# Patient Record
Sex: MALE | Race: WHITE | Employment: OTHER | URBAN - METROPOLITAN AREA
[De-identification: names, ages, dates, MRNs, and addresses within clinical notes are randomized per-mention and may not be internally consistent; named-entity substitution may affect disease eponyms.]

---

## 2018-06-08 ENCOUNTER — HOSPITAL ENCOUNTER (OUTPATIENT)
Age: 63
Discharge: HOME | End: 2018-06-10
Payer: MEDICARE

## 2018-06-08 ENCOUNTER — OFFICE VISIT (OUTPATIENT)
Dept: FAMILY MEDICINE CLINIC | Age: 63
End: 2018-06-08
Payer: MEDICARE

## 2018-06-08 VITALS
HEART RATE: 79 BPM | SYSTOLIC BLOOD PRESSURE: 118 MMHG | WEIGHT: 312 LBS | RESPIRATION RATE: 18 BRPM | OXYGEN SATURATION: 96 % | HEIGHT: 66 IN | BODY MASS INDEX: 50.14 KG/M2 | DIASTOLIC BLOOD PRESSURE: 80 MMHG

## 2018-06-08 DIAGNOSIS — I48.0 PAF (PAROXYSMAL ATRIAL FIBRILLATION) (HCC): ICD-10-CM

## 2018-06-08 DIAGNOSIS — E55.9 VITAMIN D DEFICIENCY: ICD-10-CM

## 2018-06-08 DIAGNOSIS — Z79.4 TYPE 2 DIABETES MELLITUS WITH HYPERGLYCEMIA, WITH LONG-TERM CURRENT USE OF INSULIN (HCC): ICD-10-CM

## 2018-06-08 DIAGNOSIS — E11.65 TYPE 2 DIABETES MELLITUS WITH HYPERGLYCEMIA, WITH LONG-TERM CURRENT USE OF INSULIN (HCC): ICD-10-CM

## 2018-06-08 DIAGNOSIS — E78.2 MIXED HYPERLIPIDEMIA: ICD-10-CM

## 2018-06-08 DIAGNOSIS — I25.10 CORONARY ARTERY DISEASE INVOLVING NATIVE CORONARY ARTERY OF NATIVE HEART WITHOUT ANGINA PECTORIS: ICD-10-CM

## 2018-06-08 DIAGNOSIS — I48.0 PAF (PAROXYSMAL ATRIAL FIBRILLATION) (HCC): Primary | ICD-10-CM

## 2018-06-08 DIAGNOSIS — I10 ESSENTIAL HYPERTENSION: ICD-10-CM

## 2018-06-08 DIAGNOSIS — K21.9 GASTROESOPHAGEAL REFLUX DISEASE WITHOUT ESOPHAGITIS: ICD-10-CM

## 2018-06-08 LAB
HBA1C MFR BLD: 9.4 %
INTERNATIONAL NORMALIZATION RATIO, POC: 2.3
PROTHROMBIN TIME, POC: NORMAL

## 2018-06-08 PROCEDURE — 99214 OFFICE O/P EST MOD 30 MIN: CPT | Performed by: FAMILY MEDICINE

## 2018-06-08 PROCEDURE — 82607 VITAMIN B-12: CPT

## 2018-06-08 PROCEDURE — G8510 SCR DEP NEG, NO PLAN REQD: HCPCS | Performed by: FAMILY MEDICINE

## 2018-06-08 PROCEDURE — G8427 DOCREV CUR MEDS BY ELIG CLIN: HCPCS | Performed by: FAMILY MEDICINE

## 2018-06-08 PROCEDURE — 83036 HEMOGLOBIN GLYCOSYLATED A1C: CPT | Performed by: FAMILY MEDICINE

## 2018-06-08 PROCEDURE — 3046F HEMOGLOBIN A1C LEVEL >9.0%: CPT | Performed by: FAMILY MEDICINE

## 2018-06-08 PROCEDURE — 85610 PROTHROMBIN TIME: CPT | Performed by: FAMILY MEDICINE

## 2018-06-08 PROCEDURE — 3017F COLORECTAL CA SCREEN DOC REV: CPT | Performed by: FAMILY MEDICINE

## 2018-06-08 PROCEDURE — 1036F TOBACCO NON-USER: CPT | Performed by: FAMILY MEDICINE

## 2018-06-08 PROCEDURE — 82746 ASSAY OF FOLIC ACID SERUM: CPT

## 2018-06-08 PROCEDURE — 84439 ASSAY OF FREE THYROXINE: CPT

## 2018-06-08 PROCEDURE — 36415 COLL VENOUS BLD VENIPUNCTURE: CPT

## 2018-06-08 PROCEDURE — G8417 CALC BMI ABV UP PARAM F/U: HCPCS | Performed by: FAMILY MEDICINE

## 2018-06-08 PROCEDURE — 80053 COMPREHEN METABOLIC PANEL: CPT

## 2018-06-08 PROCEDURE — 85025 COMPLETE CBC W/AUTO DIFF WBC: CPT

## 2018-06-08 PROCEDURE — 84443 ASSAY THYROID STIM HORMONE: CPT

## 2018-06-08 PROCEDURE — 82306 VITAMIN D 25 HYDROXY: CPT

## 2018-06-08 PROCEDURE — G8598 ASA/ANTIPLAT THER USED: HCPCS | Performed by: FAMILY MEDICINE

## 2018-06-08 PROCEDURE — 2022F DILAT RTA XM EVC RTNOPTHY: CPT | Performed by: FAMILY MEDICINE

## 2018-06-08 PROCEDURE — 80061 LIPID PANEL: CPT

## 2018-06-08 PROCEDURE — 82044 UR ALBUMIN SEMIQUANTITATIVE: CPT

## 2018-06-08 PROCEDURE — 83036 HEMOGLOBIN GLYCOSYLATED A1C: CPT

## 2018-06-08 RX ORDER — METOPROLOL SUCCINATE 25 MG/1
TABLET, EXTENDED RELEASE ORAL
Qty: 90 TABLET | Refills: 3 | Status: SHIPPED | OUTPATIENT
Start: 2018-06-08 | End: 2019-04-03 | Stop reason: SDUPTHER

## 2018-06-08 RX ORDER — WARFARIN SODIUM 5 MG/1
TABLET ORAL
Qty: 50 TABLET | Refills: 5 | Status: SHIPPED | OUTPATIENT
Start: 2018-06-08 | End: 2019-01-03 | Stop reason: SDUPTHER

## 2018-06-08 RX ORDER — PANTOPRAZOLE SODIUM 40 MG/1
TABLET, DELAYED RELEASE ORAL
Qty: 30 TABLET | Refills: 0 | Status: SHIPPED | OUTPATIENT
Start: 2018-06-08 | End: 2018-07-20 | Stop reason: SDUPTHER

## 2018-06-08 RX ORDER — PRAVASTATIN SODIUM 20 MG
20 TABLET ORAL DAILY
Qty: 90 TABLET | Refills: 3 | Status: ON HOLD | OUTPATIENT
Start: 2018-06-08 | End: 2019-06-25 | Stop reason: HOSPADM

## 2018-06-09 LAB
ALBUMIN SERPL-MCNC: 4.2 G/DL (ref 3.5–5.2)
ALP BLD-CCNC: 72 U/L (ref 40–129)
ALT SERPL-CCNC: 19 U/L (ref 0–40)
ANION GAP SERPL CALCULATED.3IONS-SCNC: 20 MMOL/L (ref 7–16)
AST SERPL-CCNC: 18 U/L (ref 0–39)
BASOPHILS ABSOLUTE: 0.06 E9/L (ref 0–0.2)
BASOPHILS RELATIVE PERCENT: 0.7 % (ref 0–2)
BILIRUB SERPL-MCNC: 0.5 MG/DL (ref 0–1.2)
BUN BLDV-MCNC: 18 MG/DL (ref 8–23)
CALCIUM SERPL-MCNC: 9.8 MG/DL (ref 8.6–10.2)
CHLORIDE BLD-SCNC: 99 MMOL/L (ref 98–107)
CHOLESTEROL, TOTAL: 219 MG/DL (ref 0–199)
CO2: 21 MMOL/L (ref 22–29)
CREAT SERPL-MCNC: 0.9 MG/DL (ref 0.7–1.2)
EOSINOPHILS ABSOLUTE: 0.1 E9/L (ref 0.05–0.5)
EOSINOPHILS RELATIVE PERCENT: 1.2 % (ref 0–6)
FOLATE: 17.9 NG/ML (ref 4.8–24.2)
GFR AFRICAN AMERICAN: >60
GFR NON-AFRICAN AMERICAN: >60 ML/MIN/1.73
GLUCOSE BLD-MCNC: 199 MG/DL (ref 74–109)
HBA1C MFR BLD: 9.5 % (ref 4.8–5.9)
HCT VFR BLD CALC: 50.9 % (ref 37–54)
HDLC SERPL-MCNC: 51 MG/DL
HEMOGLOBIN: 15.9 G/DL (ref 12.5–16.5)
IMMATURE GRANULOCYTES #: 0.05 E9/L
IMMATURE GRANULOCYTES %: 0.6 % (ref 0–5)
LDL CHOLESTEROL CALCULATED: 116 MG/DL (ref 0–99)
LYMPHOCYTES ABSOLUTE: 2.01 E9/L (ref 1.5–4)
LYMPHOCYTES RELATIVE PERCENT: 23.7 % (ref 20–42)
MCH RBC QN AUTO: 30.3 PG (ref 26–35)
MCHC RBC AUTO-ENTMCNC: 31.2 % (ref 32–34.5)
MCV RBC AUTO: 97 FL (ref 80–99.9)
MICROALBUMIN UR-MCNC: 23.3 MG/L
MONOCYTES ABSOLUTE: 0.66 E9/L (ref 0.1–0.95)
MONOCYTES RELATIVE PERCENT: 7.8 % (ref 2–12)
NEUTROPHILS ABSOLUTE: 5.59 E9/L (ref 1.8–7.3)
NEUTROPHILS RELATIVE PERCENT: 66 % (ref 43–80)
PDW BLD-RTO: 14.7 FL (ref 11.5–15)
PLATELET # BLD: 254 E9/L (ref 130–450)
PMV BLD AUTO: 10.4 FL (ref 7–12)
POTASSIUM SERPL-SCNC: 4.6 MMOL/L (ref 3.5–5)
RBC # BLD: 5.25 E12/L (ref 3.8–5.8)
SODIUM BLD-SCNC: 140 MMOL/L (ref 132–146)
T4 FREE: 1.15 NG/DL (ref 0.93–1.7)
TOTAL PROTEIN: 7.8 G/DL (ref 6.4–8.3)
TRIGL SERPL-MCNC: 260 MG/DL (ref 0–149)
TSH SERPL DL<=0.05 MIU/L-ACNC: 4.21 UIU/ML (ref 0.27–4.2)
VITAMIN B-12: 447 PG/ML (ref 211–946)
VITAMIN D 25-HYDROXY: 59 NG/ML (ref 30–100)
VLDLC SERPL CALC-MCNC: 52 MG/DL
WBC # BLD: 8.5 E9/L (ref 4.5–11.5)

## 2018-06-10 PROBLEM — E78.2 MIXED HYPERLIPIDEMIA: Status: ACTIVE | Noted: 2018-06-10

## 2018-06-10 PROBLEM — K21.9 GASTROESOPHAGEAL REFLUX DISEASE WITHOUT ESOPHAGITIS: Status: ACTIVE | Noted: 2018-06-10

## 2018-06-10 ASSESSMENT — ENCOUNTER SYMPTOMS
CHEST TIGHTNESS: 0
RECTAL PAIN: 0
PHOTOPHOBIA: 0
COUGH: 1
VOICE CHANGE: 0
RHINORRHEA: 0
EYE ITCHING: 0
TROUBLE SWALLOWING: 0
CHOKING: 0
DIARRHEA: 0
STRIDOR: 0
WHEEZING: 0
ABDOMINAL DISTENTION: 0
FACIAL SWELLING: 0
ANAL BLEEDING: 0
BLOOD IN STOOL: 0
COLOR CHANGE: 0
EYE DISCHARGE: 0
CONSTIPATION: 0
EYE REDNESS: 0
APNEA: 0
BACK PAIN: 0
HEARTBURN: 1
EYE PAIN: 0

## 2018-06-10 ASSESSMENT — PATIENT HEALTH QUESTIONNAIRE - PHQ9
SUM OF ALL RESPONSES TO PHQ9 QUESTIONS 1 & 2: 0
1. LITTLE INTEREST OR PLEASURE IN DOING THINGS: 0
2. FEELING DOWN, DEPRESSED OR HOPELESS: 0
SUM OF ALL RESPONSES TO PHQ QUESTIONS 1-9: 0

## 2018-06-19 DIAGNOSIS — E03.9 ACQUIRED HYPOTHYROIDISM: ICD-10-CM

## 2018-06-19 DIAGNOSIS — E03.9 HYPOTHYROIDISM, UNSPECIFIED TYPE: Primary | ICD-10-CM

## 2018-06-19 DIAGNOSIS — E55.9 VITAMIN D DEFICIENCY: ICD-10-CM

## 2018-06-19 RX ORDER — LEVOTHYROXINE SODIUM 0.03 MG/1
25 TABLET ORAL DAILY
Qty: 30 TABLET | Refills: 5 | Status: SHIPPED | OUTPATIENT
Start: 2018-06-19 | End: 2019-01-03

## 2018-07-24 RX ORDER — PANTOPRAZOLE SODIUM 40 MG/1
TABLET, DELAYED RELEASE ORAL
Qty: 30 TABLET | Refills: 2 | Status: SHIPPED | OUTPATIENT
Start: 2018-07-24 | End: 2018-10-14 | Stop reason: SDUPTHER

## 2018-08-07 ENCOUNTER — OFFICE VISIT (OUTPATIENT)
Dept: CARDIOLOGY CLINIC | Age: 63
End: 2018-08-07
Payer: MEDICARE

## 2018-08-07 VITALS
DIASTOLIC BLOOD PRESSURE: 78 MMHG | BODY MASS INDEX: 49.82 KG/M2 | HEART RATE: 79 BPM | WEIGHT: 310 LBS | SYSTOLIC BLOOD PRESSURE: 118 MMHG | HEIGHT: 66 IN

## 2018-08-07 DIAGNOSIS — Z72.0 TOBACCO ABUSE: ICD-10-CM

## 2018-08-07 DIAGNOSIS — Z98.890 HISTORY OF MITRAL VALVE REPAIR: ICD-10-CM

## 2018-08-07 DIAGNOSIS — Z79.4 TYPE 2 DIABETES MELLITUS WITH HYPERGLYCEMIA, WITH LONG-TERM CURRENT USE OF INSULIN (HCC): ICD-10-CM

## 2018-08-07 DIAGNOSIS — Z95.1 S/P CABG X 2: ICD-10-CM

## 2018-08-07 DIAGNOSIS — I10 ESSENTIAL HYPERTENSION: ICD-10-CM

## 2018-08-07 DIAGNOSIS — E11.65 TYPE 2 DIABETES MELLITUS WITH HYPERGLYCEMIA, WITH LONG-TERM CURRENT USE OF INSULIN (HCC): ICD-10-CM

## 2018-08-07 DIAGNOSIS — I48.0 PAF (PAROXYSMAL ATRIAL FIBRILLATION) (HCC): ICD-10-CM

## 2018-08-07 DIAGNOSIS — E66.01 MORBID OBESITY WITH BMI OF 50.0-59.9, ADULT (HCC): ICD-10-CM

## 2018-08-07 DIAGNOSIS — I25.10 CORONARY ARTERY DISEASE INVOLVING NATIVE CORONARY ARTERY OF NATIVE HEART WITHOUT ANGINA PECTORIS: Primary | ICD-10-CM

## 2018-08-07 PROCEDURE — G8417 CALC BMI ABV UP PARAM F/U: HCPCS | Performed by: INTERNAL MEDICINE

## 2018-08-07 PROCEDURE — 93000 ELECTROCARDIOGRAM COMPLETE: CPT | Performed by: INTERNAL MEDICINE

## 2018-08-07 PROCEDURE — G8598 ASA/ANTIPLAT THER USED: HCPCS | Performed by: INTERNAL MEDICINE

## 2018-08-07 PROCEDURE — 3046F HEMOGLOBIN A1C LEVEL >9.0%: CPT | Performed by: INTERNAL MEDICINE

## 2018-08-07 PROCEDURE — 1036F TOBACCO NON-USER: CPT | Performed by: INTERNAL MEDICINE

## 2018-08-07 PROCEDURE — G8427 DOCREV CUR MEDS BY ELIG CLIN: HCPCS | Performed by: INTERNAL MEDICINE

## 2018-08-07 PROCEDURE — 3017F COLORECTAL CA SCREEN DOC REV: CPT | Performed by: INTERNAL MEDICINE

## 2018-08-07 PROCEDURE — 2022F DILAT RTA XM EVC RTNOPTHY: CPT | Performed by: INTERNAL MEDICINE

## 2018-08-07 PROCEDURE — 99214 OFFICE O/P EST MOD 30 MIN: CPT | Performed by: INTERNAL MEDICINE

## 2018-08-07 NOTE — PROGRESS NOTES
12.5-500 MG TABS, Take 1 tablet by mouth daily, Disp: 30 tablet, Rfl: 5    glucose blood VI test strips (ASCENSIA AUTODISC VI;ONE TOUCH ULTRA TEST VI) strip, Test tid prn, Disp: 100 strip, Rfl: 5    glucose blood VI test strips (FREESTYLE LITE) strip, TEST THREE TIMES DAILY, Disp: 100 strip, Rfl: 5    tiotropium (SPIRIVA RESPIMAT) 2.5 MCG/ACT AERS inhaler, Inhale 1 puff into the lungs daily, Disp: 3 Inhaler, Rfl: 12    Blood Glucose Monitoring Suppl (FREESTYLE LITE) MEDINA, , Disp: , Rfl:     CPAP Machine MISC, by Does not apply route nightly as needed , Disp: , Rfl:     Accu-Chek Multiclix Lancets MISC, Use as directed, Disp: 100 each, Rfl: 3    aspirin 81 MG tablet, Take 81 mg by mouth daily. , Disp: , Rfl:       S: REASON FOR VISIT:       Chief Complaint   Patient presents with    Atrial Fibrillation     6 month. Pt has no cardiac complaints today          History of Present Illness:       Office Visit for follow up of CAD, A Fib, VHD     No hospitalizations or surgeries since last visit     Cory any exertional chest pain or short of breath   No palpitations, dizzy or syncope.    Active at home   No orthopnea   Try to watch diet   Compliant with all medications       Past Medical History:   Diagnosis Date    Acid reflux     Arthritis     Asthma 4/16/2014    Asthmatic bronchitis , chronic (HCC) 11/28/2016    CAD (coronary artery disease)     [facility]itis ([facility] 4/16/2014    COPD [facility]ch[facility] obstructive [facility]     CB    COPD (chronic obstructive pulmonary disease) [facility]     Diabetes mellitus ([facility]     Emphysema (subcutaneous) (surgical) resulting from a procedure     Hyperlipidemia     Hypertension     Hypoxemia requiring supplemental oxygen 2/2/2015    LONG TERM ANTICOAGULENT USE     Morbid obesity with BMI of 50.0-59.9, adult ([facility] 11/27/2013    Obesity     Osteoarthritis     Sleep apnea     bilevel positive airway pressure at 13/8 with 2 L oxygen flow     Tobacco abuse polydyspsia  Genitourinary:negative for dysuria and hematuria  Derm: negative for rash and skin lesion(s)  Neurological: negative for tingling, numbness, weakness, seizures and tremors  Endocrine: negative for polydipsia and polyuria  Musculoskeletal: negative for pain or tenderness  Psychiatric: negative for anxiety, depression, or suicidal ideations         O:  COMPLETE PHYSICAL EXAM:       /78   Pulse 79   Ht 5' 6\" (1.676 m)   Wt (!) 310 lb (140.6 kg)   BMI 50.04 kg/m²       General:   Patient alert, comfortable, no distress. Appears stated age. HEENT:    Pupils equal, no icterus, no nasal drainage, tongue moist.   Neck:              No masses, Thyroid not palpable. Chest:   Normal configuration, non tender. Lungs:   Clear to auscultation bilaterally, few scattered rhonchi. Cardiovascular:  Regular rhythm, 1/6 systolic murmur, No S3, no palpable thrills, No elevated JVD, No carotid bruit. Abdomen:  Soft, Non tender, Bowel sounds normal, no pulsatile abdominal aorta, no palpable masses. Extremities:  No edema. Distal pulses palpable. No cyanosis, no clubbing. Skin:   Good turgor, warm and dry, no cyanosis. Musculoskeletal: No joint swelling or deformity. Neuro:   Cranial nerves grossly intact; No focal neurologic deficit. Psych:   Alert, good mood and effect. REVIEW OF DIAGNOSTIC TESTS:        Electrocardiogram: Atrial paced             A/P:   ASSESSMENT / PLAN:    Spring Riddle was seen today for atrial fibrillation.     Diagnoses and all orders for this visit:      Coronary artery disease involving native coronary artery of native heart without angina pectoris: On ASA, BB, statins  -     EKG 12 Lead     S/P CABG (coronary artery bypass graft) x2 9/2013-Dr Aden     S/P MVR (mitral valve repair) 2013     PAF (paroxysmal atrial fibrillation) ([facility] on Coumadin    Nonrheumatic aortic valve stenosis, mild     DE PAZ (dyspnea on exertion)     Abnormal resting ECG findings     Controlled type

## 2018-09-10 ENCOUNTER — OFFICE VISIT (OUTPATIENT)
Dept: FAMILY MEDICINE CLINIC | Age: 63
End: 2018-09-10
Payer: MEDICARE

## 2018-09-10 ENCOUNTER — HOSPITAL ENCOUNTER (OUTPATIENT)
Age: 63
Discharge: HOME | End: 2018-09-12
Payer: MEDICARE

## 2018-09-10 VITALS
WEIGHT: 315 LBS | OXYGEN SATURATION: 97 % | HEART RATE: 76 BPM | DIASTOLIC BLOOD PRESSURE: 80 MMHG | BODY MASS INDEX: 51.65 KG/M2 | SYSTOLIC BLOOD PRESSURE: 124 MMHG

## 2018-09-10 DIAGNOSIS — Z79.4 TYPE 2 DIABETES MELLITUS WITH HYPERGLYCEMIA, WITH LONG-TERM CURRENT USE OF INSULIN (HCC): ICD-10-CM

## 2018-09-10 DIAGNOSIS — I48.0 PAF (PAROXYSMAL ATRIAL FIBRILLATION) (HCC): Primary | ICD-10-CM

## 2018-09-10 DIAGNOSIS — R06.02 SOB (SHORTNESS OF BREATH): ICD-10-CM

## 2018-09-10 DIAGNOSIS — F17.211 CIGARETTE NICOTINE DEPENDENCE IN REMISSION: ICD-10-CM

## 2018-09-10 DIAGNOSIS — I48.0 PAF (PAROXYSMAL ATRIAL FIBRILLATION) (HCC): ICD-10-CM

## 2018-09-10 DIAGNOSIS — E11.65 TYPE 2 DIABETES MELLITUS WITH HYPERGLYCEMIA, WITH LONG-TERM CURRENT USE OF INSULIN (HCC): ICD-10-CM

## 2018-09-10 DIAGNOSIS — Z87.891 PERSONAL HISTORY OF TOBACCO USE: ICD-10-CM

## 2018-09-10 DIAGNOSIS — G25.81 RESTLESS LEG SYNDROME: ICD-10-CM

## 2018-09-10 LAB
BASOPHILS ABSOLUTE: 0.04 E9/L (ref 0–0.2)
BASOPHILS RELATIVE PERCENT: 0.5 % (ref 0–2)
EOSINOPHILS ABSOLUTE: 0.05 E9/L (ref 0.05–0.5)
EOSINOPHILS RELATIVE PERCENT: 0.6 % (ref 0–6)
HBA1C MFR BLD: 10.4 %
HCT VFR BLD CALC: 46.1 % (ref 37–54)
HEMOGLOBIN: 14.2 G/DL (ref 12.5–16.5)
IMMATURE GRANULOCYTES #: 0.05 E9/L
IMMATURE GRANULOCYTES %: 0.6 % (ref 0–5)
INTERNATIONAL NORMALIZATION RATIO, POC: 1.9
LYMPHOCYTES ABSOLUTE: 1.53 E9/L (ref 1.5–4)
LYMPHOCYTES RELATIVE PERCENT: 17.8 % (ref 20–42)
MCH RBC QN AUTO: 30.4 PG (ref 26–35)
MCHC RBC AUTO-ENTMCNC: 30.8 % (ref 32–34.5)
MCV RBC AUTO: 98.7 FL (ref 80–99.9)
MONOCYTES ABSOLUTE: 0.54 E9/L (ref 0.1–0.95)
MONOCYTES RELATIVE PERCENT: 6.3 % (ref 2–12)
NEUTROPHILS ABSOLUTE: 6.37 E9/L (ref 1.8–7.3)
NEUTROPHILS RELATIVE PERCENT: 74.2 % (ref 43–80)
PDW BLD-RTO: 15.3 FL (ref 11.5–15)
PLATELET # BLD: 237 E9/L (ref 130–450)
PMV BLD AUTO: 10.3 FL (ref 7–12)
PROTHROMBIN TIME, POC: NORMAL
RBC # BLD: 4.67 E12/L (ref 3.8–5.8)
WBC # BLD: 8.6 E9/L (ref 4.5–11.5)

## 2018-09-10 PROCEDURE — 1036F TOBACCO NON-USER: CPT | Performed by: FAMILY MEDICINE

## 2018-09-10 PROCEDURE — G8598 ASA/ANTIPLAT THER USED: HCPCS | Performed by: FAMILY MEDICINE

## 2018-09-10 PROCEDURE — 3046F HEMOGLOBIN A1C LEVEL >9.0%: CPT | Performed by: FAMILY MEDICINE

## 2018-09-10 PROCEDURE — G8510 SCR DEP NEG, NO PLAN REQD: HCPCS | Performed by: FAMILY MEDICINE

## 2018-09-10 PROCEDURE — G0296 VISIT TO DETERM LDCT ELIG: HCPCS | Performed by: FAMILY MEDICINE

## 2018-09-10 PROCEDURE — 84443 ASSAY THYROID STIM HORMONE: CPT

## 2018-09-10 PROCEDURE — 80053 COMPREHEN METABOLIC PANEL: CPT

## 2018-09-10 PROCEDURE — 2022F DILAT RTA XM EVC RTNOPTHY: CPT | Performed by: FAMILY MEDICINE

## 2018-09-10 PROCEDURE — G8427 DOCREV CUR MEDS BY ELIG CLIN: HCPCS | Performed by: FAMILY MEDICINE

## 2018-09-10 PROCEDURE — 3017F COLORECTAL CA SCREEN DOC REV: CPT | Performed by: FAMILY MEDICINE

## 2018-09-10 PROCEDURE — 83036 HEMOGLOBIN GLYCOSYLATED A1C: CPT

## 2018-09-10 PROCEDURE — 99214 OFFICE O/P EST MOD 30 MIN: CPT | Performed by: FAMILY MEDICINE

## 2018-09-10 PROCEDURE — 80061 LIPID PANEL: CPT

## 2018-09-10 PROCEDURE — 83036 HEMOGLOBIN GLYCOSYLATED A1C: CPT | Performed by: FAMILY MEDICINE

## 2018-09-10 PROCEDURE — 85025 COMPLETE CBC W/AUTO DIFF WBC: CPT

## 2018-09-10 PROCEDURE — G8417 CALC BMI ABV UP PARAM F/U: HCPCS | Performed by: FAMILY MEDICINE

## 2018-09-10 PROCEDURE — 85610 PROTHROMBIN TIME: CPT | Performed by: FAMILY MEDICINE

## 2018-09-10 RX ORDER — PRAMIPEXOLE DIHYDROCHLORIDE 0.25 MG/1
0.25 TABLET ORAL NIGHTLY
Qty: 30 TABLET | Refills: 5 | Status: SHIPPED | OUTPATIENT
Start: 2018-09-10 | End: 2019-02-26 | Stop reason: SDUPTHER

## 2018-09-10 ASSESSMENT — PATIENT HEALTH QUESTIONNAIRE - PHQ9
1. LITTLE INTEREST OR PLEASURE IN DOING THINGS: 0
SUM OF ALL RESPONSES TO PHQ9 QUESTIONS 1 & 2: 0
SUM OF ALL RESPONSES TO PHQ QUESTIONS 1-9: 0
2. FEELING DOWN, DEPRESSED OR HOPELESS: 0
SUM OF ALL RESPONSES TO PHQ QUESTIONS 1-9: 0
1. LITTLE INTEREST OR PLEASURE IN DOING THINGS: 0
SUM OF ALL RESPONSES TO PHQ QUESTIONS 1-9: 0
2. FEELING DOWN, DEPRESSED OR HOPELESS: 0
SUM OF ALL RESPONSES TO PHQ9 QUESTIONS 1 & 2: 0
SUM OF ALL RESPONSES TO PHQ QUESTIONS 1-9: 0

## 2018-09-11 ENCOUNTER — TELEPHONE (OUTPATIENT)
Dept: CASE MANAGEMENT | Age: 63
End: 2018-09-11

## 2018-09-11 ENCOUNTER — TELEPHONE (OUTPATIENT)
Dept: FAMILY MEDICINE CLINIC | Age: 63
End: 2018-09-11

## 2018-09-11 LAB
ALBUMIN SERPL-MCNC: 4 G/DL (ref 3.5–5.2)
ALP BLD-CCNC: 86 U/L (ref 40–129)
ALT SERPL-CCNC: 44 U/L (ref 0–40)
ANION GAP SERPL CALCULATED.3IONS-SCNC: 16 MMOL/L (ref 7–16)
AST SERPL-CCNC: 25 U/L (ref 0–39)
BILIRUB SERPL-MCNC: 0.8 MG/DL (ref 0–1.2)
BUN BLDV-MCNC: 12 MG/DL (ref 8–23)
CALCIUM SERPL-MCNC: 9.9 MG/DL (ref 8.6–10.2)
CHLORIDE BLD-SCNC: 98 MMOL/L (ref 98–107)
CHOLESTEROL, TOTAL: 202 MG/DL (ref 0–199)
CO2: 26 MMOL/L (ref 22–29)
CREAT SERPL-MCNC: 0.8 MG/DL (ref 0.7–1.2)
GFR AFRICAN AMERICAN: >60
GFR NON-AFRICAN AMERICAN: >60 ML/MIN/1.73
GLUCOSE BLD-MCNC: 243 MG/DL (ref 74–109)
HBA1C MFR BLD: 10.4 % (ref 4–5.6)
HDLC SERPL-MCNC: 44 MG/DL
LDL CHOLESTEROL CALCULATED: 113 MG/DL (ref 0–99)
POTASSIUM SERPL-SCNC: 4.6 MMOL/L (ref 3.5–5)
SODIUM BLD-SCNC: 140 MMOL/L (ref 132–146)
TOTAL PROTEIN: 7.2 G/DL (ref 6.4–8.3)
TRIGL SERPL-MCNC: 223 MG/DL (ref 0–149)
TSH SERPL DL<=0.05 MIU/L-ACNC: 3.26 UIU/ML (ref 0.27–4.2)
VLDLC SERPL CALC-MCNC: 45 MG/DL

## 2018-09-11 ASSESSMENT — ENCOUNTER SYMPTOMS
EYE ITCHING: 0
COLOR CHANGE: 0
APNEA: 0
CHOKING: 0
EYE PAIN: 0
VOICE CHANGE: 0
SINUS PRESSURE: 0
CONSTIPATION: 0
BACK PAIN: 0
FACIAL SWELLING: 0
SORE THROAT: 0
RHINORRHEA: 0
WHEEZING: 0
EYE REDNESS: 0
RECTAL PAIN: 0
SHORTNESS OF BREATH: 1
ANAL BLEEDING: 0
COUGH: 0
ABDOMINAL PAIN: 0
PHOTOPHOBIA: 0
STRIDOR: 0
EYE DISCHARGE: 0
NAUSEA: 0
DIARRHEA: 0
CHEST TIGHTNESS: 0
HEARTBURN: 1
BLOOD IN STOOL: 0
VOMITING: 0
ABDOMINAL DISTENTION: 0
TROUBLE SWALLOWING: 0

## 2018-09-11 NOTE — PROGRESS NOTES
Subjective:      Patient ID: Camron Treviño is a 58 y.o. male. Shortness of Breath   This is a chronic problem. The current episode started 1 to 4 weeks ago. The problem occurs daily. The problem has been waxing and waning. Pertinent negatives include no abdominal pain, chest pain, ear pain, fever, headaches, leg swelling, neck pain, rash, rhinorrhea, sore throat, vomiting or wheezing. The symptoms are aggravated by any activity. The patient has no known risk factors for DVT/PE. He has tried steroid inhalers and beta agonist inhalers for the symptoms. The treatment provided mild relief. Other   This is a recurrent (restless leg) problem. The current episode started more than 1 month ago. The problem occurs daily. The problem has been waxing and waning. Associated symptoms include arthralgias, fatigue and urinary symptoms. Pertinent negatives include no abdominal pain, chest pain, chills, congestion, coughing, diaphoresis, fever, headaches, joint swelling, myalgias, nausea, neck pain, numbness, rash, sore throat, vomiting or weakness. Nothing aggravates the symptoms. He has tried nothing for the symptoms. The treatment provided no relief. Fatigue   This is a chronic problem. The current episode started more than 1 year ago. The problem occurs daily. The problem has been waxing and waning. Associated symptoms include arthralgias, fatigue and urinary symptoms. Pertinent negatives include no abdominal pain, chest pain, chills, congestion, coughing, diaphoresis, fever, headaches, joint swelling, myalgias, nausea, neck pain, numbness, rash, sore throat, vomiting or weakness. Nothing aggravates the symptoms. He has tried nothing for the symptoms. The treatment provided no relief. Diabetes   He presents for his follow-up diabetic visit. He has type 2 diabetes mellitus. No MedicAlert identification noted. His disease course has been worsening. There are no hypoglycemic associated symptoms.  Pertinent negatives for today, and weight loss plan recommended is : medically supervised diet with primary care physician. reviewed health maintenance report. Patient is aware of deficiencies and suggested preventative tests.

## 2018-09-25 ENCOUNTER — OFFICE VISIT (OUTPATIENT)
Dept: PULMONOLOGY | Age: 63
End: 2018-09-25
Payer: MEDICARE

## 2018-09-25 VITALS
OXYGEN SATURATION: 91 % | HEIGHT: 66 IN | HEART RATE: 85 BPM | DIASTOLIC BLOOD PRESSURE: 80 MMHG | SYSTOLIC BLOOD PRESSURE: 124 MMHG | WEIGHT: 312 LBS | TEMPERATURE: 97.6 F | BODY MASS INDEX: 50.14 KG/M2 | RESPIRATION RATE: 22 BRPM

## 2018-09-25 DIAGNOSIS — Z72.0 TOBACCO ABUSE: ICD-10-CM

## 2018-09-25 DIAGNOSIS — E66.01 MORBID OBESITY WITH BMI OF 50.0-59.9, ADULT (HCC): ICD-10-CM

## 2018-09-25 DIAGNOSIS — J45.909 SEVERE ASTHMA WITHOUT COMPLICATION, UNSPECIFIED WHETHER PERSISTENT: Primary | ICD-10-CM

## 2018-09-25 DIAGNOSIS — G47.30 SLEEP APNEA, UNSPECIFIED TYPE: ICD-10-CM

## 2018-09-25 DIAGNOSIS — J42 CHRONIC BRONCHITIS, UNSPECIFIED CHRONIC BRONCHITIS TYPE (HCC): ICD-10-CM

## 2018-09-25 DIAGNOSIS — J44.9 ASTHMATIC BRONCHITIS , CHRONIC (HCC): ICD-10-CM

## 2018-09-25 LAB
DLCO %PRED: NORMAL
DLCO PRE: NORMAL
FEF 25-75%-POST: 1.7
FEF 25-75%-PRE: 1.24
FEV1-POST: 2.47
FEV1-PRE: 2.28
FEV1/FVC-POST: 72
FEV1/FVC-PRE: 66
FVC-POST: 3.44
FVC-PRE: 3.45
MEP: NORMAL
MIP: NORMAL
TLC %PRED: NORMAL
TLC PRE: NORMAL

## 2018-09-25 PROCEDURE — G8417 CALC BMI ABV UP PARAM F/U: HCPCS | Performed by: INTERNAL MEDICINE

## 2018-09-25 PROCEDURE — 99213 OFFICE O/P EST LOW 20 MIN: CPT | Performed by: INTERNAL MEDICINE

## 2018-09-25 PROCEDURE — 1036F TOBACCO NON-USER: CPT | Performed by: INTERNAL MEDICINE

## 2018-09-25 PROCEDURE — G8427 DOCREV CUR MEDS BY ELIG CLIN: HCPCS | Performed by: INTERNAL MEDICINE

## 2018-09-25 PROCEDURE — 94060 EVALUATION OF WHEEZING: CPT | Performed by: INTERNAL MEDICINE

## 2018-09-25 PROCEDURE — G8598 ASA/ANTIPLAT THER USED: HCPCS | Performed by: INTERNAL MEDICINE

## 2018-09-25 PROCEDURE — 3023F SPIROM DOC REV: CPT | Performed by: INTERNAL MEDICINE

## 2018-09-25 PROCEDURE — G8925 SPIR FEV1/FVC>=60% & NO COPD: HCPCS | Performed by: INTERNAL MEDICINE

## 2018-09-25 PROCEDURE — 3017F COLORECTAL CA SCREEN DOC REV: CPT | Performed by: INTERNAL MEDICINE

## 2018-09-25 PROCEDURE — 99215 OFFICE O/P EST HI 40 MIN: CPT | Performed by: INTERNAL MEDICINE

## 2018-09-25 ASSESSMENT — PULMONARY FUNCTION TESTS
FEV1_POST: 2.47
FEV1/FVC_POST: 72
FVC_POST: 3.44
FEV1_PRE: 2.28
FEV1/FVC_PRE: 66
FVC_PRE: 3.45

## 2018-09-25 NOTE — PROGRESS NOTES
Pulmonary [address] [facility]    Department of Internal Medicine  Division of Pulmonary, Critical Care and Sleep Medicine      Dear Manuel Hackett, DO    We had the pleasure of seeing Hu Combs, in the [address] at [facility] regarding his LISS & Chronic Asthmatic Bronchitis (COPD)     HISTORY OF PRESENT ILLNESS:  Hu Combs is a 58 y.o. male with a past medical history of Chronic bronchitis from his one pack per day smoker, stopped with MI 7/22/2013, Hypertension, Type II diabetes mellitus, Morbid Obesity, LISS, Sinus surgery and herniorrhaphy, No family history of premature vascular disease, Allergy to penicillin and sulfa drugs, [facility] admission, 07/23/2013 with two to three days of intermittent exertional chest discomfort. A prolonged episode occurred on the day of admission associated with inferior ST elevation and reciprocal lateral ST depression. Urgent cardiac catheterization, 07/23/2013. LV mild inferior hypokinesis, EF 55%. [facility] eccentric focal 65% distal stenosis. LAD and CX normal. OM1 50% ostial and proximal stenosis. RCA 25% mid stenosis, 99% focal distal stenosis. PCI distal RCA, 07/23/2013. 3.5 x 12 mm Vision BMS, no residual stenosis. Surgical consultation, Dr. Kael Boykin, 07/23/2013. Definitive plans were to have him take aspirin and Plavix for a month then undergo elective CABG one week after discontinuation of these medications. Unfourtunely he had a MI just toward the end of this regimen and he was taken to the OR and had CABG and mitral valve repair 09/09/2013. Postoperatively, he had pulmonary difficulties related to obesity/COPD. At discharge, he was in atrial fibrillation and is on a weaning dose of amiodarone. On today's visit, Hu Combs is just ok but has no chest pain, but is experiencing worsening or declining SOB. He has no pleurisy, hemoptysis. He has no CP, but has noted leg swelling.    Mr. Marilyn Mccurdy is semi - compliant with BiPAP with oxygen. He had issues with billing from [employer]. His sleep study showed excessive daytime sleepiness, hypersomnolence indicated by a sleep onset of 13 minutes, severe reduced sleep efficiency, severe sleep apnea syndrome with an apnea/hypopnea index of 60 and this was corrected with start bilevel positive airway pressure at 13/8 with 2 liters oxygen flow. No new complaints. His pulmonary function test remain stable and he quit smoking after his heart attack, but still has cravings to smoke- support provided. He is to continue with the nuMVC 99. He can use the nebulizer as needed. He has overnight oxygen testing which is 40% desaturations. ALLERGIES:    Allergies   Allergen Reactions    Pcn [Penicillins]      Child went to hospital   ? reaction    Sulfa Antibiotics      ?        PAST MEDICAL HISTORY:       Diagnosis Date    Acid reflux     Arthritis     Asthma 4/16/2014    Asthmatic bronchitis , chronic ([facility]) 11/28/2016    CAD (coronary artery disease)     [facility] 4/16/2014    COPD (chronic obstructive pulmonary disease) ([facility])     CB    COPD (chronic obstructive pulmonary disease) ([facility]     Diabetes mellitus ([facility])     Emphysema (subcutaneous) (surgical) resulting from a procedure     Hyperlipidemia     Hypertension     Hypoxemia requiring supplemental oxygen 2/2/2015    LONG TERM ANTICOAGULENT USE     Morbid obesity with BMI of 50.0-59.9, adult ([facility] 11/27/2013    Obesity     Osteoarthritis     Sleep apnea     bilevel positive airway pressure at 13/8 with 2 L oxygen flow     Tobacco abuse     Type II or unspecified type diabetes mellitus without mention of complication, not stated as uncontrolled         MEDICATIONS:   Current Outpatient Prescriptions   Medication Sig Dispense Refill    SYMBICORT 160-4.5 MCG/ACT AERO Inhale 2 puffs into the lungs 2 times daily 1 Inhaler 3    tiotropium (SPIRIVA RESPIMAT) 2.5 MCG/ACT AERS inhaler Inhale 1 puff into the lungs daily 3 Inhaler 12    gabapentin (NEURONTIN) 600 MG tablet TAKE ONE TABLET BY MOUTH TWO TIMES A DAY 60 tablet 3    insulin glargine (BASAGLAR KWIKPEN) 100 UNIT/ML injection pen Inject 20 Units into the skin nightly 5 pen 5    glycopyrrolate-formoterol (BEVESPI AEROSPHERE) 9-4.8 MCG/ACT AERO Inhale 2 puffs into the lungs 2 times daily 1 Inhaler 0    pramipexole (MIRAPEX) 0.25 MG tablet Take 1 tablet by mouth nightly 30 tablet 5    montelukast (SINGULAIR) 10 MG tablet TAKE ONE TABLET BY MOUTH EVERY NIGHT 30 tablet 3    blood glucose test strips (ASCENSIA AUTODISC VI;ONE TOUCH ULTRA TEST VI) strip Test tid prn 100 strip 5    pantoprazole (PROTONIX) 40 MG tablet TAKE ONE TABLET BY MOUTH EVERY DAY WITH BREAKFAST 30 tablet 2    levothyroxine (SYNTHROID) 25 MCG tablet Take 1 tablet by mouth Daily 30 tablet 5    vitamin D (D3-50) 81501 UNIT CAPS TAKE 1 CAPSULE BY MOUTH ONCE EVERY 7 DAYS 4 capsule 5    metoprolol succinate (TOPROL XL) 25 MG extended release tablet TAKE ONE TABLET BY MOUTH EVERY DAY 90 tablet 3    pravastatin (PRAVACHOL) 20 MG tablet Take 1 tablet by mouth daily 90 tablet 3    warfarin (JANTOVEN) 5 MG tablet TAKE 2 TABLETS BY MOUTH DAILY ON MONDAY, WEDNESDAY, FRIDAY AND SATURDAY.  THEN TAKE 1 TABLET DAILY ON TUESDAY, THURSDAY AND SUNDAY 50 tablet 5    ranitidine (ZANTAC) 300 MG tablet TAKE ONE TABLET BY MOUTH EVERY NIGHT 30 tablet 4    potassium chloride (KLOR-CON M) 20 MEQ extended release tablet Take 1 tablet by mouth daily 60 tablet 5    ipratropium (ATROVENT) 0.06 % nasal spray 2 sprays by Nasal route 3 times daily 1 Bottle 5    furosemide (LASIX) 40 MG tablet TAKE ONE-HALF TABLET BY MOUTH DAILY 30 tablet 4    glucose blood VI test strips (FREESTYLE LITE) strip TEST THREE TIMES DAILY 100 strip 5    Blood Glucose Monitoring Suppl (FREESTYLE LITE) MEDINA       CPAP Machine MISC by Does not apply route nightly as needed       Accu-Chek Multiclix Lancets MISC Use as directed 100 each 3    aspirin 81 MG tablet Take 81 mg by mouth daily. No current facility-administered medications for this visit. SOCIAL AND OCCUPATIONAL HEALTH:  The patient quit smoking with he quit in 7/2013. He smoked 1 ppd for 45 years. There is no history of TB or TB exposure. There is no asbestos or silica dust exposure. The patient reports no coal, foundry, quarry or [employer] exposure. There is no recent travel history noted. The patient denies a history of recreational or IV drug use. No hot tub exposure. The patient has no pets. Hobbies include Jehovah's witness. The patient denies excessive alcohol intake. SOCIAL HISTORY: Age-appropriate past and current activities are:  Social History   Substance Use Topics    Smoking status: Former Smoker     Packs/day: 1.00     Years: 45.00     Types: Cigarettes     Quit date: 7/22/2013    Smokeless tobacco: Former User     Types: Chew     Quit date: 10/8/1971    Alcohol use No      Comment: drinks 2 cups of coffee daily       SURGICAL HISTORY:   Past Surgical History:   Procedure Laterality Date    COLONOSCOPY      CORONARY ANGIOPLASTY WITH STENT PLACEMENT  07-23-13    Left main focal eccentric 65% distal stenosis. Large OM1 CX 50% ostial & prox narrow. Large ramus artery & LAD: Minor plaque w/o sign narrow. RCA: Dom vessel w/25% prox narrow & focal hazy eccentric 99% distal stenosis before origin RPDA & RPLCA. LV: Mild inferior hypokinesis EF 55%. Probable mild AS with 10-15mmHg. Successful PCI distal RCA w/3.5 x 12 mm BMS, 0% res stenosis.  Normal distal runoff    CORONARY ARTERY BYPASS GRAFT  09-09-13    Dr Scarlet Cruz; CABG x2, LIMA to LAD, SVG to ramus intermedius; MV repair using 30-mm Future complete ring with magic stitch between A3 and P3; rigid internal fixation of sternum using KLS plates x2; endoscopic vein harvesting of right lower extremity     ECHO COMPL W DOP COLOR FLOW  7/25/2013         HERNIA REPAIR      SINUS SURGERY         FAMILY HISTORY: A review of medical events in the patient's family , including disease which may be hereditary or place the patient at risk were reviewed. Family History   Problem Relation Age of Onset    Cancer Mother         breast    Cancer Father         stomach    Mental Illness Brother     Stroke Brother     Other Brother       Family Status   Relation Status    Mother     Father     Sister     Brother Alive    Brother         REVIEW OF SYSTEMS: The patients health assessment form was reviewed. PHYSICAL EXAMINATION:   Vitals:    18 1445   BP: 124/80   Pulse: 85   Resp: 22   Temp: 97.6 °F (36.4 °C)   TempSrc: Oral   SpO2: 91%   Weight: (!) 312 lb (141.5 kg)   Height: 5' 6\" (1.676 m)     Constitutional: A morbidly obese  58 y.o. male who is alert, oriented, cooperative and in no apparent distress. Head was normocephalic and atraumatic. EENT: EOMI MAK. MMM. No icterus. No conjunctival injections. External canals are patent and no discharge. Septum was midline, mucosa was without erythema, exudates or cobblestoning. No thrush was noted. Neck: Supple without thyromegaly. No elevated JVP. Trachea was midline. No carotid bruits. Redundent pharyngeal tissues. Respiratory: Symmetrical without dullness to percussion. Clear to auscultation. No wheezes, rhonchi or rales. No intercostal retraction or use of accessory muscles. No egophony. Cardiovascular: Nonpalpable PMI. Regular without murmur, clicks, gallops or rubs. No left or right ventricular heave. Sternotomy   Pulses:  Equal bilaterally. Abdomen: Obese, rounded and soft without organomegaly. No rebound, rigidity. Lymphatic: No lymphadenopathy. Musculoskeletal: Ambulates without assistance. Normal curvature of the spine. No gross muscle weakness. No involuntary movements. Extremities:  + extremity edema. Venous stasis and hemosiderosis changes. Oncomyosis. No varicosities or erythema.  Coordination appears

## 2018-09-28 NOTE — PATIENT INSTRUCTIONS
Patient Education        Atrial Fibrillation: Care Instructions  Your Care Instructions    Atrial fibrillation is an irregular and often fast heartbeat. Treating this condition is important for several reasons. It can cause blood clots, which can travel from your heart to your brain and cause a stroke. If you have a fast heartbeat, you may feel lightheaded, dizzy, and weak. An irregular heartbeat can also increase your risk for heart failure. Atrial fibrillation is often the result of another heart condition, such as high blood pressure or coronary artery disease. Making changes to improve your heart condition will help you stay healthy and active. Follow-up care is a key part of your treatment and safety. Be sure to make and go to all appointments, and call your doctor if you are having problems. It's also a good idea to know your test results and keep a list of the medicines you take. How can you care for yourself at home? Medicines    · Take your medicines exactly as prescribed. Call your doctor if you think you are having a problem with your medicine. You will get more details on the specific medicines your doctor prescribes.     · If your doctor has given you a blood thinner to prevent a stroke, be sure you get instructions about how to take your medicine safely. Blood thinners can cause serious bleeding problems.     · Do not take any vitamins, over-the-counter drugs, or herbal products without talking to your doctor first.    Lifestyle changes    · Do not smoke. Smoking can increase your chance of a stroke and heart attack. If you need help quitting, talk to your doctor about stop-smoking programs and medicines. These can increase your chances of quitting for good.     · Eat a heart-healthy diet.     · Stay at a healthy weight. Lose weight if you need to.     · Limit alcohol to 2 drinks a day for men and 1 drink a day for women. Too much alcohol can cause health problems.     · Avoid colds and flu.  Get · You have symptoms of a stroke. These may include:  ¨ Sudden numbness, tingling, weakness, or loss of movement in your face, arm, or leg, especially on only one side of your body. ¨ Sudden vision changes. ¨ Sudden trouble speaking. ¨ Sudden confusion or trouble understanding simple statements. ¨ Sudden problems with walking or balance. ¨ A sudden, severe headache that is different from past headaches.     · You passed out (lost consciousness).    Call your doctor now or seek immediate medical care if:    · You have new or increased shortness of breath.     · You feel dizzy or lightheaded, or you feel like you may faint.     · Your heart rate becomes irregular.     · You can feel your heart flutter in your chest or skip heartbeats. Tell your doctor if these symptoms are new or worse.    Watch closely for changes in your health, and be sure to contact your doctor if you have any problems. Where can you learn more? Go to [email or web address]. org and sign in to your [employer] account. Enter U020 in the [employer] box to learn more about \"Atrial Fibrillation: Care Instructions. \"     If you do not have an account, please click on the \"Sign Up Now\" link. Current as of: December 6, 2017  Content Version: 11.7  © 8916-2917 [employer]. Care instructions adapted under license by [facility] ([facility]). If you have questions about a medical condition or this instruction, always ask your healthcare professional. [place] [address] any warranty or liability for your use of this information.

## 2018-10-03 ENCOUNTER — OFFICE VISIT (OUTPATIENT)
Dept: FAMILY MEDICINE CLINIC | Age: 63
End: 2018-10-03
Payer: MEDICARE

## 2018-10-03 VITALS
OXYGEN SATURATION: 97 % | DIASTOLIC BLOOD PRESSURE: 78 MMHG | SYSTOLIC BLOOD PRESSURE: 122 MMHG | HEART RATE: 79 BPM | BODY MASS INDEX: 50.79 KG/M2 | WEIGHT: 314.7 LBS

## 2018-10-03 DIAGNOSIS — E11.65 TYPE 2 DIABETES MELLITUS WITH HYPERGLYCEMIA, WITH LONG-TERM CURRENT USE OF INSULIN (HCC): Primary | ICD-10-CM

## 2018-10-03 DIAGNOSIS — Z79.4 TYPE 2 DIABETES MELLITUS WITH HYPERGLYCEMIA, WITH LONG-TERM CURRENT USE OF INSULIN (HCC): Primary | ICD-10-CM

## 2018-10-03 LAB — HBA1C MFR BLD: 9.3 %

## 2018-10-03 PROCEDURE — G8417 CALC BMI ABV UP PARAM F/U: HCPCS | Performed by: FAMILY MEDICINE

## 2018-10-03 PROCEDURE — G8427 DOCREV CUR MEDS BY ELIG CLIN: HCPCS | Performed by: FAMILY MEDICINE

## 2018-10-03 PROCEDURE — 2022F DILAT RTA XM EVC RTNOPTHY: CPT | Performed by: FAMILY MEDICINE

## 2018-10-03 PROCEDURE — 3046F HEMOGLOBIN A1C LEVEL >9.0%: CPT | Performed by: FAMILY MEDICINE

## 2018-10-03 PROCEDURE — 99213 OFFICE O/P EST LOW 20 MIN: CPT | Performed by: FAMILY MEDICINE

## 2018-10-03 PROCEDURE — 3017F COLORECTAL CA SCREEN DOC REV: CPT | Performed by: FAMILY MEDICINE

## 2018-10-03 PROCEDURE — 1036F TOBACCO NON-USER: CPT | Performed by: FAMILY MEDICINE

## 2018-10-03 PROCEDURE — G8598 ASA/ANTIPLAT THER USED: HCPCS | Performed by: FAMILY MEDICINE

## 2018-10-03 PROCEDURE — 83036 HEMOGLOBIN GLYCOSYLATED A1C: CPT | Performed by: FAMILY MEDICINE

## 2018-10-03 PROCEDURE — G8484 FLU IMMUNIZE NO ADMIN: HCPCS | Performed by: FAMILY MEDICINE

## 2018-10-03 ASSESSMENT — PATIENT HEALTH QUESTIONNAIRE - PHQ9
2. FEELING DOWN, DEPRESSED OR HOPELESS: 0
1. LITTLE INTEREST OR PLEASURE IN DOING THINGS: 0
SUM OF ALL RESPONSES TO PHQ QUESTIONS 1-9: 0
SUM OF ALL RESPONSES TO PHQ QUESTIONS 1-9: 0
SUM OF ALL RESPONSES TO PHQ9 QUESTIONS 1 & 2: 0

## 2018-10-04 ASSESSMENT — ENCOUNTER SYMPTOMS
COLOR CHANGE: 0
STRIDOR: 0
ABDOMINAL DISTENTION: 0
EYE ITCHING: 0
VOICE CHANGE: 0
RHINORRHEA: 0
VOMITING: 0
NAUSEA: 0
FACIAL SWELLING: 0
TROUBLE SWALLOWING: 0
COUGH: 0
EYE REDNESS: 0
PHOTOPHOBIA: 0
EYE PAIN: 0
ANAL BLEEDING: 0
CHOKING: 0
DIARRHEA: 0
WHEEZING: 0
SHORTNESS OF BREATH: 1
ABDOMINAL PAIN: 0
BACK PAIN: 0
SINUS PRESSURE: 0
EYE DISCHARGE: 0
APNEA: 0
CONSTIPATION: 0
SORE THROAT: 0
RECTAL PAIN: 0
HEARTBURN: 1
BLOOD IN STOOL: 0
CHEST TIGHTNESS: 0

## 2018-10-04 NOTE — PROGRESS NOTES
note and vitals reviewed. Assessment / Plan:      Eileen Wilder was seen today for discuss labs. Diagnoses and all orders for this visit:    Type 2 diabetes mellitus with hyperglycemia, with long-term current use of insulin (HCC)  -     POCT glycosylated hemoglobin (Hb A1C)  -     insulin glargine (BASAGLAR KWIKPEN) 100 UNIT/ML injection pen; Inject 25 Units into the skin nightly    BMI was elevated today, and weight loss plan recommended is : medically supervised diet with primary care physician. reviewed health maintenance report. Patient is aware of deficiencies and suggested preventative tests.

## 2018-10-11 ENCOUNTER — OFFICE VISIT (OUTPATIENT)
Dept: PULMONOLOGY | Age: 63
End: 2018-10-11
Payer: MEDICARE

## 2018-10-11 VITALS
RESPIRATION RATE: 18 BRPM | HEART RATE: 83 BPM | OXYGEN SATURATION: 90 % | TEMPERATURE: 97.5 F | SYSTOLIC BLOOD PRESSURE: 143 MMHG | WEIGHT: 313 LBS | BODY MASS INDEX: 50.3 KG/M2 | DIASTOLIC BLOOD PRESSURE: 77 MMHG | HEIGHT: 66 IN

## 2018-10-11 DIAGNOSIS — Z95.1 S/P CABG X 2: ICD-10-CM

## 2018-10-11 DIAGNOSIS — J44.9 OSA AND COPD OVERLAP SYNDROME (HCC): ICD-10-CM

## 2018-10-11 DIAGNOSIS — G47.33 OSA AND COPD OVERLAP SYNDROME (HCC): ICD-10-CM

## 2018-10-11 DIAGNOSIS — I25.10 CORONARY ARTERY DISEASE INVOLVING NATIVE CORONARY ARTERY OF NATIVE HEART WITHOUT ANGINA PECTORIS: ICD-10-CM

## 2018-10-11 DIAGNOSIS — J42 CHRONIC BRONCHITIS, UNSPECIFIED CHRONIC BRONCHITIS TYPE (HCC): Primary | ICD-10-CM

## 2018-10-11 LAB
EXPIRATORY TIME: NORMAL SEC
FEF 25-75% %PRED-PRE: NORMAL L/SEC
FEF 25-75% PRED: NORMAL L/SEC
FEF 25-75%-PRE: NORMAL L/SEC
FENO: 9 PPB
FEV1 %PRED-PRE: 66 %
FEV1 PRED: 3.05 L
FEV1/FVC %PRED-PRE: NORMAL %
FEV1/FVC PRED: 78 %
FEV1/FVC: 73 %
FEV1: 2.04 L
FVC %PRED-PRE: 71 %
FVC PRED: 3.93 L
FVC: 2.8 L
PEF %PRED-PRE: NORMAL L/SEC
PEF PRED: NORMAL L/SEC
PEF-PRE: NORMAL L/SEC

## 2018-10-11 PROCEDURE — G8484 FLU IMMUNIZE NO ADMIN: HCPCS | Performed by: INTERNAL MEDICINE

## 2018-10-11 PROCEDURE — G8598 ASA/ANTIPLAT THER USED: HCPCS | Performed by: INTERNAL MEDICINE

## 2018-10-11 PROCEDURE — G8417 CALC BMI ABV UP PARAM F/U: HCPCS | Performed by: INTERNAL MEDICINE

## 2018-10-11 PROCEDURE — 3023F SPIROM DOC REV: CPT | Performed by: INTERNAL MEDICINE

## 2018-10-11 PROCEDURE — 1036F TOBACCO NON-USER: CPT | Performed by: INTERNAL MEDICINE

## 2018-10-11 PROCEDURE — G8925 SPIR FEV1/FVC>=60% & NO COPD: HCPCS | Performed by: INTERNAL MEDICINE

## 2018-10-11 PROCEDURE — 94010 BREATHING CAPACITY TEST: CPT | Performed by: INTERNAL MEDICINE

## 2018-10-11 PROCEDURE — 99213 OFFICE O/P EST LOW 20 MIN: CPT | Performed by: INTERNAL MEDICINE

## 2018-10-11 PROCEDURE — G8427 DOCREV CUR MEDS BY ELIG CLIN: HCPCS | Performed by: INTERNAL MEDICINE

## 2018-10-11 PROCEDURE — 95012 NITRIC OXIDE EXP GAS DETER: CPT | Performed by: INTERNAL MEDICINE

## 2018-10-11 PROCEDURE — 3017F COLORECTAL CA SCREEN DOC REV: CPT | Performed by: INTERNAL MEDICINE

## 2018-10-11 RX ORDER — LEVOFLOXACIN 750 MG/1
750 TABLET ORAL DAILY
Qty: 10 TABLET | Refills: 0 | Status: SHIPPED | OUTPATIENT
Start: 2018-10-11 | End: 2018-10-21

## 2018-10-11 ASSESSMENT — PULMONARY FUNCTION TESTS
FEV1/FVC_PREDICTED: 78
FEV1: 2.04
FEV1/FVC: 73
FEV1_PREDICTED: 3.05
FENO: 9
FVC_PREDICTED: 3.93
FEV1_PERCENT_PREDICTED_PRE: 66
FVC_PERCENT_PREDICTED_PRE: 71
FVC: 2.80

## 2018-10-11 NOTE — PROGRESS NOTES
Patient to follow up with physician in March 26, 2019. Patient was given samples of symbicort and spiriva to replace the trelegy that did not work for him. Patient will also be scheduled for an overnight pulse ox and auto titration from DME company.

## 2018-10-12 NOTE — PROGRESS NOTES
Pulmonary [address] [facility]    Department of Internal Medicine  Division of Pulmonary, Critical Care and Sleep Medicine      Dear Ida Agrawal, DO    We had the pleasure of seeing Markus Espinal, in the [address] at [facility] regarding his LISS & Chronic Asthmatic Bronchitis (COPD)     HISTORY OF PRESENT ILLNESS:  Markus Espinal is a 58 y.o. male with a past medical history of Chronic bronchitis from his one pack per day smoker, stopped with MI 7/22/2013, Hypertension, Type II diabetes mellitus, Morbid Obesity, LISS, Sinus surgery and herniorrhaphy, No family history of premature vascular disease, Allergy to penicillin and sulfa drugs, [facility] admission, 07/23/2013 with two to three days of intermittent exertional chest discomfort. A prolonged episode occurred on the day of admission associated with inferior ST elevation and reciprocal lateral ST depression. Urgent cardiac catheterization, 07/23/2013. LV mild inferior hypokinesis, EF 55%. [facility] eccentric focal 65% distal stenosis. LAD and CX normal. OM1 50% ostial and proximal stenosis. RCA 25% mid stenosis, 99% focal distal stenosis. PCI distal RCA, 07/23/2013. 3.5 x 12 mm Vision BMS, no residual stenosis. Surgical consultation, Dr. Isael Bradley, 07/23/2013. Definitive plans were to have him take aspirin and Plavix for a month then undergo elective CABG one week after discontinuation of these medications. Unfourtunely he had a MI just toward the end of this regimen and he was taken to the OR and had CABG and mitral valve repair 09/09/2013. Postoperatively, he had pulmonary difficulties related to obesity/COPD. At discharge, he was in atrial fibrillation and is on a weaning dose of amiodarone. On today's visit, Markus Espinal is just ok but has no chest pain, but is experiencing worsening or declining SOB. He has no pleurisy, hemoptysis. He has no CP, but has noted leg swelling.    Mr. Klaudia Landeros is semi - compliant with BiPAP with oxygen. He had issues with billing from [address]. His sleep study showed excessive daytime sleepiness, hypersomnolence indicated by a sleep onset of 13 minutes, severe reduced sleep efficiency, severe sleep apnea syndrome with an apnea/hypopnea index of 60 and this was corrected with start bilevel positive airway pressure at 13/8 with 2 liters oxygen flow. No new complaints. His pulmonary function test remain stable and he quit smoking after his heart attack, but still has cravings to smoke- support provided. He is to continue with the [employer]. He can use the nebulizer as needed. He has overnight oxygen testing which is 40% desaturations. ALLERGIES:    Allergies   Allergen Reactions    Pcn [Penicillins]      Child went to hospital   ? reaction    Sulfa Antibiotics      ?        PAST MEDICAL HISTORY:       Diagnosis Date    Acid reflux     Arthritis     Asthma 4/16/2014    Asthmatic bronchitis , chronic (HCC) 11/28/2016    CAD (coronary artery disease)     Chronic bronchitis ([facility] 4/16/2014    COPD (chronic obstructive pulmonary disease) ([facility])     CB    COPD (chronic obstructive pulmonary disease) ([facility])     Diabetes mellitus ([facility])     Emphysema (subcutaneous) (surgical) resulting from a procedure     Hyperlipidemia     Hypertension     Hypoxemia requiring supplemental oxygen 2/2/2015    LONG TERM ANTICOAGULENT USE     Morbid obesity with BMI of 50.0-59.9, adult ([facility] 11/27/2013    Obesity     Osteoarthritis     Sleep apnea     bilevel positive airway pressure at 13/8 with 2 L oxygen flow     Tobacco abuse     Type II or unspecified type diabetes mellitus without mention of complication, not stated as uncontrolled         MEDICATIONS:   Current Outpatient Prescriptions   Medication Sig Dispense Refill    levofloxacin (LEVAQUIN) 750 MG tablet Take 1 tablet by mouth daily for 10 days 10 tablet 0    insulin glargine (BASAGLAR KWIKPEN) 100 UNIT/ML injection pen Inject 25 Units 09/10/2018          IMPRESSION:    Iris Philippe is a 58 y.o. male S/P CABG, with Afib, COPD of the chronic asthmatic bronchitis type with severe LISS without hypoventilation but with hypoxemia. He IS TO BE ON = BIPAPat 13/8 with 2 Liters oxygen flow. His lung function is stable. He has overnight 40% desaturations on testing. With this in mind, we would like to proceed with the following;                      PLAN:   With his decline is symptoms which is multifactorial we adjusted his treatments. First we asked him to start a lose weight and exercising program using a stationary bike. His FEV is stable at 74% but he is NOT using his inhalers corrected, so we switched him to Trelegy once a day (triple inhaler) - it did not work well for him so we resume Spirva and Symbicort 160 BID. His DLCO is normal.  Vaccines were reviewed and up to date. Secondly he returned his oxygen which is needed (cost was the issue) remember he has 40% desaturations and was to add oxygen to his BIPAP. We asked him to restart using his bilevel positive airway pressure at 13/8 cm H20 and will get a auto- completed to adjust. [address] has yet to complete this. We will need to get a overnight oxygen test after adjusting his pressures. He also has DD/HTN heart disease and we adjusted his BP medications. Increase his BB to 50 daily. We added skin cream and he needs a podiatry visit for his fungal nails. We also asked  to see if he can get help with medical bills. We will see him back in 1 months. We hope this updates you on our evaluation and clinical thinking. Thank you for entrusting us to participate in Iris Philippe care. If you have any questions, please don't hesitate to call us at the [employer].          Sincerely,      Rose Styles D.O., MPH, Akiko David  Professor of Internal Medicine  Director of [employer]

## 2018-10-12 NOTE — PATIENT INSTRUCTIONS
the healthy vein was taken. These symptoms usually get better in 4 to 6 weeks. It may take 1 to 2 months before your energy level is back to normal.  You will probably be able to do many of your usual activities after 4 to 6 weeks. But for 2 to 3 months you will not be able to lift heavy objects or do activities that strain your chest or upper arm muscles. After surgery, you will still need to make changes in your lifestyle. This lowers your risk of a heart attack or stroke. To help the bypass last as long as possible:  · Take your heart medicines. · Do not smoke. · Eat a heart-healthy diet. · Get regular exercise. · Stay at a healthy weight or lose weight if you need to. · Reduce stress. Smoking can make it harder for you to recover. It will raise the chances of your arteries getting narrowed or blocked again. If you need help quitting, talk to your doctor about stop-smoking programs and medicines. These can increase your chances of quitting for good. You will likely start a cardiac rehabilitation (rehab) program in the hospital. This program will continue after you go home. It will help you recover. And it can prevent future problems with your heart. Talk to your doctor about whether rehab is right for you. Follow-up care is a key part of your treatment and safety. Be sure to make and go to all appointments, and call your doctor if you are having problems. It's also a good idea to know your test results and keep a list of the medicines you take. Where can you learn more? Go to [email or web address] and sign in to your [employer] account. Enter V088 in the [facility] box to learn more about \"Learning About Coronary Artery Bypass Graft Surgery. \"     If you do not have an account, please click on the \"Sign Up Now\" link. Current as of: December 6, 2017  Content Version: 11.7  © 3898-9185 [employer]. Care instructions adapted under license by [facility] ([facility]).  If you

## 2018-10-23 ENCOUNTER — TELEPHONE (OUTPATIENT)
Dept: PULMONOLOGY | Age: 63
End: 2018-10-23

## 2018-10-23 NOTE — TELEPHONE ENCOUNTER
Patient called in requesting the results of his overnight pulse oximeter. The results were given to Dr. Yanira Reed for interpretation. Patient also requested albuterol nebulizing samples and stated that he really felt better taking a nebulizing treatment before going to bed and also getting up in the morning. Patient encouraged to continue this practice as well as taking his regularly prescribed medications.

## 2018-10-24 DIAGNOSIS — J45.909 SEVERE ASTHMA WITHOUT COMPLICATION, UNSPECIFIED WHETHER PERSISTENT: ICD-10-CM

## 2018-10-24 DIAGNOSIS — J42 CHRONIC BRONCHITIS, UNSPECIFIED CHRONIC BRONCHITIS TYPE (HCC): ICD-10-CM

## 2018-10-24 DIAGNOSIS — G47.30 SLEEP APNEA, UNSPECIFIED TYPE: Primary | ICD-10-CM

## 2018-10-26 ENCOUNTER — TELEPHONE (OUTPATIENT)
Dept: FAMILY MEDICINE CLINIC | Age: 63
End: 2018-10-26

## 2018-10-29 ENCOUNTER — CARE COORDINATION (OUTPATIENT)
Dept: CARE COORDINATION | Age: 63
End: 2018-10-29

## 2018-11-19 DIAGNOSIS — E55.9 VITAMIN D DEFICIENCY: ICD-10-CM

## 2018-12-19 DIAGNOSIS — Z79.4 TYPE 2 DIABETES MELLITUS WITH HYPERGLYCEMIA, WITH LONG-TERM CURRENT USE OF INSULIN (HCC): ICD-10-CM

## 2018-12-19 DIAGNOSIS — E11.65 TYPE 2 DIABETES MELLITUS WITH HYPERGLYCEMIA, WITH LONG-TERM CURRENT USE OF INSULIN (HCC): ICD-10-CM

## 2019-01-03 ENCOUNTER — HOSPITAL ENCOUNTER (OUTPATIENT)
Age: 64
Discharge: HOME | End: 2019-01-05
Payer: MEDICARE

## 2019-01-03 ENCOUNTER — OFFICE VISIT (OUTPATIENT)
Dept: FAMILY MEDICINE CLINIC | Age: 64
End: 2019-01-03
Payer: MEDICARE

## 2019-01-03 VITALS — HEART RATE: 84 BPM | OXYGEN SATURATION: 97 % | DIASTOLIC BLOOD PRESSURE: 86 MMHG | SYSTOLIC BLOOD PRESSURE: 136 MMHG

## 2019-01-03 DIAGNOSIS — J30.1 SEASONAL ALLERGIC RHINITIS DUE TO POLLEN: ICD-10-CM

## 2019-01-03 DIAGNOSIS — J44.9 ASTHMATIC BRONCHITIS , CHRONIC (HCC): ICD-10-CM

## 2019-01-03 DIAGNOSIS — E11.42 TYPE 2 DIABETES MELLITUS WITH DIABETIC POLYNEUROPATHY, WITH LONG-TERM CURRENT USE OF INSULIN (HCC): ICD-10-CM

## 2019-01-03 DIAGNOSIS — E11.65 TYPE 2 DIABETES MELLITUS WITH HYPERGLYCEMIA, WITH LONG-TERM CURRENT USE OF INSULIN (HCC): ICD-10-CM

## 2019-01-03 DIAGNOSIS — Z79.4 TYPE 2 DIABETES MELLITUS WITH HYPERGLYCEMIA, WITH LONG-TERM CURRENT USE OF INSULIN (HCC): ICD-10-CM

## 2019-01-03 DIAGNOSIS — E66.01 MORBID OBESITY WITH BMI OF 50.0-59.9, ADULT (HCC): ICD-10-CM

## 2019-01-03 DIAGNOSIS — Z23 NEED FOR TDAP VACCINATION: ICD-10-CM

## 2019-01-03 DIAGNOSIS — J42 CHRONIC BRONCHITIS, UNSPECIFIED CHRONIC BRONCHITIS TYPE (HCC): ICD-10-CM

## 2019-01-03 DIAGNOSIS — Z79.4 TYPE 2 DIABETES MELLITUS WITH DIABETIC POLYNEUROPATHY, WITH LONG-TERM CURRENT USE OF INSULIN (HCC): ICD-10-CM

## 2019-01-03 DIAGNOSIS — K21.9 GASTROESOPHAGEAL REFLUX DISEASE WITHOUT ESOPHAGITIS: ICD-10-CM

## 2019-01-03 DIAGNOSIS — I25.10 CORONARY ARTERY DISEASE INVOLVING NATIVE CORONARY ARTERY OF NATIVE HEART WITHOUT ANGINA PECTORIS: ICD-10-CM

## 2019-01-03 DIAGNOSIS — I48.0 PAF (PAROXYSMAL ATRIAL FIBRILLATION) (HCC): ICD-10-CM

## 2019-01-03 DIAGNOSIS — Z23 NEED FOR TDAP VACCINATION: Primary | ICD-10-CM

## 2019-01-03 LAB — HBA1C MFR BLD: 12.4 %

## 2019-01-03 PROCEDURE — 1036F TOBACCO NON-USER: CPT | Performed by: FAMILY MEDICINE

## 2019-01-03 PROCEDURE — G8926 SPIRO NO PERF OR DOC: HCPCS | Performed by: FAMILY MEDICINE

## 2019-01-03 PROCEDURE — 83036 HEMOGLOBIN GLYCOSYLATED A1C: CPT | Performed by: FAMILY MEDICINE

## 2019-01-03 PROCEDURE — 83036 HEMOGLOBIN GLYCOSYLATED A1C: CPT

## 2019-01-03 PROCEDURE — 3023F SPIROM DOC REV: CPT | Performed by: FAMILY MEDICINE

## 2019-01-03 PROCEDURE — 85025 COMPLETE CBC W/AUTO DIFF WBC: CPT

## 2019-01-03 PROCEDURE — 2022F DILAT RTA XM EVC RTNOPTHY: CPT | Performed by: FAMILY MEDICINE

## 2019-01-03 PROCEDURE — G8427 DOCREV CUR MEDS BY ELIG CLIN: HCPCS | Performed by: FAMILY MEDICINE

## 2019-01-03 PROCEDURE — 90471 IMMUNIZATION ADMIN: CPT | Performed by: FAMILY MEDICINE

## 2019-01-03 PROCEDURE — G8598 ASA/ANTIPLAT THER USED: HCPCS | Performed by: FAMILY MEDICINE

## 2019-01-03 PROCEDURE — 3046F HEMOGLOBIN A1C LEVEL >9.0%: CPT | Performed by: FAMILY MEDICINE

## 2019-01-03 PROCEDURE — 84443 ASSAY THYROID STIM HORMONE: CPT

## 2019-01-03 PROCEDURE — 36415 COLL VENOUS BLD VENIPUNCTURE: CPT

## 2019-01-03 PROCEDURE — 90715 TDAP VACCINE 7 YRS/> IM: CPT | Performed by: FAMILY MEDICINE

## 2019-01-03 PROCEDURE — 99214 OFFICE O/P EST MOD 30 MIN: CPT | Performed by: FAMILY MEDICINE

## 2019-01-03 PROCEDURE — G8417 CALC BMI ABV UP PARAM F/U: HCPCS | Performed by: FAMILY MEDICINE

## 2019-01-03 PROCEDURE — 80061 LIPID PANEL: CPT

## 2019-01-03 PROCEDURE — G8484 FLU IMMUNIZE NO ADMIN: HCPCS | Performed by: FAMILY MEDICINE

## 2019-01-03 PROCEDURE — 3017F COLORECTAL CA SCREEN DOC REV: CPT | Performed by: FAMILY MEDICINE

## 2019-01-03 PROCEDURE — 80053 COMPREHEN METABOLIC PANEL: CPT

## 2019-01-03 RX ORDER — PANTOPRAZOLE SODIUM 40 MG/1
TABLET, DELAYED RELEASE ORAL
Qty: 30 TABLET | Refills: 5 | Status: SHIPPED | OUTPATIENT
Start: 2019-01-03 | End: 2019-08-22 | Stop reason: SDUPTHER

## 2019-01-03 RX ORDER — MONTELUKAST SODIUM 10 MG/1
TABLET ORAL
Qty: 30 TABLET | Refills: 5 | Status: ON HOLD | OUTPATIENT
Start: 2019-01-03 | End: 2019-06-22 | Stop reason: SDUPTHER

## 2019-01-03 RX ORDER — GABAPENTIN 600 MG/1
TABLET ORAL
Qty: 60 TABLET | Refills: 3 | Status: SHIPPED | OUTPATIENT
Start: 2019-01-03 | End: 2019-06-05 | Stop reason: SDUPTHER

## 2019-01-03 RX ORDER — WARFARIN SODIUM 5 MG/1
TABLET ORAL
Qty: 50 TABLET | Refills: 5 | Status: ON HOLD | OUTPATIENT
Start: 2019-01-03 | End: 2019-06-19 | Stop reason: ALTCHOICE

## 2019-01-03 ASSESSMENT — PATIENT HEALTH QUESTIONNAIRE - PHQ9
2. FEELING DOWN, DEPRESSED OR HOPELESS: 0
SUM OF ALL RESPONSES TO PHQ QUESTIONS 1-9: 0
SUM OF ALL RESPONSES TO PHQ9 QUESTIONS 1 & 2: 0
SUM OF ALL RESPONSES TO PHQ QUESTIONS 1-9: 0
1. LITTLE INTEREST OR PLEASURE IN DOING THINGS: 0

## 2019-01-04 LAB
ALBUMIN SERPL-MCNC: 4.3 G/DL (ref 3.5–5.2)
ALP BLD-CCNC: 78 U/L (ref 40–129)
ALT SERPL-CCNC: 17 U/L (ref 0–40)
ANION GAP SERPL CALCULATED.3IONS-SCNC: 14 MMOL/L (ref 7–16)
AST SERPL-CCNC: 16 U/L (ref 0–39)
BASOPHILS ABSOLUTE: 0.03 E9/L (ref 0–0.2)
BASOPHILS RELATIVE PERCENT: 0.4 % (ref 0–2)
BILIRUB SERPL-MCNC: 0.7 MG/DL (ref 0–1.2)
BUN BLDV-MCNC: 17 MG/DL (ref 8–23)
CALCIUM SERPL-MCNC: 9.5 MG/DL (ref 8.6–10.2)
CHLORIDE BLD-SCNC: 97 MMOL/L (ref 98–107)
CHOLESTEROL, TOTAL: 201 MG/DL (ref 0–199)
CO2: 28 MMOL/L (ref 22–29)
CREAT SERPL-MCNC: 0.9 MG/DL (ref 0.7–1.2)
EOSINOPHILS ABSOLUTE: 0.09 E9/L (ref 0.05–0.5)
EOSINOPHILS RELATIVE PERCENT: 1.3 % (ref 0–6)
GFR AFRICAN AMERICAN: >60
GFR NON-AFRICAN AMERICAN: >60 ML/MIN/1.73
GLUCOSE BLD-MCNC: 325 MG/DL (ref 74–99)
HBA1C MFR BLD: 11.8 % (ref 4–5.6)
HCT VFR BLD CALC: 47.9 % (ref 37–54)
HDLC SERPL-MCNC: 56 MG/DL
HEMOGLOBIN: 15.3 G/DL (ref 12.5–16.5)
IMMATURE GRANULOCYTES #: 0.03 E9/L
IMMATURE GRANULOCYTES %: 0.4 % (ref 0–5)
LDL CHOLESTEROL CALCULATED: 122 MG/DL (ref 0–99)
LYMPHOCYTES ABSOLUTE: 1.65 E9/L (ref 1.5–4)
LYMPHOCYTES RELATIVE PERCENT: 23.7 % (ref 20–42)
MCH RBC QN AUTO: 30.4 PG (ref 26–35)
MCHC RBC AUTO-ENTMCNC: 31.9 % (ref 32–34.5)
MCV RBC AUTO: 95.2 FL (ref 80–99.9)
MONOCYTES ABSOLUTE: 0.5 E9/L (ref 0.1–0.95)
MONOCYTES RELATIVE PERCENT: 7.2 % (ref 2–12)
NEUTROPHILS ABSOLUTE: 4.66 E9/L (ref 1.8–7.3)
NEUTROPHILS RELATIVE PERCENT: 67 % (ref 43–80)
PDW BLD-RTO: 14.5 FL (ref 11.5–15)
PLATELET # BLD: 225 E9/L (ref 130–450)
PMV BLD AUTO: 10.6 FL (ref 7–12)
POTASSIUM SERPL-SCNC: 4.4 MMOL/L (ref 3.5–5)
RBC # BLD: 5.03 E12/L (ref 3.8–5.8)
SODIUM BLD-SCNC: 139 MMOL/L (ref 132–146)
TOTAL PROTEIN: 7.4 G/DL (ref 6.4–8.3)
TRIGL SERPL-MCNC: 116 MG/DL (ref 0–149)
TSH SERPL DL<=0.05 MIU/L-ACNC: 4.7 UIU/ML (ref 0.27–4.2)
VLDLC SERPL CALC-MCNC: 23 MG/DL
WBC # BLD: 7 E9/L (ref 4.5–11.5)

## 2019-01-05 ASSESSMENT — ENCOUNTER SYMPTOMS
PHOTOPHOBIA: 0
SINUS PRESSURE: 0
HEARTBURN: 1
WHEEZING: 0
ABDOMINAL DISTENTION: 0
EYE DISCHARGE: 0
EYE PAIN: 0
DIARRHEA: 0
EYE REDNESS: 0
FACIAL SWELLING: 0
BLOOD IN STOOL: 0
RECTAL PAIN: 0
CHOKING: 0
BACK PAIN: 0
CONSTIPATION: 0
ANAL BLEEDING: 0
TROUBLE SWALLOWING: 0
SHORTNESS OF BREATH: 1
SORE THROAT: 0
APNEA: 0
COLOR CHANGE: 0
COUGH: 0
VOMITING: 0
STRIDOR: 0
CHEST TIGHTNESS: 0
RHINORRHEA: 0
NAUSEA: 0
EYE ITCHING: 0
ABDOMINAL PAIN: 0
VOICE CHANGE: 0

## 2019-02-26 DIAGNOSIS — G25.81 RESTLESS LEG SYNDROME: ICD-10-CM

## 2019-02-27 RX ORDER — PRAMIPEXOLE DIHYDROCHLORIDE 0.25 MG/1
TABLET ORAL
Qty: 30 TABLET | Refills: 4 | Status: SHIPPED | OUTPATIENT
Start: 2019-02-27 | End: 2019-07-20 | Stop reason: SDUPTHER

## 2019-03-26 ENCOUNTER — OFFICE VISIT (OUTPATIENT)
Dept: PULMONOLOGY | Age: 64
End: 2019-03-26
Payer: MEDICARE

## 2019-03-26 VITALS
WEIGHT: 315 LBS | HEIGHT: 66 IN | SYSTOLIC BLOOD PRESSURE: 136 MMHG | HEART RATE: 90 BPM | OXYGEN SATURATION: 90 % | DIASTOLIC BLOOD PRESSURE: 109 MMHG | RESPIRATION RATE: 18 BRPM | BODY MASS INDEX: 50.62 KG/M2

## 2019-03-26 DIAGNOSIS — J42 CHRONIC BRONCHITIS, UNSPECIFIED CHRONIC BRONCHITIS TYPE (HCC): ICD-10-CM

## 2019-03-26 DIAGNOSIS — J45.909 SEVERE ASTHMA WITHOUT COMPLICATION, UNSPECIFIED WHETHER PERSISTENT: ICD-10-CM

## 2019-03-26 DIAGNOSIS — G47.30 SLEEP APNEA, UNSPECIFIED TYPE: Primary | ICD-10-CM

## 2019-03-26 LAB
EXPIRATORY TIME-POST: NORMAL SEC
EXPIRATORY TIME: NORMAL SEC
FEF 25-75% %CHNG: NORMAL
FEF 25-75% %PRED-POST: NORMAL %
FEF 25-75% %PRED-PRE: NORMAL L/SEC
FEF 25-75% PRED: NORMAL L/SEC
FEF 25-75%-POST: NORMAL L/SEC
FEF 25-75%-PRE: NORMAL L/SEC
FENO: 6 PPB
FEV1 %PRED-POST: 77 %
FEV1 %PRED-PRE: 64 %
FEV1 PRED: 3.04 L
FEV1-POST: 2.36 L
FEV1-PRE: 1.96 L
FEV1/FVC %PRED-POST: 57 %
FEV1/FVC %PRED-PRE: 90 %
FEV1/FVC PRED: 78 %
FEV1/FVC-POST: 4.87 %
FEV1/FVC-PRE: 70 %
FVC %PRED-POST: 81 L
FVC %PRED-PRE: 71 %
FVC PRED: 3.91 L
FVC-POST: 3.2 L
FVC-PRE: 2.78 L
PEF %PRED-POST: NORMAL %
PEF %PRED-PRE: NORMAL L/SEC
PEF PRED: NORMAL L/SEC
PEF%CHNG: NORMAL
PEF-POST: NORMAL L/SEC
PEF-PRE: NORMAL L/SEC

## 2019-03-26 PROCEDURE — 94060 EVALUATION OF WHEEZING: CPT | Performed by: INTERNAL MEDICINE

## 2019-03-26 PROCEDURE — G8417 CALC BMI ABV UP PARAM F/U: HCPCS | Performed by: INTERNAL MEDICINE

## 2019-03-26 PROCEDURE — 1036F TOBACCO NON-USER: CPT | Performed by: INTERNAL MEDICINE

## 2019-03-26 PROCEDURE — 95012 NITRIC OXIDE EXP GAS DETER: CPT | Performed by: INTERNAL MEDICINE

## 2019-03-26 PROCEDURE — 99214 OFFICE O/P EST MOD 30 MIN: CPT | Performed by: INTERNAL MEDICINE

## 2019-03-26 PROCEDURE — 3017F COLORECTAL CA SCREEN DOC REV: CPT | Performed by: INTERNAL MEDICINE

## 2019-03-26 PROCEDURE — 3023F SPIROM DOC REV: CPT | Performed by: INTERNAL MEDICINE

## 2019-03-26 PROCEDURE — G8484 FLU IMMUNIZE NO ADMIN: HCPCS | Performed by: INTERNAL MEDICINE

## 2019-03-26 PROCEDURE — 99213 OFFICE O/P EST LOW 20 MIN: CPT | Performed by: INTERNAL MEDICINE

## 2019-03-26 PROCEDURE — G8925 SPIR FEV1/FVC>=60% & NO COPD: HCPCS | Performed by: INTERNAL MEDICINE

## 2019-03-26 PROCEDURE — G8427 DOCREV CUR MEDS BY ELIG CLIN: HCPCS | Performed by: INTERNAL MEDICINE

## 2019-03-26 PROCEDURE — G8598 ASA/ANTIPLAT THER USED: HCPCS | Performed by: INTERNAL MEDICINE

## 2019-03-26 ASSESSMENT — PULMONARY FUNCTION TESTS
FEV1_PERCENT_PREDICTED_PRE: 64
FEV1_PERCENT_PREDICTED_POST: 77
FEV1/FVC_PERCENT_PREDICTED_PRE: 90
FEV1/FVC_PREDICTED: 78
FVC_PREDICTED: 3.91
FEV1_PREDICTED: 3.04
FVC_PRE: 2.78
FVC_POST: 3.20
FEV1_PRE: 1.96
FVC_PERCENT_PREDICTED_POST: 81
FVC_PERCENT_PREDICTED_PRE: 71
FEV1/FVC_PRE: 70
FEV1/FVC_PERCENT_PREDICTED_POST: 57
FEV1/FVC_POST: 4.87
FEV1_POST: 2.36
FENO: 6

## 2019-03-27 ENCOUNTER — TELEPHONE (OUTPATIENT)
Dept: PULMONOLOGY | Age: 64
End: 2019-03-27

## 2019-04-03 ENCOUNTER — OFFICE VISIT (OUTPATIENT)
Dept: FAMILY MEDICINE CLINIC | Age: 64
End: 2019-04-03
Payer: MEDICARE

## 2019-04-03 VITALS
BODY MASS INDEX: 50.62 KG/M2 | HEART RATE: 81 BPM | OXYGEN SATURATION: 93 % | RESPIRATION RATE: 18 BRPM | SYSTOLIC BLOOD PRESSURE: 132 MMHG | WEIGHT: 315 LBS | DIASTOLIC BLOOD PRESSURE: 80 MMHG | HEIGHT: 66 IN

## 2019-04-03 DIAGNOSIS — R29.898 MUSCULAR DECONDITIONING: ICD-10-CM

## 2019-04-03 DIAGNOSIS — I48.0 PAF (PAROXYSMAL ATRIAL FIBRILLATION) (HCC): Primary | ICD-10-CM

## 2019-04-03 DIAGNOSIS — K21.9 GASTROESOPHAGEAL REFLUX DISEASE WITHOUT ESOPHAGITIS: ICD-10-CM

## 2019-04-03 DIAGNOSIS — Z79.4 TYPE 2 DIABETES MELLITUS WITH HYPERGLYCEMIA, WITH LONG-TERM CURRENT USE OF INSULIN (HCC): ICD-10-CM

## 2019-04-03 DIAGNOSIS — I10 ESSENTIAL HYPERTENSION: ICD-10-CM

## 2019-04-03 DIAGNOSIS — J01.90 ACUTE BACTERIAL SINUSITIS: ICD-10-CM

## 2019-04-03 DIAGNOSIS — E11.65 TYPE 2 DIABETES MELLITUS WITH HYPERGLYCEMIA, WITH LONG-TERM CURRENT USE OF INSULIN (HCC): ICD-10-CM

## 2019-04-03 DIAGNOSIS — B96.89 ACUTE BACTERIAL SINUSITIS: ICD-10-CM

## 2019-04-03 LAB
HBA1C MFR BLD: 13.7 %
INTERNATIONAL NORMALIZATION RATIO, POC: 1.3
PROTHROMBIN TIME, POC: 17.8

## 2019-04-03 PROCEDURE — 1036F TOBACCO NON-USER: CPT | Performed by: FAMILY MEDICINE

## 2019-04-03 PROCEDURE — G8417 CALC BMI ABV UP PARAM F/U: HCPCS | Performed by: FAMILY MEDICINE

## 2019-04-03 PROCEDURE — 3046F HEMOGLOBIN A1C LEVEL >9.0%: CPT | Performed by: FAMILY MEDICINE

## 2019-04-03 PROCEDURE — 85610 PROTHROMBIN TIME: CPT | Performed by: FAMILY MEDICINE

## 2019-04-03 PROCEDURE — 83036 HEMOGLOBIN GLYCOSYLATED A1C: CPT | Performed by: FAMILY MEDICINE

## 2019-04-03 PROCEDURE — 99214 OFFICE O/P EST MOD 30 MIN: CPT | Performed by: FAMILY MEDICINE

## 2019-04-03 PROCEDURE — G8427 DOCREV CUR MEDS BY ELIG CLIN: HCPCS | Performed by: FAMILY MEDICINE

## 2019-04-03 PROCEDURE — 3017F COLORECTAL CA SCREEN DOC REV: CPT | Performed by: FAMILY MEDICINE

## 2019-04-03 PROCEDURE — 2022F DILAT RTA XM EVC RTNOPTHY: CPT | Performed by: FAMILY MEDICINE

## 2019-04-03 PROCEDURE — G8598 ASA/ANTIPLAT THER USED: HCPCS | Performed by: FAMILY MEDICINE

## 2019-04-03 PROCEDURE — G8510 SCR DEP NEG, NO PLAN REQD: HCPCS | Performed by: FAMILY MEDICINE

## 2019-04-03 RX ORDER — RANITIDINE 300 MG/1
TABLET ORAL
Qty: 30 TABLET | Refills: 5 | Status: SHIPPED | OUTPATIENT
Start: 2019-04-03 | End: 2020-01-02

## 2019-04-03 RX ORDER — METOPROLOL SUCCINATE 25 MG/1
TABLET, EXTENDED RELEASE ORAL
Qty: 90 TABLET | Refills: 5 | Status: SHIPPED | OUTPATIENT
Start: 2019-04-03 | End: 2019-07-01 | Stop reason: SDUPTHER

## 2019-04-03 RX ORDER — AZITHROMYCIN 250 MG/1
250 TABLET, FILM COATED ORAL SEE ADMIN INSTRUCTIONS
Qty: 6 TABLET | Refills: 0 | Status: SHIPPED | OUTPATIENT
Start: 2019-04-03 | End: 2019-04-08

## 2019-04-03 ASSESSMENT — PATIENT HEALTH QUESTIONNAIRE - PHQ9
2. FEELING DOWN, DEPRESSED OR HOPELESS: 0
SUM OF ALL RESPONSES TO PHQ9 QUESTIONS 1 & 2: 0
1. LITTLE INTEREST OR PLEASURE IN DOING THINGS: 0
SUM OF ALL RESPONSES TO PHQ QUESTIONS 1-9: 0
SUM OF ALL RESPONSES TO PHQ QUESTIONS 1-9: 0

## 2019-04-05 ASSESSMENT — ENCOUNTER SYMPTOMS
CHEST TIGHTNESS: 0
EYE ITCHING: 0
HEARTBURN: 1
COUGH: 0
NAUSEA: 0
RECTAL PAIN: 0
RHINORRHEA: 0
CHOKING: 0
ANAL BLEEDING: 0
EYE PAIN: 0
STRIDOR: 0
SHORTNESS OF BREATH: 1
VOMITING: 0
FACIAL SWELLING: 0
COLOR CHANGE: 0
PHOTOPHOBIA: 0
WHEEZING: 0
APNEA: 0
DIARRHEA: 0
EYE DISCHARGE: 0
ABDOMINAL DISTENTION: 0
TROUBLE SWALLOWING: 0
VOICE CHANGE: 0
CONSTIPATION: 0
SORE THROAT: 0
BACK PAIN: 0
EYE REDNESS: 0
BLOOD IN STOOL: 0
SINUS PRESSURE: 0
ABDOMINAL PAIN: 0

## 2019-04-05 NOTE — PROGRESS NOTES
Subjective:      Patient ID: Debbie Fairbanks is a 61 y.o. male. Shortness of Breath   This is a chronic problem. The current episode started 1 to 4 weeks ago. The problem occurs daily. The problem has been waxing and waning. Pertinent negatives include no abdominal pain, chest pain, ear pain, fever, headaches, leg swelling, neck pain, rash, rhinorrhea, sore throat, vomiting or wheezing. The symptoms are aggravated by any activity. The patient has no known risk factors for DVT/PE. He has tried steroid inhalers and beta agonist inhalers for the symptoms. The treatment provided mild relief. Other   This is a recurrent (restless leg) problem. The current episode started more than 1 month ago. The problem occurs daily. The problem has been waxing and waning. Associated symptoms include arthralgias, fatigue and urinary symptoms. Pertinent negatives include no abdominal pain, chest pain, chills, congestion, coughing, diaphoresis, fever, headaches, joint swelling, myalgias, nausea, neck pain, numbness, rash, sore throat, vomiting or weakness. Nothing aggravates the symptoms. He has tried nothing for the symptoms. The treatment provided no relief. Fatigue   This is a chronic problem. The current episode started more than 1 year ago. The problem occurs daily. The problem has been waxing and waning. Associated symptoms include arthralgias, fatigue and urinary symptoms. Pertinent negatives include no abdominal pain, chest pain, chills, congestion, coughing, diaphoresis, fever, headaches, joint swelling, myalgias, nausea, neck pain, numbness, rash, sore throat, vomiting or weakness. Nothing aggravates the symptoms. He has tried nothing for the symptoms. The treatment provided no relief. Diabetes   He presents for his follow-up diabetic visit. He has type 2 diabetes mellitus. No MedicAlert identification noted. His disease course has been worsening. There are no hypoglycemic associated symptoms.  Pertinent negatives for hypoglycemia include no confusion, dizziness, headaches, nervousness/anxiousness, pallor, seizures, speech difficulty or tremors. Associated symptoms include fatigue and foot paresthesias. Pertinent negatives for diabetes include no chest pain, no polydipsia, no polyphagia, no polyuria and no weakness. There are no hypoglycemic complications. Symptoms are worsening. Diabetic complications include heart disease and peripheral neuropathy. Risk factors for coronary artery disease include diabetes mellitus, dyslipidemia and hypertension. Current diabetic treatment includes oral agent (dual therapy). He is compliant with treatment most of the time. His weight is decreasing steadily. He is following a generally healthy diet. Meal planning includes avoidance of concentrated sweets. He has had a previous visit with a dietitian. He rarely participates in exercise. His home blood glucose trend is decreasing steadily. An ACE inhibitor/angiotensin II receptor blocker is being taken. He sees a podiatrist.Eye exam is current. Hyperlipidemia   Associated symptoms include shortness of breath. Pertinent negatives include no chest pain or myalgias. Hypertension   Associated symptoms include shortness of breath. Pertinent negatives include no chest pain, headaches, neck pain or palpitations. Gastroesophageal Reflux   He complains of heartburn. He reports no abdominal pain, no chest pain, no choking, no coughing, no nausea, no sore throat or no wheezing. This is a recurrent problem. The current episode started more than 1 month ago. The problem occurs occasionally. The problem has been waxing and waning. The heartburn duration is less than a minute. The heartburn is located in the substernum. The heartburn is of moderate intensity. The heartburn does not wake him from sleep. The heartburn does not limit his activity. The heartburn doesn't change with position. Nothing aggravates the symptoms. Associated symptoms include fatigue. He has tried a histamine-2 antagonist and a PPI for the symptoms. Review of Systems   Constitutional: Positive for fatigue. Negative for activity change, appetite change, chills, diaphoresis, fever and unexpected weight change. HENT: Negative for congestion, dental problem, drooling, ear discharge, ear pain, facial swelling, hearing loss, mouth sores, nosebleeds, postnasal drip, rhinorrhea, sinus pressure, sneezing, sore throat, tinnitus, trouble swallowing and voice change. Eyes: Negative for photophobia, pain, discharge, redness, itching and visual disturbance. Respiratory: Positive for shortness of breath. Negative for apnea, cough, choking, chest tightness, wheezing and stridor. Cardiovascular: Negative for chest pain, palpitations and leg swelling. Gastrointestinal: Positive for heartburn. Negative for abdominal distention, abdominal pain, anal bleeding, blood in stool, constipation, diarrhea, nausea, rectal pain and vomiting. Endocrine: Negative for cold intolerance, heat intolerance, polydipsia, polyphagia and polyuria. Genitourinary: Negative for decreased urine volume, difficulty urinating, discharge, dysuria, enuresis, flank pain, frequency, genital sores, hematuria, penile pain, penile swelling, scrotal swelling, testicular pain and urgency. Musculoskeletal: Positive for arthralgias. Negative for back pain, gait problem, joint swelling, myalgias, neck pain and neck stiffness. Skin: Negative for color change, pallor, rash and wound. Allergic/Immunologic: Negative for environmental allergies, food allergies and immunocompromised state. Neurological: Negative for dizziness, tremors, seizures, syncope, facial asymmetry, speech difficulty, weakness, light-headedness, numbness and headaches. Hematological: Negative for adenopathy. Does not bruise/bleed easily.    Psychiatric/Behavioral: Negative for agitation, behavioral problems, confusion, decreased concentration, dysphoric mood, hallucinations, self-injury, sleep disturbance and suicidal ideas. The patient is not nervous/anxious and is not hyperactive.       Past Medical History:   Diagnosis Date    Acid reflux     Arthritis     Asthma 2014    [facility]hmatic bronchitis , chronic [facility] 2016    CAD (coronary artery disease)     Chronic bronchitis ([facility] 2014    COPD (chronic obstructive pulmonary disease) ([facility])     CB    COPD (chronic obstructive pulmonary disease) ([facility])     Diabetes mellitus ([facility])     Emphysema (subcutaneous) (surgical) resulting from a procedure     Hyperlipidemia     Hypertension     Hypoxemia requiring supplemental oxygen 2015    LONG TERM ANTICOAGULENT USE     Morbid obesity with BMI of 50.0-59.9, adult ([facility] 2013    Obesity     Osteoarthritis     Sleep apnea     bilevel positive airway pressure at 13/8 with 2 L oxygen flow     Tobacco abuse     Type II or unspecified type diabetes mellitus without mention of complication, not stated as uncontrolled        Social History     Tobacco Use    Smoking status: Former Smoker     Packs/day: 1.00     Years: 45.00     Pack years: 45.00     Types: Cigarettes     Last attempt to quit: 2013     Years since quittin.7    Smokeless tobacco: Former User     Types: Chew     Quit date: 10/8/1971   Substance Use Topics    Alcohol use: No     Alcohol/week: 0.0 oz     Comment: drinks 2 cups of coffee daily       Family History   Problem Relation Age of Onset    Cancer Mother         breast    Cancer Father         stomach    Mental Illness Brother     Stroke Brother     Other Brother        Current Outpatient Medications   Medication Sig Dispense Refill    metoprolol succinate (TOPROL XL) 25 MG extended release tablet TAKE ONE TABLET BY MOUTH EVERY DAY 90 tablet 5    metFORMIN (GLUCOPHAGE) 500 MG tablet TAKE ONE TABLET BY MOUTH TWICE A DAY WITH MEALS 60 tablet 5    ranitidine (ZANTAC) 300 MG tablet TAKE ONE TABLET BY MOUTH EVERY NIGHT 30 tablet 5    insulin glargine (BASAGLAR KWIKPEN) 100 UNIT/ML injection pen Inject 45 Units into the skin nightly 5 pen 5    azithromycin (ZITHROMAX) 250 MG tablet Take 1 tablet by mouth See Admin Instructions for 5 days 500mg on day 1 followed by 250mg on days 2 - 5 6 tablet 0    Umeclidinium Bromide (INCRUSE ELLIPTA) 62.5 MCG/INH AEPB Inhale 1 puff into the lungs daily      potassium chloride (KLOR-CON M) 20 MEQ extended release tablet TAKE ONE TABLET BY MOUTH EVERY DAY 60 tablet 4    pramipexole (MIRAPEX) 0.25 MG tablet TAKE ONE TABLET BY MOUTH EVERY NIGHT 30 tablet 4    montelukast (SINGULAIR) 10 MG tablet TAKE ONE TABLET BY MOUTH EVERY NIGHT 30 tablet 5    pantoprazole (PROTONIX) 40 MG tablet TAKE ONE TABLET BY MOUTH EVERY DAY WITH BREAKFAST 30 tablet 5    gabapentin (NEURONTIN) 600 MG tablet TAKE ONE TABLET BY MOUTH TWO TIMES A DAY. 60 tablet 3    warfarin (JANTOVEN) 5 MG tablet TAKE 2 TABLETS BY MOUTH DAILY ON MONDAY, WEDNESDAY, FRIDAY AND SATURDAY. THEN TAKE 1 TABLET DAILY ON TUESDAY, THURSDAY AND SUNDAY 50 tablet 5    furosemide (LASIX) 40 MG tablet TAKE ONE-HALF TABLET BY MOUTH DAILY 30 tablet 3    vitamin D (D3-50) 14169 UNIT CAPS TAKE 1 CAPSULE BY MOUTH ONCE EVERY 7 DAYS 4 capsule 5    blood glucose test strips (ASCENSIA AUTODISC VI;ONE TOUCH ULTRA TEST VI) strip Test tid prn 100 strip 5    pravastatin (PRAVACHOL) 20 MG tablet Take 1 tablet by mouth daily 90 tablet 3    SYMBICORT 160-4.5 MCG/ACT AERO Inhale 2 puffs into the lungs 2 times daily 1 Inhaler 3    tiotropium (SPIRIVA RESPIMAT) 2.5 MCG/ACT AERS inhaler Inhale 1 puff into the lungs daily 3 Inhaler 12    CPAP Machine MISC by Does not apply route nightly as needed       Accu-Chek Multiclix Lancets MISC Use as directed 100 each 3    aspirin 81 MG tablet Take 81 mg by mouth daily.       ipratropium (ATROVENT) 0.06 % nasal spray 2 sprays by Nasal route 3 times daily 1 Bottle 5     No current facility-administered Psychiatric: He has a normal mood and affect. His behavior is normal. Judgment and thought content normal.   Nursing note and vitals reviewed. Assessment / Plan:      Ade Mosley was seen today for diabetes. Diagnoses and all orders for this visit:    PAF (paroxysmal atrial fibrillation) ([facility])  -     POCT INR    Essential hypertension  -     metoprolol succinate (TOPROL XL) 25 MG extended release tablet; TAKE ONE TABLET BY MOUTH EVERY DAY    Type 2 diabetes mellitus with hyperglycemia, with long-term current use of insulin ([facility])  -     metFORMIN (GLUCOPHAGE) 500 MG tablet; TAKE ONE TABLET BY MOUTH TWICE A DAY WITH MEALS  -     POCT glycosylated hemoglobin (Hb A1C)  -     insulin glargine (BASAGLAR KWIKPEN) 100 UNIT/ML injection pen; Inject 45 Units into the skin nightly    Gastroesophageal reflux disease without esophagitis  -     ranitidine (ZANTAC) 300 MG tablet; TAKE ONE TABLET BY MOUTH EVERY NIGHT    Muscular deconditioning  -     [facility]    Acute bacterial sinusitis  -     azithromycin (ZITHROMAX) 250 MG tablet; Take 1 tablet by mouth See Admin Instructions for 5 days 500mg on day 1 followed by 250mg on days 2 - 5    BMI was elevated today, and weight loss plan recommended is : medically supervised diet with primary care physician. reviewed health maintenance report. Patient is aware of deficiencies and suggested preventative tests.

## 2019-04-17 DIAGNOSIS — E55.9 VITAMIN D DEFICIENCY: ICD-10-CM

## 2019-04-24 RX ORDER — CHOLECALCIFEROL (VITAMIN D3) 1250 MCG
CAPSULE ORAL
Qty: 4 CAPSULE | Refills: 4 | Status: SHIPPED | OUTPATIENT
Start: 2019-04-24 | End: 2019-11-01 | Stop reason: SDUPTHER

## 2019-05-15 ENCOUNTER — EVALUATION (OUTPATIENT)
Dept: PHYSICAL THERAPY | Age: 64
End: 2019-05-15
Payer: MEDICARE

## 2019-05-15 DIAGNOSIS — R29.898 MUSCULAR DECONDITIONING: Primary | ICD-10-CM

## 2019-05-15 PROCEDURE — 97162 PT EVAL MOD COMPLEX 30 MIN: CPT | Performed by: PHYSICAL THERAPIST

## 2019-05-15 NOTE — PROGRESS NOTES
Physical Therapy Daily Treatment Note    Date: 5/15/2019  Patient Name: Stan Vang  : 1955   MRN: [id number]  DOInjury: chronic  Referring Provider: Miranda Flores DO   [address]     Medical Diagnosis:      Diagnosis Orders   1. Muscular deconditioning         Outcome Measure:   LEFS 74% impairment     Precautions: Patient reluctant to exercise. He has poor understanding of neuropathy (that it does not improve, however strength and endurance can). S: See eval  O:  Time 7896-9997     Visit -10 Repeat outcome measure at mid point and end. Pain 10     ROM      Modalities            Exercise      Nustep   Next   TE         Squats Next   TA   Calf Raises Next   TA   Marches Next   TA   Alt. Sidekicks Next   TA         Sit/Stands Yes. Soon. TA               Marching Gait Yes. Soon. NR   Sidestepping Yes. Soon. NR                                 A:  Tolerated well. P: Continue with rehab plan. Patient wishes to keep visits low to help manage costs. We will focus on building tolerance to weightbearing which will need to include some seated or mat exercises as a rest break from weightbearing.    Guillermo Araujo, PT    Treatment Charges: Mins Units   Initial Evaluation 60 1   Re-Evaluation     Ther Exercise         TE     Manual Therapy     MT     Ther Activities        TA     Gait Training          GT     Neuro Re-education NR     Modalities     Non-Billable Service Time     Other     Total Time/Units 60 1

## 2019-05-15 NOTE — PROGRESS NOTES
 Sleep apnea     bilevel positive airway pressure at 13/8 with 2 L oxygen flow     Tobacco abuse     Type II or unspecified type diabetes mellitus without mention of complication, not stated as uncontrolled      Past Surgical History:   Procedure Laterality Date    COLONOSCOPY      CORONARY ANGIOPLASTY WITH STENT PLACEMENT  07-23-13    Left main focal eccentric 65% distal stenosis. Large OM1 CX 50% ostial & prox narrow. Large ramus artery & LAD: Minor plaque w/o sign narrow. RCA: Dom vessel w/25% prox narrow & focal hazy eccentric 99% distal stenosis before origin RPDA & RPLCA. LV: Mild inferior hypokinesis EF 55%. Probable mild AS with 10-15mmHg. Successful PCI distal RCA w/3.5 x 12 mm BMS, 0% res stenosis.  Normal distal runoff    CORONARY ARTERY BYPASS GRAFT  09-09-13    Dr Adan Vasquez; CABG x2, LIMA to LAD, SVG to ramus intermedius; MV repair using 30-mm Future complete ring with magic stitch between A3 and P3; rigid internal fixation of sternum using KLS plates x2; endoscopic vein harvesting of right lower extremity     ECHO COMPL W DOP COLOR FLOW  7/25/2013         HERNIA REPAIR      SINUS SURGERY         Medications:   Current Outpatient Medications   Medication Sig Dispense Refill    Cholecalciferol (VITAMIN D3) 08763 units CAPS TAKE ONE CAPSULE BY MOUTH ONCE EVERY 7 DAYS 4 capsule 4    metoprolol succinate (TOPROL XL) 25 MG extended release tablet TAKE ONE TABLET BY MOUTH EVERY DAY 90 tablet 5    metFORMIN (GLUCOPHAGE) 500 MG tablet TAKE ONE TABLET BY MOUTH TWICE A DAY WITH MEALS 60 tablet 5    ranitidine (ZANTAC) 300 MG tablet TAKE ONE TABLET BY MOUTH EVERY NIGHT 30 tablet 5    insulin glargine (BASAGLAR KWIKPEN) 100 UNIT/ML injection pen Inject 45 Units into the skin nightly 5 pen 5    Umeclidinium Bromide (INCRUSE ELLIPTA) 62.5 MCG/INH AEPB Inhale 1 puff into the lungs daily      potassium chloride (KLOR-CON M) 20 MEQ extended release tablet TAKE ONE TABLET BY MOUTH EVERY DAY 60 tablet 4    pramipexole (MIRAPEX) 0.25 MG tablet TAKE ONE TABLET BY MOUTH EVERY NIGHT 30 tablet 4    montelukast (SINGULAIR) 10 MG tablet TAKE ONE TABLET BY MOUTH EVERY NIGHT 30 tablet 5    pantoprazole (PROTONIX) 40 MG tablet TAKE ONE TABLET BY MOUTH EVERY DAY WITH BREAKFAST 30 tablet 5    gabapentin (NEURONTIN) 600 MG tablet TAKE ONE TABLET BY MOUTH TWO TIMES A DAY. 60 tablet 3    warfarin (JANTOVEN) 5 MG tablet TAKE 2 TABLETS BY MOUTH DAILY ON MONDAY, WEDNESDAY, FRIDAY AND SATURDAY. THEN TAKE 1 TABLET DAILY ON TUESDAY, THURSDAY AND SUNDAY 50 tablet 5    furosemide (LASIX) 40 MG tablet TAKE ONE-HALF TABLET BY MOUTH DAILY 30 tablet 3    blood glucose test strips (ASCENSIA AUTODISC VI;ONE TOUCH ULTRA TEST VI) strip Test tid prn 100 strip 5    pravastatin (PRAVACHOL) 20 MG tablet Take 1 tablet by mouth daily 90 tablet 3    SYMBICORT 160-4.5 MCG/ACT AERO Inhale 2 puffs into the lungs 2 times daily 1 Inhaler 3    ipratropium (ATROVENT) 0.06 % nasal spray 2 sprays by Nasal route 3 times daily 1 Bottle 5    tiotropium (SPIRIVA RESPIMAT) 2.5 MCG/ACT AERS inhaler Inhale 1 puff into the lungs daily 3 Inhaler 12    CPAP Machine MISC by Does not apply route nightly as needed       Accu-Chek Multiclix Lancets MISC Use as directed 100 each 3    aspirin 81 MG tablet Take 81 mg by mouth daily. No current facility-administered medications for this visit. Equipment owned: none    Reported limitations: difficulty with walking     Occupation: disability. Exercise regimen: none. Resistant to physical activity due to reported fear of leg/foot pain. Hobbies: tinkering in garage     Patient Goals: relief of foot pain (neuropathy pain)     Contraindications/Precautions: none    OBJECTIVE:     Observations: overweight male with flat affect    Inspection: wound dressing applied to medial aspect of R lower leg above malleolus. Patient states his DPM took a biopsy recently due to concerns of skin cancer.         Gait: 80-84  < 10  < 9    85-89  < 8  < 8    90-94  < 7  < 4         ASSESSMENT     Outcome Measure:   LEFS 74% impairment     Problems:    Limited tolerance to weightbearing tasks. Requested position change (getting off of feet) in 10 seconds.  Decreased functional ability with walking, standing, use of right lower extremity, use of left lower extremity and IADLs    [] There are no barriers affecting plan of care or recovery    [x] Barriers to this patient's plan of care or recovery include: chronic nature of problem, patient understanding of role physical activity plays on overall health, poor prognosis for neuropathy pain. Domestic Concerns:  [x] No  [] Yes:    Short Term goals (2-3 weeks)    Demonstrate ability to remain on feet for 10 minutes performing tasks in PT  · LEFS 70% impairment     Long Term goals (4-6 weeks)   Demonstrate ability to remain on feet for 30 minutes performing tasks in PT   Able to perform/complete the following functions/tasks: walk daily at home   Independent with Home Exercise Programs   LEFS 50% impairment      Rehab Potential: [x] Good  [] Fair  [] Poor    PLAN       Treatment Plan:  [x] Therapeutic Exercise  [x] Therapeutic Activity  [x] Neuromuscular Re-education   [x] Gait Training  [x] Balance Training  [] Aerobic conditioning  [] Manual Therapy  [] Massage/Fascial release   [] Work/Sport specific activities    [] Pain Neuroscience [] Cold/hotpack  [] Vasocompression  [] Electrical Stimulation  [] Lumbar/Cervical Traction  [] Ultrasound   [] Iontophoresis: 4 mg/mL Dexamethasone Sodium Phosphate 40-80 mAmin        [x] Instruction in HEP      []  Medication allergies reviewed for use of Dexamethasone Sodium Phosphate 4mg/ml  with iontophoresis treatments. Patient is not allergic.       The following CPT codes are likely to be used in the care of this patient: 20239 PT Evaluation: Moderate Complexity , 70627 PT Re-Evaluation , 15582 Therapeutic Exercise , 23062 Neuromuscular Re-Education , 87643 Therapeutic Activities , 33260 Group Therapy , 16[facility] Gait Training       Suggested Professional Referral: [x] No  [] Yes:     Patient Education:  [x] Plans/Goals, Risks/Benefits discussed  [x] Home exercise program  Method of Education: [x] Verbal  [x] Demo  [x] Written  Comprehension of Education:  [x] Verbalizes understanding. [x] Demonstrates understanding. [] Needs Review. [] Demonstrates/verbalizes understanding of HEP/Ed previously given. Frequency:  1-2 days per week for 4-6 weeks    Patient understands diagnosis/prognosis and consents to treatment, plan and goals: [x] Yes    [] No     Thank you for the opportunity to work with your patient. If you have questions or comments, please contact me at [phone]; fax: [phone]. Electronically signed by: Carri Leos PT         Please sign Physician's Certification and return to: [place] [address]  Dept: [phone]  Dept Fax: [phone] Certification / Comments     Frequency/Duration 1-2 days per week for 4-6 weeks. Certification period from 5/15/2019  to 08/13/2019. I have reviewed the Plan of Care established for skilled therapy services and certify that the services are required and that they will be provided while the patient is under my care.     Physician's Comments/Revisions:               Physician's Printed Name:                                           [de-identified] Signature:                                                               Date:

## 2019-05-21 ENCOUNTER — TREATMENT (OUTPATIENT)
Dept: PHYSICAL THERAPY | Age: 64
End: 2019-05-21
Payer: MEDICARE

## 2019-05-21 DIAGNOSIS — R29.898 MUSCULAR DECONDITIONING: Primary | ICD-10-CM

## 2019-05-21 PROCEDURE — 97530 THERAPEUTIC ACTIVITIES: CPT

## 2019-05-21 PROCEDURE — 97110 THERAPEUTIC EXERCISES: CPT

## 2019-05-21 NOTE — PROGRESS NOTES
Physical Therapy Daily Treatment Note    Date: 2019  Patient Name: Carolyn Fernández  : 1955   MRN: [id number]  DOInjury: chronic  Referring Provider: Catracho Gunn DO   [address]     Medical Diagnosis:      Diagnosis Orders   1. Muscular deconditioning         Outcome Measure:   LEFS 74% impairment     Precautions: Patient reluctant to exercise. He has poor understanding of neuropathy (that it does not improve, however strength and endurance can). S:  Pt with c/o numbness/tingling in both feet, unable to tolerate standing ex's. O:  Time 1300- 1345     Visit 2 /6-10 Repeat outcome measure at mid point and end. Pain 5/10     ROM      Modalities            Exercise      Nustep   L 5 x 10 min    TE         Squats Next   TA   Calf Raises Attempted unable to do    TA   Marches 2 x 10 seated    TA   Alt. Sidekicks 2 x 10 seated   TA   LAQ's  2 x 10           Sit/Stands Yes. Soon. TA               Marching Gait Yes. Soon. NR   Sidestepping Yes. Soon. NR                                 A:  Tolerated fair . Pt unable to tolerate attempted standing . All ex's altered to a seated position. P: Continue with rehab plan. Patient wishes to keep visits low to help manage costs. We will focus on building tolerance to weightbearing which will need to include some seated or mat exercises as a rest break from weightbearing.    Lynett Aase, PTA    Treatment Charges: Mins Units   Initial Evaluation     Re-Evaluation     Ther Exercise         TE 10 1   Manual Therapy     MT     Ther Activities        TA 35 2   Gait Training          GT     Neuro Re-education NR     Modalities     Non-Billable Service Time     Other     Total Time/Units 45 3

## 2019-06-05 DIAGNOSIS — E11.65 TYPE 2 DIABETES MELLITUS WITH HYPERGLYCEMIA, WITH LONG-TERM CURRENT USE OF INSULIN (HCC): ICD-10-CM

## 2019-06-05 DIAGNOSIS — Z79.4 TYPE 2 DIABETES MELLITUS WITH DIABETIC POLYNEUROPATHY, WITH LONG-TERM CURRENT USE OF INSULIN (HCC): ICD-10-CM

## 2019-06-05 DIAGNOSIS — Z79.4 TYPE 2 DIABETES MELLITUS WITH HYPERGLYCEMIA, WITH LONG-TERM CURRENT USE OF INSULIN (HCC): ICD-10-CM

## 2019-06-05 DIAGNOSIS — E11.42 TYPE 2 DIABETES MELLITUS WITH DIABETIC POLYNEUROPATHY, WITH LONG-TERM CURRENT USE OF INSULIN (HCC): ICD-10-CM

## 2019-06-05 RX ORDER — GABAPENTIN 600 MG/1
TABLET ORAL
Qty: 60 TABLET | Refills: 2 | Status: SHIPPED | OUTPATIENT
Start: 2019-06-05 | End: 2019-09-20 | Stop reason: SDUPTHER

## 2019-06-19 ENCOUNTER — APPOINTMENT (OUTPATIENT)
Dept: GENERAL RADIOLOGY | Age: 64
DRG: 291 | End: 2019-06-19
Payer: MEDICARE

## 2019-06-19 ENCOUNTER — HOSPITAL ENCOUNTER (INPATIENT)
Age: 64
LOS: 6 days | Discharge: HOME | DRG: 291 | End: 2019-06-25
Attending: EMERGENCY MEDICINE | Admitting: INTERNAL MEDICINE
Payer: MEDICARE

## 2019-06-19 DIAGNOSIS — I48.92 ATRIAL FLUTTER WITH RAPID VENTRICULAR RESPONSE (HCC): Primary | ICD-10-CM

## 2019-06-19 DIAGNOSIS — Z79.01 ANTICOAGULATED ON COUMADIN: ICD-10-CM

## 2019-06-19 DIAGNOSIS — I50.9 ACUTE ON CHRONIC CONGESTIVE HEART FAILURE, UNSPECIFIED HEART FAILURE TYPE (HCC): ICD-10-CM

## 2019-06-19 DIAGNOSIS — J18.9 PNEUMONIA OF BOTH LUNGS DUE TO INFECTIOUS ORGANISM, UNSPECIFIED PART OF LUNG: ICD-10-CM

## 2019-06-19 PROBLEM — I50.31 ACUTE DIASTOLIC (CONGESTIVE) HEART FAILURE (HCC): Status: ACTIVE | Noted: 2019-06-19

## 2019-06-19 LAB
ALBUMIN SERPL-MCNC: 3.8 G/DL (ref 3.5–5.2)
ALP BLD-CCNC: 123 U/L (ref 40–129)
ALT SERPL-CCNC: 59 U/L (ref 0–40)
ANION GAP SERPL CALCULATED.3IONS-SCNC: 13 MMOL/L (ref 7–16)
AST SERPL-CCNC: 54 U/L (ref 0–39)
BASOPHILS ABSOLUTE: 0.03 E9/L (ref 0–0.2)
BASOPHILS RELATIVE PERCENT: 0.5 % (ref 0–2)
BETA-HYDROXYBUTYRATE: 0.64 MMOL/L (ref 0.02–0.27)
BILIRUB SERPL-MCNC: 1.1 MG/DL (ref 0–1.2)
BUN BLDV-MCNC: 19 MG/DL (ref 8–23)
CALCIUM SERPL-MCNC: 9.1 MG/DL (ref 8.6–10.2)
CHLORIDE BLD-SCNC: 99 MMOL/L (ref 98–107)
CO2: 25 MMOL/L (ref 22–29)
CREAT SERPL-MCNC: 0.9 MG/DL (ref 0.7–1.2)
EKG ATRIAL RATE: 284 BPM
EKG P AXIS: 89 DEGREES
EKG Q-T INTERVAL: 290 MS
EKG QRS DURATION: 114 MS
EKG QTC CALCULATION (BAZETT): 446 MS
EKG R AXIS: 71 DEGREES
EKG T AXIS: -147 DEGREES
EKG VENTRICULAR RATE: 142 BPM
EOSINOPHILS ABSOLUTE: 0.02 E9/L (ref 0.05–0.5)
EOSINOPHILS RELATIVE PERCENT: 0.3 % (ref 0–6)
GFR AFRICAN AMERICAN: >60
GFR NON-AFRICAN AMERICAN: >60 ML/MIN/1.73
GLUCOSE BLD-MCNC: 489 MG/DL (ref 74–99)
HCT VFR BLD CALC: 45.6 % (ref 37–54)
HEMOGLOBIN: 14.9 G/DL (ref 12.5–16.5)
IMMATURE GRANULOCYTES #: 0.03 E9/L
IMMATURE GRANULOCYTES %: 0.5 % (ref 0–5)
INR BLD: 2.7
LACTIC ACID: 2.5 MMOL/L (ref 0.5–2.2)
LYMPHOCYTES ABSOLUTE: 1.29 E9/L (ref 1.5–4)
LYMPHOCYTES RELATIVE PERCENT: 19.9 % (ref 20–42)
MAGNESIUM: 1.9 MG/DL (ref 1.6–2.6)
MCH RBC QN AUTO: 31.8 PG (ref 26–35)
MCHC RBC AUTO-ENTMCNC: 32.7 % (ref 32–34.5)
MCV RBC AUTO: 97.2 FL (ref 80–99.9)
METER GLUCOSE: 450 MG/DL (ref 74–99)
MONOCYTES ABSOLUTE: 0.52 E9/L (ref 0.1–0.95)
MONOCYTES RELATIVE PERCENT: 8 % (ref 2–12)
NEUTROPHILS ABSOLUTE: 4.58 E9/L (ref 1.8–7.3)
NEUTROPHILS RELATIVE PERCENT: 70.8 % (ref 43–80)
PDW BLD-RTO: 14.8 FL (ref 11.5–15)
PH VENOUS: 7.37 (ref 7.35–7.45)
PLATELET # BLD: 187 E9/L (ref 130–450)
PMV BLD AUTO: 10.7 FL (ref 7–12)
POTASSIUM SERPL-SCNC: 4.7 MMOL/L (ref 3.5–5)
PRO-BNP: 1631 PG/ML (ref 0–125)
PROTHROMBIN TIME: 29.7 SEC (ref 9.3–12.4)
RBC # BLD: 4.69 E12/L (ref 3.8–5.8)
SODIUM BLD-SCNC: 137 MMOL/L (ref 132–146)
TOTAL PROTEIN: 6.8 G/DL (ref 6.4–8.3)
TROPONIN: <0.01 NG/ML (ref 0–0.03)
TROPONIN: <0.01 NG/ML (ref 0–0.03)
WBC # BLD: 6.5 E9/L (ref 4.5–11.5)

## 2019-06-19 PROCEDURE — 87633 RESP VIRUS 12-25 TARGETS: CPT

## 2019-06-19 PROCEDURE — 82962 GLUCOSE BLOOD TEST: CPT

## 2019-06-19 PROCEDURE — 83880 ASSAY OF NATRIURETIC PEPTIDE: CPT

## 2019-06-19 PROCEDURE — 94640 AIRWAY INHALATION TREATMENT: CPT

## 2019-06-19 PROCEDURE — 36415 COLL VENOUS BLD VENIPUNCTURE: CPT

## 2019-06-19 PROCEDURE — 96365 THER/PROPH/DIAG IV INF INIT: CPT

## 2019-06-19 PROCEDURE — 83605 ASSAY OF LACTIC ACID: CPT

## 2019-06-19 PROCEDURE — 82010 KETONE BODYS QUAN: CPT

## 2019-06-19 PROCEDURE — 83735 ASSAY OF MAGNESIUM: CPT

## 2019-06-19 PROCEDURE — 93010 ELECTROCARDIOGRAM REPORT: CPT | Performed by: INTERNAL MEDICINE

## 2019-06-19 PROCEDURE — 87040 BLOOD CULTURE FOR BACTERIA: CPT

## 2019-06-19 PROCEDURE — 87798 DETECT AGENT NOS DNA AMP: CPT

## 2019-06-19 PROCEDURE — 85610 PROTHROMBIN TIME: CPT

## 2019-06-19 PROCEDURE — 6370000000 HC RX 637 (ALT 250 FOR IP): Performed by: INTERNAL MEDICINE

## 2019-06-19 PROCEDURE — 93005 ELECTROCARDIOGRAM TRACING: CPT | Performed by: EMERGENCY MEDICINE

## 2019-06-19 PROCEDURE — 71045 X-RAY EXAM CHEST 1 VIEW: CPT

## 2019-06-19 PROCEDURE — 87450 HC DIRECT STREP B ANTIGEN: CPT

## 2019-06-19 PROCEDURE — 2580000003 HC RX 258: Performed by: EMERGENCY MEDICINE

## 2019-06-19 PROCEDURE — 85025 COMPLETE CBC W/AUTO DIFF WBC: CPT

## 2019-06-19 PROCEDURE — 87581 M.PNEUMON DNA AMP PROBE: CPT

## 2019-06-19 PROCEDURE — 99285 EMERGENCY DEPT VISIT HI MDM: CPT

## 2019-06-19 PROCEDURE — 6360000002 HC RX W HCPCS: Performed by: INTERNAL MEDICINE

## 2019-06-19 PROCEDURE — 84484 ASSAY OF TROPONIN QUANT: CPT

## 2019-06-19 PROCEDURE — 82800 BLOOD PH: CPT

## 2019-06-19 PROCEDURE — 6360000002 HC RX W HCPCS: Performed by: EMERGENCY MEDICINE

## 2019-06-19 PROCEDURE — 2500000003 HC RX 250 WO HCPCS: Performed by: EMERGENCY MEDICINE

## 2019-06-19 PROCEDURE — 80053 COMPREHEN METABOLIC PANEL: CPT

## 2019-06-19 PROCEDURE — 96375 TX/PRO/DX INJ NEW DRUG ADDON: CPT

## 2019-06-19 PROCEDURE — 2060000000 HC ICU INTERMEDIATE R&B

## 2019-06-19 PROCEDURE — 87486 CHLMYD PNEUM DNA AMP PROBE: CPT

## 2019-06-19 RX ORDER — MONTELUKAST SODIUM 10 MG/1
10 TABLET ORAL NIGHTLY
Status: DISCONTINUED | OUTPATIENT
Start: 2019-06-19 | End: 2019-06-25 | Stop reason: HOSPADM

## 2019-06-19 RX ORDER — 0.9 % SODIUM CHLORIDE 0.9 %
500 INTRAVENOUS SOLUTION INTRAVENOUS ONCE
Status: COMPLETED | OUTPATIENT
Start: 2019-06-19 | End: 2019-06-19

## 2019-06-19 RX ORDER — DEXTROSE MONOHYDRATE 50 MG/ML
100 INJECTION, SOLUTION INTRAVENOUS PRN
Status: DISCONTINUED | OUTPATIENT
Start: 2019-06-19 | End: 2019-06-25 | Stop reason: HOSPADM

## 2019-06-19 RX ORDER — IPRATROPIUM BROMIDE AND ALBUTEROL SULFATE 2.5; .5 MG/3ML; MG/3ML
1 SOLUTION RESPIRATORY (INHALATION)
Status: DISCONTINUED | OUTPATIENT
Start: 2019-06-19 | End: 2019-06-25 | Stop reason: HOSPADM

## 2019-06-19 RX ORDER — METOPROLOL SUCCINATE 25 MG/1
25 TABLET, EXTENDED RELEASE ORAL DAILY
Status: DISCONTINUED | OUTPATIENT
Start: 2019-06-20 | End: 2019-06-19

## 2019-06-19 RX ORDER — ONDANSETRON 2 MG/ML
4 INJECTION INTRAMUSCULAR; INTRAVENOUS EVERY 6 HOURS PRN
Status: DISCONTINUED | OUTPATIENT
Start: 2019-06-19 | End: 2019-06-25 | Stop reason: HOSPADM

## 2019-06-19 RX ORDER — ACETAMINOPHEN 325 MG/1
650 TABLET ORAL EVERY 4 HOURS PRN
Status: DISCONTINUED | OUTPATIENT
Start: 2019-06-19 | End: 2019-06-25 | Stop reason: HOSPADM

## 2019-06-19 RX ORDER — PRAVASTATIN SODIUM 20 MG
20 TABLET ORAL DAILY
Status: DISCONTINUED | OUTPATIENT
Start: 2019-06-19 | End: 2019-06-20

## 2019-06-19 RX ORDER — INSULIN GLARGINE 100 [IU]/ML
30 INJECTION, SOLUTION SUBCUTANEOUS NIGHTLY
Status: DISCONTINUED | OUTPATIENT
Start: 2019-06-19 | End: 2019-06-19

## 2019-06-19 RX ORDER — POTASSIUM CHLORIDE 20 MEQ/1
20 TABLET, EXTENDED RELEASE ORAL DAILY
Status: DISCONTINUED | OUTPATIENT
Start: 2019-06-20 | End: 2019-06-25 | Stop reason: HOSPADM

## 2019-06-19 RX ORDER — WARFARIN SODIUM 5 MG/1
5 TABLET ORAL
Status: DISCONTINUED | OUTPATIENT
Start: 2019-06-21 | End: 2019-06-25 | Stop reason: HOSPADM

## 2019-06-19 RX ORDER — WARFARIN SODIUM 5 MG/1
5 TABLET ORAL
COMMUNITY
End: 2020-01-02 | Stop reason: SDUPTHER

## 2019-06-19 RX ORDER — SODIUM CHLORIDE 9 MG/ML
INJECTION, SOLUTION INTRAVENOUS
Status: DISPENSED
Start: 2019-06-19 | End: 2019-06-20

## 2019-06-19 RX ORDER — WARFARIN SODIUM 5 MG/1
10 TABLET ORAL
COMMUNITY
End: 2020-01-02 | Stop reason: SDUPTHER

## 2019-06-19 RX ORDER — PANTOPRAZOLE SODIUM 40 MG/1
40 TABLET, DELAYED RELEASE ORAL
Status: DISCONTINUED | OUTPATIENT
Start: 2019-06-20 | End: 2019-06-25 | Stop reason: HOSPADM

## 2019-06-19 RX ORDER — SODIUM CHLORIDE 0.9 % (FLUSH) 0.9 %
10 SYRINGE (ML) INJECTION PRN
Status: DISCONTINUED | OUTPATIENT
Start: 2019-06-19 | End: 2019-06-25 | Stop reason: HOSPADM

## 2019-06-19 RX ORDER — NICOTINE POLACRILEX 4 MG
15 LOZENGE BUCCAL PRN
Status: DISCONTINUED | OUTPATIENT
Start: 2019-06-19 | End: 2019-06-25 | Stop reason: HOSPADM

## 2019-06-19 RX ORDER — WARFARIN SODIUM 5 MG/1
5 TABLET ORAL DAILY
Status: DISCONTINUED | OUTPATIENT
Start: 2019-06-20 | End: 2019-06-25 | Stop reason: HOSPADM

## 2019-06-19 RX ORDER — WARFARIN SODIUM 5 MG/1
5 TABLET ORAL
Status: DISCONTINUED | OUTPATIENT
Start: 2019-06-19 | End: 2019-06-19 | Stop reason: CLARIF

## 2019-06-19 RX ORDER — WARFARIN SODIUM 5 MG/1
5 TABLET ORAL
Status: DISCONTINUED | OUTPATIENT
Start: 2019-06-19 | End: 2019-06-19

## 2019-06-19 RX ORDER — ASPIRIN 81 MG/1
81 TABLET ORAL DAILY
Status: DISCONTINUED | OUTPATIENT
Start: 2019-06-19 | End: 2019-06-25 | Stop reason: HOSPADM

## 2019-06-19 RX ORDER — METOPROLOL SUCCINATE 25 MG/1
25 TABLET, EXTENDED RELEASE ORAL 2 TIMES DAILY
Status: DISCONTINUED | OUTPATIENT
Start: 2019-06-19 | End: 2019-06-25 | Stop reason: HOSPADM

## 2019-06-19 RX ORDER — DEXTROSE MONOHYDRATE 25 G/50ML
12.5 INJECTION, SOLUTION INTRAVENOUS PRN
Status: DISCONTINUED | OUTPATIENT
Start: 2019-06-19 | End: 2019-06-25 | Stop reason: HOSPADM

## 2019-06-19 RX ORDER — SODIUM CHLORIDE 0.9 % (FLUSH) 0.9 %
10 SYRINGE (ML) INJECTION EVERY 12 HOURS SCHEDULED
Status: DISCONTINUED | OUTPATIENT
Start: 2019-06-19 | End: 2019-06-25 | Stop reason: HOSPADM

## 2019-06-19 RX ORDER — INSULIN GLARGINE 100 [IU]/ML
10 INJECTION, SOLUTION SUBCUTANEOUS NIGHTLY
Status: DISCONTINUED | OUTPATIENT
Start: 2019-06-19 | End: 2019-06-20

## 2019-06-19 RX ORDER — PRAMIPEXOLE DIHYDROCHLORIDE 0.25 MG/1
0.25 TABLET ORAL NIGHTLY
Status: DISCONTINUED | OUTPATIENT
Start: 2019-06-19 | End: 2019-06-25 | Stop reason: HOSPADM

## 2019-06-19 RX ORDER — GABAPENTIN 300 MG/1
600 CAPSULE ORAL 2 TIMES DAILY
Status: DISCONTINUED | OUTPATIENT
Start: 2019-06-19 | End: 2019-06-25 | Stop reason: HOSPADM

## 2019-06-19 RX ADMIN — SODIUM CHLORIDE 500 ML: 9 INJECTION, SOLUTION INTRAVENOUS at 14:15

## 2019-06-19 RX ADMIN — ASPIRIN 81 MG: 81 TABLET, COATED ORAL at 18:22

## 2019-06-19 RX ADMIN — INSULIN GLARGINE 10 UNITS: 100 INJECTION, SOLUTION SUBCUTANEOUS at 20:30

## 2019-06-19 RX ADMIN — PRAVASTATIN SODIUM 20 MG: 20 TABLET ORAL at 20:30

## 2019-06-19 RX ADMIN — INSULIN LISPRO 6 UNITS: 100 INJECTION, SOLUTION INTRAVENOUS; SUBCUTANEOUS at 20:30

## 2019-06-19 RX ADMIN — WATER 2 G: 1 INJECTION INTRAMUSCULAR; INTRAVENOUS; SUBCUTANEOUS at 14:16

## 2019-06-19 RX ADMIN — DILTIAZEM HYDROCHLORIDE 50 MG: 5 INJECTION INTRAVENOUS at 14:52

## 2019-06-19 RX ADMIN — IPRATROPIUM BROMIDE AND ALBUTEROL SULFATE 1 AMPULE: .5; 3 SOLUTION RESPIRATORY (INHALATION) at 18:04

## 2019-06-19 RX ADMIN — MONTELUKAST 10 MG: 10 TABLET, FILM COATED ORAL at 20:30

## 2019-06-19 RX ADMIN — GABAPENTIN 600 MG: 300 CAPSULE ORAL at 20:30

## 2019-06-19 RX ADMIN — FUROSEMIDE 40 MG: 10 INJECTION, SOLUTION INTRAMUSCULAR; INTRAVENOUS at 18:25

## 2019-06-19 RX ADMIN — METOPROLOL SUCCINATE 25 MG: 25 TABLET, EXTENDED RELEASE ORAL at 22:16

## 2019-06-19 RX ADMIN — AZITHROMYCIN DIHYDRATE 500 MG: 500 INJECTION, POWDER, LYOPHILIZED, FOR SOLUTION INTRAVENOUS at 14:19

## 2019-06-19 RX ADMIN — PRAMIPEXOLE DIHYDROCHLORIDE 0.25 MG: 0.25 TABLET ORAL at 20:31

## 2019-06-19 ASSESSMENT — ENCOUNTER SYMPTOMS
VOMITING: 0
RHINORRHEA: 0
SHORTNESS OF BREATH: 1
COUGH: 1
ABDOMINAL PAIN: 0
NAUSEA: 0
WHEEZING: 0
EYE REDNESS: 0
DIARRHEA: 0
BACK PAIN: 0
SORE THROAT: 0
EYE PAIN: 0

## 2019-06-19 ASSESSMENT — PAIN SCALES - GENERAL: PAINLEVEL_OUTOF10: 0

## 2019-06-19 NOTE — ED PROVIDER NOTES
The patient is a 70-year-old male with a past history of CAD, COPD, diabetes, hypertension, hyperlipidemia, diabetes, who presents to the ED for the chief complaint of shortness of breath and leg swelling. Onset of his symptoms started a couple days ago. He complains of having a mild cough, shortness of breath and increased swelling in his bilateral legs. He also reports associated lightheadedness. He denies any chest pain or palpitations. Denies any abdominal pain, nausea, vomiting or diarrhea. His complaint has been persistent in duration and severity. Nothing makes it better. He reports his shortness of breath is worse when he lays flat or when he exerts himself. Patient is currently awake, alert and oriented in no acute distress but is hypoxic and 90% on room air. He reports a history of atrial fibrillation and is on Coumadin. He reports he has had a heart attack before. He has had 2 stents placed and has had triple bypass surgery in 2013. His cardiologist is Dr. Yehuda Amaral. He reports his PCP is Dr. Danita Nunez. Patient reports his blood sugar was elevated last time he checked it yesterday in the 400s. He reports he is supposed to be on insulin but cannot afford the insulin and therefore has not had it for several months. The history is provided by the patient. No  was used. Review of Systems   Constitutional: Negative for chills and fever. HENT: Negative for ear pain, rhinorrhea and sore throat. Eyes: Negative for pain, redness and visual disturbance. Respiratory: Positive for cough and shortness of breath. Negative for wheezing. Cardiovascular: Positive for leg swelling. Negative for chest pain. Gastrointestinal: Negative for abdominal pain, diarrhea, nausea and vomiting. Genitourinary: Negative for difficulty urinating, dysuria and frequency. Musculoskeletal: Negative for arthralgias, back pain and neck pain. Skin: Negative for rash and wound. Neurological: Positive for light-headedness. Negative for dizziness, weakness and headaches. All other systems reviewed and are negative. BP 90/67   Pulse 75   Temp 97.8 °F (36.6 °C) (Oral)   Resp 20   SpO2 93%     Physical Exam   Constitutional: He is oriented to person, place, and time. He appears well-developed and well-nourished. No distress. 75-year-old obese male laying in bed in no acute distress. His wife is present at bedside. HENT:   Head: Normocephalic and atraumatic. Right Ear: External ear normal.   Left Ear: External ear normal.   Nose: Nose normal.   Mouth/Throat: Oropharynx is clear and moist. No oropharyngeal exudate. Eyes: Pupils are equal, round, and reactive to light. Conjunctivae are normal. Right eye exhibits no discharge. Left eye exhibits no discharge. No scleral icterus. Neck: Normal range of motion. Neck supple. No JVD present. No tracheal deviation present. Cardiovascular: Regular rhythm, normal heart sounds and intact distal pulses. Tachycardia present. No murmur heard. Pulses:       Radial pulses are 2+ on the right side, and 2+ on the left side. 1-2+ bilateral lower extremity pitting edema. Pulmonary/Chest: Effort normal. No respiratory distress. He has decreased breath sounds. He has no wheezes. He has no rales. Normal effort. No respiratory distress. SPO2 90% on room air. Diminished breath sounds bilaterally. Abdominal: Soft. Bowel sounds are normal. There is no tenderness. There is no rebound and no guarding. Musculoskeletal: He exhibits no deformity. Lymphadenopathy:     He has no cervical adenopathy. Neurological: He is alert and oriented to person, place, and time. Skin: Skin is warm and dry. He is not diaphoretic. Nursing note and vitals reviewed. Procedures    MDM  Number of Diagnoses or Management Options  Acute on chronic congestive heart failure, unspecified heart failure type [facility]):    Anticoagulated on Coumadin:   Atrial --------------------------------------------- PAST HISTORY ---------------------------------------------  Past Medical History:  has a past medical history of Acid reflux, Arthritis, Asthma, Asthmatic bronchitis , chronic (HCC), CAD (coronary artery disease), Chronic bronchitis ([facility] COPD (chronic obstructive pulmonary disease) ([facility] COPD (chronic obstructive pulmonary disease) ([facility] Diabetes mellitus ([facility] Emphysema (subcutaneous) (surgical) resulting from a procedure, Hyperlipidemia, Hypertension, Hypoxemia requiring supplemental oxygen, LONG TERM ANTICOAGULENT USE, Morbid obesity with BMI of 50.0-59.9, adult ([facility] Obesity, Osteoarthritis, Sleep apnea, Tobacco abuse, and Type II or unspecified type diabetes mellitus without mention of complication, not stated as uncontrolled. Past Surgical History:  has a past surgical history that includes hernia repair; sinus surgery; ECHO Compl W Dop Color Flow (7/25/2013); Colonoscopy; Coronary angioplasty with stent (07-23-13); and Coronary artery bypass graft (09-09-13). Social History:  reports that he quit smoking about 5 years ago. His smoking use included cigarettes. He has a 45.00 pack-year smoking history. He quit smokeless tobacco use about 47 years ago. His smokeless tobacco use included chew. He reports that he does not drink alcohol or use drugs. Family History: family history includes Cancer in his father and mother; Mental Illness in his brother; Other in his brother; Stroke in his brother. The patients home medications have been reviewed.     Allergies: Pcn [penicillins] and Sulfa antibiotics    -------------------------------------------------- RESULTS -------------------------------------------------      EKG Interpretation    Interpreted by emergency department physician    Rhythm: atrial flutter  Rate: 142  Axis: normal  Ectopy: none  Conduction: right bundle branch block (incomplete)  ST Segments: nonspecific changes  T Waves: non specific changes  Q Waves: nonspecific    Clinical Impression: Atrial flutter with 2-1 conduction, incomplete right bundle branch block and nonspecific changes.     [facility]       Lab  Results for orders placed or performed during the hospital encounter of 06/19/19   CBC auto differential   Result Value Ref Range    WBC 6.5 4.5 - 11.5 E9/L    RBC 4.69 3.80 - 5.80 E12/L    Hemoglobin 14.9 12.5 - 16.5 g/dL    Hematocrit 45.6 37.0 - 54.0 %    MCV 97.2 80.0 - 99.9 fL    MCH 31.8 26.0 - 35.0 pg    MCHC 32.7 32.0 - 34.5 %    RDW 14.8 11.5 - 15.0 fL    Platelets 450 505 - 284 E9/L    MPV 10.7 7.0 - 12.0 fL    Neutrophils % 70.8 43.0 - 80.0 %    Immature Granulocytes % 0.5 0.0 - 5.0 %    Lymphocytes % 19.9 (L) 20.0 - 42.0 %    Monocytes % 8.0 2.0 - 12.0 %    Eosinophils % 0.3 0.0 - 6.0 %    Basophils % 0.5 0.0 - 2.0 %    Neutrophils # 4.58 1.80 - 7.30 E9/L    Immature Granulocytes # 0.03 E9/L    Lymphocytes # 1.29 (L) 1.50 - 4.00 E9/L    Monocytes # 0.52 0.10 - 0.95 E9/L    Eosinophils # 0.02 (L) 0.05 - 0.50 E9/L    Basophils # 0.03 0.00 - 0.20 E9/L   Comprehensive Metabolic Panel   Result Value Ref Range    Sodium 137 132 - 146 mmol/L    Potassium 4.7 3.5 - 5.0 mmol/L    Chloride 99 98 - 107 mmol/L    CO2 25 22 - 29 mmol/L    Anion Gap 13 7 - 16 mmol/L    Glucose 489 (H) 74 - 99 mg/dL    BUN 19 8 - 23 mg/dL    CREATININE 0.9 0.7 - 1.2 mg/dL    GFR Non-African American >60 >=60 mL/min/1.73    GFR African American >60     Calcium 9.1 8.6 - 10.2 mg/dL    Total Protein 6.8 6.4 - 8.3 g/dL    Alb 3.8 3.5 - 5.2 g/dL    Total Bilirubin 1.1 0.0 - 1.2 mg/dL    Alkaline Phosphatase 123 40 - 129 U/L    ALT 59 (H) 0 - 40 U/L    AST 54 (H) 0 - 39 U/L   Troponin   Result Value Ref Range    Troponin <0.01 0.00 - 0.03 ng/mL   Brain Natriuretic Peptide   Result Value Ref Range    Pro-BNP 1,631 (H) 0 - 125 pg/mL   pH, venous   Result Value Ref Range    pH, Misha 7.37 7.35 - 7.45   Beta-Hydroxybutyrate   Result Value Ref Range Arnold and will admit.    [AD]   [phone] Discussed case with Dr. Sofy Coello.    [AD]      ED Course User Index  [AD] Alexis Arguello DO         I have spoken with the patient and discussed todays results, in addition to providing specific details for the plan of care and counseling regarding the diagnosis and prognosis. Their questions are answered at this time and they are agreeable with the plan.      --------------------------------- ADDITIONAL PROVIDER NOTES ---------------------------------  Consultations:  Spoke with Dr. Latonya Gomez,  They will admit this patient. This patient's ED course included: a personal history and physicial examination, re-evaluation prior to disposition, multiple bedside re-evaluations, IV medications, cardiac monitoring, continuous pulse oximetry and complex medical decision making and emergency management    This patient has remained hemodynamically stable during their ED course. Please note that the withdrawal or failure to initiate urgent interventions for this patient would likely result in a life threatening deterioration or permanent disability. Accordingly this patient received 35 minutes of critical care time, excluding separately billable procedures. Clinical Impression  1. Atrial flutter with rapid ventricular response ([address]    2. Pneumonia of both lungs due to infectious organism, unspecified part of lung    3. Anticoagulated on Coumadin    4. Acute on chronic congestive heart failure, unspecified heart failure type ([address]          Disposition  Patient's disposition: Admit to telemetry  Patient's condition is stable.            Alexis Arguello DO  Resident  06/19/19 [id number]

## 2019-06-19 NOTE — H&P
requiring supplemental oxygen 2/2/2015    LONG TERM ANTICOAGULENT USE     Morbid obesity with BMI of 50.0-59.9, adult (formerly [facility]) 11/27/2013    Obesity     Osteoarthritis     Sleep apnea     bilevel positive airway pressure at 13/8 with 2 L oxygen flow     Tobacco abuse     Type II or unspecified type diabetes mellitus without mention of complication, not stated as uncontrolled      Past Surgical History:   Procedure Laterality Date    COLONOSCOPY      CORONARY ANGIOPLASTY WITH STENT PLACEMENT  07-23-13    Left main focal eccentric 65% distal stenosis. Large OM1 CX 50% ostial & prox narrow. Large ramus artery & LAD: Minor plaque w/o sign narrow. RCA: Dom vessel w/25% prox narrow & focal hazy eccentric 99% distal stenosis before origin RPDA & RPLCA. LV: Mild inferior hypokinesis EF 55%. Probable mild AS with 10-15mmHg. Successful PCI distal RCA w/3.5 x 12 mm BMS, 0% res stenosis.  Normal distal runoff    CORONARY ARTERY BYPASS GRAFT  09-09-13    Dr Kunal Boyce; CABG x2, LIMA to LAD, SVG to ramus intermedius; MV repair using 30-mm Future complete ring with magic stitch between A3 and P3; rigid internal fixation of sternum using KLS plates x2; endoscopic vein harvesting of right lower extremity     ECHO COMPL W DOP COLOR FLOW  7/25/2013         HERNIA REPAIR      SINUS SURGERY       Family History   Problem Relation Age of Onset    Cancer Mother         breast    Cancer Father         stomach    Mental Illness Brother     Stroke Brother     Other Brother      Social History     Socioeconomic History    Marital status:      Spouse name: Not on file    Number of children: Not on file    Years of education: Not on file    Highest education level: Not on file   Occupational History    Not on file   Social Needs    Financial resource strain: Not on file    Food insecurity:     Worry: Not on file     Inability: Not on file    Transportation needs:     Medical: Not on file     Non-medical: Not on file Tobacco Use    Smoking status: Former Smoker     Packs/day: 1.00     Years: 45.00     Pack years: 45.00     Types: Cigarettes     Last attempt to quit: 2013     Years since quittin.9    Smokeless tobacco: Former User     Types: Chew     Quit date: 10/8/1971   Substance and Sexual Activity    Alcohol use: No     Alcohol/week: 0.0 oz     Comment: drinks 2 cups of coffee daily    Drug use: No    Sexual activity: Yes     Partners: Female   Lifestyle    Physical activity:     Days per week: Not on file     Minutes per session: Not on file    Stress: Not on file   Relationships    Social connections:     Talks on phone: Not on file     Gets together: Not on file     Attends Mu-ism service: Not on file     Active member of club or organization: Not on file     Attends meetings of clubs or organizations: Not on file     Relationship status: Not on file    Intimate partner violence:     Fear of current or ex partner: Not on file     Emotionally abused: Not on file     Physically abused: Not on file     Forced sexual activity: Not on file   Other Topics Concern    Not on file   Social History Narrative    Not on file     Prior to Admission medications    Medication Sig Start Date End Date Taking?  Authorizing Provider   warfarin (COUMADIN) 5 MG tablet Take 5 mg by mouth three times a week , Tuesday, Thursday   Yes Historical Provider, MD   warfarin (COUMADIN) 5 MG tablet Take 10 mg by mouth four times a week Monday, Wednesday, Friday, Saturday   Yes Historical Provider, MD   gabapentin (NEURONTIN) 600 MG tablet TAKE ONE TABLET BY MOUTH TWO TIMES A DAY 19 Yes Judit Gates DO   Cholecalciferol (VITAMIN D3) 20376 units CAPS TAKE ONE CAPSULE BY MOUTH ONCE EVERY 7 DAYS  Patient taking differently: Take 1 capsule by mouth once a week 19  Yes Judit Gates DO   metoprolol succinate (TOPROL XL) 25 MG extended release tablet TAKE ONE TABLET BY MOUTH EVERY DAY 4/3/19  Yes Dicie Reasons, DO   metFORMIN (GLUCOPHAGE) 500 MG tablet TAKE ONE TABLET BY MOUTH TWICE A DAY WITH MEALS 4/3/19  Yes Dicie Reasons, DO   ranitidine (ZANTAC) 300 MG tablet TAKE ONE TABLET BY MOUTH EVERY NIGHT  Patient taking differently: Take 300 mg by mouth Daily with supper  4/3/19  Yes Dicie Reasons, DO   potassium chloride (KLOR-CON M) 20 MEQ extended release tablet TAKE ONE TABLET BY MOUTH EVERY DAY 3/12/19  Yes Dicie Reasons, DO   pramipexole (MIRAPEX) 0.25 MG tablet TAKE ONE TABLET BY MOUTH EVERY NIGHT 2/27/19  Yes Dicie Reasons, DO   montelukast (SINGULAIR) 10 MG tablet TAKE ONE TABLET BY MOUTH EVERY NIGHT 1/3/19  Yes Dicie Reasons, DO   pantoprazole (PROTONIX) 40 MG tablet TAKE ONE TABLET BY MOUTH EVERY DAY WITH BREAKFAST 1/3/19 1/3/20 Yes Dicie Reasons, DO   furosemide (LASIX) 40 MG tablet TAKE ONE-HALF TABLET BY MOUTH DAILY 12/11/18  Yes Dicie Reasons, DO   pravastatin (PRAVACHOL) 20 MG tablet Take 1 tablet by mouth daily  Patient taking differently: Take 20 mg by mouth nightly  6/8/18  Yes Dicie Reasons, DO   SYMBICORT 160-4.5 MCG/ACT AERO Inhale 2 puffs into the lungs 2 times daily 3/6/18  Yes Dicie Reasons, DO   CPAP Machine MISC 1 each by Does not apply route nightly as needed    Yes Historical Provider, MD   aspirin 81 MG tablet Take 81 mg by mouth nightly    Yes Historical Provider, MD   blood glucose test strips (ASCENSIA AUTODISC VI;ONE TOUCH ULTRA TEST VI) strip Test tid prn 8/14/18   Dicie Reasons, DO   Accu-Chek Multiclix Lancets MISC Use as directed 9/25/14   Dicie Reasons, DO     Allergies: Pcn [penicillins] and Sulfa antibiotics    Review of Systems  All bolded are positive; please see HPI  General:  Fever, chills, diaphoresis, fatigue, malaise, night sweats, weight loss  Psychological:  Anxiety, disorientation, hallucinations. ENT:  Epistaxis, vertigo, visual changes.   Cardiovascular:  Chest pain, irregular heartbeats, palpitations, paroxysmal nocturnal dyspnea. Respiratory:  Shortness of breath, coughing, sputum production, hemoptysis, wheezing, orthopnea. Gastrointestinal:  Nausea, vomiting, diarrhea, heartburn, constipation, abdominal pain, hematemesis, hematochezia, melena, acholic stools  Genito-Urinary:  Dysuria, urgency, frequency, hematuria  Musculoskeletal:  Joint pain, joint stiffness, joint swelling, muscle pain  Neurology:  Headache, focal neurological deficits, weakness, numbness, paresthesia  Derm:  Rashes, ulcers, excoriations, bruising  Extremities:  Decreased ROM, peripheral edema, mottling    Physical Examination  /75   Pulse 93   Temp 97.5 °F (36.4 °C) (Axillary)   Resp 24   Ht 5' 6\" (1.676 m)   Wt (!) 340 lb (154.2 kg)   SpO2 97%   BMI 54.88 kg/m²   General: patient is awake, alert, and fully oriented; they appear stated age and are cooperative during the examination; they are in no apparent distress and do not exhibit any labored breathing at this time  HEENT: unremarkable  Neck: supple with trachea midline and without obvious JVD or bruit  Cardiac: Irregularly irregular without obvious murmur, rub, or gallop  Lungs: Diminished breath sounds bilaterally without wheezes, rhonchi, or rales  Abdomen: soft, nontender, and nondistended with normal active bowel sounds and without organomegaly  Extremities: atraumatic, without ulcers, 2+ pitting edema bilateral lower extremities  Neurologic: mental status is as above, cranial nerves are grossly intact, the patient moves all extremities spontaneously, and no focal deficits are appreciated on exam    Recent vitals, labs, and imaging results have been reviewed and are available in the electronic medical record. Assessment and Plan  Patient is a 61 y.o. male who presented with shortness of breath.   The active problem list is as follows:    Acute exacerbation of diastolic congestive heart failure  Atrial flutter with intermittent RVR  Bilateral basilar pneumonia  Acute on chronic

## 2019-06-20 LAB
ALBUMIN SERPL-MCNC: 3.5 G/DL (ref 3.5–5.2)
ALP BLD-CCNC: 99 U/L (ref 40–129)
ALT SERPL-CCNC: 48 U/L (ref 0–40)
ANION GAP SERPL CALCULATED.3IONS-SCNC: 13 MMOL/L (ref 7–16)
AST SERPL-CCNC: 32 U/L (ref 0–39)
BILIRUB SERPL-MCNC: 0.7 MG/DL (ref 0–1.2)
BUN BLDV-MCNC: 19 MG/DL (ref 8–23)
CALCIUM SERPL-MCNC: 9 MG/DL (ref 8.6–10.2)
CHLORIDE BLD-SCNC: 96 MMOL/L (ref 98–107)
CHOLESTEROL, TOTAL: 155 MG/DL (ref 0–199)
CO2: 26 MMOL/L (ref 22–29)
CREAT SERPL-MCNC: 0.9 MG/DL (ref 0.7–1.2)
FILM ARRAY ADENOVIRUS: NORMAL
FILM ARRAY BORDETELLA PERTUSSIS: NORMAL
FILM ARRAY CHLAMYDOPHILIA PNEUMONIAE: NORMAL
FILM ARRAY CORONAVIRUS 229E: NORMAL
FILM ARRAY CORONAVIRUS HKU1: NORMAL
FILM ARRAY CORONAVIRUS NL63: NORMAL
FILM ARRAY CORONAVIRUS OC43: NORMAL
FILM ARRAY INFLUENZA A VIRUS 09H1: NORMAL
FILM ARRAY INFLUENZA A VIRUS H1: NORMAL
FILM ARRAY INFLUENZA A VIRUS H3: NORMAL
FILM ARRAY INFLUENZA A VIRUS: NORMAL
FILM ARRAY INFLUENZA B: NORMAL
FILM ARRAY METAPNEUMOVIRUS: NORMAL
FILM ARRAY MYCOPLASMA PNEUMONIAE: NORMAL
FILM ARRAY PARAINFLUENZA VIRUS 1: NORMAL
FILM ARRAY PARAINFLUENZA VIRUS 2: NORMAL
FILM ARRAY PARAINFLUENZA VIRUS 3: NORMAL
FILM ARRAY PARAINFLUENZA VIRUS 4: NORMAL
FILM ARRAY RESPIRATORY SYNCITIAL VIRUS: NORMAL
FILM ARRAY RHINOVIRUS/ENTEROVIRUS: NORMAL
GFR AFRICAN AMERICAN: >60
GFR NON-AFRICAN AMERICAN: >60 ML/MIN/1.73
GLUCOSE BLD-MCNC: 406 MG/DL (ref 74–99)
HBA1C MFR BLD: 13.7 % (ref 4–5.6)
HCT VFR BLD CALC: 42 % (ref 37–54)
HDLC SERPL-MCNC: 42 MG/DL
HEMOGLOBIN: 13.5 G/DL (ref 12.5–16.5)
INR BLD: 2.8
L. PNEUMOPHILA SEROGP 1 UR AG: NORMAL
LDL CHOLESTEROL CALCULATED: 81 MG/DL (ref 0–99)
LV EF: 41 %
LVEF MODALITY: NORMAL
MAGNESIUM: 2 MG/DL (ref 1.6–2.6)
MCH RBC QN AUTO: 31.7 PG (ref 26–35)
MCHC RBC AUTO-ENTMCNC: 32.1 % (ref 32–34.5)
MCV RBC AUTO: 98.6 FL (ref 80–99.9)
METER GLUCOSE: 281 MG/DL (ref 74–99)
METER GLUCOSE: 351 MG/DL (ref 74–99)
METER GLUCOSE: 370 MG/DL (ref 74–99)
METER GLUCOSE: 384 MG/DL (ref 74–99)
PDW BLD-RTO: 14.7 FL (ref 11.5–15)
PHOSPHORUS: 4 MG/DL (ref 2.5–4.5)
PLATELET # BLD: 156 E9/L (ref 130–450)
PMV BLD AUTO: 10.1 FL (ref 7–12)
POTASSIUM SERPL-SCNC: 4.4 MMOL/L (ref 3.5–5)
PROTHROMBIN TIME: 31.8 SEC (ref 9.3–12.4)
RBC # BLD: 4.26 E12/L (ref 3.8–5.8)
SODIUM BLD-SCNC: 135 MMOL/L (ref 132–146)
STREP PNEUMONIAE ANTIGEN, URINE: NORMAL
TOTAL PROTEIN: 6.4 G/DL (ref 6.4–8.3)
TRIGL SERPL-MCNC: 158 MG/DL (ref 0–149)
TROPONIN: <0.01 NG/ML (ref 0–0.03)
TSH SERPL DL<=0.05 MIU/L-ACNC: 2.79 UIU/ML (ref 0.27–4.2)
VLDLC SERPL CALC-MCNC: 32 MG/DL
WBC # BLD: 6.6 E9/L (ref 4.5–11.5)

## 2019-06-20 PROCEDURE — 83036 HEMOGLOBIN GLYCOSYLATED A1C: CPT

## 2019-06-20 PROCEDURE — 84443 ASSAY THYROID STIM HORMONE: CPT

## 2019-06-20 PROCEDURE — 83735 ASSAY OF MAGNESIUM: CPT

## 2019-06-20 PROCEDURE — 6370000000 HC RX 637 (ALT 250 FOR IP): Performed by: INTERNAL MEDICINE

## 2019-06-20 PROCEDURE — 99222 1ST HOSP IP/OBS MODERATE 55: CPT | Performed by: INTERNAL MEDICINE

## 2019-06-20 PROCEDURE — 87070 CULTURE OTHR SPECIMN AEROBIC: CPT

## 2019-06-20 PROCEDURE — C8929 TTE W OR WO FOL WCON,DOPPLER: HCPCS

## 2019-06-20 PROCEDURE — 2060000000 HC ICU INTERMEDIATE R&B

## 2019-06-20 PROCEDURE — 85610 PROTHROMBIN TIME: CPT

## 2019-06-20 PROCEDURE — 94640 AIRWAY INHALATION TREATMENT: CPT

## 2019-06-20 PROCEDURE — 6360000002 HC RX W HCPCS: Performed by: INTERNAL MEDICINE

## 2019-06-20 PROCEDURE — 80061 LIPID PANEL: CPT

## 2019-06-20 PROCEDURE — 2700000000 HC OXYGEN THERAPY PER DAY

## 2019-06-20 PROCEDURE — 84100 ASSAY OF PHOSPHORUS: CPT

## 2019-06-20 PROCEDURE — 6360000004 HC RX CONTRAST MEDICATION: Performed by: INTERNAL MEDICINE

## 2019-06-20 PROCEDURE — 85027 COMPLETE CBC AUTOMATED: CPT

## 2019-06-20 PROCEDURE — 82962 GLUCOSE BLOOD TEST: CPT

## 2019-06-20 PROCEDURE — 84484 ASSAY OF TROPONIN QUANT: CPT

## 2019-06-20 PROCEDURE — 36415 COLL VENOUS BLD VENIPUNCTURE: CPT

## 2019-06-20 PROCEDURE — 2580000003 HC RX 258: Performed by: INTERNAL MEDICINE

## 2019-06-20 PROCEDURE — 2500000003 HC RX 250 WO HCPCS: Performed by: INTERNAL MEDICINE

## 2019-06-20 PROCEDURE — 80053 COMPREHEN METABOLIC PANEL: CPT

## 2019-06-20 PROCEDURE — 94660 CPAP INITIATION&MGMT: CPT

## 2019-06-20 RX ORDER — DILTIAZEM HYDROCHLORIDE 5 MG/ML
25 INJECTION INTRAVENOUS ONCE
Status: DISCONTINUED | OUTPATIENT
Start: 2019-06-20 | End: 2019-06-23

## 2019-06-20 RX ORDER — INSULIN GLARGINE 100 [IU]/ML
20 INJECTION, SOLUTION SUBCUTANEOUS NIGHTLY
Status: DISCONTINUED | OUTPATIENT
Start: 2019-06-20 | End: 2019-06-21

## 2019-06-20 RX ORDER — DILTIAZEM HYDROCHLORIDE 5 MG/ML
20 INJECTION INTRAVENOUS ONCE
Status: COMPLETED | OUTPATIENT
Start: 2019-06-20 | End: 2019-06-20

## 2019-06-20 RX ORDER — ATORVASTATIN CALCIUM 40 MG/1
80 TABLET, FILM COATED ORAL NIGHTLY
Status: DISCONTINUED | OUTPATIENT
Start: 2019-06-20 | End: 2019-06-25 | Stop reason: HOSPADM

## 2019-06-20 RX ADMIN — FUROSEMIDE 40 MG: 10 INJECTION, SOLUTION INTRAMUSCULAR; INTRAVENOUS at 10:13

## 2019-06-20 RX ADMIN — PANTOPRAZOLE SODIUM 40 MG: 40 TABLET, DELAYED RELEASE ORAL at 06:09

## 2019-06-20 RX ADMIN — PERFLUTREN 1.65 MG: 6.52 INJECTION, SUSPENSION INTRAVENOUS at 09:33

## 2019-06-20 RX ADMIN — INSULIN GLARGINE 20 UNITS: 100 INJECTION, SOLUTION SUBCUTANEOUS at 21:10

## 2019-06-20 RX ADMIN — INSULIN LISPRO 15 UNITS: 100 INJECTION, SOLUTION INTRAVENOUS; SUBCUTANEOUS at 11:48

## 2019-06-20 RX ADMIN — INSULIN LISPRO 9 UNITS: 100 INJECTION, SOLUTION INTRAVENOUS; SUBCUTANEOUS at 16:57

## 2019-06-20 RX ADMIN — DILTIAZEM HYDROCHLORIDE 20 MG: 5 INJECTION INTRAVENOUS at 13:56

## 2019-06-20 RX ADMIN — INSULIN LISPRO 10 UNITS: 100 INJECTION, SOLUTION INTRAVENOUS; SUBCUTANEOUS at 10:14

## 2019-06-20 RX ADMIN — IPRATROPIUM BROMIDE AND ALBUTEROL SULFATE 1 AMPULE: .5; 3 SOLUTION RESPIRATORY (INHALATION) at 10:47

## 2019-06-20 RX ADMIN — IPRATROPIUM BROMIDE AND ALBUTEROL SULFATE 1 AMPULE: .5; 3 SOLUTION RESPIRATORY (INHALATION) at 06:20

## 2019-06-20 RX ADMIN — GABAPENTIN 600 MG: 300 CAPSULE ORAL at 10:11

## 2019-06-20 RX ADMIN — MONTELUKAST 10 MG: 10 TABLET, FILM COATED ORAL at 21:08

## 2019-06-20 RX ADMIN — ASPIRIN 81 MG: 81 TABLET, COATED ORAL at 10:12

## 2019-06-20 RX ADMIN — INSULIN LISPRO 7 UNITS: 100 INJECTION, SOLUTION INTRAVENOUS; SUBCUTANEOUS at 21:10

## 2019-06-20 RX ADMIN — WARFARIN SODIUM 5 MG: 5 TABLET ORAL at 16:55

## 2019-06-20 RX ADMIN — METOPROLOL SUCCINATE 25 MG: 25 TABLET, EXTENDED RELEASE ORAL at 10:11

## 2019-06-20 RX ADMIN — IPRATROPIUM BROMIDE AND ALBUTEROL SULFATE 1 AMPULE: .5; 3 SOLUTION RESPIRATORY (INHALATION) at 14:42

## 2019-06-20 RX ADMIN — FUROSEMIDE 40 MG: 10 INJECTION, SOLUTION INTRAMUSCULAR; INTRAVENOUS at 16:55

## 2019-06-20 RX ADMIN — Medication 10 ML: at 10:13

## 2019-06-20 RX ADMIN — AZITHROMYCIN DIHYDRATE 500 MG: 500 INJECTION, POWDER, LYOPHILIZED, FOR SOLUTION INTRAVENOUS at 14:36

## 2019-06-20 RX ADMIN — POTASSIUM CHLORIDE 20 MEQ: 20 TABLET, EXTENDED RELEASE ORAL at 10:11

## 2019-06-20 RX ADMIN — METOPROLOL SUCCINATE 25 MG: 25 TABLET, EXTENDED RELEASE ORAL at 21:08

## 2019-06-20 RX ADMIN — ATORVASTATIN CALCIUM 80 MG: 40 TABLET, FILM COATED ORAL at 21:08

## 2019-06-20 RX ADMIN — PRAMIPEXOLE DIHYDROCHLORIDE 0.25 MG: 0.25 TABLET ORAL at 21:08

## 2019-06-20 RX ADMIN — CEFTRIAXONE 1 G: 1 INJECTION, POWDER, FOR SOLUTION INTRAMUSCULAR; INTRAVENOUS at 14:36

## 2019-06-20 RX ADMIN — GABAPENTIN 600 MG: 300 CAPSULE ORAL at 21:08

## 2019-06-20 RX ADMIN — Medication 10 ML: at 21:14

## 2019-06-20 ASSESSMENT — PAIN SCALES - GENERAL
PAINLEVEL_OUTOF10: 0
PAINLEVEL_OUTOF10: 0

## 2019-06-20 NOTE — PROGRESS NOTES
[id number]  Internal Medicine  -Resident Progress Note-    Cranston Alpers / Tiffani Rodriguez 1955 / MRN [id number] / Tavares Hermosillo 6/20/2019 / Hospital Day: 2    PCP:  Roberto Salazar DO  Admitting Physician:  Jarek Herndon DO  Consultants: Cardiology  Chief Complaint:    Chief Complaint   Patient presents with    Leg Swelling     bilateral lower extremeties     Shortness of Breath       Subjective / Overnight Events  Va Angel was seen and examined at bedside today; no family was present for the examination. No acute overnight events were noted. Patient states that he is feeling better than he was yesterday although does remain short of breath and peripheral edema still present. He is having a productive cough. Review of Systems  All bolded are positive; please see HPI  General:  Fever, chills, diaphoresis, fatigue, malaise, night sweats, weight loss  Psychological:  Anxiety, disorientation, hallucinations. ENT:  Epistaxis, vertigo, visual changes. Cardiovascular:  Chest pain, irregular heartbeats, palpitations, paroxysmal nocturnal dyspnea. Respiratory:  Shortness of breath, coughing, sputum production, hemoptysis, wheezing, orthopnea.   Gastrointestinal:  Nausea, vomiting, diarrhea, heartburn, constipation, abdominal pain, hematemesis, hematochezia, melena, acholic stools  Genito-Urinary:  Dysuria, urgency, frequency, hematuria  Musculoskeletal:  Joint pain, joint stiffness, joint swelling, muscle pain  Neurology:  Headache, focal neurological deficits, weakness, numbness, paresthesia  Derm:  Rashes, ulcers, excoriations, bruising  Extremities:  Decreased ROM, peripheral edema, mottling    Physical Examination  Vitals:  BP (!) 115/91   Pulse 112   Temp 98.2 °F (36.8 °C) (Axillary)   Resp 16   Ht 5' 6\" (1.676 m)   Wt (!) 332 lb 9.6 oz (150.9 kg)   SpO2 94%   BMI 53.68 kg/m²   General Appearance:  awake, alert, and oriented to person, place, time, and purpose; appears stated age and cooperative; no apparent distress no labored breathing  HEENT:  PERRL; EOMI; sclera clear; buccal mucosa moist  Neck:  supple; trachea midline; no thyromegaly; no JVD; no bruits  Heart: Irregularly irregular; rate controlled; no murmurs  Lungs: Diminished breath sounds bilaterally; no wheezes; no rhonchi; no rales  Abdomen:  soft, non-tender, non-distended; bowel sounds positive; no organomegaly or masses  Extremities:  peripheral pulses present; 1-2+ pitting edema to bilateral lower extremities; no ulcers  Neurologic:  alert and oriented x 3; no focal deficit; cranial nerves grossly intact  Skin:  no petechia; no hemorrhage; no wounds    Medications  Scheduled Meds    atorvastatin  80 mg Oral Nightly    insulin glargine  20 Units Subcutaneous Nightly    insulin lispro  0-18 Units Subcutaneous TID WC    insulin lispro  0-9 Units Subcutaneous Nightly    aspirin  81 mg Oral Daily    gabapentin  600 mg Oral BID    montelukast  10 mg Oral Nightly    pantoprazole  40 mg Oral QAM AC    potassium chloride  20 mEq Oral Daily    pramipexole  0.25 mg Oral Nightly    mometasone-formoterol  2 puff Inhalation BID    ipratropium-albuterol  1 ampule Inhalation Q4H While awake    warfarin  5 mg Oral Daily    sodium chloride flush  10 mL Intravenous 2 times per day    cefTRIAXone (ROCEPHIN) IV  1 g Intravenous Q24H    azithromycin  500 mg Intravenous Q24H    [START ON 6/21/2019] warfarin  5 mg Oral Once per day on Mon Wed Fri Sat    furosemide  40 mg Intravenous BID    metoprolol succinate  25 mg Oral BID     Infusion Meds    dextrose       PRN Meds sodium chloride flush, magnesium hydroxide, ondansetron, acetaminophen, glucose, dextrose, glucagon (rDNA), dextrose    · Recent labs, imaging, and micro results are available in the EMR and have been reviewed. Assessment and Plan  Patient is a 61 y.o. male who presented with shortness of breath.   The active problem list is as follows:    Acute exacerbation of diastolic congestive heart failure  Atrial flutter with intermittent RVR  Bilateral basilar pneumonia  Acute on chronic hypoxic respiratory failure  COPD  CAD, history of MI with CABG x3  Hypertension  Hyperlipidemia  Obstructive sleep apnea, on BiPAP at night   Insulin-dependent diabetes mellitus    Patient has put out nearly 2 L overnight with IV Lasix. To go for echocardiogram today. Cardiology is following. Viral panel was negative. Respiratory and blood cultures are pending. Insulin dosing will be adjusted as patient is again hyperglycemic this morning. Social work has been consulted to meet with the patient about medication affordability as this is the reason he was not taking insulin prior to admission. Patient remains noncompliant with BiPAP, he has refused BiPAP here and does not wear his BiPAP that he has at home. · Routine labs in AM.  · DVT prophylaxis. · Please see orders for further management and care. The plan was discussed with Dr. Evelyn Dockery. Jacinda Du,  PGY2  6/20/2019  10:47 AM       I saw and evaluated the patient. I agree with the findings and the plan of care as documented in the resident's note.     Zhen Bello D.O., FACOI  2:09 PM  6/20/2019

## 2019-06-20 NOTE — PROGRESS NOTES
Patient refusing bipap. States he has one at home that he doesn't wear and he doesn't want to get charged for one here.

## 2019-06-20 NOTE — CARE COORDINATION
Cm note:  Call to Dr. Valeri Leyva in reference to obtaining insulin scripts prior to discharge so that cost can be evaluated. This cm called the patients room ph ext #[phone] he asked me to speak to his wife Humble De Anda who was in the room at the time ([phone]) and she stated he never told Dr. Saundra Mace that he couldn't afford his insulin. He states that he hasn't taken insulin in over 3 months and that he does have prescription coverage with [employer]- but his copays are still too high. However, pt and his wife are unsure of what insulins he was previously prescribed. Waiting on scripts once pcp knows which medications and which doses pt will be prescribed at discharge. Pts pharmacy of choice is [facility]- fax: [phone] and phone of [phone] however pts wife is in agreement with obtaining medications through [address] on [address] [facility] in [place] or any other pharmacy if such medications are less expensive. Will continue to follow.    Electronically signed by Hiren Razo RN-BC on 6/20/2019 at 2:41 PM

## 2019-06-20 NOTE — PLAN OF CARE
Problem: Pain:  Goal: Pain level will decrease  Description  Pain level will decrease  6/20/2019 1100 by Brodie Toney RN  Outcome: Met This Shift     Problem: Pain:  Goal: Control of acute pain  Description  Control of acute pain  6/20/2019 1100 by Brodie Toney RN  Outcome: Met This Shift     Problem: Pain:  Goal: Control of chronic pain  Description  Control of chronic pain  6/20/2019 1100 by Brodie Toney RN  Outcome: Met This Shift     Problem: Breathing Pattern - Ineffective:  Goal: Ability to achieve and maintain a regular respiratory rate will improve  Description  Ability to achieve and maintain a regular respiratory rate will improve  6/20/2019 1100 by Brodie Toney RN  Outcome: Not Met This Shift  Note:   Patient sob with exertion and requiring oxygen at this time.

## 2019-06-20 NOTE — CONSULTS
Inpatient consult to Cardiology  Consult performed by: Panda Ram MD  Consult ordered by: Ej Oropeza DO            [address]    Name: Holli Cyr    Age: 61 y.o. Date of Admission: 6/19/2019 12:41 PM    Date of Service: 6/20/2019    Reason for Consultation: Acute superimposed upon chronic diastolic heart failure, coronary atherosclerosis, paroxysmal atrial flutter, hypertension, chronic obstructive lung disease, morbid obesity, obstructive sleep apnea, noncompliance    Referring Physician: William Talley DO    History of Present Illness: The patient is a 24-year-old white male known to [facility] with primary cardiovascular care provided by Hildegard Carrel. He has a known history of coronary atherosclerosis and initially presented in July, 2013 with an acute coronary syndrome with coronary intervention of his right coronary artery with that of a bare-metal stent with subsequent surgical revascularization inclusive of a left internal mammary graft to the left anterior descending and saphenous vein graft to the ramus intermedius branch of the circumflex associated with mitral valve repair with a 30 mm mitral ring. He has a history of paroxysmal atrial fibrillation, hypertension, diabetes, hyperlipidemia, chronic obstructive lung disease and is well that of morbid obesity with obstructive sleep apnea and noncompliance with the management of both his diabetes and nocturnal CPAP. Most recently undergone objective assessment July, 2017 with an echocardiogram demonstrating regional wall motion of base of the inferior and inferolateral segments with normal left ventricular systolic function and stage II diastolic dysfunction with borderline left atrial enlargement, mild mitral regurgitation and mild to moderate pulmonary hypertension with right ventricular systolic pressures of approximately 50 mmHg.   Perfusion imaging demonstrated a small fixed apical perfusion apnea with no reversible perfusion of base and normal left ventricular systolic function. He readily admits noncompliance with the use of his nocturnal CPAP over the past year as well as the inability to afford diabetic care, particular that of insulin with random glucose levels continuing to range in excess of 400 mg/dL. He is a somewhat limited and vague historian and relates an uncertain duration of a decline of his health inclusive of peripheral edema and an unusual sensation of eventually prompting his emergency room assessment. He specifically denied any symptoms of anginal-like chest discomfort or other ischemic equivalents no additional manifestations of overt decompensated heart failure and specifically denied arrhythmia related symptoms. He is noted no symptoms of a focal neurologic origin nor bleeding. At the time of the emergency room presentation, he was noted to be in atrial flutter with heart rates of approximately 140 bpm with present achievement of improve rate control as well as that of radiographic interstitial edema with a proBNP level of 1630 pg/mL with no comparison value. He was therapeutically anticoagulated time of his assessment. .    Review of Systems: The remainder of a complete multisystem review including consitutional, central nervous, respiratory, circulatory, gastrointestinal, genitourinary, endocrinologic, hematologic, musculoskeletal and psychiatric are negative.     Past Medical History:  Past Medical History:   Diagnosis Date    Acid reflux     Acute diastolic (congestive) heart failure ([facility] [address] 6/19/2019    Arthritis     Asthma 4/16/2014    Asthmatic bronchitis , chronic (HCC) 11/28/2016    CAD (coronary artery disease)     Chronic bronchitis ([facility] [address] 4/16/2014    COPD (chronic obstructive pulmonary disease) (HCC)     CB    COPD (chronic obstructive pulmonary disease) (HCC)     Diabetes mellitus (HCC)     Emphysema (subcutaneous) (surgical) resulting from a Worry: Not on file     Inability: Not on file    Transportation needs:     Medical: Not on file     Non-medical: Not on file   Tobacco Use    Smoking status: Former Smoker     Packs/day: 1.00     Years: 45.00     Pack years: 45.00     Types: Cigarettes     Last attempt to quit: 2013     Years since quittin.9    Smokeless tobacco: Former User     Types: Chew     Quit date: 10/8/1971   Substance and Sexual Activity    Alcohol use: No     Alcohol/week: 0.0 oz     Comment: drinks 2 cups of coffee daily    Drug use: No    Sexual activity: Yes     Partners: Female   Lifestyle    Physical activity:     Days per week: Not on file     Minutes per session: Not on file    Stress: Not on file   Relationships    Social connections:     Talks on phone: Not on file     Gets together: Not on file     Attends Islam service: Not on file     Active member of club or organization: Not on file     Attends meetings of clubs or organizations: Not on file     Relationship status: Not on file    Intimate partner violence:     Fear of current or ex partner: Not on file     Emotionally abused: Not on file     Physically abused: Not on file     Forced sexual activity: Not on file   Other Topics Concern    Not on file   Social History Narrative    Not on file       Allergies: Allergies   Allergen Reactions    Pcn [Penicillins]      Child went to hospital   ? Reaction  Patient tolerates cephalosporins    Sulfa Antibiotics      ? Home Medications:  Prior to Admission medications    Medication Sig Start Date End Date Taking?  Authorizing Provider   warfarin (COUMADIN) 5 MG tablet Take 5 mg by mouth three times a week , Tuesday, Thursday   Yes Historical Provider, MD   warfarin (COUMADIN) 5 MG tablet Take 10 mg by mouth four times a week Monday, Wednesday, Friday, Saturday   Yes Historical Provider, MD   gabapentin (NEURONTIN) 600 MG tablet TAKE ONE TABLET BY MOUTH TWO TIMES A DAY 19 Yes Manjit PALM Antonio Benavidez,    Cholecalciferol (VITAMIN D3) 23742 units CAPS TAKE ONE CAPSULE BY MOUTH ONCE EVERY 7 DAYS  Patient taking differently: Take 1 capsule by mouth once a week Wednesday 4/24/19  Yes Gustabo Mohan DO   metoprolol succinate (TOPROL XL) 25 MG extended release tablet TAKE ONE TABLET BY MOUTH EVERY DAY 4/3/19  Yes Gustabo Mohan DO   metFORMIN (GLUCOPHAGE) 500 MG tablet TAKE ONE TABLET BY MOUTH TWICE A DAY WITH MEALS 4/3/19  Yes Gustabo Mohan DO   ranitidine (ZANTAC) 300 MG tablet TAKE ONE TABLET BY MOUTH EVERY NIGHT  Patient taking differently: Take 300 mg by mouth Daily with supper  4/3/19  Yes Gustabo Mohan DO   potassium chloride (KLOR-CON M) 20 MEQ extended release tablet TAKE ONE TABLET BY MOUTH EVERY DAY 3/12/19  Yes Gustabo Mohan DO   pramipexole (MIRAPEX) 0.25 MG tablet TAKE ONE TABLET BY MOUTH EVERY NIGHT 2/27/19  Yes Gustabo Mohan DO   montelukast (SINGULAIR) 10 MG tablet TAKE ONE TABLET BY MOUTH EVERY NIGHT 1/3/19  Yes Gustabo Mohan DO   pantoprazole (PROTONIX) 40 MG tablet TAKE ONE TABLET BY MOUTH EVERY DAY WITH BREAKFAST 1/3/19 1/3/20 Yes Gustabo Mohan DO   furosemide (LASIX) 40 MG tablet TAKE ONE-HALF TABLET BY MOUTH DAILY 12/11/18  Yes Gustabo Mohan DO   pravastatin (PRAVACHOL) 20 MG tablet Take 1 tablet by mouth daily  Patient taking differently: Take 20 mg by mouth nightly  6/8/18  Yes Gustabo Mohan DO   SYMBICORT 160-4.5 MCG/ACT AERO Inhale 2 puffs into the lungs 2 times daily 3/6/18  Yes Gustabo Mohan DO   CPAP Machine MISC 1 each by Does not apply route nightly as needed    Yes Historical Provider, MD   aspirin 81 MG tablet Take 81 mg by mouth nightly    Yes Historical Provider, MD   blood glucose test strips (ASCENSIA AUTODISC VI;ONE TOUCH ULTRA TEST VI) strip Test tid prn 8/14/18   Gustabo Mohan DO   Accu-Chek Multiclix Lancets MISC Use as directed 9/25/14   Gustabo Mohan DO       Current Medications:  Current Facility-Administered Medications Medication Dose Route Frequency Provider Last Rate Last Dose    aspirin EC tablet 81 mg  81 mg Oral Daily Zerita Furth, DO   81 mg at 06/19/19 1822    gabapentin (NEURONTIN) capsule 600 mg  600 mg Oral BID Zerita Furth, DO   600 mg at 06/19/19 2030    montelukast (SINGULAIR) tablet 10 mg  10 mg Oral Nightly Zerita Furth, DO   10 mg at 06/19/19 2030    pantoprazole (PROTONIX) tablet 40 mg  40 mg Oral QAM AC Zerita Furth, DO   40 mg at 06/20/19 4753    potassium chloride (KLOR-CON M) extended release tablet 20 mEq  20 mEq Oral Daily Zerita Furth, DO        pramipexole (MIRAPEX) tablet 0.25 mg  0.25 mg Oral Nightly Zerita Furth, DO   0.25 mg at 06/19/19 2031    pravastatin (PRAVACHOL) tablet 20 mg  20 mg Oral Daily Zerita Furth, DO   20 mg at 06/19/19 2030    mometasone-formoterol (DULERA) 200-5 MCG/ACT inhaler 2 puff  2 puff Inhalation BID Zerita Furth, DO        ipratropium-albuterol (DUONEB) nebulizer solution 1 ampule  1 ampule Inhalation Q4H While awake Zerita Furth, DO   1 ampule at 06/19/19 1804    warfarin (COUMADIN) tablet 5 mg  5 mg Oral Daily Zerita Furth, DO        sodium chloride flush 0.9 % injection 10 mL  10 mL Intravenous 2 times per day Zerita Furth, DO        sodium chloride flush 0.9 % injection 10 mL  10 mL Intravenous PRN Zerita Furth, DO        magnesium hydroxide (MILK OF MAGNESIA) 400 MG/5ML suspension 30 mL  30 mL Oral Daily PRN Zerita Furth, DO        ondansetron [facility]) injection 4 mg  4 mg Intravenous Q6H PRN Zerita Furth, DO        acetaminophen (TYLENOL) tablet 650 mg  650 mg Oral Q4H PRN Zerita Furth, DO        cefTRIAXone (ROCEPHIN) 1 g in sodium chloride (PF) 10 mL IV syringe  1 g Intravenous Q24H Zerita Furth, DO        azithromycin (ZITHROMAX) 500 mg in D5W 250ml Vial Mate  500 mg Intravenous Q24H Zerita Furth, DO        glucose (H) 06/08/2018         Cardiac Tests:  ECG: A resting electrocardiogram demonstrates evidence of atrial flutter with a mean ventricular response of approximately 140 bpm with a follow-up voltage within the limb leads, a delayed precordial transition zone and nonspecific ST changes  Telemetry findings reviewed: atrial flutter with a mean ventricular response of approximately 100 bpm, no new tachy/bradyarrhythmias overnight  Chest X-ray: A chest x-ray demonstrates no evidence of cardiomegaly with diffuse interstitial infiltrates  Last Echocardiogram: An echocardiogram of July, 2017 demonstrated evidence of a normal size left ventricular chamber with regional wall motion of the base of the inferior and inferolateral segments with overall normal left ventricular systolic function with associated stage II diastolic dysfunction. Borderline left atrial enlargement is present with mild mitral regurgitation and mild to moderate pulmonary hypertension with right ventricular systolic pressures of approximately 50 mmHg  Last stress test: A gated vasodilator myocardial perfusion imaging study of July, 2017 demonstrated a small fixed apical perfusion abnormality with no associated reversible perfusion abnormalities and normal left ventricular systolic function      ASSESSMENT / PLAN: Clinical basis, the patient presents with likely acute superimposed upon chronic diastolic heart failure the face of known coronary atherosclerosis and paroxysmal atrial fibrillation potentially exacerbated by recurrent atrial arrhythmias. Presently attempts of rate control will be attempted in the face of his recurrent atrial flutter with needs of fluid mobilization with ongoing monitoring of renal function electrolytes.   Following correction of his present volume status, a determination of needs of transesophageal echocardiographic guided cardioversion if adequate rate control cannot be maintained with that of a short-term rate control and anticoagulation strategy with potential spontaneous conversion to sinus rhythm following correction of his volume status. In addition, appropriate compliance with weight reduction and the use of his nocturnal CPAP as appropriate have been reinforced to optimize management of his atrial arrhythmias and diastolic cardiac performance. Aggressive risk factor modification of blood pressure, diabetes and serum lipids will remain essential to reducing risk of future atherosclerotic development. Careful monitoring of anticoagulation will be essential to reducing risk of embolic events with goals of maintaining prothrombin times in the range of 2.0-2.5 times INR control the face of concomitant antiplatelet therapy. Antibiotic prophylaxis time of all dental interventions has been reinforced to reduce risk of bacterial endocarditis. Thank you for allowing me to participate in your patient's care. Please feel free to contact me if you have any questions or concerns. Note: This report was completed using computerized voice recognition software. Every effort has been made to ensure accuracy, however; inadvertent computerized transcription errors may be present. Michael Jerez.  Marisol Buenrostro, [facility]

## 2019-06-20 NOTE — PLAN OF CARE
Problem: Pain:  Goal: Pain level will decrease  Description  Pain level will decrease  6/19/2019 2344 by Juan José Waldron RN  Outcome: Met This Shift     Problem: Pain:  Goal: Control of acute pain  Description  Control of acute pain  6/19/2019 2344 by Juan José Waldron RN  Outcome: Met This Shift     Problem: Breathing Pattern - Ineffective:  Goal: Ability to achieve and maintain a regular respiratory rate will improve  Description  Ability to achieve and maintain a regular respiratory rate will improve  6/19/2019 2344 by Juan José Waldron RN  Outcome: Met This Shift

## 2019-06-21 LAB
ALBUMIN SERPL-MCNC: 3.7 G/DL (ref 3.5–5.2)
ALP BLD-CCNC: 115 U/L (ref 40–129)
ALT SERPL-CCNC: 57 U/L (ref 0–40)
ANION GAP SERPL CALCULATED.3IONS-SCNC: 12 MMOL/L (ref 7–16)
AST SERPL-CCNC: 43 U/L (ref 0–39)
BILIRUB SERPL-MCNC: 0.7 MG/DL (ref 0–1.2)
BUN BLDV-MCNC: 23 MG/DL (ref 8–23)
CALCIUM SERPL-MCNC: 9 MG/DL (ref 8.6–10.2)
CHLORIDE BLD-SCNC: 93 MMOL/L (ref 98–107)
CO2: 28 MMOL/L (ref 22–29)
CREAT SERPL-MCNC: 1 MG/DL (ref 0.7–1.2)
GFR AFRICAN AMERICAN: >60
GFR NON-AFRICAN AMERICAN: >60 ML/MIN/1.73
GLUCOSE BLD-MCNC: 322 MG/DL (ref 74–99)
HCT VFR BLD CALC: 46.3 % (ref 37–54)
HEMOGLOBIN: 14.7 G/DL (ref 12.5–16.5)
INR BLD: 2.9
MCH RBC QN AUTO: 31.4 PG (ref 26–35)
MCHC RBC AUTO-ENTMCNC: 31.7 % (ref 32–34.5)
MCV RBC AUTO: 98.9 FL (ref 80–99.9)
METER GLUCOSE: 214 MG/DL (ref 74–99)
METER GLUCOSE: 311 MG/DL (ref 74–99)
METER GLUCOSE: 312 MG/DL (ref 74–99)
PDW BLD-RTO: 14.7 FL (ref 11.5–15)
PLATELET # BLD: 185 E9/L (ref 130–450)
PMV BLD AUTO: 10.4 FL (ref 7–12)
POTASSIUM SERPL-SCNC: 4.3 MMOL/L (ref 3.5–5)
PROTHROMBIN TIME: 32.2 SEC (ref 9.3–12.4)
RBC # BLD: 4.68 E12/L (ref 3.8–5.8)
SODIUM BLD-SCNC: 133 MMOL/L (ref 132–146)
TOTAL PROTEIN: 6.6 G/DL (ref 6.4–8.3)
WBC # BLD: 6.7 E9/L (ref 4.5–11.5)

## 2019-06-21 PROCEDURE — 36415 COLL VENOUS BLD VENIPUNCTURE: CPT

## 2019-06-21 PROCEDURE — 2580000003 HC RX 258: Performed by: INTERNAL MEDICINE

## 2019-06-21 PROCEDURE — 2060000000 HC ICU INTERMEDIATE R&B

## 2019-06-21 PROCEDURE — 85027 COMPLETE CBC AUTOMATED: CPT

## 2019-06-21 PROCEDURE — 6370000000 HC RX 637 (ALT 250 FOR IP): Performed by: INTERNAL MEDICINE

## 2019-06-21 PROCEDURE — 80053 COMPREHEN METABOLIC PANEL: CPT

## 2019-06-21 PROCEDURE — 94660 CPAP INITIATION&MGMT: CPT

## 2019-06-21 PROCEDURE — 2700000000 HC OXYGEN THERAPY PER DAY

## 2019-06-21 PROCEDURE — 82962 GLUCOSE BLOOD TEST: CPT

## 2019-06-21 PROCEDURE — 6360000002 HC RX W HCPCS: Performed by: INTERNAL MEDICINE

## 2019-06-21 PROCEDURE — 99232 SBSQ HOSP IP/OBS MODERATE 35: CPT | Performed by: INTERNAL MEDICINE

## 2019-06-21 PROCEDURE — 94640 AIRWAY INHALATION TREATMENT: CPT

## 2019-06-21 PROCEDURE — 85610 PROTHROMBIN TIME: CPT

## 2019-06-21 RX ORDER — INSULIN GLARGINE 100 [IU]/ML
30 INJECTION, SOLUTION SUBCUTANEOUS NIGHTLY
Qty: 1 VIAL | Refills: 3 | Status: SHIPPED | OUTPATIENT
Start: 2019-06-21 | End: 2019-06-21

## 2019-06-21 RX ORDER — INSULIN GLARGINE 100 [IU]/ML
30 INJECTION, SOLUTION SUBCUTANEOUS NIGHTLY
Status: DISCONTINUED | OUTPATIENT
Start: 2019-06-21 | End: 2019-06-25 | Stop reason: HOSPADM

## 2019-06-21 RX ORDER — INSULIN GLARGINE 100 [IU]/ML
30 INJECTION, SOLUTION SUBCUTANEOUS NIGHTLY
Qty: 1 VIAL | Refills: 3 | Status: SHIPPED | OUTPATIENT
Start: 2019-06-21 | End: 2019-06-25

## 2019-06-21 RX ADMIN — INSULIN LISPRO 12 UNITS: 100 INJECTION, SOLUTION INTRAVENOUS; SUBCUTANEOUS at 09:08

## 2019-06-21 RX ADMIN — Medication 10 ML: at 10:35

## 2019-06-21 RX ADMIN — WARFARIN SODIUM 5 MG: 5 TABLET ORAL at 17:04

## 2019-06-21 RX ADMIN — MOMETASONE FUROATE AND FORMOTEROL FUMARATE DIHYDRATE 2 PUFF: 200; 5 AEROSOL RESPIRATORY (INHALATION) at 17:14

## 2019-06-21 RX ADMIN — FUROSEMIDE 40 MG: 10 INJECTION, SOLUTION INTRAMUSCULAR; INTRAVENOUS at 16:49

## 2019-06-21 RX ADMIN — METOPROLOL SUCCINATE 25 MG: 25 TABLET, EXTENDED RELEASE ORAL at 09:12

## 2019-06-21 RX ADMIN — IPRATROPIUM BROMIDE AND ALBUTEROL SULFATE 1 AMPULE: .5; 3 SOLUTION RESPIRATORY (INHALATION) at 09:30

## 2019-06-21 RX ADMIN — INSULIN LISPRO 12 UNITS: 100 INJECTION, SOLUTION INTRAVENOUS; SUBCUTANEOUS at 11:48

## 2019-06-21 RX ADMIN — FUROSEMIDE 40 MG: 10 INJECTION, SOLUTION INTRAMUSCULAR; INTRAVENOUS at 09:11

## 2019-06-21 RX ADMIN — AZITHROMYCIN DIHYDRATE 500 MG: 500 INJECTION, POWDER, LYOPHILIZED, FOR SOLUTION INTRAVENOUS at 14:15

## 2019-06-21 RX ADMIN — POTASSIUM CHLORIDE 20 MEQ: 20 TABLET, EXTENDED RELEASE ORAL at 09:09

## 2019-06-21 RX ADMIN — IPRATROPIUM BROMIDE AND ALBUTEROL SULFATE 1 AMPULE: .5; 3 SOLUTION RESPIRATORY (INHALATION) at 06:12

## 2019-06-21 RX ADMIN — IPRATROPIUM BROMIDE AND ALBUTEROL SULFATE 1 AMPULE: .5; 3 SOLUTION RESPIRATORY (INHALATION) at 17:14

## 2019-06-21 RX ADMIN — PANTOPRAZOLE SODIUM 40 MG: 40 TABLET, DELAYED RELEASE ORAL at 06:39

## 2019-06-21 RX ADMIN — ASPIRIN 81 MG: 81 TABLET, COATED ORAL at 09:09

## 2019-06-21 RX ADMIN — INSULIN LISPRO 6 UNITS: 100 INJECTION, SOLUTION INTRAVENOUS; SUBCUTANEOUS at 16:49

## 2019-06-21 RX ADMIN — GABAPENTIN 600 MG: 300 CAPSULE ORAL at 09:09

## 2019-06-21 RX ADMIN — CEFTRIAXONE 1 G: 1 INJECTION, POWDER, FOR SOLUTION INTRAMUSCULAR; INTRAVENOUS at 14:14

## 2019-06-21 RX ADMIN — Medication 10 ML: at 14:18

## 2019-06-21 ASSESSMENT — PAIN SCALES - GENERAL
PAINLEVEL_OUTOF10: 0
PAINLEVEL_OUTOF10: 0

## 2019-06-21 NOTE — PROGRESS NOTES
Reason for consult:Uncontrolled DM    A1C:  13.7%                Comment:  Stated he thought last one was 9s     Patient states the following concerns/barriers to diabetes self-management:       [] None       [] Medication cost   [] Food cost/availability      [] Vision     [] Reading  [] Hearing    [] Physical limitations     [] Work schedule         [] Other:      Comment:  Patient closing eyes off and on during session. Wife at bedside, also with DM, and was very interested during session. .            Diabetes survival packet provided to:   [x] Patient          Information included: Definition of diabetes      Target glucose ranges/A1C      Self-monitoring of blood glucose      Prevention/symptoms/treatment of hypo-/hyperglycemia      Medication adherence      The plate method/meal planning guidelines      The benefits of exercise and recommendations      Reducing the risk of chronic complications       Diabetes support group    Comment:all packet information reviewed. Pt stated could not afford insulin. Discussed [employer] brand, ReliOn N and R Vials approx $40 (now also 70/30 insulin pens for $45 pen). Uses wifes meter and not testing regularly. Asked to test at least 2 times a day ac breakfast and supper or per your instructions. A1C 13.7% discussed, goal of <7% with rationale reviewed. FSBS goals and Log sheet given. Never experienced hypoglycemia, reviewed. Meal planning and CARB amounts for each meal reviewed   Label reading reviewed. Pt does not want to come to group classes.         Evaluation/Plan/Recommendations:   Patient's understanding of diabetes:   []Poor   [x]Fair    []Good   [] Excellent     Outpatient diabetes education is recommended:   [x] Yes  [] No  Patient is interested in outpatient diabetes education:   [] Yes    [x] No    [] Unsure  Educator will contact pt. post-discharge to schedule classes:  [] Yes    [x] Patient refuses  Recommended:     [] Consult to social work; patient has

## 2019-06-21 NOTE — CONSULTS
Wednesday 4/24/19  Yes Nery Bro DO   metoprolol succinate (TOPROL XL) 25 MG extended release tablet TAKE ONE TABLET BY MOUTH EVERY DAY 4/3/19  Yes Nery Bro DO   metFORMIN (GLUCOPHAGE) 500 MG tablet TAKE ONE TABLET BY MOUTH TWICE A DAY WITH MEALS 4/3/19  Yes Nery Bro DO   ranitidine (ZANTAC) 300 MG tablet TAKE ONE TABLET BY MOUTH EVERY NIGHT  Patient taking differently: Take 300 mg by mouth Daily with supper  4/3/19  Yes Nery Bro DO   potassium chloride (KLOR-CON M) 20 MEQ extended release tablet TAKE ONE TABLET BY MOUTH EVERY DAY 3/12/19  Yes Nery Bro DO   pramipexole (MIRAPEX) 0.25 MG tablet TAKE ONE TABLET BY MOUTH EVERY NIGHT 2/27/19  Yes Nery Bro DO   montelukast (SINGULAIR) 10 MG tablet TAKE ONE TABLET BY MOUTH EVERY NIGHT 1/3/19  Yes Nery Bro DO   pantoprazole (PROTONIX) 40 MG tablet TAKE ONE TABLET BY MOUTH EVERY DAY WITH BREAKFAST 1/3/19 1/3/20 Yes Nery Bro DO   furosemide (LASIX) 40 MG tablet TAKE ONE-HALF TABLET BY MOUTH DAILY 12/11/18  Yes Nery Bro DO   pravastatin (PRAVACHOL) 20 MG tablet Take 1 tablet by mouth daily  Patient taking differently: Take 20 mg by mouth nightly  6/8/18  Yes Nery Bro DO   SYMBICORT 160-4.5 MCG/ACT AERO Inhale 2 puffs into the lungs 2 times daily 3/6/18  Yes Nery Bro DO   CPAP Machine MISC 1 each by Does not apply route nightly as needed    Yes Historical Provider, MD   aspirin 81 MG tablet Take 81 mg by mouth nightly    Yes Historical Provider, MD   blood glucose test strips (ASCENSIA AUTODISC VI;ONE TOUCH ULTRA TEST VI) strip Test tid prn 8/14/18   Nery Bro DO   Accu-Chek Multiclix Lancets MISC Use as directed 9/25/14   Nery Bro DO        /67   Pulse 76   Temp 97.8 °F (36.6 °C) (Oral)   Resp 16   Ht 5' 6\" (1.676 m)   Wt (!) 333 lb 6.4 oz (151.2 kg)   SpO2 97%   BMI 53.81 kg/m²   Wt Readings from Last 5 Encounters:   06/21/19 (!) 333 lb 6.4 oz (151.2 kg) 04/03/19 (!) 324 lb (147 kg)   03/26/19 (!) 319 lb (144.7 kg)   10/11/18 (!) 313 lb (142 kg)   10/03/18 (!) 314 lb 11.2 oz (142.7 kg)       Xr Chest Portable    Result Date: 6/19/2019  Reading location: [facility] INDICATION: Dyspnea FINDINGS: Portable AP upright view the chest compared 9/10/2018. Mild cardiac enlargement. Normal caliber pulmonary vessels. Poorly defined perihilar and basilar opacities bilaterally. Bilateral perihilar and basilar airspace disease. Lab Results   Component Value Date     06/21/2019    K 4.3 06/21/2019    CL 93 (L) 06/21/2019    CO2 28 06/21/2019    CREATININE 1.0 06/21/2019    BUN 23 06/21/2019    PROT 6.6 06/21/2019    INR 2.9 06/21/2019    HCT 46.3 06/21/2019    HGB 14.7 06/21/2019    MG 2.0 06/20/2019     Lab Results   Component Value Date    LABALBU 3.7 06/21/2019    BILITOT 0.7 06/21/2019    ALKPHOS 115 06/21/2019    AST 43 (H) 06/21/2019    ALT 57 (H) 06/21/2019    TSH 2.790 06/20/2019    L8OBRSW 92.74 01/20/2014    J9UKJDH 6.7 09/17/2012     No results found for: UA    6/21/2019  2:23 PM   Daniele Mandujano RN        CBC:   Recent Labs     06/19/19  1320 06/20/19  0629 06/21/19  0605   WBC 6.5 6.6 6.7   HGB 14.9 13.5 14.7    156 185     BMP:    Recent Labs     06/19/19  1320 06/20/19  0629 06/21/19  0605    135 133   K 4.7 4.4 4.3   CL 99 96* 93*   CO2 25 26 28   BUN 19 19 23   CREATININE 0.9 0.9 1.0   GLUCOSE 489* 406* 322*     Hepatic:   Recent Labs     06/19/19  1320 06/20/19  0629 06/21/19  0605   AST 54* 32 43*   ALT 59* 48* 57*   BILITOT 1.1 0.7 0.7   ALKPHOS 123 99 115     Troponin:   Recent Labs     06/19/19  1320 06/19/19  1731 06/20/19  0004   TROPONINI <0.01 <0.01 <0.01     BNP: No results for input(s): BNP in the last 72 hours.   Lipids:   Recent Labs     06/20/19  0629   CHOL 155   HDL 42     INR:   Recent Labs     06/19/19  1320 06/20/19  1223 06/21/19  0605   INR 2.7 2.8 2.9     Lab Results   Component Value Date    MG 2.0 06/20/2019 Assessment  Charting Type: Shift assessment    Neurological  Level of Consciousness: Alert  Orientation Level: Oriented X4(forgetful at times)  Cognition: Appropriate safety awareness, Appropriate judgement, Appropriate attention/concentration         HEENT  HEENT (WDL): Exceptions to WDL  Right Eye: Impaired vision  Left Eye: Impaired vision    Respiratory  Respiratory Pattern: Tachypneic  Respiratory Depth: Normal  Respiratory Quality/Effort: Dyspnea with exertion  Chest Assessment: Trachea midline, Chest expansion symmetrical  L Breath Sounds: Diminished  R Breath Sounds: Diminished              Cardiac  Cardiac Regularity: Irregular  Cardiac Rhythm: Atrial fibrillation, Atrial flutter  Rhythm Interpretation  Pulse: 76    Cardiac Monitor  Telemetry Monitor On: Yes  Telemetry Audible: Yes  Telemetry Alarms Set: Yes    Gastrointestinal  Abdominal (WDL): Exceptions to WDL  Abdomen Inspection: Rounded, Rotund  RUQ Bowel Sounds: Active  LUQ Bowel Sounds: Active  RLQ Bowel Sounds: Active  LLQ Bowel Sounds: Active          Bowel Sounds  RUQ Bowel Sounds: Active  LUQ Bowel Sounds: Active  RLQ Bowel Sounds: Active  LLQ Bowel Sounds:  Active    Peripheral Vascular  Peripheral Vascular (WDL): Exceptions to WDL  Edema: Right lower extremity, Left lower extremity  RLE Edema: +2, Pitting  LLE Edema: +2, Pitting    Skin Color/Condition  Skin Color: Appropriate for ethnicity  Skin Condition/Temp: Swollen, Warm, Flaky, Dry         Musculoskeletal  RUE: Full movement  LUE: Full movement  RL Extremity: Swelling  LL Extremity: Swelling    Genitourinary  Genitourinary (WDL): Within Defined Limits         Pain Assessment  Pain Assessment: 0-10  Pain Level: 0                       Pulse: 76

## 2019-06-21 NOTE — PROGRESS NOTES
INPATIENT CARDIOLOGY FOLLOW-UP    Name: Ruth Ann Jimenez    Age: 61 y.o. Date of Admission: 6/19/2019 12:41 PM    Date of Service: 6/21/2019    Chief Complaint: Follow-up for acute superimposed upon chronic combined systolic and diastolic heart failure, coronary atherosclerosis, ischemic cardiomyopathy, paroxysmal atrial flutter, hypertension, chronic obstructive lung disease, morbid obesity, obstructive sleep apnea    Interim History: The patient is presently symptomatically improved with present fluid mobilization and a spontaneous conversion of his atrial flutter to that of sinus rhythm. He has tolerated optimization of his beta-blocker dosage without difficulty. A persistent component of volume overload is noted with present fluid mobilization. His echocardiogram was technically suboptimal in spite of echocardiographic contrast but suggests evidence of regional wall motion abnormalities as previously noted within the inferior segment and mild to moderate left ventricular systolic dysfunction. Review of Systems: The remainder of a complete multisystem review including consitutional, central nervous, respiratory, circulatory, gastrointestinal, genitourinary, endocrinologic, hematologic, musculoskeletal and psychiatric are negative. Problem List:  Patient Active Problem List   Diagnosis    CAD (coronary artery disease)    Hypertension    Type 2 diabetes mellitus with hyperglycemia, with long-term current use of insulin ([facility])    Tobacco abuse    PAF (paroxysmal atrial fibrillation) ([facility])    S/P CABG x 2    History of mitral valve repair    Morbid obesity with BMI of 50.0-59.9, adult ([address]    Asthma    [facility]    Sleep apnea    Asthmatic bronchitis , chronic ([facility])    Gastroesophageal reflux disease without esophagitis    Mixed hyperlipidemia    Muscular deconditioning    Acute diastolic (congestive) heart failure ([facility])       Allergies:   Allergies   Allergen Reactions    Pcn [Penicillins]      Child went to hospital   ? Reaction  Patient tolerates cephalosporins    Sulfa Antibiotics      ?        Current Medications:  Current Facility-Administered Medications   Medication Dose Route Frequency Provider Last Rate Last Dose    atorvastatin (LIPITOR) tablet 80 mg  80 mg Oral Nightly Meenu Diana MD   80 mg at 06/20/19 2108    insulin glargine (LANTUS) injection vial 20 Units  20 Units Subcutaneous Nightly [facility] Backand, DO   20 Units at 06/20/19 2110    insulin lispro (HUMALOG) injection vial 0-18 Units  0-18 Units Subcutaneous TID WC [facility] Sports, DO   9 Units at 06/20/19 1657    insulin lispro (HUMALOG) injection vial 0-9 Units  0-9 Units Subcutaneous Nightly [facility] Sports, DO   7 Units at 06/20/19 2110    diltiazem injection 25 mg  25 mg Intravenous Once Meenu Diana MD        diltiazem 125 mg in dextrose 5 % 125 mL infusion  5 mg/hr Intravenous Continuous Meenu Diana MD   Stopped at 06/20/19 1431    aspirin EC tablet 81 mg  81 mg Oral Daily Keyona Sports, DO   81 mg at 06/20/19 1012    gabapentin (NEURONTIN) capsule 600 mg  600 mg Oral BID Keyona Sports, DO   600 mg at 06/20/19 2108    montelukast (SINGULAIR) tablet 10 mg  10 mg Oral Nightly Keyona Sports, DO   10 mg at 06/20/19 2108    pantoprazole (PROTONIX) tablet 40 mg  40 mg Oral QAM AC [facility] Sports, DO   40 mg at 06/21/19 7604    potassium chloride (KLOR-CON M) extended release tablet 20 mEq  20 mEq Oral Daily Keyona Sports, DO   20 mEq at 06/20/19 1011    pramipexole (MIRAPEX) tablet 0.25 mg  0.25 mg Oral Nightly Keyona Sports, DO   0.25 mg at 06/20/19 2108    mometasone-formoterol (DULERA) 200-5 MCG/ACT inhaler 2 puff  2 puff Inhalation BID Keyona Sports, DO        ipratropium-albuterol (DUONEB) nebulizer solution 1 ampule  1 ampule Inhalation Q4H While awake Keyona Sports, DO   1 ampule at 06/21/19 5929  warfarin (COUMADIN) tablet 5 mg  5 mg Oral Daily Madelin Soper, DO   5 mg at 06/20/19 1655    sodium chloride flush 0.9 % injection 10 mL  10 mL Intravenous 2 times per day Madelin Soper, DO   10 mL at 06/20/19 2114    sodium chloride flush 0.9 % injection 10 mL  10 mL Intravenous PRN Madelin Soper, DO        magnesium hydroxide (MILK OF MAGNESIA) 400 MG/5ML suspension 30 mL  30 mL Oral Daily PRN Madelin Soper, DO        ondansetron [facility]) injection 4 mg  4 mg Intravenous Q6H PRN Madelin Soper, DO        acetaminophen (TYLENOL) tablet 650 mg  650 mg Oral Q4H PRN Madelin Soper, DO        cefTRIAXone (ROCEPHIN) 1 g in sodium chloride (PF) 10 mL IV syringe  1 g Intravenous Q24H Madelin Soper, DO   1 g at 06/20/19 1436    azithromycin (ZITHROMAX) 500 mg in D5W 250ml Vial Mate  500 mg Intravenous Q24H Madelin Soper, DO   Stopped at 06/20/19 1517    glucose (GLUTOSE) 40 % oral gel 15 g  15 g Oral PRN Madelin Soper, DO        dextrose 50 % IV solution  12.5 g Intravenous PRN Madelin Soper, DO        glucagon (rDNA) injection 1 mg  1 mg Intramuscular PRN Madelin Soper, DO        dextrose 5 % solution  100 mL/hr Intravenous PRN Madelin Soper, DO        warfarin (COUMADIN) tablet 5 mg  5 mg Oral Once per day on Mon Wed Fri Sat Madelin Ross, DO        furosemide (LASIX) injection 40 mg  40 mg Intravenous BID Madelin Soper, DO   40 mg at 06/20/19 1655    metoprolol succinate (TOPROL XL) extended release tablet 25 mg  25 mg Oral BID Bonifacio Hong MD   25 mg at 06/20/19 2108      diltiazem (CARDIZEM) 125 mg in dextrose 5% 125 mL infusion Stopped (06/20/19 1431)    dextrose         Physical Exam:  /80   Pulse 80   Temp 97.7 °F (36.5 °C)   Resp 17   Ht 5' 6\" (1.676 m)   Wt (!) 333 lb 6.4 oz (151.2 kg)   SpO2 96%   BMI 53.81 kg/m²   Weight change: -6 lb 9.6 oz (-2.994 kg)  Wt Readings from Last 3 Encounters:   06/21/19 (!) 333 lb 6.4 oz (151.2 kg)   04/03/19 (!) 324 lb (147 kg)   03/26/19 (!) 319 lb (144.7 kg)     The patient is awake, alert and in no discomfort or distress. No gross musculoskeletal deformity is present. No significant skin or nail changes are present. Gross examination of head, eyes, nose and throat are negative. Jugular venous pressure is normal and no carotid bruits are present. Normal respiratory effort is noted with no accessory muscle usage present. Lung fields are clear to ascultation. Cardiac examination is notable for a regular rate and rhythm with no palpable thrill. No gallop rhythm or cardiac murmur are identified. A benign abdominal examination is present with the exception of obesity and no masses or organomegaly. Intact pulses are present throughout all extremities and mild to moderate pretibial and pedal edema/lymphedema is present with associated venous stasis changes. No focal neurologic deficits are present. Intake/Output:    Intake/Output Summary (Last 24 hours) at 6/21/2019 0648  Last data filed at 6/21/2019 0100  Gross per 24 hour   Intake 430 ml   Output 2425 ml   Net -1995 ml     I/O this shift:  In: -   Out: 625 [Urine:625]    Laboratory Tests:  Lab Results   Component Value Date    CREATININE 0.9 06/20/2019    BUN 19 06/20/2019     06/20/2019    K 4.4 06/20/2019    CL 96 (L) 06/20/2019    CO2 26 06/20/2019     No results for input(s): CKTOTAL, CKMB in the last 72 hours.     Invalid input(s): TROPONONI  No results found for: BNP  Lab Results   Component Value Date    WBC 6.7 06/21/2019    RBC 4.68 06/21/2019    HGB 14.7 06/21/2019    HCT 46.3 06/21/2019    MCV 98.9 06/21/2019    MCH 31.4 06/21/2019    MCHC 31.7 06/21/2019    RDW 14.7 06/21/2019     06/21/2019    MPV 10.4 06/21/2019     Recent Labs     06/19/19  1320 06/20/19  0629   ALKPHOS 123 99   ALT 59* 48*   AST 54* 32   PROT 6.8 6.4   BILITOT 1.1 0.7   LABALBU 3.8 3.5     Lab Results   Component Value Date    MG 2.0 06/20/2019     Lab Results   Component Value Date    PROTIME 31.8 06/20/2019    PROTIME 17.8 04/03/2019    INR 2.8 06/20/2019     Lab Results   Component Value Date    TSH 2.790 06/20/2019     No components found for: CHLPL  Lab Results   Component Value Date    TRIG 158 (H) 06/20/2019    TRIG 116 01/03/2019    TRIG 223 (H) 09/10/2018     Lab Results   Component Value Date    HDL 42 06/20/2019    HDL 56 01/03/2019    HDL 44 09/10/2018     Lab Results   Component Value Date    LDLCALC 81 06/20/2019    LDLCALC 122 (H) 01/03/2019    LDLCALC 113 (H) 09/10/2018       Cardiac Tests:  Telemetry findings reviewed: sinus rhythm following a spontaneous conversion of paroxysmal atrial fibrillation, no new tachy/bradyarrhythmias overnight  Last Echocardiogram: An echocardiogram was technically suboptimal and of limited quality in spite the use of echocardiographic contrast and suggested evidence of a normal size left ventricular chamber with mild concentric left ventricular hypertrophy with regional wall motion at the base of the inferior segment and mild to moderate left ventricular systolic dysfunction. Left atrial enlargement was present with mitral annular calcification and significant aortic sclerosis with further valvular definition not possible      ASSESSMENT / PLAN: On a clinical basis, the patient appears clinically improved with a spontaneous conversion of his atrial arrhythmias to sinus rhythm and appropriate fluid mobilization. Presently additional fluid mobilization will be necessitated with plans to continue intravenous diuretics upon conversion to oral diuretics the conversion to that of torsemide to optimize gastrointestinal absorption and assisted by the appropriate use of nocturnal CPAP with continued monitoring of his renal function and electrolytes.   In view of apparent systolic dysfunction following additional stabilization of his volume status, the addition of afterload reduction would appear advisable to continue to optimize cardiac performance. Continued appropriate lifestyle modification inclusive of weight reduction will be essential to the management of his obstructive sleep apnea both the benefit the diastolic component of his heart failure symptoms as well as to reduce risk of recurrent atrial arrhythmias. Aggressive risk factor modification of blood pressure, diabetes and serum lipids will remain essential to reducing risk of future atherosclerotic development. Note: This report was completed utilizing computer voice recognition software. Every effort has been made to ensure accuracy, however; inadvertent computerized transcription errors may be present. Kyle Swain.  Marina Ewing, [address] [facility] Cardiology

## 2019-06-21 NOTE — CARE COORDINATION
Cm note:   Spoke to Yolanda Holder the pharmacist at [facility]- [phone] . Novolog script is $47.00 patient cost and the Lantus requires prior auth- call placed to [phone] cover my meds- to expedite the approval for the Lantus. Per Forrest Jenkins- at [facility] [employer]- sil[employer]- cover my meds, she is showing pt has been  basagler and metformin previously along with the novolog. Not seeing jantoven (pill)  previous use per representative. ICD 10 given E11.65 and Z79.4 noted. Per Forrest Jenkins with [employer]- silver scripts- approved Lantus- medication Case [place] number: [id number]-  will be at the [facility]- upon discharge pt can have the scripts filled at that pharmacy of his choosing. Cost of the Lantus will be $143.50  Per Yolanda Samarcela at [address] [facility]. Spoke to pt and wife- Varghese Korin- they are okay with trying the medications for a month to see how he does- stating they will follow up with Dr. Anthony Fabry after discharge. Left a message with Luisa Rodriguez at [phone] with prescription assistance program at [facility] ([facility] to see if she can assist with the costs of this Lantus or assist patient on the outpt basis as follow up. Spoke to Luisa Rodriguez with [facility] she will meet with patient and wife Marquis Langley on Monday and try to bridge the patient with medication programs she has available and help with the Lantus that is so costly. She will also assist Marquis Langley the pts wife with the high cost diabetic meds as well. Spoke to Marquis Langley again and she is excited to speak with Luisa Rodriguez.         Electronically signed by Pipo Brady RN-BC on 6/21/2019 at 2:38 PM

## 2019-06-21 NOTE — PROGRESS NOTES
[facility]  Internal Medicine  -Resident Progress Note-    Christina Montes De Oca / Grayc Garcia 1955 / MRN [id number] / Uvaldo Roberto 6/21/2019 / Hospital Day: 3    PCP:  Iqra Retana DO  Admitting Physician:  Adriana Quinn DO  Consultants: Cardiology  Chief Complaint:    Chief Complaint   Patient presents with    Leg Swelling     bilateral lower extremeties     Shortness of Breath       Subjective / Overnight Events  Angelic Brenner was seen and examined at bedside today; no family was present for the examination. No acute overnight events were noted. Patient states that he is feeling better and continues to have increased urine output with IV diuretics. Patient explains that he has tried multiple diets at home however he is never able to stick to them in his diet consists of mostly fast food at this time. Patient has a BiPAP machine however explains that he does not use it because he is unable to afford the cleaning equipment. He was not using insulin due to not being able to afford it. Review of Systems  All bolded are positive; please see HPI  General:  Fever, chills, diaphoresis, fatigue, malaise, night sweats, weight loss  Psychological:  Anxiety, disorientation, hallucinations. ENT:  Epistaxis, vertigo, visual changes. Cardiovascular:  Chest pain, irregular heartbeats, palpitations, paroxysmal nocturnal dyspnea. Respiratory:  Shortness of breath, coughing, sputum production, hemoptysis, wheezing, orthopnea.   Gastrointestinal:  Nausea, vomiting, diarrhea, heartburn, constipation, abdominal pain, hematemesis, hematochezia, melena, acholic stools  Genito-Urinary:  Dysuria, urgency, frequency, hematuria  Musculoskeletal:  Joint pain, joint stiffness, joint swelling, muscle pain  Neurology:  Headache, focal neurological deficits, weakness, numbness, paresthesia  Derm:  Rashes, ulcers, excoriations, bruising  Extremities:  Decreased ROM, peripheral edema, mottling    Physical Examination  Vitals:  /67 (rDNA), dextrose    · Recent labs, imaging, and micro results are available in the EMR and have been reviewed. Assessment and Plan  Patient is a 61 y.o. male who presented with shortness of breath. The active problem list is as follows:    Acute exacerbation of diastolic congestive heart failure  Atrial flutter with intermittent RVR  Bilateral basilar pneumonia  Acute on chronic hypoxic respiratory failure  COPD  CAD, history of MI with CABG x3  Hypertension  Hyperlipidemia  Obstructive sleep apnea, on BiPAP at night   Insulin-dependent diabetes mellitus  Medical noncompliance    Patient continues to improve on a daily basis with IV diuretics and restricted diet here in the hospital.  He is currently back in normal sinus rhythm on the monitor. Patient has several issues at play being exacerbated by his noncompliance. Diabetic education has been consulted to speak with the patient about dietary recommendations. Social work and case management are working with the patient in regards to medication affordability. · Routine labs in AM.  · DVT prophylaxis. · Please see orders for further management and care. The plan was discussed with Dr. Darien Hernandez. Harley Tinajero, DO PGY2  6/21/2019  11:44 AM       I saw and evaluated the patient. I agree with the findings and the plan of care as documented in the resident's note.     Rylee Maxwell D.O., FACOI  2:14 PM  6/21/2019

## 2019-06-22 DIAGNOSIS — J30.1 SEASONAL ALLERGIC RHINITIS DUE TO POLLEN: ICD-10-CM

## 2019-06-22 LAB
ALBUMIN SERPL-MCNC: 3.6 G/DL (ref 3.5–5.2)
ALP BLD-CCNC: 124 U/L (ref 40–129)
ALT SERPL-CCNC: 61 U/L (ref 0–40)
ANION GAP SERPL CALCULATED.3IONS-SCNC: 7 MMOL/L (ref 7–16)
AST SERPL-CCNC: 48 U/L (ref 0–39)
BILIRUB SERPL-MCNC: 0.7 MG/DL (ref 0–1.2)
BUN BLDV-MCNC: 24 MG/DL (ref 8–23)
CALCIUM SERPL-MCNC: 9 MG/DL (ref 8.6–10.2)
CHLORIDE BLD-SCNC: 97 MMOL/L (ref 98–107)
CO2: 29 MMOL/L (ref 22–29)
CREAT SERPL-MCNC: 1 MG/DL (ref 0.7–1.2)
GFR AFRICAN AMERICAN: >60
GFR NON-AFRICAN AMERICAN: >60 ML/MIN/1.73
GLUCOSE BLD-MCNC: 270 MG/DL (ref 74–99)
HCT VFR BLD CALC: 44.2 % (ref 37–54)
HEMOGLOBIN: 14.1 G/DL (ref 12.5–16.5)
INR BLD: 2.7
MCH RBC QN AUTO: 31.3 PG (ref 26–35)
MCHC RBC AUTO-ENTMCNC: 31.9 % (ref 32–34.5)
MCV RBC AUTO: 98.2 FL (ref 80–99.9)
METER GLUCOSE: 233 MG/DL (ref 74–99)
METER GLUCOSE: 268 MG/DL (ref 74–99)
METER GLUCOSE: 290 MG/DL (ref 74–99)
METER GLUCOSE: 299 MG/DL (ref 74–99)
PDW BLD-RTO: 14.6 FL (ref 11.5–15)
PLATELET # BLD: 179 E9/L (ref 130–450)
PMV BLD AUTO: 10.6 FL (ref 7–12)
POTASSIUM SERPL-SCNC: 4.2 MMOL/L (ref 3.5–5)
PROTHROMBIN TIME: 30.6 SEC (ref 9.3–12.4)
RBC # BLD: 4.5 E12/L (ref 3.8–5.8)
SODIUM BLD-SCNC: 133 MMOL/L (ref 132–146)
TOTAL PROTEIN: 6.5 G/DL (ref 6.4–8.3)
WBC # BLD: 6.4 E9/L (ref 4.5–11.5)

## 2019-06-22 PROCEDURE — 6370000000 HC RX 637 (ALT 250 FOR IP): Performed by: INTERNAL MEDICINE

## 2019-06-22 PROCEDURE — 99233 SBSQ HOSP IP/OBS HIGH 50: CPT | Performed by: INTERNAL MEDICINE

## 2019-06-22 PROCEDURE — 82962 GLUCOSE BLOOD TEST: CPT

## 2019-06-22 PROCEDURE — 36415 COLL VENOUS BLD VENIPUNCTURE: CPT

## 2019-06-22 PROCEDURE — 94660 CPAP INITIATION&MGMT: CPT

## 2019-06-22 PROCEDURE — 85027 COMPLETE CBC AUTOMATED: CPT

## 2019-06-22 PROCEDURE — 2580000003 HC RX 258: Performed by: INTERNAL MEDICINE

## 2019-06-22 PROCEDURE — 94640 AIRWAY INHALATION TREATMENT: CPT

## 2019-06-22 PROCEDURE — 80053 COMPREHEN METABOLIC PANEL: CPT

## 2019-06-22 PROCEDURE — 2700000000 HC OXYGEN THERAPY PER DAY

## 2019-06-22 PROCEDURE — 85610 PROTHROMBIN TIME: CPT

## 2019-06-22 PROCEDURE — 2060000000 HC ICU INTERMEDIATE R&B

## 2019-06-22 PROCEDURE — 6360000002 HC RX W HCPCS: Performed by: INTERNAL MEDICINE

## 2019-06-22 RX ORDER — LOSARTAN POTASSIUM 25 MG/1
12.5 TABLET ORAL DAILY
Status: DISCONTINUED | OUTPATIENT
Start: 2019-06-23 | End: 2019-06-25 | Stop reason: HOSPADM

## 2019-06-22 RX ADMIN — FUROSEMIDE 40 MG: 10 INJECTION, SOLUTION INTRAMUSCULAR; INTRAVENOUS at 16:21

## 2019-06-22 RX ADMIN — MOMETASONE FUROATE AND FORMOTEROL FUMARATE DIHYDRATE 2 PUFF: 200; 5 AEROSOL RESPIRATORY (INHALATION) at 17:14

## 2019-06-22 RX ADMIN — METOPROLOL SUCCINATE 25 MG: 25 TABLET, EXTENDED RELEASE ORAL at 00:12

## 2019-06-22 RX ADMIN — CEFTRIAXONE 1 G: 1 INJECTION, POWDER, FOR SOLUTION INTRAMUSCULAR; INTRAVENOUS at 14:31

## 2019-06-22 RX ADMIN — MONTELUKAST 10 MG: 10 TABLET, FILM COATED ORAL at 00:13

## 2019-06-22 RX ADMIN — Medication 10 ML: at 09:22

## 2019-06-22 RX ADMIN — IPRATROPIUM BROMIDE AND ALBUTEROL SULFATE 1 AMPULE: .5; 3 SOLUTION RESPIRATORY (INHALATION) at 06:15

## 2019-06-22 RX ADMIN — GABAPENTIN 600 MG: 300 CAPSULE ORAL at 00:12

## 2019-06-22 RX ADMIN — IPRATROPIUM BROMIDE AND ALBUTEROL SULFATE 1 AMPULE: .5; 3 SOLUTION RESPIRATORY (INHALATION) at 13:39

## 2019-06-22 RX ADMIN — ASPIRIN 81 MG: 81 TABLET, COATED ORAL at 09:21

## 2019-06-22 RX ADMIN — FUROSEMIDE 40 MG: 10 INJECTION, SOLUTION INTRAMUSCULAR; INTRAVENOUS at 09:21

## 2019-06-22 RX ADMIN — INSULIN LISPRO 9 UNITS: 100 INJECTION, SOLUTION INTRAVENOUS; SUBCUTANEOUS at 11:09

## 2019-06-22 RX ADMIN — INSULIN LISPRO 6 UNITS: 100 INJECTION, SOLUTION INTRAVENOUS; SUBCUTANEOUS at 16:23

## 2019-06-22 RX ADMIN — ATORVASTATIN CALCIUM 80 MG: 40 TABLET, FILM COATED ORAL at 00:12

## 2019-06-22 RX ADMIN — MOMETASONE FUROATE AND FORMOTEROL FUMARATE DIHYDRATE 2 PUFF: 200; 5 AEROSOL RESPIRATORY (INHALATION) at 06:15

## 2019-06-22 RX ADMIN — INSULIN LISPRO 9 UNITS: 100 INJECTION, SOLUTION INTRAVENOUS; SUBCUTANEOUS at 09:22

## 2019-06-22 RX ADMIN — GABAPENTIN 600 MG: 300 CAPSULE ORAL at 09:21

## 2019-06-22 RX ADMIN — IPRATROPIUM BROMIDE AND ALBUTEROL SULFATE 1 AMPULE: .5; 3 SOLUTION RESPIRATORY (INHALATION) at 17:14

## 2019-06-22 RX ADMIN — INSULIN LISPRO 5 UNITS: 100 INJECTION, SOLUTION INTRAVENOUS; SUBCUTANEOUS at 00:18

## 2019-06-22 RX ADMIN — WARFARIN SODIUM 5 MG: 5 TABLET ORAL at 16:21

## 2019-06-22 RX ADMIN — PRAMIPEXOLE DIHYDROCHLORIDE 0.25 MG: 0.25 TABLET ORAL at 00:11

## 2019-06-22 RX ADMIN — POTASSIUM CHLORIDE 20 MEQ: 20 TABLET, EXTENDED RELEASE ORAL at 09:21

## 2019-06-22 RX ADMIN — PANTOPRAZOLE SODIUM 40 MG: 40 TABLET, DELAYED RELEASE ORAL at 06:04

## 2019-06-22 RX ADMIN — INSULIN GLARGINE 30 UNITS: 100 INJECTION, SOLUTION SUBCUTANEOUS at 00:17

## 2019-06-22 RX ADMIN — IPRATROPIUM BROMIDE AND ALBUTEROL SULFATE 1 AMPULE: .5; 3 SOLUTION RESPIRATORY (INHALATION) at 09:32

## 2019-06-22 RX ADMIN — AZITHROMYCIN DIHYDRATE 500 MG: 500 INJECTION, POWDER, LYOPHILIZED, FOR SOLUTION INTRAVENOUS at 16:21

## 2019-06-22 RX ADMIN — Medication 10 ML: at 00:11

## 2019-06-22 ASSESSMENT — PAIN SCALES - GENERAL
PAINLEVEL_OUTOF10: 0

## 2019-06-22 NOTE — PROGRESS NOTES
Nutrition Education    Type and Reason for Visit: Patient Education    Nutrition Assessment:  Heart Healthy DI completed      Discussed heart healthy and consistent carb diets with pt. Pt stated that he does plan to attend classes following discharge but that he is overwhelmed with information at this time. Discussed the main points to remember (low saturated fat, low sodium, and low simple carbohydrate). Pt denied any further questions. · Verbally reviewed following information with Patient  · Written educational materials provided. · Contact name and number provided. · Refer to Patient Education activity for more details.     Electronically signed by Pete Reinoso RD, LD on 6/22/19 at 12:20 PM    Contact Number: [phone]

## 2019-06-22 NOTE — PLAN OF CARE
Problem: Pain:  Description  Pain management should include both nonpharmacologic and pharmacologic interventions.   Goal: Pain level will decrease  Description  Pain level will decrease  6/22/2019 0229 by Chaz Kiser RN  Outcome: Met This Shift  Note:   No pain    Goal: Control of acute pain  Description  Control of acute pain  6/22/2019 0229 by Chaz Kiser RN  Outcome: Met This Shift  Note:   No complaints of pain    Goal: Control of chronic pain  Description  Control of chronic pain  6/22/2019 0229 by Chaz Kiser RN  Outcome: Met This Shift  Note:   No complaints of pain       Problem: Breathing Pattern - Ineffective:  Goal: Ability to achieve and maintain a regular respiratory rate will improve  Description  Ability to achieve and maintain a regular respiratory rate will improve  6/22/2019 0229 by Chaz Kiser RN  Outcome: Met This Shift

## 2019-06-22 NOTE — PROGRESS NOTES
[facility] Cardiology Inpatient Progress Note    Patient is a 61 y.o. male of Mel Melton DO seen in hospital follow up. Chief complaint: CHF/CMP/PAFlutter    HPI: Some SOB. No CP. Patient Active Problem List   Diagnosis    CAD (coronary artery disease)    Hypertension    Type 2 diabetes mellitus with hyperglycemia, with long-term current use of insulin ([facility])    Tobacco abuse    PAF (paroxysmal atrial fibrillation) ([facility])    S/P CABG x 2    History of mitral valve repair    Morbid obesity with BMI of 50.0-59.9, adult ([facility]    Asthma    Chronic bronchitis ([facility] [address]    Sleep apnea    Asthmatic bronchitis , chronic ([facility])    Gastroesophageal reflux disease without esophagitis    Mixed hyperlipidemia    Muscular deconditioning    Acute diastolic (congestive) heart failure ([facility])       Allergies   Allergen Reactions    Pcn [Penicillins]      Child went to hospital   ? Reaction  Patient tolerates cephalosporins    Sulfa Antibiotics      ?        Current Facility-Administered Medications   Medication Dose Route Frequency Provider Last Rate Last Dose    insulin glargine (LANTUS) injection vial 30 Units  30 Units Subcutaneous Nightly Erika Flores DO   30 Units at 06/22/19 0017    atorvastatin (LIPITOR) tablet 80 mg  80 mg Oral Nightly Leon Ghosh MD   80 mg at 06/22/19 0012    insulin lispro (HUMALOG) injection vial 0-18 Units  0-18 Units Subcutaneous TID WC Erika Flores DO   9 Units at 06/22/19 1109    insulin lispro (HUMALOG) injection vial 0-9 Units  0-9 Units Subcutaneous Nightly Erika Flores, DO   5 Units at 06/22/19 0018    diltiazem injection 25 mg  25 mg Intravenous Once Leon Ghosh MD        aspirin EC tablet 81 mg  81 mg Oral Daily Erika Flores DO   81 mg at 06/22/19 8808    gabapentin (NEURONTIN) capsule 600 mg  600 mg Oral BID Erika Flores DO   600 mg at 06/22/19 0921    montelukast (SINGULAIR) tablet 10 mg  10 mg Oral Nightly Behavior is normal.   Lymphadenopathy: No cervical adenopathy. No groin adenopathy. CBC:   Lab Results   Component Value Date    WBC 6.4 06/22/2019    RBC 4.50 06/22/2019    HGB 14.1 06/22/2019    HCT 44.2 06/22/2019    MCV 98.2 06/22/2019    MCH 31.3 06/22/2019    MCHC 31.9 06/22/2019    RDW 14.6 06/22/2019     06/22/2019    MPV 10.6 06/22/2019     BMP:   Lab Results   Component Value Date     06/22/2019    K 4.2 06/22/2019    CL 97 06/22/2019    CO2 29 06/22/2019    BUN 24 06/22/2019    LABALBU 3.6 06/22/2019    LABALBU 4.4 10/27/2011    CREATININE 1.0 06/22/2019    CALCIUM 9.0 06/22/2019    GFRAA >60 06/22/2019    LABGLOM >60 06/22/2019     Magnesium:    Lab Results   Component Value Date    MG 2.0 06/20/2019     Cardiac Enzymes:   Lab Results   Component Value Date    CKTOTAL 81 06/02/2016    CKTOTAL 754 (H) 07/24/2013    CKTOTAL 1,226 (H) 07/24/2013    CKMB 50.3 (H) 07/24/2013    CKMB 95.8 (H) 07/24/2013    CKMB 108.6 (H) 07/23/2013    TROPONINI <0.01 06/20/2019    TROPONINI <0.01 06/19/2019    TROPONINI <0.01 06/19/2019      PT/INR:    Lab Results   Component Value Date    PROTIME 30.6 06/22/2019    PROTIME 17.8 04/03/2019    INR 2.7 06/22/2019     TSH:    Lab Results   Component Value Date    TSH 2.790 06/20/2019         Rhythm Strip: normal sinus rhythm. ASSESSMENT & PLAN:    Patient Active Problem List   Diagnosis    CAD (coronary artery disease)    Hypertension    Type 2 diabetes mellitus with hyperglycemia, with long-term current use of insulin ([facility])    Tobacco abuse    PAF (paroxysmal atrial fibrillation) ([facility])    S/P CABG x 2    History of mitral valve repair    Morbid obesity with BMI of 50.0-59.9, adult ([facility] [place] [address]    Asthma    [facility]    Sleep apnea    Asthmatic bronchitis , chronic ([facility])    Gastroesophageal reflux disease without esophagitis    Mixed hyperlipidemia    Muscular deconditioning    Acute diastolic (congestive) heart failure ([facility] [address]     1. CHF: -3.8 liters. IV lasix/BB. Add ARB carefully. Add aldactone if BP will tolerate. 2. PAFlutter: BB/warfarin. 3. CAD: ASA/statin/BB    4. HTN: Observe. 5. COPD    6. LISS:    Colette Drake D.O.   Cardiologist  Cardiology, [facility]

## 2019-06-22 NOTE — PROGRESS NOTES
Internal Medicine Progress Note    Navi Garcia. Adele Larsen., & [address] Adele Larsen., F.A.C.O.I. Derrek Shen D.O., F.A.C.O.I. Primary Care Physician: Karen Guallpa DO   Admitting Physician:  Sim Mariee DO  Admission date and time: 6/19/2019 12:41 PM    Room:  [address] [place] [address]  Admitting diagnosis: Acute diastolic (congestive) heart failure [facility]) [I50.31]    Patient Name: Shabana Nelson  MRN: [id number]    Date of Service: 6/22/2019     Subjective:    Barb Campa is a 61 y. o.  male who was seen and examined today,6/22/2019, at the bedside. He feels better. Has complaint of generalized muscle aches. Admits to mild shortness of breath with moderate exertion. His blood sugars have been improved. No family present during my examination. Review of System:  Bolded are positive  Constitutional:   Fever and chills. Weight loss. Weight gain. Fatigue. Malaise. HEENT:   Ear pain. Sore throat. Rhinorrhea. Sinus pressure. Eye pain. Discharge. Redness. Psch:   Depressed. Anxious. Cardiovascular:   Chest pain. Irregular heartbeats. Palpitations. Dyspnea on exertion. Orthopnea. Respiratory:   Shortness of breath. Coughing. Sputum production. Hemoptysis. Wheezing. Gastrointestinal:   Nausea. Vomiting. Diarrhea. Constipation. Abdominal pain. Bleeding. Extremities:   Lower extremity swelling. Edema. Cyanosis. Neurology:    Headache. Focal neurological deficits. Weakness. Paresthesia. Loss of Consciousness. Numbness. Memory difficulty. Integumentary:   Rashes. Ulcers. Excoriations. Bruising. Genitourinary:    Urgency. Frequency. Hematuria. Voiding difficulty. Hematologic/Lymphatic:  Lymph node. Night sweats. Musculoskeletal:    Gait disturbance. Myalgias. Joint complaints. Back Pain. Physical Exam:  No intake/output data recorded.     Intake/Output Summary (Last 24 hours) at 6/22/2019 1327  Last data filed at 6/22/2019 0612  Gross per 24 hour   Intake 840 Recent Labs     06/19/19  1731 06/20/19  0004   TROPONINI <0.01 <0.01     BNP: No results for input(s): BNP in the last 72 hours. Lipids:   Recent Labs     06/20/19  0629   CHOL 155   HDL 42     ABGs: No results found for: PHART, PO2ART, LBO5BDK  INR:   Recent Labs     06/20/19  1223 06/21/19  0605 06/22/19  0606   INR 2.8 2.9 2.7   PROTIME 31.8* 32.2* 30.6*     RAD: Xr Chest Portable    Result Date: 6/19/2019  Reading location: 200 INDICATION: Dyspnea FINDINGS: Portable AP upright view the chest compared 9/10/2018. Mild cardiac enlargement. Normal caliber pulmonary vessels. Poorly defined perihilar and basilar opacities bilaterally. Bilateral perihilar and basilar airspace disease. Wound Documentation:   Incision 09/09/13 Sternum Mid (Active)   Number of days: 2112       Incision 09/09/13 Leg Left (Active)   Number of days: 2112       Incision Leg Left; Lower (Active)   Number of days:        Chronic Hospital Medical Problems:  Past Medical History:   Diagnosis Date    Acid reflux     Acute diastolic (congestive) heart failure ([facility] [address] 6/19/2019    Arthritis     Asthma 4/16/2014    Asthmatic bronchitis , chronic (HCC) 11/28/2016    CAD (coronary artery disease)     Chronic bronchitis ([address] 4/16/2014    COPD (chronic obstructive pulmonary disease) ([facility])     CB    COPD (chronic obstructive pulmonary disease) ([facility])     Diabetes mellitus ([facility])     Emphysema (subcutaneous) (surgical) resulting from a procedure     Hyperlipidemia     Hypertension     Hypoxemia requiring supplemental oxygen 2/2/2015    LONG TERM ANTICOAGULENT USE     Morbid obesity with BMI of 50.0-59.9, adult ([facility] [address] 11/27/2013    Obesity     Osteoarthritis     Sleep apnea     bilevel positive airway pressure at 13/8 with 2 L oxygen flow     Tobacco abuse     Type II or unspecified type diabetes mellitus without mention of complication, not stated as uncontrolled      Active Problems:    Acute diastolic (congestive) heart failure [facility])  Resolved Problems:    * No resolved hospital problems. *      Assessment:  Acute exacerbation of diastolic congestive heart failure  Paroxysmal atrial flutter with intermittent RVR  Bilateral basilar pneumonia  Acute on chronic hypoxic respiratory failure  COPD  CAD, history of MI with CABG x3  Hypertension  Hyperlipidemia  Obstructive sleep apnea, on BiPAP at night   Insulin-dependent diabetes mellitus  Medical noncompliance      Plan:   Patient does feel slightly improved today. We will continue current respiratory therapies. Encourage the patient to increase his activity. Discussed the need for compliance with medication regimens including sleep apnea treatment and better glycemic control. Patient states he does realize that he needs to take better care of himself. Remains in normal sinus rhythm. Continue diabetic education/dietary teaching. Anticipate discharge home soon. Cardiology is following patient recommendations have been reviewed.  for discharge planning. I reviewed the patient's past medical, surgical history and medication. Patient's medications were reviewed/continued/adjusted. Labs as ordered. Please see orders for further plan of care. Rhythm strips reviewed as well as consultant recommendations/notes and/or discussion. I reviewed the  course of events since last visit. More than 50% of my  time was spent at the bedside counseling and/or coordination of care with the patient and/or family with face to face contact. This time was spent reviewing notes and laboratory data, instructing and counseling the patient. Time I spent with the family or surrogate(s) is included only if the patient was incapable of providing the necessary information or participating in medical decisionsI also discussed the differential diagnosis and all of the proposed management plans with the patient and individuals accompanying the patient.     Oziel Carr requires this high level of physician care and nursing in the IMC/Telemetry due the complexity of decision management and chance of rapid decline or death. Continued cardiac monitoring and higher level of nursing are required. I am ready available for decision making and intervention. I reviewed the relevant imaging studies and available reports. I also discussed the differential diagnosis and all of the proposed management plans with the patient and individuals accompanying the patient to this visit. I reviewed the relevant imaging studies and available reports. Bret Barrett DO, F.[email or web address].   On 6/22/2019  1:27 PM

## 2019-06-23 LAB
ALBUMIN SERPL-MCNC: 3.4 G/DL (ref 3.5–5.2)
ALP BLD-CCNC: 117 U/L (ref 40–129)
ALT SERPL-CCNC: 54 U/L (ref 0–40)
ANION GAP SERPL CALCULATED.3IONS-SCNC: 10 MMOL/L (ref 7–16)
AST SERPL-CCNC: 36 U/L (ref 0–39)
BILIRUB SERPL-MCNC: 0.5 MG/DL (ref 0–1.2)
BUN BLDV-MCNC: 21 MG/DL (ref 8–23)
CALCIUM SERPL-MCNC: 8.8 MG/DL (ref 8.6–10.2)
CHLORIDE BLD-SCNC: 96 MMOL/L (ref 98–107)
CO2: 28 MMOL/L (ref 22–29)
CREAT SERPL-MCNC: 0.9 MG/DL (ref 0.7–1.2)
GFR AFRICAN AMERICAN: >60
GFR NON-AFRICAN AMERICAN: >60 ML/MIN/1.73
GLUCOSE BLD-MCNC: 287 MG/DL (ref 74–99)
HCT VFR BLD CALC: 41.7 % (ref 37–54)
HEMOGLOBIN: 13.3 G/DL (ref 12.5–16.5)
INR BLD: 2.5
MCH RBC QN AUTO: 31.7 PG (ref 26–35)
MCHC RBC AUTO-ENTMCNC: 31.9 % (ref 32–34.5)
MCV RBC AUTO: 99.3 FL (ref 80–99.9)
METER GLUCOSE: 251 MG/DL (ref 74–99)
METER GLUCOSE: 264 MG/DL (ref 74–99)
METER GLUCOSE: 274 MG/DL (ref 74–99)
METER GLUCOSE: 280 MG/DL (ref 74–99)
PDW BLD-RTO: 14.8 FL (ref 11.5–15)
PLATELET # BLD: 171 E9/L (ref 130–450)
PMV BLD AUTO: 10.4 FL (ref 7–12)
POTASSIUM SERPL-SCNC: 4.5 MMOL/L (ref 3.5–5)
PROTHROMBIN TIME: 28.1 SEC (ref 9.3–12.4)
RBC # BLD: 4.2 E12/L (ref 3.8–5.8)
SODIUM BLD-SCNC: 134 MMOL/L (ref 132–146)
TOTAL PROTEIN: 6.3 G/DL (ref 6.4–8.3)
WBC # BLD: 6.9 E9/L (ref 4.5–11.5)

## 2019-06-23 PROCEDURE — 94660 CPAP INITIATION&MGMT: CPT

## 2019-06-23 PROCEDURE — 85610 PROTHROMBIN TIME: CPT

## 2019-06-23 PROCEDURE — 99233 SBSQ HOSP IP/OBS HIGH 50: CPT | Performed by: INTERNAL MEDICINE

## 2019-06-23 PROCEDURE — 6370000000 HC RX 637 (ALT 250 FOR IP): Performed by: INTERNAL MEDICINE

## 2019-06-23 PROCEDURE — 82962 GLUCOSE BLOOD TEST: CPT

## 2019-06-23 PROCEDURE — 85027 COMPLETE CBC AUTOMATED: CPT

## 2019-06-23 PROCEDURE — 94640 AIRWAY INHALATION TREATMENT: CPT

## 2019-06-23 PROCEDURE — 36415 COLL VENOUS BLD VENIPUNCTURE: CPT

## 2019-06-23 PROCEDURE — 2700000000 HC OXYGEN THERAPY PER DAY

## 2019-06-23 PROCEDURE — 2060000000 HC ICU INTERMEDIATE R&B

## 2019-06-23 PROCEDURE — 6360000002 HC RX W HCPCS: Performed by: INTERNAL MEDICINE

## 2019-06-23 PROCEDURE — 2580000003 HC RX 258: Performed by: INTERNAL MEDICINE

## 2019-06-23 PROCEDURE — 80053 COMPREHEN METABOLIC PANEL: CPT

## 2019-06-23 RX ORDER — DOXYCYCLINE HYCLATE 100 MG/1
100 CAPSULE ORAL EVERY 12 HOURS SCHEDULED
Status: DISCONTINUED | OUTPATIENT
Start: 2019-06-23 | End: 2019-06-25 | Stop reason: HOSPADM

## 2019-06-23 RX ADMIN — MONTELUKAST 10 MG: 10 TABLET, FILM COATED ORAL at 01:21

## 2019-06-23 RX ADMIN — GABAPENTIN 600 MG: 300 CAPSULE ORAL at 07:55

## 2019-06-23 RX ADMIN — PANTOPRAZOLE SODIUM 40 MG: 40 TABLET, DELAYED RELEASE ORAL at 06:30

## 2019-06-23 RX ADMIN — INSULIN LISPRO 5 UNITS: 100 INJECTION, SOLUTION INTRAVENOUS; SUBCUTANEOUS at 01:18

## 2019-06-23 RX ADMIN — INSULIN GLARGINE 30 UNITS: 100 INJECTION, SOLUTION SUBCUTANEOUS at 01:19

## 2019-06-23 RX ADMIN — Medication 10 ML: at 07:56

## 2019-06-23 RX ADMIN — ATORVASTATIN CALCIUM 80 MG: 40 TABLET, FILM COATED ORAL at 01:20

## 2019-06-23 RX ADMIN — IPRATROPIUM BROMIDE AND ALBUTEROL SULFATE 1 AMPULE: .5; 3 SOLUTION RESPIRATORY (INHALATION) at 14:30

## 2019-06-23 RX ADMIN — IPRATROPIUM BROMIDE AND ALBUTEROL SULFATE 1 AMPULE: .5; 3 SOLUTION RESPIRATORY (INHALATION) at 09:30

## 2019-06-23 RX ADMIN — POTASSIUM CHLORIDE 20 MEQ: 20 TABLET, EXTENDED RELEASE ORAL at 07:55

## 2019-06-23 RX ADMIN — Medication 10 ML: at 01:21

## 2019-06-23 RX ADMIN — LOSARTAN POTASSIUM 12.5 MG: 25 TABLET ORAL at 11:01

## 2019-06-23 RX ADMIN — METOPROLOL SUCCINATE 25 MG: 25 TABLET, EXTENDED RELEASE ORAL at 07:55

## 2019-06-23 RX ADMIN — PRAMIPEXOLE DIHYDROCHLORIDE 0.25 MG: 0.25 TABLET ORAL at 01:20

## 2019-06-23 RX ADMIN — IPRATROPIUM BROMIDE AND ALBUTEROL SULFATE 1 AMPULE: .5; 3 SOLUTION RESPIRATORY (INHALATION) at 18:59

## 2019-06-23 RX ADMIN — FUROSEMIDE 40 MG: 10 INJECTION, SOLUTION INTRAMUSCULAR; INTRAVENOUS at 16:23

## 2019-06-23 RX ADMIN — METOPROLOL SUCCINATE 25 MG: 25 TABLET, EXTENDED RELEASE ORAL at 01:20

## 2019-06-23 RX ADMIN — INSULIN LISPRO 8 UNITS: 100 INJECTION, SOLUTION INTRAVENOUS; SUBCUTANEOUS at 16:24

## 2019-06-23 RX ADMIN — IPRATROPIUM BROMIDE AND ALBUTEROL SULFATE 1 AMPULE: .5; 3 SOLUTION RESPIRATORY (INHALATION) at 06:12

## 2019-06-23 RX ADMIN — MOMETASONE FUROATE AND FORMOTEROL FUMARATE DIHYDRATE 2 PUFF: 200; 5 AEROSOL RESPIRATORY (INHALATION) at 19:06

## 2019-06-23 RX ADMIN — MOMETASONE FUROATE AND FORMOTEROL FUMARATE DIHYDRATE 2 PUFF: 200; 5 AEROSOL RESPIRATORY (INHALATION) at 06:12

## 2019-06-23 RX ADMIN — ASPIRIN 81 MG: 81 TABLET, COATED ORAL at 07:55

## 2019-06-23 RX ADMIN — INSULIN LISPRO 9 UNITS: 100 INJECTION, SOLUTION INTRAVENOUS; SUBCUTANEOUS at 16:23

## 2019-06-23 RX ADMIN — INSULIN LISPRO 9 UNITS: 100 INJECTION, SOLUTION INTRAVENOUS; SUBCUTANEOUS at 11:01

## 2019-06-23 RX ADMIN — FUROSEMIDE 40 MG: 10 INJECTION, SOLUTION INTRAMUSCULAR; INTRAVENOUS at 07:55

## 2019-06-23 RX ADMIN — WARFARIN SODIUM 5 MG: 5 TABLET ORAL at 16:23

## 2019-06-23 RX ADMIN — GABAPENTIN 600 MG: 300 CAPSULE ORAL at 01:21

## 2019-06-23 RX ADMIN — INSULIN LISPRO 9 UNITS: 100 INJECTION, SOLUTION INTRAVENOUS; SUBCUTANEOUS at 07:56

## 2019-06-23 ASSESSMENT — PAIN SCALES - GENERAL
PAINLEVEL_OUTOF10: 0

## 2019-06-23 NOTE — PLAN OF CARE
Problem: Pain:  Description  Pain management should include both nonpharmacologic and pharmacologic interventions.   Goal: Pain level will decrease  Description  Pain level will decrease  6/23/2019 0606 by Geovany Balderas RN  Outcome: Met This Shift  Note:   No c/o pain    Goal: Control of acute pain  Description  Control of acute pain  6/23/2019 0606 by Geovany Balderas RN  Outcome: Met This Shift    Goal: Control of chronic pain  Description  Control of chronic pain  6/23/2019 0606 by Geovany Balderas RN  Outcome: Met This Shift       Problem: Breathing Pattern - Ineffective:  Goal: Ability to achieve and maintain a regular respiratory rate will improve  Description  Ability to achieve and maintain a regular respiratory rate will improve  6/23/2019 0606 by Geovany Balderas RN  Outcome: Met This Shift

## 2019-06-23 NOTE — PROGRESS NOTES
PRN Carmen Cumber, DO        dextrose 5 % solution  100 mL/hr Intravenous PRN Carmen Cumber, DO        warfarin (COUMADIN) tablet 5 mg  5 mg Oral Once per day on Mon Wed Fri Sat Carmen Cumber, DO   5 mg at 06/22/19 1621    furosemide (LASIX) injection 40 mg  40 mg Intravenous BID Carmen Dannieber, DO   40 mg at 06/23/19 1090    metoprolol succinate (TOPROL XL) extended release tablet 25 mg  25 mg Oral BID Cal Quintana MD   25 mg at 06/23/19 8159       Review of systems:   Heart: as above   Lungs: as above   Eyes: denies changes in vision or discharge. Ears: denies changes in hearing or pain. Nose: denies epistaxis or masses   Throat: denies sore throat or trouble swallowing. Neuro: denies numbness, tingling, tremors. Skin: denies rashes or itching. : denies hematuria, dysuria   GI: denies vomiting, diarrhea   Psych: denies mood changed, anxiety, depression. Physical Exam   /78   Pulse 72   Temp 97.7 °F (36.5 °C) (Oral)   Resp 16   Ht 5' 6\" (1.676 m)   Wt (!) 332 lb 9.6 oz (150.9 kg)   SpO2 95%   BMI 53.68 kg/m²   Constitutional: Oriented to person, place, and time. No distress. Well developed. Head: Normocephalic and atraumatic. Neck: Neck supple. No hepatojugular reflux. No JVD present. Carotid bruit is not present. No tracheal deviation present. No thyromegaly present. Cardiovascular: Normal rate, regular rhythm, normal heart sounds. intact distal pulses. No gallop and no friction rub. No murmur heard. Pulmonary: Breath sounds normal. No respiratory distress. No wheezes. No rales. Chest: Effort normal. No tenderness. Abdominal: Soft. Bowel sounds are normal. No distension or mass. No tenderness, rebound or guarding. Musculoskeletal: . No tenderness. No clubbing or cyanosis. Extremitites: Intact distal pulses. No edema  Neurological: Alert and oriented to person, place, and time. Skin: Skin is warm and dry. No rash noted.  Not

## 2019-06-23 NOTE — PROGRESS NOTES
06/23/2019    MCV 99.3 06/23/2019    MCH 31.7 06/23/2019    MCHC 31.9 06/23/2019    RDW 14.8 06/23/2019    SEGSPCT 61 01/20/2014    LYMPHOPCT 19.9 06/19/2019    MONOPCT 8.0 06/19/2019    BASOPCT 0.5 06/19/2019    MONOSABS 0.52 06/19/2019    LYMPHSABS 1.29 06/19/2019    EOSABS 0.02 06/19/2019    BASOSABS 0.03 06/19/2019     CMP:    Lab Results   Component Value Date     06/23/2019    K 4.5 06/23/2019    CL 96 06/23/2019    CO2 28 06/23/2019    BUN 21 06/23/2019    CREATININE 0.9 06/23/2019    GFRAA >60 06/23/2019    LABGLOM >60 06/23/2019    GLUCOSE 287 06/23/2019    GLUCOSE 118 10/27/2011    PROT 6.3 06/23/2019    LABALBU 3.4 06/23/2019    LABALBU 4.4 10/27/2011    CALCIUM 8.8 06/23/2019    BILITOT 0.5 06/23/2019    ALKPHOS 117 06/23/2019    AST 36 06/23/2019    ALT 54 06/23/2019       Other Data:      Intake/Output Summary (Last 24 hours) at 6/23/2019 1442  Last data filed at 6/23/2019 1402  Gross per 24 hour   Intake 500 ml   Output 1325 ml   Net -825 ml         Scheduled Medications:   insulin lispro  8 Units Subcutaneous TID WC    doxycycline hyclate  100 mg Oral 2 times per day    losartan  12.5 mg Oral Daily    insulin glargine  30 Units Subcutaneous Nightly    atorvastatin  80 mg Oral Nightly    insulin lispro  0-18 Units Subcutaneous TID WC    insulin lispro  0-9 Units Subcutaneous Nightly    aspirin  81 mg Oral Daily    gabapentin  600 mg Oral BID    montelukast  10 mg Oral Nightly    pantoprazole  40 mg Oral QAM AC    potassium chloride  20 mEq Oral Daily    pramipexole  0.25 mg Oral Nightly    mometasone-formoterol  2 puff Inhalation BID    ipratropium-albuterol  1 ampule Inhalation Q4H While awake    warfarin  5 mg Oral Daily    sodium chloride flush  10 mL Intravenous 2 times per day    warfarin  5 mg Oral Once per day on Mon Wed Fri Sat    furosemide  40 mg Intravenous BID    metoprolol succinate  25 mg Oral BID       Assessment:   1.  Acutely decompensated diastolic congestive heart failure resulting in acute respiratory failure with hypoxia  2. Paroxysmal atrial flutter with intermittent RVR  3. Community-acquired pneumonia  4. Non-oxygen dependent COPD  5. CAD, history of MI with CABG x3  6. Essential hypertension  7. Hyperlipidemia  8. Obstructive sleep apnea, on BiPAP at night   9. Insulin-dependent diabetes mellitus type 2  10. Medical noncompliance    Plan:   After my discussion with the patient today, I suspect outpatient noncompliance is a major issue. We will continue with IV diuresis as the patient has diuresed 5 L since hospitalization. Antibiotic therapy will be transition to oral and procalcitonin will be assessed tomorrow. We will reassess a two-view chest x-ray tomorrow morning as well. We will attempt to wean off the nasal cannula oxygen and have encouraged the patient to become more ambulatory. Cardiac recommendations have been reviewed. The case management team will investigate better coverage of medications at home. Possible discharge home in the next 24 to 48 hours. Continue current therapy. See orders for further plan of care. More than 50% of my  time was spent at the bedside counseling and/or coordination of care with the patient and/or family with face to face contact. This time was spent reviewing notes and laboratory data, instructing and counseling the patient. Time I spent with the family or surrogate(s) is included only if the patient was incapable of providing the necessary information or participating in medical decisionsI also discussed the differential diagnosis and all of the proposed management plans with the patient and individuals accompanying the patient.     Evy Garcia requires this high level of physician care and nursing in the IMC/Telemetry due the complexity of decision management and chance of rapid decline or death. Continued cardiac monitoring and higher level of nursing are required.  I am ready available for decision making and intervention. I reviewed the relevant imaging studies and available reports.  I also discussed the differential diagnosis and all of the proposed management plans with the patient and individuals accompanying the patient to this visit. I reviewed the relevant imaging studies and available reports.          Lesli Jin D.O.  2:42 PM  6/23/2019

## 2019-06-24 ENCOUNTER — APPOINTMENT (OUTPATIENT)
Dept: GENERAL RADIOLOGY | Age: 64
DRG: 291 | End: 2019-06-24
Payer: MEDICARE

## 2019-06-24 LAB
ALBUMIN SERPL-MCNC: 3.7 G/DL (ref 3.5–5.2)
ALP BLD-CCNC: 121 U/L (ref 40–129)
ALT SERPL-CCNC: 47 U/L (ref 0–40)
ANION GAP SERPL CALCULATED.3IONS-SCNC: 8 MMOL/L (ref 7–16)
AST SERPL-CCNC: 30 U/L (ref 0–39)
BILIRUB SERPL-MCNC: 0.8 MG/DL (ref 0–1.2)
BLOOD CULTURE, ROUTINE: NORMAL
BUN BLDV-MCNC: 22 MG/DL (ref 8–23)
CALCIUM SERPL-MCNC: 8.7 MG/DL (ref 8.6–10.2)
CHLORIDE BLD-SCNC: 94 MMOL/L (ref 98–107)
CO2: 34 MMOL/L (ref 22–29)
CREAT SERPL-MCNC: 1 MG/DL (ref 0.7–1.2)
CULTURE, BLOOD 2: NORMAL
GFR AFRICAN AMERICAN: >60
GFR NON-AFRICAN AMERICAN: >60 ML/MIN/1.73
GLUCOSE BLD-MCNC: 289 MG/DL (ref 74–99)
HCT VFR BLD CALC: 41.6 % (ref 37–54)
HEMOGLOBIN: 13.2 G/DL (ref 12.5–16.5)
INR BLD: 2.4
MCH RBC QN AUTO: 32.1 PG (ref 26–35)
MCHC RBC AUTO-ENTMCNC: 31.7 % (ref 32–34.5)
MCV RBC AUTO: 101.2 FL (ref 80–99.9)
METER GLUCOSE: 198 MG/DL (ref 74–99)
METER GLUCOSE: 236 MG/DL (ref 74–99)
METER GLUCOSE: 257 MG/DL (ref 74–99)
METER GLUCOSE: 274 MG/DL (ref 74–99)
PDW BLD-RTO: 14.8 FL (ref 11.5–15)
PLATELET # BLD: 163 E9/L (ref 130–450)
PMV BLD AUTO: 10.5 FL (ref 7–12)
POTASSIUM SERPL-SCNC: 4.2 MMOL/L (ref 3.5–5)
PROCALCITONIN: 0.07 NG/ML (ref 0–0.08)
PROTHROMBIN TIME: 26.8 SEC (ref 9.3–12.4)
RBC # BLD: 4.11 E12/L (ref 3.8–5.8)
SODIUM BLD-SCNC: 136 MMOL/L (ref 132–146)
TOTAL PROTEIN: 6.5 G/DL (ref 6.4–8.3)
WBC # BLD: 6 E9/L (ref 4.5–11.5)

## 2019-06-24 PROCEDURE — 85027 COMPLETE CBC AUTOMATED: CPT

## 2019-06-24 PROCEDURE — 97161 PT EVAL LOW COMPLEX 20 MIN: CPT | Performed by: PHYSICAL THERAPIST

## 2019-06-24 PROCEDURE — 97530 THERAPEUTIC ACTIVITIES: CPT | Performed by: PHYSICAL THERAPIST

## 2019-06-24 PROCEDURE — 94660 CPAP INITIATION&MGMT: CPT

## 2019-06-24 PROCEDURE — 6370000000 HC RX 637 (ALT 250 FOR IP): Performed by: INTERNAL MEDICINE

## 2019-06-24 PROCEDURE — 2700000000 HC OXYGEN THERAPY PER DAY

## 2019-06-24 PROCEDURE — 99232 SBSQ HOSP IP/OBS MODERATE 35: CPT | Performed by: INTERNAL MEDICINE

## 2019-06-24 PROCEDURE — 36415 COLL VENOUS BLD VENIPUNCTURE: CPT

## 2019-06-24 PROCEDURE — 2060000000 HC ICU INTERMEDIATE R&B

## 2019-06-24 PROCEDURE — 71046 X-RAY EXAM CHEST 2 VIEWS: CPT

## 2019-06-24 PROCEDURE — 97165 OT EVAL LOW COMPLEX 30 MIN: CPT

## 2019-06-24 PROCEDURE — 80053 COMPREHEN METABOLIC PANEL: CPT

## 2019-06-24 PROCEDURE — 84145 PROCALCITONIN (PCT): CPT

## 2019-06-24 PROCEDURE — 97530 THERAPEUTIC ACTIVITIES: CPT

## 2019-06-24 PROCEDURE — 94640 AIRWAY INHALATION TREATMENT: CPT

## 2019-06-24 PROCEDURE — 85610 PROTHROMBIN TIME: CPT

## 2019-06-24 PROCEDURE — 2580000003 HC RX 258: Performed by: INTERNAL MEDICINE

## 2019-06-24 PROCEDURE — 82962 GLUCOSE BLOOD TEST: CPT

## 2019-06-24 PROCEDURE — 6360000002 HC RX W HCPCS: Performed by: INTERNAL MEDICINE

## 2019-06-24 RX ORDER — MONTELUKAST SODIUM 10 MG/1
TABLET ORAL
Qty: 30 TABLET | Refills: 4 | Status: SHIPPED | OUTPATIENT
Start: 2019-06-24 | End: 2020-01-02 | Stop reason: SDUPTHER

## 2019-06-24 RX ADMIN — INSULIN GLARGINE 30 UNITS: 100 INJECTION, SOLUTION SUBCUTANEOUS at 00:27

## 2019-06-24 RX ADMIN — DOXYCYCLINE HYCLATE 100 MG: 100 CAPSULE ORAL at 00:28

## 2019-06-24 RX ADMIN — ATORVASTATIN CALCIUM 80 MG: 40 TABLET, FILM COATED ORAL at 21:51

## 2019-06-24 RX ADMIN — DOXYCYCLINE HYCLATE 100 MG: 100 CAPSULE ORAL at 09:39

## 2019-06-24 RX ADMIN — IPRATROPIUM BROMIDE AND ALBUTEROL SULFATE 1 AMPULE: .5; 3 SOLUTION RESPIRATORY (INHALATION) at 06:07

## 2019-06-24 RX ADMIN — LOSARTAN POTASSIUM 12.5 MG: 25 TABLET ORAL at 11:49

## 2019-06-24 RX ADMIN — Medication 10 ML: at 22:15

## 2019-06-24 RX ADMIN — INSULIN LISPRO 9 UNITS: 100 INJECTION, SOLUTION INTRAVENOUS; SUBCUTANEOUS at 12:44

## 2019-06-24 RX ADMIN — FUROSEMIDE 40 MG: 10 INJECTION, SOLUTION INTRAMUSCULAR; INTRAVENOUS at 11:49

## 2019-06-24 RX ADMIN — INSULIN LISPRO 5 UNITS: 100 INJECTION, SOLUTION INTRAVENOUS; SUBCUTANEOUS at 22:04

## 2019-06-24 RX ADMIN — WARFARIN SODIUM 5 MG: 5 TABLET ORAL at 18:36

## 2019-06-24 RX ADMIN — INSULIN LISPRO 8 UNITS: 100 INJECTION, SOLUTION INTRAVENOUS; SUBCUTANEOUS at 17:34

## 2019-06-24 RX ADMIN — IPRATROPIUM BROMIDE AND ALBUTEROL SULFATE 1 AMPULE: .5; 3 SOLUTION RESPIRATORY (INHALATION) at 09:36

## 2019-06-24 RX ADMIN — GABAPENTIN 600 MG: 300 CAPSULE ORAL at 09:39

## 2019-06-24 RX ADMIN — PRAMIPEXOLE DIHYDROCHLORIDE 0.25 MG: 0.25 TABLET ORAL at 21:51

## 2019-06-24 RX ADMIN — INSULIN LISPRO 8 UNITS: 100 INJECTION, SOLUTION INTRAVENOUS; SUBCUTANEOUS at 09:41

## 2019-06-24 RX ADMIN — INSULIN LISPRO 8 UNITS: 100 INJECTION, SOLUTION INTRAVENOUS; SUBCUTANEOUS at 12:46

## 2019-06-24 RX ADMIN — POTASSIUM CHLORIDE 20 MEQ: 20 TABLET, EXTENDED RELEASE ORAL at 09:39

## 2019-06-24 RX ADMIN — METOPROLOL SUCCINATE 25 MG: 25 TABLET, EXTENDED RELEASE ORAL at 09:39

## 2019-06-24 RX ADMIN — INSULIN GLARGINE 30 UNITS: 100 INJECTION, SOLUTION SUBCUTANEOUS at 22:02

## 2019-06-24 RX ADMIN — INSULIN LISPRO 3 UNITS: 100 INJECTION, SOLUTION INTRAVENOUS; SUBCUTANEOUS at 00:27

## 2019-06-24 RX ADMIN — FUROSEMIDE 40 MG: 10 INJECTION, SOLUTION INTRAMUSCULAR; INTRAVENOUS at 17:38

## 2019-06-24 RX ADMIN — Medication 10 ML: at 11:53

## 2019-06-24 RX ADMIN — MONTELUKAST 10 MG: 10 TABLET, FILM COATED ORAL at 00:28

## 2019-06-24 RX ADMIN — DOXYCYCLINE HYCLATE 100 MG: 100 CAPSULE ORAL at 22:13

## 2019-06-24 RX ADMIN — GABAPENTIN 600 MG: 300 CAPSULE ORAL at 00:28

## 2019-06-24 RX ADMIN — INSULIN LISPRO 3 UNITS: 100 INJECTION, SOLUTION INTRAVENOUS; SUBCUTANEOUS at 09:39

## 2019-06-24 RX ADMIN — PRAMIPEXOLE DIHYDROCHLORIDE 0.25 MG: 0.25 TABLET ORAL at 00:28

## 2019-06-24 RX ADMIN — WARFARIN SODIUM 5 MG: 5 TABLET ORAL at 18:35

## 2019-06-24 RX ADMIN — ATORVASTATIN CALCIUM 80 MG: 40 TABLET, FILM COATED ORAL at 00:28

## 2019-06-24 RX ADMIN — PANTOPRAZOLE SODIUM 40 MG: 40 TABLET, DELAYED RELEASE ORAL at 06:17

## 2019-06-24 RX ADMIN — ASPIRIN 81 MG: 81 TABLET, COATED ORAL at 09:39

## 2019-06-24 RX ADMIN — GABAPENTIN 600 MG: 300 CAPSULE ORAL at 22:02

## 2019-06-24 RX ADMIN — Medication 10 ML: at 00:30

## 2019-06-24 RX ADMIN — MOMETASONE FUROATE AND FORMOTEROL FUMARATE DIHYDRATE 2 PUFF: 200; 5 AEROSOL RESPIRATORY (INHALATION) at 06:07

## 2019-06-24 RX ADMIN — MONTELUKAST 10 MG: 10 TABLET, FILM COATED ORAL at 21:51

## 2019-06-24 RX ADMIN — IPRATROPIUM BROMIDE AND ALBUTEROL SULFATE 1 AMPULE: .5; 3 SOLUTION RESPIRATORY (INHALATION) at 13:08

## 2019-06-24 ASSESSMENT — PAIN SCALES - GENERAL
PAINLEVEL_OUTOF10: 0

## 2019-06-24 NOTE — PROGRESS NOTES
INPATIENT CARDIOLOGY FOLLOW-UP    Name: Marisela King    Age: 61 y.o. Date of Admission: 6/19/2019 12:41 PM    Date of Service: 6/24/2019    Chief Complaint: Follow-up for coronary atherosclerosis, ischemic cardiomyopathy, chronic combined systolic and diastolic heart failure, paroxysmal atrial flutter, hypertension, chronic obstructive lung disease, morbid obesity, obstructive sleep apnea    Interim History: The patient presently remains stable from a cardiovascular standpoint with additional achieve fluid mobilization and acceptable hemodynamics. Repeat assessment of renal function electrolytes following most recent optimization of therapy are pending. He remains in sinus rhythm without additional arrhythmias. He has continued to be intolerant of nocturnal CPAP through hospital provided equipment. Review of Systems: The remainder of a complete multisystem review including consitutional, central nervous, respiratory, circulatory, gastrointestinal, genitourinary, endocrinologic, hematologic, musculoskeletal and psychiatric are negative. Problem List:  Patient Active Problem List   Diagnosis    CAD (coronary artery disease)    Hypertension    Type 2 diabetes mellitus with hyperglycemia, with long-term current use of insulin ([facility])    Tobacco abuse    PAF (paroxysmal atrial fibrillation) ([facility])    S/P CABG x 2    History of mitral valve repair    Morbid obesity with BMI of 50.0-59.9, adult ([facility] [address] [facility]    Asthma    Chronic bronchitis ([facility] [address]    Sleep apnea    Asthmatic bronchitis , chronic ([facility])    Gastroesophageal reflux disease without esophagitis    Mixed hyperlipidemia    Muscular deconditioning    Acute diastolic (congestive) heart failure ([facility])       Allergies: Allergies   Allergen Reactions    Pcn [Penicillins]      Child went to hospital   ? Reaction  Patient tolerates cephalosporins    Sulfa Antibiotics      ?        Current Medications:  Current Facility-Administered Medications Medication Dose Route Frequency Provider Last Rate Last Dose    insulin lispro (HUMALOG) injection vial 8 Units  8 Units Subcutaneous TID  Chandu Mew, DO   8 Units at 06/23/19 1624    doxycycline hyclate (VIBRAMYCIN) capsule 100 mg  100 mg Oral 2 times per day [facility] Mew, DO   100 mg at 06/24/19 0028    losartan (COZAAR) tablet 12.5 mg  12.5 mg Oral Daily Ji Woodall, DO   12.5 mg at 06/23/19 1101    insulin glargine (LANTUS) injection vial 30 Units  30 Units Subcutaneous Nightly Phoenix Gander, DO   30 Units at 06/24/19 0027    atorvastatin (LIPITOR) tablet 80 mg  80 mg Oral Nightly Светлана Mallory MD   80 mg at 06/24/19 0028    insulin lispro (HUMALOG) injection vial 0-18 Units  0-18 Units Subcutaneous TID  Phoenix Gander, DO   9 Units at 06/23/19 1623    insulin lispro (HUMALOG) injection vial 0-9 Units  0-9 Units Subcutaneous Nightly Phoenix Gander, DO   3 Units at 06/24/19 0027    aspirin EC tablet 81 mg  81 mg Oral Daily Phoenix Gander, DO   81 mg at 06/23/19 6860    gabapentin (NEURONTIN) capsule 600 mg  600 mg Oral BID Phoenix Gander, DO   600 mg at 06/24/19 0028    montelukast (SINGULAIR) tablet 10 mg  10 mg Oral Nightly Phoenix Gander, DO   10 mg at 06/24/19 0028    pantoprazole (PROTONIX) tablet 40 mg  40 mg Oral QAM AC Phoenix Gander, DO   40 mg at 06/24/19 0617    potassium chloride (KLOR-CON M) extended release tablet 20 mEq  20 mEq Oral Daily Phoenix Gander, DO   20 mEq at 06/23/19 0755    pramipexole (MIRAPEX) tablet 0.25 mg  0.25 mg Oral Nightly Phoenix Gander, DO   0.25 mg at 06/24/19 0028    mometasone-formoterol (DULERA) 200-5 MCG/ACT inhaler 2 puff  2 puff Inhalation BID Phoenix Gander, DO   2 puff at 06/24/19 0607    ipratropium-albuterol (DUONEB) nebulizer solution 1 ampule  1 ampule Inhalation Q4H While awake Phoenix Jenkins, DO   1 ampule at 06/24/19 0607    warfarin (COUMADIN) tablet 5 mg  5 mg Oral normal and no carotid bruits are present. Normal respiratory effort is noted with no accessory muscle usage present. Lung fields are clear to ascultation. Cardiac examination is notable for a regular rate and rhythm with no palpable thrill. No gallop rhythm or cardiac murmur are identified. A benign abdominal examination is present with the exception of obesity and no masses or organomegaly. Intact pulses are present throughout all extremities and mild pretibial and pedal edema is present with chronic stasis changes. No focal neurologic deficits are present. Intake/Output:    Intake/Output Summary (Last 24 hours) at 6/24/2019 0701  Last data filed at 6/24/2019 0535  Gross per 24 hour   Intake 620 ml   Output 2075 ml   Net -1455 ml     No intake/output data recorded. Laboratory Tests:  Lab Results   Component Value Date    CREATININE 0.9 06/23/2019    BUN 21 06/23/2019     06/23/2019    K 4.5 06/23/2019    CL 96 (L) 06/23/2019    CO2 28 06/23/2019     No results for input(s): CKTOTAL, CKMB in the last 72 hours.     Invalid input(s): TROPONONI  No results found for: BNP  Lab Results   Component Value Date    WBC 6.9 06/23/2019    RBC 4.20 06/23/2019    HGB 13.3 06/23/2019    HCT 41.7 06/23/2019    MCV 99.3 06/23/2019    MCH 31.7 06/23/2019    MCHC 31.9 06/23/2019    RDW 14.8 06/23/2019     06/23/2019    MPV 10.4 06/23/2019     Recent Labs     06/22/19  0606 06/23/19  0540   ALKPHOS 124 117   ALT 61* 54*   AST 48* 36   PROT 6.5 6.3*   BILITOT 0.7 0.5   LABALBU 3.6 3.4*     Lab Results   Component Value Date    MG 2.0 06/20/2019     Lab Results   Component Value Date    PROTIME 28.1 06/23/2019    PROTIME 17.8 04/03/2019    INR 2.5 06/23/2019     Lab Results   Component Value Date    TSH 2.790 06/20/2019     No components found for: CHLPL  Lab Results   Component Value Date    TRIG 158 (H) 06/20/2019    TRIG 116 01/03/2019    TRIG 223 (H) 09/10/2018     Lab Results   Component Value Date    HDL 42 present. John Ferreira.  [place] Fallon, [facility]

## 2019-06-24 NOTE — PROGRESS NOTES
[address] [facility]  Internal Medicine  -Resident Progress Note-    Francisca Rodriguez / Porsha Mccullougha 1955 / MRN [id number] / Analia Rasp 6/24/2019 / Hospital Day: 6    PCP:  Joleen Argueta DO  Admitting Physician:  Juanita Araya DO  Consultants: Cardiology  Chief Complaint:    Chief Complaint   Patient presents with    Leg Swelling     bilateral lower extremeties     Shortness of Breath       Subjective / Overnight Events  Maikel Simpson was seen and examined at bedside today; no family was present for the examination. No acute overnight events were noted. Patient states that he is feeling better and although continues to be short of breath with exertion. Patient states he has been walking the halls on his own a couple times a day. Review of Systems  All bolded are positive; please see HPI  General:  Fever, chills, diaphoresis, fatigue, malaise, night sweats, weight loss  Psychological:  Anxiety, disorientation, hallucinations. ENT:  Epistaxis, vertigo, visual changes. Cardiovascular:  Chest pain, irregular heartbeats, palpitations, paroxysmal nocturnal dyspnea. Respiratory:  Shortness of breath, coughing, sputum production, hemoptysis, wheezing, orthopnea.   Gastrointestinal:  Nausea, vomiting, diarrhea, heartburn, constipation, abdominal pain, hematemesis, hematochezia, melena, acholic stools  Genito-Urinary:  Dysuria, urgency, frequency, hematuria  Musculoskeletal:  Joint pain, joint stiffness, joint swelling, muscle pain  Neurology:  Headache, focal neurological deficits, weakness, numbness, paresthesia  Derm:  Rashes, ulcers, excoriations, bruising  Extremities:  Decreased ROM, peripheral edema, mottling    Physical Examination  Vitals:  /78   Pulse 75   Temp 99 °F (37.2 °C) (Oral)   Resp 18   Ht 5' 6\" (1.676 m)   Wt (!) 338 lb 1.6 oz (153.4 kg)   SpO2 94%   BMI 54.57 kg/m²   General Appearance:  awake, alert, and oriented to person, place, time, and purpose; appears stated age and cooperative; no apparent distress no labored breathing  HEENT:  PERRL; EOMI; sclera clear; buccal mucosa moist  Neck:  supple; trachea midline; no thyromegaly; no JVD; no bruits  Heart: Irregularly irregular; rate controlled; no murmurs  Lungs: Diminished breath sounds bilaterally; no wheezes; no rhonchi; no rales  Abdomen:  soft, non-tender, non-distended; bowel sounds positive; no organomegaly or masses  Extremities:  peripheral pulses present; 1-2+ pitting edema to bilateral lower extremities; no ulcers  Neurologic:  alert and oriented x 3; no focal deficit; cranial nerves grossly intact  Skin:  no petechia; no hemorrhage; no wounds    Medications  Scheduled Meds    insulin lispro  8 Units Subcutaneous TID WC    doxycycline hyclate  100 mg Oral 2 times per day    losartan  12.5 mg Oral Daily    insulin glargine  30 Units Subcutaneous Nightly    atorvastatin  80 mg Oral Nightly    insulin lispro  0-18 Units Subcutaneous TID WC    insulin lispro  0-9 Units Subcutaneous Nightly    aspirin  81 mg Oral Daily    gabapentin  600 mg Oral BID    montelukast  10 mg Oral Nightly    pantoprazole  40 mg Oral QAM AC    potassium chloride  20 mEq Oral Daily    pramipexole  0.25 mg Oral Nightly    mometasone-formoterol  2 puff Inhalation BID    ipratropium-albuterol  1 ampule Inhalation Q4H While awake    warfarin  5 mg Oral Daily    sodium chloride flush  10 mL Intravenous 2 times per day    warfarin  5 mg Oral Once per day on Mon Wed Fri Sat    furosemide  40 mg Intravenous BID    metoprolol succinate  25 mg Oral BID     Infusion Meds    dextrose       PRN Meds sodium chloride flush, magnesium hydroxide, ondansetron, acetaminophen, glucose, dextrose, glucagon (rDNA), dextrose    · Recent labs, imaging, and micro results are available in the EMR and have been reviewed. Assessment and Plan  Patient is a 61 y.o. male who presented with shortness of breath.   The active problem list is as follows:    Acute exacerbation of diastolic congestive heart failure  Atrial flutter with intermittent RVR  Bilateral basilar pneumonia  Acute on chronic hypoxic respiratory failure  COPD  CAD, history of MI with CABG x3  Hypertension  Hyperlipidemia  Obstructive sleep apnea, on BiPAP at night   Insulin-dependent diabetes mellitus  Medical noncompliance    Patient did continue with IV diuresis, he has diuresed nearly 7 L since admission. Antibiotics have been transitioned to oral and cherst x-ray repeated with no active disease present. Patient encouraged to continue with ambulation, therapy teams have been asked to evaluate the patient. Case management has looked into insulin coverage and will be speaking with prescription assistance programs. Compliance with all medications and equipment as an outpatient was again stressed to the patient. · Routine labs in AM.  · DVT prophylaxis. · Please see orders for further management and care. The plan was discussed with Dr. Wilner Becker.     Heather Buckner DO PGY2  6/24/2019  7:55 AM

## 2019-06-24 NOTE — PROGRESS NOTES
Occupational Therapy  Occupational Therapy Initial Assessment      Date:2019  Patient Name: Princess Esqueda  MRN: [id number]  : 1955  Room: [address] [place] [address]    Evaluating OT: Fabian Butts OTR/L [id number]    Recommendation for Placement: HHOT with supervision vs pulmonary rehab  Recommended Adaptive Equipment: TBD   AM-PAC Daily Activity Raw Score:     Diagnosis:   1. Atrial flutter with rapid ventricular response ([facility]    2. Pneumonia of both lungs due to infectious organism, unspecified part of lung    3. Anticoagulated on Coumadin    4. Acute on chronic congestive heart failure, unspecified heart failure type [facility])      Pertinent Medical History:   Past Medical History:   Diagnosis Date    Acid reflux     Acute diastolic (congestive) heart failure ([facility] 2019    Arthritis     Asthma 2014    Asthmatic bronchitis , chronic (HCC) 2016    CAD (coronary artery disease)     Chronic bronchitis ([facility]) 2014    COPD (chronic obstructive pulmonary disease) ([facility])     CB    COPD (chronic obstructive pulmonary disease) ([facility])     Diabetes mellitus ([facility])     Emphysema (subcutaneous) (surgical) resulting from a procedure     Hyperlipidemia     Hypertension     Hypoxemia requiring supplemental oxygen 2015    LONG TERM ANTICOAGULENT USE     Morbid obesity with BMI of 50.0-59.9, adult ([facility] 2013    Obesity     Osteoarthritis     Sleep apnea     bilevel positive airway pressure at 13/8 with 2 L oxygen flow     Tobacco abuse     Type II or unspecified type diabetes mellitus without mention of complication, not stated as uncontrolled       Precautions: Falls, monitor pulse ox during activity     Home Living: Pt lives with wife in 2 story home with 4 steps to enter.  Bedroom and tubshower on 2nd floor 12 steps with rail, walkin shower in basement 13 steps no rail, pt prefers to use walkin shower  Bathroom Setup: tub shower, walkin shower  Equipment owned: none    Prior Level of Function: independent with ADLs, assist with IADLs; ambulated no device independently  Driving: yes    Pain Level: pt c/o 0/10 pain this session; nursing aware  Cognition: A&O: 4/4; Follows 2 step directions   Memory:  good     Sequencing:  good     Problem solving:  good     Judgement/safety:  good       Functional Assessment:   Initial Eval Status  Date: 6/24/19 Treatment Status  Date: Short Term Goals  Treatment frequency: PRN   Feeding Independent       Grooming Supervision   Independent    UB Dressing Independent       LB Dressing Supervision   Independent    Bathing Supervision      Toileting Independent       Bed Mobility  Supine to sit: Independent   Sit to supine: Independent       Functional Transfers Independent       Functional Mobility 75 feet no device with Supervision to monitor oxygen saturation, O2 sat on RA 84-87% with ambulation, required seated rest break and cues for pursed lip breathing and to concentrate on breathing to recover above 90% in <1 min. Independent no device   Balance Sitting:     Static: good    Dynamic: good  Standing: good     Activity Tolerance Poor, see functional mobility above  fair   Visual/  Perceptual Glasses: readers                UE Assessment:  Hand Dominance: Right [x]  Left []   Strength ROM Additional Info:    RUE  4/5  WFL good  and wfl FMC/dexterity noted during ADL tasks   LUE 4/5  WFL good  and wfl FMC/dexterity noted during ADL tasks     Hearing: WFL  Sensation:  Impaired, neuropathy in B feet  Tone: WNL  Edema: none noted                            Comments/Treatment: OK from RN to see patient. Upon arrival, patient supine in bed with wife present. OT eval completed.  Therapist facilitated: Bed mobility, sitting balance at EOB for 10 minutes to increase dynamic sitting balance and activity tolerance, transfer training with verbal cues for hand placement, static standing balance, functional mobility, verbal cues for pursed lip breathing during activity to maintain oxygen saturation, pt up with PT at end of eval. Pt educated on ECT's. All needs within reach. Pt would benefit from continued skilled OT to increase safety and independence with completion of ADL/IADL tasks for functional independence and quality of life. Eval Complexity: Low    Assessment of current deficits:   Functional mobility [x]  ADLs [x] Strength [x]  Cognition []  Functional transfers  [x] IADLs [x] Safety Awareness [x]  Endurance [x]  Fine Motor Coordination [] Balance [x] Vision/perception [] Sensation []   Gross Motor Coordination [] ROM [] Delirium []                  Motor Control []    Plan of Care:   ADL retraining [x]   Equipment needs [x]   Neuromuscular re-education [x] Energy Conservation Techniques [x]  Functional Transfer training [x] Patient and/or Family Education [x]  Functional Mobility training [x]  Environmental Modifications [x]  Cognitive re-training []   Compensatory techniques for ADLs [x]  Splinting Needs []   Positioning to improve overall function [x]   Therapeutic Activity [x]  Therapeutic Exercise  [x]  Visual/Perceptual: []    Delirium prevention/treatment  []   Other:  []    Rehab Potential: Good for established goals     Patient / Family Goal: go home      Patient and/or family were instructed diagnosis, prognosis/goals and plan of care. Demonstrated fair understanding.     Tx Time in: 315  Tx Time out: 330  Evaluation + 15 treatment time    Flo Allan, OTR/L [id number]

## 2019-06-25 VITALS
SYSTOLIC BLOOD PRESSURE: 114 MMHG | RESPIRATION RATE: 16 BRPM | HEIGHT: 66 IN | HEART RATE: 74 BPM | WEIGHT: 315 LBS | DIASTOLIC BLOOD PRESSURE: 72 MMHG | OXYGEN SATURATION: 95 % | BODY MASS INDEX: 50.62 KG/M2 | TEMPERATURE: 98.4 F

## 2019-06-25 LAB
ALBUMIN SERPL-MCNC: 3.5 G/DL (ref 3.5–5.2)
ALP BLD-CCNC: 125 U/L (ref 40–129)
ALT SERPL-CCNC: 47 U/L (ref 0–40)
ANION GAP SERPL CALCULATED.3IONS-SCNC: 8 MMOL/L (ref 7–16)
AST SERPL-CCNC: 32 U/L (ref 0–39)
BILIRUB SERPL-MCNC: 0.8 MG/DL (ref 0–1.2)
BUN BLDV-MCNC: 20 MG/DL (ref 8–23)
CALCIUM SERPL-MCNC: 9.3 MG/DL (ref 8.6–10.2)
CHLORIDE BLD-SCNC: 95 MMOL/L (ref 98–107)
CO2: 33 MMOL/L (ref 22–29)
CREAT SERPL-MCNC: 1 MG/DL (ref 0.7–1.2)
GFR AFRICAN AMERICAN: >60
GFR NON-AFRICAN AMERICAN: >60 ML/MIN/1.73
GLUCOSE BLD-MCNC: 269 MG/DL (ref 74–99)
HCT VFR BLD CALC: 42.6 % (ref 37–54)
HEMOGLOBIN: 13.7 G/DL (ref 12.5–16.5)
INR BLD: 1.9
MCH RBC QN AUTO: 31.6 PG (ref 26–35)
MCHC RBC AUTO-ENTMCNC: 32.2 % (ref 32–34.5)
MCV RBC AUTO: 98.4 FL (ref 80–99.9)
METER GLUCOSE: 258 MG/DL (ref 74–99)
METER GLUCOSE: 290 MG/DL (ref 74–99)
PDW BLD-RTO: 14.6 FL (ref 11.5–15)
PLATELET # BLD: 171 E9/L (ref 130–450)
PMV BLD AUTO: 10.4 FL (ref 7–12)
POTASSIUM SERPL-SCNC: 4.3 MMOL/L (ref 3.5–5)
PRO-BNP: 595 PG/ML (ref 0–125)
PROTHROMBIN TIME: 21.5 SEC (ref 9.3–12.4)
RBC # BLD: 4.33 E12/L (ref 3.8–5.8)
SODIUM BLD-SCNC: 136 MMOL/L (ref 132–146)
TOTAL PROTEIN: 6.4 G/DL (ref 6.4–8.3)
WBC # BLD: 5.6 E9/L (ref 4.5–11.5)

## 2019-06-25 PROCEDURE — 82962 GLUCOSE BLOOD TEST: CPT

## 2019-06-25 PROCEDURE — 80053 COMPREHEN METABOLIC PANEL: CPT

## 2019-06-25 PROCEDURE — 83880 ASSAY OF NATRIURETIC PEPTIDE: CPT

## 2019-06-25 PROCEDURE — 6370000000 HC RX 637 (ALT 250 FOR IP): Performed by: INTERNAL MEDICINE

## 2019-06-25 PROCEDURE — 97116 GAIT TRAINING THERAPY: CPT

## 2019-06-25 PROCEDURE — 85610 PROTHROMBIN TIME: CPT

## 2019-06-25 PROCEDURE — 2580000003 HC RX 258: Performed by: INTERNAL MEDICINE

## 2019-06-25 PROCEDURE — 94640 AIRWAY INHALATION TREATMENT: CPT

## 2019-06-25 PROCEDURE — 36415 COLL VENOUS BLD VENIPUNCTURE: CPT

## 2019-06-25 PROCEDURE — 85027 COMPLETE CBC AUTOMATED: CPT

## 2019-06-25 PROCEDURE — 99233 SBSQ HOSP IP/OBS HIGH 50: CPT | Performed by: INTERNAL MEDICINE

## 2019-06-25 RX ORDER — TORSEMIDE 20 MG/1
20 TABLET ORAL 2 TIMES DAILY
Qty: 30 TABLET | Refills: 3 | Status: SHIPPED | OUTPATIENT
Start: 2019-06-25 | End: 2019-08-24 | Stop reason: SDUPTHER

## 2019-06-25 RX ORDER — LOSARTAN POTASSIUM 25 MG/1
12.5 TABLET ORAL DAILY
Qty: 30 TABLET | Refills: 3 | Status: SHIPPED | OUTPATIENT
Start: 2019-06-26 | End: 2020-03-13 | Stop reason: SDUPTHER

## 2019-06-25 RX ORDER — INSULIN GLARGINE 100 [IU]/ML
30 INJECTION, SOLUTION SUBCUTANEOUS NIGHTLY
Qty: 1 VIAL | Refills: 3 | Status: SHIPPED | OUTPATIENT
Start: 2019-06-25 | End: 2019-07-01 | Stop reason: SDUPTHER

## 2019-06-25 RX ORDER — TORSEMIDE 20 MG/1
20 TABLET ORAL 2 TIMES DAILY
Status: DISCONTINUED | OUTPATIENT
Start: 2019-06-25 | End: 2019-06-25 | Stop reason: HOSPADM

## 2019-06-25 RX ORDER — ATORVASTATIN CALCIUM 80 MG/1
80 TABLET, FILM COATED ORAL NIGHTLY
Qty: 30 TABLET | Refills: 3 | Status: SHIPPED | OUTPATIENT
Start: 2019-06-25 | End: 2020-01-02 | Stop reason: SDUPTHER

## 2019-06-25 RX ORDER — DOXYCYCLINE HYCLATE 100 MG/1
100 CAPSULE ORAL EVERY 12 HOURS SCHEDULED
Qty: 10 CAPSULE | Refills: 0 | Status: SHIPPED | OUTPATIENT
Start: 2019-06-25 | End: 2019-06-30

## 2019-06-25 RX ADMIN — INSULIN LISPRO 9 UNITS: 100 INJECTION, SOLUTION INTRAVENOUS; SUBCUTANEOUS at 10:06

## 2019-06-25 RX ADMIN — Medication 10 ML: at 10:08

## 2019-06-25 RX ADMIN — LOSARTAN POTASSIUM 12.5 MG: 25 TABLET ORAL at 10:05

## 2019-06-25 RX ADMIN — ASPIRIN 81 MG: 81 TABLET, COATED ORAL at 10:05

## 2019-06-25 RX ADMIN — POTASSIUM CHLORIDE 20 MEQ: 20 TABLET, EXTENDED RELEASE ORAL at 10:05

## 2019-06-25 RX ADMIN — MOMETASONE FUROATE AND FORMOTEROL FUMARATE DIHYDRATE 2 PUFF: 200; 5 AEROSOL RESPIRATORY (INHALATION) at 07:11

## 2019-06-25 RX ADMIN — INSULIN LISPRO 9 UNITS: 100 INJECTION, SOLUTION INTRAVENOUS; SUBCUTANEOUS at 11:50

## 2019-06-25 RX ADMIN — INSULIN LISPRO 8 UNITS: 100 INJECTION, SOLUTION INTRAVENOUS; SUBCUTANEOUS at 11:52

## 2019-06-25 RX ADMIN — IPRATROPIUM BROMIDE AND ALBUTEROL SULFATE 1 AMPULE: .5; 3 SOLUTION RESPIRATORY (INHALATION) at 07:00

## 2019-06-25 RX ADMIN — DOXYCYCLINE HYCLATE 100 MG: 100 CAPSULE ORAL at 10:04

## 2019-06-25 RX ADMIN — TORSEMIDE 20 MG: 20 TABLET ORAL at 10:05

## 2019-06-25 RX ADMIN — PANTOPRAZOLE SODIUM 40 MG: 40 TABLET, DELAYED RELEASE ORAL at 06:15

## 2019-06-25 RX ADMIN — IPRATROPIUM BROMIDE AND ALBUTEROL SULFATE 1 AMPULE: .5; 3 SOLUTION RESPIRATORY (INHALATION) at 10:37

## 2019-06-25 RX ADMIN — METOPROLOL SUCCINATE 25 MG: 25 TABLET, EXTENDED RELEASE ORAL at 10:05

## 2019-06-25 RX ADMIN — GABAPENTIN 600 MG: 300 CAPSULE ORAL at 10:05

## 2019-06-25 RX ADMIN — INSULIN LISPRO 8 UNITS: 100 INJECTION, SOLUTION INTRAVENOUS; SUBCUTANEOUS at 10:08

## 2019-06-25 ASSESSMENT — PAIN SCALES - GENERAL: PAINLEVEL_OUTOF10: 0

## 2019-06-25 NOTE — DISCHARGE SUMMARY
Name:  King Luisana  :  1955  MRN:  [id number]  Room:  4827/8985-17  DOS:  2019    [facility]  Internal Medicine  -Resident Discharge Summary-    PCP:  Carlos Rico DO  Admitting Physician:  Valorie Shaikh DO  Consultant(s):   IP CONSULT TO INTERNAL MEDICINE  IP CONSULT TO CARDIOLOGY  IP CONSULT TO SOCIAL WORK  IP CONSULT TO HEART FAILURE NURSE/COORDINATOR  IP CONSULT TO DIABETES EDUCATOR      Admission Date:  2019   Discharge Date:  2019      Admission Diagnoses     Acute diastolic (congestive) heart failure (HCC) [I50.31]     Discharge Diagnoses  Acute exacerbation of diastolic congestive heart failure  Atrial flutter with intermittent RVR  Bilateral basilar pneumonia  Acute on chronic hypoxic respiratory failure  COPD  CAD, history of MI with CABG x3  Hypertension  Hyperlipidemia  Obstructive sleep apnea, on BiPAP at night   Insulin-dependent diabetes mellitus  Medical noncompliance      Brief History of Present Illness and Hospital Course  King Luisana is a 61 y.o. male patient of Carlos Rico DO who  has a past medical history of Acid reflux, Acute diastolic (congestive) heart failure (HCC), Arthritis, Asthma, Asthmatic bronchitis , chronic (HCC), CAD (coronary artery disease), Chronic bronchitis ([address] COPD (chronic obstructive pulmonary disease) ([address] COPD (chronic obstructive pulmonary disease) ([address] Diabetes mellitus ([address] Emphysema (subcutaneous) (surgical) resulting from a procedure, Hyperlipidemia, Hypertension, Hypoxemia requiring supplemental oxygen, LONG TERM ANTICOAGULENT USE, Morbid obesity with BMI of 50.0-59.9, adult ([address] Obesity, Osteoarthritis, Sleep apnea, Tobacco abuse, and Type II or unspecified type diabetes mellitus without mention of complication, not stated as uncontrolled. who originally had concerns including Leg Swelling (bilateral lower extremeties ) and Shortness of Breath.  at presentation on 2019, and was found to have Acute diastolic (congestive) heart failure (HCC) [I50.31] after workup. Fox Rubio is a 61 y.o. male with a past medical history pertinent for diastolic congestive heart failure, CAD with history of MI and CABG x3, hypertension, hyperlipidemia, insulin-dependent diabetes type 2, orbit obesity, COPD, obstructive sleep apnea who presents to [facility] ER complaining of shortness of breath and lower extremity edema. Patient states that his symptoms began 2 days ago with progressively worsening shortness of breath and leg swelling. He reports having a cough with minimal sputum production. He denies any sick contacts. His shortness of breath becomes worse with any form of exertion or lying flat. Patient has a significant cardiac history and follows closely with Dr. Stacie Osborne. Patient's blood sugar was significantly elevated in the 400s in the ED. He is prescribed insulin at home however reports that he is unable to afford it and therefore has not taken any for the last several months. Patient did well throughout his hospitalization diuresing over 9L. His blood glucose levels remained elevated, although much better controlled throughout his hospitalization being on insulin. He has been transitioned to PO diuretics. Antibiotics also transitioned to PO with resolution of pneumonia. Outpatient noncompliance is likely a large issue with patient's multiple comorbidities. This was discussed with the patient and dietary compliance especially was stressed. There are financial concern with insulin affordability and case management did figure this out with the patient and he states he will be able to afford the one month supply until he is able to follow up with his PCP. At this time patient is stable for discharge home.          Brief Physical Examination and Laboratory Data on Day of Discharge   Vitals:  /72   Pulse 74   Temp 98.4 °F (36.9 °C) (Oral)   Resp 16   Ht 5' 6\" (1.676 m)   Wt (!) 338 lb 1.6 oz (153.4 kg)   SpO2 95%   BMI 54.57 kg/m²   General Appearance:  awake, alert, and oriented to person, place, time, and purpose; appears stated age and cooperative; no apparent distress no labored breathing  HEENT:  NCAT; PERRL; conjunctiva pink, sclera clear  Neck:  no adenopathy, bruit, JVD, tenderness, masses, or nodules; supple, symmetrical, trachea midline, thyroid not enlarged  Lung:  clear to auscultation bilaterally; no use of accessory muscles; no rhonchi, rales, or crackles  Heart:  regular rate and regular rhythm without murmur, rub, or gallop  Abdomen:  soft, nontender, nondistended; normoactive bowel sounds; no organomegaly  Extremities:  extremities normal, atraumatic, no cyanosis or edema  Musculokeletal:  no joint swelling, no muscle tenderness. ROM normal in all joints of extremities.    Neurologic:  mental status A&Ox3, thought content appropriate; CN II-XII grossly intact; sensation intact, motor strength 5/5 globally; no slurred speech    Labs  Lab Results   Component Value Date    WBC 5.6 06/25/2019    HGB 13.7 06/25/2019    HCT 42.6 06/25/2019     06/25/2019     06/25/2019    K 4.3 06/25/2019    CL 95 (L) 06/25/2019    CREATININE 1.0 06/25/2019    BUN 20 06/25/2019    CO2 33 (H) 06/25/2019    GLUCOSE 269 (H) 06/25/2019    ALT 47 (H) 06/25/2019    AST 32 06/25/2019    INR 1.9 06/25/2019     Lab Results   Component Value Date    INR 1.9 06/25/2019    INR 2.4 06/24/2019    INR 2.5 06/23/2019    PROTIME 21.5 (H) 06/25/2019    PROTIME 26.8 (H) 06/24/2019    PROTIME 28.1 (H) 06/23/2019      Lab Results   Component Value Date    TSH 2.790 06/20/2019     Lab Results   Component Value Date    TRIG 158 (H) 06/20/2019    TRIG 116 01/03/2019    TRIG 223 (H) 09/10/2018     Lab Results   Component Value Date    HDL 42 06/20/2019    HDL 56 01/03/2019    HDL 44 09/10/2018     Lab Results   Component Value Date    LDLCALC 81 06/20/2019    LDLCALC 122 (H) 01/03/2019    LDLCALC 113 (H) 09/10/2018 Lab Results   Component Value Date    LABA1C 13.7 (H) 06/20/2019       Pertinent Imaging  Xr Chest Standard (2 Vw)    Result Date: 6/24/2019  LOCATION: 200 EXAM: XR CHEST (2 VW) COMPARISON: June 19, 2019 HISTORY: Shortness of breath TECHNIQUE: PA and lateral  views of the chest were obtained. FINDINGS:  SUPPORT DEVICES: None. LUNGS: Clear with no areas of consolidation. No lung nodules. PLEURA: No effusions or pneumothorax. LUNG VOLUMES: Satisfactory inspirator effort. MEDIASTINAL STRUCTURES: No lymphadenopathy. Normal aortic contour. HEART SIZE: Stable. UPPER ABDOMEN: Unremarkable. BONES AND SOFT TISSUES: No fracture or soft tissue abnormality. No active pulmonary disease. Xr Chest Portable    Result Date: 6/19/2019  Reading location: 200 INDICATION: Dyspnea FINDINGS: Portable AP upright view the chest compared 9/10/2018. Mild cardiac enlargement. Normal caliber pulmonary vessels. Poorly defined perihilar and basilar opacities bilaterally. Bilateral perihilar and basilar airspace disease. Echocardiogram   Technically suboptimal study in spite contrast administration.   Left ventricle is grossly normal in size .   Mild left ventricular concentric hypertrophy noted.   Regional wall motion abnormality suggested with inferior hypokinesis.   Mild-moderately reduced left ventricular systolic dysfunction suggested.   The left atrium is dilated.   Enlarged right atrium size.   Moderate mitral annular calcification.   Mild mitral regurgitation is present.   The aortic valve appears moderately sclerotic.   Mild tricuspid regurgitation. Disposition  The patient's condition is stable. At this time the patient is without objective evidence of an acute process requiring continuing hospitalization or inpatient management. They are stable for discharge with outpatient follow-up.     I have spoken with the patient and discussed the results of the current hospitalization, in addition to providing specific details for the plan of care and counseling regarding the diagnosis and prognosis. The plan has been discussed in detail and they are aware of the specific conditions for emergent return, as well as the importance of follow-up. Their questions are answered at this time and they are agreeable with the plan for discharge to home    Discharge Medications     Medication List      START taking these medications    atorvastatin 80 MG tablet  Commonly known as:  LIPITOR  Take 1 tablet by mouth nightly     doxycycline hyclate 100 MG capsule  Commonly known as:  VIBRAMYCIN  Take 1 capsule by mouth every 12 hours for 5 days     insulin glargine 100 UNIT/ML injection vial  Commonly known as:  LANTUS  Inject 30 Units into the skin nightly     * insulin lispro 100 UNIT/ML injection vial  Commonly known as:  HUMALOG  Inject 8 Units into the skin 3 times daily (with meals)     * insulin lispro 100 UNIT/ML injection vial  Commonly known as:  HUMALOG  Inject 0-18 Units into the skin 3 times daily (with meals) **High Dose Corrective Algorithm**   Glucose: Dose:                No Insulin   140-199           3 Units   200-249 6 Units   250-299 9 Units   300-349 12 Units   350-400 15 Units   Over 400 18 Units   MAX 70 units daily     losartan 25 MG tablet  Commonly known as:  COZAAR  Take 0.5 tablets by mouth daily  Start taking on:  6/26/2019     torsemide 20 MG tablet  Commonly known as:  DEMADEX  Take 1 tablet by mouth 2 times daily         * This list has 2 medication(s) that are the same as other medications prescribed for you. Read the directions carefully, and ask your doctor or other care provider to review them with you.             CHANGE how you take these medications    ranitidine 300 MG tablet  Commonly known as:  ZANTAC  TAKE ONE TABLET BY MOUTH EVERY NIGHT  What changed:    · how much to take  · how to take this  · when to take this  · additional instructions     Vitamin D3 66592 units Caps  TAKE ONE CAPSULE BY MOUTH ONCE EVERY 7 DAYS  What changed:    · how much to take  · how to take this  · when to take this  · additional instructions        CONTINUE taking these medications    ACCU-CHEK MULTICLIX LANCETS Misc  Use as directed     aspirin 81 MG tablet     blood glucose test strips strip  Commonly known as:  ASCENSIA AUTODISC VI;ONE TOUCH ULTRA TEST VI  Test tid prn     CPAP Machine Misc     gabapentin 600 MG tablet  Commonly known as:  NEURONTIN  TAKE ONE TABLET BY MOUTH TWO TIMES A DAY     metoprolol succinate 25 MG extended release tablet  Commonly known as:  TOPROL XL  TAKE ONE TABLET BY MOUTH EVERY DAY     montelukast 10 MG tablet  Commonly known as:  SINGULAIR  TAKE ONE TABLET BY MOUTH EVERY NIGHT     pantoprazole 40 MG tablet  Commonly known as:  PROTONIX  TAKE ONE TABLET BY MOUTH EVERY DAY WITH BREAKFAST     potassium chloride 20 MEQ extended release tablet  Commonly known as:  KLOR-CON M  TAKE ONE TABLET BY MOUTH EVERY DAY     pramipexole 0.25 MG tablet  Commonly known as:  MIRAPEX  TAKE ONE TABLET BY MOUTH EVERY NIGHT     SYMBICORT 160-4.5 MCG/ACT Aero  Generic drug:  budesonide-formoterol  Inhale 2 puffs into the lungs 2 times daily     * warfarin 5 MG tablet  Commonly known as:  COUMADIN     * warfarin 5 MG tablet  Commonly known as:  COUMADIN         * This list has 2 medication(s) that are the same as other medications prescribed for you. Read the directions carefully, and ask your doctor or other care provider to review them with you.             STOP taking these medications    furosemide 40 MG tablet  Commonly known as:  LASIX     metFORMIN 500 MG tablet  Commonly known as:  GLUCOPHAGE     pravastatin 20 MG tablet  Commonly known as:  PRAVACHOL           Where to Get Your Medications      These medications were sent to [address] [facility], [address] [place] [address]    Phone:  [phone]   · atorvastatin 80 MG tablet  · doxycycline hyclate 100 MG capsule  · insulin glargine 100 UNIT/ML injection vial  · insulin lispro 100 UNIT/ML injection vial  · losartan 25 MG tablet  · montelukast 10 MG tablet  · torsemide 20 MG tablet     You can get these medications from any pharmacy    Bring a paper prescription for each of these medications  · insulin lispro 100 UNIT/ML injection vial         Follow-up / Instructions  · This patient is instructed to follow-up with his primary care physician within 7 days. · Patient is instructed to follow-up with the consults listed above as directed by them. · he is instructed to resume home medications and take new medications as indicated in the list above. · If the patient has a recurrence of symptoms, he is instructed to go to the ED. Preparing for this patient's discharge, including paperwork, orders, instructions, and meeting with patient required > 30 minutes. Caitlyn Velez DO  2:22 PM  6/25/2019    Geovany Guadalupe did very well throughout the hospital stay. He was found to be suffering from acutely decompensated congestive heart failure. He was evaluated by the cardiovascular team and cardiac medications were maximized. He diuresed more than 9 L during the hospital stay and his volume status improved. He was weaned off nasal cannula oxygen. Discussed the need for salt and fluid restriction as an outpatient. I saw, examined, and discussed the patient with the resident and agree with their assessment and plan.     Electronically signed by Chioma Davidson DO on 6/25/2019 at 2:24 PM

## 2019-06-25 NOTE — PROGRESS NOTES
P Quality Flow/Interdisciplinary Rounds Progress Note        Quality Flow Rounds held on June 25, 2019    Disciplines Attending:  Bedside Nurse, ,  and Nursing Unit [address] [place] [address] Emeli Cade was admitted on 6/19/2019 12:41 PM    Anticipated Discharge Date:  Expected Discharge Date: 06/22/19    Disposition:    Casey Score:  Casey Scale Score: 20    Readmission Risk              Risk of Unplanned Readmission:        13           Discussed patient goal for the day, patient clinical progression, and barriers to discharge. The following Goal(s) of the Day/Commitment(s) have been identified:  prompting d/c today.        Mesfin Brunson  June 25, 2019

## 2019-06-25 NOTE — PROGRESS NOTES
Physical Therapy  Physical Therapy  Daily Treatment Note  6/25/2019  [id number]                      Laurel Valentin   [id number]                              1955    Patient Active Problem List   Diagnosis    CAD (coronary artery disease)    Hypertension    Type 2 diabetes mellitus with hyperglycemia, with long-term current use of insulin ([facility])    Tobacco abuse    PAF (paroxysmal atrial fibrillation) ([facility])    S/P CABG x 2    History of mitral valve repair    Morbid obesity with BMI of 50.0-59.9, adult ([facility] [address] [facility]    Asthma    Chronic bronchitis ([facility] [address]    Sleep apnea    Asthmatic bronchitis , chronic ([facility])    Gastroesophageal reflux disease without esophagitis    Mixed hyperlipidemia    Muscular deconditioning    Acute diastolic (congestive) heart failure ([facility])       Recommendation for discharge:HHPT vs pulmonary rehab  Equipment prescriptions needed:Pt has needed equipment    AM-[facility] Mobility Inpatient   How much difficulty turning over in bed?: None  How much difficulty sitting down on / standing up from a chair with arms?: None  How much difficulty moving from lying on back to sitting on side of bed?: None  How much help from another person moving to and from a bed to a chair?: None  How much help from another person needed to walk in hospital room?: None  How much help from another person for climbing 3-5 steps with a railing?: None  AM-PAC Inpatient Mobility Raw Score : 24  AM-PAC Inpatient T-Scale Score : 61.14  Mobility Inpatient CMS 0-100% Score: 0  Mobility Inpatient CMS G-Code Modifier : CH      Precautions: falls, monitor po2    S: Patient cleared by nursing for treatment. Patient is agreeable to treatment. Pain status: (measured on a visual analog scale with 0=no pain and 10=excruciating pain) 0/10.    O: Pt was instructed in and performed the following:   Bed Mobility- Supine to sit-Independent     Scooting- Independent     Sit to supine-na   Transfers-sit to stand- Supervision     Gait:

## 2019-06-26 ENCOUNTER — CARE COORDINATION (OUTPATIENT)
Dept: CASE MANAGEMENT | Age: 64
End: 2019-06-26

## 2019-06-26 NOTE — CARE COORDINATION
[facility] 45 Transitions Initial Follow Up Call    Call within 2 business days of discharge: Yes    Patient: Tatiana Allan Patient : 1955   MRN: [id number]  Reason for Admission: Acute diastolic CHF  Discharge Date: 19 RARS: Readmission Risk Score: 14      Last Discharge [facility]       Complaint Diagnosis Description Type Department Provider    19 Leg Swelling; Shortness of Breath Atrial flutter with rapid ventricular response ([address] . .. ED [facility] Hosp-Ad[facility]Discharged[facility] Alysa Aguilar DO; Brea Sotelo,... Attempted to contact patient today 19 for acute diastolic CHF. Left message requesting a return call back to [facility] and provided contact information. CTC will continue with patient outreach for Care Transition.       Follow Up  Future Appointments   Date Time Provider Valencia Cross   2019 12:45 PM Ti Merida DO [facility]   7/3/2019 11:00 AM [facility] CHF ROOM 2 [facility] CHF None   2019  1:30 PM [facility]   2019  9:45 AM Henny Mueller MD [facility]   2019  2:30 PM Octavio Mantilla DO [facility] Pulm HP       ARLEEN Guerra

## 2019-06-27 ENCOUNTER — CARE COORDINATION (OUTPATIENT)
Dept: CASE MANAGEMENT | Age: 64
End: 2019-06-27

## 2019-06-27 DIAGNOSIS — I50.31 ACUTE DIASTOLIC (CONGESTIVE) HEART FAILURE (HCC): Primary | ICD-10-CM

## 2019-06-27 PROCEDURE — 1111F DSCHRG MED/CURRENT MED MERGE: CPT | Performed by: FAMILY MEDICINE

## 2019-06-27 NOTE — CARE COORDINATION
[facility] 45 Transitions Initial Follow Up Call    Call within 2 business days of discharge: Yes    Patient: Felicia Ramos Patient : 1955   MRN: [id number]  Reason for Admission: Acute diastolic CHF  Discharge Date: 19 RARS: Readmission Risk Score: 14      Last Discharge [facility]       Complaint Diagnosis Description Type Department Provider    19 Leg Swelling; Shortness of Breath Atrial flutter with rapid ventricular response ([address] . .. ED to Hosp-Admission (Discharged) (ADMITTED) [facility] [address] Fred Diss, DO; Sachi Brooks,... Spoke with: Felicia Ramos (Patient)    Facility: [facility]    Non-face-to-face services provided:  Scheduled appointment with PCP-CTC confirmed with patient he is scheudled for TCM/HFU visit with Dr. Tommie Clay (PCP) on 19 at 12:45 pm.   Obtained and reviewed discharge summary and/or continuity of care documents  Establishment or re-establishment of referrals-CTC confirmed with patient he met with Keiry Javed from [address] [place] [address] ([facility]) yesterday at her office and filled out application to get insulin approved for lower cost.     Care Transitions 24 Hour Call    Do you have any ongoing symptoms?:  Yes  Patient-reported symptoms:  Other (Comment: swelling to both hips, legs and feet)  Do you have a copy of your discharge instructions?:  Yes  Do you have all of your prescriptions and are they filled?:  Yes  Have you been contacted by a [facility] Pharmacist?:  No  Have you scheduled your follow up appointment?:  Yes  How are you going to get to your appointment?:  Car - drive self  Were you discharged with any Home Care or Post Acute Services:  No  Do you feel like you have everything you need to keep you well at home?:  Yes  Care Transitions Interventions  No Identified Needs       Spoke with Elza Lilly today 19 for initial TCM/hospital discharge follow up for acute diastolic CHF.  Patient states he continues having swelling to boht hips, legs and feet. States he obtained all medications newly ordered on discharge including Demadex and is taking as directed. 111F code entered. Denies monitoring daily weight at home but agrees to start doing moving forward. CTN confirmed patient has a scale at home to check daily weights. Encouraged patient to monitor for 2-3 lbs/day and/or 5 lbs/week and call doctor for rapid weight gain. Noted weight on discharge was 338 lbs which is a decrease from 340 lbs on admission. Denies any shortness of breath, chest pain or chest discomfort. Patient states he is complaint with following a low sodium diet. CTN advised to start reading food labels when grocery shopping and choosing foods that are salt-free or low in salt which he acknowledges. Reviewed CHF zone tool and knowing when to seek medical attention. States he is monitoring blood sugar three times daily. States FBS today was 191 and 139 at lunch. Denies any s/s of high BS. Confirmed with patient he obtained and is taking insulin as directed. States he met with Mell Bro from [employer] [facility] yesterday and filled out application to get cost of insulin at lower cost. Confirmed with patient he is scheduled for TCM/HFU visit with Dr. Alvina Wolf (PCP) on 7/1/19 at 12:45 pm. Denies any transportation issues as he verbalizes he is independent in driving. Denies any other complaints or needs at this time. CTN will continue to follow for TCM.      Follow Up  Future Appointments   Date Time Provider Valencia Cross   7/1/2019 12:45 PM DO Thomas Khan [facility]   7/3/2019 11:00 AM [facility] CHF ROOM 2 [facility] CHF None   7/9/2019  1:30 PM ARLEEN Church - CNP [facility] CARDIO [facility]   8/6/2019  9:45 AM Jesse Jacobs MD [facility]   9/30/2019  2:30 PM Jerrod Chawla DO [facility]       ARLEEN Payan

## 2019-06-27 NOTE — CARE COORDINATION
[facility]annah 45 Transitions Initial Follow Up Call    Call within 2 business days of discharge: Yes    Patient: King Luisana Patient : 1955   MRN: [id number]  Reason for Admission: Acute diastolic CHF  Discharge Date: 19 RARS: Readmission Risk Score: 14      Last Discharge [facility]       Complaint Diagnosis Description Type Department Provider    19 Leg Swelling; Shortness of Breath Atrial flutter with rapid ventricular response ([address] . .. ED to Hosp-Admission (Discharged) (AD[facility] Valorie Shaikh DO; Aida Segal,... Spoke with: King Luisana (Patient)    Facility: [address] [facility] spoke briefly to Cordelia Shetty who advises he is currently at the store and having trouble hearing on cell phone. Cordelia Shetty states he will return CTN call later today and confirmed he has CTN number for return call. CTN will await return call.      Follow Up  Future Appointments   Date Time Provider Valencia Cross   2019 12:45 PM Carlos Rico DO [facility]   7/3/2019 11:00 AM [facility] CHF ROOM 2 SJ CHF None   2019  1:30 PM ARLEEN Pelaez - CNP [facility] CARDIO [facility]   2019  9:45 AM Lyric Andrade MD [facility]   2019  2:30 PM Genny Colunga DO [facility]m [facility]       ARLEEN Hollins

## 2019-07-01 ENCOUNTER — OFFICE VISIT (OUTPATIENT)
Dept: FAMILY MEDICINE CLINIC | Age: 64
End: 2019-07-01
Payer: MEDICARE

## 2019-07-01 ENCOUNTER — HOSPITAL ENCOUNTER (OUTPATIENT)
Age: 64
Discharge: HOME | End: 2019-07-03
Payer: MEDICARE

## 2019-07-01 VITALS
RESPIRATION RATE: 20 BRPM | SYSTOLIC BLOOD PRESSURE: 132 MMHG | BODY MASS INDEX: 50.3 KG/M2 | OXYGEN SATURATION: 94 % | HEIGHT: 66 IN | WEIGHT: 313 LBS | HEART RATE: 76 BPM | DIASTOLIC BLOOD PRESSURE: 82 MMHG

## 2019-07-01 DIAGNOSIS — E11.65 TYPE 2 DIABETES MELLITUS WITH HYPERGLYCEMIA, WITH LONG-TERM CURRENT USE OF INSULIN (HCC): ICD-10-CM

## 2019-07-01 DIAGNOSIS — I48.0 PAF (PAROXYSMAL ATRIAL FIBRILLATION) (HCC): ICD-10-CM

## 2019-07-01 DIAGNOSIS — Z79.4 TYPE 2 DIABETES MELLITUS WITH HYPERGLYCEMIA, WITH LONG-TERM CURRENT USE OF INSULIN (HCC): ICD-10-CM

## 2019-07-01 DIAGNOSIS — E11.65 TYPE 2 DIABETES MELLITUS WITH HYPERGLYCEMIA, WITH LONG-TERM CURRENT USE OF INSULIN (HCC): Primary | ICD-10-CM

## 2019-07-01 DIAGNOSIS — Z79.4 TYPE 2 DIABETES MELLITUS WITH HYPERGLYCEMIA, WITH LONG-TERM CURRENT USE OF INSULIN (HCC): Primary | ICD-10-CM

## 2019-07-01 DIAGNOSIS — I10 ESSENTIAL HYPERTENSION: ICD-10-CM

## 2019-07-01 DIAGNOSIS — I50.813 ACUTE ON CHRONIC RIGHT-SIDED CONGESTIVE HEART FAILURE (HCC): ICD-10-CM

## 2019-07-01 DIAGNOSIS — J13: ICD-10-CM

## 2019-07-01 LAB
ALBUMIN SERPL-MCNC: 3.9 G/DL (ref 3.5–5.2)
ALP BLD-CCNC: 114 U/L (ref 40–129)
ALT SERPL-CCNC: 23 U/L (ref 0–40)
ANION GAP SERPL CALCULATED.3IONS-SCNC: 18 MMOL/L (ref 7–16)
AST SERPL-CCNC: 21 U/L (ref 0–39)
BILIRUB SERPL-MCNC: 1.1 MG/DL (ref 0–1.2)
BUN BLDV-MCNC: 28 MG/DL (ref 8–23)
CALCIUM SERPL-MCNC: 9.7 MG/DL (ref 8.6–10.2)
CHLORIDE BLD-SCNC: 87 MMOL/L (ref 98–107)
CO2: 30 MMOL/L (ref 22–29)
CREAT SERPL-MCNC: 1.4 MG/DL (ref 0.7–1.2)
GFR AFRICAN AMERICAN: >60
GFR NON-AFRICAN AMERICAN: 51 ML/MIN/1.73
GLUCOSE BLD-MCNC: 504 MG/DL (ref 74–99)
HBA1C MFR BLD: 13.2 % (ref 4–5.6)
INR BLD: 2.5
IRON SATURATION: 18 % (ref 20–55)
IRON: 60 MCG/DL (ref 59–158)
MICROALBUMIN UR-MCNC: 193.8 MG/L
POTASSIUM SERPL-SCNC: 4.3 MMOL/L (ref 3.5–5)
PROTHROMBIN TIME: 27.9 SEC (ref 9.3–12.4)
SODIUM BLD-SCNC: 135 MMOL/L (ref 132–146)
TOTAL IRON BINDING CAPACITY: 339 MCG/DL (ref 250–450)
TOTAL PROTEIN: 7.4 G/DL (ref 6.4–8.3)
TSH SERPL DL<=0.05 MIU/L-ACNC: 3.27 UIU/ML (ref 0.27–4.2)

## 2019-07-01 PROCEDURE — 99495 TRANSJ CARE MGMT MOD F2F 14D: CPT | Performed by: FAMILY MEDICINE

## 2019-07-01 PROCEDURE — 82044 UR ALBUMIN SEMIQUANTITATIVE: CPT

## 2019-07-01 PROCEDURE — 1111F DSCHRG MED/CURRENT MED MERGE: CPT | Performed by: FAMILY MEDICINE

## 2019-07-01 PROCEDURE — 36415 COLL VENOUS BLD VENIPUNCTURE: CPT

## 2019-07-01 PROCEDURE — 84443 ASSAY THYROID STIM HORMONE: CPT

## 2019-07-01 PROCEDURE — 80053 COMPREHEN METABOLIC PANEL: CPT

## 2019-07-01 PROCEDURE — 83550 IRON BINDING TEST: CPT

## 2019-07-01 PROCEDURE — 85610 PROTHROMBIN TIME: CPT

## 2019-07-01 PROCEDURE — 83036 HEMOGLOBIN GLYCOSYLATED A1C: CPT

## 2019-07-01 PROCEDURE — 83540 ASSAY OF IRON: CPT

## 2019-07-01 RX ORDER — METOPROLOL SUCCINATE 25 MG/1
TABLET, EXTENDED RELEASE ORAL
Qty: 90 TABLET | Refills: 5 | Status: SHIPPED
Start: 2019-07-01 | End: 2020-06-12

## 2019-07-01 RX ORDER — INSULIN GLARGINE 100 [IU]/ML
45 INJECTION, SOLUTION SUBCUTANEOUS NIGHTLY
Qty: 1 VIAL | Refills: 5 | Status: SHIPPED | OUTPATIENT
Start: 2019-07-01 | End: 2020-01-02 | Stop reason: SDUPTHER

## 2019-07-02 ENCOUNTER — CARE COORDINATION (OUTPATIENT)
Dept: CASE MANAGEMENT | Age: 64
End: 2019-07-02

## 2019-07-02 NOTE — PROGRESS NOTES
Post-Discharge Transitional Care Management Services or Hospital Follow Up      Cha Haywood   YOB: 1955    Date of Office Visit:  7/1/2019  Date of Hospital Admission: 6/19/19  Date of Hospital Discharge: 6/25/19  Risk of hospital readmission (high >=14%. Medium >=10%) :Readmission Risk Score: 14      Care management risk score Rising risk (score 2-5) and Complex Care (Scores >=6): 5     Non face to face  following discharge, date last encounter closed (first attempt may have been earlier): 6/27/2019  2:00 PM    Call initiated 2 business days of discharge: Yes    Patient Active Problem List   Diagnosis    CAD (coronary artery disease)    Hypertension    Type 2 diabetes mellitus with hyperglycemia, with long-term current use of insulin ([address]    Tobacco abuse    PAF (paroxysmal atrial fibrillation) ([address]    S/P CABG x 2    History of mitral valve repair    Morbid obesity with BMI of 50.0-59.9, adult ([address]    Asthma    Chronic bronchitis ([address]    Sleep apnea    Asthmatic bronchitis , chronic ([address]    Gastroesophageal reflux disease without esophagitis    Mixed hyperlipidemia    Muscular deconditioning    Acute diastolic (congestive) heart failure (HCC)       Allergies   Allergen Reactions    Pcn [Penicillins]      Child went to hospital   ? Reaction  Patient tolerates cephalosporins    Sulfa Antibiotics      ?        Medications listed as ordered at the time of discharge from hospital   [address] Medication Instructions ESTELA:    Printed on:07/02/19 1019   Medication Information                      Accu-Chek Multiclix Lancets MISC  Use as directed             aspirin 81 MG tablet  Take 81 mg by mouth nightly              atorvastatin (LIPITOR) 80 MG tablet  Take 1 tablet by mouth nightly             blood glucose test strips (ASCENSIA AUTODISC VI;ONE TOUCH ULTRA TEST VI) strip  Test tid prn             Cholecalciferol (VITAMIN D3) 95985 units CAPS  TAKE ONE marked \"taking\" at this time  Outpatient Medications Marked as Taking for the 7/1/19 encounter (Office Visit) with Marisela Cagle, DO   Medication Sig Dispense Refill    metoprolol succinate (TOPROL XL) 25 MG extended release tablet TAKE ONE TABLET BY MOUTH EVERY DAY 90 tablet 5    insulin glargine (LANTUS) 100 UNIT/ML injection vial Inject 45 Units into the skin nightly 1 vial 5    Insulin Syringe-Needle U-100 27G X 5/8\" 1 ML MISC Use as directed 100 each 11    insulin lispro (HUMALOG) 100 UNIT/ML injection vial Inject 0-18 Units into the skin 3 times daily (with meals) **High Dose Corrective Algorithm**   Glucose: Dose:                No Insulin   140-199           3 Units   200-249 6 Units   250-299 9 Units   300-349 12 Units   350-400 15 Units   Over 400 18 Units   MAX 70 units daily 1 vial 3    atorvastatin (LIPITOR) 80 MG tablet Take 1 tablet by mouth nightly 30 tablet 3    losartan (COZAAR) 25 MG tablet Take 0.5 tablets by mouth daily 30 tablet 3    torsemide (DEMADEX) 20 MG tablet Take 1 tablet by mouth 2 times daily 30 tablet 3    montelukast (SINGULAIR) 10 MG tablet TAKE ONE TABLET BY MOUTH EVERY NIGHT 30 tablet 4    warfarin (COUMADIN) 5 MG tablet Take 5 mg by mouth three times a week Sunday, Tuesday, Thursday      warfarin (COUMADIN) 5 MG tablet Take 10 mg by mouth four times a week Monday, Wednesday, Friday, Saturday      gabapentin (NEURONTIN) 600 MG tablet TAKE ONE TABLET BY MOUTH TWO TIMES A DAY 60 tablet 2    Cholecalciferol (VITAMIN D3) 73616 units CAPS TAKE ONE CAPSULE BY MOUTH ONCE EVERY 7 DAYS (Patient taking differently: Take 1 capsule by mouth once a week Wednesday) 4 capsule 4    ranitidine (ZANTAC) 300 MG tablet TAKE ONE TABLET BY MOUTH EVERY NIGHT (Patient taking differently: Take 300 mg by mouth Daily with supper ) 30 tablet 5    potassium chloride (KLOR-CON M) 20 MEQ extended release tablet TAKE ONE TABLET BY MOUTH EVERY DAY 60 tablet 4    pramipexole (MIRAPEX) 0.25 MG tablet TAKE ONE TABLET BY MOUTH EVERY NIGHT 30 tablet 4    pantoprazole (PROTONIX) 40 MG tablet TAKE ONE TABLET BY MOUTH EVERY DAY WITH BREAKFAST 30 tablet 5    blood glucose test strips (ASCENSIA AUTODISC VI;ONE TOUCH ULTRA TEST VI) strip Test tid prn 100 strip 5    SYMBICORT 160-4.5 MCG/ACT AERO Inhale 2 puffs into the lungs 2 times daily 1 Inhaler 3    CPAP Machine MISC 1 each by Does not apply route nightly as needed       Accu-Chek Multiclix Lancets MISC Use as directed 100 each 3    aspirin 81 MG tablet Take 81 mg by mouth nightly           Medications patient taking as of now reconciled against medications ordered at time of hospital discharge: Yes    Chief Complaint   Patient presents with    Care Management       History of Present illness - Follow up of Hospital diagnosis(es): pneumonia, heart failure    Inpatient course: Discharge summary reviewed- see chart. Interval history/Current status: improved    A comprehensive review of systems was negative except for what was noted in the HPI. Vitals:    07/01/19 1138   BP: 132/82   Pulse: 76   Resp: 20   SpO2: 94%   Weight: (!) 313 lb (142 kg)   Height: 5' 6\" (1.676 m)     Body mass index is 50.52 kg/m².    Wt Readings from Last 3 Encounters:   07/01/19 (!) 313 lb (142 kg)   06/24/19 (!) 338 lb 1.6 oz (153.4 kg)   04/03/19 (!) 324 lb (147 kg)     BP Readings from Last 3 Encounters:   07/01/19 132/82   06/25/19 114/72   04/03/19 132/80        Physical Exam:  General Appearance: alert and oriented to person, place and time, well developed and well- nourished, in no acute distress  Skin: warm and dry, no rash or erythema  Head: normocephalic and atraumatic  Eyes: pupils equal, round, and reactive to light, extraocular eye movements intact, conjunctivae normal  ENT: tympanic membrane, external ear and ear canal normal bilaterally, nose without deformity, nasal mucosa and turbinates normal without polyps  Neck: supple and non-tender without mass, no

## 2019-07-02 NOTE — CARE COORDINATION
[facility]    2019    Patient: Philip Smith  Patient : 1955   MRN: [id number]  Reason for Admission: Acute diastolic CHF  Discharge Date: 19 RARS: Readmission Risk Score: 14         Spoke with: Philip Smith (Patient)    Care Transitions Subsequent and Final Call    Subsequent and Final Calls  Do you have any ongoing symptoms?:  Yes  Onset of Patient-reported symptoms: In the past 7 days  Patient-reported symptoms:  Cough, Weakness, Fatigue  Have your medications changed?:  Yes  Patient Reports:  Per patient who states Lantus dose was ncreased yesterday by PCP to 45 units nightly from 30 units nightly. Do you have any questions related to your medications?:  No  Do you currently have any active services?:  No  Do you have any needs or concerns that I can assist you with?:  No  Identified Barriers:  Lack of Motivation, Financial, Other  Care Transitions Interventions  No Identified Needs  Other Interventions:          Spoke with patient today 19 for TCM/hospital discharge follow up sub call for CHF. Patient reports he continues feeling tired and having all over weakness. States he was able to get out to the store today and completed his follow up visit yesterday with PCP. States swelling to hips, legs and feet have improved. Noted weight yesterday at PCP office was 313 lbs which is a decrease from 338 lbs on 19. States he is compliant with taking Demadex ordered on discharge. States he has shortness of breath from time to time and admits using Symbicort which provides relief. Denies any chest pain, chest discomfort or palpitations. Reiterated CHF zone tool and knowing when to seek medical attention. Confirmed he is scheduled for isit at [employer] tomorrow 7/3/19 and with cardiology on 19. States he continues monitoring blood sugar three times daily. States FBS was 227 and 233 at lunch.  Denies any s/s of high BS and admits he is trying

## 2019-07-03 ENCOUNTER — HOSPITAL ENCOUNTER (OUTPATIENT)
Dept: OTHER | Age: 64
Setting detail: THERAPIES SERIES
Discharge: HOME | End: 2019-07-03
Payer: MEDICARE

## 2019-07-03 ENCOUNTER — ANTI-COAG VISIT (OUTPATIENT)
Dept: FAMILY MEDICINE CLINIC | Age: 64
End: 2019-07-03

## 2019-07-03 VITALS
TEMPERATURE: 98.6 F | HEART RATE: 78 BPM | RESPIRATION RATE: 18 BRPM | DIASTOLIC BLOOD PRESSURE: 78 MMHG | SYSTOLIC BLOOD PRESSURE: 130 MMHG | BODY MASS INDEX: 50.24 KG/M2 | OXYGEN SATURATION: 95 % | WEIGHT: 311.25 LBS

## 2019-07-03 PROCEDURE — 99214 OFFICE O/P EST MOD 30 MIN: CPT

## 2019-07-03 PROCEDURE — 99204 OFFICE O/P NEW MOD 45 MIN: CPT

## 2019-07-03 ASSESSMENT — PAIN SCALES - GENERAL: PAINLEVEL_OUTOF10: 0

## 2019-07-08 ENCOUNTER — CARE COORDINATION (OUTPATIENT)
Dept: CASE MANAGEMENT | Age: 64
End: 2019-07-08

## 2019-07-08 DIAGNOSIS — G47.30 SLEEP APNEA, UNSPECIFIED TYPE: Primary | ICD-10-CM

## 2019-07-08 NOTE — CARE COORDINATION
[facility] 45 Transitions Follow Up Call    2019    Patient: Sarai Martinez  Patient : 1955   MRN: [id number]  Reason for Admission: Acute diastolic CHF  Discharge Date: 19 RARS: Readmission Risk Score: 14         Spoke with: Sarai Martinez (Patient)    Care Transitions Subsequent and Final Call    Subsequent and Final Calls  Do you have any ongoing symptoms?:  Yes  Onset of Patient-reported symptoms:  Other  Patient-reported symptoms:  Weakness, Other, Shortness of Breath  Have your medications changed?:  No  Do you have any questions related to your medications?:  No  Do you currently have any active services?:  No  Do you have any needs or concerns that I can assist you with?:  No  Identified Barriers:  Financial, Lack of Education, Other  Care Transitions Interventions  No Identified Needs  Other Interventions:          Spoke with Tom Mckeon today 19 for final TCM/hospital discharge follow up for CHF. Patient continue to complain of ongoing weakness especially in legs. States he was able to climb basement stairs after doing laundry causing shortness of breath and weakness in legs. Denies any shortness of breath at rest, chest pain or chest discomfort. States having moderate swelling to both legs and is consistent with taking diuretics as directed. Denies wearing compression stockings and is aware to elevate legs when resting to help combat swelling. States weight today was 314 lbs today and is aware to monitor for 2-3 lbs/day and/or 5 lbs/week. Processed the importance to follow a low sodium diet and start reading food labels looking at sodium content when grocery shopping. Admits to eating peanuts which CTN said to refrain from unless sodium free d/t causing fluid retention and potentially compromising heart/lungs which he acknowledges. Confirmed patient is established with  CHF clinic and was seen by dietician on 7/3/19.  Reviewed CHF zone tool and knowing when to seek medical

## 2019-07-09 ENCOUNTER — OFFICE VISIT (OUTPATIENT)
Dept: CARDIOLOGY CLINIC | Age: 64
End: 2019-07-09
Payer: MEDICARE

## 2019-07-09 ENCOUNTER — TELEPHONE (OUTPATIENT)
Dept: CARDIOLOGY CLINIC | Age: 64
End: 2019-07-09

## 2019-07-09 VITALS
HEIGHT: 66 IN | DIASTOLIC BLOOD PRESSURE: 68 MMHG | SYSTOLIC BLOOD PRESSURE: 102 MMHG | HEART RATE: 70 BPM | BODY MASS INDEX: 50.3 KG/M2 | WEIGHT: 313 LBS | RESPIRATION RATE: 24 BRPM | OXYGEN SATURATION: 92 %

## 2019-07-09 DIAGNOSIS — I10 ESSENTIAL HYPERTENSION: ICD-10-CM

## 2019-07-09 DIAGNOSIS — I48.0 PAF (PAROXYSMAL ATRIAL FIBRILLATION) (HCC): ICD-10-CM

## 2019-07-09 DIAGNOSIS — Z95.2 S/P MVR (MITRAL VALVE REPLACEMENT): ICD-10-CM

## 2019-07-09 DIAGNOSIS — E78.2 MIXED HYPERLIPIDEMIA: ICD-10-CM

## 2019-07-09 DIAGNOSIS — R06.02 SHORTNESS OF BREATH: ICD-10-CM

## 2019-07-09 DIAGNOSIS — J44.9 CHRONIC OBSTRUCTIVE PULMONARY DISEASE, UNSPECIFIED COPD TYPE (HCC): ICD-10-CM

## 2019-07-09 DIAGNOSIS — E11.65 TYPE 2 DIABETES MELLITUS WITH HYPERGLYCEMIA, WITH LONG-TERM CURRENT USE OF INSULIN (HCC): ICD-10-CM

## 2019-07-09 DIAGNOSIS — Z79.4 TYPE 2 DIABETES MELLITUS WITH HYPERGLYCEMIA, WITH LONG-TERM CURRENT USE OF INSULIN (HCC): ICD-10-CM

## 2019-07-09 DIAGNOSIS — E66.01 MORBID OBESITY WITH BMI OF 50.0-59.9, ADULT (HCC): ICD-10-CM

## 2019-07-09 DIAGNOSIS — G47.30 SLEEP APNEA, UNSPECIFIED TYPE: ICD-10-CM

## 2019-07-09 DIAGNOSIS — Z95.1 S/P CABG X 2: ICD-10-CM

## 2019-07-09 DIAGNOSIS — I50.32 CHRONIC HEART FAILURE WITH PRESERVED EJECTION FRACTION (HCC): Primary | ICD-10-CM

## 2019-07-09 DIAGNOSIS — I25.10 CORONARY ARTERY DISEASE INVOLVING NATIVE CORONARY ARTERY OF NATIVE HEART WITHOUT ANGINA PECTORIS: ICD-10-CM

## 2019-07-09 DIAGNOSIS — R53.83 OTHER FATIGUE: ICD-10-CM

## 2019-07-09 PROCEDURE — 1036F TOBACCO NON-USER: CPT | Performed by: NURSE PRACTITIONER

## 2019-07-09 PROCEDURE — 93000 ELECTROCARDIOGRAM COMPLETE: CPT | Performed by: INTERNAL MEDICINE

## 2019-07-09 PROCEDURE — 1111F DSCHRG MED/CURRENT MED MERGE: CPT | Performed by: NURSE PRACTITIONER

## 2019-07-09 PROCEDURE — G8427 DOCREV CUR MEDS BY ELIG CLIN: HCPCS | Performed by: NURSE PRACTITIONER

## 2019-07-09 PROCEDURE — 3017F COLORECTAL CA SCREEN DOC REV: CPT | Performed by: NURSE PRACTITIONER

## 2019-07-09 PROCEDURE — 3023F SPIROM DOC REV: CPT | Performed by: NURSE PRACTITIONER

## 2019-07-09 PROCEDURE — G8417 CALC BMI ABV UP PARAM F/U: HCPCS | Performed by: NURSE PRACTITIONER

## 2019-07-09 PROCEDURE — 3046F HEMOGLOBIN A1C LEVEL >9.0%: CPT | Performed by: NURSE PRACTITIONER

## 2019-07-09 PROCEDURE — 2022F DILAT RTA XM EVC RTNOPTHY: CPT | Performed by: NURSE PRACTITIONER

## 2019-07-09 PROCEDURE — G8926 SPIRO NO PERF OR DOC: HCPCS | Performed by: NURSE PRACTITIONER

## 2019-07-09 PROCEDURE — G8598 ASA/ANTIPLAT THER USED: HCPCS | Performed by: NURSE PRACTITIONER

## 2019-07-09 PROCEDURE — 99214 OFFICE O/P EST MOD 30 MIN: CPT | Performed by: NURSE PRACTITIONER

## 2019-07-09 RX ORDER — SPIRONOLACTONE 25 MG/1
25 TABLET ORAL 2 TIMES DAILY
Qty: 180 TABLET | Refills: 1 | Status: SHIPPED | OUTPATIENT
Start: 2019-07-09 | End: 2020-01-17

## 2019-07-09 NOTE — PROGRESS NOTES
w/3.5 x 12 mm BMS, 0% res stenosis. Normal distal runoff    CORONARY ARTERY BYPASS GRAFT  09-09-13    Dr Lori Lomeli; CABG x2, LIMA to LAD, SVG to ramus intermedius; MV repair using 30-mm Future complete ring with magic stitch between A3 and P3; rigid internal fixation of sternum using KLS plates x2; endoscopic vein harvesting of right lower extremity     ECHO COMPL W DOP COLOR FLOW  7/25/2013         HERNIA REPAIR      SINUS SURGERY           Allergies   Allergen Reactions    Pcn [Penicillins]      Child went to hospital   ? Reaction  Patient tolerates cephalosporins    Sulfa Antibiotics      ?          Outpatient Medications Marked as Taking for the 7/9/19 encounter (Office Visit) with ARLEEN Arellano - CNP   Medication Sig Dispense Refill    spironolactone (ALDACTONE) 25 MG tablet Take 1 tablet by mouth 2 times daily 180 tablet 1    metoprolol succinate (TOPROL XL) 25 MG extended release tablet TAKE ONE TABLET BY MOUTH EVERY DAY 90 tablet 5    insulin glargine (LANTUS) 100 UNIT/ML injection vial Inject 45 Units into the skin nightly 1 vial 5    insulin lispro (HUMALOG) 100 UNIT/ML injection vial Inject 8 Units into the skin 3 times daily (with meals) 1 vial 3    insulin lispro (HUMALOG) 100 UNIT/ML injection vial Inject 0-18 Units into the skin 3 times daily (with meals) **High Dose Corrective Algorithm**   Glucose: Dose:                No Insulin   140-199           3 Units   200-249 6 Units   250-299 9 Units   300-349 12 Units   350-400 15 Units   Over 400 18 Units   MAX 70 units daily 1 vial 3    atorvastatin (LIPITOR) 80 MG tablet Take 1 tablet by mouth nightly 30 tablet 3    losartan (COZAAR) 25 MG tablet Take 0.5 tablets by mouth daily 30 tablet 3    torsemide (DEMADEX) 20 MG tablet Take 1 tablet by mouth 2 times daily 30 tablet 3    montelukast (SINGULAIR) 10 MG tablet TAKE ONE TABLET BY MOUTH EVERY NIGHT 30 tablet 4    warfarin (COUMADIN) 5 MG tablet Take 5 mg by mouth three times a week Sunday, Tuesday, Thursday      warfarin (COUMADIN) 5 MG tablet Take 10 mg by mouth four times a week Monday, Wednesday, Friday, Saturday      gabapentin (NEURONTIN) 600 MG tablet TAKE ONE TABLET BY MOUTH TWO TIMES A DAY 60 tablet 2    Cholecalciferol (VITAMIN D3) 92958 units CAPS TAKE ONE CAPSULE BY MOUTH ONCE EVERY 7 DAYS (Patient taking differently: Take 1 capsule by mouth once a week Wednesday) 4 capsule 4    ranitidine (ZANTAC) 300 MG tablet TAKE ONE TABLET BY MOUTH EVERY NIGHT (Patient taking differently: Take 300 mg by mouth Daily with supper ) 30 tablet 5    pramipexole (MIRAPEX) 0.25 MG tablet TAKE ONE TABLET BY MOUTH EVERY NIGHT 30 tablet 4    pantoprazole (PROTONIX) 40 MG tablet TAKE ONE TABLET BY MOUTH EVERY DAY WITH BREAKFAST 30 tablet 5    SYMBICORT 160-4.5 MCG/ACT AERO Inhale 2 puffs into the lungs 2 times daily 1 Inhaler 3    CPAP Machine MISC 1 each by Does not apply route nightly as needed       aspirin 81 MG tablet Take 81 mg by mouth nightly            Review of Systems:   Cardiac: As per HPI  General: No fever, chills, rigors  Pulmonary: As per HPI  HEENT: No visual disturbances, difficult swallowing  GI: No nausea, vomiting, abdominal pain  : No dysuria or hematuria  Endocrine: No thyroid disease or diabetes  Musculoskeletal: COX x 4, no focal motor deficits  Skin: Intact, no rashes  Neuro/Psych: No headache or seizures          Weights: Wt Readings from Last 3 Encounters:   07/12/19 (!) 310 lb (140.6 kg)   07/09/19 (!) 313 lb (142 kg)   07/03/19 (!) 311 lb 4 oz (141.2 kg)             Physical Examination:     /68   Pulse 70   Resp 24   Ht 5' 6\" (1.676 m)   Wt (!) 313 lb (142 kg)   SpO2 92%   BMI 50.52 kg/m²     CONSTITUTIONAL: Alert and oriented times 3, no acute distress and cooperative to examination with proper mood and affect. SKIN: Skin color, texture, turgor normal. No rashes or lesions.   LYMPH: no cervical nodes, no inguinal nodes  HEENT: Head is ventricular ejection fraction was calculated to   be 62%, with normal myocardial thickening and wall motion. Impression:    1. Electrocardiographically normal regadenoson infusion with a   clinically non-ischemic response  2. Myocardial perfusion imaging normal without any reversible   ischemia; small fixed apical defect.   3. Overall left ventricular systolic function was normal without   regional wall motion abnormalities. TTE (7/26/2017, Dr. Cristina Ornelas)  [address] [facility] [employer]   Technically suboptimal study   Left ventricle is normal in size .   Regional wall motion abnormalities.   Normal left ventricular ejection fraction. EF 55%.   There is doppler evidence of stage II diastolic dysfunction.   The left atrium is borderline dilated.   Mild mitral annular calcification.   Mild mitral regurgitation is present.   Mild aortic regurgitation is noted.   Mild to moderate tricuspid regurgitation.   Pulmonary hypertension is mild to moderate . TTE (6/19/2019, Dr. Cristina Ornelas)  [address] [employer]   Technically suboptimal study in spite contrast administration.   Left ventricle is grossly normal in size .   Mild left ventricular concentric hypertrophy noted.   Regional wall motion abnormality suggested with inferior hypokinesis.   Mild-moderately reduced left ventricular systolic dysfunction suggested.   The left atrium is dilated.   Enlarged right atrium size.   Moderate mitral annular calcification.   Mild mitral regurgitation is present.   The aortic valve appears moderately sclerotic.   Mild tricuspid regurgitation. ECG  Sinus Rhythm       ASSESSMENT:  1. Chronic HFpEF  2. ACC stage C / NYHA class III  3. Near euvolemic  4. PAF - currently in SR. Anticoagulated with coumadin. 5. CAD s/p CABG (9/2013) -  last ischemic evaluation (8/2017) nonischemic   6. VHD s/p MVR   7. Hypertension - controlled  8. COPD  9. T2DM  10. HLD  11. Obesity  12. LISS - intermittent compliance  13. Iron deficiency    14. Deconditioning     PLAN:  1.  Stop potassium    2. Start spironolactone 25 mg daily    3. Continue rest of current cardiac medications. 4. Referral to CHF (congestive Heart Failure) infusion clinic in one week for labs and evaluation     5. Wear your cpap every night    6. Weigh yourself daily    -Stay Hydrated    -Diet should sodium restricted to 2 grams    -Again watch your daily weight trends and if you gain water weight please follow below instructions.    -If you gain 3-5 pounds in 2-3 days OR notice that you are retaining fluid in anyway just like you did before then take an extra dose of your water pill (Demadex/Torsemide) every day until you lose the weight or feel better.    -If you notice that you have taken more than 3 extra doses in 1 week then please call and let us know. -If at any time you feel that you are retaining fluid, your medications are not working, or you feel ill in anyway, then please call us for either same day appointment or the next day, and for instructions. Our goal is to keep you out of the emergency room and the hospital and we have ways to do it. You just need to call us in a timely manner.     -If you become sick for other reasons, and notice that you are not urinating as much, the urine is very dark, you have significant diarrhea or vomiting, then please DO NOT take your water pill and CALL US immediately. 7. Return for lexiscan stress test    8. Continue to attempt to lose weight    9. Referral for iron infusions. 10. Return visit as scheduled with Dr. Carmela Harper on 8/6/2018.      Ruth Pruett APRN-CNP  Heart Failure and Pulmonary Hypertension  [address] [facility]

## 2019-07-10 ENCOUNTER — TELEPHONE (OUTPATIENT)
Dept: FAMILY MEDICINE CLINIC | Age: 64
End: 2019-07-10

## 2019-07-11 ENCOUNTER — TELEPHONE (OUTPATIENT)
Dept: CARDIOLOGY CLINIC | Age: 64
End: 2019-07-11

## 2019-07-11 ENCOUNTER — CARE COORDINATION (OUTPATIENT)
Dept: CARE COORDINATION | Age: 64
End: 2019-07-11

## 2019-07-11 NOTE — CARE COORDINATION
Ambulatory Care Coordination Note  7/11/2019  CM Risk Score: 5  Charlson 10 Year Mortality Risk Score: 98%     ACC: Amilcar Reyes, RN    Summary Note: Care Coordination explained to patient and wife via speaker phone. Is agreeable to services. Feels a little overwhelmed with all his appointments and is happy to have assistance. Goals set and education provided. Will mail Zone tools to patient and discuss at next contact. Plan:     Patient:    -Call Cardiology back to set up iron infusions  -Keep appt for stress test    ACM:  -Mail Zone Tools to patient  -Mail diet information   -Check on PAP process    Ambulatory Care Coordination Assessment    Care Coordination Protocol  Program Enrollment:  Rising Risk  Referral from Primary Care Provider:  No  Week 1 - Initial Assessment     Do you have all of your prescriptions and are they filled?:  Yes  Barriers to medication adherence:  None  Are you able to afford your medications?:  With Assistance  Medication Assistance Program:  [facility] Meds/Vouchers, Patient assistance with pharmaceutical company  How often do you have trouble taking your medications the way you have been told to take them?:  I always take them as prescribed. Do you have Home O2 Therapy?:  No      Ability to seek help/take action for Emergent Urgent situations i.e. fire, crime, inclement weather or health crisis. :  Independent  Ability to ambulate to restroom:  Independent  Ability handle personal hygeine needs (bathing/dressing/grooming): Independent  Ability to manage Medications: Independent  Ability to prepare Food Preparation:  Independent  Ability to maintain home (clean home, laundry): Independent  Ability to drive and/or has transportation:  Independent  Ability to do shopping:  Independent  Ability to manage finances:   Independent  Is patient able to live independently?:  No     Current Housing:  Private Residence        Per the [facility], did the patient have 2 or more falls or 1 fall with injury in the past year?:  No     Frequent urination at night?:  No  Do you use rails/bars?:  No  Do you have a non-slip tub mat?:  Yes     Are you experiencing loss of meaning?:  No  Are you experiencing loss of hope and peace?:  No     Thinking about your patient's physical health needs, are there any symptoms or problems (risk indicators) you are unsure about that require further investigation?:  No identified areas of uncertainly or problems already being investigated   Are the patients physical health problems impacting on their mental well-being?:  Mild impact on mental well-being e.g. \"\"feeling fed-up\"\", \"\"reduced enjoyment\"\"   Are there any problems with your patients lifestyle behaviors (alcohol, drugs, diet, exercise) that are impacting on physical or mental well-being?:  No identified areas of concern   Do you have any other concerns about your patients mental well-being?  How would you rate their severity and impact on the patient?:  No identified areas of concern   How would you rate their home environment in terms of safety and stability (including domestic violence, insecure housing, neighbor harassment)?:  Consistently safe, supportive, stable, no identified problems   How do daily activities impact on the patient's well-being? (include current or anticipated unemployment, work, caregiving, access to transportation or other):  No identified problems or perceived positive benefits   How would you rate their social network (family, work, friends)?:  Adequate participation with social networks   How would you rate their financial resources (including ability to afford all required medical care)?:  Financially secure, some resource challenges   How wells does the patient now understand their health and well-being (symptoms, signs or risk factors) and what they need to do to manage their health?:  Reasonable to good understanding and already engages in managing health or is willing [facility]   9/30/2019  2:30 PM Precious Wheatley, DO ACC Pulm [facility]   10/2/2019 12:45 PM Stuart Thornton DO [facility]     , ACC: Krysta Braun RN  CM Risk Score: 5  Charlson 10 Year Mortality Risk Score: 98%     Care Coordination Interventions    Program Enrollment:  Rising Risk  Referral from Primary Care Provider:  No  Suggested Interventions and Community Resources  Medication Assistance Program:  In Process       ,   Diabetes Assessment    Medic Alert ID:  No  Meal Planning:  Plate Method, Avoidance of concentrated sweets   How often do you test your blood sugar?:  Meals (Comment: AM: 262    PM:  171            )   Do you have barriers with adherence to non-pharmacologic self-management interventions?  (Nutrition/Exercise/Self-Monitoring):  No   Have you ever had to go to the ED for symptoms of low blood sugar?:  No          ,   Congestive Heart Failure Assessment    Are you currently restricting fluids?:  No Restriction  Do you understand a low sodium diet?:  Yes  Do you understand how to read food labels?:  Yes  How many restaurant meals do you eat per week?:  0  Do you salt your food before tasting it?:  No      (Comment: Swelling better today than normal)      Symptoms:   CHF associated dyspnea on exertion: Pos (Comment: Sometimes SOB depending on Humidity)      Symptom course:  stable  Patient-reported weight (lb):  308  Weight trend:  stable  Salt intake watch compared to last visit:  stable     ,   COPD Assessment    Does the patient understand envrionmental exposure?:  Yes  Is the patient able to verbalize Rescue vs. Long Acting medications?:  No  Does the patient have a nebulizer?:  Yes  Does the patient use a space with inhaled medications?:  No     No patient-reported symptoms         Symptoms:      Have you had a recent diagnosis of pneumonia either by PCP or at a hospital?:  No     ,   General Assessment    Do you have any symptoms that are causing concern?:  No      and This patient was permanently screened out of Care Coordination on 7/11/2019 for the following reason:

## 2019-07-12 ENCOUNTER — HOSPITAL ENCOUNTER (OUTPATIENT)
Dept: CARDIOLOGY | Age: 64
Discharge: HOME | End: 2019-07-12
Payer: MEDICARE

## 2019-07-12 ENCOUNTER — TELEPHONE (OUTPATIENT)
Dept: CARDIOLOGY CLINIC | Age: 64
End: 2019-07-12

## 2019-07-12 VITALS
SYSTOLIC BLOOD PRESSURE: 126 MMHG | HEIGHT: 66 IN | BODY MASS INDEX: 49.82 KG/M2 | WEIGHT: 310 LBS | DIASTOLIC BLOOD PRESSURE: 50 MMHG | HEART RATE: 71 BPM

## 2019-07-12 DIAGNOSIS — R53.83 OTHER FATIGUE: ICD-10-CM

## 2019-07-12 DIAGNOSIS — R06.02 SHORTNESS OF BREATH: ICD-10-CM

## 2019-07-12 DIAGNOSIS — I25.10 CORONARY ARTERY DISEASE INVOLVING NATIVE CORONARY ARTERY OF NATIVE HEART WITHOUT ANGINA PECTORIS: ICD-10-CM

## 2019-07-12 PROCEDURE — 6360000002 HC RX W HCPCS: Performed by: NURSE PRACTITIONER

## 2019-07-12 PROCEDURE — 78452 HT MUSCLE IMAGE SPECT MULT: CPT

## 2019-07-12 PROCEDURE — 93017 CV STRESS TEST TRACING ONLY: CPT

## 2019-07-12 PROCEDURE — A9502 TC99M TETROFOSMIN: HCPCS | Performed by: INTERNAL MEDICINE

## 2019-07-12 PROCEDURE — 2580000003 HC RX 258: Performed by: INTERNAL MEDICINE

## 2019-07-12 PROCEDURE — 3430000000 HC RX DIAGNOSTIC RADIOPHARMACEUTICAL: Performed by: INTERNAL MEDICINE

## 2019-07-12 RX ORDER — SODIUM CHLORIDE 0.9 % (FLUSH) 0.9 %
10 SYRINGE (ML) INJECTION PRN
Status: DISCONTINUED | OUTPATIENT
Start: 2019-07-12 | End: 2019-07-13 | Stop reason: HOSPADM

## 2019-07-12 RX ADMIN — TETROFOSMIN 10 MILLICURIE: 0.23 INJECTION, POWDER, LYOPHILIZED, FOR SOLUTION INTRAVENOUS at 09:11

## 2019-07-12 RX ADMIN — TETROFOSMIN 34 MILLICURIE: 0.23 INJECTION, POWDER, LYOPHILIZED, FOR SOLUTION INTRAVENOUS at 10:25

## 2019-07-12 RX ADMIN — REGADENOSON 0.4 MG: 0.08 INJECTION, SOLUTION INTRAVENOUS at 10:25

## 2019-07-12 RX ADMIN — Medication 10 ML: at 10:25

## 2019-07-12 RX ADMIN — Medication 10 ML: at 09:11

## 2019-07-12 NOTE — PROCEDURES
[address] Brad Safer., [facility]. [place] [address]  [phone]                 Pharmacologic Stress Nuclear Gated SPECT Study    Name: Tonio Jewell Account Number: [de-identified]    :  1955          Sex: male         Date of Study:  2019    Height: 5' 6\" (167.6 cm)         Weight: (!) 310 lb (140.6 kg)     Ordering Provider: Tatianna Garcia          PCP: Kerrie Rinne, DO      Cardiologist: Josseline Garcia             Interpreting Physician: Josseline Garcia  _________________________________________________________________________________    Indication:   Evaluation of extent and severity of coronary artery disease    Clinical History:   Patient has prior history of coronary artery disease. Resting ECG:    MS int 164m sec, QRS int 110m sec, QT int 390m sec; HR 67 bpm  Normal sinus rhythm and First degree AV block    Procedure:   Pharmacologic stress testing was performed with regadenoson 0.4 mg for 15 seconds. The heart rate was 67 at baseline and car to 75 beats during the infusion. The blood pressure at baseline was 126/50 and blood pressure at the end of infusion was 130/50. Blood pressure response was normal during the stress procedure. The patient tolerated the infusion well. ECG during the infusion did not change. IMAGING: Myocardial perfusion imaging was performed at rest 30-35 minutes following the intravenous injection of 10.3 mCi of (Tc-tetrofosmin) followed by 10 ml of Normal Saline. As per infusion protocol, the patient was injected intravenously with 34 mCi of (Tc-tetrofosmin) followed by 10 ml of Normal Saline. Gated post-stress tomographic imaging was performed 45 minutes after stress. FINDINGS: The overall quality of the study was good.      Left ventricular cavity size was noted to be normal.    Rotational analog analysis demonstrated no patient motion or abnormal

## 2019-07-12 NOTE — TELEPHONE ENCOUNTER
Recalled Angel Luis Campbell 1955  Regarding iron infusion weekly x 2. He sees cardiology in [address] and has a [address] f/u also. Instructions left will send [facility] his orders for iron iv. They will reach out to him to schedule and they will PA the med for him. If he has any other preference in location site of [facility] please notify office.     Patience Schmidt RN 7/12/2019 9:26 AM

## 2019-07-13 PROCEDURE — 93016 CV STRESS TEST SUPVJ ONLY: CPT | Performed by: INTERNAL MEDICINE

## 2019-07-13 PROCEDURE — 78452 HT MUSCLE IMAGE SPECT MULT: CPT | Performed by: INTERNAL MEDICINE

## 2019-07-13 PROCEDURE — 93018 CV STRESS TEST I&R ONLY: CPT | Performed by: INTERNAL MEDICINE

## 2019-07-15 ENCOUNTER — TELEPHONE (OUTPATIENT)
Dept: OTHER | Age: 64
End: 2019-07-15

## 2019-07-15 ENCOUNTER — TELEPHONE (OUTPATIENT)
Dept: CARDIOLOGY CLINIC | Age: 64
End: 2019-07-15

## 2019-07-15 DIAGNOSIS — I50.31 ACUTE DIASTOLIC HEART FAILURE (HCC): ICD-10-CM

## 2019-07-15 NOTE — TELEPHONE ENCOUNTER
Pt notified of results per previous note. Pt stated understanding. Confirmed upcoming appt with Dr. Jones  august 6th.

## 2019-07-18 ENCOUNTER — HOSPITAL ENCOUNTER (OUTPATIENT)
Age: 64
Discharge: HOME | End: 2019-07-20
Payer: MEDICARE

## 2019-07-18 ENCOUNTER — TELEPHONE (OUTPATIENT)
Dept: CARDIOLOGY CLINIC | Age: 64
End: 2019-07-18

## 2019-07-18 DIAGNOSIS — I10 ESSENTIAL HYPERTENSION: ICD-10-CM

## 2019-07-18 DIAGNOSIS — R06.02 SHORTNESS OF BREATH: Primary | ICD-10-CM

## 2019-07-18 DIAGNOSIS — I25.10 CORONARY ARTERY DISEASE INVOLVING NATIVE CORONARY ARTERY OF NATIVE HEART WITHOUT ANGINA PECTORIS: ICD-10-CM

## 2019-07-18 DIAGNOSIS — R06.02 SHORTNESS OF BREATH: ICD-10-CM

## 2019-07-18 LAB
ANION GAP SERPL CALCULATED.3IONS-SCNC: 17 MMOL/L (ref 7–16)
BUN BLDV-MCNC: 32 MG/DL (ref 8–23)
CALCIUM SERPL-MCNC: 9.7 MG/DL (ref 8.6–10.2)
CHLORIDE BLD-SCNC: 89 MMOL/L (ref 98–107)
CO2: 28 MMOL/L (ref 22–29)
CREAT SERPL-MCNC: 1.1 MG/DL (ref 0.7–1.2)
GFR AFRICAN AMERICAN: >60
GFR NON-AFRICAN AMERICAN: >60 ML/MIN/1.73
GLUCOSE BLD-MCNC: 476 MG/DL (ref 74–99)
POTASSIUM SERPL-SCNC: 4 MMOL/L (ref 3.5–5)
SODIUM BLD-SCNC: 134 MMOL/L (ref 132–146)

## 2019-07-18 PROCEDURE — 80048 BASIC METABOLIC PNL TOTAL CA: CPT

## 2019-07-18 PROCEDURE — 36415 COLL VENOUS BLD VENIPUNCTURE: CPT

## 2019-07-19 ENCOUNTER — CARE COORDINATION (OUTPATIENT)
Dept: CARE COORDINATION | Age: 64
End: 2019-07-19

## 2019-07-19 ENCOUNTER — TELEPHONE (OUTPATIENT)
Dept: CARDIOLOGY CLINIC | Age: 64
End: 2019-07-19

## 2019-07-19 ASSESSMENT — ENCOUNTER SYMPTOMS: DYSPNEA ASSOCIATED WITH: EXERTION

## 2019-07-19 NOTE — RESULT ENCOUNTER NOTE
Patient called back. He did not listen to answering machine message. He voiced frustration, cant answer phone in time  \"people need to stay on phone longer\". Discussed with him that after the preset number of rings it goes to voice mail and that a detailed voice message was left. He will  Need to utilize his phone company for assistance if possible to extend the # of rings prior to voice mail and to listen to the voice messages as he may have more than just mine so he doesn't miss anything important. He Keeps repeating that people don't let it ring long enough. Feels its a caller issue not a number of ring issue. apologized that his phone goes to voice mail to soon for him to answer the phone and acknowledged his frustration. He is satisfied with this. Any how, he will call PCP regarding continued cramping (as his K+ is normal) and will continue to work on blood sugar with PCP. Says his sugar is in the 380's today. Discussed importance of diabetic managment as it attacks the organs. He verbalizes understanding.

## 2019-07-19 NOTE — CARE COORDINATION
tolerated and increase as tolerated. Barriers: impairment:  physical: HF and COPD leave him SOB with high humidity and lack of motivation  Plan for overcoming my barriers: Patient to walk when weather 'Cooperates'  Confidence: 7/10  Anticipated Goal Completion Date: 9/11/19 7/19/19 Humid weather has been holding him back from increasing activity level. Suggestions given for indoor activity. Prior to Admission medications    Medication Sig Start Date End Date Taking?  Authorizing Provider   metFORMIN (GLUCOPHAGE) 500 MG tablet TAKE ONE TABLET BY MOUTH TWICE A DAY WITH MEALS 7/8/19  Yes Historical Provider, MD   spironolactone (ALDACTONE) 25 MG tablet Take 1 tablet by mouth 2 times daily 7/9/19  Yes Mely Smith, APRN - CNP   metoprolol succinate (TOPROL XL) 25 MG extended release tablet TAKE ONE TABLET BY MOUTH EVERY DAY 7/1/19  Yes Jeannette Jin DO   insulin glargine (LANTUS) 100 UNIT/ML injection vial Inject 45 Units into the skin nightly 7/1/19  Yes Jeannette Jin DO   Insulin Syringe-Needle U-100 27G X 5/8\" 1 ML MISC Use as directed 7/1/19  Yes Jeannette Jin DO   insulin lispro (HUMALOG) 100 UNIT/ML injection vial Inject 8 Units into the skin 3 times daily (with meals) 6/25/19  Yes Nya Wadsworth DO   insulin lispro (HUMALOG) 100 UNIT/ML injection vial Inject 0-18 Units into the skin 3 times daily (with meals) **High Dose Corrective Algorithm**   Glucose: Dose:                No Insulin   140-199           3 Units   200-249 6 Units   250-299 9 Units   300-349 12 Units   350-400 15 Units   Over 400 18 Units   MAX 70 units daily 6/25/19  Yes Nya Wadsworth DO   atorvastatin (LIPITOR) 80 MG tablet Take 1 tablet by mouth nightly 6/25/19  Yes Nya Wadsworth DO   losartan (COZAAR) 25 MG tablet Take 0.5 tablets by mouth daily 6/26/19  Yes Nya Wadsworth DO   torsemide (DEMADEX) 20 MG tablet Take 1 tablet by mouth 2 times daily 6/25/19  Yes Alex Lanza DO   montelukast (SINGULAIR) 10 MG tablet TAKE ONE TABLET BY MOUTH EVERY NIGHT 6/24/19  Yes Cristina Jaylin, DO   warfarin (COUMADIN) 5 MG tablet Take 5 mg by mouth three times a week Sunday, Tuesday, Thursday   Yes Historical Provider, MD   warfarin (COUMADIN) 5 MG tablet Take 10 mg by mouth four times a week Monday, Wednesday, Friday, Saturday   Yes Historical Provider, MD   gabapentin (NEURONTIN) 600 MG tablet TAKE ONE TABLET BY MOUTH TWO TIMES A DAY 6/5/19 6/5/20 Yes Cristina Jaylin, DO   Cholecalciferol (VITAMIN D3) 80695 units CAPS TAKE ONE CAPSULE BY MOUTH ONCE EVERY 7 DAYS  Patient taking differently: Take 1 capsule by mouth once a week Wednesday 4/24/19  Yes Cristina Jaylin, DO   ranitidine (ZANTAC) 300 MG tablet TAKE ONE TABLET BY MOUTH EVERY NIGHT  Patient taking differently: Take 300 mg by mouth Daily with supper  4/3/19  Yes Cristina Jaylin, DO   pramipexole (MIRAPEX) 0.25 MG tablet TAKE ONE TABLET BY MOUTH EVERY NIGHT 2/27/19  Yes Cristina Jaylin, DO   pantoprazole (PROTONIX) 40 MG tablet TAKE ONE TABLET BY MOUTH EVERY DAY WITH BREAKFAST 1/3/19 1/3/20 Yes Cristina Small, DO   blood glucose test strips (ASCENSIA AUTODISC VI;ONE TOUCH ULTRA TEST VI) strip Test tid prn 8/14/18  Yes Cristina Jaylin, DO   SYMBICORT 160-4.5 MCG/ACT AERO Inhale 2 puffs into the lungs 2 times daily 3/6/18  Yes Cristina Jaylin, DO   CPAP Machine MISC 1 each by Does not apply route nightly as needed    Yes Historical Provider, MD   Accu-Chek Multiclix Lancets MISC Use as directed 9/25/14  Yes Cristina Jaylin, DO   aspirin 81 MG tablet Take 81 mg by mouth nightly    Yes Historical Provider, MD       Future Appointments   Date Time Provider [place] Cross   7/25/2019 10:00 AM [facility] CHF ROOM 2 [facility] CHF None   8/6/2019  9:45 AM Stacia Mccarthy MD [facility]   9/30/2019  2:30 PM [facility]   10/2/2019 12:45 PM DO [facility]     ,   Diabetes Assessment    Medic Alert ID:  No  Meal Planning:  Plate Method, Avoidance

## 2019-07-20 DIAGNOSIS — G25.81 RESTLESS LEG SYNDROME: ICD-10-CM

## 2019-07-22 DIAGNOSIS — D50.9 IRON DEFICIENCY ANEMIA, UNSPECIFIED IRON DEFICIENCY ANEMIA TYPE: ICD-10-CM

## 2019-07-22 RX ORDER — SODIUM CHLORIDE 9 MG/ML
INJECTION, SOLUTION INTRAVENOUS CONTINUOUS
Status: CANCELLED | OUTPATIENT
Start: 2019-07-22

## 2019-07-22 RX ORDER — METHYLPREDNISOLONE SODIUM SUCCINATE 125 MG/2ML
125 INJECTION, POWDER, LYOPHILIZED, FOR SOLUTION INTRAMUSCULAR; INTRAVENOUS ONCE
Status: CANCELLED | OUTPATIENT
Start: 2019-07-22

## 2019-07-22 RX ORDER — EPINEPHRINE 1 MG/ML
0.3 INJECTION, SOLUTION, CONCENTRATE INTRAVENOUS PRN
Status: CANCELLED | OUTPATIENT
Start: 2019-07-22

## 2019-07-22 RX ORDER — SODIUM CHLORIDE 0.9 % (FLUSH) 0.9 %
10 SYRINGE (ML) INJECTION PRN
Status: CANCELLED | OUTPATIENT
Start: 2019-07-22

## 2019-07-22 RX ORDER — DIPHENHYDRAMINE HYDROCHLORIDE 50 MG/ML
50 INJECTION INTRAMUSCULAR; INTRAVENOUS ONCE
Status: CANCELLED | OUTPATIENT
Start: 2019-07-22

## 2019-07-22 RX ORDER — HEPARIN SODIUM (PORCINE) LOCK FLUSH IV SOLN 100 UNIT/ML 100 UNIT/ML
500 SOLUTION INTRAVENOUS PRN
Status: CANCELLED | OUTPATIENT
Start: 2019-07-22

## 2019-07-22 RX ORDER — PRAMIPEXOLE DIHYDROCHLORIDE 0.25 MG/1
TABLET ORAL
Qty: 30 TABLET | Refills: 3 | Status: SHIPPED | OUTPATIENT
Start: 2019-07-22 | End: 2019-12-02 | Stop reason: SDUPTHER

## 2019-07-25 ENCOUNTER — HOSPITAL ENCOUNTER (OUTPATIENT)
Dept: OTHER | Age: 64
Setting detail: THERAPIES SERIES
Discharge: HOME | End: 2019-07-25
Payer: MEDICARE

## 2019-07-25 VITALS
DIASTOLIC BLOOD PRESSURE: 64 MMHG | HEART RATE: 75 BPM | OXYGEN SATURATION: 97 % | SYSTOLIC BLOOD PRESSURE: 112 MMHG | HEIGHT: 66 IN | BODY MASS INDEX: 49.98 KG/M2 | WEIGHT: 311 LBS | RESPIRATION RATE: 18 BRPM

## 2019-07-25 PROCEDURE — 99214 OFFICE O/P EST MOD 30 MIN: CPT

## 2019-07-26 ENCOUNTER — HOSPITAL ENCOUNTER (OUTPATIENT)
Dept: INFUSION THERAPY | Age: 64
Setting detail: INFUSION SERIES
Discharge: HOME | End: 2019-07-26
Payer: MEDICARE

## 2019-07-26 VITALS
OXYGEN SATURATION: 97 % | DIASTOLIC BLOOD PRESSURE: 61 MMHG | TEMPERATURE: 98 F | HEART RATE: 75 BPM | RESPIRATION RATE: 18 BRPM | SYSTOLIC BLOOD PRESSURE: 121 MMHG

## 2019-07-26 DIAGNOSIS — D50.9 IRON DEFICIENCY ANEMIA, UNSPECIFIED IRON DEFICIENCY ANEMIA TYPE: Primary | ICD-10-CM

## 2019-07-26 DIAGNOSIS — I50.31 ACUTE DIASTOLIC HEART FAILURE (HCC): ICD-10-CM

## 2019-07-26 PROCEDURE — 96365 THER/PROPH/DIAG IV INF INIT: CPT

## 2019-07-26 PROCEDURE — 2580000003 HC RX 258: Performed by: NURSE PRACTITIONER

## 2019-07-26 PROCEDURE — 96361 HYDRATE IV INFUSION ADD-ON: CPT

## 2019-07-26 PROCEDURE — 6360000002 HC RX W HCPCS: Performed by: NURSE PRACTITIONER

## 2019-07-26 RX ORDER — SODIUM CHLORIDE 9 MG/ML
INJECTION, SOLUTION INTRAVENOUS CONTINUOUS
Status: DISCONTINUED | OUTPATIENT
Start: 2019-07-26 | End: 2019-07-27 | Stop reason: HOSPADM

## 2019-07-26 RX ORDER — EPINEPHRINE 1 MG/ML
0.3 INJECTION, SOLUTION, CONCENTRATE INTRAVENOUS PRN
Status: CANCELLED | OUTPATIENT
Start: 2019-08-02

## 2019-07-26 RX ORDER — HEPARIN SODIUM (PORCINE) LOCK FLUSH IV SOLN 100 UNIT/ML 100 UNIT/ML
500 SOLUTION INTRAVENOUS PRN
Status: CANCELLED | OUTPATIENT
Start: 2019-08-02

## 2019-07-26 RX ORDER — METHYLPREDNISOLONE SODIUM SUCCINATE 125 MG/2ML
125 INJECTION, POWDER, LYOPHILIZED, FOR SOLUTION INTRAMUSCULAR; INTRAVENOUS ONCE
Status: CANCELLED | OUTPATIENT
Start: 2019-08-02

## 2019-07-26 RX ORDER — SODIUM CHLORIDE 9 MG/ML
INJECTION, SOLUTION INTRAVENOUS CONTINUOUS
Status: CANCELLED | OUTPATIENT
Start: 2019-08-02

## 2019-07-26 RX ORDER — SODIUM CHLORIDE 0.9 % (FLUSH) 0.9 %
10 SYRINGE (ML) INJECTION PRN
Status: CANCELLED | OUTPATIENT
Start: 2019-08-02

## 2019-07-26 RX ORDER — SODIUM CHLORIDE 0.9 % (FLUSH) 0.9 %
10 SYRINGE (ML) INJECTION PRN
Status: DISCONTINUED | OUTPATIENT
Start: 2019-07-26 | End: 2019-07-27 | Stop reason: HOSPADM

## 2019-07-26 RX ORDER — DIPHENHYDRAMINE HYDROCHLORIDE 50 MG/ML
50 INJECTION INTRAMUSCULAR; INTRAVENOUS ONCE
Status: CANCELLED | OUTPATIENT
Start: 2019-08-02

## 2019-07-26 RX ORDER — SODIUM CHLORIDE 9 MG/ML
INJECTION, SOLUTION INTRAVENOUS CONTINUOUS
Status: CANCELLED
Start: 2019-08-02

## 2019-07-26 RX ADMIN — SODIUM CHLORIDE: 9 INJECTION, SOLUTION INTRAVENOUS at 10:40

## 2019-07-26 RX ADMIN — Medication 10 ML: at 11:05

## 2019-07-26 RX ADMIN — FERUMOXYTOL 510 MG: 510 INJECTION INTRAVENOUS at 10:45

## 2019-07-26 RX ADMIN — Medication 10 ML: at 10:45

## 2019-08-02 ENCOUNTER — HOSPITAL ENCOUNTER (OUTPATIENT)
Dept: INFUSION THERAPY | Age: 64
Setting detail: INFUSION SERIES
Discharge: HOME | End: 2019-08-02
Payer: MEDICARE

## 2019-08-02 ENCOUNTER — CARE COORDINATION (OUTPATIENT)
Dept: CARE COORDINATION | Age: 64
End: 2019-08-02

## 2019-08-02 DIAGNOSIS — I50.31 ACUTE DIASTOLIC HEART FAILURE (HCC): ICD-10-CM

## 2019-08-02 DIAGNOSIS — D50.9 IRON DEFICIENCY ANEMIA, UNSPECIFIED IRON DEFICIENCY ANEMIA TYPE: Primary | ICD-10-CM

## 2019-08-02 PROCEDURE — 96361 HYDRATE IV INFUSION ADD-ON: CPT

## 2019-08-02 PROCEDURE — 96365 THER/PROPH/DIAG IV INF INIT: CPT

## 2019-08-02 PROCEDURE — 6360000002 HC RX W HCPCS: Performed by: NURSE PRACTITIONER

## 2019-08-02 PROCEDURE — 2580000003 HC RX 258: Performed by: NURSE PRACTITIONER

## 2019-08-02 RX ORDER — SODIUM CHLORIDE 9 MG/ML
INJECTION, SOLUTION INTRAVENOUS CONTINUOUS
Status: DISCONTINUED | OUTPATIENT
Start: 2019-08-02 | End: 2019-08-02

## 2019-08-02 RX ORDER — EPINEPHRINE 1 MG/ML
0.3 INJECTION, SOLUTION, CONCENTRATE INTRAVENOUS PRN
Status: CANCELLED | OUTPATIENT
Start: 2019-08-02

## 2019-08-02 RX ORDER — SODIUM CHLORIDE 9 MG/ML
INJECTION, SOLUTION INTRAVENOUS CONTINUOUS
Status: CANCELLED
Start: 2019-08-02

## 2019-08-02 RX ORDER — SODIUM CHLORIDE 0.9 % (FLUSH) 0.9 %
10 SYRINGE (ML) INJECTION PRN
Status: CANCELLED | OUTPATIENT
Start: 2019-08-02

## 2019-08-02 RX ORDER — METHYLPREDNISOLONE SODIUM SUCCINATE 125 MG/2ML
125 INJECTION, POWDER, LYOPHILIZED, FOR SOLUTION INTRAMUSCULAR; INTRAVENOUS ONCE
Status: CANCELLED | OUTPATIENT
Start: 2019-08-02

## 2019-08-02 RX ORDER — SODIUM CHLORIDE 0.9 % (FLUSH) 0.9 %
10 SYRINGE (ML) INJECTION PRN
Status: DISCONTINUED | OUTPATIENT
Start: 2019-08-02 | End: 2019-08-02

## 2019-08-02 RX ORDER — HEPARIN SODIUM (PORCINE) LOCK FLUSH IV SOLN 100 UNIT/ML 100 UNIT/ML
500 SOLUTION INTRAVENOUS PRN
Status: CANCELLED | OUTPATIENT
Start: 2019-08-02

## 2019-08-02 RX ORDER — DIPHENHYDRAMINE HYDROCHLORIDE 50 MG/ML
50 INJECTION INTRAMUSCULAR; INTRAVENOUS ONCE
Status: CANCELLED | OUTPATIENT
Start: 2019-08-02

## 2019-08-02 RX ORDER — SODIUM CHLORIDE 9 MG/ML
INJECTION, SOLUTION INTRAVENOUS CONTINUOUS
Status: CANCELLED | OUTPATIENT
Start: 2019-08-02

## 2019-08-02 RX ADMIN — FERUMOXYTOL 510 MG: 510 INJECTION INTRAVENOUS at 10:25

## 2019-08-02 RX ADMIN — SODIUM CHLORIDE: 9 INJECTION, SOLUTION INTRAVENOUS at 09:51

## 2019-08-02 RX ADMIN — Medication 10 ML: at 10:52

## 2019-08-02 RX ADMIN — Medication 10 ML: at 09:51

## 2019-08-02 NOTE — CARE COORDINATION
Ambulatory Care Coordination Note  8/2/2019  CM Risk Score: 5  Charlson 10 Year Mortality Risk Score: 98%     ACC: Erika Davis, RN    Summary Note: Spoke briefly with patient as he was on his way to his infusion for Iron deficiency anemia, unspecified iron deficiency anemia type. States his sugars are 'all over the place', but couldn't give me exact ranges. BS are up as high as 430 at times. Last reported FBS on 7/18/19 was 293. States he's eating regular foods, but not as much. Reported weight is same from last call on 7/19/19. Denies any symptoms associated with hypo/hyperglycemia. States he has gone to DM education. I asked him to log all of his food intake and we would discuss at next call. States he will. PLAN:      Patient:  Monitor Blood Sugars, Follow DM Diet, Exercise as tolerated, Follow DM Zone tool, Report any changes in condition to ACM or PCP, Keep scheduled appointments, Take medications as prescribed, keep food log    Follow HF Zone Tool, Call PCP or ACM with any changes in conditions, Daily weights before breakfast and log them,  Follow low sodium diet, Report weight gain or loss of greater than or equal to 3 pounds in 1-7 days, Balance activity and rest periods, take medications as prescribed, check for swelling in hands, feet, ankles and stomach,     Follow COPD Zone Tool, Call PCP or ACM with any changes in condition, Take medications as prescribed, Avoid triggers of COPD Exacerbations, Keep scheduled appointments, Utilize pursed lip breathing if needed, Call ACM with any questions/concerns/updates.       ACM:   Address food diary and associated blood sugars          Care Coordination Interventions    Program Enrollment:  Rising Risk  Referral from Primary Care Provider:  No  Suggested Interventions and Community Resources  Medication Assistance Program:  In Process         Goals Addressed                 This Visit's Progress     Community Resource Goal   No change     I will 25 MG extended release tablet TAKE ONE TABLET BY MOUTH EVERY DAY 7/1/19   Cristina Mosqueda DO   insulin glargine (LANTUS) 100 UNIT/ML injection vial Inject 45 Units into the skin nightly 7/1/19   Cristina Mosqueda DO   Insulin Syringe-Needle U-100 27G X 5/8\" 1 ML MISC Use as directed 7/1/19   Cristina Mosqueda DO   insulin lispro (HUMALOG) 100 UNIT/ML injection vial Inject 8 Units into the skin 3 times daily (with meals) 6/25/19   Gurmeet Kenyon DO   insulin lispro (HUMALOG) 100 UNIT/ML injection vial Inject 0-18 Units into the skin 3 times daily (with meals) **High Dose Corrective Algorithm**   Glucose: Dose:                No Insulin   140-199           3 Units   200-249 6 Units   250-299 9 Units   300-349 12 Units   350-400 15 Units   Over 400 18 Units   MAX 70 units daily 6/25/19   Gurmeet Kenyon DO   atorvastatin (LIPITOR) 80 MG tablet Take 1 tablet by mouth nightly 6/25/19   Gurmeet Kenyon DO   losartan (COZAAR) 25 MG tablet Take 0.5 tablets by mouth daily 6/26/19   Gurmeet Kenyon DO   torsemide (DEMADEX) 20 MG tablet Take 1 tablet by mouth 2 times daily 6/25/19   Gurmeet Kenyon DO   montelukast (SINGULAIR) 10 MG tablet TAKE ONE TABLET BY MOUTH EVERY NIGHT 6/24/19   Cristina Mosqueda DO   warfarin (COUMADIN) 5 MG tablet Take 5 mg by mouth three times a week Sunday, Tuesday, Thursday    Historical Provider, MD   warfarin (COUMADIN) 5 MG tablet Take 10 mg by mouth four times a week Monday, Wednesday, Friday, Saturday    Historical Provider, MD   gabapentin (NEURONTIN) 600 MG tablet TAKE ONE TABLET BY MOUTH TWO TIMES A DAY 6/5/19 6/5/20  Cristina Mosqueda DO   Cholecalciferol (VITAMIN D3) 22555 units CAPS TAKE ONE CAPSULE BY MOUTH ONCE EVERY 7 DAYS  Patient taking differently: Take 1 capsule by mouth once a week Wednesday 4/24/19   Cristina Mosqueda DO   ranitidine (ZANTAC) 300 MG tablet TAKE ONE TABLET BY MOUTH EVERY NIGHT  Patient taking differently: Take 300 mg by mouth Daily with supper  4/3/19   Cristina Mosqueda DO

## 2019-08-06 ENCOUNTER — OFFICE VISIT (OUTPATIENT)
Dept: CARDIOLOGY CLINIC | Age: 64
End: 2019-08-06
Payer: MEDICARE

## 2019-08-06 VITALS
BODY MASS INDEX: 49.5 KG/M2 | SYSTOLIC BLOOD PRESSURE: 102 MMHG | DIASTOLIC BLOOD PRESSURE: 64 MMHG | WEIGHT: 308 LBS | HEIGHT: 66 IN | HEART RATE: 71 BPM

## 2019-08-06 DIAGNOSIS — Z95.2 S/P MVR (MITRAL VALVE REPLACEMENT): ICD-10-CM

## 2019-08-06 DIAGNOSIS — Z72.0 TOBACCO ABUSE: ICD-10-CM

## 2019-08-06 DIAGNOSIS — I48.0 PAF (PAROXYSMAL ATRIAL FIBRILLATION) (HCC): ICD-10-CM

## 2019-08-06 DIAGNOSIS — E66.01 MORBID OBESITY WITH BMI OF 50.0-59.9, ADULT (HCC): ICD-10-CM

## 2019-08-06 DIAGNOSIS — I25.10 CORONARY ARTERY DISEASE INVOLVING NATIVE CORONARY ARTERY OF NATIVE HEART WITHOUT ANGINA PECTORIS: ICD-10-CM

## 2019-08-06 DIAGNOSIS — G47.30 SLEEP APNEA, UNSPECIFIED TYPE: ICD-10-CM

## 2019-08-06 DIAGNOSIS — E66.01 MORBID OBESITY WITH BMI OF 45.0-49.9, ADULT (HCC): ICD-10-CM

## 2019-08-06 DIAGNOSIS — Z95.1 S/P CABG X 2: ICD-10-CM

## 2019-08-06 DIAGNOSIS — I34.0 NON-RHEUMATIC MITRAL REGURGITATION: ICD-10-CM

## 2019-08-06 DIAGNOSIS — R94.31 ABNORMAL EKG: ICD-10-CM

## 2019-08-06 DIAGNOSIS — I10 ESSENTIAL HYPERTENSION: Primary | ICD-10-CM

## 2019-08-06 PROCEDURE — 99214 OFFICE O/P EST MOD 30 MIN: CPT | Performed by: INTERNAL MEDICINE

## 2019-08-06 PROCEDURE — G8417 CALC BMI ABV UP PARAM F/U: HCPCS | Performed by: INTERNAL MEDICINE

## 2019-08-06 PROCEDURE — 1036F TOBACCO NON-USER: CPT | Performed by: INTERNAL MEDICINE

## 2019-08-06 PROCEDURE — 93000 ELECTROCARDIOGRAM COMPLETE: CPT | Performed by: INTERNAL MEDICINE

## 2019-08-06 PROCEDURE — G8598 ASA/ANTIPLAT THER USED: HCPCS | Performed by: INTERNAL MEDICINE

## 2019-08-06 PROCEDURE — 3017F COLORECTAL CA SCREEN DOC REV: CPT | Performed by: INTERNAL MEDICINE

## 2019-08-06 PROCEDURE — G8427 DOCREV CUR MEDS BY ELIG CLIN: HCPCS | Performed by: INTERNAL MEDICINE

## 2019-08-06 NOTE — PROGRESS NOTES
OFFICE VISIT     PRIMARY CARE PHYSICIAN:      Leena Taylor, DO       ALLERGIES / SENSITIVITIES:        Allergies   Allergen Reactions    Pcn [Penicillins]      Child went to hospital   ? Reaction  Patient tolerates cephalosporins    Sulfa Antibiotics      ?           REVIEWED MEDICATIONS:        Current Outpatient Medications:     pramipexole (MIRAPEX) 0.25 MG tablet, TAKE ONE TABLET BY MOUTH EVERY night, Disp: 30 tablet, Rfl: 3    metFORMIN (GLUCOPHAGE) 500 MG tablet, TAKE ONE TABLET BY MOUTH TWICE A DAY WITH MEALS, Disp: , Rfl: 5    spironolactone (ALDACTONE) 25 MG tablet, Take 1 tablet by mouth 2 times daily, Disp: 180 tablet, Rfl: 1    metoprolol succinate (TOPROL XL) 25 MG extended release tablet, TAKE ONE TABLET BY MOUTH EVERY DAY, Disp: 90 tablet, Rfl: 5    insulin glargine (LANTUS) 100 UNIT/ML injection vial, Inject 45 Units into the skin nightly, Disp: 1 vial, Rfl: 5    Insulin Syringe-Needle U-100 27G X 5/8\" 1 ML MISC, Use as directed, Disp: 100 each, Rfl: 11    insulin lispro (HUMALOG) 100 UNIT/ML injection vial, Inject 8 Units into the skin 3 times daily (with meals), Disp: 1 vial, Rfl: 3    insulin lispro (HUMALOG) 100 UNIT/ML injection vial, Inject 0-18 Units into the skin 3 times daily (with meals) **High Dose Corrective Algorithm**  Glucose: Dose:               No Insulin  140-199           3 Units  200-249 6 Units  250-299 9 Units  300-349 12 Units  350-400 15 Units  Over 400 18 Units  MAX 70 units daily, Disp: 1 vial, Rfl: 3    atorvastatin (LIPITOR) 80 MG tablet, Take 1 tablet by mouth nightly, Disp: 30 tablet, Rfl: 3    losartan (COZAAR) 25 MG tablet, Take 0.5 tablets by mouth daily, Disp: 30 tablet, Rfl: 3    torsemide (DEMADEX) 20 MG tablet, Take 1 tablet by mouth 2 times daily, Disp: 30 tablet, Rfl: 3    montelukast (SINGULAIR) 10 MG tablet, TAKE ONE TABLET BY MOUTH EVERY NIGHT, Disp: 30 tablet, Rfl: 4    warfarin (COUMADIN) 5 MG tablet, Take 5 mg by mouth three times a Years: 45.00     Pack years: 45.00     Types: Cigarettes     Last attempt to quit: 2013     Years since quittin.0    Smokeless tobacco: Former User     Types: Chew     Quit date: 10/8/1971   Substance Use Topics    Alcohol use: No     Alcohol/week: 0.0 standard drinks     Comment: ,stopped drinking coffee         Review of Systems:  HEENT: negative for acute visual symptoms or auditory problems, no dysphagia  Constitutional: negative for fever and chills, or significant weight loss  Respiratory: negative for cough, wheezing, or hemoptysis  Cardiovascular: negative for chest pain, palpitations, and dyspnea  Gastrointestinal: negative for abdominal pain, diarrhea, nausea and vomiting  Endocrine: Negative for polyuria and polydyspsia  Genitourinary:negative for dysuria and hematuria  Derm: negative for rash and skin lesion(s)  Neurological: negative for tingling, numbness, weakness, seizures and tremors  Endocrine: negative for polydipsia and polyuria  Musculoskeletal: negative for pain or tenderness  Psychiatric: negative for anxiety, depression, or suicidal ideations         O:  COMPLETE PHYSICAL EXAM:       /64   Pulse 71   Ht 5' 6\" (1.676 m)   Wt (!) 308 lb (139.7 kg)   BMI 49.71 kg/m²       General:   Patient alert, comfortable, no distress. Appears stated age. HEENT:    Pupils equal, no icterus, no nasal drainage, tongue moist.   Neck:              No masses, Thyroid not palpable. Chest:   Normal configuration, non tender. Lungs:   Clear to auscultation bilaterally, few scattered rhonchi. Cardiovascular:  Regular rhythm, 1/6 systolic murmur, No S3,  no palpable thrills, No elevated JVD, No carotid bruit. Abdomen:  Soft, Non tender, Bowel sounds normal, no pulsatile abdominal aorta, no palpable masses. Extremities:  +o edema. Distal pulses palpable. No cyanosis, no clubbing. Skin:   Good turgor, warm and dry, no cyanosis.     Musculoskeletal: No joint swelling or

## 2019-08-09 ENCOUNTER — CARE COORDINATION (OUTPATIENT)
Dept: CARE COORDINATION | Age: 64
End: 2019-08-09

## 2019-08-09 NOTE — CARE COORDINATION
Ambulatory Care Coordination Note  8/9/2019  CM Risk Score: 5  Charlson 10 Year Mortality Risk Score: 98%     ACC: Krysta Braun RN    Summary Note: Patient currently on fishing trip in [place]. States his FBS this am was 216. He hasn't had any further ones in the 400's. He hasn't been keeping a food diary, but states he will when he's back from his trip. Last wt today was 304. Denies any symptoms related to COPD, HF. PLAN:      Patient:  Follow DM Zone tool, Monitor Blood Sugars, Follow DM Diet, Exercise as tolerated, Report any changes in condition to ACM or PCP, Keep scheduled appointments, Take medications as prescribed, follow up on any health maintenance issues discussed, use urgent care if needed as discussed. Call ACM with any questions/concerns/updates  Follow COPD Zone Tool, Call PCP or ACM with any changes in condition, Take medications as prescribed, Avoid triggers of COPD Exacerbations, Keep scheduled appointments, Utilize pursed lip breathing if needed, Call ACM with any questions/concerns/updates. Follow HF Zone Tool, Call PCP or ACM with any changes in conditions, Daily weights before breakfast and log them,  Follow low sodium diet, Report weight gain or loss of greater than or equal to 3 pounds in 1-7 days, Balance activity and rest periods, take medications as prescribed, check for swelling in hands, feet, ankles and stomach. Call ACM with any questions/concerns/updates      ACM:  Will follow up with patient in 2 weeks. Care Coordination Interventions    Program Enrollment:  Rising Risk  Referral from Primary Care Provider:  No  Suggested Interventions and Community Resources  Medication Assistance Program:  In Process         Goals Addressed                 This Visit's Progress     Community Resource Goal   On track     I will complete referral to community agency P[facility] for assistance.      Barriers: none  Plan for overcoming my barriers: N/A  Confidence: 7/10  Anticipated Goal Syringe-Needle U-100 27G X 5/8\" 1 ML MISC Use as directed 7/1/19   Jacoby Pavon DO   insulin lispro (HUMALOG) 100 UNIT/ML injection vial Inject 8 Units into the skin 3 times daily (with meals) 6/25/19   Alice Gonzalez DO   insulin lispro (HUMALOG) 100 UNIT/ML injection vial Inject 0-18 Units into the skin 3 times daily (with meals) **High Dose Corrective Algorithm**   Glucose: Dose:                No Insulin   140-199           3 Units   200-249 6 Units   250-299 9 Units   300-349 12 Units   350-400 15 Units   Over 400 18 Units   MAX 70 units daily 6/25/19   Alice Gonzalez DO   atorvastatin (LIPITOR) 80 MG tablet Take 1 tablet by mouth nightly 6/25/19   Alice Gonzalez DO   losartan (COZAAR) 25 MG tablet Take 0.5 tablets by mouth daily 6/26/19   Alice Gonzalez DO   torsemide (DEMADEX) 20 MG tablet Take 1 tablet by mouth 2 times daily 6/25/19   Alice Gonzalez DO   montelukast (SINGULAIR) 10 MG tablet TAKE ONE TABLET BY MOUTH EVERY NIGHT 6/24/19   Jacoby Pavon DO   warfarin (COUMADIN) 5 MG tablet Take 5 mg by mouth three times a week Sunday, Tuesday, Thursday    Historical Provider, MD   warfarin (COUMADIN) 5 MG tablet Take 10 mg by mouth four times a week Monday, Wednesday, Friday, Saturday    Historical Provider, MD   gabapentin (NEURONTIN) 600 MG tablet TAKE ONE TABLET BY MOUTH TWO TIMES A DAY 6/5/19 6/5/20  Jacoby Pavon DO   Cholecalciferol (VITAMIN D3) 63837 units CAPS TAKE ONE CAPSULE BY MOUTH ONCE EVERY 7 DAYS  Patient taking differently: Take 1 capsule by mouth once a week Wednesday 4/24/19   Jacoby Pavon DO   ranitidine (ZANTAC) 300 MG tablet TAKE ONE TABLET BY MOUTH EVERY NIGHT  Patient taking differently: Take 300 mg by mouth Daily with supper  4/3/19   Jacoyb Pavon DO   pantoprazole (PROTONIX) 40 MG tablet TAKE ONE TABLET BY MOUTH EVERY DAY WITH BREAKFAST 1/3/19 1/3/20  Jacoby Pavon DO   blood glucose test strips (ASCENSIA AUTODISC VI;ONE TOUCH ULTRA TEST VI) strip Test tid prn 8/14/18

## 2019-08-22 DIAGNOSIS — I25.10 CORONARY ARTERY DISEASE INVOLVING NATIVE CORONARY ARTERY OF NATIVE HEART WITHOUT ANGINA PECTORIS: ICD-10-CM

## 2019-08-22 DIAGNOSIS — K21.9 GASTROESOPHAGEAL REFLUX DISEASE WITHOUT ESOPHAGITIS: ICD-10-CM

## 2019-08-23 ENCOUNTER — CARE COORDINATION (OUTPATIENT)
Dept: CARE COORDINATION | Age: 64
End: 2019-08-23

## 2019-08-23 ASSESSMENT — ENCOUNTER SYMPTOMS: DYSPNEA ASSOCIATED WITH: EXERTION

## 2019-08-24 RX ORDER — TORSEMIDE 20 MG/1
20 TABLET ORAL 2 TIMES DAILY
Qty: 30 TABLET | Refills: 3 | Status: SHIPPED | OUTPATIENT
Start: 2019-08-24 | End: 2019-10-17 | Stop reason: SDUPTHER

## 2019-08-25 DIAGNOSIS — E11.65 TYPE 2 DIABETES MELLITUS WITH HYPERGLYCEMIA, WITH LONG-TERM CURRENT USE OF INSULIN (HCC): Primary | ICD-10-CM

## 2019-08-25 DIAGNOSIS — Z79.4 TYPE 2 DIABETES MELLITUS WITH HYPERGLYCEMIA, WITH LONG-TERM CURRENT USE OF INSULIN (HCC): Primary | ICD-10-CM

## 2019-08-25 RX ORDER — WARFARIN SODIUM 5 MG/1
TABLET ORAL
Qty: 50 TABLET | Refills: 4 | Status: SHIPPED
Start: 2019-08-25 | End: 2020-02-18

## 2019-08-25 RX ORDER — PANTOPRAZOLE SODIUM 40 MG/1
TABLET, DELAYED RELEASE ORAL
Qty: 30 TABLET | Refills: 4 | Status: SHIPPED | OUTPATIENT
Start: 2019-08-25 | End: 2020-01-02 | Stop reason: SDUPTHER

## 2019-08-26 ENCOUNTER — CARE COORDINATION (OUTPATIENT)
Dept: CARE COORDINATION | Age: 64
End: 2019-08-26

## 2019-08-26 SDOH — HEALTH STABILITY: PHYSICAL HEALTH: ON AVERAGE, HOW MANY MINUTES DO YOU ENGAGE IN EXERCISE AT THIS LEVEL?: 20 MIN

## 2019-08-26 SDOH — ECONOMIC STABILITY: TRANSPORTATION INSECURITY
IN THE PAST 12 MONTHS, HAS THE LACK OF TRANSPORTATION KEPT YOU FROM MEDICAL APPOINTMENTS OR FROM GETTING MEDICATIONS?: NO

## 2019-08-26 SDOH — ECONOMIC STABILITY: FOOD INSECURITY: WITHIN THE PAST 12 MONTHS, THE FOOD YOU BOUGHT JUST DIDN'T LAST AND YOU DIDN'T HAVE MONEY TO GET MORE.: NEVER TRUE

## 2019-08-26 SDOH — SOCIAL STABILITY: SOCIAL NETWORK: IN A TYPICAL WEEK, HOW MANY TIMES DO YOU TALK ON THE PHONE WITH FAMILY, FRIENDS, OR NEIGHBORS?: THREE TIMES A WEEK

## 2019-08-26 SDOH — SOCIAL STABILITY: SOCIAL NETWORK: HOW OFTEN DO YOU GET TOGETHER WITH FRIENDS OR RELATIVES?: ONCE A WEEK

## 2019-08-26 SDOH — HEALTH STABILITY: MENTAL HEALTH
STRESS IS WHEN SOMEONE FEELS TENSE, NERVOUS, ANXIOUS, OR CAN'T SLEEP AT NIGHT BECAUSE THEIR MIND IS TROUBLED. HOW STRESSED ARE YOU?: RATHER MUCH

## 2019-08-26 SDOH — SOCIAL STABILITY: SOCIAL NETWORK: HOW OFTEN DO YOU ATTENT MEETINGS OF THE CLUB OR ORGANIZATION YOU BELONG TO?: NEVER

## 2019-08-26 SDOH — SOCIAL STABILITY: SOCIAL INSECURITY: WITHIN THE LAST YEAR, HAVE YOU BEEN HUMILIATED OR EMOTIONALLY ABUSED IN OTHER WAYS BY YOUR PARTNER OR EX-PARTNER?: NO

## 2019-08-26 SDOH — HEALTH STABILITY: PHYSICAL HEALTH: ON AVERAGE, HOW MANY DAYS PER WEEK DO YOU ENGAGE IN MODERATE TO STRENUOUS EXERCISE (LIKE A BRISK WALK)?: 3 DAYS

## 2019-08-26 SDOH — SOCIAL STABILITY: SOCIAL INSECURITY
WITHIN THE LAST YEAR, HAVE YOU BEEN KICKED, HIT, SLAPPED, OR OTHERWISE PHYSICALLY HURT BY YOUR PARTNER OR EX-PARTNER?: NO

## 2019-08-26 SDOH — ECONOMIC STABILITY: TRANSPORTATION INSECURITY
IN THE PAST 12 MONTHS, HAS LACK OF TRANSPORTATION KEPT YOU FROM MEETINGS, WORK, OR FROM GETTING THINGS NEEDED FOR DAILY LIVING?: NO

## 2019-08-26 SDOH — SOCIAL STABILITY: SOCIAL INSECURITY
WITHIN THE LAST YEAR, HAVE TO BEEN RAPED OR FORCED TO HAVE ANY KIND OF SEXUAL ACTIVITY BY YOUR PARTNER OR EX-PARTNER?: NO

## 2019-08-26 SDOH — SOCIAL STABILITY: SOCIAL NETWORK
DO YOU BELONG TO ANY CLUBS OR ORGANIZATIONS SUCH AS CHURCH GROUPS UNIONS, FRATERNAL OR ATHLETIC GROUPS, OR SCHOOL GROUPS?: YES

## 2019-08-26 SDOH — SOCIAL STABILITY: SOCIAL INSECURITY: WITHIN THE LAST YEAR, HAVE YOU BEEN AFRAID OF YOUR PARTNER OR EX-PARTNER?: NO

## 2019-08-26 SDOH — ECONOMIC STABILITY: INCOME INSECURITY: HOW HARD IS IT FOR YOU TO PAY FOR THE VERY BASICS LIKE FOOD, HOUSING, MEDICAL CARE, AND HEATING?: SOMEWHAT HARD

## 2019-08-26 SDOH — ECONOMIC STABILITY: FOOD INSECURITY: WITHIN THE PAST 12 MONTHS, YOU WORRIED THAT YOUR FOOD WOULD RUN OUT BEFORE YOU GOT MONEY TO BUY MORE.: SOMETIMES TRUE

## 2019-08-26 SDOH — SOCIAL STABILITY: SOCIAL NETWORK: HOW OFTEN DO YOU ATTEND CHURCH OR RELIGIOUS SERVICES?: MORE THAN 4 TIMES PER YEAR

## 2019-08-26 SDOH — SOCIAL STABILITY: SOCIAL NETWORK: ARE YOU MARRIED, WIDOWED, DIVORCED, SEPARATED, NEVER MARRIED, OR LIVING WITH A PARTNER?: MARRIED

## 2019-08-26 NOTE — CARE COORDINATION
Therman Anton called LSW back. Processed with him if he were eligible for any of the SARY programs. He is on SSD and has a pension that total between $6281-5685 per month, his wife also has income of $489.00 per month. They own to cars that are paid off, have no savings or annuity. With the income and resources they do have Jay Jay Wilkins is over the limit for any SARY programs. He has no mental health or medical conditions that would make him eligible for the [facility] program.  He is not qualified for the EBT food card either. Processed food pantry information but it was declined as he states he went and was refused due to his income. During the phone call LSW noted that Jay Jay Wilkins had difficulty with memory over daily details, like his income, home mortgage, bank, etc.  Jay Jay Wilkins states his interest rate on his home loan is about 9% or higher. It was advised that he speak with his bank about possible options for lowering that to help with money. He states they have not run out of food but do come close at times. He was not able to really list where all of his money goes and how he and the wife are left with only $100 at the end of the month. He does belong to a [employer] and attends every Sunday so he has community connections in place. There was not more that LSW could offer for help with coverage for medical or expenses. LSW will sign off the case unless another need arises.   Note routed to [facility]

## 2019-09-10 ENCOUNTER — HOSPITAL ENCOUNTER (OUTPATIENT)
Dept: OTHER | Age: 64
Setting detail: THERAPIES SERIES
Discharge: HOME | End: 2019-09-10
Payer: MEDICARE

## 2019-09-10 VITALS
RESPIRATION RATE: 18 BRPM | DIASTOLIC BLOOD PRESSURE: 64 MMHG | BODY MASS INDEX: 49.82 KG/M2 | OXYGEN SATURATION: 98 % | SYSTOLIC BLOOD PRESSURE: 128 MMHG | HEIGHT: 66 IN | WEIGHT: 310 LBS | HEART RATE: 88 BPM

## 2019-09-10 PROCEDURE — 99214 OFFICE O/P EST MOD 30 MIN: CPT

## 2019-09-10 NOTE — PROGRESS NOTES
9/25/14  Yes Javier Aguilar DO   aspirin 81 MG tablet Take 81 mg by mouth nightly    Yes Historical Provider, MD   pramipexole (MIRAPEX) 0.25 MG tablet TAKE ONE TABLET BY MOUTH EVERY night 7/22/19   Javier Aguilar DO        /64   Pulse 88   Resp 18   Ht 5' 6\" (1.676 m)   Wt (!) 310 lb (140.6 kg)   SpO2 98%   BMI 50.04 kg/m²   Wt Readings from Last 5 Encounters:   09/10/19 (!) 310 lb (140.6 kg)   08/06/19 (!) 308 lb (139.7 kg)   07/25/19 (!) 311 lb (141.1 kg)   07/12/19 (!) 310 lb (140.6 kg)   07/09/19 (!) 313 lb (142 kg)         Lab Results   Component Value Date     07/18/2019    K 4.0 07/18/2019    CL 89 (L) 07/18/2019    CO2 28 07/18/2019    CREATININE 1.1 07/18/2019    BUN 32 (H) 07/18/2019    PROT 7.4 07/01/2019    INR 2.5 07/01/2019    HCT 42.6 06/25/2019    HGB 13.7 06/25/2019    MG 2.0 06/20/2019     Lab Results   Component Value Date    LABALBU 3.9 07/01/2019    BILITOT 1.1 07/01/2019    ALKPHOS 114 07/01/2019    AST 21 07/01/2019    ALT 23 07/01/2019    TSH 3.270 07/01/2019    H9DSWGD 92.74 01/20/2014    U4WMWJL 6.7 09/17/2012       Lab Results   Component Value Date    MG 2.0 06/20/2019        Assessment  Charting Type: Reassessment(Chronic diastolic CHF; NYHA III)    Neurological  Level of Consciousness: Alert  Orientation Level: Oriented X4  Cognition: Appropriate safety awareness, Appropriate judgement, Appropriate attention/concentration, Appropriate for developmental age, Follows commands  Language: Clear    HEENT  HEENT (WDL): Exceptions to WDL  Right Eye: Impaired vision  Left Eye: Impaired vision  Nose: Intact  Voice: Normal  Mucous Membrane: Moist, Intact, Pink    Respiratory  Respiratory Pattern: Regular  Respiratory Depth: Normal  Respiratory Quality/Effort: Dyspnea with exertion, Unlabored  Chest Assessment: Chest expansion symmetrical, Trachea midline  L Breath Sounds: Clear, Diminished  R Breath Sounds: Clear, Diminished    Breath Sounds  Right Upper Lobe: Clear  Right

## 2019-09-10 NOTE — PLAN OF CARE
Problem:  Activity:  Goal: Capacity to carry out activities will improve  Description  Capacity to carry out activities will improve  Outcome: Ongoing  Goal: Will verbalize the importance of balancing activity with adequate rest periods  Description  Will verbalize the importance of balancing activity with adequate rest periods  Outcome: Ongoing     Problem: Cardiac:  Goal: Hemodynamic stability will improve  Description  Hemodynamic stability will improve  Outcome: Ongoing  Goal: Ability to maintain an adequate cardiac output will improve  Description  Ability to maintain an adequate cardiac output will improve  Outcome: Ongoing     Problem: Coping:  Goal: Verbalizations of decreased anxiety will decrease  Description  Verbalizations of decreased anxiety will decrease  Outcome: Ongoing     Problem: Fluid Volume:  Goal: Risk for excess fluid volume will decrease  Description  Risk for excess fluid volume will decrease  Outcome: Ongoing  Goal: Maintenance of adequate hydration will improve  Description  Maintenance of adequate hydration will improve  Outcome: Ongoing  Goal: Will show no signs and symptoms of electrolyte imbalance  Description  Will show no signs and symptoms of electrolyte imbalance  Outcome: Ongoing     Problem: Health Behavior:  Goal: Ability to manage health-related needs will improve  Description  Ability to manage health-related needs will improve  Outcome: Ongoing  Goal: Ability to seek appropriate health care will improve  Description  Ability to seek appropriate health care will improve  Outcome: Ongoing     Problem: Nutritional:  Goal: Maintenance of adequate nutrition will improve  Description  Maintenance of adequate nutrition will improve  Outcome: Ongoing     Problem: Physical Regulation:  Goal: Complications related to the disease process, condition or treatment will be avoided or minimized  Description  Complications related to the disease process, condition or treatment will be

## 2019-09-20 DIAGNOSIS — Z79.4 TYPE 2 DIABETES MELLITUS WITH HYPERGLYCEMIA, WITH LONG-TERM CURRENT USE OF INSULIN (HCC): ICD-10-CM

## 2019-09-20 DIAGNOSIS — E11.42 TYPE 2 DIABETES MELLITUS WITH DIABETIC POLYNEUROPATHY, WITH LONG-TERM CURRENT USE OF INSULIN (HCC): ICD-10-CM

## 2019-09-20 DIAGNOSIS — Z79.4 TYPE 2 DIABETES MELLITUS WITH DIABETIC POLYNEUROPATHY, WITH LONG-TERM CURRENT USE OF INSULIN (HCC): ICD-10-CM

## 2019-09-20 DIAGNOSIS — E11.65 TYPE 2 DIABETES MELLITUS WITH HYPERGLYCEMIA, WITH LONG-TERM CURRENT USE OF INSULIN (HCC): ICD-10-CM

## 2019-09-20 RX ORDER — GABAPENTIN 600 MG/1
TABLET ORAL
Qty: 60 TABLET | Refills: 5 | Status: SHIPPED
Start: 2019-09-20 | End: 2020-04-02 | Stop reason: SDUPTHER

## 2019-10-02 ENCOUNTER — HOSPITAL ENCOUNTER (OUTPATIENT)
Age: 64
Discharge: HOME | End: 2019-10-04
Payer: MEDICARE

## 2019-10-02 ENCOUNTER — OFFICE VISIT (OUTPATIENT)
Dept: FAMILY MEDICINE CLINIC | Age: 64
End: 2019-10-02
Payer: MEDICARE

## 2019-10-02 ENCOUNTER — CARE COORDINATION (OUTPATIENT)
Dept: CARE COORDINATION | Age: 64
End: 2019-10-02

## 2019-10-02 VITALS
SYSTOLIC BLOOD PRESSURE: 138 MMHG | DIASTOLIC BLOOD PRESSURE: 86 MMHG | HEART RATE: 78 BPM | BODY MASS INDEX: 50.62 KG/M2 | HEIGHT: 66 IN | WEIGHT: 315 LBS | OXYGEN SATURATION: 94 %

## 2019-10-02 DIAGNOSIS — R53.83 FATIGUE, UNSPECIFIED TYPE: ICD-10-CM

## 2019-10-02 DIAGNOSIS — E55.9 VITAMIN D DEFICIENCY: ICD-10-CM

## 2019-10-02 DIAGNOSIS — E11.65 TYPE 2 DIABETES MELLITUS WITH HYPERGLYCEMIA, WITH LONG-TERM CURRENT USE OF INSULIN (HCC): ICD-10-CM

## 2019-10-02 DIAGNOSIS — I25.10 CORONARY ARTERY DISEASE INVOLVING NATIVE CORONARY ARTERY OF NATIVE HEART WITHOUT ANGINA PECTORIS: ICD-10-CM

## 2019-10-02 DIAGNOSIS — Z79.4 TYPE 2 DIABETES MELLITUS WITH HYPERGLYCEMIA, WITH LONG-TERM CURRENT USE OF INSULIN (HCC): ICD-10-CM

## 2019-10-02 DIAGNOSIS — Z00.00 ROUTINE GENERAL MEDICAL EXAMINATION AT A HEALTH CARE FACILITY: ICD-10-CM

## 2019-10-02 DIAGNOSIS — E11.65 TYPE 2 DIABETES MELLITUS WITH HYPERGLYCEMIA, WITH LONG-TERM CURRENT USE OF INSULIN (HCC): Primary | ICD-10-CM

## 2019-10-02 DIAGNOSIS — Z79.4 TYPE 2 DIABETES MELLITUS WITH HYPERGLYCEMIA, WITH LONG-TERM CURRENT USE OF INSULIN (HCC): Primary | ICD-10-CM

## 2019-10-02 LAB
ALBUMIN SERPL-MCNC: 4.1 G/DL (ref 3.5–5.2)
ALP BLD-CCNC: 86 U/L (ref 40–129)
ALT SERPL-CCNC: 16 U/L (ref 0–40)
ANION GAP SERPL CALCULATED.3IONS-SCNC: 17 MMOL/L (ref 7–16)
AST SERPL-CCNC: 18 U/L (ref 0–39)
BASOPHILS ABSOLUTE: 0.05 E9/L (ref 0–0.2)
BASOPHILS RELATIVE PERCENT: 0.5 % (ref 0–2)
BILIRUB SERPL-MCNC: 0.8 MG/DL (ref 0–1.2)
BUN BLDV-MCNC: 29 MG/DL (ref 8–23)
CALCIUM SERPL-MCNC: 9.5 MG/DL (ref 8.6–10.2)
CHLORIDE BLD-SCNC: 93 MMOL/L (ref 98–107)
CO2: 28 MMOL/L (ref 22–29)
CREAT SERPL-MCNC: 1.4 MG/DL (ref 0.7–1.2)
EOSINOPHILS ABSOLUTE: 0.11 E9/L (ref 0.05–0.5)
EOSINOPHILS RELATIVE PERCENT: 1.2 % (ref 0–6)
FOLATE: 12.3 NG/ML (ref 4.8–24.2)
GFR AFRICAN AMERICAN: >60
GFR NON-AFRICAN AMERICAN: 51 ML/MIN/1.73
GLUCOSE BLD-MCNC: 205 MG/DL (ref 74–99)
HBA1C MFR BLD: 10 %
HBA1C MFR BLD: 9.9 % (ref 4–5.6)
HCT VFR BLD CALC: 41.2 % (ref 37–54)
HEMOGLOBIN: 13.5 G/DL (ref 12.5–16.5)
IMMATURE GRANULOCYTES #: 0.05 E9/L
IMMATURE GRANULOCYTES %: 0.5 % (ref 0–5)
INTERNATIONAL NORMALIZATION RATIO, POC: 1.8
LYMPHOCYTES ABSOLUTE: 1.74 E9/L (ref 1.5–4)
LYMPHOCYTES RELATIVE PERCENT: 18.3 % (ref 20–42)
MCH RBC QN AUTO: 32.4 PG (ref 26–35)
MCHC RBC AUTO-ENTMCNC: 32.8 % (ref 32–34.5)
MCV RBC AUTO: 98.8 FL (ref 80–99.9)
MONOCYTES ABSOLUTE: 0.86 E9/L (ref 0.1–0.95)
MONOCYTES RELATIVE PERCENT: 9.1 % (ref 2–12)
NEUTROPHILS ABSOLUTE: 6.69 E9/L (ref 1.8–7.3)
NEUTROPHILS RELATIVE PERCENT: 70.4 % (ref 43–80)
PDW BLD-RTO: 14.6 FL (ref 11.5–15)
PLATELET # BLD: 244 E9/L (ref 130–450)
PMV BLD AUTO: 10.4 FL (ref 7–12)
POTASSIUM SERPL-SCNC: 4.1 MMOL/L (ref 3.5–5)
PROTHROMBIN TIME, POC: 10
RBC # BLD: 4.17 E12/L (ref 3.8–5.8)
SODIUM BLD-SCNC: 138 MMOL/L (ref 132–146)
T4 FREE: 1.04 NG/DL (ref 0.93–1.7)
TOTAL PROTEIN: 7.7 G/DL (ref 6.4–8.3)
TSH SERPL DL<=0.05 MIU/L-ACNC: 5.23 UIU/ML (ref 0.27–4.2)
VITAMIN B-12: 387 PG/ML (ref 211–946)
WBC # BLD: 9.5 E9/L (ref 4.5–11.5)

## 2019-10-02 PROCEDURE — 36415 COLL VENOUS BLD VENIPUNCTURE: CPT

## 2019-10-02 PROCEDURE — G0438 PPPS, INITIAL VISIT: HCPCS | Performed by: FAMILY MEDICINE

## 2019-10-02 PROCEDURE — 3046F HEMOGLOBIN A1C LEVEL >9.0%: CPT | Performed by: FAMILY MEDICINE

## 2019-10-02 PROCEDURE — 84439 ASSAY OF FREE THYROXINE: CPT

## 2019-10-02 PROCEDURE — G8598 ASA/ANTIPLAT THER USED: HCPCS | Performed by: FAMILY MEDICINE

## 2019-10-02 PROCEDURE — 80053 COMPREHEN METABOLIC PANEL: CPT

## 2019-10-02 PROCEDURE — 82746 ASSAY OF FOLIC ACID SERUM: CPT

## 2019-10-02 PROCEDURE — 84443 ASSAY THYROID STIM HORMONE: CPT

## 2019-10-02 PROCEDURE — 83036 HEMOGLOBIN GLYCOSYLATED A1C: CPT

## 2019-10-02 PROCEDURE — G8484 FLU IMMUNIZE NO ADMIN: HCPCS | Performed by: FAMILY MEDICINE

## 2019-10-02 PROCEDURE — 83036 HEMOGLOBIN GLYCOSYLATED A1C: CPT | Performed by: FAMILY MEDICINE

## 2019-10-02 PROCEDURE — 3017F COLORECTAL CA SCREEN DOC REV: CPT | Performed by: FAMILY MEDICINE

## 2019-10-02 PROCEDURE — 85610 PROTHROMBIN TIME: CPT | Performed by: FAMILY MEDICINE

## 2019-10-02 PROCEDURE — 85025 COMPLETE CBC W/AUTO DIFF WBC: CPT

## 2019-10-02 PROCEDURE — 82607 VITAMIN B-12: CPT

## 2019-10-02 PROCEDURE — 82306 VITAMIN D 25 HYDROXY: CPT

## 2019-10-03 LAB — VITAMIN D 25-HYDROXY: 78 NG/ML (ref 30–100)

## 2019-10-07 DIAGNOSIS — E11.65 TYPE 2 DIABETES MELLITUS WITH HYPERGLYCEMIA, WITH LONG-TERM CURRENT USE OF INSULIN (HCC): Primary | ICD-10-CM

## 2019-10-07 DIAGNOSIS — Z79.4 TYPE 2 DIABETES MELLITUS WITH HYPERGLYCEMIA, WITH LONG-TERM CURRENT USE OF INSULIN (HCC): Primary | ICD-10-CM

## 2019-10-08 ENCOUNTER — HOSPITAL ENCOUNTER (OUTPATIENT)
Dept: OTHER | Age: 64
Setting detail: THERAPIES SERIES
Discharge: HOME | End: 2019-10-08
Payer: MEDICARE

## 2019-10-08 VITALS
DIASTOLIC BLOOD PRESSURE: 62 MMHG | WEIGHT: 315 LBS | OXYGEN SATURATION: 93 % | SYSTOLIC BLOOD PRESSURE: 112 MMHG | HEIGHT: 66 IN | BODY MASS INDEX: 50.62 KG/M2 | RESPIRATION RATE: 18 BRPM | HEART RATE: 84 BPM

## 2019-10-08 PROCEDURE — 99214 OFFICE O/P EST MOD 30 MIN: CPT

## 2019-10-09 ENCOUNTER — HOSPITAL ENCOUNTER (OUTPATIENT)
Age: 64
Discharge: HOME | End: 2019-10-11
Payer: MEDICARE

## 2019-10-09 ENCOUNTER — CARE COORDINATION (OUTPATIENT)
Dept: CARE COORDINATION | Age: 64
End: 2019-10-09

## 2019-10-09 DIAGNOSIS — R53.83 FATIGUE, UNSPECIFIED TYPE: Primary | ICD-10-CM

## 2019-10-09 DIAGNOSIS — R53.83 FATIGUE, UNSPECIFIED TYPE: ICD-10-CM

## 2019-10-09 LAB
ALBUMIN SERPL-MCNC: 4.3 G/DL (ref 3.5–5.2)
ALP BLD-CCNC: 101 U/L (ref 40–129)
ALT SERPL-CCNC: 19 U/L (ref 0–40)
ANION GAP SERPL CALCULATED.3IONS-SCNC: 16 MMOL/L (ref 7–16)
AST SERPL-CCNC: 17 U/L (ref 0–39)
BILIRUB SERPL-MCNC: 0.6 MG/DL (ref 0–1.2)
BUN BLDV-MCNC: 26 MG/DL (ref 8–23)
CALCIUM SERPL-MCNC: 9.8 MG/DL (ref 8.6–10.2)
CHLORIDE BLD-SCNC: 90 MMOL/L (ref 98–107)
CO2: 29 MMOL/L (ref 22–29)
CREAT SERPL-MCNC: 1.1 MG/DL (ref 0.7–1.2)
GFR AFRICAN AMERICAN: >60
GFR NON-AFRICAN AMERICAN: >60 ML/MIN/1.73
GLUCOSE BLD-MCNC: 314 MG/DL (ref 74–99)
POTASSIUM SERPL-SCNC: 4.5 MMOL/L (ref 3.5–5)
SODIUM BLD-SCNC: 135 MMOL/L (ref 132–146)
TOTAL PROTEIN: 7.6 G/DL (ref 6.4–8.3)
TSH SERPL DL<=0.05 MIU/L-ACNC: 5.49 UIU/ML (ref 0.27–4.2)

## 2019-10-09 PROCEDURE — 80053 COMPREHEN METABOLIC PANEL: CPT

## 2019-10-09 PROCEDURE — 84443 ASSAY THYROID STIM HORMONE: CPT

## 2019-10-09 PROCEDURE — 36415 COLL VENOUS BLD VENIPUNCTURE: CPT

## 2019-10-09 ASSESSMENT — ENCOUNTER SYMPTOMS: DYSPNEA ASSOCIATED WITH: EXERTION

## 2019-10-10 DIAGNOSIS — J42 CHRONIC BRONCHITIS, UNSPECIFIED CHRONIC BRONCHITIS TYPE (HCC): Primary | ICD-10-CM

## 2019-10-10 DIAGNOSIS — J45.909 SEVERE ASTHMA WITHOUT COMPLICATION, UNSPECIFIED WHETHER PERSISTENT: ICD-10-CM

## 2019-10-17 ENCOUNTER — CARE COORDINATION (OUTPATIENT)
Dept: CARE COORDINATION | Age: 64
End: 2019-10-17

## 2019-10-17 RX ORDER — TORSEMIDE 20 MG/1
TABLET ORAL
Qty: 60 TABLET | Refills: 5 | Status: SHIPPED
Start: 2019-10-17 | End: 2020-04-07 | Stop reason: SDUPTHER

## 2019-10-21 ENCOUNTER — OFFICE VISIT (OUTPATIENT)
Dept: PULMONOLOGY | Age: 64
End: 2019-10-21
Payer: MEDICARE

## 2019-10-21 VITALS
BODY MASS INDEX: 52.48 KG/M2 | WEIGHT: 315 LBS | HEIGHT: 65 IN | SYSTOLIC BLOOD PRESSURE: 113 MMHG | OXYGEN SATURATION: 93 % | DIASTOLIC BLOOD PRESSURE: 59 MMHG | RESPIRATION RATE: 18 BRPM | HEART RATE: 79 BPM

## 2019-10-21 DIAGNOSIS — J42 CHRONIC BRONCHITIS, UNSPECIFIED CHRONIC BRONCHITIS TYPE (HCC): Primary | ICD-10-CM

## 2019-10-21 DIAGNOSIS — G47.30 SLEEP APNEA, UNSPECIFIED TYPE: ICD-10-CM

## 2019-10-21 DIAGNOSIS — J45.909 SEVERE ASTHMA WITHOUT COMPLICATION, UNSPECIFIED WHETHER PERSISTENT: ICD-10-CM

## 2019-10-21 LAB
EXPIRATORY TIME: NORMAL SEC
FEF 25-75% %PRED-PRE: NORMAL L/SEC
FEF 25-75% PRED: NORMAL L/SEC
FEF 25-75%-PRE: NORMAL L/SEC
FENO: 9 PPB
FEV1 %PRED-PRE: 77 %
FEV1 PRED: 2.92 L
FEV1/FVC %PRED-PRE: 90 %
FEV1/FVC PRED: 78 %
FEV1/FVC: 70 %
FEV1: 2.26 L
FVC %PRED-PRE: 85 %
FVC PRED: 3.75 L
FVC: 3.21 L
PEF %PRED-PRE: NORMAL L/SEC
PEF PRED: NORMAL L/SEC
PEF-PRE: NORMAL L/SEC

## 2019-10-21 PROCEDURE — 6360000002 HC RX W HCPCS

## 2019-10-21 PROCEDURE — 3017F COLORECTAL CA SCREEN DOC REV: CPT | Performed by: INTERNAL MEDICINE

## 2019-10-21 PROCEDURE — 90732 PPSV23 VACC 2 YRS+ SUBQ/IM: CPT

## 2019-10-21 PROCEDURE — 94010 BREATHING CAPACITY TEST: CPT | Performed by: INTERNAL MEDICINE

## 2019-10-21 PROCEDURE — 3023F SPIROM DOC REV: CPT | Performed by: INTERNAL MEDICINE

## 2019-10-21 PROCEDURE — G0009 ADMIN PNEUMOCOCCAL VACCINE: HCPCS

## 2019-10-21 PROCEDURE — 1036F TOBACCO NON-USER: CPT | Performed by: INTERNAL MEDICINE

## 2019-10-21 PROCEDURE — G8427 DOCREV CUR MEDS BY ELIG CLIN: HCPCS | Performed by: INTERNAL MEDICINE

## 2019-10-21 PROCEDURE — 99214 OFFICE O/P EST MOD 30 MIN: CPT | Performed by: INTERNAL MEDICINE

## 2019-10-21 PROCEDURE — G8925 SPIR FEV1/FVC>=60% & NO COPD: HCPCS | Performed by: INTERNAL MEDICINE

## 2019-10-21 PROCEDURE — G8417 CALC BMI ABV UP PARAM F/U: HCPCS | Performed by: INTERNAL MEDICINE

## 2019-10-21 PROCEDURE — 99213 OFFICE O/P EST LOW 20 MIN: CPT | Performed by: INTERNAL MEDICINE

## 2019-10-21 PROCEDURE — G8598 ASA/ANTIPLAT THER USED: HCPCS | Performed by: INTERNAL MEDICINE

## 2019-10-21 PROCEDURE — G8484 FLU IMMUNIZE NO ADMIN: HCPCS | Performed by: INTERNAL MEDICINE

## 2019-10-21 RX ORDER — PRENATAL VIT 91/IRON/FOLIC/DHA 28-975-200
COMBINATION PACKAGE (EA) ORAL
Qty: 42 G | Refills: 1 | Status: SHIPPED
Start: 2019-10-21 | End: 2021-10-28 | Stop reason: ALTCHOICE

## 2019-10-21 ASSESSMENT — PULMONARY FUNCTION TESTS
FEV1/FVC_PERCENT_PREDICTED_PRE: 90
FENO: 9
FVC: 3.21
FEV1_PERCENT_PREDICTED_PRE: 77
FEV1/FVC_PREDICTED: 78
FEV1: 2.26
FEV1/FVC: 70
FVC_PREDICTED: 3.75
FEV1_PREDICTED: 2.92
FVC_PERCENT_PREDICTED_PRE: 85

## 2019-10-25 ENCOUNTER — CARE COORDINATION (OUTPATIENT)
Dept: CARE COORDINATION | Age: 64
End: 2019-10-25

## 2019-10-29 NOTE — PROGRESS NOTES
History:     Reason For Follow Up/Onc History:   1. Stage 1B ([id number]) invasive ductal carcinoma of right breast, lower outer quadrant, ER neg, AR neg, Her2 neg, Grade 3, Ki67 70%      HPI:   Sharri Cline presents for follow up of breast cancer and continuation of neoadjuvant chemotherapy. She's due for cycle 6 Taxol. Neupogen has been added. + neuropathy in hands and feet. Still able to do ADLs.     Onc history:   - She presented for screening mammo in 5/15/2019 which showed focal asymmetries in both left and right breast. Diagnostic mammogram and US showed no significant abn of left breast, and right breast 15 mm x 11 mm x 12 mm mass at 4:00, 5 CFN. Biopsy on 5/24/19 showed grade 3 IDC, triple negative, Ki67 70%.    - 7/3/19 started neoadjuvant chemotherapy with ddAC to be followed by Taxol.   - 8/8/19 admitted for neutropenic fever; admitted again with cycle 4.     History: I reviewed, confirmed and updated history (past medical, social, family) as necessary.    Medications    Current Outpatient Medications:     artificial tears (ISOPTO TEARS) 0.5 % ophthalmic solution, Place 1 drop into both eyes 4 (four) times daily., Disp: , Rfl:     carboxymethylcellulose sodium 1 % DpGe, Place 1 drop into both eyes every evening., Disp: , Rfl: 0    fish oil-omega-3 fatty acids 300-1,000 mg capsule, Take 2 g by mouth once daily., Disp: , Rfl:     magic mouthwash diphen/antac/lidoc/nysta, Take 10 mLs by mouth 4 (four) times daily., Disp: 120 mL, Rfl: 0    multivitamin with minerals tablet, Take 1 tablet by mouth once daily.  , Disp: , Rfl:     ondansetron (ZOFRAN-ODT) 8 MG TbDL, Take 1 tablet (8 mg total) by mouth every 8 (eight) hours as needed (nausea)., Disp: 45 tablet, Rfl: 1    potassium chloride SA (K-DUR,KLOR-CON) 20 MEQ tablet, Take 2 tablets (40 mEq total) by mouth once daily., Disp: 60 tablet, Rfl: 1  No current facility-administered medications for this visit.     Facility-Administered Medications  Physical Therapy      Room #:   0624/0624-02  Patient Name: Billy Lu    PHYSICAL THERAPY INITIAL EVALUATION     Tentative placement recommendation: Home Health Physical Therapy  vs pulmonary rehab  Equipment recommendation: Patient has needed equipment       Plan of care: Patient will be seen    daily  for therapeutic exercise, functional retraining, endurance activities, balance exercises, family and patient education. Nursing to ambulate patient in hallway; order entered. AM-PAC Basic Mobility        AM-PAC Mobility Inpatient   How much difficulty turning over in bed?: None  How much difficulty sitting down on / standing up from a chair with arms?: None  How much difficulty moving from lying on back to sitting on side of bed?: None  How much help from another person moving to and from a bed to a chair?: None  How much help from another person needed to walk in hospital room?: None  How much help from another person for climbing 3-5 steps with a railing?: None  AM-PAC Inpatient Mobility Raw Score : 24  AM-PAC Inpatient T-Scale Score : 61.14  Mobility Inpatient CMS 0-100% Score: 0  Mobility Inpatient CMS G-Code Modifier :     Order:  EVALUATE AND TREAT    Diagnosis/Problem list:    1. Atrial flutter with rapid ventricular response ([facility] [address]    2. Pneumonia of both lungs due to infectious organism, unspecified part of lung    3. Anticoagulated on Coumadin    4.  Acute on chronic congestive heart failure, unspecified heart failure type [facility])        Patient Active Problem List   Diagnosis    CAD (coronary artery disease)    Hypertension    Type 2 diabetes mellitus with hyperglycemia, with long-term current use of insulin ([facility]    Tobacco abuse    PAF (paroxysmal atrial fibrillation) ([facility])    S/P CABG x 2    History of mitral valve repair    Morbid obesity with BMI of 50.0-59.9, adult ([facility] [address]    Asthma    Chronic bronchitis ([facility] [address] [facility]    Sleep apnea    Asthmatic bronchitis , chronic ([facility] [address]    "Ordered in Other Visits:     sodium chloride 0.9% flush 10 mL, 10 mL, Intravenous, 1 time in Clinic/HOD, Dominique Ayala MD  Allergies  Review of patient's allergies indicates:  No Known Allergies  Review of Systems  Review of Systems   Constitutional: Positive for fatigue. Negative for activity change, appetite change, chills, fever and unexpected weight change.   HENT: Negative for congestion, hearing loss, mouth sores, nosebleeds, sinus pressure and trouble swallowing.    Eyes: Negative for pain, discharge, itching and visual disturbance.   Respiratory: Negative for cough, chest tightness and shortness of breath.    Cardiovascular: Negative for chest pain, palpitations and leg swelling.   Gastrointestinal: Negative for abdominal distention, abdominal pain, blood in stool, diarrhea, nausea, rectal pain and vomiting.   Endocrine: Negative for heat intolerance and polydipsia.   Genitourinary: Negative for difficulty urinating, dysuria, flank pain, frequency, hematuria and urgency.   Musculoskeletal: Negative for arthralgias, back pain and neck pain.   Skin: Negative for color change, pallor and rash.   Neurological: Positive for numbness. Negative for dizziness, light-headedness and headaches.   Hematological: Negative for adenopathy. Does not bruise/bleed easily.   Psychiatric/Behavioral: Negative for confusion and decreased concentration. The patient is not nervous/anxious.         Objective     Objective:     Vitals:    10/29/19 1038   BP: 113/62   BP Location: Right arm   Patient Position: Sitting   BP Method: Large (Automatic)   Pulse: 76   Resp: 18   Temp: 97.8 °F (36.6 °C)   TempSrc: Oral   SpO2: 100%   Weight: 61.6 kg (135 lb 12.9 oz)   Height: 5' 3" (1.6 m)     Body surface area is 1.65 meters squared.  Physical Exam   Constitutional: She is oriented to person, place, and time. She appears well-developed and well-nourished. No distress.   HENT:   Head: Normocephalic and atraumatic.   Mouth/Throat: " Gastroesophageal reflux disease without esophagitis    Mixed hyperlipidemia    Muscular deconditioning    Acute diastolic (congestive) heart failure (HCC)       Past medical history:       Diagnosis Date    Acid reflux     Acute diastolic (congestive) heart failure ([facility] [address] 6/19/2019    Arthritis     Asthma 4/16/2014    Asthmatic bronchitis , chronic (HCC) 11/28/2016    CAD (coronary artery disease)     [facility] 4/16/2014    COPD (chronic obstructive pulmonary disease) ([facility]     CB    COPD (chronic obstructive pulmonary disease) ([facility])     Diabetes mellitus ([facility])     Emphysema (subcutaneous) (surgical) resulting from a procedure     Hyperlipidemia     Hypertension     Hypoxemia requiring supplemental oxygen 2/2/2015    LONG TERM ANTICOAGULENT USE     Morbid obesity with BMI of 50.0-59.9, [facility] 11/27/2013    Obesity     Osteoarthritis     Sleep apnea     bilevel positive airway pressure at 13/8 with 2 L oxygen flow     Tobacco abuse     Type II or unspecified type diabetes mellitus without mention of complication, not stated as uncontrolled    ;      Procedure Laterality Date    COLONOSCOPY      CORONARY ANGIOPLASTY WITH STENT PLACEMENT  07-23-13    Left main focal eccentric 65% distal stenosis. Large OM1 CX 50% ostial & prox narrow. Large ramus artery & LAD: Minor plaque w/o sign narrow. RCA: Dom vessel w/25% prox narrow & focal hazy eccentric 99% distal stenosis before origin RPDA & RPLCA. LV: Mild inferior hypokinesis EF 55%. Probable mild AS with 10-15mmHg. Successful PCI distal RCA w/3.5 x 12 mm BMS, 0% res stenosis.  Normal distal runoff    CORONARY ARTERY BYPASS GRAFT  09-09-13    Dr Maria Del Rosario Pelletier; CABG x2, LIMA to LAD, SVG to ramus intermedius; MV repair using 30-mm Future complete ring with magic stitch between A3 and P3; rigid internal fixation of sternum using KLS plates x2; endoscopic vein harvesting of right lower extremity     ECHO COMPL W DOP COLOR FLOW  7/25/2013 Oropharynx is clear and moist and mucous membranes are normal. No oral lesions.   Eyes: Conjunctivae and EOM are normal. Right eye exhibits no discharge. Left eye exhibits no discharge. No scleral icterus.   Neck: Normal range of motion. Neck supple. No thyroid mass and no thyromegaly present.   Cardiovascular: Normal rate, regular rhythm, S1 normal, S2 normal and normal heart sounds.   Pulmonary/Chest: Effort normal and breath sounds normal. No respiratory distress. She has no wheezes. She has no rhonchi. She has no rales. Chest wall is not dull to percussion.   Right breast with indention of skin at 4:00 very, very minimal, no palpable mass - unchanged  Mediport   Abdominal: Soft. Normal appearance and bowel sounds are normal. There is no hepatosplenomegaly. There is no tenderness.   Musculoskeletal: She exhibits no tenderness.   Lymphadenopathy: No inguinal adenopathy noted on the right or left side.   Neurological: She is alert and oriented to person, place, and time. She has normal strength.   Skin: Skin is warm, dry and intact. No pallor.   Psychiatric: Her behavior is normal. Thought content normal.     Labs and Imaging  Results for orders placed or performed in visit on 10/29/19   CBC Oncology   Result Value Ref Range    WBC 3.22 (L) 3.90 - 12.70 K/uL    RBC 2.88 (L) 4.00 - 5.40 M/uL    Hemoglobin 8.9 (L) 12.0 - 16.0 g/dL    Hematocrit 28.0 (L) 37.0 - 48.5 %    Mean Corpuscular Volume 97 82 - 98 fL    Mean Corpuscular Hemoglobin 30.9 27.0 - 31.0 pg    Mean Corpuscular Hemoglobin Conc 31.8 (L) 32.0 - 36.0 g/dL    RDW 17.4 (H) 11.5 - 14.5 %    Platelets 151 150 - 350 K/uL    MPV 9.9 9.2 - 12.9 fL    Gran # (ANC) 1.7 (L) 1.8 - 7.7 K/uL    Immature Grans (Abs) 0.11 (H) 0.00 - 0.04 K/uL       Assessment     Assessment:     1. Stage 1B ([id number]) invasive ductal carcinoma of right breast, lower outer quadrant, ER neg, TX neg, Her2 neg, Grade 3, Ki67 70%   * 7/3/19 started neoadjuvant chemotherapy with four cycles   HERNIA REPAIR      SINUS SURGERY         The admitting diagnosis and active problem list as listed above have been reviewed prior to the initiation of this evaluation. Last time out of bed: today    Precautions: falls , monitor po2  Social history: Patient lives with spouse  in a two story home    with 4 steps to enter with Rail    Prior Level of Function: Patient ambulated independently    Equipment owned: None,       Mentation: alert, cooperative, oriented x 3  and follows directions,      ROM: wfl    STRENGTH: wfl    PAIN: (measured on a visual analog scale with 0=no pain and 10=excruciating pain) 0/10. FUNCTIONAL ASSESSMENT   Bed Mobility- Supine to sit- Independent          Scooting- Independent       Sit to supine- Independent      Transfers-Sit to stand- Independent     Gait:  Patient ambulated 75 feet x 4using no device with Independent      Steps:  5 steps with rail supervision     Balance: sit-good         stand good       Edema: no  Endurance: fair       Treatment: pt's po2 monitored on room air with all activities; ranged from 85-95% with frequent cues to breathe deeply and increase reading > 90%. Pt tends to hold breath when concentrating and also decreases with conversation  Therapist educated and facilitated patient on techniques to increase safety and independence with bed mobility, balance, functional transfers, and functional mobility. Sat EOB x 10 minutes to increase dynamic sitting balance and activity tolerance. Patient demonstrating good   understanding of education/techniques, requiring additional training/education. At end of session, patient sitting edge of bed with   call light and phone within reach,   all lines and tubes intact, nursing notified. Pt would benefit from continued skilled PT to increase functional independence and quality of life.        Rehab Potential: good  for baseline  Patients Goal: home      ASSESSMENT  Patient exhibits decreased strength, balance, coordination impairing functional mobility. GOALS to be met in 2 days. Bed mobility-  Independent        Transfers-Sit to stand-Independent     Gait:  Patient to ambulate 500 feet using   No device and maintain > 90% po2   Steps: Patient to go up and down 5  step(s) using 1 rail(s) with  Independent          Increase endurance to allow for improvement towards functional goals.         Donya Rossi, PT of dose-dense Adriamycin and cyclophosphamide with Neulasta support followed by twelve doses of weekly paclitaxel.   * Can consider adding Zometa every 6 months x 2 years in adjuvant setting.    * Continue weekly Taxol, 20% dose reduction given her recurrent significant cytopenias   - Cycle 6. Tolerating Taxol well. Tumor is shrinking    2. Chronically elevated alk phos and bilirubin   * Chronic; follows with Hepatology; prior liver biopsy 11/2018 with mild portal inflammation    3. Chemo anemia   * Required transfusion after cycle 4 ddAC.   * continue to monitor; transfuse if less than 8.    * Following.     4. Drug constipation   * Encouraged Senna S two nightly.     5. Neutropenia with neutropenic fever after cycle 3 and cycle 4 ddAC despite Neulasta.    * Continue neupogen on days 3 and 4.     6. Chemo thrombocytopenia    * Improving. Monitor.     7. Hypokalemia   * KCl    8. Chemo neuropathy   * Mild, monitor. May need to decrease dose in future but will monitor now.     Plan:     1. Cycle 6 weekly Taxol.   2. Continue neupogen 300 mcg days 3 and 4 each cycle.   3. Continue antiemetics.   4. Senna s  5. KCl - continue.   6. Monitor hemoglobin.   7. When nearing end of chemo, will send back to Dr Hernandez. Patient did not have MRI breasts prior to treatment, but US right axilla unremarkable. Discussed with patient today.     Already scheduled: weekly taxols through session 8 with MD visit every other week.  Add     Future Appointments   Date Time Provider Department Center   10/29/2019 11:00 AM Dominique Ayala MD [facility] HEM ONC [facility]   10/29/2019  1:00 PM [facility]MO [facility]   10/31/2019  7:30 AM [facility] US ENDOCRINOLOGY [facility] ENDCNUS Arnol Kymberly   10/31/2019  9:00 AM INJECTION, [facility] INFUSION [facility] CHE[facility]   11/1/2019  9:00 AM INJECTION, [facility] INF[facility]ION [facility] Cance   11/5/2019  1:00 PM INJECTION, [facility] INFUSION [facility] CHEMO Jordan Cance   11/5/2019  2:00 PM [facility]MO [facility] CHE[facility]  Jordan Cance   11/7/2019  9:00 AM INJECTION, [facility] INFUSION [facility] CHEMO Jordan Cance   11/8/2019  9:00 AM INJECTION, [facility] INFUSION [facility] CHEMO Jordan Cance   11/12/2019 10:30 AM INJECTION, [facility] INFUSION [facility] CHEMO Jordan Cance   11/12/2019 11:30 AM Dominique Ayala MD [facility] HEM ONC [facility] C[facility]   11/12/2019  1:00 PM [facility], [facility]MO [facility] CHEMO [facility] Cance   11/14/2019  9:00 AM INJECTION, [facility] INFUSION [facility] CHEMO Jordan Cance   11/15/2019  9:00 AM INJECTION, [facility] INFUSION [facility] CHEMO [facility] Cance   12/2/2019  2:00 PM [facility]     Add weekly taxol, cbc, cmp with day 3 and 4 neupogen (after each taxol) through 12/14 with MD on 11/26 and 12/10.

## 2019-11-01 DIAGNOSIS — E11.65 TYPE 2 DIABETES MELLITUS WITH HYPERGLYCEMIA, WITH LONG-TERM CURRENT USE OF INSULIN (HCC): ICD-10-CM

## 2019-11-01 DIAGNOSIS — E55.9 VITAMIN D DEFICIENCY: ICD-10-CM

## 2019-11-01 DIAGNOSIS — Z79.4 TYPE 2 DIABETES MELLITUS WITH HYPERGLYCEMIA, WITH LONG-TERM CURRENT USE OF INSULIN (HCC): ICD-10-CM

## 2019-11-03 RX ORDER — CHOLECALCIFEROL (VITAMIN D3) 1250 MCG
1 CAPSULE ORAL WEEKLY
Qty: 4 CAPSULE | Refills: 5 | Status: SHIPPED
Start: 2019-11-03 | End: 2020-12-15

## 2019-11-04 ENCOUNTER — CARE COORDINATION (OUTPATIENT)
Dept: CARE COORDINATION | Age: 64
End: 2019-11-04

## 2019-11-04 ASSESSMENT — ENCOUNTER SYMPTOMS: DYSPNEA ASSOCIATED WITH: EXERTION

## 2019-11-05 ENCOUNTER — OFFICE VISIT (OUTPATIENT)
Dept: FAMILY MEDICINE CLINIC | Age: 64
End: 2019-11-05
Payer: MEDICARE

## 2019-11-05 DIAGNOSIS — E11.65 TYPE 2 DIABETES MELLITUS WITH HYPERGLYCEMIA, WITH LONG-TERM CURRENT USE OF INSULIN (HCC): Primary | ICD-10-CM

## 2019-11-05 DIAGNOSIS — Z79.4 TYPE 2 DIABETES MELLITUS WITH HYPERGLYCEMIA, WITH LONG-TERM CURRENT USE OF INSULIN (HCC): Primary | ICD-10-CM

## 2019-11-05 DIAGNOSIS — G89.29 CHRONIC RIGHT SHOULDER PAIN: Primary | ICD-10-CM

## 2019-11-05 DIAGNOSIS — M25.511 CHRONIC RIGHT SHOULDER PAIN: Primary | ICD-10-CM

## 2019-11-05 PROCEDURE — G8427 DOCREV CUR MEDS BY ELIG CLIN: HCPCS | Performed by: FAMILY MEDICINE

## 2019-11-05 PROCEDURE — 3046F HEMOGLOBIN A1C LEVEL >9.0%: CPT | Performed by: FAMILY MEDICINE

## 2019-11-05 PROCEDURE — 99212 OFFICE O/P EST SF 10 MIN: CPT | Performed by: FAMILY MEDICINE

## 2019-11-05 PROCEDURE — G8417 CALC BMI ABV UP PARAM F/U: HCPCS | Performed by: FAMILY MEDICINE

## 2019-11-05 PROCEDURE — 1036F TOBACCO NON-USER: CPT | Performed by: FAMILY MEDICINE

## 2019-11-05 PROCEDURE — G8598 ASA/ANTIPLAT THER USED: HCPCS | Performed by: FAMILY MEDICINE

## 2019-11-05 PROCEDURE — 3017F COLORECTAL CA SCREEN DOC REV: CPT | Performed by: FAMILY MEDICINE

## 2019-11-05 PROCEDURE — 2022F DILAT RTA XM EVC RTNOPTHY: CPT | Performed by: FAMILY MEDICINE

## 2019-11-05 PROCEDURE — G8484 FLU IMMUNIZE NO ADMIN: HCPCS | Performed by: FAMILY MEDICINE

## 2019-11-05 RX ORDER — GLIMEPIRIDE 4 MG/1
4 TABLET ORAL 2 TIMES DAILY
Qty: 60 TABLET | Refills: 3 | Status: SHIPPED | OUTPATIENT
Start: 2019-11-05 | End: 2019-11-06 | Stop reason: SDUPTHER

## 2019-11-05 RX ORDER — GLIMEPIRIDE 4 MG/1
4 TABLET ORAL 2 TIMES DAILY
Qty: 30 TABLET | Refills: 5 | Status: SHIPPED | OUTPATIENT
Start: 2019-11-05 | End: 2019-11-06

## 2019-11-06 RX ORDER — GLIMEPIRIDE 4 MG/1
4 TABLET ORAL 2 TIMES DAILY
Qty: 60 TABLET | Refills: 3 | OUTPATIENT
Start: 2019-11-06 | End: 2020-01-02

## 2019-11-06 ASSESSMENT — ENCOUNTER SYMPTOMS
BLOOD IN STOOL: 0
ABDOMINAL DISTENTION: 0
BACK PAIN: 0
STRIDOR: 0
CHOKING: 0
VOICE CHANGE: 0
SORE THROAT: 0
COLOR CHANGE: 0
APNEA: 0
COUGH: 0
EYE ITCHING: 0
SINUS PRESSURE: 0
ANAL BLEEDING: 0
EYE REDNESS: 0
EYE PAIN: 0
SHORTNESS OF BREATH: 0
VOMITING: 0
EYE DISCHARGE: 0
CONSTIPATION: 0
HEARTBURN: 1
RECTAL PAIN: 0
WHEEZING: 0
PHOTOPHOBIA: 0
ABDOMINAL PAIN: 0
CHEST TIGHTNESS: 0
RHINORRHEA: 0
FACIAL SWELLING: 0
DIARRHEA: 0
NAUSEA: 0
TROUBLE SWALLOWING: 0

## 2019-11-08 ENCOUNTER — TELEPHONE (OUTPATIENT)
Dept: FAMILY MEDICINE CLINIC | Age: 64
End: 2019-11-08

## 2019-11-11 ENCOUNTER — HOSPITAL ENCOUNTER (OUTPATIENT)
Dept: OTHER | Age: 64
Setting detail: THERAPIES SERIES
Discharge: HOME | End: 2019-11-11
Payer: MEDICARE

## 2019-11-11 VITALS
SYSTOLIC BLOOD PRESSURE: 112 MMHG | BODY MASS INDEX: 50.62 KG/M2 | WEIGHT: 315 LBS | DIASTOLIC BLOOD PRESSURE: 64 MMHG | HEART RATE: 78 BPM | OXYGEN SATURATION: 98 % | RESPIRATION RATE: 20 BRPM | HEIGHT: 66 IN

## 2019-11-11 DIAGNOSIS — M19.011 ARTHROPATHY OF RIGHT SHOULDER: Primary | ICD-10-CM

## 2019-11-11 PROCEDURE — 99214 OFFICE O/P EST MOD 30 MIN: CPT

## 2019-11-15 ENCOUNTER — OFFICE VISIT (OUTPATIENT)
Dept: ORTHOPEDIC SURGERY | Age: 64
End: 2019-11-15
Payer: MEDICARE

## 2019-11-15 ENCOUNTER — TELEPHONE (OUTPATIENT)
Dept: ORTHOPEDIC SURGERY | Age: 64
End: 2019-11-15

## 2019-11-15 VITALS — BODY MASS INDEX: 50.62 KG/M2 | WEIGHT: 315 LBS | HEIGHT: 66 IN

## 2019-11-15 DIAGNOSIS — M75.101 TEAR OF RIGHT ROTATOR CUFF, UNSPECIFIED TEAR EXTENT, UNSPECIFIED WHETHER TRAUMATIC: Primary | ICD-10-CM

## 2019-11-15 PROCEDURE — G8417 CALC BMI ABV UP PARAM F/U: HCPCS | Performed by: ORTHOPAEDIC SURGERY

## 2019-11-15 PROCEDURE — 99203 OFFICE O/P NEW LOW 30 MIN: CPT | Performed by: ORTHOPAEDIC SURGERY

## 2019-11-15 PROCEDURE — G8484 FLU IMMUNIZE NO ADMIN: HCPCS | Performed by: ORTHOPAEDIC SURGERY

## 2019-11-15 PROCEDURE — 3017F COLORECTAL CA SCREEN DOC REV: CPT | Performed by: ORTHOPAEDIC SURGERY

## 2019-11-15 PROCEDURE — 1036F TOBACCO NON-USER: CPT | Performed by: ORTHOPAEDIC SURGERY

## 2019-11-15 PROCEDURE — G8427 DOCREV CUR MEDS BY ELIG CLIN: HCPCS | Performed by: ORTHOPAEDIC SURGERY

## 2019-11-15 PROCEDURE — G8598 ASA/ANTIPLAT THER USED: HCPCS | Performed by: ORTHOPAEDIC SURGERY

## 2019-11-18 ENCOUNTER — CARE COORDINATION (OUTPATIENT)
Dept: CARE COORDINATION | Age: 64
End: 2019-11-18

## 2019-11-18 ENCOUNTER — TELEPHONE (OUTPATIENT)
Dept: FAMILY MEDICINE CLINIC | Age: 64
End: 2019-11-18

## 2019-12-02 RX ORDER — FAMOTIDINE 20 MG/1
20 TABLET, FILM COATED ORAL 2 TIMES DAILY
Qty: 60 TABLET | Refills: 5 | Status: SHIPPED
Start: 2019-12-02 | End: 2020-06-15

## 2019-12-30 ENCOUNTER — HOSPITAL ENCOUNTER (OUTPATIENT)
Dept: OTHER | Age: 64
Setting detail: THERAPIES SERIES
Discharge: HOME | End: 2019-12-30
Payer: MEDICARE

## 2019-12-30 VITALS
HEIGHT: 66 IN | DIASTOLIC BLOOD PRESSURE: 62 MMHG | BODY MASS INDEX: 50.62 KG/M2 | RESPIRATION RATE: 18 BRPM | OXYGEN SATURATION: 92 % | SYSTOLIC BLOOD PRESSURE: 120 MMHG | WEIGHT: 315 LBS | HEART RATE: 69 BPM

## 2019-12-30 PROCEDURE — 99214 OFFICE O/P EST MOD 30 MIN: CPT

## 2020-01-02 ENCOUNTER — OFFICE VISIT (OUTPATIENT)
Dept: FAMILY MEDICINE CLINIC | Age: 65
End: 2020-01-02
Payer: MEDICARE

## 2020-01-02 VITALS
HEIGHT: 66 IN | WEIGHT: 315 LBS | OXYGEN SATURATION: 97 % | BODY MASS INDEX: 50.62 KG/M2 | DIASTOLIC BLOOD PRESSURE: 74 MMHG | HEART RATE: 77 BPM | SYSTOLIC BLOOD PRESSURE: 118 MMHG | RESPIRATION RATE: 18 BRPM

## 2020-01-02 LAB — HBA1C MFR BLD: 9 %

## 2020-01-02 PROCEDURE — 99214 OFFICE O/P EST MOD 30 MIN: CPT | Performed by: FAMILY MEDICINE

## 2020-01-02 PROCEDURE — 3017F COLORECTAL CA SCREEN DOC REV: CPT | Performed by: FAMILY MEDICINE

## 2020-01-02 PROCEDURE — G8427 DOCREV CUR MEDS BY ELIG CLIN: HCPCS | Performed by: FAMILY MEDICINE

## 2020-01-02 PROCEDURE — 2022F DILAT RTA XM EVC RTNOPTHY: CPT | Performed by: FAMILY MEDICINE

## 2020-01-02 PROCEDURE — G8417 CALC BMI ABV UP PARAM F/U: HCPCS | Performed by: FAMILY MEDICINE

## 2020-01-02 PROCEDURE — 83036 HEMOGLOBIN GLYCOSYLATED A1C: CPT | Performed by: FAMILY MEDICINE

## 2020-01-02 PROCEDURE — G8484 FLU IMMUNIZE NO ADMIN: HCPCS | Performed by: FAMILY MEDICINE

## 2020-01-02 PROCEDURE — 1036F TOBACCO NON-USER: CPT | Performed by: FAMILY MEDICINE

## 2020-01-02 RX ORDER — PANTOPRAZOLE SODIUM 40 MG/1
40 TABLET, DELAYED RELEASE ORAL 2 TIMES DAILY
Qty: 60 TABLET | Refills: 4 | Status: SHIPPED
Start: 2020-01-02 | End: 2020-06-08

## 2020-01-02 RX ORDER — INSULIN GLARGINE 100 [IU]/ML
60 INJECTION, SOLUTION SUBCUTANEOUS NIGHTLY
Qty: 3 VIAL | Refills: 5 | Status: SHIPPED
Start: 2020-01-02 | End: 2020-04-02 | Stop reason: SDUPTHER

## 2020-01-02 RX ORDER — PRAMIPEXOLE DIHYDROCHLORIDE 0.25 MG/1
TABLET ORAL
Qty: 30 TABLET | Refills: 0 | Status: SHIPPED
Start: 2020-01-02 | End: 2020-02-07

## 2020-01-02 RX ORDER — DIAZEPAM 10 MG/1
TABLET ORAL
Qty: 2 TABLET | Refills: 0 | Status: SHIPPED | OUTPATIENT
Start: 2020-01-02 | End: 2020-02-02

## 2020-01-02 RX ORDER — ATORVASTATIN CALCIUM 80 MG/1
80 TABLET, FILM COATED ORAL NIGHTLY
Qty: 30 TABLET | Refills: 5 | Status: SHIPPED
Start: 2020-01-02 | End: 2020-09-10

## 2020-01-02 RX ORDER — MONTELUKAST SODIUM 10 MG/1
10 TABLET ORAL NIGHTLY
Qty: 30 TABLET | Refills: 4 | Status: SHIPPED
Start: 2020-01-02 | End: 2020-06-08

## 2020-01-02 ASSESSMENT — PATIENT HEALTH QUESTIONNAIRE - PHQ9
1. LITTLE INTEREST OR PLEASURE IN DOING THINGS: 0
SUM OF ALL RESPONSES TO PHQ QUESTIONS 1-9: 0
SUM OF ALL RESPONSES TO PHQ QUESTIONS 1-9: 0
SUM OF ALL RESPONSES TO PHQ9 QUESTIONS 1 & 2: 0
2. FEELING DOWN, DEPRESSED OR HOPELESS: 0

## 2020-01-03 ENCOUNTER — TELEPHONE (OUTPATIENT)
Dept: FAMILY MEDICINE CLINIC | Age: 65
End: 2020-01-03

## 2020-01-06 ASSESSMENT — ENCOUNTER SYMPTOMS
CHOKING: 0
SORE THROAT: 0
SHORTNESS OF BREATH: 0
COLOR CHANGE: 0
CHEST TIGHTNESS: 0
SINUS PRESSURE: 0
PHOTOPHOBIA: 0
DIARRHEA: 0
TROUBLE SWALLOWING: 0
ANAL BLEEDING: 0
ABDOMINAL DISTENTION: 0
EYE REDNESS: 0
RHINORRHEA: 0
ABDOMINAL PAIN: 0
CONSTIPATION: 0
COUGH: 0
VOICE CHANGE: 0
NAUSEA: 0
APNEA: 0
RECTAL PAIN: 0
WHEEZING: 0
STRIDOR: 0
VOMITING: 0
FACIAL SWELLING: 0
BACK PAIN: 1
BLOOD IN STOOL: 0
EYE DISCHARGE: 0
EYE PAIN: 0
EYE ITCHING: 0
HEARTBURN: 1

## 2020-01-07 ENCOUNTER — TELEPHONE (OUTPATIENT)
Dept: FAMILY MEDICINE CLINIC | Age: 65
End: 2020-01-07

## 2020-01-07 ENCOUNTER — HOSPITAL ENCOUNTER (OUTPATIENT)
Dept: SLEEP CENTER | Age: 65
Discharge: HOME | End: 2020-01-07
Payer: MEDICARE

## 2020-01-07 PROCEDURE — 95811 POLYSOM 6/>YRS CPAP 4/> PARM: CPT | Performed by: INTERNAL MEDICINE

## 2020-01-07 PROCEDURE — 2700000000 HC OXYGEN THERAPY PER DAY

## 2020-01-07 PROCEDURE — 95811 POLYSOM 6/>YRS CPAP 4/> PARM: CPT

## 2020-01-07 NOTE — PROGRESS NOTES
Jose Francisco Reyes is a 59 y.o. male  . Subjective:      Diabetes   He presents for his follow-up diabetic visit. He has type 2 diabetes mellitus. No MedicAlert identification noted. His disease course has been worsening. There are no hypoglycemic associated symptoms. Pertinent negatives for hypoglycemia include no confusion, dizziness, headaches, nervousness/anxiousness, pallor, seizures, speech difficulty or tremors. Associated symptoms include fatigue and foot paresthesias. Pertinent negatives for diabetes include no chest pain, no polydipsia, no polyphagia, no polyuria and no weakness. There are no hypoglycemic complications. Symptoms are worsening. Diabetic complications include heart disease and peripheral neuropathy. Risk factors for coronary artery disease include dyslipidemia, diabetes mellitus and hypertension. Current diabetic treatment includes oral agent (dual therapy) and insulin injections. He is compliant with treatment most of the time. His weight is increasing steadily. He is following a generally unhealthy diet. When asked about meal planning, he reported none. He has had a previous visit with a dietitian. He participates in exercise intermittently. His home blood glucose trend is decreasing steadily. An ACE inhibitor/angiotensin II receptor blocker is being taken. He does not see a podiatrist.Eye exam is current. Other   This is a chronic (restless leg) problem. The current episode started more than 1 year ago. The problem occurs daily. The problem has been waxing and waning. Associated symptoms include arthralgias, fatigue and urinary symptoms. Pertinent negatives include no abdominal pain, chest pain, chills, congestion, coughing, diaphoresis, fever, headaches, joint swelling, myalgias, nausea, neck pain, numbness, rash, sore throat, vomiting or weakness. Nothing aggravates the symptoms. He has tried nothing for the symptoms. The treatment provided no relief.    Fatigue   This is a chronic problem. The current episode started more than 1 year ago. The problem occurs daily. The problem has been waxing and waning. Associated symptoms include arthralgias, fatigue and urinary symptoms. Pertinent negatives include no abdominal pain, chest pain, chills, congestion, coughing, diaphoresis, fever, headaches, joint swelling, myalgias, nausea, neck pain, numbness, rash, sore throat, vomiting or weakness. Nothing aggravates the symptoms. He has tried nothing for the symptoms. The treatment provided no relief. Hyperlipidemia   This is a chronic problem. The current episode started more than 1 year ago. The problem is resistant. Recent lipid tests were reviewed and are variable. Pertinent negatives include no chest pain, myalgias or shortness of breath. Current antihyperlipidemic treatment includes statins. The current treatment provides moderate improvement of lipids. There are no compliance problems. Risk factors for coronary artery disease include dyslipidemia, diabetes mellitus and hypertension. Hypertension   This is a chronic problem. The current episode started more than 1 year ago. The problem has been waxing and waning since onset. The problem is uncontrolled. Pertinent negatives include no chest pain, headaches, neck pain, palpitations or shortness of breath. Risk factors for coronary artery disease include diabetes mellitus and dyslipidemia. Past treatments include beta blockers, diuretics and angiotensin blockers. The current treatment provides moderate improvement. There are no compliance problems. Gastroesophageal Reflux   He complains of heartburn. He reports no abdominal pain, no chest pain, no choking, no coughing, no nausea, no sore throat or no wheezing. This is a recurrent problem. The current episode started more than 1 month ago. The problem occurs occasionally. The problem has been waxing and waning. The heartburn duration is less than a minute.  The heartburn is located in the weakness, light-headedness, numbness and headaches. Hematological: Negative for adenopathy. Does not bruise/bleed easily. Psychiatric/Behavioral: Negative for agitation, behavioral problems, confusion, decreased concentration, dysphoric mood, hallucinations, self-injury, sleep disturbance and suicidal ideas. The patient is not nervous/anxious and is not hyperactive.         Past Medical History:   Diagnosis Date    Acid reflux     Acute diastolic (congestive) heart failure ([facility] 2019    Arthritis     Asthma 2014    Asthmatic bronchitis , chronic (HCC) 2016    CAD (coronary artery disease)     Chronic bronchitis ([facility] 2014    COPD (chronic obstructive pulmonary disease) ([facility]     CB    COPD (chronic obstructive pulmonary disease) ([facility]     Diabetes mellitus ([facility])     Emphysema (subcutaneous) (surgical) resulting from a procedure     Hyperlipidemia     Hypertension     Hypoxemia requiring supplemental oxygen 2015    LONG TERM ANTICOAGULENT USE     Morbid obesity with BMI of 50.0-59.9, adult ([facility] 2013    Obesity     Osteoarthritis     Sleep apnea     bilevel positive airway pressure at 13/8 with 2 L oxygen flow     Tobacco abuse     Type II or unspecified type diabetes mellitus without mention of complication, not stated as uncontrolled        Social History     Socioeconomic History    Marital status:      Spouse name: Cait Michelle Number of children: 1    Years of education: Not on file    Highest education level: 11th grade   Occupational History    Not on file   Social Needs    Financial resource strain: Somewhat hard    Food insecurity:     Worry: Sometimes true     Inability: Never true   [employer] needs:     Medical: No     Non-medical: No   Tobacco Use    Smoking status: Former Smoker     Packs/day: 1.00     Years: 45.00     Pack years: 45.00     Types: Cigarettes     Last attempt to quit: 2013     Years since quittin.4    (PEPCID) 20 MG tablet Take 1 tablet by mouth 2 times daily 60 tablet 5    blood glucose test strips (FREESTYLE TEST STRIPS) strip 1 each by In Vitro route 3 times daily As needed. 300 each 3    metFORMIN (GLUCOPHAGE) 500 MG tablet TAKE ONE TABLET BY MOUTH TWICE A DAY WITH MEALS 60 tablet 4    Cholecalciferol (VITAMIN D3) 1.25 MG (26247 UT) CAPS Take 1 capsule by mouth once a week Wednesday 4 capsule 5    terbinafine (ATHLETES FOOT) 1 % cream Apply topically 2 times daily. 42 g 1    torsemide (DEMADEX) 20 MG tablet TAKE ONE TABLET BY MOUTH TWO TIMES A DAY 60 tablet 5    gabapentin (NEURONTIN) 600 MG tablet TAKE ONE TABLET BY MOUTH TWO TIMES A DAY 60 tablet 5    warfarin (JANTOVEN) 5 MG tablet TAKE 2 TABLETS BY MOUTH DAILY ON MONDAY, WEDNESDAY, FRIDAY AND SATURDAY, THEN TAKE 1 TABLET DAILY ON TUESDAY, THURSDAY AND SUNDAY 50 tablet 4    spironolactone (ALDACTONE) 25 MG tablet Take 1 tablet by mouth 2 times daily 180 tablet 1    metoprolol succinate (TOPROL XL) 25 MG extended release tablet TAKE ONE TABLET BY MOUTH EVERY DAY 90 tablet 5    Insulin Syringe-Needle U-100 27G X 5/8\" 1 ML MISC Use as directed 100 each 11    insulin lispro (HUMALOG) 100 UNIT/ML injection vial Inject 0-18 Units into the skin 3 times daily (with meals) **High Dose Corrective Algorithm**   Glucose: Dose:                No Insulin   140-199           3 Units   200-249 6 Units   250-299 9 Units   300-349 12 Units   350-400 15 Units   Over 400 18 Units   MAX 70 units daily 1 vial 3    losartan (COZAAR) 25 MG tablet Take 0.5 tablets by mouth daily 30 tablet 3    SYMBICORT 160-4.5 MCG/ACT AERO Inhale 2 puffs into the lungs 2 times daily 1 Inhaler 3    CPAP Machine MISC 1 each by Does not apply route nightly as needed       Accu-Chek Multiclix Lancets MISC Use as directed 100 each 3    aspirin 81 MG tablet Take 81 mg by mouth nightly        No current facility-administered medications on file prior to visit.         Allergies Allergen Reactions    Pcn [Penicillins]      Child went to hospital   ? Reaction  Patient tolerates cephalosporins    Sulfa Antibiotics      ? I have reviewed his allergies, medications, problem list, medical,social and family history and have updated as needed in the electronic medical record. Objective:     Physical Exam  Vitals signs and nursing note reviewed. Constitutional:       General: He is not in acute distress. Appearance: He is well-developed. He is not diaphoretic. HENT:      Head: Normocephalic and atraumatic. Right Ear: External ear normal.      Left Ear: External ear normal.      Nose: Nose normal.      Mouth/Throat:      Pharynx: No oropharyngeal exudate. Eyes:      General: No scleral icterus. Right eye: No discharge. Left eye: No discharge. Conjunctiva/sclera: Conjunctivae normal.      Pupils: Pupils are equal, round, and reactive to light. Neck:      Musculoskeletal: Normal range of motion and neck supple. Thyroid: No thyromegaly. Vascular: No JVD. Trachea: No tracheal deviation. Cardiovascular:      Rate and Rhythm: Normal rate and regular rhythm. Heart sounds: Normal heart sounds. No murmur. No friction rub. No gallop. Pulmonary:      Effort: Pulmonary effort is normal. No respiratory distress. Breath sounds: Normal breath sounds. No stridor. No wheezing or rales. Chest:      Chest wall: No tenderness. Abdominal:      General: Bowel sounds are normal. There is no distension. Palpations: Abdomen is soft. There is no mass. Tenderness: There is no tenderness. There is no guarding or rebound. Genitourinary:     Comments: Deferred by patient  Musculoskeletal:      Comments: Right shoulder arthropathy    Lymphadenopathy:      Cervical: No cervical adenopathy. Skin:     General: Skin is warm and dry. Coloration: Skin is not pale. Findings: No erythema or rash.    Neurological:      Mental Status: He is alert and oriented to person, place, and time. Cranial Nerves: No cranial nerve deficit. Motor: No abnormal muscle tone. Coordination: Coordination normal.      Deep Tendon Reflexes: Reflexes are normal and symmetric. Reflexes normal.   Psychiatric:         Behavior: Behavior normal.         Thought Content: Thought content normal.         Judgment: Judgment normal.         Assessment / Plan:   Vahid Quinn was seen today for diabetes. Diagnoses and all orders for this visit:    Type 2 diabetes mellitus with hyperglycemia, with long-term current use of insulin ([facility])  -     POCT glycosylated hemoglobin (Hb A1C)  -     insulin glargine (LANTUS) 100 UNIT/ML injection vial; Inject 60 Units into the skin nightly    Restless leg syndrome  -     pramipexole (MIRAPEX) 0.25 MG tablet; TAKE ONE TABLET BY MOUTH EVERY NIGHT    Seasonal allergic rhinitis due to pollen  -     montelukast (SINGULAIR) 10 MG tablet; Take 1 tablet by mouth nightly    Arthropathy of right shoulder  -     diazepam (VALIUM) 10 MG tablet; Take one 30 min and 10 min prior to MRI    Gastroesophageal reflux disease without esophagitis  -     pantoprazole (PROTONIX) 40 MG tablet; Take 1 tablet by mouth 2 times daily    Erectile dysfunction, unspecified erectile dysfunction type  -     External Referral To Urology    Benign prostatic hyperplasia with urinary hesitancy  -     External Referral To Urology    Hypotestosteronism  -     External Referral To Urology    Mixed hyperlipidemia  -     atorvastatin (LIPITOR) 80 MG tablet; Take 1 tablet by mouth nightly        Reviewed healthmaintenance report. Patient is aware of deficiencies and suggested preventative tests.

## 2020-01-07 NOTE — TELEPHONE ENCOUNTER
Pt called stating he is unable to get his MRI done at [facility] [address] [place] [address] due to his weight and could not fit in the machine due to his shoulders. Pt was recommended to have an open MRI at [facility] on [facility]. Fax # [phone]- Gabriel Alonso    Is this okay to schedule?

## 2020-01-10 NOTE — PROGRESS NOTES
[address]                               SLEEP STUDY REPORT    PATIENT NAME: Randy Sanchez                   :        1955  MED REC NO:   [id number]                            ROOM:  ACCOUNT NO:   [de-identified]                           ADMIT DATE: 2020  PROVIDER:     Jaime Chavez DO    DATE OF STUDY:  2020      INDICATION:  Polysomnography in a 77-year-old male, 65 inches, 323  pounds with a BMI of 53. The patient with known sleep apnea on 14 cm of  water pressure, but difficulty with pressures, re-evaluation requested. After appropriate setup, sleep study reveals a total recording time of  167 minutes, sleep period time of 158 minutes, total sleep time of 115  minutes, sleep efficiency of 68%, sleep latency of 9.2 minutes with no  REM periods achieved. SLEEP STAGES:  40.3% in N1, 59.7% in N2, 0% in N3, 0% in REM. RESPIRATORY SUMMARY:  89 apneas, 68 obstructive, 20 central and 1 mixed  with 79 hypopneas, all obstructive in nature giving an apnea-hypopnea  index of 87. BODY POSITION:  14.3% in supine position, 85.7% in the right lateral  decubitus position. OXYGENATION:  Highest oxygen saturation 97%, average oxygen saturation  87% with lowest oxygen saturation of 69%. The patient is spending only  27.6% of total sleep study with adequate oxygenation and 63% of the  time, the patient with severe hypoxemia indicated by saturations less  than 89%. Of that desaturation percentage, 6.7% of the time, the  saturations were lower than 79%. ELECTROCARDIOGRAM:  Awake heart rate was 90 beats per minute. Steady  average sleep heart rate was 85 beats per minute. Lowest heart rate 78  beats per minute.     TECHNICAL COMMENTS:  Initially, the study of the index at oxygenation  were abnormal but CPAP was re-instituted using a medium ResMed AirFit  N30 nasal cushion mask, re-titration with the addition of 4 liters of  oxygen was added. Titration analysis reveals sleep efficiency remains low at 45% with  sleep latency improved to 31 minutes. There were 2 REM periods achieved  which is improved 46.6% at the time REM was noted. He was also able to  enter N3 stage sleep at 22.9% of the study on the titration, index  improved with positive airway pressure. Oxygenation remains difficult  with persistent hypoxemia, thus supplemental oxygen was added. TITRATION:  CPAP was started at 5 cm of water pressure and increased to  14 with only 39 minutes of recording time identified. The  apnea-hypopnea index reduced from 89 to 0 with the lowest sat 85%  requiring 4 liters of oxygen to be added. IMPRESSION:  1. Extreme obesity based on the current BMI. 2.  Known sleep apnea with worsening symptoms. 3.  Significant comorbid medical problems including diabetes,  hypertension, and COPD. 4.  Severely reduced sleep efficiency. 5.  Hypersomnolence indicated by sleep onset of less than 10 minutes due  to sleep disorder. 6.  Fragmented sleep, improved with positive airway pressure. 7.  Severe sleep apnea syndrome with an index of 87.  8.  Severe nocturnal hypoxemia over 60% of the sleep time. RECOMMENDATIONS:  1. Aggressive weight loss management may improve the patient's current  sleep disorder, but not eliminate it. 2.  Start positive airway pressure of 14 cm of water pressure with 4  liters of oxygen using a medium ResMed AirFit N30 nasal cushion mask  with heating verification and ramp. 3.  Please advise the patient not to drive and/or operate heavy  machinery and/or do anything that may cause harm if sleepy or tired. 4.  Discussed proper sleep hygiene.         Karin Ron DO    D: 01/09/2020 15:01:51       T: 01/10/2020 1:48:18     [email or web address]  Job#: [id number]     Doc#: [id number]    CC:

## 2020-01-17 RX ORDER — SPIRONOLACTONE 25 MG/1
TABLET ORAL
Qty: 180 TABLET | Refills: 0 | Status: SHIPPED
Start: 2020-01-17 | End: 2020-04-20

## 2020-01-27 ENCOUNTER — HOSPITAL ENCOUNTER (OUTPATIENT)
Dept: OTHER | Age: 65
Setting detail: THERAPIES SERIES
Discharge: HOME | End: 2020-01-27
Payer: MEDICARE

## 2020-01-27 VITALS
DIASTOLIC BLOOD PRESSURE: 72 MMHG | OXYGEN SATURATION: 95 % | BODY MASS INDEX: 50.62 KG/M2 | RESPIRATION RATE: 18 BRPM | SYSTOLIC BLOOD PRESSURE: 128 MMHG | HEIGHT: 66 IN | WEIGHT: 315 LBS | HEART RATE: 114 BPM

## 2020-01-27 PROCEDURE — 99214 OFFICE O/P EST MOD 30 MIN: CPT

## 2020-01-27 NOTE — PLAN OF CARE
Problem:  Activity:  Goal: Capacity to carry out activities will improve  Description  Capacity to carry out activities will improve  Outcome: Ongoing     Problem: Cardiac:  Goal: Hemodynamic stability will improve  Description  Hemodynamic stability will improve  Outcome: Ongoing  Goal: Ability to maintain an adequate cardiac output will improve  Description  Ability to maintain an adequate cardiac output will improve  Outcome: Ongoing     Problem: Coping:  Goal: Verbalizations of decreased anxiety will decrease  Description  Verbalizations of decreased anxiety will decrease  Outcome: Ongoing     Problem: Fluid Volume:  Goal: Risk for excess fluid volume will decrease  Description  Risk for excess fluid volume will decrease  Outcome: Ongoing  Goal: Maintenance of adequate hydration will improve  Description  Maintenance of adequate hydration will improve  Outcome: Ongoing  Goal: Will show no signs and symptoms of electrolyte imbalance  Description  Will show no signs and symptoms of electrolyte imbalance  Outcome: Ongoing     Problem: Health Behavior:  Goal: Ability to manage health-related needs will improve  Description  Ability to manage health-related needs will improve  Outcome: Ongoing     Problem: Nutritional:  Goal: Maintenance of adequate nutrition will improve  Description  Maintenance of adequate nutrition will improve  Outcome: Ongoing     Problem: Physical Regulation:  Goal: Complications related to the disease process, condition or treatment will be avoided or minimized  Description  Complications related to the disease process, condition or treatment will be avoided or minimized  Outcome: Ongoing     Problem: Respiratory:  Goal: Ability to maintain adequate ventilation will improve  Description  Ability to maintain adequate ventilation will improve  Outcome: Ongoing  Goal: Respiratory status will improve  Description  Respiratory status will improve  Outcome: Ongoing

## 2020-01-27 NOTE — PROGRESS NOTES
Belenda Schwab      YOB: 1955  CHF Clinic Visit Date: 1/27/2020    Belenda Schwab presented to the CHF clinic for his monthly outpatient follow up. Cory Lofton is doing well. He has started exercising earlier this month, and states his breathing has improved. He has lost seven pounds since his last appointment, and states he is doing better with his diet. He asked that his appointments be scheduled every two months, but was asked to call if he requires an earlier appointment. He verbalized understanding. Allergies   Allergen Reactions    Pcn [Penicillins]      Child went to hospital   ? Reaction  Patient tolerates cephalosporins    Sulfa Antibiotics      ? PATIENT REPORTED MEDICATION LIST:   Prior to Admission medications    Medication Sig Start Date End Date Taking? Authorizing Provider   spironolactone (ALDACTONE) 25 MG tablet TAKE ONE TABLET BY MOUTH TWO TIMES A DAY 1/17/20  Yes Lamonte Marquez MD   pramipexole (MIRAPEX) 0.25 MG tablet TAKE ONE TABLET BY MOUTH EVERY NIGHT 1/2/20  Yes Tomy Rankin DO   montelukast (SINGULAIR) 10 MG tablet Take 1 tablet by mouth nightly 1/2/20  Yes Tomy Rankin DO   diazepam (VALIUM) 10 MG tablet Take one 30 min and 10 min prior to MRI 1/2/20 2/2/20 Yes Tomy Rankin DO   atorvastatin (LIPITOR) 80 MG tablet Take 1 tablet by mouth nightly 1/2/20  Yes Tomy Rankin DO   pantoprazole (PROTONIX) 40 MG tablet Take 1 tablet by mouth 2 times daily 1/2/20  Yes Tomy Rankin DO   insulin glargine (LANTUS) 100 UNIT/ML injection vial Inject 60 Units into the skin nightly 1/2/20  Yes Tomy Rankin DO   famotidine (PEPCID) 20 MG tablet Take 1 tablet by mouth 2 times daily 12/2/19  Yes Tomy Rankin DO   blood glucose test strips (FREESTYLE TEST STRIPS) strip 1 each by In Vitro route 3 times daily As needed.  11/21/19  Yes Tomy Rankin DO   metFORMIN (GLUCOPHAGE) 500 MG tablet TAKE ONE TABLET BY MOUTH TWICE A DAY WITH MEALS 11/3/19 (147 kg)   01/02/20 (!) 331 lb (150.1 kg)   12/30/19 (!) 331 lb (150.1 kg)   11/15/19 (!) 322 lb (146.1 kg)   11/11/19 (!) 323 lb (146.5 kg)       No results found. Lab Results   Component Value Date     10/09/2019    K 4.5 10/09/2019    CL 90 (L) 10/09/2019    CO2 29 10/09/2019    CREATININE 1.1 10/09/2019    BUN 26 (H) 10/09/2019    PROT 7.6 10/09/2019    INR 1.8 10/02/2019    HCT 41.2 10/02/2019    HGB 13.5 10/02/2019    MG 2.0 06/20/2019     Lab Results   Component Value Date    LABALBU 4.3 10/09/2019    BILITOT 0.6 10/09/2019    ALKPHOS 101 10/09/2019    AST 17 10/09/2019    ALT 19 10/09/2019    TSH 5.490 (H) 10/09/2019    N7BDKDW 92.74 01/20/2014    U0EINNK 6.7 09/17/2012     No results found for: UA    1/27/2020  2:12 PM   Araceli Zafar RN    CBC: No results for input(s): WBC, HGB, PLT in the last 72 hours. BMP:  No results for input(s): NA, K, CL, CO2, BUN, CREATININE, GLUCOSE in the last 72 hours. Hepatic: No results for input(s): AST, ALT, ALB, BILITOT, ALKPHOS in the last 72 hours. Troponin: No results for input(s): TROPONINI in the last 72 hours. BNP: No results for input(s): BNP in the last 72 hours. Lipids: No results for input(s): CHOL, HDL in the last 72 hours. Invalid input(s): LDLCALCU  INR: No results for input(s): INR in the last 72 hours.   Lab Results   Component Value Date    MG 2.0 06/20/2019        Assessment  Charting Type: Reassessment(Chronic diastolic CHF; NYHA III)    Neurological  Level of Consciousness: Alert  Orientation Level: Oriented X4  Cognition: Appropriate safety awareness, Appropriate judgement, Appropriate attention/concentration, Appropriate for developmental age, Follows commands  Language: Clear    HEENT  HEENT (WDL): Exceptions to WDL  Right Eye: Impaired vision  Left Eye: Impaired vision  Nose: Intact  Voice: Normal  Mucous Membrane: Moist, Intact, Pink    Respiratory  Respiratory Pattern: Regular  Respiratory Depth: Normal  Respiratory

## 2020-02-07 RX ORDER — PRAMIPEXOLE DIHYDROCHLORIDE 0.25 MG/1
TABLET ORAL
Qty: 30 TABLET | Refills: 0 | Status: SHIPPED
Start: 2020-02-07 | End: 2020-03-09

## 2020-02-18 RX ORDER — WARFARIN SODIUM 5 MG/1
TABLET ORAL
Qty: 50 TABLET | Refills: 0 | Status: SHIPPED
Start: 2020-02-18 | End: 2020-03-26 | Stop reason: SDUPTHER

## 2020-03-09 ENCOUNTER — TELEPHONE (OUTPATIENT)
Dept: FAMILY MEDICINE CLINIC | Age: 65
End: 2020-03-09

## 2020-03-09 RX ORDER — GLUCOSAMINE HCL/CHONDROITIN SU 500-400 MG
CAPSULE ORAL
Qty: 300 STRIP | Refills: 0 | Status: SHIPPED
Start: 2020-03-09 | End: 2021-07-11

## 2020-03-09 RX ORDER — PRAMIPEXOLE DIHYDROCHLORIDE 0.25 MG/1
TABLET ORAL
Qty: 30 TABLET | Refills: 5 | Status: SHIPPED
Start: 2020-03-09 | End: 2020-09-27 | Stop reason: SDUPTHER

## 2020-03-11 RX ORDER — LANCETS 30 GAUGE
1 EACH MISCELLANEOUS 2 TIMES DAILY
Qty: 300 EACH | Refills: 1 | Status: ON HOLD
Start: 2020-03-11 | End: 2021-04-25 | Stop reason: SDUPTHER

## 2020-03-11 NOTE — TELEPHONE ENCOUNTER
[facility] called regarding E-script sent today and requested lancets for patient.         Last seen 1/2/2020  Next appt 4/2/2020  [facility]

## 2020-03-13 RX ORDER — LOSARTAN POTASSIUM 25 MG/1
12.5 TABLET ORAL DAILY
Qty: 30 TABLET | Refills: 3 | Status: SHIPPED
Start: 2020-03-13 | End: 2020-10-31

## 2020-03-16 ENCOUNTER — TELEPHONE (OUTPATIENT)
Dept: OTHER | Age: 65
End: 2020-03-16

## 2020-03-16 NOTE — TELEPHONE ENCOUNTER
[facility] was notified of the [facility] due to the current state of emergency. He was asked to contact his referring physician if he requires medical assistance. The patient verbalized understanding. We will contact the patient once the facility will reopen.

## 2020-03-26 RX ORDER — WARFARIN SODIUM 5 MG/1
TABLET ORAL
Qty: 50 TABLET | Refills: 0 | Status: SHIPPED
Start: 2020-03-26 | End: 2020-04-02 | Stop reason: SDUPTHER

## 2020-04-02 ENCOUNTER — OFFICE VISIT (OUTPATIENT)
Dept: FAMILY MEDICINE CLINIC | Age: 65
End: 2020-04-02
Payer: MEDICARE

## 2020-04-02 VITALS
BODY MASS INDEX: 50.46 KG/M2 | HEART RATE: 78 BPM | DIASTOLIC BLOOD PRESSURE: 80 MMHG | OXYGEN SATURATION: 96 % | HEIGHT: 66 IN | WEIGHT: 314 LBS | RESPIRATION RATE: 18 BRPM | SYSTOLIC BLOOD PRESSURE: 122 MMHG

## 2020-04-02 LAB — HBA1C MFR BLD: 10.8 %

## 2020-04-02 PROCEDURE — 3046F HEMOGLOBIN A1C LEVEL >9.0%: CPT | Performed by: FAMILY MEDICINE

## 2020-04-02 PROCEDURE — G8926 SPIRO NO PERF OR DOC: HCPCS | Performed by: FAMILY MEDICINE

## 2020-04-02 PROCEDURE — G8417 CALC BMI ABV UP PARAM F/U: HCPCS | Performed by: FAMILY MEDICINE

## 2020-04-02 PROCEDURE — 3023F SPIROM DOC REV: CPT | Performed by: FAMILY MEDICINE

## 2020-04-02 PROCEDURE — 3017F COLORECTAL CA SCREEN DOC REV: CPT | Performed by: FAMILY MEDICINE

## 2020-04-02 PROCEDURE — 2022F DILAT RTA XM EVC RTNOPTHY: CPT | Performed by: FAMILY MEDICINE

## 2020-04-02 PROCEDURE — G8427 DOCREV CUR MEDS BY ELIG CLIN: HCPCS | Performed by: FAMILY MEDICINE

## 2020-04-02 PROCEDURE — 1036F TOBACCO NON-USER: CPT | Performed by: FAMILY MEDICINE

## 2020-04-02 PROCEDURE — 99215 OFFICE O/P EST HI 40 MIN: CPT | Performed by: FAMILY MEDICINE

## 2020-04-02 PROCEDURE — 83036 HEMOGLOBIN GLYCOSYLATED A1C: CPT | Performed by: FAMILY MEDICINE

## 2020-04-02 RX ORDER — WARFARIN SODIUM 5 MG/1
TABLET ORAL
Qty: 50 TABLET | Refills: 5 | Status: SHIPPED
Start: 2020-04-02 | End: 2020-11-12

## 2020-04-02 RX ORDER — LEVOCETIRIZINE DIHYDROCHLORIDE 5 MG/1
5 TABLET, FILM COATED ORAL NIGHTLY PRN
Qty: 30 TABLET | Refills: 11 | Status: SHIPPED
Start: 2020-04-02 | End: 2021-05-11

## 2020-04-02 RX ORDER — PROMETHAZINE HYDROCHLORIDE AND CODEINE PHOSPHATE 6.25; 1 MG/5ML; MG/5ML
5 SYRUP ORAL EVERY 4 HOURS PRN
Qty: 118 ML | Refills: 0 | Status: SHIPPED | OUTPATIENT
Start: 2020-04-02 | End: 2020-04-05

## 2020-04-02 RX ORDER — INSULIN GLARGINE 100 [IU]/ML
INJECTION, SOLUTION SUBCUTANEOUS
Qty: 3 VIAL | Refills: 5 | Status: SHIPPED
Start: 2020-04-02 | End: 2020-07-02

## 2020-04-02 RX ORDER — IPRATROPIUM BROMIDE 42 UG/1
2 SPRAY, METERED NASAL 3 TIMES DAILY
Qty: 1 BOTTLE | Refills: 3 | Status: SHIPPED
Start: 2020-04-02 | End: 2021-06-18

## 2020-04-02 RX ORDER — GABAPENTIN 600 MG/1
TABLET ORAL
Qty: 60 TABLET | Refills: 5 | Status: SHIPPED
Start: 2020-04-02 | End: 2020-07-02 | Stop reason: SDUPTHER

## 2020-04-06 ASSESSMENT — ENCOUNTER SYMPTOMS
COUGH: 1
ABDOMINAL PAIN: 0
SORE THROAT: 0
HEARTBURN: 1
SINUS PAIN: 1
SHORTNESS OF BREATH: 1
BACK PAIN: 1
VOMITING: 0
NAUSEA: 0
CHOKING: 0
RHINORRHEA: 1
SINUS PRESSURE: 1
WHEEZING: 1

## 2020-04-07 NOTE — PROGRESS NOTES
Cigarettes     Last attempt to quit: 2013     Years since quittin.7    Smokeless tobacco: Former User     Types: Chew     Quit date: 10/8/1971   Substance and Sexual Activity    Alcohol use: No     Alcohol/week: 0.0 standard drinks     Comment: ,stopped drinking coffee    Drug use: No    Sexual activity: Yes     Partners: Female   Lifestyle    Physical activity     Days per week: 3 days     Minutes per session: 20 min    Stress: Rather much   Relationships    Social connections     Talks on phone: Three times a week     Gets together: Once a week     Attends [employer] service: More than 4 times per year     Active member of club or organization: Yes     Attends meetings of clubs or organizations: Never     Relationship status:     Intimate partner violence     Fear of current or ex partner: No     Emotionally abused: No     Physically abused: No     Forced sexual activity: No   Other Topics Concern    Not on file   Social History Narrative    19  [facility] reviewed SDOH with Ulysses Leiva. He does have money strain but between the income he has coming in and his wife's they are over resources for SARY programs. Offered food panty info to help with end of the month struggles but he declined stating that he tried and was refused due to his income. He belongs to a [employer] and goes weekly so he has some community connections in place. He has an extremely high interest rate on his mortgage which he was encouraged to look into getting lowered to help save money on his house payments. Noticed some difficulty with memory recall. Becomes easily flustered at times. Has no MHI to support [facility] S[facility]. States he does not feel depressed or need any MH treatment.           Family History   Problem Relation Age of Onset   Keith Click Cancer Mother         breast    Cancer Father         stomach    Mental Illness Brother     Stroke Brother     Other Brother        Current Outpatient Medications tenderness. Abdominal:      General: Bowel sounds are normal. There is no distension. Palpations: Abdomen is soft. There is no mass. Tenderness: There is no abdominal tenderness. There is no guarding or rebound. Genitourinary:     Comments: Deferred by patient  Musculoskeletal: Normal range of motion. General: No tenderness. Comments: Increased spasm L1 to S1 with decreased ROM. + straight leg raising and contralateral straight leg raising tests B/L   Lymphadenopathy:      Cervical: No cervical adenopathy. Skin:     General: Skin is warm and dry. Coloration: Skin is not pale. Findings: No erythema or rash. Comments: Good 2 pt sensation below ankles and fine touch, no ulcers to feet   Neurological:      Mental Status: He is alert and oriented to person, place, and time. Cranial Nerves: No cranial nerve deficit. Motor: No abnormal muscle tone. Coordination: Coordination normal.      Deep Tendon Reflexes: Reflexes are normal and symmetric. Reflexes normal.   Psychiatric:         Behavior: Behavior normal.         Thought Content: Thought content normal.         Judgment: Judgment normal.         Assessment / Plan:   Cassie Tolbert was seen today for 3 month follow-up, allergic rhinitis , cough, congestion, fatigue, hypertension, hyperlipidemia, diabetes, obesity, hypothyroidism, atrial fibrillation, joint pain, back pain and copd. Diagnoses and all orders for this visit:    Type 2 diabetes mellitus with hyperglycemia, with long-term current use of insulin (HCC)  -     POCT glycosylated hemoglobin (Hb A1C)  -     gabapentin (NEURONTIN) 600 MG tablet; Take one bid  -     CBC Auto Differential; Future  -     Comprehensive Metabolic Panel; Future  -     TSH without Reflex; Future  -     Hemoglobin A1C; Future  -     Lipid Panel;  Future  -     MICROALBUMIN, UR; Future  -     insulin glargine (LANTUS) 100 UNIT/ML injection vial; 30 in am and 40 at night  -      Future  -     Hemoglobin A1C; Future  -     Lipid Panel; Future  -     MICROALBUMIN, UR; Future    Chronic bronchitis, unspecified chronic bronchitis type (HCC)  -     POCT glycosylated hemoglobin (Hb A1C)  -     gabapentin (NEURONTIN) 600 MG tablet; Take one bid  -     warfarin (JANTOVEN) 5 MG tablet; TAKE 2 TABLETS BY MOUTH DAILY ON MONDAY, WEDNESDAY, FRIDAY AND SATURDAY. THEN TAKE 1 TABLET DAILY ON TUESDAY, THURSDAY AND SUNDAY  -     ipratropium (ATROVENT) 0.06 % nasal spray; 2 sprays by Nasal route 3 times daily  -     promethazine-codeine (PHENERGAN WITH CODEINE) 6.25-10 MG/5ML syrup; Take 5 mLs by mouth every 4 hours as needed for Cough for up to 3 days. -     levocetirizine (XYZAL) 5 MG tablet; Take 1 tablet by mouth nightly as needed (allergies)  -     CBC Auto Differential; Future  -     Comprehensive Metabolic Panel; Future  -     TSH without Reflex; Future  -     Hemoglobin A1C; Future  -     Lipid Panel; Future  -     MICROALBUMIN, UR; Future  -     insulin glargine (LANTUS) 100 UNIT/ML injection vial; 30 in am and 40 at night  -     PROTIME-INR; Future    Chronic heart failure with preserved ejection fraction (HCC)  -     POCT glycosylated hemoglobin (Hb A1C)  -     gabapentin (NEURONTIN) 600 MG tablet; Take one bid  -     warfarin (JANTOVEN) 5 MG tablet; TAKE 2 TABLETS BY MOUTH DAILY ON MONDAY, WEDNESDAY, FRIDAY AND SATURDAY. THEN TAKE 1 TABLET DAILY ON TUESDAY, THURSDAY AND SUNDAY  -     ipratropium (ATROVENT) 0.06 % nasal spray; 2 sprays by Nasal route 3 times daily  -     promethazine-codeine (PHENERGAN WITH CODEINE) 6.25-10 MG/5ML syrup; Take 5 mLs by mouth every 4 hours as needed for Cough for up to 3 days. -     levocetirizine (XYZAL) 5 MG tablet; Take 1 tablet by mouth nightly as needed (allergies)  -     CBC Auto Differential; Future  -     Comprehensive Metabolic Panel; Future  -     TSH without Reflex; Future  -     Hemoglobin A1C; Future  -     Lipid Panel;  Future  -     MICROALBUMIN, UR;

## 2020-04-08 RX ORDER — TORSEMIDE 20 MG/1
TABLET ORAL
Qty: 180 TABLET | Refills: 3 | Status: SHIPPED
Start: 2020-04-08 | End: 2021-03-30

## 2020-04-20 RX ORDER — SPIRONOLACTONE 25 MG/1
TABLET ORAL
Qty: 180 TABLET | Refills: 3 | Status: SHIPPED
Start: 2020-04-20 | End: 2021-02-04

## 2020-05-19 ENCOUNTER — TELEPHONE (OUTPATIENT)
Dept: FAMILY MEDICINE CLINIC | Age: 65
End: 2020-05-19

## 2020-05-27 ENCOUNTER — HOSPITAL ENCOUNTER (OUTPATIENT)
Age: 65
Discharge: HOME | End: 2020-05-29
Payer: MEDICARE

## 2020-05-27 LAB
ALBUMIN SERPL-MCNC: 4.1 G/DL (ref 3.5–5.2)
ALP BLD-CCNC: 90 U/L (ref 40–129)
ALT SERPL-CCNC: 16 U/L (ref 0–40)
ANION GAP SERPL CALCULATED.3IONS-SCNC: 20 MMOL/L (ref 7–16)
AST SERPL-CCNC: 17 U/L (ref 0–39)
BASOPHILS ABSOLUTE: 0.05 E9/L (ref 0–0.2)
BASOPHILS RELATIVE PERCENT: 0.6 % (ref 0–2)
BILIRUB SERPL-MCNC: 0.6 MG/DL (ref 0–1.2)
BUN BLDV-MCNC: 29 MG/DL (ref 8–23)
CALCIUM SERPL-MCNC: 9.8 MG/DL (ref 8.6–10.2)
CHLORIDE BLD-SCNC: 92 MMOL/L (ref 98–107)
CHOLESTEROL, TOTAL: 141 MG/DL (ref 0–199)
CO2: 26 MMOL/L (ref 22–29)
CREAT SERPL-MCNC: 1.2 MG/DL (ref 0.7–1.2)
EOSINOPHILS ABSOLUTE: 0.15 E9/L (ref 0.05–0.5)
EOSINOPHILS RELATIVE PERCENT: 1.7 % (ref 0–6)
GFR AFRICAN AMERICAN: >60
GFR NON-AFRICAN AMERICAN: >60 ML/MIN/1.73
GLUCOSE BLD-MCNC: 270 MG/DL (ref 74–99)
HBA1C MFR BLD: 11.5 % (ref 4–5.6)
HCT VFR BLD CALC: 42.2 % (ref 37–54)
HDLC SERPL-MCNC: 41 MG/DL
HEMOGLOBIN: 13.4 G/DL (ref 12.5–16.5)
IMMATURE GRANULOCYTES #: 0.04 E9/L
IMMATURE GRANULOCYTES %: 0.4 % (ref 0–5)
INR BLD: 2.3
LDL CHOLESTEROL CALCULATED: 67 MG/DL (ref 0–99)
LYMPHOCYTES ABSOLUTE: 1.77 E9/L (ref 1.5–4)
LYMPHOCYTES RELATIVE PERCENT: 19.7 % (ref 20–42)
MCH RBC QN AUTO: 31.1 PG (ref 26–35)
MCHC RBC AUTO-ENTMCNC: 31.8 % (ref 32–34.5)
MCV RBC AUTO: 97.9 FL (ref 80–99.9)
MICROALBUMIN UR-MCNC: <12 MG/L
MONOCYTES ABSOLUTE: 0.87 E9/L (ref 0.1–0.95)
MONOCYTES RELATIVE PERCENT: 9.7 % (ref 2–12)
NEUTROPHILS ABSOLUTE: 6.09 E9/L (ref 1.8–7.3)
NEUTROPHILS RELATIVE PERCENT: 67.9 % (ref 43–80)
PDW BLD-RTO: 15.2 FL (ref 11.5–15)
PLATELET # BLD: 241 E9/L (ref 130–450)
PMV BLD AUTO: 10.3 FL (ref 7–12)
POTASSIUM SERPL-SCNC: 4.3 MMOL/L (ref 3.5–5)
PROTHROMBIN TIME: 26.3 SEC (ref 9.3–12.4)
RBC # BLD: 4.31 E12/L (ref 3.8–5.8)
SODIUM BLD-SCNC: 138 MMOL/L (ref 132–146)
TOTAL PROTEIN: 7.5 G/DL (ref 6.4–8.3)
TRIGL SERPL-MCNC: 165 MG/DL (ref 0–149)
TSH SERPL DL<=0.05 MIU/L-ACNC: 5.1 UIU/ML (ref 0.27–4.2)
VLDLC SERPL CALC-MCNC: 33 MG/DL
WBC # BLD: 9 E9/L (ref 4.5–11.5)

## 2020-05-27 PROCEDURE — 80061 LIPID PANEL: CPT

## 2020-05-27 PROCEDURE — 83036 HEMOGLOBIN GLYCOSYLATED A1C: CPT

## 2020-05-27 PROCEDURE — 84443 ASSAY THYROID STIM HORMONE: CPT

## 2020-05-27 PROCEDURE — 80053 COMPREHEN METABOLIC PANEL: CPT

## 2020-05-27 PROCEDURE — 82044 UR ALBUMIN SEMIQUANTITATIVE: CPT

## 2020-05-27 PROCEDURE — 36415 COLL VENOUS BLD VENIPUNCTURE: CPT

## 2020-05-27 PROCEDURE — 85610 PROTHROMBIN TIME: CPT

## 2020-05-27 PROCEDURE — 85025 COMPLETE CBC W/AUTO DIFF WBC: CPT

## 2020-06-08 ENCOUNTER — TELEPHONE (OUTPATIENT)
Dept: OTHER | Age: 65
End: 2020-06-08

## 2020-06-08 RX ORDER — MONTELUKAST SODIUM 10 MG/1
TABLET ORAL
Qty: 30 TABLET | Refills: 0 | Status: SHIPPED
Start: 2020-06-08 | End: 2020-07-15 | Stop reason: SDUPTHER

## 2020-06-08 RX ORDER — PANTOPRAZOLE SODIUM 40 MG/1
TABLET, DELAYED RELEASE ORAL
Qty: 60 TABLET | Refills: 0 | Status: SHIPPED
Start: 2020-06-08 | End: 2020-07-15 | Stop reason: SDUPTHER

## 2020-06-12 RX ORDER — METOPROLOL SUCCINATE 25 MG/1
TABLET, EXTENDED RELEASE ORAL
Qty: 90 TABLET | Refills: 0 | Status: SHIPPED
Start: 2020-06-12 | End: 2020-06-15

## 2020-06-14 NOTE — PROGRESS NOTES
[facility] Cardiology Progress Note  Dr. Chiquis Sawyer      Referring Physician: Dena Stewart DO  CHIEF COMPLAINT:   Chief Complaint   Patient presents with    Coronary Artery Disease     6 month. Pt complains of occasional dizziness when standing too quick. Pt has not taken meds yet today. No other cardiac complaints today       HISTORY OF PRESENT ILLNESS:   59year old male with history of CHF, CAD, DM, hypertension, hyperlipidemia and COPD is here for a follow up appointment. Occasional palpitations, occasional shortness of breath and pedal edema, occasional dizziness, denies any chest pain, no PND, no orthopnea, no syncope. Past Medical History:   Diagnosis Date    Acid reflux     Acute diastolic (congestive) heart failure ([facility] 6/19/2019    Arthritis     Asthma 4/16/2014    Asthmatic bronchitis , chronic (HCC) 11/28/2016    CAD (coronary artery disease)     Chronic bronchitis ([facility] 4/16/2014    COPD (chronic obstructive pulmonary disease) ([facility])     CB    COPD (chronic obstructive pulmonary disease) ([facility]     Diabetes mellitus ([facility])     Emphysema (subcutaneous) (surgical) resulting from a procedure     Hyperlipidemia     Hypertension     Hypoxemia requiring supplemental oxygen 2/2/2015    LONG TERM ANTICOAGULENT USE     Morbid obesity with BMI of 50.0-59.9, adult ([facility] 11/27/2013    Obesity     Osteoarthritis     Sleep apnea     bilevel positive airway pressure at 13/8 with 2 L oxygen flow     Tobacco abuse     Type II or unspecified type diabetes mellitus without mention of complication, not stated as uncontrolled          Past Surgical History:   Procedure Laterality Date    COLONOSCOPY      CORONARY ANGIOPLASTY WITH STENT PLACEMENT  07-23-13    Left main focal eccentric 65% distal stenosis. Large OM1 CX 50% ostial & prox narrow. Large ramus artery & LAD: Minor plaque w/o sign narrow.  RCA: Dom vessel w/25% prox narrow & focal hazy eccentric 99% distal stenosis before origin RPDA & RPLCA. LV: Mild inferior hypokinesis EF 55%. Probable mild AS with 10-15mmHg. Successful PCI distal RCA w/3.5 x 12 mm BMS, 0% res stenosis. Normal distal runoff    CORONARY ARTERY BYPASS GRAFT  09-09-13    Dr Arnaldo Nelson; CABG x2, LIMA to LAD, SVG to ramus intermedius; MV repair using 30-mm Future complete ring with magic stitch between A3 and P3; rigid internal fixation of sternum using KLS plates x2; endoscopic vein harvesting of right lower extremity     ECHO COMPL W DOP COLOR FLOW  7/25/2013         HERNIA REPAIR      SINUS SURGERY           Current Outpatient Medications   Medication Sig Dispense Refill    metoprolol succinate (TOPROL XL) 50 MG extended release tablet 1 by mouth daily 90 tablet 3    montelukast (SINGULAIR) 10 MG tablet TAKE ONE TABLET BY MOUTH EVERY NIGHT 30 tablet 0    pantoprazole (PROTONIX) 40 MG tablet TAKE ONE TABLET BY MOUTH TWO TIMES A DAY 60 tablet 0    spironolactone (ALDACTONE) 25 MG tablet TAKE ONE TABLET BY MOUTH TWO TIMES A  tablet 3    metFORMIN (GLUCOPHAGE) 500 MG tablet TAKE ONE TABLET BY MOUTH TWICE A DAY WITH MEALS 60 tablet 3    torsemide (DEMADEX) 20 MG tablet TAKE ONE TABLET BY MOUTH TWO TIMES A  tablet 3    ipratropium (ATROVENT) 0.06 % nasal spray 2 sprays by Nasal route 3 times daily 1 Bottle 3    levocetirizine (XYZAL) 5 MG tablet Take 1 tablet by mouth nightly as needed (allergies) 30 tablet 11    losartan (COZAAR) 25 MG tablet Take 0.5 tablets by mouth daily 30 tablet 3    Lancets MISC 1 each by Does not apply route 2 times daily 300 each 1    pramipexole (MIRAPEX) 0.25 MG tablet TAKE 1 TABLET BY MOUTH EVERY NIGHT (Patient taking differently: 0.5 mg TAKE 1 TABLET BY MOUTH EVERY NIGHT) 30 tablet 5    blood glucose monitor strips Test two times a day & as needed for symptoms of irregular blood glucose.  300 strip 0    atorvastatin (LIPITOR) 80 MG tablet Take 1 tablet by mouth nightly 30 tablet 5    Cholecalciferol (VITAMIN D3) 1.25 MG (90923 UT) CAPS Take 1 capsule by mouth once a week Wednesday 4 capsule 5    terbinafine (ATHLETES FOOT) 1 % cream Apply topically 2 times daily. 42 g 1    Insulin Syringe-Needle U-100 27G X \" 1 ML MISC Use as directed 100 each 11    SYMBICORT 160-4.5 MCG/ACT AERO Inhale 2 puffs into the lungs 2 times daily 1 Inhaler 3    CPAP Machine MISC 1 each by Does not apply route nightly as needed       Accu-Chek Multiclix Lancets MISC Use as directed 100 each 3    aspirin 81 MG tablet Take 81 mg by mouth nightly       insulin NPH (HUMULIN N;NOVOLIN N) 100 UNIT/ML injection pen Inject 40 Units into the skin 2 times daily (before meals) 5 pen 5    thyroid (ARMOUR THYROID) 15 MG tablet Take 1 tablet by mouth daily 30 tablet 5    gabapentin (NEURONTIN) 600 MG tablet One tab three times a day 90 tablet 5    warfarin (JANTOVEN) 5 MG tablet TAKE 2 TABLETS BY MOUTH DAILY ON MONDAY, WEDNESDAY, FRIDAY AND SATURDAY. THEN TAKE 1 TABLET DAILY ON TUESDAY, THURSDAY AND  50 tablet 5     No current facility-administered medications for this visit.           Allergies as of 06/15/2020 - Review Complete 06/15/2020   Allergen Reaction Noted    Pcn [penicillins]  2013    Sulfa antibiotics  2013       Social History     Socioeconomic History    Marital status:      Spouse name: Kriss Billings Number of children: 1    Years of education: Not on file    Highest education level: 11th grade   Occupational History    Not on file   Social Needs    Financial resource strain: Somewhat hard    Food insecurity     Worry: Sometimes true     Inability: Never true    Transportation needs     Medical: No     Non-medical: No   Tobacco Use    Smoking status: Former Smoker     Packs/day: 1.00     Years: 45.00     Pack years: 45.00     Types: Cigarettes     Last attempt to quit: 2013     Years since quittin.9    Smokeless tobacco: Former User     Types: [address] date: 10/8/1971   Substance and Sexual Activity    Alcohol use: No     Alcohol/week: 0.0 standard drinks     Comment: 1-2 coffee per day    Drug use: No    Sexual activity: Yes     Partners: Female   Lifestyle    Physical activity     Days per week: 3 days     Minutes per session: 20 min    Stress: Rather much   Relationships    Social connections     Talks on phone: Three times a week     Gets together: Once a week     Attends [employer] service: More than 4 times per year     Active member of club or organization: Yes     Attends meetings of clubs or organizations: Never     Relationship status:     Intimate partner violence     Fear of current or ex partner: No     Emotionally abused: No     Physically abused: No     Forced sexual activity: No   Other Topics Concern    Not on file   Social History Narrative    8-26-19  [facility] reviewed SDOH with Oliverio Cazares. He does have money strain but between the income he has coming in and his wife's they are over resources for SARY programs. Offered food panty info to help with end of the month struggles but he declined stating that he tried and was refused due to his income. He belongs to a [employer] and goes weekly so he has some community connections in place. He has an extremely high interest rate on his mortgage which he was encouraged to look into getting lowered to help save money on his house payments. Noticed some difficulty with memory recall. Becomes easily flustered at times. Has no MHI to support applying for OUR [facility] program of SARY. States he does not feel depressed or need any MH treatment.           Family History   Problem Relation Age of Onset    Cancer Mother         breast    Cancer Father         stomach    Mental Illness Brother     Stroke Brother     Other Brother        REVIEW OF SYSTEMS:     CONSTITUTIONAL:  negative for  fevers, chills, sweats and fatigue  HEENT:  negative for  tinnitus, earaches, nasal congestion and epistaxis  RESPIRATORY:  negative for  dry cough, cough with sputum, wheezing and hemoptysis  GASTROINTESTINAL:  negative for nausea, vomiting, diarrhea, constipation, pruritus and jaundice  HEMATOLOGIC/LYMPHATIC:  negative for easy bruising, bleeding, lymphadenopathy and petechiae  ENDOCRINE:  negative for heat intolerance, cold intolerance, tremor, hair loss and diabetic symptoms including neither polyuria nor polydipsia nor blurred vision  MUSCULOSKELETAL:  negative for  myalgias, arthralgias, joint swelling, stiff joints and decreased range of motion  NEUROLOGICAL:  negative for memory problems, speech problems, visual disturbance, dysphagia, weakness and numbness      PHYSICAL EXAM:   CONSTITUTIONAL:  awake, alert, cooperative, no apparent distress, and appears stated age  HEAD:  normocepalic, without obvious abnormality, atraumatic, pink, moist mucous membranes. NECK:  Supple, symmetrical, trachea midline, no adenopathy, thyroid symmetric, not enlarged and no tenderness, skin normal  LUNGS:  No increased work of breathing, good air exchange, clear to auscultation bilaterally, no crackles or wheezing  CARDIOVASCULAR:  Normal apical impulse,, tachycardic, normal S1 and S2, no S3 or S4, 3/6 systolic murmur at the apex, 3/6 systolic murmur at the left lower sternal border, no JVD, no carotid bruit, no pedal edema, good carotid upstroke bilaterally. ABDOMEN:  Soft, nontender, no masses, no hepatomegaly or splenomegaly, BS+  CHEST: nontender to palpation, expands symmetrically  MUSCULOSKELETAL:  No clubbing no cyanosis. there is no redness, warmth, or swelling of the joints  full range of motion noted  NEUROLOGIC:  Alert, awake,oriented x3.   SKIN:  no bruising or bleeding, normal skin color, texture, turgor and no redness, warmth, or swelling        /74   Pulse 125   Ht 5' 6\" (1.676 m)   Wt (!) 322 lb (146.1 kg)   BMI 51.97 kg/m²     DATA:   I personally reviewed the visit EKG with the following interpretation: Atrial fibrillation, rapid ventricular response, nonspecific T Warfarin:  No components found for: PTPATWAR, PTINRWAR  PTT:    Lab Results   Component Value Date    APTT 26.1 09/09/2013     PTT Heparin:  No components found for: APTTHEP  Magnesium:    Lab Results   Component Value Date    MG 2.0 06/20/2019     TSH:    Lab Results   Component Value Date    TSH 5.100 05/27/2020     TROPONIN:  No components found for: TROP  BNP:  No results found for: BNP  FASTING LIPID PANEL:    Lab Results   Component Value Date    CHOL 141 05/27/2020    HDL 41 05/27/2020    TRIG 165 05/27/2020     No orders to display     I have personally reviewed the laboratory, cardiac diagnostic and radiographic testing as outlined above:      IMPRESSION:  1. Atrial fibrillation: Rapid ventricular response, will increase metoprolol to 50 mg daily  2. CAD: S/p CABG with LIMA to LAD, SVG to ramus intermedius artery  3. Status post mitral valve repair  4. Ischemic cardiomyopathy:  5. Combined congestive heart failure: Chronic, compensated, continue current treatment  6. Tricuspid valve regurgitation: Mild  7. Hypertension: Controlled  8. Type 2 diabetes mellitus  9. Hyperlipidemia: On statin  10. Chronic anticoagulation    RECOMMENDATIONS:   1. Increase Toprol-XL to 50 mg daily  2. Continue the rest of medications  3. EKG next week  4. CHF: Daily weight, take an extra torsemide for weight gain of more than 2-3 pounds in 24 hours, compliance with diuretics, low-salt diet were all advised. 5.  Increase risk of bleeding due to being on anti-coagulation, symptoms and signs of bleeding discussed with patient, patient was advised to seek medical attention at the earliest symptoms or signs of bleeding. 6.  Preventive cardiology: Low-salt, low-cholesterol diet, daily exercise, total cholesterol of less than 200, LDL of less than 70, adherence to diabetic diet and diabetic medications, were all advised. 7.  Follow-up with Dr. Glenroy Dang as scheduled  8.   Follow-up with Dr. Britt Bethea in 3 months, sooner if symptomatic for any reason    I have reviewed my findings and recommendations with patient    Electronically signed by Trey Vaughn MD on 7/8/2020 at 9:27 PM  NOTE: This report was transcribed using voice recognition software.  Every effort was made to ensure accuracy; however, inadvertent computerized transcription errors may be present

## 2020-06-15 ENCOUNTER — OFFICE VISIT (OUTPATIENT)
Dept: CARDIOLOGY CLINIC | Age: 65
End: 2020-06-15
Payer: MEDICARE

## 2020-06-15 VITALS
HEART RATE: 125 BPM | SYSTOLIC BLOOD PRESSURE: 118 MMHG | WEIGHT: 315 LBS | BODY MASS INDEX: 50.62 KG/M2 | HEIGHT: 66 IN | DIASTOLIC BLOOD PRESSURE: 74 MMHG

## 2020-06-15 PROCEDURE — 99215 OFFICE O/P EST HI 40 MIN: CPT | Performed by: INTERNAL MEDICINE

## 2020-06-15 PROCEDURE — 93000 ELECTROCARDIOGRAM COMPLETE: CPT | Performed by: INTERNAL MEDICINE

## 2020-06-15 RX ORDER — METOPROLOL SUCCINATE 50 MG/1
TABLET, EXTENDED RELEASE ORAL
Qty: 90 TABLET | Refills: 3 | Status: ON HOLD
Start: 2020-06-15 | End: 2021-04-25 | Stop reason: HOSPADM

## 2020-06-22 ENCOUNTER — TELEPHONE (OUTPATIENT)
Dept: CARDIOLOGY CLINIC | Age: 65
End: 2020-06-22

## 2020-06-22 ENCOUNTER — NURSE ONLY (OUTPATIENT)
Dept: CARDIOLOGY CLINIC | Age: 65
End: 2020-06-22
Payer: MEDICARE

## 2020-06-22 PROCEDURE — 93000 ELECTROCARDIOGRAM COMPLETE: CPT | Performed by: INTERNAL MEDICINE

## 2020-07-02 ENCOUNTER — OFFICE VISIT (OUTPATIENT)
Dept: FAMILY MEDICINE CLINIC | Age: 65
End: 2020-07-02
Payer: MEDICARE

## 2020-07-02 VITALS
WEIGHT: 315 LBS | RESPIRATION RATE: 18 BRPM | HEART RATE: 84 BPM | BODY MASS INDEX: 50.62 KG/M2 | DIASTOLIC BLOOD PRESSURE: 80 MMHG | OXYGEN SATURATION: 90 % | SYSTOLIC BLOOD PRESSURE: 118 MMHG | HEIGHT: 66 IN

## 2020-07-02 PROCEDURE — 3017F COLORECTAL CA SCREEN DOC REV: CPT | Performed by: FAMILY MEDICINE

## 2020-07-02 PROCEDURE — G8926 SPIRO NO PERF OR DOC: HCPCS | Performed by: FAMILY MEDICINE

## 2020-07-02 PROCEDURE — 3023F SPIROM DOC REV: CPT | Performed by: FAMILY MEDICINE

## 2020-07-02 PROCEDURE — G8417 CALC BMI ABV UP PARAM F/U: HCPCS | Performed by: FAMILY MEDICINE

## 2020-07-02 PROCEDURE — 3046F HEMOGLOBIN A1C LEVEL >9.0%: CPT | Performed by: FAMILY MEDICINE

## 2020-07-02 PROCEDURE — 99214 OFFICE O/P EST MOD 30 MIN: CPT | Performed by: FAMILY MEDICINE

## 2020-07-02 PROCEDURE — 1036F TOBACCO NON-USER: CPT | Performed by: FAMILY MEDICINE

## 2020-07-02 PROCEDURE — 2022F DILAT RTA XM EVC RTNOPTHY: CPT | Performed by: FAMILY MEDICINE

## 2020-07-02 PROCEDURE — G8427 DOCREV CUR MEDS BY ELIG CLIN: HCPCS | Performed by: FAMILY MEDICINE

## 2020-07-02 RX ORDER — LEVOTHYROXINE AND LIOTHYRONINE 9.5; 2.25 UG/1; UG/1
15 TABLET ORAL DAILY
Qty: 30 TABLET | Refills: 5 | Status: SHIPPED
Start: 2020-07-02 | End: 2020-12-15

## 2020-07-02 RX ORDER — GABAPENTIN 600 MG/1
TABLET ORAL
Qty: 90 TABLET | Refills: 5 | Status: SHIPPED
Start: 2020-07-02 | End: 2021-03-31

## 2020-07-02 RX ORDER — GABAPENTIN 600 MG/1
TABLET ORAL
Qty: 90 TABLET | Refills: 5 | Status: SHIPPED
Start: 2020-07-02 | End: 2020-07-02

## 2020-07-07 ASSESSMENT — ENCOUNTER SYMPTOMS
DIARRHEA: 0
ABDOMINAL DISTENTION: 0
RECTAL PAIN: 0
PHOTOPHOBIA: 0
CHEST TIGHTNESS: 0
FACIAL SWELLING: 0
EYE DISCHARGE: 0
CHOKING: 0
SHORTNESS OF BREATH: 1
APNEA: 0
ANAL BLEEDING: 0
VOICE CHANGE: 0
SINUS PAIN: 1
NAUSEA: 0
EYE REDNESS: 0
TROUBLE SWALLOWING: 0
WHEEZING: 1
BACK PAIN: 1
SINUS PRESSURE: 1
RHINORRHEA: 1
EYE PAIN: 0
BLOOD IN STOOL: 0
VOMITING: 0
STRIDOR: 0
ABDOMINAL PAIN: 0
CONSTIPATION: 0
EYE ITCHING: 0
SORE THROAT: 0
COLOR CHANGE: 0
COUGH: 0

## 2020-07-07 ASSESSMENT — COPD QUESTIONNAIRES: COPD: 1

## 2020-07-07 NOTE — PROGRESS NOTES
Associated symptoms include nasal congestion, postnasal drip, rhinorrhea and sneezing. Pertinent negatives include no appetite change, chest pain, ear pain, fever, headaches, myalgias, sore throat or trouble swallowing. His symptoms are aggravated by URI and pollen. His symptoms are alleviated by beta-agonist and steroid inhaler. He reports moderate improvement on treatment. His symptoms are not alleviated by anxiolytic. Risk factors for lung disease include smoking/tobacco exposure (has quit). His past medical history is significant for COPD. Fatigue   This is a chronic problem. The current episode started more than 1 year ago. The problem occurs daily. The problem has been waxing and waning. Associated symptoms include arthralgias, fatigue and joint swelling. Pertinent negatives include no abdominal pain, chest pain, chills, congestion, coughing, diaphoresis, fever, headaches, myalgias, nausea, neck pain, numbness, rash, sore throat, vomiting or weakness. Nothing aggravates the symptoms. He has tried nothing for the symptoms. The treatment provided no relief. Review of Systems   Constitutional: Positive for fatigue. Negative for activity change, appetite change, chills, diaphoresis, fever and unexpected weight change. HENT: Positive for postnasal drip, rhinorrhea, sinus pressure, sinus pain and sneezing. Negative for congestion, dental problem, drooling, ear discharge, ear pain, facial swelling, hearing loss, mouth sores, nosebleeds, sore throat, tinnitus, trouble swallowing and voice change. Eyes: Negative for photophobia, pain, discharge, redness, itching and visual disturbance. Respiratory: Positive for shortness of breath and wheezing. Negative for apnea, cough, choking, chest tightness and stridor. Cardiovascular: Negative for chest pain, palpitations and leg swelling.    Gastrointestinal: Negative for abdominal distention, abdominal pain, anal bleeding, blood in stool, constipation, diarrhea, nausea, rectal pain and vomiting. Endocrine: Negative for cold intolerance, heat intolerance, polydipsia, polyphagia and polyuria. Genitourinary: Negative for decreased urine volume, difficulty urinating, discharge, dysuria, enuresis, flank pain, frequency, genital sores, hematuria, penile pain, penile swelling, scrotal swelling, testicular pain and urgency. Musculoskeletal: Positive for arthralgias, back pain, gait problem and joint swelling. Negative for myalgias, neck pain and neck stiffness. Skin: Negative for color change, pallor, rash and wound. Allergic/Immunologic: Negative for environmental allergies, food allergies and immunocompromised state. Neurological: Negative for dizziness, tremors, seizures, syncope, facial asymmetry, speech difficulty, weakness, light-headedness, numbness and headaches. Hematological: Negative for adenopathy. Does not bruise/bleed easily. Psychiatric/Behavioral: Positive for sleep disturbance. Negative for agitation, behavioral problems, confusion, decreased concentration, dysphoric mood, hallucinations, self-injury and suicidal ideas. The patient is not nervous/anxious and is not hyperactive.         Past Medical History:   Diagnosis Date    Acid reflux     [facility] 6/19/2019    Arthritis     Asthma 4/16/2014    Asthmatic bronchitis , chronic (HCC) 11/28/2016    CAD (coronary artery disease)     [facility] 4/16/2014    COPD (chronic obstructive pulmonary disease) ([facility])     CB    COPD (chronic obstructive pulmonary disease) ([facility])     Diabetes mellitus ([facility])     Emphysema (subcutaneous) (surgical) resulting from a procedure     Hyperlipidemia     Hypertension     Hypoxemia requiring supplemental oxygen 2/2/2015    LONG TERM ANTICOAGULENT USE     Morbid obesity with BMI of 50.0-59.9, adult ([facility] 11/27/2013    Obesity     Osteoarthritis     Sleep apnea     bilevel positive airway pressure at 13/8 with 2 L oxygen flow     Tobacco abuse     Type II or unspecified type diabetes mellitus without mention of complication, not stated as uncontrolled        Social History     Socioeconomic History    Marital status:      Spouse name: Viridiana Bell Number of children: 1    Years of education: Not on file    Highest education level: 11th grade   Occupational History    Not on file   Social Needs    Financial resource strain: Somewhat hard    Food insecurity     Worry: Sometimes true     Inability: Never true    Transportation needs     Medical: No     Non-medical: No   Tobacco Use    Smoking status: Former Smoker     Packs/day: 1.00     Years: 45.00     Pack years: 45.00     Types: Cigarettes     Last attempt to quit: 2013     Years since quittin.9    Smokeless tobacco: Former User     Types: Chew     Quit date: 10/8/1971   Substance and Sexual Activity    Alcohol use: No     Alcohol/week: 0.0 standard drinks     Comment: 1-2 coffee per day    Drug use: No    Sexual activity: Yes     Partners: Female   Lifestyle    Physical activity     Days per week: 3 days     Minutes per session: 20 min    Stress: Rather much   Relationships    Social connections     Talks on phone: Three times a week     Gets together: Once a week     Attends [employer] service: More than 4 times per year     Active member of club or organization: Yes     Attends meetings of clubs or organizations: Never     Relationship status:     Intimate partner violence     Fear of current or ex partner: No     Emotionally abused: No     Physically abused: No     Forced sexual activity: No   Other Topics Concern    Not on file   Social History Narrative    19  [facility] reviewed SDOH with Otilia Huggins. He does have money strain but between the income he has coming in and his wife's they are over resources for SARY programs.   Offered food panty info to help with end of the month struggles but he declined supple. Thyroid: No thyromegaly. Vascular: No JVD. Trachea: No tracheal deviation. Cardiovascular:      Rate and Rhythm: Normal rate and regular rhythm. Heart sounds: Normal heart sounds. No murmur. No friction rub. No gallop. Pulmonary:      Effort: Pulmonary effort is normal. No respiratory distress. Breath sounds: Normal breath sounds. No stridor. No wheezing or rales. Chest:      Chest wall: No tenderness. Abdominal:      General: Bowel sounds are normal. There is no distension. Palpations: Abdomen is soft. There is no mass. Tenderness: There is no abdominal tenderness. There is no guarding or rebound. Genitourinary:     Comments: Deferred by patient  Musculoskeletal: Normal range of motion. General: No tenderness. Lymphadenopathy:      Cervical: No cervical adenopathy. Skin:     General: Skin is warm and dry. Coloration: Skin is not pale. Findings: No erythema or rash. Comments: Good 2 point and fine touch sensation below ankles    Neurological:      Mental Status: He is alert and oriented to person, place, and time. Cranial Nerves: No cranial nerve deficit. Motor: No abnormal muscle tone. Coordination: Coordination normal.      Deep Tendon Reflexes: Reflexes are normal and symmetric. Reflexes normal.   Psychiatric:         Behavior: Behavior normal.         Thought Content: Thought content normal.         Judgment: Judgment normal.         Assessment / Plan:   [facility] was seen today for 3 month follow-up, obesity, diabetes, copd, fatigue and hypothyroidism. Diagnoses and all orders for this visit:    Acquired hypothyroidism  -     thyroid (ARMOUR THYROID) 15 MG tablet; Take 1 tablet by mouth daily    Type 2 diabetes mellitus with hyperglycemia, with long-term current use of insulin (HCC)  -     insulin NPH (HUMULIN N;NOVOLIN N) 100 UNIT/ML injection pen;  Inject 40 Units into the skin 2 times daily (before meals)  - Discontinue: gabapentin (NEURONTIN) 600 MG tablet; Take one bid  -     gabapentin (NEURONTIN) 600 MG tablet; One tab three times a day    Type 2 diabetes mellitus with diabetic polyneuropathy, with long-term current use of insulin ([facility])  -     insulin NPH (HUMULIN N;NOVOLIN N) 100 UNIT/ML injection pen; Inject 40 Units into the skin 2 times daily (before meals)  -     Discontinue: gabapentin (NEURONTIN) 600 MG tablet; Take one bid  -     gabapentin (NEURONTIN) 600 MG tablet; One tab three times a day    Chronic bronchitis, unspecified chronic [facility] type [facility]  -     Discontinue: gabapentin (NEURONTIN) 600 MG tablet; Take one bid  -     gabapentin (NEURONTIN) 600 MG tablet; One tab three times a day    Chronic heart failure with preserved ejection fraction ([facility])  -     Discontinue: gabapentin (NEURONTIN) 600 MG tablet; Take one bid  -     gabapentin (NEURONTIN) 600 MG tablet; One tab three times a day    Morbid obesity with BMI of 45.0-49.9, adult ([facility])  -     Discontinue: gabapentin (NEURONTIN) 600 MG tablet; Take one bid  -     gabapentin (NEURONTIN) 600 MG tablet; One tab three times a day    Compliance poor      Discussed need to be more complaint. Patient needs to watch diet, lose weight and at least be little compliant with medications. He needs to let me know when medications are not covered by insurance so we find another alternative. Discussed need to watch carbs and avoid sweets and fatty foods all together. Reviewed healthmaintenance report. Patient is aware of deficiencies and suggested preventative tests.

## 2020-07-10 ENCOUNTER — VIRTUAL VISIT (OUTPATIENT)
Dept: PULMONOLOGY | Age: 65
End: 2020-07-10
Payer: MEDICARE

## 2020-07-10 PROCEDURE — 99213 OFFICE O/P EST LOW 20 MIN: CPT | Performed by: INTERNAL MEDICINE

## 2020-07-10 NOTE — PROGRESS NOTES
[address] [facility]  Department of Internal Medicine  Division of Pulmonary, Critical Care and Sleep Medicine  Office Note    Dear Heather Keys, DO    We had the pleasure of seeing Víctor العلي, in the [address] at [facility] regarding his LISS & Chronic Asthmatic Bronchitis (COPD)     HISTORY OF PRESENT ILLNESS:  Víctor العلي is a 59 y.o. male with a past medical history of Chronic bronchitis from his one pack per day smoker, stopped with MI 7/22/2013, Hypertension, Type II diabetes mellitus, Morbid Obesity, LISS, Sinus surgery and herniorrhaphy, No family history of premature vascular disease, Allergy to penicillin and sulfa drugs, [facility] admission, 07/23/2013 with two to three days of intermittent exertional chest discomfort. A prolonged episode occurred on the day of admission associated with inferior ST elevation and reciprocal lateral ST depression. Urgent cardiac catheterization, 07/23/2013. LV mild inferior hypokinesis, EF 55%. [facility] eccentric focal 65% distal stenosis. LAD and CX normal. OM1 50% ostial and proximal stenosis. RCA 25% mid stenosis, 99% focal distal stenosis. PCI distal RCA, 07/23/2013. 3.5 x 12 mm Vision BMS, no residual stenosis. Surgical consultation, Dr. Natasha Merida, 07/23/2013. Definitive plans were to have him take aspirin and Plavix for a month then undergo elective CABG one week after discontinuation of these medications. Unfourtunely he had a MI just toward the end of this regimen and he was taken to the OR and had CABG and mitral valve repair 09/09/2013. Postoperatively, he had pulmonary difficulties related to obesity/COPD. On today's visit, Víctor العلي is just ok but has no chest pain, but is experiencing worsening or declining SOB. He has no pleurisy, hemoptysis. He has noted leg swelling. He was seen in the HF clinic and up 7 pounds.  Mr. Julius Kumar is semi - compliant with BiPAP with oxygen. He had issues with billing from [facility]. His sleep study showed excessive daytime sleepiness, hypersomnolence indicated by a sleep onset of 13 minutes, severe reduced sleep efficiency, severe sleep apnea syndrome with an apnea/hypopnea index of 60 and this was corrected with start bilevel positive airway pressure at 13/8 with 2 liters oxygen flow. His pulmonary function test remain stable and he quit smoking after his heart attack in 2013 but still has cravings to smoke - support provided. He is to continue with the Sprivia, Symbicort and singular. He can use the nebulizer as needed. He has overnight oxygen testing which is 40% desaturations. We discussed diet and exercise. We will again have SW see about financial support. It has been a year since we last saw Yaa Shown. His compliance is from lack of support and finances. Patients compliance has been extremely poor for quite some time and continues to be. Diet is very poor. Eating basically what he wants. Sugars elevated. Ran out of insulin, insulin was not covered by insurance so only on glimepiride . Sugars are still running high. I explained to patient that he is going to start running even into more serious effects including visual, renal, cardiovascular and death. He complains of shortness of breath and wheezing. There is no cough. This is a chronic problem. The current episode started more than 1 year ago. The problem occurs daily. The problem has been waxing and waning. Associated symptoms include nasal congestion, postnasal drip, rhinorrhea and sneezing. Pertinent negatives include no appetite change, chest pain, ear pain, fever, headaches, myalgias, sore throat or trouble swallowing. His symptoms are aggravated by URI and pollen. His symptoms are alleviated by beta-agonist and steroid inhaler. He reports moderate improvement on treatment. Risk factors for lung disease include smoking/tobacco exposure (has quit).  His past 3    metFORMIN (GLUCOPHAGE) 500 MG tablet TAKE ONE TABLET BY MOUTH TWICE A DAY WITH MEALS 60 tablet 3    torsemide (DEMADEX) 20 MG tablet TAKE ONE TABLET BY MOUTH TWO TIMES A  tablet 3    warfarin (JANTOVEN) 5 MG tablet TAKE 2 TABLETS BY MOUTH DAILY ON MONDAY, WEDNESDAY, FRIDAY AND SATURDAY. THEN TAKE 1 TABLET DAILY ON TUESDAY, THURSDAY AND SUNDAY 50 tablet 5    ipratropium (ATROVENT) 0.06 % nasal spray 2 sprays by Nasal route 3 times daily 1 Bottle 3    levocetirizine (XYZAL) 5 MG tablet Take 1 tablet by mouth nightly as needed (allergies) 30 tablet 11    losartan (COZAAR) 25 MG tablet Take 0.5 tablets by mouth daily 30 tablet 3    Lancets MISC 1 each by Does not apply route 2 times daily 300 each 1    pramipexole (MIRAPEX) 0.25 MG tablet TAKE 1 TABLET BY MOUTH EVERY NIGHT (Patient taking differently: 0.5 mg TAKE 1 TABLET BY MOUTH EVERY NIGHT) 30 tablet 5    blood glucose monitor strips Test two times a day & as needed for symptoms of irregular blood glucose. 300 strip 0    atorvastatin (LIPITOR) 80 MG tablet Take 1 tablet by mouth nightly 30 tablet 5    Cholecalciferol (VITAMIN D3) 1.25 MG (91686 UT) CAPS Take 1 capsule by mouth once a week Wednesday 4 capsule 5    terbinafine (ATHLETES FOOT) 1 % cream Apply topically 2 times daily. 42 g 1    Insulin Syringe-Needle U-100 27G X 5/8\" 1 ML MISC Use as directed 100 each 11    SYMBICORT 160-4.5 MCG/ACT AERO Inhale 2 puffs into the lungs 2 times daily 1 Inhaler 3    CPAP Machine MISC 1 each by Does not apply route nightly as needed       Accu-Chek Multiclix Lancets MISC Use as directed 100 each 3    aspirin 81 MG tablet Take 81 mg by mouth nightly        No current facility-administered medications for this visit. SOCIAL AND OCCUPATIONAL HEALTH:  The patient quit smoking with he quit in 7/2013. He smoked 1 ppd for 45 years. There is no history of TB or TB exposure. There is no asbestos or silica dust exposure.   The patient reports no coal, foundry, quarry or [employer] exposure. There is no recent travel history noted. The patient denies a history of recreational or IV drug use. No hot tub exposure. The patient has no pets. Hobbies include Sabianism. The patient denies excessive alcohol intake. SOCIAL HISTORY: Age-appropriate past and current activities are:  Social History     Tobacco Use    Smoking status: Former Smoker     Packs/day: 1.00     Years: 45.00     Pack years: 45.00     Types: Cigarettes     Last attempt to quit: 2013     Years since quittin.9    Smokeless tobacco: Former User     Types: Chew     Quit date: 10/8/1971   Substance Use Topics    Alcohol use: No     Alcohol/week: 0.0 standard drinks     Comment: 1-2 coffee per day    Drug use: No       SURGICAL HISTORY:   Past Surgical History:   Procedure Laterality Date    COLONOSCOPY      CORONARY ANGIOPLASTY WITH STENT PLACEMENT  13    Left main focal eccentric 65% distal stenosis. Large OM1 CX 50% ostial & prox narrow. Large ramus artery & LAD: Minor plaque w/o sign narrow. RCA: Dom vessel w/25% prox narrow & focal hazy eccentric 99% distal stenosis before origin RPDA & RPLCA. LV: Mild inferior hypokinesis EF 55%. Probable mild AS with 10-15mmHg. Successful PCI distal RCA w/3.5 x 12 mm BMS, 0% res stenosis. Normal distal runoff    CORONARY ARTERY BYPASS GRAFT  13    Dr Honey Bhatia; CABG x2, LIMA to LAD, SVG to ramus intermedius; MV repair using 30-mm Future complete ring with magic stitch between A3 and P3; rigid internal fixation of sternum using KLS plates x2; endoscopic vein harvesting of right lower extremity     ECHO COMPL W DOP COLOR FLOW  2013         HERNIA REPAIR      SINUS SURGERY         FAMILY HISTORY: A review of medical events in the patient's family , including disease which may be hereditary or place the patient at risk were reviewed.    Family History   Problem Relation Age of Onset    Cancer Mother         breast    Cancer Father         stomach    Mental Illness Brother     Stroke Brother     Other Brother       Family Status   Relation Name Status    Mother      Father      Sister      Brother  Alive    Brother          REVIEW OF SYSTEMS: The patients health assessment form was reviewed. PHYSICAL EXAMINATION:   There were no vitals filed for this visit. Constitutional: A morbidly obese  59 y.o. male who is alert, oriented, cooperative and in no apparent distress. Head was normocephalic and atraumatic. EENT: EOMI MAK. MMM. No icterus. No conjunctival injections. External canals are patent and no discharge. Septum was midline, mucosa was without erythema, exudates or cobblestoning. No thrush. Neck: Supple without thyromegaly. No elevated JVP. Trachea was midline. No carotid bruits. Redundent pharyngeal tissues. Respiratory: Symmetrical without dullness to percussion. Clear to auscultation. No wheezes, rhonchi or rales. No intercostal retraction or use of accessory muscles. No egophony. Cardiovascular: Nonpalpable PMI. Regular without murmur, clicks, gallops or rubs. No left or right ventricular heave. Sternotomy   Pulses:  Equal bilaterally. Abdomen: Obese, rounded and soft without organomegaly. No rebound, rigidity. Lymphatic: No lymphadenopathy. Musculoskeletal: Ambulates without assistance. Normal curvature of the spine. No gross muscle weakness. No involuntary movements. Extremities:  + extremity edema. Venous stasis and hemosiderosis changes. Oncomyosis. No varicosities or erythema. Coordination appears adequate. Skin:  Warm and very dry scaly. Dependednt pigmentation was relatively normal. No bruises or skin rashes. Oncomycosis. Fungal rash  Neurological/Psychiatric: General behavior, level of consciousness, thought content is normal. Emotional status is normal.  Cranial nerves II-XII are intact.       DATA:   The data collected below information that was obtained, reviewed, analyzed and interpreted today. Tod[facility] Office Spirometry was compared to previous test if available and demonstrates an FVC of 3.45 liters which is 87 % of predicted with an FEV1 of  2.28 liters which is 74 % of predicted. FEV1/FVC ratio is 66%. expiratory flow rates are 50 % of predicted. Maximum voluntary ventilation is 83 liters per minute or 64 % of predicted. Flow volume loop shows no signs of intrathoracic or extrathoracic process. Impression: Mild Airflow Obstruction     Static Lung Volume: reveal a slow vital capacity of 4.16 liters which is 100 % of predicted. The inspiratory capacity is 3.87 liters, 128% of predicted. The thoracic gas volume is 2.72 liters which is normal.  The total lung capacity is 6.59 liters, 107 % of predicted. The RV/TLC ratio is 115 % of predicted. The DlCO is 27.62 ml/min/mmHg which is 102 % of predicted. The DlCO/VA (krogh constant) is 4.31 ml/min/mmHg, 99 % of predicted. CT SCAN CHEST (8/2018): Impression: We reviewed the films during the inter-disciplinary rounds/conference, which showed LUNGS/CENTRAL AIRWAYS/PLEURA: Small right effusion is identified with dependent atelectasis in the right lower lobe. Additional left small effusion also noted. Central airways are patent. No discrete lung nodules are visualized. HEART/PERICARDIUM/GREAT VESSELS: Cardiac size is normal. There is no  pericardial effusion.  The great vessels of the chest are normal in caliber. LYMPH NODES: No thoracic adenopathy by size criteria. NECK BASE/CHEST WALL/DIAPHRAGM: No soft tissue lesions or diaphragmatic abnormality. UPPER ABDOMEN: Limited images through the upper abdomen are unremarkable. OSSEOUS STRUCTURES: No suspicious lytic or blastic lesions. No fractures. CHEST RADIOGRAPHY: (2018): LUNGS: Clear with no areas of consolidation. No lung nodules. PLEURA: Small effusions noted bilaterally. LUNG VOLUMES: Satisfactory inspirator effort.  MEDIASTINAL STRUCTURES: No lymphadenopathy. Normal aortic contour. HEART SIZE: Normal. Mild cardiac enlargement is unchanged. Normal caliber pulmonary vessels. No acute pulmonary consolidation. Slight blunting of the posterior costophrenic angles on the lateral view. ECHOCARDIOGRAM (2017):     Left ventricle is normal in size .   Regional wall motion abnormalities.   Normal left ventricular ejection fraction.   There is doppler evidence of stage II diastolic dysfunction.   The left atrium is borderline dilated.   Mild mitral annular calcification.   Mild mitral regurgitation is present.   Mild aortic regurgitation is noted.   Mild to moderate tricuspid regurgitation.   Pulmonary hypertension is mild to moderate . Roberts Sleepiness Scale is use this Tool to Measure Sleep Deprivation. The Roberts Sleepiness Scale was developed by researchers in [facility] and is widely used by sleep professionals around the world to measure sleep deprivation. How likely are you to doze off or fall asleep in the following situations, in contrast to feeling just tired? This refers to your usual way of life in recent times. Results: Total Roberts Score: 5 Interpretation: No hypersomnolence. ABG:    Lab Results   Component Value Date    PH 7.428 09/11/2013    PH 7.225 09/09/2013    PCO2 40.1 09/11/2013    PO2 62.0 09/11/2013    HCO3 25.9 09/11/2013    BE 1.5 09/11/2013    THB 12.2 09/11/2013    O2SAT 92.5 09/11/2013     TSH:    Lab Results   Component Value Date    TSH 5.100 05/27/2020          IMPRESSION:    Nery Keen is a 59 y.o. male S/P CABG, with Afib, COPD of the chronic asthmatic bronchitis type with severe LISS without hypoventilation but with hypoxemia. He is to use  BIPAPat 13/8 with 2 Liters oxygen flow. His lung function is stable. He has overnight 40% desaturations on testing.  With this in mind, we would like to proceed with the following;                      PLAN:   I discussed my concern with Lucy Levi non compliance which mainly stems from lack of $ financial support. Again we will ask our SW to help get him on medicaid. His current decline in symptoms which is multifactorial. He was encouraged to lose weight and exercising program using a bike. He cannot walk with his neuropathy. We discussed seeing a weight loss clinic [facility]) and he refused. His DLCO is normal.  Vaccines were reviewed and current. He refused influenza. Secondly he returned his oxygen which is needed (cost was the issue) remember he has 40% desaturations and was to add oxygen to his BIPAP. We asked him to restart using his bilevel positive airway pressure at 13/8 cm H20. We will see him back in a few months. We hope this updates you on our evaluation and clinical thinking. Thank you for entrusting us to participate in [facility]. If you have any questions, please don't hesitate to call us at the [employer].      Sincerely,    Jorden Aguilar D.O., MPH, [facility]  Professor of Internal Medicine  Director of [employer]

## 2020-07-10 NOTE — PROGRESS NOTES
TeleMedicine Video Visit    This visit was performed as a virtual video visit using a synchronous, two-way, audio-video telehealth technology platform. Patient identification was verified at the start of the visit, including the patient's telephone number and physical location. I discussed with the patient the nature of our telehealth visits, that:     1. Due to the nature of an audio- video modality, the only components of a physical exam that could be done are the elements supported by direct observation. 2. I would evaluate the patient and recommend diagnostics and treatments based on my assessment. 3. If it was felt that the patient should be evaluated in clinic or an emergency room setting, then they would be directed there. 4. Our sessions are not being recorded and that personal health information is protected. 5. Our team would provide follow up care in person if/when the patient needs it. Patient does agree to proceed with telemedicine consultation. Patient's location: [address] [place] [address]  Physician  location [place] [id number]. [place], [address] [place] [address] John Paul S other people involved in call N/A      Time spent: Greater than 20    This visit was completed virtually using the telephone. Stephan Hampton will get with Kwesi Bunch regarding patient's medication needs and possible help with costs. Patient is to follow up in four (4) months.

## 2020-07-14 ENCOUNTER — TELEPHONE (OUTPATIENT)
Dept: INTERNAL MEDICINE | Age: 65
End: 2020-07-14

## 2020-07-14 ENCOUNTER — TELEPHONE (OUTPATIENT)
Dept: FAMILY MEDICINE CLINIC | Age: 65
End: 2020-07-14

## 2020-07-14 NOTE — TELEPHONE ENCOUNTER
Initiated call to pt on 7-13-20 per consult of Dr. Janee Biswas due to financial barriers especially for respiratory equipment and medications. Pt hs been assessed by previous social workers for similar concerns. Pt reports he is ; pt receives social security disability income (gross is $1,629 and after Medicare Part B is taken out net is $1,485) ; also  receives monthly pension of $1,067. Pt went to his files and read off numbers. Wife receives her own social security which is $741 gross and then after Medicare is paid; receives net of $597. Pt identifies his major struggle is covering the [employer] of his his BiPAP and oxygen concentrator which he received from [facility]; he states he can afford copays of all other medications, but not his insulins. RITU verified with RENEA Higgins of [employer] that pt has been approved for Humalog and refill was requested 6/13/20 and then refaxed after call as there has been lag with this company; PAP reports attempt to obtain LAntus has failed due to pt need to pay percentage of income for medications which he has not met. We discussed that since pt is approved for [employer], that basaglar would be covered if PCP would concur to change which she will contact Dr. Marisela Morales office to verify. RICHYW spoke with Felisha Du at [facility] and she will refer pt to the manager to apply for company's financial hardship program to assist with 20 % copay charges. LSW left message for pt.

## 2020-07-14 NOTE — TELEPHONE ENCOUNTER
Patient assistance seen that Morgan Vaughn is not on a long acting insulin. They can try and get Basalagar. They got the humulin covered and he should be getting this covered. Please advise. If you want the basalagar they can get the application out to him.

## 2020-07-14 NOTE — TELEPHONE ENCOUNTER
I actually prefer Mikhail Mcgraw. I only switched to NPH because it was cheaper.  He had been on basaglar prior

## 2020-07-15 ENCOUNTER — TELEPHONE (OUTPATIENT)
Dept: INTERNAL MEDICINE | Age: 65
End: 2020-07-15

## 2020-07-15 NOTE — TELEPHONE ENCOUNTER
Discussed with RENEA Bland of [employer] and communicated with PCP; plan is to apply of r [employer] for basaglar as long term solution to pt's financial issue re: insulins; duet to back log of drug company, pt to take NPH insulin until basaglar and humalog to arrive; LSW verified with pt's pharmacy that he did  supply for $47 copay. LSW initiated call to pt and explained above as well as opportunity to appy for financial hardship program at [facility] which he wrote down all information for future reference, including LSW's information to call as needed.   Pt very appreciative

## 2020-07-15 NOTE — TELEPHONE ENCOUNTER
Nonda Captain will be sending over the script for you to sign Niacinamide Pregnancy And Lactation Text: These medications are considered safe during pregnancy.

## 2020-07-16 RX ORDER — MONTELUKAST SODIUM 10 MG/1
10 TABLET ORAL NIGHTLY
Qty: 30 TABLET | Refills: 4 | Status: SHIPPED
Start: 2020-07-16 | End: 2020-12-18 | Stop reason: SDUPTHER

## 2020-07-16 RX ORDER — POTASSIUM CHLORIDE 20 MEQ/1
20 TABLET, EXTENDED RELEASE ORAL DAILY
Qty: 60 TABLET | Refills: 4 | Status: ON HOLD
Start: 2020-07-16 | End: 2021-04-25 | Stop reason: HOSPADM

## 2020-07-16 RX ORDER — PANTOPRAZOLE SODIUM 40 MG/1
40 TABLET, DELAYED RELEASE ORAL DAILY
Qty: 60 TABLET | Refills: 4 | Status: SHIPPED
Start: 2020-07-16 | End: 2020-12-21 | Stop reason: SDUPTHER

## 2020-07-27 ENCOUNTER — TELEPHONE (OUTPATIENT)
Dept: INTERNAL MEDICINE | Age: 65
End: 2020-07-27

## 2020-07-29 ENCOUNTER — TELEPHONE (OUTPATIENT)
Dept: FAMILY MEDICINE CLINIC | Age: 65
End: 2020-07-29

## 2020-07-29 NOTE — TELEPHONE ENCOUNTER
Patient called to follow up if ofc rec'd Insulin for him. I spoke w/Kayley ESPINOZA MA who advised she has not. Patient requested to be called once rec'd.

## 2020-09-10 RX ORDER — ATORVASTATIN CALCIUM 80 MG/1
80 TABLET, FILM COATED ORAL NIGHTLY
Qty: 30 TABLET | Refills: 0 | Status: SHIPPED
Start: 2020-09-10 | End: 2020-10-13

## 2020-09-27 RX ORDER — PRAMIPEXOLE DIHYDROCHLORIDE 0.25 MG/1
0.5 TABLET ORAL 3 TIMES DAILY
Qty: 90 TABLET | Refills: 5 | Status: SHIPPED
Start: 2020-09-27 | End: 2020-10-01 | Stop reason: SDUPTHER

## 2020-10-01 RX ORDER — PRAMIPEXOLE DIHYDROCHLORIDE 0.25 MG/1
0.5 TABLET ORAL 3 TIMES DAILY
Qty: 90 TABLET | Refills: 5 | Status: SHIPPED
Start: 2020-10-01 | End: 2021-01-16

## 2020-10-13 RX ORDER — ATORVASTATIN CALCIUM 80 MG/1
TABLET, FILM COATED ORAL
Qty: 30 TABLET | Refills: 0 | Status: SHIPPED
Start: 2020-10-13 | End: 2020-11-05

## 2020-10-19 ENCOUNTER — OFFICE VISIT (OUTPATIENT)
Dept: FAMILY MEDICINE CLINIC | Age: 65
End: 2020-10-19
Payer: MEDICARE

## 2020-10-19 ENCOUNTER — HOSPITAL ENCOUNTER (OUTPATIENT)
Age: 65
Discharge: HOME | End: 2020-10-21
Payer: MEDICARE

## 2020-10-19 VITALS
HEART RATE: 51 BPM | RESPIRATION RATE: 18 BRPM | TEMPERATURE: 97.6 F | HEIGHT: 66 IN | BODY MASS INDEX: 50.62 KG/M2 | SYSTOLIC BLOOD PRESSURE: 118 MMHG | OXYGEN SATURATION: 94 % | DIASTOLIC BLOOD PRESSURE: 75 MMHG | WEIGHT: 315 LBS

## 2020-10-19 LAB
ALBUMIN SERPL-MCNC: 4.2 G/DL (ref 3.5–5.2)
ALP BLD-CCNC: 114 U/L (ref 40–129)
ALT SERPL-CCNC: 18 U/L (ref 0–40)
ANION GAP SERPL CALCULATED.3IONS-SCNC: 17 MMOL/L (ref 7–16)
AST SERPL-CCNC: 17 U/L (ref 0–39)
BASOPHILS ABSOLUTE: 0.05 E9/L (ref 0–0.2)
BASOPHILS RELATIVE PERCENT: 0.5 % (ref 0–2)
BILIRUB SERPL-MCNC: 0.8 MG/DL (ref 0–1.2)
BUN BLDV-MCNC: 28 MG/DL (ref 8–23)
CALCIUM SERPL-MCNC: 10.7 MG/DL (ref 8.6–10.2)
CHLORIDE BLD-SCNC: 90 MMOL/L (ref 98–107)
CHOLESTEROL, TOTAL: 132 MG/DL (ref 0–199)
CO2: 30 MMOL/L (ref 22–29)
CREAT SERPL-MCNC: 1.3 MG/DL (ref 0.7–1.2)
EOSINOPHILS ABSOLUTE: 0.16 E9/L (ref 0.05–0.5)
EOSINOPHILS RELATIVE PERCENT: 1.7 % (ref 0–6)
GFR AFRICAN AMERICAN: >60
GFR NON-AFRICAN AMERICAN: 55 ML/MIN/1.73
GLUCOSE BLD-MCNC: 198 MG/DL (ref 74–99)
HBA1C MFR BLD: 10.6 %
HBA1C MFR BLD: 11.1 % (ref 4–5.6)
HCT VFR BLD CALC: 45 % (ref 37–54)
HDLC SERPL-MCNC: 42 MG/DL
HEMOGLOBIN: 15.2 G/DL (ref 12.5–16.5)
IMMATURE GRANULOCYTES #: 0.06 E9/L
IMMATURE GRANULOCYTES %: 0.6 % (ref 0–5)
LDL CHOLESTEROL CALCULATED: 58 MG/DL (ref 0–99)
LYMPHOCYTES ABSOLUTE: 1.64 E9/L (ref 1.5–4)
LYMPHOCYTES RELATIVE PERCENT: 16.9 % (ref 20–42)
MCH RBC QN AUTO: 31.9 PG (ref 26–35)
MCHC RBC AUTO-ENTMCNC: 33.8 % (ref 32–34.5)
MCV RBC AUTO: 94.5 FL (ref 80–99.9)
MONOCYTES ABSOLUTE: 0.84 E9/L (ref 0.1–0.95)
MONOCYTES RELATIVE PERCENT: 8.7 % (ref 2–12)
NEUTROPHILS ABSOLUTE: 6.94 E9/L (ref 1.8–7.3)
NEUTROPHILS RELATIVE PERCENT: 71.6 % (ref 43–80)
PDW BLD-RTO: 14.5 FL (ref 11.5–15)
PLATELET # BLD: 289 E9/L (ref 130–450)
PMV BLD AUTO: 10.5 FL (ref 7–12)
POTASSIUM SERPL-SCNC: 4.5 MMOL/L (ref 3.5–5)
RBC # BLD: 4.76 E12/L (ref 3.8–5.8)
SODIUM BLD-SCNC: 137 MMOL/L (ref 132–146)
TOTAL PROTEIN: 7.8 G/DL (ref 6.4–8.3)
TRIGL SERPL-MCNC: 161 MG/DL (ref 0–149)
TSH SERPL DL<=0.05 MIU/L-ACNC: 3.8 UIU/ML (ref 0.27–4.2)
VLDLC SERPL CALC-MCNC: 32 MG/DL
WBC # BLD: 9.7 E9/L (ref 4.5–11.5)

## 2020-10-19 PROCEDURE — G8484 FLU IMMUNIZE NO ADMIN: HCPCS | Performed by: FAMILY MEDICINE

## 2020-10-19 PROCEDURE — 80053 COMPREHEN METABOLIC PANEL: CPT

## 2020-10-19 PROCEDURE — 80061 LIPID PANEL: CPT

## 2020-10-19 PROCEDURE — 85025 COMPLETE CBC W/AUTO DIFF WBC: CPT

## 2020-10-19 PROCEDURE — 1036F TOBACCO NON-USER: CPT | Performed by: FAMILY MEDICINE

## 2020-10-19 PROCEDURE — 3046F HEMOGLOBIN A1C LEVEL >9.0%: CPT | Performed by: FAMILY MEDICINE

## 2020-10-19 PROCEDURE — 3017F COLORECTAL CA SCREEN DOC REV: CPT | Performed by: FAMILY MEDICINE

## 2020-10-19 PROCEDURE — 2022F DILAT RTA XM EVC RTNOPTHY: CPT | Performed by: FAMILY MEDICINE

## 2020-10-19 PROCEDURE — 83036 HEMOGLOBIN GLYCOSYLATED A1C: CPT

## 2020-10-19 PROCEDURE — G8427 DOCREV CUR MEDS BY ELIG CLIN: HCPCS | Performed by: FAMILY MEDICINE

## 2020-10-19 PROCEDURE — 36415 COLL VENOUS BLD VENIPUNCTURE: CPT

## 2020-10-19 PROCEDURE — 99214 OFFICE O/P EST MOD 30 MIN: CPT | Performed by: FAMILY MEDICINE

## 2020-10-19 PROCEDURE — G8417 CALC BMI ABV UP PARAM F/U: HCPCS | Performed by: FAMILY MEDICINE

## 2020-10-19 PROCEDURE — 84443 ASSAY THYROID STIM HORMONE: CPT

## 2020-10-19 RX ORDER — DULAGLUTIDE 0.75 MG/.5ML
0.75 INJECTION, SOLUTION SUBCUTANEOUS WEEKLY
Qty: 4 PEN | Refills: 0 | COMMUNITY
Start: 2020-10-19 | End: 2020-12-15

## 2020-10-19 RX ORDER — INSULIN GLARGINE 100 [IU]/ML
INJECTION, SOLUTION SUBCUTANEOUS
Qty: 5 PEN | Refills: 5
Start: 2020-10-19 | End: 2020-12-15

## 2020-10-20 ENCOUNTER — ANTI-COAG VISIT (OUTPATIENT)
Dept: FAMILY MEDICINE CLINIC | Age: 65
End: 2020-10-20

## 2020-10-29 ASSESSMENT — ENCOUNTER SYMPTOMS
APNEA: 0
STRIDOR: 0
SORE THROAT: 0
RECTAL PAIN: 0
ABDOMINAL DISTENTION: 0
CHOKING: 0
BACK PAIN: 0
DIARRHEA: 0
VOICE CHANGE: 0
ANAL BLEEDING: 0
CONSTIPATION: 0
WHEEZING: 0
RHINORRHEA: 0
SHORTNESS OF BREATH: 0
SINUS PAIN: 0
PHOTOPHOBIA: 0
EYE REDNESS: 0
VOMITING: 0
COLOR CHANGE: 0
BLOOD IN STOOL: 0
EYE ITCHING: 0
COUGH: 0
NAUSEA: 0
EYE DISCHARGE: 0
FACIAL SWELLING: 0
TROUBLE SWALLOWING: 0
SINUS PRESSURE: 0
CHEST TIGHTNESS: 0
EYE PAIN: 0
ABDOMINAL PAIN: 0

## 2020-10-29 ASSESSMENT — COPD QUESTIONNAIRES: COPD: 1

## 2020-10-29 NOTE — PROGRESS NOTES
Farhat Marin is a 59 y.o. male  . Subjective:      Compliance continues to be poor. Patient refuses thyroid treatment. States that it increases appetite. Diet is very poor. Recommend endocrinology consult. Patient wants to hold off for three more months. Discussed cardiovascular risk at length. Will attempt to improve diet and we will recheck thyroid and continue to encourage treatment if levels are still off. Patient is also due to have prostate rechecked for BPH and ED    Diabetes   He presents for his follow-up diabetic visit. He has type 2 diabetes mellitus. No MedicAlert identification noted. His disease course has been worsening. There are no hypoglycemic associated symptoms. Pertinent negatives for hypoglycemia include no confusion, dizziness, headaches, nervousness/anxiousness, pallor, seizures, speech difficulty or tremors. Associated symptoms include fatigue and foot paresthesias. Pertinent negatives for diabetes include no chest pain, no polydipsia, no polyphagia, no polyuria and no weakness. There are no hypoglycemic complications. Symptoms are worsening. Diabetic complications include heart disease and peripheral neuropathy. Risk factors for coronary artery disease include diabetes mellitus, dyslipidemia, hypertension, sedentary lifestyle and obesity. Current diabetic treatment includes oral agent (monotherapy) (quit insulin). He is compliant with treatment none of the time. His weight is increasing steadily. He is following a generally unhealthy diet. When asked about meal planning, he reported none. He has had a previous visit with a dietitian. He never participates in exercise. His home blood glucose trend is increasing steadily. An ACE inhibitor/angiotensin II receptor blocker is being taken. He does not see a podiatrist.Eye exam is current. COPD   There is no cough, shortness of breath or wheezing. This is a chronic problem. The current episode started more than 1 year ago.  The problem occurs daily. The problem has been waxing and waning. Associated symptoms include nasal congestion. Pertinent negatives include no appetite change, chest pain, ear pain, fever, headaches, myalgias, postnasal drip, rhinorrhea, sneezing, sore throat or trouble swallowing. His symptoms are aggravated by URI and pollen. His symptoms are alleviated by beta-agonist and steroid inhaler. He reports moderate improvement on treatment. His symptoms are not alleviated by anxiolytic. Risk factors for lung disease include smoking/tobacco exposure (has quit). His past medical history is significant for asthma, bronchitis and COPD. Fatigue   This is a chronic problem. The current episode started more than 1 year ago. The problem occurs daily. The problem has been waxing and waning. Associated symptoms include arthralgias, fatigue, joint swelling and urinary symptoms. Pertinent negatives include no abdominal pain, chest pain, chills, congestion, coughing, diaphoresis, fever, headaches, myalgias, nausea, neck pain, numbness, rash, sore throat, vomiting or weakness. Associated symptoms comments: Sleep disturbances . Nothing aggravates the symptoms. He has tried nothing for the symptoms. The treatment provided no relief. Cough   This is a new problem. The current episode started in the past 7 days. The problem has been waxing and waning. The cough is productive of sputum. Associated symptoms include nasal congestion. Pertinent negatives include no chest pain, chills, ear pain, eye redness, fever, headaches, myalgias, postnasal drip, rash, rhinorrhea, sore throat, shortness of breath or wheezing. Nothing aggravates the symptoms. He has tried a beta-agonist inhaler, steroid inhaler and OTC cough suppressant for the symptoms. The treatment provided moderate relief. His past medical history is significant for asthma, bronchitis and COPD. There is no history of environmental allergies. Hypertension   This is a chronic problem.  The current episode started more than 1 year ago. The problem has been waxing and waning since onset. The problem is uncontrolled. Pertinent negatives include no chest pain, headaches, neck pain, palpitations or shortness of breath. There are no associated agents to hypertension. Risk factors for coronary artery disease include diabetes mellitus, dyslipidemia, male gender and obesity. Past treatments include beta blockers and angiotensin blockers. The current treatment provides moderate improvement. Compliance problems include diet, medication cost, medication side effects and psychosocial issues. Identifiable causes of hypertension include a thyroid problem. Hyperlipidemia   This is a chronic problem. The current episode started more than 1 year ago. The problem is resistant. Recent lipid tests were reviewed and are variable. There are no known factors aggravating his hyperlipidemia. Pertinent negatives include no chest pain, myalgias or shortness of breath. Current antihyperlipidemic treatment includes statins. The current treatment provides moderate improvement of lipids. There are no compliance problems. Risk factors for coronary artery disease include male sex, obesity and dyslipidemia. Other   This is a chronic (atrial fibrillation and HX CAD with intervention and coumadin) problem. The current episode started more than 1 year ago. The problem occurs daily. The problem has been waxing and waning. Associated symptoms include arthralgias, fatigue, joint swelling and urinary symptoms. Pertinent negatives include no abdominal pain, chest pain, chills, congestion, coughing, diaphoresis, fever, headaches, myalgias, nausea, neck pain, numbness, rash, sore throat, vomiting or weakness. The symptoms are aggravated by smoking (obesity, poor diet, no excercise). Treatments tried: coronary revasculization, treating lipids, treating obesity, treating  diabetes and htn. The treatment provided mild relief.        Review of Systems   Constitutional: Positive for fatigue. Negative for activity change, appetite change, chills, diaphoresis, fever and unexpected weight change. HENT: Negative for congestion, dental problem, drooling, ear discharge, ear pain, facial swelling, hearing loss, mouth sores, nosebleeds, postnasal drip, rhinorrhea, sinus pressure, sinus pain, sneezing, sore throat, tinnitus, trouble swallowing and voice change. Eyes: Negative for photophobia, pain, discharge, redness, itching and visual disturbance. Respiratory: Negative for apnea, cough, choking, chest tightness, shortness of breath, wheezing and stridor. Cardiovascular: Negative for chest pain, palpitations and leg swelling. Gastrointestinal: Negative for abdominal distention, abdominal pain, anal bleeding, blood in stool, constipation, diarrhea, nausea, rectal pain and vomiting. Endocrine: Negative for cold intolerance, heat intolerance, polydipsia, polyphagia and polyuria. Genitourinary: Negative for decreased urine volume, difficulty urinating, discharge, dysuria, enuresis, flank pain, frequency, genital sores, hematuria, penile pain, penile swelling, scrotal swelling, testicular pain and urgency. Musculoskeletal: Positive for arthralgias and joint swelling. Negative for back pain, gait problem, myalgias, neck pain and neck stiffness. Skin: Negative for color change, pallor, rash and wound. Allergic/Immunologic: Negative for environmental allergies, food allergies and immunocompromised state. Neurological: Negative for dizziness, tremors, seizures, syncope, facial asymmetry, speech difficulty, weakness, light-headedness, numbness and headaches. Hematological: Negative for adenopathy. Does not bruise/bleed easily. Psychiatric/Behavioral: Negative for agitation, behavioral problems, confusion, decreased concentration, dysphoric mood, hallucinations, self-injury, sleep disturbance and suicidal ideas.  The patient is not nervous/anxious and is not hyperactive.         Past Medical History:   Diagnosis Date    Acid reflux     Acute diastolic (congestive) heart failure ([facility] 2019    Arthritis     Asthma 2014    Asthmatic bronchitis , chronic (HCC) 2016    CAD (coronary artery disease)     Chronic bronchitis ([facility] 2014    COPD (chronic obstructive pulmonary disease) ([facility]     CB    COPD (chronic obstructive pulmonary disease) ([facility]     Diabetes mellitus ([facility])     Emphysema (subcutaneous) (surgical) resulting from a procedure     Hyperlipidemia     Hypertension     Hypoxemia requiring supplemental oxygen 2015    LONG TERM ANTICOAGULENT USE     Morbid obesity with BMI of 50.0-59.9, adult ([facility] 2013    Obesity     Osteoarthritis     Sleep apnea     bilevel positive airway pressure at 13/8 with 2 L oxygen flow     Tobacco abuse     Type II or unspecified type diabetes mellitus without mention of complication, not stated as uncontrolled        Social History     Socioeconomic History    Marital status:      Spouse name: Ramona Armijo Number of children: 1    Years of education: Not on file    Highest education level: 11th grade   Occupational History    Not on file   Social Needs    Financial resource strain: Somewhat hard    Food insecurity     Worry: Sometimes true     Inability: Never true    Transportation needs     Medical: No     Non-medical: No   Tobacco Use    Smoking status: Former Smoker     Packs/day: 1.00     Years: 45.00     Pack years: 45.00     Types: Cigarettes     Last attempt to quit: 2013     Years since quittin.2    Smokeless tobacco: Former User     Types: Chew     Quit date: 10/8/1971   Substance and Sexual Activity    Alcohol use: No     Alcohol/week: 0.0 standard drinks     Comment: 1-2 coffee per day    Drug use: No    Sexual activity: Yes     Partners: Female   Lifestyle    Physical activity     Days per week: 3 days     Minutes per session: 20 min    (SINGULAIR) 10 MG tablet Take 1 tablet by mouth nightly 30 tablet 4    potassium chloride (KLOR-CON M) 20 MEQ extended release tablet Take 1 tablet by mouth daily 60 tablet 4    gabapentin (NEURONTIN) 600 MG tablet One tab three times a day 90 tablet 5    metoprolol succinate (TOPROL XL) 50 MG extended release tablet 1 by mouth daily 90 tablet 3    spironolactone (ALDACTONE) 25 MG tablet TAKE ONE TABLET BY MOUTH TWO TIMES A  tablet 3    torsemide (DEMADEX) 20 MG tablet TAKE ONE TABLET BY MOUTH TWO TIMES A  tablet 3    warfarin (JANTOVEN) 5 MG tablet TAKE 2 TABLETS BY MOUTH DAILY ON MONDAY, WEDNESDAY, FRIDAY AND SATURDAY. THEN TAKE 1 TABLET DAILY ON TUESDAY, THURSDAY AND SUNDAY 50 tablet 5    ipratropium (ATROVENT) 0.06 % nasal spray 2 sprays by Nasal route 3 times daily 1 Bottle 3    levocetirizine (XYZAL) 5 MG tablet Take 1 tablet by mouth nightly as needed (allergies) 30 tablet 11    losartan (COZAAR) 25 MG tablet Take 0.5 tablets by mouth daily 30 tablet 3    Lancets MISC 1 each by Does not apply route 2 times daily 300 each 1    blood glucose monitor strips Test two times a day & as needed for symptoms of irregular blood glucose. 300 strip 0    terbinafine (ATHLETES FOOT) 1 % cream Apply topically 2 times daily.  42 g 1    Insulin Syringe-Needle U-100 27G X 5/8\" 1 ML MISC Use as directed 100 each 11    SYMBICORT 160-4.5 MCG/ACT AERO Inhale 2 puffs into the lungs 2 times daily 1 Inhaler 3    CPAP Machine MISC 1 each by Does not apply route nightly as needed       Accu-Chek Multiclix Lancets MISC Use as directed 100 each 3    aspirin 81 MG tablet Take 81 mg by mouth nightly       thyroid (ARMOUR THYROID) 15 MG tablet Take 1 tablet by mouth daily (Patient not taking: Reported on 10/19/2020) 30 tablet 5    Cholecalciferol (VITAMIN D3) 1.25 MG (73248 UT) CAPS Take 1 capsule by mouth once a week Wednesday (Patient not taking: Reported on 10/19/2020) 4 capsule 5     No current facility-administered medications on file prior to visit. Allergies   Allergen Reactions    Pcn [Penicillins]      Child went to hospital   ? Reaction  Patient tolerates cephalosporins    Sulfa Antibiotics      ? I have reviewed his allergies, medications, problem list, medical,social and family history and have updated as needed in the electronic medical record. Objective:     Physical Exam  Vitals signs and nursing note reviewed. Constitutional:       General: He is not in acute distress. Appearance: He is well-developed. He is not diaphoretic. HENT:      Head: Normocephalic and atraumatic. Right Ear: External ear normal.      Left Ear: External ear normal.      Nose: Nose normal.      Mouth/Throat:      Pharynx: No oropharyngeal exudate. Eyes:      General: No scleral icterus. Right eye: No discharge. Left eye: No discharge. Conjunctiva/sclera: Conjunctivae normal.      Pupils: Pupils are equal, round, and reactive to light. Neck:      Musculoskeletal: Normal range of motion and neck supple. Thyroid: No thyromegaly. Vascular: No JVD. Trachea: No tracheal deviation. Cardiovascular:      Rate and Rhythm: Normal rate and regular rhythm. Heart sounds: Normal heart sounds. No murmur. No friction rub. No gallop. Pulmonary:      Effort: Pulmonary effort is normal. No respiratory distress. Breath sounds: Normal breath sounds. No stridor. No wheezing or rales. Chest:      Chest wall: No tenderness. Abdominal:      General: Bowel sounds are normal. There is no distension. Palpations: Abdomen is soft. There is no mass. Tenderness: There is no abdominal tenderness. There is no guarding or rebound. Genitourinary:     Comments: Deferred by patient, has appointment scheduled for urology. Musculoskeletal: Normal range of motion. General: No tenderness. Lymphadenopathy:      Cervical: No cervical adenopathy.    Skin: General: Skin is warm and dry. Coloration: Skin is not pale. Findings: No erythema or rash. Neurological:      Mental Status: He is alert and oriented to person, place, and time. Cranial Nerves: No cranial nerve deficit. Motor: No abnormal muscle tone. Coordination: Coordination normal.      Deep Tendon Reflexes: Reflexes are normal and symmetric. Reflexes normal.   Psychiatric:         Behavior: Behavior normal.         Thought Content: Thought content normal.         Judgment: Judgment normal.         Assessment / Plan:   Gali Escobar was seen today for diabetes. Diagnoses and all orders for this visit:    Type 2 diabetes mellitus with hyperglycemia, with long-term current use of insulin (HCC)  -     POCT glycosylated hemoglobin (Hb A1C)  -     CBC Auto Differential; Future  -     Comprehensive Metabolic Panel; Future  -     Hemoglobin A1C; Future  -     TSH without Reflex; Future  -     Lipid Panel; Future    Vitamin D deficiency  -     CBC Auto Differential; Future  -     Comprehensive Metabolic Panel; Future  -     Hemoglobin A1C; Future  -     TSH without Reflex; Future  -     Lipid Panel; Future    Benign prostatic hyperplasia with urinary hesitancy  -     External Referral To Urology  -     CBC Auto Differential; Future  -     Comprehensive Metabolic Panel; Future  -     Hemoglobin A1C; Future  -     TSH without Reflex; Future  -     Lipid Panel; Future    Chronic fatigue  -     CBC Auto Differential; Future  -     Comprehensive Metabolic Panel; Future  -     Hemoglobin A1C; Future  -     TSH without Reflex; Future  -     Lipid Panel;  Future    S/P CABG x 2  -     POCT glycosylated hemoglobin (Hb A1C)  -     insulin glargine (BASAGLAR KWIKPEN) 100 UNIT/ML injection pen; 40 units am and 60 units in pm  -     Dulaglutide (TRULICITY) 3.49 FX/8.1FA SOPN; Inject 0.75 mg into the skin once a week  -     External Referral To Urology  -     CBC Auto Differential; Future  -     Comprehensive Metabolic Panel; Future  -     Hemoglobin A1C; Future  -     TSH without Reflex; Future  -     Lipid Panel; Future    History of mitral valve repair  -     POCT glycosylated hemoglobin (Hb A1C)  -     insulin glargine (BASAGLAR KWIKPEN) 100 UNIT/ML injection pen; 40 units am and 60 units in pm  -     Dulaglutide (TRULICITY) 8.56 ID/1.8JG SOPN; Inject 0.75 mg into the skin once a week  -     External Referral To Urology  -     CBC Auto Differential; Future  -     Comprehensive Metabolic Panel; Future  -     Hemoglobin A1C; Future  -     TSH without Reflex; Future  -     Lipid Panel; Future    Coronary artery disease involving native coronary artery of native heart without angina pectoris  -     POCT glycosylated hemoglobin (Hb A1C)  -     insulin glargine (BASAGLAR KWIKPEN) 100 UNIT/ML injection pen; 40 units am and 60 units in pm  -     Dulaglutide (TRULICITY) 0.20 UN/1.0ZL SOPN; Inject 0.75 mg into the skin once a week  -     External Referral To Urology  -     CBC Auto Differential; Future  -     Comprehensive Metabolic Panel; Future  -     Hemoglobin A1C; Future  -     TSH without Reflex; Future  -     Lipid Panel; Future    Mixed hyperlipidemia  -     CBC Auto Differential; Future  -     Comprehensive Metabolic Panel; Future  -     Hemoglobin A1C; Future  -     TSH without Reflex; Future  -     Lipid Panel; Future     Extended visit. Over 50% of time spent counseling patient. Reviewed healthmaintenance report. Patient is aware of deficiencies and suggested preventative tests.

## 2020-10-30 ENCOUNTER — TELEPHONE (OUTPATIENT)
Dept: OTHER | Age: 65
End: 2020-10-30

## 2020-10-30 NOTE — TELEPHONE ENCOUNTER
Called pt to reschedule overdue CHF clinic appt. Per patient he feels great and he does not wish to continue to follow up at the clinic. I ensured pt had clinic contact information in case his needs may change.

## 2020-10-31 RX ORDER — LOSARTAN POTASSIUM 25 MG/1
TABLET ORAL
Qty: 30 TABLET | Refills: 0 | Status: SHIPPED
Start: 2020-10-31 | End: 2021-02-19 | Stop reason: SDUPTHER

## 2020-11-05 RX ORDER — ATORVASTATIN CALCIUM 80 MG/1
TABLET, FILM COATED ORAL
Qty: 30 TABLET | Refills: 0 | Status: SHIPPED
Start: 2020-11-05 | End: 2020-11-12

## 2020-11-06 ENCOUNTER — TELEPHONE (OUTPATIENT)
Dept: OTHER | Age: 65
End: 2020-11-06

## 2020-11-06 NOTE — TELEPHONE ENCOUNTER
Patient no longer wishes to follow with the CHF clinic at [facility]. Will discharge the patient at this time, but will gladly see in the future. He will require a new referral order.
Unable to consent due to medical condition

## 2020-11-12 RX ORDER — WARFARIN SODIUM 5 MG/1
TABLET ORAL
Qty: 50 TABLET | Refills: 0 | Status: SHIPPED
Start: 2020-11-12 | End: 2020-12-18 | Stop reason: SDUPTHER

## 2020-11-12 RX ORDER — ATORVASTATIN CALCIUM 80 MG/1
TABLET, FILM COATED ORAL
Qty: 30 TABLET | Refills: 0 | Status: SHIPPED
Start: 2020-11-12 | End: 2021-01-11

## 2020-12-09 NOTE — PROGRESS NOTES
[facility] Cardiology Progress Note  Dr. Soundra Opitz      Referring Physician: Frankie Marcos DO  CHIEF COMPLAINT:   Chief Complaint   Patient presents with    Coronary Artery Disease     6 month follow-up. Pt has no cardiac complaints today       HISTORY OF PRESENT ILLNESS:   72year old male with history of CHF, CAD, DM, hypertension, hyperlipidemia and COPD is here for a follow up appointment. Patient denies any chest pain, no shortness of breath, no lightheadedness, no dizziness, no palpitations, no pedal edema, no PND, no orthopnea, no syncope, no presyncopal episodes. Functional capacity is at baseline    Past Medical History:   Diagnosis Date    Acid reflux     Acute diastolic (congestive) heart failure ([facility] [place] [address] 6/19/2019    Arthritis     Asthma 4/16/2014    Asthmatic bronchitis , chronic (HCC) 11/28/2016    CAD (coronary artery disease)     [facility] bronchitis ([facility] 4/16/2014    COPD (chronic obstructive pulmonary disease) ([facility])     CB    COPD (chronic obstructive pulmonary disease) ([facility])     Diabetes mellitus ([facility])     Emphysema (subcutaneous) (surgical) resulting from a procedure     Hyperlipidemia     Hypertension     Hypoxemia requiring supplemental oxygen 2/2/2015    LONG TERM ANTICOAGULENT USE     Morbid obesity with BMI of 50.0-59.9, adult ([facility] [place] [address] 11/27/2013    Obesity     Osteoarthritis     Sleep apnea     bilevel positive airway pressure at 13/8 with 2 L oxygen flow     Tobacco abuse     Type II or unspecified type diabetes mellitus without mention of complication, not stated as uncontrolled          Past Surgical History:   Procedure Laterality Date    COLONOSCOPY      CORONARY ANGIOPLASTY WITH STENT PLACEMENT  07-23-13    Left main focal eccentric 65% distal stenosis. Large OM1 CX 50% ostial & prox narrow. Large ramus artery & LAD: Minor plaque w/o sign narrow. RCA: Dom vessel w/25% prox narrow & focal hazy eccentric 99% distal stenosis before origin RPDA & RPLCA.  LV: Mild tablet TAKE ONE TABLET BY MOUTH TWO TIMES A  tablet 3    levocetirizine (XYZAL) 5 MG tablet Take 1 tablet by mouth nightly as needed (allergies) 30 tablet 11    Lancets MISC 1 each by Does not apply route 2 times daily 300 each 1    blood glucose monitor strips Test two times a day & as needed for symptoms of irregular blood glucose. 300 strip 0    Insulin Syringe-Needle U-100 27G X 5/8\" 1 ML MISC Use as directed 100 each 11    CPAP Machine MISC 1 each by Does not apply route nightly as needed       Accu-Chek Multiclix Lancets MISC Use as directed 100 each 3    aspirin 81 MG tablet Take 81 mg by mouth nightly       ipratropium (ATROVENT) 0.06 % nasal spray 2 sprays by Nasal route 3 times daily 1 Bottle 3    terbinafine (ATHLETES FOOT) 1 % cream Apply topically 2 times daily. 42 g 1    SYMBICORT 160-4.5 MCG/ACT AERO Inhale 2 puffs into the lungs 2 times daily 1 Inhaler 3     No current facility-administered medications for this visit.           Allergies as of 12/15/2020 - Review Complete 12/15/2020   Allergen Reaction Noted    Pcn [penicillins]  2013    Sulfa antibiotics  2013       Social History     Socioeconomic History    Marital status:      Spouse name: Carri Pederson Number of children: 1    Years of education: Not on file    Highest education level: 11th grade   Occupational History    Not on file   Social Needs    Financial resource strain: Somewhat hard    Food insecurity     Worry: Sometimes true     Inability: Never true    Transportation needs     Medical: No     Non-medical: No   Tobacco Use    Smoking status: Former Smoker     Packs/day: 1.00     Years: 45.00     Pack years: 45.00     Types: Cigarettes     Quit date: 2013     Years since quittin.4    Smokeless tobacco: Former User     Types: Chew     Quit date: 10/8/1971   Substance and Sexual Activity    Alcohol use: No     Alcohol/week: 0.0 standard drinks     Comment: 1-2 coffee per day    Drug use: No    Sexual activity: Yes     Partners: Female   Lifestyle    Physical activity     Days per week: 3 days     Minutes per session: 20 min    Stress: Rather much   Relationships    Social connections     Talks on phone: Three times a week     Gets together: Once a week     Attends [employer] service: More than 4 times per year     Active member of club or organization: Yes     Attends meetings of clubs or organizations: Never     Relationship status:     Intimate partner violence     Fear of current or ex partner: No     Emotionally abused: No     Physically abused: No     Forced sexual activity: No   Other Topics Concern    Not on file   Social History Narrative    8-26-19  [facility] reviewed SDOH with Mike Dominguez. He does have money strain but between the income he has coming in and his wife's they are over resources for SARY programs. Offered food panty info to help with end of the month struggles but he declined stating that he tried and was refused due to his income. He belongs to a [employer] and goes weekly so he has some community connections in place. He has an extremely high interest rate on his mortgage which he was encouraged to look into getting lowered to help save money on his house payments. Noticed some difficulty with memory recall. Becomes easily flustered at times. Has no MHI to support applying for [facility] program of SARY. States he does not feel depressed or need any MH treatment.           Family History   Problem Relation Age of Onset    Cancer Mother         breast    Cancer Father         stomach    Mental Illness Brother     Stroke Brother     Other Brother        REVIEW OF SYSTEMS:     CONSTITUTIONAL:  negative for  fevers, chills, sweats and fatigue  HEENT:  negative for  tinnitus, earaches, nasal congestion and epistaxis  RESPIRATORY:  negative for  dry cough, cough with sputum, wheezing and hemoptysis  GASTROINTESTINAL:  negative for nausea, vomiting, diarrhea, constipation, pruritus and jaundice  HEMATOLOGIC/LYMPHATIC:  negative for easy bruising, bleeding, lymphadenopathy and petechiae  ENDOCRINE:  negative for heat intolerance, cold intolerance, tremor, hair loss and diabetic symptoms including neither polyuria nor polydipsia nor blurred vision  MUSCULOSKELETAL:  negative for  myalgias, arthralgias, joint swelling, stiff joints and decreased range of motion  NEUROLOGICAL:  negative for memory problems, speech problems, visual disturbance, dysphagia, weakness and numbness      PHYSICAL EXAM:   CONSTITUTIONAL:  awake, alert, cooperative, no apparent distress, and appears stated age  HEAD:  normocepalic, without obvious abnormality, atraumatic, pink, moist mucous membranes. NECK:  Supple, symmetrical, trachea midline, no adenopathy, thyroid symmetric, not enlarged and no tenderness, skin normal  LUNGS:  No increased work of breathing, good air exchange, clear to auscultation bilaterally, no crackles or wheezing  CARDIOVASCULAR:  Normal apical impulse,, tachycardic, normal S1 and S2, no S3 or S4, 3/6 systolic murmur at the apex, 3/6 systolic murmur at the left lower sternal border, no JVD, no carotid bruit, no pedal edema, good carotid upstroke bilaterally. ABDOMEN:  Soft, nontender, no masses, no hepatomegaly or splenomegaly, BS+  CHEST: nontender to palpation, expands symmetrically  MUSCULOSKELETAL:  No clubbing no cyanosis. there is no redness, warmth, or swelling of the joints  full range of motion noted  NEUROLOGIC:  Alert, awake,oriented x3.   SKIN:  no bruising or bleeding, normal skin color, texture, turgor and no redness, warmth, or swelling        /80 (Site: Right Upper Arm, Position: Sitting, Cuff Size: Large Adult)   Pulse 67   Ht 5' 6\" (1.676 m)   Wt (!) 321 lb (145.6 kg)   BMI 51.81 kg/m²     DATA:   I personally reviewed the visit EKG with the following interpretation: Sinus rhythm, nonspecific T wave changes in the inferior lateral leads, right bundle branch block pattern    EKG 6/22/20  Atrial fibrillation, rapid ventricular response, nonspecific T wave changes       ECHO: 6/20/19 Summary   Technically suboptimal study in spite contrast administration. Left ventricle is grossly normal in size . Mild left ventricular concentric hypertrophy noted. Regional wall motion abnormality suggested with inferior hypokinesis. Mild-moderately reduced left ventricular systolic dysfunction suggested. The left atrium is dilated. Enlarged right atrium size. Moderate mitral annular calcification. Mild mitral regurgitation is present. The aortic valve appears moderately sclerotic. Mild tricuspid regurgitation. Stress Test: 7/12/19 1. Electrocardiographically normal regadenoson infusion with a   clinically non-ischemic response  2. Myocardial perfusion imaging showed no reversible ischemia,   small apical fixed defect.    3. Overall left ventricular systolic function was normal without   regional wall motion abnormalities. EF 60%.   4.   Low risk general pharmacologic stress test.    CABG/MVR 9/9/13 Sternotomy; coronary artery bypass grafting x2, left internal   mammary artery to the LAD, saphenous vein graft to the ramus intermedius;   mitral valve repair using 30-mm Future complete ring with magic stitch   between A3 and P3; rigid internal fixation of the sternum using KLS plates   x2; and endoscopic vein harvesting    Cardiology Labs: BMP:    Lab Results   Component Value Date     10/19/2020    K 4.5 10/19/2020    CL 90 10/19/2020    CO2 30 10/19/2020    BUN 28 10/19/2020    CREATININE 1.3 10/19/2020     CMP:    Lab Results   Component Value Date     10/19/2020    K 4.5 10/19/2020    CL 90 10/19/2020    CO2 30 10/19/2020    BUN 28 10/19/2020    CREATININE 1.3 10/19/2020    PROT 7.8 10/19/2020     CBC:    Lab Results   Component Value Date    WBC 9.7 10/19/2020    RBC 4.76 10/19/2020    HGB 15.2 10/19/2020    HCT 45.0 10/19/2020    MCV 94.5 10/19/2020

## 2020-12-15 ENCOUNTER — OFFICE VISIT (OUTPATIENT)
Dept: CARDIOLOGY CLINIC | Age: 65
End: 2020-12-15
Payer: MEDICARE

## 2020-12-15 VITALS
SYSTOLIC BLOOD PRESSURE: 124 MMHG | BODY MASS INDEX: 50.62 KG/M2 | HEIGHT: 66 IN | WEIGHT: 315 LBS | DIASTOLIC BLOOD PRESSURE: 80 MMHG | HEART RATE: 67 BPM

## 2020-12-15 PROCEDURE — 3017F COLORECTAL CA SCREEN DOC REV: CPT | Performed by: INTERNAL MEDICINE

## 2020-12-15 PROCEDURE — 1036F TOBACCO NON-USER: CPT | Performed by: INTERNAL MEDICINE

## 2020-12-15 PROCEDURE — 93000 ELECTROCARDIOGRAM COMPLETE: CPT | Performed by: INTERNAL MEDICINE

## 2020-12-15 PROCEDURE — G8427 DOCREV CUR MEDS BY ELIG CLIN: HCPCS | Performed by: INTERNAL MEDICINE

## 2020-12-15 PROCEDURE — 1123F ACP DISCUSS/DSCN MKR DOCD: CPT | Performed by: INTERNAL MEDICINE

## 2020-12-15 PROCEDURE — 4040F PNEUMOC VAC/ADMIN/RCVD: CPT | Performed by: INTERNAL MEDICINE

## 2020-12-15 PROCEDURE — G8484 FLU IMMUNIZE NO ADMIN: HCPCS | Performed by: INTERNAL MEDICINE

## 2020-12-15 PROCEDURE — G8417 CALC BMI ABV UP PARAM F/U: HCPCS | Performed by: INTERNAL MEDICINE

## 2020-12-15 PROCEDURE — 99214 OFFICE O/P EST MOD 30 MIN: CPT | Performed by: INTERNAL MEDICINE

## 2020-12-15 RX ORDER — INSULIN GLARGINE 100 [IU]/ML
INJECTION, SOLUTION SUBCUTANEOUS NIGHTLY
COMMUNITY
End: 2020-12-29

## 2020-12-17 ENCOUNTER — TELEPHONE (OUTPATIENT)
Dept: FAMILY MEDICINE CLINIC | Age: 65
End: 2020-12-17

## 2020-12-17 NOTE — TELEPHONE ENCOUNTER
Patient called asking for his labs to be ordered prior to his upcoming appt.       Last seen 10/19/2020  Next appt 12/29/2020

## 2020-12-18 RX ORDER — MONTELUKAST SODIUM 10 MG/1
10 TABLET ORAL NIGHTLY
Qty: 30 TABLET | Refills: 4 | Status: SHIPPED
Start: 2020-12-18 | End: 2020-12-21 | Stop reason: SDUPTHER

## 2020-12-18 RX ORDER — WARFARIN SODIUM 5 MG/1
TABLET ORAL
Qty: 50 TABLET | Refills: 0 | Status: SHIPPED
Start: 2020-12-18 | End: 2021-01-26

## 2020-12-21 RX ORDER — MONTELUKAST SODIUM 10 MG/1
10 TABLET ORAL NIGHTLY
Qty: 30 TABLET | Refills: 4 | Status: SHIPPED
Start: 2020-12-21 | End: 2021-02-20 | Stop reason: SDUPTHER

## 2020-12-21 RX ORDER — PANTOPRAZOLE SODIUM 40 MG/1
40 TABLET, DELAYED RELEASE ORAL DAILY
Qty: 60 TABLET | Refills: 4 | Status: SHIPPED
Start: 2020-12-21 | End: 2021-04-01 | Stop reason: SDUPTHER

## 2020-12-23 DIAGNOSIS — Z12.5 SCREENING FOR MALIGNANT NEOPLASM OF PROSTATE: ICD-10-CM

## 2020-12-23 DIAGNOSIS — I25.10 CORONARY ARTERY DISEASE INVOLVING NATIVE CORONARY ARTERY OF NATIVE HEART WITHOUT ANGINA PECTORIS: ICD-10-CM

## 2020-12-23 DIAGNOSIS — E11.65 TYPE 2 DIABETES MELLITUS WITH HYPERGLYCEMIA, WITH LONG-TERM CURRENT USE OF INSULIN (HCC): ICD-10-CM

## 2020-12-23 DIAGNOSIS — E03.9 ACQUIRED HYPOTHYROIDISM: ICD-10-CM

## 2020-12-23 DIAGNOSIS — Z79.4 TYPE 2 DIABETES MELLITUS WITH HYPERGLYCEMIA, WITH LONG-TERM CURRENT USE OF INSULIN (HCC): ICD-10-CM

## 2020-12-23 LAB
ALBUMIN SERPL-MCNC: 4 G/DL (ref 3.5–5.2)
ALP BLD-CCNC: 94 U/L (ref 40–129)
ALT SERPL-CCNC: 14 U/L (ref 0–40)
ANION GAP SERPL CALCULATED.3IONS-SCNC: 12 MMOL/L (ref 7–16)
AST SERPL-CCNC: 15 U/L (ref 0–39)
BASOPHILS ABSOLUTE: 0.04 E9/L (ref 0–0.2)
BASOPHILS RELATIVE PERCENT: 0.4 % (ref 0–2)
BILIRUB SERPL-MCNC: 0.6 MG/DL (ref 0–1.2)
BUN BLDV-MCNC: 29 MG/DL (ref 8–23)
CALCIUM SERPL-MCNC: 9.4 MG/DL (ref 8.6–10.2)
CHLORIDE BLD-SCNC: 95 MMOL/L (ref 98–107)
CHOLESTEROL, TOTAL: 132 MG/DL (ref 0–199)
CO2: 29 MMOL/L (ref 22–29)
CREAT SERPL-MCNC: 1.3 MG/DL (ref 0.7–1.2)
EOSINOPHILS ABSOLUTE: 0.19 E9/L (ref 0.05–0.5)
EOSINOPHILS RELATIVE PERCENT: 2 % (ref 0–6)
GFR AFRICAN AMERICAN: >60
GFR NON-AFRICAN AMERICAN: 55 ML/MIN/1.73
GLUCOSE BLD-MCNC: 264 MG/DL (ref 74–99)
HBA1C MFR BLD: 9.2 % (ref 4–5.6)
HCT VFR BLD CALC: 39.7 % (ref 37–54)
HDLC SERPL-MCNC: 41 MG/DL
HEMOGLOBIN: 12.7 G/DL (ref 12.5–16.5)
IMMATURE GRANULOCYTES #: 0.05 E9/L
IMMATURE GRANULOCYTES %: 0.5 % (ref 0–5)
LDL CHOLESTEROL CALCULATED: 66 MG/DL (ref 0–99)
LYMPHOCYTES ABSOLUTE: 1.64 E9/L (ref 1.5–4)
LYMPHOCYTES RELATIVE PERCENT: 17.7 % (ref 20–42)
MCH RBC QN AUTO: 30.9 PG (ref 26–35)
MCHC RBC AUTO-ENTMCNC: 32 % (ref 32–34.5)
MCV RBC AUTO: 96.6 FL (ref 80–99.9)
MONOCYTES ABSOLUTE: 0.7 E9/L (ref 0.1–0.95)
MONOCYTES RELATIVE PERCENT: 7.6 % (ref 2–12)
NEUTROPHILS ABSOLUTE: 6.65 E9/L (ref 1.8–7.3)
NEUTROPHILS RELATIVE PERCENT: 71.8 % (ref 43–80)
PDW BLD-RTO: 14.9 FL (ref 11.5–15)
PLATELET # BLD: 233 E9/L (ref 130–450)
PMV BLD AUTO: 10.5 FL (ref 7–12)
POTASSIUM SERPL-SCNC: 4.5 MMOL/L (ref 3.5–5)
PROSTATE SPECIFIC ANTIGEN: 0.34 NG/ML (ref 0–4)
RBC # BLD: 4.11 E12/L (ref 3.8–5.8)
SODIUM BLD-SCNC: 136 MMOL/L (ref 132–146)
T4 FREE: 1.04 NG/DL (ref 0.93–1.7)
TOTAL PROTEIN: 7.3 G/DL (ref 6.4–8.3)
TRIGL SERPL-MCNC: 123 MG/DL (ref 0–149)
TSH SERPL DL<=0.05 MIU/L-ACNC: 6.31 UIU/ML (ref 0.27–4.2)
VLDLC SERPL CALC-MCNC: 25 MG/DL
WBC # BLD: 9.3 E9/L (ref 4.5–11.5)

## 2020-12-29 ENCOUNTER — OFFICE VISIT (OUTPATIENT)
Dept: FAMILY MEDICINE CLINIC | Age: 65
End: 2020-12-29
Payer: MEDICARE

## 2020-12-29 VITALS
RESPIRATION RATE: 18 BRPM | OXYGEN SATURATION: 91 % | WEIGHT: 315 LBS | SYSTOLIC BLOOD PRESSURE: 124 MMHG | HEART RATE: 72 BPM | HEIGHT: 66 IN | BODY MASS INDEX: 50.62 KG/M2 | DIASTOLIC BLOOD PRESSURE: 82 MMHG

## 2020-12-29 PROCEDURE — 3017F COLORECTAL CA SCREEN DOC REV: CPT | Performed by: FAMILY MEDICINE

## 2020-12-29 PROCEDURE — G8484 FLU IMMUNIZE NO ADMIN: HCPCS | Performed by: FAMILY MEDICINE

## 2020-12-29 PROCEDURE — G0439 PPPS, SUBSEQ VISIT: HCPCS | Performed by: FAMILY MEDICINE

## 2020-12-29 PROCEDURE — 1123F ACP DISCUSS/DSCN MKR DOCD: CPT | Performed by: FAMILY MEDICINE

## 2020-12-29 PROCEDURE — 4040F PNEUMOC VAC/ADMIN/RCVD: CPT | Performed by: FAMILY MEDICINE

## 2020-12-29 ASSESSMENT — PATIENT HEALTH QUESTIONNAIRE - PHQ9
SUM OF ALL RESPONSES TO PHQ QUESTIONS 1-9: 0
SUM OF ALL RESPONSES TO PHQ QUESTIONS 1-9: 0
2. FEELING DOWN, DEPRESSED OR HOPELESS: 0
1. LITTLE INTEREST OR PLEASURE IN DOING THINGS: 0
SUM OF ALL RESPONSES TO PHQ9 QUESTIONS 1 & 2: 0
SUM OF ALL RESPONSES TO PHQ QUESTIONS 1-9: 0

## 2020-12-31 NOTE — PATIENT INSTRUCTIONS
Personalized Preventive Plan for Salbador Sauceda - 12/29/2020  Medicare offers a range of preventive health benefits. Some of the tests and screenings are paid in full while other may be subject to a deductible, co-insurance, and/or copay. Some of these benefits include a comprehensive review of your medical history including lifestyle, illnesses that may run in your family, and various assessments and screenings as appropriate. After reviewing your medical record and screening and assessments performed today your provider may have ordered immunizations, labs, imaging, and/or referrals for you. A list of these orders (if applicable) as well as your Preventive Care list are included within your After Visit Summary for your review. Other Preventive Recommendations:    · A preventive eye exam performed by an eye specialist is recommended every 1-2 years to screen for glaucoma; cataracts, macular degeneration, and other eye disorders. · A preventive dental visit is recommended every 6 months. · Try to get at least 150 minutes of exercise per week or 10,000 steps per day on a pedometer . · Order or download the FREE \"Exercise & Physical Activity: Your Everyday Guide\" from [employer]. Call 7-120.795.8829 or search [employer] online. · You need 4413-5167 mg of calcium and 4586-4545 IU of vitamin D per day. It is possible to meet your calcium requirement with diet alone, but a vitamin D supplement is usually necessary to meet this goal.  · When exposed to the sun, use a sunscreen that protects against both UVA and UVB radiation with an SPF of 30 or greater. Reapply every 2 to 3 hours or after sweating, drying off with a towel, or swimming. · Always wear a seat belt when traveling in a car. Always wear a helmet when riding a bicycle or motorcycle.

## 2020-12-31 NOTE — PROGRESS NOTES
Patient taking differently: Take 25 mg by mouth 2 times daily  Yes George Mendez MD   spironolactone (ALDACTONE) 25 MG tablet TAKE ONE TABLET BY MOUTH TWO TIMES A DAY Yes Opal De La Torre MD   torsemide (DEMADEX) 20 MG tablet TAKE ONE TABLET BY MOUTH TWO TIMES A DAY Yes Opal De La Torre MD   ipratropium (ATROVENT) 0.06 % nasal spray 2 sprays by Nasal route 3 times daily Yes Cristal Wilson, DO   levocetirizine (XYZAL) 5 MG tablet Take 1 tablet by mouth nightly as needed (allergies) Yes Cristal Wilson, DO   Lancets MISC 1 each by Does not apply route 2 times daily Yes Cristal Wilson, DO   blood glucose monitor strips Test two times a day & as needed for symptoms of irregular blood glucose. Yes Cristal Wilson, DO   terbinafine (ATHLETES FOOT) 1 % cream Apply topically 2 times daily.  Yes Leeanne Caal, DO   Insulin Syringe-Needle U-100 27G X 5/8\" 1 ML MISC Use as directed Yes Cristal Wilson DO   SYMBICORT 160-4.5 MCG/ACT AERO Inhale 2 puffs into the lungs 2 times daily Yes Cristal Wilson DO   CPAP Machine MISC 1 each by Does not apply route nightly as needed  Yes Historical Provider, MD   Accu-Chek Multiclix Lancets MISC Use as directed Yes Cristal Wilson DO   aspirin 81 MG tablet Take 81 mg by mouth nightly  Yes Historical Provider, MD       Past Medical History:   Diagnosis Date    Acid reflux     Acute diastolic (congestive) heart failure ([facility] [address] 6/19/2019    Arthritis     Asthma 4/16/2014    Asthmatic bronchitis , chronic ([address] 11/28/2016    CAD (coronary artery disease)     Chronic bronchitis ([address] 4/16/2014    COPD (chronic obstructive pulmonary disease) ([address]     CB    COPD (chronic obstructive pulmonary disease) ([address]     Diabetes mellitus ([address]     Emphysema (subcutaneous) (surgical) resulting from a procedure     Hyperlipidemia     Hypertension     Hypoxemia requiring supplemental oxygen 2/2/2015    LONG TERM ANTICOAGULENT USE  Morbid obesity with BMI of 50.0-59.9, adult ([facility] [address] 11/27/2013    Obesity     Osteoarthritis     Sleep apnea     bilevel positive airway pressure at 13/8 with 2 L oxygen flow     Tobacco abuse     Type II or unspecified type diabetes mellitus without mention of complication, not stated as uncontrolled        Past Surgical History:   Procedure Laterality Date    COLONOSCOPY      CORONARY ANGIOPLASTY WITH STENT PLACEMENT  07-23-13    Left main focal eccentric 65% distal stenosis. Large OM1 CX 50% ostial & prox narrow. Large ramus artery & LAD: Minor plaque w/o sign narrow. RCA: Dom vessel w/25% prox narrow & focal hazy eccentric 99% distal stenosis before origin RPDA & RPLCA. LV: Mild inferior hypokinesis EF 55%. Probable mild AS with 10-15mmHg. Successful PCI distal RCA w/3.5 x 12 mm BMS, 0% res stenosis.  Normal distal runoff    CORONARY ARTERY BYPASS GRAFT  09-09-13    Dr Salomon El; CABG x2, LIMA to LAD, SVG to ramus intermedius; MV repair using 30-mm Future complete ring with magic stitch between A3 and P3; rigid internal fixation of sternum using KLS plates x2; endoscopic vein harvesting of right lower extremity     ECHO COMPL W DOP COLOR FLOW  7/25/2013         HERNIA REPAIR      SINUS SURGERY         Family History   Problem Relation Age of Onset    Cancer Mother         breast    Cancer Father         stomach    Mental Illness Brother     Stroke Brother     Other Brother        CareTeam (Including outside providers/suppliers regularly involved in providing care):   Patient Care Team:  Bee Sebastian DO as PCP - General (Family Medicine)  Bee Sebastian DO as PCP [facility]  Rosario Montesinos MD as Consulting Physician (Cardiology)  Alexis Pat MD as Consulting Physician (Cardiothoracic Surgery)  Sergio Blair DPM as Consulting Physician (Podiatry)  Min Rahman MD as Consulting Physician (Cardiology)    Wt Readings from Last 3 Encounters: 12/29/20 (!) 320 lb (145.2 kg)   12/15/20 (!) 321 lb (145.6 kg)   10/19/20 (!) 318 lb (144.2 kg)     Vitals:    12/29/20 1412   BP: 124/82   Site: Left Upper Arm   Position: Sitting   Pulse: 72   Resp: 18   SpO2: 91%   Weight: (!) 320 lb (145.2 kg)   Height: 5' 6\" (1.676 m)     Body mass index is 51.65 kg/m². Based upon direct observation of the patient, evaluation of cognition reveals recent and remote memory intact. Patient's complete Health Risk Assessment and screening values have been reviewed and are found in Flowsheets. The following problems were reviewed today and where indicated follow up appointments were made and/or referrals ordered. Positive Risk Factor Screenings with Interventions:          General Health and ACP:  General  In general, how would you say your health is?: Good  In the past 7 days, have you experienced any of the following?  New or Increased Pain, New or Increased Fatigue, Loneliness, Social Isolation, Stress or Anger?: None of These  Do you get the social and emotional support that you need?: Yes  Do you have a Living Will?: (!) No  Advance Directives     Power of [address] [place] [address] Will ACP-Advance Directive ACP-Power of     Not on File Filed on 07/26/13 Filed Not on File      General Health Risk Interventions:  · No Living Will: Advance Care Planning addressed with patient today    Health Habits/Nutrition:  Health Habits/Nutrition  Do you exercise for at least 20 minutes 2-3 times per week?: (!) No  Have you lost any weight without trying in the past 3 months?: No  Do you eat fewer than 2 meals per day?: No  Have you seen a dentist within the past year?: (!) No  Body mass index: (!) 51.64  Health Habits/Nutrition Interventions:  · Inadequate physical activity:  patient agrees to exercise for at least 150 minutes/week       Personalized Preventive Plan   Current Health Maintenance Status  Immunization History   Administered Date(s) Administered  Influenza Virus Vaccine 11/13/2015    Pneumococcal Conjugate 13-valent ([id number]) 05/01/2015    Pneumococcal Polysaccharide ([id number]) 12/05/2013, 10/21/2019    Tdap (Boostrix, Adacel) 01/03/2019        Health Maintenance   Topic Date Due    AAA screen  1955    Diabetic retinal exam  11/12/1965    Shingles Vaccine (1 of 2) 11/12/2005    Annual Wellness Visit (AWV)  05/29/2019    Low dose CT lung screening  09/10/2019    Flu vaccine (1) 09/01/2020    A1C test (Diabetic or Prediabetic)  03/23/2021    Diabetic foot exam  04/06/2021    Diabetic microalbuminuria test  05/27/2021    Lipid screen  12/23/2021    Potassium monitoring  12/23/2021    Creatinine monitoring  12/23/2021    Pneumococcal 65+ years Vaccine (2 of 2 - PPSV23) 10/21/2024    Colon cancer screen colonoscopy  12/09/2025    DTaP/Tdap/Td vaccine (2 - Td) 01/03/2029    Hepatitis C screen  Completed    HIV screen  Addressed    Hepatitis A vaccine  Aged Out    Hib vaccine  Aged Out    Meningococcal (ACWY) vaccine  Aged Out     Recommendations for [employer] Due: see orders and patient instructions/AVS.  . Recommended screening schedule for the next 5-10 years is provided to the patient in written form: see Patient Jourdan Pyle was seen today for medicare awv. Diagnoses and all orders for this visit:    Routine general medical examination at a health care facility             Discussed diabetes and obesity at length. He states he could not tolerate trulicity. It said it made him eat too much. Patient has been compliant with insulin for the last few weeks and noted significant improvement. Diet is still poor , we discussed need to improve significantly.

## 2021-01-11 DIAGNOSIS — Z79.4 TYPE 2 DIABETES MELLITUS WITH HYPERGLYCEMIA, WITH LONG-TERM CURRENT USE OF INSULIN (HCC): ICD-10-CM

## 2021-01-11 DIAGNOSIS — E11.65 TYPE 2 DIABETES MELLITUS WITH HYPERGLYCEMIA, WITH LONG-TERM CURRENT USE OF INSULIN (HCC): ICD-10-CM

## 2021-01-11 DIAGNOSIS — E78.2 MIXED HYPERLIPIDEMIA: ICD-10-CM

## 2021-01-11 RX ORDER — ATORVASTATIN CALCIUM 80 MG/1
TABLET, FILM COATED ORAL
Qty: 30 TABLET | Refills: 0 | Status: SHIPPED
Start: 2021-01-11 | End: 2021-02-19 | Stop reason: SDUPTHER

## 2021-01-14 DIAGNOSIS — G25.81 RESTLESS LEG SYNDROME: ICD-10-CM

## 2021-01-16 RX ORDER — PRAMIPEXOLE DIHYDROCHLORIDE 0.25 MG/1
TABLET ORAL
Qty: 90 TABLET | Refills: 0 | Status: SHIPPED
Start: 2021-01-16 | End: 2021-04-07 | Stop reason: SDUPTHER

## 2021-01-26 DIAGNOSIS — J42 CHRONIC BRONCHITIS, UNSPECIFIED CHRONIC BRONCHITIS TYPE (HCC): ICD-10-CM

## 2021-01-26 DIAGNOSIS — I25.10 CORONARY ARTERY DISEASE INVOLVING NATIVE CORONARY ARTERY OF NATIVE HEART WITHOUT ANGINA PECTORIS: ICD-10-CM

## 2021-01-26 DIAGNOSIS — I50.32 CHRONIC HEART FAILURE WITH PRESERVED EJECTION FRACTION (HCC): ICD-10-CM

## 2021-01-26 DIAGNOSIS — I48.0 PAF (PAROXYSMAL ATRIAL FIBRILLATION) (HCC): ICD-10-CM

## 2021-01-26 DIAGNOSIS — E66.01 MORBID OBESITY WITH BMI OF 45.0-49.9, ADULT (HCC): ICD-10-CM

## 2021-01-26 RX ORDER — WARFARIN SODIUM 5 MG/1
TABLET ORAL
Qty: 50 TABLET | Refills: 0 | Status: SHIPPED
Start: 2021-01-26 | End: 2021-02-20 | Stop reason: SDUPTHER

## 2021-01-29 RX ORDER — IBUPROFEN 200 MG
1 TABLET ORAL 2 TIMES DAILY
Qty: 100 EACH | Refills: 11 | Status: ON HOLD
Start: 2021-01-29 | End: 2021-04-25 | Stop reason: HOSPADM

## 2021-02-04 RX ORDER — SPIRONOLACTONE 25 MG/1
TABLET ORAL
Qty: 180 TABLET | Refills: 0 | Status: ON HOLD
Start: 2021-02-04 | End: 2021-06-14 | Stop reason: HOSPADM

## 2021-02-05 ENCOUNTER — VIRTUAL VISIT (OUTPATIENT)
Dept: PULMONOLOGY | Age: 66
End: 2021-02-05
Payer: MEDICARE

## 2021-02-05 DIAGNOSIS — G47.36 NOCTURNAL HYPOXEMIA DUE TO EMPHYSEMA (HCC): Primary | ICD-10-CM

## 2021-02-05 DIAGNOSIS — J43.9 NOCTURNAL HYPOXEMIA DUE TO EMPHYSEMA (HCC): Primary | ICD-10-CM

## 2021-02-05 DIAGNOSIS — G47.33 OSA (OBSTRUCTIVE SLEEP APNEA): ICD-10-CM

## 2021-02-05 NOTE — PROGRESS NOTES
semi - compliant with BiPAP with oxygen. He had issues with billing from [address]. His sleep study showed excessive daytime sleepiness, hypersomnolence indicated by a sleep onset of 13 minutes, severe reduced sleep efficiency, severe sleep apnea syndrome with an apnea/hypopnea index of 60 and this was corrected with start bilevel positive airway pressure at 13/8 with 2 liters oxygen flow. His pulmonary function test remain stable and he quit smoking after his heart attack in 2013 but still has cravings to smoke - support provided. He is to continue with the Sprivia, Symbicort and singular. He can use the nebulizer as needed. He has overnight oxygen testing which is 40% desaturations. We discussed diet and exercise. We will again have SW see about financial support. It has been a year since we last saw Kofi Both. His compliance is from lack of support and finances. Patients compliance has been extremely poor for quite some time and continues to be. Diet is very poor. Eating basically what he wants. Sugars elevated. Ran out of insulin, insulin was not covered by insurance so only on glimepiride . Sugars are still running high. I explained to patient that he is going to start running even into more serious effects including visual, renal, cardiovascular and death. He complains of shortness of breath and wheezing. There is no cough. This is a chronic problem. The current episode started more than 1 year ago. The problem occurs daily. The problem has been waxing and waning. Associated symptoms include nasal congestion, postnasal drip, rhinorrhea and sneezing. Pertinent negatives include no appetite change, chest pain, ear pain, fever, headaches, myalgias, sore throat or trouble swallowing. His symptoms are aggravated by URI and pollen. His symptoms are alleviated by beta-agonist and steroid inhaler. He reports moderate improvement on treatment.  Risk factors for lung disease include smoking/tobacco exposure (has quit). His past medical history is significant for COPD. No using BIPAP cause it cost to much. On today's video visit we got caught up with Darryl Ravi and his medical care. We reviewed his blood work from recently showed his LDL cholesterol in good shape at 66 units/dL. And unfortunately his hemoglobin A1c was well over 9. We talked about diet modifications during the visit. On a different note or pulmonary perspective he has a BiPAP machine but has not been wearing it the interesting part of this discussion is he does not want to wear it because they keep sending him a bill I told him if he has the machine regardless of the bill please put the machine on it where it at least 4 hours daily. I also asked him to send the bills to us in the office so we can apply for him for hardship. I do think that there is likely some health literacy issues playing into this continued dilemma    ALLERGIES:    Allergies   Allergen Reactions    Pcn [Penicillins]      Child went to hospital   ? Reaction  Patient tolerates cephalosporins    Sulfa Antibiotics      ?        PAST MEDICAL HISTORY:       Diagnosis Date    Acid reflux     Acute diastolic (congestive) heart failure ([facility] 6/19/2019    Arthritis     Asthma 4/16/2014    Asthmatic bronchitis , chronic (HCC) 11/28/2016    CAD (coronary artery disease)     Chronic bronchitis (HCC) 4/16/2014    COPD [facility]ch[facility] obstructive pulmonary disease) ([facility])     CB    COPD [facility]chronic obstructive pulmonary disease) [facility])     Diabetes mellitus ([facility])     Emphysema (subcutaneous) (surgical) resulting from a procedure     Hyperlipidemia     Hypertension     Hypoxemia requiring supplemental oxygen 2/2/2015    LONG TERM ANTICOAGULENT USE     Morbid obesity with BMI of 50.0-59.9, adult ([facility] 11/27/2013    Obesity     Osteoarthritis     Sleep apnea     bilevel positive airway pressure at 13/8 with 2 L oxygen flow     Tobacco abuse     Type II or unspecified type diabetes mellitus without mention of complication, not stated as uncontrolled         MEDICATIONS:   Current Outpatient Medications   Medication Sig Dispense Refill    spironolactone (ALDACTONE) 25 MG tablet TAKE ONE TABLET BY MOUTH TWO TIMES A  tablet 0    INSULIN SYRINGE 1CC/29G 29G X 1/2\" 1 ML MISC 1 each by Does not apply route 2 times daily 100 each 11    warfarin (JANTOVEN) 5 MG tablet take 2 tablets by mouth daily on monday, wednesday, friday and saturday.  then take 1 tab daily on tuesday, thursday and sunday 50 tablet 0    pramipexole (MIRAPEX) 0.25 MG tablet TAKE TWO TABLETS BY MOUTH THREE TIMES A DAY 90 tablet 0    atorvastatin (LIPITOR) 80 MG tablet TAKE ONE TABLET BY MOUTH EVERY NIGHT 30 tablet 0    metFORMIN (GLUCOPHAGE) 500 MG tablet TAKE ONE TABLET BY MOUTH TWICE A DAY WITH MEALS 60 tablet 0    montelukast (SINGULAIR) 10 MG tablet Take 1 tablet by mouth nightly 30 tablet 4    pantoprazole (PROTONIX) 40 MG tablet Take 1 tablet by mouth daily 60 tablet 4    Insulin Lispro (HUMALOG KWIKPEN SC) Inject into the skin      losartan (COZAAR) 25 MG tablet TAKE ONE-HALF TABLET BY MOUTH ONE TIME DAILY 30 tablet 0    potassium chloride (KLOR-CON M) 20 MEQ extended release tablet Take 1 tablet by mouth daily 60 tablet 4    gabapentin (NEURONTIN) 600 MG tablet One tab three times a day 90 tablet 5    metoprolol succinate (TOPROL XL) 50 MG extended release tablet 1 by mouth daily (Patient taking differently: Take 25 mg by mouth 2 times daily ) 90 tablet 3    torsemide (DEMADEX) 20 MG tablet TAKE ONE TABLET BY MOUTH TWO TIMES A  tablet 3    ipratropium (ATROVENT) 0.06 % nasal spray 2 sprays by Nasal route 3 times daily 1 Bottle 3    levocetirizine (XYZAL) 5 MG tablet Take 1 tablet by mouth nightly as needed (allergies) 30 tablet 11    Lancets MISC 1 each by Does not apply route 2 times daily 300 each 1    blood glucose monitor strips Test two times a day & as needed for symptoms of irregular blood glucose. 300 strip 0    terbinafine (ATHLETES FOOT) 1 % cream Apply topically 2 times daily. 42 g 1    Insulin Syringe-Needle U-100 27G X 5/8\" 1 ML MISC Use as directed 100 each 11    SYMBICORT 160-4.5 MCG/ACT AERO Inhale 2 puffs into the lungs 2 times daily 1 Inhaler 3    CPAP Machine MISC 1 each by Does not apply route nightly as needed       Accu-Chek Multiclix Lancets MISC Use as directed 100 each 3    aspirin 81 MG tablet Take 81 mg by mouth nightly        No current facility-administered medications for this visit. SOCIAL AND OCCUPATIONAL HEALTH:  The patient quit smoking with he quit in 2013. He smoked 1 ppd for 45 years. There is no history of TB or TB exposure. There is no asbestos or silica dust exposure. The patient reports no coal, foundry, quarry or [employer] exposure. There is no recent travel history noted. The patient denies a history of recreational or IV drug use. No hot tub exposure. The patient has no pets. Hobbies include Gnosticist. The patient denies excessive alcohol intake. SOCIAL HISTORY: Age-appropriate past and current activities are:  Social History     Tobacco Use    Smoking status: Former Smoker     Packs/day: 1.00     Years: 45.00     Pack years: 45.00     Types: Cigarettes     Quit date: 2013     Years since quittin.5    Smokeless tobacco: Former User     Types: Chew     Quit date: 10/8/1971   Substance Use Topics    Alcohol use: No     Alcohol/week: 0.0 standard drinks     Comment: 1-2 coffee per day    Drug use: No       SURGICAL HISTORY:   Past Surgical History:   Procedure Laterality Date    COLONOSCOPY      CORONARY ANGIOPLASTY WITH STENT PLACEMENT  13    Left main focal eccentric 65% distal stenosis. Large OM1 CX 50% ostial & prox narrow. Large ramus artery & LAD: Minor plaque w/o sign narrow. RCA: Dom vessel w/25% prox narrow & focal hazy eccentric 99% distal stenosis before origin RPDA & RPLCA. LV: Mild inferior hypokinesis EF 55%. Probable mild AS with 10-15mmHg. Successful PCI distal RCA w/3.5 x 12 mm BMS, 0% res stenosis. Normal distal runoff    CORONARY ARTERY BYPASS GRAFT  13    Dr Burnie Kehr; CABG x2, LIMA to LAD, SVG to ramus intermedius; MV repair using 30-mm Future complete ring with magic stitch between A3 and P3; rigid internal fixation of sternum using KLS plates x2; endoscopic vein harvesting of right lower extremity     ECHO COMPL W DOP COLOR FLOW  2013         HERNIA REPAIR      SINUS SURGERY         FAMILY HISTORY: A review of medical events in the patient's family , including disease which may be hereditary or place the patient at risk were reviewed. Family History   Problem Relation Age of Onset    Cancer Mother         breast    Cancer Father         stomach    Mental Illness Brother     Stroke Brother     Other Brother       Family Status   Relation Name Status    Mother      Father      Sister      Brother  Alive    Brother          REVIEW OF SYSTEMS: The patients health assessment form was reviewed. PHYSICAL EXAMINATION:   There were no vitals filed for this visit. Constitutional: A morbidly obese  72 y.o. male who is alert, oriented, cooperative and in no apparent distress. Head was normocephalic and atraumatic. EENT: EOMI MAK. MMM. No icterus. No conjunctival injections. External canals are patent and no discharge. Septum was midline, mucosa was without erythema, exudates or cobblestoning. No thrush. Neck: Supple without thyromegaly. No elevated JVP. Trachea was midline. No carotid bruits. Redundent pharyngeal tissues. Respiratory: Symmetrical without dullness to percussion. Clear to auscultation. No wheezes, rhonchi or rales. No intercostal retraction or use of accessory muscles. No egophony. Cardiovascular: Nonpalpable PMI. Regular without murmur, clicks, gallops or rubs. No left or right ventricular heave.   Sternotomy   Pulses:  Equal bilaterally. Abdomen: Obese, rounded and soft without organomegaly. No rebound, rigidity. Lymphatic: No lymphadenopathy. Musculoskeletal: Ambulates without assistance. Normal curvature of the spine. No gross muscle weakness. No involuntary movements. Extremities:  + extremity edema. Venous stasis and hemosiderosis changes. Oncomyosis. No varicosities or erythema. Coordination appears adequate. Skin:  Warm and very dry scaly. Dependednt pigmentation was relatively normal. No bruises or skin rashes. Oncomycosis. Fungal rash  Neurological/Psychiatric: General behavior, level of consciousness, thought content is normal. Emotional status is normal.  Cranial nerves II-XII are intact. DATA:   The data collected below information that was obtained, reviewed, analyzed and interpreted today. Spirometry was compared to previous test if available and demonstrates an FVC of 3.45 liters which is 87 % of predicted with an FEV1 of  2.28 liters which is 74 % of predicted. FEV1/FVC ratio is 66%. expiratory flow rates are 50 % of predicted. Maximum voluntary ventilation is 83 liters per minute or 64 % of predicted. Flow volume loop shows no signs of intrathoracic or extrathoracic process. Impression: Mild Airflow Obstruction     Static Lung Volume: reveal a slow vital capacity of 4.16 liters which is 100 % of predicted. The inspiratory capacity is 3.87 liters, 128% of predicted. The thoracic gas volume is 2.72 liters which is normal.  The total lung capacity is 6.59 liters, 107 % of predicted. The RV/TLC ratio is 115 % of predicted. The DlCO is 27.62 ml/min/mmHg which is 102 % of predicted. The DlCO/VA (krogh constant) is 4.31 ml/min/mmHg, 99 % of predicted. CT SCAN CHEST (8/2018): Impression: We reviewed the films during the inter-disciplinary rounds/conference, which showed LUNGS/CENTRAL AIRWAYS/PLEURA: Small right effusion is identified with dependent atelectasis in the right lower lobe. Additional left small effusion also noted. Central airways are patent. No discrete lung nodules are visualized. HEART/PERICARDIUM/GREAT VESSELS: Cardiac size is normal. There is no  pericardial effusion.  The great vessels of the chest are normal in caliber. LYMPH NODES: No thoracic adenopathy by size criteria. NECK BASE/CHEST WALL/DIAPHRAGM: No soft tissue lesions or diaphragmatic abnormality. UPPER ABDOMEN: Limited images through the upper abdomen are unremarkable. OSSEOUS STRUCTURES: No suspicious lytic or blastic lesions. No fractures. CHEST RADIOGRAPHY: (2018): LUNGS: Clear with no areas of consolidation. No lung nodules. PLEURA: Small effusions noted bilaterally. LUNG VOLUMES: Satisfactory inspirator effort. MEDIASTINAL STRUCTURES: No lymphadenopathy. Normal aortic contour. HEART SIZE: Normal. Mild cardiac enlargement is unchanged. Normal caliber pulmonary vessels. No acute pulmonary consolidation. Slight blunting of the posterior costophrenic angles on the lateral view. ECHOCARDIOGRAM (2017):     Left ventricle is normal in size .   Regional wall motion abnormalities.   Normal left ventricular ejection fraction.   There is doppler evidence of stage II diastolic dysfunction.   The left atrium is borderline dilated.   Mild mitral annular calcification.   Mild mitral regurgitation is present.   Mild aortic regurgitation is noted.   Mild to moderate tricuspid regurgitation.   Pulmonary hypertension is mild to moderate . [place] Sleepiness Scale is use this Tool to Measure Sleep Deprivation. The [place] Sleepiness Scale was developed by researchers in [facility] and is widely used by sleep professionals around the world to measure sleep deprivation. How likely are you to doze off or fall asleep in the following situations, in contrast to feeling just tired? This refers to your usual way of life in recent times. Results: Total [place] Score: 5 Interpretation: No hypersomnolence.     A1c   = 9.2  Lipid technology platform. Patient identification was verified at the start of the visit, including the patient's telephone number and physical location. I discussed with the patient the nature of our telehealth visits, that:     1. Due to the nature of an audio- video modality, the only components of a physical exam that could be done are the elements supported by direct observation. 2. I would evaluate the patient and recommend diagnostics and treatments based on my assessment. 3. If it was felt that the patient should be evaluated in clinic or an emergency room setting, then they would be directed there. 4. Our sessions are not being recorded and that personal health information is protected. 5. Our team would provide follow up care in person if/when the patient needs it. Patient does agree to proceed with telemedicine consultation. Patient's location: [phone] Physician  location [facility] [place] and [address] [place] [address]. [address] [place], [address] [place]. Other people involved in call Maryellen Kemp RN      Time spent: Greater than 15    This visit was completed virtually using Doxy. me

## 2021-02-08 DIAGNOSIS — J42 CHRONIC BRONCHITIS, UNSPECIFIED CHRONIC BRONCHITIS TYPE (HCC): Primary | ICD-10-CM

## 2021-02-19 DIAGNOSIS — J30.1 SEASONAL ALLERGIC RHINITIS DUE TO POLLEN: ICD-10-CM

## 2021-02-19 DIAGNOSIS — E11.65 TYPE 2 DIABETES MELLITUS WITH HYPERGLYCEMIA, WITH LONG-TERM CURRENT USE OF INSULIN (HCC): ICD-10-CM

## 2021-02-19 DIAGNOSIS — J42 CHRONIC BRONCHITIS, UNSPECIFIED CHRONIC BRONCHITIS TYPE (HCC): ICD-10-CM

## 2021-02-19 DIAGNOSIS — Z79.4 TYPE 2 DIABETES MELLITUS WITH HYPERGLYCEMIA, WITH LONG-TERM CURRENT USE OF INSULIN (HCC): ICD-10-CM

## 2021-02-19 DIAGNOSIS — I48.0 PAF (PAROXYSMAL ATRIAL FIBRILLATION) (HCC): ICD-10-CM

## 2021-02-19 DIAGNOSIS — E78.2 MIXED HYPERLIPIDEMIA: ICD-10-CM

## 2021-02-19 DIAGNOSIS — I50.32 CHRONIC HEART FAILURE WITH PRESERVED EJECTION FRACTION (HCC): ICD-10-CM

## 2021-02-19 DIAGNOSIS — I25.10 CORONARY ARTERY DISEASE INVOLVING NATIVE CORONARY ARTERY OF NATIVE HEART WITHOUT ANGINA PECTORIS: ICD-10-CM

## 2021-02-19 DIAGNOSIS — E66.01 MORBID OBESITY WITH BMI OF 45.0-49.9, ADULT (HCC): ICD-10-CM

## 2021-02-19 RX ORDER — ATORVASTATIN CALCIUM 80 MG/1
TABLET, FILM COATED ORAL
Qty: 30 TABLET | Refills: 0 | OUTPATIENT
Start: 2021-02-19

## 2021-02-20 RX ORDER — WARFARIN SODIUM 5 MG/1
TABLET ORAL
Qty: 50 TABLET | Refills: 0 | Status: SHIPPED
Start: 2021-02-20 | End: 2021-03-31

## 2021-02-20 RX ORDER — ATORVASTATIN CALCIUM 80 MG/1
TABLET, FILM COATED ORAL
Qty: 90 TABLET | Refills: 0 | Status: SHIPPED
Start: 2021-02-20 | End: 2021-04-01 | Stop reason: SDUPTHER

## 2021-02-20 RX ORDER — MONTELUKAST SODIUM 10 MG/1
10 TABLET ORAL NIGHTLY
Qty: 30 TABLET | Refills: 4 | Status: SHIPPED
Start: 2021-02-20 | End: 2021-04-01 | Stop reason: SDUPTHER

## 2021-02-20 RX ORDER — LOSARTAN POTASSIUM 25 MG/1
TABLET ORAL
Qty: 90 TABLET | Refills: 0 | Status: ON HOLD
Start: 2021-02-20 | End: 2021-04-25 | Stop reason: HOSPADM

## 2021-02-22 ENCOUNTER — TELEPHONE (OUTPATIENT)
Dept: FAMILY MEDICINE CLINIC | Age: 66
End: 2021-02-22

## 2021-02-22 NOTE — TELEPHONE ENCOUNTER
Appt Information:  N/A      Reason:  NT-reports falling 2/20 and injuring his right shoulder. Reports the he is having increased fatigue and sleeping a lot so wife is worried that he hit his head. Wife reports the  is present, but he is sleeping.  Warm transferred call to NABILA Rae @ [facility].      PCP:  Orlando Fall DO      LOV:  12/29/2020       NOV:   3/30/2021      Thank You So Much!!!!

## 2021-02-22 NOTE — TELEPHONE ENCOUNTER
I called patient's wife, LMM informing go to ED. I called patient's phone and LMM informing go to ED.

## 2021-03-17 ENCOUNTER — TELEPHONE (OUTPATIENT)
Dept: PULMONOLOGY | Age: 66
End: 2021-03-17

## 2021-03-17 NOTE — TELEPHONE ENCOUNTER
Called and spoke with patient and informed him that his visit has been moved to a virtual appointment and was moved to 2:45. Pt wants to be seen in person on next visit.

## 2021-03-25 ENCOUNTER — TELEPHONE (OUTPATIENT)
Dept: PULMONOLOGY | Age: 66
End: 2021-03-25

## 2021-03-25 NOTE — TELEPHONE ENCOUNTER
This office called the patient and notified him that [address] is awaiting a phone call from him regarding his bills and stated to this office that they Elisabeth Dial) has several ways to develop an agreeable payment plan but the patient has to call them. Patient was given the number [phone]  Option 4 to call. Patient was agreeable.

## 2021-03-30 ENCOUNTER — OFFICE VISIT (OUTPATIENT)
Dept: FAMILY MEDICINE CLINIC | Age: 66
End: 2021-03-30
Payer: MEDICARE

## 2021-03-30 VITALS
HEART RATE: 135 BPM | HEIGHT: 66 IN | WEIGHT: 315 LBS | OXYGEN SATURATION: 93 % | BODY MASS INDEX: 50.62 KG/M2 | RESPIRATION RATE: 18 BRPM | DIASTOLIC BLOOD PRESSURE: 80 MMHG | SYSTOLIC BLOOD PRESSURE: 122 MMHG

## 2021-03-30 DIAGNOSIS — J41.0 SIMPLE CHRONIC BRONCHITIS (HCC): ICD-10-CM

## 2021-03-30 DIAGNOSIS — Z79.4 TYPE 2 DIABETES MELLITUS WITH DIABETIC POLYNEUROPATHY, WITH LONG-TERM CURRENT USE OF INSULIN (HCC): ICD-10-CM

## 2021-03-30 DIAGNOSIS — E66.01 MORBID OBESITY WITH BMI OF 45.0-49.9, ADULT (HCC): ICD-10-CM

## 2021-03-30 DIAGNOSIS — I10 BENIGN ESSENTIAL HTN: ICD-10-CM

## 2021-03-30 DIAGNOSIS — K21.9 GASTROESOPHAGEAL REFLUX DISEASE WITHOUT ESOPHAGITIS: ICD-10-CM

## 2021-03-30 DIAGNOSIS — J30.1 SEASONAL ALLERGIC RHINITIS DUE TO POLLEN: ICD-10-CM

## 2021-03-30 DIAGNOSIS — Z79.4 TYPE 2 DIABETES MELLITUS WITH HYPERGLYCEMIA, WITH LONG-TERM CURRENT USE OF INSULIN (HCC): Primary | ICD-10-CM

## 2021-03-30 DIAGNOSIS — E11.65 TYPE 2 DIABETES MELLITUS WITH HYPERGLYCEMIA, WITH LONG-TERM CURRENT USE OF INSULIN (HCC): Primary | ICD-10-CM

## 2021-03-30 DIAGNOSIS — E78.2 MIXED HYPERLIPIDEMIA: ICD-10-CM

## 2021-03-30 DIAGNOSIS — I25.10 CORONARY ARTERY DISEASE INVOLVING NATIVE CORONARY ARTERY OF NATIVE HEART WITHOUT ANGINA PECTORIS: ICD-10-CM

## 2021-03-30 DIAGNOSIS — I48.0 PAF (PAROXYSMAL ATRIAL FIBRILLATION) (HCC): ICD-10-CM

## 2021-03-30 DIAGNOSIS — E11.42 TYPE 2 DIABETES MELLITUS WITH DIABETIC POLYNEUROPATHY, WITH LONG-TERM CURRENT USE OF INSULIN (HCC): ICD-10-CM

## 2021-03-30 DIAGNOSIS — E03.9 ACQUIRED HYPOTHYROIDISM: ICD-10-CM

## 2021-03-30 LAB — HBA1C MFR BLD: 13.4 %

## 2021-03-30 PROCEDURE — G8484 FLU IMMUNIZE NO ADMIN: HCPCS | Performed by: FAMILY MEDICINE

## 2021-03-30 PROCEDURE — 1123F ACP DISCUSS/DSCN MKR DOCD: CPT | Performed by: FAMILY MEDICINE

## 2021-03-30 PROCEDURE — G8417 CALC BMI ABV UP PARAM F/U: HCPCS | Performed by: FAMILY MEDICINE

## 2021-03-30 PROCEDURE — G8427 DOCREV CUR MEDS BY ELIG CLIN: HCPCS | Performed by: FAMILY MEDICINE

## 2021-03-30 PROCEDURE — 4040F PNEUMOC VAC/ADMIN/RCVD: CPT | Performed by: FAMILY MEDICINE

## 2021-03-30 PROCEDURE — 99214 OFFICE O/P EST MOD 30 MIN: CPT | Performed by: FAMILY MEDICINE

## 2021-03-30 PROCEDURE — G8926 SPIRO NO PERF OR DOC: HCPCS | Performed by: FAMILY MEDICINE

## 2021-03-30 PROCEDURE — 1036F TOBACCO NON-USER: CPT | Performed by: FAMILY MEDICINE

## 2021-03-30 PROCEDURE — 3017F COLORECTAL CA SCREEN DOC REV: CPT | Performed by: FAMILY MEDICINE

## 2021-03-30 PROCEDURE — 3046F HEMOGLOBIN A1C LEVEL >9.0%: CPT | Performed by: FAMILY MEDICINE

## 2021-03-30 PROCEDURE — 3023F SPIROM DOC REV: CPT | Performed by: FAMILY MEDICINE

## 2021-03-30 PROCEDURE — 83036 HEMOGLOBIN GLYCOSYLATED A1C: CPT | Performed by: FAMILY MEDICINE

## 2021-03-30 PROCEDURE — 2022F DILAT RTA XM EVC RTNOPTHY: CPT | Performed by: FAMILY MEDICINE

## 2021-03-30 RX ORDER — TORSEMIDE 20 MG/1
TABLET ORAL
Qty: 180 TABLET | Refills: 3 | Status: ON HOLD
Start: 2021-03-30 | End: 2021-04-25 | Stop reason: HOSPADM

## 2021-03-30 ASSESSMENT — PATIENT HEALTH QUESTIONNAIRE - PHQ9
SUM OF ALL RESPONSES TO PHQ9 QUESTIONS 1 & 2: 0
2. FEELING DOWN, DEPRESSED OR HOPELESS: 0
1. LITTLE INTEREST OR PLEASURE IN DOING THINGS: 0
SUM OF ALL RESPONSES TO PHQ QUESTIONS 1-9: 0

## 2021-03-31 DIAGNOSIS — Z79.4 TYPE 2 DIABETES MELLITUS WITH DIABETIC POLYNEUROPATHY, WITH LONG-TERM CURRENT USE OF INSULIN (HCC): ICD-10-CM

## 2021-03-31 DIAGNOSIS — E11.65 TYPE 2 DIABETES MELLITUS WITH HYPERGLYCEMIA, WITH LONG-TERM CURRENT USE OF INSULIN (HCC): ICD-10-CM

## 2021-03-31 DIAGNOSIS — E66.01 MORBID OBESITY WITH BMI OF 45.0-49.9, ADULT (HCC): ICD-10-CM

## 2021-03-31 DIAGNOSIS — E11.42 TYPE 2 DIABETES MELLITUS WITH DIABETIC POLYNEUROPATHY, WITH LONG-TERM CURRENT USE OF INSULIN (HCC): ICD-10-CM

## 2021-03-31 DIAGNOSIS — Z79.4 TYPE 2 DIABETES MELLITUS WITH HYPERGLYCEMIA, WITH LONG-TERM CURRENT USE OF INSULIN (HCC): ICD-10-CM

## 2021-03-31 DIAGNOSIS — I48.0 PAF (PAROXYSMAL ATRIAL FIBRILLATION) (HCC): ICD-10-CM

## 2021-03-31 DIAGNOSIS — J42 CHRONIC BRONCHITIS, UNSPECIFIED CHRONIC BRONCHITIS TYPE (HCC): ICD-10-CM

## 2021-03-31 DIAGNOSIS — I25.10 CORONARY ARTERY DISEASE INVOLVING NATIVE CORONARY ARTERY OF NATIVE HEART WITHOUT ANGINA PECTORIS: ICD-10-CM

## 2021-03-31 DIAGNOSIS — I50.32 CHRONIC HEART FAILURE WITH PRESERVED EJECTION FRACTION (HCC): ICD-10-CM

## 2021-03-31 RX ORDER — WARFARIN SODIUM 5 MG/1
TABLET ORAL
Qty: 50 TABLET | Refills: 5 | Status: SHIPPED
Start: 2021-03-31 | End: 2021-04-29 | Stop reason: SDUPTHER

## 2021-03-31 RX ORDER — GABAPENTIN 600 MG/1
TABLET ORAL
Qty: 90 TABLET | Refills: 5 | Status: ON HOLD
Start: 2021-03-31 | End: 2021-04-25 | Stop reason: HOSPADM

## 2021-04-01 RX ORDER — MONTELUKAST SODIUM 10 MG/1
10 TABLET ORAL NIGHTLY
Qty: 30 TABLET | Refills: 4 | Status: SHIPPED
Start: 2021-04-01 | End: 2022-05-03

## 2021-04-01 RX ORDER — PANTOPRAZOLE SODIUM 40 MG/1
40 TABLET, DELAYED RELEASE ORAL DAILY
Qty: 60 TABLET | Refills: 4 | Status: SHIPPED
Start: 2021-04-01 | End: 2021-06-09 | Stop reason: ALTCHOICE

## 2021-04-01 RX ORDER — ATORVASTATIN CALCIUM 80 MG/1
TABLET, FILM COATED ORAL
Qty: 90 TABLET | Refills: 5 | Status: SHIPPED
Start: 2021-04-01 | End: 2022-04-08 | Stop reason: SDUPTHER

## 2021-04-01 ASSESSMENT — ENCOUNTER SYMPTOMS
SHORTNESS OF BREATH: 0
NAUSEA: 0
BLOOD IN STOOL: 0
SINUS PRESSURE: 0
VOICE CHANGE: 0
BACK PAIN: 0
CONSTIPATION: 0
EYE DISCHARGE: 0
PHOTOPHOBIA: 0
TROUBLE SWALLOWING: 0
ABDOMINAL PAIN: 0
EYE PAIN: 0
APNEA: 0
EYE REDNESS: 0
COUGH: 0
RECTAL PAIN: 0
EYE ITCHING: 0
COLOR CHANGE: 0
FACIAL SWELLING: 0
RHINORRHEA: 0
CHOKING: 0
ABDOMINAL DISTENTION: 0
DIARRHEA: 0
ANAL BLEEDING: 0
SORE THROAT: 0
STRIDOR: 0
VOMITING: 0
WHEEZING: 0
CHEST TIGHTNESS: 0

## 2021-04-01 ASSESSMENT — COPD QUESTIONNAIRES: COPD: 1

## 2021-04-01 NOTE — PROGRESS NOTES
chest pain, ear pain, fever, headaches, myalgias, postnasal drip, rhinorrhea, sneezing, sore throat or trouble swallowing. His symptoms are aggravated by URI and pollen. His symptoms are alleviated by beta-agonist and steroid inhaler. He reports moderate improvement on treatment. His symptoms are not alleviated by anxiolytic. Risk factors for lung disease include smoking/tobacco exposure (has quit). His past medical history is significant for asthma, bronchitis and COPD. Fatigue  This is a chronic problem. The current episode started more than 1 year ago. The problem occurs daily. The problem has been waxing and waning. Associated symptoms include arthralgias, fatigue, joint swelling and urinary symptoms. Pertinent negatives include no abdominal pain, chest pain, chills, congestion, coughing, diaphoresis, fever, headaches, myalgias, nausea, neck pain, numbness, rash, sore throat, vomiting or weakness. Associated symptoms comments: Sleep disturbances . Nothing aggravates the symptoms. He has tried nothing for the symptoms. The treatment provided no relief. Cough  This is a new problem. The current episode started in the past 7 days. The problem has been waxing and waning. The cough is productive of sputum. Associated symptoms include nasal congestion. Pertinent negatives include no chest pain, chills, ear pain, eye redness, fever, headaches, myalgias, postnasal drip, rash, rhinorrhea, sore throat, shortness of breath or wheezing. Nothing aggravates the symptoms. He has tried a beta-agonist inhaler, steroid inhaler and OTC cough suppressant for the symptoms. The treatment provided moderate relief. His past medical history is significant for asthma, bronchitis and COPD. There is no history of environmental allergies. Hypertension  This is a chronic problem. The current episode started more than 1 year ago. The problem has been waxing and waning since onset. The problem is uncontrolled.  Pertinent negatives include no chest pain, headaches, neck pain, palpitations or shortness of breath. There are no associated agents to hypertension. Risk factors for coronary artery disease include diabetes mellitus, dyslipidemia, male gender and obesity. Past treatments include beta blockers and angiotensin blockers. The current treatment provides moderate improvement. Compliance problems include diet, medication cost, medication side effects and psychosocial issues. Identifiable causes of hypertension include a thyroid problem. Hyperlipidemia  This is a chronic problem. The current episode started more than 1 year ago. The problem is resistant. Recent lipid tests were reviewed and are variable. There are no known factors aggravating his hyperlipidemia. Pertinent negatives include no chest pain, myalgias or shortness of breath. Current antihyperlipidemic treatment includes statins. The current treatment provides moderate improvement of lipids. There are no compliance problems. Risk factors for coronary artery disease include male sex, obesity and dyslipidemia. Other  This is a chronic (atrial fibrillation and HX CAD with intervention and coumadin) problem. The current episode started more than 1 year ago. The problem occurs daily. The problem has been waxing and waning. Associated symptoms include arthralgias, fatigue, joint swelling and urinary symptoms. Pertinent negatives include no abdominal pain, chest pain, chills, congestion, coughing, diaphoresis, fever, headaches, myalgias, nausea, neck pain, numbness, rash, sore throat, vomiting or weakness. The symptoms are aggravated by smoking (obesity, poor diet, no excercise). Treatments tried: coronary revasculization, treating lipids, treating obesity, treating  diabetes and htn. The treatment provided mild relief. Review of Systems   Constitutional: Positive for fatigue. Negative for activity change, appetite change, chills, diaphoresis, fever and unexpected weight change. HENT: Negative for congestion, dental problem, drooling, ear discharge, ear pain, facial swelling, hearing loss, mouth sores, nosebleeds, postnasal drip, rhinorrhea, sinus pressure, sneezing, sore throat, tinnitus, trouble swallowing and voice change. Eyes: Negative for photophobia, pain, discharge, redness, itching and visual disturbance. Respiratory: Negative for apnea, cough, choking, chest tightness, shortness of breath, wheezing and stridor. Cardiovascular: Negative for chest pain, palpitations and leg swelling. Gastrointestinal: Negative for abdominal distention, abdominal pain, anal bleeding, blood in stool, constipation, diarrhea, nausea, rectal pain and vomiting. Endocrine: Negative for cold intolerance, heat intolerance, polydipsia, polyphagia and polyuria. Genitourinary: Negative for decreased urine volume, difficulty urinating, discharge, dysuria, enuresis, flank pain, frequency, genital sores, hematuria, penile pain, penile swelling, scrotal swelling, testicular pain and urgency. Musculoskeletal: Positive for arthralgias and joint swelling. Negative for back pain, gait problem, myalgias, neck pain and neck stiffness. Skin: Negative for color change, pallor, rash and wound. Allergic/Immunologic: Negative for environmental allergies, food allergies and immunocompromised state. Neurological: Negative for dizziness, tremors, seizures, syncope, facial asymmetry, speech difficulty, weakness, light-headedness, numbness and headaches. Hematological: Negative for adenopathy. Does not bruise/bleed easily. Psychiatric/Behavioral: Negative for agitation, behavioral problems, confusion, decreased concentration, dysphoric mood, hallucinations, self-injury, sleep disturbance and suicidal ideas. The patient is not nervous/anxious and is not hyperactive.         Past Medical History:   Diagnosis Date    Acid reflux     Acute diastolic (congestive) heart failure ([facility] [address] 6/19/2019    Arthritis  Asthma 2014    Asthmatic bronchitis , chronic ([facility] 2016    CAD (coronary artery disease)     Chronic bronchitis ([facility] 2014    COPD [facility]ch[facility] obstructive pulmonary disease) ([facility])     CB    COPD (chronic obstructive pulmonary disease) (HCC)     Diabetes mellitus (HCC)     Emphysema (subcutaneous) (surgical) resulting from a procedure     Hyperlipidemia     Hypertension     Hypoxemia requiring supplemental oxygen 2015    LONG TERM ANTICOAGULENT USE     Morbid obesity with BMI of 50.0-59.9, adult ([facility] 2013    Obesity     Osteoarthritis     Sleep apnea     bilevel positive airway pressure at 13/8 with 2 L oxygen flow     Tobacco abuse     Type II or unspecified type diabetes mellitus without mention of complication, not stated as uncontrolled        Social History     Socioeconomic History    Marital status:      Spouse name: Eve Sheila Number of children: 1    Years of education: Not on file    Highest education level: 11th grade   Occupational History    Not on file   Social Needs    Financial resource strain: Somewhat hard    Food insecurity     Worry: Sometimes true     Inability: Never true    Transportation needs     Medical: No     Non-medical: No   Tobacco Use    Smoking status: Former Smoker     Packs/day: 1.00     Years: 45.00     Pack years: 45.00     Types: Cigarettes     Quit date: 2013     Years since quittin.6    Smokeless tobacco: Former User     Types: Chew     Quit date: 10/8/1971   Substance and Sexual Activity    Alcohol use: No     Alcohol/week: 0.0 standard drinks     Comment: 1-2 coffee per day    Drug use: No    Sexual activity: Yes     Partners: Female   Lifestyle    Physical activity     Days per week: 3 days     Minutes per session: 20 min    Stress: Rather much   Relationships    Social connections     Talks on phone:  Three times a week     Gets together: Once a week     Attends [employer] service: More than 4 times per year     Active member of club or organization: Yes     Attends meetings of clubs or organizations: Never     Relationship status:     Intimate partner violence     Fear of current or ex partner: No     Emotionally abused: No     Physically abused: No     Forced sexual activity: No   Other Topics Concern    Not on file   Social History Narrative    8-26-19  [facility] reviewed SDOH with Cornel Donal. He does have money strain but between the income he has coming in and his wife's they are over resources for SARY programs. Offered food panty info to help with end of the month struggles but he declined stating that he tried and was refused due to his income. He belongs to a [employer] and goes weekly so he has some community connections in place. He has an extremely high interest rate on his mortgage which he was encouraged to look into getting lowered to help save money on his house payments. Noticed some difficulty with memory recall. Becomes easily flustered at times. Has no MHI to support applying for [facility] program of SARY. States he does not feel depressed or need any MH treatment.           Family History   Problem Relation Age of Onset    Cancer Mother         breast    Cancer Father         stomach    Mental Illness Brother     Stroke Brother     Other Brother        Current Outpatient Medications on File Prior to Visit   Medication Sig Dispense Refill    losartan (COZAAR) 25 MG tablet TAKE ONE-HALF TABLET BY MOUTH ONE TIME DAILY 90 tablet 0    atorvastatin (LIPITOR) 80 MG tablet TAKE ONE TABLET BY MOUTH EVERY NIGHT 90 tablet 0    metFORMIN (GLUCOPHAGE) 500 MG tablet Take I pill 2 times daily 180 tablet 0    montelukast (SINGULAIR) 10 MG tablet Take 1 tablet by mouth nightly 30 tablet 4    spironolactone (ALDACTONE) 25 MG tablet TAKE ONE TABLET BY MOUTH TWO TIMES A  tablet 0    INSULIN SYRINGE 1CC/29G 29G X 1/2\" 1 ML MISC 1 each by Does not apply route 2 times daily 100 each 11    Head: Normocephalic and atraumatic. Right Ear: External ear normal.      Left Ear: External ear normal.      Nose: Nose normal.      Mouth/Throat:      Pharynx: No oropharyngeal exudate. Eyes:      General: No scleral icterus. Right eye: No discharge. Left eye: No discharge. Conjunctiva/sclera: Conjunctivae normal.      Pupils: Pupils are equal, round, and reactive to light. Neck:      Musculoskeletal: Normal range of motion and neck supple. Thyroid: No thyromegaly. Vascular: No JVD. Trachea: No tracheal deviation. Cardiovascular:      Rate and Rhythm: Normal rate and regular rhythm. Heart sounds: Normal heart sounds. No murmur. No friction rub. No gallop. Pulmonary:      Effort: Pulmonary effort is normal. No respiratory distress. Breath sounds: Normal breath sounds. No stridor. No wheezing or rales. Chest:      Chest wall: No tenderness. Abdominal:      General: Bowel sounds are normal. There is no distension. Palpations: Abdomen is soft. There is no mass. Tenderness: There is no abdominal tenderness. There is no guarding or rebound. Genitourinary:     Comments: Deferred by patient  Musculoskeletal: Normal range of motion. General: No tenderness. Lymphadenopathy:      Cervical: No cervical adenopathy. Skin:     General: Skin is warm and dry. Coloration: Skin is not pale. Findings: No erythema or rash. Neurological:      Mental Status: He is alert and oriented to person, place, and time. Cranial Nerves: No cranial nerve deficit. Motor: No abnormal muscle tone. Coordination: Coordination normal.      Deep Tendon Reflexes: Reflexes are normal and symmetric. Reflexes normal.   Psychiatric:         Behavior: Behavior normal.         Thought Content: Thought content normal.         Judgment: Judgment normal.         Assessment / Plan:   Nusrat Robertson was seen today for 3 month follow-up.     Diagnoses and all orders for this visit:    Type 2 diabetes mellitus with hyperglycemia, with long-term current use of insulin ([facility])  -     POCT glycosylated hemoglobin (Hb A1C)  -     CBC Auto Differential; Future  -     Comprehensive Metabolic Panel; Future  -     TSH without Reflex; Future  -     T4, Free; Future  -     Hemoglobin A1C; Future  -     Lipid Panel; Future  -     PROTIME-INR; Future  -     metFORMIN (GLUCOPHAGE) 500 MG tablet; Take I pill 2 times daily    Coronary artery disease involving native coronary artery of native heart without angina pectoris  -     CBC Auto Differential; Future  -     Comprehensive Metabolic Panel; Future  -     TSH without Reflex; Future  -     T4, Free; Future  -     Hemoglobin A1C; Future  -     Lipid Panel; Future  -     PROTIME-INR; Future    Mixed hyperlipidemia  -     CBC Auto Differential; Future  -     Comprehensive Metabolic Panel; Future  -     TSH without Reflex; Future  -     T4, Free; Future  -     Hemoglobin A1C; Future  -     Lipid Panel; Future  -     PROTIME-INR; Future  -     atorvastatin (LIPITOR) 80 MG tablet; TAKE ONE TABLET BY MOUTH EVERY NIGHT    PAF (paroxysmal atrial fibrillation) ([facility])  -     CBC Auto Differential; Future  -     Comprehensive Metabolic Panel; Future  -     TSH without Reflex; Future  -     T4, Free; Future  -     Hemoglobin A1C; Future  -     Lipid Panel; Future  -     PROTIME-INR; Future    Benign essential HTN  -     CBC Auto Differential; Future  -     Comprehensive Metabolic Panel; Future  -     TSH without Reflex; Future  -     T4, Free; Future  -     Hemoglobin A1C; Future  -     Lipid Panel; Future  -     PROTIME-INR; Future    Morbid obesity with BMI of 45.0-49.9, adult ([facility])  -     POCT glycosylated hemoglobin (Hb A1C)  -     CBC Auto Differential; Future  -     Comprehensive Metabolic Panel; Future  -     TSH without Reflex; Future  -     T4, Free; Future  -     Hemoglobin A1C; Future  -     Lipid Panel; Future  -     PROTIME-INR;  Future Type 2 diabetes mellitus with diabetic polyneuropathy, with long-term current use of insulin (HCC)  -     POCT glycosylated hemoglobin (Hb A1C)  -     CBC Auto Differential; Future  -     Comprehensive Metabolic Panel; Future  -     TSH without Reflex; Future  -     T4, Free; Future  -     Hemoglobin A1C; Future  -     Lipid Panel; Future  -     PROTIME-INR; Future    Acquired hypothyroidism  -     POCT glycosylated hemoglobin (Hb A1C)  -     CBC Auto Differential; Future  -     Comprehensive Metabolic Panel; Future  -     TSH without Reflex; Future  -     T4, Free; Future  -     Hemoglobin A1C; Future  -     Lipid Panel; Future  -     PROTIME-INR; Future    Simple chronic bronchitis (HCC)  -     POCT glycosylated hemoglobin (Hb A1C)  -     CBC Auto Differential; Future  -     Comprehensive Metabolic Panel; Future  -     TSH without Reflex; Future  -     T4, Free; Future  -     Hemoglobin A1C; Future  -     Lipid Panel; Future  -     PROTIME-INR; Future  -     montelukast (SINGULAIR) 10 MG tablet; Take 1 tablet by mouth nightly    Seasonal allergic rhinitis due to pollen  -     montelukast (SINGULAIR) 10 MG tablet; Take 1 tablet by mouth nightly    Gastroesophageal reflux disease without esophagitis  -     pantoprazole (PROTONIX) 40 MG tablet; Take 1 tablet by mouth daily      reviewed health maintenance report. Patient is aware of deficiencies and suggested preventative tests. Reviewed healthmaintenance report. Patient is aware of deficiencies and suggested preventative tests.

## 2021-04-07 DIAGNOSIS — G25.81 RESTLESS LEG SYNDROME: ICD-10-CM

## 2021-04-07 RX ORDER — PRAMIPEXOLE DIHYDROCHLORIDE 0.25 MG/1
TABLET ORAL
Qty: 180 TABLET | Refills: 0 | Status: SHIPPED
Start: 2021-04-07 | End: 2021-04-29

## 2021-04-15 DIAGNOSIS — E11.65 TYPE 2 DIABETES MELLITUS WITH HYPERGLYCEMIA, WITH LONG-TERM CURRENT USE OF INSULIN (HCC): ICD-10-CM

## 2021-04-15 DIAGNOSIS — E11.42 TYPE 2 DIABETES MELLITUS WITH DIABETIC POLYNEUROPATHY, WITH LONG-TERM CURRENT USE OF INSULIN (HCC): ICD-10-CM

## 2021-04-15 DIAGNOSIS — I10 BENIGN ESSENTIAL HTN: ICD-10-CM

## 2021-04-15 DIAGNOSIS — Z79.4 TYPE 2 DIABETES MELLITUS WITH DIABETIC POLYNEUROPATHY, WITH LONG-TERM CURRENT USE OF INSULIN (HCC): ICD-10-CM

## 2021-04-15 DIAGNOSIS — J41.0 SIMPLE CHRONIC BRONCHITIS (HCC): ICD-10-CM

## 2021-04-15 DIAGNOSIS — E03.9 ACQUIRED HYPOTHYROIDISM: ICD-10-CM

## 2021-04-15 DIAGNOSIS — E66.01 MORBID OBESITY WITH BMI OF 45.0-49.9, ADULT (HCC): ICD-10-CM

## 2021-04-15 DIAGNOSIS — I48.0 PAF (PAROXYSMAL ATRIAL FIBRILLATION) (HCC): ICD-10-CM

## 2021-04-15 DIAGNOSIS — Z79.4 TYPE 2 DIABETES MELLITUS WITH HYPERGLYCEMIA, WITH LONG-TERM CURRENT USE OF INSULIN (HCC): ICD-10-CM

## 2021-04-15 DIAGNOSIS — E78.2 MIXED HYPERLIPIDEMIA: ICD-10-CM

## 2021-04-15 DIAGNOSIS — I25.10 CORONARY ARTERY DISEASE INVOLVING NATIVE CORONARY ARTERY OF NATIVE HEART WITHOUT ANGINA PECTORIS: ICD-10-CM

## 2021-04-15 LAB
ALBUMIN SERPL-MCNC: 3.9 G/DL (ref 3.5–5.2)
ALP BLD-CCNC: 161 U/L (ref 40–129)
ALT SERPL-CCNC: 22 U/L (ref 0–40)
ANION GAP SERPL CALCULATED.3IONS-SCNC: 16 MMOL/L (ref 7–16)
AST SERPL-CCNC: 27 U/L (ref 0–39)
BASOPHILS ABSOLUTE: 0.04 E9/L (ref 0–0.2)
BASOPHILS RELATIVE PERCENT: 0.5 % (ref 0–2)
BILIRUB SERPL-MCNC: 1.7 MG/DL (ref 0–1.2)
BUN BLDV-MCNC: 32 MG/DL (ref 8–23)
CALCIUM SERPL-MCNC: 9.6 MG/DL (ref 8.6–10.2)
CHLORIDE BLD-SCNC: 95 MMOL/L (ref 98–107)
CHOLESTEROL, TOTAL: 97 MG/DL (ref 0–199)
CO2: 27 MMOL/L (ref 22–29)
CREAT SERPL-MCNC: 1.6 MG/DL (ref 0.7–1.2)
EOSINOPHILS ABSOLUTE: 0.02 E9/L (ref 0.05–0.5)
EOSINOPHILS RELATIVE PERCENT: 0.3 % (ref 0–6)
GFR AFRICAN AMERICAN: 53
GFR NON-AFRICAN AMERICAN: 44 ML/MIN/1.73
GLUCOSE BLD-MCNC: 439 MG/DL (ref 74–99)
HBA1C MFR BLD: 13.2 % (ref 4–5.6)
HCT VFR BLD CALC: 43.7 % (ref 37–54)
HDLC SERPL-MCNC: 29 MG/DL
HEMOGLOBIN: 13.5 G/DL (ref 12.5–16.5)
IMMATURE GRANULOCYTES #: 0.05 E9/L
IMMATURE GRANULOCYTES %: 0.6 % (ref 0–5)
INR BLD: 2.3
LDL CHOLESTEROL CALCULATED: 46 MG/DL (ref 0–99)
LYMPHOCYTES ABSOLUTE: 1.21 E9/L (ref 1.5–4)
LYMPHOCYTES RELATIVE PERCENT: 15.5 % (ref 20–42)
MCH RBC QN AUTO: 30.7 PG (ref 26–35)
MCHC RBC AUTO-ENTMCNC: 30.9 % (ref 32–34.5)
MCV RBC AUTO: 99.3 FL (ref 80–99.9)
MONOCYTES ABSOLUTE: 0.68 E9/L (ref 0.1–0.95)
MONOCYTES RELATIVE PERCENT: 8.7 % (ref 2–12)
NEUTROPHILS ABSOLUTE: 5.8 E9/L (ref 1.8–7.3)
NEUTROPHILS RELATIVE PERCENT: 74.4 % (ref 43–80)
PDW BLD-RTO: 18 FL (ref 11.5–15)
PLATELET # BLD: 215 E9/L (ref 130–450)
PMV BLD AUTO: 10.2 FL (ref 7–12)
POTASSIUM SERPL-SCNC: 4.9 MMOL/L (ref 3.5–5)
PROTHROMBIN TIME: 25.5 SEC (ref 9.3–12.4)
RBC # BLD: 4.4 E12/L (ref 3.8–5.8)
SODIUM BLD-SCNC: 138 MMOL/L (ref 132–146)
T4 FREE: 1.18 NG/DL (ref 0.93–1.7)
TOTAL PROTEIN: 7 G/DL (ref 6.4–8.3)
TRIGL SERPL-MCNC: 109 MG/DL (ref 0–149)
TSH SERPL DL<=0.05 MIU/L-ACNC: 4.8 UIU/ML (ref 0.27–4.2)
VLDLC SERPL CALC-MCNC: 22 MG/DL
WBC # BLD: 7.8 E9/L (ref 4.5–11.5)

## 2021-04-18 ENCOUNTER — APPOINTMENT (OUTPATIENT)
Dept: GENERAL RADIOLOGY | Age: 66
DRG: 291 | End: 2021-04-18
Payer: MEDICARE

## 2021-04-18 ENCOUNTER — HOSPITAL ENCOUNTER (INPATIENT)
Age: 66
LOS: 7 days | Discharge: HOME HEALTH | DRG: 291 | End: 2021-04-25
Attending: EMERGENCY MEDICINE | Admitting: INTERNAL MEDICINE
Payer: MEDICARE

## 2021-04-18 DIAGNOSIS — I48.91 ATRIAL FIBRILLATION WITH RVR (HCC): ICD-10-CM

## 2021-04-18 DIAGNOSIS — I50.9 ACUTE ON CHRONIC CONGESTIVE HEART FAILURE, UNSPECIFIED HEART FAILURE TYPE (HCC): Primary | ICD-10-CM

## 2021-04-18 PROBLEM — I50.21 ACUTE SYSTOLIC CHF (CONGESTIVE HEART FAILURE) (HCC): Status: ACTIVE | Noted: 2021-04-18

## 2021-04-18 LAB
ALBUMIN SERPL-MCNC: 3.6 G/DL (ref 3.5–5.2)
ALP BLD-CCNC: 185 U/L (ref 40–129)
ALT SERPL-CCNC: 23 U/L (ref 0–40)
ANION GAP SERPL CALCULATED.3IONS-SCNC: 6 MMOL/L (ref 7–16)
APTT: 36 SEC (ref 24.5–35.1)
AST SERPL-CCNC: 27 U/L (ref 0–39)
BACTERIA: NORMAL /HPF
BASOPHILS ABSOLUTE: 0.05 E9/L (ref 0–0.2)
BASOPHILS RELATIVE PERCENT: 0.7 % (ref 0–2)
BILIRUB SERPL-MCNC: 1 MG/DL (ref 0–1.2)
BILIRUBIN URINE: NEGATIVE
BLOOD, URINE: NEGATIVE
BUN BLDV-MCNC: 29 MG/DL (ref 8–23)
CALCIUM SERPL-MCNC: 9.1 MG/DL (ref 8.6–10.2)
CHLORIDE BLD-SCNC: 96 MMOL/L (ref 98–107)
CHLORIDE URINE RANDOM: 91 MMOL/L
CLARITY: CLEAR
CO2: 32 MMOL/L (ref 22–29)
COLOR: YELLOW
CREAT SERPL-MCNC: 1.7 MG/DL (ref 0.7–1.2)
CREATININE URINE: 78 MG/DL (ref 40–278)
EKG ATRIAL RATE: 88 BPM
EKG Q-T INTERVAL: 328 MS
EKG QRS DURATION: 98 MS
EKG QTC CALCULATION (BAZETT): 482 MS
EKG R AXIS: 110 DEGREES
EKG T AXIS: -138 DEGREES
EKG VENTRICULAR RATE: 130 BPM
EOSINOPHIL, URINE: 0 % (ref 0–1)
EOSINOPHILS ABSOLUTE: 0.1 E9/L (ref 0.05–0.5)
EOSINOPHILS RELATIVE PERCENT: 1.3 % (ref 0–6)
GFR AFRICAN AMERICAN: 49
GFR NON-AFRICAN AMERICAN: 41 ML/MIN/1.73
GLUCOSE BLD-MCNC: 411 MG/DL (ref 74–99)
GLUCOSE URINE: 250 MG/DL
HBA1C MFR BLD: 13.1 % (ref 4–5.6)
HCT VFR BLD CALC: 42.8 % (ref 37–54)
HEMOGLOBIN: 13 G/DL (ref 12.5–16.5)
IMMATURE GRANULOCYTES #: 0.05 E9/L
IMMATURE GRANULOCYTES %: 0.7 % (ref 0–5)
INR BLD: 3.5
INR BLD: 3.6
KETONES, URINE: NEGATIVE MG/DL
LACTIC ACID: 1.6 MMOL/L (ref 0.5–2.2)
LEUKOCYTE ESTERASE, URINE: NEGATIVE
LYMPHOCYTES ABSOLUTE: 1.23 E9/L (ref 1.5–4)
LYMPHOCYTES RELATIVE PERCENT: 16.3 % (ref 20–42)
MCH RBC QN AUTO: 30.8 PG (ref 26–35)
MCHC RBC AUTO-ENTMCNC: 30.4 % (ref 32–34.5)
MCV RBC AUTO: 101.4 FL (ref 80–99.9)
METER GLUCOSE: 355 MG/DL (ref 74–99)
METER GLUCOSE: 368 MG/DL (ref 74–99)
METER GLUCOSE: 402 MG/DL (ref 74–99)
MONOCYTES ABSOLUTE: 0.57 E9/L (ref 0.1–0.95)
MONOCYTES RELATIVE PERCENT: 7.6 % (ref 2–12)
NEUTROPHILS ABSOLUTE: 5.53 E9/L (ref 1.8–7.3)
NEUTROPHILS RELATIVE PERCENT: 73.4 % (ref 43–80)
NITRITE, URINE: NEGATIVE
PDW BLD-RTO: 18.6 FL (ref 11.5–15)
PH UA: 5.5 (ref 5–9)
PLATELET # BLD: 228 E9/L (ref 130–450)
PMV BLD AUTO: 10.3 FL (ref 7–12)
POTASSIUM REFLEX MAGNESIUM: 5.7 MMOL/L (ref 3.5–5)
POTASSIUM, UR: 38.1 MMOL/L
PRO-BNP: 3130 PG/ML (ref 0–125)
PROTEIN UA: ABNORMAL MG/DL
PROTHROMBIN TIME: 41.6 SEC (ref 9.3–12.4)
PROTHROMBIN TIME: 43.1 SEC (ref 9.3–12.4)
RBC # BLD: 4.22 E12/L (ref 3.8–5.8)
RBC UA: NORMAL /HPF (ref 0–2)
SARS-COV-2, NAAT: NOT DETECTED
SODIUM BLD-SCNC: 134 MMOL/L (ref 132–146)
SODIUM URINE: 67 MMOL/L
SPECIFIC GRAVITY UA: 1.02 (ref 1–1.03)
TOTAL PROTEIN: 6.5 G/DL (ref 6.4–8.3)
TROPONIN: 0.01 NG/ML (ref 0–0.03)
TROPONIN: 0.02 NG/ML (ref 0–0.03)
TROPONIN: 0.02 NG/ML (ref 0–0.03)
UROBILINOGEN, URINE: 1 E.U./DL
WBC # BLD: 7.5 E9/L (ref 4.5–11.5)
WBC UA: NORMAL /HPF (ref 0–5)

## 2021-04-18 PROCEDURE — 82436 ASSAY OF URINE CHLORIDE: CPT

## 2021-04-18 PROCEDURE — 84484 ASSAY OF TROPONIN QUANT: CPT

## 2021-04-18 PROCEDURE — 93010 ELECTROCARDIOGRAM REPORT: CPT | Performed by: INTERNAL MEDICINE

## 2021-04-18 PROCEDURE — 82570 ASSAY OF URINE CREATININE: CPT

## 2021-04-18 PROCEDURE — 83036 HEMOGLOBIN GLYCOSYLATED A1C: CPT

## 2021-04-18 PROCEDURE — 6360000002 HC RX W HCPCS: Performed by: INTERNAL MEDICINE

## 2021-04-18 PROCEDURE — 99285 EMERGENCY DEPT VISIT HI MDM: CPT

## 2021-04-18 PROCEDURE — 83605 ASSAY OF LACTIC ACID: CPT

## 2021-04-18 PROCEDURE — 96366 THER/PROPH/DIAG IV INF ADDON: CPT

## 2021-04-18 PROCEDURE — 2580000003 HC RX 258: Performed by: INTERNAL MEDICINE

## 2021-04-18 PROCEDURE — 96376 TX/PRO/DX INJ SAME DRUG ADON: CPT

## 2021-04-18 PROCEDURE — 84133 ASSAY OF URINE POTASSIUM: CPT

## 2021-04-18 PROCEDURE — 2500000003 HC RX 250 WO HCPCS: Performed by: STUDENT IN AN ORGANIZED HEALTH CARE EDUCATION/TRAINING PROGRAM

## 2021-04-18 PROCEDURE — 93005 ELECTROCARDIOGRAM TRACING: CPT | Performed by: STUDENT IN AN ORGANIZED HEALTH CARE EDUCATION/TRAINING PROGRAM

## 2021-04-18 PROCEDURE — 84300 ASSAY OF URINE SODIUM: CPT

## 2021-04-18 PROCEDURE — 96365 THER/PROPH/DIAG IV INF INIT: CPT

## 2021-04-18 PROCEDURE — 80053 COMPREHEN METABOLIC PANEL: CPT

## 2021-04-18 PROCEDURE — 87635 SARS-COV-2 COVID-19 AMP PRB: CPT

## 2021-04-18 PROCEDURE — 36415 COLL VENOUS BLD VENIPUNCTURE: CPT

## 2021-04-18 PROCEDURE — 96375 TX/PRO/DX INJ NEW DRUG ADDON: CPT

## 2021-04-18 PROCEDURE — 71045 X-RAY EXAM CHEST 1 VIEW: CPT

## 2021-04-18 PROCEDURE — 82962 GLUCOSE BLOOD TEST: CPT

## 2021-04-18 PROCEDURE — 85610 PROTHROMBIN TIME: CPT

## 2021-04-18 PROCEDURE — 2060000000 HC ICU INTERMEDIATE R&B

## 2021-04-18 PROCEDURE — 87205 SMEAR GRAM STAIN: CPT

## 2021-04-18 PROCEDURE — 83880 ASSAY OF NATRIURETIC PEPTIDE: CPT

## 2021-04-18 PROCEDURE — 2580000003 HC RX 258: Performed by: STUDENT IN AN ORGANIZED HEALTH CARE EDUCATION/TRAINING PROGRAM

## 2021-04-18 PROCEDURE — 6370000000 HC RX 637 (ALT 250 FOR IP): Performed by: INTERNAL MEDICINE

## 2021-04-18 PROCEDURE — 6360000002 HC RX W HCPCS: Performed by: STUDENT IN AN ORGANIZED HEALTH CARE EDUCATION/TRAINING PROGRAM

## 2021-04-18 PROCEDURE — 2500000003 HC RX 250 WO HCPCS: Performed by: INTERNAL MEDICINE

## 2021-04-18 PROCEDURE — 85730 THROMBOPLASTIN TIME PARTIAL: CPT

## 2021-04-18 PROCEDURE — 85025 COMPLETE CBC W/AUTO DIFF WBC: CPT

## 2021-04-18 PROCEDURE — 81001 URINALYSIS AUTO W/SCOPE: CPT

## 2021-04-18 RX ORDER — ALBUTEROL SULFATE 2.5 MG/3ML
2.5 SOLUTION RESPIRATORY (INHALATION) EVERY 6 HOURS PRN
Status: DISCONTINUED | OUTPATIENT
Start: 2021-04-18 | End: 2021-04-25 | Stop reason: HOSPADM

## 2021-04-18 RX ORDER — FUROSEMIDE 10 MG/ML
40 INJECTION INTRAMUSCULAR; INTRAVENOUS ONCE
Status: COMPLETED | OUTPATIENT
Start: 2021-04-18 | End: 2021-04-18

## 2021-04-18 RX ORDER — DIGOXIN 0.25 MG/ML
250 INJECTION INTRAMUSCULAR; INTRAVENOUS ONCE
Status: COMPLETED | OUTPATIENT
Start: 2021-04-18 | End: 2021-04-18

## 2021-04-18 RX ORDER — ASPIRIN 81 MG/1
81 TABLET, CHEWABLE ORAL NIGHTLY
Status: DISCONTINUED | OUTPATIENT
Start: 2021-04-18 | End: 2021-04-25 | Stop reason: HOSPADM

## 2021-04-18 RX ORDER — PANTOPRAZOLE SODIUM 40 MG/1
40 TABLET, DELAYED RELEASE ORAL DAILY
Status: DISCONTINUED | OUTPATIENT
Start: 2021-04-18 | End: 2021-04-25 | Stop reason: HOSPADM

## 2021-04-18 RX ORDER — ONDANSETRON 2 MG/ML
4 INJECTION INTRAMUSCULAR; INTRAVENOUS EVERY 6 HOURS PRN
Status: DISCONTINUED | OUTPATIENT
Start: 2021-04-18 | End: 2021-04-25 | Stop reason: HOSPADM

## 2021-04-18 RX ORDER — GABAPENTIN 100 MG/1
200 CAPSULE ORAL 3 TIMES DAILY
Status: DISCONTINUED | OUTPATIENT
Start: 2021-04-18 | End: 2021-04-25 | Stop reason: HOSPADM

## 2021-04-18 RX ORDER — PRAMIPEXOLE DIHYDROCHLORIDE 0.25 MG/1
0.25 TABLET ORAL 3 TIMES DAILY
Status: DISCONTINUED | OUTPATIENT
Start: 2021-04-18 | End: 2021-04-25 | Stop reason: HOSPADM

## 2021-04-18 RX ORDER — PRAMIPEXOLE DIHYDROCHLORIDE 0.25 MG/1
0.25 TABLET ORAL 3 TIMES DAILY
Status: DISCONTINUED | OUTPATIENT
Start: 2021-04-18 | End: 2021-04-18

## 2021-04-18 RX ORDER — ATORVASTATIN CALCIUM 40 MG/1
80 TABLET, FILM COATED ORAL NIGHTLY
Status: DISCONTINUED | OUTPATIENT
Start: 2021-04-18 | End: 2021-04-25 | Stop reason: HOSPADM

## 2021-04-18 RX ORDER — BUDESONIDE 0.5 MG/2ML
0.5 INHALANT ORAL 2 TIMES DAILY
Status: DISCONTINUED | OUTPATIENT
Start: 2021-04-18 | End: 2021-04-25 | Stop reason: HOSPADM

## 2021-04-18 RX ORDER — ARFORMOTEROL TARTRATE 15 UG/2ML
15 SOLUTION RESPIRATORY (INHALATION) 2 TIMES DAILY
Status: DISCONTINUED | OUTPATIENT
Start: 2021-04-18 | End: 2021-04-25 | Stop reason: HOSPADM

## 2021-04-18 RX ORDER — BUDESONIDE AND FORMOTEROL FUMARATE DIHYDRATE 160; 4.5 UG/1; UG/1
2 AEROSOL RESPIRATORY (INHALATION) 2 TIMES DAILY
Status: DISCONTINUED | OUTPATIENT
Start: 2021-04-18 | End: 2021-04-18 | Stop reason: CLARIF

## 2021-04-18 RX ORDER — NICOTINE POLACRILEX 4 MG
15 LOZENGE BUCCAL PRN
Status: DISCONTINUED | OUTPATIENT
Start: 2021-04-18 | End: 2021-04-25 | Stop reason: HOSPADM

## 2021-04-18 RX ORDER — DEXTROSE MONOHYDRATE 50 MG/ML
100 INJECTION, SOLUTION INTRAVENOUS PRN
Status: DISCONTINUED | OUTPATIENT
Start: 2021-04-18 | End: 2021-04-25 | Stop reason: HOSPADM

## 2021-04-18 RX ORDER — SPIRONOLACTONE 25 MG/1
25 TABLET ORAL DAILY
Status: DISCONTINUED | OUTPATIENT
Start: 2021-04-18 | End: 2021-04-25 | Stop reason: HOSPADM

## 2021-04-18 RX ORDER — DEXTROSE MONOHYDRATE 25 G/50ML
12.5 INJECTION, SOLUTION INTRAVENOUS PRN
Status: DISCONTINUED | OUTPATIENT
Start: 2021-04-18 | End: 2021-04-25 | Stop reason: HOSPADM

## 2021-04-18 RX ORDER — METOPROLOL SUCCINATE 25 MG/1
25 TABLET, EXTENDED RELEASE ORAL 2 TIMES DAILY
Status: DISCONTINUED | OUTPATIENT
Start: 2021-04-18 | End: 2021-04-25 | Stop reason: HOSPADM

## 2021-04-18 RX ORDER — WARFARIN SODIUM 5 MG/1
5 TABLET ORAL DAILY
Status: DISCONTINUED | OUTPATIENT
Start: 2021-04-18 | End: 2021-04-18

## 2021-04-18 RX ADMIN — INSULIN LISPRO 10 UNITS: 100 INJECTION, SOLUTION INTRAVENOUS; SUBCUTANEOUS at 17:27

## 2021-04-18 RX ADMIN — METOPROLOL SUCCINATE 25 MG: 25 TABLET, EXTENDED RELEASE ORAL at 21:05

## 2021-04-18 RX ADMIN — ATORVASTATIN CALCIUM 80 MG: 40 TABLET, FILM COATED ORAL at 21:05

## 2021-04-18 RX ADMIN — DIGOXIN 250 MCG: 250 INJECTION, SOLUTION INTRAMUSCULAR; INTRAVENOUS; PARENTERAL at 21:05

## 2021-04-18 RX ADMIN — ASPIRIN 81 MG CHEWABLE TABLET 81 MG: 81 TABLET CHEWABLE at 21:05

## 2021-04-18 RX ADMIN — SPIRONOLACTONE 25 MG: 25 TABLET ORAL at 16:57

## 2021-04-18 RX ADMIN — FUROSEMIDE 40 MG: 10 INJECTION, SOLUTION INTRAMUSCULAR; INTRAVENOUS at 09:51

## 2021-04-18 RX ADMIN — INSULIN LISPRO 5 UNITS: 100 INJECTION, SOLUTION INTRAVENOUS; SUBCUTANEOUS at 21:06

## 2021-04-18 RX ADMIN — PANTOPRAZOLE SODIUM 40 MG: 40 TABLET, DELAYED RELEASE ORAL at 16:57

## 2021-04-18 RX ADMIN — BUMETANIDE 1 MG/HR: 0.25 INJECTION INTRAMUSCULAR; INTRAVENOUS at 16:56

## 2021-04-18 RX ADMIN — GABAPENTIN 200 MG: 100 CAPSULE ORAL at 21:05

## 2021-04-18 RX ADMIN — GABAPENTIN 200 MG: 100 CAPSULE ORAL at 16:57

## 2021-04-18 RX ADMIN — PRAMIPEXOLE DIHYDROCHLORIDE 0.25 MG: 0.25 TABLET ORAL at 23:30

## 2021-04-18 RX ADMIN — DILTIAZEM HYDROCHLORIDE 5 MG/HR: 5 INJECTION INTRAVENOUS at 10:04

## 2021-04-18 RX ADMIN — DIGOXIN 250 MCG: 250 INJECTION, SOLUTION INTRAMUSCULAR; INTRAVENOUS; PARENTERAL at 19:05

## 2021-04-18 RX ADMIN — PRAMIPEXOLE DIHYDROCHLORIDE 0.25 MG: 0.25 TABLET ORAL at 16:56

## 2021-04-18 RX ADMIN — CALCIUM GLUCONATE 1000 MG: 98 INJECTION, SOLUTION INTRAVENOUS at 10:13

## 2021-04-18 ASSESSMENT — PAIN DESCRIPTION - ONSET: ONSET: ON-GOING

## 2021-04-18 ASSESSMENT — ENCOUNTER SYMPTOMS
SORE THROAT: 0
BACK PAIN: 0
WHEEZING: 0
SINUS PRESSURE: 0
NAUSEA: 0
COUGH: 0
ABDOMINAL PAIN: 0
VOMITING: 0
SHORTNESS OF BREATH: 1
DIARRHEA: 0

## 2021-04-18 ASSESSMENT — PAIN DESCRIPTION - ORIENTATION: ORIENTATION: LOWER

## 2021-04-18 ASSESSMENT — PAIN DESCRIPTION - PAIN TYPE: TYPE: CHRONIC PAIN;ACUTE PAIN

## 2021-04-18 ASSESSMENT — PAIN SCALES - GENERAL
PAINLEVEL_OUTOF10: 5
PAINLEVEL_OUTOF10: 5

## 2021-04-18 ASSESSMENT — PAIN DESCRIPTION - DESCRIPTORS: DESCRIPTORS: BURNING

## 2021-04-18 ASSESSMENT — PAIN DESCRIPTION - PROGRESSION: CLINICAL_PROGRESSION: NOT CHANGED

## 2021-04-18 ASSESSMENT — PAIN - FUNCTIONAL ASSESSMENT: PAIN_FUNCTIONAL_ASSESSMENT: ACTIVITIES ARE NOT PREVENTED

## 2021-04-18 NOTE — PLAN OF CARE
Problem: Pain:  Goal: Pain level will decrease  Description: Pain level will decrease  4/18/2021 1607 by Charlie Torres RN  Outcome: Ongoing  4/18/2021 1603 by Charlie Torres RN  Outcome: Ongoing     Problem: Skin Integrity:  Goal: Will show no infection signs and symptoms  Description: Will show no infection signs and symptoms  4/18/2021 1607 by Charlie Torres RN  Outcome: Ongoing  4/18/2021 1603 by Charlie Torres RN  Outcome: Ongoing

## 2021-04-18 NOTE — ED NOTES
Dr. Castillo notified about BP of 87/69. Ok to stop diltiazem infusion.      Cristian Marin RN  04/18/21 1100

## 2021-04-18 NOTE — ED NOTES
Bed: 08  Expected date:   Expected time:   Means of arrival:   Comments:  juan Jones RN  04/18/21 6724

## 2021-04-18 NOTE — ED PROVIDER NOTES
[address] [facility]  Department of Emergency Medicine     Written by: Sari Flores DO  Patient Name: Kt Burton  Attending Provider: Jeny Mata DO  Admit Date: 2021  8:31 AM  MRN: [id number]                   : 1955        Chief Complaint   Patient presents with    Testicle Pain     swollen    - Chief complaint    Patient is a 66-year-old male past medical history of CHF, CAD, COPD, diabetes, hyperlipidemia, hypertension and A. fib anticoagulated on warfarin. Patient presents with chief complaint of leg swelling, scrotal swelling and shortness of breath. Patient states that symptoms began 2 days ago. He notes that he has had increasing scrotal swelling and leg swelling. This is caused significant pain that he describes as a burning sensation. Currently rates pain a 8 out of 10. Patient denies any exacerbation or relieving factors. In addition patient also notes shortness of breath, this is worsened with lying flat. He states that he is supposed to wear 2 L of oxygen at night but does not wear oxygen during the day. Patient denies any similar episodes in the past.  Patient denies any fevers, chills, nausea, vomiting, chest pain, cough, abdominal pain, constipation or diarrhea. The history is provided by the patient. No  was used. Review of Systems   Constitutional: Negative for chills and fever. HENT: Negative for ear pain, sinus pressure and sore throat. Respiratory: Positive for shortness of breath. Negative for cough and wheezing. Cardiovascular: Positive for leg swelling. Negative for chest pain. Gastrointestinal: Negative for abdominal pain, diarrhea, nausea and vomiting. Genitourinary: Positive for scrotal swelling. Negative for dysuria and frequency. Musculoskeletal: Negative for arthralgias and back pain. Skin: Negative for rash and wound. Neurological: Negative for weakness and headaches.    Hematological: Negative for adenopathy. All other systems reviewed and are negative. Physical Exam  Vitals signs and nursing note reviewed. Exam conducted with a chaperone present. Constitutional:       Appearance: He is well-developed. HENT:      Head: Normocephalic and atraumatic. Eyes:      Conjunctiva/sclera: Conjunctivae normal.   Neck:      Musculoskeletal: Normal range of motion and neck supple. Cardiovascular:      Rate and Rhythm: Tachycardia present. Rhythm irregular. Heart sounds: Normal heart sounds. No murmur. Pulmonary:      Effort: Pulmonary effort is normal. No respiratory distress. Breath sounds: Normal breath sounds. No wheezing or rales. Abdominal:      General: Bowel sounds are normal.      Palpations: Abdomen is soft. Tenderness: There is no abdominal tenderness. There is no guarding or rebound. Genitourinary:     Comments: Moderate scrotal swelling noted, no erythema or cellulitic process identified, no crepitus. Musculoskeletal:         General: No tenderness or deformity. Right lower leg: Edema (2+) present. Left lower leg: Edema (2+) present. Skin:     General: Skin is warm and dry. Neurological:      Mental Status: He is alert and oriented to person, place, and time. Cranial Nerves: No cranial nerve deficit. Coordination: Coordination normal.          Procedures   EKG #1:  Interpreted by emergency department physician unless otherwise noted. Time:  906    Rate: 130  Rhythm: Atrial fibrillation ventricular response  Interpretation:EKG reviewed demonstrate atrial fibrillation at ventricular spots, right axis deviation, rate 130, , no acute ST segment changes. .  Comparison: changes compared to previous EKG.       MDM  Number of Diagnoses or Management Options  Acute on chronic congestive heart failure, unspecified heart failure type [facility])  Atrial fibrillation with RVR ([address]  Diagnosis management comments: Patient is a 27-year-old male past with history of CHF, CAD, COPD, diabetes, hyperlipidemia, hypertension A. fib anticoagulated on warfarin. Patient has a chief complaint of shortness of breath. As well as scrotal swelling. Patient hypoxic on arrival O2 saturation 77%, heart rate 111, BP 93/65, patient placed on 2 L nasal cannula with improvement in symptoms. On physical exam heart tachycardic with a irregular rhythm, lungs with Rales at bilateral bases, abdomen mildly distended no tenderness rigidity or guarding. On examination of the scrotum there is scrotal edema without erythema or cellulitic process. There is also noted to be 2+ bilateral lower extremity edema. EKG obtained demonstrated atrial fibrillation without ventricular response, laboratory obtained CBC unremarkable, CMP demonstrated nonhemolyzed potassium of 5.7, elevated glucose of 411 and elevated baseline creatinine of 1.7, proBNP elevated from patient's baseline of 3130, INR 3.5, troponin 0 0.02, lactic acid 1.6. COVID-19 test was obtained and was negative. Chest x-ray obtained demonstrate no acute abnormalities. Findings consistent with acute heart failure exacerbation and hyperkalemia in the setting of A. fib RVR, patient was given 1 g calcium gluconate as well as 40 mg IV Lasix. In addition patient was initially started on a Cardizem drip however patient became hypotensive with a blood pressure of 87/69, Cardizem drip stopped patient's blood pressure improved and heart rate stabilized in the low 100s. Due to A. fib RVR and heart failure, decision made to admit patient. Case discussed with hospitalist who agreed to admit patient. Plan of care discussed with patient including admission, all questions were answered, patient was in agreement plan of care and admitted to the hospital in stable condition.        Amount and/or Complexity of Data Reviewed  Clinical lab tests: ordered and reviewed  Tests in the radiology section of CPT®: ordered and reviewed  Decide to obtain previous medical records or to obtain history from someone other than the patient: yes    Risk of Complications, Morbidity, and/or Mortality  Presenting problems: moderate  Diagnostic procedures: moderate  Management options: moderate    Patient Progress  Patient progress: stable       ED Course as of Apr 18 1410   Sun Apr 18, 2021   1349 Spoke with Dr. Alvina Jara he has agreed to admit patient. [BP]      ED Course User Index  [BP] Jose Miguel Sandoval, DO        --------------------------------------------- PAST HISTORY ---------------------------------------------  Past Medical History:  has a past medical history of Acid reflux, Acute diastolic (congestive) heart failure (HCC), Arthritis, Asthma, Asthmatic bronchitis , chronic (HCC), CAD (coronary artery disease), Chronic bronchitis (HCC), COPD (chronic obstructive pulmonary disease) ([facility] [address] COPD (chronic obstructive pulmonary disease) ([facility] [address] Diabetes mellitus ([facility] [address] Emphysema (subcutaneous) (surgical) resulting from a procedure, Hyperlipidemia, Hypertension, Hypoxemia requiring supplemental oxygen, LONG TERM ANTICOAGULENT USE, Morbid obesity with BMI of 50.0-59.9, adult ([facility] [address] Obesity, Osteoarthritis, Sleep apnea, Tobacco abuse, and Type II or unspecified type diabetes mellitus without mention of complication, not stated as uncontrolled. Past Surgical History:  has a past surgical history that includes hernia repair; sinus surgery; ECHO Compl W Dop Color Flow (7/25/2013); Colonoscopy; Coronary angioplasty with stent (07-23-13); and Coronary artery bypass graft (09-09-13). Social History:  reports that he quit smoking about 7 years ago. His smoking use included cigarettes. He has a 45.00 pack-year smoking history. He quit smokeless tobacco use about 49 years ago. His smokeless tobacco use included chew. He reports that he does not drink alcohol or use drugs.     Family History: family history includes Cancer in his father and mother; Mental Illness in his brother; Other in his brother; Stroke in his brother. The patients home medications have been reviewed.     Allergies: Pcn [penicillins] and Sulfa antibiotics    -------------------------------------------------- RESULTS -------------------------------------------------    LABS:  Results for orders placed or performed during the hospital encounter of 04/18/21   COVID-19, Rapid    Specimen: Nasopharyngeal Swab   Result Value Ref Range    SARS-CoV-2, NAAT Not Detected Not Detected   CBC Auto Differential   Result Value Ref Range    WBC 7.5 4.5 - 11.5 E9/L    RBC 4.22 3.80 - 5.80 E12/L    Hemoglobin 13.0 12.5 - 16.5 g/dL    Hematocrit 42.8 37.0 - 54.0 %    .4 (H) 80.0 - 99.9 fL    MCH 30.8 26.0 - 35.0 pg    MCHC 30.4 (L) 32.0 - 34.5 %    RDW 18.6 (H) 11.5 - 15.0 fL    Platelets 609 222 - 997 E9/L    MPV 10.3 7.0 - 12.0 fL    Neutrophils % 73.4 43.0 - 80.0 %    Immature Granulocytes % 0.7 0.0 - 5.0 %    Lymphocytes % 16.3 (L) 20.0 - 42.0 %    Monocytes % 7.6 2.0 - 12.0 %    Eosinophils % 1.3 0.0 - 6.0 %    Basophils % 0.7 0.0 - 2.0 %    Neutrophils Absolute 5.53 1.80 - 7.30 E9/L    Immature Granulocytes # 0.05 E9/L    Lymphocytes Absolute 1.23 (L) 1.50 - 4.00 E9/L    Monocytes Absolute 0.57 0.10 - 0.95 E9/L    Eosinophils Absolute 0.10 0.05 - 0.50 E9/L    Basophils Absolute 0.05 0.00 - 0.20 E9/L   Comprehensive Metabolic Panel w/ Reflex to MG   Result Value Ref Range    Sodium 134 132 - 146 mmol/L    Potassium reflex Magnesium 5.7 (H) 3.5 - 5.0 mmol/L    Chloride 96 (L) 98 - 107 mmol/L    CO2 32 (H) 22 - 29 mmol/L    Anion Gap 6 (L) 7 - 16 mmol/L    Glucose 411 (H) 74 - 99 mg/dL    BUN 29 (H) 8 - 23 mg/dL    CREATININE 1.7 (H) 0.7 - 1.2 mg/dL    GFR Non-African American 41 >=60 mL/min/1.73    GFR African American 49     Calcium 9.1 8.6 - 10.2 mg/dL    Total Protein 6.5 6.4 - 8.3 g/dL    Albumin 3.6 3.5 - 5.2 g/dL    Total Bilirubin 1.0 0.0 - 1.2 mg/dL    Alkaline Phosphatase 185 (H) 40 - 129 U/L    ALT 23 0 - 40 U/L AST 27 0 - 39 U/L   Troponin   Result Value Ref Range    Troponin 0.02 0.00 - 0.03 ng/mL   Brain Natriuretic Peptide   Result Value Ref Range    Pro-BNP 3,130 (H) 0 - 125 pg/mL   Urinalysis, reflex to microscopic   Result Value Ref Range    Color, UA Yellow Straw/Yellow    Clarity, UA Clear Clear    Glucose, Ur 250 (A) Negative mg/dL    Bilirubin Urine Negative Negative    Ketones, Urine Negative Negative mg/dL    Specific Gravity, UA 1.020 1.005 - 1.030    Blood, Urine Negative Negative    pH, UA 5.5 5.0 - 9.0    Protein, UA TRACE Negative mg/dL    Urobilinogen, Urine 1.0 <2.0 E.U./dL    Nitrite, Urine Negative Negative    Leukocyte Esterase, Urine Negative Negative   Lactic Acid, Plasma   Result Value Ref Range    Lactic Acid 1.6 0.5 - 2.2 mmol/L   Microscopic Urinalysis   Result Value Ref Range    WBC, UA 0-1 0 - 5 /HPF    RBC, UA NONE 0 - 2 /HPF    Bacteria, UA NONE SEEN None Seen /HPF   EKG 12 Lead   Result Value Ref Range    Ventricular Rate 130 BPM    Atrial Rate 88 BPM    QRS Duration 98 ms    Q-T Interval 328 ms    QTc Calculation (Bazett) 482 ms    R Axis 110 degrees    T Axis -138 degrees       RADIOLOGY:  XR CHEST PORTABLE   Final Result   No acute findings in the chest             EKG:  This EKG is signed and interpreted by me. Rate: 130  Rhythm: Atrial fibrillation  Interpretation: EKG obtained demonstrate atrial fibrillation with rapid ventricular response, rate 130, normal axis, QTC 42, no acute ST segment changes. Comparison: changes compared to previous EKG      ------------------------- NURSING NOTES AND VITALS REVIEWED ---------------------------  Date / Time Roomed:  4/18/2021  8:31 AM  ED Bed Assignment:  08/08    The nursing notes within the ED encounter and vital signs as below have been reviewed.      Patient Vitals for the past 24 hrs:   BP Temp Temp src Pulse Resp SpO2 Height Weight   04/18/21 1404 134/81 98.4 °F (36.9 °C) Oral 111 20 95 %     04/18/21 1209 114/72   119 24 96 % 

## 2021-04-18 NOTE — PROGRESS NOTES
Pharmacy Consultation Note  (Warfarin Dosing and Monitoring)    Initial consult date: 4/18/21  Consulting physician: Dr. Cornel Cain    Allergies:  Pcn [penicillins] and Sulfa antibiotics    72 y.o. male    Ht Readings from Last 1 Encounters:   04/18/21 5' 8\" (1.727 m)     Wt Readings from Last 1 Encounters:   04/18/21 (!) 366 lb 12.8 oz (166.4 kg)         Warfarin Indication Target   INR Range Home Dose  (if applicable) Diet/Feeding Tube   (Enteral feeds, nutritional drinks and increased Vitamin K in diet can decrease INR)   Atrial fibrillation 2-3 10 mg M,W,F,Sat  5 mg Tu,Th, Sun --       x Home Med? Meds Increasing INR x Home Med?  Meds Decreasing INR     Allopurinol    Azathioprine     Amiodarone/Propafenone/Dronedarone   Carbamazepine     Androgens   Cholestyramine     Chemotherapy (BBW: Capecitabine)   Estrogen     Ciprofloxacin/Levofloxacin   Nafcillin/Dicloxacillin     Clarithromycin/Erythromycin/Azithromycin   Barbiturates      Fluconazole/Itraconazole/Voriconazole/Ketoconazole   Phenytoin (Variable)     Metronidazole   Rifampin     Phenytoin (Variable)   Steroids (Variable/Dose Dependent)      Statins/Fenofibrate/Gemfibrozil   Sucralfate   X Y Steroids (Variable/Dose Dependent)   Other:     Sulfamethoxazole/Trimethoprim        Tramadol         Other:      Comments regarding medication interactions:      x Diseases Affecting INR x Increased Bleeding Risk   X CHF Exacerbation (Increases)  History GI Bleed/PUD    Liver Disease (Increases)  Chronic NSAID Use    Thyroid: Hyper (Increases)  Hypo (Decreases) X Chronic ASA/Antiplatelet Use (Clopidogrel/ Dipyridamole/Prasugrel/Ticagrelor)      Malignancy (Increases)  Abnormal Renal Function (dialysis, renal transplant, SCr ? 2.3 mg/dL)     History of EtOH Abuse: Acute (Increases)   Chronic (Decreases)  Liver Function (cirrhosis, bilirubin >2x ULN with AST/ALT/AP >3x ULN)    Fever (Increases)  Age > 65 years    Acute infection (Increases)  Hypertension/Uncontrolled BP Diarrhea/Dehydration (Increases)  History of stroke    Other: __________________  Other:___________________     Vitamin K or Blood product  Administration Date                    TSH:    Lab Results   Component Value Date    TSH 4.800 04/15/2021        Hepatic Function Panel:                            Lab Results   Component Value Date    ALKPHOS 185 04/18/2021    ALT 23 04/18/2021    AST 27 04/18/2021    PROT 6.5 04/18/2021    BILITOT 1.0 04/18/2021    LABALBU 3.6 04/18/2021    LABALBU 4.4 10/27/2011       Date Warfarin Dose INR Heparin or LMWH HBG/HCT PLT Comment   4/18 HOLD 3.5 -- 13.0/42.8 228                                          Assessment and Plan:  · Christiano Patrick is a 72 yom admitted with acute decompensated congestive heart failure and afib RVR. He is anticoagulated on warfarin for atrial fibrillation.   · INR = 3.5, hold today's dose  · Daily PT/INR until the INR is stable within the therapeutic range  · Pharmacist will follow and monitor/adjust dosing as necessary    Thank you for this consult,      Sharolyn Homans, PharmD, BCPS 4/18/2021 3:45 PM   [phone]

## 2021-04-18 NOTE — H&P
Department of Internal Medicine  History and Physical    PCP: Dr. Jaswinder Rosario Physician: Dr. Justyn Arce  Consultants: Dr. Royce Madrigal:  Profound SOB with edema    HISTORY OF PRESENT ILLNESS:    Rigoberto Drew is a 80-year-old gentleman who presented to [facility] emergency department for the evaluation of profound shortness of breath and edema. Rigoberto Drew states he has been compliant with his medications but is not following any particular fluid restriction. We recently cared for his wife, Saba Castaneda who is here for an extended period of time. I suspect that during that time period, Rigoberto Drew was noncompliant with his medications and consumed a significant amount of salt via the foods he was eating. He is well-known to Dr. Екатерина Ramos service and we will ask the [facility] cardiology team to provide consultation. In review of Dr. Bala Avila most recent note, the patient's weight at that point was 320 pounds. He is now greater than 340 pounds. He has massively volume overloaded with profound shortness of breath. He states he is unable to complete his activities of daily living. In the emergency department, he was also found to be in atrial fibrillation with rapid ventricular response. It was determined he would be admitted to the [facility] floor.     PAST MEDICAL Hx:  Past Medical History:   Diagnosis Date    Acid reflux     Acute diastolic (congestive) heart failure ([facility] [address] 6/19/2019    Arthritis     Asthma 4/16/2014    Asthmatic bronchitis , chronic (HCC) 11/28/2016    CAD (coronary artery disease)     Chronic bronchitis ([facility] [address] 4/16/2014    COPD (chronic obstructive pulmonary disease) (HCC)     CB    COPD (chronic obstructive pulmonary disease) (HCC)     Diabetes mellitus (HCC)     Emphysema (subcutaneous) (surgical) resulting from a procedure     Hyperlipidemia     Hypertension     Hypoxemia requiring supplemental oxygen 2/2/2015    LONG TERM ANTICOAGULENT USE     Morbid obesity with BMI of pantoprazole (PROTONIX) 40 MG tablet Take 1 tablet by mouth daily 4/1/21   Carly Cerise, DO   warfarin (JANTOVEN) 5 MG tablet TAKE 2 TABLETS BY MOUTH DAILY ON MONDAY, WEDNESDAY, FRIDAY AND SATURDAY. THEN TAKE ONE TABLET DAILY ON TUESDAY, THURSDAY AND WIL 3/31/21   Carly Cerise, DO   gabapentin (NEURONTIN) 600 MG tablet TAKE ONE TABLET BY MOUTH THREE TIMES A DAY 3/31/21 3/31/22  Carly Cerise, DO   torsemide (DEMADEX) 20 MG tablet TAKE ONE TABLET BY MOUTH TWO TIMES A DAY 3/30/21   Jeff Vargas MD   losartan (COZAAR) 25 MG tablet TAKE ONE-HALF TABLET BY MOUTH ONE TIME DAILY 2/20/21   Carly Cerise, DO   spironolactone (ALDACTONE) 25 MG tablet TAKE ONE TABLET BY MOUTH TWO TIMES A DAY 2/4/21   Jeff Vargas MD   INSULIN SYRINGE 1CC/29G 29G X 1/2\" 1 ML MISC 1 each by Does not apply route 2 times daily 1/29/21   Carly Cerise, DO   Insulin Lispro (HUMALOG KWIKPEN SC) Inject into the skin    Historical Provider, MD   potassium chloride (KLOR-CON M) 20 MEQ extended release tablet Take 1 tablet by mouth daily 7/16/20   Carly Cerise, DO   metoprolol succinate (TOPROL XL) 50 MG extended release tablet 1 by mouth daily  Patient taking differently: Take 25 mg by mouth 2 times daily  6/15/20   Jeff Vargas MD   ipratropium (ATROVENT) 0.06 % nasal spray 2 sprays by Nasal route 3 times daily 4/2/20 4/2/21  Carly Cerise, DO   levocetirizine (XYZAL) 5 MG tablet Take 1 tablet by mouth nightly as needed (allergies) 4/2/20   Carly Cerise, DO   Lancets MISC 1 each by Does not apply route 2 times daily 3/11/20   Carly Cerise, DO   blood glucose monitor strips Test two times a day & as needed for symptoms of irregular blood glucose. 3/9/20   Carly Cerise, DO   terbinafine (ATHLETES FOOT) 1 % cream Apply topically 2 times daily.  10/21/19   Artem Farr, DO   Insulin Syringe-Needle U-100 27G X 5/8\" 1 ML MISC Use as directed 7/1/19   Carly Cormier, DO   SYMBICORT 160-4.5 MCG/ACT AERO Inhale 2 puffs into the lungs 2 times daily 3/6/18   Edmundo Mohr, DO   CPAP Machine MISC 1 each by Does not apply route nightly as needed     Historical Provider, MD   Accu-Chek Multiclix Lancets MISC Use as directed 14   Edmundo Mohr, DO   aspirin 81 MG tablet Take 81 mg by mouth nightly     Historical Provider, MD       ALLERGIES:  Pcn [penicillins] and Sulfa antibiotics    SOCIAL Hx:  Social History     Socioeconomic History    Marital status:      Spouse name: Esdras Tian Number of children: 1    Years of education: Not on file    Highest education level: 11th grade   Occupational History    Not on file   Social Needs    Financial resource strain: Somewhat hard    Food insecurity     Worry: Sometimes true     Inability: Never true    Transportation needs     Medical: No     Non-medical: No   Tobacco Use    Smoking status: Former Smoker     Packs/day: 1.00     Years: 45.00     Pack years: 45.00     Types: Cigarettes     Quit date: 2013     Years since quittin.7    Smokeless tobacco: Former User     Types: Chew     Quit date: 10/8/1971   Substance and Sexual Activity    Alcohol use: No     Alcohol/week: 0.0 standard drinks     Comment: 1-2 coffee per day    Drug use: No    Sexual activity: Yes     Partners: Female   Lifestyle    Physical activity     Days per week: 3 days     Minutes per session: 20 min    Stress: Rather much   Relationships    Social connections     Talks on phone: Three times a week     Gets together: Once a week     Attends Sabianism service: More than 4 times per year     Active member of club or organization: Yes     Attends meetings of clubs or organizations: Never     Relationship status:     Intimate partner violence     Fear of current or ex partner: No     Emotionally abused: No     Physically abused: No     Forced sexual activity: No   Other Topics Concern    Not on file   Social History Narrative    19  [facility] reviewed SDOH with Boris Beltran.   He does have money strain but between the income he has coming in and his wife's they are over resources for SARY programs. Offered food panty info to help with end of the month struggles but he declined stating that he tried and was refused due to his income. He belongs to a [employer] and goes weekly so he has some community connections in place. He has an extremely high interest rate on his mortgage which he was encouraged to look into getting lowered to help save money on his house payments. Noticed some difficulty with memory recall. Becomes easily flustered at times. Has no MHI to support applying for [facility] program of SARY. States he does not feel depressed or need any MH treatment. ROS:  General:   Weight gain with generalized malaise and fatigue. Psychological:   Denies anxiety, disorientation or hallucinations    ENT:    Denies epistaxis, headaches, vertigo or visual changes    Cardiovascular:   Admits to palpitations on presentation with some improvement at this point. Denies any overt chest pain. Respiratory:   Admits to profound shortness of breath particular with exertion. Has no coughing or sputum production. Gastrointestinal:   Admits to significant abdominal distention. Genito-Urinary:    Admits to substantial scrotal edema. Musculoskeletal:   Denies joint pain, joint stiffness, joint swelling or muscle pain    Neurology:    Profound weakness and deconditioning. Derm:    Denies any rashes, ulcers, or excoriations. Denies bruising. Extremities:   Extensive bilateral lower extremity edema that hinders his ability to walk. PHYSICAL EXAM:  VITALS:  Vitals:    04/18/21 1209   BP: 114/72   Pulse: 119   Resp: 24   Temp:    SpO2: 96%         CONSTITUTIONAL:    Awake, alert, cooperative, no apparent distress, and appears stated age    EYES:    PERRL, EOMI, sclera clear, conjunctiva normal    ENT:    Normocephalic, atraumatic, sinuses nontender on palpation.  External ears without lesions. Oral pharynx with moist mucus membranes. Tonsils without erythema or exudates. Nasal cannula oxygen is in place. NECK:    Supple, symmetrical, trachea midline, no adenopathy, thyroid symmetric, not enlarged and no tenderness, skin normal, no bruits, no JVD    HEMATOLOGIC/LYMPHATICS:    No cervical lymphadenopathy and no supraclavicular lymphadenopathy    LUNGS:    Symmetric. No increased work of breathing, good air exchange, clear to auscultation bilaterally, no wheezes, rhonchi, or rales,     CARDIOVASCULAR:    Atrial fibrillation with rapid ventricular response during my examination. ABDOMEN:    Abdominal distention with diffuse abdominal anasarca. MUSCULOSKELETAL:    There is no redness, warmth, or swelling of the joints. Full range of motion noted. Motor strength is 5 out of 5 all extremities bilaterally. Tone is normal.    NEUROLOGIC:    Awake, alert, oriented to name, place and time. Cranial nerves II-XII are grossly intact. Motor is 5 out of 5 bilaterally. SKIN:    No bruising or bleeding. No redness, warmth, or swelling    EXTREMITIES:    3+ pitting edema involving bilateral lower extremities extending into the scrotum. OSTEOPATHIC:    Examined in seated and supine positions. Normal thoracic kyphosis and lumbar lordosis. No acute somatic dysfunction.     LABORATORY DATA:  CBC with Differential:    Lab Results   Component Value Date    WBC 7.5 04/18/2021    RBC 4.22 04/18/2021    HGB 13.0 04/18/2021    HCT 42.8 04/18/2021     04/18/2021    .4 04/18/2021    MCH 30.8 04/18/2021    MCHC 30.4 04/18/2021    RDW 18.6 04/18/2021    SEGSPCT 61 01/20/2014    LYMPHOPCT 16.3 04/18/2021    MONOPCT 7.6 04/18/2021    BASOPCT 0.7 04/18/2021    MONOSABS 0.57 04/18/2021    LYMPHSABS 1.23 04/18/2021    EOSABS 0.10 04/18/2021    BASOSABS 0.05 04/18/2021     BMP:    Lab Results   Component Value Date     04/18/2021    K 5.7 04/18/2021    CL 96 04/18/2021    CO2 32 04/18/2021 BUN 29 04/18/2021    LABALBU 3.6 04/18/2021    LABALBU 4.4 10/27/2011    CREATININE 1.7 04/18/2021    CALCIUM 9.1 04/18/2021    GFRAA 49 04/18/2021    LABGLOM 41 04/18/2021    GLUCOSE 411 04/18/2021    GLUCOSE 118 10/27/2011       ASSESSMENT:  1. Acutely decompensated systolic/diastolic congestive heart failure  2. Atrial fibrillation with rapid ventricular response  3. Significant coronary artery disease with ischemic cardiomyopathy  4. History of mitral valve repair  5. Insulin-dependent diabetes mellitus type 2  6. Essential hypertension  7. Hyperlipidemia  8. Obstructive sleep apnea with obesity hypoventilatory syndrome    PLAN:  I saw Arslan Villalobos on a regular basis during his wife's recent hospitalization. He has gained a significant amount of weight and appears significantly uncomfortable during my examination. He is acutely decompensated from a congestive heart failure standpoint and requires aggressive diuresis. Bumex infusion will be instituted and we will add nitrates and additional thiazide diuretics as necessary. Cardizem infusion was started in the emergency department and the patient became increasingly hypotensive. This has been discontinued and we will utilize oral beta-blocker therapy. We again stressed the absolute importance for fluid and salt restriction. He has maintained on anticoagulation therapy for his underlying atrial fibrillation. The cardiovascular team will provide consultation and the patient's echocardiogram will be updated. Again, secondary to the significant medical situation with his wife, I highly suspect noncompliance unintentionally. We discussed this at length and we will continue to monitor closely.     Nicole Ortega D.O., FACOI  1:25 PM  4/18/2021    Electronically signed by Nicole Ortega DO on 4/18/21 at 1:25 PM EDT

## 2021-04-19 PROBLEM — E78.5 HYPERLIPIDEMIA: Status: ACTIVE | Noted: 2018-06-10

## 2021-04-19 PROBLEM — I50.41 ACUTE COMBINED SYSTOLIC (CONGESTIVE) AND DIASTOLIC (CONGESTIVE) HEART FAILURE (HCC): Status: ACTIVE | Noted: 2021-04-18

## 2021-04-19 LAB
ALBUMIN SERPL-MCNC: 3.7 G/DL (ref 3.5–5.2)
ALP BLD-CCNC: 169 U/L (ref 40–129)
ALT SERPL-CCNC: 22 U/L (ref 0–40)
ANION GAP SERPL CALCULATED.3IONS-SCNC: 10 MMOL/L (ref 7–16)
AST SERPL-CCNC: 24 U/L (ref 0–39)
BASOPHILS ABSOLUTE: 0.04 E9/L (ref 0–0.2)
BASOPHILS RELATIVE PERCENT: 0.5 % (ref 0–2)
BILIRUB SERPL-MCNC: 1.2 MG/DL (ref 0–1.2)
BUN BLDV-MCNC: 27 MG/DL (ref 8–23)
CALCIUM SERPL-MCNC: 9.2 MG/DL (ref 8.6–10.2)
CHLORIDE BLD-SCNC: 93 MMOL/L (ref 98–107)
CO2: 35 MMOL/L (ref 22–29)
CREAT SERPL-MCNC: 1.5 MG/DL (ref 0.7–1.2)
EOSINOPHILS ABSOLUTE: 0.07 E9/L (ref 0.05–0.5)
EOSINOPHILS RELATIVE PERCENT: 0.9 % (ref 0–6)
GFR AFRICAN AMERICAN: 57
GFR NON-AFRICAN AMERICAN: 47 ML/MIN/1.73
GLUCOSE BLD-MCNC: 293 MG/DL (ref 74–99)
HCT VFR BLD CALC: 44.3 % (ref 37–54)
HEMOGLOBIN: 13.5 G/DL (ref 12.5–16.5)
IMMATURE GRANULOCYTES #: 0.06 E9/L
IMMATURE GRANULOCYTES %: 0.8 % (ref 0–5)
INR BLD: 3.4
LV EF: 55 %
LVEF MODALITY: NORMAL
LYMPHOCYTES ABSOLUTE: 1.04 E9/L (ref 1.5–4)
LYMPHOCYTES RELATIVE PERCENT: 14.1 % (ref 20–42)
MAGNESIUM: 2.3 MG/DL (ref 1.6–2.6)
MCH RBC QN AUTO: 31 PG (ref 26–35)
MCHC RBC AUTO-ENTMCNC: 30.5 % (ref 32–34.5)
MCV RBC AUTO: 101.6 FL (ref 80–99.9)
METER GLUCOSE: 324 MG/DL (ref 74–99)
METER GLUCOSE: 374 MG/DL (ref 74–99)
METER GLUCOSE: 386 MG/DL (ref 74–99)
MONOCYTES ABSOLUTE: 0.66 E9/L (ref 0.1–0.95)
MONOCYTES RELATIVE PERCENT: 8.9 % (ref 2–12)
NEUTROPHILS ABSOLUTE: 5.53 E9/L (ref 1.8–7.3)
NEUTROPHILS RELATIVE PERCENT: 74.8 % (ref 43–80)
PDW BLD-RTO: 18.5 FL (ref 11.5–15)
PHOSPHORUS: 4.6 MG/DL (ref 2.5–4.5)
PLATELET # BLD: 215 E9/L (ref 130–450)
PMV BLD AUTO: 10.2 FL (ref 7–12)
POTASSIUM SERPL-SCNC: 4.3 MMOL/L (ref 3.5–5)
PROCALCITONIN: 0.1 NG/ML (ref 0–0.08)
PROTHROMBIN TIME: 39.7 SEC (ref 9.3–12.4)
RBC # BLD: 4.36 E12/L (ref 3.8–5.8)
SODIUM BLD-SCNC: 138 MMOL/L (ref 132–146)
T4 FREE: 1.07 NG/DL (ref 0.93–1.7)
TOTAL PROTEIN: 7.2 G/DL (ref 6.4–8.3)
TROPONIN: 0.01 NG/ML (ref 0–0.03)
TSH SERPL DL<=0.05 MIU/L-ACNC: 3.84 UIU/ML (ref 0.27–4.2)
WBC # BLD: 7.4 E9/L (ref 4.5–11.5)

## 2021-04-19 PROCEDURE — 82962 GLUCOSE BLOOD TEST: CPT

## 2021-04-19 PROCEDURE — 83735 ASSAY OF MAGNESIUM: CPT

## 2021-04-19 PROCEDURE — 36415 COLL VENOUS BLD VENIPUNCTURE: CPT

## 2021-04-19 PROCEDURE — 2060000000 HC ICU INTERMEDIATE R&B

## 2021-04-19 PROCEDURE — 6360000002 HC RX W HCPCS: Performed by: INTERNAL MEDICINE

## 2021-04-19 PROCEDURE — 85610 PROTHROMBIN TIME: CPT

## 2021-04-19 PROCEDURE — 84443 ASSAY THYROID STIM HORMONE: CPT

## 2021-04-19 PROCEDURE — 6370000000 HC RX 637 (ALT 250 FOR IP): Performed by: INTERNAL MEDICINE

## 2021-04-19 PROCEDURE — 2700000000 HC OXYGEN THERAPY PER DAY

## 2021-04-19 PROCEDURE — 84145 PROCALCITONIN (PCT): CPT

## 2021-04-19 PROCEDURE — 2580000003 HC RX 258: Performed by: INTERNAL MEDICINE

## 2021-04-19 PROCEDURE — C8929 TTE W OR WO FOL WCON,DOPPLER: HCPCS

## 2021-04-19 PROCEDURE — 84100 ASSAY OF PHOSPHORUS: CPT

## 2021-04-19 PROCEDURE — 84484 ASSAY OF TROPONIN QUANT: CPT

## 2021-04-19 PROCEDURE — 99223 1ST HOSP IP/OBS HIGH 75: CPT | Performed by: INTERNAL MEDICINE

## 2021-04-19 PROCEDURE — 2500000003 HC RX 250 WO HCPCS: Performed by: INTERNAL MEDICINE

## 2021-04-19 PROCEDURE — 85025 COMPLETE CBC W/AUTO DIFF WBC: CPT

## 2021-04-19 PROCEDURE — APPSS180 APP SPLIT SHARED TIME > 60 MINUTES: Performed by: NURSE PRACTITIONER

## 2021-04-19 PROCEDURE — 6360000004 HC RX CONTRAST MEDICATION: Performed by: NURSE PRACTITIONER

## 2021-04-19 PROCEDURE — 80053 COMPREHEN METABOLIC PANEL: CPT

## 2021-04-19 PROCEDURE — 94640 AIRWAY INHALATION TREATMENT: CPT

## 2021-04-19 PROCEDURE — 84439 ASSAY OF FREE THYROXINE: CPT

## 2021-04-19 RX ORDER — INSULIN GLARGINE 100 [IU]/ML
12 INJECTION, SOLUTION SUBCUTANEOUS NIGHTLY
Status: DISCONTINUED | OUTPATIENT
Start: 2021-04-19 | End: 2021-04-19

## 2021-04-19 RX ORDER — DIGOXIN 0.25 MG/ML
INJECTION INTRAMUSCULAR; INTRAVENOUS
Status: DISPENSED
Start: 2021-04-19 | End: 2021-04-19

## 2021-04-19 RX ORDER — INSULIN GLARGINE 100 [IU]/ML
15 INJECTION, SOLUTION SUBCUTANEOUS NIGHTLY
Status: DISCONTINUED | OUTPATIENT
Start: 2021-04-19 | End: 2021-04-20

## 2021-04-19 RX ORDER — DIGOXIN 0.25 MG/ML
125 INJECTION INTRAMUSCULAR; INTRAVENOUS ONCE
Status: COMPLETED | OUTPATIENT
Start: 2021-04-19 | End: 2021-04-19

## 2021-04-19 RX ADMIN — PRAMIPEXOLE DIHYDROCHLORIDE 0.25 MG: 0.25 TABLET ORAL at 21:10

## 2021-04-19 RX ADMIN — BUMETANIDE 1 MG/HR: 0.25 INJECTION INTRAMUSCULAR; INTRAVENOUS at 04:01

## 2021-04-19 RX ADMIN — ATORVASTATIN CALCIUM 80 MG: 40 TABLET, FILM COATED ORAL at 21:09

## 2021-04-19 RX ADMIN — INSULIN LISPRO 10 UNITS: 100 INJECTION, SOLUTION INTRAVENOUS; SUBCUTANEOUS at 13:12

## 2021-04-19 RX ADMIN — PANTOPRAZOLE SODIUM 40 MG: 40 TABLET, DELAYED RELEASE ORAL at 10:25

## 2021-04-19 RX ADMIN — INSULIN LISPRO 8 UNITS: 100 INJECTION, SOLUTION INTRAVENOUS; SUBCUTANEOUS at 18:17

## 2021-04-19 RX ADMIN — GABAPENTIN 200 MG: 100 CAPSULE ORAL at 21:10

## 2021-04-19 RX ADMIN — BUDESONIDE 500 MCG: 0.5 INHALANT RESPIRATORY (INHALATION) at 17:09

## 2021-04-19 RX ADMIN — INSULIN LISPRO 5 UNITS: 100 INJECTION, SOLUTION INTRAVENOUS; SUBCUTANEOUS at 21:21

## 2021-04-19 RX ADMIN — DIGOXIN 125 MCG: 250 INJECTION, SOLUTION INTRAMUSCULAR; INTRAVENOUS; PARENTERAL at 06:53

## 2021-04-19 RX ADMIN — METOPROLOL SUCCINATE 25 MG: 25 TABLET, EXTENDED RELEASE ORAL at 21:10

## 2021-04-19 RX ADMIN — METOPROLOL SUCCINATE 25 MG: 25 TABLET, EXTENDED RELEASE ORAL at 10:26

## 2021-04-19 RX ADMIN — AMIODARONE HYDROCHLORIDE 1 MG/MIN: 50 INJECTION, SOLUTION INTRAVENOUS at 18:12

## 2021-04-19 RX ADMIN — INSULIN GLARGINE 15 UNITS: 100 INJECTION, SOLUTION SUBCUTANEOUS at 21:21

## 2021-04-19 RX ADMIN — ASPIRIN 81 MG CHEWABLE TABLET 81 MG: 81 TABLET CHEWABLE at 21:09

## 2021-04-19 RX ADMIN — GABAPENTIN 200 MG: 100 CAPSULE ORAL at 14:16

## 2021-04-19 RX ADMIN — PRAMIPEXOLE DIHYDROCHLORIDE 0.25 MG: 0.25 TABLET ORAL at 14:16

## 2021-04-19 RX ADMIN — BUDESONIDE 500 MCG: 0.5 INHALANT RESPIRATORY (INHALATION) at 06:22

## 2021-04-19 RX ADMIN — PRAMIPEXOLE DIHYDROCHLORIDE 0.25 MG: 0.25 TABLET ORAL at 10:25

## 2021-04-19 RX ADMIN — AMIODARONE HYDROCHLORIDE 150 MG: 50 INJECTION, SOLUTION INTRAVENOUS at 17:28

## 2021-04-19 RX ADMIN — ARFORMOTEROL TARTRATE 15 MCG: 15 SOLUTION RESPIRATORY (INHALATION) at 06:22

## 2021-04-19 RX ADMIN — SPIRONOLACTONE 25 MG: 25 TABLET ORAL at 10:25

## 2021-04-19 RX ADMIN — INSULIN LISPRO 6 UNITS: 100 INJECTION, SOLUTION INTRAVENOUS; SUBCUTANEOUS at 10:25

## 2021-04-19 RX ADMIN — ARFORMOTEROL TARTRATE 15 MCG: 15 SOLUTION RESPIRATORY (INHALATION) at 17:09

## 2021-04-19 RX ADMIN — GABAPENTIN 200 MG: 100 CAPSULE ORAL at 10:25

## 2021-04-19 RX ADMIN — PERFLUTREN 2.2 MG: 6.52 INJECTION, SUSPENSION INTRAVENOUS at 15:15

## 2021-04-19 ASSESSMENT — PAIN SCALES - GENERAL
PAINLEVEL_OUTOF10: 4
PAINLEVEL_OUTOF10: 5

## 2021-04-19 ASSESSMENT — PAIN DESCRIPTION - PROGRESSION: CLINICAL_PROGRESSION: NOT CHANGED

## 2021-04-19 ASSESSMENT — PAIN DESCRIPTION - PAIN TYPE: TYPE: ACUTE PAIN

## 2021-04-19 ASSESSMENT — PAIN DESCRIPTION - LOCATION: LOCATION: SCROTUM

## 2021-04-19 ASSESSMENT — PAIN DESCRIPTION - ONSET: ONSET: ON-GOING

## 2021-04-19 NOTE — PROGRESS NOTES
Pharmacy Consultation Note  (Warfarin Dosing and Monitoring)    Initial consult date: 4/18/21  Consulting physician: Dr. Sharita Paz    Allergies:  Pcn [penicillins] and Sulfa antibiotics    72 y.o. male    Ht Readings from Last 1 Encounters:   04/18/21 5' 8\" (1.727 m)     Wt Readings from Last 1 Encounters:   04/19/21 (!) 364 lb (165.1 kg)         Warfarin Indication Target   INR Range Home Dose  (if applicable) Diet/Feeding Tube   (Enteral feeds, nutritional drinks and increased Vitamin K in diet can decrease INR)   Atrial fibrillation 2-3 10 mg M,W,F,Sat  5 mg Tu,Th, Sun --       x Home Med? Meds Increasing INR x Home Med?  Meds Decreasing INR     Allopurinol    Azathioprine     Amiodarone/Propafenone/Dronedarone   Carbamazepine     Androgens   Cholestyramine     Chemotherapy (BBW: Capecitabine)   Estrogen     Ciprofloxacin/Levofloxacin   Nafcillin/Dicloxacillin     Clarithromycin/Erythromycin/Azithromycin   Barbiturates      Fluconazole/Itraconazole/Voriconazole/Ketoconazole   Phenytoin (Variable)     Metronidazole   Rifampin     Phenytoin (Variable)   Steroids (Variable/Dose Dependent)      Statins/Fenofibrate/Gemfibrozil   Sucralfate   X Y Steroids (Variable/Dose Dependent)   Other:     Sulfamethoxazole/Trimethoprim        Tramadol         Other:      Comments regarding medication interactions:      x Diseases Affecting INR x Increased Bleeding Risk   X CHF Exacerbation (Increases)  History GI Bleed/PUD    Liver Disease (Increases)  Chronic NSAID Use    Thyroid: Hyper (Increases)  Hypo (Decreases) X Chronic ASA/Antiplatelet Use (Clopidogrel/ Dipyridamole/Prasugrel/Ticagrelor)      Malignancy (Increases)  Abnormal Renal Function (dialysis, renal transplant, SCr ? 2.3 mg/dL)     History of EtOH Abuse: Acute (Increases)   Chronic (Decreases)  Liver Function (cirrhosis, bilirubin >2x ULN with AST/ALT/AP >3x ULN)    Fever (Increases)  Age > 65 years    Acute infection (Increases)  Hypertension/Uncontrolled BP Diarrhea/Dehydration (Increases)  History of stroke    Other: __________________  Other:___________________     Vitamin K or Blood product  Administration Date                    TSH:    Lab Results   Component Value Date    TSH 3.840 04/19/2021        Hepatic Function Panel:                            Lab Results   Component Value Date    ALKPHOS 169 04/19/2021    ALT 22 04/19/2021    AST 24 04/19/2021    PROT 7.2 04/19/2021    BILITOT 1.2 04/19/2021    LABALBU 3.7 04/19/2021    LABALBU 4.4 10/27/2011       Date Warfarin Dose INR Heparin or LMWH HBG/HCT PLT Comment   4/18 HOLD 3.5 -- 13.0/42.8 228    4/19 HOLD 3.4 -- 13.5/44.3 215                                 Assessment and Plan:  · Ana Whitfield is a 72 yom admitted with acute decompensated congestive heart failure and afib RVR. He is anticoagulated on warfarin for atrial fibrillation.   · INR = 3.4, continue to hold  · Daily PT/INR until the INR is stable within the therapeutic range  · Pharmacist will follow and monitor/adjust dosing as necessary    Thank you for this consult,      Robert Nettles, PharmD, BCPS 4/19/2021 11:45 AM   [phone]

## 2021-04-19 NOTE — PROGRESS NOTES
Internal Medicine Progress Note    [facility]    Jordon Wood. Gallo Ghosh., F.A.C.O.I. Alphonsa Olszewski, D.O., VENKATOLIZZY Plunkett D.O. Gilmer Fleischer, MSN, APRN, NP-C  Colleen Park. Franky Richards, MSN, APRN-CNP     Primary Care Physician: Rylee Sherman DO   Admitting Physician:  Mary Ellen Parada DO  Admission date and time: 4/18/2021  8:31 AM    Room:  [address] [place] [address]  Admitting diagnosis: Acute systolic CHF (congestive heart failure) [facility]) [I50.21]    Patient Name: Gillian Ray  MRN: [id number]    Date of Service: 4/19/2021     Subjective:  Nusrat Robertson is a 72 y.o. male who was seen and examined today,4/19/2021, at the bedside. Nusrat Robertson has diuresed nearly 6 L since hospitalization yesterday and is feeling better. He continues to complain of bilateral lower extremity edema with persistent scrotal swelling. His wife, Demi Scott was updated extensively via the telephone. Review of System:   Constitutional:   Denies fever or chills, weight loss or gain, fatigue or malaise. HEENT:   Denies ear pain, sore throat, sinus or eye problems. Cardiovascular:   Denies any chest pain, irregular heartbeats, or palpitations. Respiratory:   Ongoing but improving shortness of breath. Minimal coughing without sputum production. Gastrointestinal:   Denies nausea, vomiting, diarrhea, or constipation. Denies any abdominal pain. Genitourinary:    Voiding with use of a Morrow catheter. Extensive scrotal edema. Extremities:   Persistent, extensive bilateral lower extremity edema. Neurology:    Denies any headache or focal neurological deficits, Denies generalized weakness or memory difficulty. Psch:   Denies being anxious or depressed. Musculoskeletal:    Denies  myalgias, joint complaints or back pain. Integumentary:   Denies any rashes, ulcers, or excoriations. Denies bruising. Hematologic/Lymphatic:  Denies bruising or bleeding. Physical Exam:  I/O this shift:   In: 560 [P.O.:560]  Out: 700 [Urine:700]    Intake/Output Summary (Last 24 hours) at 4/19/2021 1128  Last data filed at 4/19/2021 1040  Gross per 24 hour   Intake 1220 ml   Output 6900 ml   Net -5680 ml   I/O last 3 completed shifts: In: 660 [P.O.:660]  Out: 6200 [Urine:6200]  Patient Vitals for the past 96 hrs (Last 3 readings):   Weight   04/19/21 0400 (!) 364 lb (165.1 kg)   04/18/21 1453 (!) 366 lb 12.8 oz (166.4 kg)   04/18/21 0843 (!) 340 lb (154.2 kg)     Vital Signs:   Blood pressure 131/75, pulse 136, temperature 97.9 °F (36.6 °C), temperature source Oral, resp. rate 17, height 5' 8\" (1.727 m), weight (!) 364 lb (165.1 kg), SpO2 93 %. General appearance:  Awake and alert. Better appearing today. Head:  Normocephalic. No masses, lesions or tenderness. Eyes:  PERRLA. EOMI. Sclera clear. Buccal mucosa moist.  ENT:  Ears normal. Mucosa normal.  Neck:    Supple. Trachea midline. No thyromegaly. No JVD. No bruits. Heart:    Rhythm regular. Rate controlled. No murmurs. Lungs:    Diminished at the bases posteriorly. .  Abdomen:   Extensive abdominal anasarca. Extremities:    Extensive bilateral lower extremity edema. Neurologic:    Alert x 3. No focal deficit. Cranial nerves grossly intact. No focal weakness. Psych:   Behavior is normal. Mood appears normal. Speech is not rapid and/or pressured. Musculoskeletal:   Spine ROM normal. Muscular strength intact. Gait not assessed. Integumentary:  No rashes  Skin normal color and texture. Genitalia/Breast:  Persistent scrotal swelling with Morrow catheter in place.     Medication:  Scheduled Meds:   digoxin        aspirin  81 mg Oral Nightly    atorvastatin  80 mg Oral Nightly    gabapentin  200 mg Oral TID    metoprolol succinate  25 mg Oral BID    pantoprazole  40 mg Oral Daily    spironolactone  25 mg Oral Daily    insulin lispro  0-12 Units Subcutaneous TID WC    insulin lispro  0-6 Units Subcutaneous Nightly    warfarin (COUMADIN) daily dosing (placeholder)   Other RX Placeholder    Arformoterol Tartrate  15 mcg Nebulization BID    And    budesonide  0.5 mg Nebulization BID    pramipexole  0.25 mg Oral TID     Continuous Infusions:   bumetanide 0.1 mg/mL infusion 1 mg/hr (04/19/21 0401)    dextrose         Objective Data:  CBC with Differential:    Lab Results   Component Value Date    WBC 7.4 04/19/2021    RBC 4.36 04/19/2021    HGB 13.5 04/19/2021    HCT 44.3 04/19/2021     04/19/2021    .6 04/19/2021    MCH 31.0 04/19/2021    MCHC 30.5 04/19/2021    RDW 18.5 04/19/2021    SEGSPCT 61 01/20/2014    LYMPHOPCT 14.1 04/19/2021    MONOPCT 8.9 04/19/2021    BASOPCT 0.5 04/19/2021    MONOSABS 0.66 04/19/2021    LYMPHSABS 1.04 04/19/2021    EOSABS 0.07 04/19/2021    BASOSABS 0.04 04/19/2021     BMP:    Lab Results   Component Value Date     04/19/2021    K 4.3 04/19/2021    K 5.7 04/18/2021    CL 93 04/19/2021    CO2 35 04/19/2021    BUN 27 04/19/2021    LABALBU 3.7 04/19/2021    LABALBU 4.4 10/27/2011    CREATININE 1.5 04/19/2021    CALCIUM 9.2 04/19/2021    GFRAA 57 04/19/2021    LABGLOM 47 04/19/2021    GLUCOSE 293 04/19/2021    GLUCOSE 118 10/27/2011       Assessment:  1. Acutely decompensated systolic/diastolic congestive heart failure  2. Atrial fibrillation with rapid ventricular response  3. Significant coronary artery disease with ischemic cardiomyopathy  4. History of mitral valve repair  5. Insulin-dependent diabetes mellitus type 2  6. Essential hypertension  7. Hyperlipidemia  8. Obstructive sleep apnea with obesity hypoventilatory syndrome    Plan:   The patient has diuresed nearly 6 L since hospitalization but remains markedly volume overloaded. He continues to deal with intermittent periods of atrial fibrillation rapid ventricular response. He is mildly hypotensive at this point. Further cardiac medications as per the cardiovascular team who has been asked to provide consultation. Repeat 2D echocardiogram is pending.

## 2021-04-19 NOTE — PLAN OF CARE
Problem: Pain:  Goal: Pain level will decrease  Description: Pain level will decrease  4/19/2021 0304 by Ora Ahumada  Outcome: Met This Shift  4/18/2021 1607 by Sheryle Mage, RN  Outcome: Ongoing  4/18/2021 1603 by Sheryle Mage, RN  Outcome: Ongoing  Goal: Control of acute pain  Description: Control of acute pain  4/19/2021 0304 by Ora Ahumada  Outcome: Met This Shift  4/18/2021 1607 by Sheryle Mage, RN  Outcome: Ongoing  4/18/2021 1603 by Sheryle Mage, RN  Outcome: Ongoing  Goal: Control of chronic pain  Description: Control of chronic pain  4/19/2021 0304 by Ora Ahumada  Outcome: Met This Shift  4/18/2021 1607 by Sheryle Mage, RN  Outcome: Ongoing  4/18/2021 1603 by Sheryle Mage, RN  Outcome: Ongoing     Problem: Skin Integrity:  Goal: Will show no infection signs and symptoms  Description: Will show no infection signs and symptoms  4/19/2021 0304 by Ora Ahumada  Outcome: Met This Shift  4/18/2021 1607 by Sheryle Mage, RN  Outcome: Ongoing  4/18/2021 1603 by Sheryle Mage, RN  Outcome: Ongoing  Goal: Absence of new skin breakdown  Description: Absence of new skin breakdown  4/19/2021 0304 by Ora Ahumada  Outcome: Met This Shift  4/18/2021 1607 by Sheryle Mage, RN  Outcome: Ongoing  4/18/2021 1603 by Sheryle Mage, RN  Outcome: Ongoing     Problem: Falls - Risk of:  Goal: Will remain free from falls  Description: Will remain free from falls  Outcome: Met This Shift  Goal: Absence of physical injury  Description: Absence of physical injury  Outcome: Met This Shift

## 2021-04-19 NOTE — PROGRESS NOTES
This RN attempted to call wife, Fabian Shelton, to get patients home cpap settings. Unable to leave voicemail.

## 2021-04-19 NOTE — CONSULTS
Inpatient Cardiology Consultation      Reason for Consult:  Acute CHF    Consulting Physician: Dr. Arabella Kirkland    Requesting Physician:  Trisha Goldstein    Date of Consultation: 4/19/2021    HISTORY OF PRESENT ILLNESS:   Mr. [facility] is a 72year old  male who follows with Dr. Arabella Kirkland. He was most recently seen in the office with Dr. Arabella Kirkland on 06/15/2020. His medical history includes CHF, DM, morbid obesity, HTN, HLD, COPD, GERD, LISS, PAF with chronic anticoagulation with Coumadin, Ischemic Cardiomyopathy/CAD s/p CABG, and VHD s/p MVr. Mr. [facility] presented to [facility] ED on 04/18/2021 with complaints of scrotal pain and edema with DE PAZ. He states that prior to presentation, he had been having scrotal edema with accompanying pain for 2 days. He states that over the past week he \"forgot\" to take his Demadex for \"3 days, maybe more\". He states that over the past week he has had increased DE PAZ, orthopnea, and PND. States that his breathing is significantly worse \"whenever I bend forward\". He states that he has had edema to BLE and to his abdomen this week. He denies change in his diet or fluid intake. Upon arrival to the ED his VS were -29-77% on RA-93/65. EKG A Fib with RVR. Covid testing was negative. WBC 7.5. H&H 13/42. 8. BUN/SCr 29/1.7. Troponin 0.02. ProBNP 3130. K 5.7. Lactic Acid 1.6. UA negative. CXR unremarkable. He received Lasix 40mg IV, 1gm Calcium gluconate, and was placed on a Cardizem gtt (for almost 6 hours) and was discontinued for borderline hypotension. In addition he received Digoxin 250mcg IV x 2 doses and one dose of Digoxin 125mcg IV. He was admitted to a telemetry monitored unit. An echocardiogram was ordered. Cardiology was consulted by Dr. Jerica Zaidi for management of acute CHF. Please note: past medical records were reviewed per electronic medical record (EMR) - see detailed reports under Past Medical/ Surgical History. Past Medical and Surgical History:    1.  Forty-five pack year smoker, abstinent since 07/22/2013. 2. Hypertension. 3. HLD  4. Type II diabetes mellitus. 5. Morbid Obesity.  BMI 50 - 06/2015. 6. LISS.  Uses nocturnal CPAP. 7. COPD/Asthma/Emphysema  8. GERD  9. S/p Sinus surgery and herniorrhaphy. 10. [facility] admission, 07/23/2013 with two to three days of intermittent exertional chest discomfort.   A prolonged episode occurred on the day of admission associated with inferior ST elevation and reciprocal lateral ST depression.  Peak CPK 1418, .6, troponin-I 54.75.   CBC and BMP normal.  11. Urgent cardiac catheterization, 07/23/2013.   LV mild inferior hypokinesis, EF 55%.  LMC eccentric focal 65% distal stenosis.  LAD and CX normal.  OM1 50% ostial and proximal stenosis.   RCA 25% mid stenosis, 99% focal distal stenosis.     12. PCI distal RCA, 07/23/2013.   3.5 x 12 mm Vision BMS, no residual stenosis.    [facility] and [facility] with elective CABG one week after discontinuation of these medications. 14. CABG and MVr, [facility], 09/09/2013 (Dr. Nereida Andrade).   LIMA-LAD, SVG-RI and 30 mm MV ring.    15. Anticoagulation with Coumadin started postop, 09/2013.    16. Cardiac OP assessment, 10/01/2013.   per minute.  Beta blocker started.  NSR noted on OV 11/27/2013.    17. Echo, 10/03/2013.  Mild LVE.  Mild global hypokinesis.  EF 45-50%.  Marked left atrial enlargement.  E/E' 35.  Mild to moderate AS (32/17 mmHg, 1.6 cm², VTR ratio 0.51).  Status post mitral valve ring repair.  Mild mitral regurgitation.  MVA 2.1 cm².  RVSP 40 mmHg.    18. Echocardiogram 06/20/2019 (Dr. Meka Gaspar): Technically suboptimal study in spite contrast administration. Left ventricle is grossly normal in size. Mild left ventricular concentric hypertrophy noted. Regional wall motion abnormality suggested with inferior hypokinesis. Mild-moderately reduced left ventricular systolic dysfunction suggested. Calculated EF 41%. The left atrium is dilated. Enlarged right atrium size. Moderate mitral annular calcification. Mild mitral regurgitation is present. The aortic valve appears moderately sclerotic. Mild tricuspid regurgitation. 19. Lexiscan MPS 07/12/2019: Electrocardiographically normal regadenoson infusion with a  clinically non-ischemic response  Myocardial perfusion imaging showed no reversible ischemia,   small apical fixed defect. Overall left ventricular systolic function was normal without regional wall motion abnormalities. EF 60%. Low risk general pharmacologic stress test  20. PAF  21. Chronic anticoagulation with Coumadin         Medications Prior to admit:  Prior to Admission medications    Medication Sig Start Date End Date Taking? Authorizing Provider   pramipexole (MIRAPEX) 0.25 MG tablet TAKE TWO TABLETS BY MOUTH THREE TIMES A DAY 4/7/21   Maryellen Gates, DO   atorvastatin (LIPITOR) 80 MG tablet TAKE ONE TABLET BY MOUTH EVERY NIGHT 4/1/21   Maryellen Kody, DO   metFORMIN (GLUCOPHAGE) 500 MG tablet Take I pill 2 times daily 4/1/21   Maryellen Gates, DO   montelukast (SINGULAIR) 10 MG tablet Take 1 tablet by mouth nightly 4/1/21   Maryellen Gates, DO   pantoprazole (PROTONIX) 40 MG tablet Take 1 tablet by mouth daily 4/1/21   Maryellen Gates, DO   warfarin (JANTOVEN) 5 MG tablet TAKE 2 TABLETS BY MOUTH DAILY ON MONDAY, WEDNESDAY, FRIDAY AND SATURDAY.  THEN TAKE ONE TABLET DAILY ON TUESDAY, THURSDAY AND WIL 3/31/21   Maryellen Gates, DO   gabapentin (NEURONTIN) 600 MG tablet TAKE ONE TABLET BY MOUTH THREE TIMES A DAY 3/31/21 3/31/22  Maryellen Gates, DO   torsemide (DEMADEX) 20 MG tablet TAKE ONE TABLET BY MOUTH TWO TIMES A DAY 3/30/21   Mahsa Simpson MD   losartan (COZAAR) 25 MG tablet TAKE ONE-HALF TABLET BY MOUTH ONE TIME DAILY 2/20/21   Maryellen Gates, DO   spironolactone (ALDACTONE) 25 MG tablet TAKE ONE TABLET BY MOUTH TWO TIMES A DAY 2/4/21   Mahsa Simpson MD   INSULIN SYRINGE 1CC/29G 29G X 1/2\" 1 ML MISC 1 each by Does not apply route 2 times daily 1/29/21   [facility], DO   Insulin Lispro (HUMALOG KWIKPEN SC) Inject into the skin    Historical Provider, MD   potassium chloride (KLOR-CON M) 20 MEQ extended release tablet Take 1 tablet by mouth daily 7/16/20   [facility], DO   metoprolol succinate (TOPROL XL) 50 MG extended release tablet 1 by mouth daily  Patient taking differently: Take 25 mg by mouth 2 times daily  6/15/20   Buster Rinne, MD   ipratropium (ATROVENT) 0.06 % nasal spray 2 sprays by Nasal route 3 times daily 4/2/20 4/2/21  [facility], DO   levocetirizine (XYZAL) 5 MG tablet Take 1 tablet by mouth nightly as needed (allergies) 4/2/20   [facility], DO   Lancets MISC 1 each by Does not apply route 2 times daily 3/11/20   [facility], DO   blood glucose monitor strips Test two times a day & as needed for symptoms of irregular blood glucose. 3/9/20   [facility], DO   terbinafine (ATHLETES FOOT) 1 % cream Apply topically 2 times daily.  10/21/19   Meenu Blake, DO   Insulin Syringe-Needle U-100 27G X 5/8\" 1 ML MISC Use as directed 7/1/19   [facility], DO   SYMBICORT 160-4.5 MCG/ACT AERO Inhale 2 puffs into the lungs 2 times daily 3/6/18   [facility], DO   CPAP Machine MISC 1 each by Does not apply route nightly as needed     Historical Provider, MD   Accu-Chek Multiclix Lancets MISC Use as directed 9/25/14   [facility], DO   aspirin 81 MG tablet Take 81 mg by mouth nightly     Historical Provider, MD       Current Medications:    Current Facility-Administered Medications: digoxin (LANOXIN) 0.25 MG/ML injection, , ,   aspirin chewable tablet 81 mg, 81 mg, Oral, Nightly  atorvastatin (LIPITOR) tablet 80 mg, 80 mg, Oral, Nightly  gabapentin (NEURONTIN) capsule 200 mg, 200 mg, Oral, TID  metoprolol succinate (TOPROL XL) extended release tablet 25 mg, 25 mg, Oral, BID  pantoprazole (PROTONIX) tablet 40 mg, 40 mg, Oral, Daily  spironolactone (ALDACTONE) tablet 25 mg, 25 mg, Oral, Daily  bumetanide (BUMEX) 12.5 mg in sodium chloride 0.9 % 125 mL infusion, 1 mg/hr, Intravenous, Continuous  albuterol (PROVENTIL) nebulizer solution 2.5 mg, 2.5 mg, Nebulization, Q6H PRN  ondansetron (ZOFRAN) injection 4 mg, 4 mg, Intravenous, Q6H PRN  glucose (GLUTOSE) 40 % oral gel 15 g, 15 g, Oral, PRN  dextrose 50 % IV solution, 12.5 g, Intravenous, PRN  glucagon (rDNA) injection 1 mg, 1 mg, Intramuscular, PRN  dextrose 5 % solution, 100 mL/hr, Intravenous, PRN  insulin lispro (HUMALOG) injection vial 0-12 Units, 0-12 Units, Subcutaneous, TID WC  insulin lispro (HUMALOG) injection vial 0-6 Units, 0-6 Units, Subcutaneous, Nightly  warfarin (COUMADIN) daily dosing (placeholder), , Other, RX Placeholder  Arformoterol Tartrate (BROVANA) nebulizer solution 15 mcg, 15 mcg, Nebulization, BID **AND** budesonide (PULMICORT) nebulizer suspension 500 mcg, 0.5 mg, Nebulization, BID  pramipexole (MIRAPEX) tablet 0.25 mg, 0.25 mg, Oral, TID    Allergies:  Pcn [penicillins] and Sulfa antibiotics    Social History:   Quit smoking in 2013. Prior to that smoked 1ppd for 30 years. Denies alcohol and illicit drug use. Caffeine intake include coffee daily. Family History:   Father with an MI at an unknown age. REVIEW OF SYSTEMS:     · Constitutional: Denies fevers, chills, night sweats, and fatigue  · HEENT: Denies headaches, nose bleeds, and blurred vision,oral pain, abscess or lesion. · Musculoskeletal: Denies falls. Complains of pain to BLE with ambulation and edema to BLE. · Neurological: Denies dizziness and lightheadedness, complains of numbness and tingling  · Cardiovascular: Denies chest pain, palpitations, and feelings of heart racing. · Respiratory:Complains of DE PAZ, orthopnea and PND  · Gastrointestinal: Denies heartburn, nausea/vomiting, diarrhea and constipation, black/bloody, and tarry stools. · Genitourinary: Denies dysuria and hematuria. Complains of scrotal edema with pain.    · Hematologic: Denies excessive bruising or bleeding  · Lymphatic: Denies lumps and bumps to neck, axilla, breast, and groin  · Endocrine: Denies excessive thirst. Denies intolerance to hot and cold   · Psychiatric: Denies anxiety and depression. PHYSICAL EXAM:   BP (!) 97/55   Pulse 124   Temp 97.9 °F (36.6 °C) (Oral)   Resp 17   Ht 5' 8\" (1.727 m)   Wt (!) 364 lb (165.1 kg)   SpO2 93%   BMI 55.35 kg/m²   CONST:  Well developed, morbidly obese middle aged male who appears stated age. Awake, alert, cooperative, no apparent distress  HEENT:   Head- Normocephalic, atraumatic   Eyes- Conjunctivae pink, anicteric  Throat- Oral mucosa pink and moist  Neck-  No stridor, trachea midline, no jugular venous distention. No adenopathy. Short, thick neck. CHEST: Chest symmetrical and non-tender to palpation. No accessory muscle use or intercostal retractions  RESPIRATORY: Lung sounds - diminished with crackles throughout fields   CARDIOVASCULAR:     No carotid bruit  Heart Inspection- shows no noted pulsations  Heart Palpation- no heaves or thrills; PMI is non-displaced   Heart Ausculation- Irregular rate and rhythm, no murmur. No s3, or rub   PV: 2-3+ pitting bilateral lower extremity edema with extension to bilateral thighs. No varicosities. Pedal pulses palpable, no clubbing or cyanosis   ABDOMEN: Soft, morbidly obese, non-tender to light palpation. Bowel sounds present. No palpable masses no organomegaly; no abdominal bruit  MS: Good muscle strength and tone. No atrophy or abnormal movements. :Morrow catheter with clear yellow urine   SKIN: Warm and dry no statis dermatitis or ulcers   NEURO / PSYCH: Oriented to person, place and time. Speech clear and  Inappropriate at times. Follows all commands.  Pleasant affect     DATA:    ECG: A Fib with RVR  Tele strips:  Currently A Fib with -120  Diagnostic:      Intake/Output Summary (Last 24 hours) at 4/19/2021 0818  Last data filed at 4/19/2021 0730  Gross per 24 hour Intake 740 ml   Output 6200 ml   Net -5460 ml       Labs:   CBC:   Recent Labs     04/18/21  0903 04/19/21  0322   WBC 7.5 7.4   HGB 13.0 13.5   HCT 42.8 44.3    215     BMP:   Recent Labs     04/18/21  0903 04/19/21  0322    138   K 5.7* 4.3   CO2 32* 35*   BUN 29* 27*   CREATININE 1.7* 1.5*   LABGLOM 41 47   CALCIUM 9.1 9.2     Mag:   Recent Labs     04/19/21  0322   MG 2.3     Phos:   Recent Labs     04/19/21  0322   PHOS 4.6*     TSH:   Recent Labs     04/19/21  0322   TSH 3.840     HgA1c:   Lab Results   Component Value Date    LABA1C 13.1 (H) 04/18/2021   PT/INR:   Recent Labs     04/18/21  1652 04/19/21  0322   PROTIME 43.1* 39.7*   INR 3.6 3.4     APTT:  Recent Labs     04/18/21  1455   APTT 36.0*     CARDIAC ENZYMES:  Recent Labs     04/18/21  1652 04/18/21  2224 04/19/21  0322   TROPONINI 0.02 0.01 0.01     FASTING LIPID PANEL:  Lab Results   Component Value Date    CHOL 97 04/15/2021    HDL 29 04/15/2021    LDLCALC 46 04/15/2021    TRIG 109 04/15/2021     LIVER PROFILE:  Recent Labs     04/18/21  0903 04/19/21  0322   AST 27 24   ALT 23 22   LABALBU 3.6 3.7     04/18/2021 CXR:   No acute findings in the chest    IMPRESSION and PLAN to follow per Dr. Irais Higginbotham    Electronically signed by ARLEEN Tamayo CNP on 4/19/2021 at 8:18 AM     [id number] [facility] Cardiology Consult       Lamar Ross    I have personally participated in a face-to-face and personally obtained history and performed physical exam on the date of service. I reviewed chart, vitals, labs and radiologic studies. I also participated in medical decision making with ARLEEN Tamayo CNP on the date of service All of the assessments and recommendations are from me and I agree with all of the pertinent clinical information, assessment and treatment plan. I have reviewed and edited the note above based on my findings during my history, exam, and decision making.  Please see my additional contributions to the history, physical exam, assessment, and recommendations below. HISTORY OF PRESENT ILLNESS:     Reviewed, as above. Past medical history:  Reviewed, as above. Past surgical history:  Reviewed, as above. Current medications:  Reviewed, as above    Allergies:  Reviewed, as above    Social history:  Reviewed, as above    Family medical history:  Reviewed, as above. REVIEW OF SYSTEMS:   Reviewed, as above. PHYSICAL EXAM:   CONSTITUTIONAL:  awake, alert, cooperative, no apparent distress, and appears stated age  EYES:  lids and lashes normal and pupils equal, round and reactive to light, anicteric sclerae  HEAD:  normocepalic, without obvious abnormality, atraumatic, pink, moist mucous membranes. NECK:  Supple, symmetrical, trachea midline, no adenopathy, thyroid symmetric, not enlarged and no tenderness, skin normal  HEMATOLOGIC/LYMPHATICS:  no cervical lymphadenopathy and no supraclavicular lymphadenopathy  LUNGS:  No increased work of breathing, good air exchange, basilar rales  CARDIOVASCULAR:  Normal apical impulse, irregularly irregular, normal S1 and S2, no S3 or S4, and no murmur noted and no JVD, no carotid bruit, + + + pedal edema, good carotid upstroke bilaterally. ABDOMEN:  Soft, nontender, no masses, no hepatomegaly or splenomegaly, BS+  CHEST: nontender to palpation, expands symmetrically  MUSCULOSKELETAL:  No clubbing no cyanosis. there is no redness, warmth, or swelling of the joints  full range of motion noted  NEUROLOGIC:  Alert, awake,oriented x3. SKIN:  no bruising or bleeding, normal skin color, texture, turgor and no redness, warmth, or swelling    /79   Pulse 93   Temp 98.9 °F (37.2 °C) (Oral)   Resp 19   Ht 5' 8\" (1.727 m)   Wt (!) 364 lb (165.1 kg)   SpO2 95%   BMI 55.35 kg/m²       I/O last 3 completed shifts:   In: 8203 [P.O.:1540]  Out: 9600 [Urine:9600]  I/O this shift:  In: 360 [P.O.:360]  Out: -       DATA:   I personally reviewed the admission EKG with the following interpretation: Atrial fibrillation, rapid ventricular response, poor R wave progression    ECHO: 6/20/2019,Summary   Technically suboptimal study in spite contrast administration. Left ventricle is grossly normal in size . Mild left ventricular concentric hypertrophy noted. Regional wall motion abnormality suggested with inferior hypokinesis. Mild-moderately reduced left ventricular systolic dysfunction suggested. The left atrium is dilated. Enlarged right atrium size. Moderate mitral annular calcification. Mild mitral regurgitation is present. The aortic valve appears moderately sclerotic. Mild tricuspid regurgitation.        Stress Test: Not performed to date  Angiography: Not performed to date  Cardiology Labs:   BMP:    Lab Results   Component Value Date     04/19/2021    K 4.3 04/19/2021    K 5.7 04/18/2021    CL 93 04/19/2021    CO2 35 04/19/2021    BUN 27 04/19/2021     CMP:    Lab Results   Component Value Date     04/19/2021    K 4.3 04/19/2021    K 5.7 04/18/2021    CL 93 04/19/2021    CO2 35 04/19/2021    BUN 27 04/19/2021    PROT 7.2 04/19/2021     CBC:    Lab Results   Component Value Date    WBC 7.4 04/19/2021    RBC 4.36 04/19/2021    HGB 13.5 04/19/2021    HCT 44.3 04/19/2021    .6 04/19/2021    RDW 18.5 04/19/2021     04/19/2021     PT/INR:  No results found for: PTINR  PT/INR Warfarin:  No components found for: PTPATWAR, PTINRWAR  PTT:    Lab Results   Component Value Date    APTT 36.0 04/18/2021     PTT Heparin:  No components found for: APTTHEP  Magnesium:    Lab Results   Component Value Date    MG 2.3 04/19/2021     TSH:    Lab Results   Component Value Date    TSH 3.840 04/19/2021     TROPONIN:  No components found for: TROP  BNP:  No results found for: BNP  FASTING LIPID PANEL:    Lab Results   Component Value Date    CHOL 97 04/15/2021    HDL 29 04/15/2021    TRIG 109 04/15/2021     XR CHEST PORTABLE   Final Result   No acute findings in the chest             I have personally reviewed the laboratory, cardiac diagnostic and radiographic testing as outlined above:    IMPRESSION:  1. Acute exacerbation of combined congestive heart failure: Decompensated, better with IV diuresis, negative more than 8 L in less than 24 hours, will adjust Bumex dose  2. Atrial fibrillation: Persistent, with RVR will adjust medications, continue current anticoagulation   3. CAD: S/p CABG with LIMA to LAD, SVG to ramus intermedius artery  4. Status post mitral valve repair using #30-mm Future complete ring with magic stitch   between A3 and P3, with mild mitral valve regurgitation  5. Ischemic cardiomyopathy:  6. Tricuspid valve regurgitation: Mild  7. Hypertension: Controlled  8. Type 2 diabetes mellitus  9. Hyperlipidemia: On statin  10. Chronic anticoagulation  11. Stage III chronic kidney disease    RECOMMENDATIONS:   1. will give IV amiodarone bolus then will start IV amiodarone drip  2. Will decrease Bumex drip to 0.2 mg/h  3. will continue the rest of medications  4. Echocardiogram  5. Ischemic evaluation prior to discharge  6. Intake and output  7. Basic metabolic panel and CBC in a.m.  8. Further cardiac recommendations will be forthcoming pending his clinical course and diagnostic test findings    I have reviewed my findings and recommendations with patient    Thank you for the consult  Electronically signed by Lorena Velarde MD on 4/19/2021 at 8:39 PM  NOTE: This report was transcribed using voice recognition software.  Every effort was made to ensure accuracy; however, inadvertent computerized transcription errors may be present

## 2021-04-19 NOTE — PLAN OF CARE
Problem: Skin Integrity:  Goal: Will show no infection signs and symptoms  Description: Will show no infection signs and symptoms  4/19/2021 1640 by Сергей Sharma RN  Outcome: Met This Shift     Problem: Skin Integrity:  Goal: Absence of new skin breakdown  Description: Absence of new skin breakdown  4/19/2021 1640 by Сергей Sharma RN  Outcome: Met This Shift     Problem: Falls - Risk of:  Goal: Will remain free from falls  Description: Will remain free from falls  4/19/2021 1640 by Сергей Sharma RN  Outcome: Met This Shift

## 2021-04-19 NOTE — CARE COORDINATION
SS NOTE: SW attempted to interview pt today, however he could not stay awake. SW made contact with pt's wife today. She relates that they were residing together in a 2 story home with ramped entry. Pt was I pta with adls iadls and driivng. Pt has a CPAP and home O2 from [address]. Pt has no hx of hhc, snf or rehab. [facility] wife. Pt's was was recently dched from this hospital and is on home O2, [place] and is a HD pt at [address] Pt's PCP is Dr Jesse Renteria and his pharmacy is [facility]. Will awaitn PT/OT recommendations for final dch plan. Daniella Landa. 4/19/2021.4:37 PM.

## 2021-04-20 LAB
ANION GAP SERPL CALCULATED.3IONS-SCNC: 8 MMOL/L (ref 7–16)
BUN BLDV-MCNC: 19 MG/DL (ref 8–23)
CALCIUM SERPL-MCNC: 8.8 MG/DL (ref 8.6–10.2)
CHLORIDE BLD-SCNC: 91 MMOL/L (ref 98–107)
CO2: 37 MMOL/L (ref 22–29)
CREAT SERPL-MCNC: 1.4 MG/DL (ref 0.7–1.2)
GFR AFRICAN AMERICAN: >60
GFR NON-AFRICAN AMERICAN: 51 ML/MIN/1.73
GLUCOSE BLD-MCNC: 268 MG/DL (ref 74–99)
INR BLD: 2.4
MAGNESIUM: 2.1 MG/DL (ref 1.6–2.6)
METER GLUCOSE: 258 MG/DL (ref 74–99)
METER GLUCOSE: 318 MG/DL (ref 74–99)
METER GLUCOSE: 338 MG/DL (ref 74–99)
PHOSPHORUS: 2.9 MG/DL (ref 2.5–4.5)
POTASSIUM SERPL-SCNC: 3.7 MMOL/L (ref 3.5–5)
PROTHROMBIN TIME: 28 SEC (ref 9.3–12.4)
SODIUM BLD-SCNC: 136 MMOL/L (ref 132–146)

## 2021-04-20 PROCEDURE — 36415 COLL VENOUS BLD VENIPUNCTURE: CPT

## 2021-04-20 PROCEDURE — 2580000003 HC RX 258: Performed by: INTERNAL MEDICINE

## 2021-04-20 PROCEDURE — 85610 PROTHROMBIN TIME: CPT

## 2021-04-20 PROCEDURE — 99233 SBSQ HOSP IP/OBS HIGH 50: CPT | Performed by: INTERNAL MEDICINE

## 2021-04-20 PROCEDURE — 83735 ASSAY OF MAGNESIUM: CPT

## 2021-04-20 PROCEDURE — 6360000002 HC RX W HCPCS: Performed by: INTERNAL MEDICINE

## 2021-04-20 PROCEDURE — 6370000000 HC RX 637 (ALT 250 FOR IP): Performed by: INTERNAL MEDICINE

## 2021-04-20 PROCEDURE — 2580000003 HC RX 258: Performed by: NURSE PRACTITIONER

## 2021-04-20 PROCEDURE — 97116 GAIT TRAINING THERAPY: CPT | Performed by: PHYSICAL THERAPIST

## 2021-04-20 PROCEDURE — 80048 BASIC METABOLIC PNL TOTAL CA: CPT

## 2021-04-20 PROCEDURE — 2060000000 HC ICU INTERMEDIATE R&B

## 2021-04-20 PROCEDURE — 97161 PT EVAL LOW COMPLEX 20 MIN: CPT | Performed by: PHYSICAL THERAPIST

## 2021-04-20 PROCEDURE — 97530 THERAPEUTIC ACTIVITIES: CPT

## 2021-04-20 PROCEDURE — 94640 AIRWAY INHALATION TREATMENT: CPT

## 2021-04-20 PROCEDURE — 82962 GLUCOSE BLOOD TEST: CPT

## 2021-04-20 PROCEDURE — 94660 CPAP INITIATION&MGMT: CPT

## 2021-04-20 PROCEDURE — 84100 ASSAY OF PHOSPHORUS: CPT

## 2021-04-20 PROCEDURE — 2700000000 HC OXYGEN THERAPY PER DAY

## 2021-04-20 PROCEDURE — 6370000000 HC RX 637 (ALT 250 FOR IP): Performed by: NURSE PRACTITIONER

## 2021-04-20 PROCEDURE — 97165 OT EVAL LOW COMPLEX 30 MIN: CPT

## 2021-04-20 PROCEDURE — 2500000003 HC RX 250 WO HCPCS: Performed by: NURSE PRACTITIONER

## 2021-04-20 RX ORDER — AMIODARONE HYDROCHLORIDE 200 MG/1
400 TABLET ORAL 2 TIMES DAILY
Status: COMPLETED | OUTPATIENT
Start: 2021-04-20 | End: 2021-04-25

## 2021-04-20 RX ORDER — WARFARIN SODIUM 5 MG/1
5 TABLET ORAL
Status: COMPLETED | OUTPATIENT
Start: 2021-04-20 | End: 2021-04-20

## 2021-04-20 RX ORDER — INSULIN GLARGINE 100 [IU]/ML
20 INJECTION, SOLUTION SUBCUTANEOUS 2 TIMES DAILY
Status: DISCONTINUED | OUTPATIENT
Start: 2021-04-20 | End: 2021-04-22

## 2021-04-20 RX ADMIN — INSULIN LISPRO 8 UNITS: 100 INJECTION, SOLUTION INTRAVENOUS; SUBCUTANEOUS at 14:19

## 2021-04-20 RX ADMIN — INSULIN LISPRO 6 UNITS: 100 INJECTION, SOLUTION INTRAVENOUS; SUBCUTANEOUS at 19:47

## 2021-04-20 RX ADMIN — PRAMIPEXOLE DIHYDROCHLORIDE 0.25 MG: 0.25 TABLET ORAL at 10:30

## 2021-04-20 RX ADMIN — ARFORMOTEROL TARTRATE 15 MCG: 15 SOLUTION RESPIRATORY (INHALATION) at 18:51

## 2021-04-20 RX ADMIN — PANTOPRAZOLE SODIUM 40 MG: 40 TABLET, DELAYED RELEASE ORAL at 10:30

## 2021-04-20 RX ADMIN — INSULIN LISPRO 4 UNITS: 100 INJECTION, SOLUTION INTRAVENOUS; SUBCUTANEOUS at 10:32

## 2021-04-20 RX ADMIN — METOPROLOL SUCCINATE 25 MG: 25 TABLET, EXTENDED RELEASE ORAL at 21:26

## 2021-04-20 RX ADMIN — ASPIRIN 81 MG CHEWABLE TABLET 81 MG: 81 TABLET CHEWABLE at 21:28

## 2021-04-20 RX ADMIN — ARFORMOTEROL TARTRATE 15 MCG: 15 SOLUTION RESPIRATORY (INHALATION) at 06:37

## 2021-04-20 RX ADMIN — BUDESONIDE 500 MCG: 0.5 INHALANT RESPIRATORY (INHALATION) at 06:37

## 2021-04-20 RX ADMIN — PRAMIPEXOLE DIHYDROCHLORIDE 0.25 MG: 0.25 TABLET ORAL at 21:28

## 2021-04-20 RX ADMIN — AMIODARONE HYDROCHLORIDE 0.5 MG/MIN: 50 INJECTION, SOLUTION INTRAVENOUS at 04:42

## 2021-04-20 RX ADMIN — GABAPENTIN 200 MG: 100 CAPSULE ORAL at 21:28

## 2021-04-20 RX ADMIN — INSULIN GLARGINE 20 UNITS: 100 INJECTION, SOLUTION SUBCUTANEOUS at 10:33

## 2021-04-20 RX ADMIN — INSULIN LISPRO 6 UNITS: 100 INJECTION, SOLUTION INTRAVENOUS; SUBCUTANEOUS at 14:20

## 2021-04-20 RX ADMIN — SPIRONOLACTONE 25 MG: 25 TABLET ORAL at 10:29

## 2021-04-20 RX ADMIN — WARFARIN SODIUM 5 MG: 5 TABLET ORAL at 19:46

## 2021-04-20 RX ADMIN — INSULIN LISPRO 6 UNITS: 100 INJECTION, SOLUTION INTRAVENOUS; SUBCUTANEOUS at 19:46

## 2021-04-20 RX ADMIN — GABAPENTIN 200 MG: 100 CAPSULE ORAL at 10:29

## 2021-04-20 RX ADMIN — AMIODARONE HYDROCHLORIDE 400 MG: 200 TABLET ORAL at 21:28

## 2021-04-20 RX ADMIN — INSULIN GLARGINE 20 UNITS: 100 INJECTION, SOLUTION SUBCUTANEOUS at 21:36

## 2021-04-20 RX ADMIN — ATORVASTATIN CALCIUM 80 MG: 40 TABLET, FILM COATED ORAL at 21:28

## 2021-04-20 RX ADMIN — AMIODARONE HYDROCHLORIDE 0.5 MG/MIN: 50 INJECTION, SOLUTION INTRAVENOUS at 00:15

## 2021-04-20 RX ADMIN — GABAPENTIN 200 MG: 100 CAPSULE ORAL at 14:23

## 2021-04-20 RX ADMIN — INSULIN LISPRO 4 UNITS: 100 INJECTION, SOLUTION INTRAVENOUS; SUBCUTANEOUS at 21:36

## 2021-04-20 RX ADMIN — METOPROLOL SUCCINATE 25 MG: 25 TABLET, EXTENDED RELEASE ORAL at 10:29

## 2021-04-20 RX ADMIN — PRAMIPEXOLE DIHYDROCHLORIDE 0.25 MG: 0.25 TABLET ORAL at 14:23

## 2021-04-20 RX ADMIN — BUDESONIDE 500 MCG: 0.5 INHALANT RESPIRATORY (INHALATION) at 18:51

## 2021-04-20 RX ADMIN — BUMETANIDE 0.5 MG/HR: 0.25 INJECTION INTRAMUSCULAR; INTRAVENOUS at 17:12

## 2021-04-20 ASSESSMENT — PAIN SCALES - GENERAL
PAINLEVEL_OUTOF10: 0
PAINLEVEL_OUTOF10: 0

## 2021-04-20 NOTE — PROGRESS NOTES
Internal Medicine Progress Note    [facility]    Gina Borges. Angeles Go, VENKATOBruceI. Lakhwinder Belle D.O., SENTHIL Camara D.O. Eloy Ch, MSN, APRN, NP-C  Loki Dunn. Fer Daugherty, MSN, APRN-CNP     Primary Care Physician: Yumiko Crooks DO   Admitting Physician:  Lore Irvin DO  Admission date and time: 4/18/2021  8:31 AM    Room:  [address] [place] [address]  Admitting diagnosis: Acute systolic CHF (congestive heart failure) [facility]) [I50.21]    Patient Name: Soco Cade  MRN: [id number]    Date of Service: 4/20/2021     Subjective:  Gaurang Ny is a 72 y.o. male who was seen and examined today,4/20/2021, at the bedside. Gaurang Ny has diuresed nearly 9 L since hospitalization yesterday and is feeling better. He continues to complain of bilateral lower extremity edema with persistent scrotal swelling but admits that these are improved. We discussed maintaining continuous Bumex infusion at present. Echocardiogram is pending. Amiodarone infusion was started due to poorly controlled heart rate by cardiology. He is free of chest pain. He is not dyspneic at rest.    Review of System:   Constitutional:   Denies fever or chills, weight loss or gain, fatigue or malaise. HEENT:   Denies ear pain, sore throat, sinus or eye problems. Cardiovascular:   Denies any chest pain, irregular heartbeats, or palpitations. Respiratory:   Ongoing but improving shortness of breath with exertion, no resting dyspnea. Minimal coughing without sputum production. Gastrointestinal:   Denies nausea, vomiting, diarrhea, or constipation. Denies any abdominal pain. Genitourinary:    Voiding with use of a Morrow catheter. Extensive scrotal edema, improved. Extremities:   Persistent, extensive bilateral lower extremity edema, improved. Neurology:    Denies any headache or focal neurological deficits, Denies generalized weakness or memory difficulty.    Psch:   Denies being anxious or depressed. Musculoskeletal:    Denies  myalgias, joint complaints or back pain. Integumentary:   Denies any rashes, ulcers, or excoriations. Denies bruising. Hematologic/Lymphatic:  Denies bruising or bleeding. Physical Exam:  No intake/output data recorded. Intake/Output Summary (Last 24 hours) at 4/20/2021 0965  Last data filed at 4/20/2021 0616  Gross per 24 hour   Intake 1600 ml   Output 5050 ml   Net -3450 ml   I/O last 3 completed shifts: In: 1680 [P.O.:1680]  Out: [facility] [Urine:5050]  Patient Vitals for the past 96 hrs (Last 3 readings):   Weight   04/20/21 0524 (!) 361 lb (163.7 kg)   04/19/21 0400 (!) 364 lb (165.1 kg)   04/18/21 1453 (!) 366 lb 12.8 oz (166.4 kg)     Vital Signs:   Blood pressure 113/71, pulse 98, temperature 98.3 °F (36.8 °C), temperature source Oral, resp. rate 18, height 5' 8\" (1.727 m), weight (!) 361 lb (163.7 kg), SpO2 96 %. General appearance:  Awake and alert. Comfortable without distress  Head:  Normocephalic. No masses, lesions or tenderness. Eyes:  PERRLA. EOMI. Sclera clear. Buccal mucosa moist.  ENT:  Ears normal. Mucosa normal.  Neck:    Supple. Trachea midline. No thyromegaly. No JVD. No bruits. Heart:    Somewhat irregular rhythm with variable rate. S1 and S2, systolic murmur  Lungs:    Symmetrical, overall improved aeration with diminished bilateral bases. Clear to auscultation without wheezing, rales or rhonchi. Abdomen:   Extensive abdominal anasarca which is improved from yesterday examination. Extremities:    Extensive bilateral lower extremity edema which is improved from yesterday's examination. Neurologic:    Alert x 3. No focal deficit. Cranial nerves grossly intact. Generally weak without focal weakness. Psych:   Behavior is normal. Mood appears normal. Speech is not rapid and/or pressured. Musculoskeletal:   Spine ROM normal. Muscular strength intact. Gait not assessed.   Integumentary:  No rashes  Skin normal color and texture. Genitalia/Breast:  Persistent scrotal swelling with Morrow catheter in place. Medication:  Scheduled Meds:   insulin glargine  20 Units Subcutaneous BID    aspirin  81 mg Oral Nightly    atorvastatin  80 mg Oral Nightly    gabapentin  200 mg Oral TID    metoprolol succinate  25 mg Oral BID    pantoprazole  40 mg Oral Daily    spironolactone  25 mg Oral Daily    insulin lispro  0-12 Units Subcutaneous TID WC    insulin lispro  0-6 Units Subcutaneous Nightly    warfarin (COUMADIN) daily dosing (placeholder)   Other RX Placeholder    Arformoterol Tartrate  15 mcg Nebulization BID    And    budesonide  0.5 mg Nebulization BID    pramipexole  0.25 mg Oral TID     Continuous Infusions:   amiodarone 450mg/250ml D5W infusion 0.5 mg/min (04/20/21 0442)    bumetanide 0.1 mg/mL infusion 0.2 mg/hr (04/19/21 1610)    dextrose         Objective Data:  CBC with Differential:    Lab Results   Component Value Date    WBC 7.4 04/19/2021    RBC 4.36 04/19/2021    HGB 13.5 04/19/2021    HCT 44.3 04/19/2021     04/19/2021    .6 04/19/2021    MCH 31.0 04/19/2021    MCHC 30.5 04/19/2021    RDW 18.5 04/19/2021    SEGSPCT 61 01/20/2014    LYMPHOPCT 14.1 04/19/2021    MONOPCT 8.9 04/19/2021    BASOPCT 0.5 04/19/2021    MONOSABS 0.66 04/19/2021    LYMPHSABS 1.04 04/19/2021    EOSABS 0.07 04/19/2021    BASOSABS 0.04 04/19/2021     BMP:    Lab Results   Component Value Date     04/20/2021    K 3.7 04/20/2021    K 5.7 04/18/2021    CL 91 04/20/2021    CO2 37 04/20/2021    BUN 19 04/20/2021    LABALBU 3.7 04/19/2021    LABALBU 4.4 10/27/2011    CREATININE 1.4 04/20/2021    CALCIUM 8.8 04/20/2021    GFRAA >60 04/20/2021    LABGLOM 51 04/20/2021    GLUCOSE 268 04/20/2021    GLUCOSE 118 10/27/2011       Assessment:  1. Acutely decompensated systolic/diastolic congestive heart failure  2. Atrial fibrillation with rapid ventricular response  3.  Significant coronary artery disease with ischemic chance of rapid decline or death. Continued cardiac monitoring and higher level of nursing are required. I am readily available for any further decision-making and intervention.      Bert Alvarado, ARLEEN - CNP  4/20/2021  9:40 AM

## 2021-04-20 NOTE — CONSULTS
I met with patient at bedside regarding new consult for HF RN. Patient previously followed at [facility] CHF clinic however he stopped coming when clinic closed due to covid and has been hesitant to FU since. Pt is requesting time to think about reestablishing and stated he will call the clinic once he is home if he chooses to schedule an appointment. CHF education provided to pt. I will continue to follow patient throughout this admission and am available for any future needs.    Nisha Cardozo RN

## 2021-04-20 NOTE — PROGRESS NOTES
A Lot  How much difficulty sitting down on / standing up from a chair with arms?: A Lot  How much difficulty moving from lying on back to sitting on side of bed?: A Lot  How much help from another person moving to and from a bed to a chair?: A Lot  How much help from another person needed to walk in hospital room?: A Lot  How much help from another person for climbing 3-5 steps with a railing?: A Lot  AM-PAC Inpatient Mobility Raw Score : 12  AM-PAC Inpatient T-Scale Score : 35.33  Mobility Inpatient CMS 0-100% Score: 68.66  Mobility Inpatient CMS G-Code Modifier : CL    Nursing cleared patient for PT evaluation. The admitting diagnosis and active problem list as listed above have been reviewed prior to the initiation of this evaluation. OBJECTIVE;   Initial Evaluation  Date: 4/20/2021 Treatment Date:     Short Term/ Long Term   Goals   Was pt agreeable to Eval/treatment? Yes    To be met in 3 days   Pain level   10/10  Scrotum d/t swelling and bilateral lower extremities d/t neuropathy     Bed Mobility    Rolling: Moderate assist of 1    Supine to sit: Moderate assist of 1    Sit to supine: Moderate assist of 1    Scooting: Moderate assist of 1    Rolling: Supervision     Supine to sit: Supervision     Sit to supine: Supervision     Scooting: Supervision      Transfers Sit to stand:  Moderate assist of  2 from bed and chair x 2 reps; unable to sit onto chair safely d/t swollen scrotum; returned to bed  Sit to stand: Supervision       Ambulation    2x4 feet using  standard walker with Moderate assist of 1   for balance and walker control; small steps with abducted stance d/t swollen scrotum   50 feet using  least restrictive device versus no device with Supervision     Stair negotiation: ascended and descended   Not assessed       5 steps with rail supervision    ROM Within functional limits        Strength BUE:  refer to OT eval  RLE:  4-/5  LLE:  4-/5   Increase strength in affected mm groups by 1/3 grade Balance Sitting EOB:  fair    Dynamic Standing:  poor  D/t weakness and neuropathy and swelling  Sitting EOB:  good    Dynamic Standing: fair       Patient is Alert & Oriented x person, place, time and situation and follows directions    Sensation:  Patient  denies numbness and tingling bilateral lower extremities    Edema:  yes bilateral lower extremities and scrotum  Endurance: poor      Vitals: 5 liters nasal cannula    SPO2 at rest 96%  SPO2 during session 93%     Patient education  Patient educated on role of Physical Therapy, risks of immobility, safety and plan of care and  importance of mobility while in hospital       Patient response to education:   Pt verbalized understanding Pt demonstrated skill Pt requires further education in this area   Yes Partial Yes      Treatment:  Patient practiced and was instructed/facilitated in the following treatment: Patient   Sat edge of bed 15 minutes with Minimal assist of 1 to increase dynamic sitting balance and activity tolerance. standing challenges for endurance and balance     Therapeutic Exercises:  not performed       At end of session, patient in bed with alarm call light and phone within reach,   all lines and tubes intact, nursing notified. Patient would benefit from continued skilled Physical Therapy to improve functional independence and quality of life. Patient's/ family goals   home        ASSESSMENT: Patient exhibits decreased strength, balance, endurance and pain in bilateral feet and scrotum impairing functional mobility, gait distance, tolerance to activity and participation are barriers to d/c and require skilled intervention during hospital stay   and  impacts safe mobility and  patient requires mod assist with mobility, continues to require skilled intervention to progress activity to monitor vital signs and response to activity.   Skilled intervention of 2 clinicians required to facilitate participation, promote active engagement to progress towards goals and prevent injury of patient and staff d/t size and debility     Plan of Care:     -Bed Mobility: Lower extremity exercises , Upper extremity exercises  and Trunk control activities   -Standing Balance: Perform strengthening exercises in standing to promote motor control with or without upper extremity support  and Instruct patient on adequate base of support to maintain balance  -Transfers: Provide instruction on proper hand and foot position for adequate transfer of weight onto lower extremities and use of gait device, Cues for hand placement, technique and safety, Facilitate weight shift forward on to lower extremities and provide necessary stabilization of bilateral lower extremities , Assist with extension of knees trunk and hip to accept weight transfer  and Provide stabilization to prevent fall   -Gait: Gait training, Standing activities to improve: base of support, weight shift, weight bearing , Exercises to improve trunk control and Exercises to improve hip and knee control   -Endurance: Utilize Supervised activities to increase level of endurance to allow for safe functional mobility including transfers and gait     Patient and or family understand(s) diagnosis, prognosis, and plan of care. Frequency of treatments: Patient will be seen  daily. Time in  1128  Time out  1200    Total Treatment Time  23 minutes    Evaluation time includes thorough review of current medical information, gathering information on past medical history/social history and prior level of function, completion of standardized testing/informal observation of tasks, assessment of data, and development of Plan of care and goals.      CPT codes:  Low Complexity PT evaluation (00628)  Gait Training (37585) 23 minutes 2 unit(s)    Vinny Santana, PT

## 2021-04-20 NOTE — PROGRESS NOTES
Patient is seen in follow-up for acute CHF    Subjective:     Mr. Og Rainey feels better today, shortness of breath is improving  Sitting up in bed no apparent distress    ROS:  CONSTITUTIONAL:  negative for  fevers, chills  HEENT:  negative for earaches, nasal congestion and epistaxis  RESPIRATORY:  negative for  dry cough, cough with sputum,wheezing and hemoptysis  GASTROINTESTINAL:  negative for nausea, vomiting  MUSCULOSKELETAL:  negative for  myalgias, arthralgias  NEUROLOGICAL:  negative for visual disturbance, dysphagia    Medication side effects: none    Scheduled Meds:   insulin glargine  20 Units Subcutaneous BID    insulin lispro  6 Units Subcutaneous TID WC    warfarin  5 mg Oral Once    aspirin  81 mg Oral Nightly    atorvastatin  80 mg Oral Nightly    gabapentin  200 mg Oral TID    metoprolol succinate  25 mg Oral BID    pantoprazole  40 mg Oral Daily    spironolactone  25 mg Oral Daily    insulin lispro  0-12 Units Subcutaneous TID WC    insulin lispro  0-6 Units Subcutaneous Nightly    warfarin (COUMADIN) daily dosing (placeholder)   Other RX Placeholder    Arformoterol Tartrate  15 mcg Nebulization BID    And    budesonide  0.5 mg Nebulization BID    pramipexole  0.25 mg Oral TID     Continuous Infusions:   bumetanide 0.1 mg/mL infusion 0.5 mg/hr (04/20/21 1712)    dextrose       PRN Meds:albuterol, ondansetron, glucose, dextrose, glucagon (rDNA), dextrose    I/O last 3 completed shifts:   In: 1760 [P.O.:1760]  Out: 3150 [Urine:3150]  I/O this shift:  In: 320 [P.O.:320]  Out: 2500 [Urine:2500]      Objective:      Physical Exam:   /72   Pulse 99   Temp 98.8 °F (37.1 °C) (Oral)   Resp 18   Ht 5' 8\" (1.727 m)   Wt (!) 361 lb (163.7 kg)   SpO2 97%   BMI 54.89 kg/m²   CONSTITUTIONAL:  awake, alert, cooperative, no apparent distress, and appears stated age  HEAD:  normocepalic, without obvious abnormality, atraumatic  NECK:  Supple, symmetrical, trachea midline, no adenopathy, thyroid symmetric, not enlarged and no tenderness, skin normal  LUNGS:  No increased work of breathing, No accessory muscle use or intercostal retractions, good air exchange, clear to auscultation bilaterally, no crackles or wheezing  CARDIOVASCULAR:  Normal apical impulse, irregularly irregular, normal S1 and S2, no S3 3/6 systolic murmur at the apex, + + edema, no JVD, no carotid bruit. ABDOMEN:  Soft, nontender, no masses, no hepatomegaly, no splenomegaly, BS+  MUSCULOSKELETAL:  No clubbing no cyanosis. there is no redness, warmth, or swelling of the joints  full range of motion noted  NEUROLOGIC:  Alert, awake,oriented x3  SKIN:  no bruising or bleeding, normal skin color, texture, turgor and no redness, warmth, or swelling      Cardiographics  I personally reviewed the telemetry monitor strips with the following interpretation: Atrial fibrillation with controlled ventricular response alternating with A. fib with RVR    Echocardiogram: 6/20/2019,Summary   Technically suboptimal study in spite contrast administration.   Left ventricle is grossly normal in size .   Mild left ventricular concentric hypertrophy noted.   Regional wall motion abnormality suggested with inferior hypokinesis.   Mild-moderately reduced left ventricular systolic dysfunction suggested.   The left atrium is dilated.   Enlarged right atrium size.   Moderate mitral annular calcification.   Mild mitral regurgitation is present.   The aortic valve appears moderately sclerotic.   Mild tricuspid regurgitation.       Imaging  XR CHEST PORTABLE   Final Result   No acute findings in the chest             Lab Review   Lab Results   Component Value Date     04/20/2021    K 3.7 04/20/2021    K 5.7 04/18/2021    CL 91 04/20/2021    CO2 37 04/20/2021    BUN 19 04/20/2021    CREATININE 1.4 04/20/2021    GLUCOSE 268 04/20/2021    GLUCOSE 118 10/27/2011    CALCIUM 8.8 04/20/2021     Lab Results   Component Value Date    WBC 7.4 04/19/2021    HGB 13.5 04/19/2021    HCT 44.3 04/19/2021    .6 04/19/2021     04/19/2021     I have personally reviewed the laboratory, cardiac diagnostic and radiographic testing as outlined above:    Assessment:     1. Acute exacerbation of combined congestive heart failure: Decompensated, better with IV diuresis, negative more than 7 L since admission, will adjust Bumex dose  2. Atrial fibrillation: Persistent, with RVR will adjust medications, rate is better controlled after starting amiodarone continue current anticoagulation   3. CAD: S/p CABG with LIMA to LAD, SVG to ramus intermedius artery  4. Status post mitral valve repair using #30-mm Future complete ring with magic stitch   between A3 and P3, with mild mitral valve regurgitation  5. Ischemic cardiomyopathy:  6. Tricuspid valve regurgitation: Mild  7. Hypertension: Controlled  8. Type 2 diabetes mellitus  9. Hyperlipidemia: On statin  10. Chronic anticoagulation  11. Acute kidney injury superimposed on stage III chronic kidney disease       Recommendations:     1. Decrease Bumex dose given his acute kidney injury  2. Will transition to oral amiodarone for a week  3. Will continue the rest of medications  4. Lexiscan stress test prior to discharge  5. Basic metabolic panel and CBC in a.m.  6.  Further cardiac recommendations will be forthcoming pending his clinical course and diagnostic test findings    Discussed with patient    Electronically signed by Nona Galaviz MD on 4/20/2021 at 7:00 PM  NOTE: This report was transcribed using voice recognition software.  Every effort was made to ensure accuracy; however, inadvertent computerized transcription errors may be present

## 2021-04-20 NOTE — PROGRESS NOTES
Diarrhea/Dehydration (Increases)  History of stroke    Other: __________________  Other:___________________     Vitamin K or Blood product  Administration Date                    TSH:    Lab Results   Component Value Date    TSH 3.840 04/19/2021        Hepatic Function Panel:                            Lab Results   Component Value Date    ALKPHOS 169 04/19/2021    ALT 22 04/19/2021    AST 24 04/19/2021    PROT 7.2 04/19/2021    BILITOT 1.2 04/19/2021    LABALBU 3.7 04/19/2021    LABALBU 4.4 10/27/2011       Date Warfarin Dose INR Heparin or LMWH HBG/HCT PLT Comment   4/18 HOLD 3.5 -- 13.0/42.8 228    4/19 HOLD 3.4 -- 13.5/44.3 215    4/20 <5 mg> 2.4 -- -- --                        Assessment and Plan:  · Dru Meckel is a 72 yom admitted with acute decompensated congestive heart failure and afib RVR. He is anticoagulated on warfarin for atrial fibrillation. · Amiodarone was initiated yesterday for afib RVR. Patient was not on amiodarone PTA. Due to the amiodarone/warfarin interaction it is recommended to reduce home dose by ~20% if he is continued on amiodarone at discharge.   · INR = 2.4, warfarin 5 mg x 1  · Daily PT/INR until the INR is stable within the therapeutic range  · Pharmacist will follow and monitor/adjust dosing as necessary    Thank you for this consult,      Pj Randall, PharmD, BCPS 4/20/2021 11:45 AM   [phone]

## 2021-04-20 NOTE — PROGRESS NOTES
Occupational Therapy  OCCUPATIONAL THERAPY INITIAL EVALUATION      Date:2021  Patient Name: Sherrie Anderson  MRN: [id number]  : 1955  Room: [address] [place] [address]    Referring Provider: Rafaela Najjar, DO      Evaluating OT: Eyadkaylee Israel OTR/L 1986    AM-PAC Daily Activity Raw Score:     Tenative Discharge Recommendations: TE vs Home with assist - pending progress     Recommended Adaptive Equipment:  TBD     Diagnosis: CHF    Pertinent Medical History: COPD, diabetes, LE neuropathy   scrotal swelling   Precautions:  Falls, alarm, O2     Home Living: Pt lives with wife,  2 story with 4 steps/rail to enter . Bed/bath on 2nd. Bathroom setup: walk in shower in basement. Tub/shower unit 2nd floor    Equipment owned: Home O2, w/c    Prior Level of Function: Independent  with ADLs , assist  with IADLs; ambulated with no device - patient reports he would hold onto wall and furniture sometimes   Driving: yes     Pain Level: scrotal pain, and LE/foot pain - sensitive to touch -neuropathy   Cognition: alert, oriented x3 and conversing    Memory:  Good    Sequencing:  Good    Problem solving:  Fair    Judgement/safety:  pooe     Functional Assessment:   Initial Eval Status  Date: 21 Treatment Status  Date: STGs = LTGs  Time frame: 7-10 days   Feeding Independent      Grooming Min A,seated   Decrease standing balance and tolerance   Supervision    UB Dressing Mod A   Supervision    LB Dressing Dependent/Max A   Mod A    Bathing Max  A  Mod A    Toileting Dependent      Bed Mobility  Upon entry sitting EOB      Functional Transfers Mod A x2  Sit-stand from bed   Min/CGA   Functional Mobility Mod A ,w/walker  Side steps next to bed  Min/CGA  with good tolerance   Balance Sitting:     Static:  SBA - EOB   Standing:  Mod A   Independent sitting   with good tolerance  CGA standing with fair + tolerance     Activity Tolerance Fair - with light activity   Scrotal pain limiting tolerance   And LE pain   Good with ADL activity    Visual/  Perceptual Glasses: none by bedside                 Hand Dominance right    Strength ROM Additional Info:    RUE  WFL WFL good  and wfl FMC/dexterity noted during ADL tasks       LUE WFL WFL good  and wfl FMC/dexterity noted during ADL tasks       Hearing: WFL   Sensation:  No c/o numbness or tingling   Tone: WFL   Edema: scrotal     Comments: Upon arrival patient sitting EOB . At end of session, patient lying in bed  with call light and phone within reach, all lines and tubes intact. ALARM  ON       Overall patient demonstrated decreased independence and safety during completion of ADL/functional transfer/mobility tasks. Pt would benefit from continued skilled OT to increase safety and independence with completion of ADL/IADL tasks for functional independence and quality of life. Treatment: OT treatment provided this date includes:   ADLS:   Facilitated instruction/training on safety , sequencing, posture and adapted techniques for completion of above ADLs:  Patient demonstrates overall decrease endurance and increase scrotal pain and LE pain  limiting standing tolerance for ADL tasks. Unable to lift legs to attempt donning socks - patient has increase sensitivity and pain B feet - was barefoot during session. Able to use UEs for ADL tasks while sitting up in bed     Mobility/xfers:   Instruction/training on safety, technique and tolerance with mobility and xfers:  completed xfers from various surfaces (bed/chair) with Mod A x2 assist and poor+ tolerance/safety. Demonstrated fair - understanding of safety and tech. continue to reinforce  Steps to/from bed/chair with w/alker Mod A- poor tolerance    Monitoring of vitals and patient tolerance/response throughout tx session:     educating pt on modified techniques, posture, safety and energy conservation  techniques throughout tx session. Patient demonstrating fair  understanding, continue to reinforce. Patient tolerated session with fair - tolerance and NO  signs of SOB. - O2 on during session   Educated on importance of OOB activity and elevating scrotum while lying in bed - continue to reinforce          Assessment of current deficits   Functional mobility [x]  ADLs [x] Strength [x]  Cognition []  Functional transfers  [x] IADLs [x] Safety Awareness [x]  Endurance [x]  Fine Motor Coordination [] Balance [x] Vision/perception [] Sensation []   Gross Motor Coordination [] ROM [] Delirium []                  Motor Control []       Plan of Care: 1-3 days/week for 1-2 weeks PRN   [x]ADL retraining/adapted techniques and AE recommendations to increase functional independence within precautions                    [x]Energy conservation techniques to improve tolerance for selfcare routine   [x]Functional transfer/mobility training/DME recommendations for increased independence, safety and fall prevention         [x]Patient/family education to increase safety and functional independence             [x]Environmental modifications for safe mobility and completion of ADLs                             []Cognitive retraining ex's to improve problem solving skills & safe participation in ADLs/IADLs     []Sensory re-education techniques to improve extremity awareness, maintain skin integrity and improve hand function                             []Visual/Perceptual retraining  to improve body awareness and safety during transfers and ADLs  []Splinting/positioning needs to maintain joint/skin integrity and prevent contractures  [x]Therapeutic activity to improve functional performance during ADLs. [x]Therapeutic exercise to improve tolerance and functional strength for ADLs   [x]Balance retraining/tolerance tasks for facilitation of postural control with dynamic challenges during ADLs .   []Neuromuscular re-education: facilitation of righting/equilibrium reactions, midline orientation, scapular stability/mobility, Normalization muscle     tone and facilitation active functional movement/Attention                         []Delirium prevention/treatment    [x]Positioning to improve functional independence  []Other:       Rehab Potential:  Good for established goals     Patient / Family Goal: none stated       Patient and/or family were instructed on functional diagnosis, prognosis/goals and OT plan of care. Demonstrated fair + understanding. Eval Complexity: Low      Time In:1128  Time Out: 1205        Min Units   OT Eval Low 97165  x 1   OT Eval Medium 12067      OT Eval High 32078       OT Re-Eval [id number]       Therapeutic Ex 77559       Therapeutic Activities 43430  15  1   ADL/Self Care 62521       Orthotic Management 22401       Neuro Re-Ed 18449       Non-Billable Time          Evaluation Time includes thorough review of current medical information, gathering information on past medical history/social history and prior level of function, completion of standardized testing/informal observation of tasks, assessment of data and education on plan of care and goals.             Illa Loss OTR/L [id number]

## 2021-04-20 NOTE — PLAN OF CARE
Problem: Pain:  Goal: Pain level will decrease  Description: Pain level will decrease  Outcome: Met This Shift  Goal: Control of acute pain  Description: Control of acute pain  Outcome: Met This Shift  Goal: Control of chronic pain  Description: Control of chronic pain  Outcome: Met This Shift     Problem: Skin Integrity:  Goal: Will show no infection signs and symptoms  Description: Will show no infection signs and symptoms  4/20/2021 0055 by Shailesh Armstrong  Outcome: Met This Shift  4/19/2021 1640 by Joseph Sharma RN  Outcome: Met This Shift  Goal: Absence of new skin breakdown  Description: Absence of new skin breakdown  4/20/2021 0055 by Shailesh Armstrong  Outcome: Met This Shift  4/19/2021 1640 by Joseph Sharma RN  Outcome: Met This Shift     Problem: Falls - Risk of:  Goal: Will remain free from falls  Description: Will remain free from falls  4/20/2021 0055 by Shailesh Armstrong  Outcome: Met This Shift  4/19/2021 1640 by Joseph Sharma RN  Outcome: Met This Shift  Goal: Absence of physical injury  Description: Absence of physical injury  4/20/2021 0055 by Shailesh Armstrong  Outcome: Met This Shift  4/19/2021 1640 by Joseph Sharma RN  Outcome: Met This Shift

## 2021-04-21 LAB
ANION GAP SERPL CALCULATED.3IONS-SCNC: 11 MMOL/L (ref 7–16)
BASOPHILS ABSOLUTE: 0.03 E9/L (ref 0–0.2)
BASOPHILS RELATIVE PERCENT: 0.3 % (ref 0–2)
BUN BLDV-MCNC: 18 MG/DL (ref 8–23)
CALCIUM SERPL-MCNC: 8.5 MG/DL (ref 8.6–10.2)
CHLORIDE BLD-SCNC: 90 MMOL/L (ref 98–107)
CO2: 37 MMOL/L (ref 22–29)
CREAT SERPL-MCNC: 1.2 MG/DL (ref 0.7–1.2)
EKG ATRIAL RATE: 115 BPM
EKG Q-T INTERVAL: 312 MS
EKG QRS DURATION: 104 MS
EKG QTC CALCULATION (BAZETT): 437 MS
EKG R AXIS: 95 DEGREES
EKG T AXIS: -131 DEGREES
EKG VENTRICULAR RATE: 118 BPM
EOSINOPHILS ABSOLUTE: 0.13 E9/L (ref 0.05–0.5)
EOSINOPHILS RELATIVE PERCENT: 1.3 % (ref 0–6)
GFR AFRICAN AMERICAN: >60
GFR NON-AFRICAN AMERICAN: >60 ML/MIN/1.73
GLUCOSE BLD-MCNC: 223 MG/DL (ref 74–99)
HCT VFR BLD CALC: 40.1 % (ref 37–54)
HEMOGLOBIN: 12.7 G/DL (ref 12.5–16.5)
IMMATURE GRANULOCYTES #: 0.05 E9/L
IMMATURE GRANULOCYTES %: 0.5 % (ref 0–5)
INR BLD: 1.9
LYMPHOCYTES ABSOLUTE: 1.3 E9/L (ref 1.5–4)
LYMPHOCYTES RELATIVE PERCENT: 13.1 % (ref 20–42)
MAGNESIUM: 2.1 MG/DL (ref 1.6–2.6)
MCH RBC QN AUTO: 31.1 PG (ref 26–35)
MCHC RBC AUTO-ENTMCNC: 31.7 % (ref 32–34.5)
MCV RBC AUTO: 98.3 FL (ref 80–99.9)
METER GLUCOSE: 227 MG/DL (ref 74–99)
METER GLUCOSE: 260 MG/DL (ref 74–99)
METER GLUCOSE: 350 MG/DL (ref 74–99)
MONOCYTES ABSOLUTE: 0.84 E9/L (ref 0.1–0.95)
MONOCYTES RELATIVE PERCENT: 8.4 % (ref 2–12)
NEUTROPHILS ABSOLUTE: 7.61 E9/L (ref 1.8–7.3)
NEUTROPHILS RELATIVE PERCENT: 76.4 % (ref 43–80)
PDW BLD-RTO: 17.8 FL (ref 11.5–15)
PLATELET # BLD: 168 E9/L (ref 130–450)
PMV BLD AUTO: 10.1 FL (ref 7–12)
POTASSIUM SERPL-SCNC: 3.4 MMOL/L (ref 3.5–5)
PROTHROMBIN TIME: 22.7 SEC (ref 9.3–12.4)
RBC # BLD: 4.08 E12/L (ref 3.8–5.8)
SODIUM BLD-SCNC: 138 MMOL/L (ref 132–146)
WBC # BLD: 10 E9/L (ref 4.5–11.5)

## 2021-04-21 PROCEDURE — 6370000000 HC RX 637 (ALT 250 FOR IP): Performed by: NURSE PRACTITIONER

## 2021-04-21 PROCEDURE — 85025 COMPLETE CBC W/AUTO DIFF WBC: CPT

## 2021-04-21 PROCEDURE — 94640 AIRWAY INHALATION TREATMENT: CPT

## 2021-04-21 PROCEDURE — 82962 GLUCOSE BLOOD TEST: CPT

## 2021-04-21 PROCEDURE — 2700000000 HC OXYGEN THERAPY PER DAY

## 2021-04-21 PROCEDURE — 80048 BASIC METABOLIC PNL TOTAL CA: CPT

## 2021-04-21 PROCEDURE — 99233 SBSQ HOSP IP/OBS HIGH 50: CPT | Performed by: INTERNAL MEDICINE

## 2021-04-21 PROCEDURE — 6370000000 HC RX 637 (ALT 250 FOR IP): Performed by: INTERNAL MEDICINE

## 2021-04-21 PROCEDURE — 2500000003 HC RX 250 WO HCPCS: Performed by: NURSE PRACTITIONER

## 2021-04-21 PROCEDURE — 2060000000 HC ICU INTERMEDIATE R&B

## 2021-04-21 PROCEDURE — 2500000003 HC RX 250 WO HCPCS: Performed by: INTERNAL MEDICINE

## 2021-04-21 PROCEDURE — 36415 COLL VENOUS BLD VENIPUNCTURE: CPT

## 2021-04-21 PROCEDURE — 85610 PROTHROMBIN TIME: CPT

## 2021-04-21 PROCEDURE — 83735 ASSAY OF MAGNESIUM: CPT

## 2021-04-21 PROCEDURE — 6360000002 HC RX W HCPCS: Performed by: INTERNAL MEDICINE

## 2021-04-21 PROCEDURE — 2580000003 HC RX 258: Performed by: NURSE PRACTITIONER

## 2021-04-21 RX ORDER — WARFARIN SODIUM 5 MG/1
5 TABLET ORAL
Status: COMPLETED | OUTPATIENT
Start: 2021-04-21 | End: 2021-04-21

## 2021-04-21 RX ORDER — METOLAZONE 2.5 MG/1
5 TABLET ORAL ONCE
Status: COMPLETED | OUTPATIENT
Start: 2021-04-21 | End: 2021-04-21

## 2021-04-21 RX ORDER — POTASSIUM CHLORIDE 20 MEQ/1
20 TABLET, EXTENDED RELEASE ORAL ONCE
Status: COMPLETED | OUTPATIENT
Start: 2021-04-21 | End: 2021-04-21

## 2021-04-21 RX ADMIN — PANTOPRAZOLE SODIUM 40 MG: 40 TABLET, DELAYED RELEASE ORAL at 09:46

## 2021-04-21 RX ADMIN — INSULIN LISPRO 6 UNITS: 100 INJECTION, SOLUTION INTRAVENOUS; SUBCUTANEOUS at 09:58

## 2021-04-21 RX ADMIN — WARFARIN SODIUM 5 MG: 5 TABLET ORAL at 17:18

## 2021-04-21 RX ADMIN — INSULIN LISPRO 4 UNITS: 100 INJECTION, SOLUTION INTRAVENOUS; SUBCUTANEOUS at 09:50

## 2021-04-21 RX ADMIN — GABAPENTIN 200 MG: 100 CAPSULE ORAL at 21:40

## 2021-04-21 RX ADMIN — METOPROLOL SUCCINATE 25 MG: 25 TABLET, EXTENDED RELEASE ORAL at 21:41

## 2021-04-21 RX ADMIN — ANTI-FUNGAL POWDER MICONAZOLE NITRATE TALC FREE: 1.42 POWDER TOPICAL at 21:42

## 2021-04-21 RX ADMIN — POTASSIUM CHLORIDE 20 MEQ: 1500 TABLET, EXTENDED RELEASE ORAL at 15:13

## 2021-04-21 RX ADMIN — INSULIN LISPRO 3 UNITS: 100 INJECTION, SOLUTION INTRAVENOUS; SUBCUTANEOUS at 21:32

## 2021-04-21 RX ADMIN — SPIRONOLACTONE 25 MG: 25 TABLET ORAL at 11:44

## 2021-04-21 RX ADMIN — BUDESONIDE 500 MCG: 0.5 INHALANT RESPIRATORY (INHALATION) at 17:37

## 2021-04-21 RX ADMIN — AMIODARONE HYDROCHLORIDE 400 MG: 200 TABLET ORAL at 21:41

## 2021-04-21 RX ADMIN — GABAPENTIN 200 MG: 100 CAPSULE ORAL at 15:13

## 2021-04-21 RX ADMIN — INSULIN LISPRO 10 UNITS: 100 INJECTION, SOLUTION INTRAVENOUS; SUBCUTANEOUS at 12:51

## 2021-04-21 RX ADMIN — INSULIN LISPRO 6 UNITS: 100 INJECTION, SOLUTION INTRAVENOUS; SUBCUTANEOUS at 12:53

## 2021-04-21 RX ADMIN — PRAMIPEXOLE DIHYDROCHLORIDE 0.25 MG: 0.25 TABLET ORAL at 09:45

## 2021-04-21 RX ADMIN — METOLAZONE 5 MG: 2.5 TABLET ORAL at 17:25

## 2021-04-21 RX ADMIN — AMIODARONE HYDROCHLORIDE 400 MG: 200 TABLET ORAL at 09:46

## 2021-04-21 RX ADMIN — INSULIN GLARGINE 20 UNITS: 100 INJECTION, SOLUTION SUBCUTANEOUS at 21:42

## 2021-04-21 RX ADMIN — PRAMIPEXOLE DIHYDROCHLORIDE 0.25 MG: 0.25 TABLET ORAL at 21:41

## 2021-04-21 RX ADMIN — BUDESONIDE 500 MCG: 0.5 INHALANT RESPIRATORY (INHALATION) at 06:02

## 2021-04-21 RX ADMIN — BUMETANIDE 0.5 MG/HR: 0.25 INJECTION INTRAMUSCULAR; INTRAVENOUS at 21:39

## 2021-04-21 RX ADMIN — INSULIN LISPRO 6 UNITS: 100 INJECTION, SOLUTION INTRAVENOUS; SUBCUTANEOUS at 17:24

## 2021-04-21 RX ADMIN — GABAPENTIN 200 MG: 100 CAPSULE ORAL at 09:45

## 2021-04-21 RX ADMIN — ASPIRIN 81 MG CHEWABLE TABLET 81 MG: 81 TABLET CHEWABLE at 21:41

## 2021-04-21 RX ADMIN — ARFORMOTEROL TARTRATE 15 MCG: 15 SOLUTION RESPIRATORY (INHALATION) at 06:02

## 2021-04-21 RX ADMIN — ATORVASTATIN CALCIUM 80 MG: 40 TABLET, FILM COATED ORAL at 21:41

## 2021-04-21 RX ADMIN — INSULIN LISPRO 9 UNITS: 100 INJECTION, SOLUTION INTRAVENOUS; SUBCUTANEOUS at 17:20

## 2021-04-21 RX ADMIN — INSULIN GLARGINE 20 UNITS: 100 INJECTION, SOLUTION SUBCUTANEOUS at 09:55

## 2021-04-21 RX ADMIN — METOPROLOL SUCCINATE 25 MG: 25 TABLET, EXTENDED RELEASE ORAL at 11:43

## 2021-04-21 RX ADMIN — ARFORMOTEROL TARTRATE 15 MCG: 15 SOLUTION RESPIRATORY (INHALATION) at 17:37

## 2021-04-21 RX ADMIN — PRAMIPEXOLE DIHYDROCHLORIDE 0.25 MG: 0.25 TABLET ORAL at 15:13

## 2021-04-21 ASSESSMENT — PAIN SCALES - GENERAL: PAINLEVEL_OUTOF10: 0

## 2021-04-21 NOTE — PROGRESS NOTES
Pharmacy Consultation Note  (Warfarin Dosing and Monitoring)    Initial consult date: 4/18/21  Consulting physician: Dr. Orellana    Allergies:  Pcn [penicillins] and Sulfa antibiotics    72 y.o. male    Ht Readings from Last 1 Encounters:   04/18/21 5' 8\" (1.727 m)     Wt Readings from Last 1 Encounters:   04/21/21 (!) 349 lb 8 oz (158.5 kg)         Warfarin Indication Target   INR Range Home Dose  (if applicable) Diet/Feeding Tube   (Enteral feeds, nutritional drinks and increased Vitamin K in diet can decrease INR)   Atrial fibrillation 2-3 10 mg M,W,F,Sat  5 mg Tu,Th, Sun --       x Home Med? Meds Increasing INR x Home Med?  Meds Decreasing INR     Allopurinol    Azathioprine   X N Amiodarone/Propafenone/Dronedarone   Carbamazepine     Androgens   Cholestyramine     Chemotherapy (BBW: Capecitabine)   Estrogen     Ciprofloxacin/Levofloxacin   Nafcillin/Dicloxacillin     Clarithromycin/Erythromycin/Azithromycin   Barbiturates      Fluconazole/Itraconazole/Voriconazole/Ketoconazole   Phenytoin (Variable)     Metronidazole   Rifampin     Phenytoin (Variable)   Steroids (Variable/Dose Dependent)      Statins/Fenofibrate/Gemfibrozil   Sucralfate   X Y Steroids (Variable/Dose Dependent)   Other:     Sulfamethoxazole/Trimethoprim        Tramadol         Other:      Comments regarding medication interactions:      x Diseases Affecting INR x Increased Bleeding Risk   X CHF Exacerbation (Increases)  History GI Bleed/PUD    Liver Disease (Increases)  Chronic NSAID Use    Thyroid: Hyper (Increases)  Hypo (Decreases) X Chronic ASA/Antiplatelet Use (Clopidogrel/ Dipyridamole/Prasugrel/Ticagrelor)      Malignancy (Increases)  Abnormal Renal Function (dialysis, renal transplant, SCr ? 2.3 mg/dL)     History of EtOH Abuse: Acute (Increases)   Chronic (Decreases)  Liver Function (cirrhosis, bilirubin >2x ULN with AST/ALT/AP >3x ULN)    Fever (Increases)  Age > 65 years    Acute infection (Increases)  Hypertension/Uncontrolled BP Diarrhea/Dehydration (Increases)  History of stroke    Other: __________________  Other:___________________     Vitamin K or Blood product  Administration Date                    TSH:    Lab Results   Component Value Date    TSH 3.840 04/19/2021        Hepatic Function Panel:                            Lab Results   Component Value Date    ALKPHOS 169 04/19/2021    ALT 22 04/19/2021    AST 24 04/19/2021    PROT 7.2 04/19/2021    BILITOT 1.2 04/19/2021    LABALBU 3.7 04/19/2021    LABALBU 4.4 10/27/2011       Date Warfarin Dose INR Heparin or LMWH HBG/HCT PLT Comment   4/18 HOLD 3.5 -- 13.0/42.8 228    4/19 HOLD 3.4 -- 13.5/44.3 215    4/20 5 mg 2.4 -- -- --    4/21 <5 mg> 1.9 -- 12.7/40.1 168               Assessment and Plan:  · Ana Whitfield is a 72 yom admitted with acute decompensated congestive heart failure and afib RVR. He is anticoagulated on warfarin for atrial fibrillation. · Amiodarone has been initiated for afib RVR. Patient was not on amiodarone PTA. Due to the amiodarone/warfarin interaction it is recommended to reduce home dose by ~20% if he is continued on amiodarone at discharge.   · INR = 1.9, warfarin 5 mg x 1  · Daily PT/INR until the INR is stable within the therapeutic range  · Pharmacist will follow and monitor/adjust dosing as necessary    Thank you for this consult,      Robert Nettles, PharmD, BCPS 4/21/2021 11:53 AM   [phone]

## 2021-04-21 NOTE — PROGRESS NOTES
Inpatient Cardiology Progress note     PATIENT IS BEING FOLLOWED FOR:CHF , with a history of coronary artery disease and valvular heart disease, paroxysmal atrial fibrillation    Lian Sinha is a 72 y.o. male followed by Dr. Bebo Sharpe as an outpatient     SUBJECTIVE: remains dyspneic, denies chest pain  OBJECTIVE: dyspneic at rest    ROS:  Consist: Denies fevers, chills or night sweats  Heart: Denies chest pain, palpitations, lightheadedness, dizziness or syncope  Lungs: Denies hemoptysis  GI: Denies abdominal pain, vomiting or diarrhea    PHYSICAL EXAM:   BP (!) 97/57   Pulse 90   Temp 97.8 °F (36.6 °C) (Oral)   Resp 19   Ht 5' 8\" (1.727 m)   Wt (!) 349 lb 8 oz (158.5 kg)   SpO2 95%   BMI 53.14 kg/m²    B/P Range last 24 hours: Systolic (31XHZ), KLR:237 , Min:97 , DAZ:016    Diastolic (63CWW), OFY:42, Min:54, Max:72    CONST: Well developed, well nourished who appears stated age. Awake, alert and cooperative. No apparent distress  HEENT:   Head- Normocephalic, atraumatic   Eyes- Conjunctivae pink, anicteric  Throat- Oral mucosa pink and moist  Neck-  No stridor, trachea midline, no jugular venous distention. No carotid bruit  CHEST: Chest symmetrical and non-tender to palpation. No accessory muscle use or intercostal retractions  RESPIRATORY:  Lung sounds - diminished  CARDIOVASCULAR:     Heart Inspection- shows no noted pulsations  Heart Palpation- no heaves or thrills; PMI is non-displaced   Heart Ausculation- Regular rate and rhythm, no murmur. No s3, s4 or rub   PV: 2 plus lower extremity edema. No varicosities. Pedal pulses palpable, no clubbing or cyanosis   ABDOMEN: Soft, non-tender to light palpation. Bowel sounds present. No palpable masses no organomegaly; no abdominal bruit and positive fluid abdominal wall  MS: Good muscle strength and tone. No atrophy or abnormal movements.    : Deferred  SKIN: Warm and dry no statis dermatitis or ulcers   NEURO / PSYCH: Oriented to person, place and time. Speech clear and appropriate. Follows all commands.  Pleasant affect       Intake/Output Summary (Last 24 hours) at 4/21/2021 1159  Last data filed at 4/21/2021 1145  Gross per 24 hour   Intake 800 ml   Output 5800 ml   Net -5000 ml       Weight:   Wt Readings from Last 3 Encounters:   04/21/21 (!) 349 lb 8 oz (158.5 kg)   03/30/21 (!) 323 lb (146.5 kg)   12/29/20 (!) 320 lb (145.2 kg)     Current Inpatient Medications:   warfarin  5 mg Oral Once    insulin glargine  20 Units Subcutaneous BID    insulin lispro  6 Units Subcutaneous TID WC    amiodarone  400 mg Oral BID    aspirin  81 mg Oral Nightly    atorvastatin  80 mg Oral Nightly    gabapentin  200 mg Oral TID    metoprolol succinate  25 mg Oral BID    pantoprazole  40 mg Oral Daily    spironolactone  25 mg Oral Daily    insulin lispro  0-12 Units Subcutaneous TID WC    insulin lispro  0-6 Units Subcutaneous Nightly    warfarin (COUMADIN) daily dosing (placeholder)   Other RX Placeholder    Arformoterol Tartrate  15 mcg Nebulization BID    And    budesonide  0.5 mg Nebulization BID    pramipexole  0.25 mg Oral TID       IV Infusions (if any):   bumetanide 0.1 mg/mL infusion 0.5 mg/hr (04/20/21 1712)    dextrose         DIAGNOSTIC/ LABORATORY DATA:  Labs:   CBC:   Recent Labs     04/19/21  0322 04/21/21  0638   WBC 7.4 10.0   HGB 13.5 12.7   HCT 44.3 40.1    168     BMP:   Recent Labs     04/20/21  0817 04/21/21  0638    138   K 3.7 3.4*   CO2 37* 37*   BUN 19 18   CREATININE 1.4* 1.2   LABGLOM 51 >60   CALCIUM 8.8 8.5*     Mag:   Recent Labs     04/20/21  0817 04/21/21  0638   MG 2.1 2.1     Phos:   Recent Labs     04/19/21  0322 04/20/21  0817   PHOS 4.6* 2.9     TFT:   Lab Results   Component Value Date    TSH 3.840 04/19/2021    U9HCBKU 92.74 01/20/2014    I9EQFRW 6.7 09/17/2012    FT3 2.5 06/02/2016    T4FREE 1.07 04/19/2021      HgA1c:   Lab Results   Component Value Date    LABA1C 13.1 (H) 04/18/2021     No results controlled ventricular response     Echocardiogram: 6/20/2019,Summary   Technically suboptimal study in spite contrast administration.   Left ventricle is grossly normal in size .   Mild left ventricular concentric hypertrophy noted.   Regional wall motion abnormality suggested with inferior hypokinesis.   Mild-moderately reduced left ventricular systolic dysfunction suggested.   The left atrium is dilated.   Enlarged right atrium size.   Moderate mitral annular calcification.   Mild mitral regurgitation is present.   The aortic valve appears moderately sclerotic.   Mild tricuspid regurgitation.       Imaging  XR CHEST PORTABLE   Final Result   No acute findings in the chest             Lab Review   Lab Results   Component Value Date     04/21/2021    K 3.4 04/21/2021    K 5.7 04/18/2021    CL 90 04/21/2021    CO2 37 04/21/2021    BUN 18 04/21/2021    CREATININE 1.2 04/21/2021    GLUCOSE 223 04/21/2021    GLUCOSE 118 10/27/2011    CALCIUM 8.5 04/21/2021     Lab Results   Component Value Date    WBC 10.0 04/21/2021    HGB 12.7 04/21/2021    HCT 40.1 04/21/2021    MCV 98.3 04/21/2021     04/21/2021     I have personally reviewed the laboratory, cardiac diagnostic and radiographic testing as outlined above:    Assessment:     1. Acute exacerbation of combined congestive heart failure: Decompensated, better with IV diuresis, negative more than 14 L since admission, will continue current treatment  2. Atrial fibrillation: Persistent, with RVR will adjust medications, rate is better controlled after starting amiodarone continue current anticoagulation   3. CAD: S/p CABG with LIMA to LAD, SVG to ramus intermedius artery  4. Status post mitral valve repair using #30-mm Future complete ring with magic stitch   between A3 and P3, with mild mitral valve regurgitation  5. Ischemic cardiomyopathy:  6. Tricuspid valve regurgitation: Mild  7. Hypertension: Controlled  8. Type 2 diabetes mellitus  9. Hyperlipidemia: On statin  10. Chronic anticoagulation  11. Acute kidney injury superimposed on stage III chronic kidney disease       Recommendations:     1. Continue current treatment  2. Intake and output  3. Lexiscan stress test prior to discharge  4. Basic metabolic panel and CBC in a.m.  5.  Further cardiac recommendations will be forthcoming pending his clinical course and diagnostic test findings    Discussed with patient    Electronically signed by Chris Cochran MD on 4/21/2021 at 8:05 PM  NOTE: This report was transcribed using voice recognition software.  Every effort was made to ensure accuracy; however, inadvertent computerized transcription errors may be present

## 2021-04-21 NOTE — PROGRESS NOTES
Internal Medicine Progress Note    [facility]    Carlos Manuel Joseph. Elgin Schilling., CLAUDETTE.NÉSTOR.VINICIOOBruceI. Pilar Martínez D.O., VINOD Roe, MSN, APRN, NP-C  Caldwell Frankel. Naz Gibbons, MSN, APRN-CNP     Primary Care Physician: Viki Friday, DO   Admitting Physician:  Miguelangel Girard DO  Admission date and time: 4/18/2021  8:31 AM    Room:  [address] [place] [address]  Admitting diagnosis: Acute systolic CHF (congestive heart failure) [facility]) [I50.21]    Patient Name: Melva Cano  MRN: [id number]    Date of Service: 4/21/2021     Subjective:  Roma Casillas is a 72 y.o. male who was seen and examined today,4/21/2021, at the bedside. Roma Casillas has diuresed nearly 14 L since hospitalization. He has complaint of continued edema from his waits to feet. States that his scrotum is huge and painful. States that at times it feels like he has daggers in this region. No chest pain. SHort of breath with exertion. Has complaint of peripheral neuropathy which is painful in the lower extremities. Review of System:   Constitutional:   Denies fever or chills, weight loss or gain,+ fatigue/malaise. HEENT:   Denies ear pain, sore throat, sinus or eye problems. Cardiovascular:   Denies any chest pain, irregular heartbeats, or palpitations. Respiratory:   +shortness of breath with exertion, no resting dyspnea. Minimal coughing without sputum production. Gastrointestinal:   Denies nausea, vomiting, diarrhea, or constipation. Denies any abdominal pain. Genitourinary:    Voiding with use of a Morrow catheter. Extensive scrotal edema-pain. Extremities:   Persistent, extensive bilateral lower extremity edema, improved per patient. Neurology:    Denies any headache or focal neurological deficits, + generalized weakness and peripheral neuropathy-states that his lower extremities are hypersensitive to the touch. Psch:   Denies being anxious or depressed.   Musculoskeletal:    Denies  myalgias, joint complaints or back pain. Integumentary:   Denies any rashes, ulcers, or excoriations. Skin color changes of the lower extemities. Hematologic/Lymphatic:  Denies bruising or bleeding. Physical Exam:  No intake/output data recorded. Intake/Output Summary (Last 24 hours) at 4/21/2021 1531  Last data filed at 4/21/2021 1331  Gross per 24 hour   Intake 800 ml   Output 4700 ml   Net -3900 ml   I/O last 3 completed shifts: In: 800 [P.O.:800]  Out: 5400 [Urine:5400]  Patient Vitals for the past 96 hrs (Last 3 readings):   Weight   04/21/21 0542 (!) 349 lb 8 oz (158.5 kg)   04/20/21 0524 (!) 361 lb (163.7 kg)   04/19/21 0400 (!) 364 lb (165.1 kg)     Vital Signs:   Blood pressure (!) 97/57, pulse 90, temperature 97.8 °F (36.6 °C), temperature source Oral, resp. rate 19, height 5' 8\" (1.727 m), weight (!) 349 lb 8 oz (158.5 kg), SpO2 95 %. General appearance:  Awake and alert. Comfortable without distress  Head:  Normocephalic. No masses, lesions or tenderness. Eyes:  PERRLA. EOMI. Sclera clear. ENT:  Ears normal. Mucosa normal.  Neck:    Supple. Trachea midline. No thyromegaly. No JVD. No bruits. Heart:    Somewhat irregular rhythm with variable rate. S1 and S2, systolic murmur  Lungs:    Symmetrical, overall improved aeration with diminished bilateral bases. Clear to auscultation without wheezing, rales or rhonchi. Abdomen:   Extensive abdominal anasarca. Bowel sounds are present. No pain with palpation. Extremities:    Extensive bilateral lower extremity edema. Skin changes of the lower extremities consistent with venous stasis. Lichenification of the skin. Neurologic:    Alert x 3. No focal deficit. Cranial nerves grossly intact. Generally weak without focal weakness. Psych:   Behavior is normal. Mood appears normal. Speech is not rapid and/or pressured. Musculoskeletal:   Spine ROM normal. Muscular strength intact. Gait not assessed.   Integumentary:  No rashes  Skin changes of the lower extremities consistent with venous stasis. Lichenification of the skin. Genitalia/Breast:  Persistent scrotal swelling with Morrow catheter in place. Medication:  Scheduled Meds:   warfarin  5 mg Oral Once    insulin glargine  20 Units Subcutaneous BID    insulin lispro  6 Units Subcutaneous TID WC    amiodarone  400 mg Oral BID    aspirin  81 mg Oral Nightly    atorvastatin  80 mg Oral Nightly    gabapentin  200 mg Oral TID    metoprolol succinate  25 mg Oral BID    pantoprazole  40 mg Oral Daily    spironolactone  25 mg Oral Daily    insulin lispro  0-12 Units Subcutaneous TID WC    insulin lispro  0-6 Units Subcutaneous Nightly    warfarin (COUMADIN) daily dosing (placeholder)   Other RX Placeholder    Arformoterol Tartrate  15 mcg Nebulization BID    And    budesonide  0.5 mg Nebulization BID    pramipexole  0.25 mg Oral TID     Continuous Infusions:   bumetanide 0.1 mg/mL infusion 0.5 mg/hr (04/20/21 1712)    dextrose         Objective Data:  CBC with Differential:    Lab Results   Component Value Date    WBC 10.0 04/21/2021    RBC 4.08 04/21/2021    HGB 12.7 04/21/2021    HCT 40.1 04/21/2021     04/21/2021    MCV 98.3 04/21/2021    MCH 31.1 04/21/2021    MCHC 31.7 04/21/2021    RDW 17.8 04/21/2021    SEGSPCT 61 01/20/2014    LYMPHOPCT 13.1 04/21/2021    MONOPCT 8.4 04/21/2021    BASOPCT 0.3 04/21/2021    MONOSABS 0.84 04/21/2021    LYMPHSABS 1.30 04/21/2021    EOSABS 0.13 04/21/2021    BASOSABS 0.03 04/21/2021     BMP:    Lab Results   Component Value Date     04/21/2021    K 3.4 04/21/2021    K 5.7 04/18/2021    CL 90 04/21/2021    CO2 37 04/21/2021    BUN 18 04/21/2021    LABALBU 3.7 04/19/2021    LABALBU 4.4 10/27/2011    CREATININE 1.2 04/21/2021    CALCIUM 8.5 04/21/2021    GFRAA >60 04/21/2021    LABGLOM >60 04/21/2021    GLUCOSE 223 04/21/2021    GLUCOSE 118 10/27/2011       Assessment:  1. Acute on chronic systolic/diastolic congestive heart failure  2.  Persistent atrial fibrillation with rapid ventricular response  3. Significant coronary artery disease with ischemic cardiomyopathy  4. History of mitral valve repair  5. Insulin-dependent diabetes mellitus type 2, hemoglobin A1c 13.1%  6. Essential hypertension  7. Hyperlipidemia  8. Obstructive sleep apnea with obesity hypoventilatory syndrome  9. Ischemic cardiomyopathy  10. Chronic anticoagulation therapy on Coumadin  11. Acute kidney injury on stage III chronic kidney disease  12. Moderate aortic stenosis  13. Pulmonary hypertension-moderate  14. Hypokalemia and hyperkalemia    Plan:   The patient has diuresed approximately 14 L since being hospitalized. Still with massive volume overload. Will maintain Morrow catheter for strict output measurement. Continue current diuretic therapy. Cardiology is following the patient recommendations been reviewed. Secondary to diuresis patient does have hypokalemia. Will prescribe potassium supplementation to replete potassium stores. Continue to monitor electrolytes. Monitor blood glucose levels and treat accordingly. Patient does need better glycemic control which was discussed as patient's hemoglobin A1c was found to be 13.1. PT/OT to evaluate and treat. Heart rate better controlled. Staff reported hypotension. Blood pressure check utilizing Doppler and systolic blood pressure was found to be 120 mmHg. Staff instructed to utilize Doppler and manual blood pressure cuff for further blood pressures. Monitor INR and adjust Coumadin therapy accordingly.  for discharge planning. Continue current therapy. See orders for further plan of care. More than 50% of my  time was spent at the bedside counseling/coordinating care with the patient and/or family with face to face contact. This time was spent reviewing notes and laboratory data as well as instructing and counseling the patient.  Time I spent with the family or surrogate(s) is included only if the

## 2021-04-21 NOTE — PLAN OF CARE
Problem: Falls - Risk of:  Goal: Will remain free from falls  Description: Will remain free from falls  4/21/2021 1335 by Casper Hines RN  Outcome: Met This Shift     Problem: Falls - Risk of:  Goal: Absence of physical injury  Description: Absence of physical injury  4/21/2021 1335 by Casper Hines RN  Outcome: Met This Shift

## 2021-04-22 LAB
ALBUMIN SERPL-MCNC: 3.6 G/DL (ref 3.5–5.2)
ALP BLD-CCNC: 143 U/L (ref 40–129)
ALT SERPL-CCNC: 21 U/L (ref 0–40)
ANION GAP SERPL CALCULATED.3IONS-SCNC: 12 MMOL/L (ref 7–16)
ANION GAP SERPL CALCULATED.3IONS-SCNC: 9 MMOL/L (ref 7–16)
AST SERPL-CCNC: 28 U/L (ref 0–39)
BASOPHILS ABSOLUTE: 0.04 E9/L (ref 0–0.2)
BASOPHILS RELATIVE PERCENT: 0.4 % (ref 0–2)
BILIRUB SERPL-MCNC: 2.2 MG/DL (ref 0–1.2)
BUN BLDV-MCNC: 20 MG/DL (ref 6–23)
BUN BLDV-MCNC: 21 MG/DL (ref 6–23)
CALCIUM SERPL-MCNC: 9.1 MG/DL (ref 8.6–10.2)
CALCIUM SERPL-MCNC: 9.5 MG/DL (ref 8.6–10.2)
CHLORIDE BLD-SCNC: 77 MMOL/L (ref 98–107)
CHLORIDE BLD-SCNC: 82 MMOL/L (ref 98–107)
CO2: 44 MMOL/L (ref 22–29)
CO2: 45 MMOL/L (ref 22–29)
CREAT SERPL-MCNC: 1.2 MG/DL (ref 0.7–1.2)
CREAT SERPL-MCNC: 1.3 MG/DL (ref 0.7–1.2)
EOSINOPHILS ABSOLUTE: 0.12 E9/L (ref 0.05–0.5)
EOSINOPHILS RELATIVE PERCENT: 1.2 % (ref 0–6)
GFR AFRICAN AMERICAN: >60
GFR AFRICAN AMERICAN: >60
GFR NON-AFRICAN AMERICAN: 55 ML/MIN/1.73
GFR NON-AFRICAN AMERICAN: >60 ML/MIN/1.73
GLUCOSE BLD-MCNC: 260 MG/DL (ref 74–99)
GLUCOSE BLD-MCNC: 328 MG/DL (ref 74–99)
HCT VFR BLD CALC: 40.8 % (ref 37–54)
HEMOGLOBIN: 13 G/DL (ref 12.5–16.5)
IMMATURE GRANULOCYTES #: 0.05 E9/L
IMMATURE GRANULOCYTES %: 0.5 % (ref 0–5)
INR BLD: 1.7
LYMPHOCYTES ABSOLUTE: 1.16 E9/L (ref 1.5–4)
LYMPHOCYTES RELATIVE PERCENT: 11.7 % (ref 20–42)
MAGNESIUM: 2.1 MG/DL (ref 1.6–2.6)
MCH RBC QN AUTO: 31 PG (ref 26–35)
MCHC RBC AUTO-ENTMCNC: 31.9 % (ref 32–34.5)
MCV RBC AUTO: 97.4 FL (ref 80–99.9)
METER GLUCOSE: 200 MG/DL (ref 74–99)
METER GLUCOSE: 203 MG/DL (ref 74–99)
METER GLUCOSE: 216 MG/DL (ref 74–99)
METER GLUCOSE: 267 MG/DL (ref 74–99)
METER GLUCOSE: 352 MG/DL (ref 74–99)
MONOCYTES ABSOLUTE: 0.78 E9/L (ref 0.1–0.95)
MONOCYTES RELATIVE PERCENT: 7.9 % (ref 2–12)
NEUTROPHILS ABSOLUTE: 7.74 E9/L (ref 1.8–7.3)
NEUTROPHILS RELATIVE PERCENT: 78.3 % (ref 43–80)
PDW BLD-RTO: 17.8 FL (ref 11.5–15)
PLATELET # BLD: 170 E9/L (ref 130–450)
PMV BLD AUTO: 9.7 FL (ref 7–12)
POTASSIUM SERPL-SCNC: 3.1 MMOL/L (ref 3.5–5)
POTASSIUM SERPL-SCNC: 3.2 MMOL/L (ref 3.5–5)
PROTHROMBIN TIME: 19.9 SEC (ref 9.3–12.4)
RBC # BLD: 4.19 E12/L (ref 3.8–5.8)
SODIUM BLD-SCNC: 133 MMOL/L (ref 132–146)
SODIUM BLD-SCNC: 136 MMOL/L (ref 132–146)
TOTAL PROTEIN: 7.3 G/DL (ref 6.4–8.3)
WBC # BLD: 9.9 E9/L (ref 4.5–11.5)

## 2021-04-22 PROCEDURE — 97110 THERAPEUTIC EXERCISES: CPT

## 2021-04-22 PROCEDURE — 97535 SELF CARE MNGMENT TRAINING: CPT

## 2021-04-22 PROCEDURE — 6370000000 HC RX 637 (ALT 250 FOR IP): Performed by: INTERNAL MEDICINE

## 2021-04-22 PROCEDURE — 6360000002 HC RX W HCPCS: Performed by: INTERNAL MEDICINE

## 2021-04-22 PROCEDURE — 85610 PROTHROMBIN TIME: CPT

## 2021-04-22 PROCEDURE — 2500000003 HC RX 250 WO HCPCS: Performed by: NURSE PRACTITIONER

## 2021-04-22 PROCEDURE — 94640 AIRWAY INHALATION TREATMENT: CPT

## 2021-04-22 PROCEDURE — 80048 BASIC METABOLIC PNL TOTAL CA: CPT

## 2021-04-22 PROCEDURE — APPSS30 APP SPLIT SHARED TIME 16-30 MINUTES: Performed by: NURSE PRACTITIONER

## 2021-04-22 PROCEDURE — 80053 COMPREHEN METABOLIC PANEL: CPT

## 2021-04-22 PROCEDURE — 2580000003 HC RX 258: Performed by: NURSE PRACTITIONER

## 2021-04-22 PROCEDURE — 6370000000 HC RX 637 (ALT 250 FOR IP): Performed by: NURSE PRACTITIONER

## 2021-04-22 PROCEDURE — 36415 COLL VENOUS BLD VENIPUNCTURE: CPT

## 2021-04-22 PROCEDURE — 2060000000 HC ICU INTERMEDIATE R&B

## 2021-04-22 PROCEDURE — 99233 SBSQ HOSP IP/OBS HIGH 50: CPT | Performed by: INTERNAL MEDICINE

## 2021-04-22 PROCEDURE — 82962 GLUCOSE BLOOD TEST: CPT

## 2021-04-22 PROCEDURE — 97530 THERAPEUTIC ACTIVITIES: CPT

## 2021-04-22 PROCEDURE — 85025 COMPLETE CBC W/AUTO DIFF WBC: CPT

## 2021-04-22 PROCEDURE — 83735 ASSAY OF MAGNESIUM: CPT

## 2021-04-22 RX ORDER — ACETAZOLAMIDE 250 MG/1
500 TABLET ORAL 2 TIMES DAILY
Status: COMPLETED | OUTPATIENT
Start: 2021-04-22 | End: 2021-04-23

## 2021-04-22 RX ORDER — POTASSIUM BICARBONATE 25 MEQ/1
25 TABLET, EFFERVESCENT ORAL 2 TIMES DAILY
Status: DISCONTINUED | OUTPATIENT
Start: 2021-04-22 | End: 2021-04-22 | Stop reason: CLARIF

## 2021-04-22 RX ORDER — WARFARIN SODIUM 3 MG/1
6 TABLET ORAL
Status: COMPLETED | OUTPATIENT
Start: 2021-04-22 | End: 2021-04-22

## 2021-04-22 RX ORDER — POTASSIUM CHLORIDE 20 MEQ/1
40 TABLET, EXTENDED RELEASE ORAL ONCE
Status: COMPLETED | OUTPATIENT
Start: 2021-04-22 | End: 2021-04-22

## 2021-04-22 RX ORDER — INSULIN GLARGINE 100 [IU]/ML
40 INJECTION, SOLUTION SUBCUTANEOUS 2 TIMES DAILY
Status: DISCONTINUED | OUTPATIENT
Start: 2021-04-22 | End: 2021-04-25 | Stop reason: HOSPADM

## 2021-04-22 RX ADMIN — ASPIRIN 81 MG CHEWABLE TABLET 81 MG: 81 TABLET CHEWABLE at 21:44

## 2021-04-22 RX ADMIN — PANTOPRAZOLE SODIUM 40 MG: 40 TABLET, DELAYED RELEASE ORAL at 09:45

## 2021-04-22 RX ADMIN — BUMETANIDE 0.5 MG/HR: 0.25 INJECTION INTRAMUSCULAR; INTRAVENOUS at 17:44

## 2021-04-22 RX ADMIN — SPIRONOLACTONE 25 MG: 25 TABLET ORAL at 09:44

## 2021-04-22 RX ADMIN — INSULIN LISPRO 15 UNITS: 100 INJECTION, SOLUTION INTRAVENOUS; SUBCUTANEOUS at 12:40

## 2021-04-22 RX ADMIN — ANTI-FUNGAL POWDER MICONAZOLE NITRATE TALC FREE: 1.42 POWDER TOPICAL at 09:50

## 2021-04-22 RX ADMIN — METOPROLOL SUCCINATE 25 MG: 25 TABLET, EXTENDED RELEASE ORAL at 09:44

## 2021-04-22 RX ADMIN — INSULIN LISPRO 3 UNITS: 100 INJECTION, SOLUTION INTRAVENOUS; SUBCUTANEOUS at 21:46

## 2021-04-22 RX ADMIN — GABAPENTIN 200 MG: 100 CAPSULE ORAL at 14:05

## 2021-04-22 RX ADMIN — INSULIN LISPRO 6 UNITS: 100 INJECTION, SOLUTION INTRAVENOUS; SUBCUTANEOUS at 17:46

## 2021-04-22 RX ADMIN — WARFARIN 6 MG: 3 TABLET ORAL at 18:40

## 2021-04-22 RX ADMIN — PRAMIPEXOLE DIHYDROCHLORIDE 0.25 MG: 0.25 TABLET ORAL at 21:45

## 2021-04-22 RX ADMIN — AMIODARONE HYDROCHLORIDE 400 MG: 200 TABLET ORAL at 09:45

## 2021-04-22 RX ADMIN — ACETAZOLAMIDE 500 MG: 250 TABLET ORAL at 21:43

## 2021-04-22 RX ADMIN — BUDESONIDE 500 MCG: 0.5 INHALANT RESPIRATORY (INHALATION) at 18:21

## 2021-04-22 RX ADMIN — INSULIN GLARGINE 20 UNITS: 100 INJECTION, SOLUTION SUBCUTANEOUS at 09:45

## 2021-04-22 RX ADMIN — POTASSIUM BICARBONATE 20 MEQ: 782 TABLET, EFFERVESCENT ORAL at 21:44

## 2021-04-22 RX ADMIN — POTASSIUM BICARBONATE 20 MEQ: 782 TABLET, EFFERVESCENT ORAL at 12:41

## 2021-04-22 RX ADMIN — AMIODARONE HYDROCHLORIDE 400 MG: 200 TABLET ORAL at 21:44

## 2021-04-22 RX ADMIN — INSULIN LISPRO 6 UNITS: 100 INJECTION, SOLUTION INTRAVENOUS; SUBCUTANEOUS at 17:45

## 2021-04-22 RX ADMIN — PRAMIPEXOLE DIHYDROCHLORIDE 0.25 MG: 0.25 TABLET ORAL at 09:45

## 2021-04-22 RX ADMIN — METOPROLOL SUCCINATE 25 MG: 25 TABLET, EXTENDED RELEASE ORAL at 21:43

## 2021-04-22 RX ADMIN — PRAMIPEXOLE DIHYDROCHLORIDE 0.25 MG: 0.25 TABLET ORAL at 14:05

## 2021-04-22 RX ADMIN — GABAPENTIN 200 MG: 100 CAPSULE ORAL at 09:45

## 2021-04-22 RX ADMIN — ATORVASTATIN CALCIUM 80 MG: 40 TABLET, FILM COATED ORAL at 21:45

## 2021-04-22 RX ADMIN — GABAPENTIN 200 MG: 100 CAPSULE ORAL at 21:45

## 2021-04-22 RX ADMIN — ARFORMOTEROL TARTRATE 15 MCG: 15 SOLUTION RESPIRATORY (INHALATION) at 07:26

## 2021-04-22 RX ADMIN — INSULIN LISPRO 6 UNITS: 100 INJECTION, SOLUTION INTRAVENOUS; SUBCUTANEOUS at 09:58

## 2021-04-22 RX ADMIN — INSULIN LISPRO 6 UNITS: 100 INJECTION, SOLUTION INTRAVENOUS; SUBCUTANEOUS at 12:40

## 2021-04-22 RX ADMIN — INSULIN GLARGINE 40 UNITS: 100 INJECTION, SOLUTION SUBCUTANEOUS at 21:46

## 2021-04-22 RX ADMIN — POTASSIUM CHLORIDE 40 MEQ: 1500 TABLET, EXTENDED RELEASE ORAL at 16:21

## 2021-04-22 RX ADMIN — BUDESONIDE 500 MCG: 0.5 INHALANT RESPIRATORY (INHALATION) at 07:26

## 2021-04-22 RX ADMIN — ARFORMOTEROL TARTRATE 15 MCG: 15 SOLUTION RESPIRATORY (INHALATION) at 18:21

## 2021-04-22 RX ADMIN — INSULIN LISPRO 9 UNITS: 100 INJECTION, SOLUTION INTRAVENOUS; SUBCUTANEOUS at 09:57

## 2021-04-22 ASSESSMENT — PAIN DESCRIPTION - PROGRESSION: CLINICAL_PROGRESSION: NOT CHANGED

## 2021-04-22 ASSESSMENT — PAIN SCALES - GENERAL
PAINLEVEL_OUTOF10: 7
PAINLEVEL_OUTOF10: 0

## 2021-04-22 ASSESSMENT — PAIN DESCRIPTION - LOCATION
LOCATION: SCROTUM
LOCATION: SCROTUM

## 2021-04-22 ASSESSMENT — PAIN DESCRIPTION - ONSET
ONSET: ON-GOING
ONSET: ON-GOING

## 2021-04-22 ASSESSMENT — PAIN DESCRIPTION - FREQUENCY
FREQUENCY: CONTINUOUS

## 2021-04-22 ASSESSMENT — PAIN DESCRIPTION - DESCRIPTORS
DESCRIPTORS: DULL
DESCRIPTORS: DULL

## 2021-04-22 ASSESSMENT — PAIN - FUNCTIONAL ASSESSMENT: PAIN_FUNCTIONAL_ASSESSMENT: ACTIVITIES ARE NOT PREVENTED

## 2021-04-22 ASSESSMENT — PAIN DESCRIPTION - PAIN TYPE: TYPE: ACUTE PAIN

## 2021-04-22 ASSESSMENT — PAIN DESCRIPTION - ORIENTATION: ORIENTATION: LOWER

## 2021-04-22 NOTE — PROGRESS NOTES
Physical Therapy    Physical Therapy Treatment Note    Room #:  0520/0520-01  Patient Name: Jeremy Alvarez  YOB: 1955  MRN: [id number]    Referring Provider:   Rogerio Eddy DO      Date of Service: 4/22/2021    Evaluating Physical Therapist: Cordelia Anaya, PT  #[id number]       Diagnosis:   Acute systolic CHF (congestive heart failure) ([address] [I50.21]   Admitted with    bilateral lower extremities , scrotum swelling and shortness of breath     Patient Active Problem List   Diagnosis    CAD (coronary artery disease)    Hypertension    Type 2 diabetes mellitus with hyperglycemia, with long-term current use of insulin ([address]    Tobacco abuse    PAF (paroxysmal atrial fibrillation) ([address]    Status post coronary artery bypass graft    H/O mitral valve repair    Morbid obesity with BMI of 50.0-59.9, adult ([address]    Chronic bronchitis ([address]    Sleep apnea    Gastroesophageal reflux disease without esophagitis    Hyperlipidemia    Muscular deconditioning    Acute diastolic (congestive) heart failure (HCC)    Acute diastolic heart failure (HCC)    Iron deficiency anemia, unspecified    Acute combined systolic (congestive) and diastolic (congestive) heart failure (HCC)    Atrial fibrillation with RVR (HCC)    Ischemic cardiomyopathy    Nonrheumatic tricuspid valve regurgitation    Chronic anticoagulation    Stage 3 chronic kidney disease        Tentative placement recommendation: Home    Equipment recommendation:  To be determined      Prior Level of Function: Patient ambulated independently    Rehab Potential: good    for baseline if swelling reduces      Past medical history:   Past Medical History:   Diagnosis Date    Acid reflux     Acute diastolic (congestive) heart failure ([address] 6/19/2019    Arthritis     Asthma 4/16/2014    Asthmatic bronchitis , chronic ([address] 11/28/2016    CAD (coronary artery disease)     Chronic bronchitis ([address] 4/16/2014    COPD (chronic obstructive pulmonary disease) ([facility]     CB    COPD [facility]ch[facility] obstructive [facility] disease) [facility]     Diabetes mellitus [facility]     Emphysema (subcutaneous) (surgical) resulting from a procedure     Hyperlipidemia     Hypertension     Hypoxemia requiring supplemental oxygen 2/2/2015    LONG TERM ANTICOAGULENT USE     Morbid obesity with BMI of 50.0-59.9, adult ([facility] 11/27/2013    Obesity     Osteoarthritis     Sleep apnea     bilevel positive airway pressure at 13/8 with 2 L oxygen flow     Stage 3 chronic kidney disease     Tobacco abuse     Type II or unspecified type diabetes mellitus without mention of complication, not stated as uncontrolled      Past Surgical History:   Procedure Laterality Date    COLONOSCOPY      CORONARY ANGIOPLASTY WITH STENT PLACEMENT  07-23-13    Left main focal eccentric 65% distal stenosis. Large OM1 CX 50% ostial & prox narrow. Large ramus artery & LAD: Minor plaque w/o sign narrow. RCA: Dom vessel w/25% prox narrow & focal hazy eccentric 99% distal stenosis before origin RPDA & RPLCA. LV: Mild inferior hypokinesis EF 55%. Probable mild AS with 10-15mmHg. Successful PCI distal RCA w/3.5 x 12 mm BMS, 0% res stenosis. Normal distal runoff    CORONARY ARTERY BYPASS GRAFT  09-09-13    Dr Isela Nino; CABG x2, LIMA to LAD, SVG to ramus intermedius; MV repair using 30-mm Future complete ring with magic stitch between A3 and P3; rigid internal fixation of sternum using KLS plates x2; endoscopic vein harvesting of right lower extremity     ECHO COMPL W DOP COLOR FLOW  7/25/2013         HERNIA REPAIR      SINUS SURGERY         Precautions: Activity as tolerated, falls, alarm and O2 , 5 Liters of o2 via nasal cannula     SUBJECTIVE:    Social history: Patient lives with spouse   in a two story home bedroom and bathroom 2nd floor    with 4 steps  to enter with [employer] owned: None,       [address] cleared patient for PT treatment.  .  No new concerns, pt size and debility   Pt with increased tolerance and decreased assist for above activity. Pt cooperative, requires increased time to complete tasks   Plan of Care:     -Bed Mobility: Lower extremity exercises , Upper extremity exercises  and Trunk control activities   -Standing Balance: Perform strengthening exercises in standing to promote motor control with or without upper extremity support  and Instruct patient on adequate base of support to maintain balance  -Transfers: Provide instruction on proper hand and foot position for adequate transfer of weight onto lower extremities and use of gait device, Cues for hand placement, technique and safety, Facilitate weight shift forward on to lower extremities and provide necessary stabilization of bilateral lower extremities , Assist with extension of knees trunk and hip to accept weight transfer  and Provide stabilization to prevent fall   -Gait: Gait training, Standing activities to improve: base of support, weight shift, weight bearing , Exercises to improve trunk control and Exercises to improve hip and knee control   -Endurance: Utilize Supervised activities to increase level of endurance to allow for safe functional mobility including transfers and gait     Patient and or family understand(s) diagnosis, prognosis, and plan of care. Frequency of treatments: Patient will be seen  daily.        Time in  1040  Time out 1100    Total Treatment Time  10minutes      CPT codes:    Therapeutic exercises (46793)   10 minutes  1 unit(s)  Non billable time 10 minutes    [place], [place]  [id number]

## 2021-04-22 NOTE — PROGRESS NOTES
Internal Medicine Progress Note    [facility]    Melina Lee. Consuelo Reyes., F.A.C.O.I. Shelly Jara D.O., VENKATOBruceIVINOD Jimenes, MSN, APRN, NP-C  Ronald Delong. Deng Gibbons, MSN, APRN-CNP     Primary Care Physician: Silvio Askew DO   Admitting Physician:  Ethel Folres DO  Admission date and time: 4/18/2021  8:31 AM    Room:  [address] [place] [address]  Admitting diagnosis: Acute systolic CHF (congestive heart failure) [facility]) [I50.21]    Patient Name: Michael La  MRN: [id number]    Date of Service: 4/22/2021     Subjective:  Ralph Andrews is a 72 y.o. male who was seen and examined today,4/22/2021, at the bedside. Ptosis diuresed approximately 23.7 L since admission. Patient had approximately 4200 mL of urine out earlier today. Patient states he does feel better. Less leg pain and swelling. States he feels as though his abdominal wall is less swollen as well. Patient does admit to continued scrotal edema but feels it is slightly less. Admits to irritation with the Morrow catheter. No chest pain. SHort of breath with exertion. Has complaint of peripheral neuropathy which is painful in the lower extremities. Review of System:   Constitutional:   Denies fever or chills, weight loss or gain,+ fatigue/malaise. HEENT:   Denies ear pain, sore throat, sinus or eye problems. Cardiovascular:   Denies any chest pain, irregular heartbeats, or palpitations. Respiratory:   +shortness of breath with exertion, no resting dyspnea. Minimal coughing without sputum production. Gastrointestinal:   Denies nausea, vomiting, diarrhea, or constipation. Denies any abdominal pain. Less abdominal wall edema today. Genitourinary:    Voiding with use of a Morrow catheter. Extensive scrotal edema-pain. Extremities:   Persistent, extensive bilateral lower extremity edema, improved per patient.   Neurology:    Denies any headache or focal neurological deficits, + generalized weakness and peripheral neuropathy-states that his lower extremities are hypersensitive to the touch. Psch:   Denies being anxious or depressed. Musculoskeletal:    Denies  myalgias, joint complaints or back pain. Integumentary:   Denies any rashes, ulcers, or excoriations. Skin color changes of the lower extemities. Hematologic/Lymphatic:  Denies bruising or bleeding. Physical Exam:  No intake/output data recorded. Intake/Output Summary (Last 24 hours) at 4/22/2021 1607  Last data filed at 4/22/2021 1424  Gross per 24 hour   Intake 640 ml   Output 66411 ml   Net -9735 ml   I/O last 3 completed shifts: In: 640 [P.O.:640]  Out: 50484 [PXCounts include 234 beds at the [facility]:[id number]]  Patient Vitals for the past 96 hrs (Last 3 readings):   Weight   04/22/21 0523 (!) 339 lb (153.8 kg)   04/21/21 0542 (!) 349 lb 8 oz (158.5 kg)   04/20/21 0524 (!) 361 lb (163.7 kg)     Vital Signs:   Blood pressure 112/61, pulse 84, temperature 98 °F (36.7 °C), temperature source Oral, resp. rate 16, height 5' 8\" (1.727 m), weight (!) 339 lb (153.8 kg), SpO2 95 %. General appearance:  Awake and alert. Comfortable without distress  Head:  Normocephalic. No masses, lesions or tenderness. Eyes:  PERRLA. EOMI. Sclera clear. ENT:  Ears normal. Mucosa normal.  Neck:    Supple. Trachea midline. No thyromegaly. No JVD. No bruits. Heart:    Somewhat irregular rhythm with variable rate. S1 and S2, systolic murmur  Lungs:    Symmetrical, overall improved aeration with diminished bilateral bases. Clear to auscultation without wheezing, rales or rhonchi. Abdomen:   Extensive abdominal anasarca. Bowel sounds are present. No pain with palpation. Extremities:    Extensive bilateral lower extremity edema. Skin changes of the lower extremities consistent with venous stasis. Lichenification of the skin. Neurologic:    Alert x 3. No focal deficit. Cranial nerves grossly intact. Generally weak without focal weakness.   Psych:   Behavior is normal. Mood appears normal. Speech is not rapid and/or pressured. Musculoskeletal:   Spine ROM normal. Muscular strength intact. Gait not assessed. Integumentary:  No rashes  Skin changes of the lower extremities consistent with venous stasis. Lichenification of the skin. Genitalia/Breast:  Persistent scrotal swelling with Morrow catheter in place.     Medication:  Scheduled Meds:   potassium bicarb-citric acid  20 mEq Oral BID    warfarin  6 mg Oral Once    potassium chloride  40 mEq Oral Once    miconazole   Topical BID    insulin lispro  0-18 Units Subcutaneous TID WC    insulin lispro  0-9 Units Subcutaneous Nightly    insulin glargine  20 Units Subcutaneous BID    insulin lispro  6 Units Subcutaneous TID WC    amiodarone  400 mg Oral BID    aspirin  81 mg Oral Nightly    atorvastatin  80 mg Oral Nightly    gabapentin  200 mg Oral TID    metoprolol succinate  25 mg Oral BID    pantoprazole  40 mg Oral Daily    spironolactone  25 mg Oral Daily    warfarin (COUMADIN) daily dosing (placeholder)   Other RX Placeholder    Arformoterol Tartrate  15 mcg Nebulization BID    And    budesonide  0.5 mg Nebulization BID    pramipexole  0.25 mg Oral TID     Continuous Infusions:   bumetanide 0.1 mg/mL infusion 0.5 mg/hr (04/21/21 2139)    dextrose         Objective Data:  CBC with Differential:    Lab Results   Component Value Date    WBC 9.9 04/22/2021    RBC 4.19 04/22/2021    HGB 13.0 04/22/2021    HCT 40.8 04/22/2021     04/22/2021    MCV 97.4 04/22/2021    MCH 31.0 04/22/2021    MCHC 31.9 04/22/2021    RDW 17.8 04/22/2021    SEGSPCT 61 01/20/2014    LYMPHOPCT 11.7 04/22/2021    MONOPCT 7.9 04/22/2021    BASOPCT 0.4 04/22/2021    MONOSABS 0.78 04/22/2021    LYMPHSABS 1.16 04/22/2021    EOSABS 0.12 04/22/2021    BASOSABS 0.04 04/22/2021     BMP:    Lab Results   Component Value Date     04/22/2021    K 3.1 04/22/2021    K 5.7 04/18/2021    CL 77 04/22/2021    CO2 44 04/22/2021    BUN 21 04/22/2021 LABALBU 3.6 04/22/2021    LABALBU 4.4 10/27/2011    CREATININE 1.3 04/22/2021    CALCIUM 9.5 04/22/2021    GFRAA >60 04/22/2021    LABGLOM 55 04/22/2021    GLUCOSE 328 04/22/2021    GLUCOSE 118 10/27/2011       Assessment:  1. Acute on chronic systolic/diastolic congestive heart failure  2. Persistent atrial fibrillation with rapid ventricular response  3. Significant coronary artery disease with ischemic cardiomyopathy  4. History of mitral valve repair  5. Insulin-dependent diabetes mellitus type 2, hemoglobin A1c 13.1%  6. Essential hypertension  7. Hyperlipidemia  8. Obstructive sleep apnea with obesity hypoventilatory syndrome  9. Ischemic cardiomyopathy  10. Chronic anticoagulation therapy on Coumadin  11. Acute kidney injury on stage III chronic kidney disease  12. Moderate aortic stenosis  13. Pulmonary hypertension-moderate  14. Hypokalemia and hyperkalemia    Plan:   Patient continues to do well. He has diuresed approximately 23.7 L since admission. Bilateral lower leg edema as well as abdominal wall edema is much improved. Patient continues to have complaint of scrotal edema however this has improved as well. We will continue to diurese the patient. Secondary to diuresing patient is hypokalemic therefore potassium has been prescribed to replete potassium stores. Continue to monitor electrolytes. She did not tolerate liquid potassium therefore he has been changed to pill form. Cardiology is following the patient recommendations been reviewed. Patient does need better glycemic control which was discussed as patient's hemoglobin A1c was found to be 13.1. PT/OT to evaluate and treat. Increase activity as tolerated. Monitor INR and adjust Coumadin therapy accordingly.  for discharge planning. Continue current therapy. See orders for further plan of care.     More than 50% of my  time was spent at the bedside counseling/coordinating care with the patient and/or family with face to face contact. This time was spent reviewing notes and laboratory data as well as instructing and counseling the patient. Time I spent with the family or surrogate(s) is included only if the patient was incapable of providing the necessary information or participating in medical decisions. I also discussed the differential diagnosis and all of the proposed management plans with the patient and individuals accompanying the patient. [facility] requires this high level of physician care and nursing on the IMC/Telemetry unit due the complexity of decision management and chance of rapid decline or death. Continued cardiac monitoring and higher level of nursing are required. I am readily available for any further decision-making and intervention. The patient was seen, examined and then discussed with Dr. Jovan Andrews. Mary Yousif, ARLEEN - CNP  4/22/2021  4:07 PM       I saw and evaluated the patient. I agree with the findings and the plan of care as documented in Mary Yousif NP-C's  note.     Brandi Walker D.O., FACOI  5:42 PM  4/22/2021

## 2021-04-22 NOTE — PROGRESS NOTES
Report given to RN at [address]. Prior to discharge, patient was given IV furosemide and rocephin. Morrow and PIV removed. Patient left floor at 1500 via [facility]. All belongings sent home with patient. Family was notified by patient of discharge.

## 2021-04-22 NOTE — PROGRESS NOTES
Inpatient Cardiology Progress note     PATIENT IS BEING FOLLOWED FOR:CHF , with a history of coronary artery disease and valvular heart disease, paroxysmal atrial fibrillation    Johanna Bowen is a 72 y.o. male followed by Dr. Baltazar Seo as an outpatient     SUBJECTIVE: remains dyspneic but his breathing has improved especially sitting up in the chair now, denies chest pain but still has testicular pain  OBJECTIVE: Resting dyspnea has improved    ROS:  Consist: Denies fevers, chills or night sweats  Heart: Denies chest pain, palpitations, lightheadedness, dizziness or syncope  Lungs: Denies hemoptysis  GI: Denies abdominal pain, vomiting or diarrhea    PHYSICAL EXAM:   /65   Pulse 99   Temp 97.9 °F (36.6 °C) (Oral)   Resp 17   Ht 5' 8\" (1.727 m)   Wt (!) 339 lb (153.8 kg)   SpO2 90%   BMI 51.54 kg/m²    B/P Range last 24 hours: Systolic (35RDJ), VRF:357 , Min:109 , KNP:716    Diastolic (46HJI), QJS:73, Min:12, Max:80    CONST: Well developed, well nourished who appears stated age. Awake, alert and cooperative. No apparent distress  HEENT:   Head- Normocephalic, atraumatic   Eyes- Conjunctivae pink, anicteric  Throat- Oral mucosa pink and moist  Neck-  No stridor, trachea midline, no jugular venous distention. No carotid bruit  CHEST: Chest symmetrical and non-tender to palpation. No accessory muscle use or intercostal retractions  RESPIRATORY:  Lung sounds - diminished bases  CARDIOVASCULAR:     Heart Inspection- shows no noted pulsations  Heart Palpation- no heaves or thrills; PMI is non-displaced   Heart Ausculation- Regular rate and rhythm, no murmur. No s3, s4 or rub   PV: 2 plus lower extremity edema. No varicosities. Pedal pulses palpable, no clubbing or cyanosis   ABDOMEN: Soft, non-tender to light palpation. Bowel sounds present. No palpable masses no organomegaly; no abdominal bruit and positive fluid abdominal wall  MS: Good muscle strength and tone. No atrophy or abnormal movements.    : 1.07 04/19/2021      HgA1c:   Lab Results   Component Value Date    LABA1C 13.1 (H) 04/18/2021     No results found for: EAG    BNP: No results for input(s): BNP in the last 72 hours. PT/INR:   Recent Labs     04/21/21  0638 04/22/21  0635   PROTIME 22.7* 19.9*   INR 1.9 1.7     APTT:  No results for input(s): APTT in the last 72 hours. CARDIAC ENZYMES:  No results for input(s): CKTOTAL, CKMB, CKMBINDEX, TROPONINI in the last 72 hours. FASTING LIPID PANEL:  Lab Results   Component Value Date    CHOL 97 04/15/2021    HDL 29 04/15/2021    LDLCALC 46 04/15/2021    TRIG 109 04/15/2021     LIVER PROFILE:  No results for input(s): AST, ALT, LABALBU in the last 72 hours. CXR:     Telemetry:atrial fibrillation  12 lead EKG:  Echo:4/18/2021  Technically limited study, which might have affected the accuracy and the   completeness of the interpretation. Compared to prior echo, changes noted. Left ventricle size is normal.   Moderate concentric left ventricular hypertrophy. Ejection fraction is visually estimated at 55%. No regional wall motion abnormalities seen. Indeterminate diastolic function. Mildly enlarged right ventricle cavity. Right ventricle global systolic function is mildly reduced . Moderate aortic stenosis is present (GRISELDA is recommended for further   evaluation). Physiologic and/or trace tricuspid regurgitation. RVSP is 51 mmHg. Pulmonary hypertension is moderate . ASSESSMENT:          1. Acute exacerbation of combined congestive heart failure: Decompensated, better with IV diuresis, negative more than 7 L since admission, will adjust Bumex dose  2. Atrial fibrillation: Persistent, with RVR will adjust medications, rate is better controlled after starting amiodarone continue current anticoagulation. INR is subtherapeutic today but adjustments have been made  3. CAD: S/p CABG with LIMA to LAD, SVG to ramus intermedius artery  4.  Status post mitral valve repair using #30-mm Future Subcutaneous Nightly    insulin glargine  20 Units Subcutaneous BID    insulin lispro  6 Units Subcutaneous TID     amiodarone  400 mg Oral BID    aspirin  81 mg Oral Nightly    atorvastatin  80 mg Oral Nightly    gabapentin  200 mg Oral TID    metoprolol succinate  25 mg Oral BID    pantoprazole  40 mg Oral Daily    spironolactone  25 mg Oral Daily    warfarin (COUMADIN) daily dosing (placeholder)   Other RX Placeholder    Arformoterol Tartrate  15 mcg Nebulization BID    And    budesonide  0.5 mg Nebulization BID    pramipexole  0.25 mg Oral TID     Continuous Infusions:   bumetanide 0.1 mg/mL infusion 0.5 mg/hr (04/21/21 2139)    dextrose       PRN Meds:albuterol, ondansetron, glucose, dextrose, glucagon (rDNA), dextrose    I/O last 3 completed shifts: In: 0 [P.O.:880]  Out: 7575 [Urine:7575]  I/O this shift:  In: -   Out: 1050 [Urine:1050]      Objective:      Physical Exam:   /65   Pulse 99   Temp 97.9 °F (36.6 °C) (Oral)   Resp 17   Ht 5' 8\" (1.727 m)   Wt (!) 339 lb (153.8 kg)   SpO2 90%   BMI 51.54 kg/m²   CONSTITUTIONAL:  awake, alert, cooperative, no apparent distress, and appears stated age  HEAD:  normocepalic, without obvious abnormality, atraumatic  NECK:  Supple, symmetrical, trachea midline, no adenopathy, thyroid symmetric, not enlarged and no tenderness, skin normal  LUNGS:  No increased work of breathing, No accessory muscle use or intercostal retractions, good air exchange, clear to auscultation bilaterally, no crackles or wheezing  CARDIOVASCULAR:  Normal apical impulse, irregularly irregular, normal S1 and S2, no S3 3/6 systolic murmur at the apex,+ edema, no JVD, no carotid bruit. ABDOMEN:  Soft, nontender, no masses, no hepatomegaly, no splenomegaly, BS+  MUSCULOSKELETAL:  No clubbing no cyanosis. there is no redness, warmth, or swelling of the joints  full range of motion noted  NEUROLOGIC:  Alert, awake,oriented x3  SKIN:  no bruising or bleeding, normal skin color, texture, turgor and no redness, warmth, or swelling      Cardiographics  I personally reviewed the telemetry monitor strips with the following interpretation: Atrial fibrillation with controlled ventricular response     Echocardiogram: 6/20/2019,Summary   Technically suboptimal study in spite contrast administration.   Left ventricle is grossly normal in size .   Mild left ventricular concentric hypertrophy noted.   Regional wall motion abnormality suggested with inferior hypokinesis.   Mild-moderately reduced left ventricular systolic dysfunction suggested.   The left atrium is dilated.   Enlarged right atrium size.   Moderate mitral annular calcification.   Mild mitral regurgitation is present.   The aortic valve appears moderately sclerotic.   Mild tricuspid regurgitation.       Imaging  XR CHEST PORTABLE   Final Result   No acute findings in the chest             Lab Review   Lab Results   Component Value Date     04/22/2021    K 3.2 04/22/2021    K 5.7 04/18/2021    CL 82 04/22/2021    CO2 45 04/22/2021    BUN 20 04/22/2021    CREATININE 1.2 04/22/2021    GLUCOSE 260 04/22/2021    GLUCOSE 118 10/27/2011    CALCIUM 9.1 04/22/2021     Lab Results   Component Value Date    WBC 9.9 04/22/2021    HGB 13.0 04/22/2021    HCT 40.8 04/22/2021    MCV 97.4 04/22/2021     04/22/2021     I have personally reviewed the laboratory, cardiac diagnostic and radiographic testing as outlined above:    Assessment:     1. Acute exacerbation of combined congestive heart failure: Decompensated, better with IV diuresis, negative more than 24 L since admission, will continue current treatment  2. Atrial fibrillation: Persistent, with RVR will adjust medications, rate is better controlled after starting amiodarone continue current anticoagulation   3. CAD: S/p CABG with LIMA to LAD, SVG to ramus intermedius artery  4.  Status post mitral valve repair using #30-mm Future complete ring with magic stitch   between A3 and P3, with mild mitral valve regurgitation  5. Ischemic cardiomyopathy:  6. Tricuspid valve regurgitation: Mild  7. Hypertension: Controlled  8. Type 2 diabetes mellitus  9. Hyperlipidemia: On statin  10. Chronic anticoagulation  11. Acute kidney injury superimposed on stage III chronic kidney disease: We will adjust diuretic therapy  12. Metabolic alkalosis: Will give 2 doses of Diamox       Recommendations:     1.  will decrease Bumex drip to 0.2 mg/h, may transition to IV bolus tomorrow  2. Diamox 500 mg 1 by mouth twice daily for 2 doses  3. Continue the rest of treatment  4. Continue to follow intake and output  5. Lexiscan stress test prior to discharge  6. Basic metabolic panel and CBC in a.m.  7.  Further cardiac recommendations will be forthcoming pending his clinical course and diagnostic test findings    Discussed with patient    NOTE: This report was transcribed using voice recognition software.  Every effort was made to ensure accuracy; however, inadvertent computerized transcription errors may be present

## 2021-04-22 NOTE — PROGRESS NOTES
assist due to edema in scrotum;   Supine>sit MIN A      Functional Transfers Mod A x2  Sit-stand from bed  MIN A sit<>stand from EOB     MIN A sit<>stand from chair to w/w v/c's for hand placement   Min/CGA   Functional Mobility Mod A ,w/walker  Side steps next to bed MIN A with w/w less than house hold distances v/c's for walker safety  Min/CGA  with good tolerance   Balance Sitting:     Static:  SBA - EOB   Standing: Mod A  Sitting:   Static: SBA at EOB   Dynamic: MIN A   Standing: MIN A with w/w  Independent sitting   with good tolerance  CGA standing with fair + tolerance     Activity Tolerance Fair - with light activity   Scrotal pain limiting tolerance   And LE pain  Fair with light ADL tasks and functional transfers  Good with ADL activity    Visual/  Perceptual Glasses: none by bedside                           Comments: Upon arrival 1st session,  pt supine in bed, agreeable to therapy. Upon arrival 2nd session pt seated in chair. Pt educated with regards to bed mobility, functional transfers, functional mobility, hand placement, walker safety, safety awareness, LE/UE dressing, importance of increased activity. At end of session pt supine in bed all lines and tubes intact, call light within reach. · Pt has made fair  progress towards set goals.    · Continue with current plan of care      Treatment Time In:1040            Treatment Time Out: 1100   Treatment Time In:1205            Treatment Time Out: 2725    Treatment Charges: Mins Units   Ther Ex  50303     Manual Therapy 21289     Thera Activities 35302 10 1   ADL/Home Mgt 50667 10 1   Neuro Re-ed 34492     Group Therapy      Orthotic manage/training  19164     Non-Billable Time 10    Total Timed Treatment [address] [place]IGGINS/L [address]38

## 2021-04-22 NOTE — PROGRESS NOTES
Patient states he has pain. No pain medicine given due to patient drowsiness. Patient repositioning seems to provide relief.

## 2021-04-22 NOTE — PROGRESS NOTES
Pharmacy Consultation Note  (Warfarin Dosing and Monitoring)    Initial consult date: 4/18/21  Consulting physician: Dr. Case Counter    Allergies:  Pcn [penicillins] and Sulfa antibiotics    72 y.o. male    Ht Readings from Last 1 Encounters:   04/18/21 5' 8\" (1.727 m)     Wt Readings from Last 1 Encounters:   04/22/21 (!) 339 lb (153.8 kg)         Warfarin Indication Target   INR Range Home Dose  (if applicable) Diet/Feeding Tube   (Enteral feeds, nutritional drinks and increased Vitamin K in diet can decrease INR)   Atrial fibrillation 2-3 10 mg M,W,F,Sat  5 mg Tu,Th, Sun --       x Home Med? Meds Increasing INR x Home Med?  Meds Decreasing INR     Allopurinol    Azathioprine   X N Amiodarone/Propafenone/Dronedarone   Carbamazepine     Androgens   Cholestyramine     Chemotherapy (BBW: Capecitabine)   Estrogen     Ciprofloxacin/Levofloxacin   Nafcillin/Dicloxacillin     Clarithromycin/Erythromycin/Azithromycin   Barbiturates      Fluconazole/Itraconazole/Voriconazole/Ketoconazole   Phenytoin (Variable)     Metronidazole   Rifampin     Phenytoin (Variable)   Steroids (Variable/Dose Dependent)      Statins/Fenofibrate/Gemfibrozil   Sucralfate   X Y Steroids (Variable/Dose Dependent)   Other:     Sulfamethoxazole/Trimethoprim        Tramadol         Other:      Comments regarding medication interactions:      x Diseases Affecting INR x Increased Bleeding Risk   X CHF Exacerbation (Increases)  History GI Bleed/PUD    Liver Disease (Increases)  Chronic NSAID Use    Thyroid: Hyper (Increases)  Hypo (Decreases) X Chronic ASA/Antiplatelet Use (Clopidogrel/ Dipyridamole/Prasugrel/Ticagrelor)      Malignancy (Increases)  Abnormal Renal Function (dialysis, renal transplant, SCr ? 2.3 mg/dL)     History of EtOH Abuse: Acute (Increases)   Chronic (Decreases)  Liver Function (cirrhosis, bilirubin >2x ULN with AST/ALT/AP >3x ULN)    Fever (Increases)  Age > 65 years    Acute infection (Increases)  Hypertension/Uncontrolled BP Diarrhea/Dehydration (Increases)  History of stroke    Other: __________________  Other:___________________     Vitamin K or Blood product  Administration Date                    TSH:    Lab Results   Component Value Date    TSH 3.840 04/19/2021        Hepatic Function Panel:                            Lab Results   Component Value Date    ALKPHOS 169 04/19/2021    ALT 22 04/19/2021    AST 24 04/19/2021    PROT 7.2 04/19/2021    BILITOT 1.2 04/19/2021    LABALBU 3.7 04/19/2021    LABALBU 4.4 10/27/2011       Date Warfarin Dose INR Heparin or LMWH HBG/HCT PLT Comment   4/18 HOLD 3.5 -- 13.0/42.8 228    4/19 HOLD 3.4 -- 13.5/44.3 215    4/20 5 mg 2.4 -- -- --    4/21 5 mg 1.9 -- 12.7/40.1 168    4/22 <6 mg> 1.7 -- 13.0/40.8 170      Assessment and Plan:  · Allyssa Gonzáles is a 72 yom admitted with acute decompensated congestive heart failure and afib RVR. He is anticoagulated on warfarin for atrial fibrillation. · Amiodarone has been initiated for afib RVR. Patient was not on amiodarone PTA. Due to the amiodarone/warfarin interaction it is recommended to reduce home dose by ~20% if he is continued on amiodarone at discharge.   · INR = 1.7, warfarin 6 mg x 1  · Daily PT/INR until the INR is stable within the therapeutic range  · Pharmacist will follow and monitor/adjust dosing as necessary    Thank you for this consult,      Ml Shetty, Jenn, BCPS 4/22/2021 11:45 AM   [phone]

## 2021-04-23 ENCOUNTER — TELEPHONE (OUTPATIENT)
Dept: FAMILY MEDICINE CLINIC | Age: 66
End: 2021-04-23

## 2021-04-23 LAB
ALBUMIN SERPL-MCNC: 3.6 G/DL (ref 3.5–5.2)
ALP BLD-CCNC: 164 U/L (ref 40–129)
ALT SERPL-CCNC: 29 U/L (ref 0–40)
ANION GAP SERPL CALCULATED.3IONS-SCNC: 9 MMOL/L (ref 7–16)
AST SERPL-CCNC: 36 U/L (ref 0–39)
BASOPHILS ABSOLUTE: 0.04 E9/L (ref 0–0.2)
BASOPHILS RELATIVE PERCENT: 0.4 % (ref 0–2)
BILIRUB SERPL-MCNC: 2.2 MG/DL (ref 0–1.2)
BUN BLDV-MCNC: 28 MG/DL (ref 6–23)
CALCIUM SERPL-MCNC: 10.3 MG/DL (ref 8.6–10.2)
CHLORIDE BLD-SCNC: 77 MMOL/L (ref 98–107)
CO2: 50 MMOL/L (ref 22–29)
CREAT SERPL-MCNC: 1.5 MG/DL (ref 0.7–1.2)
EOSINOPHILS ABSOLUTE: 0.17 E9/L (ref 0.05–0.5)
EOSINOPHILS RELATIVE PERCENT: 1.9 % (ref 0–6)
GFR AFRICAN AMERICAN: 57
GFR NON-AFRICAN AMERICAN: 47 ML/MIN/1.73
GLUCOSE BLD-MCNC: 236 MG/DL (ref 74–99)
HCT VFR BLD CALC: 42.1 % (ref 37–54)
HEMOGLOBIN: 13.4 G/DL (ref 12.5–16.5)
IMMATURE GRANULOCYTES #: 0.04 E9/L
IMMATURE GRANULOCYTES %: 0.4 % (ref 0–5)
INR BLD: 1.5
LYMPHOCYTES ABSOLUTE: 1.32 E9/L (ref 1.5–4)
LYMPHOCYTES RELATIVE PERCENT: 14.5 % (ref 20–42)
MAGNESIUM: 2.2 MG/DL (ref 1.6–2.6)
MCH RBC QN AUTO: 30.8 PG (ref 26–35)
MCHC RBC AUTO-ENTMCNC: 31.8 % (ref 32–34.5)
MCV RBC AUTO: 96.8 FL (ref 80–99.9)
METER GLUCOSE: 185 MG/DL (ref 74–99)
METER GLUCOSE: 226 MG/DL (ref 74–99)
METER GLUCOSE: 333 MG/DL (ref 74–99)
METER GLUCOSE: 485 MG/DL (ref 74–99)
METER GLUCOSE: >500 MG/DL (ref 74–99)
MONOCYTES ABSOLUTE: 0.75 E9/L (ref 0.1–0.95)
MONOCYTES RELATIVE PERCENT: 8.3 % (ref 2–12)
NEUTROPHILS ABSOLUTE: 6.76 E9/L (ref 1.8–7.3)
NEUTROPHILS RELATIVE PERCENT: 74.5 % (ref 43–80)
PDW BLD-RTO: 16.9 FL (ref 11.5–15)
PLATELET # BLD: 186 E9/L (ref 130–450)
PMV BLD AUTO: 9.6 FL (ref 7–12)
POTASSIUM SERPL-SCNC: 3.1 MMOL/L (ref 3.5–5)
PROTHROMBIN TIME: 17.8 SEC (ref 9.3–12.4)
RBC # BLD: 4.35 E12/L (ref 3.8–5.8)
SODIUM BLD-SCNC: 136 MMOL/L (ref 132–146)
TOTAL PROTEIN: 7.4 G/DL (ref 6.4–8.3)
WBC # BLD: 9.1 E9/L (ref 4.5–11.5)

## 2021-04-23 PROCEDURE — 6360000002 HC RX W HCPCS: Performed by: INTERNAL MEDICINE

## 2021-04-23 PROCEDURE — 83735 ASSAY OF MAGNESIUM: CPT

## 2021-04-23 PROCEDURE — 6370000000 HC RX 637 (ALT 250 FOR IP): Performed by: NURSE PRACTITIONER

## 2021-04-23 PROCEDURE — 97116 GAIT TRAINING THERAPY: CPT

## 2021-04-23 PROCEDURE — 6370000000 HC RX 637 (ALT 250 FOR IP): Performed by: INTERNAL MEDICINE

## 2021-04-23 PROCEDURE — 85610 PROTHROMBIN TIME: CPT

## 2021-04-23 PROCEDURE — 97116 GAIT TRAINING THERAPY: CPT | Performed by: PHYSICAL THERAPIST

## 2021-04-23 PROCEDURE — 94640 AIRWAY INHALATION TREATMENT: CPT

## 2021-04-23 PROCEDURE — 80053 COMPREHEN METABOLIC PANEL: CPT

## 2021-04-23 PROCEDURE — 2700000000 HC OXYGEN THERAPY PER DAY

## 2021-04-23 PROCEDURE — 99232 SBSQ HOSP IP/OBS MODERATE 35: CPT | Performed by: INTERNAL MEDICINE

## 2021-04-23 PROCEDURE — 85025 COMPLETE CBC W/AUTO DIFF WBC: CPT

## 2021-04-23 PROCEDURE — 82962 GLUCOSE BLOOD TEST: CPT

## 2021-04-23 PROCEDURE — 97530 THERAPEUTIC ACTIVITIES: CPT

## 2021-04-23 PROCEDURE — 36415 COLL VENOUS BLD VENIPUNCTURE: CPT

## 2021-04-23 PROCEDURE — 2060000000 HC ICU INTERMEDIATE R&B

## 2021-04-23 PROCEDURE — 6370000000 HC RX 637 (ALT 250 FOR IP)

## 2021-04-23 RX ORDER — ACETAZOLAMIDE 250 MG/1
250 TABLET ORAL 2 TIMES DAILY
Status: DISCONTINUED | OUTPATIENT
Start: 2021-04-23 | End: 2021-04-24

## 2021-04-23 RX ORDER — WARFARIN SODIUM 7.5 MG/1
7.5 TABLET ORAL
Status: COMPLETED | OUTPATIENT
Start: 2021-04-23 | End: 2021-04-23

## 2021-04-23 RX ORDER — BUMETANIDE 1 MG/1
1 TABLET ORAL 2 TIMES DAILY
Status: DISCONTINUED | OUTPATIENT
Start: 2021-04-23 | End: 2021-04-25 | Stop reason: HOSPADM

## 2021-04-23 RX ORDER — POTASSIUM CHLORIDE 20 MEQ/1
40 TABLET, EXTENDED RELEASE ORAL 2 TIMES DAILY
Status: DISCONTINUED | OUTPATIENT
Start: 2021-04-23 | End: 2021-04-25 | Stop reason: HOSPADM

## 2021-04-23 RX ORDER — BUMETANIDE 1 MG/1
TABLET ORAL
Status: COMPLETED
Start: 2021-04-23 | End: 2021-04-23

## 2021-04-23 RX ADMIN — INSULIN LISPRO 12 UNITS: 100 INJECTION, SOLUTION INTRAVENOUS; SUBCUTANEOUS at 13:49

## 2021-04-23 RX ADMIN — BUDESONIDE 500 MCG: 0.5 INHALANT RESPIRATORY (INHALATION) at 07:35

## 2021-04-23 RX ADMIN — WARFARIN SODIUM 7.5 MG: 7.5 TABLET ORAL at 17:25

## 2021-04-23 RX ADMIN — INSULIN LISPRO 6 UNITS: 100 INJECTION, SOLUTION INTRAVENOUS; SUBCUTANEOUS at 13:50

## 2021-04-23 RX ADMIN — AMIODARONE HYDROCHLORIDE 400 MG: 200 TABLET ORAL at 22:46

## 2021-04-23 RX ADMIN — BUMETANIDE 1 MG: 1 TABLET ORAL at 13:52

## 2021-04-23 RX ADMIN — INSULIN LISPRO 3 UNITS: 100 INJECTION, SOLUTION INTRAVENOUS; SUBCUTANEOUS at 22:52

## 2021-04-23 RX ADMIN — AMIODARONE HYDROCHLORIDE 400 MG: 200 TABLET ORAL at 11:19

## 2021-04-23 RX ADMIN — METOPROLOL SUCCINATE 25 MG: 25 TABLET, EXTENDED RELEASE ORAL at 11:24

## 2021-04-23 RX ADMIN — POTASSIUM CHLORIDE 40 MEQ: 20 TABLET, EXTENDED RELEASE ORAL at 13:50

## 2021-04-23 RX ADMIN — INSULIN GLARGINE 40 UNITS: 100 INJECTION, SOLUTION SUBCUTANEOUS at 11:17

## 2021-04-23 RX ADMIN — ACETAZOLAMIDE 500 MG: 250 TABLET ORAL at 11:19

## 2021-04-23 RX ADMIN — ANTI-FUNGAL POWDER MICONAZOLE NITRATE TALC FREE: 1.42 POWDER TOPICAL at 11:20

## 2021-04-23 RX ADMIN — INSULIN GLARGINE 40 UNITS: 100 INJECTION, SOLUTION SUBCUTANEOUS at 22:51

## 2021-04-23 RX ADMIN — ASPIRIN 81 MG CHEWABLE TABLET 81 MG: 81 TABLET CHEWABLE at 22:47

## 2021-04-23 RX ADMIN — SPIRONOLACTONE 25 MG: 25 TABLET ORAL at 11:18

## 2021-04-23 RX ADMIN — INSULIN LISPRO 3 UNITS: 100 INJECTION, SOLUTION INTRAVENOUS; SUBCUTANEOUS at 17:22

## 2021-04-23 RX ADMIN — INSULIN LISPRO 6 UNITS: 100 INJECTION, SOLUTION INTRAVENOUS; SUBCUTANEOUS at 11:16

## 2021-04-23 RX ADMIN — ATORVASTATIN CALCIUM 80 MG: 40 TABLET, FILM COATED ORAL at 22:46

## 2021-04-23 RX ADMIN — GABAPENTIN 200 MG: 100 CAPSULE ORAL at 11:17

## 2021-04-23 RX ADMIN — POTASSIUM CHLORIDE 40 MEQ: 20 TABLET, EXTENDED RELEASE ORAL at 22:46

## 2021-04-23 RX ADMIN — ACETAZOLAMIDE 250 MG: 250 TABLET ORAL at 13:51

## 2021-04-23 RX ADMIN — POTASSIUM BICARBONATE 20 MEQ: 782 TABLET, EFFERVESCENT ORAL at 11:19

## 2021-04-23 RX ADMIN — PANTOPRAZOLE SODIUM 40 MG: 40 TABLET, DELAYED RELEASE ORAL at 11:18

## 2021-04-23 RX ADMIN — GABAPENTIN 200 MG: 100 CAPSULE ORAL at 13:55

## 2021-04-23 RX ADMIN — PRAMIPEXOLE DIHYDROCHLORIDE 0.25 MG: 0.25 TABLET ORAL at 11:18

## 2021-04-23 RX ADMIN — INSULIN LISPRO 6 UNITS: 100 INJECTION, SOLUTION INTRAVENOUS; SUBCUTANEOUS at 11:14

## 2021-04-23 RX ADMIN — ACETAZOLAMIDE 250 MG: 250 TABLET ORAL at 22:47

## 2021-04-23 RX ADMIN — PRAMIPEXOLE DIHYDROCHLORIDE 0.25 MG: 0.25 TABLET ORAL at 22:46

## 2021-04-23 RX ADMIN — PRAMIPEXOLE DIHYDROCHLORIDE 0.25 MG: 0.25 TABLET ORAL at 13:55

## 2021-04-23 RX ADMIN — ARFORMOTEROL TARTRATE 15 MCG: 15 SOLUTION RESPIRATORY (INHALATION) at 17:47

## 2021-04-23 RX ADMIN — ANTI-FUNGAL POWDER MICONAZOLE NITRATE TALC FREE: 1.42 POWDER TOPICAL at 22:45

## 2021-04-23 RX ADMIN — ARFORMOTEROL TARTRATE 15 MCG: 15 SOLUTION RESPIRATORY (INHALATION) at 07:35

## 2021-04-23 RX ADMIN — GABAPENTIN 200 MG: 100 CAPSULE ORAL at 22:46

## 2021-04-23 RX ADMIN — BUDESONIDE 500 MCG: 0.5 INHALANT RESPIRATORY (INHALATION) at 17:47

## 2021-04-23 RX ADMIN — INSULIN LISPRO 6 UNITS: 100 INJECTION, SOLUTION INTRAVENOUS; SUBCUTANEOUS at 17:21

## 2021-04-23 ASSESSMENT — PAIN SCALES - GENERAL
PAINLEVEL_OUTOF10: 0

## 2021-04-23 NOTE — CARE COORDINATION
SS NOTE: SW met with pt today. Pt apologized for falling asleep during visits - relating that he has,\". ..narcolepsy. Sanya Alfaro \" and he is [place]. Pt believes he may dch over the weekend and knows that his wife is unable to assist him. He relates that he will be mobile at home with his home O2. He would like [place] [address] after dch  And agrees to have his insurance provider, either [facility] or [facility] take the referral. SW recd the [place] [address] order today and will await outcome of inquiries to these 2 [place] [address] providers. SW will report findings. Pt has a CPAP and home O2 from [address]. Pt relates that if he goes home over the weekend his  and the 's father will transport him- they will need to go to his home to  the portable O2 for the transport. SS to continue. Hardeep Landa. 4/23/2021.12:40 PM.

## 2021-04-23 NOTE — PROGRESS NOTES
pulmonary disease) ([facility]     CB    COPD (chronic obstructive pulmonary disease) ([facility])     Diabetes mellitus ([facility]     Emphysema (subcutaneous) (surgical) resulting from a procedure     Hyperlipidemia     Hypertension     Hypoxemia requiring supplemental oxygen 2/2/2015    LONG TERM ANTICOAGULENT USE     Morbid obesity with BMI of 50.0-59.9, adult ([facility] 11/27/2013    Obesity     Osteoarthritis     Sleep apnea     bilevel positive airway pressure at 13/8 with 2 L oxygen flow     Stage 3 chronic kidney disease     Tobacco abuse     Type II or unspecified type diabetes mellitus without mention of complication, not stated as uncontrolled      Past Surgical History:   Procedure Laterality Date    COLONOSCOPY      CORONARY ANGIOPLASTY WITH STENT PLACEMENT  07-23-13    Left main focal eccentric 65% distal stenosis. Large OM1 CX 50% ostial & prox narrow. Large ramus artery & LAD: Minor plaque w/o sign narrow. RCA: Dom vessel w/25% prox narrow & focal hazy eccentric 99% distal stenosis before origin RPDA & RPLCA. LV: Mild inferior hypokinesis EF 55%. Probable mild AS with 10-15mmHg. Successful PCI distal RCA w/3.5 x 12 mm BMS, 0% res stenosis. Normal distal runoff    CORONARY ARTERY BYPASS GRAFT  09-09-13    Dr Buster Fleischer; CABG x2, LIMA to LAD, SVG to ramus intermedius; MV repair using 30-mm Future complete ring with magic stitch between A3 and P3; rigid internal fixation of sternum using KLS plates x2; endoscopic vein harvesting of right lower extremity     ECHO COMPL W DOP COLOR FLOW  7/25/2013         HERNIA REPAIR      SINUS SURGERY         Precautions:  Activity as tolerated, falls, alarm and O2 , 7 Liters of o2 via nasal cannula     SUBJECTIVE:    Social history: Patient lives with spouse   in a two story home bedroom and bathroom 2nd floor    with 4 steps  to enter with Rail   ; plans to stay on first floor    Equipment owned: None,       [address]   How much strength, balance, endurance and pain in bilateral feet and scrotum impairing functional mobility, gait distance, tolerance to activity and participation are barriers to d/c and require skilled intervention during hospital stay   and  impacts safe mobility and  patient requires mod assist with mobility, continues to require skilled intervention to progress activity to monitor vital signs and response to activity. More safe with directions and mobility however self limiting d/t confusion. Pt able to be redirected for gait training with walker. o2 needs increased from yesterday and able to hold po2 > 90% at 7 Liters of o2 via nasal cannula Pt with increased tolerance and decreased assist for above activity. Pt cooperative, requires increased time to complete tasks   Plan of Care:     -Bed Mobility: Lower extremity exercises , Upper extremity exercises  and Trunk control activities   -Standing Balance: Perform strengthening exercises in standing to promote motor control with or without upper extremity support  and Instruct patient on adequate base of support to maintain balance  -Transfers: Provide instruction on proper hand and foot position for adequate transfer of weight onto lower extremities and use of gait device, Cues for hand placement, technique and safety, Facilitate weight shift forward on to lower extremities and provide necessary stabilization of bilateral lower extremities , Assist with extension of knees trunk and hip to accept weight transfer  and Provide stabilization to prevent fall   -Gait: Gait training, Standing activities to improve: base of support, weight shift, weight bearing , Exercises to improve trunk control and Exercises to improve hip and knee control   -Endurance: Utilize Supervised activities to increase level of endurance to allow for safe functional mobility including transfers and gait     Patient and or family understand(s) diagnosis, prognosis, and plan of care.     Frequency of treatments: Patient will be seen  daily.        Time in  923  Time out 940    Total Treatment Time  17minutes      CPT codes:    Gait Training (15634) 15 minutes 1 unit(s)    Simeon Armenta PT

## 2021-04-23 NOTE — PROGRESS NOTES
better with IV diuresis, negative more than 32 L since admission, will continue current treatment  2. Atrial fibrillation: Persistent, with RVR will adjust medications, rate is better controlled after starting amiodarone continue current anticoagulation   3. CAD: S/p CABG with LIMA to LAD, SVG to ramus intermedius artery  4. Status post mitral valve repair using #30-mm Future complete ring with magic stitch   between A3 and P3, with mild mitral valve regurgitation  5. Ischemic cardiomyopathy:  6. Tricuspid valve regurgitation: Mild  7. Hypertension: Controlled  8. Type 2 diabetes mellitus  9. Hyperlipidemia: On statin  10. Chronic anticoagulation  11. Acute kidney injury superimposed on stage III chronic kidney disease:   12.  Metabolic alkalosis: Will give 2 doses of Diamox       Recommendations:     1. Will continue current treatment  2. Continue to follow intake and output  3. Lexiscan stress test prior to discharge  4. Basic metabolic panel and CBC in a.m.  5.  Further cardiac recommendations will be forthcoming pending his clinical course and diagnostic test findings    Discussed with patient    NOTE: This report was transcribed using voice recognition software.  Every effort was made to ensure accuracy; however, inadvertent computerized transcription errors may be present

## 2021-04-23 NOTE — PROGRESS NOTES
Nutrition Assessment    Type and Reason for Visit:  Initial, RD Nutrition Re-Screen/LOS    Nutrition Recommendations/Plan: Continue with current diet    Nutrition Assessment:  Pt admits w/ Acute Excerbation CHF w/ H/o CHF and Diabetes, CAD, COPD,. Pt tolerating diet w/ >75% p.o. intakes. Wound consult no noted pressure areas. Will continue to monitor and follow        Nutrition Related Findings:  A/ox3, missing teeth, +BS, Abd distended/rotund, BLE +3 edema, BGL elevated, renal labs elevated, -I/O -29.7      Wounds:  Wound Consult Pending(wound note: 4/21: no pressure areas)       Current Nutrition Therapies:    DIET CARB CONTROL; No Added Salt (3-4 GM);  Daily Fluid Restriction: 1500 ml    Anthropometric Measures:  · Height: 5' 8\" (172.7 cm)  · Current Body Weight: 339 lb (153.8 kg)(4/22)   · Ideal Body Weight: 154 lbs; % Ideal Body Weight 220.1 %   · BMI: 51.6  · BMI Categories: Obese Class 3 (BMI 40.0 or greater)       Nutrition Diagnosis:   No nutrition diagnosis at this time     Nutrition Interventions:   Food and/or Nutrient Delivery:  Continue Current Diet  Nutrition Education/Counseling:  No recommendation at this time   Coordination of Nutrition Care:  Continue to monitor while inpatient    Goals:  Continue to consume >75% most meals       Nutrition Monitoring and Evaluation:   Behavioral-Environmental Outcomes:  Readiness for Change   Food/Nutrient Intake Outcomes:  Food and Nutrient Intake  Physical Signs/Symptoms Outcomes:  Biochemical Data, Fluid Status or Edema, Nutrition Focused Physical Findings, Skin, Weight     Discharge Planning:    No discharge needs at this time     Electronically signed by Baljit Pretty RD, LD on 4/23/21 at 10:20 AM EDT    Contact: [phone]

## 2021-04-23 NOTE — CARE COORDINATION
SS NOTE: SW met with pt today. Pt apologized for falling asleep during visits - relating that he has,\". ..narcolepsy. Ion  Ion  \" and he is [place]. Pt believes he may dch over the weekend and knows that his wife is unable to assist him. He relates that he will be mobile at home with his home O2. He would like [facility] after dch  And agrees to have his insurance provider, [facility]. [facility] has accepted the referral and have order. They will follow pt for final dch. Pt has a CPAP and home O2 from [address]. Pt relates that if he goes home over the weekend his  and the 's father will transport him- they will need to go to his home to  the portable O2 for the transport. SS to continue. Bladimir Landa. 4/23/2021.4:17 PM.

## 2021-04-23 NOTE — PROGRESS NOTES
Pharmacy Consultation Note  (Warfarin Dosing and Monitoring)    Initial consult date: 4/18/21  Consulting physician: Dr. Jaime Lloyd    Allergies:  Pcn [penicillins] and Sulfa antibiotics    72 y.o. male    Ht Readings from Last 1 Encounters:   04/23/21 5' 8\" (1.727 m)     Wt Readings from Last 1 Encounters:   04/22/21 (!) 339 lb (153.8 kg)         Warfarin Indication Target   INR Range Home Dose  (if applicable) Diet/Feeding Tube   (Enteral feeds, nutritional drinks and increased Vitamin K in diet can decrease INR)   Atrial fibrillation 2-3 10 mg M,W,F,Sat  5 mg Tu,Th, Sun --       x Home Med? Meds Increasing INR x Home Med?  Meds Decreasing INR     Allopurinol    Azathioprine   X N Amiodarone/Propafenone/Dronedarone   Carbamazepine     Androgens   Cholestyramine     Chemotherapy (BBW: Capecitabine)   Estrogen     Ciprofloxacin/Levofloxacin   Nafcillin/Dicloxacillin     Clarithromycin/Erythromycin/Azithromycin   Barbiturates      Fluconazole/Itraconazole/Voriconazole/Ketoconazole   Phenytoin (Variable)     Metronidazole   Rifampin     Phenytoin (Variable)   Steroids (Variable/Dose Dependent)      Statins/Fenofibrate/Gemfibrozil   Sucralfate   X Y Steroids (Variable/Dose Dependent)   Other:     Sulfamethoxazole/Trimethoprim        Tramadol         Other:      Comments regarding medication interactions:      x Diseases Affecting INR x Increased Bleeding Risk   X CHF Exacerbation (Increases)  History GI Bleed/PUD    Liver Disease (Increases)  Chronic NSAID Use    Thyroid: Hyper (Increases)  Hypo (Decreases) X Chronic ASA/Antiplatelet Use (Clopidogrel/ Dipyridamole/Prasugrel/Ticagrelor)      Malignancy (Increases)  Abnormal Renal Function (dialysis, renal transplant, SCr ? 2.3 mg/dL)     History of EtOH Abuse: Acute (Increases)   Chronic (Decreases)  Liver Function (cirrhosis, bilirubin >2x ULN with AST/ALT/AP >3x ULN)    Fever (Increases)  Age > 65 years    Acute infection (Increases)  Hypertension/Uncontrolled BP Diarrhea/Dehydration (Increases)  History of stroke    Other: __________________  Other:___________________     Vitamin K or Blood product  Administration Date                    TSH:    Lab Results   Component Value Date    TSH 3.840 04/19/2021        Hepatic Function Panel:                            Lab Results   Component Value Date    ALKPHOS 164 04/23/2021    ALT 29 04/23/2021    AST 36 04/23/2021    PROT 7.4 04/23/2021    BILITOT 2.2 04/23/2021    LABALBU 3.6 04/23/2021    LABALBU 4.4 10/27/2011       Date Warfarin Dose INR Heparin or LMWH HBG/HCT PLT Comment   4/18 HOLD 3.5 -- 13.0/42.8 228    4/19 HOLD 3.4 -- 13.5/44.3 215    4/20 5 mg 2.4 -- -- --    4/21 5 mg 1.9 -- 12.7/40.1 168    4/22 6 mg 1.7 -- 13.0/40.8 170    4/23 <7.5 mg> 1.5 -- 13.4/42.1 186      Assessment and Plan:  · Alphonso Grider is a 72 yom admitted with acute decompensated congestive heart failure and afib RVR. He is anticoagulated on warfarin for atrial fibrillation. · Amiodarone has been initiated for afib RVR. Patient was not on amiodarone PTA. Due to the amiodarone/warfarin interaction it is recommended to reduce home dose by ~20% if he is continued on amiodarone at discharge.   · INR = 1.5, warfarin 7.5 mg x 1  · Daily PT/INR until the INR is stable within the therapeutic range  · Pharmacist will follow and monitor/adjust dosing as necessary    Thank you for this consult,      Juan Timmons, RonitD, BCPS 4/23/2021 2:15 PM   [phone]

## 2021-04-23 NOTE — PROGRESS NOTES
Internal Medicine Progress Note    [facility]    Gina Borges. Angeles Go, VENKATOBruceI. Lakhwinder Belle D.O., VENKATOBruceI. Frieda Camara D.O. Eloy Ch, MSN, APRN, NP-C  Loki Dunn. Fer Daugherty, MSN, APRN-CNP     Primary Care Physician: Yumiko Crooks DO   Admitting Physician:  Lore Irvin DO  Admission date and time: 4/18/2021  8:31 AM    Room:  [facility]  Admitting diagnosis: Acute systolic CHF (congestive heart failure) [facility]) [I50.21]    Patient Name: Soco Cade  MRN: [id number]    Date of Service: 4/23/2021     Subjective:  Gaurang Ny is a 72 y.o. male who was seen and examined today,4/23/2021, at the bedside. Continues to diurese well. Less irritation with Morrow catheter. States scrotal edema is improved. States abdominal lower leg edema is almost resolved. Anxious for discharge as he states his wife is home and is having issue getting a ride to and from dialysis. Admits overall he does feel better compared to when he came into the hospital.  States he is getting up and out of bed and sitting in the chair. No family present. Review of System:   Constitutional:   Denies fever or chills, weight loss or gain, having fatigue. HEENT:   Denies ear pain, sore throat, sinus or eye problems. Cardiovascular:   Denies any chest pain, irregular heartbeats, or palpitations. Respiratory:   +shortness of breath with exertion, no resting dyspnea. Minimal coughing without sputum production. Gastrointestinal:   Denies nausea, vomiting, diarrhea, or constipation. Denies any abdominal pain. Abdominal wall edema is almost resolved. .  Genitourinary:    Voiding with use of a Morrow catheter. Currently no scrotal pain-does have complaint of scrotal edema which is improved  Extremities:   Patient states that the bilateral lower extremity edema is almost resolved.   Neurology:    Denies any headache or focal neurological deficits, + generalized weakness and peripheral neuropathy-states that his lower extremities are hypersensitive to the touch. Psch:   Denies being anxious or depressed. Musculoskeletal:    Denies  myalgias, joint complaints or back pain. Integumentary:   Denies any rashes, ulcers, or excoriations. Skin color changes of the lower extemities. Hematologic/Lymphatic:  Denies bruising or bleeding. Physical Exam:  No intake/output data recorded. Intake/Output Summary (Last 24 hours) at 4/23/2021 1558  Last data filed at 4/23/2021 1410  Gross per 24 hour   Intake 346 ml   Output 29715 ml   Net -64079 ml   I/O last 3 completed shifts: In: 346 [P.O.:346]  Out: [address] [facility] [place] [Urine:[id number]]  Patient Vitals for the past 96 hrs (Last 3 readings):   Weight   04/22/21 0523 (!) 339 lb (153.8 kg)   04/21/21 0542 (!) 349 lb 8 oz (158.5 kg)   04/20/21 0524 (!) 361 lb (163.7 kg)     Vital Signs:   Blood pressure (!) 94/56, pulse 93, temperature 97.2 °F (36.2 °C), temperature source Oral, resp. rate 18, height 5' 8\" (1.727 m), weight (!) 339 lb (153.8 kg), SpO2 94 %. General appearance:  Awake and alert. Comfortable without distress  Head:  Normocephalic. No masses, lesions or tenderness. Eyes:  PERRLA. EOMI. Sclera clear. ENT:  Ears normal. Mucosa normal.  Neck:    Supple. Trachea midline. No thyromegaly. No JVD. No bruits. Heart:    Somewhat irregular rhythm with variable rate. S1 and S2, systolic murmur  Lungs:    Symmetrical, overall improved aeration with diminished bilateral bases. Clear to auscultation without wheezing, rales or rhonchi. Abdomen: Bowel sounds are present. No pain with palpation. Resolution of abdominal wall edema  Extremities:    Trace edema of the bilateral lower extremities. Skin changes of the lower extremities consistent with venous stasis. Lichenification of the skin. Neurologic:    Alert x 3. No focal deficit. Cranial nerves grossly intact. Generally weak without focal weakness.   Psych:   Behavior is normal. Mood family with face to face contact. This time was spent reviewing notes and laboratory data as well as instructing and counseling the patient. Time I spent with the family or surrogate(s) is included only if the patient was incapable of providing the necessary information or participating in medical decisions. I also discussed the differential diagnosis and all of the proposed management plans with the patient and individuals accompanying the patient. Lamond Flaming requires this high level of physician care and nursing on the IMC/Telemetry unit due the complexity of decision management and chance of rapid decline or death. Continued cardiac monitoring and higher level of nursing are required. I am readily available for any further decision-making and intervention. The patient was seen, examined and then discussed with Dr. Kayla Rae. ARLEEN Cherry - CNP  4/23/2021  3:58 PM       I saw and evaluated the patient. I agree with the findings and the plan of care as documented in Alda Kyle NP-C's  note.     Марина Schroeder D.O., FACOI  4:37 PM  4/23/2021

## 2021-04-23 NOTE — PROGRESS NOTES
Spoke with Dr. Ridge Underwood in regards to critical C02 level of 50. N.N.O. Also spoke to him about settings for bipap Dr. Donn Ayala to use the settings that he used on his home bipap.

## 2021-04-23 NOTE — PROGRESS NOTES
Physical Therapy    Physical Therapy Treatment Note    Room #:  9878/7787-21  Patient Name: Melva Cano  YOB: 1955  MRN: [id number]    Referring Provider:   Miguelangel Girard DO      Date of Service: 4/23/2021    Evaluating Physical Therapist: Kathryn Amezquita, PT  #[id number]       Diagnosis:   Acute systolic CHF (congestive heart failure) ([address] [I50.21]   Admitted with    bilateral lower extremities , scrotum swelling and shortness of breath     Patient Active Problem List   Diagnosis    CAD (coronary artery disease)    Hypertension    Type 2 diabetes mellitus with hyperglycemia, with long-term current use of insulin ([address]    Tobacco abuse    PAF (paroxysmal atrial fibrillation) ([address]    Status post coronary artery bypass graft    H/O mitral valve repair    Morbid obesity with BMI of 50.0-59.9, adult ([address]    Chronic bronchitis ([address]    Sleep apnea    Gastroesophageal reflux disease without esophagitis    Hyperlipidemia    Muscular deconditioning    Acute diastolic (congestive) heart failure (HCC)    Acute diastolic heart failure (HCC)    Iron deficiency anemia, unspecified    Acute combined systolic (congestive) and diastolic (congestive) heart failure (HCC)    Atrial fibrillation with RVR (HCC)    Ischemic cardiomyopathy    Nonrheumatic tricuspid valve regurgitation    Chronic anticoagulation    Stage 3 chronic kidney disease        Tentative placement recommendation: Home    Equipment recommendation: baritric walker      Prior Level of Function: Patient ambulated independently    Rehab Potential: good    for baseline if swelling reduces      Past medical history:   Past Medical History:   Diagnosis Date    Acid reflux     Acute diastolic (congestive) heart failure ([address] 6/19/2019    Arthritis     Asthma 4/16/2014    Asthmatic bronchitis , chronic ([address] 11/28/2016    CAD (coronary artery disease)     Chronic bronchitis ([address] 4/16/2014    COPD (chronic obstructive pulmonary disease) ([facility]     CB    [facility]ch[facility]) [facility]     Diabetes mellitus ([facility]     Emphysema (subcutaneous) (surgical) resulting from a procedure     Hyperlipidemia     Hypertension     Hypoxemia requiring supplemental oxygen 2/2/2015    LONG TERM ANTICOAGULENT USE     Morbid obesity with BMI of 50.0-59.9, adult ([facility] 11/27/2013    Obesity     Osteoarthritis     Sleep apnea     bilevel positive airway pressure at 13/8 with 2 L oxygen flow     Stage 3 chronic kidney disease     Tobacco abuse     Type II or unspecified type diabetes mellitus without mention of complication, not stated as uncontrolled      Past Surgical History:   Procedure Laterality Date    COLONOSCOPY      CORONARY ANGIOPLASTY WITH STENT PLACEMENT  07-23-13    Left main focal eccentric 65% distal stenosis. Large OM1 CX 50% ostial & prox narrow. Large ramus artery & LAD: Minor plaque w/o sign narrow. RCA: Dom vessel w/25% prox narrow & focal hazy eccentric 99% distal stenosis before origin RPDA & RPLCA. LV: Mild inferior hypokinesis EF 55%. Probable mild AS with 10-15mmHg. Successful PCI distal RCA w/3.5 x 12 mm BMS, 0% res stenosis. Normal distal runoff    CORONARY ARTERY BYPASS GRAFT  09-09-13    Dr Eugenia Brenner; CABG x2, LIMA to LAD, SVG to ramus intermedius; MV repair using 30-mm Future complete ring with magic stitch between A3 and P3; rigid internal fixation of sternum using KLS plates x2; endoscopic vein harvesting of right lower extremity     ECHO COMPL W DOP COLOR FLOW  7/25/2013         HERNIA REPAIR      SINUS SURGERY         Precautions:  Activity as tolerated, falls, alarm and O2 , 7 Liters of o2 via nasal cannula     SUBJECTIVE:    Social history: Patient lives with spouse   in a two story home bedroom and bathroom 2nd floor    with 4 steps  to enter with Rail   ; plans to stay on first floor    Equipment owned: None,       [address]   How much difficulty turning over in bed?: A Lot  How much difficulty sitting down on / standing up from a chair with arms?: A Lot  How much difficulty moving from lying on back to sitting on side of bed?: A Lot  How much help from another person moving to and from a bed to a chair?: A Little  How much help from another person needed to walk in hospital room?: A Lot  How much help from another person for climbing 3-5 steps with a railing?: A Lot  AM-PAC Inpatient Mobility Raw Score : 13  AM-PAC Inpatient T-Scale Score : 36.74  Mobility Inpatient CMS 0-100% Score: 64.91  Mobility Inpatient CMS G-Code Modifier : CL    Nursing cleared patient for PT treatment. .  No new concerns,   OBJECTIVE;   Initial Evaluation  Date: 4/20/2021 Treatment Date:  4/23/2021       Short Term/ Long Term   Goals   Was pt agreeable to Eval/treatment? Yes   yes To be met in 3 days   Pain level   10/10  Scrotum d/t swelling and bilateral lower extremities d/t neuropathy 5/10 scrotum    Bed Mobility    Rolling: Moderate assist of 1    Supine to sit: Moderate assist of 1    Sit to supine: Moderate assist of 1    Scooting: Moderate assist of 1   Rolling: Minimal assist of 1    Supine to sit: Minimal assist of 1    Sit to supine: Supervision     Scooting: Minimal assist of 1     Rolling: Supervision     Supine to sit: Supervision     Sit to supine: Supervision     Scooting: Supervision      Transfers Sit to stand:  Moderate assist of  2 from bed and chair x 2 reps; unable to sit onto chair safely d/t swollen scrotum; returned to bed Sit to stand: Minimal assist of 1 with cues for safety, technique, hand placement          Sit to stand: Supervision       Ambulation    2x4 feet using  standard walker with Moderate assist of 1   for balance and walker control; small steps with abducted stance d/t swollen scrotum 2 x 35 feet using  standard walker with Minimal assist of 1   with cues for safety,    Pt in bed at end of session    50 feet using  least restrictive device versus no device with Supervision     Stair negotiation: ascended and descended   Not assessed       5 steps with rail supervision    ROM Within functional limits        Strength BUE:  refer to OT jasonal  RLE:  4-/5  LLE:  4-/5   Increase strength in affected mm groups by 1/3 grade   Balance Sitting EOB:  fair    Dynamic Standing:  poor  D/t weakness and neuropathy and swelling Sitting EOB: good    Dynamic Standing: fair     Sitting EOB:  good    Dynamic Standing: fair       Patient is Alert & Oriented x person, place, time and situation and follows directions  Pt states \"I am confused\"  Sensation:  Patient  reports numbness and tingling bilateral lower extremities    Edema:  yes bilateral lower extremities and scrotum however improved from evaluation  Endurance: poor +     Vitals: 7 liters nasal cannula  8L due to tank settings   SPO2 at rest 94 %  SPO2 during session 100%      Patient education  Patient educated on role of Physical Therapy, risks of immobility, safety and plan of care and  importance of mobility while in hospital     Patient was educated and facilitated on techniques to increase safety and independence with bed mobility, balance, functional transfers, and functional mobility. Patient was explained the the benefits of mobility and risks of immobility. Patient response to education:   Pt verbalized understanding Pt demonstrated skill Pt requires further education in this area   Yes Partial Yes      Treatment:  Patient practiced and was instructed/facilitated in the following treatment: Patient   Sat edge of bed 15 minutes with Supervision  to increase dynamic sitting balance and activity tolerance. standing challenges for endurance and balance   pt stood from bed and amb in hallway, pt was seated while pulse ox was monitored on 8L due to tank settings. Pulse ox 100% while seated. Pt sat in chair on return to room 5 min, bed was changed and pt returned to supine.     Therapeutic Exercises: not performed        At end of session, patient in bed with alarm call light and phone within reach,   all lines and tubes intact, nursing notified. Patient would benefit from continued skilled Physical Therapy to improve functional independence and quality of life. Patient's/ family goals   home        ASSESSMENT: Patient exhibits decreased strength, balance, endurance and pain in bilateral feet and scrotum impairing functional mobility, gait distance, tolerance to activity and participation are barriers to d/c and require skilled intervention during hospital stay   and  impacts safe mobility and  patient requires mod assist with mobility, continues to require skilled intervention to progress activity to monitor vital signs and response to activity. More safe with directions and mobility however self limiting d/t confusion. Pt able to be redirected for gait training with walker. o2 needs increased from yesterday and able to hold po2 > 90% at 7 Liters of o2 via nasal cannula  Pt 100% when seated in chair in hallway. Pt with increased tolerance to amb with increased distance .    Pt cooperative, motivated to participate,  requires increased time to complete tasks   Plan of Care:     -Bed Mobility: Lower extremity exercises , Upper extremity exercises  and Trunk control activities   -Standing Balance: Perform strengthening exercises in standing to promote motor control with or without upper extremity support  and Instruct patient on adequate base of support to maintain balance  -Transfers: Provide instruction on proper hand and foot position for adequate transfer of weight onto lower extremities and use of gait device, Cues for hand placement, technique and safety, Facilitate weight shift forward on to lower extremities and provide necessary stabilization of bilateral lower extremities , Assist with extension of knees trunk and hip to accept weight transfer  and Provide stabilization to prevent fall -Gait: Gait training, Standing activities to improve: base of support, weight shift, weight bearing , Exercises to improve trunk control and Exercises to improve hip and knee control   -Endurance: Utilize Supervised activities to increase level of endurance to allow for safe functional mobility including transfers and gait     Patient and or family understand(s) diagnosis, prognosis, and plan of care. Frequency of treatments: Patient will be seen  daily.        Time in  329  Time out  415    Total Treatment Time  46minutes      CPT codes:    Therapeutic activities (52828)   16 minutes  1 unit(s)  Gait Training [phone]) 30 minutes 2 unit(s)    Yashira Vinson

## 2021-04-24 ENCOUNTER — APPOINTMENT (OUTPATIENT)
Dept: NUCLEAR MEDICINE | Age: 66
DRG: 291 | End: 2021-04-24
Payer: MEDICARE

## 2021-04-24 LAB
ALBUMIN SERPL-MCNC: 3.6 G/DL (ref 3.5–5.2)
ALP BLD-CCNC: 176 U/L (ref 40–129)
ALT SERPL-CCNC: 29 U/L (ref 0–40)
ANION GAP SERPL CALCULATED.3IONS-SCNC: 10 MMOL/L (ref 7–16)
AST SERPL-CCNC: 34 U/L (ref 0–39)
BASOPHILS ABSOLUTE: 0.05 E9/L (ref 0–0.2)
BASOPHILS RELATIVE PERCENT: 0.6 % (ref 0–2)
BILIRUB SERPL-MCNC: 2.1 MG/DL (ref 0–1.2)
BUN BLDV-MCNC: 36 MG/DL (ref 6–23)
CALCIUM SERPL-MCNC: 10.6 MG/DL (ref 8.6–10.2)
CHLORIDE BLD-SCNC: 78 MMOL/L (ref 98–107)
CO2: 46 MMOL/L (ref 22–29)
CREAT SERPL-MCNC: 1.5 MG/DL (ref 0.7–1.2)
EOSINOPHILS ABSOLUTE: 0.2 E9/L (ref 0.05–0.5)
EOSINOPHILS RELATIVE PERCENT: 2.6 % (ref 0–6)
GFR AFRICAN AMERICAN: 57
GFR NON-AFRICAN AMERICAN: 47 ML/MIN/1.73
GLUCOSE BLD-MCNC: 251 MG/DL (ref 74–99)
HCT VFR BLD CALC: 45.1 % (ref 37–54)
HEMOGLOBIN: 14.2 G/DL (ref 12.5–16.5)
IMMATURE GRANULOCYTES #: 0.04 E9/L
IMMATURE GRANULOCYTES %: 0.5 % (ref 0–5)
INR BLD: 1.4
LV EF: 65 %
LVEF MODALITY: NORMAL
LYMPHOCYTES ABSOLUTE: 1.05 E9/L (ref 1.5–4)
LYMPHOCYTES RELATIVE PERCENT: 13.5 % (ref 20–42)
MAGNESIUM: 2.5 MG/DL (ref 1.6–2.6)
MCH RBC QN AUTO: 30.9 PG (ref 26–35)
MCHC RBC AUTO-ENTMCNC: 31.5 % (ref 32–34.5)
MCV RBC AUTO: 98 FL (ref 80–99.9)
METER GLUCOSE: 231 MG/DL (ref 74–99)
METER GLUCOSE: 239 MG/DL (ref 74–99)
METER GLUCOSE: 286 MG/DL (ref 74–99)
METER GLUCOSE: 288 MG/DL (ref 74–99)
METER GLUCOSE: 295 MG/DL (ref 74–99)
MONOCYTES ABSOLUTE: 0.66 E9/L (ref 0.1–0.95)
MONOCYTES RELATIVE PERCENT: 8.5 % (ref 2–12)
NEUTROPHILS ABSOLUTE: 5.77 E9/L (ref 1.8–7.3)
NEUTROPHILS RELATIVE PERCENT: 74.3 % (ref 43–80)
PDW BLD-RTO: 16.9 FL (ref 11.5–15)
PLATELET # BLD: 211 E9/L (ref 130–450)
PMV BLD AUTO: 10.3 FL (ref 7–12)
POTASSIUM SERPL-SCNC: 3.4 MMOL/L (ref 3.5–5)
PROTHROMBIN TIME: 16.4 SEC (ref 9.3–12.4)
RBC # BLD: 4.6 E12/L (ref 3.8–5.8)
SODIUM BLD-SCNC: 134 MMOL/L (ref 132–146)
TOTAL PROTEIN: 7.2 G/DL (ref 6.4–8.3)
WBC # BLD: 7.8 E9/L (ref 4.5–11.5)

## 2021-04-24 PROCEDURE — 85025 COMPLETE CBC W/AUTO DIFF WBC: CPT

## 2021-04-24 PROCEDURE — 6360000002 HC RX W HCPCS: Performed by: INTERNAL MEDICINE

## 2021-04-24 PROCEDURE — 36415 COLL VENOUS BLD VENIPUNCTURE: CPT

## 2021-04-24 PROCEDURE — 94660 CPAP INITIATION&MGMT: CPT

## 2021-04-24 PROCEDURE — 82962 GLUCOSE BLOOD TEST: CPT

## 2021-04-24 PROCEDURE — 80053 COMPREHEN METABOLIC PANEL: CPT

## 2021-04-24 PROCEDURE — 2700000000 HC OXYGEN THERAPY PER DAY

## 2021-04-24 PROCEDURE — 85610 PROTHROMBIN TIME: CPT

## 2021-04-24 PROCEDURE — 83735 ASSAY OF MAGNESIUM: CPT

## 2021-04-24 PROCEDURE — 94640 AIRWAY INHALATION TREATMENT: CPT

## 2021-04-24 PROCEDURE — 78452 HT MUSCLE IMAGE SPECT MULT: CPT

## 2021-04-24 PROCEDURE — 93016 CV STRESS TEST SUPVJ ONLY: CPT | Performed by: INTERNAL MEDICINE

## 2021-04-24 PROCEDURE — 93017 CV STRESS TEST TRACING ONLY: CPT

## 2021-04-24 PROCEDURE — 6370000000 HC RX 637 (ALT 250 FOR IP): Performed by: INTERNAL MEDICINE

## 2021-04-24 PROCEDURE — A9500 TC99M SESTAMIBI: HCPCS | Performed by: RADIOLOGY

## 2021-04-24 PROCEDURE — 2060000000 HC ICU INTERMEDIATE R&B

## 2021-04-24 PROCEDURE — 6370000000 HC RX 637 (ALT 250 FOR IP): Performed by: NURSE PRACTITIONER

## 2021-04-24 PROCEDURE — 99233 SBSQ HOSP IP/OBS HIGH 50: CPT | Performed by: INTERNAL MEDICINE

## 2021-04-24 PROCEDURE — 3430000000 HC RX DIAGNOSTIC RADIOPHARMACEUTICAL: Performed by: RADIOLOGY

## 2021-04-24 PROCEDURE — 93018 CV STRESS TEST I&R ONLY: CPT | Performed by: INTERNAL MEDICINE

## 2021-04-24 PROCEDURE — 78452 HT MUSCLE IMAGE SPECT MULT: CPT | Performed by: INTERNAL MEDICINE

## 2021-04-24 RX ORDER — WARFARIN SODIUM 7.5 MG/1
7.5 TABLET ORAL
Status: COMPLETED | OUTPATIENT
Start: 2021-04-24 | End: 2021-04-24

## 2021-04-24 RX ADMIN — ATORVASTATIN CALCIUM 80 MG: 40 TABLET, FILM COATED ORAL at 22:15

## 2021-04-24 RX ADMIN — POTASSIUM CHLORIDE 40 MEQ: 20 TABLET, EXTENDED RELEASE ORAL at 12:15

## 2021-04-24 RX ADMIN — ARFORMOTEROL TARTRATE 15 MCG: 15 SOLUTION RESPIRATORY (INHALATION) at 06:00

## 2021-04-24 RX ADMIN — SPIRONOLACTONE 25 MG: 25 TABLET ORAL at 12:16

## 2021-04-24 RX ADMIN — INSULIN GLARGINE 40 UNITS: 100 INJECTION, SOLUTION SUBCUTANEOUS at 12:36

## 2021-04-24 RX ADMIN — BUMETANIDE 1 MG: 1 TABLET ORAL at 17:41

## 2021-04-24 RX ADMIN — Medication 30 MILLICURIE: at 09:30

## 2021-04-24 RX ADMIN — ARFORMOTEROL TARTRATE 15 MCG: 15 SOLUTION RESPIRATORY (INHALATION) at 18:47

## 2021-04-24 RX ADMIN — AMIODARONE HYDROCHLORIDE 400 MG: 200 TABLET ORAL at 12:15

## 2021-04-24 RX ADMIN — WARFARIN SODIUM 7.5 MG: 7.5 TABLET ORAL at 17:41

## 2021-04-24 RX ADMIN — PANTOPRAZOLE SODIUM 40 MG: 40 TABLET, DELAYED RELEASE ORAL at 12:16

## 2021-04-24 RX ADMIN — PRAMIPEXOLE DIHYDROCHLORIDE 0.25 MG: 0.25 TABLET ORAL at 22:15

## 2021-04-24 RX ADMIN — GABAPENTIN 200 MG: 100 CAPSULE ORAL at 22:16

## 2021-04-24 RX ADMIN — INSULIN GLARGINE 40 UNITS: 100 INJECTION, SOLUTION SUBCUTANEOUS at 22:22

## 2021-04-24 RX ADMIN — BUMETANIDE 1 MG: 1 TABLET ORAL at 12:19

## 2021-04-24 RX ADMIN — METOPROLOL SUCCINATE 25 MG: 25 TABLET, EXTENDED RELEASE ORAL at 12:15

## 2021-04-24 RX ADMIN — ASPIRIN 81 MG CHEWABLE TABLET 81 MG: 81 TABLET CHEWABLE at 22:15

## 2021-04-24 RX ADMIN — INSULIN LISPRO 3 UNITS: 100 INJECTION, SOLUTION INTRAVENOUS; SUBCUTANEOUS at 22:18

## 2021-04-24 RX ADMIN — ANTI-FUNGAL POWDER MICONAZOLE NITRATE TALC FREE: 1.42 POWDER TOPICAL at 12:42

## 2021-04-24 RX ADMIN — INSULIN LISPRO 6 UNITS: 100 INJECTION, SOLUTION INTRAVENOUS; SUBCUTANEOUS at 12:34

## 2021-04-24 RX ADMIN — REGADENOSON 0.4 MG: 0.08 INJECTION, SOLUTION INTRAVENOUS at 09:50

## 2021-04-24 RX ADMIN — BUDESONIDE 500 MCG: 0.5 INHALANT RESPIRATORY (INHALATION) at 18:47

## 2021-04-24 RX ADMIN — INSULIN LISPRO 6 UNITS: 100 INJECTION, SOLUTION INTRAVENOUS; SUBCUTANEOUS at 17:29

## 2021-04-24 RX ADMIN — Medication 10 MILLICURIE: at 08:08

## 2021-04-24 RX ADMIN — POTASSIUM CHLORIDE 40 MEQ: 20 TABLET, EXTENDED RELEASE ORAL at 22:16

## 2021-04-24 RX ADMIN — ANTI-FUNGAL POWDER MICONAZOLE NITRATE TALC FREE: 1.42 POWDER TOPICAL at 22:16

## 2021-04-24 RX ADMIN — METOPROLOL SUCCINATE 25 MG: 25 TABLET, EXTENDED RELEASE ORAL at 22:15

## 2021-04-24 RX ADMIN — INSULIN LISPRO 9 UNITS: 100 INJECTION, SOLUTION INTRAVENOUS; SUBCUTANEOUS at 12:30

## 2021-04-24 RX ADMIN — BUDESONIDE 500 MCG: 0.5 INHALANT RESPIRATORY (INHALATION) at 06:00

## 2021-04-24 RX ADMIN — INSULIN LISPRO 9 UNITS: 100 INJECTION, SOLUTION INTRAVENOUS; SUBCUTANEOUS at 17:27

## 2021-04-24 RX ADMIN — PRAMIPEXOLE DIHYDROCHLORIDE 0.25 MG: 0.25 TABLET ORAL at 12:20

## 2021-04-24 RX ADMIN — AMIODARONE HYDROCHLORIDE 400 MG: 200 TABLET ORAL at 22:15

## 2021-04-24 RX ADMIN — GABAPENTIN 200 MG: 100 CAPSULE ORAL at 12:14

## 2021-04-24 ASSESSMENT — PAIN DESCRIPTION - ORIENTATION: ORIENTATION: RIGHT;LEFT

## 2021-04-24 ASSESSMENT — PAIN DESCRIPTION - PAIN TYPE
TYPE: CHRONIC PAIN
TYPE_2: ACUTE PAIN

## 2021-04-24 ASSESSMENT — PAIN DESCRIPTION - PROGRESSION: CLINICAL_PROGRESSION: NOT CHANGED

## 2021-04-24 ASSESSMENT — PAIN SCALES - GENERAL: PAINLEVEL_OUTOF10: 0

## 2021-04-24 ASSESSMENT — PAIN DESCRIPTION - ONSET: ONSET: ON-GOING

## 2021-04-24 NOTE — PROGRESS NOTES
[facility] Cardiology Inpatient Progress Note    Patient is a 72 y.o. male of Shad Tain DO seen in hospital follow up. Chief complaint: CHF    HPI: Some SOB. No CP. Patient Active Problem List   Diagnosis    CAD (coronary artery disease)    Hypertension    Type 2 diabetes mellitus with hyperglycemia, with long-term current use of insulin (HCC)    Tobacco abuse    [facility]oxysmal [facility] fibrillation) [facility]    Status post coronary artery bypass graft    H/O mitral valve repair    Morbid obesity with BMI of 50.0-59.9, adult ([facility]    Chronic bronchitis ([facility]    Sleep apnea    Gastroesophageal reflux disease without esophagitis    Hyperlipidemia    Muscular deconditioning    Acute diastolic (congestive) heart failure (HCC)    Acute diastolic heart failure (HCC)    Iron deficiency anemia, unspecified    Acute systolic/diastolic congestive heart failure (HCC)    Atrial fibrillation with RVR (HCC)    Ischemic cardiomyopathy    Nonrheumatic tricuspid valve regurgitation    Chronic anticoagulation    Stage 3 chronic kidney disease       Allergies   Allergen Reactions    Pcn [Penicillins]      Child went to hospital   ? Reaction  Patient tolerates cephalosporins    Sulfa Antibiotics      ?        Current Facility-Administered Medications   Medication Dose Route Frequency Provider Last Rate Last Admin    technetium sestamibi (CARDIOLITE) injection 30 millicurie  30 millicurie Intravenous ONCE PRN Tom Gottlieb MD        acetaZOLAMIDE (DIAMOX) tablet 250 mg  250 mg Oral BID Reshma Barrett, DO   250 mg at 04/23/21 2247    bumetanide (BUMEX) tablet 1 mg  1 mg Oral BID Reshma Barrett DO        potassium chloride (KLOR-CON M) extended release tablet 40 mEq  40 mEq Oral BID ARLEEN Mcdaniels - CNP   40 mEq at 04/23/21 2246    regadenoson (LEXISCAN) injection 0.4 mg  0.4 mg Intravenous ONCE PRN Melva Reza MD        insulin glargine (LANTUS) injection vial 40 Units  40 Units 4.60 04/24/2021    HGB 14.2 04/24/2021    HCT 45.1 04/24/2021    MCV 98.0 04/24/2021    MCH 30.9 04/24/2021    MCHC 31.5 04/24/2021    RDW 16.9 04/24/2021     04/24/2021    MPV 10.3 04/24/2021     BMP:   Lab Results   Component Value Date     04/24/2021    K 3.4 04/24/2021    K 5.7 04/18/2021    CL 78 04/24/2021    CO2 46 04/24/2021    BUN 36 04/24/2021    LABALBU 3.6 04/24/2021    LABALBU 4.4 10/27/2011    CREATININE 1.5 04/24/2021    CALCIUM 10.6 04/24/2021    GFRAA 57 04/24/2021    LABGLOM 47 04/24/2021     Magnesium:    Lab Results   Component Value Date    MG 2.5 04/24/2021     Cardiac Enzymes:   Lab Results   Component Value Date    CKTOTAL 81 06/02/2016    CKTOTAL 754 (H) 07/24/2013    CKTOTAL 1,226 (H) 07/24/2013    CKMB 50.3 (H) 07/24/2013    CKMB 95.8 (H) 07/24/2013    CKMB 108.6 (H) 07/23/2013    TROPONINI 0.01 04/19/2021    TROPONINI 0.01 04/18/2021    TROPONINI 0.02 04/18/2021      PT/INR:    Lab Results   Component Value Date    PROTIME 16.4 04/24/2021    PROTIME 10.0 10/02/2019    INR 1.4 04/24/2021     TSH:    Lab Results   Component Value Date    TSH 3.840 04/19/2021     Rhythm Strip: atrial fibrillation. Echo Summary 4/20/2021:   Technically limited study, which might have affected the accuracy and the completeness of the interpretation. Compared to prior echo, changes noted. Left ventricle size is normal.   Moderate concentric left ventricular hypertrophy. Ejection fraction is visually estimated at 55%. No regional wall motion abnormalities seen. Indeterminate diastolic function. Mildly enlarged right ventricle cavity. Right ventricle global systolic function is mildly reduced . Moderate aortic stenosis is present (GRISELDA is recommended for further evaluation). Physiologic and/or trace tricuspid regurgitation. RVSP is 51 mmHg. Pulmonary hypertension is moderate .     ASSESSMENT & PLAN:    Patient Active Problem List   Diagnosis    CAD (coronary artery disease)  Hypertension    Type 2 diabetes mellitus with hyperglycemia, with long-term current use of insulin (HCC)    Tobacco abuse    [facility]ox[facility]    Status post coronary artery bypass graft    H/O mitral valve repair    Morbid obesity with BMI of 50.0-59.9, adult (HCC)    Chronic bronchitis (HCC)    Sleep apnea    Gastroesophageal reflux disease without esophagitis    Hyperlipidemia    Muscular deconditioning    Acute diastolic (congestive) heart failure (HCC)    Acute diastolic heart failure (HCC)    Iron deficiency anemia, unspecified    Acute systolic/diastolic congestive heart failure (HCC)    Atrial fibrillation with RVR ([facility])    Ischemic cardiomyopathy    Nonrheumatic tricuspid valve regurgitation    Chronic anticoagulation    Stage 3 chronic kidney disease     1. Acute on chronic diastolic CHF:     Diuresing. Stress done and pending. BB/aldactone/bumex. 2. Atrial fibrillation:     On BB/amio to rate control and warfarin for stroke prevention. 3. CAD: S/p CABG with LIMA to LAD, SVG to ramus intermedius artery. Stress done and pending. ASA/statin/BB. 4. Hx of MV repair using 30 mm Future ring/VHD: TTE suggest normal MV repair functioning and suggesting mild to moderate AS (peak velocity is 2.9 m/s with mean gradient of 26 mmHg). Medically manage for now. 5. Hypertension: Controlled    6. Lipids: Statin. 7. DM: Per primary service. 8. Acute on CKD: Follow labs. Palmer Gu D.O.   Cardiologist  Cardiology, [facility]

## 2021-04-24 NOTE — PLAN OF CARE
Problem: Skin Integrity:  Goal: Will show no infection signs and symptoms  Description: Will show no infection signs and symptoms  Outcome: Ongoing     Problem: Pain:  Goal: Control of acute pain  Description: Control of acute pain  Outcome: Ongoing     Problem: Falls - Risk of:  Goal: Absence of physical injury  Description: Absence of physical injury  Outcome: Ongoing

## 2021-04-24 NOTE — PROGRESS NOTES
Internal Medicine Progress Note    [facility]    Catia Found. Angi Hardy., VENKATOBruceI. Julianna Monroy D.O., SENTHIL Mcdonnell D.O. Nithya Crisostomo, MSN, APRN, NP-C  Sangita Stanford. Leslie Shook, MSN, APRN-CNP     Primary Care Physician: Logan French DO   Admitting Physician:  Vicki John DO  Admission date and time: 4/18/2021  8:31 AM    Room:  [address] [place] [address]  Admitting diagnosis: Acute systolic CHF (congestive heart failure) [facility]) [I50.21]    Patient Name: Willis Bishop  MRN: [id number]    Date of Service: 4/24/2021     Subjective:  Renee Kim is a 72 y.o. male who was seen and examined today,4/24/2021, at the bedside. Essentially resolution of bilateral lower extremity and scrotal edema. Tolerating urination without Morrow catheter presently. We have discussed the need to be more active and ambulatory today as we anticipate discharge tomorrow pending normal Lexiscan stress today he voices understanding and agreement. This is been discussed with the RN at bedside. Review of System:   Constitutional:   Denies fever or chills, weight loss or gain, improving fatigue. HEENT:   Denies ear pain, sore throat, sinus or eye problems. Cardiovascular:   Denies any chest pain, irregular heartbeats, or palpitations. Respiratory:   Less exertional dyspnea, no resting dyspnea. Minimal coughing without sputum production. Gastrointestinal:   Denies nausea, vomiting, diarrhea, or constipation. Denies any abdominal pain. Abdominal wall edema is almost resolved. .  Genitourinary:    Significant improvement and resolution of scrotal edema. Morrow catheter removed. Voiding without difficulty. Extremities:   Patient states that the bilateral lower extremity edema is essentially resolved. Neurology:    Denies any headache or focal neurological deficits, + generalized weakness and peripheral neuropathy-states that his lower extremities are hypersensitive to the touch.    Psch: pressured. Musculoskeletal:   Spine ROM normal. Muscular strength intact. Gait not assessed. Integumentary:  No rashes  Skin changes of the lower extremities consistent with venous stasis. Lichenification of the skin.    Genitalia/Breast:  Resolution of scrotal swelling with interval removal of Morrow catheter as well    Medication:  Scheduled Meds:   acetaZOLAMIDE  250 mg Oral BID    bumetanide  1 mg Oral BID    potassium chloride  40 mEq Oral BID    insulin glargine  40 Units Subcutaneous BID    miconazole   Topical BID    insulin lispro  0-18 Units Subcutaneous TID WC    insulin lispro  0-9 Units Subcutaneous Nightly    insulin lispro  6 Units Subcutaneous TID WC    amiodarone  400 mg Oral BID    aspirin  81 mg Oral Nightly    atorvastatin  80 mg Oral Nightly    gabapentin  200 mg Oral TID    metoprolol succinate  25 mg Oral BID    pantoprazole  40 mg Oral Daily    spironolactone  25 mg Oral Daily    warfarin (COUMADIN) daily dosing (placeholder)   Other RX Placeholder    Arformoterol Tartrate  15 mcg Nebulization BID    And    budesonide  0.5 mg Nebulization BID    pramipexole  0.25 mg Oral TID     Continuous Infusions:   dextrose         Objective Data:  CBC with Differential:    Lab Results   Component Value Date    WBC 7.8 04/24/2021    RBC 4.60 04/24/2021    HGB 14.2 04/24/2021    HCT 45.1 04/24/2021     04/24/2021    MCV 98.0 04/24/2021    MCH 30.9 04/24/2021    MCHC 31.5 04/24/2021    RDW 16.9 04/24/2021    SEGSPCT 61 01/20/2014    LYMPHOPCT 13.5 04/24/2021    MONOPCT 8.5 04/24/2021    BASOPCT 0.6 04/24/2021    MONOSABS 0.66 04/24/2021    LYMPHSABS 1.05 04/24/2021    EOSABS 0.20 04/24/2021    BASOSABS 0.05 04/24/2021     BMP:    Lab Results   Component Value Date     04/24/2021    K 3.4 04/24/2021    K 5.7 04/18/2021    CL 78 04/24/2021    CO2 46 04/24/2021    BUN 36 04/24/2021    LABALBU 3.6 04/24/2021    LABALBU 4.4 10/27/2011    CREATININE 1.5 04/24/2021    CALCIUM 10.6 04/24/2021    GFRAA 57 04/24/2021    LABGLOM 47 04/24/2021    GLUCOSE 251 04/24/2021    GLUCOSE 118 10/27/2011       Assessment:  1. Acute on chronic systolic/diastolic congestive heart failure  2. Persistent atrial fibrillation with rapid ventricular response  3. Significant coronary artery disease with ischemic cardiomyopathy  4. History of mitral valve repair  5. Insulin-dependent diabetes mellitus type 2, hemoglobin A1c 13.1%  6. Essential hypertension  7. Hyperlipidemia  8. Obstructive sleep apnea with obesity hypoventilatory syndrome  9. Ischemic cardiomyopathy  10. Chronic anticoagulation therapy on Coumadin  11. Acute kidney injury on stage III chronic kidney disease  12. Moderate aortic stenosis  13. Pulmonary hypertension-moderate  14. Hypokalemia and hyperkalemia    Plan:   Cristina Sorensen has significantly improved. He is on aggressive diuretic regimen as well as fluid and sodium restricted diet. His edema has resolved. He is voiding without difficulty after removal of Morrow catheter. He is at his baseline degree of renal insufficiency. Has developed hypochloremia and contraction alkalosis, Diamox will be discontinued. Cardiology is following, stress test will be undertaken today and depending upon result he will be for discharge tomorrow. We have discussed with the patient and nursing the need to become more active, more ambulatory and attempt to wean from oxygen today. Chronic comorbidities are well managed. Electrolyte abnormalities are being replaced. Continue current therapy. See orders for further plan of care. More than 50% of my  time was spent at the bedside counseling/coordinating care with the patient and/or family with face to face contact. This time was spent reviewing notes and laboratory data as well as instructing and counseling the patient.  Time I spent with the family or surrogate(s) is included only if the patient was incapable of providing the necessary information or participating in medical decisions. I also discussed the differential diagnosis and all of the proposed management plans with the patient and individuals accompanying the patient. [facility] requires this high level of physician care and nursing on the IMC/Telemetry unit due the complexity of decision management and chance of rapid decline or death. Continued cardiac monitoring and higher level of nursing are required. I am readily available for any further decision-making and intervention.      ARLEEN Jones - CNP  4/24/2021  9:51 AM

## 2021-04-24 NOTE — PROGRESS NOTES
Pharmacy Consultation Note  (Warfarin Dosing and Monitoring)    Initial consult date: 4/18/21  Consulting physician: Dr. Srinath Coffey    Allergies:  Pcn [penicillins] and Sulfa antibiotics    72 y.o. male    Ht Readings from Last 1 Encounters:   04/23/21 5' 8\" (1.727 m)     Wt Readings from Last 1 Encounters:   04/24/21 299 lb 9.6 oz (135.9 kg)         Warfarin Indication Target   INR Range Home Dose  (if applicable) Diet/Feeding Tube   (Enteral feeds, nutritional drinks and increased Vitamin K in diet can decrease INR)   Atrial fibrillation 2-3 10 mg M,W,F,Sat  5 mg Tu,Th, Sun --       x Home Med? Meds Increasing INR x Home Med?  Meds Decreasing INR     Allopurinol    Azathioprine   X N Amiodarone/Propafenone/Dronedarone   Carbamazepine     Androgens   Cholestyramine     Chemotherapy (BBW: Capecitabine)   Estrogen     Ciprofloxacin/Levofloxacin   Nafcillin/Dicloxacillin     Clarithromycin/Erythromycin/Azithromycin   Barbiturates      Fluconazole/Itraconazole/Voriconazole/Ketoconazole   Phenytoin (Variable)     Metronidazole   Rifampin     Phenytoin (Variable)   Steroids (Variable/Dose Dependent)      Statins/Fenofibrate/Gemfibrozil   Sucralfate   X Y Steroids (Variable/Dose Dependent)   Other:     Sulfamethoxazole/Trimethoprim        Tramadol         Other:      Comments regarding medication interactions:      x Diseases Affecting INR x Increased Bleeding Risk   X CHF Exacerbation (Increases)  History GI Bleed/PUD    Liver Disease (Increases)  Chronic NSAID Use    Thyroid: Hyper (Increases)  Hypo (Decreases) X Chronic ASA/Antiplatelet Use (Clopidogrel/ Dipyridamole/Prasugrel/Ticagrelor)      Malignancy (Increases)  Abnormal Renal Function (dialysis, renal transplant, SCr ? 2.3 mg/dL)     History of EtOH Abuse: Acute (Increases)   Chronic (Decreases)  Liver Function (cirrhosis, bilirubin >2x ULN with AST/ALT/AP >3x ULN)    Fever (Increases)  Age > 65 years    Acute infection (Increases)  Hypertension/Uncontrolled BP Diarrhea/Dehydration (Increases)  History of stroke    Other: __________________  Other:___________________     Vitamin K or Blood product  Administration Date                    TSH:    Lab Results   Component Value Date    TSH 3.840 04/19/2021        Hepatic Function Panel:                            Lab Results   Component Value Date    ALKPHOS 176 04/24/2021    ALT 29 04/24/2021    AST 34 04/24/2021    PROT 7.2 04/24/2021    BILITOT 2.1 04/24/2021    LABALBU 3.6 04/24/2021    LABALBU 4.4 10/27/2011       Date Warfarin Dose INR Heparin or LMWH HBG/HCT PLT Comment   4/18 HOLD 3.5 -- 13.0/42.8 228    4/19 HOLD 3.4 -- 13.5/44.3 215    4/20 5 mg 2.4 -- -- --    4/21 5 mg 1.9 -- 12.7/40.1 168    4/22 6 mg 1.7 -- 13.0/40.8 170    4/23 7.5 mg 1.5 -- 13.4/42.1 186    4/24 <7.5 mg> 1.4 -- 14.2/45.1 211      Assessment and Plan:  · Cornel Mansfield is a 72 yom admitted with acute decompensated congestive heart failure and afib RVR. He is anticoagulated on warfarin for atrial fibrillation. · Amiodarone has been initiated for afib RVR. Patient was not on amiodarone PTA. Due to the amiodarone/warfarin interaction it is recommended to reduce home dose by ~20% if he is continued on amiodarone at discharge.   · INR = 1.4, warfarin 7.5 mg x 1  · Daily PT/INR until the INR is stable within the therapeutic range  · Pharmacist will follow and monitor/adjust dosing as necessary    Thank you for this consult,      Tae Lan, PharmD, BCPS 4/24/2021 11:46 AM   [phone]

## 2021-04-24 NOTE — CARE COORDINATION
Ss note:4/24/2021.1:37 PM consult noted for discharge home tomorrow with [facility]. Alvina Doran is a provider for [facility] [employer], they have accepted pt and have orders. Sw notified liaison Lara of discharge home tomorrow. Sw contacted pts wife who relays she will arrange for someone to come and pick her  up, just call her tomorrow (she was currently in her dialysis treatment.) Pt has 02 from [facility] so family will need to bring portable tank for discharge home.  RITU Kebede

## 2021-04-24 NOTE — PROGRESS NOTES
Throughout shift pt kept removing nasal cannula. Pt desat several times 70- 85%, due to him removing it. Pt was redirected each time and educated on importance to keep nasal cannula on. Respiratory was also called to place pt on cpap.

## 2021-04-25 VITALS
HEIGHT: 68 IN | SYSTOLIC BLOOD PRESSURE: 116 MMHG | DIASTOLIC BLOOD PRESSURE: 72 MMHG | OXYGEN SATURATION: 94 % | HEART RATE: 90 BPM | WEIGHT: 314.31 LBS | RESPIRATION RATE: 18 BRPM | TEMPERATURE: 98 F | BODY MASS INDEX: 47.64 KG/M2

## 2021-04-25 LAB
ALBUMIN SERPL-MCNC: 3.5 G/DL (ref 3.5–5.2)
ALP BLD-CCNC: 187 U/L (ref 40–129)
ALT SERPL-CCNC: 26 U/L (ref 0–40)
ANION GAP SERPL CALCULATED.3IONS-SCNC: 10 MMOL/L (ref 7–16)
AST SERPL-CCNC: 29 U/L (ref 0–39)
BASOPHILS ABSOLUTE: 0.05 E9/L (ref 0–0.2)
BASOPHILS RELATIVE PERCENT: 0.6 % (ref 0–2)
BILIRUB SERPL-MCNC: 1.8 MG/DL (ref 0–1.2)
BUN BLDV-MCNC: 40 MG/DL (ref 6–23)
CALCIUM SERPL-MCNC: 10.7 MG/DL (ref 8.6–10.2)
CHLORIDE BLD-SCNC: 81 MMOL/L (ref 98–107)
CO2: 42 MMOL/L (ref 22–29)
CREAT SERPL-MCNC: 1.4 MG/DL (ref 0.7–1.2)
EOSINOPHILS ABSOLUTE: 0.16 E9/L (ref 0.05–0.5)
EOSINOPHILS RELATIVE PERCENT: 1.8 % (ref 0–6)
GFR AFRICAN AMERICAN: >60
GFR NON-AFRICAN AMERICAN: 51 ML/MIN/1.73
GLUCOSE BLD-MCNC: 210 MG/DL (ref 74–99)
HCT VFR BLD CALC: 48.1 % (ref 37–54)
HEMOGLOBIN: 14.8 G/DL (ref 12.5–16.5)
IMMATURE GRANULOCYTES #: 0.04 E9/L
IMMATURE GRANULOCYTES %: 0.4 % (ref 0–5)
INR BLD: 1.5
LYMPHOCYTES ABSOLUTE: 1.06 E9/L (ref 1.5–4)
LYMPHOCYTES RELATIVE PERCENT: 11.8 % (ref 20–42)
MAGNESIUM: 2.5 MG/DL (ref 1.6–2.6)
MCH RBC QN AUTO: 30.4 PG (ref 26–35)
MCHC RBC AUTO-ENTMCNC: 30.8 % (ref 32–34.5)
MCV RBC AUTO: 98.8 FL (ref 80–99.9)
METER GLUCOSE: 207 MG/DL (ref 74–99)
METER GLUCOSE: 293 MG/DL (ref 74–99)
MONOCYTES ABSOLUTE: 0.75 E9/L (ref 0.1–0.95)
MONOCYTES RELATIVE PERCENT: 8.4 % (ref 2–12)
NEUTROPHILS ABSOLUTE: 6.91 E9/L (ref 1.8–7.3)
NEUTROPHILS RELATIVE PERCENT: 77 % (ref 43–80)
PDW BLD-RTO: 16.9 FL (ref 11.5–15)
PLATELET # BLD: 241 E9/L (ref 130–450)
PMV BLD AUTO: 10.1 FL (ref 7–12)
POTASSIUM SERPL-SCNC: 3.8 MMOL/L (ref 3.5–5)
PROTHROMBIN TIME: 17.7 SEC (ref 9.3–12.4)
RBC # BLD: 4.87 E12/L (ref 3.8–5.8)
SODIUM BLD-SCNC: 133 MMOL/L (ref 132–146)
TOTAL PROTEIN: 7.8 G/DL (ref 6.4–8.3)
WBC # BLD: 9 E9/L (ref 4.5–11.5)

## 2021-04-25 PROCEDURE — 85610 PROTHROMBIN TIME: CPT

## 2021-04-25 PROCEDURE — 83735 ASSAY OF MAGNESIUM: CPT

## 2021-04-25 PROCEDURE — 82962 GLUCOSE BLOOD TEST: CPT

## 2021-04-25 PROCEDURE — 94660 CPAP INITIATION&MGMT: CPT

## 2021-04-25 PROCEDURE — 6360000002 HC RX W HCPCS: Performed by: INTERNAL MEDICINE

## 2021-04-25 PROCEDURE — 6370000000 HC RX 637 (ALT 250 FOR IP): Performed by: NURSE PRACTITIONER

## 2021-04-25 PROCEDURE — 80053 COMPREHEN METABOLIC PANEL: CPT

## 2021-04-25 PROCEDURE — 94640 AIRWAY INHALATION TREATMENT: CPT

## 2021-04-25 PROCEDURE — 99233 SBSQ HOSP IP/OBS HIGH 50: CPT | Performed by: INTERNAL MEDICINE

## 2021-04-25 PROCEDURE — 36415 COLL VENOUS BLD VENIPUNCTURE: CPT

## 2021-04-25 PROCEDURE — 6370000000 HC RX 637 (ALT 250 FOR IP): Performed by: INTERNAL MEDICINE

## 2021-04-25 PROCEDURE — 85025 COMPLETE CBC W/AUTO DIFF WBC: CPT

## 2021-04-25 RX ORDER — LANCETS 30 GAUGE
1 EACH MISCELLANEOUS
Qty: 300 EACH | Refills: 1 | Status: SHIPPED | OUTPATIENT
Start: 2021-04-25 | End: 2021-10-28 | Stop reason: ALTCHOICE

## 2021-04-25 RX ORDER — METOPROLOL SUCCINATE 25 MG/1
25 TABLET, EXTENDED RELEASE ORAL 2 TIMES DAILY
Qty: 30 TABLET | Refills: 3 | Status: SHIPPED | OUTPATIENT
Start: 2021-04-25 | End: 2021-06-09 | Stop reason: ALTCHOICE

## 2021-04-25 RX ORDER — GABAPENTIN 100 MG/1
200 CAPSULE ORAL 3 TIMES DAILY
Qty: 180 CAPSULE | Refills: 0 | Status: SHIPPED
Start: 2021-04-25 | End: 2021-06-09 | Stop reason: SDUPTHER

## 2021-04-25 RX ORDER — WARFARIN SODIUM 10 MG/1
10 TABLET ORAL
Status: DISCONTINUED | OUTPATIENT
Start: 2021-04-25 | End: 2021-04-25 | Stop reason: HOSPADM

## 2021-04-25 RX ORDER — POTASSIUM CHLORIDE 20 MEQ/1
40 TABLET, EXTENDED RELEASE ORAL 2 TIMES DAILY
Qty: 60 TABLET | Refills: 3 | Status: ON HOLD
Start: 2021-04-25 | End: 2021-06-14 | Stop reason: HOSPADM

## 2021-04-25 RX ORDER — BUMETANIDE 1 MG/1
1 TABLET ORAL 2 TIMES DAILY
Qty: 30 TABLET | Refills: 3 | Status: SHIPPED | OUTPATIENT
Start: 2021-04-25 | End: 2021-06-04 | Stop reason: SDUPTHER

## 2021-04-25 RX ORDER — INSULIN GLARGINE 100 [IU]/ML
40 INJECTION, SOLUTION SUBCUTANEOUS 2 TIMES DAILY
Qty: 5 PEN | Refills: 3 | Status: SHIPPED | OUTPATIENT
Start: 2021-04-25 | End: 2021-06-09

## 2021-04-25 RX ORDER — INSULIN LISPRO 100 [IU]/ML
INJECTION, SOLUTION INTRAVENOUS; SUBCUTANEOUS
Qty: 5 PEN | Refills: 0 | Status: SHIPPED | OUTPATIENT
Start: 2021-04-25 | End: 2022-04-08 | Stop reason: SDUPTHER

## 2021-04-25 RX ADMIN — ARFORMOTEROL TARTRATE 15 MCG: 15 SOLUTION RESPIRATORY (INHALATION) at 06:02

## 2021-04-25 RX ADMIN — SPIRONOLACTONE 25 MG: 25 TABLET ORAL at 09:56

## 2021-04-25 RX ADMIN — GABAPENTIN 200 MG: 100 CAPSULE ORAL at 14:26

## 2021-04-25 RX ADMIN — BUDESONIDE 500 MCG: 0.5 INHALANT RESPIRATORY (INHALATION) at 06:02

## 2021-04-25 RX ADMIN — PRAMIPEXOLE DIHYDROCHLORIDE 0.25 MG: 0.25 TABLET ORAL at 14:26

## 2021-04-25 RX ADMIN — GABAPENTIN 200 MG: 100 CAPSULE ORAL at 09:55

## 2021-04-25 RX ADMIN — AMIODARONE HYDROCHLORIDE 400 MG: 200 TABLET ORAL at 09:55

## 2021-04-25 RX ADMIN — POTASSIUM CHLORIDE 40 MEQ: 20 TABLET, EXTENDED RELEASE ORAL at 09:54

## 2021-04-25 RX ADMIN — INSULIN LISPRO 6 UNITS: 100 INJECTION, SOLUTION INTRAVENOUS; SUBCUTANEOUS at 09:00

## 2021-04-25 RX ADMIN — ALBUTEROL SULFATE 2.5 MG: 2.5 SOLUTION RESPIRATORY (INHALATION) at 06:00

## 2021-04-25 RX ADMIN — INSULIN GLARGINE 40 UNITS: 100 INJECTION, SOLUTION SUBCUTANEOUS at 09:07

## 2021-04-25 RX ADMIN — PRAMIPEXOLE DIHYDROCHLORIDE 0.25 MG: 0.25 TABLET ORAL at 09:55

## 2021-04-25 RX ADMIN — BUMETANIDE 1 MG: 1 TABLET ORAL at 10:00

## 2021-04-25 RX ADMIN — INSULIN LISPRO 6 UNITS: 100 INJECTION, SOLUTION INTRAVENOUS; SUBCUTANEOUS at 09:06

## 2021-04-25 RX ADMIN — METOPROLOL SUCCINATE 25 MG: 25 TABLET, EXTENDED RELEASE ORAL at 09:55

## 2021-04-25 RX ADMIN — PANTOPRAZOLE SODIUM 40 MG: 40 TABLET, DELAYED RELEASE ORAL at 10:00

## 2021-04-25 ASSESSMENT — PAIN SCALES - GENERAL
PAINLEVEL_OUTOF10: 2
PAINLEVEL_OUTOF10: 0

## 2021-04-25 NOTE — PROGRESS NOTES
[facility] Cardiology Inpatient Progress Note    Patient is a 72 y.o. male of Kemar Pendleton DO seen in hospital follow up. Chief complaint: CHF    HPI: Some SOB. No CP. Patient Active Problem List   Diagnosis    CAD (coronary artery disease)    Hypertension    Type 2 diabetes mellitus with hyperglycemia, with long-term current use of insulin (HCC)    Tobacco abuse    [facility]oxysmal [facility]) ([facility]    Status post coronary artery bypass graft    H/O mitral valve repair    Morbid obesity with BMI of 50.0-59.9, adult ([facility]    Chronic bronchitis ([facility] [facility]    Sleep apnea    Gastroesophageal reflux disease without esophagitis    Hyperlipidemia    Muscular deconditioning    Acute diastolic (congestive) heart failure (HCC)    Acute diastolic heart failure (HCC)    Iron deficiency anemia, unspecified    Acute systolic/diastolic congestive heart failure (HCC)    Atrial fibrillation with RVR (HCC)    Ischemic cardiomyopathy    Nonrheumatic tricuspid valve regurgitation    Chronic anticoagulation    Stage 3 chronic kidney disease    Acute on chronic congestive heart failure (HCC)       Allergies   Allergen Reactions    Pcn [Penicillins]      Child went to hospital   ? Reaction  Patient tolerates cephalosporins    Sulfa Antibiotics      ?        Current Facility-Administered Medications   Medication Dose Route Frequency Provider Last Rate Last Admin    bumetanide (BUMEX) tablet 1 mg  1 mg Oral BID Bret Barrett, DO   1 mg at 04/24/21 1741    potassium chloride (KLOR-CON M) extended release tablet 40 mEq  40 mEq Oral BID ARLEEN Small CNP   40 mEq at 04/24/21 2216    insulin glargine (LANTUS) injection vial 40 Units  40 Units Subcutaneous BID Susanne Perera DO   40 Units at 04/25/21 0907    miconazole (MICOTIN) 2 % powder   Topical BID Bay Ferreira DO   Given at 04/24/21 2216    insulin lispro (HUMALOG) injection vial 0-18 Units  0-18 Units Subcutaneous TID PRIYANK Alfaro Bisel, DO   6 Units at 04/25/21 0900    insulin lispro (HUMALOG) injection vial 0-9 Units  0-9 Units Subcutaneous Nightly Maribel Butler, DO   3 Units at 04/24/21 2218    insulin lispro (HUMALOG) injection vial 6 Units  6 Units Subcutaneous TID WC Otelia Ang, DO   6 Units at 04/25/21 8602    amiodarone (CORDARONE) tablet 400 mg  400 mg Oral BID Sharita White MD   400 mg at 04/24/21 2215    aspirin chewable tablet 81 mg  81 mg Oral Nightly Otelia Ang, DO   81 mg at 04/24/21 2215    atorvastatin (LIPITOR) tablet 80 mg  80 mg Oral Nightly Otelia Ang, DO   80 mg at 04/24/21 2215    gabapentin (NEURONTIN) capsule 200 mg  200 mg Oral TID Otelia Ang, DO   200 mg at 04/24/21 2216    metoprolol succinate (TOPROL XL) extended release tablet 25 mg  25 mg Oral BID Otelia Ang, DO   25 mg at 04/24/21 2215    pantoprazole (PROTONIX) tablet 40 mg  40 mg Oral Daily Otelia Ang, DO   40 mg at 04/24/21 1216    spironolactone (ALDACTONE) tablet 25 mg  25 mg Oral Daily Otelia Ang, DO   25 mg at 04/24/21 1216    albuterol (PROVENTIL) nebulizer solution 2.5 mg  2.5 mg Nebulization Q6H PRN Otelia Ang, DO   2.5 mg at 04/25/21 0600    ondansetron (ZOFRAN) injection 4 mg  4 mg Intravenous Q6H PRN Otelia Ang, DO        glucose (GLUTOSE) 40 % oral gel 15 g  15 g Oral PRN Otelia Ang, DO        dextrose 50 % IV solution  12.5 g Intravenous PRN Otelia Ang, DO        glucagon (rDNA) injection 1 mg  1 mg Intramuscular PRN Otelia Ang, DO        dextrose 5 % solution  100 mL/hr Intravenous PRN Otelia Ang, DO        warfarin (COUMADIN) daily dosing (placeholder)   Other RX Placeholder Otelia Ang, DO        Arformoterol Tartrate (BROVANA) nebulizer solution 15 mcg  15 mcg Nebulization BID Otelia Ang, DO   15 mcg at 04/25/21 0602    And    budesonide (PULMICORT) nebulizer suspension 500 mcg  0.5 mg Nebulization BID Otelia Ang, DO   500 mcg at 04/25/21 0602    pramipexole (MIRAPEX) tablet 0.25 mg  0.25 mg Oral TID [facility] Value Date     04/25/2021    K 3.8 04/25/2021    K 5.7 04/18/2021    CL 81 04/25/2021    CO2 42 04/25/2021    BUN 40 04/25/2021    LABALBU 3.5 04/25/2021    LABALBU 4.4 10/27/2011    CREATININE 1.4 04/25/2021    CALCIUM 10.7 04/25/2021    GFRAA >60 04/25/2021    LABGLOM 51 04/25/2021     Magnesium:    Lab Results   Component Value Date    MG 2.5 04/25/2021     Cardiac Enzymes:   Lab Results   Component Value Date    CKTOTAL 81 06/02/2016    CKTOTAL 754 (H) 07/24/2013    CKTOTAL 1,226 (H) 07/24/2013    CKMB 50.3 (H) 07/24/2013    CKMB 95.8 (H) 07/24/2013    CKMB 108.6 (H) 07/23/2013    TROPONINI 0.01 04/19/2021    TROPONINI 0.01 04/18/2021    TROPONINI 0.02 04/18/2021      PT/INR:    Lab Results   Component Value Date    PROTIME 17.7 04/25/2021    PROTIME 10.0 10/02/2019    INR 1.5 04/25/2021     TSH:    Lab Results   Component Value Date    TSH 3.840 04/19/2021     Rhythm Strip: atrial fibrillation. Echo Summary 4/20/2021:   Technically limited study, which might have affected the accuracy and the completeness of the interpretation. Compared to prior echo, changes noted. Left ventricle size is normal.   Moderate concentric left ventricular hypertrophy. Ejection fraction is visually estimated at 55%. No regional wall motion abnormalities seen. Indeterminate diastolic function. Mildly enlarged right ventricle cavity. Right ventricle global systolic function is mildly reduced . Moderate aortic stenosis is present (GRISELDA is recommended for further evaluation). Physiologic and/or trace tricuspid regurgitation. RVSP is 51 mmHg. Pulmonary hypertension is moderate .     Stress Summary 4/24/2021:  The myocardial perfusion imaging was normal with attenuation.       Overall left ventricular systolic function was normal without regional   wall motion abnormalities.       Overall low risk study.         ASSESSMENT & PLAN:    Patient Active Problem List   Diagnosis    CAD (coronary artery disease)   

## 2021-04-25 NOTE — PROGRESS NOTES
Pharmacy Consultation Note  (Warfarin Dosing and Monitoring)    Initial consult date: 4/18/21  Consulting physician: Dr. Liliya Delatorre    Allergies:  Pcn [penicillins] and Sulfa antibiotics    72 y.o. male    Ht Readings from Last 1 Encounters:   04/23/21 5' 8\" (1.727 m)     Wt Readings from Last 1 Encounters:   04/25/21 (!) 314 lb 5 oz (142.6 kg)         Warfarin Indication Target   INR [place] Dose  (if applicable) Diet/Feeding Tube   (Enteral feeds, nutritional drinks and increased Vitamin K in diet can decrease INR)   Atrial fibrillation 2-3 10 mg M,W,F,Sat  5 mg Tu,Th, Sun --       x Home Med? Meds Increasing INR x Home Med?  Meds Decreasing INR     Allopurinol    Azathioprine     Amiodarone/Propafenone/Dronedarone   Carbamazepine     Androgens   Cholestyramine     Chemotherapy (BBW: Capecitabine)   Estrogen     Ciprofloxacin/Levofloxacin   Nafcillin/Dicloxacillin     Clarithromycin/Erythromycin/Azithromycin   Barbiturates      Fluconazole/Itraconazole/Voriconazole/Ketoconazole   Phenytoin (Variable)     Metronidazole   Rifampin     Phenytoin (Variable)   Steroids (Variable/Dose Dependent)      Statins/Fenofibrate/Gemfibrozil   Sucralfate   X Y Steroids (Variable/Dose Dependent)   Other:     Sulfamethoxazole/Trimethoprim        Tramadol         Other:      Comments regarding medication interactions:      x Diseases Affecting INR x Increased Bleeding Risk   X CHF Exacerbation (Increases)  History GI Bleed/PUD    Liver Disease (Increases)  Chronic NSAID Use    Thyroid: Hyper (Increases)  Hypo (Decreases) X Chronic ASA/Antiplatelet Use (Clopidogrel/ Dipyridamole/Prasugrel/Ticagrelor)      Malignancy (Increases)  Abnormal Renal Function (dialysis, renal transplant, SCr ? 2.3 mg/dL)     History of EtOH Abuse: Acute (Increases)   Chronic (Decreases)  Liver Function (cirrhosis, bilirubin >2x ULN with AST/ALT/AP >3x ULN)    Fever (Increases)  Age > 65 years    Acute infection (Increases)  Hypertension/Uncontrolled BP Diarrhea/Dehydration (Increases)  History of stroke    Other: __________________  Other:___________________     Vitamin K or Blood product  Administration Date                    TSH:    Lab Results   Component Value Date    TSH 3.840 04/19/2021        Hepatic Function Panel:                            Lab Results   Component Value Date    ALKPHOS 187 04/25/2021    ALT 26 04/25/2021    AST 29 04/25/2021    PROT 7.8 04/25/2021    BILITOT 1.8 04/25/2021    LABALBU 3.5 04/25/2021    LABALBU 4.4 10/27/2011       Date Warfarin Dose INR Heparin or LMWH HBG/HCT PLT Comment   4/18 HOLD 3.5 -- 13.0/42.8 228    4/19 HOLD 3.4 -- 13.5/44.3 215    4/20 5 mg 2.4 -- -- --    4/21 5 mg 1.9 -- 12.7/40.1 168    4/22 6 mg 1.7 -- 13.0/40.8 170    4/23 7.5 mg 1.5 -- 13.4/42.1 186    4/24 7.5 mg 1.4 -- 14.2/45.1 211    4/25 <10 mg> 1.5 -- 14.8/48.1 241      Assessment and Plan:  · Ida Nichols is a 72 yom admitted with acute decompensated congestive heart failure and afib RVR. He is anticoagulated on warfarin for atrial fibrillation. · Dose was reduced for amiodarone therapy; amiodarone therapy has since been discontinued  · INR = 1.5, warfarin 10 mg tonight if not discharged prior.  Resume home dosing after  · Daily PT/INR until the INR is stable within the therapeutic range  · Pharmacist will follow and monitor/adjust dosing as necessary    Thank you for this consult,    Yumiko Nieves, PharmD 4/25/2021 4:24 PM   [phone]

## 2021-04-25 NOTE — DISCHARGE SUMMARY
eating. He is well-known to Dr. Steffen Jenkins service and we will ask the [facility] cardiology team to provide consultation. In review of Dr. Pat Manning most recent note, the patient's weight at that point was 320 pounds. He is now greater than 340 pounds. He has massively volume overloaded with profound shortness of breath. He states he is unable to complete his activities of daily living. In the emergency department, he was also found to be in atrial fibrillation with rapid ventricular response. It was determined he would be admitted to the [address] [facility] floor.     LABS[de-identified]  Lab Results   Component Value Date    WBC 9.0 04/25/2021    HGB 14.8 04/25/2021    HCT 48.1 04/25/2021     04/25/2021     04/25/2021    K 3.8 04/25/2021    CL 81 (L) 04/25/2021    CREATININE 1.4 (H) 04/25/2021    BUN 40 (H) 04/25/2021    CO2 42 (HH) 04/25/2021    GLUCOSE 210 (H) 04/25/2021    ALT 26 04/25/2021    AST 29 04/25/2021    INR 1.5 04/25/2021     Lab Results   Component Value Date    INR 1.5 04/25/2021    INR 1.4 04/24/2021    INR 1.5 04/23/2021    PROTIME 17.7 (H) 04/25/2021    PROTIME 16.4 (H) 04/24/2021    PROTIME 17.8 (H) 04/23/2021      Lab Results   Component Value Date    TSH 3.840 04/19/2021     Lab Results   Component Value Date    TRIG 109 04/15/2021    TRIG 123 12/23/2020    TRIG 161 (H) 10/19/2020     Lab Results   Component Value Date    HDL 29 04/15/2021    HDL 41 12/23/2020    HDL 42 10/19/2020     Lab Results   Component Value Date    LDLCALC 46 04/15/2021    LDLCALC 66 12/23/2020    LDLCALC 58 10/19/2020     Lab Results   Component Value Date    LABA1C 13.1 (H) 04/18/2021       IMAGING:  Echo Complete    Result Date: 4/20/2021  Transthoracic Echocardiography Report (TTE)  Demographics   Patient Name    [facility]        Gender            Male                  [address] [facility] Record  [id number]      Room Number       3721  Number   Account #       [de-identified]     Procedure Date    04/19/2021   Corporate ID Ordering                                Physician   Accession       [id number]    Referring  Number                        Physician   Date of Birth   1955    Sonographer       Luigi Nur [facility]   Age             72 year(s)    Interpreting      Chris 64                                Physician         Physician Cardiology                                                  Lety Sunshine MD                                 Any Other  Procedure Type of Study   TTE procedure:Echo Complete W/Doppler & Color Flow. Procedure Date Date: 04/19/2021 Start: 02:40 PM Study Location: Echo Lab Technical Quality: Poor visualization due to body habitus. Indications:LV function. Patient Status: Routine Contrast Medium: Definity. Height: 68 inches Weight: 364 pounds BSA: 2.64 m^2 BMI: 55.35 kg/m^2 BP: 110/68 mmHg  Findings   Left Ventricle  Left ventricle size is normal.  Moderate concentric left ventricular hypertrophy. Ejection fraction is visually estimated at 55%. No regional wall motion abnormalities seen. Indeterminate diastolic function. No evidence of left ventricular mass or thrombus noted. Right Ventricle  Mildly enlarged right ventricle cavity. Right ventricle global systolic function is mildly reduced . Left Atrium  Left atrium is of normal size. Interatrial septum appears intact. Right Atrium  Normal right atrium size. Mitral Valve  Structurally normal mitral valve. No evidence of mitral valve stenosis. No evidence of mitral regurgitaton. Tricuspid Valve  The tricuspid valve appears structurally normal.  Physiologic and/or trace tricuspid regurgitation. RVSP is 51 mmHg. Pulmonary hypertension is moderate . Aortic Valve  Aortic valve opens well. No evidence of aortic valve regurgitation. Moderate aortic stenosis is present. Pulmonic Valve  The pulmonic valve was not well visualized. No evidence of any pulmonic regurgitation.    Pericardial Effusion  No evidence of pericardial effusion. Aorta  Aortic root within normal limits. Conclusions   Summary  Technically limited study, which might have affected the accuracy and the  completeness of the interpretation. Compared to prior echo, changes noted. Left ventricle size is normal.  Moderate concentric left ventricular hypertrophy. Ejection fraction is visually estimated at 55%. No regional wall motion abnormalities seen. Indeterminate diastolic function. Mildly enlarged right ventricle cavity. Right ventricle global systolic function is mildly reduced . Moderate aortic stenosis is present (GRISELDA is recommended for further  evaluation). Physiologic and/or trace tricuspid regurgitation. RVSP is 51 mmHg. Pulmonary hypertension is moderate .    Signature   ----------------------------------------------------------------  Electronically signed by Rajani Vinson MD(Interpreting  physician) on 04/20/2021 09:35 PM  ----------------------------------------------------------------  M-Mode/2D Measurements & Calculations   LV Diastolic    LV Systolic Dimension: 3.1   AV Cusp Separation: 1.1 cmLA  Dimension: 4.7  cm                           Dimension: 4 cmAO Root  cm              LV Volume Diastolic: 068.0   Dimension: 3.5 cm  LV FS:34 %      ml  LV PW           LV Volume Systolic: 89.7 ml  Diastolic: 1.5  LV EDV/LV EDV Index: 102.6  cm              ml/39 ml/m^2LV ESV/LV ESV    RV Diastolic Dimension: 3.7  LV PW Systolic: Index: 12.2 FI/91ZA/ m^2     cm  1.6 cm          EF Calculated: 63.5 %        RV Systolic Dimension: 3.4 cm  Septum          LV Mass Index: 111 l/min*m^2 LA/Aorta: 3.83  Diastolic: 1.5  AV Area (2D): 0.8 cm^2  cm                                           LA volume/Index: 130.4 ml  Septum          LVOT: 2.1 cm  Systolic: 1.5  cm   LV Mass: 294.05  g  Doppler Measurements & Calculations   MV Peak E-Wave: 1.63 AV Peak Velocity: 2.92 LVOT Peak Velocity: 0.7 m/s  m/s                  m/s                    LVOT Mean Velocity: 0.46 m/s  MV Peak A-Wave: 0    AV Peak Gradient:      LVOT Peak Gradient: 2 mmHgLVOT  m/s                  34.04 mmHg             Mean Gradient: 1 mmHg  MV E/A Ratio: 1628   AV Mean Velocity: 2.49 Estimated RVSP: 51.3 mmHg  MV Peak Gradient:    m/s                    Estimated RAP:10 mmHg  16.8 mmHg            AV Mean Gradient: 25.5  MV Mean Gradient:    mmHg  6.5 mmHg             AV VTI: 55.2 cm        TR Velocity:3.21 m/s  MV Mean Velocity:    AV Area                TR Gradient:41.32 mmHg  1.1 m/s              (Continuity):0.82 cm^2 PV Peak Velocity: 0.75 m/s  MV Deceleration                             PV Peak Gradient: 2.24 mmHg  Time: 161.6 msec     LVOT VTI: 13.1 cm      PV Mean Velocity: 0.55 m/s  MV P1/2t: 58.1 msec                         PV Mean Gradient: 1.3 mmHg  MVA by PHT:3.79 cm^2 Estimated PASP: 51.32  MV Area              mmHg  (continuity): 1.2  cm^2  [id number]    Xr Chest Portable    Result Date: 4/18/2021  EXAMINATION: ONE XRAY VIEW OF THE CHEST 4/18/2021 9:08 am COMPARISON: Comparison is dated June 24, 2019 HISTORY: ORDERING SYSTEM PROVIDED HISTORY: SOB TECHNOLOGIST PROVIDED HISTORY: Reason for exam:->SOB FINDINGS: Cardiac and mediastinal contours are within normal limits for technique. Sternotomy wires and cardiac valve prosthesis noted. The No acute infiltrate identified. No pleural fluid collection seen. Pulmonary vasculature is unremarkable. No acute findings in the chest     Nm Cardiac Stress Test Nuclear Imaging    Result Date: 4/24/2021  Indication:  Chest Pain. Clinical History:   Patient has known historyof coronary artery disease. IMAGING: Myocardial perfusion imaging was performed at rest 30-35 minutes following the intravenous injection of 9.9 mCi of (Tc-Sestamibi) followed by 10 ml of Normal Saline.   At peak exercise, the patient was injected intravenously with 35. 4mCi of (Tc-Sestamibi) followed by 10 ml of Normal Saline. Gated post-stress tomographic imaging was performed 20-25 minutes after stress. FINDINGS: The overall quality of the study was fair. Left ventricular cavity size was noted to be normal. Rotational analog analysis demonstrated generalized soft tissue attenuation noted. The gated SPECT stress imaging in the short, vertical long, and horizontal long axis demonstrated normal homogeneous tracer distribution throughout the myocardium. The resting images are normal Gated SPECT left ventricular ejection fraction was calculated to be 65%, with normal wall motion. The myocardial perfusion imaging was normal with attenuation. Overall left ventricular systolic function was normal without regional wall motion abnormalities. Overall low risk study. HOSPITAL COURSE:   Ida Nichols spent an extended period of time hospitalized and this will be a brief synopsis of the events that occurred during the hospitalization. He was found to be decompensated from a congestive heart failure standpoint and underwent substantial diuresis. He diuresed greater than 35 L during the hospitalization and his cardiac medications were maximized. We reinforced the need for fluid and salt restriction. He underwent stress test evaluation which was found to be nonischemic in nature. Cardiovascular recommendations have been reviewed and the patient will follow up with the cardiovascular team as an outpatient. Otherwise, he became ambulatory. He was weaned back to 2 L of nasal cannula oxygen which she will require continuously upon discharge. He is acceptable for discharge today. BRIEF PHYSICAL EXAMINATION AND LABORATORIES ON DAY OF DISCHARGE:  VITALS:  /74   Pulse 88   Temp 98.6 °F (37 °C) (Oral)   Resp 16   Ht 5' 8\" (1.727 m)   Wt (!) 314 lb 5 oz (142.6 kg)   SpO2 94%   BMI 47.79 kg/m²     HEENT:  PERRLA. EOMI. Sclera clear.   Buccal mucosa moist.  Nasal cannula oxygen is in place. Neck:  Supple. Trachea midline. No thyromegaly. No JVD. No bruits. Heart:  Rhythm regular, rate controlled. Systolic murmur. Lungs:  Symmetrical. Clear to auscultation bilaterally. No wheezes. No rhonchi. No rales. Abdomen: Soft. Non-tender. Non-distended. Bowel sounds positive. No organomegaly or masses. No pain on palpation    Extremities:  Peripheral pulses present. Substantial improvement in his presenting edema and scrotal swelling. Neurologic:  Alert x 3. No focal deficit. Cranial nerves grossly intact. Skin:  No petechia. No hemorrhage. No wounds. DISPOSITION:  The patient's condition is good. At this time the patient is without objective evidence of an acute process requiring continuing hospitalization or inpatient management. They are stable for discharge with outpatient follow-up. I have spoken with the patient and discussed the results of the current hospitalization, in addition to providing specific details for the plan of care and counseling regarding the diagnosis and prognosis. The plan has been discussed in detail and they are aware of the specific conditions for emergent return, as well as the importance of follow-up. Their questions are answered at this time and they are agreeable with the plan for discharge to home with Charles Anderson. DISCHARGE MEDICATIONS:    [facility]:[id number]    Printed on:04/25/21 1051   Medication Information                      Accu-Chek Multiclix Lancets MISC  Use as directed             aspirin 81 MG tablet  Take 81 mg by mouth nightly              atorvastatin (LIPITOR) 80 MG tablet  TAKE ONE TABLET BY MOUTH EVERY NIGHT             blood glucose monitor strips  Test two times a day & as needed for symptoms of irregular blood glucose.              bumetanide (BUMEX) 1 MG tablet  Take 1 tablet by mouth 2 times daily             CPAP Machine MISC  1 each by his primary care physician. · Patient is instructed to follow-up with the consults listed above as directed by them. · he is instructed to resume home medications and take new medications as indicated in the list above. · If the patient has a recurrence of symptoms, he is instructed to go to the ED. Preparing for this patient's discharge, including paperwork, orders, instructions, and meeting with patient did require > 40 minutes.     Taylor Pruett,      4/25/2021  10:51 AM

## 2021-04-25 NOTE — PROGRESS NOTES
Walk test - Pulse ox was 87% on room air at rest. Ambulated patient on room air. Oxygen saturation was 84 % on room air while ambulating. Oxygen applied. Oxygen applied at 4 liters. Oxygen saturation only 87 % with ambulation. Oxygen increased to 6 liters and oxygen saturation increased to 94 %. Recovery pulse ox was 94% on 6 liters of oxygen.

## 2021-04-26 ENCOUNTER — TELEPHONE (OUTPATIENT)
Dept: ADMINISTRATIVE | Age: 66
End: 2021-04-26

## 2021-04-26 ENCOUNTER — VIRTUAL VISIT (OUTPATIENT)
Dept: FAMILY MEDICINE CLINIC | Age: 66
End: 2021-04-26
Payer: MEDICARE

## 2021-04-26 DIAGNOSIS — R29.898 SEVERE MUSCLE DECONDITIONING: ICD-10-CM

## 2021-04-26 DIAGNOSIS — I50.31 ACUTE DIASTOLIC (CONGESTIVE) HEART FAILURE (HCC): ICD-10-CM

## 2021-04-26 DIAGNOSIS — F51.01 PRIMARY INSOMNIA: ICD-10-CM

## 2021-04-26 DIAGNOSIS — E66.01 CLASS 3 SEVERE OBESITY DUE TO EXCESS CALORIES WITH SERIOUS COMORBIDITY AND BODY MASS INDEX (BMI) OF 45.0 TO 49.9 IN ADULT (HCC): ICD-10-CM

## 2021-04-26 DIAGNOSIS — E11.65 TYPE 2 DIABETES MELLITUS WITH HYPERGLYCEMIA, WITH LONG-TERM CURRENT USE OF INSULIN (HCC): Primary | ICD-10-CM

## 2021-04-26 DIAGNOSIS — I50.32 CHRONIC HEART FAILURE WITH PRESERVED EJECTION FRACTION (HCC): ICD-10-CM

## 2021-04-26 DIAGNOSIS — I25.10 CORONARY ARTERY DISEASE INVOLVING NATIVE CORONARY ARTERY OF NATIVE HEART WITHOUT ANGINA PECTORIS: ICD-10-CM

## 2021-04-26 DIAGNOSIS — Z79.4 TYPE 2 DIABETES MELLITUS WITH HYPERGLYCEMIA, WITH LONG-TERM CURRENT USE OF INSULIN (HCC): Primary | ICD-10-CM

## 2021-04-26 PROCEDURE — 99495 TRANSJ CARE MGMT MOD F2F 14D: CPT | Performed by: FAMILY MEDICINE

## 2021-04-26 PROCEDURE — 1111F DSCHRG MED/CURRENT MED MERGE: CPT | Performed by: FAMILY MEDICINE

## 2021-04-26 RX ORDER — TEMAZEPAM 15 MG/1
15 CAPSULE ORAL NIGHTLY PRN
Qty: 30 CAPSULE | Refills: 5 | Status: SHIPPED | OUTPATIENT
Start: 2021-04-26 | End: 2021-04-26

## 2021-04-26 RX ORDER — RAMELTEON 8 MG/1
8 TABLET ORAL NIGHTLY PRN
Qty: 30 TABLET | Refills: 5 | Status: SHIPPED
Start: 2021-04-26 | End: 2021-06-09 | Stop reason: ALTCHOICE

## 2021-04-27 ENCOUNTER — TELEPHONE (OUTPATIENT)
Dept: FAMILY MEDICINE CLINIC | Age: 66
End: 2021-04-27

## 2021-04-27 ENCOUNTER — TELEPHONE (OUTPATIENT)
Dept: CARDIOLOGY CLINIC | Age: 66
End: 2021-04-27

## 2021-04-27 NOTE — TELEPHONE ENCOUNTER
Soco from [address] [facility] home care called they are out of network for pt insurance    Last seen 4/26/2021  Next appt Visit date not found

## 2021-04-27 NOTE — PROGRESS NOTES
Post-Discharge Transitional Care Management Services or Hospital Follow Up      Salvador Munoz   YOB: 1955    Date of Office Visit:  4/26/2021  Date of Hospital Admission: 4/18/21  Date of Hospital Discharge: 4/25/21  Risk of hospital readmission (high >=14%. Medium >=10%) :Readmission Risk Score: 17      Care management risk score Rising risk (score 2-5) and Complex Care (Scores >=6): 5     Non face to face  following discharge, date last encounter closed (first attempt may have been earlier): *No documented post hospital discharge outreach found in the last 14 days    Call initiated 2 business days of discharge: *No response recorded in the last 14 days    Patient Active Problem List   Diagnosis    CAD (coronary artery disease)    Hypertension    Type 2 diabetes mellitus with hyperglycemia, with long-term current use of insulin ([facility] [address]    Tobacco abuse    PAF (paroxysmal atrial fibrillation) ([facility] [address]    Status post coronary artery bypass graft    H/O mitral valve repair    Morbid obesity with BMI of 50.0-59.9, adult (HCC)    Chronic bronchitis ([facility] [address]    Sleep apnea    Gastroesophageal reflux disease without esophagitis    Hyperlipidemia    Muscular deconditioning    Acute diastolic (congestive) heart failure (HCC)    Acute diastolic heart failure (HCC)    Iron deficiency anemia, unspecified    Acute systolic/diastolic congestive heart failure (HCC)    Atrial fibrillation with RVR (HCC)    Ischemic cardiomyopathy    Nonrheumatic tricuspid valve regurgitation    Chronic anticoagulation    Stage 3 chronic kidney disease    Acute on chronic congestive heart failure (HCC)       Allergies   Allergen Reactions    Pcn [Penicillins]      Child went to hospital   ? Reaction  Patient tolerates cephalosporins    Sulfa Antibiotics      ?        Medications listed as ordered at the time of discharge from hospital   [facility] Medication Instructions ESTELA:    Printed on: 04/27/21 0106   Medication Information                      Accu-Chek Multiclix Lancets MISC  Use as directed             aspirin 81 MG tablet  Take 81 mg by mouth nightly              atorvastatin (LIPITOR) 80 MG tablet  TAKE ONE TABLET BY MOUTH EVERY NIGHT             blood glucose monitor strips  Test two times a day & as needed for symptoms of irregular blood glucose. bumetanide (BUMEX) 1 MG tablet  Take 1 tablet by mouth 2 times daily             CPAP Machine MISC  1 each by Does not apply route nightly as needed              gabapentin (NEURONTIN) 100 MG capsule  Take 2 capsules by mouth 3 times daily for 30 days.              insulin glargine (LANTUS SOLOSTAR) 100 UNIT/ML injection pen  Inject 40 Units into the skin 2 times daily             insulin lispro, 1 Unit Dial, (HUMALOG KWIKPEN) 100 UNIT/ML SOPN  Inject 6 units with meals PLUS                 No Insulin   140-199           3 Units   200-249           6 Units   250-299           9 Units   300-349         12 Units   350-400         15 Units   Over 400       18 Units             Insulin Syringe-Needle U-100 27G X 5/8\" 1 ML MISC  Use as directed             ipratropium (ATROVENT) 0.06 % nasal spray  2 sprays by Nasal route 3 times daily             Lancets MISC  1 each by Does not apply route 4 times daily (before meals and nightly)             levocetirizine (XYZAL) 5 MG tablet  Take 1 tablet by mouth nightly as needed (allergies)             metoprolol succinate (TOPROL XL) 25 MG extended release tablet  Take 1 tablet by mouth 2 times daily             montelukast (SINGULAIR) 10 MG tablet  Take 1 tablet by mouth nightly             pantoprazole (PROTONIX) 40 MG tablet  Take 1 tablet by mouth daily             potassium chloride (KLOR-CON M) 20 MEQ extended release tablet  Take 2 tablets by mouth 2 times daily             pramipexole (MIRAPEX) 0.25 MG tablet  TAKE TWO TABLETS BY MOUTH THREE TIMES A DAY             ramelteon (ROZEREM) 8 MG tablet  Take 1 tablet by mouth nightly as needed for Sleep             spironolactone (ALDACTONE) 25 MG tablet  TAKE ONE TABLET BY MOUTH TWO TIMES A DAY             SYMBICORT 160-4.5 MCG/ACT AERO  Inhale 2 puffs into the lungs 2 times daily             terbinafine (ATHLETES FOOT) 1 % cream  Apply topically 2 times daily. warfarin (JANTOVEN) 5 MG tablet  TAKE 2 TABLETS BY MOUTH DAILY ON MONDAY, WEDNESDAY, FRIDAY AND SATURDAY. THEN TAKE ONE TABLET DAILY ON TUESDAY, THURSDAY AND SUNDAY                   Medications marked \"taking\" at this time  Outpatient Medications Marked as Taking for the 4/26/21 encounter (Virtual Visit) with Quirino Luque, DO   Medication Sig Dispense Refill    ramelteon (ROZEREM) 8 MG tablet Take 1 tablet by mouth nightly as needed for Sleep 30 tablet 5    bumetanide (BUMEX) 1 MG tablet Take 1 tablet by mouth 2 times daily 30 tablet 3    gabapentin (NEURONTIN) 100 MG capsule Take 2 capsules by mouth 3 times daily for 30 days.  180 capsule 0    insulin glargine (LANTUS SOLOSTAR) 100 UNIT/ML injection pen Inject 40 Units into the skin 2 times daily 5 pen 3    insulin lispro, 1 Unit Dial, (HUMALOG KWIKPEN) 100 UNIT/ML SOPN Inject 6 units with meals PLUS                 No Insulin   140-199           3 Units   200-249           6 Units   250-299           9 Units   300-349         12 Units   350-400         15 Units   Over 400       18 Units 5 pen 0    Lancets MISC 1 each by Does not apply route 4 times daily (before meals and nightly) 300 each 1    metoprolol succinate (TOPROL XL) 25 MG extended release tablet Take 1 tablet by mouth 2 times daily 30 tablet 3    potassium chloride (KLOR-CON M) 20 MEQ extended release tablet Take 2 tablets by mouth 2 times daily 60 tablet 3    pramipexole (MIRAPEX) 0.25 MG tablet TAKE TWO TABLETS BY MOUTH THREE TIMES A  tablet 0    atorvastatin (LIPITOR) 80 MG tablet TAKE ONE TABLET BY MOUTH EVERY NIGHT 90 tablet 5    appearance. Obese. Skin no rash, no lesion No respiratory distress appreciated     Assessment/Plan:  1. Type 2 diabetes mellitus with hyperglycemia, with long-term current use of insulin ([address]    - [place] DISCHARGE MEDS RECONCILED W/ CURRENT OUTPATIENT MED LIST    2. Chronic heart failure with preserved ejection fraction (HCC)    - [place] DISCHARGE MEDS RECONCILED W/ CURRENT OUTPATIENT MED LIST    3. Acute diastolic (congestive) heart failure (HCC)    - [place] DISCHARGE MEDS RECONCILED W/ CURRENT OUTPATIENT MED LIST    4. Coronary artery disease involving native coronary artery of native heart without angina pectoris    - [place] DISCHARGE MEDS RECONCILED W/ CURRENT OUTPATIENT MED LIST    5. Primary insomnia    - [place] DISCHARGE MEDS RECONCILED W/ CURRENT OUTPATIENT MED LIST        Medical Decision Making: moderate complexity    Discussed hospital stay an course of treatment as well as discharge instructions. Will follow up with me in one month. Will continue home [facility] care. Patient will most likely be set up with CHF clinic when no longer home bound. To report any increase in weight or SOB. Discussed weight loss and glucose control.

## 2021-04-28 NOTE — TELEPHONE ENCOUNTER
So I dont know who ordered his home care but he said he didn't have any and [facility] didn't take [facility] insurance but Angelical[facility] rojas just called they were at his home to start care and wanted to make your aware that patient has no coumadin in the house and not sure if he is to take it but it is on his med list and was just refilled 3/31?

## 2021-04-29 DIAGNOSIS — G25.81 RESTLESS LEG SYNDROME: ICD-10-CM

## 2021-04-29 DIAGNOSIS — E66.01 MORBID OBESITY WITH BMI OF 45.0-49.9, ADULT (HCC): ICD-10-CM

## 2021-04-29 DIAGNOSIS — J42 CHRONIC BRONCHITIS, UNSPECIFIED CHRONIC BRONCHITIS TYPE (HCC): ICD-10-CM

## 2021-04-29 DIAGNOSIS — I25.10 CORONARY ARTERY DISEASE INVOLVING NATIVE CORONARY ARTERY OF NATIVE HEART WITHOUT ANGINA PECTORIS: ICD-10-CM

## 2021-04-29 DIAGNOSIS — I48.0 PAF (PAROXYSMAL ATRIAL FIBRILLATION) (HCC): ICD-10-CM

## 2021-04-29 DIAGNOSIS — I50.32 CHRONIC HEART FAILURE WITH PRESERVED EJECTION FRACTION (HCC): ICD-10-CM

## 2021-04-29 RX ORDER — WARFARIN SODIUM 5 MG/1
TABLET ORAL
Qty: 50 TABLET | Refills: 5 | Status: ON HOLD
Start: 2021-04-29 | End: 2021-06-14 | Stop reason: HOSPADM

## 2021-04-29 RX ORDER — PRAMIPEXOLE DIHYDROCHLORIDE 0.25 MG/1
TABLET ORAL
Qty: 180 TABLET | Refills: 3 | Status: SHIPPED
Start: 2021-04-29 | End: 2022-01-25

## 2021-05-10 ENCOUNTER — VIRTUAL VISIT (OUTPATIENT)
Dept: PULMONOLOGY | Age: 66
End: 2021-05-10
Payer: MEDICARE

## 2021-05-10 DIAGNOSIS — J43.9 NOCTURNAL HYPOXEMIA DUE TO EMPHYSEMA (HCC): Primary | ICD-10-CM

## 2021-05-10 DIAGNOSIS — G47.33 OSA (OBSTRUCTIVE SLEEP APNEA): ICD-10-CM

## 2021-05-10 DIAGNOSIS — I50.32 CHRONIC HEART FAILURE WITH PRESERVED EJECTION FRACTION (HCC): ICD-10-CM

## 2021-05-10 DIAGNOSIS — G47.36 NOCTURNAL HYPOXEMIA DUE TO EMPHYSEMA (HCC): Primary | ICD-10-CM

## 2021-05-10 PROCEDURE — 3023F SPIROM DOC REV: CPT | Performed by: INTERNAL MEDICINE

## 2021-05-10 PROCEDURE — 3017F COLORECTAL CA SCREEN DOC REV: CPT | Performed by: INTERNAL MEDICINE

## 2021-05-10 PROCEDURE — 1111F DSCHRG MED/CURRENT MED MERGE: CPT | Performed by: INTERNAL MEDICINE

## 2021-05-10 PROCEDURE — 99213 OFFICE O/P EST LOW 20 MIN: CPT | Performed by: INTERNAL MEDICINE

## 2021-05-10 PROCEDURE — 4040F PNEUMOC VAC/ADMIN/RCVD: CPT | Performed by: INTERNAL MEDICINE

## 2021-05-10 PROCEDURE — 1036F TOBACCO NON-USER: CPT | Performed by: INTERNAL MEDICINE

## 2021-05-10 PROCEDURE — G8427 DOCREV CUR MEDS BY ELIG CLIN: HCPCS | Performed by: INTERNAL MEDICINE

## 2021-05-10 PROCEDURE — 1123F ACP DISCUSS/DSCN MKR DOCD: CPT | Performed by: INTERNAL MEDICINE

## 2021-05-10 PROCEDURE — G8417 CALC BMI ABV UP PARAM F/U: HCPCS | Performed by: INTERNAL MEDICINE

## 2021-05-10 PROCEDURE — G8926 SPIRO NO PERF OR DOC: HCPCS | Performed by: INTERNAL MEDICINE

## 2021-05-10 NOTE — PROGRESS NOTES
[facility]  Department of Internal Medicine  Division of Pulmonary, Critical Care and Sleep Medicine  Office Note    Dear Syeda Posada, DO    We had the pleasure of doing a video chat with Dylan Billy, in the [address] at [facility] regarding his LISS & Chronic Asthmatic Bronchitis (COPD)     HISTORY OF PRESENT ILLNESS:  Dylan Billy is a 72 y.o. male with a past medical history of Chronic bronchitis from his one pack per day smoker, stopped with MI 7/22/2013, Hypertension, Type II diabetes mellitus, Morbid Obesity, LISS, Sinus surgery and herniorrhaphy, No family history of premature vascular disease, Allergy to penicillin and sulfa drugs, [facility] admission, 07/23/2013 with two to three days of intermittent exertional chest discomfort. A prolonged episode occurred on the day of admission associated with inferior ST elevation and reciprocal lateral ST depression. Urgent cardiac catheterization, 07/23/2013. LV mild inferior hypokinesis, EF 55%. [facility] eccentric focal 65% distal stenosis. LAD and CX normal. OM1 50% ostial and proximal stenosis. RCA 25% mid stenosis, 99% focal distal stenosis. PCI distal RCA, 07/23/2013. 3.5 x 12 mm Vision BMS, no residual stenosis. Surgical consultation, Dr. Isela Nino, 07/23/2013. Definitive plans were to have him take aspirin and Plavix for a month then undergo elective CABG one week after discontinuation of these medications. Unfourtunely he had a MI just toward the end of this regimen and he was taken to the OR and had CABG and mitral valve repair 09/09/2013. Postoperatively, he had pulmonary difficulties related to obesity/COPD. On today's visit, Dylan Billy is just ok but has no chest pain, but is experiencing worsening or declining SOB. He has no pleurisy, hemoptysis. He has noted leg swelling. He was seen in the HF clinic and up 7 pounds.  Mr. Hunter is semi - compliant with BiPAP with oxygen. He had issues with billing from [employer]. His sleep study showed excessive daytime sleepiness, hypersomnolence indicated by a sleep onset of 13 minutes, severe reduced sleep efficiency, severe sleep apnea syndrome with an apnea/hypopnea index of 60 and this was corrected with start bilevel positive airway pressure at 13/8 with 2 liters oxygen flow. His pulmonary function test remain stable and he quit smoking after his heart attack in 2013 but still has cravings to smoke - support provided. He is to continue with the Sprivia, Symbicort and singular. He can use the nebulizer as needed. He has overnight oxygen testing which is 40% desaturations. We discussed diet and exercise. We will again have SW see about financial support. It has been a year since we last saw Elisa Glass. His compliance is from lack of support and finances. Patients compliance has been extremely poor for quite some time and continues to be. Diet is very poor. Eating basically what he wants. Sugars elevated. Ran out of insulin, insulin was not covered by insurance so only on glimepiride . Sugars are still running high. I explained to patient that he is going to start running even into more serious effects including visual, renal, cardiovascular and death. He complains of shortness of breath and wheezing. There is no cough. This is a chronic problem. The current episode started more than 1 year ago. The problem occurs daily. The problem has been waxing and waning. Associated symptoms include nasal congestion, postnasal drip, rhinorrhea and sneezing. Pertinent negatives include no appetite change, chest pain, ear pain, fever, headaches, myalgias, sore throat or trouble swallowing. His symptoms are aggravated by URI and pollen. His symptoms are alleviated by beta-agonist and steroid inhaler. He reports moderate improvement on treatment.  Risk factors for lung disease include smoking/tobacco exposure (has quit). His past medical history is significant for COPD. No using BIPAP cause it cost to much. On today's video visit we got caught up with Renee Kim and his medical care. Renee Kim spent an extended period of time hospitalized and this will be a brief synopsis of the events that occurred during the hospitalization. He was found to be decompensated from a congestive heart failure standpoint and underwent substantial diuresis. He diuresed greater than 35 L during the hospitalization and his cardiac medications were maximized. We reinforced the need for fluid and salt restriction. He underwent stress test evaluation which was found to be nonischemic in nature. Cardiovascular recommendations have been reviewed and the patient will follow up with the cardiovascular team as an outpatient. Otherwise, he became ambulatory. He was weaned back to 2 L of nasal cannula oxygen which she will require continuously upon discharge. Since being home he has not used oxygen and BIPAP at all. ALLERGIES:    Allergies   Allergen Reactions    Pcn [Penicillins]      Child went to hospital   ? Reaction  Patient tolerates cephalosporins    Sulfa Antibiotics      ?        PAST MEDICAL HISTORY:       Diagnosis Date    Acid reflux     Acute diastolic (congestive) heart failure ([facility]) 06/19/2019    Arthritis     Asthma 04/16/2014    Asthmatic bronchitis , chronic ([facility]) 11/28/2016    CAD (coronary artery disease)     Chronic bronchitis ([facility]) 04/16/2014    COPD (chronic obstructive pulmonary disease) ([facility])     CB    COPD (chronic obstructive pulmonary disease) ([facility])     Diabetes mellitus ([facility])     Emphysema (subcutaneous) (surgical) resulting from a procedure     H/O cardiovascular stress test 04/24/2021    Lexiscan    Hyperlipidemia     Hypertension     Hypoxemia requiring supplemental oxygen 02/02/2015    LONG TERM ANTICOAGULENT USE     Morbid obesity with BMI of 50.0-59.9, adult ([facility] [address] 11/27/2013    Obesity     Osteoarthritis     Sleep apnea     bilevel positive airway pressure at 13/8 with 2 L oxygen flow     Stage 3 chronic kidney disease     Tobacco abuse     Type II or unspecified type diabetes mellitus without mention of complication, not stated as uncontrolled         MEDICATIONS:   Current Outpatient Medications   Medication Sig Dispense Refill    pramipexole (MIRAPEX) 0.25 MG tablet TAKE TWO TABLETS BY MOUTH THREE TIMES A  tablet 3    warfarin (JANTOVEN) 5 MG tablet TAKE 2 TABLETS BY MOUTH DAILY ON MONDAY, WEDNESDAY, FRIDAY AND SATURDAY. THEN TAKE ONE TABLET DAILY ON TUESDAY, THURSDAY AND SUNDAY 50 tablet 5    ramelteon (ROZEREM) 8 MG tablet Take 1 tablet by mouth nightly as needed for Sleep 30 tablet 5    bumetanide (BUMEX) 1 MG tablet Take 1 tablet by mouth 2 times daily 30 tablet 3    gabapentin (NEURONTIN) 100 MG capsule Take 2 capsules by mouth 3 times daily for 30 days.  180 capsule 0    insulin glargine (LANTUS SOLOSTAR) 100 UNIT/ML injection pen Inject 40 Units into the skin 2 times daily 5 pen 3    insulin lispro, 1 Unit Dial, (HUMALOG KWIKPEN) 100 UNIT/ML SOPN Inject 6 units with meals PLUS                 No Insulin   140-199           3 Units   200-249           6 Units   250-299           9 Units   300-349         12 Units   350-400         15 Units   Over 400       18 Units 5 pen 0    Lancets MISC 1 each by Does not apply route 4 times daily (before meals and nightly) 300 each 1    metoprolol succinate (TOPROL XL) 25 MG extended release tablet Take 1 tablet by mouth 2 times daily 30 tablet 3    potassium chloride (KLOR-CON M) 20 MEQ extended release tablet Take 2 tablets by mouth 2 times daily 60 tablet 3    atorvastatin (LIPITOR) 80 MG tablet TAKE ONE TABLET BY MOUTH EVERY NIGHT 90 tablet 5    montelukast (SINGULAIR) 10 MG tablet Take 1 tablet by mouth nightly 30 tablet 4    pantoprazole (PROTONIX) 40 MG tablet Take 1 tablet by mouth daily 60 tablet 4    spironolactone (ALDACTONE) 25 MG tablet TAKE ONE TABLET BY MOUTH TWO TIMES A  tablet 0    ipratropium (ATROVENT) 0.06 % nasal spray 2 sprays by Nasal route 3 times daily 1 Bottle 3    levocetirizine (XYZAL) 5 MG tablet Take 1 tablet by mouth nightly as needed (allergies) 30 tablet 11    blood glucose monitor strips Test two times a day & as needed for symptoms of irregular blood glucose. 300 strip 0    terbinafine (ATHLETES FOOT) 1 % cream Apply topically 2 times daily. 42 g 1    Insulin Syringe-Needle U-100 27G X 5/8\" 1 ML MISC Use as directed 100 each 11    SYMBICORT 160-4.5 MCG/ACT AERO Inhale 2 puffs into the lungs 2 times daily 1 Inhaler 3    CPAP Machine MISC 1 each by Does not apply route nightly as needed       Accu-Chek Multiclix Lancets MISC Use as directed 100 each 3    aspirin 81 MG tablet Take 81 mg by mouth nightly        No current facility-administered medications for this visit. SOCIAL AND OCCUPATIONAL HEALTH:  The patient quit smoking with he quit in 2013. He smoked 1 ppd for 45 years. There is no history of TB or TB exposure. There is no asbestos or silica dust exposure. The patient reports no coal, foundry, quarry or [employer] exposure. There is no recent travel history noted. The patient denies a history of recreational or IV drug use. No hot tub exposure. The patient has no pets. Hobbies include [employer]. The patient denies excessive alcohol intake.      SOCIAL HISTORY: Age-appropriate past and current activities are:  Social History     Tobacco Use    Smoking status: Former Smoker     Packs/day: 1.00     Years: 45.00     Pack years: 45.00     Types: Cigarettes     Quit date: 2013     Years since quittin.8    Smokeless tobacco: Former User     Types: [address] date: 10/8/1971   Substance Use Topics    Alcohol use: No     Alcohol/week: 0.0 standard drinks     Comment: 1-2 coffee per day    Drug use: No       SURGICAL HISTORY:   Past Surgical History:   Procedure Laterality Date    COLONOSCOPY      CORONARY ANGIOPLASTY WITH STENT PLACEMENT  13    Left main focal eccentric 65% distal stenosis. Large OM1 CX 50% ostial & prox narrow. Large ramus artery & LAD: Minor plaque w/o sign narrow. RCA: Dom vessel w/25% prox narrow & focal hazy eccentric 99% distal stenosis before origin RPDA & RPLCA. LV: Mild inferior hypokinesis EF 55%. Probable mild AS with 10-15mmHg. Successful PCI distal RCA w/3.5 x 12 mm BMS, 0% res stenosis. Normal distal runoff    CORONARY ARTERY BYPASS GRAFT  13    Dr Lala Jacobs; CABG x2, LIMA to LAD, SVG to ramus intermedius; MV repair using 30-mm Future complete ring with magic stitch between A3 and P3; rigid internal fixation of sternum using KLS plates x2; endoscopic vein harvesting of right lower extremity     ECHO COMPL W DOP COLOR FLOW  2013         HERNIA REPAIR      SINUS SURGERY         FAMILY HISTORY: A review of medical events in the patient's family , including disease which may be hereditary or place the patient at risk were reviewed. Family History   Problem Relation Age of Onset    Cancer Mother         breast    Cancer Father         stomach    Mental Illness Brother     Stroke Brother     Other Brother       Family Status   Relation Name Status    Mother      Father      Sister      Brother  Alive    Brother          REVIEW OF SYSTEMS: The patients health assessment form was reviewed. PHYSICAL EXAMINATION:   There were no vitals filed for this visit. Constitutional: A morbidly obese  72 y.o. male who is alert, oriented, cooperative and in no apparent distress. Head was normocephalic and atraumatic. EENT: EOMI MAK. MMM. No icterus. No conjunctival injections. External canals are patent and no discharge. Septum was midline, mucosa was without erythema, exudates or cobblestoning. No thrush. Neck: Supple without thyromegaly. No elevated JVP. Trachea was midline.  No carotid bruits. Redundent pharyngeal tissues. Respiratory: Symmetrical without dullness to percussion. Clear to auscultation. No wheezes, rhonchi or rales. No intercostal retraction or use of accessory muscles. No egophony. Cardiovascular: Nonpalpable PMI. Regular without murmur, clicks, gallops or rubs. No left or right ventricular heave. Sternotomy   Pulses:  Equal bilaterally. Abdomen: Obese, rounded and soft without organomegaly. No rebound, rigidity. Lymphatic: No lymphadenopathy. Musculoskeletal: Ambulates without assistance. Normal curvature of the spine. No gross muscle weakness. No involuntary movements. Extremities:  + extremity edema. Venous stasis and hemosiderosis changes. Oncomyosis. No varicosities or erythema. Coordination appears adequate. Skin:  Warm and very dry scaly. Dependednt pigmentation was relatively normal. No bruises or skin rashes. Oncomycosis. Fungal rash  Neurological/Psychiatric: General behavior, level of consciousness, thought content is normal. Emotional status is normal.  Cranial nerves II-XII are intact. DATA:   The data collected below information that was obtained, reviewed, analyzed and interpreted today. Spirometry was compared to previous test if available and demonstrates an FVC of 3.45 liters which is 87 % of predicted with an FEV1 of  2.28 liters which is 74 % of predicted. FEV1/FVC ratio is 66%. expiratory flow rates are 50 % of predicted. Maximum voluntary ventilation is 83 liters per minute or 64 % of predicted. Flow volume loop shows no signs of intrathoracic or extrathoracic process. Impression: Mild Airflow Obstruction     Static Lung Volume: reveal a slow vital capacity of 4.16 liters which is 100 % of predicted. The inspiratory capacity is 3.87 liters, 128% of predicted. The thoracic gas volume is 2.72 liters which is normal.  The total lung capacity is 6.59 liters, 107 % of predicted. The RV/TLC ratio is 115 % of predicted.   The perfusion imaging was normal with attenuation. Overall left ventricular systolic function was normal without regional wall motion abnormalities. Overall low risk study.     [employer] Sleepiness Scale is use this Tool to Measure Sleep Deprivation. The [employer] Sleepiness Scale was developed by researchers in [facility] and is widely used by sleep professionals around the world to measure sleep deprivation. How likely are you to doze off or fall asleep in the following situations, in contrast to feeling just tired? This refers to your usual way of life in recent times. Results: Total [employer] Score: 5 Interpretation: No hypersomnolence. A1c   = 9.2  Lipid Profile = LDL 66  TSH  = Elevated 6.1  PSA   = normal  ABG:    Lab Results   Component Value Date    PH 7.428 09/11/2013    PH 7.225 09/09/2013    PCO2 40.1 09/11/2013    PO2 62.0 09/11/2013    HCO3 25.9 09/11/2013    BE 1.5 09/11/2013    THB 12.2 09/11/2013    O2SAT 92.5 09/11/2013     TSH:    Lab Results   Component Value Date    TSH 3.840 04/19/2021          IMPRESSION:    Pepe Lopez is a 72 y.o. male S/P CABG, with Afib, COPD of the chronic asthmatic bronchitis type with severe LISS without hypoventilation but with hypoxemia. He is to use  BIPAPat 13/8 with 2 Liters oxygen flow. His lung function is stable. He has overnight 40% desaturations on testing. With this in mind, we would like to proceed with the following;                      PLAN:   At this point we asked him to resume his BiPAP at the pressure set below in this paragraph. In the meantime he is going to send us all the billing reports from his [employer] company. We will then at this point discussed with  to provide him assistance and review his health literacy. He was encouraged to lose weight and exercising program using a bike. He cannot walk with his neuropathy. We discussed seeing a weight loss clinic Abhishek Baird) and he refused. His DLCO is normal.  Vaccines were reviewed.  We asked him to restart using his bilevel positive airway pressure at 13/8 cm H20. We will see him back in a few months. We hope this updates you on our evaluation and clinical thinking. Thank you for entrusting us to participate in [facility]. If you have any questions, please don't hesitate to call us at the [address] [facility]. Sincerely,    Margret Najjar, D.O., MPH, [facility]  Professor of Internal Medicine  Director of [facility]    TeleMedicine Video Visit    This visit was performed as a telephone visit. Patient identification was verified at the start of the visit, including the patient's telephone number and physical location. I discussed with the patient the nature of our telehealth visits, that:     1. Due to the nature of an audio- video modality, the only components of a physical exam that could be done are the elements supported by direct observation. 2. I would evaluate the patient and recommend diagnostics and treatments based on my assessment. 3. If it was felt that the patient should be evaluated in clinic or an emergency room setting, then they would be directed there. 4. Our sessions are not being recorded and that personal health information is protected. 5. Our team would provide follow up care in person if/when the patient needs it. Patient does agree to proceed with telemedicine consultation. Patient's location: [phone] Physician  location [facility] and [address] [place] [address]. [address] [place] [address], [id number] [place].    Other people involved in call Nancy Dawn RN      Time spent: Greater than 15    This visit was completed virtually using telephone visit

## 2021-05-19 ENCOUNTER — TELEPHONE (OUTPATIENT)
Dept: FAMILY MEDICINE CLINIC | Age: 66
End: 2021-05-19

## 2021-05-19 NOTE — TELEPHONE ENCOUNTER
Patient called stating he doesn't think his water pills are working properly. Patient stated he will just \"sprinkle\" about 3 times a day, which has been going on for the past several days. Patient gained 7 lbs over 6 days. Patient denies any SOB. Please advise.     Last seen 4/26/2021  Next appt Visit date not found  [facility]

## 2021-05-20 ENCOUNTER — TELEPHONE (OUTPATIENT)
Dept: CARDIOLOGY CLINIC | Age: 66
End: 2021-05-20

## 2021-05-20 DIAGNOSIS — I50.32 CHRONIC HEART FAILURE WITH PRESERVED EJECTION FRACTION (HCC): Primary | ICD-10-CM

## 2021-05-20 DIAGNOSIS — N28.9 RENAL INSUFFICIENCY: ICD-10-CM

## 2021-05-20 RX ORDER — METOLAZONE 2.5 MG/1
TABLET ORAL
Qty: 4 TABLET | Refills: 0 | Status: SHIPPED
Start: 2021-05-20 | End: 2021-05-23

## 2021-05-20 NOTE — TELEPHONE ENCOUNTER
I have not seen him since 2019    Tell him to take Extra Bumex for 3 days  Go to ER if more short of breath of more edema  Needs OV - with Portia Casiano or Cem next week, if not in 2 weeks

## 2021-05-20 NOTE — TELEPHONE ENCOUNTER
Patient called to follow up and stated he gained another 2 pounds since yesterday. Informed patient MA was not available and Dr. Jesse Renteria was not in the ofc yet. Patient stated he will not be home for about an hour and a half and he will call the ofc back for advisement. I spoke w/Kayley ESPINOZA MA and was advised to instruct patient to contact his cardiologist, Dr. [place] Sieve, who should be handling these issues. Per MA, patient should also be having home health care, but has missed visits. I called patient back and informed. Patient stated he will call Dr. Lynne Garnica [facility].     Last seen 4/26/2021  Next appt Visit date not found

## 2021-05-23 DIAGNOSIS — N28.9 RENAL INSUFFICIENCY: Primary | ICD-10-CM

## 2021-06-03 DIAGNOSIS — N28.9 RENAL INSUFFICIENCY: ICD-10-CM

## 2021-06-03 DIAGNOSIS — I50.32 CHRONIC HEART FAILURE WITH PRESERVED EJECTION FRACTION (HCC): ICD-10-CM

## 2021-06-03 RX ORDER — METOLAZONE 2.5 MG/1
TABLET ORAL
Qty: 4 TABLET | Refills: 0 | OUTPATIENT
Start: 2021-06-03

## 2021-06-04 RX ORDER — BUMETANIDE 1 MG/1
1 TABLET ORAL 2 TIMES DAILY
Qty: 30 TABLET | Refills: 3 | Status: SHIPPED
Start: 2021-06-04 | End: 2021-07-20 | Stop reason: SDUPTHER

## 2021-06-09 ENCOUNTER — OFFICE VISIT (OUTPATIENT)
Dept: FAMILY MEDICINE CLINIC | Age: 66
End: 2021-06-09
Payer: MEDICARE

## 2021-06-09 VITALS
HEART RATE: 133 BPM | OXYGEN SATURATION: 92 % | WEIGHT: 295 LBS | RESPIRATION RATE: 18 BRPM | SYSTOLIC BLOOD PRESSURE: 124 MMHG | BODY MASS INDEX: 44.85 KG/M2 | DIASTOLIC BLOOD PRESSURE: 80 MMHG

## 2021-06-09 DIAGNOSIS — E66.01 MORBID OBESITY WITH BMI OF 50.0-59.9, ADULT (HCC): ICD-10-CM

## 2021-06-09 DIAGNOSIS — E11.65 TYPE 2 DIABETES MELLITUS WITH HYPERGLYCEMIA, WITH LONG-TERM CURRENT USE OF INSULIN (HCC): Primary | ICD-10-CM

## 2021-06-09 DIAGNOSIS — I48.0 PAF (PAROXYSMAL ATRIAL FIBRILLATION) (HCC): ICD-10-CM

## 2021-06-09 DIAGNOSIS — E11.42 TYPE 2 DIABETES MELLITUS WITH DIABETIC POLYNEUROPATHY, WITH LONG-TERM CURRENT USE OF INSULIN (HCC): ICD-10-CM

## 2021-06-09 DIAGNOSIS — Z79.4 TYPE 2 DIABETES MELLITUS WITH DIABETIC POLYNEUROPATHY, WITH LONG-TERM CURRENT USE OF INSULIN (HCC): ICD-10-CM

## 2021-06-09 DIAGNOSIS — J42 CHRONIC BRONCHITIS, UNSPECIFIED CHRONIC BRONCHITIS TYPE (HCC): ICD-10-CM

## 2021-06-09 DIAGNOSIS — E66.01 MORBID OBESITY WITH BMI OF 45.0-49.9, ADULT (HCC): ICD-10-CM

## 2021-06-09 DIAGNOSIS — I50.32 CHRONIC HEART FAILURE WITH PRESERVED EJECTION FRACTION (HCC): ICD-10-CM

## 2021-06-09 DIAGNOSIS — Z79.4 TYPE 2 DIABETES MELLITUS WITH HYPERGLYCEMIA, WITH LONG-TERM CURRENT USE OF INSULIN (HCC): Primary | ICD-10-CM

## 2021-06-09 DIAGNOSIS — J30.1 ALLERGIC RHINITIS DUE TO POLLEN, UNSPECIFIED SEASONALITY: ICD-10-CM

## 2021-06-09 PROCEDURE — G8417 CALC BMI ABV UP PARAM F/U: HCPCS | Performed by: FAMILY MEDICINE

## 2021-06-09 PROCEDURE — G8926 SPIRO NO PERF OR DOC: HCPCS | Performed by: FAMILY MEDICINE

## 2021-06-09 PROCEDURE — 2022F DILAT RTA XM EVC RTNOPTHY: CPT | Performed by: FAMILY MEDICINE

## 2021-06-09 PROCEDURE — 1036F TOBACCO NON-USER: CPT | Performed by: FAMILY MEDICINE

## 2021-06-09 PROCEDURE — 1123F ACP DISCUSS/DSCN MKR DOCD: CPT | Performed by: FAMILY MEDICINE

## 2021-06-09 PROCEDURE — 3023F SPIROM DOC REV: CPT | Performed by: FAMILY MEDICINE

## 2021-06-09 PROCEDURE — G8427 DOCREV CUR MEDS BY ELIG CLIN: HCPCS | Performed by: FAMILY MEDICINE

## 2021-06-09 PROCEDURE — 3017F COLORECTAL CA SCREEN DOC REV: CPT | Performed by: FAMILY MEDICINE

## 2021-06-09 PROCEDURE — 3046F HEMOGLOBIN A1C LEVEL >9.0%: CPT | Performed by: FAMILY MEDICINE

## 2021-06-09 PROCEDURE — 99214 OFFICE O/P EST MOD 30 MIN: CPT | Performed by: FAMILY MEDICINE

## 2021-06-09 PROCEDURE — 4040F PNEUMOC VAC/ADMIN/RCVD: CPT | Performed by: FAMILY MEDICINE

## 2021-06-09 RX ORDER — DULOXETIN HYDROCHLORIDE 20 MG/1
20 CAPSULE, DELAYED RELEASE ORAL DAILY
Qty: 30 CAPSULE | Refills: 5 | Status: SHIPPED
Start: 2021-06-09 | End: 2021-06-18

## 2021-06-09 RX ORDER — PEN NEEDLE, DIABETIC 31 G X1/4"
1 NEEDLE, DISPOSABLE MISCELLANEOUS DAILY
Qty: 100 EACH | Refills: 3 | Status: SHIPPED
Start: 2021-06-09 | End: 2021-10-28 | Stop reason: ALTCHOICE

## 2021-06-09 RX ORDER — GABAPENTIN 100 MG/1
200 CAPSULE ORAL 3 TIMES DAILY
Qty: 180 CAPSULE | Refills: 0 | Status: ON HOLD
Start: 2021-06-09 | End: 2021-06-14 | Stop reason: HOSPADM

## 2021-06-09 RX ORDER — LEVOCETIRIZINE DIHYDROCHLORIDE 5 MG/1
TABLET, FILM COATED ORAL
Qty: 30 TABLET | Refills: 5 | Status: SHIPPED
Start: 2021-06-09 | End: 2021-06-18

## 2021-06-09 RX ORDER — METOPROLOL SUCCINATE 25 MG/1
25 TABLET, EXTENDED RELEASE ORAL 2 TIMES DAILY
Qty: 60 TABLET | Refills: 5 | Status: SHIPPED
Start: 2021-06-09 | End: 2022-04-08 | Stop reason: SDUPTHER

## 2021-06-09 RX ORDER — INSULIN GLARGINE 100 [IU]/ML
INJECTION, SOLUTION SUBCUTANEOUS
Qty: 5 PEN | Refills: 3
Start: 2021-06-09 | End: 2021-06-18

## 2021-06-10 ASSESSMENT — ENCOUNTER SYMPTOMS
ABDOMINAL DISTENTION: 0
SORE THROAT: 0
WHEEZING: 0
NAUSEA: 0
EYE PAIN: 0
SINUS PRESSURE: 0
ABDOMINAL PAIN: 0
COLOR CHANGE: 0
EYE DISCHARGE: 0
TROUBLE SWALLOWING: 0
RECTAL PAIN: 0
EYE REDNESS: 0
DIARRHEA: 0
CHEST TIGHTNESS: 0
BACK PAIN: 0
CHOKING: 0
VOMITING: 0
BLOOD IN STOOL: 0
FACIAL SWELLING: 0
ANAL BLEEDING: 0
PHOTOPHOBIA: 0
VOICE CHANGE: 0
COUGH: 0
STRIDOR: 0
APNEA: 0
SHORTNESS OF BREATH: 0
RHINORRHEA: 0
CONSTIPATION: 0
EYE ITCHING: 0

## 2021-06-10 ASSESSMENT — COPD QUESTIONNAIRES: COPD: 1

## 2021-06-10 NOTE — PROGRESS NOTES
Kt Burton is a 72 y.o. male  . Subjective:      Swelling and fluid retention has improved . Following up regularly with cardiology. Sugars have been very high. We adjusted insulin. Complains of bilateral worsening of paresthesias. Has lost some weight and improving diet a little. Discussed case at length    Diabetes  He presents for his follow-up diabetic visit. He has type 2 diabetes mellitus. No MedicAlert identification noted. His disease course has been worsening. There are no hypoglycemic associated symptoms. Pertinent negatives for hypoglycemia include no confusion, dizziness, headaches, nervousness/anxiousness, pallor, seizures, speech difficulty or tremors. Associated symptoms include fatigue and foot paresthesias. Pertinent negatives for diabetes include no chest pain, no polydipsia, no polyphagia, no polyuria and no weakness. There are no hypoglycemic complications. Symptoms are worsening. Diabetic complications include heart disease and peripheral neuropathy. Risk factors for coronary artery disease include diabetes mellitus, dyslipidemia, hypertension, sedentary lifestyle and obesity. Current diabetic treatment includes oral agent (monotherapy) (quit insulin). He is compliant with treatment none of the time. His weight is increasing steadily. He is following a generally unhealthy diet. When asked about meal planning, he reported none. He has had a previous visit with a dietitian. He never participates in exercise. His home blood glucose trend is increasing steadily. An ACE inhibitor/angiotensin II receptor blocker is being taken. He does not see a podiatrist.Eye exam is current. COPD  There is no cough, shortness of breath or wheezing. This is a chronic problem. The current episode started more than 1 year ago. The problem occurs daily. The problem has been waxing and waning. Associated symptoms include nasal congestion.  Pertinent negatives include no appetite change, chest pain, ear pain, fever, headaches, myalgias, postnasal drip, rhinorrhea, sneezing, sore throat or trouble swallowing. His symptoms are aggravated by URI and pollen. His symptoms are alleviated by beta-agonist and steroid inhaler. He reports moderate improvement on treatment. His symptoms are not alleviated by anxiolytic. Risk factors for lung disease include smoking/tobacco exposure (has quit). His past medical history is significant for asthma, bronchitis and COPD. Fatigue  This is a chronic problem. The current episode started more than 1 year ago. The problem occurs daily. The problem has been waxing and waning. Associated symptoms include arthralgias, fatigue, joint swelling and urinary symptoms. Pertinent negatives include no abdominal pain, chest pain, chills, congestion, coughing, diaphoresis, fever, headaches, myalgias, nausea, neck pain, numbness, rash, sore throat, vomiting or weakness. Associated symptoms comments: Sleep disturbances . Nothing aggravates the symptoms. He has tried nothing for the symptoms. The treatment provided no relief. Cough  This is a new problem. The current episode started in the past 7 days. The problem has been waxing and waning. The cough is productive of sputum. Associated symptoms include nasal congestion. Pertinent negatives include no chest pain, chills, ear pain, eye redness, fever, headaches, myalgias, postnasal drip, rash, rhinorrhea, sore throat, shortness of breath or wheezing. Nothing aggravates the symptoms. He has tried a beta-agonist inhaler, steroid inhaler and OTC cough suppressant for the symptoms. The treatment provided moderate relief. His past medical history is significant for asthma, bronchitis and COPD. There is no history of environmental allergies. Hypertension  This is a chronic problem. The current episode started more than 1 year ago. The problem has been waxing and waning since onset. The problem is uncontrolled.  Pertinent negatives include no chest pain, headaches, neck pain, palpitations or shortness of breath. There are no associated agents to hypertension. Risk factors for coronary artery disease include diabetes mellitus, dyslipidemia, male gender and obesity. Past treatments include beta blockers and angiotensin blockers. The current treatment provides moderate improvement. Compliance problems include diet, medication cost, medication side effects and psychosocial issues. Identifiable causes of hypertension include a thyroid problem. Hyperlipidemia  This is a chronic problem. The current episode started more than 1 year ago. The problem is resistant. Recent lipid tests were reviewed and are variable. There are no known factors aggravating his hyperlipidemia. Pertinent negatives include no chest pain, myalgias or shortness of breath. Current antihyperlipidemic treatment includes statins. The current treatment provides moderate improvement of lipids. There are no compliance problems. Risk factors for coronary artery disease include male sex, obesity and dyslipidemia. Other  This is a chronic (atrial fibrillation and HX CAD with intervention and coumadin) problem. The current episode started more than 1 year ago. The problem occurs daily. The problem has been waxing and waning. Associated symptoms include arthralgias, fatigue, joint swelling and urinary symptoms. Pertinent negatives include no abdominal pain, chest pain, chills, congestion, coughing, diaphoresis, fever, headaches, myalgias, nausea, neck pain, numbness, rash, sore throat, vomiting or weakness. The symptoms are aggravated by smoking (obesity, poor diet, no excercise). Treatments tried: coronary revasculization, treating lipids, treating obesity, treating  diabetes and htn. The treatment provided mild relief. Review of Systems   Constitutional: Positive for fatigue. Negative for activity change, appetite change, chills, diaphoresis, fever and unexpected weight change.    HENT: Negative for congestion, dental problem, drooling, ear discharge, ear pain, facial swelling, hearing loss, mouth sores, nosebleeds, postnasal drip, rhinorrhea, sinus pressure, sneezing, sore throat, tinnitus, trouble swallowing and voice change. Eyes: Negative for photophobia, pain, discharge, redness, itching and visual disturbance. Respiratory: Negative for apnea, cough, choking, chest tightness, shortness of breath, wheezing and stridor. Cardiovascular: Negative for chest pain, palpitations and leg swelling. Gastrointestinal: Negative for abdominal distention, abdominal pain, anal bleeding, blood in stool, constipation, diarrhea, nausea, rectal pain and vomiting. Endocrine: Negative for cold intolerance, heat intolerance, polydipsia, polyphagia and polyuria. Genitourinary: Negative for decreased urine volume, difficulty urinating, discharge, dysuria, enuresis, flank pain, frequency, genital sores, hematuria, penile pain, penile swelling, scrotal swelling, testicular pain and urgency. Musculoskeletal: Positive for arthralgias and joint swelling. Negative for back pain, gait problem, myalgias, neck pain and neck stiffness. Skin: Negative for color change, pallor, rash and wound. Allergic/Immunologic: Negative for environmental allergies, food allergies and immunocompromised state. Neurological: Negative for dizziness, tremors, seizures, syncope, facial asymmetry, speech difficulty, weakness, light-headedness, numbness and headaches. Hematological: Negative for adenopathy. Does not bruise/bleed easily. Psychiatric/Behavioral: Negative for agitation, behavioral problems, confusion, decreased concentration, dysphoric mood, hallucinations, self-injury, sleep disturbance and suicidal ideas. The patient is not nervous/anxious and is not hyperactive.         Past Medical History:   Diagnosis Date    Acid reflux     Acute diastolic (congestive) heart failure (HCC) 06/19/2019    Arthritis     Asthma 2014    Asthmatic bronchitis , chronic ([facility] 2016    CAD (coronary artery disease)     Chronic bronchitis (HCC) 2014    COPD (chronic obstructive pulmonary disease) (HCC)     CB    COPD (chronic obstructive pulmonary disease) (HCC)     Diabetes mellitus (HCC)     Emphysema (subcutaneous) (surgical) resulting from a procedure     H/O cardiovascular stress test 2021    Lexiscan    Hyperlipidemia     Hypertension     Hypoxemia requiring supplemental oxygen 2015    LONG TERM ANTICOAGULENT USE     Morbid obesity with BMI of 50.0-59.9, adult ([facility] 2013    Obesity     Osteoarthritis     Sleep apnea     bilevel positive airway pressure at 13/8 with 2 L oxygen flow     Stage 3 chronic kidney disease ([facility])     Tobacco abuse     Type II or unspecified type diabetes mellitus without mention of complication, not stated as uncontrolled        Social History     Socioeconomic History    Marital status:      Spouse name: Esdras Tian Number of children: 1    Years of education: Not on file    Highest education level: 11th grade   Occupational History    Not on file   Tobacco Use    Smoking status: Former Smoker     Packs/day: 1.00     Years: 45.00     Pack years: 45.00     Types: Cigarettes     Quit date: 2013     Years since quittin.8    Smokeless tobacco: Former User     Types: Chew     Quit date: 10/8/1971   Vaping Use    Vaping Use: Never used   Substance and Sexual Activity    Alcohol use: No     Alcohol/week: 0.0 standard drinks     Comment: 1-2 coffee per day    Drug use: No    Sexual activity: Yes     Partners: Female   Other Topics Concern    Not on file   Social History Narrative    19  [facility] reviewed SDOH with Boris Beltran. He does have money strain but between the income he has coming in and his wife's they are over resources for SARY programs.   Offered food panty info to help with end of the month struggles but he declined stating that he tried and was refused due to his income. He belongs to a [employer] and goes weekly so he has some community connections in place. He has an extremely high interest rate on his mortgage which he was encouraged to look into getting lowered to help save money on his house payments. Noticed some difficulty with memory recall. Becomes easily flustered at times. Has no MHI to support applying for [facility] program of [facility]. States he does not feel depressed or need any MH treatment. Social Determinants of Health     Financial Resource Strain:     Difficulty of Paying Living Expenses:    Food Insecurity:     Worried About Running Out of Food in the Last Year:     [address] in the Last Year:    Transportation Needs:     Lack of Transportation (Medical):  Lack of Transportation (Non-Medical):    Physical Activity:     Days of Exercise per Week:     Minutes of Exercise per Session:    Stress:     Feeling of Stress :    Social Connections:     Frequency of Communication with Friends and Family:     Frequency of Social Gatherings with Friends and Family:     Attends [employer] Services:     Active Member of Clubs or Organizations:     Attends Club or Organization Meetings:     Marital Status:    Intimate Partner Violence:     Fear of Current or Ex-Partner:     Emotionally Abused:     Physically Abused:     Sexually Abused:        Family History   Problem Relation Age of Onset    Cancer Mother         breast    Cancer Father         stomach    Mental Illness Brother     Stroke Brother     Other Brother        Current Outpatient Medications on File Prior to Visit   Medication Sig Dispense Refill    bumetanide (BUMEX) 1 MG tablet Take 1 tablet by mouth 2 times daily 30 tablet 3    pramipexole (MIRAPEX) 0.25 MG tablet TAKE TWO TABLETS BY MOUTH THREE TIMES A  tablet 3    warfarin (JANTOVEN) 5 MG tablet TAKE 2 TABLETS BY MOUTH DAILY ON MONDAY, WEDNESDAY, FRIDAY AND SATURDAY.  THEN TAKE ONE TABLET DAILY ON TUESDAY, THURSDAY AND SUNDAY 50 tablet 5    insulin lispro, 1 Unit Dial, (HUMALOG KWIKPEN) 100 UNIT/ML SOPN Inject 6 units with meals PLUS                 No Insulin   140-199           3 Units   200-249           6 Units   250-299           9 Units   300-349         12 Units   350-400         15 Units   Over 400       18 Units 5 pen 0    Lancets MISC 1 each by Does not apply route 4 times daily (before meals and nightly) 300 each 1    potassium chloride (KLOR-CON M) 20 MEQ extended release tablet Take 2 tablets by mouth 2 times daily 60 tablet 3    atorvastatin (LIPITOR) 80 MG tablet TAKE ONE TABLET BY MOUTH EVERY NIGHT 90 tablet 5    montelukast (SINGULAIR) 10 MG tablet Take 1 tablet by mouth nightly 30 tablet 4    spironolactone (ALDACTONE) 25 MG tablet TAKE ONE TABLET BY MOUTH TWO TIMES A  tablet 0    blood glucose monitor strips Test two times a day & as needed for symptoms of irregular blood glucose. 300 strip 0    terbinafine (ATHLETES FOOT) 1 % cream Apply topically 2 times daily. 42 g 1    SYMBICORT 160-4.5 MCG/ACT AERO Inhale 2 puffs into the lungs 2 times daily 1 Inhaler 3    CPAP Machine MISC 1 each by Does not apply route nightly as needed       Accu-Chek Multiclix Lancets MISC Use as directed 100 each 3    aspirin 81 MG tablet Take 81 mg by mouth nightly       ipratropium (ATROVENT) 0.06 % nasal spray 2 sprays by Nasal route 3 times daily 1 Bottle 3     No current facility-administered medications on file prior to visit. Allergies   Allergen Reactions    Pcn [Penicillins]      Child went to hospital   ? Reaction  Patient tolerates cephalosporins    Sulfa Antibiotics      ? I have reviewed his allergies, medications, problem list, medical,social and family history and have updated as needed in the electronic medical record. Objective:     Physical Exam  Vitals and nursing note reviewed.    Constitutional:       General: He is not in acute distress. Appearance: He is well-developed. He is not diaphoretic. HENT:      Head: Normocephalic and atraumatic. Right Ear: External ear normal.      Left Ear: External ear normal.      Nose: Nose normal.      Mouth/Throat:      Pharynx: No oropharyngeal exudate. Eyes:      General: No scleral icterus. Right eye: No discharge. Left eye: No discharge. Conjunctiva/sclera: Conjunctivae normal.      Pupils: Pupils are equal, round, and reactive to light. Neck:      Thyroid: No thyromegaly. Vascular: No JVD. Trachea: No tracheal deviation. Cardiovascular:      Rate and Rhythm: Normal rate and regular rhythm. Heart sounds: Normal heart sounds. No murmur heard. No friction rub. No gallop. Pulmonary:      Effort: Pulmonary effort is normal. No respiratory distress. Breath sounds: Normal breath sounds. No stridor. No wheezing or rales. Chest:      Chest wall: No tenderness. Abdominal:      General: Bowel sounds are normal. There is no distension. Palpations: Abdomen is soft. There is no mass. Tenderness: There is no abdominal tenderness. There is no guarding or rebound. Genitourinary:     Comments: Deferred by patient  Musculoskeletal:         General: No tenderness. Normal range of motion. Cervical back: Normal range of motion and neck supple. Lymphadenopathy:      Cervical: No cervical adenopathy. Skin:     General: Skin is warm and dry. Coloration: Skin is not pale. Findings: No erythema or rash. Neurological:      Mental Status: He is alert and oriented to person, place, and time. Cranial Nerves: No cranial nerve deficit. Motor: No abnormal muscle tone. Coordination: Coordination normal.      Deep Tendon Reflexes: Reflexes are normal and symmetric. Reflexes normal.   Psychiatric:         Behavior: Behavior normal.         Thought Content:  Thought content normal.         Judgment: Judgment normal. Assessment / Plan:   Boris Beltran was seen today for follow-up. Diagnoses and all orders for this visit:    Type 2 diabetes mellitus with hyperglycemia, with long-term current use of insulin (HCC)  -     CBC Auto Differential; Future  -     Comprehensive Metabolic Panel; Future  -     Hemoglobin A1C; Future  -     TSH without Reflex; Future  -     Lipid Panel; Future    Allergic rhinitis due to pollen, unspecified seasonality  -     levocetirizine (XYZAL) 5 MG tablet; TAKE ONE TABLET BY MOUTH NIGHTly as needed (allergies)  -     CBC Auto Differential; Future  -     Comprehensive Metabolic Panel; Future  -     Hemoglobin A1C; Future  -     TSH without Reflex; Future  -     Lipid Panel; Future    Chronic bronchitis, unspecified chronic bronchitis type (HCC)  -     levocetirizine (XYZAL) 5 MG tablet; TAKE ONE TABLET BY MOUTH NIGHTly as needed (allergies)  -     CBC Auto Differential; Future  -     Comprehensive Metabolic Panel; Future  -     Hemoglobin A1C; Future  -     TSH without Reflex; Future  -     Lipid Panel; Future    Chronic heart failure with preserved ejection fraction (HCC)  -     levocetirizine (XYZAL) 5 MG tablet; TAKE ONE TABLET BY MOUTH NIGHTly as needed (allergies)  -     CBC Auto Differential; Future  -     Comprehensive Metabolic Panel; Future  -     Hemoglobin A1C; Future  -     TSH without Reflex; Future  -     Lipid Panel; Future    Morbid obesity with BMI of 45.0-49.9, adult (HCC)  -     levocetirizine (XYZAL) 5 MG tablet; TAKE ONE TABLET BY MOUTH NIGHTly as needed (allergies)  -     CBC Auto Differential; Future  -     Comprehensive Metabolic Panel; Future  -     Hemoglobin A1C; Future  -     TSH without Reflex; Future  -     Lipid Panel; Future    Morbid obesity with BMI of 50.0-59.9, adult (HCC)  -     CBC Auto Differential; Future  -     Comprehensive Metabolic Panel; Future  -     Hemoglobin A1C; Future  -     TSH without Reflex; Future  -     Lipid Panel;  Future    Type 2 diabetes mellitus with diabetic polyneuropathy, with long-term current use of insulin ([facility])  -     gabapentin (NEURONTIN) 100 MG capsule; Take 2 capsules by mouth 3 times daily for 30 days. -     insulin glargine (LANTUS SOLOSTAR) 100 UNIT/ML injection pen; 30 units twice a day  -     Insulin Syringe-Needle U-100 27G X 5/8\" 1 ML MISC; Use as directed  -     Insulin Pen Needle (MEIJER PEN NEEDLES) 31G X 6 MM MISC; 1 each by Does not apply route daily  -     DULoxetine (CYMBALTA) 20 MG extended release capsule; Take 1 capsule by mouth daily  -     CBC Auto Differential; Future  -     Comprehensive Metabolic Panel; Future  -     Hemoglobin A1C; Future  -     TSH without Reflex; Future  -     Lipid Panel; Future    PAF (paroxysmal atrial fibrillation) ([facility])  -     metoprolol succinate (TOPROL XL) 25 MG extended release tablet; Take 1 tablet by mouth 2 times daily  -     CBC Auto Differential; Future  -     Comprehensive Metabolic Panel; Future  -     Hemoglobin A1C; Future  -     TSH without Reflex; Future  -     Lipid Panel; Future        Reviewed healthmaintenance report. Patient is aware of deficiencies and suggested preventative tests.

## 2021-06-12 ENCOUNTER — APPOINTMENT (OUTPATIENT)
Dept: CT IMAGING | Age: 66
End: 2021-06-12
Payer: MEDICARE

## 2021-06-12 ENCOUNTER — APPOINTMENT (OUTPATIENT)
Dept: GENERAL RADIOLOGY | Age: 66
End: 2021-06-12
Payer: MEDICARE

## 2021-06-12 ENCOUNTER — HOSPITAL ENCOUNTER (OUTPATIENT)
Age: 66
Setting detail: OBSERVATION
Discharge: HOME | End: 2021-06-14
Attending: EMERGENCY MEDICINE | Admitting: INTERNAL MEDICINE
Payer: MEDICARE

## 2021-06-12 DIAGNOSIS — J42 CHRONIC BRONCHITIS, UNSPECIFIED CHRONIC BRONCHITIS TYPE (HCC): ICD-10-CM

## 2021-06-12 DIAGNOSIS — R42 DIZZINESS: Primary | ICD-10-CM

## 2021-06-12 DIAGNOSIS — R79.89 ELEVATED BRAIN NATRIURETIC PEPTIDE (BNP) LEVEL: ICD-10-CM

## 2021-06-12 DIAGNOSIS — E66.01 MORBID OBESITY WITH BMI OF 45.0-49.9, ADULT (HCC): ICD-10-CM

## 2021-06-12 DIAGNOSIS — N17.9 AKI (ACUTE KIDNEY INJURY) (HCC): ICD-10-CM

## 2021-06-12 DIAGNOSIS — I25.10 CORONARY ARTERY DISEASE INVOLVING NATIVE CORONARY ARTERY OF NATIVE HEART WITHOUT ANGINA PECTORIS: ICD-10-CM

## 2021-06-12 DIAGNOSIS — I48.0 PAF (PAROXYSMAL ATRIAL FIBRILLATION) (HCC): ICD-10-CM

## 2021-06-12 DIAGNOSIS — I50.32 CHRONIC HEART FAILURE WITH PRESERVED EJECTION FRACTION (HCC): ICD-10-CM

## 2021-06-12 LAB
ALBUMIN SERPL-MCNC: 3.6 G/DL (ref 3.5–5.2)
ALP BLD-CCNC: 134 U/L (ref 40–129)
ALT SERPL-CCNC: 16 U/L (ref 0–40)
ANION GAP SERPL CALCULATED.3IONS-SCNC: 11 MMOL/L (ref 7–16)
ANISOCYTOSIS: ABNORMAL
APTT: 26.7 SEC (ref 24.5–35.1)
AST SERPL-CCNC: 23 U/L (ref 0–39)
BASOPHILS ABSOLUTE: 0.03 E9/L (ref 0–0.2)
BASOPHILS RELATIVE PERCENT: 0.3 % (ref 0–2)
BILIRUB SERPL-MCNC: 1.1 MG/DL (ref 0–1.2)
BUN BLDV-MCNC: 47 MG/DL (ref 6–23)
BURR CELLS: ABNORMAL
CALCIUM SERPL-MCNC: 9.6 MG/DL (ref 8.6–10.2)
CHLORIDE BLD-SCNC: 90 MMOL/L (ref 98–107)
CHP ED QC CHECK: YES
CO2: 29 MMOL/L (ref 22–29)
CREAT SERPL-MCNC: 2 MG/DL (ref 0.7–1.2)
EOSINOPHILS ABSOLUTE: 0.11 E9/L (ref 0.05–0.5)
EOSINOPHILS RELATIVE PERCENT: 1.2 % (ref 0–6)
GFR AFRICAN AMERICAN: 41
GFR NON-AFRICAN AMERICAN: 34 ML/MIN/1.73
GLUCOSE BLD-MCNC: 179 MG/DL (ref 74–99)
GLUCOSE BLD-MCNC: 184 MG/DL
HCT VFR BLD CALC: 42.7 % (ref 37–54)
HEMOGLOBIN: 14.6 G/DL (ref 12.5–16.5)
IMMATURE GRANULOCYTES #: 0.07 E9/L
IMMATURE GRANULOCYTES %: 0.7 % (ref 0–5)
INR BLD: 3
LYMPHOCYTES ABSOLUTE: 1.47 E9/L (ref 1.5–4)
LYMPHOCYTES RELATIVE PERCENT: 15.7 % (ref 20–42)
MCH RBC QN AUTO: 31 PG (ref 26–35)
MCHC RBC AUTO-ENTMCNC: 34.2 % (ref 32–34.5)
MCV RBC AUTO: 90.7 FL (ref 80–99.9)
MONOCYTES ABSOLUTE: 0.74 E9/L (ref 0.1–0.95)
MONOCYTES RELATIVE PERCENT: 7.9 % (ref 2–12)
NEUTROPHILS ABSOLUTE: 6.96 E9/L (ref 1.8–7.3)
NEUTROPHILS RELATIVE PERCENT: 74.2 % (ref 43–80)
OVALOCYTES: ABNORMAL
PDW BLD-RTO: 16.9 FL (ref 11.5–15)
PLATELET # BLD: 86 E9/L (ref 130–450)
PLATELET CONFIRMATION: NORMAL
PMV BLD AUTO: 12 FL (ref 7–12)
POIKILOCYTES: ABNORMAL
POTASSIUM REFLEX MAGNESIUM: 4.1 MMOL/L (ref 3.5–5)
PRO-BNP: 2171 PG/ML (ref 0–125)
PROTHROMBIN TIME: 35.3 SEC (ref 9.3–12.4)
RBC # BLD: 4.71 E12/L (ref 3.8–5.8)
REASON FOR REJECTION: NORMAL
REJECTED TEST: NORMAL
SODIUM BLD-SCNC: 130 MMOL/L (ref 132–146)
TOTAL PROTEIN: 7.3 G/DL (ref 6.4–8.3)
WBC # BLD: 9.4 E9/L (ref 4.5–11.5)

## 2021-06-12 PROCEDURE — 96361 HYDRATE IV INFUSION ADD-ON: CPT

## 2021-06-12 PROCEDURE — 83880 ASSAY OF NATRIURETIC PEPTIDE: CPT

## 2021-06-12 PROCEDURE — 85610 PROTHROMBIN TIME: CPT

## 2021-06-12 PROCEDURE — 73030 X-RAY EXAM OF SHOULDER: CPT

## 2021-06-12 PROCEDURE — 85730 THROMBOPLASTIN TIME PARTIAL: CPT

## 2021-06-12 PROCEDURE — 73060 X-RAY EXAM OF HUMERUS: CPT

## 2021-06-12 PROCEDURE — 6360000002 HC RX W HCPCS: Performed by: EMERGENCY MEDICINE

## 2021-06-12 PROCEDURE — 70450 CT HEAD/BRAIN W/O DYE: CPT

## 2021-06-12 PROCEDURE — 80053 COMPREHEN METABOLIC PANEL: CPT

## 2021-06-12 PROCEDURE — 96374 THER/PROPH/DIAG INJ IV PUSH: CPT

## 2021-06-12 PROCEDURE — 99283 EMERGENCY DEPT VISIT LOW MDM: CPT

## 2021-06-12 PROCEDURE — 85025 COMPLETE CBC W/AUTO DIFF WBC: CPT

## 2021-06-12 PROCEDURE — 36415 COLL VENOUS BLD VENIPUNCTURE: CPT

## 2021-06-12 PROCEDURE — 72125 CT NECK SPINE W/O DYE: CPT

## 2021-06-12 PROCEDURE — 2580000003 HC RX 258

## 2021-06-12 PROCEDURE — 71045 X-RAY EXAM CHEST 1 VIEW: CPT

## 2021-06-12 PROCEDURE — G0378 HOSPITAL OBSERVATION PER HR: HCPCS

## 2021-06-12 PROCEDURE — 93005 ELECTROCARDIOGRAM TRACING: CPT | Performed by: STUDENT IN AN ORGANIZED HEALTH CARE EDUCATION/TRAINING PROGRAM

## 2021-06-12 RX ORDER — MORPHINE SULFATE 5 MG/ML
5 INJECTION, SOLUTION INTRAMUSCULAR; INTRAVENOUS ONCE
Status: COMPLETED | OUTPATIENT
Start: 2021-06-12 | End: 2021-06-12

## 2021-06-12 RX ORDER — FENTANYL CITRATE 50 UG/ML
50 INJECTION, SOLUTION INTRAMUSCULAR; INTRAVENOUS ONCE
Status: DISCONTINUED | OUTPATIENT
Start: 2021-06-12 | End: 2021-06-12

## 2021-06-12 RX ORDER — MORPHINE SULFATE 5 MG/ML
5 INJECTION, SOLUTION INTRAMUSCULAR; INTRAVENOUS ONCE
Status: DISCONTINUED | OUTPATIENT
Start: 2021-06-12 | End: 2021-06-12

## 2021-06-12 RX ORDER — 0.9 % SODIUM CHLORIDE 0.9 %
1000 INTRAVENOUS SOLUTION INTRAVENOUS ONCE
Status: DISCONTINUED | OUTPATIENT
Start: 2021-06-12 | End: 2021-06-12

## 2021-06-12 RX ORDER — 0.9 % SODIUM CHLORIDE 0.9 %
500 INTRAVENOUS SOLUTION INTRAVENOUS ONCE
Status: COMPLETED | OUTPATIENT
Start: 2021-06-12 | End: 2021-06-12

## 2021-06-12 RX ORDER — SODIUM CHLORIDE 9 MG/ML
INJECTION, SOLUTION INTRAVENOUS
Status: COMPLETED
Start: 2021-06-12 | End: 2021-06-12

## 2021-06-12 RX ADMIN — SODIUM CHLORIDE 500 ML: 9 INJECTION, SOLUTION INTRAVENOUS at 20:01

## 2021-06-12 RX ADMIN — MORPHINE SULFATE 5 MG: 5 INJECTION, SOLUTION INTRAMUSCULAR; INTRAVENOUS at 19:30

## 2021-06-12 RX ADMIN — Medication 500 ML: at 20:01

## 2021-06-12 ASSESSMENT — PAIN DESCRIPTION - PAIN TYPE: TYPE: ACUTE PAIN

## 2021-06-12 ASSESSMENT — PAIN DESCRIPTION - ORIENTATION: ORIENTATION: RIGHT

## 2021-06-12 ASSESSMENT — PAIN DESCRIPTION - LOCATION: LOCATION: SHOULDER

## 2021-06-12 ASSESSMENT — PAIN SCALES - GENERAL: PAINLEVEL_OUTOF10: 10

## 2021-06-12 NOTE — ED PROVIDER NOTES
Department of Emergency Medicine   ED  Provider Note  Admit Date/RoomTime: 6/12/2021  5:13 PM  ED Room: 0432/0432-01          History of Present Illness:  6/12/21, Time: 6:12 PM EDT  Chief Complaint   Patient presents with    Dizziness     Patient started a new BP medication today. Patient was walking, was feeling dizzy and fell. Patient c/o right shoulder pain. Denies head/neck/back pain. Patient landed on right shoulder on [place]. Patient denies LOC. Patient on thinners.  Shoulder Pain        Ag Arciniega is a 72 y.o. male presenting to the ED for dizziness, fall, and shoulder pain, beginning earlier today. The complaint has been constant, moderate in severity, and worsened by nothing. The patient is a 45-year-old male with history of CAD, COPD, CHF who presents to the emergency department complaining of dizziness, fall, and shoulder pain. The patient states that he started a new blood pressure medication today, metoprolol, he states his first dose was this morning. Patient states that while he was walking he felt dizzy. He describes the dizziness as more of a lightheadedness. He states that he got out of his car and was standing in his driveway when he fell and landed on his right shoulder. Patient states that he was able to get up right away and was not laying on the ground prolonged period of time. The patient does have a history of atrial fibrillation and is on Coumadin. He states that he has been taking this as prescribed but is unsure what his last INR level was. He states that his cardiologist is Dr. Radha Warner. Patient states that he is still feeling a little bit lightheaded now. He denies any blurry vision, double vision, numbness, tingling, unilateral weakness, chest pain, palpitations, shortness of breath, hitting his head, loss of consciousness, abdominal pain, nausea, vomiting, diarrhea, other injuries, or other acute symptoms or concerns.     Review of Systems: Pertinent positives and negatives are stated within HPI, all other systems reviewed and are negative.        --------------------------------------------- PAST HISTORY ---------------------------------------------  Past Medical History:  has a past medical history of Acid reflux, Acute diastolic (congestive) heart failure ([facility] Arthritis, Asthma, Asthmatic bronchitis , chronic (HCC), CAD (coronary artery disease), Chronic bronchitis ([facility] COPD (chronic obstructive pulmonary disease) ([facility] COPD (chronic obstructive pulmonary disease) ([facility] Diabetes mellitus ([facility] Emphysema (subcutaneous) (surgical) resulting from a procedure, H/O cardiovascular stress test, Hyperlipidemia, Hypertension, Hypoxemia requiring supplemental oxygen, LONG TERM ANTICOAGULENT USE, Morbid obesity with BMI of 50.0-59.9, adult ([facility] Obesity, Osteoarthritis, Sleep apnea, Stage 3 chronic kidney disease ([facility] Tobacco abuse, and Type II or unspecified type diabetes mellitus without mention of complication, not stated as uncontrolled. Past Surgical History:  has a past surgical history that includes hernia repair; sinus surgery; ECHO Compl W Dop Color Flow (7/25/2013); Colonoscopy; Coronary angioplasty with stent (07-23-13); and Coronary artery bypass graft (09-09-13). Social History:  reports that he quit smoking about 7 years ago. His smoking use included cigarettes. He has a 45.00 pack-year smoking history. He quit smokeless tobacco use about 49 years ago. His smokeless tobacco use included chew. He reports that he does not drink alcohol and does not use drugs. Family History: family history includes Cancer in his father and mother; Mental Illness in his brother; Other in his brother; Stroke in his brother. . Unless otherwise noted, family history is non contributory    The patients home medications have been reviewed.     Allergies: Pcn [penicillins] and Sulfa antibiotics    I have reviewed the past medical history, past surgical history, social history, and family history    ---------------------------------------------------PHYSICAL EXAM--------------------------------------    Constitutional/General: Alert and oriented x3. Obese chronically ill-appearing, but no acute distress. Head: Normocephalic and atraumatic  Eyes:  EOMI, sclera non icteric  Mouth: Oropharynx clear, handling secretions, no trismus, no asymmetry of the posterior oropharynx or uvular edema  Neck: Supple, full ROM, no stridor, no meningeal signs  Respiratory: Lungs clear to auscultation bilaterally, no wheezes, rales, or rhonchi. Not in respiratory distress  Cardiovascular:  Tachycardic rate. Irregular rhythm. No murmurs, no aortic murmurs, no gallops, or rubs. Chest: No chest wall tenderness  Gastrointestinal:  Abdomen Soft, Non tender, Non distended. No rebound, guarding, or rigidity. No pulsatile masses. Musculoskeletal: Moves all extremities x 4. Warm and well perfused, no clubbing, no cyanosis, no edema. Capillary refill <3 seconds  Skin: skin warm and dry. No rashes. Neurologic: GCS 15, no focal deficits, symmetric strength 5/5 in the upper and lower extremities bilaterally  Psychiatric: Normal Affect    -------------------------------------------------- RESULTS -------------------------------------------------  I have personally reviewed all laboratory and imaging results for this patient. Results are listed below.      LABS: (Lab results interpreted by me)  Results for orders placed or performed during the hospital encounter of 06/12/21   CBC auto differential   Result Value Ref Range    WBC 9.4 4.5 - 11.5 E9/L    RBC 4.71 3.80 - 5.80 E12/L    Hemoglobin 14.6 12.5 - 16.5 g/dL    Hematocrit 42.7 37.0 - 54.0 %    MCV 90.7 80.0 - 99.9 fL    MCH 31.0 26.0 - 35.0 pg    MCHC 34.2 32.0 - 34.5 %    RDW 16.9 (H) 11.5 - 15.0 fL    Platelets 86 (L) 508 - 450 E9/L    MPV 12.0 7.0 - 12.0 fL    Neutrophils % 74.2 43.0 - 80.0 %    Immature Granulocytes % 0.7 0.0 - 5.0 %    Lymphocytes % 15.7 (L) 20.0 - 42.0 %    Monocytes % 7.9 2.0 - 12.0 %    Eosinophils % 1.2 0.0 - 6.0 %    Basophils % 0.3 0.0 - 2.0 %    Neutrophils Absolute 6.96 1.80 - 7.30 E9/L    Immature Granulocytes # 0.07 E9/L    Lymphocytes Absolute 1.47 (L) 1.50 - 4.00 E9/L    Monocytes Absolute 0.74 0.10 - 0.95 E9/L    Eosinophils Absolute 0.11 0.05 - 0.50 E9/L    Basophils Absolute 0.03 0.00 - 0.20 E9/L    Anisocytosis 1+     Poikilocytes 1+     [place] Cells 1+     Ovalocytes 1+    Brain Natriuretic Peptide   Result Value Ref Range    Pro-BNP 2,171 (H) 0 - 125 pg/mL   Protime-INR   Result Value Ref Range    Protime 35.3 (H) 9.3 - 12.4 sec    INR 3.0    APTT   Result Value Ref Range    aPTT 26.7 24.5 - 35.1 sec   Platelet Confirmation   Result Value Ref Range    Platelet Confirmation CONFIRMED    SPECIMEN REJECTION   Result Value Ref Range    Rejected Test [id number]     Reason for Rejection see below    Comprehensive Metabolic Panel w/ Reflex to MG   Result Value Ref Range    Sodium 130 (L) 132 - 146 mmol/L    Potassium reflex Magnesium 4.1 3.5 - 5.0 mmol/L    Chloride 90 (L) 98 - 107 mmol/L    CO2 29 22 - 29 mmol/L    Anion Gap 11 7 - 16 mmol/L    Glucose 179 (H) 74 - 99 mg/dL    BUN 47 (H) 6 - 23 mg/dL    CREATININE 2.0 (H) 0.7 - 1.2 mg/dL    GFR Non-African American 34 >=60 mL/min/1.73    GFR African American 41     Calcium 9.6 8.6 - 10.2 mg/dL    Total Protein 7.3 6.4 - 8.3 g/dL    Albumin 3.6 3.5 - 5.2 g/dL    Total Bilirubin 1.1 0.0 - 1.2 mg/dL    Alkaline Phosphatase 134 (H) 40 - 129 U/L    ALT 16 0 - 40 U/L    AST 23 0 - 39 U/L   POCT glucose   Result Value Ref Range    Glucose 184 mg/dL    QC OK? yes    ,       RADIOLOGY:  Interpreted by Radiologist unless otherwise specified  CT HEAD WO CONTRAST   Final Result   Resolution of previously noted tiny foci of air seen in the cavernous sinus   and superior sagittal sinus. Chronic infarction in the distribution of the right MCA again noted.       Resolution of subcutaneous air previously noted in the left  space. No evidence of acute intracranial pathology. CT HEAD WO CONTRAST   Final Result      No evidence for acute intracranial hemorrhage, territorial infarction or   intracranial mass lesion. Unchanged encephalomalacia within the right MCA   territory and right caudate head. The previously noted pneumocephalus within the superior sagittal sinus and   bilateral cavernous sinus has significantly decreased. The previously noted   pneumocephalus within the straight sinus is no longer visualized. No new foci   of pneumocephalus. Mild chronic microangiopathic ischemic disease. Mild generalized volume loss. Subtle nondisplaced fracture lateral wall of left maxillary sinus with   decreasing left  space pneumocephalus. CT HEAD WO CONTRAST   Final Result   Diffuse atrophy with small vessel ischemic disease and old infarct with   encephalomalacia in the right parieto-occipital region. There is no acute   hemorrhage. Subcutaneous emphysema in the left temporal region and left  space. Pneumocephalus with small amount of air in the cavernous sinus, superior   sagittal sinus, and straight sinus. There is a questionable fracture of the   skull base involving the base of the sphenoid bone. Surveillance is   recommended. CT cervical spine. There is no acute displaced fracture in the cervical spine. There is mild   diffuse degenerative changes from C3-T1 with osteophytes and multilevel disc   bulges. The prevertebral soft tissues are normal.  The previously noted   pneumocephalus at the skull base and craniocervical junction are noted. There is calcification of the carotid arteries. Impression      No acute displaced fracture. Diffuse degenerative changes from C3-T1. Pneumocephalus. See above.          CT CERVICAL SPINE WO CONTRAST   Final Result   Diffuse atrophy with small vessel ischemic disease and old infarct with   encephalomalacia in the right parieto-occipital region. There is no acute   hemorrhage. Subcutaneous emphysema in the left temporal region and left  space. Pneumocephalus with small amount of air in the cavernous sinus, superior   sagittal sinus, and straight sinus. There is a questionable fracture of the   skull base involving the base of the sphenoid bone. Surveillance is   recommended. CT cervical spine. There is no acute displaced fracture in the cervical spine. There is mild   diffuse degenerative changes from C3-T1 with osteophytes and multilevel disc   bulges. The prevertebral soft tissues are normal.  The previously noted   pneumocephalus at the skull base and craniocervical junction are noted. There is calcification of the carotid arteries. Impression      No acute displaced fracture. Diffuse degenerative changes from C3-T1. Pneumocephalus. See above. XR CHEST 1 VIEW   Final Result   Pulmonary vascular congestion. No obvious airspace opacity or pleural   effusion. XR HUMERUS RIGHT (MIN 2 VIEWS)   Final Result   1. No acute osseous or soft tissue findings about the right shoulder nor   right humerus. 2.  Mild right shoulder degenerative changes. 3.  High-riding humeral head which is suggestive of changes related to   underlying rotator cuff pathology. XR SHOULDER RIGHT (MIN 2 VIEWS)   Final Result   1. No acute osseous or soft tissue findings about the right shoulder nor   right humerus. 2.  Mild right shoulder degenerative changes. 3.  High-riding humeral head which is suggestive of changes related to   underlying rotator cuff pathology.                ------------------------- NURSING NOTES AND VITALS REVIEWED ---------------------------   The nursing notes within the ED encounter and vital signs as below have been reviewed by myself  /81   Pulse 106 Temp 97.6 °F (36.4 °C) (Oral)   Resp 20   Ht 5' 6\" (1.676 m)   Wt 300 lb (136.1 kg)   SpO2 92%   BMI 48.42 kg/m²     Oxygen Saturation Interpretation: 92 % on room air. Normal    The patients available past medical records and past encounters were reviewed. ------------------------------ ED COURSE/MEDICAL DECISION MAKING----------------------  Medications   sodium chloride flush 0.9 % injection 5-40 mL (has no administration in time range)   sodium chloride flush 0.9 % injection 5-40 mL (has no administration in time range)   0.9 % sodium chloride infusion (has no administration in time range)   polyethylene glycol (GLYCOLAX) packet 17 g (has no administration in time range)   acetaminophen (TYLENOL) tablet 650 mg (has no administration in time range)     Or   acetaminophen (TYLENOL) suppository 650 mg (has no administration in time range)   aspirin EC tablet 81 mg (has no administration in time range)   atorvastatin (LIPITOR) tablet 80 mg (has no administration in time range)   DULoxetine (CYMBALTA) extended release capsule 20 mg (has no administration in time range)   insulin glargine (LANTUS;BASAGLAR) injection pen 30 Units (has no administration in time range)   montelukast (SINGULAIR) tablet 10 mg (has no administration in time range)   warfarin (COUMADIN) tablet 5 mg (has no administration in time range)   Arformoterol Tartrate (BROVANA) nebulizer solution 15 mcg (has no administration in time range)     And   budesonide (PULMICORT) nebulizer suspension 500 mcg (has no administration in time range)   0.9 % sodium chloride bolus (0 mLs Intravenous Stopped 6/12/21 2047)   morphine sulfate (PF) injection 5 mg (5 mg Intravenous Given 6/12/21 1930)       The cardiac monitor revealed afib with a heart rate in the 100s as interpreted by me. The cardiac monitor was ordered secondary to the patient's dizziness and to monitor the patient for dysrhythmia. CPT [id number]         Medical Decision Making:      The patient was seen and evaluated by the Attending Emergency Medicine Physician Dr. Chely Rivera. The patient is a 72-year-old male presents to the emergency department complaining of dizziness and shoulder pain. Patient is slightly tachycardic on arrival and is found to be in atrial fibrillation with a heart rate in the 80s to low 100s. Patient is chronically on appearance, but no acute distress. There are no focal neurological deficits on examination. Patient is slightly dehydrated with a sodium of 130 and a chloride of 90 and was resuscitated with IV fluids. Also has a slight JORDEN with a creatinine of 2.0 and a BUN of 47, baseline is 1.4 and 40. BNP elevated at 2171. Troponin was drawn but hemolyzed and repeat was pending upon admission. Patient denied any chest pain. CT head did show questionable fracture and pneumocephalus. I did consult with neurosurgery, Dr. Kiet Fung, who states that we can repeat the CT scan in 2 hours and if it is improved or unchanged on the patient does not need to be transferred to [facility]. Imaging did not show any other acute abnormalities. Repeat CT scan showed improvement of the pneumocephalus and subcutaneous emphysema. There is a questionable left facial bone fracture. Patient continually denied any pain in this region and states that he did not hit his head. I am concerned with the dizziness and JORDEN and would like to the patient admitted. Did consult with hospitalist who accepted for admission. Patient's orthostatics were also positive in the emergency department. Discussed results and plan with patient and did call his wife who agreed with admission. Re-Evaluations:  ED Course as of Jun 13 0158   Sat Jun 12, 2021   1857 Patient requesting something for pain control. Pain meds ordered and will reassess. Patient's orthostatics were positive. IV fluids ordered and will reassess.     [MS]      ED Course User Index  [MS] Kieran Edomndson, DO       2100 Patient was resting comfortably and in no acute distress on reassessment. This patient's ED course included: a personal history and physicial examination, re-evaluation prior to disposition, multiple bedside re-evaluations, IV medications, cardiac monitoring, continuous pulse oximetry and complex medical decision making and emergency management    This patient has remained hemodynamically stable during their ED course. Consultations:  Spoke with Dr. Joaquin Chino (neurosurgery). Discussed case. They will provide consultation. Spoke with Dr. Fernandez Colón (Medicine). Discussed case. They will admit this patient. Counseling: The emergency provider has spoken with the patient and discussed todays results, in addition to providing specific details for the plan of care and counseling regarding the diagnosis and prognosis. Questions are answered at this time and they are agreeable with the plan.       --------------------------------- IMPRESSION AND DISPOSITION ---------------------------------    IMPRESSION  1. Dizziness    2. JORDEN (acute kidney injury) ([facility]    3. Elevated brain natriuretic peptide (BNP) level        DISPOSITION  Disposition: Discharge to home  Patient condition is stable        NOTE: This report was transcribed using voice recognition software.  Every effort was made to ensure accuracy; however, inadvertent computerized transcription errors may be present       Patito Hu DO  Resident  06/13/21 9209

## 2021-06-13 ENCOUNTER — APPOINTMENT (OUTPATIENT)
Dept: CT IMAGING | Age: 66
End: 2021-06-13
Payer: MEDICARE

## 2021-06-13 ENCOUNTER — APPOINTMENT (OUTPATIENT)
Dept: ULTRASOUND IMAGING | Age: 66
End: 2021-06-13
Payer: MEDICARE

## 2021-06-13 LAB
ANION GAP SERPL CALCULATED.3IONS-SCNC: 10 MMOL/L (ref 7–16)
BUN BLDV-MCNC: 37 MG/DL (ref 6–23)
CALCIUM SERPL-MCNC: 9.5 MG/DL (ref 8.6–10.2)
CHLORIDE BLD-SCNC: 92 MMOL/L (ref 98–107)
CO2: 29 MMOL/L (ref 22–29)
CREAT SERPL-MCNC: 1.4 MG/DL (ref 0.7–1.2)
EKG ATRIAL RATE: 82 BPM
EKG Q-T INTERVAL: 384 MS
EKG QRS DURATION: 118 MS
EKG QTC CALCULATION (BAZETT): 507 MS
EKG R AXIS: 85 DEGREES
EKG T AXIS: -60 DEGREES
EKG VENTRICULAR RATE: 105 BPM
GFR AFRICAN AMERICAN: >60
GFR NON-AFRICAN AMERICAN: 51 ML/MIN/1.73
GLUCOSE BLD-MCNC: 279 MG/DL (ref 74–99)
METER GLUCOSE: 285 MG/DL (ref 74–99)
METER GLUCOSE: 286 MG/DL (ref 74–99)
METER GLUCOSE: 370 MG/DL (ref 74–99)
METER GLUCOSE: 425 MG/DL (ref 74–99)
POTASSIUM SERPL-SCNC: 4 MMOL/L (ref 3.5–5)
SODIUM BLD-SCNC: 131 MMOL/L (ref 132–146)

## 2021-06-13 PROCEDURE — 36415 COLL VENOUS BLD VENIPUNCTURE: CPT

## 2021-06-13 PROCEDURE — 6370000000 HC RX 637 (ALT 250 FOR IP): Performed by: INTERNAL MEDICINE

## 2021-06-13 PROCEDURE — 94664 DEMO&/EVAL PT USE INHALER: CPT

## 2021-06-13 PROCEDURE — 6370000000 HC RX 637 (ALT 250 FOR IP): Performed by: NURSE PRACTITIONER

## 2021-06-13 PROCEDURE — 82962 GLUCOSE BLOOD TEST: CPT

## 2021-06-13 PROCEDURE — 80048 BASIC METABOLIC PNL TOTAL CA: CPT

## 2021-06-13 PROCEDURE — G0378 HOSPITAL OBSERVATION PER HR: HCPCS

## 2021-06-13 PROCEDURE — 2580000003 HC RX 258: Performed by: INTERNAL MEDICINE

## 2021-06-13 PROCEDURE — 94640 AIRWAY INHALATION TREATMENT: CPT

## 2021-06-13 PROCEDURE — 70450 CT HEAD/BRAIN W/O DYE: CPT

## 2021-06-13 PROCEDURE — 6360000002 HC RX W HCPCS: Performed by: INTERNAL MEDICINE

## 2021-06-13 PROCEDURE — 93880 EXTRACRANIAL BILAT STUDY: CPT

## 2021-06-13 PROCEDURE — 93005 ELECTROCARDIOGRAM TRACING: CPT | Performed by: INTERNAL MEDICINE

## 2021-06-13 PROCEDURE — 87040 BLOOD CULTURE FOR BACTERIA: CPT

## 2021-06-13 RX ORDER — SODIUM CHLORIDE 9 MG/ML
INJECTION, SOLUTION INTRAVENOUS CONTINUOUS
Status: DISCONTINUED | OUTPATIENT
Start: 2021-06-13 | End: 2021-06-14

## 2021-06-13 RX ORDER — METOPROLOL SUCCINATE 25 MG/1
25 TABLET, EXTENDED RELEASE ORAL 2 TIMES DAILY
Status: DISCONTINUED | OUTPATIENT
Start: 2021-06-13 | End: 2021-06-14 | Stop reason: HOSPADM

## 2021-06-13 RX ORDER — GABAPENTIN 100 MG/1
100 CAPSULE ORAL 3 TIMES DAILY
Status: DISCONTINUED | OUTPATIENT
Start: 2021-06-13 | End: 2021-06-14 | Stop reason: HOSPADM

## 2021-06-13 RX ORDER — SODIUM CHLORIDE 9 MG/ML
25 INJECTION, SOLUTION INTRAVENOUS PRN
Status: DISCONTINUED | OUTPATIENT
Start: 2021-06-13 | End: 2021-06-14 | Stop reason: HOSPADM

## 2021-06-13 RX ORDER — LOSARTAN POTASSIUM 50 MG/1
50 TABLET ORAL DAILY
Status: DISCONTINUED | OUTPATIENT
Start: 2021-06-13 | End: 2021-06-14

## 2021-06-13 RX ORDER — ATORVASTATIN CALCIUM 40 MG/1
80 TABLET, FILM COATED ORAL NIGHTLY
Status: DISCONTINUED | OUTPATIENT
Start: 2021-06-13 | End: 2021-06-14 | Stop reason: HOSPADM

## 2021-06-13 RX ORDER — BUDESONIDE AND FORMOTEROL FUMARATE DIHYDRATE 160; 4.5 UG/1; UG/1
2 AEROSOL RESPIRATORY (INHALATION) 2 TIMES DAILY
Status: DISCONTINUED | OUTPATIENT
Start: 2021-06-13 | End: 2021-06-13 | Stop reason: CLARIF

## 2021-06-13 RX ORDER — BUDESONIDE 0.5 MG/2ML
0.5 INHALANT ORAL 2 TIMES DAILY
Status: DISCONTINUED | OUTPATIENT
Start: 2021-06-13 | End: 2021-06-14

## 2021-06-13 RX ORDER — ASPIRIN 81 MG/1
81 TABLET ORAL NIGHTLY
Status: DISCONTINUED | OUTPATIENT
Start: 2021-06-13 | End: 2021-06-13

## 2021-06-13 RX ORDER — DULOXETIN HYDROCHLORIDE 20 MG/1
20 CAPSULE, DELAYED RELEASE ORAL DAILY
Status: DISCONTINUED | OUTPATIENT
Start: 2021-06-13 | End: 2021-06-14 | Stop reason: HOSPADM

## 2021-06-13 RX ORDER — ASPIRIN 81 MG/1
81 TABLET ORAL DAILY
Status: DISCONTINUED | OUTPATIENT
Start: 2021-06-14 | End: 2021-06-14 | Stop reason: HOSPADM

## 2021-06-13 RX ORDER — POLYETHYLENE GLYCOL 3350 17 G/17G
17 POWDER, FOR SOLUTION ORAL DAILY PRN
Status: DISCONTINUED | OUTPATIENT
Start: 2021-06-13 | End: 2021-06-14 | Stop reason: HOSPADM

## 2021-06-13 RX ORDER — WARFARIN SODIUM 5 MG/1
5 TABLET ORAL DAILY
Status: DISCONTINUED | OUTPATIENT
Start: 2021-06-13 | End: 2021-06-14 | Stop reason: HOSPADM

## 2021-06-13 RX ORDER — ACETAMINOPHEN 650 MG/1
650 SUPPOSITORY RECTAL EVERY 6 HOURS PRN
Status: DISCONTINUED | OUTPATIENT
Start: 2021-06-13 | End: 2021-06-14 | Stop reason: HOSPADM

## 2021-06-13 RX ORDER — PRAMIPEXOLE DIHYDROCHLORIDE 0.25 MG/1
0.5 TABLET ORAL 2 TIMES DAILY
Status: DISCONTINUED | OUTPATIENT
Start: 2021-06-13 | End: 2021-06-14 | Stop reason: HOSPADM

## 2021-06-13 RX ORDER — SODIUM CHLORIDE 0.9 % (FLUSH) 0.9 %
5-40 SYRINGE (ML) INJECTION EVERY 12 HOURS SCHEDULED
Status: DISCONTINUED | OUTPATIENT
Start: 2021-06-13 | End: 2021-06-14 | Stop reason: HOSPADM

## 2021-06-13 RX ORDER — MONTELUKAST SODIUM 10 MG/1
10 TABLET ORAL NIGHTLY
Status: DISCONTINUED | OUTPATIENT
Start: 2021-06-13 | End: 2021-06-14 | Stop reason: HOSPADM

## 2021-06-13 RX ORDER — BUMETANIDE 1 MG/1
1 TABLET ORAL 2 TIMES DAILY
Status: DISCONTINUED | OUTPATIENT
Start: 2021-06-13 | End: 2021-06-14 | Stop reason: HOSPADM

## 2021-06-13 RX ORDER — LOSARTAN POTASSIUM 50 MG/1
50 TABLET ORAL DAILY
Status: ON HOLD | COMMUNITY
End: 2021-06-14 | Stop reason: HOSPADM

## 2021-06-13 RX ORDER — ACETAMINOPHEN 325 MG/1
650 TABLET ORAL EVERY 6 HOURS PRN
Status: DISCONTINUED | OUTPATIENT
Start: 2021-06-13 | End: 2021-06-14 | Stop reason: HOSPADM

## 2021-06-13 RX ORDER — ARFORMOTEROL TARTRATE 15 UG/2ML
15 SOLUTION RESPIRATORY (INHALATION) 2 TIMES DAILY
Status: DISCONTINUED | OUTPATIENT
Start: 2021-06-13 | End: 2021-06-14

## 2021-06-13 RX ORDER — SODIUM CHLORIDE 0.9 % (FLUSH) 0.9 %
5-40 SYRINGE (ML) INJECTION PRN
Status: DISCONTINUED | OUTPATIENT
Start: 2021-06-13 | End: 2021-06-14 | Stop reason: HOSPADM

## 2021-06-13 RX ORDER — INSULIN GLARGINE 100 [IU]/ML
30 INJECTION, SOLUTION SUBCUTANEOUS 2 TIMES DAILY
Status: DISCONTINUED | OUTPATIENT
Start: 2021-06-13 | End: 2021-06-14 | Stop reason: HOSPADM

## 2021-06-13 RX ADMIN — INSULIN LISPRO 15 UNITS: 100 INJECTION, SOLUTION INTRAVENOUS; SUBCUTANEOUS at 16:17

## 2021-06-13 RX ADMIN — BUDESONIDE 500 MCG: 0.5 INHALANT RESPIRATORY (INHALATION) at 18:19

## 2021-06-13 RX ADMIN — WARFARIN SODIUM 5 MG: 5 TABLET ORAL at 16:16

## 2021-06-13 RX ADMIN — SODIUM CHLORIDE, PRESERVATIVE FREE 10 ML: 5 INJECTION INTRAVENOUS at 09:36

## 2021-06-13 RX ADMIN — METOPROLOL SUCCINATE 25 MG: 25 TABLET, EXTENDED RELEASE ORAL at 09:27

## 2021-06-13 RX ADMIN — DULOXETINE HYDROCHLORIDE 20 MG: 20 CAPSULE, DELAYED RELEASE ORAL at 09:26

## 2021-06-13 RX ADMIN — ATORVASTATIN CALCIUM 80 MG: 40 TABLET, FILM COATED ORAL at 22:04

## 2021-06-13 RX ADMIN — INSULIN LISPRO 5 UNITS: 100 INJECTION, SOLUTION INTRAVENOUS; SUBCUTANEOUS at 22:06

## 2021-06-13 RX ADMIN — ARFORMOTEROL TARTRATE 15 MCG: 15 SOLUTION RESPIRATORY (INHALATION) at 18:19

## 2021-06-13 RX ADMIN — SODIUM CHLORIDE: 9 INJECTION, SOLUTION INTRAVENOUS at 09:35

## 2021-06-13 RX ADMIN — INSULIN GLARGINE 30 UNITS: 100 INJECTION, SOLUTION SUBCUTANEOUS at 09:27

## 2021-06-13 RX ADMIN — INSULIN LISPRO 18 UNITS: 100 INJECTION, SOLUTION INTRAVENOUS; SUBCUTANEOUS at 12:31

## 2021-06-13 RX ADMIN — MONTELUKAST 10 MG: 10 TABLET, FILM COATED ORAL at 22:13

## 2021-06-13 RX ADMIN — GABAPENTIN 100 MG: 100 CAPSULE ORAL at 22:05

## 2021-06-13 RX ADMIN — ARFORMOTEROL TARTRATE 15 MCG: 15 SOLUTION RESPIRATORY (INHALATION) at 06:43

## 2021-06-13 RX ADMIN — BUDESONIDE 500 MCG: 0.5 INHALANT RESPIRATORY (INHALATION) at 06:43

## 2021-06-13 RX ADMIN — INSULIN GLARGINE 30 UNITS: 100 INJECTION, SOLUTION SUBCUTANEOUS at 22:05

## 2021-06-13 RX ADMIN — ASPIRIN 325 MG: 325 TABLET, COATED ORAL at 05:07

## 2021-06-13 ASSESSMENT — PAIN DESCRIPTION - PROGRESSION
CLINICAL_PROGRESSION: NOT CHANGED

## 2021-06-13 ASSESSMENT — PAIN SCALES - GENERAL
PAINLEVEL_OUTOF10: 7
PAINLEVEL_OUTOF10: 0
PAINLEVEL_OUTOF10: 7
PAINLEVEL_OUTOF10: 0

## 2021-06-13 ASSESSMENT — PAIN DESCRIPTION - ONSET: ONSET: ON-GOING

## 2021-06-13 ASSESSMENT — PAIN DESCRIPTION - PAIN TYPE
TYPE: ACUTE PAIN
TYPE: ACUTE PAIN

## 2021-06-13 ASSESSMENT — PAIN DESCRIPTION - DESCRIPTORS: DESCRIPTORS: ACHING;DISCOMFORT;SORE

## 2021-06-13 ASSESSMENT — PAIN DESCRIPTION - FREQUENCY: FREQUENCY: CONTINUOUS

## 2021-06-13 ASSESSMENT — PAIN DESCRIPTION - ORIENTATION
ORIENTATION: RIGHT
ORIENTATION: RIGHT

## 2021-06-13 ASSESSMENT — PAIN - FUNCTIONAL ASSESSMENT: PAIN_FUNCTIONAL_ASSESSMENT: PREVENTS OR INTERFERES SOME ACTIVE ACTIVITIES AND ADLS

## 2021-06-13 ASSESSMENT — PAIN DESCRIPTION - LOCATION
LOCATION: SHOULDER
LOCATION: SHOULDER

## 2021-06-13 NOTE — CONSULTS
History Of Present Illness: Patient is 66-year-old male who presented to the ED following a fall secondary to lightheadedness and dizziness. Patient states that he started 3 new blood pressure medications all at once. He states he felt dizzy and fell while outside. He had assistance getting back up. Patient denies any headache. Denies any numbness or tingling. Denies any focal weakness. Patient has been trying to lose weight. He has been using diuretics to achieve weight loss. States he lost about 20 pounds in the last 3 weeks. Over the last few months he has lost about 50 pounds. As above per hospitalist.  Patient interviewed and reports light headedness with standing/walking that led to fall. The patient is a 72 y.o. male with significant past medical history of CAD, CABG, coronary stents,type 2 diabetes who presents with above. The patient has the following symptoms:    Change in level of consciousness: alert    New Weakness: no    Numbness or Tingling: no    Difficulty Swallowing: no    Current Medications:   Scheduled Meds:   sodium chloride flush  5-40 mL Intravenous 2 times per day    atorvastatin  80 mg Oral Nightly    DULoxetine  20 mg Oral Daily    insulin glargine  30 Units Subcutaneous BID    montelukast  10 mg Oral Nightly    warfarin  5 mg Oral Daily    Arformoterol Tartrate  15 mcg Nebulization BID    And    budesonide  0.5 mg Nebulization BID    metoprolol succinate  25 mg Oral BID    [START ON 6/14/2021] aspirin  81 mg Oral Daily     Continuous Infusions:   sodium chloride      sodium chloride       PRN Meds:sodium chloride flush, sodium chloride, polyethylene glycol, acetaminophen **OR** acetaminophen, perflutren lipid microspheres    Allergies:  Pcn [penicillins] and Sulfa antibiotics    Social History:   TOBACCO:   reports that he quit smoking about 7 years ago. His smoking use included cigarettes. He has a 45.00 pack-year smoking history.  He quit smokeless tobacco use about 49 years ago. His smokeless tobacco use included chew. ETOH:   reports no history of alcohol use. Past Medical History:        Diagnosis Date    Acid reflux     Acute diastolic (congestive) heart failure ([facility]) 06/19/2019    Arthritis     Asthma 04/16/2014    Asthmatic bronchitis , chronic ([facility]) 11/28/2016    CAD (coronary artery disease)     Chronic bronchitis (HCC) 04/16/2014    COPD (chronic obstructive pulmonary disease) ([facility])     CB    COPD (chronic obstructive pulmonary disease) ([facility])     Diabetes mellitus ([facility])     Emphysema (subcutaneous) (surgical) resulting from a procedure     H/O cardiovascular stress test 04/24/2021    Lexiscan    Hyperlipidemia     Hypertension     Hypoxemia requiring supplemental oxygen 02/02/2015    LONG TERM ANTICOAGULENT USE     Morbid obesity with BMI of 50.0-59.9, adult ([facility] 11/27/2013    Obesity     Osteoarthritis     Sleep apnea     bilevel positive airway pressure at 13/8 with 2 L oxygen flow     Stage 3 chronic kidney disease ([facility])     Tobacco abuse     Type II or unspecified type diabetes mellitus without mention of complication, not stated as uncontrolled        Past Surgical History:        Procedure Laterality Date    COLONOSCOPY      CORONARY ANGIOPLASTY WITH STENT PLACEMENT  07-23-13    Left main focal eccentric 65% distal stenosis. Large OM1 CX 50% ostial & prox narrow. Large ramus artery & LAD: Minor plaque w/o sign narrow. RCA: Dom vessel w/25% prox narrow & focal hazy eccentric 99% distal stenosis before origin RPDA & RPLCA. LV: Mild inferior hypokinesis EF 55%. Probable mild AS with 10-15mmHg. Successful PCI distal RCA w/3.5 x 12 mm BMS, 0% res stenosis.  Normal distal runoff    CORONARY ARTERY BYPASS GRAFT  09-09-13    Dr Martha Mcbride; CABG x2, LIMA to LAD, SVG to ramus intermedius; MV repair using 30-mm Future complete ring with magic stitch between A3 and P3; rigid internal fixation of sternum using KLS plates x2; endoscopic vein harvesting of right lower extremity     ECHO COMPL W DOP COLOR FLOW  7/25/2013         HERNIA REPAIR      SINUS SURGERY           Outside reports reviewed: ER records, historical medical records, lab reports and radiology reports. Patient's medications, allergies, past medical, surgical, social and family histories were reviewed and updated as appropriate. Review of Systems  A comprehensive review of systems was negative except for:       Objective:     Neuro exam 102/76 p 90 t 98  General: normal orientation and alertness. Cranial nerve testing was normal.  Funduscopic eye exam revealed not testable. Motor exam: 5-/5. Deep tendon reflexes were absent bilaterally. Plantar responses were flexor bilaterally. Cerebellar exam noted finger to nose without dysmetria. Sensation was decreased in the lower extremities.       Assessment:   Dizziness multifactorial including possible near syncope      Plan:   Defer anticoagulation/asa combination to cardiology/primary care  Suggest reevaluation of antihypertensive regime to prevent hypotension  MRI pending  PT for gait and balance (walks without assist at baseline)  Thanks for consult

## 2021-06-13 NOTE — H&P
Department of Internal Medicine  History and Physical    PCP: Dr. Darien Stewart   Admitting Physician: Dr. Heidi Ndiaye  Consultants: Dr. Guille Castaneda  Date of Service: 6/13/2021    CHIEF COMPLAINT:  Lightheadedness/fall    HISTORY OF PRESENT ILLNESS:    Patient is 60-year-old male who presented to the ED following a fall secondary to lightheadedness and dizziness. Patient states that he started 3 new blood pressure medications all at once. He states he felt dizzy and fell while outside. He had assistance getting back up. Patient denies any headache. Denies any numbness or tingling. Denies any focal weakness. Patient has been trying to lose weight. He has been using diuretics to achieve weight loss. States he lost about 20 pounds in the last 3 weeks. Over the last few months he has lost about 50 pounds.     PAST MEDICAL Hx:  Past Medical History:   Diagnosis Date    Acid reflux     Acute diastolic (congestive) heart failure (HCC) 06/19/2019    Arthritis     Asthma 04/16/2014    Asthmatic bronchitis , chronic (HCC) 11/28/2016    CAD (coronary artery disease)     Chronic bronchitis (HCC) 04/16/2014    COPD (chronic obstructive pulmonary disease) (HCC)     CB    COPD (chronic obstructive pulmonary disease) (HCC)     [facility] mellitus ([facility])     Emphysema (subcutaneous) (surgical) resulting from a procedure     H/O cardiovascular stress test 04/24/2021    Lexiscan    Hyperlipidemia     Hypertension     Hypoxemia requiring supplemental oxygen 02/02/2015    LONG TERM ANTICOAGULENT USE     Morbid obesity with BMI of 50.0-59.9, adult ([facility] 11/27/2013    Obesity     Osteoarthritis     Sleep apnea     bilevel positive airway pressure at 13/8 with 2 L oxygen flow     Stage 3 chronic kidney disease (HCC)     Tobacco abuse     Type II or unspecified type diabetes mellitus without mention of complication, not stated as uncontrolled        PAST SURGICAL Hx:   Past Surgical History:   Procedure Laterality Date    1 capsule by mouth daily 6/9/21   [facility], DO   bumetanide (BUMEX) 1 MG tablet Take 1 tablet by mouth 2 times daily 6/4/21   Rolo Fuel, DO   pramipexole (MIRAPEX) 0.25 MG tablet TAKE TWO TABLETS BY MOUTH THREE TIMES A DAY 4/29/21   Rolo Fuel, DO   warfarin (JANTOVEN) 5 MG tablet TAKE 2 TABLETS BY MOUTH DAILY ON MONDAY, WEDNESDAY, FRIDAY AND SATURDAY. THEN TAKE ONE TABLET DAILY ON TUESDAY, THURSDAY AND SUNDAY 4/29/21   Rolo Fuel, DO   insulin lispro, 1 Unit Dial, (HUMALOG KWIKPEN) 100 UNIT/ML SOPN Inject 6 units with meals PLUS                 No Insulin   140-199           3 Units   200-249           6 Units   250-299           9 Units   300-349         12 Units   350-400         15 Units   Over 400       18 Units 4/25/21   Roc Landis, DO   Lancets MISC 1 each by Does not apply route 4 times daily (before meals and nightly) 4/25/21   Roc Landis, DO   potassium chloride (KLOR-CON M) 20 MEQ extended release tablet Take 2 tablets by mouth 2 times daily 4/25/21   Roc Lanids, DO   atorvastatin (LIPITOR) 80 MG tablet TAKE ONE TABLET BY MOUTH EVERY NIGHT 4/1/21   [facility], DO   montelukast (SINGULAIR) 10 MG tablet Take 1 tablet by mouth nightly 4/1/21   Rolo Fuel, DO   spironolactone (ALDACTONE) 25 MG tablet TAKE ONE TABLET BY MOUTH TWO TIMES A DAY 2/4/21   Rajani Vinson MD   ipratropium (ATROVENT) 0.06 % nasal spray 2 sprays by Nasal route 3 times daily 4/2/20 4/2/21  Rolo Fuel, DO   blood glucose monitor strips Test two times a day & as needed for symptoms of irregular blood glucose. 3/9/20   Rolo Fuel, DO   terbinafine (ATHLETES FOOT) 1 % cream Apply topically 2 times daily.  10/21/19   Sherry Scale, DO   SYMBICORT 160-4.5 MCG/ACT AERO Inhale 2 puffs into the lungs 2 times daily 3/6/18   [facility], DO   CPAP Machine MISC 1 each by Does not apply route nightly as needed     Historical Provider, MD   Accu-Chek Multiclix Lancets MISC Use as directed 14   Yuliet Winters, DO   aspirin 81 MG tablet Take 81 mg by mouth nightly     Historical Provider, MD       ALLERGIES:  Pcn [penicillins] and Sulfa antibiotics    SOCIAL Hx:  Social History     Socioeconomic History    Marital status:      Spouse name: Emily Christianson Number of children: 1    Years of education: Not on file    Highest education level: 11th grade   Occupational History    Not on file   Tobacco Use    Smoking status: Former Smoker     Packs/day: 1.00     Years: 45.00     Pack years: 45.00     Types: Cigarettes     Quit date: 2013     Years since quittin.8    Smokeless tobacco: Former User     Types: [address] date: 10/8/1971   Vaping Use    Vaping Use: Never used   Substance and Sexual Activity    Alcohol use: No     Alcohol/week: 0.0 standard drinks     Comment: 1-2 coffee per day    Drug use: No    Sexual activity: Yes     Partners: Female   Other Topics Concern    Not on file   Social History Narrative    19  [facility] reviewed SDOH with Keith Gonzalez. He does have money strain but between the income he has coming in and his wife's they are over resources for SARY programs. Offered food panty info to help with end of the month struggles but he declined stating that he tried and was refused due to his income. He belongs to a [employer] and goes weekly so he has some community connections in place. He has an extremely high interest rate on his mortgage which he was encouraged to look into getting lowered to help save money on his house payments. Noticed some difficulty with memory recall. Becomes easily flustered at times. Has no MHI to support applying for [facility] [place] program of SARY. States he does not feel depressed or need any MH treatment.         Social Determinants of Health     Financial Resource Strain:     Difficulty of Paying Living Expenses:    Food Insecurity:     Worried About Running Out of Food in the Last Year:     [address] in the Last Year: Transportation Needs:     Lack of Transportation (Medical):  Lack of Transportation (Non-Medical):    Physical Activity:     Days of Exercise per Week:     Minutes of Exercise per Session:    Stress:     Feeling of Stress :    Social Connections:     Frequency of Communication with Friends and Family:     Frequency of Social Gatherings with Friends and Family:     Attends [employer] Services:     Active Member of Clubs or Organizations:     Attends Club or Organization Meetings:     Marital Status:    Intimate Partner Violence:     Fear of Current or Ex-Partner:     Emotionally Abused:     Physically Abused:     Sexually Abused:        ROS: Positive in bold  General:   Denies chills, fatigue, fever, malaise, night sweats or weight loss    Psychological:   Denies anxiety, disorientation or hallucinations    ENT:    Denies epistaxis, headaches, vertigo or visual changes    Cardiovascular:   Denies any chest pain, irregular heartbeats, or palpitations. No paroxysmal nocturnal dyspnea. Respiratory:   Denies shortness of breath, coughing, sputum production, hemoptysis, or wheezing. No orthopnea. Gastrointestinal:   Denies nausea, vomiting, diarrhea, or constipation. Denies any abdominal pain. Denies change in bowel habits or stools. Genito-Urinary:    Denies any urgency, frequency, hematuria. Voiding without difficulty. Musculoskeletal:   Denies joint pain, joint stiffness, joint swelling or muscle pain    Neurology:    Denies any headache or focal neurological deficits. No weakness or paresthesia. Derm:    Denies any rashes, ulcers, or excoriations. Denies bruising. Extremities:   Denies any lower extremity swelling or edema.       PHYSICAL EXAM: Abnormal findings noted  VITALS:  Vitals:    06/13/21 0000   BP: 107/69   Pulse: 106   Resp:    Temp: 97.6 °F (36.4 °C)   SpO2: 95%         CONSTITUTIONAL:    Awake, alert, cooperative, no apparent distress, and appears stated age    EYES:    EOMI, sclera clear, conjunctiva normal    ENT:    Normocephalic, atraumatic,  External ears without lesions. NECK:    Supple, symmetrical, trachea midline,  no JVD    HEMATOLOGIC/LYMPHATICS:    No cervical lymphadenopathy and no supraclavicular lymphadenopathy    LUNGS:    Symmetric. No increased work of breathing, good air exchange, clear to auscultation bilaterally, no wheezes, rhonchi, or rales,     CARDIOVASCULAR:    Normal apical impulse, regular rate and rhythm, normal S1 and S2, no S3 or S4, and no murmur noted    ABDOMEN:     soft, non-distended, non-tender    MUSCULOSKELETAL:    There is no redness, warmth, or swelling of the joints. NEUROLOGIC:    Awake, alert, oriented to name, place and time. Cranial nerves II-XII are grossly intact. SKIN:    No bruising or bleeding. No redness, warmth, or swelling    EXTREMITIES:    Peripheral pulses present. No edema, cyanosis, or swelling.     LINES/CATHETERS     LABORATORY DATA:  CBC with Differential:    Lab Results   Component Value Date    WBC 9.4 06/12/2021    RBC 4.71 06/12/2021    HGB 14.6 06/12/2021    HCT 42.7 06/12/2021    PLT 86 06/12/2021    MCV 90.7 06/12/2021    MCH 31.0 06/12/2021    MCHC 34.2 06/12/2021    RDW 16.9 06/12/2021    SEGSPCT 61 01/20/2014    LYMPHOPCT 15.7 06/12/2021    MONOPCT 7.9 06/12/2021    BASOPCT 0.3 06/12/2021    MONOSABS 0.74 06/12/2021    LYMPHSABS 1.47 06/12/2021    EOSABS 0.11 06/12/2021    BASOSABS 0.03 06/12/2021     CMP:    Lab Results   Component Value Date     06/12/2021    K 4.1 06/12/2021    CL 90 06/12/2021    CO2 29 06/12/2021    BUN 47 06/12/2021    CREATININE 2.0 06/12/2021    GFRAA 41 06/12/2021    LABGLOM 34 06/12/2021    GLUCOSE 184 06/12/2021    GLUCOSE 118 10/27/2011    PROT 7.3 06/12/2021    LABALBU 3.6 06/12/2021    LABALBU 4.4 10/27/2011    CALCIUM 9.6 06/12/2021    BILITOT 1.1 06/12/2021    ALKPHOS 134 06/12/2021    AST 23 06/12/2021    ALT 16 06/12/2021

## 2021-06-14 ENCOUNTER — TELEPHONE (OUTPATIENT)
Dept: FAMILY MEDICINE CLINIC | Age: 66
End: 2021-06-14

## 2021-06-14 ENCOUNTER — APPOINTMENT (OUTPATIENT)
Dept: MRI IMAGING | Age: 66
End: 2021-06-14
Payer: MEDICARE

## 2021-06-14 VITALS
DIASTOLIC BLOOD PRESSURE: 86 MMHG | SYSTOLIC BLOOD PRESSURE: 124 MMHG | OXYGEN SATURATION: 95 % | HEART RATE: 83 BPM | TEMPERATURE: 97.8 F | HEIGHT: 66 IN | BODY MASS INDEX: 48.21 KG/M2 | RESPIRATION RATE: 18 BRPM | WEIGHT: 300 LBS

## 2021-06-14 LAB
ALBUMIN SERPL-MCNC: 3.7 G/DL (ref 3.5–5.2)
ALP BLD-CCNC: 142 U/L (ref 40–129)
ALT SERPL-CCNC: 15 U/L (ref 0–40)
ANION GAP SERPL CALCULATED.3IONS-SCNC: 11 MMOL/L (ref 7–16)
AST SERPL-CCNC: 19 U/L (ref 0–39)
BASOPHILS ABSOLUTE: 0.06 E9/L (ref 0–0.2)
BASOPHILS RELATIVE PERCENT: 0.8 % (ref 0–2)
BILIRUB SERPL-MCNC: 1 MG/DL (ref 0–1.2)
BUN BLDV-MCNC: 34 MG/DL (ref 6–23)
CALCIUM SERPL-MCNC: 9.4 MG/DL (ref 8.6–10.2)
CHLORIDE BLD-SCNC: 93 MMOL/L (ref 98–107)
CO2: 28 MMOL/L (ref 22–29)
CREAT SERPL-MCNC: 1.1 MG/DL (ref 0.7–1.2)
EKG ATRIAL RATE: 136 BPM
EKG Q-T INTERVAL: 370 MS
EKG QRS DURATION: 114 MS
EKG QTC CALCULATION (BAZETT): 514 MS
EKG R AXIS: 88 DEGREES
EKG T AXIS: -104 DEGREES
EKG VENTRICULAR RATE: 116 BPM
EOSINOPHILS ABSOLUTE: 0.13 E9/L (ref 0.05–0.5)
EOSINOPHILS RELATIVE PERCENT: 1.7 % (ref 0–6)
GFR AFRICAN AMERICAN: >60
GFR NON-AFRICAN AMERICAN: >60 ML/MIN/1.73
GLUCOSE BLD-MCNC: 311 MG/DL (ref 74–99)
HCT VFR BLD CALC: 44.6 % (ref 37–54)
HEMOGLOBIN: 14.6 G/DL (ref 12.5–16.5)
IMMATURE GRANULOCYTES #: 0.04 E9/L
IMMATURE GRANULOCYTES %: 0.5 % (ref 0–5)
INR BLD: 4.1
LYMPHOCYTES ABSOLUTE: 1.31 E9/L (ref 1.5–4)
LYMPHOCYTES RELATIVE PERCENT: 16.8 % (ref 20–42)
MCH RBC QN AUTO: 31 PG (ref 26–35)
MCHC RBC AUTO-ENTMCNC: 32.7 % (ref 32–34.5)
MCV RBC AUTO: 94.7 FL (ref 80–99.9)
METER GLUCOSE: 339 MG/DL (ref 74–99)
METER GLUCOSE: 374 MG/DL (ref 74–99)
METER GLUCOSE: 407 MG/DL (ref 74–99)
MONOCYTES ABSOLUTE: 0.62 E9/L (ref 0.1–0.95)
MONOCYTES RELATIVE PERCENT: 8 % (ref 2–12)
NEUTROPHILS ABSOLUTE: 5.62 E9/L (ref 1.8–7.3)
NEUTROPHILS RELATIVE PERCENT: 72.2 % (ref 43–80)
PDW BLD-RTO: 17 FL (ref 11.5–15)
PLATELET # BLD: 236 E9/L (ref 130–450)
PMV BLD AUTO: 11 FL (ref 7–12)
POTASSIUM SERPL-SCNC: 4 MMOL/L (ref 3.5–5)
PROTHROMBIN TIME: 48.6 SEC (ref 9.3–12.4)
RBC # BLD: 4.71 E12/L (ref 3.8–5.8)
SODIUM BLD-SCNC: 132 MMOL/L (ref 132–146)
TOTAL PROTEIN: 7.3 G/DL (ref 6.4–8.3)
WBC # BLD: 7.8 E9/L (ref 4.5–11.5)

## 2021-06-14 PROCEDURE — 97165 OT EVAL LOW COMPLEX 30 MIN: CPT | Performed by: OCCUPATIONAL THERAPIST

## 2021-06-14 PROCEDURE — 70551 MRI BRAIN STEM W/O DYE: CPT

## 2021-06-14 PROCEDURE — 97161 PT EVAL LOW COMPLEX 20 MIN: CPT | Performed by: PHYSICAL THERAPIST

## 2021-06-14 PROCEDURE — 97530 THERAPEUTIC ACTIVITIES: CPT | Performed by: PHYSICAL THERAPIST

## 2021-06-14 PROCEDURE — 6370000000 HC RX 637 (ALT 250 FOR IP): Performed by: NURSE PRACTITIONER

## 2021-06-14 PROCEDURE — 2580000003 HC RX 258: Performed by: INTERNAL MEDICINE

## 2021-06-14 PROCEDURE — G0378 HOSPITAL OBSERVATION PER HR: HCPCS

## 2021-06-14 PROCEDURE — 82962 GLUCOSE BLOOD TEST: CPT

## 2021-06-14 PROCEDURE — 85025 COMPLETE CBC W/AUTO DIFF WBC: CPT

## 2021-06-14 PROCEDURE — 36415 COLL VENOUS BLD VENIPUNCTURE: CPT

## 2021-06-14 PROCEDURE — 80053 COMPREHEN METABOLIC PANEL: CPT

## 2021-06-14 PROCEDURE — 6360000002 HC RX W HCPCS: Performed by: INTERNAL MEDICINE

## 2021-06-14 PROCEDURE — 96375 TX/PRO/DX INJ NEW DRUG ADDON: CPT

## 2021-06-14 PROCEDURE — 6370000000 HC RX 637 (ALT 250 FOR IP): Performed by: INTERNAL MEDICINE

## 2021-06-14 PROCEDURE — 93308 TTE F-UP OR LMTD: CPT

## 2021-06-14 PROCEDURE — 6360000004 HC RX CONTRAST MEDICATION: Performed by: INTERNAL MEDICINE

## 2021-06-14 PROCEDURE — 85610 PROTHROMBIN TIME: CPT

## 2021-06-14 RX ORDER — GABAPENTIN 100 MG/1
100 CAPSULE ORAL 3 TIMES DAILY
Qty: 90 CAPSULE | Refills: 3 | COMMUNITY
Start: 2021-06-14 | End: 2021-07-16 | Stop reason: SDUPTHER

## 2021-06-14 RX ORDER — WARFARIN SODIUM 2.5 MG/1
2.5 TABLET ORAL DAILY
Qty: 30 TABLET | Refills: 0 | Status: SHIPPED | OUTPATIENT
Start: 2021-06-17 | End: 2021-08-02 | Stop reason: SDUPTHER

## 2021-06-14 RX ORDER — LOSARTAN POTASSIUM 50 MG/1
25 TABLET ORAL DAILY
Status: DISCONTINUED | OUTPATIENT
Start: 2021-06-15 | End: 2021-06-14 | Stop reason: HOSPADM

## 2021-06-14 RX ORDER — LOSARTAN POTASSIUM 25 MG/1
25 TABLET ORAL DAILY
Qty: 30 TABLET | Refills: 1 | Status: SHIPPED | OUTPATIENT
Start: 2021-06-15 | End: 2021-08-16 | Stop reason: SDUPTHER

## 2021-06-14 RX ORDER — LORAZEPAM 2 MG/ML
0.5 INJECTION INTRAMUSCULAR ONCE
Status: COMPLETED | OUTPATIENT
Start: 2021-06-14 | End: 2021-06-14

## 2021-06-14 RX ADMIN — LOSARTAN POTASSIUM 50 MG: 50 TABLET, FILM COATED ORAL at 10:05

## 2021-06-14 RX ADMIN — GABAPENTIN 100 MG: 100 CAPSULE ORAL at 10:05

## 2021-06-14 RX ADMIN — PRAMIPEXOLE DIHYDROCHLORIDE 0.5 MG: 0.25 TABLET ORAL at 10:04

## 2021-06-14 RX ADMIN — SODIUM CHLORIDE, PRESERVATIVE FREE 10 ML: 5 INJECTION INTRAVENOUS at 10:03

## 2021-06-14 RX ADMIN — BUMETANIDE 1 MG: 1 TABLET ORAL at 10:04

## 2021-06-14 RX ADMIN — ASPIRIN 81 MG: 81 TABLET, COATED ORAL at 10:04

## 2021-06-14 RX ADMIN — METOPROLOL SUCCINATE 25 MG: 25 TABLET, EXTENDED RELEASE ORAL at 00:15

## 2021-06-14 RX ADMIN — INSULIN LISPRO 15 UNITS: 100 INJECTION, SOLUTION INTRAVENOUS; SUBCUTANEOUS at 10:02

## 2021-06-14 RX ADMIN — DULOXETINE HYDROCHLORIDE 20 MG: 20 CAPSULE, DELAYED RELEASE ORAL at 10:05

## 2021-06-14 RX ADMIN — INSULIN GLARGINE 30 UNITS: 100 INJECTION, SOLUTION SUBCUTANEOUS at 10:02

## 2021-06-14 RX ADMIN — PERFLUTREN 1.5 ML: 6.52 INJECTION, SUSPENSION INTRAVENOUS at 09:33

## 2021-06-14 RX ADMIN — LORAZEPAM 0.5 MG: 2 INJECTION INTRAMUSCULAR; INTRAVENOUS at 07:48

## 2021-06-14 RX ADMIN — INSULIN LISPRO 18 UNITS: 100 INJECTION, SOLUTION INTRAVENOUS; SUBCUTANEOUS at 12:42

## 2021-06-14 RX ADMIN — METOPROLOL SUCCINATE 25 MG: 25 TABLET, EXTENDED RELEASE ORAL at 10:05

## 2021-06-14 RX ADMIN — SODIUM CHLORIDE: 9 INJECTION, SOLUTION INTRAVENOUS at 06:30

## 2021-06-14 RX ADMIN — GABAPENTIN 100 MG: 100 CAPSULE ORAL at 14:05

## 2021-06-14 ASSESSMENT — PAIN DESCRIPTION - PROGRESSION
CLINICAL_PROGRESSION: NOT CHANGED

## 2021-06-14 ASSESSMENT — PAIN SCALES - GENERAL
PAINLEVEL_OUTOF10: 0
PAINLEVEL_OUTOF10: 0

## 2021-06-14 NOTE — CARE COORDINATION
6/14/2021 1238 CM note: No covid test. Met with patient for transition of care needs. Pt resides with his wife in 2 story home,ramp entry. He is independent with ADLS and drives. Pt has home o2 2L NC prn (concentrator and 1 portable) and CPAP through [facility]. He owns bsc,ww, cane and glucometer/supplies. PCP is Dr Juan Licona and he uses [id number] [id number]. Hx [address], no hx SNF. Plan is to return home, his friend will provide ride. Pt is anxious for dc as he transports his wife to HD.  Adam DAHL

## 2021-06-14 NOTE — PROGRESS NOTES
Room #: 8984/9098-78  Date: 2021       Patient Name: Pepe Lopez  : 1955      MRN: [id number]     Patient unavailable for physical therapy eval due to off floor at MRI. Will attempt PT evaluation at a later time. Thank you.        Delonte Chavez, PT

## 2021-06-14 NOTE — PROGRESS NOTES
[address] [facility]  [facility]         Date:2021                                                   Patient Name: Armand Thomas     MRN: [id number]     : 1955     Room: [address] [place] [address]       Evaluating OT: BRIAN Mehta/MAXI; [id number]       Referring Provider and Orders/Date:  OT eval and treat Start: 21, End: 21, ONE TIME, Standing Count: 1 Occurrences, SOLOMON Barkley DO       Diagnosis:   1. Dizziness    2. JORDEN (acute kidney injury) ([facility]    3. Elevated brain natriuretic peptide (BNP) level         Pertinent Medical History:        Past Medical History:   Diagnosis Date    Acid reflux     Acute diastolic (congestive) heart failure ([facility] 2019    Arthritis     Asthma 2014    Asthmatic bronchitis , chronic (HCC) 2016    CAD (coronary artery disease)     Chronic bronchitis (HCC) 2014    COPD (chronic obstructive pulmonary disease) (HCC)     CB    COPD (chronic obstructive pulmonary disease) (HCC)     Diabetes mellitus ([facility])     Emphysema (subcutaneous) (surgical) resulting from a procedure     H/O cardiovascular stress test 2021    Lexiscan    Hyperlipidemia     Hypertension     Hypoxemia requiring supplemental oxygen 2015    LONG TERM ANTICOAGULENT USE     Morbid obesity with BMI of 50.0-59.9, adult ([facility] 2013    Obesity     Osteoarthritis     Sleep apnea     bilevel positive airway pressure at 13/8 with 2 L oxygen flow     Stage 3 chronic kidney disease (HCC)     Tobacco abuse     Type II or unspecified type diabetes mellitus without mention of complication, not stated as uncontrolled           Past Surgical History:   Procedure Laterality Date    COLONOSCOPY      CORONARY ANGIOPLASTY WITH STENT PLACEMENT  13    Left main focal eccentric 65% distal stenosis. Large OM1 CX 50% ostial & prox narrow. with functional transfers/mobility and ADLs  * Therapeutic exercise to improve motor endurance, ROM, and functional strength for ADLs/functional transfers  * Therapeutic activities to facilitate/challenge dynamic balance, stand tolerance for increased safety and independence with ADLs  * Therapeutic activities to facilitate gross/fine motor skills for increased independence with ADLs        Recommended Adaptive Equipment/DME:  TBD      Home Living: Pt lives at home with wife with friends that do assist at home. Pts wife just recently got out of the hospital and is w/c bound. Home is 2 story with 4 steps to enter and 13 steps to the second floor. Bed and bath are on the second floor. Bathroom setup: Walk in shower with TTB and grab bars; Standard shower head; Standard toilet   DME owned: canes, walker, crutches, 3:1     Prior Level of Function: indep with ADLs , indep with IADLs; ambulated indep without AD   Driving: yes   Occupation: retired   Enjoys: watching TV, \"keep busy\", IADLs    Pain Level: 8/10 in right shoulder folowing fall; declining medication for pain at this time. Cognition: A&O: 4/4; Follows 4 step directions   Memory: Intact   Sequencing:  Intact   Problem solving: Min impaired suspected due to new environment    Judgement/safety:  Min impaired     Functional Assessment:  AM-PAC Daily Activity Raw Score: 21/24   Initial Eval Status  Date: 6/14/21 Treatment Status  Date: STGs = LTGs  Time frame: 10-14 days   Feeding Independent with drinks  NA   Grooming Independent standing up at sink and use of chair to sit down for energy conservation during shaving. Independent    UB Dressing Independent with pull over shirt. Independent    LB Dressing Minimal Assist with extended time and balance assist with pants. Min a to don katelin socks. Pt reports that he does not wear socks at home and just slippers in the home. Independent    Bathing Stand by Assist for balance due to impulsivity and line management. Independent    Toileting Stand by Assist for balance, safety and Iv pole management. Independent    Bed Mobility  Supine to sit: Independent   Sit to supine: Independent  With some impulsivity with lines noted. Supine to sit: Independent   Sit to supine: Independent    Functional Transfers Supervision due to IVs and balance  Independent    Functional Mobility Stand by Assist due to balance loss, but he was able to self correct with reaching to the bed rails for support. Independent    Balance Sitting:     Static:  good    Dynamic:good  Standing: fair-  Sitting:     Static:  good    Dynamic:good  Standing: fair   Activity Tolerance Fair with room air.  good   Visual/  Perceptual Glasses: not Present; WFL; Reports change in vision since admission: No     NA   R UE Strengthening  2-/5  3/5     Hand Dominance Right   AROM (PROM) Strength Additional Info:    RUE  25% function due to pain following fall 2-/5 good  and wfl FMC/dexterity noted during ADL tasks       LUE WFL 5/5 good  and wfl FMC/dexterity noted during ADL tasks       Hearing: WFL  Sensation: Min c/o numbness or tingling in katelin feet  Tone: WFL   Edema: min in katelin feet with IV    Comments: Upon arrival patient was supine in bed resting and talking to his wife via phone. 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 Pt required no A for most UB ADLs and SBA LB ADLs tasks. Limited with SBA for standing during LB ADLs and functional transfers. The biggest barriers reflect that of functional transfers, functional mobility, LB ADLs, activity tolerance, balance, safety and strengthening. At end of session, patient sitting EOB with call light and phone within reach, all lines and tubes intact. Overall patient demonstrated min decreased independence and safety during completion of ADL/functional transfer/mobility tasks.  Nursing updated on pt position and status following OT eval. Pt would benefit from continued skilled OT to increase safety and

## 2021-06-14 NOTE — TELEPHONE ENCOUNTER
Patient wife states patient is still having dizziness and is acting weird not normal. Patient states it could be his ears because everything else was checked in hospital and normal. Nephew brought him home and said he got dizzy 3 times getting out of the car. States did not have issue at hospital started again once he got home?

## 2021-06-15 DIAGNOSIS — R42 VERTIGO: Primary | ICD-10-CM

## 2021-06-15 DIAGNOSIS — R27.0 ATAXIA: ICD-10-CM

## 2021-06-15 NOTE — DISCHARGE SUMMARY
[address]                               DISCHARGE SUMMARY    PATIENT NAME: Rich Grimaldo                   :        1955  MED REC NO:   [id number]                            ROOM:       2382  ACCOUNT NO:   [de-identified]                           ADMIT DATE: 2021  PROVIDER:     Julisa Call DO                  DISCHARGE DATE:  2021      ADMITTING DIAGNOSIS:  Dizziness. FINAL DISCHARGE DIAGNOSIS:  Dizziness, multifactorial in nature with  possible near syncopal episode. SECONDARY DISCHARGE DIAGNOSES:  1.  _____. 2.  Nondisplaced fracture of the left maxillary sinus, resolved. 3.  Acute kidney injury, superimposed upon chronic kidney disease. 4.  Chronic infarction of the distribution of the right middle cerebral  artery. 5.  Coronary atherosclerosis with status post bypass. 6.  Chronic diastolic congestive heart failure. 7.  Cardiac dysrhythmia. 8.  Atrial fibrillation, on warfarin. 9.  History of mitral valve repair. 10.  Mild-to-moderate aortic stenosis. 11.  Type 2 diabetes mellitus, on insulin, poorly controlled. 12.  COPD with chronic hypoxemic respiratory failure, on supplemental  O2.  13.  Obstructive sleep apnea with obesity hypoventilatory syndrome. 14.  Essential hypertension. 15.  Hyperlipidemia. 16.  History of tobacco abuse. COMPLICATIONS:  No complications. OPERATIONS:  No operation. CONSULTATIONS OBTAINED:  With Dr. Kalros Winters. No OMT was given. ADMITTING PHYSICIAN:  Dr. Yas Rodriguez and Dr. Freida Ruiz. CHIEF COMPLAINT AND HISTORY OF CHIEF COMPLAINT:  This is a 42-year-old  white male who was admitted to [address] [place] [address]. The patient  presented to the hospital here under the service of Dr. Yas Rodriguez and Dr. Freida Ruiz. The patient normally sees Dr. Samreen Lyon.   The patient apparently  presented to the hospital here after a fall secondary to lightheadedness  and atrial fibrillation  with a controlled ventricular response. His INR was elevated at 4.1,  for which, at this time, we adjusted his Coumadin. His BUN and  creatinine were improved with 30 and 1.1, respectively. MRI of the  brain show chronic microvascular ischemic change, but no acute infarct. Ultrasound of carotids show less than 50% stenosis bilaterally. His BUN  and creatinine were improved. The patient at the time of discharge, his  blood pressure was 124/86, pulse 83, respirations 18, weight was 300  pounds, while height being 5 feet 6 inches. DISCHARGE MEDICATIONS:  The patient was discharged on following  medications:  1. Atorvastatin 80 mg daily. 2.  Gabapentin 100 t.i.d.  3.  Losartan was reduced to 25 mg daily. 4.  Warfarin 2.5 mg will be resumed on 06/17, when INR being monitored  by his primary care doctor, Dr. Angela Lunsford. 5.  The patient was maintained on aspirin 81 mg daily. 6.  Bumex 1 mg b.i.d.  7.  CPAP machine will be continued nightly. 8.  Cymbalta 20 mg daily. 9.  Sliding scale insulin. 10.  Lantus 30 units b.i.d. 11.  Xyzal one tablet nightly. 12.  Metoprolol XL 25 b.i.d.  13.  Singulair 10 mg daily. 14.  Mirapex 0.25 daily. 15.  Along with Symbicort 160/4.5, two puffs b.i.d. The patient was discharged home by private car. The patient is  recommended not to drive at the present time. He will follow up with  Dr. Anthony Hunter in one week. He will be followed up by Dr. Vianca Ortiz,  his cardiologist along with Dr. Johanne Francois in one week. The patient was  given full instruction to return for any problems should arise. He will  need better glycemic control. More than 40 minutes were spent on the  day of discharge with more than 50% of the time spent face-to-face with  the patient discussing the plan of care and treatment at this time.         [address], DO    D: 06/14/2021 16:04:05       T: 06/15/2021 8:39:30     [email or web address]  Job#: [id number]     Doc#: [id number]    CC:

## 2021-06-18 ENCOUNTER — OFFICE VISIT (OUTPATIENT)
Dept: CARDIOLOGY CLINIC | Age: 66
End: 2021-06-18
Payer: MEDICARE

## 2021-06-18 VITALS
DIASTOLIC BLOOD PRESSURE: 66 MMHG | WEIGHT: 293 LBS | HEART RATE: 117 BPM | SYSTOLIC BLOOD PRESSURE: 110 MMHG | HEIGHT: 66 IN | BODY MASS INDEX: 47.09 KG/M2

## 2021-06-18 DIAGNOSIS — I25.10 CORONARY ARTERY DISEASE INVOLVING NATIVE CORONARY ARTERY OF NATIVE HEART WITHOUT ANGINA PECTORIS: Primary | ICD-10-CM

## 2021-06-18 PROCEDURE — 93000 ELECTROCARDIOGRAM COMPLETE: CPT | Performed by: INTERNAL MEDICINE

## 2021-06-18 PROCEDURE — G8417 CALC BMI ABV UP PARAM F/U: HCPCS | Performed by: INTERNAL MEDICINE

## 2021-06-18 PROCEDURE — 99215 OFFICE O/P EST HI 40 MIN: CPT | Performed by: INTERNAL MEDICINE

## 2021-06-18 PROCEDURE — 3017F COLORECTAL CA SCREEN DOC REV: CPT | Performed by: INTERNAL MEDICINE

## 2021-06-18 PROCEDURE — 4040F PNEUMOC VAC/ADMIN/RCVD: CPT | Performed by: INTERNAL MEDICINE

## 2021-06-18 PROCEDURE — 1036F TOBACCO NON-USER: CPT | Performed by: INTERNAL MEDICINE

## 2021-06-18 PROCEDURE — 1123F ACP DISCUSS/DSCN MKR DOCD: CPT | Performed by: INTERNAL MEDICINE

## 2021-06-18 PROCEDURE — G8427 DOCREV CUR MEDS BY ELIG CLIN: HCPCS | Performed by: INTERNAL MEDICINE

## 2021-06-18 RX ORDER — PANTOPRAZOLE SODIUM 40 MG/1
40 TABLET, DELAYED RELEASE ORAL DAILY
COMMUNITY
End: 2022-09-21 | Stop reason: SDUPTHER

## 2021-06-18 RX ORDER — AMIODARONE HYDROCHLORIDE 200 MG/1
200 TABLET ORAL 2 TIMES DAILY
Qty: 60 TABLET | Refills: 0 | Status: SHIPPED | OUTPATIENT
Start: 2021-06-18 | End: 2021-07-16 | Stop reason: SDUPTHER

## 2021-06-18 RX ORDER — INSULIN GLARGINE 100 [IU]/ML
30 INJECTION, SOLUTION SUBCUTANEOUS 2 TIMES DAILY
COMMUNITY
End: 2021-07-20 | Stop reason: SDUPTHER

## 2021-06-18 RX ORDER — SPIRONOLACTONE 25 MG/1
25 TABLET ORAL DAILY
COMMUNITY
End: 2021-10-11

## 2021-06-18 RX ORDER — DULOXETIN HYDROCHLORIDE 20 MG/1
20 CAPSULE, DELAYED RELEASE ORAL DAILY
Status: ON HOLD | COMMUNITY
End: 2021-10-31 | Stop reason: HOSPADM

## 2021-06-18 NOTE — PROGRESS NOTES
[facility] Cardiology Progress Note  Dr. Hira Merritt      Referring Physician: Susie Acosta DO  CHIEF COMPLAINT:   Chief Complaint   Patient presents with    Coronary Artery Disease     s/p [facility] last week   No cardiac complaints. HISTORY OF PRESENT ILLNESS:   72year old male with history of CHF, CAD, DM, hypertension, hyperlipidemia and COPD is here for a follow up appointment. Dizziness and lightheadedness especially with standing up, generalized weakness, patient denies any chest pain, no shortness of breath, no palpitations, no pedal edema, no PND, no orthopnea, no syncope, no presyncopal episodes. Functional capacity is at baseline    Past Medical History:   Diagnosis Date    Acid reflux     Acute diastolic (congestive) heart failure ([facility] [address] 06/19/2019    Arthritis     Asthma 04/16/2014    Asthmatic bronchitis , chronic (HCC) 11/28/2016    CAD (coronary artery disease)     Chronic bronchitis (HCC) 04/16/2014    COPD (chronic obstructive pulmonary disease) (HCC)     CB    COPD (chronic obstructive pulmonary disease) (HCC)     Diabetes mellitus ([facility])     Emphysema (subcutaneous) (surgical) resulting from a procedure     H/O cardiovascular stress test 04/24/2021    Lexiscan    Hyperlipidemia     Hypertension     Hypoxemia requiring supplemental oxygen 02/02/2015    LONG TERM ANTICOAGULENT USE     Morbid obesity with BMI of 50.0-59.9, adult ([facility] [address] 11/27/2013    Obesity     Osteoarthritis     Sleep apnea     bilevel positive airway pressure at 13/8 with 2 L oxygen flow     Stage 3 chronic kidney disease (HCC)     Tobacco abuse     Type II or unspecified type diabetes mellitus without mention of complication, not stated as uncontrolled          Past Surgical History:   Procedure Laterality Date    COLONOSCOPY      CORONARY ANGIOPLASTY WITH STENT PLACEMENT  07-23-13    Left main focal eccentric 65% distal stenosis. Large OM1 CX 50% ostial & prox narrow.  Large ramus artery & LAD: Minor plaque w/o sign narrow. RCA: Dom vessel w/25% prox narrow & focal hazy eccentric 99% distal stenosis before origin RPDA & RPLCA. LV: Mild inferior hypokinesis EF 55%. Probable mild AS with 10-15mmHg. Successful PCI distal RCA w/3.5 x 12 mm BMS, 0% res stenosis. Normal distal runoff    CORONARY ARTERY BYPASS GRAFT  09-09-13    Dr Sun Cuevas; CABG x2, LIMA to LAD, SVG to ramus intermedius; MV repair using 30-mm Future complete ring with magic stitch between A3 and P3; rigid internal fixation of sternum using KLS plates x2; endoscopic vein harvesting of right lower extremity     ECHO COMPL W DOP COLOR FLOW  7/25/2013         HERNIA REPAIR      SINUS SURGERY           Current Outpatient Medications   Medication Sig Dispense Refill    insulin glargine (BASAGLAR KWIKPEN) 100 UNIT/ML injection pen Inject 30 Units into the skin 2 times daily      spironolactone (ALDACTONE) 25 MG tablet Take 25 mg by mouth daily      pantoprazole (PROTONIX) 40 MG tablet Take 40 mg by mouth daily      DULoxetine (CYMBALTA) 20 MG extended release capsule Take 20 mg by mouth daily      amiodarone (CORDARONE) 200 MG tablet Take 1 tablet by mouth 2 times daily 60 tablet 0    gabapentin (NEURONTIN) 100 MG capsule Take 1 capsule by mouth 3 times daily.  90 capsule 3    losartan (COZAAR) 25 MG tablet Take 1 tablet by mouth daily (Patient taking differently: Take 50 mg by mouth daily ) 30 tablet 1    warfarin (COUMADIN) 2.5 MG tablet Take 1 tablet by mouth daily (Patient taking differently: Take 2.5 mg by mouth daily Dr. Vic Jeronimo follows) 30 tablet 0    metoprolol succinate (TOPROL XL) 25 MG extended release tablet Take 1 tablet by mouth 2 times daily 60 tablet 5    Insulin Syringe-Needle U-100 27G X 5/8\" 1 ML MISC Use as directed 100 each 11    Insulin Pen Needle (MEIJER PEN NEEDLES) 31G X 6 MM MISC 1 each by Does not apply route daily 100 each 3    bumetanide (BUMEX) 1 MG tablet Take 1 tablet by mouth 2 times daily 30 tablet 3    pramipexole (MIRAPEX) 0.25 MG tablet TAKE TWO TABLETS BY MOUTH THREE TIMES A  tablet 3    insulin lispro, 1 Unit Dial, (HUMALOG KWIKPEN) 100 UNIT/ML SOPN Inject 6 units with meals PLUS                 No Insulin   140-199           3 Units   200-249           6 Units   250-299           9 Units   300-349         12 Units   350-400         15 Units   Over 400       18 Units 5 pen 0    Lancets MISC 1 each by Does not apply route 4 times daily (before meals and nightly) 300 each 1    atorvastatin (LIPITOR) 80 MG tablet TAKE ONE TABLET BY MOUTH EVERY NIGHT (Patient taking differently: 60 mg TAKE ONE TABLET BY MOUTH EVERY NIGHT) 90 tablet 5    montelukast (SINGULAIR) 10 MG tablet Take 1 tablet by mouth nightly 30 tablet 4    blood glucose monitor strips Test two times a day & as needed for symptoms of irregular blood glucose. 300 strip 0    terbinafine (ATHLETES FOOT) 1 % cream Apply topically 2 times daily. 42 g 1    CPAP Machine MISC 1 each by Does not apply route nightly as needed       Accu-Chek Multiclix Lancets MISC Use as directed 100 each 3    aspirin 81 MG tablet Take 81 mg by mouth nightly        No current facility-administered medications for this visit.          Allergies as of 2021 - Fully Reviewed 2021   Allergen Reaction Noted    Pcn [penicillins]  2013    Sulfa antibiotics  2013       Social History     Socioeconomic History    Marital status:      Spouse name: Landry Nails Number of children: 1    Years of education: Not on file    Highest education level: 11th grade   Occupational History    Not on file   Tobacco Use    Smoking status: Former Smoker     Packs/day: 1.00     Years: 45.00     Pack years: 45.00     Types: Cigarettes     Quit date: 2013     Years since quittin.9    Smokeless tobacco: Former User     Types: Chew     Quit date: 10/8/1971   Vaping Use    Vaping Use: Never used   Substance and Sexual Activity    Alcohol use: No     Alcohol/week: 0.0 standard drinks     Comment: 1-2 coffee per day    Drug use: No    Sexual activity: Yes     Partners: Female   Other Topics Concern    Not on file   Social History Narrative    8-26-19  [facility] reviewed SDOH with Neva Beltran. He does have money strain but between the income he has coming in and his wife's they are over resources for SARY programs. Offered food panty info to help with end of the month struggles but he declined stating that he tried and was refused due to his income. He belongs to a Faith and goes weekly so he has some community connections in place. He has an extremely high interest rate on his mortgage which he was encouraged to look into getting lowered to help save money on his house payments. Noticed some difficulty with memory recall. Becomes easily flustered at times. Has no MHI to support applying for [facility] program of SARY. States he does not feel depressed or need any MH treatment. Social Determinants of Health     Financial Resource Strain:     Difficulty of Paying Living Expenses:    Food Insecurity:     Worried About Running Out of Food in the Last Year:     [address] in the Last Year:    Transportation Needs:     Lack of Transportation (Medical):      Lack of Transportation (Non-Medical):    Physical Activity:     Days of Exercise per Week:     Minutes of Exercise per Session:    Stress:     Feeling of Stress :    Social Connections:     Frequency of Communication with Friends and Family:     Frequency of Social Gatherings with Friends and Family:     Attends [employer] Services:     Active Member of Clubs or Organizations:     Attends Club or Organization Meetings:     Marital Status:    Intimate Partner Violence:     Fear of Current or Ex-Partner:     Emotionally Abused:     Physically Abused:     Sexually Abused:        Family History   Problem Relation Age of Onset    Cancer Mother         breast    Cancer Father         stomach  Mental Illness Brother     Stroke Brother     Other Brother        REVIEW OF SYSTEMS:     CONSTITUTIONAL:  negative for  fevers, chills, sweats, + fatigue  HEENT:  negative for  tinnitus, earaches, nasal congestion and epistaxis  RESPIRATORY:  negative for  dry cough, cough with sputum, wheezing and hemoptysis  GASTROINTESTINAL:  negative for nausea, vomiting, diarrhea, constipation, pruritus and jaundice  HEMATOLOGIC/LYMPHATIC:  negative for easy bruising, bleeding, lymphadenopathy and petechiae  ENDOCRINE:  negative for heat intolerance, cold intolerance, tremor, hair loss and diabetic symptoms including neither polyuria nor polydipsia nor blurred vision  MUSCULOSKELETAL:  negative for  myalgias, arthralgias, joint swelling, stiff joints and decreased range of motion  NEUROLOGICAL:  negative for memory problems, speech problems, visual disturbance, dysphagia, weakness and numbness      PHYSICAL EXAM:   CONSTITUTIONAL:  awake, alert, cooperative, no apparent distress, and appears stated age  HEAD:  normocepalic, without obvious abnormality, atraumatic, pink, moist mucous membranes. NECK:  Supple, symmetrical, trachea midline, no adenopathy, thyroid symmetric, not enlarged and no tenderness, skin normal  LUNGS:  No increased work of breathing, good air exchange, clear to auscultation bilaterally, no crackles or wheezing  CARDIOVASCULAR:  Normal apical impulse,, tachycardic, normal S1 and S2, no S3 or S4, 3/6 systolic murmur at the apex, 3/6 systolic murmur at the left lower sternal border, no JVD, no carotid bruit, + pedal edema, good carotid upstroke bilaterally. ABDOMEN:  Soft, nontender, no masses, no hepatomegaly or splenomegaly, BS+  CHEST: nontender to palpation, expands symmetrically  MUSCULOSKELETAL:  No clubbing no cyanosis. there is no redness, warmth, or swelling of the joints  full range of motion noted  NEUROLOGIC:  Alert, awake,oriented x3.   SKIN:  no bruising or bleeding, normal skin color, texture, turgor and no redness, warmth, or swelling        /66 (Site: Left Upper Arm, Position: Sitting, Cuff Size: Large Adult)   Pulse 117   Ht 5' 6\" (1.676 m)   Wt 293 lb (132.9 kg)   BMI 47.29 kg/m²     DATA:   I personally reviewed the visit EKG with the following interpretation: Atrial fibrillation, rapid ventricular response, low voltage QRS in the limb leads    EKG 6/13/21 Atrial fibrillation with rapid ventricular response  Incomplete right bundle branch block  Nonspecific T wave abnormality  Abnormal ECG  When compared with ECG of 12-JUN-2021 18:44,  Incomplete right bundle branch block is now present     Reviewed as Above. Motion Artifact       ECHO:6/14/21 Summary   Left ventricular size is grossly normal.   Moderate left ventricular concentric hypertrophy noted. Ejection fraction is visually estimated at 45%. No evidence of left ventricular mass or thrombus noted. The left atrium is moderately dilated   Poor images for bubble study   Mildly dilated right ventricle. Mildly enlarged right atrium size. Moderate mitral regurgitation is present. No evidence of mitral valve stenosis. The aortic valve is trileaflet. The aortic valve appears mildly sclerotic. Physiologic and/or trace aortic regurgitation is noted. Mild aortic stenosis. The aortic valve area is 1.75 cm2 with a maximum gradient of 24.82 mmHg   and a mean gradient of 14.08 mmHg. Moderate tricuspid regurgitation.    Irregular rhythm    Stress Test: 4/24/21       The myocardial perfusion imaging was normal with attenuation.       Overall left ventricular systolic function was normal without regional   wall motion abnormalities.       Overall low risk study.             CABG/MVR 9/9/13 Sternotomy; coronary artery bypass grafting x2, left internal   mammary artery to the LAD, saphenous vein graft to the ramus intermedius;   mitral valve repair using 30-mm Future complete ring with magic stitch   between A3 and P3; recent echocardiogram  6. Ischemic cardiomyopathy: Last documented ejection fraction was 45%  7. Mitral valve regurgitation: Moderate  8. Tricuspid valve regurgitation: Moderate  9. Hypertension: Controlled  10. Type 2 diabetes mellitus  11. Hyperlipidemia: On statin  12. Chronic anticoagulation    RECOMMENDATIONS:   1. Change losartan to 25 mg by mouth daily  2. Amiodarone 200 mg by mouth twice daily for a month  3. Continue the rest of medications  4. CHF: Daily weight, take an extra Bumex for weight gain of more than 2-3 pounds in 24 hours, compliance with diuretics, low-salt diet were all advised. 5.  Increase risk of bleeding due to being on anti-coagulation, symptoms and signs of bleeding discussed with patient, patient was advised to seek medical attention at the earliest symptoms or signs of bleeding. 6.  Preventive cardiology: Low-salt, low-cholesterol diet, daily exercise, total cholesterol of less than 200, LDL of less than 70, adherence to diabetic diet and diabetic medications, were all advised. 7. Basic metabolic panel  8. Follow-up with Dr. Vic Jeronimo as scheduled  9. Follow-up with Dr. Demi Puri in 4 weeks, sooner if symptomatic for any reason    I have reviewed my findings and recommendations with patient    Electronically signed by Mahsa Simpson MD on 6/18/2021 at 5:20 PM  NOTE: This report was transcribed using voice recognition software.  Every effort was made to ensure accuracy; however, inadvertent computerized transcription errors may be present

## 2021-06-19 LAB
BLOOD CULTURE, ROUTINE: NORMAL
CULTURE, BLOOD 2: NORMAL

## 2021-07-16 RX ORDER — GABAPENTIN 100 MG/1
100 CAPSULE ORAL 3 TIMES DAILY
Qty: 90 CAPSULE | Refills: 5 | Status: SHIPPED
Start: 2021-07-16 | End: 2021-10-20 | Stop reason: SDUPTHER

## 2021-07-16 RX ORDER — AMIODARONE HYDROCHLORIDE 200 MG/1
200 TABLET ORAL 2 TIMES DAILY
Qty: 60 TABLET | Refills: 5 | Status: SHIPPED
Start: 2021-07-16 | End: 2022-04-18

## 2021-07-20 ENCOUNTER — OFFICE VISIT (OUTPATIENT)
Dept: FAMILY MEDICINE CLINIC | Age: 66
End: 2021-07-20
Payer: MEDICARE

## 2021-07-20 VITALS
WEIGHT: 294 LBS | HEART RATE: 78 BPM | SYSTOLIC BLOOD PRESSURE: 126 MMHG | HEIGHT: 66 IN | DIASTOLIC BLOOD PRESSURE: 80 MMHG | BODY MASS INDEX: 47.25 KG/M2 | RESPIRATION RATE: 18 BRPM | OXYGEN SATURATION: 92 %

## 2021-07-20 DIAGNOSIS — J01.90 ACUTE BACTERIAL SINUSITIS: ICD-10-CM

## 2021-07-20 DIAGNOSIS — I87.2 STASIS DERMATITIS OF BOTH LEGS: ICD-10-CM

## 2021-07-20 DIAGNOSIS — E11.65 TYPE 2 DIABETES MELLITUS WITH HYPERGLYCEMIA, WITH LONG-TERM CURRENT USE OF INSULIN (HCC): Primary | ICD-10-CM

## 2021-07-20 DIAGNOSIS — B96.89 ACUTE BACTERIAL SINUSITIS: ICD-10-CM

## 2021-07-20 DIAGNOSIS — E66.01 CLASS 3 SEVERE OBESITY DUE TO EXCESS CALORIES WITH SERIOUS COMORBIDITY AND BODY MASS INDEX (BMI) OF 45.0 TO 49.9 IN ADULT (HCC): ICD-10-CM

## 2021-07-20 DIAGNOSIS — Z95.1 STATUS POST CORONARY ARTERY BYPASS GRAFT: ICD-10-CM

## 2021-07-20 DIAGNOSIS — M25.511 CHRONIC RIGHT SHOULDER PAIN: ICD-10-CM

## 2021-07-20 DIAGNOSIS — E78.2 MIXED HYPERLIPIDEMIA: ICD-10-CM

## 2021-07-20 DIAGNOSIS — Z79.4 TYPE 2 DIABETES MELLITUS WITH HYPERGLYCEMIA, WITH LONG-TERM CURRENT USE OF INSULIN (HCC): Primary | ICD-10-CM

## 2021-07-20 DIAGNOSIS — I48.0 PAF (PAROXYSMAL ATRIAL FIBRILLATION) (HCC): ICD-10-CM

## 2021-07-20 DIAGNOSIS — G89.29 CHRONIC RIGHT SHOULDER PAIN: ICD-10-CM

## 2021-07-20 DIAGNOSIS — I87.2 VENOUS INSUFFICIENCY (CHRONIC) (PERIPHERAL): ICD-10-CM

## 2021-07-20 LAB — HBA1C MFR BLD: 11.5 %

## 2021-07-20 PROCEDURE — 3046F HEMOGLOBIN A1C LEVEL >9.0%: CPT | Performed by: FAMILY MEDICINE

## 2021-07-20 PROCEDURE — 2022F DILAT RTA XM EVC RTNOPTHY: CPT | Performed by: FAMILY MEDICINE

## 2021-07-20 PROCEDURE — 3017F COLORECTAL CA SCREEN DOC REV: CPT | Performed by: FAMILY MEDICINE

## 2021-07-20 PROCEDURE — 99214 OFFICE O/P EST MOD 30 MIN: CPT | Performed by: FAMILY MEDICINE

## 2021-07-20 PROCEDURE — 83036 HEMOGLOBIN GLYCOSYLATED A1C: CPT | Performed by: FAMILY MEDICINE

## 2021-07-20 PROCEDURE — 4040F PNEUMOC VAC/ADMIN/RCVD: CPT | Performed by: FAMILY MEDICINE

## 2021-07-20 PROCEDURE — G8417 CALC BMI ABV UP PARAM F/U: HCPCS | Performed by: FAMILY MEDICINE

## 2021-07-20 PROCEDURE — 1123F ACP DISCUSS/DSCN MKR DOCD: CPT | Performed by: FAMILY MEDICINE

## 2021-07-20 PROCEDURE — G8427 DOCREV CUR MEDS BY ELIG CLIN: HCPCS | Performed by: FAMILY MEDICINE

## 2021-07-20 PROCEDURE — 1036F TOBACCO NON-USER: CPT | Performed by: FAMILY MEDICINE

## 2021-07-20 RX ORDER — AMMONIUM LACTATE 12 G/100G
LOTION TOPICAL
Qty: 225 G | Refills: 5 | Status: SHIPPED
Start: 2021-07-20 | End: 2021-10-28 | Stop reason: ALTCHOICE

## 2021-07-20 RX ORDER — CEFDINIR 300 MG/1
300 CAPSULE ORAL 2 TIMES DAILY
Qty: 20 CAPSULE | Refills: 0 | Status: SHIPPED | OUTPATIENT
Start: 2021-07-20 | End: 2021-07-30

## 2021-07-20 RX ORDER — INSULIN GLARGINE 100 [IU]/ML
40 INJECTION, SOLUTION SUBCUTANEOUS 2 TIMES DAILY
Qty: 5 PEN | Refills: 3
Start: 2021-07-20 | End: 2021-10-28 | Stop reason: DRUGHIGH

## 2021-07-20 RX ORDER — BUMETANIDE 1 MG/1
1 TABLET ORAL 2 TIMES DAILY
Qty: 60 TABLET | Refills: 5 | Status: ON HOLD
Start: 2021-07-20 | End: 2021-10-31 | Stop reason: SDUPTHER

## 2021-07-24 ASSESSMENT — ENCOUNTER SYMPTOMS
WHEEZING: 0
ABDOMINAL PAIN: 0
SORE THROAT: 0
NAUSEA: 0
RECTAL PAIN: 0
CONSTIPATION: 0
VOICE CHANGE: 0
RHINORRHEA: 1
STRIDOR: 0
BLOOD IN STOOL: 0
TROUBLE SWALLOWING: 0
COLOR CHANGE: 0
FACIAL SWELLING: 0
ABDOMINAL DISTENTION: 0
DIARRHEA: 0
ANAL BLEEDING: 0
APNEA: 0
SINUS PRESSURE: 1
CHEST TIGHTNESS: 0
CHOKING: 0
COUGH: 0
SHORTNESS OF BREATH: 0
VOMITING: 0
SINUS PAIN: 1

## 2021-07-24 ASSESSMENT — COPD QUESTIONNAIRES: COPD: 1

## 2021-07-25 NOTE — PROGRESS NOTES
Lakeisha Dunn is a 72 y.o. male  . Subjective:      Patients sugars have improved somewhat but are still elevated. Patient has been watching diet a lot more. He has lost over 20 lbs. Has improved with medication compliance. We will increase mealtime insulin to three times a day. This may help but we also may need consult with endocrinology. Since it takes awhile to get in we will make appointment now. Patient also complains of ongoing pain to right shoulder. Complains of sinus pressure and coughing up mucus . Diabetes  He presents for his follow-up diabetic visit. He has type 2 diabetes mellitus. No MedicAlert identification noted. His disease course has been improving. There are no hypoglycemic associated symptoms. Pertinent negatives for hypoglycemia include no confusion, dizziness, headaches, nervousness/anxiousness, pallor, seizures, speech difficulty or tremors. Associated symptoms include fatigue and foot paresthesias. Pertinent negatives for diabetes include no chest pain, no polydipsia, no polyphagia, no polyuria and no weakness. There are no hypoglycemic complications. Symptoms are worsening. Diabetic complications include heart disease and peripheral neuropathy. Risk factors for coronary artery disease include diabetes mellitus, dyslipidemia, hypertension, sedentary lifestyle and obesity. Current diabetic treatment includes oral agent (monotherapy) (quit insulin). He is compliant with treatment none of the time. His weight is increasing steadily. He is following a generally unhealthy diet. When asked about meal planning, he reported none. He has had a previous visit with a dietitian. He never participates in exercise. His home blood glucose trend is increasing steadily. An ACE inhibitor/angiotensin II receptor blocker is being taken. He does not see a podiatrist.Eye exam is current. COPD  There is no cough, shortness of breath or wheezing. This is a chronic problem.  The current episode started more than 1 year ago. The problem occurs daily. The problem has been waxing and waning. Associated symptoms include nasal congestion, postnasal drip and rhinorrhea. Pertinent negatives include no appetite change, chest pain, ear pain, fever, headaches, myalgias, sneezing, sore throat or trouble swallowing. His symptoms are aggravated by URI and pollen. His symptoms are alleviated by beta-agonist and steroid inhaler. He reports moderate improvement on treatment. His symptoms are not alleviated by anxiolytic. Risk factors for lung disease include smoking/tobacco exposure (has quit). His past medical history is significant for asthma, bronchitis and COPD. Fatigue  This is a chronic problem. The current episode started more than 1 year ago. The problem occurs daily. The problem has been waxing and waning. Associated symptoms include arthralgias, fatigue, joint swelling and urinary symptoms. Pertinent negatives include no abdominal pain, chest pain, chills, congestion, coughing, diaphoresis, fever, headaches, myalgias, nausea, neck pain, numbness, rash, sore throat, vomiting or weakness. Associated symptoms comments: Sleep disturbances . Nothing aggravates the symptoms. He has tried nothing for the symptoms. The treatment provided no relief. Cough  This is a new problem. The current episode started in the past 7 days. The problem has been waxing and waning. The cough is productive of sputum. Associated symptoms include nasal congestion, postnasal drip and rhinorrhea. Pertinent negatives include no chest pain, chills, ear pain, fever, headaches, myalgias, rash, sore throat, shortness of breath or wheezing. Nothing aggravates the symptoms. He has tried a beta-agonist inhaler, steroid inhaler and OTC cough suppressant for the symptoms. The treatment provided moderate relief. His past medical history is significant for asthma, bronchitis and COPD. There is no history of environmental allergies.    Hypertension  This is a chronic problem. The current episode started more than 1 year ago. The problem has been waxing and waning since onset. The problem is uncontrolled. Pertinent negatives include no chest pain, headaches, neck pain, palpitations or shortness of breath. There are no associated agents to hypertension. Risk factors for coronary artery disease include diabetes mellitus, dyslipidemia, male gender and obesity. Past treatments include beta blockers and angiotensin blockers. The current treatment provides moderate improvement. Compliance problems include diet, medication cost, medication side effects and psychosocial issues. Identifiable causes of hypertension include a thyroid problem. Hyperlipidemia  This is a chronic problem. The current episode started more than 1 year ago. The problem is resistant. Recent lipid tests were reviewed and are variable. There are no known factors aggravating his hyperlipidemia. Pertinent negatives include no chest pain, myalgias or shortness of breath. Current antihyperlipidemic treatment includes statins. The current treatment provides moderate improvement of lipids. There are no compliance problems. Risk factors for coronary artery disease include male sex, obesity and dyslipidemia. Other  This is a chronic (atrial fibrillation and HX CAD with intervention and coumadin) problem. The current episode started more than 1 year ago. The problem occurs daily. The problem has been waxing and waning. Associated symptoms include arthralgias, fatigue, joint swelling and urinary symptoms. Pertinent negatives include no abdominal pain, chest pain, chills, congestion, coughing, diaphoresis, fever, headaches, myalgias, nausea, neck pain, numbness, rash, sore throat, vomiting or weakness. The symptoms are aggravated by smoking (obesity, poor diet, no excercise). Treatments tried: coronary revasculization, treating lipids, treating obesity, treating  diabetes and htn.  The treatment provided mild relief. Review of Systems   Constitutional: Positive for fatigue. Negative for activity change, appetite change, chills, diaphoresis, fever and unexpected weight change. HENT: Positive for postnasal drip, rhinorrhea, sinus pressure and sinus pain. Negative for congestion, dental problem, ear discharge, ear pain, facial swelling, hearing loss, mouth sores, nosebleeds, sneezing, sore throat, tinnitus, trouble swallowing and voice change. Respiratory: Negative for apnea, cough, choking, chest tightness, shortness of breath, wheezing and stridor. Cardiovascular: Negative for chest pain, palpitations and leg swelling. Gastrointestinal: Negative for abdominal distention, abdominal pain, anal bleeding, blood in stool, constipation, diarrhea, nausea, rectal pain and vomiting. Endocrine: Negative for cold intolerance, heat intolerance, polydipsia, polyphagia and polyuria. Genitourinary: Negative for decreased urine volume, difficulty urinating, discharge, dysuria, enuresis, flank pain, frequency, genital sores, hematuria, penile pain, penile swelling, scrotal swelling, testicular pain and urgency. Musculoskeletal: Positive for arthralgias and joint swelling. Negative for myalgias and neck pain. Skin: Negative for color change, pallor, rash and wound. Allergic/Immunologic: Negative for environmental allergies. Neurological: Negative for dizziness, tremors, seizures, speech difficulty, weakness, numbness and headaches. Psychiatric/Behavioral: Negative for confusion, dysphoric mood, self-injury, sleep disturbance and suicidal ideas. The patient is not nervous/anxious.         Past Medical History:   Diagnosis Date    Acid reflux     Acute diastolic (congestive) heart failure ([facility] 06/19/2019    Arthritis     Asthma 04/16/2014    Asthmatic bronchitis , chronic (HCC) 11/28/2016    CAD (coronary artery disease)     Chronic bronchitis ([facility] 04/16/2014    [facility]ch[facility] obstructive [facility]) [facility]     CB    [facility]     Diabetes mellitus ([facility]     Emphysema (subcutaneous) (surgical) resulting from a procedure     H/O cardiovascular stress test 2021    Lexiscan    Hyperlipidemia     Hypertension     Hypoxemia requiring supplemental oxygen 2015    LONG TERM ANTICOAGULENT USE     Morbid obesity with BMI of 50.0-59.9, adult ([facility] 2013    Obesity     Osteoarthritis     Sleep apnea     bilevel positive airway pressure at 13/8 with 2 L oxygen flow     Stage 3 chronic kidney disease (HCC)     Tobacco abuse     Type II or unspecified type diabetes mellitus without mention of complication, not stated as uncontrolled        Social History     Socioeconomic History    Marital status:      Spouse name: Awa Sandy Number of children: 1    Years of education: Not on file    Highest education level: 11th grade   Occupational History    Not on file   Tobacco Use    Smoking status: Former Smoker     Packs/day: 1.00     Years: 45.00     Pack years: 45.00     Types: Cigarettes     Quit date: 2013     Years since quittin.0    Smokeless tobacco: Former User     Types: Chew     Quit date: 10/8/1971   Vaping Use    Vaping Use: Never used   Substance and Sexual Activity    Alcohol use: No     Alcohol/week: 0.0 standard drinks     Comment: 1-2 coffee per day    Drug use: No    Sexual activity: Yes     Partners: Female   Other Topics Concern    Not on file   Social History Narrative    19  [facility] reviewed SDOH with Gali Escobar. He does have money strain but between the income he has coming in and his wife's they are over resources for SARY programs. Offered food panty info to help with end of the month struggles but he declined stating that he tried and was refused due to his income. He belongs to a [employer] and goes weekly so he has some community connections in place.   He has an extremely high interest rate on his mortgage which he was encouraged to look into getting lowered to help save money on his house payments. Noticed some difficulty with memory recall. Becomes easily flustered at times. Has no MHI to support applying for [facility] program of [facility]. States he does not feel depressed or need any MH treatment. Social Determinants of Health     Financial Resource Strain:     Difficulty of Paying Living Expenses:    Food Insecurity:     Worried About Running Out of Food in the Last Year:     [address] in the Last Year:    Transportation Needs:     Lack of Transportation (Medical):  Lack of Transportation (Non-Medical):    Physical Activity:     Days of Exercise per Week:     Minutes of Exercise per Session:    Stress:     Feeling of Stress :    Social Connections:     Frequency of Communication with Friends and Family:     Frequency of Social Gatherings with Friends and Family:     Attends [employer] Services:     Active Member of Clubs or Organizations:     Attends Club or Organization Meetings:     Marital Status:    Intimate Partner Violence:     Fear of Current or Ex-Partner:     Emotionally Abused:     Physically Abused:     Sexually Abused:        Family History   Problem Relation Age of Onset    Cancer Mother         breast    Cancer Father         stomach    Mental Illness Brother     Stroke Brother     Other Brother        Current Outpatient Medications on File Prior to Visit   Medication Sig Dispense Refill    gabapentin (NEURONTIN) 100 MG capsule Take 1 capsule by mouth 3 times daily for 30 days.  90 capsule 5    amiodarone (CORDARONE) 200 MG tablet Take 1 tablet by mouth 2 times daily 60 tablet 5    blood glucose test strips (ACCU-CHEK GUIDE) strip USE TO TEST TWO TIMES DAILY AND AS NEEDED FOR SYMPTOMS OF IRREGULAR BLOOD GLUCOSE. 100 each 11    spironolactone (ALDACTONE) 25 MG tablet Take 25 mg by mouth daily      pantoprazole (PROTONIX) 40 MG tablet Take 40 mg by mouth list, medical,social and family history and have updated as needed in the electronic medical record. Objective:     Physical Exam  Vitals and nursing note reviewed. Constitutional:       General: He is not in acute distress. Appearance: He is well-developed. He is not diaphoretic. HENT:      Head: Normocephalic and atraumatic. Right Ear: External ear normal.      Left Ear: External ear normal.      Nose: Congestion and rhinorrhea present. Mouth/Throat:      Pharynx: No oropharyngeal exudate. Eyes:      General: No scleral icterus. Right eye: No discharge. Left eye: No discharge. Conjunctiva/sclera: Conjunctivae normal.      Pupils: Pupils are equal, round, and reactive to light. Neck:      Thyroid: No thyromegaly. Vascular: No JVD. Trachea: No tracheal deviation. Cardiovascular:      Rate and Rhythm: Normal rate and regular rhythm. Heart sounds: Normal heart sounds. No murmur heard. No friction rub. No gallop. Pulmonary:      Effort: Pulmonary effort is normal. No respiratory distress. Breath sounds: Normal breath sounds. No stridor. No wheezing or rales. Chest:      Chest wall: No tenderness. Abdominal:      General: Bowel sounds are normal. There is no distension. Palpations: Abdomen is soft. There is no mass. Tenderness: There is no abdominal tenderness. There is no guarding or rebound. Genitourinary:     Comments: Deferred by patient  Musculoskeletal:         General: No tenderness. Cervical back: Normal range of motion and neck supple. Comments: Right shoulder impingement    Lymphadenopathy:      Cervical: No cervical adenopathy. Skin:     General: Skin is warm and dry. Coloration: Skin is not pale. Findings: No erythema or rash. Neurological:      Mental Status: He is alert and oriented to person, place, and time. Cranial Nerves: No cranial nerve deficit. Motor: No abnormal muscle tone. Coordination: Coordination normal.      Deep Tendon Reflexes: Reflexes are normal and symmetric. Reflexes normal.   Psychiatric:         Behavior: Behavior normal.         Thought Content: Thought content normal.         Judgment: Judgment normal.         Assessment / Plan:   Katalina Colón was seen today for 1 month follow-up. Diagnoses and all orders for this visit:    Type 2 diabetes mellitus with hyperglycemia, with long-term current use of insulin (HCC)  -     insulin glargine (BASAGLAR KWIKPEN) 100 UNIT/ML injection pen; Inject 40 Units into the skin 2 times daily  -     POCT glycosylated hemoglobin (Hb A1C)  -     CBC Auto Differential; Future  -     Comprehensive Metabolic Panel; Future  -     TSH without Reflex; Future  -     Lipid Panel; Future  -     Morena Conway MD, Endocrinology, [place]    Class 3 severe obesity due to excess calories with serious comorbidity and body mass index (BMI) of 45.0 to 49.9 in adult ([facility]  -     TSH without Reflex; Future    Status post coronary artery bypass graft    Mixed hyperlipidemia    Chronic right shoulder pain  -     XR SHOULDER RIGHT (MIN 2 VIEWS); Future  -     [address] [place] [address] and Sports Medicine    PAF (paroxysmal atrial fibrillation) ([facility]  -     PROTIME-INR; Future    Acute bacterial sinusitis  -     cefdinir (OMNICEF) 300 MG capsule; Take 1 capsule by mouth 2 times daily for 10 days    Venous insufficiency (chronic) (peripheral)  -     bumetanide (BUMEX) 1 MG tablet; Take 1 tablet by mouth 2 times daily    Stasis dermatitis of both legs  -     ammonium lactate (LAC-HYDRIN) 12 % lotion; Apply topically daily. Reviewed healthmaintenance report. Patient is aware of deficiencies and suggested preventative tests.

## 2021-07-26 ENCOUNTER — TELEPHONE (OUTPATIENT)
Dept: FAMILY MEDICINE CLINIC | Age: 66
End: 2021-07-26

## 2021-07-26 NOTE — TELEPHONE ENCOUNTER
----- Message from Xavi Pacejordana sent at 7/26/2021  1:31 PM EDT -----  Subject: Refill Request    QUESTIONS  Name of Medication? Other - levocetirizine  Patient-reported dosage and instructions? n/a  How many days do you have left? 0  Preferred Pharmacy? [facility] phone number (if available)? [phone]  ---------------------------------------------------------------------------  --------------  CALL BACK INFO  What is the best way for the office to contact you? OK to leave message on   voicemail  Preferred Call Back Phone Number?  [phone]

## 2021-07-26 NOTE — TELEPHONE ENCOUNTER
----- Message from Yuliya Ro sent at 7/26/2021  1:33 PM EDT -----  Subject: Results Request    QUESTIONS  Which lab or imaging result is the patient calling about? xray  Which provider ordered the test?   At what location was the test performed? Date the test was performed? 2021-07-22  Additional Information for Provider?   ---------------------------------------------------------------------------  --------------  CALL BACK INFO  What is the best way for the office to contact you? OK to leave message on   voicemail  Preferred Call Back Phone Number?  [phone]

## 2021-07-27 NOTE — PROGRESS NOTES
[facility] Cardiology Progress Note  Dr. Rubén Whelan      Referring Physician: Clovis Li DO  CHIEF COMPLAINT:   Chief Complaint   Patient presents with    Coronary Artery Disease     6 week check . Patient denies any cp sob or dizziness. HISTORY OF PRESENT ILLNESS:   72year old male with history of CHF, CAD, DM, hypertension, hyperlipidemia and COPD is here for a follow up appointment. Dizziness and lightheadedness especially with standing up, generalized weakness, patient denies any chest pain, no shortness of breath, no palpitations, no pedal edema, no PND, no orthopnea, no syncope, no presyncopal episodes. Functional capacity is at baseline    Past Medical History:   Diagnosis Date    Acid reflux     Acute diastolic (congestive) heart failure ([facility] [address] 06/19/2019    Arthritis     Asthma 04/16/2014    Asthmatic bronchitis , chronic (HCC) 11/28/2016    CAD (coronary artery disease)     Chronic bronchitis (HCC) 04/16/2014    COPD (chronic obstructive pulmonary disease) (HCC)     CB    COPD (chronic obstructive pulmonary disease) (HCC)     Diabetes mellitus (HCC)     Emphysema (subcutaneous) (surgical) resulting from a procedure     H/O cardiovascular stress test 04/24/2021    Lexiscan    Hyperlipidemia     Hypertension     Hypoxemia requiring supplemental oxygen 02/02/2015    LONG TERM ANTICOAGULENT USE     Morbid obesity with BMI of 50.0-59.9, adult ([facility] [address] 11/27/2013    Obesity     Osteoarthritis     Sleep apnea     bilevel positive airway pressure at 13/8 with 2 L oxygen flow     Stage 3 chronic kidney disease (HCC)     Tobacco abuse     Type II or unspecified type diabetes mellitus without mention of complication, not stated as uncontrolled          Past Surgical History:   Procedure Laterality Date    COLONOSCOPY      CORONARY ANGIOPLASTY WITH STENT PLACEMENT  07-23-13    Left main focal eccentric 65% distal stenosis. Large OM1 CX 50% ostial & prox narrow.  Large ramus artery & LAD: Minor plaque w/o sign narrow. RCA: Dom vessel w/25% prox narrow & focal hazy eccentric 99% distal stenosis before origin RPDA & RPLCA. LV: Mild inferior hypokinesis EF 55%. Probable mild AS with 10-15mmHg. Successful PCI distal RCA w/3.5 x 12 mm BMS, 0% res stenosis. Normal distal runoff    CORONARY ARTERY BYPASS GRAFT  09-09-13    Dr Kathie Cui; CABG x2, LIMA to LAD, SVG to ramus intermedius; MV repair using 30-mm Future complete ring with magic stitch between A3 and P3; rigid internal fixation of sternum using KLS plates x2; endoscopic vein harvesting of right lower extremity     ECHO COMPL W DOP COLOR FLOW  7/25/2013         HERNIA REPAIR      SINUS SURGERY           Current Outpatient Medications   Medication Sig Dispense Refill    amiodarone (CORDARONE) 200 MG tablet Take 1 tablet by mouth 2 times daily 60 tablet 5    pantoprazole (PROTONIX) 40 MG tablet Take 40 mg by mouth daily      metoprolol succinate (TOPROL XL) 25 MG extended release tablet Take 1 tablet by mouth 2 times daily 60 tablet 5    pramipexole (MIRAPEX) 0.25 MG tablet TAKE TWO TABLETS BY MOUTH THREE TIMES A  tablet 3    insulin lispro, 1 Unit Dial, (HUMALOG KWIKPEN) 100 UNIT/ML SOPN Inject 6 units with meals PLUS                 No Insulin   140-199           3 Units   200-249           6 Units   250-299           9 Units   300-349         12 Units   350-400         15 Units   Over 400       18 Units (Patient taking differently: Inject 0-18 Units into the skin 3 times daily (before meals) MAX 70 units daily) 5 pen 0    atorvastatin (LIPITOR) 80 MG tablet TAKE ONE TABLET BY MOUTH EVERY NIGHT 90 tablet 5    montelukast (SINGULAIR) 10 MG tablet Take 1 tablet by mouth nightly 30 tablet 4    CPAP Machine MISC 1 each by Does not apply route nightly as needed       aspirin 81 MG tablet Take 81 mg by mouth nightly       gabapentin (NEURONTIN) 300 MG capsule Take 1 capsule by mouth 2 times daily.  90 capsule 5    bumetanide (BUMEX) 1 MG tablet Take 1 tablet by mouth daily 60 tablet 5    warfarin (COUMADIN) 5 MG tablet Take 1 tablet by mouth daily 30 tablet 3    insulin glargine (BASAGLAR KWIKPEN) 100 UNIT/ML injection pen Inject 45 Units into the skin 2 times daily      levocetirizine (XYZAL) 5 MG tablet Take 5 mg by mouth nightly      spironolactone (ALDACTONE) 25 MG tablet TAKE ONE TABLET BY MOUTH TWO TIMES A  tablet 0    hydrOXYzine (VISTARIL) 25 MG capsule Take one 220 minutes prior to MRI 3 capsule 0     No current facility-administered medications for this visit. Allergies as of 2021 - Fully Reviewed 2021   Allergen Reaction Noted    Pcn [penicillins]  2013    Sulfa antibiotics  2013       Social History     Socioeconomic History    Marital status:      Spouse name: Krystyna Shaw Number of children: 1    Years of education: Not on file    Highest education level: 11th grade   Occupational History    Not on file   Tobacco Use    Smoking status: Former Smoker     Packs/day: 1.00     Years: 45.00     Pack years: 45.00     Types: Cigarettes     Quit date: 2013     Years since quittin.3    Smokeless tobacco: Former User     Types: [address] date: 10/8/1971   Vaping Use    Vaping Use: Never used   Substance and Sexual Activity    Alcohol use: No     Alcohol/week: 0.0 standard drinks     Comment: 1-2 coffee per day    Drug use: No    Sexual activity: Yes     Partners: Female   Other Topics Concern    Not on file   Social History Narrative    19  [facility] reviewed SDOH with Crystal Reinoso. He does have money strain but between the income he has coming in and his wife's they are over resources for [employer] programs. Offered food panty info to help with end of the month struggles but he declined stating that he tried and was refused due to his income. He belongs to a Religious and goes weekly so he has some community connections in place.   He has an extremely high interest rate on his mortgage which he was encouraged to look into getting lowered to help save money on his house payments. Noticed some difficulty with memory recall. Becomes easily flustered at times. Has no MHI to support applying for [facility] program of [facility]. States he does not feel depressed or need any MH treatment. Social Determinants of Health     Financial Resource Strain: Low Risk     Difficulty of Paying Living Expenses: Not hard at all   Food Insecurity: No Food Insecurity    Worried About Running Out of Food in the Last Year: Never true    Jennifer of Food in the Last Year: Never true   Transportation Needs:     Lack of Transportation (Medical): Not on file    Lack of Transportation (Non-Medical):  Not on file   Physical Activity:     Days of Exercise per Week: Not on file    Minutes of Exercise per Session: Not on file   Stress:     Feeling of Stress : Not on file   Social Connections:     Frequency of Communication with Friends and Family: Not on file    Frequency of Social Gatherings with Friends and Family: Not on file    Attends Religion Services: Not on file    Active Member of [address] [place] [address] [employer] or Organizations: Not on file    Attends Club or Organization Meetings: Not on file    Marital Status: Not on file   Intimate Partner Violence:     Fear of Current or Ex-Partner: Not on file    Emotionally Abused: Not on file    Physically Abused: Not on file    Sexually Abused: Not on file   Housing Stability:     Unable to Pay for Housing in the Last Year: Not on file    Number of [place] in the Last Year: Not on file    Unstable Housing in the Last Year: Not on file       Family History   Problem Relation Age of Onset    Cancer Mother         breast    Cancer Father         stomach    Mental Illness Brother     Stroke Brother     Other Brother        REVIEW OF SYSTEMS:     CONSTITUTIONAL:  negative for  fevers, chills, sweats, + fatigue  HEENT:  negative for  tinnitus, earaches, nasal congestion and epistaxis  RESPIRATORY:  negative for  dry cough, cough with sputum, wheezing and hemoptysis  GASTROINTESTINAL:  negative for nausea, vomiting, diarrhea, constipation, pruritus and jaundice  HEMATOLOGIC/LYMPHATIC:  negative for easy bruising, bleeding, lymphadenopathy and petechiae  ENDOCRINE:  negative for heat intolerance, cold intolerance, tremor, hair loss and diabetic symptoms including neither polyuria nor polydipsia nor blurred vision  MUSCULOSKELETAL:  negative for  myalgias, arthralgias, joint swelling, stiff joints and decreased range of motion  NEUROLOGICAL:  negative for memory problems, speech problems, visual disturbance, dysphagia, weakness and numbness      PHYSICAL EXAM:   CONSTITUTIONAL:  awake, alert, cooperative, no apparent distress, and appears stated age  HEAD:  normocepalic, without obvious abnormality, atraumatic, pink, moist mucous membranes. NECK:  Supple, symmetrical, trachea midline, no adenopathy, thyroid symmetric, not enlarged and no tenderness, skin normal  LUNGS:  No increased work of breathing, good air exchange, clear to auscultation bilaterally, no crackles or wheezing  CARDIOVASCULAR:  Normal apical impulse,, tachycardic, normal S1 and S2, no S3 or S4, 3/6 systolic murmur at the apex, 3/6 systolic murmur at the left lower sternal border, no JVD, no carotid bruit, + pedal edema, good carotid upstroke bilaterally. ABDOMEN:  Soft, nontender, no masses, no hepatomegaly or splenomegaly, BS+  CHEST: nontender to palpation, expands symmetrically  MUSCULOSKELETAL:  No clubbing no cyanosis. there is no redness, warmth, or swelling of the joints  full range of motion noted  NEUROLOGIC:  Alert, awake,oriented x3.   SKIN:  no bruising or bleeding, normal skin color, texture, turgor and no redness, warmth, or swelling        /82 (Site: Right Upper Arm, Position: Sitting, Cuff Size: Large Adult)   Pulse 94   Ht 5' 6\" (1.676 m)   Wt 294 lb (133.4 kg)   SpO2 94%   BMI 47.45 kg/m²     DATA:   I personally reviewed the visit EKG with the following interpretation: Atrial fibrillation, controlled ventricular response, incomplete right bundle branch block    EKG 6/18/21  Atrial fibrillation, rapid ventricular response, low voltage QRS in the limb leads     ECHO:6/14/21 Summary   Left ventricular size is grossly normal.   Moderate left ventricular concentric hypertrophy noted. Ejection fraction is visually estimated at 45%. No evidence of left ventricular mass or thrombus noted. The left atrium is moderately dilated   Poor images for bubble study   Mildly dilated right ventricle. Mildly enlarged right atrium size. Moderate mitral regurgitation is present. No evidence of mitral valve stenosis. The aortic valve is trileaflet. The aortic valve appears mildly sclerotic. Physiologic and/or trace aortic regurgitation is noted. Mild aortic stenosis. The aortic valve area is 1.75 cm2 with a maximum gradient of 24.82 mmHg   and a mean gradient of 14.08 mmHg. Moderate tricuspid regurgitation.    Irregular rhythm    Stress Test: 4/24/21       The myocardial perfusion imaging was normal with attenuation.       Overall left ventricular systolic function was normal without regional   wall motion abnormalities.       Overall low risk study.             CABG/MVR 9/9/13 Sternotomy; coronary artery bypass grafting x2, left internal   mammary artery to the LAD, saphenous vein graft to the ramus intermedius;   mitral valve repair using 30-mm Future complete ring with magic stitch   between A3 and P3; rigid internal fixation of the sternum using KLS plates   x2; and endoscopic vein harvesting    Cardiology Labs: BMP:    Lab Results   Component Value Date     10/31/2021    K 4.4 10/31/2021    K 2.8 10/28/2021     10/31/2021    CO2 22 10/31/2021    BUN 15 10/31/2021    CREATININE 1.3 10/31/2021     CMP:    Lab Results   Component Value Date     10/31/2021    K 4.4 10/31/2021    K 2.8 10/28/2021     10/31/2021    CO2 22 10/31/2021    BUN 15 10/31/2021    CREATININE 1.3 10/31/2021    PROT 8.5 10/28/2021     CBC:    Lab Results   Component Value Date    WBC 7.1 10/31/2021    RBC 4.28 10/31/2021    HGB 13.7 10/31/2021    HCT 40.7 10/31/2021    MCV 95.1 10/31/2021    RDW 15.5 10/31/2021     10/31/2021     PT/INR:  No results found for: PTINR  PT/INR Warfarin:  No components found for: PTPATWAR, PTINRWAR  PTT:    Lab Results   Component Value Date    APTT 24.9 10/29/2021     PTT Heparin:  No components found for: APTTHEP  Magnesium:    Lab Results   Component Value Date    MG 1.9 10/31/2021     TSH:    Lab Results   Component Value Date    TSH 7.780 10/20/2021     TROPONIN:  No components found for: TROP  BNP:  No results found for: BNP  FASTING LIPID PANEL:    Lab Results   Component Value Date    CHOL 148 10/20/2021    HDL 60 10/20/2021    TRIG 142 10/20/2021     No orders to display     I have personally reviewed the laboratory, cardiac diagnostic and radiographic testing as outlined above:      IMPRESSION:  1. Atrial fibrillation: controlledventricular response, will continue Coumadin for anticoagulation  2. Combined congestive heart failure: Chronic, compensated, continue current treatment  3. Dizziness and lightheadedness: Suspect secondary to low blood pressure, will cut down losartan to 25 mg daily  4. CAD: S/p CABG with LIMA to LAD, SVG to ramus intermedius artery  5. Status post mitral valve repair using #30-mm Future complete ring with magic stitch between A3 and P3, moderate MR on recent echocardiogram  6. Ischemic cardiomyopathy: Last documented ejection fraction was 45%  7. Mitral valve regurgitation: Moderate  8. Tricuspid valve regurgitation: Moderate  9. Hypertension: Controlled  10. Type 2 diabetes mellitus  11. Hyperlipidemia: On statin  12. Chronic anticoagulation    RECOMMENDATIONS:   1. Continue current treatment  2.   CHF: Daily weight, take an extra Bumex for weight gain of more than 2-3 pounds in 24 hours, compliance with diuretics, low-salt diet were all advised. 3.  Increase risk of bleeding due to being on anti-coagulation, symptoms and signs of bleeding discussed with patient, patient was advised to seek medical attention at the earliest symptoms or signs of bleeding. We will. Preventive cardiology: Low-salt, low-cholesterol diet, daily exercise, total cholesterol of less than 200, LDL of less than 70, adherence to diabetic diet and diabetic medications, were all advised. 5.  Follow-up with Dr. Brock Cunningham as scheduled  6. Follow-up with Dr. Rudi Hayes in 4 weeks, sooner if symptomatic for any reason    I have reviewed my findings and recommendations with patient    Electronically signed by Tiffanie Hairston MD on 11/14/2021 at 4:07 PM  NOTE: This report was transcribed using voice recognition software.  Every effort was made to ensure accuracy; however, inadvertent computerized transcription errors may be present

## 2021-07-28 ENCOUNTER — OFFICE VISIT (OUTPATIENT)
Dept: CARDIOLOGY CLINIC | Age: 66
End: 2021-07-28
Payer: MEDICARE

## 2021-07-28 VITALS
OXYGEN SATURATION: 94 % | SYSTOLIC BLOOD PRESSURE: 124 MMHG | HEIGHT: 66 IN | HEART RATE: 94 BPM | BODY MASS INDEX: 47.25 KG/M2 | WEIGHT: 294 LBS | DIASTOLIC BLOOD PRESSURE: 82 MMHG

## 2021-07-28 DIAGNOSIS — I25.10 CORONARY ARTERY DISEASE INVOLVING NATIVE CORONARY ARTERY OF NATIVE HEART WITHOUT ANGINA PECTORIS: Primary | ICD-10-CM

## 2021-07-28 PROCEDURE — 1123F ACP DISCUSS/DSCN MKR DOCD: CPT | Performed by: INTERNAL MEDICINE

## 2021-07-28 PROCEDURE — G8427 DOCREV CUR MEDS BY ELIG CLIN: HCPCS | Performed by: INTERNAL MEDICINE

## 2021-07-28 PROCEDURE — 3017F COLORECTAL CA SCREEN DOC REV: CPT | Performed by: INTERNAL MEDICINE

## 2021-07-28 PROCEDURE — 99214 OFFICE O/P EST MOD 30 MIN: CPT | Performed by: INTERNAL MEDICINE

## 2021-07-28 PROCEDURE — 4040F PNEUMOC VAC/ADMIN/RCVD: CPT | Performed by: INTERNAL MEDICINE

## 2021-07-28 PROCEDURE — 1036F TOBACCO NON-USER: CPT | Performed by: INTERNAL MEDICINE

## 2021-07-28 PROCEDURE — G8417 CALC BMI ABV UP PARAM F/U: HCPCS | Performed by: INTERNAL MEDICINE

## 2021-07-28 PROCEDURE — 93000 ELECTROCARDIOGRAM COMPLETE: CPT | Performed by: INTERNAL MEDICINE

## 2021-08-02 RX ORDER — WARFARIN SODIUM 2.5 MG/1
2.5 TABLET ORAL DAILY
Qty: 30 TABLET | Refills: 5 | Status: SHIPPED
Start: 2021-08-02 | End: 2021-10-28 | Stop reason: DRUGHIGH

## 2021-08-02 NOTE — TELEPHONE ENCOUNTER
----- Message from Eileen Cobb sent at 8/2/2021  1:51 PM EDT -----  Subject: Refill Request    QUESTIONS  Name of Medication? warfarin (COUMADIN) 2.5 MG tablet  Patient-reported dosage and instructions? 2.5 mg one tablet per day  How many days do you have left? 3  Preferred Pharmacy? [facility] phone number (if available)? [phone]  ---------------------------------------------------------------------------  --------------  CALL BACK INFO  What is the best way for the office to contact you? OK to leave message on   voicemail  Preferred Call Back Phone Number?  [phone]

## 2021-08-16 DIAGNOSIS — N28.9 RENAL INSUFFICIENCY: ICD-10-CM

## 2021-08-16 DIAGNOSIS — I50.32 CHRONIC HEART FAILURE WITH PRESERVED EJECTION FRACTION (HCC): ICD-10-CM

## 2021-08-16 RX ORDER — METOLAZONE 2.5 MG/1
TABLET ORAL
Qty: 24 TABLET | Refills: 3 | Status: SHIPPED
Start: 2021-08-16 | End: 2021-10-28 | Stop reason: ALTCHOICE

## 2021-08-16 RX ORDER — METOLAZONE 2.5 MG/1
TABLET ORAL
Qty: 12 TABLET | Refills: 1 | Status: SHIPPED
Start: 2021-08-16 | End: 2021-08-16 | Stop reason: SDUPTHER

## 2021-08-16 RX ORDER — LOSARTAN POTASSIUM 25 MG/1
25 TABLET ORAL DAILY
Qty: 90 TABLET | Refills: 1 | Status: ON HOLD
Start: 2021-08-16 | End: 2021-10-31 | Stop reason: HOSPADM

## 2021-08-18 ENCOUNTER — OFFICE VISIT (OUTPATIENT)
Dept: ORTHOPEDIC SURGERY | Age: 66
End: 2021-08-18
Payer: MEDICARE

## 2021-08-18 VITALS — HEIGHT: 66 IN | BODY MASS INDEX: 47.25 KG/M2 | WEIGHT: 294 LBS

## 2021-08-18 DIAGNOSIS — S40.011A CONTUSION OF MULTIPLE SITES OF RIGHT SHOULDER, INITIAL ENCOUNTER: ICD-10-CM

## 2021-08-18 DIAGNOSIS — M75.101 TEAR OF RIGHT ROTATOR CUFF, UNSPECIFIED TEAR EXTENT, UNSPECIFIED WHETHER TRAUMATIC: Primary | ICD-10-CM

## 2021-08-18 PROCEDURE — 3017F COLORECTAL CA SCREEN DOC REV: CPT | Performed by: ORTHOPAEDIC SURGERY

## 2021-08-18 PROCEDURE — G8417 CALC BMI ABV UP PARAM F/U: HCPCS | Performed by: ORTHOPAEDIC SURGERY

## 2021-08-18 PROCEDURE — G8427 DOCREV CUR MEDS BY ELIG CLIN: HCPCS | Performed by: ORTHOPAEDIC SURGERY

## 2021-08-18 PROCEDURE — 1036F TOBACCO NON-USER: CPT | Performed by: ORTHOPAEDIC SURGERY

## 2021-08-18 PROCEDURE — 1123F ACP DISCUSS/DSCN MKR DOCD: CPT | Performed by: ORTHOPAEDIC SURGERY

## 2021-08-18 PROCEDURE — 4040F PNEUMOC VAC/ADMIN/RCVD: CPT | Performed by: ORTHOPAEDIC SURGERY

## 2021-08-18 PROCEDURE — 99203 OFFICE O/P NEW LOW 30 MIN: CPT | Performed by: ORTHOPAEDIC SURGERY

## 2021-08-18 NOTE — PROGRESS NOTES
Chief Complaint   Patient presents with    Shoulder Pain     Right shoulder pain. Patient fell about a month ago on right side. HPI:    Patient is 72 y.o. male complaining of right shoulder pain and weakness for 6 weeks after falling onto outstretched hand. He denies a specific traumatic injury to the right shoulder. Previous treatments include rest, ice, and anti-inflammatory medication and HEP without much relief. He denies any other orthopedic complaints. ROS:    Skin: (-) rash,(-) psoriasis,(-) eczema, (-)skin cancer. Neurologic: (-)numbness, (-)tingling, (-)headaches, (-) LOC. Cardiovascular: (-) Chest pain, (-) swelling in legs/feet, (-) SOB, (-) cramping in legs/feet with walking.     All other review of systems negative except stated above or in HPI      Past Medical History:   Diagnosis Date    Acid reflux     Acute diastolic (congestive) heart failure (HCC) 06/19/2019    Arthritis     Asthma 04/16/2014    Asthmatic bronchitis , chronic (HCC) 11/28/2016    CAD (coronary artery disease)     [facility] bronchitis ([facility]) 04/16/2014    COPD (chronic obstructive pulmonary disease) ([facility])     CB    COPD (chronic obstructive pulmonary disease) ([facility])     Diabetes mellitus ([facility])     Emphysema (subcutaneous) (surgical) resulting from a procedure     H/O cardiovascular stress test 04/24/2021    Lexiscan    Hyperlipidemia     Hypertension     Hypoxemia requiring supplemental oxygen 02/02/2015    LONG TERM ANTICOAGULENT USE     Morbid obesity with BMI of 50.0-59.9, adult ([facility] [address] 11/27/2013    Obesity     Osteoarthritis     Sleep apnea     bilevel positive airway pressure at 13/8 with 2 L oxygen flow     Stage 3 chronic kidney disease (HCC)     Tobacco abuse     Type II or unspecified type diabetes mellitus without mention of complication, not stated as uncontrolled      Past Surgical History:   Procedure Laterality Date    COLONOSCOPY      CORONARY ANGIOPLASTY WITH STENT PLACEMENT 07-23-13    Left main focal eccentric 65% distal stenosis. Large OM1 CX 50% ostial & prox narrow. Large ramus artery & LAD: Minor plaque w/o sign narrow. RCA: Dom vessel w/25% prox narrow & focal hazy eccentric 99% distal stenosis before origin RPDA & RPLCA. LV: Mild inferior hypokinesis EF 55%. Probable mild AS with 10-15mmHg. Successful PCI distal RCA w/3.5 x 12 mm BMS, 0% res stenosis. Normal distal runoff    CORONARY ARTERY BYPASS GRAFT  09-09-13    Dr Ronnie Zhu; CABG x2, LIMA to LAD, SVG to ramus intermedius; MV repair using 30-mm Future complete ring with magic stitch between A3 and P3; rigid internal fixation of sternum using KLS plates x2; endoscopic vein harvesting of right lower extremity     ECHO COMPL W DOP COLOR FLOW  7/25/2013         HERNIA REPAIR      SINUS SURGERY         Current Outpatient Medications:     losartan (COZAAR) 25 MG tablet, Take 1 tablet by mouth daily, Disp: 90 tablet, Rfl: 1    metOLazone (ZAROXOLYN) 2.5 MG tablet, One twice a week, Disp: 24 tablet, Rfl: 3    warfarin (COUMADIN) 2.5 MG tablet, Take 1 tablet by mouth daily, Disp: 30 tablet, Rfl: 5    insulin glargine (BASAGLAR KWIKPEN) 100 UNIT/ML injection pen, Inject 40 Units into the skin 2 times daily, Disp: 5 pen, Rfl: 3    bumetanide (BUMEX) 1 MG tablet, Take 1 tablet by mouth 2 times daily, Disp: 60 tablet, Rfl: 5    ammonium lactate (LAC-HYDRIN) 12 % lotion, Apply topically daily. , Disp: 225 g, Rfl: 5    gabapentin (NEURONTIN) 100 MG capsule, Take 1 capsule by mouth 3 times daily for 30 days. , Disp: 90 capsule, Rfl: 5    amiodarone (CORDARONE) 200 MG tablet, Take 1 tablet by mouth 2 times daily, Disp: 60 tablet, Rfl: 5    blood glucose test strips (ACCU-CHEK GUIDE) strip, USE TO TEST TWO TIMES DAILY AND AS NEEDED FOR SYMPTOMS OF IRREGULAR BLOOD GLUCOSE., Disp: 100 each, Rfl: 11    spironolactone (ALDACTONE) 25 MG tablet, Take 25 mg by mouth daily, Disp: , Rfl:     pantoprazole (PROTONIX) 40 MG tablet, Take 40 mg by mouth daily, Disp: , Rfl:     DULoxetine (CYMBALTA) 20 MG extended release capsule, Take 20 mg by mouth daily, Disp: , Rfl:     metoprolol succinate (TOPROL XL) 25 MG extended release tablet, Take 1 tablet by mouth 2 times daily, Disp: 60 tablet, Rfl: 5    Insulin Syringe-Needle U-100 27G X 5/8\" 1 ML MISC, Use as directed, Disp: 100 each, Rfl: 11    Insulin Pen Needle (MEIJER PEN NEEDLES) 31G X 6 MM MISC, 1 each by Does not apply route daily, Disp: 100 each, Rfl: 3    pramipexole (MIRAPEX) 0.25 MG tablet, TAKE TWO TABLETS BY MOUTH THREE TIMES A DAY, Disp: 180 tablet, Rfl: 3    insulin lispro, 1 Unit Dial, (HUMALOG KWIKPEN) 100 UNIT/ML SOPN, Inject 6 units with meals PLUS               No Insulin  140-199           3 Units  200-249           6 Units  250-299           9 Units  300-349         12 Units  350-400         15 Units  Over 400       18 Units, Disp: 5 pen, Rfl: 0    Lancets MISC, 1 each by Does not apply route 4 times daily (before meals and nightly), Disp: 300 each, Rfl: 1    atorvastatin (LIPITOR) 80 MG tablet, TAKE ONE TABLET BY MOUTH EVERY NIGHT (Patient taking differently: 60 mg TAKE ONE TABLET BY MOUTH EVERY NIGHT), Disp: 90 tablet, Rfl: 5    montelukast (SINGULAIR) 10 MG tablet, Take 1 tablet by mouth nightly, Disp: 30 tablet, Rfl: 4    terbinafine (ATHLETES FOOT) 1 % cream, Apply topically 2 times daily. , Disp: 42 g, Rfl: 1    CPAP Machine MISC, 1 each by Does not apply route nightly as needed , Disp: , Rfl:     Accu-Chek Multiclix Lancets MISC, Use as directed, Disp: 100 each, Rfl: 3    aspirin 81 MG tablet, Take 81 mg by mouth nightly , Disp: , Rfl:   Allergies   Allergen Reactions    Pcn [Penicillins]      Child went to hospital   ? Reaction  Patient tolerates cephalosporins    Sulfa Antibiotics      ?      Social History     Socioeconomic History    Marital status:      Spouse name: Marciano Encinas Number of children: 1    Years of education: Not on file    Highest education level: 11th grade   Occupational History    Not on file   Tobacco Use    Smoking status: Former Smoker     Packs/day: 1.00     Years: 45.00     Pack years: 45.00     Types: Cigarettes     Quit date: 2013     Years since quittin.0    Smokeless tobacco: Former User     Types: Chew     Quit date: 10/8/1971   Vaping Use    Vaping Use: Never used   Substance and Sexual Activity    Alcohol use: No     Alcohol/week: 0.0 standard drinks     Comment: 1-2 coffee per day    Drug use: No    Sexual activity: Yes     Partners: Female   Other Topics Concern    Not on file   Social History Narrative    19  [facility] reviewed SDOH with [facility]. He does have money strain but between the income he has coming in and his wife's they are over resources for SARY programs. Offered food panty info to help with end of the month struggles but he declined stating that he tried and was refused due to his income. He belongs to a [employer] and goes weekly so he has some community connections in place. He has an extremely high interest rate on his mortgage which he was encouraged to look into getting lowered to help save money on his house payments. Noticed some difficulty with memory recall. Becomes easily flustered at times. Has no MHI to support applying for [facility] program of SARY. States he does not feel depressed or need any MH treatment. Social Determinants of Health     Financial Resource Strain:     Difficulty of Paying Living Expenses:    Food Insecurity:     Worried About Running Out of Food in the Last Year:     [address] in the Last Year:    Transportation Needs:     Lack of Transportation (Medical):      Lack of Transportation (Non-Medical):    Physical Activity:     Days of Exercise per Week:     Minutes of Exercise per Session:    Stress:     Feeling of Stress :    Social Connections:     Frequency of Communication with Friends and Family:     Frequency of Social Gatherings with Friends and Family:     Attends Jain Services:     Active Member of Clubs or Organizations:     Attends Club or Organization Meetings:     Marital Status:    Intimate Partner Violence:     Fear of Current or Ex-Partner:     Emotionally Abused:     Physically Abused:     Sexually Abused:      Family History   Problem Relation Age of Onset    Cancer Mother         breast    Cancer Father         stomach    Mental Illness Brother     Stroke Brother     Other Brother            Physical Exam:    Ht 5' 6\" (1.676 m)   Wt 294 lb (133.4 kg)   BMI 47.45 kg/m²     GENERAL: alert, appears stated age, cooperative, no acute distress    HEENT: Head is normocephalic, atraumatic. PERRLA. SKIN: Clean, dry, intact. There is not any cellulitis or cutaneous lesions noted in the upper extremities    PULMONARY: breathing is regular and unlabored, no acute distress    CV: The bilateral upper and lower extremities are warm and well-perfused with brisk capillary refill. 2+ pulses UE and LE bilateral.     PSYCHIATRY: Pleasant mood, appropriate behavior, follows commands    NEURO: Sensation is intact distally with light touch with no alteration. Motor exam of the upper extremities show elbow flexion and extension, wrist flexion and extension, and finger abduction grossly intact 5/5. Upper extremity reflexes are bilaterally symmetrical and within normal limits. LYMPH: No lymphedema present distally in upper or lower extremity. MUSCULOSKELETAL:  Shoulder Exam:  Examination of the right shoulder shows: There is not a deformity. There is not erythema. There is not soft tissue swelling. Deltoid region is  tender to palpation. AC Joint is  tender to palpation. Clavicle is not tender to palpation. Bicipital Groove is  tender to palpation. Pectoralis  is not tender to palpation. Scapula/ trapezius is  tender to palpation.   Left:  ROM Full, Strength: Supraspinatus 5/5, Infraspinatus 5/5, Subscapularis 5/5  Right:  ROM 60/60/50, Strength: Supraspinatus 4/5, Infraspinatus 5/5, Subscapularis 5/5  Left Shoulder:  Crepitus:  no   Tenderness:  none   Effusion:   none   Impingement: negative   Empty Can:  negative   Speed's:  negative      Apprehension:  negative   Cross Arm Sign:  negative   Uehling's:  negative       Neer's:  negative      Belly Press Test:  negative      Drop Arm Test:  negative     Right Shoulder:  Crepitus:  no   Tenderness:  mild   Effusion:   non   Impingement: positive   Empty Can:  positive   Speed's: positive   Apprehension:  not tested   Cross Arm Sign:  positive   Uehling's:  positive      Neer's:  positive      Belly Press Test:  negative   Drop Arm Test:  positive           Imaging:  XR SHOULDER RIGHT (MIN 2 VIEWS)    Result Date: 7/22/2021  EXAMINATION: THREE XRAY VIEWS OF THE RIGHT SHOULDER 7/22/2021 10:53 am COMPARISON: 12 June 2021 HISTORY: ORDERING SYSTEM PROVIDED HISTORY: Chronic right shoulder pain TECHNOLOGIST PROVIDED HISTORY: Reason for exam:->pain FINDINGS: Very close proximity of the humeral head to the acromion in the Grashey view suggests chronic rotator cuff disease. There is moderate osteoarthritis at the [facility] joint. There is mild osteoarthritis at the glenohumeral joint. No acute fracture. Suspected chronic rotator cuff disease. Osteoarthritis at the [facility] and glenohumeral joints as noted. Franco Sears was seen today for shoulder pain. Diagnoses and all orders for this visit:    Tear of right rotator cuff, unspecified tear extent, unspecified whether traumatic  -     MRI SHOULDER RIGHT WO CONTRAST; Future    Contusion of multiple sites of right shoulder, initial encounter        Patient seen and examined. X-rays reviewed. Patient has exam and history consistent with rotator cuff pathology. MRI recommended for further evaluation and management of possible rotator cuff tear.   Return to clinic after MRI    In a 15 minute assessment and discussion, patient was counseled on continued weight loss, diet, and physical activity relating to this condition. He was educated with options in detail including nutrition, joining a health club/ weight loss program, and use of cardio equipment such as the Arc Trainer and the importance of use as well as range of motion and HEP exercises for weight loss.      Gideon Chavez, DO  8/18/21

## 2021-08-27 ENCOUNTER — TELEPHONE (OUTPATIENT)
Dept: FAMILY MEDICINE CLINIC | Age: 66
End: 2021-08-27

## 2021-08-27 NOTE — TELEPHONE ENCOUNTER
Called pt and pt is asking about scheduling MRI that Dr Trell Rae ordered. Transferred call to Dr Mela Landau office.

## 2021-08-27 NOTE — TELEPHONE ENCOUNTER
----- Message from Alberto Ochoa sent at 8/27/2021 12:05 PM EDT -----  Subject: Message to Provider    QUESTIONS  Information for Provider? Pt would like to speak to someone about his MRI.     ---------------------------------------------------------------------------  --------------  CALL BACK INFO  What is the best way for the office to contact you? OK to leave message on   voicemail  Preferred Call Back Phone Number? [phone]  ---------------------------------------------------------------------------  --------------  SCRIPT ANSWERS  Relationship to Patient?  Self

## 2021-08-31 ENCOUNTER — TELEPHONE (OUTPATIENT)
Dept: FAMILY MEDICINE CLINIC | Age: 66
End: 2021-08-31

## 2021-08-31 RX ORDER — HYDROXYZINE PAMOATE 25 MG/1
CAPSULE ORAL
Qty: 3 CAPSULE | Refills: 0 | Status: SHIPPED
Start: 2021-08-31 | End: 2022-06-14

## 2021-08-31 NOTE — TELEPHONE ENCOUNTER
Pt called asking for something to calm him down before his  closed MRI, please advise.  Its scheduled for 9.10.2021    Last seen 7/20/2021  Next appt 10/20/2021    [employer]

## 2021-09-10 ENCOUNTER — HOSPITAL ENCOUNTER (OUTPATIENT)
Dept: MRI IMAGING | Age: 66
Discharge: HOME | End: 2021-09-12
Payer: MEDICARE

## 2021-09-10 DIAGNOSIS — M75.101 TEAR OF RIGHT ROTATOR CUFF, UNSPECIFIED TEAR EXTENT, UNSPECIFIED WHETHER TRAUMATIC: ICD-10-CM

## 2021-09-10 PROCEDURE — 73221 MRI JOINT UPR EXTREM W/O DYE: CPT

## 2021-09-28 ENCOUNTER — NURSE TRIAGE (OUTPATIENT)
Dept: OTHER | Facility: CLINIC | Age: 66
End: 2021-09-28

## 2021-09-28 ENCOUNTER — OFFICE VISIT (OUTPATIENT)
Dept: FAMILY MEDICINE CLINIC | Age: 66
End: 2021-09-28
Payer: MEDICARE

## 2021-09-28 VITALS
RESPIRATION RATE: 16 BRPM | SYSTOLIC BLOOD PRESSURE: 112 MMHG | TEMPERATURE: 97.8 F | HEIGHT: 66 IN | DIASTOLIC BLOOD PRESSURE: 64 MMHG | HEART RATE: 110 BPM | WEIGHT: 298 LBS | BODY MASS INDEX: 47.89 KG/M2 | OXYGEN SATURATION: 97 %

## 2021-09-28 DIAGNOSIS — T14.8XXA PUNCTURE WOUND: ICD-10-CM

## 2021-09-28 DIAGNOSIS — L03.012 CELLULITIS OF FINGER OF LEFT HAND: Primary | ICD-10-CM

## 2021-09-28 PROCEDURE — G8427 DOCREV CUR MEDS BY ELIG CLIN: HCPCS | Performed by: NURSE PRACTITIONER

## 2021-09-28 PROCEDURE — 3017F COLORECTAL CA SCREEN DOC REV: CPT | Performed by: NURSE PRACTITIONER

## 2021-09-28 PROCEDURE — 1036F TOBACCO NON-USER: CPT | Performed by: NURSE PRACTITIONER

## 2021-09-28 PROCEDURE — 4040F PNEUMOC VAC/ADMIN/RCVD: CPT | Performed by: NURSE PRACTITIONER

## 2021-09-28 PROCEDURE — 1123F ACP DISCUSS/DSCN MKR DOCD: CPT | Performed by: NURSE PRACTITIONER

## 2021-09-28 PROCEDURE — G8417 CALC BMI ABV UP PARAM F/U: HCPCS | Performed by: NURSE PRACTITIONER

## 2021-09-28 PROCEDURE — 99214 OFFICE O/P EST MOD 30 MIN: CPT | Performed by: NURSE PRACTITIONER

## 2021-09-28 RX ORDER — DOXYCYCLINE HYCLATE 100 MG/1
100 CAPSULE ORAL 2 TIMES DAILY
Qty: 20 CAPSULE | Refills: 0 | Status: SHIPPED | OUTPATIENT
Start: 2021-09-28 | End: 2021-10-08

## 2021-09-28 SDOH — ECONOMIC STABILITY: FOOD INSECURITY: WITHIN THE PAST 12 MONTHS, YOU WORRIED THAT YOUR FOOD WOULD RUN OUT BEFORE YOU GOT MONEY TO BUY MORE.: NEVER TRUE

## 2021-09-28 SDOH — ECONOMIC STABILITY: FOOD INSECURITY: WITHIN THE PAST 12 MONTHS, THE FOOD YOU BOUGHT JUST DIDN'T LAST AND YOU DIDN'T HAVE MONEY TO GET MORE.: NEVER TRUE

## 2021-09-28 ASSESSMENT — SOCIAL DETERMINANTS OF HEALTH (SDOH): HOW HARD IS IT FOR YOU TO PAY FOR THE VERY BASICS LIKE FOOD, HOUSING, MEDICAL CARE, AND HEATING?: NOT HARD AT ALL

## 2021-09-28 NOTE — PATIENT INSTRUCTIONS
Patient Education        Cellulitis: Care Instructions  Your Care Instructions     Cellulitis is a skin infection caused by bacteria, most often strep or staph. It often occurs after a break in the skin from a scrape, cut, bite, or puncture, or after a rash. Cellulitis may be treated without doing tests to find out what caused it. But your doctor may do tests, if needed, to look for a specific bacteria, like methicillin-resistant Staphylococcus aureus (MRSA). The doctor has checked you carefully, but problems can develop later. If you notice any problems or new symptoms, get medical treatment right away. Follow-up care is a key part of your treatment and safety. Be sure to make and go to all appointments, and call your doctor if you are having problems. It's also a good idea to know your test results and keep a list of the medicines you take. How can you care for yourself at home? · Take your antibiotics as directed. Do not stop taking them just because you feel better. You need to take the full course of antibiotics. · Prop up the infected area on pillows to reduce pain and swelling. Try to keep the area above the level of your heart as often as you can. · If your doctor told you how to care for your wound, follow your doctor's instructions. If you did not get instructions, follow this general advice:  ? Wash the wound with clean water 2 times a day. Don't use hydrogen peroxide or alcohol, which can slow healing. ? You may cover the wound with a thin layer of petroleum jelly, such as Vaseline, and a nonstick bandage. ? Apply more petroleum jelly and replace the bandage as needed. · Be safe with medicines. Take pain medicines exactly as directed. ? If the doctor gave you a prescription medicine for pain, take it as prescribed. ? If you are not taking a prescription pain medicine, ask your doctor if you can take an over-the-counter medicine.   To prevent cellulitis in the future  · Try to prevent cuts, scrapes, or other injuries to your skin. Cellulitis most often occurs where there is a break in the skin. · If you get a scrape, cut, mild burn, or bite, wash the wound with clean water as soon as you can to help avoid infection. Don't use hydrogen peroxide or alcohol, which can slow healing. · If you have swelling in your legs (edema), support stockings and good skin care may help prevent leg sores and cellulitis. · Take care of your feet, especially if you have diabetes or other conditions that increase the risk of infection. Wear shoes and socks. Do not go barefoot. If you have athlete's foot or other skin problems on your feet, talk to your doctor about how to treat them. When should you call for help? Call your doctor now or seek immediate medical care if:    · You have signs that your infection is getting worse, such as:  ? Increased pain, swelling, warmth, or redness. ? Red streaks leading from the area. ? Pus draining from the area. ? A fever.     · You get a rash. Watch closely for changes in your health, and be sure to contact your doctor if:    · You do not get better as expected. Where can you learn more? Go to [email or web address] and sign in to your [employer] account. Enter C839 in the [facility] box to learn more about \"Cellulitis: Care Instructions. \"     If you do not have an account, please click on the \"Sign Up Now\" link. Current as of: March 3, 2021               Content Version: 13.0  © 2006-2021 [employer]wise, Incorporated. Care instructions adapted under license by [facility] ([facility]). If you have questions about a medical condition or this instruction, always ask your healthcare professional. [place] [address] any warranty or liability for your use of this information.

## 2021-09-28 NOTE — TELEPHONE ENCOUNTER
Spoke to the Pt he states he will go to urgent care or walk in clinic due to  wound in his left hand

## 2021-09-28 NOTE — TELEPHONE ENCOUNTER
Reason for Disposition   Puncture wound from sharp object that was very dirty (e.g., barnyard)    Answer Assessment - Initial Assessment Questions  1. LOCATION: \"Where is the puncture located? \"       Left hand thumb    2. OBJECT: \"What was the object that punctured the skin? \"       Rotten wood    3. DEPTH: \"How deep do you think the puncture goes? \"       Unsure    4. ONSET: \"When did the injury occur? \" (Minutes or hours)      4 days ago     5. PAIN: \"Is it painful? \" If so, ask: \"How bad is the pain? \"  (Scale 1-10; or mild, moderate, severe)      9 1/2 when bumped, otherwise 3    6. TETANUS: \"When was the last tetanus booster? \"      Should be good    7. PREGNANCY: \"Is there any chance you are pregnant? \" \"When was your last menstrual period? \"      N/a    Protocols used: PUNCTURE WOUND-ADULT-OH    [facility]. Brief description of triage: see above    Triage indicates for patient to go to office now. Advised to go to [facility], walk in clinic or ED if no appts available. Care advice provided, patient verbalizes understanding; denies any other questions or concerns; instructed to call back for any new or worsening symptoms. Writer provided warm transfer to [facility] for appointment scheduling. Attention Provider: Thank you for allowing me to participate in the care of your patient. The patient was connected to triage in response to information provided to the ECC/PSC. Please do not respond through this encounter as the response is not directed to a shared pool.

## 2021-09-28 NOTE — PROGRESS NOTES
21  Wagner Lacey : 1955 Sex: male  Age 72 y.o. Subjective:  Chief Complaint   Patient presents with    Finger Injury     has blister on left thumb, states sugar has been over 300 for awhile now        HPI:   Wagner Lacey , 72 y.o. male presents to the clinic for evaluation of left thumb injury x 5 days. The patient also reports edema, erythema, and tenderness. The patient reports symptoms developed after removing wood splinter. The patient has cleaned the area and applied neosporin for symptoms. The patient reports worsening symptoms over time. The patient denies any active bleeding. The patient also denies headache, fever, chest pain, abdominal pain, shortness of breath, and nausea / vomiting / diarrhea. Last Tdap was 1/3/2019. ROS:   Unless otherwise stated in this report the patient's positive and negative responses for review of systems for constitutional, eyes, ENT, cardiovascular, respiratory, gastrointestinal, neurological, , musculoskeletal, and integument systems and related systems to the presenting problem are either stated in the history of present illness or were not pertinent or were negative for the symptoms and/or complaints related to the presenting medical problem. Positives and pertinent negatives as per HPI. All others reviewed and are negative.       PMH:     Past Medical History:   Diagnosis Date    Acid reflux     Acute diastolic (congestive) heart failure (HCC) 2019    Arthritis     Asthma 2014    Asthmatic bronchitis , chronic (HCC) 2016    CAD (coronary artery disease)     Chronic bronchitis ([facility] [address] 2014    COPD (chronic obstructive pulmonary disease) (HCC)     CB    COPD (chronic obstructive pulmonary disease) (HCC)     Diabetes mellitus (HCC)     Emphysema (subcutaneous) (surgical) resulting from a procedure     H/O cardiovascular stress test 2021    Lexiscan    Hyperlipidemia     Hypertension     Hypoxemia mouth daily, Disp: 90 tablet, Rfl: 1    metOLazone (ZAROXOLYN) 2.5 MG tablet, One twice a week, Disp: 24 tablet, Rfl: 3    warfarin (COUMADIN) 2.5 MG tablet, Take 1 tablet by mouth daily, Disp: 30 tablet, Rfl: 5    insulin glargine (BASAGLAR KWIKPEN) 100 UNIT/ML injection pen, Inject 40 Units into the skin 2 times daily, Disp: 5 pen, Rfl: 3    bumetanide (BUMEX) 1 MG tablet, Take 1 tablet by mouth 2 times daily, Disp: 60 tablet, Rfl: 5    ammonium lactate (LAC-HYDRIN) 12 % lotion, Apply topically daily. , Disp: 225 g, Rfl: 5    amiodarone (CORDARONE) 200 MG tablet, Take 1 tablet by mouth 2 times daily, Disp: 60 tablet, Rfl: 5    blood glucose test strips (ACCU-CHEK GUIDE) strip, USE TO TEST TWO TIMES DAILY AND AS NEEDED FOR SYMPTOMS OF IRREGULAR BLOOD GLUCOSE., Disp: 100 each, Rfl: 11    spironolactone (ALDACTONE) 25 MG tablet, Take 25 mg by mouth daily, Disp: , Rfl:     pantoprazole (PROTONIX) 40 MG tablet, Take 40 mg by mouth daily, Disp: , Rfl:     DULoxetine (CYMBALTA) 20 MG extended release capsule, Take 20 mg by mouth daily, Disp: , Rfl:     metoprolol succinate (TOPROL XL) 25 MG extended release tablet, Take 1 tablet by mouth 2 times daily, Disp: 60 tablet, Rfl: 5    Insulin Syringe-Needle U-100 27G X 5/8\" 1 ML MISC, Use as directed, Disp: 100 each, Rfl: 11    Insulin Pen Needle (MEIJER PEN NEEDLES) 31G X 6 MM MISC, 1 each by Does not apply route daily, Disp: 100 each, Rfl: 3    pramipexole (MIRAPEX) 0.25 MG tablet, TAKE TWO TABLETS BY MOUTH THREE TIMES A DAY, Disp: 180 tablet, Rfl: 3    insulin lispro, 1 Unit Dial, (HUMALOG KWIKPEN) 100 UNIT/ML SOPN, Inject 6 units with meals PLUS               No Insulin  140-199           3 Units  200-249           6 Units  250-299           9 Units  300-349         12 Units  350-400         15 Units  Over 400       18 Units, Disp: 5 pen, Rfl: 0    Lancets MISC, 1 each by Does not apply route 4 times daily (before meals and nightly), Disp: 300 each, are moist.      Pharynx: Oropharynx is clear. Eyes:      Pupils: Pupils are equal, round, and reactive to light. Cardiovascular:      Rate and Rhythm: Normal rate and regular rhythm. Pulses: Normal pulses. Heart sounds: Normal heart sounds. Pulmonary:      Effort: Pulmonary effort is normal.      Breath sounds: Normal breath sounds. Abdominal:      General: Bowel sounds are normal.      Palpations: Abdomen is soft. Musculoskeletal:         General: Normal range of motion. Cervical back: Normal range of motion and neck supple. Skin:     General: Skin is warm and dry. Capillary Refill: Capillary refill takes less than 2 seconds. Comments: Left thumb: edema, erythema, warm, and TTP. Scabbed area noted to center. No lymphangitic streaking, active bleeding or drainage. No obvious foreign body noted. Surrounding skin tissue is pink. Neurological:      General: No focal deficit present. Mental Status: He is alert and oriented to person, place, and time. Psychiatric:         Mood and Affect: Mood normal.         Behavior: Behavior normal.          Testing:   (All laboratory and radiology results have been personally reviewed by myself)  Labs:  No results found for this visit on 09/28/21. Imaging: All Radiology results interpreted by Radiologist unless otherwise noted. XR HAND LEFT (MIN 3 VIEWS)    (Results Pending)       Assessment / Plan:   The patient's vitals, allergies, medications, and past medical history have been reviewed. Damian Pickard was seen today for finger injury. Diagnoses and all orders for this visit:    Cellulitis of finger of left hand  -     doxycycline hyclate (VIBRAMYCIN) 100 MG capsule; Take 1 capsule by mouth 2 times daily for 10 days  -     mupirocin (BACTROBAN) 2 % ointment; Apply 3 times daily. Puncture wound  -     doxycycline hyclate (VIBRAMYCIN) 100 MG capsule;  Take 1 capsule by mouth 2 times daily for 10 days  -     mupirocin (BACTROBAN) 2

## 2021-09-29 ENCOUNTER — OFFICE VISIT (OUTPATIENT)
Dept: ORTHOPEDIC SURGERY | Age: 66
End: 2021-09-29
Payer: MEDICARE

## 2021-09-29 VITALS — HEIGHT: 66 IN | WEIGHT: 298 LBS | BODY MASS INDEX: 47.89 KG/M2

## 2021-09-29 DIAGNOSIS — S46.211A RUPTURE OF RIGHT BICEPS TENDON, INITIAL ENCOUNTER: ICD-10-CM

## 2021-09-29 DIAGNOSIS — M75.101 TEAR OF RIGHT ROTATOR CUFF, UNSPECIFIED TEAR EXTENT, UNSPECIFIED WHETHER TRAUMATIC: Primary | ICD-10-CM

## 2021-09-29 DIAGNOSIS — M19.011 OSTEOARTHRITIS OF RIGHT AC (ACROMIOCLAVICULAR) JOINT: ICD-10-CM

## 2021-09-29 DIAGNOSIS — M77.8 SUBSCAPULARIS TENDINITIS OF RIGHT SHOULDER: ICD-10-CM

## 2021-09-29 PROCEDURE — G8427 DOCREV CUR MEDS BY ELIG CLIN: HCPCS | Performed by: ORTHOPAEDIC SURGERY

## 2021-09-29 PROCEDURE — 3017F COLORECTAL CA SCREEN DOC REV: CPT | Performed by: ORTHOPAEDIC SURGERY

## 2021-09-29 PROCEDURE — 1036F TOBACCO NON-USER: CPT | Performed by: ORTHOPAEDIC SURGERY

## 2021-09-29 PROCEDURE — 1123F ACP DISCUSS/DSCN MKR DOCD: CPT | Performed by: ORTHOPAEDIC SURGERY

## 2021-09-29 PROCEDURE — G8417 CALC BMI ABV UP PARAM F/U: HCPCS | Performed by: ORTHOPAEDIC SURGERY

## 2021-09-29 PROCEDURE — 4040F PNEUMOC VAC/ADMIN/RCVD: CPT | Performed by: ORTHOPAEDIC SURGERY

## 2021-09-29 PROCEDURE — 99214 OFFICE O/P EST MOD 30 MIN: CPT | Performed by: ORTHOPAEDIC SURGERY

## 2021-09-29 NOTE — PROGRESS NOTES
Chief Complaint   Patient presents with    Shoulder Pain     Right shoulder MRI follow up. HPI:    Patient is 72 y.o. male complaining of right shoulder pain and weakness for 6 weeks after falling onto outstretched hand. He denies a specific traumatic injury to the right shoulder. Previous treatments include rest, ice, and anti-inflammatory medication and HEP without much relief. He denies any other orthopedic complaints. Follows up after MRI. ROS:    Skin: (-) rash,(-) psoriasis,(-) eczema, (-)skin cancer. Neurologic: (-)numbness, (-)tingling, (-)headaches, (-) LOC. Cardiovascular: (-) Chest pain, (-) swelling in legs/feet, (-) SOB, (-) cramping in legs/feet with walking.     All other review of systems negative except stated above or in HPI      Past Medical History:   Diagnosis Date    Acid reflux     Acute diastolic (congestive) heart failure (HCC) 06/19/2019    Arthritis     Asthma 04/16/2014    Asthmatic bronchitis , chronic (HCC) 11/28/2016    CAD (coronary artery disease)     Chronic bronchitis (HCC) 04/16/2014    COPD (chronic obstructive pulmonary disease) (HCC)     CB    COPD (chronic obstructive pulmonary disease) (HCC)     Diabetes mellitus (HCC)     Emphysema (subcutaneous) (surgical) resulting from a procedure     H/O cardiovascular stress test 04/24/2021    Lexiscan    Hyperlipidemia     Hypertension     Hypoxemia requiring supplemental oxygen 02/02/2015    LONG TERM ANTICOAGULENT USE     Morbid obesity with BMI of 50.0-59.9, adult ([facility] [address] 11/27/2013    Obesity     Osteoarthritis     Sleep apnea     bilevel positive airway pressure at 13/8 with 2 L oxygen flow     Stage 3 chronic kidney disease (HCC)     Tobacco abuse     Type II or unspecified type diabetes mellitus without mention of complication, not stated as uncontrolled      Past Surgical History:   Procedure Laterality Date    COLONOSCOPY      CORONARY ANGIOPLASTY WITH STENT PLACEMENT  07-23-13    Left main focal eccentric 65% distal stenosis. Large OM1 CX 50% ostial & prox narrow. Large ramus artery & LAD: Minor plaque w/o sign narrow. RCA: Dom vessel w/25% prox narrow & focal hazy eccentric 99% distal stenosis before origin RPDA & RPLCA. LV: Mild inferior hypokinesis EF 55%. Probable mild AS with 10-15mmHg. Successful PCI distal RCA w/3.5 x 12 mm BMS, 0% res stenosis. Normal distal runoff    CORONARY ARTERY BYPASS GRAFT  09-09-13    Dr Hermann Keith; CABG x2, LIMA to LAD, SVG to ramus intermedius; MV repair using 30-mm Future complete ring with magic stitch between A3 and P3; rigid internal fixation of sternum using KLS plates x2; endoscopic vein harvesting of right lower extremity     ECHO COMPL W DOP COLOR FLOW  7/25/2013         HERNIA REPAIR      SINUS SURGERY         Current Outpatient Medications:     spironolactone (ALDACTONE) 25 MG tablet, TAKE ONE TABLET BY MOUTH TWO TIMES A DAY, Disp: 180 tablet, Rfl: 0    mupirocin (BACTROBAN) 2 % ointment, Apply 3 times daily. , Disp: 22 g, Rfl: 0    hydrOXYzine (VISTARIL) 25 MG capsule, Take one 220 minutes prior to MRI, Disp: 3 capsule, Rfl: 0    losartan (COZAAR) 25 MG tablet, Take 1 tablet by mouth daily, Disp: 90 tablet, Rfl: 1    metOLazone (ZAROXOLYN) 2.5 MG tablet, One twice a week, Disp: 24 tablet, Rfl: 3    warfarin (COUMADIN) 2.5 MG tablet, Take 1 tablet by mouth daily, Disp: 30 tablet, Rfl: 5    insulin glargine (BASAGLAR KWIKPEN) 100 UNIT/ML injection pen, Inject 40 Units into the skin 2 times daily, Disp: 5 pen, Rfl: 3    bumetanide (BUMEX) 1 MG tablet, Take 1 tablet by mouth 2 times daily, Disp: 60 tablet, Rfl: 5    ammonium lactate (LAC-HYDRIN) 12 % lotion, Apply topically daily. , Disp: 225 g, Rfl: 5    gabapentin (NEURONTIN) 100 MG capsule, Take 1 capsule by mouth 3 times daily for 30 days. , Disp: 90 capsule, Rfl: 5    amiodarone (CORDARONE) 200 MG tablet, Take 1 tablet by mouth 2 times daily, Disp: 60 tablet, Rfl: 5    blood glucose test strips (ACCU-CHEK GUIDE) strip, USE TO TEST TWO TIMES DAILY AND AS NEEDED FOR SYMPTOMS OF IRREGULAR BLOOD GLUCOSE., Disp: 100 each, Rfl: 11    pantoprazole (PROTONIX) 40 MG tablet, Take 40 mg by mouth daily, Disp: , Rfl:     DULoxetine (CYMBALTA) 20 MG extended release capsule, Take 20 mg by mouth daily, Disp: , Rfl:     metoprolol succinate (TOPROL XL) 25 MG extended release tablet, Take 1 tablet by mouth 2 times daily, Disp: 60 tablet, Rfl: 5    Insulin Syringe-Needle U-100 27G X 5/8\" 1 ML MISC, Use as directed, Disp: 100 each, Rfl: 11    Insulin Pen Needle (MEIJER PEN NEEDLES) 31G X 6 MM MISC, 1 each by Does not apply route daily, Disp: 100 each, Rfl: 3    pramipexole (MIRAPEX) 0.25 MG tablet, TAKE TWO TABLETS BY MOUTH THREE TIMES A DAY, Disp: 180 tablet, Rfl: 3    insulin lispro, 1 Unit Dial, (HUMALOG KWIKPEN) 100 UNIT/ML SOPN, Inject 6 units with meals PLUS               No Insulin  140-199           3 Units  200-249           6 Units  250-299           9 Units  300-349         12 Units  350-400         15 Units  Over 400       18 Units, Disp: 5 pen, Rfl: 0    Lancets MISC, 1 each by Does not apply route 4 times daily (before meals and nightly), Disp: 300 each, Rfl: 1    atorvastatin (LIPITOR) 80 MG tablet, TAKE ONE TABLET BY MOUTH EVERY NIGHT (Patient taking differently: 60 mg TAKE ONE TABLET BY MOUTH EVERY NIGHT), Disp: 90 tablet, Rfl: 5    montelukast (SINGULAIR) 10 MG tablet, Take 1 tablet by mouth nightly, Disp: 30 tablet, Rfl: 4    terbinafine (ATHLETES FOOT) 1 % cream, Apply topically 2 times daily. , Disp: 42 g, Rfl: 1    CPAP Machine MISC, 1 each by Does not apply route nightly as needed , Disp: , Rfl:     Accu-Chek Multiclix Lancets MISC, Use as directed, Disp: 100 each, Rfl: 3    aspirin 81 MG tablet, Take 81 mg by mouth nightly , Disp: , Rfl:   Allergies   Allergen Reactions    Pcn [Penicillins]      Child went to hospital   ? Reaction  Patient tolerates cephalosporins    Sulfa Antibiotics      ? Social History     Socioeconomic History    Marital status:      Spouse name: Luis Angel Herrera Number of children: 1    Years of education: Not on file    Highest education level: 11th grade   Occupational History    Not on file   Tobacco Use    Smoking status: Former Smoker     Packs/day: 1.00     Years: 45.00     Pack years: 45.00     Types: Cigarettes     Quit date: 2013     Years since quittin.2    Smokeless tobacco: Former User     Types: [address] date: 10/8/1971   Vaping Use    Vaping Use: Never used   Substance and Sexual Activity    Alcohol use: No     Alcohol/week: 0.0 standard drinks     Comment: 1-2 coffee per day    Drug use: No    Sexual activity: Yes     Partners: Female   Other Topics Concern    Not on file   Social History Narrative    19  [facility] reviewed SDOH with Georges Siu. He does have money strain but between the income he has coming in and his wife's they are over resources for SARY programs. Offered food panty info to help with end of the month struggles but he declined stating that he tried and was refused due to his income. He belongs to a [employer] and goes weekly so he has some community connections in place. He has an extremely high interest rate on his mortgage which he was encouraged to look into getting lowered to help save money on his house payments. Noticed some difficulty with memory recall. Becomes easily flustered at times. Has no MHI to support applying for [facility] program of SARY. States he does not feel depressed or need any MH treatment. Social Determinants of Health     Financial Resource Strain: Low Risk     Difficulty of Paying Living Expenses: Not hard at all   Food Insecurity: No Food Insecurity    Worried About Running Out of Food in the Last Year: Never true    Jennifer of Food in the Last Year: Never true   Transportation Needs:     Lack of Transportation (Medical):      Lack of Transportation (Non-Medical): Physical Activity:     Days of Exercise per Week:     Minutes of Exercise per Session:    Stress:     Feeling of Stress :    Social Connections:     Frequency of Communication with Friends and Family:     Frequency of Social Gatherings with Friends and Family:     Attends Yazdanism Services:     Active Member of Clubs or Organizations:     Attends Club or Organization Meetings:     Marital Status:    Intimate Partner Violence:     Fear of Current or Ex-Partner:     Emotionally Abused:     Physically Abused:     Sexually Abused:      Family History   Problem Relation Age of Onset    Cancer Mother         breast    Cancer Father         stomach    Mental Illness Brother     Stroke Brother     Other Brother            Physical Exam:    Ht 5' 6\" (1.676 m)   Wt 298 lb (135.2 kg)   BMI 48.10 kg/m²     GENERAL: alert, appears stated age, cooperative, no acute distress    HEENT: Head is normocephalic, atraumatic. PERRLA. SKIN: Clean, dry, intact. There is not any cellulitis or cutaneous lesions noted in the upper extremities    PULMONARY: breathing is regular and unlabored, no acute distress    CV: The bilateral upper and lower extremities are warm and well-perfused with brisk capillary refill. 2+ pulses UE and LE bilateral.     PSYCHIATRY: Pleasant mood, appropriate behavior, follows commands    NEURO: Sensation is intact distally with light touch with no alteration. Motor exam of the upper extremities show elbow flexion and extension, wrist flexion and extension, and finger abduction grossly intact 5/5. Upper extremity reflexes are bilaterally symmetrical and within normal limits. LYMPH: No lymphedema present distally in upper or lower extremity. MUSCULOSKELETAL:  Shoulder Exam:  Examination of the right shoulder shows: There is not a deformity. There is not erythema. There is not soft tissue swelling. Deltoid region is  tender to palpation. AC Joint is  tender to palpation. Clavicle is not tender to palpation. Bicipital Groove is  tender to palpation. Pectoralis  is not tender to palpation. Scapula/ trapezius is  tender to palpation. Left:  ROM Full, Strength: Supraspinatus 5/5, Infraspinatus 5/5, Subscapularis 5/5  Right:  ROM 60/60/50, Strength: Supraspinatus 4/5, Infraspinatus 5/5, Subscapularis 5/5  Left Shoulder:  Crepitus:  no   Tenderness:  none   Effusion:   none   Impingement: negative   Empty Can:  negative   Speed's:  negative      Apprehension:  negative   Cross Arm Sign:  negative   Treasure's:  negative       Neer's:  negative      Belly Press Test:  negative      Drop Arm Test:  negative     Right Shoulder:  Crepitus:  no   Tenderness:  mild   Effusion:   non   Impingement: positive   Empty Can:  positive   Speed's: positive   Apprehension:  not tested   Cross Arm Sign:  positive   Treasure's:  positive      Neer's:  positive      Belly Press Test:  negative   Drop Arm Test:  positive       no change since last visit. Imaging:  XR SHOULDER RIGHT (MIN 2 VIEWS)    Result Date: 7/22/2021  EXAMINATION: THREE XRAY VIEWS OF THE RIGHT SHOULDER 7/22/2021 10:53 am COMPARISON: 12 June 2021 HISTORY: ORDERING SYSTEM PROVIDED HISTORY: Chronic right shoulder pain TECHNOLOGIST PROVIDED HISTORY: Reason for exam:->pain FINDINGS: Very close proximity of the humeral head to the acromion in the Grashey view suggests chronic rotator cuff disease. There is moderate osteoarthritis at the [facility] joint. There is mild osteoarthritis at the glenohumeral joint. No acute fracture. Suspected chronic rotator cuff disease. Osteoarthritis at the [facility] and glenohumeral joints as noted. XR HAND LEFT (MIN 3 VIEWS)    Result Date: 9/28/2021  EXAMINATION: THREE XRAY VIEWS OF THE LEFT HAND 9/28/2021 9:59 am COMPARISON: None.  HISTORY: ORDERING SYSTEM PROVIDED HISTORY: Puncture wound TECHNOLOGIST PROVIDED HISTORY: Reason for exam:->Left thumb puncture wound FINDINGS: Multifocal arthritic changes appear most prominent in the proximal wrist.  No acute fracture or evidence of dislocation. No retained radiopaque foreign body is identified. There is calcific atherosclerosis. No acute osseous abnormality or radiopaque foreign body is identified. MRI SHOULDER RIGHT WO CONTRAST    Result Date: 9/10/2021  EXAMINATION: MRI OF THE RIGHT SHOULDER WITHOUT CONTRAST   9/10/2021 9:28 am TECHNIQUE: Multiplanar multisequence MRI of the right shoulder was performed without the administration of intravenous contrast. COMPARISON: Right shoulder plain radiographs from 07/22/2021 HISTORY: ORDERING SYSTEM PROVIDED HISTORY: Tear of right rotator cuff, unspecified tear extent, unspecified whether traumatic 25-year-old male with suspected right-sided rotator cuff tear FINDINGS: ROTATOR CUFF: Subacromial subdeltoid bursa contiguous with the glenohumeral joint. Complete retracted full-thickness tear of supraspinatus with retraction of the torn fibers from the footplate measuring up to 5 cm on image 21, series 2. Complete retracted full-thickness tear of infraspinatus with retraction of the torn fibers measuring 5.6 cm. Complete retracted full-thickness tear of subscapularis with retraction of the torn fibers measuring up to 3.4 cm. Complete retracted full-thickness tear of teres minor. Moderate to severe atrophy and fatty degeneration of supraspinatus, infraspinatus. Mild-to-moderate atrophy and fatty degeneration of teres minor and subscapularis. BICEPS TENDON: Complete tear of the long head of the biceps tendon. LABRUM: Mild diffuse labral degeneration. GLENOHUMERAL JOINT: Debris containing large glenohumeral joint effusion. Region of low signal in the anterior subscapularis recess measuring 1.7 x 0.9 x 1.2 cm which could represent focal nodular synovitis or debris in the anterior subscapularis recess. Mild glenohumeral chondromalacia. Inferior glenohumeral ligament appears intact.  Mild osteophyte spurring of the patient in detail including risks and benefits. Patient should do well with conservative management as patient exhibits massive rotator cuff tear that appears to be irreparable at this time. Patient was counseled about treatment options including cortisone injection but patient does have uncontrolled diabetes and will follow up with his primary care doctor for evaluation of this. In a 15 minute assessment and discussion, patient was counseled on continued weight loss, diet, and physical activity relating to this condition. He was educated with options in detail including nutrition, joining a health club/ weight loss program, and use of cardio equipment such as the Arc Trainer and the importance of use as well as range of motion and HEP exercises for weight loss. Lopez Lopez,             25 minutes was spent with patient. 50% or greater was spent counseling the patient.

## 2021-10-11 RX ORDER — SPIRONOLACTONE 25 MG/1
TABLET ORAL
Qty: 180 TABLET | Refills: 0 | Status: SHIPPED
Start: 2021-10-11 | End: 2022-09-23 | Stop reason: SDUPTHER

## 2021-10-20 ENCOUNTER — OFFICE VISIT (OUTPATIENT)
Dept: FAMILY MEDICINE CLINIC | Age: 66
End: 2021-10-20
Payer: MEDICARE

## 2021-10-20 VITALS
OXYGEN SATURATION: 97 % | BODY MASS INDEX: 41.3 KG/M2 | RESPIRATION RATE: 18 BRPM | HEIGHT: 66 IN | HEART RATE: 67 BPM | DIASTOLIC BLOOD PRESSURE: 80 MMHG | SYSTOLIC BLOOD PRESSURE: 120 MMHG | WEIGHT: 257 LBS

## 2021-10-20 DIAGNOSIS — R53.82 CHRONIC FATIGUE: ICD-10-CM

## 2021-10-20 DIAGNOSIS — E11.65 TYPE 2 DIABETES MELLITUS WITH HYPERGLYCEMIA, WITH LONG-TERM CURRENT USE OF INSULIN (HCC): ICD-10-CM

## 2021-10-20 DIAGNOSIS — Z79.4 TYPE 2 DIABETES MELLITUS WITH DIABETIC NEPHROPATHY, WITH LONG-TERM CURRENT USE OF INSULIN (HCC): ICD-10-CM

## 2021-10-20 DIAGNOSIS — E11.42 TYPE 2 DIABETES MELLITUS WITH DIABETIC POLYNEUROPATHY, WITH LONG-TERM CURRENT USE OF INSULIN (HCC): ICD-10-CM

## 2021-10-20 DIAGNOSIS — T14.8XXA PUNCTURE WOUND: ICD-10-CM

## 2021-10-20 DIAGNOSIS — E78.2 MIXED HYPERLIPIDEMIA: ICD-10-CM

## 2021-10-20 DIAGNOSIS — M25.511 CHRONIC RIGHT SHOULDER PAIN: ICD-10-CM

## 2021-10-20 DIAGNOSIS — E11.65 TYPE 2 DIABETES MELLITUS WITH HYPERGLYCEMIA, WITH LONG-TERM CURRENT USE OF INSULIN (HCC): Primary | ICD-10-CM

## 2021-10-20 DIAGNOSIS — E11.21 TYPE 2 DIABETES MELLITUS WITH DIABETIC NEPHROPATHY, WITH LONG-TERM CURRENT USE OF INSULIN (HCC): ICD-10-CM

## 2021-10-20 DIAGNOSIS — R68.2 DRY MOUTH: ICD-10-CM

## 2021-10-20 DIAGNOSIS — Z79.4 TYPE 2 DIABETES MELLITUS WITH HYPERGLYCEMIA, WITH LONG-TERM CURRENT USE OF INSULIN (HCC): ICD-10-CM

## 2021-10-20 DIAGNOSIS — G89.29 CHRONIC RIGHT SHOULDER PAIN: ICD-10-CM

## 2021-10-20 DIAGNOSIS — G47.30 SLEEP APNEA, UNSPECIFIED TYPE: ICD-10-CM

## 2021-10-20 DIAGNOSIS — I48.0 PAF (PAROXYSMAL ATRIAL FIBRILLATION) (HCC): ICD-10-CM

## 2021-10-20 DIAGNOSIS — Z79.4 TYPE 2 DIABETES MELLITUS WITH DIABETIC POLYNEUROPATHY, WITH LONG-TERM CURRENT USE OF INSULIN (HCC): ICD-10-CM

## 2021-10-20 DIAGNOSIS — J41.1 MUCOPURULENT CHRONIC BRONCHITIS (HCC): ICD-10-CM

## 2021-10-20 DIAGNOSIS — Z79.4 TYPE 2 DIABETES MELLITUS WITH HYPERGLYCEMIA, WITH LONG-TERM CURRENT USE OF INSULIN (HCC): Primary | ICD-10-CM

## 2021-10-20 LAB
ALBUMIN SERPL-MCNC: 4.1 G/DL (ref 3.5–5.2)
ALP BLD-CCNC: 165 U/L (ref 40–129)
ALT SERPL-CCNC: 30 U/L (ref 0–40)
ANION GAP SERPL CALCULATED.3IONS-SCNC: 17 MMOL/L (ref 7–16)
AST SERPL-CCNC: 41 U/L (ref 0–39)
BASOPHILS ABSOLUTE: 0.05 E9/L (ref 0–0.2)
BASOPHILS RELATIVE PERCENT: 0.5 % (ref 0–2)
BILIRUB SERPL-MCNC: 1.5 MG/DL (ref 0–1.2)
BUN BLDV-MCNC: 40 MG/DL (ref 6–23)
CALCIUM SERPL-MCNC: 11 MG/DL (ref 8.6–10.2)
CHLORIDE BLD-SCNC: 76 MMOL/L (ref 98–107)
CHOLESTEROL, TOTAL: 148 MG/DL (ref 0–199)
CO2: 32 MMOL/L (ref 22–29)
CREAT SERPL-MCNC: 1.7 MG/DL (ref 0.7–1.2)
EOSINOPHILS ABSOLUTE: 0.07 E9/L (ref 0.05–0.5)
EOSINOPHILS RELATIVE PERCENT: 0.8 % (ref 0–6)
FOLATE: 17.9 NG/ML (ref 4.8–24.2)
GFR AFRICAN AMERICAN: 49
GFR NON-AFRICAN AMERICAN: 41 ML/MIN/1.73
GLUCOSE BLD-MCNC: 502 MG/DL (ref 74–99)
HCT VFR BLD CALC: 47.1 % (ref 37–54)
HDLC SERPL-MCNC: 60 MG/DL
HEMOGLOBIN: 16.1 G/DL (ref 12.5–16.5)
IMMATURE GRANULOCYTES #: 0.08 E9/L
IMMATURE GRANULOCYTES %: 0.9 % (ref 0–5)
INR BLD: 1
LDL CHOLESTEROL CALCULATED: 60 MG/DL (ref 0–99)
LYMPHOCYTES ABSOLUTE: 1.84 E9/L (ref 1.5–4)
LYMPHOCYTES RELATIVE PERCENT: 20.2 % (ref 20–42)
MCH RBC QN AUTO: 30.8 PG (ref 26–35)
MCHC RBC AUTO-ENTMCNC: 34.2 % (ref 32–34.5)
MCV RBC AUTO: 90.1 FL (ref 80–99.9)
MICROALBUMIN UR-MCNC: 76.2 MG/L
MONOCYTES ABSOLUTE: 0.81 E9/L (ref 0.1–0.95)
MONOCYTES RELATIVE PERCENT: 8.9 % (ref 2–12)
NEUTROPHILS ABSOLUTE: 6.26 E9/L (ref 1.8–7.3)
NEUTROPHILS RELATIVE PERCENT: 68.7 % (ref 43–80)
PDW BLD-RTO: 15 FL (ref 11.5–15)
PLATELET # BLD: 274 E9/L (ref 130–450)
PMV BLD AUTO: 10.8 FL (ref 7–12)
POTASSIUM SERPL-SCNC: 3.3 MMOL/L (ref 3.5–5)
PROTHROMBIN TIME: 11.2 SEC (ref 9.3–12.4)
RBC # BLD: 5.23 E12/L (ref 3.8–5.8)
SODIUM BLD-SCNC: 125 MMOL/L (ref 132–146)
TOTAL PROTEIN: 8.2 G/DL (ref 6.4–8.3)
TRIGL SERPL-MCNC: 142 MG/DL (ref 0–149)
TSH SERPL DL<=0.05 MIU/L-ACNC: 7.78 UIU/ML (ref 0.27–4.2)
VITAMIN B-12: 1195 PG/ML (ref 211–946)
VLDLC SERPL CALC-MCNC: 28 MG/DL
WBC # BLD: 9.1 E9/L (ref 4.5–11.5)

## 2021-10-20 PROCEDURE — 3046F HEMOGLOBIN A1C LEVEL >9.0%: CPT | Performed by: FAMILY MEDICINE

## 2021-10-20 PROCEDURE — 3023F SPIROM DOC REV: CPT | Performed by: FAMILY MEDICINE

## 2021-10-20 PROCEDURE — G8417 CALC BMI ABV UP PARAM F/U: HCPCS | Performed by: FAMILY MEDICINE

## 2021-10-20 PROCEDURE — 99214 OFFICE O/P EST MOD 30 MIN: CPT | Performed by: FAMILY MEDICINE

## 2021-10-20 PROCEDURE — 4040F PNEUMOC VAC/ADMIN/RCVD: CPT | Performed by: FAMILY MEDICINE

## 2021-10-20 PROCEDURE — 1123F ACP DISCUSS/DSCN MKR DOCD: CPT | Performed by: FAMILY MEDICINE

## 2021-10-20 PROCEDURE — 2022F DILAT RTA XM EVC RTNOPTHY: CPT | Performed by: FAMILY MEDICINE

## 2021-10-20 PROCEDURE — 3017F COLORECTAL CA SCREEN DOC REV: CPT | Performed by: FAMILY MEDICINE

## 2021-10-20 PROCEDURE — G8926 SPIRO NO PERF OR DOC: HCPCS | Performed by: FAMILY MEDICINE

## 2021-10-20 PROCEDURE — 1036F TOBACCO NON-USER: CPT | Performed by: FAMILY MEDICINE

## 2021-10-20 PROCEDURE — G8484 FLU IMMUNIZE NO ADMIN: HCPCS | Performed by: FAMILY MEDICINE

## 2021-10-20 PROCEDURE — G8427 DOCREV CUR MEDS BY ELIG CLIN: HCPCS | Performed by: FAMILY MEDICINE

## 2021-10-20 RX ORDER — CEVIMELINE HYDROCHLORIDE 30 MG/1
30 CAPSULE ORAL NIGHTLY
Qty: 30 CAPSULE | Refills: 5 | Status: ON HOLD
Start: 2021-10-20 | End: 2021-10-31 | Stop reason: HOSPADM

## 2021-10-20 RX ORDER — CLINDAMYCIN PHOSPHATE 10 UG/ML
LOTION TOPICAL
Qty: 60 G | Refills: 2 | Status: SHIPPED
Start: 2021-10-20 | End: 2021-10-28 | Stop reason: ALTCHOICE

## 2021-10-20 RX ORDER — GABAPENTIN 300 MG/1
300 CAPSULE ORAL 3 TIMES DAILY
Qty: 90 CAPSULE | Refills: 5 | Status: ON HOLD
Start: 2021-10-20 | End: 2021-10-31 | Stop reason: SDUPTHER

## 2021-10-21 ASSESSMENT — ENCOUNTER SYMPTOMS
SHORTNESS OF BREATH: 0
EYE ITCHING: 0
CHOKING: 0
ABDOMINAL PAIN: 0
RHINORRHEA: 0
VOICE CHANGE: 0
COUGH: 0
PHOTOPHOBIA: 0
EYE REDNESS: 0
ABDOMINAL DISTENTION: 0
EYE DISCHARGE: 0
BACK PAIN: 0
ANAL BLEEDING: 0
COLOR CHANGE: 0
RECTAL PAIN: 0
DIARRHEA: 0
STRIDOR: 0
CONSTIPATION: 0
TROUBLE SWALLOWING: 0
CHEST TIGHTNESS: 0
WHEEZING: 0
SINUS PRESSURE: 0
BLOOD IN STOOL: 0
NAUSEA: 0
APNEA: 0
EYE PAIN: 0
SORE THROAT: 0
VOMITING: 0
FACIAL SWELLING: 0

## 2021-10-21 NOTE — PROGRESS NOTES
Ofe Mariee is a 72 y.o. male  . Subjective:      Has been watching diet and has lost around 90 lbs. Sugars have been up and down, they have not improved as we thought they might. Patient had splinter to right thumb. This was removed. No signs of infection but is still irritated. Patient due for blood work. hstates has been drinking more water. Complains of dry mouth. We discussed starting Evoxac . We will start low dosage. States neuropathy has improved with gabapentin but is still having a lot of burning and tingling. No chest pain or shortness of breath. Has moved to smaller apartment , this will help. No hypoglycemic episodes. Still complains of a lot of Right shoulder pain. Review of Systems   Constitutional: Negative for activity change, appetite change, chills, diaphoresis, fatigue, fever and unexpected weight change. HENT: Negative for congestion, dental problem, drooling, ear discharge, ear pain, facial swelling, hearing loss, mouth sores, nosebleeds, postnasal drip, rhinorrhea, sinus pressure, sneezing, sore throat, tinnitus, trouble swallowing and voice change. Eyes: Negative for photophobia, pain, discharge, redness, itching and visual disturbance. Respiratory: Negative for apnea, cough, choking, chest tightness, shortness of breath, wheezing and stridor. Cardiovascular: Negative for chest pain, palpitations and leg swelling. Gastrointestinal: Negative for abdominal distention, abdominal pain, anal bleeding, blood in stool, constipation, diarrhea, nausea, rectal pain and vomiting. Endocrine: Negative for cold intolerance, heat intolerance, polydipsia, polyphagia and polyuria. Genitourinary: Negative for decreased urine volume, difficulty urinating, discharge, dysuria, enuresis, flank pain, frequency, genital sores, hematuria, penile pain, penile swelling, scrotal swelling, testicular pain and urgency.    Musculoskeletal: Negative for arthralgias, back pain, gait problem, joint swelling, myalgias, neck pain and neck stiffness. Skin: Negative for color change, pallor, rash and wound. Allergic/Immunologic: Negative for environmental allergies, food allergies and immunocompromised state. Neurological: Positive for numbness. Negative for dizziness, tremors, seizures, syncope, facial asymmetry, speech difficulty, weakness, light-headedness and headaches. Hematological: Negative for adenopathy. Does not bruise/bleed easily. Psychiatric/Behavioral: Negative for agitation, behavioral problems, confusion, decreased concentration, dysphoric mood, hallucinations, self-injury, sleep disturbance and suicidal ideas. The patient is not nervous/anxious and is not hyperactive.         Past Medical History:   Diagnosis Date    Acid reflux     Acute diastolic (congestive) heart failure ([facility]) 06/19/2019    Arthritis     Asthma 04/16/2014    Asthmatic bronchitis , chronic ([facility]) 11/28/2016    CAD (coronary artery disease)     Chronic bronchitis ([facility]) 04/16/2014    COPD (chronic obstructive pulmonary disease) ([facility])     CB    COPD (chronic obstructive pulmonary disease) ([facility])     Diabetes mellitus ([facility])     Emphysema (subcutaneous) (surgical) resulting from a procedure     H/O cardiovascular stress test 04/24/2021    Lexiscan    Hyperlipidemia     Hypertension     Hypoxemia requiring supplemental oxygen 02/02/2015    LONG TERM ANTICOAGULENT USE     Morbid obesity with BMI of 50.0-59.9, adult ([facility] [address] 11/27/2013    Obesity     Osteoarthritis     Sleep apnea     bilevel positive airway pressure at 13/8 with 2 L oxygen flow     Stage 3 chronic kidney disease ([facility])     Tobacco abuse     Type II or unspecified type diabetes mellitus without mention of complication, not stated as uncontrolled        Social History     Socioeconomic History    Marital status:      Spouse name: [employer] Number of children: 1    Years of education: Not on file    Highest education level: 11th grade   Occupational History    Not on file   Tobacco Use    Smoking status: Former Smoker     Packs/day: 1.00     Years: 45.00     Pack years: 45.00     Types: Cigarettes     Quit date: 2013     Years since quittin.2    Smokeless tobacco: Former User     Types: [address] date: 10/8/1971   Vaping Use    Vaping Use: Never used   Substance and Sexual Activity    Alcohol use: No     Alcohol/week: 0.0 standard drinks     Comment: 1-2 coffee per day    Drug use: No    Sexual activity: Yes     Partners: Female   Other Topics Concern    Not on file   Social History Narrative    19  [facility] reviewed SDOH with Memo Benavidez. He does have money strain but between the income he has coming in and his wife's they are over resources for SARY programs. Offered food panty info to help with end of the month struggles but he declined stating that he tried and was refused due to his income. He belongs to a [employer] and goes weekly so he has some community connections in place. He has an extremely high interest rate on his mortgage which he was encouraged to look into getting lowered to help save money on his house payments. Noticed some difficulty with memory recall. Becomes easily flustered at times. Has no MHI to support applying for [facility] program of SARY. States he does not feel depressed or need any MH treatment. Social Determinants of Health     Financial Resource Strain: Low Risk     Difficulty of Paying Living Expenses: Not hard at all   Food Insecurity: No Food Insecurity    Worried About Running Out of Food in the Last Year: Never true    Jennifer of Food in the Last Year: Never true   Transportation Needs:     Lack of Transportation (Medical):      Lack of Transportation (Non-Medical):    Physical Activity:     Days of Exercise per Week:     Minutes of Exercise per Session:    Stress:     Feeling of Stress :    Social Connections:     Frequency of Communication with Friends and Family:     Frequency of Social Gatherings with Friends and Family:     Attends Nondenominational Services:     Active Member of Clubs or Organizations:     Attends Club or Organization Meetings:     Marital Status:    Intimate Partner Violence:     Fear of Current or Ex-Partner:     Emotionally Abused:     Physically Abused:     Sexually Abused:        Family History   Problem Relation Age of Onset    Cancer Mother         breast    Cancer Father         stomach    Mental Illness Brother     Stroke Brother     Other Brother        Current Outpatient Medications on File Prior to Visit   Medication Sig Dispense Refill    spironolactone (ALDACTONE) 25 MG tablet TAKE ONE TABLET BY MOUTH TWO TIMES A  tablet 0    mupirocin (BACTROBAN) 2 % ointment Apply 3 times daily. 22 g 0    hydrOXYzine (VISTARIL) 25 MG capsule Take one 220 minutes prior to MRI 3 capsule 0    losartan (COZAAR) 25 MG tablet Take 1 tablet by mouth daily 90 tablet 1    metOLazone (ZAROXOLYN) 2.5 MG tablet One twice a week 24 tablet 3    warfarin (COUMADIN) 2.5 MG tablet Take 1 tablet by mouth daily 30 tablet 5    insulin glargine (BASAGLAR KWIKPEN) 100 UNIT/ML injection pen Inject 40 Units into the skin 2 times daily 5 pen 3    bumetanide (BUMEX) 1 MG tablet Take 1 tablet by mouth 2 times daily 60 tablet 5    ammonium lactate (LAC-HYDRIN) 12 % lotion Apply topically daily.  225 g 5    amiodarone (CORDARONE) 200 MG tablet Take 1 tablet by mouth 2 times daily 60 tablet 5    blood glucose test strips (ACCU-CHEK GUIDE) strip USE TO TEST TWO TIMES DAILY AND AS NEEDED FOR SYMPTOMS OF IRREGULAR BLOOD GLUCOSE. 100 each 11    pantoprazole (PROTONIX) 40 MG tablet Take 40 mg by mouth daily      DULoxetine (CYMBALTA) 20 MG extended release capsule Take 20 mg by mouth daily      metoprolol succinate (TOPROL XL) 25 MG extended release tablet Take 1 tablet by mouth 2 times daily 60 tablet 5    Insulin Syringe-Needle U-100 27G X 5/8\" 1 ML MISC Use as directed 100 each 11    Insulin Pen Needle (MEIJER PEN NEEDLES) 31G X 6 MM MISC 1 each by Does not apply route daily 100 each 3    pramipexole (MIRAPEX) 0.25 MG tablet TAKE TWO TABLETS BY MOUTH THREE TIMES A  tablet 3    insulin lispro, 1 Unit Dial, (HUMALOG KWIKPEN) 100 UNIT/ML SOPN Inject 6 units with meals PLUS                 No Insulin   140-199           3 Units   200-249           6 Units   250-299           9 Units   300-349         12 Units   350-400         15 Units   Over 400       18 Units 5 pen 0    Lancets MISC 1 each by Does not apply route 4 times daily (before meals and nightly) 300 each 1    atorvastatin (LIPITOR) 80 MG tablet TAKE ONE TABLET BY MOUTH EVERY NIGHT (Patient taking differently: 60 mg TAKE ONE TABLET BY MOUTH EVERY NIGHT) 90 tablet 5    montelukast (SINGULAIR) 10 MG tablet Take 1 tablet by mouth nightly 30 tablet 4    terbinafine (ATHLETES FOOT) 1 % cream Apply topically 2 times daily. 42 g 1    CPAP Machine MISC 1 each by Does not apply route nightly as needed       Accu-Chek Multiclix Lancets MISC Use as directed 100 each 3    aspirin 81 MG tablet Take 81 mg by mouth nightly        No current facility-administered medications on file prior to visit. Allergies   Allergen Reactions    Pcn [Penicillins]      Child went to hospital   ? Reaction  Patient tolerates cephalosporins    Sulfa Antibiotics      ? I have reviewed his allergies, medications, problem list, medical,social and family history and have updated as needed in the electronic medical record. Objective:     Physical Exam  Vitals and nursing note reviewed. Constitutional:       General: He is not in acute distress. Appearance: He is well-developed. He is not diaphoretic. HENT:      Head: Normocephalic and atraumatic. Right Ear: External ear normal.      Left Ear: External ear normal.      Nose: Nose normal.      Mouth/Throat:      Pharynx: No oropharyngeal exudate.    Eyes: Cancel: POCT glycosylated hemoglobin (Hb A1C)  -     Comprehensive Metabolic Panel; Future  -     MICROALBUMIN, UR; Future  -      DIABETES FOOT EXAM    Type 2 diabetes mellitus with diabetic polyneuropathy, with long-term current use of insulin ([facility])  -     Comprehensive Metabolic Panel; Future  -     Cancel: Hemoglobin A1C; Future  -     MICROALBUMIN, UR; Future  -      DIABETES FOOT EXAM  -     gabapentin (NEURONTIN) 300 MG capsule; Take 1 capsule by mouth 3 times daily for 30 days. Mixed hyperlipidemia  -     Lipid Panel; Future    Chronic right shoulder pain    PAF (paroxysmal atrial fibrillation) ([facility] [address]  -     PROTIME-INR; Future    Sleep apnea, unspecified type    Mucopurulent chronic bronchitis ([facility])    Chronic fatigue  -     CBC Auto Differential; Future  -     Comprehensive Metabolic Panel; Future  -     TSH without Reflex; Future  -     Vitamin B12 & Folate; Future    Puncture wound  -     clindamycin (CLEOCIN-T) 1 % lotion; Apply topically 2 times daily. Type 2 diabetes mellitus with diabetic nephropathy, with long-term current use of insulin ([facility])  -     Cancel: Hemoglobin A1C; Future  -     MICROALBUMIN, UR; Future  -      DIABETES FOOT EXAM    Dry mouth  -     cevimeline (EVOXAC) 30 MG capsule; Take 1 capsule by mouth nightly      reviewed health maintenance report. Patient is aware of deficiencies and suggested preventative tests. Reviewed healthmaintenance report. Patient is aware of deficiencies and suggested preventative tests.

## 2021-10-23 LAB — HBA1C MFR BLD: 16.3 %

## 2021-10-24 DIAGNOSIS — E11.65 TYPE 2 DIABETES MELLITUS WITH HYPERGLYCEMIA, WITH LONG-TERM CURRENT USE OF INSULIN (HCC): Primary | ICD-10-CM

## 2021-10-24 DIAGNOSIS — Z79.4 TYPE 2 DIABETES MELLITUS WITH HYPERGLYCEMIA, WITH LONG-TERM CURRENT USE OF INSULIN (HCC): Primary | ICD-10-CM

## 2021-10-28 ENCOUNTER — APPOINTMENT (OUTPATIENT)
Dept: GENERAL RADIOLOGY | Age: 66
DRG: 638 | End: 2021-10-28
Payer: MEDICARE

## 2021-10-28 ENCOUNTER — OFFICE VISIT (OUTPATIENT)
Dept: FAMILY MEDICINE CLINIC | Age: 66
End: 2021-10-28
Payer: MEDICARE

## 2021-10-28 ENCOUNTER — HOSPITAL ENCOUNTER (INPATIENT)
Age: 66
LOS: 3 days | Discharge: HOME | DRG: 638 | End: 2021-10-31
Attending: STUDENT IN AN ORGANIZED HEALTH CARE EDUCATION/TRAINING PROGRAM | Admitting: INTERNAL MEDICINE
Payer: MEDICARE

## 2021-10-28 VITALS
SYSTOLIC BLOOD PRESSURE: 122 MMHG | OXYGEN SATURATION: 86 % | HEART RATE: 121 BPM | RESPIRATION RATE: 18 BRPM | DIASTOLIC BLOOD PRESSURE: 80 MMHG

## 2021-10-28 DIAGNOSIS — R09.02 HYPOXEMIA: ICD-10-CM

## 2021-10-28 DIAGNOSIS — Z79.4 TYPE 2 DIABETES MELLITUS WITH HYPERGLYCEMIA, WITH LONG-TERM CURRENT USE OF INSULIN (HCC): Primary | ICD-10-CM
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
## 2021-10-28 DIAGNOSIS — E11.00 HYPEROSMOLAR HYPERGLYCEMIC STATE (HHS) (HCC): Primary | ICD-10-CM

## 2021-10-28 DIAGNOSIS — E11.42 TYPE 2 DIABETES MELLITUS WITH DIABETIC POLYNEUROPATHY, WITH LONG-TERM CURRENT USE OF INSULIN (HCC): ICD-10-CM

## 2021-10-28 DIAGNOSIS — N17.9 AKI (ACUTE KIDNEY INJURY) (HCC): ICD-10-CM

## 2021-10-28 DIAGNOSIS — I87.2 VENOUS INSUFFICIENCY (CHRONIC) (PERIPHERAL): ICD-10-CM

## 2021-10-28 DIAGNOSIS — E87.6 HYPOKALEMIA: ICD-10-CM

## 2021-10-28 DIAGNOSIS — Z79.4 TYPE 2 DIABETES MELLITUS WITH DIABETIC POLYNEUROPATHY, WITH LONG-TERM CURRENT USE OF INSULIN (HCC): ICD-10-CM

## 2021-10-28 DIAGNOSIS — R06.02 SOB (SHORTNESS OF BREATH): ICD-10-CM

## 2021-10-28 DIAGNOSIS — R53.83 OTHER FATIGUE: ICD-10-CM

## 2021-10-28 DIAGNOSIS — E11.65 TYPE 2 DIABETES MELLITUS WITH HYPERGLYCEMIA, WITH LONG-TERM CURRENT USE OF INSULIN (HCC): Primary | ICD-10-CM

## 2021-10-28 LAB
ALBUMIN SERPL-MCNC: 4.4 G/DL (ref 3.5–5.2)
ALP BLD-CCNC: 206 U/L (ref 40–129)
ALT SERPL-CCNC: 28 U/L (ref 0–40)
ANION GAP SERPL CALCULATED.3IONS-SCNC: 11 MMOL/L (ref 7–16)
ANION GAP SERPL CALCULATED.3IONS-SCNC: 19 MMOL/L (ref 7–16)
ANION GAP SERPL CALCULATED.3IONS-SCNC: 19 MMOL/L (ref 7–16)
AST SERPL-CCNC: 31 U/L (ref 0–39)
BACTERIA: NORMAL /HPF
BASOPHILS ABSOLUTE: 0.06 E9/L (ref 0–0.2)
BASOPHILS RELATIVE PERCENT: 0.6 % (ref 0–2)
BETA-HYDROXYBUTYRATE: 0.55 MMOL/L (ref 0.02–0.27)
BILIRUB SERPL-MCNC: 2.3 MG/DL (ref 0–1.2)
BILIRUBIN URINE: NEGATIVE
BLOOD, URINE: ABNORMAL
BUN BLDV-MCNC: 37 MG/DL (ref 6–23)
BUN BLDV-MCNC: 49 MG/DL (ref 6–23)
BUN BLDV-MCNC: 51 MG/DL (ref 6–23)
CALCIUM SERPL-MCNC: 10.5 MG/DL (ref 8.6–10.2)
CALCIUM SERPL-MCNC: 5.9 MG/DL (ref 8.6–10.2)
CALCIUM SERPL-MCNC: 9.6 MG/DL (ref 8.6–10.2)
CHLORIDE BLD-SCNC: 68 MMOL/L (ref 98–107)
CHLORIDE BLD-SCNC: 73 MMOL/L (ref 98–107)
CHLORIDE BLD-SCNC: 97 MMOL/L (ref 98–107)
CHP ED QC CHECK: YES
CLARITY: CLEAR
CO2: 25 MMOL/L (ref 22–29)
CO2: 31 MMOL/L (ref 22–29)
CO2: 32 MMOL/L (ref 22–29)
COLOR: YELLOW
CREAT SERPL-MCNC: 1.3 MG/DL (ref 0.7–1.2)
CREAT SERPL-MCNC: 2 MG/DL (ref 0.7–1.2)
CREAT SERPL-MCNC: 2.2 MG/DL (ref 0.7–1.2)
EKG ATRIAL RATE: 118 BPM
EKG P-R INTERVAL: 112 MS
EKG Q-T INTERVAL: 386 MS
EKG QRS DURATION: 118 MS
EKG QTC CALCULATION (BAZETT): 541 MS
EKG R AXIS: 69 DEGREES
EKG T AXIS: 98 DEGREES
EKG VENTRICULAR RATE: 118 BPM
EOSINOPHILS ABSOLUTE: 0.02 E9/L (ref 0.05–0.5)
EOSINOPHILS RELATIVE PERCENT: 0.2 % (ref 0–6)
EPITHELIAL CELLS, UA: NORMAL /HPF
GFR AFRICAN AMERICAN: 36
GFR AFRICAN AMERICAN: 41
GFR AFRICAN AMERICAN: >60
GFR NON-AFRICAN AMERICAN: 30 ML/MIN/1.73
GFR NON-AFRICAN AMERICAN: 34 ML/MIN/1.73
GFR NON-AFRICAN AMERICAN: 55 ML/MIN/1.73
GLUCOSE BLD-MCNC: 365 MG/DL (ref 74–99)
GLUCOSE BLD-MCNC: 633 MG/DL (ref 74–99)
GLUCOSE BLD-MCNC: 781 MG/DL (ref 74–99)
GLUCOSE BLD-MCNC: NORMAL MG/DL
GLUCOSE URINE: >=1000 MG/DL
HCT VFR BLD CALC: 48.6 % (ref 37–54)
HEMOGLOBIN: 17.4 G/DL (ref 12.5–16.5)
IMMATURE GRANULOCYTES #: 0.06 E9/L
IMMATURE GRANULOCYTES %: 0.6 % (ref 0–5)
KETONES, URINE: NEGATIVE MG/DL
LEUKOCYTE ESTERASE, URINE: NEGATIVE
LYMPHOCYTES ABSOLUTE: 1.35 E9/L (ref 1.5–4)
LYMPHOCYTES RELATIVE PERCENT: 13.3 % (ref 20–42)
MAGNESIUM: 1.2 MG/DL (ref 1.6–2.6)
MAGNESIUM: 2.1 MG/DL (ref 1.6–2.6)
MCH RBC QN AUTO: 32.2 PG (ref 26–35)
MCHC RBC AUTO-ENTMCNC: 35.8 % (ref 32–34.5)
MCV RBC AUTO: 89.8 FL (ref 80–99.9)
METER GLUCOSE: 443 MG/DL (ref 74–99)
METER GLUCOSE: >500 MG/DL (ref 74–99)
MONOCYTES ABSOLUTE: 0.84 E9/L (ref 0.1–0.95)
MONOCYTES RELATIVE PERCENT: 8.3 % (ref 2–12)
NEUTROPHILS ABSOLUTE: 7.81 E9/L (ref 1.8–7.3)
NEUTROPHILS RELATIVE PERCENT: 77 % (ref 43–80)
NITRITE, URINE: NEGATIVE
PDW BLD-RTO: 14.3 FL (ref 11.5–15)
PH UA: 6 (ref 5–9)
PH VENOUS: 7.52 (ref 7.35–7.45)
PLATELET # BLD: 260 E9/L (ref 130–450)
PMV BLD AUTO: 11 FL (ref 7–12)
POTASSIUM REFLEX MAGNESIUM: 2.8 MMOL/L (ref 3.5–5)
POTASSIUM SERPL-SCNC: 1.7 MMOL/L (ref 3.5–5)
POTASSIUM SERPL-SCNC: 3.1 MMOL/L (ref 3.5–5)
PRO-BNP: 1665 PG/ML (ref 0–125)
PROTEIN UA: ABNORMAL MG/DL
RBC # BLD: 5.41 E12/L (ref 3.8–5.8)
RBC UA: NORMAL /HPF (ref 0–2)
SARS-COV-2, NAAT: NOT DETECTED
SODIUM BLD-SCNC: 118 MMOL/L (ref 132–146)
SODIUM BLD-SCNC: 124 MMOL/L (ref 132–146)
SODIUM BLD-SCNC: 133 MMOL/L (ref 132–146)
SPECIFIC GRAVITY UA: <=1.005 (ref 1–1.03)
TOTAL PROTEIN: 8.5 G/DL (ref 6.4–8.3)
TROPONIN, HIGH SENSITIVITY: 42 NG/L (ref 0–11)
TROPONIN, HIGH SENSITIVITY: 51 NG/L (ref 0–11)
UROBILINOGEN, URINE: 0.2 E.U./DL
WBC # BLD: 10.1 E9/L (ref 4.5–11.5)
WBC UA: NORMAL /HPF (ref 0–5)

## 2021-10-28 PROCEDURE — 87088 URINE BACTERIA CULTURE: CPT

## 2021-10-28 PROCEDURE — 6370000000 HC RX 637 (ALT 250 FOR IP): Performed by: STUDENT IN AN ORGANIZED HEALTH CARE EDUCATION/TRAINING PROGRAM

## 2021-10-28 PROCEDURE — 6360000002 HC RX W HCPCS: Performed by: STUDENT IN AN ORGANIZED HEALTH CARE EDUCATION/TRAINING PROGRAM

## 2021-10-28 PROCEDURE — 93005 ELECTROCARDIOGRAM TRACING: CPT | Performed by: STUDENT IN AN ORGANIZED HEALTH CARE EDUCATION/TRAINING PROGRAM

## 2021-10-28 PROCEDURE — 4040F PNEUMOC VAC/ADMIN/RCVD: CPT | Performed by: FAMILY MEDICINE

## 2021-10-28 PROCEDURE — 96360 HYDRATION IV INFUSION INIT: CPT

## 2021-10-28 PROCEDURE — 73130 X-RAY EXAM OF HAND: CPT

## 2021-10-28 PROCEDURE — 71045 X-RAY EXAM CHEST 1 VIEW: CPT

## 2021-10-28 PROCEDURE — 87635 SARS-COV-2 COVID-19 AMP PRB: CPT

## 2021-10-28 PROCEDURE — 82010 KETONE BODYS QUAN: CPT

## 2021-10-28 PROCEDURE — G8427 DOCREV CUR MEDS BY ELIG CLIN: HCPCS | Performed by: FAMILY MEDICINE

## 2021-10-28 PROCEDURE — 2022F DILAT RTA XM EVC RTNOPTHY: CPT | Performed by: FAMILY MEDICINE

## 2021-10-28 PROCEDURE — 1123F ACP DISCUSS/DSCN MKR DOCD: CPT | Performed by: FAMILY MEDICINE

## 2021-10-28 PROCEDURE — 85025 COMPLETE CBC W/AUTO DIFF WBC: CPT

## 2021-10-28 PROCEDURE — G8484 FLU IMMUNIZE NO ADMIN: HCPCS | Performed by: FAMILY MEDICINE

## 2021-10-28 PROCEDURE — 36415 COLL VENOUS BLD VENIPUNCTURE: CPT

## 2021-10-28 PROCEDURE — 80048 BASIC METABOLIC PNL TOTAL CA: CPT

## 2021-10-28 PROCEDURE — 81001 URINALYSIS AUTO W/SCOPE: CPT

## 2021-10-28 PROCEDURE — 2580000003 HC RX 258: Performed by: STUDENT IN AN ORGANIZED HEALTH CARE EDUCATION/TRAINING PROGRAM

## 2021-10-28 PROCEDURE — 83735 ASSAY OF MAGNESIUM: CPT

## 2021-10-28 PROCEDURE — 2000000000 HC ICU R&B

## 2021-10-28 PROCEDURE — 82800 BLOOD PH: CPT

## 2021-10-28 PROCEDURE — 3046F HEMOGLOBIN A1C LEVEL >9.0%: CPT | Performed by: FAMILY MEDICINE

## 2021-10-28 PROCEDURE — 82962 GLUCOSE BLOOD TEST: CPT

## 2021-10-28 PROCEDURE — 80053 COMPREHEN METABOLIC PANEL: CPT

## 2021-10-28 PROCEDURE — 6360000002 HC RX W HCPCS: Performed by: NURSE PRACTITIONER

## 2021-10-28 PROCEDURE — 83880 ASSAY OF NATRIURETIC PEPTIDE: CPT

## 2021-10-28 PROCEDURE — G8417 CALC BMI ABV UP PARAM F/U: HCPCS | Performed by: FAMILY MEDICINE

## 2021-10-28 PROCEDURE — 3017F COLORECTAL CA SCREEN DOC REV: CPT | Performed by: FAMILY MEDICINE

## 2021-10-28 PROCEDURE — 1036F TOBACCO NON-USER: CPT | Performed by: FAMILY MEDICINE

## 2021-10-28 PROCEDURE — 84484 ASSAY OF TROPONIN QUANT: CPT

## 2021-10-28 PROCEDURE — 99213 OFFICE O/P EST LOW 20 MIN: CPT | Performed by: FAMILY MEDICINE

## 2021-10-28 PROCEDURE — 99284 EMERGENCY DEPT VISIT MOD MDM: CPT

## 2021-10-28 RX ORDER — SODIUM CHLORIDE 9 MG/ML
INJECTION, SOLUTION INTRAVENOUS CONTINUOUS
Status: DISCONTINUED | OUTPATIENT
Start: 2021-10-28 | End: 2021-10-28

## 2021-10-28 RX ORDER — INSULIN GLARGINE 100 [IU]/ML
45 INJECTION, SOLUTION SUBCUTANEOUS 2 TIMES DAILY
COMMUNITY
End: 2022-04-08 | Stop reason: SDUPTHER

## 2021-10-28 RX ORDER — METOLAZONE 2.5 MG/1
2.5 TABLET ORAL
Status: ON HOLD | COMMUNITY
End: 2021-10-31 | Stop reason: HOSPADM

## 2021-10-28 RX ORDER — MAGNESIUM SULFATE 1 G/100ML
1000 INJECTION INTRAVENOUS PRN
Status: DISCONTINUED | OUTPATIENT
Start: 2021-10-28 | End: 2021-10-30

## 2021-10-28 RX ORDER — WARFARIN SODIUM 2.5 MG/1
1.25 TABLET ORAL DAILY
Status: ON HOLD | COMMUNITY
End: 2021-10-31 | Stop reason: HOSPADM

## 2021-10-28 RX ORDER — POTASSIUM CHLORIDE 7.45 MG/ML
10 INJECTION INTRAVENOUS PRN
Status: DISCONTINUED | OUTPATIENT
Start: 2021-10-28 | End: 2021-10-29

## 2021-10-28 RX ORDER — LEVOCETIRIZINE DIHYDROCHLORIDE 5 MG/1
5 TABLET, FILM COATED ORAL NIGHTLY
COMMUNITY
End: 2022-09-21 | Stop reason: SDUPTHER

## 2021-10-28 RX ORDER — 0.9 % SODIUM CHLORIDE 0.9 %
1000 INTRAVENOUS SOLUTION INTRAVENOUS ONCE
Status: COMPLETED | OUTPATIENT
Start: 2021-10-28 | End: 2021-10-28

## 2021-10-28 RX ORDER — POTASSIUM CHLORIDE 7.45 MG/ML
10 INJECTION INTRAVENOUS
Status: COMPLETED | OUTPATIENT
Start: 2021-10-28 | End: 2021-10-29

## 2021-10-28 RX ORDER — DEXTROSE, SODIUM CHLORIDE, AND POTASSIUM CHLORIDE 5; .45; .15 G/100ML; G/100ML; G/100ML
INJECTION INTRAVENOUS CONTINUOUS PRN
Status: DISCONTINUED | OUTPATIENT
Start: 2021-10-28 | End: 2021-10-29

## 2021-10-28 RX ORDER — 0.9 % SODIUM CHLORIDE 0.9 %
15 INTRAVENOUS SOLUTION INTRAVENOUS ONCE
Status: COMPLETED | OUTPATIENT
Start: 2021-10-28 | End: 2021-10-28

## 2021-10-28 RX ORDER — POTASSIUM CHLORIDE AND SODIUM CHLORIDE 900; 300 MG/100ML; MG/100ML
INJECTION, SOLUTION INTRAVENOUS CONTINUOUS
Status: DISCONTINUED | OUTPATIENT
Start: 2021-10-28 | End: 2021-10-29

## 2021-10-28 RX ORDER — DEXTROSE MONOHYDRATE 25 G/50ML
12.5 INJECTION, SOLUTION INTRAVENOUS PRN
Status: DISCONTINUED | OUTPATIENT
Start: 2021-10-28 | End: 2021-10-31 | Stop reason: HOSPADM

## 2021-10-28 RX ORDER — MAGNESIUM SULFATE IN WATER 40 MG/ML
2000 INJECTION, SOLUTION INTRAVENOUS ONCE
Status: COMPLETED | OUTPATIENT
Start: 2021-10-28 | End: 2021-10-29

## 2021-10-28 RX ADMIN — POTASSIUM CHLORIDE AND SODIUM CHLORIDE: 900; 300 INJECTION, SOLUTION INTRAVENOUS at 23:00

## 2021-10-28 RX ADMIN — POTASSIUM BICARBONATE 40 MEQ: 782 TABLET, EFFERVESCENT ORAL at 19:53

## 2021-10-28 RX ADMIN — POTASSIUM CHLORIDE 10 MEQ: 10 INJECTION, SOLUTION INTRAVENOUS at 23:49

## 2021-10-28 RX ADMIN — SODIUM CHLORIDE: 9 INJECTION, SOLUTION INTRAVENOUS at 21:03

## 2021-10-28 RX ADMIN — MAGNESIUM SULFATE HEPTAHYDRATE 2000 MG: 40 INJECTION, SOLUTION INTRAVENOUS at 23:42

## 2021-10-28 RX ADMIN — SODIUM CHLORIDE 1000 ML: 9 INJECTION, SOLUTION INTRAVENOUS at 17:16

## 2021-10-28 RX ADMIN — SODIUM CHLORIDE 11.3 UNITS/HR: 9 INJECTION, SOLUTION INTRAVENOUS at 21:02

## 2021-10-28 RX ADMIN — SODIUM CHLORIDE 1000 ML: 9 INJECTION, SOLUTION INTRAVENOUS at 21:03

## 2021-10-28 RX ADMIN — POTASSIUM BICARBONATE 40 MEQ: 782 TABLET, EFFERVESCENT ORAL at 22:13

## 2021-10-28 RX ADMIN — POTASSIUM CHLORIDE 10 MEQ: 10 INJECTION, SOLUTION INTRAVENOUS at 22:13

## 2021-10-28 ASSESSMENT — ENCOUNTER SYMPTOMS: SHORTNESS OF BREATH: 1

## 2021-10-28 NOTE — ED PROVIDER NOTES
Winnie Kumar is a 72 y.o. male with a PMHx significant for CAD, obesity, HTN, insulin-dependent DM, paroxysmal A. fib, GERD who presents for evaluation of dehydration and fall, beginning prior to arrival.  The complaint has been persistent, moderate in severity, and worsened by nothing. The patient states that his sugars have been running high. He went to his PCP, Dr. Serenity Whitfield today and had his glucose greater than 500 and was sent in for further evaluation treatment. Upon arrival when the patient tried to step out of his vehicle his arm slipped and he fell forward onto his hands. Did not hit his head, no LOC. Does note he hurt his left thumb in the process. Notes he has been feeling very dry and dehydrated. The history is provided by the patient and medical records. Review of Systems   Constitutional: Positive for fatigue. Negative for chills and fever. HENT: Negative for postnasal drip, rhinorrhea and sore throat. Eyes: Negative for visual disturbance. Respiratory: Negative for cough and shortness of breath. Cardiovascular: Negative for chest pain. Gastrointestinal: Negative for abdominal pain, blood in stool, constipation, diarrhea, nausea and vomiting. Genitourinary: Negative for difficulty urinating, dysuria and urgency. Musculoskeletal: Negative for back pain. Left thumb pain     Skin: Negative for rash. Neurological: Positive for light-headedness. Negative for dizziness, numbness and headaches. Physical Exam  Vitals and nursing note reviewed. Constitutional:       General: He is not in acute distress. Appearance: He is well-developed. He is not ill-appearing. HENT:      Head: Normocephalic and atraumatic. Right Ear: External ear normal.      Left Ear: External ear normal.      Mouth/Throat:      Mouth: Mucous membranes are dry. Eyes:      General:         Right eye: No discharge. Left eye: No discharge.       Extraocular Movements: Extraocular movements intact. Conjunctiva/sclera: Conjunctivae normal.   Cardiovascular:      Rate and Rhythm: Normal rate and regular rhythm. Heart sounds: Normal heart sounds. No murmur heard. Pulmonary:      Effort: Pulmonary effort is normal. No respiratory distress. Breath sounds: Normal breath sounds. No stridor. Abdominal:      General: There is no distension. Palpations: Abdomen is soft. There is no mass. Tenderness: There is no abdominal tenderness. Hernia: No hernia is present. Musculoskeletal:      Cervical back: Normal range of motion and neck supple. Comments: Decreased ROM to Left UE. Tenderness to palpation of left thumb   Skin:     General: Skin is warm and dry. Coloration: Skin is not jaundiced or pale. Neurological:      General: No focal deficit present. Mental Status: He is alert and oriented to person, place, and time. Procedures     [facility]     ED Course as of Oct 29 0050   Thu Oct 28, 2021   1910 Patient reevaluated, informed of his low potassium and concern for DKA. Supplemental potassium will be ordered. Patient sitting in bed no acute distress at this time. Discussed with nursing staff at bedside for 2 good peripheral IVs.    [BB]   2010 Spoke with [facility] for critical care. Discussed with patient. He will accept the patient to the ICU.    [BB]   2030 Spoke with Dr. Adal Nguyen, discussed the patient. He will admit the patient    [BB]   2120 Patient reevaluated, potassium has improved somewhat at this point time. Lying in bed no acute distress. [BB]      ED Course User Index  [BB] DO Frannie Castañeda presents to the ED for evaluation of dehydration fall in parking lot. Workup in the ED revealed patient with atraumatic head, he did not hit his head in the fall, no LOC. Notes that his glucose in office was greater than 500. He was found to be in DKA with a glucose of 781, anion gap of 19 and potassium of 2.8. Also concern for JORDEN with a creatinine of 3.2. Potassium supplementation was ordered. As well as the DKA order set protocol. Insulin had been started on the patient prior to potassium being repeated therefore it was held. Discussed with nursing At bedside to hold until his potassium improves. Patient was ordered to 10 mEq of potassium as well as normal saline with 40 mill equivalents of potassium at 200 an hour. On repeat metabolic panel patient with a potassium 1.7. Discussed with the nursing staff at the bedside to hold the patient until potassium has improved. Case was discussed with critical care NP will accept the patient to the ICU. Case was discussed with hospitalist who will admit the patient. Of note normal saline was used as the patient's corrected sodium was only 128. Patient requires continued workup and management of their symptoms and will be admitted to the hospital for further evaluation and treatment.  --------------------------------------------- PAST HISTORY ---------------------------------------------  Past Medical History:  has a past medical history of Acid reflux, Acute diastolic (congestive) heart failure (HCC), Arthritis, Asthma, Asthmatic bronchitis , chronic (HCC), CAD (coronary artery disease), Chronic bronchitis ([address] COPD (chronic obstructive pulmonary disease) ([address] COPD (chronic obstructive pulmonary disease) ([address] Diabetes mellitus ([address] Emphysema (subcutaneous) (surgical) resulting from a procedure, H/O cardiovascular stress test, Hyperlipidemia, Hypertension, Hypoxemia requiring supplemental oxygen, LONG TERM ANTICOAGULENT USE, Morbid obesity with BMI of 50.0-59.9, adult ([address] Obesity, Osteoarthritis, Sleep apnea, Stage 3 chronic kidney disease ([address] Tobacco abuse, and Type II or unspecified type diabetes mellitus without mention of complication, not stated as uncontrolled.     Past Surgical History:  has a past surgical history that includes hernia repair; sinus surgery; ECHO Compl W Dop Color Flow (7/25/2013); Colonoscopy; Coronary angioplasty with stent (07-23-13); and Coronary artery bypass graft (09-09-13). Social History:  reports that he quit smoking about 8 years ago. His smoking use included cigarettes. He has a 45.00 pack-year smoking history. He quit smokeless tobacco use about 50 years ago. His smokeless tobacco use included chew. He reports that he does not drink alcohol and does not use drugs. Family History: family history includes Cancer in his father and mother; Mental Illness in his brother; Other in his brother; Stroke in his brother. The patients home medications have been reviewed.     Allergies: Pcn [penicillins] and Sulfa antibiotics    -------------------------------------------------- RESULTS -------------------------------------------------    Lab  Results for orders placed or performed during the hospital encounter of 10/28/21   COVID-19, Rapid    Specimen: Nasopharyngeal Swab   Result Value Ref Range    SARS-CoV-2, NAAT Not Detected Not Detected   CBC Auto Differential   Result Value Ref Range    WBC 10.1 4.5 - 11.5 E9/L    RBC 5.41 3.80 - 5.80 E12/L    Hemoglobin 17.4 (H) 12.5 - 16.5 g/dL    Hematocrit 48.6 37.0 - 54.0 %    MCV 89.8 80.0 - 99.9 fL    MCH 32.2 26.0 - 35.0 pg    MCHC 35.8 (H) 32.0 - 34.5 %    RDW 14.3 11.5 - 15.0 fL    Platelets 340 586 - 964 E9/L    MPV 11.0 7.0 - 12.0 fL    Neutrophils % 77.0 43.0 - 80.0 %    Immature Granulocytes % 0.6 0.0 - 5.0 %    Lymphocytes % 13.3 (L) 20.0 - 42.0 %    Monocytes % 8.3 2.0 - 12.0 %    Eosinophils % 0.2 0.0 - 6.0 %    Basophils % 0.6 0.0 - 2.0 %    Neutrophils Absolute 7.81 (H) 1.80 - 7.30 E9/L    Immature Granulocytes # 0.06 E9/L    Lymphocytes Absolute 1.35 (L) 1.50 - 4.00 E9/L    Monocytes Absolute 0.84 0.10 - 0.95 E9/L    Eosinophils Absolute 0.02 (L) 0.05 - 0.50 E9/L    Basophils Absolute 0.06 0.00 - 0.20 E9/L   Comprehensive Metabolic Panel w/ Reflex to MG   Result Value Ref Range    Sodium 118 (LL) 132 - 146 mmol/L    Potassium reflex Magnesium 2.8 (L) 3.5 - 5.0 mmol/L    Chloride 68 (LL) 98 - 107 mmol/L    CO2 31 (H) 22 - 29 mmol/L    Anion Gap 19 (H) 7 - 16 mmol/L    Glucose 781 (HH) 74 - 99 mg/dL    BUN 51 (H) 6 - 23 mg/dL    CREATININE 2.2 (H) 0.7 - 1.2 mg/dL    GFR Non-African American 30 >=60 mL/min/1.73    GFR African American 36     Calcium 10.5 (H) 8.6 - 10.2 mg/dL    Total Protein 8.5 (H) 6.4 - 8.3 g/dL    Albumin 4.4 3.5 - 5.2 g/dL    Total Bilirubin 2.3 (H) 0.0 - 1.2 mg/dL    Alkaline Phosphatase 206 (H) 40 - 129 U/L    ALT 28 0 - 40 U/L    AST 31 0 - 39 U/L   Troponin   Result Value Ref Range    Troponin, High Sensitivity 51 (H) 0 - 11 ng/L   Brain Natriuretic Peptide   Result Value Ref Range    Pro-BNP 1,665 (H) 0 - 125 pg/mL   Urinalysis, reflex to microscopic   Result Value Ref Range    Color, UA Yellow Straw/Yellow    Clarity, UA Clear Clear    Glucose, Ur >=1000 (A) Negative mg/dL    Bilirubin Urine Negative Negative    Ketones, Urine Negative Negative mg/dL    Specific Gravity, UA <=1.005 1.005 - 1.030    Blood, Urine TRACE (A) Negative    pH, UA 6.0 5.0 - 9.0    Protein, UA TRACE Negative mg/dL    Urobilinogen, Urine 0.2 <2.0 E.U./dL    Nitrite, Urine Negative Negative    Leukocyte Esterase, Urine Negative Negative   Beta-Hydroxybutyrate   Result Value Ref Range    Beta-Hydroxybutyrate 0.55 (H) 0.02 - 0.27 mmol/L   pH, venous   Result Value Ref Range    pH, Misha 7.52 (H) 7.35 - 7.45   Microscopic Urinalysis   Result Value Ref Range    WBC, UA 0-1 0 - 5 /HPF    RBC, UA 0-1 0 - 2 /HPF    Epithelial Cells, UA NONE SEEN /HPF    Bacteria, UA NONE SEEN None Seen /HPF   Magnesium   Result Value Ref Range    Magnesium 2.1 1.6 - 2.6 mg/dL   Troponin   Result Value Ref Range    Troponin, High Sensitivity 42 (H) 0 - 11 ng/L   Basic metabolic panel   Result Value Ref Range    Sodium 124 (L) 132 - 146 mmol/L    Potassium 3.1 (L) 3.5 - 5.0 mmol/L    Chloride 73 (LL) 98 - 107 mmol/L    CO2 32 (H) 22 - 29 mmol/L    Anion Gap 19 (H) 7 - 16 mmol/L    Glucose 633 (HH) 74 - 99 mg/dL    BUN 49 (H) 6 - 23 mg/dL    CREATININE 2.0 (H) 0.7 - 1.2 mg/dL    GFR Non-African American 34 >=60 mL/min/1.73    GFR African American 41     Calcium 9.6 8.6 - 10.2 mg/dL   Basic metabolic panel   Result Value Ref Range    Sodium 133 132 - 146 mmol/L    Potassium 1.7 (LL) 3.5 - 5.0 mmol/L    Chloride 97 (L) 98 - 107 mmol/L    CO2 25 22 - 29 mmol/L    Anion Gap 11 7 - 16 mmol/L    Glucose 365 (H) 74 - 99 mg/dL    BUN 37 (H) 6 - 23 mg/dL    CREATININE 1.3 (H) 0.7 - 1.2 mg/dL    GFR Non-African American 55 >=60 mL/min/1.73    GFR African American >60     Calcium 5.9 (LL) 8.6 - 10.2 mg/dL   Magnesium   Result Value Ref Range    Magnesium 1.2 (L) 1.6 - 2.6 mg/dL   POCT Glucose   Result Value Ref Range    Glucose  mg/dL    QC OK? yes    POCT Glucose   Result Value Ref Range    Meter Glucose >500 (H) 74 - 99 mg/dL   POCT Glucose   Result Value Ref Range    Meter Glucose 443 (H) 74 - 99 mg/dL   EKG 12 Lead   Result Value Ref Range    Ventricular Rate 118 BPM    Atrial Rate 118 BPM    P-R Interval 112 ms    QRS Duration 118 ms    Q-T Interval 386 ms    QTc Calculation (Bazett) 541 ms    R Axis 69 degrees    T Axis 98 degrees       Radiology  XR HAND LEFT (MIN 3 VIEWS)   Final Result   1. No acute fracture or joint dislocation. 2. Degenerative changes at the 1st carpometacarpal joint and radiocarpal   joint. 3. Sclerotic appearance of the lunate may be related to avascular necrosis. Dedicated radiographs of the left wrist are recommended. XR CHEST PORTABLE   Final Result   No acute cardiopulmonary abnormality. EKG:  This EKG is signed and interpreted by me.     Rate: 118  Rhythm: Sinus  Interpretation: sinus tachycardia with incomplete right bundle branch block, no ST elevations or ST depressions, , , QTc 541, prolonged QTC  Comparison: changes compared to previous EKG      ------------------------- NURSING NOTES AND VITALS REVIEWED ---------------------------  Date / Time Roomed:  10/28/2021  4:08 PM  ED Bed Assignment:  [id number]    The nursing notes within the ED encounter and vital signs as below have been reviewed. Patient Vitals for the past 24 hrs:   BP Temp Temp src Pulse Resp SpO2 Weight   10/29/21 0005 120/73 -- -- 101 18 98 % --   10/28/21 2345 -- 97.8 °F (36.6 °C) Oral 106 21 (!) 88 % --   10/28/21 2343 -- -- -- -- -- -- 254 lb 6.6 oz (115.4 kg)   10/28/21 2210 -- -- -- 94 18 100 % --   10/28/21 2031 -- -- -- 120 18 100 % --   10/28/21 1819 124/87 -- -- 95 18 100 % --   10/28/21 1615 -- -- -- -- 16 -- --   10/28/21 1612 96/73 97.4 °F (36.3 °C) Oral 119 -- 97 % 250 lb (113.4 kg)       Oxygen Saturation Interpretation: Normal      ------------------------------------------ PROGRESS NOTES ------------------------------------------  I have spoken with the patient and discussed todays results, in addition to providing specific details for the plan of care and counseling regarding the diagnosis and prognosis. Their questions are answered at this time and they are agreeable with the plan.      --------------------------------- ADDITIONAL PROVIDER NOTES ---------------------------------  Consultations:  Spoke with Dr. Cathy Darby,  They will admit this patient. Spoke with Lalita Kenyon for critical care, they will accept the patient to the ICU. This patient's ED course included: a personal history and physicial examination, re-evaluation prior to disposition, multiple bedside re-evaluations, IV medications, cardiac monitoring, continuous pulse oximetry and complex medical decision making and emergency management    This patient has remained hemodynamically stable and been closely monitored during their ED course.     Please note that the withdrawal or failure to initiate urgent interventions for this patient would likely result in a life threatening deterioration or permanent disability. Accordingly this patient received 45 minutes of critical care time, excluding separately billable procedures. Clinical Impression  1. Hyperosmolar hyperglycemic state (HHS) ([facility]    2. Hypokalemia    3. JORDEN (acute kidney injury) ([facility]    4. Fall, initial encounter          Disposition  Patient's disposition: Admit to CCU/ICU  Patient's condition is stable.            Jason Hidalgo DO  10/29/21 [id number]

## 2021-10-28 NOTE — PROGRESS NOTES
Cierra Madrid is a 72 y.o. male  . Subjective:      Patient very fatigued. SOB. Poor blood work last visit. Wanted him to go by squad to hospital. He adamantly refused. We discussed this at length. Still refused. Says he has to go by car. He understands risks and will head directly to hospital Has been going on for about a week. Sugars have been out of control. Covid is negative. SPo2 is fluctuating and dipping under 90. We will not waste any time and send immediately to ER, Again I really want him to go by squad but he adamantly refuses. He promises to go directly to hospital.       Review of Systems   Constitutional: Positive for fatigue. Respiratory: Positive for shortness of breath.         Past Medical History:   Diagnosis Date    Acid reflux     Acute diastolic (congestive) heart failure ([facility] 06/19/2019    Arthritis     Asthma 04/16/2014    Asthmatic bronchitis , chronic ([facility]) 11/28/2016    CAD (coronary artery disease)     Chronic bronchitis ([facility]) 04/16/2014    COPD (chronic obstructive pulmonary disease) ([facility])     CB    COPD (chronic obstructive pulmonary disease) ([facility]     Diabetes mellitus ([facility])     Emphysema (subcutaneous) (surgical) resulting from a procedure     H/O cardiovascular stress test 04/24/2021    Lexiscan    Hyperlipidemia     Hypertension     Hypoxemia requiring supplemental oxygen 02/02/2015    LONG TERM ANTICOAGULENT USE     Morbid obesity with BMI of 50.0-59.9, adult ([facility] [address] [address] 11/27/2013    Obesity     Osteoarthritis     Sleep apnea     bilevel positive airway pressure at 13/8 with 2 L oxygen flow     Stage 3 chronic kidney disease (HCC)     Tobacco abuse     Type II or unspecified type diabetes mellitus without mention of complication, not stated as uncontrolled        Social History     Socioeconomic History    Marital status:      Spouse name: Clay Carey Number of children: 1    Years of education: Not on file    Highest education level: 11th grade Frequency of Social Gatherings with Friends and Family:     Attends [employer] Services:     Active Member of Clubs or Organizations:     Attends Club or Organization Meetings:     Marital Status:    Intimate Partner Violence:     Fear of Current or Ex-Partner:     Emotionally Abused:     Physically Abused:     Sexually Abused:        Family History   Problem Relation Age of Onset    Cancer Mother         breast    Cancer Father         stomach    Mental Illness Brother     Stroke Brother     Other Brother        Current Outpatient Medications on File Prior to Visit   Medication Sig Dispense Refill    clindamycin (CLEOCIN-T) 1 % lotion Apply topically 2 times daily. 60 g 2    gabapentin (NEURONTIN) 300 MG capsule Take 1 capsule by mouth 3 times daily for 30 days. 90 capsule 5    cevimeline (EVOXAC) 30 MG capsule Take 1 capsule by mouth nightly 30 capsule 5    spironolactone (ALDACTONE) 25 MG tablet TAKE ONE TABLET BY MOUTH TWO TIMES A  tablet 0    mupirocin (BACTROBAN) 2 % ointment Apply 3 times daily. 22 g 0    hydrOXYzine (VISTARIL) 25 MG capsule Take one 220 minutes prior to MRI 3 capsule 0    losartan (COZAAR) 25 MG tablet Take 1 tablet by mouth daily 90 tablet 1    metOLazone (ZAROXOLYN) 2.5 MG tablet One twice a week 24 tablet 3    warfarin (COUMADIN) 2.5 MG tablet Take 1 tablet by mouth daily 30 tablet 5    insulin glargine (BASAGLAR KWIKPEN) 100 UNIT/ML injection pen Inject 40 Units into the skin 2 times daily 5 pen 3    bumetanide (BUMEX) 1 MG tablet Take 1 tablet by mouth 2 times daily 60 tablet 5    ammonium lactate (LAC-HYDRIN) 12 % lotion Apply topically daily.  225 g 5    amiodarone (CORDARONE) 200 MG tablet Take 1 tablet by mouth 2 times daily 60 tablet 5    blood glucose test strips (ACCU-CHEK GUIDE) strip USE TO TEST TWO TIMES DAILY AND AS NEEDED FOR SYMPTOMS OF IRREGULAR BLOOD GLUCOSE. 100 each 11    pantoprazole (PROTONIX) 40 MG tablet Take 40 mg by mouth daily  DULoxetine (CYMBALTA) 20 MG extended release capsule Take 20 mg by mouth daily      metoprolol succinate (TOPROL XL) 25 MG extended release tablet Take 1 tablet by mouth 2 times daily 60 tablet 5    Insulin Syringe-Needle U-100 27G X 5/8\" 1 ML MISC Use as directed 100 each 11    Insulin Pen Needle (MEIJER PEN NEEDLES) 31G X 6 MM MISC 1 each by Does not apply route daily 100 each 3    pramipexole (MIRAPEX) 0.25 MG tablet TAKE TWO TABLETS BY MOUTH THREE TIMES A  tablet 3    insulin lispro, 1 Unit Dial, (HUMALOG KWIKPEN) 100 UNIT/ML SOPN Inject 6 units with meals PLUS                 No Insulin   140-199           3 Units   200-249           6 Units   250-299           9 Units   300-349         12 Units   350-400         15 Units   Over 400       18 Units 5 pen 0    Lancets MISC 1 each by Does not apply route 4 times daily (before meals and nightly) 300 each 1    atorvastatin (LIPITOR) 80 MG tablet TAKE ONE TABLET BY MOUTH EVERY NIGHT (Patient taking differently: 60 mg TAKE ONE TABLET BY MOUTH EVERY NIGHT) 90 tablet 5    montelukast (SINGULAIR) 10 MG tablet Take 1 tablet by mouth nightly 30 tablet 4    terbinafine (ATHLETES FOOT) 1 % cream Apply topically 2 times daily. 42 g 1    CPAP Machine MISC 1 each by Does not apply route nightly as needed       Accu-Chek Multiclix Lancets MISC Use as directed 100 each 3    aspirin 81 MG tablet Take 81 mg by mouth nightly        No current facility-administered medications on file prior to visit. Allergies   Allergen Reactions    Pcn [Penicillins]      Child went to hospital   ? Reaction  Patient tolerates cephalosporins    Sulfa Antibiotics      ? I have reviewed his allergies, medications, problem list, medical,social and family history and have updated as needed in the electronic medical record. Objective:     Physical Exam  Constitutional:       Appearance: He is ill-appearing. HENT:      Head: Normocephalic.    Cardiovascular: Rate and Rhythm: Normal rate and regular rhythm. Pulmonary:      Effort: Pulmonary effort is normal.      Breath sounds: Wheezing present. Assessment / Plan:   Salvatore Luis was seen today for diabetes. Diagnoses and all orders for this visit:    Type 2 diabetes mellitus with hyperglycemia, with long-term current use of insulin (HCC)    Other fatigue    SOB (shortness of breath)    Hypoxemia      Exam and time spent with patient minimal. We want to get him to ER asap. Understands how serious condition is, but refuses to go by squad and says we have to respect his rights. He is adamant. Reviewed healthmaintenance report. Patient is aware of deficiencies and suggested preventative tests.

## 2021-10-29 ENCOUNTER — APPOINTMENT (OUTPATIENT)
Dept: GENERAL RADIOLOGY | Age: 66
DRG: 638 | End: 2021-10-29
Payer: MEDICARE

## 2021-10-29 LAB
ANION GAP SERPL CALCULATED.3IONS-SCNC: 12 MMOL/L (ref 7–16)
ANION GAP SERPL CALCULATED.3IONS-SCNC: 15 MMOL/L (ref 7–16)
ANION GAP SERPL CALCULATED.3IONS-SCNC: 8 MMOL/L (ref 7–16)
APTT: 24.9 SEC (ref 24.5–35.1)
BUN BLDV-MCNC: 33 MG/DL (ref 6–23)
BUN BLDV-MCNC: 39 MG/DL (ref 6–23)
BUN BLDV-MCNC: 43 MG/DL (ref 6–23)
CALCIUM SERPL-MCNC: 8.4 MG/DL (ref 8.6–10.2)
CALCIUM SERPL-MCNC: 8.5 MG/DL (ref 8.6–10.2)
CALCIUM SERPL-MCNC: 8.7 MG/DL (ref 8.6–10.2)
CHLORIDE BLD-SCNC: 84 MMOL/L (ref 98–107)
CHLORIDE BLD-SCNC: 87 MMOL/L (ref 98–107)
CHLORIDE BLD-SCNC: 93 MMOL/L (ref 98–107)
CO2: 31 MMOL/L (ref 22–29)
CREAT SERPL-MCNC: 1.6 MG/DL (ref 0.7–1.2)
CREAT SERPL-MCNC: 1.7 MG/DL (ref 0.7–1.2)
CREAT SERPL-MCNC: 1.8 MG/DL (ref 0.7–1.2)
GFR AFRICAN AMERICAN: 46
GFR AFRICAN AMERICAN: 49
GFR AFRICAN AMERICAN: 53
GFR NON-AFRICAN AMERICAN: 38 ML/MIN/1.73
GFR NON-AFRICAN AMERICAN: 41 ML/MIN/1.73
GFR NON-AFRICAN AMERICAN: 43 ML/MIN/1.73
GLUCOSE BLD-MCNC: 327 MG/DL (ref 74–99)
GLUCOSE BLD-MCNC: 403 MG/DL (ref 74–99)
GLUCOSE BLD-MCNC: 409 MG/DL (ref 74–99)
HBA1C MFR BLD: 16 % (ref 4–5.6)
HBA1C MFR BLD: 17 % (ref 4–5.6)
INR BLD: 1
MAGNESIUM: 2.1 MG/DL (ref 1.6–2.6)
MAGNESIUM: 2.4 MG/DL (ref 1.6–2.6)
METER GLUCOSE: 211 MG/DL (ref 74–99)
METER GLUCOSE: 211 MG/DL (ref 74–99)
METER GLUCOSE: 246 MG/DL (ref 74–99)
METER GLUCOSE: 359 MG/DL (ref 74–99)
OSMOLALITY: 295 MOSM/KG (ref 285–310)
PHOSPHORUS: 1.3 MG/DL (ref 2.5–4.5)
PHOSPHORUS: 2.4 MG/DL (ref 2.5–4.5)
POTASSIUM SERPL-SCNC: 2.9 MMOL/L (ref 3.5–5)
POTASSIUM SERPL-SCNC: 3.8 MMOL/L (ref 3.5–5)
POTASSIUM SERPL-SCNC: 4.3 MMOL/L (ref 3.5–5)
PROTHROMBIN TIME: 11.7 SEC (ref 9.3–12.4)
SODIUM BLD-SCNC: 130 MMOL/L (ref 132–146)
SODIUM BLD-SCNC: 130 MMOL/L (ref 132–146)
SODIUM BLD-SCNC: 132 MMOL/L (ref 132–146)

## 2021-10-29 PROCEDURE — 85610 PROTHROMBIN TIME: CPT

## 2021-10-29 PROCEDURE — 6370000000 HC RX 637 (ALT 250 FOR IP): Performed by: NURSE PRACTITIONER

## 2021-10-29 PROCEDURE — 6360000002 HC RX W HCPCS: Performed by: STUDENT IN AN ORGANIZED HEALTH CARE EDUCATION/TRAINING PROGRAM

## 2021-10-29 PROCEDURE — 82962 GLUCOSE BLOOD TEST: CPT

## 2021-10-29 PROCEDURE — 6360000002 HC RX W HCPCS: Performed by: NURSE PRACTITIONER

## 2021-10-29 PROCEDURE — 1200000000 HC SEMI PRIVATE

## 2021-10-29 PROCEDURE — 73630 X-RAY EXAM OF FOOT: CPT

## 2021-10-29 PROCEDURE — 83036 HEMOGLOBIN GLYCOSYLATED A1C: CPT

## 2021-10-29 PROCEDURE — 83930 ASSAY OF BLOOD OSMOLALITY: CPT

## 2021-10-29 PROCEDURE — 0HBQXZZ EXCISION OF FINGER NAIL, EXTERNAL APPROACH: ICD-10-PCS | Performed by: PODIATRIST

## 2021-10-29 PROCEDURE — 84100 ASSAY OF PHOSPHORUS: CPT

## 2021-10-29 PROCEDURE — 2500000003 HC RX 250 WO HCPCS: Performed by: STUDENT IN AN ORGANIZED HEALTH CARE EDUCATION/TRAINING PROGRAM

## 2021-10-29 PROCEDURE — 2580000003 HC RX 258: Performed by: NURSE PRACTITIONER

## 2021-10-29 PROCEDURE — 6370000000 HC RX 637 (ALT 250 FOR IP): Performed by: INTERNAL MEDICINE

## 2021-10-29 PROCEDURE — 6360000002 HC RX W HCPCS: Performed by: INTERNAL MEDICINE

## 2021-10-29 PROCEDURE — 36415 COLL VENOUS BLD VENIPUNCTURE: CPT

## 2021-10-29 PROCEDURE — 83735 ASSAY OF MAGNESIUM: CPT

## 2021-10-29 PROCEDURE — 85730 THROMBOPLASTIN TIME PARTIAL: CPT

## 2021-10-29 PROCEDURE — 2580000003 HC RX 258: Performed by: STUDENT IN AN ORGANIZED HEALTH CARE EDUCATION/TRAINING PROGRAM

## 2021-10-29 PROCEDURE — 80048 BASIC METABOLIC PNL TOTAL CA: CPT

## 2021-10-29 PROCEDURE — 97161 PT EVAL LOW COMPLEX 20 MIN: CPT

## 2021-10-29 RX ORDER — ASPIRIN 81 MG/1
81 TABLET ORAL NIGHTLY
Status: DISCONTINUED | OUTPATIENT
Start: 2021-10-29 | End: 2021-10-31 | Stop reason: HOSPADM

## 2021-10-29 RX ORDER — METOPROLOL SUCCINATE 25 MG/1
25 TABLET, EXTENDED RELEASE ORAL 2 TIMES DAILY
Status: DISCONTINUED | OUTPATIENT
Start: 2021-10-29 | End: 2021-10-31 | Stop reason: HOSPADM

## 2021-10-29 RX ORDER — POTASSIUM CHLORIDE 750 MG/1
40 TABLET, EXTENDED RELEASE ORAL ONCE
Status: COMPLETED | OUTPATIENT
Start: 2021-10-29 | End: 2021-10-29

## 2021-10-29 RX ORDER — WARFARIN SODIUM 5 MG/1
5 TABLET ORAL DAILY
Status: DISCONTINUED | OUTPATIENT
Start: 2021-10-29 | End: 2021-10-29

## 2021-10-29 RX ORDER — POTASSIUM CHLORIDE AND SODIUM CHLORIDE 900; 300 MG/100ML; MG/100ML
INJECTION, SOLUTION INTRAVENOUS CONTINUOUS
Status: DISCONTINUED | OUTPATIENT
Start: 2021-10-29 | End: 2021-10-31 | Stop reason: HOSPADM

## 2021-10-29 RX ORDER — POTASSIUM BICARBONATE 25 MEQ/1
50 TABLET, EFFERVESCENT ORAL ONCE
Status: DISCONTINUED | OUTPATIENT
Start: 2021-10-29 | End: 2021-10-29 | Stop reason: CLARIF

## 2021-10-29 RX ORDER — POLYETHYLENE GLYCOL 3350 17 G/17G
17 POWDER, FOR SOLUTION ORAL DAILY PRN
Status: DISCONTINUED | OUTPATIENT
Start: 2021-10-29 | End: 2021-10-31 | Stop reason: HOSPADM

## 2021-10-29 RX ORDER — AMIODARONE HYDROCHLORIDE 200 MG/1
200 TABLET ORAL 2 TIMES DAILY
Status: DISCONTINUED | OUTPATIENT
Start: 2021-10-29 | End: 2021-10-31 | Stop reason: HOSPADM

## 2021-10-29 RX ORDER — WARFARIN SODIUM 2.5 MG/1
2.5 TABLET ORAL
Status: COMPLETED | OUTPATIENT
Start: 2021-10-29 | End: 2021-10-29

## 2021-10-29 RX ORDER — NICOTINE POLACRILEX 4 MG
15 LOZENGE BUCCAL PRN
Status: DISCONTINUED | OUTPATIENT
Start: 2021-10-29 | End: 2021-10-31 | Stop reason: HOSPADM

## 2021-10-29 RX ORDER — INSULIN GLARGINE 100 [IU]/ML
15 INJECTION, SOLUTION SUBCUTANEOUS ONCE
Status: COMPLETED | OUTPATIENT
Start: 2021-10-29 | End: 2021-10-29

## 2021-10-29 RX ORDER — INSULIN GLARGINE 100 [IU]/ML
45 INJECTION, SOLUTION SUBCUTANEOUS 2 TIMES DAILY
Status: DISCONTINUED | OUTPATIENT
Start: 2021-10-29 | End: 2021-10-31 | Stop reason: HOSPADM

## 2021-10-29 RX ORDER — ATORVASTATIN CALCIUM 40 MG/1
80 TABLET, FILM COATED ORAL NIGHTLY
Status: DISCONTINUED | OUTPATIENT
Start: 2021-10-29 | End: 2021-10-31 | Stop reason: HOSPADM

## 2021-10-29 RX ORDER — DEXTROSE MONOHYDRATE 50 MG/ML
100 INJECTION, SOLUTION INTRAVENOUS PRN
Status: DISCONTINUED | OUTPATIENT
Start: 2021-10-29 | End: 2021-10-31 | Stop reason: HOSPADM

## 2021-10-29 RX ORDER — WARFARIN SODIUM 2.5 MG/1
1.25 TABLET ORAL DAILY
Status: DISCONTINUED | OUTPATIENT
Start: 2021-10-29 | End: 2021-10-29

## 2021-10-29 RX ORDER — POTASSIUM CHLORIDE 7.45 MG/ML
10 INJECTION INTRAVENOUS ONCE
Status: COMPLETED | OUTPATIENT
Start: 2021-10-29 | End: 2021-10-29

## 2021-10-29 RX ORDER — GABAPENTIN 300 MG/1
300 CAPSULE ORAL 3 TIMES DAILY
Status: DISCONTINUED | OUTPATIENT
Start: 2021-10-29 | End: 2021-10-31 | Stop reason: HOSPADM

## 2021-10-29 RX ORDER — DEXTROSE MONOHYDRATE 25 G/50ML
12.5 INJECTION, SOLUTION INTRAVENOUS PRN
Status: DISCONTINUED | OUTPATIENT
Start: 2021-10-29 | End: 2021-10-31 | Stop reason: HOSPADM

## 2021-10-29 RX ORDER — SODIUM CHLORIDE 9 MG/ML
INJECTION, SOLUTION INTRAVENOUS CONTINUOUS
Status: DISCONTINUED | OUTPATIENT
Start: 2021-10-29 | End: 2021-10-29

## 2021-10-29 RX ORDER — DEXTROSE AND SODIUM CHLORIDE 5; .45 G/100ML; G/100ML
INJECTION, SOLUTION INTRAVENOUS CONTINUOUS PRN
Status: DISCONTINUED | OUTPATIENT
Start: 2021-10-29 | End: 2021-10-29

## 2021-10-29 RX ORDER — PANTOPRAZOLE SODIUM 40 MG/1
40 TABLET, DELAYED RELEASE ORAL DAILY
Status: DISCONTINUED | OUTPATIENT
Start: 2021-10-29 | End: 2021-10-31 | Stop reason: HOSPADM

## 2021-10-29 RX ADMIN — POTASSIUM CHLORIDE AND SODIUM CHLORIDE: 900; 300 INJECTION, SOLUTION INTRAVENOUS at 10:21

## 2021-10-29 RX ADMIN — ATORVASTATIN CALCIUM 80 MG: 40 TABLET, FILM COATED ORAL at 21:19

## 2021-10-29 RX ADMIN — INSULIN LISPRO 1 UNITS: 100 INJECTION, SOLUTION INTRAVENOUS; SUBCUTANEOUS at 21:21

## 2021-10-29 RX ADMIN — GABAPENTIN 300 MG: 300 CAPSULE ORAL at 16:25

## 2021-10-29 RX ADMIN — POTASSIUM CHLORIDE 10 MEQ: 7.46 INJECTION, SOLUTION INTRAVENOUS at 03:10

## 2021-10-29 RX ADMIN — INSULIN LISPRO 10 UNITS: 100 INJECTION, SOLUTION INTRAVENOUS; SUBCUTANEOUS at 11:09

## 2021-10-29 RX ADMIN — POTASSIUM BICARBONATE 40 MEQ: 782 TABLET, EFFERVESCENT ORAL at 02:42

## 2021-10-29 RX ADMIN — POTASSIUM CHLORIDE 10 MEQ: 10 INJECTION, SOLUTION INTRAVENOUS at 00:53

## 2021-10-29 RX ADMIN — POTASSIUM BICARBONATE 40 MEQ: 782 TABLET, EFFERVESCENT ORAL at 06:25

## 2021-10-29 RX ADMIN — WARFARIN SODIUM 2.5 MG: 2.5 TABLET ORAL at 18:22

## 2021-10-29 RX ADMIN — METOPROLOL SUCCINATE 25 MG: 25 TABLET, EXTENDED RELEASE ORAL at 02:24

## 2021-10-29 RX ADMIN — SODIUM PHOSPHATE, MONOBASIC, MONOHYDRATE AND SODIUM PHOSPHATE, DIBASIC, ANHYDROUS 20 MMOL: 276; 142 INJECTION, SOLUTION INTRAVENOUS at 03:58

## 2021-10-29 RX ADMIN — INSULIN GLARGINE 45 UNITS: 100 INJECTION, SOLUTION SUBCUTANEOUS at 21:20

## 2021-10-29 RX ADMIN — INSULIN GLARGINE 15 UNITS: 100 INJECTION, SOLUTION SUBCUTANEOUS at 04:00

## 2021-10-29 RX ADMIN — INSULIN LISPRO 6 UNITS: 100 INJECTION, SOLUTION INTRAVENOUS; SUBCUTANEOUS at 06:25

## 2021-10-29 RX ADMIN — POTASSIUM CHLORIDE 40 MEQ: 750 TABLET, EXTENDED RELEASE ORAL at 10:17

## 2021-10-29 RX ADMIN — INSULIN GLARGINE 45 UNITS: 100 INJECTION, SOLUTION SUBCUTANEOUS at 11:09

## 2021-10-29 RX ADMIN — POTASSIUM BICARBONATE 40 MEQ: 782 TABLET, EFFERVESCENT ORAL at 10:18

## 2021-10-29 RX ADMIN — ENOXAPARIN SODIUM 40 MG: 100 INJECTION SUBCUTANEOUS at 02:41

## 2021-10-29 RX ADMIN — GABAPENTIN 300 MG: 300 CAPSULE ORAL at 21:20

## 2021-10-29 RX ADMIN — AMIODARONE HYDROCHLORIDE 200 MG: 200 TABLET ORAL at 21:20

## 2021-10-29 RX ADMIN — GABAPENTIN 300 MG: 300 CAPSULE ORAL at 10:17

## 2021-10-29 RX ADMIN — POTASSIUM CHLORIDE 10 MEQ: 10 INJECTION, SOLUTION INTRAVENOUS at 01:55

## 2021-10-29 RX ADMIN — AMIODARONE HYDROCHLORIDE 200 MG: 200 TABLET ORAL at 10:18

## 2021-10-29 RX ADMIN — POTASSIUM CHLORIDE 10 MEQ: 7.46 INJECTION, SOLUTION INTRAVENOUS at 02:22

## 2021-10-29 RX ADMIN — INSULIN LISPRO 4 UNITS: 100 INJECTION, SOLUTION INTRAVENOUS; SUBCUTANEOUS at 18:23

## 2021-10-29 RX ADMIN — POTASSIUM CHLORIDE: 149 INJECTION, SOLUTION, CONCENTRATE INTRAVENOUS at 05:11

## 2021-10-29 RX ADMIN — PANTOPRAZOLE SODIUM 40 MG: 40 TABLET, DELAYED RELEASE ORAL at 10:18

## 2021-10-29 RX ADMIN — ASPIRIN 81 MG: 81 TABLET, COATED ORAL at 02:24

## 2021-10-29 RX ADMIN — INSULIN LISPRO 8 UNITS: 100 INJECTION, SOLUTION INTRAVENOUS; SUBCUTANEOUS at 18:22

## 2021-10-29 RX ADMIN — INSULIN LISPRO 8 UNITS: 100 INJECTION, SOLUTION INTRAVENOUS; SUBCUTANEOUS at 11:09

## 2021-10-29 RX ADMIN — ASPIRIN 81 MG: 81 TABLET, COATED ORAL at 21:19

## 2021-10-29 ASSESSMENT — PAIN SCALES - GENERAL
PAINLEVEL_OUTOF10: 0

## 2021-10-29 ASSESSMENT — ENCOUNTER SYMPTOMS
RHINORRHEA: 0
DIARRHEA: 0
BACK PAIN: 0
NAUSEA: 0
COUGH: 0
BLOOD IN STOOL: 0
SORE THROAT: 0
ABDOMINAL PAIN: 0
SHORTNESS OF BREATH: 0
VOMITING: 0
CONSTIPATION: 0

## 2021-10-29 NOTE — CARE COORDINATION
10/29/21 Negative covid- vaccinated. Anton transition of care: met with patient while in icu. Insulin gtt, room air. Pt lives at home with spouse. He has used [facility] in the past and has a cpap through [address]. Pt has access to walker, wc, and canes. Pt drove self to hospital will drive home- car in parking lot. CM/SS to follow.  Electronically signed by SUBHASH Godinez on 10/29/2021 at 8:59 AM

## 2021-10-29 NOTE — CONSULTS
Department of Podiatry   Consult Note        Reason for Consult:  Bruised 2nd digit Left and Nails    CHIEF COMPLAINT:  Fatigue / SOB    HISTORY OF PRESENT ILLNESS:                The patient is a 72 y.o. male with significant past medical history of hyperosmolar hyperglycemic state, CAD, morbid obesity, type II diabetes. Podiatry consulted due to concern of wound  To Left 2nd digit. Pt states he is unsure how or when the wound started, but suspects it was within the past few days after tripping over it. No pain to the digit. He does complain of issues with his long toe-nails getting caught on socks. Some SOB, but no nausea, vomiting, fevers, chills, CP,   Past Medical History:        Diagnosis Date    Acid reflux     Acute diastolic (congestive) heart failure ([facility] [address] 06/19/2019    Arthritis     Asthma 04/16/2014    Asthmatic bronchitis , chronic (HCC) 11/28/2016    CAD (coronary artery disease)     Chronic bronchitis (HCC) 04/16/2014    COPD (chronic obstructive pulmonary disease) ([facility])     CB    COPD (chronic obstructive pulmonary disease) (HCC)     Diabetes mellitus ([facility])     Emphysema (subcutaneous) (surgical) resulting from a procedure     H/O cardiovascular stress test 04/24/2021    Lexiscan    Hyperlipidemia     Hypertension     Hypoxemia requiring supplemental oxygen 02/02/2015    LONG TERM ANTICOAGULENT USE     Morbid obesity with BMI of 50.0-59.9, adult ([facility] [address] 11/27/2013    Obesity     Osteoarthritis     Sleep apnea     bilevel positive airway pressure at 13/8 with 2 L oxygen flow     Stage 3 chronic kidney disease (HCC)     Tobacco abuse     Type II or unspecified type diabetes mellitus without mention of complication, not stated as uncontrolled    ·     Past Surgical History:        Procedure Laterality Date    COLONOSCOPY      CORONARY ANGIOPLASTY WITH STENT PLACEMENT  07-23-13    Left main focal eccentric 65% distal stenosis. Large OM1 CX 50% ostial & prox narrow.  Large ramus artery & LAD: Minor plaque w/o sign narrow. RCA: Dom vessel w/25% prox narrow & focal hazy eccentric 99% distal stenosis before origin RPDA & RPLCA. LV: Mild inferior hypokinesis EF 55%. Probable mild AS with 10-15mmHg. Successful PCI distal RCA w/3.5 x 12 mm BMS, 0% res stenosis. Normal distal runoff    CORONARY ARTERY BYPASS GRAFT  09-09-13    Dr Hou Distance; CABG x2, LIMA to LAD, SVG to ramus intermedius; MV repair using 30-mm Future complete ring with magic stitch between A3 and P3; rigid internal fixation of sternum using KLS plates x2; endoscopic vein harvesting of right lower extremity     ECHO COMPL W DOP COLOR FLOW  7/25/2013         HERNIA REPAIR      SINUS SURGERY     ·     Medications Prior to Admission:    · Medications Prior to Admission: insulin glargine (BASAGLAR KWIKPEN) 100 UNIT/ML injection pen, Inject 45 Units into the skin 2 times daily  · metOLazone (ZAROXOLYN) 2.5 MG tablet, Take 2.5 mg by mouth Twice a Week Monday & Thursday  · warfarin (COUMADIN) 2.5 MG tablet, Take 1.25 mg by mouth daily  · levocetirizine (XYZAL) 5 MG tablet, Take 5 mg by mouth nightly  · gabapentin (NEURONTIN) 300 MG capsule, Take 1 capsule by mouth 3 times daily for 30 days.   · spironolactone (ALDACTONE) 25 MG tablet, TAKE ONE TABLET BY MOUTH TWO TIMES A DAY  · hydrOXYzine (VISTARIL) 25 MG capsule, Take one 220 minutes prior to MRI  · bumetanide (BUMEX) 1 MG tablet, Take 1 tablet by mouth 2 times daily  · amiodarone (CORDARONE) 200 MG tablet, Take 1 tablet by mouth 2 times daily  · pantoprazole (PROTONIX) 40 MG tablet, Take 40 mg by mouth daily  · metoprolol succinate (TOPROL XL) 25 MG extended release tablet, Take 1 tablet by mouth 2 times daily  · insulin lispro, 1 Unit Dial, (HUMALOG KWIKPEN) 100 UNIT/ML SOPN, Inject 6 units with meals PLUS               No Insulin  140-199           3 Units  200-249           6 Units  250-299           9 Units  300-349         12 Units  350-400         15 Units  Over 400       18 Units (Patient taking differently: Inject 0-18 Units into the skin 3 times daily (before meals) MAX 70 units daily)  · atorvastatin (LIPITOR) 80 MG tablet, TAKE ONE TABLET BY MOUTH EVERY NIGHT  · montelukast (SINGULAIR) 10 MG tablet, Take 1 tablet by mouth nightly  · CPAP Machine MISC, 1 each by Does not apply route nightly as needed   · aspirin 81 MG tablet, Take 81 mg by mouth nightly   · cevimeline (EVOXAC) 30 MG capsule, Take 1 capsule by mouth nightly  · losartan (COZAAR) 25 MG tablet, Take 1 tablet by mouth daily  · DULoxetine (CYMBALTA) 20 MG extended release capsule, Take 20 mg by mouth daily  · pramipexole (MIRAPEX) 0.25 MG tablet, TAKE TWO TABLETS BY MOUTH THREE TIMES A DAY    Allergies:  Pcn [penicillins] and Sulfa antibiotics    Social History:   · TOBACCO:   reports that he quit smoking about 8 years ago. His smoking use included cigarettes. He has a 45.00 pack-year smoking history. He quit smokeless tobacco use about 50 years ago. His smokeless tobacco use included chew. · ETOH:   reports no history of alcohol use. DRUGS:   Social History     Substance and Sexual Activity   Drug Use No   ·     Family History:       Problem Relation Age of Onset    Cancer Mother         breast    Cancer Father         stomach    Mental Illness Brother     Stroke Brother     Other Brother    ·       REVIEW OF SYSTEMS:    All pertinent review of systems both positive and negative discussed in the HPI      LOWER EXTREMITY EXAMINATION     VASCULAR:  DP and PT pulses weakly palpable to the R, nonpalpable to the left. Feet are both warm to the touch. DERM:  Left 2nd digit bruised. No open wounds. No clinical signs of infection at this time. Picture below     Nails are elongated, thickened, dystrophic, and discolored x 10 to the bilateral LE.     NEUROLOGIC: Protective sensation absent from bilateral LE from ankles to digits. MUSCULOSKELETAL: Negative tenderness to palpation of calves.  4/5 muscular strength 10/29/21  0509 10/29/21  1337   *   < > 132   K 3.8   < > 4.3   CL 87*   < > 93*   CO2 31*   < > 31*   PHOS 2.4*  --   --    BUN 39*   < > 33*   CREATININE 1.8*   < > 1.6*    < > = values in this interval not displayed. Recent Labs     10/28/21  1705 10/29/21  0041   PROT 8.5*  --    INR  --  1.0   APTT  --  24.9       ASSESSMENTS:   Principal Problem:  - Left 2nd digit bruising. No open wounds. No signs of infection at this time. - Onychomycosis x 10 to the bilateral LE    Hyperosmolar hyperglycemic state (HHS) ([facility] [address]           PLAN:  - Patient seen and evalutaed  - Charts and labs reviewed   - Nails debrided x 10 with a sterile pair of nail nippers this afternoon to the bilateral LE. Patient tolerated well without incident. - X-rays: Left foot XR ordered and pending  - Dressing: left undressed for nwo    - Discussed patient with Dr. Rich Dunn   - Will continue to follow patient while they are in-house. Thank you for the opportunity to take part in the patient's care. Please do not hesitate to call for any questions or concerns.     Agree with the note    Glenny Cornejo DPM   Board Certified Foot and Ankle Surgeon  Office: [phone]  Cell:  [phone]

## 2021-10-29 NOTE — CONSULTS
Pulmonary/Critical Care Consult Note    CHIEF COMPLAINT: Fatigue, dizziness, and hyperglycemia    HISTORY OF PRESENT ILLNESS: Patient is a 77-year-old male with history of diabetes mellitus, hypertension, CAD, CABG, obesity, atrial fibrillation, and GERD. Patient presented to the ED on 10/28/2021 after an apparent fall. Patient complains of dizziness and fatigue over the last several days and also noticed elevated blood glucose levels. He apparently attempted to step out of the vehicle and slipped falling forward to his hands. He denies any difficult injuries from the fall or loss of consciousness. Patient denies prior admissions for diabetes complications. Chest x-ray obtained in the ED prior to admission showed no cardiopulmonary abnormality. X-ray left hand showed no acute fracture or dislocation and degenerative changes.       ALLERGY:  Pcn [penicillins] and Sulfa antibiotics    FAMILY HISTORY:  Family History   Problem Relation Age of Onset    Cancer Mother         breast    Cancer Father         stomach    Mental Illness Brother     Stroke Brother     Other Brother        SOCIAL HISTORY:  Social History     Socioeconomic History    Marital status:      Spouse name: Nils Neff Number of children: 1    Years of education: Not on file    Highest education level: 11th grade   Occupational History    Not on file   Tobacco Use    Smoking status: Former Smoker     Packs/day: 1.00     Years: 45.00     Pack years: 45.00     Types: Cigarettes     Quit date: 2013     Years since quittin.2    Smokeless tobacco: Former User     Types: Chew     Quit date: 10/8/1971   Vaping Use    Vaping Use: Never used   Substance and Sexual Activity    Alcohol use: No     Alcohol/week: 0.0 standard drinks     Comment: 1-2 coffee per day    Drug use: No    Sexual activity: Yes     Partners: Female   Other Topics Concern    Not on file   Social History Narrative    19  [facility] reviewed SDOH with Isai Hernandez. He does have money strain but between the income he has coming in and his wife's they are over resources for SARY programs. Offered food panty info to help with end of the month struggles but he declined stating that he tried and was refused due to his income. He belongs to a [employer] and goes weekly so he has some community connections in place. He has an extremely high interest rate on his mortgage which he was encouraged to look into getting lowered to help save money on his house payments. Noticed some difficulty with memory recall. Becomes easily flustered at times. Has no MHI to support [facility] of SARY. States he does not feel depressed or need any MH treatment. Social Determinants of Health     Financial Resource Strain: Low Risk     Difficulty of Paying Living Expenses: Not hard at all   Food Insecurity: No Food Insecurity    Worried About Running Out of Food in the Last Year: Never true    Jennifer of Food in the Last Year: Never true   Transportation Needs:     Lack of Transportation (Medical):      Lack of Transportation (Non-Medical):    Physical Activity:     Days of Exercise per Week:     Minutes of Exercise per Session:    Stress:     Feeling of Stress :    Social Connections:     Frequency of Communication with Friends and Family:     Frequency of Social Gatherings with Friends and Family:     Attends Sikhism Services:     Active Member of Clubs or Organizations:     Attends Club or Organization Meetings:     Marital Status:    Intimate Partner Violence:     Fear of Current or Ex-Partner:     Emotionally Abused:     Physically Abused:     Sexually Abused:        MEDICAL HISTORY:  Past Medical History:   Diagnosis Date    Acid reflux     [facility] 06/19/2019    Arthritis     Asthma 04/16/2014    Asthmatic bronchitis [facility] chronic ([facility] 11/28/2016    CAD (coronary artery disease)     Chronic bronchitis ([facility] 04/16/2014    COPD (chronic obstructive pulmonary disease) (HCC)     CB    COPD (chronic obstructive pulmonary disease) (HCC)     Diabetes mellitus ([facility]     Emphysema (subcutaneous) (surgical) resulting from a procedure     H/O cardiovascular stress test 04/24/2021    Lexiscan    Hyperlipidemia     Hypertension     Hypoxemia requiring supplemental oxygen 02/02/2015    LONG TERM ANTICOAGULENT USE     Morbid obesity with BMI of 50.0-59.9, adult ([facility] 11/27/2013    Obesity     Osteoarthritis     Sleep apnea     bilevel positive airway pressure at 13/8 with 2 L oxygen flow     Stage 3 chronic kidney disease ([facility]     Tobacco abuse     Type II or unspecified type diabetes mellitus without mention of complication, not stated as uncontrolled        MEDICATIONS:   sodium chloride  15 mL/kg IntraVENous Once      sodium chloride 250 mL/hr at 10/28/21 2103    dextrose 5% and 0.45% NaCl with KCl 20 mEq      insulin 11.3 Units/hr (10/28/21 2102)     dextrose, potassium chloride, magnesium sulfate, sodium phosphate IVPB **OR** sodium phosphate IVPB **OR** sodium phosphate IVPB, dextrose 5% and 0.45% NaCl with KCl 20 mEq    REVIEW OF SYSTEMS:  Constitutional: Denies fever, weight loss, night sweats, and reports fatigue. Reports excessive thirst  Skin: Denies pigmentation, dark lesions, and rashes   HEENT: Denies hearing loss, tinnitus, ear drainage, epistaxis, sore throat, and hoarseness. Cardiovascular: Denies palpitations, chest pain, and chest pressure. Respiratory: Denies cough, dyspnea at rest, hemoptysis, apnea, and choking.   Gastrointestinal: Denies nausea, vomiting, poor appetite, diarrhea, heartburn or reflux  Genitourinary: Denies dysuria, reports oliguria, denies urgency or hematuria  Musculoskeletal: Denies myalgias, muscle weakness, and bone pain  Neurological: Reports dizziness, denies vertigo, headache, and focal weakness  Psychological: Denies anxiety and depression  Endocrine: Denies heat intolerance and cold intolerance  Hematopoietic/Lymphatic: Denies bleeding problems and blood transfusions    PHYSICAL EXAM:  Vitals:    10/28/21 2031   BP:    Pulse: 120   Resp: 18   Temp:    SpO2: 100%           O2 Device: None (Room air)    Constitutional: No fever, chills, diaphoresis  HEENT: No head lesions, PERRL, EOMI, dry mouth without lesions, no nasal lesions, no cervical adenopathy palpated   Respiratory: Lungs with equal breath sounds bilaterally, no adventitious sounds auscultated, no accessory muscle use   CV: Regular rate and irregular rhythm, no murmurs, JVD, no leg edema   Abdomen: Soft, non tender, + bowel sounds, no lesions   Skin: Superficial abrasions noted to the right hand, and bilateral knees. Bruising noted to the palmar surface of the left thumb, adequate turgor, no rash, capillary refill <2 seconds   Extremities: Muscular strength 4/4 in 4 limbs, moves 4 limbs spontaneously, distal pulses present   Neurology: Awake and alert, follows commands, moves 4 limbs on command and spontaneously, equal sensation, no dysmetria, neck is supple, no meningitic signs present.      LABS:  WBC   Date Value Ref Range Status   10/28/2021 10.1 4.5 - 11.5 E9/L Final   10/20/2021 9.1 4.5 - 11.5 E9/L Final   06/14/2021 7.8 4.5 - 11.5 E9/L Final     Hemoglobin   Date Value Ref Range Status   10/28/2021 17.4 (H) 12.5 - 16.5 g/dL Final   10/20/2021 16.1 12.5 - 16.5 g/dL Final   06/14/2021 14.6 12.5 - 16.5 g/dL Final     Hematocrit   Date Value Ref Range Status   10/28/2021 48.6 37.0 - 54.0 % Final   10/20/2021 47.1 37.0 - 54.0 % Final   06/14/2021 44.6 37.0 - 54.0 % Final     MCV   Date Value Ref Range Status   10/28/2021 89.8 80.0 - 99.9 fL Final   10/20/2021 90.1 80.0 - 99.9 fL Final   06/14/2021 94.7 80.0 - 99.9 fL Final     Platelets   Date Value Ref Range Status   10/28/2021 260 130 - 450 E9/L Final   10/20/2021 274 130 - 450 E9/L Final   06/14/2021 236 130 - 450 E9/L Final     Sodium   Date Value Ref Range Status   10/28/2021 118 (LL) 132 - 146 mmol/L Final   10/20/2021 125 (L) 132 - 146 mmol/L Final   06/14/2021 132 132 - 146 mmol/L Final     Potassium   Date Value Ref Range Status   10/20/2021 3.3 (L) 3.5 - 5.0 mmol/L Final   06/14/2021 4.0 3.5 - 5.0 mmol/L Final   06/13/2021 4.0 3.5 - 5.0 mmol/L Final     Potassium reflex Magnesium   Date Value Ref Range Status   10/28/2021 2.8 (L) 3.5 - 5.0 mmol/L Final   06/12/2021 4.1 3.5 - 5.0 mmol/L Final   04/18/2021 5.7 (H) 3.5 - 5.0 mmol/L Final     Chloride   Date Value Ref Range Status   10/28/2021 68 (LL) 98 - 107 mmol/L Final   10/20/2021 76 (L) 98 - 107 mmol/L Final   06/14/2021 93 (L) 98 - 107 mmol/L Final     CO2   Date Value Ref Range Status   10/28/2021 31 (H) 22 - 29 mmol/L Final   10/20/2021 32 (H) 22 - 29 mmol/L Final   06/14/2021 28 22 - 29 mmol/L Final     BUN   Date Value Ref Range Status   10/28/2021 51 (H) 6 - 23 mg/dL Final   10/20/2021 40 (H) 6 - 23 mg/dL Final   06/14/2021 34 (H) 6 - 23 mg/dL Final     CREATININE   Date Value Ref Range Status   10/28/2021 2.2 (H) 0.7 - 1.2 mg/dL Final   10/20/2021 1.7 (H) 0.7 - 1.2 mg/dL Final   06/14/2021 1.1 0.7 - 1.2 mg/dL Final     Glucose   Date Value Ref Range Status   10/28/2021  mg/dL Final     Comment:     RR high   10/28/2021 781 (HH) 74 - 99 mg/dL Final   10/20/2021 502 (HH) 74 - 99 mg/dL Final   10/27/2011 118 (H) 70 - 110 mg/dL Final   06/09/2011 96 70 - 110 mg/dL Final     Calcium   Date Value Ref Range Status   10/28/2021 10.5 (H) 8.6 - 10.2 mg/dL Final   10/20/2021 11.0 (H) 8.6 - 10.2 mg/dL Final   06/14/2021 9.4 8.6 - 10.2 mg/dL Final     Total Protein   Date Value Ref Range Status   10/28/2021 8.5 (H) 6.4 - 8.3 g/dL Final   10/20/2021 8.2 6.4 - 8.3 g/dL Final   06/14/2021 7.3 6.4 - 8.3 g/dL Final     Albumin   Date Value Ref Range Status   10/28/2021 4.4 3.5 - 5.2 g/dL Final   10/20/2021 4.1 3.5 - 5.2 g/dL Final   06/14/2021 3.7 3.5 - 5.2 g/dL Final   10/27/2011 4.4 3.2 - 4.8 g/dL Final 06/09/2011 4.2 3.2 - 4.8 g/dL Final     Total Bilirubin   Date Value Ref Range Status   10/28/2021 2.3 (H) 0.0 - 1.2 mg/dL Final   10/20/2021 1.5 (H) 0.0 - 1.2 mg/dL Final   06/14/2021 1.0 0.0 - 1.2 mg/dL Final     Alkaline Phosphatase   Date Value Ref Range Status   10/28/2021 206 (H) 40 - 129 U/L Final   10/20/2021 165 (H) 40 - 129 U/L Final   06/14/2021 142 (H) 40 - 129 U/L Final     AST   Date Value Ref Range Status   10/28/2021 31 0 - 39 U/L Final     Comment:     Specimen is slightly Hemolyzed. Result may be artificially increased. 10/20/2021 41 (H) 0 - 39 U/L Final   06/14/2021 19 0 - 39 U/L Final     ALT   Date Value Ref Range Status   10/28/2021 28 0 - 40 U/L Final   10/20/2021 30 0 - 40 U/L Final   06/14/2021 15 0 - 40 U/L Final     GFR Non-   Date Value Ref Range Status   10/28/2021 30 >=60 mL/min/1.73 Final     Comment:     Chronic Kidney Disease: less than 60 ml/min/1.73 sq.m. Kidney Failure: less than 15 ml/min/1.73 sq.m. Results valid for patients 18 years and older. 10/20/2021 41 >=60 mL/min/1.73 Final     Comment:     Chronic Kidney Disease: less than 60 ml/min/1.73 sq.m. Kidney Failure: less than 15 ml/min/1.73 sq.m. Results valid for patients 18 years and older. 06/14/2021 >60 >=60 mL/min/1.73 Final     Comment:     Chronic Kidney Disease: less than 60 ml/min/1.73 sq.m. Kidney Failure: less than 15 ml/min/1.73 sq.m. Results valid for patients 18 years and older.        GFR    Date Value Ref Range Status   10/28/2021 36  Final   10/20/2021 49  Final   06/14/2021 >60  Final     Magnesium   Date Value Ref Range Status   10/28/2021 2.1 1.6 - 2.6 mg/dL Final   04/25/2021 2.5 1.6 - 2.6 mg/dL Final   04/24/2021 2.5 1.6 - 2.6 mg/dL Final     Phosphorus   Date Value Ref Range Status   04/20/2021 2.9 2.5 - 4.5 mg/dL Final   04/19/2021 4.6 (H) 2.5 - 4.5 mg/dL Final   06/20/2019 4.0 2.5 - 4.5 mg/dL Final     No results for input(s): PH, PO2, PCO2, HCO3, BE, O2SAT in the last 72 hours. RADIOLOGY:  XR HAND LEFT (MIN 3 VIEWS)   Final Result   1. No acute fracture or joint dislocation. 2. Degenerative changes at the 1st carpometacarpal joint and radiocarpal   joint. 3. Sclerotic appearance of the lunate may be related to avascular necrosis. Dedicated radiographs of the left wrist are recommended. XR CHEST PORTABLE   Final Result   No acute cardiopulmonary abnormality. IMPRESSION:  1. Hyperglycemic hyperosmolar state  2. Hypokalemia  3. Hypomagnesemia  4. Hypophosphatemia  5. JORDEN  6. Pseudohyponatremia  7. Subtherapeutic INR  8. Left hand contusion    PLAN:  1. N.p.o.  2. DC insulin drip on admission due to severe hypokalemia, will restart if needed once hypokalemia is corrected since last Accu-Chek was 365, he is not acidotic, and anion gap is normal  3. Aggressive IV fluid resuscitation  4. Will insert central line for rapid potassium replacement if no improvement of hypokalemia on repeat BMP  5. BMP, magnesium, phosphorus every 4 hours we will correct electrolytes as needed  6. Monitor kidney function closely  7. Lovenox 40 mg subcu x1, then restart Coumadin at 5 mg p.o. daily this evening and adjust dose as needed  8. PT and INR daily  9. Cardiac monitor  10. Diabetic counseling prior to discharge  11. Fall precautions    ATTESTATION:  ICU Staff Physician note of personal involvement in Care  As the attending physician, I certify that I personally reviewed the patients history and personally examined the patient to confirm the physical findings described above,  And that I reviewed the relevant imaging studies and available reports. I also discussed the differential diagnosis and all of the proposed management plans with the patient and individuals accompanying the patient to this visit. They had the opportunity to ask questions about the proposed management plans and to have those questions answered.      This patient has a high probability of sudden, clinically significant deterioration, which requires the highest level of physician preparedness to intervene urgently. I managed/supervised life or organ supporting interventions that required frequent physician assessment. I devoted my full attention to the direct care of this patient for the amount of time indicated below. Time I spent with the family or surrogate(s) is included only if the patient was incapable of providing the necessary information or participating in medical decisions - Time devoted to teaching and to any procedures I billed separately is not included.     CRITICAL CARE TIME:  30 minutes    Electronically signed by ARLEEN Villegas CNP on 10/28/2021 at 9:12 PM

## 2021-10-29 NOTE — ED NOTES
I went to assess the potassium being given to the pt. I noticed the line was not connected to the pt and there was a puddle of fluid on the ground. The pt did not receive any of the potassium.  Pt will be given a new bag      Akiko Quintana RN  10/28/21 [id number]

## 2021-10-29 NOTE — PROGRESS NOTES
Pharmacy Consultation Note  (Anticoagulant Dosing and Monitoring)    Initial consult date: 10/29/2021  Consulting physician: Dr. Shayla Mane    Allergies:  Pcn [penicillins] and Sulfa antibiotics    72 y.o. male      Ht Readings from Last 1 Encounters:   10/20/21 5' 6\" (1.676 m)     Wt Readings from Last 1 Encounters:   10/28/21 254 lb 6.6 oz (115.4 kg)     Warfarin Indication Target   INR Range Home   Dose  (if applicable) Diet/Feeding Tube   Atrial fibrillation 2-3 1.25 mg daily      Vitamin K or Blood product Administration  Date                    x  Meds Increasing INR  x  Meds Decreasing INR    x  Amiodarone/Propafenone    Carbamazepine      Antibiotics:_____________________    Cholestyramine      Ciprofloxacin    Enteral Feedings      Clarithromycin/Erythromycin    Phenobarbital      Fluconazole/Itraconazole/Voriconazole    Phenytoin (Variable)      Levothyroxine    Rifampin      Metronidazole    Sucralafate      Phenytoin (Variable)    Vitamin K (including food intake)      Statins/Fenofibrate    Other:_____________________      Sulfamethoxazole/Trimethoprim           Comments regarding medication interactions:     x  Diseases Affecting INR  x  Increased Bleeding Risk      CHF Exacerbation (Increases)    History GI Bleed/PUD      Liver Disease (Increases)    Chronic NSAID Use      Thyroid: Hyper (Increases)  Hypo (Decreases)  x  Chronic ASA/Plavix      Malignancy    Renal Insufficiency/ESRD/HD      History of EtOH Abuse: Acute (Increases)    Chronic (Decreases)    Other:___________________         TSH:   Lab Results   Component Value Date    TSH 7.780 10/20/2021       Hepatic Function Panel:              Lab Results   Component Value Date    ALKPHOS 206 10/28/2021    ALT 28 10/28/2021    AST 31 10/28/2021    PROT 8.5 10/28/2021    BILITOT 2.3 10/28/2021    LABALBU 4.4 10/28/2021    LABALBU 4.4 10/27/2011       Date Warfarin Dose INR Heparin or LMWH HBG/  HCT PLT Comment   10/29 <2.5 mg> 1 Lovenox 40 mg daily -- -- Assessment:  · Pt is a 73 y/o male on warfarin for history of atrial fibrillation.    · Home dose of 1.25 mg per day  · Goal INR: 2-3; INR today: 1    Plan:  · Warfarin 2.5 mg x 1 tonight  · Daily PT/INR until the INR is stable within the therapeutic range  · Pharmacist will follow and monitor/adjust dosing as necessary    Scott Sales [facility] PharmD, BCPS 10/29/2021 1:47 PM

## 2021-10-29 NOTE — CONSULTS
Pulmonary/Critical Care Consult Note    CHIEF COMPLAINT: Fatigue, dizziness, and hyperglycemia    HISTORY OF PRESENT ILLNESS: Patient is a 80-year-old male with history of diabetes mellitus, hypertension, CAD, CABG, obesity, atrial fibrillation, and GERD. Patient presented to the ED on 10/28/2021 after an apparent fall. Patient complains of dizziness and fatigue over the last several days and also noticed elevated blood glucose levels. He apparently attempted to step out of the vehicle and slipped falling forward to his hands. He denies any difficult injuries from the fall or loss of consciousness. Patient denies prior admissions for diabetes complications. Chest x-ray obtained in the ED prior to admission showed no cardiopulmonary abnormality. X-ray left hand showed no acute fracture or dislocation and degenerative changes. Daily progress:  2021: Patient is awake and communicating. He has no complaint. He has no fever, chills, or rigors. Blood sugar has improved. He is scheduled to Lantus and subcu insulin and being transferred to regular medical floor.       ALLERGY:  Pcn [penicillins] and Sulfa antibiotics    FAMILY HISTORY:  Family History   Problem Relation Age of Onset    Cancer Mother         breast    Cancer Father         stomach    Mental Illness Brother     Stroke Brother     Other Brother        SOCIAL HISTORY:  Social History     Socioeconomic History    Marital status:      Spouse name: Rina Perez Number of children: 1    Years of education: Not on file    Highest education level: 11th grade   Occupational History    Not on file   Tobacco Use    Smoking status: Former Smoker     Packs/day: 1.00     Years: 45.00     Pack years: 45.00     Types: Cigarettes     Quit date: 2013     Years since quittin.2    Smokeless tobacco: Former User     Types: [address] date: 10/8/1971   Vaping Use    Vaping Use: Never used   Substance and Sexual Activity    Alcohol use: No     Alcohol/week: 0.0 standard drinks     Comment: 1-2 coffee per day    Drug use: No    Sexual activity: Yes     Partners: Female   Other Topics Concern    Not on file   Social History Narrative    8-26-19  [facility] reviewed SDOH with Saundra Workman. He does have money strain but between the income he has coming in and his wife's they are over resources for SARY programs. Offered food panty info to help with end of the month struggles but he declined stating that he tried and was refused due to his income. He belongs to a [employer] and goes weekly so he has some community connections in place. He has an extremely high interest rate on his mortgage which he was encouraged to look into getting lowered to help save money on his house payments. Noticed some difficulty with memory recall. Becomes easily flustered at times. Has no MHI to support applying for [facility] program of SARY. States he does not feel depressed or need any MH treatment. Social Determinants of Health     Financial Resource Strain: Low Risk     Difficulty of Paying Living Expenses: Not hard at all   Food Insecurity: No Food Insecurity    Worried About Running Out of Food in the Last Year: Never true    Jennifer of Food in the Last Year: Never true   Transportation Needs:     Lack of Transportation (Medical):      Lack of Transportation (Non-Medical):    Physical Activity:     Days of Exercise per Week:     Minutes of Exercise per Session:    Stress:     Feeling of Stress :    Social Connections:     Frequency of Communication with Friends and Family:     Frequency of Social Gatherings with Friends and Family:     Attends Mosque Services:     Active Member of Clubs or Organizations:     Attends Club or Organization Meetings:     Marital Status:    Intimate Partner Violence:     Fear of Current or Ex-Partner:     Emotionally Abused:     Physically Abused:     Sexually Abused:        MEDICAL HISTORY:  Past Medical History:   Diagnosis Date    Acid reflux     Acute diastolic (congestive) heart failure ([facility]) 06/19/2019    Arthritis     Asthma 04/16/2014    Asthmatic bronchitis , chronic ([facility]) 11/28/2016    CAD (coronary artery disease)     Chronic bronchitis ([facility]) 04/16/2014    COPD (chronic obstructive pulmonary disease) ([facility])     CB    COPD (chronic obstructive pulmonary disease) ([facility])     Diabetes mellitus ([facility])     Emphysema (subcutaneous) (surgical) resulting from a procedure     H/O cardiovascular stress test 04/24/2021    Lexiscan    Hyperlipidemia     Hypertension     Hypoxemia requiring supplemental oxygen 02/02/2015    LONG TERM ANTICOAGULENT USE     Morbid obesity with BMI of 50.0-59.9, adult ([facility]) 11/27/2013    Obesity     Osteoarthritis     Sleep apnea     bilevel positive airway pressure at 13/8 with 2 L oxygen flow     Stage 3 chronic kidney disease ([facility])     Tobacco abuse     Type II or unspecified type diabetes mellitus without mention of complication, not stated as uncontrolled        MEDICATIONS:   aspirin  81 mg Oral Nightly    gabapentin  300 mg Oral TID    [Held by provider] metoprolol succinate  25 mg Oral BID    pantoprazole  40 mg Oral Daily    warfarin  5 mg Oral Daily    insulin lispro  0-12 Units SubCUTAneous TID WC    [START ON 10/30/2021] enoxaparin  40 mg SubCUTAneous Daily    amiodarone  200 mg Oral BID    atorvastatin  80 mg Oral Nightly    insulin glargine  45 Units SubCUTAneous BID    insulin lispro  0-3 Units SubCUTAneous Nightly    insulin lispro  8 Units SubCUTAneous TID WC      0.9% NaCl with KCl 40 mEq 100 mL/hr at 10/29/21 1021    dextrose       polyethylene glycol, glucose, dextrose, glucagon (rDNA), dextrose, dextrose, magnesium sulfate, sodium phosphate IVPB **OR** sodium phosphate IVPB **OR** sodium phosphate IVPB    REVIEW OF SYSTEMS:  Constitutional: Denies fever, weight loss, night sweats, and reports fatigue.  Reports excessive thirst  Skin: Hemoglobin   Date Value Ref Range Status   10/28/2021 17.4 (H) 12.5 - 16.5 g/dL Final   10/20/2021 16.1 12.5 - 16.5 g/dL Final   06/14/2021 14.6 12.5 - 16.5 g/dL Final     Hematocrit   Date Value Ref Range Status   10/28/2021 48.6 37.0 - 54.0 % Final   10/20/2021 47.1 37.0 - 54.0 % Final   06/14/2021 44.6 37.0 - 54.0 % Final     MCV   Date Value Ref Range Status   10/28/2021 89.8 80.0 - 99.9 fL Final   10/20/2021 90.1 80.0 - 99.9 fL Final   06/14/2021 94.7 80.0 - 99.9 fL Final     Platelets   Date Value Ref Range Status   10/28/2021 260 130 - 450 E9/L Final   10/20/2021 274 130 - 450 E9/L Final   06/14/2021 236 130 - 450 E9/L Final     Sodium   Date Value Ref Range Status   10/29/2021 130 (L) 132 - 146 mmol/L Final   10/29/2021 130 (L) 132 - 146 mmol/L Final   10/28/2021 133 132 - 146 mmol/L Final     Potassium   Date Value Ref Range Status   10/29/2021 3.8 3.5 - 5.0 mmol/L Final   10/29/2021 2.9 (L) 3.5 - 5.0 mmol/L Final   10/28/2021 1.7 (LL) 3.5 - 5.0 mmol/L Final     Potassium reflex Magnesium   Date Value Ref Range Status   10/28/2021 2.8 (L) 3.5 - 5.0 mmol/L Final   06/12/2021 4.1 3.5 - 5.0 mmol/L Final   04/18/2021 5.7 (H) 3.5 - 5.0 mmol/L Final     Chloride   Date Value Ref Range Status   10/29/2021 87 (L) 98 - 107 mmol/L Final   10/29/2021 84 (L) 98 - 107 mmol/L Final   10/28/2021 97 (L) 98 - 107 mmol/L Final     CO2   Date Value Ref Range Status   10/29/2021 31 (H) 22 - 29 mmol/L Final   10/29/2021 31 (H) 22 - 29 mmol/L Final   10/28/2021 25 22 - 29 mmol/L Final     BUN   Date Value Ref Range Status   10/29/2021 39 (H) 6 - 23 mg/dL Final   10/29/2021 43 (H) 6 - 23 mg/dL Final   10/28/2021 37 (H) 6 - 23 mg/dL Final     CREATININE   Date Value Ref Range Status   10/29/2021 1.8 (H) 0.7 - 1.2 mg/dL Final   10/29/2021 1.7 (H) 0.7 - 1.2 mg/dL Final   10/28/2021 1.3 (H) 0.7 - 1.2 mg/dL Final     Glucose   Date Value Ref Range Status   10/29/2021 403 (H) 74 - 99 mg/dL Final   10/29/2021 409 (H) 74 - 99 mg/dL Final   10/28/2021 365 (H) 74 - 99 mg/dL Final   10/27/2011 118 (H) 70 - 110 mg/dL Final   06/09/2011 96 70 - 110 mg/dL Final     Calcium   Date Value Ref Range Status   10/29/2021 8.7 8.6 - 10.2 mg/dL Final   10/29/2021 8.4 (L) 8.6 - 10.2 mg/dL Final   10/28/2021 5.9 (LL) 8.6 - 10.2 mg/dL Final     Total Protein   Date Value Ref Range Status   10/28/2021 8.5 (H) 6.4 - 8.3 g/dL Final   10/20/2021 8.2 6.4 - 8.3 g/dL Final   06/14/2021 7.3 6.4 - 8.3 g/dL Final     Albumin   Date Value Ref Range Status   10/28/2021 4.4 3.5 - 5.2 g/dL Final   10/20/2021 4.1 3.5 - 5.2 g/dL Final   06/14/2021 3.7 3.5 - 5.2 g/dL Final   10/27/2011 4.4 3.2 - 4.8 g/dL Final   06/09/2011 4.2 3.2 - 4.8 g/dL Final     Total Bilirubin   Date Value Ref Range Status   10/28/2021 2.3 (H) 0.0 - 1.2 mg/dL Final   10/20/2021 1.5 (H) 0.0 - 1.2 mg/dL Final   06/14/2021 1.0 0.0 - 1.2 mg/dL Final     Alkaline Phosphatase   Date Value Ref Range Status   10/28/2021 206 (H) 40 - 129 U/L Final   10/20/2021 165 (H) 40 - 129 U/L Final   06/14/2021 142 (H) 40 - 129 U/L Final     AST   Date Value Ref Range Status   10/28/2021 31 0 - 39 U/L Final     Comment:     Specimen is slightly Hemolyzed. Result may be artificially increased. 10/20/2021 41 (H) 0 - 39 U/L Final   06/14/2021 19 0 - 39 U/L Final     ALT   Date Value Ref Range Status   10/28/2021 28 0 - 40 U/L Final   10/20/2021 30 0 - 40 U/L Final   06/14/2021 15 0 - 40 U/L Final     GFR Non-   Date Value Ref Range Status   10/29/2021 38 >=60 mL/min/1.73 Final     Comment:     Chronic Kidney Disease: less than 60 ml/min/1.73 sq.m. Kidney Failure: less than 15 ml/min/1.73 sq.m. Results valid for patients 18 years and older. 10/29/2021 41 >=60 mL/min/1.73 Final     Comment:     Chronic Kidney Disease: less than 60 ml/min/1.73 sq.m. Kidney Failure: less than 15 ml/min/1.73 sq.m. Results valid for patients 18 years and older.      10/28/2021 55 >=60 mL/min/1.73 Final Comment:     Chronic Kidney Disease: less than 60 ml/min/1.73 sq.m. Kidney Failure: less than 15 ml/min/1.73 sq.m. Results valid for patients 18 years and older. GFR    Date Value Ref Range Status   10/29/2021 46  Final   10/29/2021 49  Final   10/28/2021 >60  Final     Magnesium   Date Value Ref Range Status   10/29/2021 2.4 1.6 - 2.6 mg/dL Final   10/29/2021 2.1 1.6 - 2.6 mg/dL Final   10/28/2021 1.2 (L) 1.6 - 2.6 mg/dL Final     Phosphorus   Date Value Ref Range Status   10/29/2021 2.4 (L) 2.5 - 4.5 mg/dL Final   10/29/2021 1.3 (L) 2.5 - 4.5 mg/dL Final   04/20/2021 2.9 2.5 - 4.5 mg/dL Final     No results for input(s): PH, PO2, PCO2, HCO3, BE, O2SAT in the last 72 hours. RADIOLOGY:  XR HAND LEFT (MIN 3 VIEWS)   Final Result   1. No acute fracture or joint dislocation. 2. Degenerative changes at the 1st carpometacarpal joint and radiocarpal   joint. 3. Sclerotic appearance of the lunate may be related to avascular necrosis. Dedicated radiographs of the left wrist are recommended. XR CHEST PORTABLE   Final Result   No acute cardiopulmonary abnormality. IMPRESSION:  1. Hyperglycemic hyperosmolar state  2. Hypokalemia  3. Hypomagnesemia  4. Hypophosphatemia  5. JORDEN  6. Pseudohyponatremia  7. Subtherapeutic INR  8. Left hand contusion    PLAN:  1. Resume diabetic diet  2. Insulin sliding scale and Lantus  3. -Fluid hydration  4. Monitor potassium level  5. Daily BMP  6. Monitor kidney function closely  7. VT prophylaxis and continue with Coumadin with pharmacy consult to manage Coumadin. 8. PT and INR daily  9. Transfer telemetry floor  10. Diabetic counseling prior to discharge  11. Fall precautions  12.  Podiatry consult    ATTESTATION:   Staff Physician note of personal involvement in Care  As the attending physician, I certify that I personally reviewed the patients history and personally examined the patient to confirm the physical findings described above,  And that I reviewed the relevant imaging studies and available reports. I also discussed the differential diagnosis and all of the proposed management plans with the patient and individuals accompanying the patient to this visit. They had the opportunity to ask questions about the proposed management plans and to have those questions answered.          CRITICAL CARE TIME:  30 minutes    Electronically signed by Augusta Mejía MD on 10/29/2021 at 11:36 AM

## 2021-10-29 NOTE — PROGRESS NOTES
Physical Therapy Initial Evaluation/Plan of Care    Room #:  IC07/IC07-01  Patient Name: Annie Teixeira  YOB: 1955  MRN: [id number]    Date of Service: 10/29/2021     Tentative placement recommendation: Home Health Physical Therapy   Equipment recommendation: To be determined      Evaluating Physical Therapist: William Santiago #[id number]      Specific Provider Orders/Date/Referring Provider :   10/29/21 0930   PT eval and treat Start: 10/29/21 0930, End: 10/29/21 0930, ONE TIME, Standing Count: 1 Occurrences, R    Bhaskar Santiago, DO    Admitting Diagnosis:   Hyperosmolar hyperglycemic state (HHS) ([facility] [E11.00, E11.65]      Surgery: none  Visit Diagnoses       Codes    Hypokalemia     E87.6    JORDEN (acute kidney injury) ([facility]     N17.9    Fall, initial encounter     [address] XXXA          Patient Active Problem List   Diagnosis    CAD (coronary artery disease)    Hypertension    Type 2 diabetes mellitus with hyperglycemia, with long-term current use of insulin (HCC)    Tobacco abuse    PAF (paroxysmal atrial fibrillation) ([facility]    Status post coronary artery bypass graft    H/O mitral valve repair    Morbid obesity with BMI of 50.0-59.9, adult ([address]    Chronic bronchitis ([address]    Sleep apnea    Gastroesophageal reflux disease without esophagitis    Hyperlipidemia    Muscular deconditioning    Acute diastolic (congestive) heart failure (HCC)    Acute diastolic heart failure (HCC)    Iron deficiency anemia, unspecified    Acute systolic/diastolic congestive heart failure (HCC)    Atrial fibrillation with RVR (HCC)    Ischemic cardiomyopathy    Nonrheumatic tricuspid valve regurgitation    Chronic anticoagulation    Stage 3 chronic kidney disease (HCC)    Acute on chronic congestive heart failure (HCC)    Dizziness    Hyperosmolar hyperglycemic state (HHS) ([address]        ASSESSMENT of Current Deficits Patient exhibits decreased strength, balance and endurance impairing gait distance and tolerance to activity. Pt fatigues quickly and slightly impulsive. Cues for safety required. The patient will benefit from continued skilled therapy to increase strength and improve balance for safe functional mobility, to decrease risk of falls, and to meet goals at discharge. PHYSICAL THERAPY  PLAN OF CARE       Physical therapy plan of care is established based on physician order,  patient diagnosis and clinical assessment    Current Treatment Recommendations:    -Bed Mobility: Lower extremity exercises   -Sitting Balance: Incorporate reaching activities to activate trunk muscles   -Standing Balance: Perform strengthening exercises in standing to promote motor control with or without upper extremity support   -Transfers: Provide instruction on proper hand and foot position for adequate transfer of weight onto lower extremities and use of gait device if needed and Cues for hand placement, technique and safety. Provide stabilization to prevent fall   -Gait: Gait training and Standing activities to improve: base of support, weight shift, weight bearing    -Endurance: Utilize Supervised activities to increase level of endurance to allow for safe functional mobility including transfers and gait   -Stairs: Stair training with instruction on proper technique and hand placement on rail    PT long term treatment goals are located in below grid    Patient and or family understand(s) diagnosis, prognosis, and plan of care. Frequency of treatments: Patient will be seen  daily.          Prior Level of Function: Patient ambulated independently   Rehab Potential: good  for baseline    Past medical history:   Past Medical History:   Diagnosis Date    Acid reflux     Acute diastolic (congestive) heart failure ([address] 06/19/2019    Arthritis     Asthma 04/16/2014    Asthmatic bronchitis , chronic (HCC) 11/28/2016    CAD (coronary artery disease)     Chronic bronchitis ([facility] 04/16/2014    COPD (chronic obstructive pulmonary disease) ([facility]     CB    COPD (chronic obstructive pulmonary disease) [facility]     Diabetes mellitus ([facility]     Emphysema (subcutaneous) (surgical) resulting from a procedure     H/O cardiovascular stress test 04/24/2021    Lexiscan    Hyperlipidemia     Hypertension     Hypoxemia requiring supplemental oxygen 02/02/2015    LONG TERM ANTICOAGULENT USE     Morbid obesity with BMI of 50.0-59.9, adult ([facility] 11/27/2013    Obesity     Osteoarthritis     Sleep apnea     bilevel positive airway pressure at 13/8 with 2 L oxygen flow     Stage 3 chronic kidney disease ([facility]     Tobacco abuse     Type II or unspecified type diabetes mellitus without mention of complication, not stated as uncontrolled      Past Surgical History:   Procedure Laterality Date    COLONOSCOPY      CORONARY ANGIOPLASTY WITH STENT PLACEMENT  07-23-13    Left main focal eccentric 65% distal stenosis. Large OM1 CX 50% ostial & prox narrow. Large ramus artery & LAD: Minor plaque w/o sign narrow. RCA: Dom vessel w/25% prox narrow & focal hazy eccentric 99% distal stenosis before origin RPDA & RPLCA. LV: Mild inferior hypokinesis EF 55%. Probable mild AS with 10-15mmHg. Successful PCI distal RCA w/3.5 x 12 mm BMS, 0% res stenosis. Normal distal runoff    CORONARY ARTERY BYPASS GRAFT  09-09-13    Dr Yeni Bourgeois; CABG x2, LIMA to LAD, SVG to ramus intermedius; MV repair using 30-mm Future complete ring with magic stitch between A3 and P3; rigid internal fixation of sternum using KLS plates x2; endoscopic vein harvesting of right lower extremity     ECHO COMPL W DOP COLOR FLOW  7/25/2013         HERNIA REPAIR      SINUS SURGERY         SUBJECTIVE:    Precautions: Up with assistance, falls , slightly impulsive   Social history: Patient lives with spouse in a apartment .   with 12 steps  to enter with Rail  Tub shower     Equipment owned: [facility],      [address] [place] [address],[address] Mobility Inpatient   How much difficulty turning over in bed?: A Little  How much difficulty sitting down on / standing up from a chair with arms?: A Little  How much difficulty moving from lying on back to sitting on side of bed?: A Little  How much help from another person moving to and from a bed to a chair?: A Little  How much help from another person needed to walk in hospital room?: A Little  How much help from another person for climbing 3-5 steps with a railing?: A Little  AM-PAC Inpatient Mobility Raw Score : 18  AM-PAC Inpatient T-Scale Score : 43.63  Mobility Inpatient CMS 0-100% Score: 46.58  Mobility Inpatient CMS G-Code Modifier : CK    Nursing cleared patient for PT evaluation. The admitting diagnosis and active problem list as listed above have been reviewed prior to the initiation of this evaluation. OBJECTIVE;   Initial Evaluation  Date: 10/29/2021 Treatment Date:     Short Term/ Long Term   Goals   Was pt agreeable to Eval/treatment? Yes  To be met in 5 days   Pain level   0/10       Bed Mobility    Rolling: Supervision     Supine to sit: Minimal assist of 1    Sit to supine: Minimal assist of 1    Scooting: Minimal assist of 1    Rolling: Independent    Supine to sit:  Independent    Sit to supine: Independent    Scooting: Independent     Transfers Sit to stand: Minimal assist of 1  Sit to stand: Independent    Ambulation    10 feet using  wheeled walker with Minimal assist of 1   cues for sequencing, upright posture, walker approximation, safety and proper hand placement   50 feet using  least restrictive device with Modified Independent    Stair negotiation: ascended and descended   Not assessed     10 steps with HR and supervision    ROM Within functional limits    Increase range of motion 10% of affected joints    Strength BUE:  4/5  RLE:  4/5  LLE:  4-/5  Increase strength in affected mm groups by 1/3 grade   Balance Sitting EOB:  good -  Dynamic Standing:  fair + wheeled walker    Sitting EOB:  good   Dynamic Standing: good      Patient is Alert & Oriented x person, place, time and situation and follows directions    Sensation:  Patient  reports neuropathy bilateral lower extremities     Edema:  no   Endurance: fair     Vitals: room air   Blood Pressure at rest  Blood Pressure during session    Heart Rate at rest 95 Heart Rate during session 104   SPO2 at rest 92%  SPO2 during session 90%     Patient education  Patient educated on role of Physical Therapy, risks of immobility, safety and plan of care,  importance of mobility while in hospital , purse lip breathing, ankle pumps, quad set and glut set for edema control, blood clot prevention and safety      Patient response to education:   Pt verbalized understanding Pt demonstrated skill Pt requires further education in this area   Yes Partial Yes      Treatment:  Patient practiced and was instructed/facilitated in the following treatment: Patient assisted to EOB. Sat edge of bed 10 minutes with Supervision  to increase dynamic sitting balance and activity tolerance. seated challenges. Pt stood, amb in room, and sat down in chair. Performed seated exercise. Pt fatigues quickly and shortness of breath noted. Therapeutic Exercises:  long arc quad  x 10 reps. At end of session, patient in chair with  call light and phone within reach,  all lines and tubes intact, nursing notified. Patient would benefit from continued skilled Physical Therapy to improve functional independence and quality of life. Patient's/ family goals   home    Time in  1412  Time out  1429    Total Treatment Time  0 minutes    Evaluation time includes thorough review of current medical information, gathering information on past medical history/social history and prior level of function, completion of standardized testing/informal observation of tasks, assessment of data, and development of Plan of care and goals.      CPT codes:  Low Complexity PT evaluation (89333)  No treatment    Sha Santiago, PT

## 2021-10-29 NOTE — H&P
Department of Internal Medicine  History and Physical    PCP: Dr. Manjit Tan   Admitting Physician: Dr. Chrissy Matta  Consultants: Critical care      CHIEF COMPLAINT: Failure to thrive    HISTORY OF PRESENT ILLNESS:    Roma Casillas is a 69-year-old gentleman who presented to [address] [place] [address] emergency department for the evaluation of persistent hyperglycemia resulting in generalized malaise and fatigue with inability to complete activities of daily living. Work-up in the emergency department revealed marked hyperglycemia but without overt acidosis. He received aggressive IV fluid resuscitation and was admitted to the intensive care unit for close monitoring. His blood sugar has trended downward accordingly and we are beginning to resume home medications. He has an extensive past medical history. His mentation has improved and he would like to work with the therapy teams. We discussed transferring from the intensive care unit.     PAST MEDICAL Hx:  Past Medical History:   Diagnosis Date    Acid reflux     Acute diastolic (congestive) heart failure (HCC) 06/19/2019    Arthritis     Asthma 04/16/2014    Asthmatic bronchitis , chronic (HCC) 11/28/2016    CAD (coronary artery disease)     Chronic bronchitis (HCC) 04/16/2014    COPD (chronic obstructive pulmonary disease) (HCC)     CB    COPD (chronic obstructive pulmonary disease) (HCC)     Diabetes mellitus (HCC)     Emphysema (subcutaneous) (surgical) resulting from a procedure     H/O cardiovascular stress test 04/24/2021    Lexiscan    Hyperlipidemia     Hypertension     Hypoxemia requiring supplemental oxygen 02/02/2015    LONG TERM ANTICOAGULENT USE     Morbid obesity with BMI of 50.0-59.9, adult ([facility] 11/27/2013    Obesity     Osteoarthritis     Sleep apnea     bilevel positive airway pressure at 13/8 with 2 L oxygen flow     Stage 3 chronic kidney disease (HCC)     Tobacco abuse     Type II or unspecified type diabetes mellitus without mention of complication, not stated as uncontrolled        PAST SURGICAL Hx:   Past Surgical History:   Procedure Laterality Date    COLONOSCOPY      CORONARY ANGIOPLASTY WITH STENT PLACEMENT  07-23-13    Left main focal eccentric 65% distal stenosis. Large OM1 CX 50% ostial & prox narrow. Large ramus artery & LAD: Minor plaque w/o sign narrow. RCA: Dom vessel w/25% prox narrow & focal hazy eccentric 99% distal stenosis before origin RPDA & RPLCA. LV: Mild inferior hypokinesis EF 55%. Probable mild AS with 10-15mmHg. Successful PCI distal RCA w/3.5 x 12 mm BMS, 0% res stenosis. Normal distal runoff    CORONARY ARTERY BYPASS GRAFT  09-09-13    Dr Roldan Le; CABG x2, LIMA to LAD, SVG to ramus intermedius; MV repair using 30-mm Future complete ring with magic stitch between A3 and P3; rigid internal fixation of sternum using KLS plates x2; endoscopic vein harvesting of right lower extremity     ECHO COMPL W DOP COLOR FLOW  7/25/2013         HERNIA REPAIR      SINUS SURGERY         FAMILY Hx:  Family History   Problem Relation Age of Onset    Cancer Mother         breast    Cancer Father         stomach    Mental Illness Brother     Stroke Brother     Other Brother        HOME MEDICATIONS:  Prior to Admission medications    Medication Sig Start Date End Date Taking? Authorizing Provider   insulin glargine (BASAGLAR KWIKPEN) 100 UNIT/ML injection pen Inject 45 Units into the skin 2 times daily   Yes Historical Provider, MD   metOLazone (ZAROXOLYN) 2.5 MG tablet Take 2.5 mg by mouth Twice a Week Monday & Thursday   Yes Historical Provider, MD   warfarin (COUMADIN) 2.5 MG tablet Take 1.25 mg by mouth daily   Yes Historical Provider, MD   levocetirizine (XYZAL) 5 MG tablet Take 5 mg by mouth nightly   Yes Historical Provider, MD   gabapentin (NEURONTIN) 300 MG capsule Take 1 capsule by mouth 3 times daily for 30 days.  10/20/21 11/19/21 Yes Morales Machuca,    spironolactone (ALDACTONE) 25 MG tablet TAKE ONE TABLET BY MOUTH TWO TIMES A DAY 10/11/21  Yes Itzel Brewer MD   hydrOXYzine (VISTARIL) 25 MG capsule Take one 220 minutes prior to MRI 8/31/21  Yes Moreno Mendez DO   bumetanide (BUMEX) 1 MG tablet Take 1 tablet by mouth 2 times daily 7/20/21  Yes Moreno Mendez DO   amiodarone (CORDARONE) 200 MG tablet Take 1 tablet by mouth 2 times daily 7/16/21  Yes Moreno Mendez DO   pantoprazole (PROTONIX) 40 MG tablet Take 40 mg by mouth daily   Yes Historical Provider, MD   metoprolol succinate (TOPROL XL) 25 MG extended release tablet Take 1 tablet by mouth 2 times daily 6/9/21  Yes Moreno Mendez DO   insulin lispro, 1 Unit Dial, (HUMALOG KWIKPEN) 100 UNIT/ML SOPN Inject 6 units with meals PLUS                 No Insulin   140-199           3 Units   200-249           6 Units   250-299           9 Units   300-349         12 Units   350-400         15 Units   Over 400       18 Units  Patient taking differently: Inject 0-18 Units into the skin 3 times daily (before meals) MAX 70 units daily 4/25/21  Yes Danny Osullivan,    atorvastatin (LIPITOR) 80 MG tablet TAKE ONE TABLET BY MOUTH EVERY NIGHT 4/1/21  Yes Moreno Mendez DO   montelukast (SINGULAIR) 10 MG tablet Take 1 tablet by mouth nightly 4/1/21  Yes Moreno Mendez DO   CPAP Machine MISC 1 each by Does not apply route nightly as needed    Yes Historical Provider, MD   aspirin 81 MG tablet Take 81 mg by mouth nightly    Yes Historical Provider, [facility] 30 MG capsule Take 1 capsule by mouth nightly 10/20/21   Moreno Mendez DO   losartan (COZAAR) 25 MG tablet Take 1 tablet by mouth daily 8/16/21   Moreno Mendez DO   DULoxetine (CYMBALTA) 20 MG extended release capsule Take 20 mg by mouth daily    Historical Provider, MD   pramipexole (MIRAPEX) 0.25 MG tablet TAKE TWO TABLETS BY MOUTH THREE TIMES A DAY 4/29/21   Moreno Mendez DO       ALLERGIES:  Pcn [penicillins] and Sulfa antibiotics    SOCIAL Hx:  Social History     Socioeconomic History    Marital status:      Spouse name: Vivian Lagos Number of children: 1    Years of education: Not on file    Highest education level: 11th grade   Occupational History    Not on file   Tobacco Use    Smoking status: Former Smoker     Packs/day: 1.00     Years: 45.00     Pack years: 45.00     Types: Cigarettes     Quit date: 2013     Years since quittin.2    Smokeless tobacco: Former User     Types: [address] date: 10/8/1971   Vaping Use    Vaping Use: Never used   Substance and Sexual Activity    Alcohol use: No     Alcohol/week: 0.0 standard drinks     Comment: 1-2 coffee per day    Drug use: No    Sexual activity: Yes     Partners: Female   Other Topics Concern    Not on file   Social History Narrative    19  [facility] reviewed SDOH with [facility]. He does have money strain but between the income he has coming in and his wife's they are over resources for SARY programs. Offered food panty info to help with end of the month struggles but he declined stating that he tried and was refused due to his income. He belongs to a [employer] and goes weekly so he has some community connections in place. He has an extremely high interest rate on his mortgage which he was encouraged to look into getting lowered to help save money on his house payments. Noticed some difficulty with memory recall. Becomes easily flustered at times. Has no MHI to support applying for [facility] program of SARY. States he does not feel depressed or need any MH treatment. Social Determinants of Health     Financial Resource Strain: Low Risk     Difficulty of Paying Living Expenses: Not hard at all   Food Insecurity: No Food Insecurity    Worried About Running Out of Food in the Last Year: Never true    Jennifer of Food in the Last Year: Never true   Transportation Needs:     Lack of Transportation (Medical):      Lack of Transportation (Non-Medical):    Physical Activity:     Days of Exercise per Week:     Minutes of Exercise per Session:    Stress:     Feeling of Stress :    Social Connections:     Frequency of Communication with Friends and Family:     Frequency of Social Gatherings with Friends and Family:     Attends Orthodoxy Services:     Active Member of Clubs or Organizations:     Attends Club or Organization Meetings:     Marital Status:    Intimate Partner Violence:     Fear of Current or Ex-Partner:     Emotionally Abused:     Physically Abused:     Sexually Abused:        ROS:  General:   Improving malaise and fatigue. No current fevers or chills. Psychological:   Denies anxiety, disorientation or hallucinations    ENT:    Denies epistaxis, headaches, vertigo or visual changes    Cardiovascular:   Denies any chest pain, irregular heartbeats, or palpitations. No paroxysmal nocturnal dyspnea. Respiratory:   Denies shortness of breath, coughing, sputum production, hemoptysis, or wheezing. No orthopnea. Gastrointestinal:   Denies nausea, vomiting, diarrhea, or constipation. Denies any abdominal pain. Denies change in bowel habits or stools. Genito-Urinary:    Denies any urgency, frequency, hematuria. Voiding without difficulty. Musculoskeletal:   Denies joint pain, joint stiffness, joint swelling or muscle pain    Neurology:    Denies any headache or focal neurological deficits. No weakness or paresthesia. Derm:    Denies any rashes, ulcers, or excoriations. Denies bruising. Extremities:   Denies any lower extremity swelling or edema. PHYSICAL EXAM:  VITALS:  Vitals:    10/29/21 0700   BP: (!) 112/45   Pulse: 99   Resp: 16   Temp:    SpO2: 95%         CONSTITUTIONAL:    Awake, alert, cooperative, no apparent distress, and appears stated age    EYES:    PERRL, EOMI, sclera clear, conjunctiva normal    ENT:    Normocephalic, atraumatic, sinuses nontender on palpation. External ears without lesions. Oral pharynx with moist mucus membranes.   Tonsils without erythema or exudates. NECK:    Supple, symmetrical, trachea midline, no adenopathy, thyroid symmetric, not enlarged and no tenderness, skin normal, no bruits, no JVD    HEMATOLOGIC/LYMPHATICS:    No cervical lymphadenopathy and no supraclavicular lymphadenopathy    LUNGS:    Symmetric. No increased work of breathing, good air exchange, clear to auscultation bilaterally, no wheezes, rhonchi, or rales,     CARDIOVASCULAR:    Normal apical impulse, regular rate and rhythm, normal S1 and S2, no S3 or S4, and no murmur noted    ABDOMEN:    No scars, normal bowel sounds, soft, non-distended, non-tender, no masses palpated, no hepatosplenomegaly, no rebound or guarding elicited on palpation     MUSCULOSKELETAL:    There is no redness, warmth, or swelling of the joints. Full range of motion noted. Motor strength is 5 out of 5 all extremities bilaterally. Tone is normal.    NEUROLOGIC:    Awake, alert, oriented to name, place and time. Cranial nerves II-XII are grossly intact. Motor is 5 out of 5 bilaterally. SKIN:    No bruising or bleeding. No redness, warmth, or swelling    EXTREMITIES:    Peripheral pulses present. No edema, cyanosis, or swelling. OSTEOPATHIC:    Examined in seated and supine positions. Normal thoracic kyphosis and lumbar lordosis. No acute somatic dysfunction.     LABORATORY DATA:  CBC with Differential:    Lab Results   Component Value Date    WBC 10.1 10/28/2021    RBC 5.41 10/28/2021    HGB 17.4 10/28/2021    HCT 48.6 10/28/2021     10/28/2021    MCV 89.8 10/28/2021    MCH 32.2 10/28/2021    MCHC 35.8 10/28/2021    RDW 14.3 10/28/2021    SEGSPCT 61 01/20/2014    LYMPHOPCT 13.3 10/28/2021    MONOPCT 8.3 10/28/2021    BASOPCT 0.6 10/28/2021    MONOSABS 0.84 10/28/2021    LYMPHSABS 1.35 10/28/2021    EOSABS 0.02 10/28/2021    BASOSABS 0.06 10/28/2021     BMP:    Lab Results   Component Value Date     10/29/2021    K 3.8 10/29/2021    K 2.8 10/28/2021    CL 87 10/29/2021    CO2 31 9:25 AM EDT

## 2021-10-30 LAB
ANION GAP SERPL CALCULATED.3IONS-SCNC: 6 MMOL/L (ref 7–16)
BASOPHILS ABSOLUTE: 0.04 E9/L (ref 0–0.2)
BASOPHILS RELATIVE PERCENT: 0.6 % (ref 0–2)
BUN BLDV-MCNC: 23 MG/DL (ref 6–23)
CALCIUM SERPL-MCNC: 8.2 MG/DL (ref 8.6–10.2)
CHLORIDE BLD-SCNC: 99 MMOL/L (ref 98–107)
CO2: 28 MMOL/L (ref 22–29)
CREAT SERPL-MCNC: 1.3 MG/DL (ref 0.7–1.2)
EOSINOPHILS ABSOLUTE: 0.09 E9/L (ref 0.05–0.5)
EOSINOPHILS RELATIVE PERCENT: 1.3 % (ref 0–6)
GFR AFRICAN AMERICAN: >60
GFR NON-AFRICAN AMERICAN: 55 ML/MIN/1.73
GLUCOSE BLD-MCNC: 220 MG/DL (ref 74–99)
HCT VFR BLD CALC: 41.8 % (ref 37–54)
HEMOGLOBIN: 13.8 G/DL (ref 12.5–16.5)
IMMATURE GRANULOCYTES #: 0.04 E9/L
IMMATURE GRANULOCYTES %: 0.6 % (ref 0–5)
INR BLD: 1
LYMPHOCYTES ABSOLUTE: 1.54 E9/L (ref 1.5–4)
LYMPHOCYTES RELATIVE PERCENT: 22.5 % (ref 20–42)
MAGNESIUM: 2.2 MG/DL (ref 1.6–2.6)
MCH RBC QN AUTO: 31.9 PG (ref 26–35)
MCHC RBC AUTO-ENTMCNC: 33 % (ref 32–34.5)
MCV RBC AUTO: 96.8 FL (ref 80–99.9)
METER GLUCOSE: 161 MG/DL (ref 74–99)
METER GLUCOSE: 162 MG/DL (ref 74–99)
METER GLUCOSE: 193 MG/DL (ref 74–99)
METER GLUCOSE: 261 MG/DL (ref 74–99)
MONOCYTES ABSOLUTE: 0.5 E9/L (ref 0.1–0.95)
MONOCYTES RELATIVE PERCENT: 7.3 % (ref 2–12)
NEUTROPHILS ABSOLUTE: 4.64 E9/L (ref 1.8–7.3)
NEUTROPHILS RELATIVE PERCENT: 67.7 % (ref 43–80)
PDW BLD-RTO: 15.4 FL (ref 11.5–15)
PHOSPHORUS: 1.9 MG/DL (ref 2.5–4.5)
PLATELET # BLD: 163 E9/L (ref 130–450)
PMV BLD AUTO: 10.4 FL (ref 7–12)
POTASSIUM SERPL-SCNC: 4 MMOL/L (ref 3.5–5)
PROTHROMBIN TIME: 11.6 SEC (ref 9.3–12.4)
RBC # BLD: 4.32 E12/L (ref 3.8–5.8)
SODIUM BLD-SCNC: 133 MMOL/L (ref 132–146)
URINE CULTURE, ROUTINE: NORMAL
WBC # BLD: 6.9 E9/L (ref 4.5–11.5)

## 2021-10-30 PROCEDURE — 36415 COLL VENOUS BLD VENIPUNCTURE: CPT

## 2021-10-30 PROCEDURE — 83735 ASSAY OF MAGNESIUM: CPT

## 2021-10-30 PROCEDURE — 6370000000 HC RX 637 (ALT 250 FOR IP): Performed by: INTERNAL MEDICINE

## 2021-10-30 PROCEDURE — 85610 PROTHROMBIN TIME: CPT

## 2021-10-30 PROCEDURE — 82962 GLUCOSE BLOOD TEST: CPT

## 2021-10-30 PROCEDURE — 6360000002 HC RX W HCPCS: Performed by: INTERNAL MEDICINE

## 2021-10-30 PROCEDURE — 85025 COMPLETE CBC W/AUTO DIFF WBC: CPT

## 2021-10-30 PROCEDURE — 80048 BASIC METABOLIC PNL TOTAL CA: CPT

## 2021-10-30 PROCEDURE — 1200000000 HC SEMI PRIVATE

## 2021-10-30 PROCEDURE — 84100 ASSAY OF PHOSPHORUS: CPT

## 2021-10-30 PROCEDURE — 6370000000 HC RX 637 (ALT 250 FOR IP): Performed by: NURSE PRACTITIONER

## 2021-10-30 RX ORDER — WARFARIN SODIUM 5 MG/1
5 TABLET ORAL
Status: COMPLETED | OUTPATIENT
Start: 2021-10-30 | End: 2021-10-30

## 2021-10-30 RX ORDER — WARFARIN SODIUM 2.5 MG/1
2.5 TABLET ORAL
Status: DISCONTINUED | OUTPATIENT
Start: 2021-10-30 | End: 2021-10-30

## 2021-10-30 RX ADMIN — GABAPENTIN 300 MG: 300 CAPSULE ORAL at 14:50

## 2021-10-30 RX ADMIN — INSULIN LISPRO 2 UNITS: 100 INJECTION, SOLUTION INTRAVENOUS; SUBCUTANEOUS at 08:00

## 2021-10-30 RX ADMIN — METFORMIN HYDROCHLORIDE 500 MG: 500 TABLET ORAL at 18:12

## 2021-10-30 RX ADMIN — INSULIN LISPRO 8 UNITS: 100 INJECTION, SOLUTION INTRAVENOUS; SUBCUTANEOUS at 13:14

## 2021-10-30 RX ADMIN — AMIODARONE HYDROCHLORIDE 200 MG: 200 TABLET ORAL at 20:38

## 2021-10-30 RX ADMIN — WARFARIN SODIUM 5 MG: 5 TABLET ORAL at 18:12

## 2021-10-30 RX ADMIN — INSULIN LISPRO 2 UNITS: 100 INJECTION, SOLUTION INTRAVENOUS; SUBCUTANEOUS at 18:12

## 2021-10-30 RX ADMIN — GABAPENTIN 300 MG: 300 CAPSULE ORAL at 07:46

## 2021-10-30 RX ADMIN — INSULIN LISPRO 6 UNITS: 100 INJECTION, SOLUTION INTRAVENOUS; SUBCUTANEOUS at 13:11

## 2021-10-30 RX ADMIN — INSULIN GLARGINE 45 UNITS: 100 INJECTION, SOLUTION SUBCUTANEOUS at 08:00

## 2021-10-30 RX ADMIN — PANTOPRAZOLE SODIUM 40 MG: 40 TABLET, DELAYED RELEASE ORAL at 07:46

## 2021-10-30 RX ADMIN — POTASSIUM CHLORIDE AND SODIUM CHLORIDE: 900; 300 INJECTION, SOLUTION INTRAVENOUS at 16:04

## 2021-10-30 RX ADMIN — INSULIN GLARGINE 45 UNITS: 100 INJECTION, SOLUTION SUBCUTANEOUS at 20:42

## 2021-10-30 RX ADMIN — ASPIRIN 81 MG: 81 TABLET, COATED ORAL at 20:38

## 2021-10-30 RX ADMIN — AMIODARONE HYDROCHLORIDE 200 MG: 200 TABLET ORAL at 07:46

## 2021-10-30 RX ADMIN — ATORVASTATIN CALCIUM 80 MG: 40 TABLET, FILM COATED ORAL at 20:38

## 2021-10-30 RX ADMIN — ENOXAPARIN SODIUM 40 MG: 40 INJECTION SUBCUTANEOUS at 07:45

## 2021-10-30 RX ADMIN — GABAPENTIN 300 MG: 300 CAPSULE ORAL at 20:38

## 2021-10-30 RX ADMIN — INSULIN LISPRO 1 UNITS: 100 INJECTION, SOLUTION INTRAVENOUS; SUBCUTANEOUS at 20:43

## 2021-10-30 RX ADMIN — POTASSIUM CHLORIDE AND SODIUM CHLORIDE: 900; 300 INJECTION, SOLUTION INTRAVENOUS at 07:45

## 2021-10-30 ASSESSMENT — PAIN SCALES - GENERAL: PAINLEVEL_OUTOF10: 0

## 2021-10-30 NOTE — PROGRESS NOTES
Internal Medicine Progress Note    [facility]    Rufus Maninder. Kieran Crowder, VENKATOBruceIBruce Sebastian D.O., SENTHIL Coy D.O. Tatum Esposito, MSN, APRN, NP-C  Brianjr Sofia. Cherri Zuniga, MSN, APRN-CNP     Primary Care Physician: Janeen Serra DO   Admitting Physician:  Willie Dixon DO  Admission date and time: 10/28/2021  4:08 PM    Room:  [address] [place] [address]  Admitting diagnosis: [facility]osmolar hyperglycemic state ([facility]HS) ([facility] [E11.00, E11.65]    Patient Name: Macey Simon  MRN: [id number]    Date of Service: 10/30/2021     Subjective:  Salvatore Luis is a 72 y.o. male who was seen and examined today,10/30/2021, at the bedside. Patient seem to be improved today. We have discussed the results of his hemoglobin A1c to be significantly elevated at the 16-17 range. He does admit to being very lazy and nonchalant with this care for his diabetes. We did inform him of the potential risk complication which will occur by his nonchalant attitude. We did discuss the condition with wife by phone who admits that he has been noncompliant is currently also being seen by dietary        No family present during my examination. Review of System:   Constitutional:   Denies fever or chills, weight loss or gain, fatigue or malaise. HEENT:   Denies ear pain, sore throat, sinus or eye problems. Cardiovascular:   Denies any chest pain, irregular heartbeats, or palpitations. Respiratory:   Denies shortness of breath, coughing, sputum production, hemoptysis, or wheezing. Gastrointestinal:   Denies nausea, vomiting, diarrhea, or constipation. Denies any abdominal pain. Genitourinary:    Denies any urgency, frequency, hematuria. Voiding  without difficulty. Extremities:   Denies lower extremity swelling, edema or cyanosis. Neurology:    Denies any headache or focal neurological deficits, Denies generalized weakness or memory difficulty.    Psch:   Denies being anxious or depressed. Musculoskeletal:    Denies  myalgias, joint complaints or back pain. Integumentary:   Denies any rashes, ulcers, or excoriations. Denies bruising. Hematologic/Lymphatic:  Denies bruising or bleeding. Physical Exam:  No intake/output data recorded. Intake/Output Summary (Last 24 hours) at 10/30/2021 1527  Last data filed at 10/30/2021 1449  Gross per 24 hour   Intake 160 ml   Output 600 ml   Net -440 ml   I/O last 3 completed shifts: In: 160 [P.O.:160]  Out: 600 [Urine:600]  Patient Vitals for the past 96 hrs (Last 3 readings):   Weight   10/30/21 0645 263 lb 12.8 oz (119.7 kg)   10/30/21 0600 263 lb 12.8 oz (119.7 kg)   10/28/21 2343 254 lb 6.6 oz (115.4 kg)     Vital Signs:   Blood pressure 100/63, pulse 94, temperature 97.8 °F (36.6 °C), temperature source Oral, resp. rate 20, height 5' 6\" (1.676 m), weight 263 lb 12.8 oz (119.7 kg), SpO2 94 %. General appearance:  Alert, responsive, oriented to person, place, and time. Well preserved, alert, no distress. Head:  Normocephalic. No masses, lesions or tenderness. Eyes:  PERRLA. EOMI. Sclera clear. Buccal mucosa moist.  ENT:  Ears normal. Mucosa normal.  Neck:    Supple. Trachea midline. No thyromegaly. No JVD. No bruits. Heart:    Rhythm regular. Rate controlled. No murmurs. Lungs:    Symmetrical. Clear to auscultation bilaterally. No wheezes. No rhonchi. No rales. Abdomen:   Soft. Non-tender. Non-distended. Bowel sounds positive. No organomegaly or masses. No pain on palpation. Extremities:    Peripheral pulses present. No peripheral edema. No ulcers. No cyanosis. No clubbing. Neurologic:    Alert x 3. No focal deficit. Cranial nerves grossly intact. No focal weakness. Psych:   Behavior is normal. Mood appears normal. Speech is not rapid and/or pressured. Musculoskeletal:   Spine ROM normal. Muscular strength intact. Gait not assessed.   Integumentary:  No rashes  Skin normal color and texture.   Genitalia/Breast:  Deferred    Medication:  Scheduled Meds:   warfarin  2.5 mg Oral Once    aspirin  81 mg Oral Nightly    gabapentin  300 mg Oral TID    [Held by provider] metoprolol succinate  25 mg Oral BID    pantoprazole  40 mg Oral Daily    insulin lispro  0-12 Units SubCUTAneous TID WC    enoxaparin  40 mg SubCUTAneous Daily    amiodarone  200 mg Oral BID    atorvastatin  80 mg Oral Nightly    insulin glargine  45 Units SubCUTAneous BID    insulin lispro  0-3 Units SubCUTAneous Nightly    insulin lispro  8 Units SubCUTAneous TID WC    warfarin (COUMADIN) daily dosing (placeholder)   Other RX Placeholder     Continuous Infusions:   0.9% NaCl with KCl 40 mEq 100 mL/hr at 10/30/21 0745    dextrose         Objective Data:  CBC with Differential:    Lab Results   Component Value Date    WBC 6.9 10/30/2021    RBC 4.32 10/30/2021    HGB 13.8 10/30/2021    HCT 41.8 10/30/2021     10/30/2021    MCV 96.8 10/30/2021    MCH 31.9 10/30/2021    MCHC 33.0 10/30/2021    RDW 15.4 10/30/2021    SEGSPCT 61 01/20/2014    LYMPHOPCT 22.5 10/30/2021    MONOPCT 7.3 10/30/2021    BASOPCT 0.6 10/30/2021    MONOSABS 0.50 10/30/2021    LYMPHSABS 1.54 10/30/2021    EOSABS 0.09 10/30/2021    BASOSABS 0.04 10/30/2021     CMP:    Lab Results   Component Value Date     10/30/2021    K 4.0 10/30/2021    K 2.8 10/28/2021    CL 99 10/30/2021    CO2 28 10/30/2021    BUN 23 10/30/2021    CREATININE 1.3 10/30/2021    GFRAA >60 10/30/2021    LABGLOM 55 10/30/2021    GLUCOSE 220 10/30/2021    GLUCOSE 118 10/27/2011    PROT 8.5 10/28/2021    LABALBU 4.4 10/28/2021    LABALBU 4.4 10/27/2011    CALCIUM 8.2 10/30/2021    BILITOT 2.3 10/28/2021    ALKPHOS 206 10/28/2021    AST 31 10/28/2021    ALT 28 10/28/2021       Wound Documentation:   Wound 04/18/21 Buttocks Left (Active)   Wound Etiology Pressure Stage  1 10/29/21 0400   Dressing Status Other (Comment) 10/30/21 0820   Wound Cleansed Not Cleansed 10/29/21 0400 Offloading for Diabetic Foot Ulcers Felt or foam 10/29/21 0400   Drainage Amount None 10/30/21 0820   Drainage Description Pink 10/30/21 0820   Number of days: 194       Wound 04/22/21 Pelvis Anterior;Mid Tunneling open wound (Active)   Number of days: 190       Assessment:    · Hyperosmolar hyperglycemic state with expectant electrolyte derangements  · Chronic, compensated systolic/diastolic congestive heart failure  · Persistent atrial fibrillation with subtherapeutic INR on Coumadin therapy  · Significant coronary artery disease with ischemic cardiomyopathy and known history of mitral valve repair  · Poorly controlled insulin-dependent diabetes mellitus type 2  · Chronic kidney disease stage III  · Essential hypertension with borderline hypotension currently  · Hyperlipidemia  · Obstructive sleep apnea with obesity hypoventilatory syndrome  · Valvular heart disease with moderate aortic stenosis and moderate pulmonary hypertension      Plan:     · Stressed the need for better glycemic control  · Discussed behavior modification risk factors  · Patient to follow-up with dietary  · Monitor INR and adjust accordingly    More than 50% of my  time was spent at the bedside counseling/coordinating care with the patient and/or family with face to face contact. This time was spent reviewing notes and laboratory data as well as instructing and counseling the patient. Time I spent with the family or surrogate(s) is included only if the patient was incapable of providing the necessary information or participating in medical decisions. I also discussed the differential diagnosis and all of the proposed management plans with the patient and individuals accompanying the patient. Kasey Shin requires this high level of physician care and nursing on the Telemetry unit due the complexity of decision management and chance of rapid decline or death. Continued cardiac monitoring and higher level of nursing are required.  I am readily available for any further decision-making and intervention.      Drew Tam DO, F.A.C.O.I.  10/30/2021  3:27 PM

## 2021-10-30 NOTE — PROGRESS NOTES
Pharmacy Consultation Note  (Anticoagulant Dosing and Monitoring)    Initial consult date: 10/29/2021  Consulting physician: Dr. Shayla Mane    Allergies:  Pcn [penicillins] and Sulfa antibiotics    72 y.o. male      Ht Readings from Last 1 Encounters:   10/30/21 5' 6\" (1.676 m)     Wt Readings from Last 1 Encounters:   10/30/21 263 lb 12.8 oz (119.7 kg)     Warfarin Indication Target   INR Range Home   Dose  (if applicable) Diet/Feeding Tube   Atrial fibrillation 2-3 1.25 mg daily      Vitamin K or Blood product Administration  Date                    x  Meds Increasing INR  x  Meds Decreasing INR    x  Amiodarone/Propafenone    Carbamazepine      Antibiotics:_____________________    Cholestyramine      Ciprofloxacin    Enteral Feedings      Clarithromycin/Erythromycin    Phenobarbital      Fluconazole/Itraconazole/Voriconazole    Phenytoin (Variable)      Levothyroxine    Rifampin      Metronidazole    Sucralafate      Phenytoin (Variable)    Vitamin K (including food intake)      Statins/Fenofibrate    Other:_____________________      Sulfamethoxazole/Trimethoprim           Comments regarding medication interactions:     x  Diseases Affecting INR  x  Increased Bleeding Risk      CHF Exacerbation (Increases)    History GI Bleed/PUD      Liver Disease (Increases)    Chronic NSAID Use      Thyroid: Hyper (Increases)  Hypo (Decreases)  x  Chronic ASA/Plavix      Malignancy    Renal Insufficiency/ESRD/HD      History of EtOH Abuse: Acute (Increases)    Chronic (Decreases)    Other:___________________         TSH:   Lab Results   Component Value Date    TSH 7.780 10/20/2021       Hepatic Function Panel:              Lab Results   Component Value Date    ALKPHOS 206 10/28/2021    ALT 28 10/28/2021    AST 31 10/28/2021    PROT 8.5 10/28/2021    BILITOT 2.3 10/28/2021    LABALBU 4.4 10/28/2021    LABALBU 4.4 10/27/2011       Date Warfarin Dose INR Heparin or LMWH HBG/  HCT PLT Comment   10/29 2.5 mg 1 Lovenox 40 mg daily -- -- 10/30 <2.5 mg> 1 Lovenox 40 mg daily 13.8/41.8 163      Assessment:  · Pt is a 73 y/o male on warfarin for history of atrial fibrillation.    · Home dose of 1.25 mg per day  · Goal INR: 2-3; INR today: 1    Plan:  · Warfarin 2.5 mg x 1 tonight  · Daily PT/INR until the INR is stable within the therapeutic range  · Pharmacist will follow and monitor/adjust dosing as necessary    AMEYA [facility] PharmD, BCPS 10/30/2021 9:50 AM

## 2021-10-30 NOTE — PROGRESS NOTES
Pulmonary/Critical Care Consult Note    CHIEF COMPLAINT: Fatigue, dizziness, and hyperglycemia    HISTORY OF PRESENT ILLNESS: Patient is a 70-year-old male with history of diabetes mellitus, hypertension, CAD, CABG, obesity, atrial fibrillation, and GERD. Patient presented to the ED on 10/28/2021 after an apparent fall. Patient complains of dizziness and fatigue over the last several days and also noticed elevated blood glucose levels. He apparently attempted to step out of the vehicle and slipped falling forward to his hands. He denies any difficult injuries from the fall or loss of consciousness. Patient denies prior admissions for diabetes complications. Chest x-ray obtained in the ED prior to admission showed no cardiopulmonary abnormality. X-ray left hand showed no acute fracture or dislocation and degenerative changes. Daily progress:  October 29, 2021: Patient is awake and communicating. He has no complaint. He has no fever, chills, or rigors. Blood sugar has improved. He is scheduled to Lantus and subcu insulin and being transferred to regular medical floor. October 30, 2021:. Patient has been weaned off oxygen. He has no fever, chills, rigors. Anion gap remains closed. Blood sugar is elevated but much better controlled.       ALLERGY:  Pcn [penicillins] and Sulfa antibiotics    FAMILY HISTORY:  Family History   Problem Relation Age of Onset    Cancer Mother         breast    Cancer Father         stomach    Mental Illness Brother     Stroke Brother     Other Brother        SOCIAL HISTORY:  Social History     Socioeconomic History    Marital status:      Spouse name: Lea David Number of children: 1    Years of education: Not on file    Highest education level: 11th grade   Occupational History    Not on file   Tobacco Use    Smoking status: Former Smoker     Packs/day: 1.00     Years: 45.00     Pack years: 45.00     Types: Cigarettes     Quit date: 7/22/2013     Years since quittin.2    Smokeless tobacco: Former User     Types: Chew     Quit date: 10/8/1971   Vaping Use    Vaping Use: Never used   Substance and Sexual Activity    Alcohol use: No     Alcohol/week: 0.0 standard drinks     Comment: 1-2 coffee per day    Drug use: No    Sexual activity: Yes     Partners: Female   Other Topics Concern    Not on file   Social History Narrative    19  [facility] reviewed SDOH with Ambrose Ratliff. He does have money strain but between the income he has coming in and his wife's they are over resources for SARY programs. Offered food panty info to help with end of the month struggles but he declined stating that he tried and was refused due to his income. He belongs to a [employer] and goes weekly so he has some community connections in place. He has an extremely high interest rate on his mortgage which he was encouraged to look into getting lowered to help save money on his house payments. Noticed some difficulty with memory recall. Becomes easily flustered at times. Has no MHI to support applying for [facility] [place] program of SARY. States he does not feel depressed or need any MH treatment. Social Determinants of Health     Financial Resource Strain: Low Risk     Difficulty of Paying Living Expenses: Not hard at all   Food Insecurity: No Food Insecurity    Worried About Running Out of Food in the Last Year: Never true    Jennifer of Food in the Last Year: Never true   Transportation Needs:     Lack of Transportation (Medical):      Lack of Transportation (Non-Medical):    Physical Activity:     Days of Exercise per Week:     Minutes of Exercise per Session:    Stress:     Feeling of Stress :    Social Connections:     Frequency of Communication with Friends and Family:     Frequency of Social Gatherings with Friends and Family:     Attends L[employer] day Services:     Active Member of Clubs or Organizations:     Attends Club or Organization Meetings:     Marital Status: Intimate Partner Violence:     Fear of Current or Ex-Partner:     Emotionally Abused:     Physically Abused:     Sexually Abused:        MEDICAL HISTORY:  Past Medical History:   Diagnosis Date    Acid reflux     Acute diastolic (congestive) heart failure ([facility] 06/19/2019    Arthritis     Asthma 04/16/2014    Asthmatic bronchitis , chronic (HCC) 11/28/2016    CAD (coronary artery disease)     Chronic bronchitis (HCC) 04/16/2014    COPD (chronic obstructive pulmonary disease) (HCC)     CB    COPD (chronic obstructive pulmonary disease) (HCC)     Diabetes mellitus ([facility]     Emphysema (subcutaneous) (surgical) resulting from a procedure     H/O cardiovascular stress test 04/24/2021    Lexiscan    Hyperlipidemia     Hypertension     Hypoxemia requiring supplemental oxygen 02/02/2015    LONG TERM ANTICOAGULENT USE     Morbid obesity with BMI of 50.0-59.9, adult ([facility] 11/27/2013    Obesity     Osteoarthritis     Sleep apnea     bilevel positive airway pressure at 13/8 with 2 L oxygen flow     Stage 3 chronic kidney disease ([facility])     Tobacco abuse     Type II or unspecified type diabetes mellitus without mention of complication, not stated as uncontrolled        MEDICATIONS:   warfarin  2.5 mg Oral Once    aspirin  81 mg Oral Nightly    gabapentin  300 mg Oral TID    [Held by provider] metoprolol succinate  25 mg Oral BID    pantoprazole  40 mg Oral Daily    insulin lispro  0-12 Units SubCUTAneous TID WC    enoxaparin  40 mg SubCUTAneous Daily    amiodarone  200 mg Oral BID    atorvastatin  80 mg Oral Nightly    insulin glargine  45 Units SubCUTAneous BID    insulin lispro  0-3 Units SubCUTAneous Nightly    insulin lispro  8 Units SubCUTAneous TID WC    warfarin (COUMADIN) daily dosing (placeholder)   Other RX Placeholder      0.9% NaCl with KCl 40 mEq 100 mL/hr at 10/30/21 0745    dextrose       polyethylene glycol, glucose, dextrose, glucagon (rDNA), dextrose, dextrose, magnesium sulfate, sodium phosphate IVPB **OR** sodium phosphate IVPB **OR** sodium phosphate IVPB    REVIEW OF SYSTEMS:  Constitutional: Denies fever, weight loss, night sweats, and reports fatigue. Reports excessive thirst  Skin: Denies pigmentation, dark lesions, and rashes   HEENT: Denies hearing loss, tinnitus, ear drainage, epistaxis, sore throat, and hoarseness. Cardiovascular: Denies palpitations, chest pain, and chest pressure. Respiratory: Denies cough, dyspnea at rest, hemoptysis, apnea, and choking. Gastrointestinal: Denies nausea, vomiting, poor appetite, diarrhea, heartburn or reflux  Genitourinary: Denies dysuria, reports oliguria, denies urgency or hematuria  Musculoskeletal: Denies myalgias, muscle weakness, and bone pain  Neurological: Reports dizziness, denies vertigo, headache, and focal weakness  Psychological: Denies anxiety and depression  Endocrine: Denies heat intolerance and cold intolerance  Hematopoietic/Lymphatic: Denies bleeding problems and blood transfusions    PHYSICAL EXAM:  Vitals:    10/30/21 0745   BP: 100/63   Pulse: 94   Resp: 20   Temp: 97.8 °F (36.6 °C)   SpO2: 94%           O2 Device: None (Room air)    Constitutional: No fever, chills, diaphoresis  HEENT: No head lesions, PERRL, EOMI, dry mouth without lesions, no nasal lesions, no cervical adenopathy palpated   Respiratory: Lungs with equal breath sounds bilaterally, no adventitious sounds auscultated, no accessory muscle use   CV: Regular rate and irregular rhythm, no murmurs, JVD, no leg edema   Abdomen: Soft, non tender, + bowel sounds, no lesions   Skin: Superficial abrasions noted to the right hand, and bilateral knees.  Bruising noted to the palmar surface of the left thumb, adequate turgor, no rash, capillary refill <2 seconds   Extremities: Muscular strength 4/4 in 4 limbs, moves 4 limbs spontaneously, distal pulses present   Neurology: Awake and alert, follows commands, moves 4 limbs on command and spontaneously, equal sensation, no dysmetria, neck is supple, no meningitic signs present.      LABS:  WBC   Date Value Ref Range Status   10/30/2021 6.9 4.5 - 11.5 E9/L Final   10/28/2021 10.1 4.5 - 11.5 E9/L Final   10/20/2021 9.1 4.5 - 11.5 E9/L Final     Hemoglobin   Date Value Ref Range Status   10/30/2021 13.8 12.5 - 16.5 g/dL Final   10/28/2021 17.4 (H) 12.5 - 16.5 g/dL Final   10/20/2021 16.1 12.5 - 16.5 g/dL Final     Hematocrit   Date Value Ref Range Status   10/30/2021 41.8 37.0 - 54.0 % Final   10/28/2021 48.6 37.0 - 54.0 % Final   10/20/2021 47.1 37.0 - 54.0 % Final     MCV   Date Value Ref Range Status   10/30/2021 96.8 80.0 - 99.9 fL Final   10/28/2021 89.8 80.0 - 99.9 fL Final   10/20/2021 90.1 80.0 - 99.9 fL Final     Platelets   Date Value Ref Range Status   10/30/2021 163 130 - 450 E9/L Final   10/28/2021 260 130 - 450 E9/L Final   10/20/2021 274 130 - 450 E9/L Final     Sodium   Date Value Ref Range Status   10/30/2021 133 132 - 146 mmol/L Final   10/29/2021 132 132 - 146 mmol/L Final   10/29/2021 130 (L) 132 - 146 mmol/L Final     Potassium   Date Value Ref Range Status   10/30/2021 4.0 3.5 - 5.0 mmol/L Final   10/29/2021 4.3 3.5 - 5.0 mmol/L Final   10/29/2021 3.8 3.5 - 5.0 mmol/L Final     Potassium reflex Magnesium   Date Value Ref Range Status   10/28/2021 2.8 (L) 3.5 - 5.0 mmol/L Final   06/12/2021 4.1 3.5 - 5.0 mmol/L Final   04/18/2021 5.7 (H) 3.5 - 5.0 mmol/L Final     Chloride   Date Value Ref Range Status   10/30/2021 99 98 - 107 mmol/L Final   10/29/2021 93 (L) 98 - 107 mmol/L Final   10/29/2021 87 (L) 98 - 107 mmol/L Final     CO2   Date Value Ref Range Status   10/30/2021 28 22 - 29 mmol/L Final   10/29/2021 31 (H) 22 - 29 mmol/L Final   10/29/2021 31 (H) 22 - 29 mmol/L Final     BUN   Date Value Ref Range Status   10/30/2021 23 6 - 23 mg/dL Final   10/29/2021 33 (H) 6 - 23 mg/dL Final   10/29/2021 39 (H) 6 - 23 mg/dL Final     CREATININE   Date Value Ref Range Status   10/30/2021 1.3 (H) 0.7 - 1.2 mg/dL Final   10/29/2021 1.6 (H) 0.7 - 1.2 mg/dL Final   10/29/2021 1.8 (H) 0.7 - 1.2 mg/dL Final     Glucose   Date Value Ref Range Status   10/30/2021 220 (H) 74 - 99 mg/dL Final   10/29/2021 327 (H) 74 - 99 mg/dL Final   10/29/2021 403 (H) 74 - 99 mg/dL Final   10/27/2011 118 (H) 70 - 110 mg/dL Final   06/09/2011 96 70 - 110 mg/dL Final     Calcium   Date Value Ref Range Status   10/30/2021 8.2 (L) 8.6 - 10.2 mg/dL Final   10/29/2021 8.5 (L) 8.6 - 10.2 mg/dL Final   10/29/2021 8.7 8.6 - 10.2 mg/dL Final     Total Protein   Date Value Ref Range Status   10/28/2021 8.5 (H) 6.4 - 8.3 g/dL Final   10/20/2021 8.2 6.4 - 8.3 g/dL Final   06/14/2021 7.3 6.4 - 8.3 g/dL Final     Albumin   Date Value Ref Range Status   10/28/2021 4.4 3.5 - 5.2 g/dL Final   10/20/2021 4.1 3.5 - 5.2 g/dL Final   06/14/2021 3.7 3.5 - 5.2 g/dL Final   10/27/2011 4.4 3.2 - 4.8 g/dL Final   06/09/2011 4.2 3.2 - 4.8 g/dL Final     Total Bilirubin   Date Value Ref Range Status   10/28/2021 2.3 (H) 0.0 - 1.2 mg/dL Final   10/20/2021 1.5 (H) 0.0 - 1.2 mg/dL Final   06/14/2021 1.0 0.0 - 1.2 mg/dL Final     Alkaline Phosphatase   Date Value Ref Range Status   10/28/2021 206 (H) 40 - 129 U/L Final   10/20/2021 165 (H) 40 - 129 U/L Final   06/14/2021 142 (H) 40 - 129 U/L Final     AST   Date Value Ref Range Status   10/28/2021 31 0 - 39 U/L Final     Comment:     Specimen is slightly Hemolyzed. Result may be artificially increased. 10/20/2021 41 (H) 0 - 39 U/L Final   06/14/2021 19 0 - 39 U/L Final     ALT   Date Value Ref Range Status   10/28/2021 28 0 - 40 U/L Final   10/20/2021 30 0 - 40 U/L Final   06/14/2021 15 0 - 40 U/L Final     GFR Non-   Date Value Ref Range Status   10/30/2021 55 >=60 mL/min/1.73 Final     Comment:     Chronic Kidney Disease: less than 60 ml/min/1.73 sq.m. Kidney Failure: less than 15 ml/min/1.73 sq.m. Results valid for patients 18 years and older.      10/29/2021 43 >=60 mL/min/1.73 Final     Comment:     Chronic Kidney Disease: less than 60 ml/min/1.73 sq.m. Kidney Failure: less than 15 ml/min/1.73 sq.m. Results valid for patients 18 years and older. 10/29/2021 38 >=60 mL/min/1.73 Final     Comment:     Chronic Kidney Disease: less than 60 ml/min/1.73 sq.m. Kidney Failure: less than 15 ml/min/1.73 sq.m. Results valid for patients 18 years and older. GFR    Date Value Ref Range Status   10/30/2021 >60  Final   10/29/2021 53  Final   10/29/2021 46  Final     Magnesium   Date Value Ref Range Status   10/30/2021 2.2 1.6 - 2.6 mg/dL Final   10/29/2021 2.4 1.6 - 2.6 mg/dL Final   10/29/2021 2.1 1.6 - 2.6 mg/dL Final     Phosphorus   Date Value Ref Range Status   10/30/2021 1.9 (L) 2.5 - 4.5 mg/dL Final   10/29/2021 2.4 (L) 2.5 - 4.5 mg/dL Final   10/29/2021 1.3 (L) 2.5 - 4.5 mg/dL Final     No results for input(s): PH, PO2, PCO2, HCO3, BE, O2SAT in the last 72 hours. RADIOLOGY:  XR FOOT LEFT (MIN 3 VIEWS)   Final Result   No evidence of acute trauma. XR HAND LEFT (MIN 3 VIEWS)   Final Result   1. No acute fracture or joint dislocation. 2. Degenerative changes at the 1st carpometacarpal joint and radiocarpal   joint. 3. Sclerotic appearance of the lunate may be related to avascular necrosis. Dedicated radiographs of the left wrist are recommended. XR CHEST PORTABLE   Final Result   No acute cardiopulmonary abnormality. IMPRESSION:  1. Hyperglycemic hyperosmolar state  2. Hypokalemia  3. Hypomagnesemia  4. Hypophosphatemia  5. JORDEN  6. Pseudohyponatremia  7. Subtherapeutic INR  8. Left hand contusion    PLAN:  1. Resume diabetic diet  2. Insulin sliding scale and Lantus  3. Fluid hydration  4. Monitor potassium level  5. Daily BMP  6. Monitor kidney function closely  7. VT prophylaxis and continue with Coumadin with pharmacy consult to manage Coumadin. 8. PT and INR daily  9. PT OT  10.  Diabetic counseling prior to discharge  11. Fall precautions  12. Podiatry consult  13. We will sign off. Please call with questions. ATTESTATION:   Staff Physician note of personal involvement in Care  As the attending physician, I certify that I personally reviewed the patients history and personally examined the patient to confirm the physical findings described above,  And that I reviewed the relevant imaging studies and available reports. I also discussed the differential diagnosis and all of the proposed management plans with the patient and individuals accompanying the patient to this visit. They had the opportunity to ask questions about the proposed management plans and to have those questions answered.            Electronically signed by Josh Solis MD on 10/30/2021 at 1:37 PM

## 2021-10-30 NOTE — PROGRESS NOTES
Nutrition Assessment    Type and Reason for Visit:  Initial, Positive Nutrition Screen, Consult      Nutrition Assessment:  Pt admits w/ Dx: Hyperosmolar Hyperglycemic state w/ PMH of CHF and Diabetes, CAD, COPD, MI, CABG x 2,, morbid obesity,  LISS, medical/ nutritional  non-compliance. Diet Education, educational material, reviewed daily intakes, encouraged follow-up as OutPt w/ diabetes education classes. Nutrition Education    · Verbally reviewed information with Patient  · Educated on Diabetes  · Written educational materials provided. · Contact name and number provided. Nutrition Related Findings:  A/ox4, abd WDL,+BS, no edema, I/O WNL, missing teeth, Phosphorous 1. 9(L)      Wounds:  Multiple       Current Nutrition Therapies:    ADULT DIET; Regular; 4 carb choices (60 gm/meal)    Anthropometric Measures:  · Height: 5' 6\" (167.6 cm)  · Current Body Weight: 263 lb (119.3 kg) (10/29 bed scale)   · Ideal Body Weight: 142 lbs; % Ideal Body Weight 185.2 %   · BMI: 42.5      · BMI Categories: Obese Class 3 (BMI 40.0 or greater)       Nutrition Diagnosis:   · Limited adherence to nutrition-related recommendations related to endocrine dysfuntion as evidenced by lab values, other (comment) (Poor diet compliance in home-setting.  Pt has had diet education on several occasions w/ on-going non-compliance)      Nutrition Interventions:   Food and/or Nutrient Delivery:  Continue Current Diet  Nutrition Education/Counseling:  Education completed   Coordination of Nutrition Care:  Continue to monitor while inpatient    Goals:  Pt to consume >75% meals       Nutrition Monitoring and Evaluation:   Behavioral-Environmental Outcomes:  Readiness for Change   Food/Nutrient Intake Outcomes:  Food and Nutrient Intake  Physical Signs/Symptoms Outcomes:  Biochemical Data, Fluid Status or Edema, Nutrition Focused Physical Findings, Skin, Weight     Discharge Planning:    Recommend pursue outpatient diabetes education Electronically signed by Kamilla Tovar RD, LD on 10/30/21 at 9:01 AM EDT    Contact: [phone]

## 2021-10-30 NOTE — PROGRESS NOTES
Xray of left foot reviewed; no evidence of trauma. Nails debrided 10/29 without incident. No further escalation of care needed at this time from podiatry standpoint. Will continue to follow peripherally. Please contact service if any additional concerns arise. Patient was d/w Dr. Tabares .     Keshawn Burgess DPM  12:37 PM

## 2021-10-31 VITALS
TEMPERATURE: 97.7 F | SYSTOLIC BLOOD PRESSURE: 108 MMHG | HEART RATE: 118 BPM | BODY MASS INDEX: 42.27 KG/M2 | OXYGEN SATURATION: 95 % | RESPIRATION RATE: 18 BRPM | WEIGHT: 263 LBS | HEIGHT: 66 IN | DIASTOLIC BLOOD PRESSURE: 67 MMHG

## 2021-10-31 LAB
ANION GAP SERPL CALCULATED.3IONS-SCNC: 10 MMOL/L (ref 7–16)
BASOPHILS ABSOLUTE: 0.04 E9/L (ref 0–0.2)
BASOPHILS RELATIVE PERCENT: 0.6 % (ref 0–2)
BUN BLDV-MCNC: 15 MG/DL (ref 6–23)
CALCIUM SERPL-MCNC: 9.1 MG/DL (ref 8.6–10.2)
CHLORIDE BLD-SCNC: 101 MMOL/L (ref 98–107)
CO2: 22 MMOL/L (ref 22–29)
CREAT SERPL-MCNC: 1.3 MG/DL (ref 0.7–1.2)
EOSINOPHILS ABSOLUTE: 0.1 E9/L (ref 0.05–0.5)
EOSINOPHILS RELATIVE PERCENT: 1.4 % (ref 0–6)
GFR AFRICAN AMERICAN: >60
GFR NON-AFRICAN AMERICAN: 55 ML/MIN/1.73
GLUCOSE BLD-MCNC: 142 MG/DL (ref 74–99)
HCT VFR BLD CALC: 40.7 % (ref 37–54)
HEMOGLOBIN: 13.7 G/DL (ref 12.5–16.5)
IMMATURE GRANULOCYTES #: 0.04 E9/L
IMMATURE GRANULOCYTES %: 0.6 % (ref 0–5)
INR BLD: 1
LYMPHOCYTES ABSOLUTE: 1.75 E9/L (ref 1.5–4)
LYMPHOCYTES RELATIVE PERCENT: 24.7 % (ref 20–42)
MAGNESIUM: 1.9 MG/DL (ref 1.6–2.6)
MCH RBC QN AUTO: 32 PG (ref 26–35)
MCHC RBC AUTO-ENTMCNC: 33.7 % (ref 32–34.5)
MCV RBC AUTO: 95.1 FL (ref 80–99.9)
METER GLUCOSE: 104 MG/DL (ref 74–99)
METER GLUCOSE: 143 MG/DL (ref 74–99)
METER GLUCOSE: 87 MG/DL (ref 74–99)
MONOCYTES ABSOLUTE: 0.52 E9/L (ref 0.1–0.95)
MONOCYTES RELATIVE PERCENT: 7.3 % (ref 2–12)
NEUTROPHILS ABSOLUTE: 4.64 E9/L (ref 1.8–7.3)
NEUTROPHILS RELATIVE PERCENT: 65.4 % (ref 43–80)
PDW BLD-RTO: 15.5 FL (ref 11.5–15)
PHOSPHORUS: 1.9 MG/DL (ref 2.5–4.5)
PLATELET # BLD: 156 E9/L (ref 130–450)
PMV BLD AUTO: 10.3 FL (ref 7–12)
POTASSIUM SERPL-SCNC: 4.4 MMOL/L (ref 3.5–5)
PROTHROMBIN TIME: 12.1 SEC (ref 9.3–12.4)
RBC # BLD: 4.28 E12/L (ref 3.8–5.8)
SODIUM BLD-SCNC: 133 MMOL/L (ref 132–146)
WBC # BLD: 7.1 E9/L (ref 4.5–11.5)

## 2021-10-31 PROCEDURE — 83735 ASSAY OF MAGNESIUM: CPT

## 2021-10-31 PROCEDURE — 84100 ASSAY OF PHOSPHORUS: CPT

## 2021-10-31 PROCEDURE — 85025 COMPLETE CBC W/AUTO DIFF WBC: CPT

## 2021-10-31 PROCEDURE — 80048 BASIC METABOLIC PNL TOTAL CA: CPT

## 2021-10-31 PROCEDURE — 6370000000 HC RX 637 (ALT 250 FOR IP): Performed by: NURSE PRACTITIONER

## 2021-10-31 PROCEDURE — 85610 PROTHROMBIN TIME: CPT

## 2021-10-31 PROCEDURE — 82962 GLUCOSE BLOOD TEST: CPT

## 2021-10-31 PROCEDURE — 36415 COLL VENOUS BLD VENIPUNCTURE: CPT

## 2021-10-31 PROCEDURE — 6370000000 HC RX 637 (ALT 250 FOR IP): Performed by: INTERNAL MEDICINE

## 2021-10-31 RX ORDER — WARFARIN SODIUM 2.5 MG/1
1.25 TABLET ORAL
Status: DISCONTINUED | OUTPATIENT
Start: 2021-10-31 | End: 2021-10-31 | Stop reason: HOSPADM

## 2021-10-31 RX ORDER — BUMETANIDE 1 MG/1
1 TABLET ORAL DAILY
Qty: 60 TABLET | Refills: 5 | COMMUNITY
Start: 2021-10-31 | End: 2021-12-22 | Stop reason: SDUPTHER

## 2021-10-31 RX ORDER — GABAPENTIN 300 MG/1
300 CAPSULE ORAL 2 TIMES DAILY
Qty: 90 CAPSULE | Refills: 5 | COMMUNITY
Start: 2021-10-31 | End: 2022-04-05

## 2021-10-31 RX ORDER — WARFARIN SODIUM 5 MG/1
5 TABLET ORAL DAILY
Qty: 30 TABLET | Refills: 3 | Status: SHIPPED | OUTPATIENT
Start: 2021-10-31 | End: 2022-06-14

## 2021-10-31 RX ADMIN — AMIODARONE HYDROCHLORIDE 200 MG: 200 TABLET ORAL at 08:40

## 2021-10-31 RX ADMIN — PANTOPRAZOLE SODIUM 40 MG: 40 TABLET, DELAYED RELEASE ORAL at 08:40

## 2021-10-31 RX ADMIN — GABAPENTIN 300 MG: 300 CAPSULE ORAL at 08:40

## 2021-10-31 NOTE — PROGRESS NOTES
Pharmacy Consultation Note  (Anticoagulant Dosing and Monitoring)    Initial consult date: 10/29/2021  Consulting physician: Dr. Shazia Diaz    Allergies:  Pcn [penicillins] and Sulfa antibiotics    72 y.o. male      Ht Readings from Last 1 Encounters:   10/30/21 5' 6\" (1.676 m)     Wt Readings from Last 1 Encounters:   10/31/21 263 lb (119.3 kg)     Warfarin Indication Target   INR Range Home   Dose  (if applicable) Diet/Feeding Tube   Atrial fibrillation 2-3 1.25 mg daily      Vitamin K or Blood product Administration  Date                    x  Meds Increasing INR  x  Meds Decreasing INR    x  Amiodarone/Propafenone    Carbamazepine      Antibiotics:_____________________    Cholestyramine      Ciprofloxacin    Enteral Feedings      Clarithromycin/Erythromycin    Phenobarbital      Fluconazole/Itraconazole/Voriconazole    Phenytoin (Variable)      Levothyroxine    Rifampin      Metronidazole    Sucralafate      Phenytoin (Variable)    Vitamin K (including food intake)      Statins/Fenofibrate    Other:_____________________      Sulfamethoxazole/Trimethoprim           Comments regarding medication interactions:     x  Diseases Affecting INR  x  Increased Bleeding Risk      CHF Exacerbation (Increases)    History GI Bleed/PUD      Liver Disease (Increases)    Chronic NSAID Use      Thyroid: Hyper (Increases)  Hypo (Decreases)  x  Chronic ASA/Plavix      Malignancy    Renal Insufficiency/ESRD/HD      History of EtOH Abuse: Acute (Increases)    Chronic (Decreases)    Other:___________________         TSH:   Lab Results   Component Value Date    TSH 7.780 10/20/2021       Hepatic Function Panel:              Lab Results   Component Value Date    ALKPHOS 206 10/28/2021    ALT 28 10/28/2021    AST 31 10/28/2021    PROT 8.5 10/28/2021    BILITOT 2.3 10/28/2021    LABALBU 4.4 10/28/2021    LABALBU 4.4 10/27/2011       Date Warfarin Dose INR Heparin or LMWH HBG/  HCT PLT Comment   10/29 2.5 mg 1 Lovenox 40 mg daily -- --    10/30 5 mg per primary 1 Lovenox 40 mg daily 13.8/41.8 163      Assessment:  · Pt is a 71 y/o male on warfarin for history of atrial fibrillation.    · Home dose of 1.25 mg per day  · Goal INR: 2-3; INR yesterday: 1  · Warfarin dose yesterday adjusted per primary    Plan:  · Warfarin 1.25 mg x 1 tonight per home dose -- discharge order for today  · Daily PT/INR until the INR is stable within the therapeutic range  · Pharmacist will follow and monitor/adjust dosing as necessary    AMEYA Paulino [facility] PharmD, BCPS 10/31/2021 11:36 AM

## 2021-11-01 ENCOUNTER — CARE COORDINATION (OUTPATIENT)
Dept: CASE MANAGEMENT | Age: 66
End: 2021-11-01

## 2021-11-01 DIAGNOSIS — E11.00 HYPEROSMOLAR HYPERGLYCEMIC STATE (HHS) (HCC): Primary | ICD-10-CM

## 2021-11-01 PROCEDURE — 1111F DSCHRG MED/CURRENT MED MERGE: CPT | Performed by: FAMILY MEDICINE

## 2021-11-01 NOTE — CARE COORDINATION
[facility] Transitions Initial Follow Up Call    Call within 2 business days of discharge: Yes    Patient: Yimi Salcedo Patient : 1955   MRN: [id number]  Reason for Admission: Hyperosmolar Hyperglycemic State (HHS)  Discharge Date: 10/31/21 RARS: Readmission Risk Score: 12.9      Last Discharge [facility]       Complaint Diagnosis Description Type Department Provider    10/28/21 Shortness of Breath; Fall Hyperosmolar hyperglycemic state (HHS) ([facility] [address] . .. ED to Hosp-Admission (Discharged) (ADMITTED) David 2, DO; Corine Bowersort, DO        Transitions of Care Initial Call    Was this an external facility discharge? No Discharge Facility: N/A    Challenges to be reviewed by the provider   Additional needs identified to be addressed with provider: No  none             Method of communication with provider : none      Advance Care Planning:   Does patient have an Advance Directive: reviewed and current. Was this a readmission? No  Patient stated reason for admission: Fatigue/weakness  Patients top risk factors for readmission: lack of knowledge about disease, level of motivation, medical condition-DM, COPD, AFib on Coumadin therapy, CKD, CHF and polypharmacy    Care Transition Nurse (CTN) contacted the patient by telephone to perform post hospital discharge assessment. Verified name and  with patient as identifiers. Provided introduction to self, and explanation of the CTN role. CTN reviewed discharge instructions, medical action plan and red flags with patient who verbalized understanding. Patient given an opportunity to ask questions and does not have any further questions or concerns at this time. Were discharge instructions available to patient? Yes. Reviewed appropriate site of care based on symptoms and resources available to patient including: PCP and Specialist. The patient agrees to contact the PCP office for questions related to their healthcare.      Medication reconciliation was performed with patient, who verbalizes understanding of administration of home medications. Advised obtaining a 90-day supply of all daily and as-needed medications. Covid Risk Education     Educated patient about risk for severe COVID-19 due to risk factors according to CDC guidelines. CTN reviewed discharge instructions, medical action plan and red flag symptoms with the patient who verbalized understanding. Discussed COVID vaccination status: Yes. Education provided on COVID-19 vaccination as appropriate. Discussed exposure protocols and quarantine with CDC Guidelines. Patient was given an opportunity to verbalize any questions and concerns and agrees to contact CTN or health care provider for questions related to their healthcare. Reviewed and educated patient on any new and changed medications related to discharge diagnosis. Was patient discharged with a pulse oximeter? No Discussed and confirmed pulse oximeter discharge instructions and when to notify provider or seek emergency care. CTN provided contact information. Plan for follow-up call in 5-7 days based on severity of symptoms and risk factors. Plan for next call: follow up appointment-Did patient get HFU appt scheduled with Dr. Karlos Stokes (PCP)? Non-face-to-face services provided:  Scheduled appointment with PCP-CTN confirmed with patient he will call today to schedule HFU appt with Dr. Jacob Espinal (PCP) and declines needing CTN assistance. Scheduled appointment with Specialist-CTN confirmed with patient he will today to schedule follow up with Dr. Peter Nails (DPM) and declines needing CTN assistance. Obtained and reviewed discharge summary and/or continuity of care documents  Education of patient/family/caregiver/guardian to support self-management-Discussed DM/COPD/CHF zone tools and knowing when to seek medical attention.    Assessment and support for treatment adherence and medication management-Discussed bleeding precautions assocaited with Coumadin therapy and knowing when to seek immediate medical attention. Care Transitions 24 Hour Call    Schedule Follow Up Appointment with PCP: Declined  Do you have any ongoing symptoms?: No  Do you have a copy of your discharge instructions?: Yes  Do you have all of your prescriptions and are they filled?: Yes  Have you been contacted by a [address]?: No  Have you scheduled your follow up appointment?: No  Were you discharged with any Home Care or Post Acute Services: No  Patient Home Equipment: CPAP, Nebulizer  Do you have support at home?: Partner/Spouse/SO  Do you feel like you have everything you need to keep you well at home?: Yes  Are you an active caregiver in your home?: No  Care Transitions Interventions    Registered Dietician: Completed         Spoke with patient today 11/1/21 for TCM/hospital discharge follow up for HHS. Patient states he is feeling better since discharge and offers no complaints at this time. States he is monitoring BG four times daily ac and hs. States FBS today was 83. Encouraged to document and log BG readings and take to each ov with PCP for review which he verbalizes he will start doing moving forward. States he tries to follow a low sugar/low carbohydrate/low sodium diet. CTN will place referral to dietician for further assistance r/y chronic disease management with DM/CHF. Noted last Hgb A1C on 10/29/21 was 17. Advised goal I to get below 8.0. Denies any s/s of hyper/hypoglycemia and action steps. Provided a complete review of home meds with patient and confirmed no new meds were ordered on discharge. Dicussed med changes and stopped meds per AVS with patient. 1111F code entered. Denies any complaints of shortness of breath, chest pain, chest discomfort, LE swelling, sudden weight gain, nausea, vomiting, diarrhea, chills or fever.      Discussed DM/COPD/CHF zone tools and bleeding precautions associated with Coumadin therapy knowing when to seek immediate medical attention. Confirmed with patient he will call PCP and Dr. Nicole Grossman (DPM) today to schedule follow up appts declining any CTN assistance. Patient states he is independent in driving and has no transportation issues. Patient receptive to subsequent calls. CTN will continue to follow for Care Transition.      Follow Up  Future Appointments   Date Time Provider Valencia Cross   12/15/2021 12:30 PM ARLEEN Aragon

## 2021-11-02 ENCOUNTER — CARE COORDINATION (OUTPATIENT)
Dept: CARE COORDINATION | Age: 66
End: 2021-11-02

## 2021-11-02 NOTE — CARE COORDINATION
Contacted Melissa Nielsen and left voicemail regarding Dietitian referral. Left call back number and will follow up as appropriate.      [address] [facility], [address] [place] [address]

## 2021-11-09 ENCOUNTER — CARE COORDINATION (OUTPATIENT)
Dept: CARE COORDINATION | Age: 66
End: 2021-11-09

## 2021-11-09 NOTE — CARE COORDINATION
[place] regarding Dietitian referral. Pt answered, RD explained reason for call and role in care. Pt requested RD call back on a different day- pt prefers a call on a different day after 4PM. Will contact pt at this time and follow up as appropriate.        [address] [facility], [address] [place] [address]

## 2021-11-10 ENCOUNTER — CARE COORDINATION (OUTPATIENT)
Dept: CASE MANAGEMENT | Age: 66
End: 2021-11-10

## 2021-11-10 NOTE — CARE COORDINATION
[facility] Transitions Follow Up Call    11/10/2021    Patient: Lupe Pond  Patient : 1955   MRN: [id number]  Reason for Admission: Hyperosmolar Hyperglycemic State (HHS)  Discharge Date: 10/31/21 RARS: Readmission Risk Score: 12.9    Care Transitions Follow Up Call    Needs to be reviewed by the provider   Additional needs identified to be addressed with provider: No  none             Method of communication with provider : none      Care Transition Nurse (CTN) contacted the patient by telephone to follow up after admission on 10/31/21. Verified name and  with patient as identifiers. Addressed changes since last contact: none  Discussed follow-up appointments. If no appointment was previously scheduled, appointment scheduling offered: Yes. Is follow up appointment scheduled within 7 days of discharge? No.    Advance Care Planning:   Does patient have an Advance Directive: reviewed and current. CTN reviewed discharge instructions, medical action plan and red flags with patient and discussed any barriers to care and/or understanding of plan of care after discharge. Discussed appropriate site of care based on symptoms and resources available to patient including: PCP, Urgent care clinics, When to call 911 and Condition related references. The patient agrees to contact the PCP office for questions related to their healthcare. Patients top risk factors for readmission: lack of knowledge about disease, level of motivation, medical condition-DM, COPD, CHF, AFib and CKD and polypharmacy     Plan for follow-up call in 5-7 days based on severity of symptoms and risk factors.     Care Transitions Subsequent and Final Call    Subsequent and Final Calls  Do you have any ongoing symptoms?: No  Have your medications changed?: No  Do you have any questions related to your medications?: No  Do you currently have any active services?: No  Do you have any needs or concerns that I can assist you with?: No  Identified Barriers: Lack of Motivation, Lack of Education  Care Transitions Interventions    Registered Dietician: Completed    Other Interventions:         Spoke with patient today 11/10/21 for TCM/hospital discharge follow up sub call for [facility]. Patient states he continues monitoring BG four times daily and admits FBS today was 69. States BG have been fluctuating and dropping low at night. CTN advised to eat a low carb snack before bedtime to prevent nocturnal hypoglycemia. States BG yesterday ran between 276-425. Patient does not have exact BG readings as he verbalizes he is in process of moving out of house into an apartment and readings are at his other house. Reiterated to follow low sugar/low carbohydrate diet. Patient states not being consistent with carbs in diet and admits drinking fluids with sugar such as Gator Blanca. CTN advised to drink water or choose drinks that are sugar free which he verbalizes understanding. Patient denies any s/s of hyper/hypoglcyemia., CTN reinforced DM zone tool and knowing when to seek medical attention. Confirmed dietician reached out to patient who request a return call. Confirmed with patient PCP appt was moved up to 11/18/21 at 10:45 am. Denies any other complaints or concerns at this time. CTN will continue to follow. Follow Up  Future Appointments   Date Time Provider Valencia Cross   11/18/2021 10:45 AM Lindy Lombardo DO [facility]   12/15/2021 12:30 PM Lindy Lombardo DO [facility]     CTN provided contact information for future needs.     ARLEEN Diaz

## 2021-11-11 ENCOUNTER — CARE COORDINATION (OUTPATIENT)
Dept: CARE COORDINATION | Age: 66
End: 2021-11-11

## 2021-11-11 NOTE — CARE COORDINATION
[place] regarding Dietitian referral. Pt answered, RD explained reason for call and role in care. Pt requested RD call back on a different day-prefers a call next week after 4PM. Will contact pt in one week and follow up as appropriate.        [address] [facility], [address] [place] [address]

## 2021-11-15 ENCOUNTER — CARE COORDINATION (OUTPATIENT)
Dept: CARE COORDINATION | Age: 66
End: 2021-11-15

## 2021-11-15 NOTE — CARE COORDINATION
Contacted [place] and left voicemail regarding Dietitian referral. Left call back number. RD outreached 11/2, 11/9, 11/11 and today 11/15. RD will continue to follow/assist with patient return call.        [address] [facility], [address] [place] [address]

## 2021-11-18 ENCOUNTER — OFFICE VISIT (OUTPATIENT)
Dept: FAMILY MEDICINE CLINIC | Age: 66
End: 2021-11-18
Payer: MEDICARE

## 2021-11-18 VITALS
SYSTOLIC BLOOD PRESSURE: 126 MMHG | BODY MASS INDEX: 43.87 KG/M2 | HEIGHT: 66 IN | HEART RATE: 82 BPM | OXYGEN SATURATION: 97 % | DIASTOLIC BLOOD PRESSURE: 80 MMHG | RESPIRATION RATE: 18 BRPM | WEIGHT: 273 LBS

## 2021-11-18 DIAGNOSIS — E78.2 MIXED HYPERLIPIDEMIA: ICD-10-CM

## 2021-11-18 DIAGNOSIS — E11.21 TYPE 2 DIABETES MELLITUS WITH DIABETIC NEPHROPATHY, WITH LONG-TERM CURRENT USE OF INSULIN (HCC): ICD-10-CM

## 2021-11-18 DIAGNOSIS — J44.1 CHRONIC OBSTRUCTIVE PULMONARY DISEASE WITH ACUTE EXACERBATION (HCC): ICD-10-CM

## 2021-11-18 DIAGNOSIS — I48.91 ATRIAL FIBRILLATION WITH RVR (HCC): ICD-10-CM

## 2021-11-18 DIAGNOSIS — H60.501 ACUTE OTITIS EXTERNA OF RIGHT EAR, UNSPECIFIED TYPE: ICD-10-CM

## 2021-11-18 DIAGNOSIS — I25.10 CORONARY ARTERY DISEASE INVOLVING NATIVE CORONARY ARTERY OF NATIVE HEART WITHOUT ANGINA PECTORIS: Primary | ICD-10-CM

## 2021-11-18 DIAGNOSIS — E03.9 ACQUIRED HYPOTHYROIDISM: ICD-10-CM

## 2021-11-18 DIAGNOSIS — Z79.4 TYPE 2 DIABETES MELLITUS WITH HYPERGLYCEMIA, WITH LONG-TERM CURRENT USE OF INSULIN (HCC): ICD-10-CM

## 2021-11-18 DIAGNOSIS — E11.65 TYPE 2 DIABETES MELLITUS WITH HYPERGLYCEMIA, WITH LONG-TERM CURRENT USE OF INSULIN (HCC): ICD-10-CM

## 2021-11-18 DIAGNOSIS — Z79.4 TYPE 2 DIABETES MELLITUS WITH DIABETIC NEPHROPATHY, WITH LONG-TERM CURRENT USE OF INSULIN (HCC): ICD-10-CM

## 2021-11-18 PROCEDURE — 1123F ACP DISCUSS/DSCN MKR DOCD: CPT | Performed by: FAMILY MEDICINE

## 2021-11-18 PROCEDURE — 4130F TOPICAL PREP RX AOE: CPT | Performed by: FAMILY MEDICINE

## 2021-11-18 PROCEDURE — 1111F DSCHRG MED/CURRENT MED MERGE: CPT | Performed by: FAMILY MEDICINE

## 2021-11-18 PROCEDURE — 2022F DILAT RTA XM EVC RTNOPTHY: CPT | Performed by: FAMILY MEDICINE

## 2021-11-18 PROCEDURE — G8926 SPIRO NO PERF OR DOC: HCPCS | Performed by: FAMILY MEDICINE

## 2021-11-18 PROCEDURE — G8484 FLU IMMUNIZE NO ADMIN: HCPCS | Performed by: FAMILY MEDICINE

## 2021-11-18 PROCEDURE — 3017F COLORECTAL CA SCREEN DOC REV: CPT | Performed by: FAMILY MEDICINE

## 2021-11-18 PROCEDURE — G8417 CALC BMI ABV UP PARAM F/U: HCPCS | Performed by: FAMILY MEDICINE

## 2021-11-18 PROCEDURE — 99214 OFFICE O/P EST MOD 30 MIN: CPT | Performed by: FAMILY MEDICINE

## 2021-11-18 PROCEDURE — 1036F TOBACCO NON-USER: CPT | Performed by: FAMILY MEDICINE

## 2021-11-18 PROCEDURE — G8427 DOCREV CUR MEDS BY ELIG CLIN: HCPCS | Performed by: FAMILY MEDICINE

## 2021-11-18 PROCEDURE — 3023F SPIROM DOC REV: CPT | Performed by: FAMILY MEDICINE

## 2021-11-18 PROCEDURE — 4040F PNEUMOC VAC/ADMIN/RCVD: CPT | Performed by: FAMILY MEDICINE

## 2021-11-18 PROCEDURE — 3046F HEMOGLOBIN A1C LEVEL >9.0%: CPT | Performed by: FAMILY MEDICINE

## 2021-11-18 RX ORDER — BLOOD SUGAR DIAGNOSTIC
STRIP MISCELLANEOUS
Qty: 100 EACH | Refills: 11 | Status: SHIPPED
Start: 2021-11-18 | End: 2022-08-01

## 2021-11-22 ENCOUNTER — TELEPHONE (OUTPATIENT)
Dept: FAMILY MEDICINE CLINIC | Age: 66
End: 2021-11-22

## 2021-11-22 DIAGNOSIS — H60.331 ACUTE SWIMMER'S EAR OF RIGHT SIDE: Primary | ICD-10-CM

## 2021-11-22 RX ORDER — CIPROFLOXACIN AND DEXAMETHASONE 3; 1 MG/ML; MG/ML
4 SUSPENSION/ DROPS AURICULAR (OTIC) 2 TIMES DAILY
Qty: 1 EACH | Refills: 0 | Status: SHIPPED | OUTPATIENT
Start: 2021-11-22 | End: 2021-11-29

## 2021-11-22 ASSESSMENT — ENCOUNTER SYMPTOMS
TROUBLE SWALLOWING: 0
CHEST TIGHTNESS: 0
FACIAL SWELLING: 0
VOICE CHANGE: 0
COLOR CHANGE: 0
ANAL BLEEDING: 0
STRIDOR: 0
BLOOD IN STOOL: 0
ABDOMINAL PAIN: 0
APNEA: 0
RHINORRHEA: 0
EYE PAIN: 0
EYE ITCHING: 0
ABDOMINAL DISTENTION: 0
BACK PAIN: 0
SINUS PRESSURE: 0
CHOKING: 0
VOMITING: 0
SORE THROAT: 0
RECTAL PAIN: 0
COUGH: 1
EYE REDNESS: 0
PHOTOPHOBIA: 0
NAUSEA: 0
SHORTNESS OF BREATH: 0
WHEEZING: 1
DIARRHEA: 0
CONSTIPATION: 0
EYE DISCHARGE: 0

## 2021-11-22 NOTE — TELEPHONE ENCOUNTER
----- Message from Anu Sherryma sent at 11/22/2021  9:27 AM EST -----  Subject: Message to Provider    QUESTIONS  Information for Provider? Pt of Dr. Andrew Vazquez lost referral for Dr. Olga Kulkarni Please call pt  ---------------------------------------------------------------------------  --------------  [address]  What is the best way for the office to contact you? Do not leave any   message, patient will call back for answer  Preferred Call Back Phone Number? [phone]  ---------------------------------------------------------------------------  --------------  SCRIPT ANSWERS  Relationship to Patient?  Self

## 2021-11-22 NOTE — TELEPHONE ENCOUNTER
Patient called the ofc back and was informed Dr. Raúl Presley changed RX for Neomycin.     Please advise if RX will be changed for Roflumilast.

## 2021-11-22 NOTE — TELEPHONE ENCOUNTER
That was referral for [facility] with his number on it. Can we give him number and tell him to call.  They have been trying to contact him

## 2021-11-22 NOTE — PROGRESS NOTES
Orquidea Davison is a 77 y.o. male  . Subjective:      Feeling much better since discharge from hospital. breathing is much better, sugars have improved considerably (but are still elevated). Still coughing. Pain and itching to right ear. Has follow up with cardiology. We will watch patients hydration status and renal function , we want him to hydrate well but he is to report any weight gain or shortness of breath. His diet had improved but it is still poor, has to avoid sweets. HGa1C should improve if he stays of current trajectory. Patient has not been using Cpap. He really needs to get back to using nightly. Still remains noncompliant with thyroid medication. We discussed this at length. Discussed covid and influenza vaccine. Review of Systems   Constitutional: Positive for fatigue. Negative for activity change, appetite change, chills, diaphoresis, fever and unexpected weight change. HENT: Negative for congestion, dental problem, drooling, ear discharge, ear pain, facial swelling, hearing loss, mouth sores, nosebleeds, postnasal drip, rhinorrhea, sinus pressure, sneezing, sore throat, tinnitus, trouble swallowing and voice change. Eyes: Negative for photophobia, pain, discharge, redness, itching and visual disturbance. Respiratory: Positive for cough and wheezing. Negative for apnea, choking, chest tightness, shortness of breath and stridor. Cardiovascular: Negative for chest pain, palpitations and leg swelling. Gastrointestinal: Negative for abdominal distention, abdominal pain, anal bleeding, blood in stool, constipation, diarrhea, nausea, rectal pain and vomiting. Endocrine: Negative for cold intolerance, heat intolerance, polydipsia, polyphagia and polyuria.    Genitourinary: Negative for decreased urine volume, difficulty urinating, dysuria, enuresis, flank pain, frequency, genital sores, hematuria, penile discharge, penile pain, penile swelling, scrotal swelling, testicular pain and urgency. Musculoskeletal: Negative for arthralgias, back pain, gait problem, joint swelling, myalgias, neck pain and neck stiffness. Skin: Negative for color change, pallor, rash and wound. Allergic/Immunologic: Negative for environmental allergies, food allergies and immunocompromised state. Neurological: Negative for dizziness, tremors, seizures, syncope, facial asymmetry, speech difficulty, weakness, light-headedness, numbness and headaches. Hematological: Negative for adenopathy. Does not bruise/bleed easily. Psychiatric/Behavioral: Negative for agitation, behavioral problems, confusion, decreased concentration, dysphoric mood, hallucinations, self-injury, sleep disturbance and suicidal ideas. The patient is not nervous/anxious and is not hyperactive.         Past Medical History:   Diagnosis Date    Acid reflux     Acute diastolic (congestive) heart failure ([facility]) 06/19/2019    Arthritis     Asthma 04/16/2014    Asthmatic bronchitis , chronic ([facility]) 11/28/2016    CAD (coronary artery disease)     Chronic bronchitis ([facility]) 04/16/2014    COPD (chronic obstructive pulmonary disease) ([facility])     CB    COPD (chronic obstructive pulmonary disease) ([facility])     Diabetes mellitus ([facility])     Emphysema (subcutaneous) (surgical) resulting from a procedure     H/O cardiovascular stress test 04/24/2021    Lexiscan    Hyperlipidemia     Hypertension     Hypoxemia requiring supplemental oxygen 02/02/2015    LONG TERM ANTICOAGULENT USE     Morbid obesity with BMI of 50.0-59.9, adult ([facility] [address] 11/27/2013    Obesity     Osteoarthritis     Sleep apnea     bilevel positive airway pressure at 13/8 with 2 L oxygen flow     Stage 3 chronic kidney disease ([facility])     Tobacco abuse     Type II or unspecified type diabetes mellitus without mention of complication, not stated as uncontrolled        Social History     Socioeconomic History    Marital status:      Spouse name: Bernadine Patrick Number of children: 1  Years of education: Not on file    Highest education level: 11th grade   Occupational History    Not on file   Tobacco Use    Smoking status: Former Smoker     Packs/day: 1.00     Years: 45.00     Pack years: 45.00     Types: Cigarettes     Quit date: 2013     Years since quittin.3    Smokeless tobacco: Former User     Types: [address] date: 10/8/1971   Vaping Use    Vaping Use: Never used   Substance and Sexual Activity    Alcohol use: No     Alcohol/week: 0.0 standard drinks     Comment: 1-2 coffee per day    Drug use: No    Sexual activity: Yes     Partners: Female   Other Topics Concern    Not on file   Social History Narrative    19  [facility] reviewed SDOH with Angelia Blackburn. He does have money strain but between the income he has coming in and his wife's they are over resources for SARY programs. Offered food panty info to help with end of the month struggles but he declined stating that he tried and was refused due to his income. He belongs to a [employer] and goes weekly so he has some community connections in place. He has an extremely high interest rate on his mortgage which he was encouraged to look into getting lowered to help save money on his house payments. Noticed some difficulty with memory recall. Becomes easily flustered at times. Has no MHI to support applying for [facility] program of SARY. States he does not feel depressed or need any MH treatment. Social Determinants of Health     Financial Resource Strain: Low Risk     Difficulty of Paying Living Expenses: Not hard at all   Food Insecurity: No Food Insecurity    Worried About Running Out of Food in the Last Year: Never true    Jennifer of Food in the Last Year: Never true   Transportation Needs:     Lack of Transportation (Medical): Not on file    Lack of Transportation (Non-Medical):  Not on file   Physical Activity:     Days of Exercise per Week: Not on file    Minutes of Exercise per Session: Not on file Stress:     Feeling of Stress : Not on file   Social Connections:     Frequency of Communication with Friends and Family: Not on file    Frequency of Social Gatherings with Friends and Family: Not on file    Attends Muslim Services: Not on file    Active Member of [address] [place] [address] [employer] or Organizations: Not on file    Attends Club or Organization Meetings: Not on file    Marital Status: Not on file   Intimate Partner Violence:     Fear of Current or Ex-Partner: Not on file    Emotionally Abused: Not on file    Physically Abused: Not on file    Sexually Abused: Not on file   Housing Stability:     Unable to Pay for Housing in the Last Year: Not on file    Number of [place] in the Last Year: Not on file    Unstable Housing in the Last Year: Not on file       Family History   Problem Relation Age of Onset    Cancer Mother         breast    Cancer Father         stomach    Mental Illness Brother     Stroke Brother     Other Brother        Current Outpatient Medications on File Prior to Visit   Medication Sig Dispense Refill    gabapentin (NEURONTIN) 300 MG capsule Take 1 capsule by mouth 2 times daily.  90 capsule 5    bumetanide (BUMEX) 1 MG tablet Take 1 tablet by mouth daily 60 tablet 5    warfarin (COUMADIN) 5 MG tablet Take 1 tablet by mouth daily 30 tablet 3    insulin glargine (BASAGLAR KWIKPEN) 100 UNIT/ML injection pen Inject 45 Units into the skin 2 times daily      levocetirizine (XYZAL) 5 MG tablet Take 5 mg by mouth nightly      spironolactone (ALDACTONE) 25 MG tablet TAKE ONE TABLET BY MOUTH TWO TIMES A  tablet 0    hydrOXYzine (VISTARIL) 25 MG capsule Take one 220 minutes prior to MRI 3 capsule 0    amiodarone (CORDARONE) 200 MG tablet Take 1 tablet by mouth 2 times daily 60 tablet 5    pantoprazole (PROTONIX) 40 MG tablet Take 40 mg by mouth daily      metoprolol succinate (TOPROL XL) 25 MG extended release tablet Take 1 tablet by mouth 2 times daily 60 tablet 5    pramipexole (MIRAPEX) 0.25 MG tablet TAKE TWO TABLETS BY MOUTH THREE TIMES A  tablet 3    insulin lispro, 1 Unit Dial, (HUMALOG KWIKPEN) 100 UNIT/ML SOPN Inject 6 units with meals PLUS                 No Insulin   140-199           3 Units   200-249           6 Units   250-299           9 Units   300-349         12 Units   350-400         15 Units   Over 400       18 Units (Patient taking differently: Inject 0-18 Units into the skin 3 times daily (before meals) MAX 70 units daily) 5 pen 0    atorvastatin (LIPITOR) 80 MG tablet TAKE ONE TABLET BY MOUTH EVERY NIGHT 90 tablet 5    montelukast (SINGULAIR) 10 MG tablet Take 1 tablet by mouth nightly 30 tablet 4    CPAP Machine MISC 1 each by Does not apply route nightly as needed       aspirin 81 MG tablet Take 81 mg by mouth nightly        No current facility-administered medications on file prior to visit. Allergies   Allergen Reactions    Pcn [Penicillins]      Child went to hospital   ? Reaction  Patient tolerates cephalosporins    Sulfa Antibiotics      ? I have reviewed his allergies, medications, problem list, medical,social and family history and have updated as needed in the electronic medical record. Objective:     Physical Exam  Vitals and nursing note reviewed. Constitutional:       General: He is not in acute distress. Appearance: He is well-developed. He is not diaphoretic. HENT:      Head: Normocephalic and atraumatic. Left Ear: External ear normal.      Ears:      Comments: Right otitis externa     Nose: Nose normal.      Mouth/Throat:      Pharynx: No oropharyngeal exudate. Eyes:      General: No scleral icterus. Right eye: No discharge. Left eye: No discharge. Conjunctiva/sclera: Conjunctivae normal.      Pupils: Pupils are equal, round, and reactive to light. Neck:      Thyroid: No thyromegaly. Vascular: No JVD. Trachea: No tracheal deviation.    Cardiovascular:      Rate and Rhythm: Normal rate and regular rhythm. Heart sounds: Normal heart sounds. No murmur heard. No friction rub. No gallop. Pulmonary:      Effort: Pulmonary effort is normal. No respiratory distress. Breath sounds: Normal breath sounds. No stridor. No wheezing or rales. Chest:      Chest wall: No tenderness. Abdominal:      General: Bowel sounds are normal. There is no distension. Palpations: Abdomen is soft. There is no mass. Tenderness: There is no abdominal tenderness. There is no guarding or rebound. Genitourinary:     Comments: Deferred by patient  Musculoskeletal:         General: No tenderness. Normal range of motion. Cervical back: Normal range of motion and neck supple. Lymphadenopathy:      Cervical: No cervical adenopathy. Skin:     General: Skin is warm and dry. Coloration: Skin is not pale. Findings: No erythema or rash. Neurological:      Mental Status: He is alert and oriented to person, place, and time. Cranial Nerves: No cranial nerve deficit. Motor: No abnormal muscle tone. Coordination: Coordination normal.      Deep Tendon Reflexes: Reflexes are normal and symmetric. Reflexes normal.   Psychiatric:         Behavior: Behavior normal.         Thought Content: Thought content normal.         Judgment: Judgment normal.         Assessment / Plan:   Georges Siu was seen today for 1 month follow-up. Diagnoses and all orders for this visit:    Coronary artery disease involving native coronary artery of native heart without angina pectoris  -     CBC Auto Differential; Future  -     Comprehensive Metabolic Panel; Future  -     PROTIME-INR; Future  -     MAGNESIUM; Future    Type 2 diabetes mellitus with hyperglycemia, with long-term current use of insulin (HCC)  -     CBC Auto Differential; Future  -     Comprehensive Metabolic Panel; Future  -     Hemoglobin A1C; Future  -     PROTIME-INR; Future  -     MAGNESIUM;  Future  -     blood glucose test strips (ACCU-CHEK GUIDE) strip; Test twice daily    Mixed hyperlipidemia  -     CBC Auto Differential; Future  -     Comprehensive Metabolic Panel; Future  -     Lipid Panel; Future  -     PROTIME-INR; Future  -     MAGNESIUM; Future    Type 2 diabetes mellitus with diabetic nephropathy, with long-term current use of insulin (HCC)  -     CBC Auto Differential; Future  -     Comprehensive Metabolic Panel; Future  -     Hemoglobin A1C; Future  -     PROTIME-INR; Future  -     MAGNESIUM; Future  -     blood glucose test strips (ACCU-CHEK GUIDE) strip; Test twice daily    Atrial fibrillation with RVR (HCC)  -     CBC Auto Differential; Future  -     Comprehensive Metabolic Panel; Future  -     PROTIME-INR; Future  -     MAGNESIUM; Future    Acquired hypothyroidism  -     TSH without Reflex; Future  -     T4, Free; Future    Chronic obstructive pulmonary disease with acute exacerbation ([facility])  -     Roflumilast (DALIRESP) 500 MCG tablet; Take 1 tablet by mouth daily    Acute otitis externa of right ear, unspecified type  -     neomycin-polymyxin-hydrocortisone (CORTISPORIN) 3.5-71695-9 otic solution; Place 4 drops into the right ear 3 times daily for 10 days Instill into right Ear        Reviewed healthmaintenance report. Patient is aware of deficiencies and suggested preventative tests.

## 2021-12-09 ENCOUNTER — NURSE TRIAGE (OUTPATIENT)
Dept: OTHER | Facility: CLINIC | Age: 66
End: 2021-12-09

## 2021-12-09 ENCOUNTER — TELEPHONE (OUTPATIENT)
Dept: FAMILY MEDICINE CLINIC | Age: 66
End: 2021-12-09

## 2021-12-09 ENCOUNTER — CARE COORDINATION (OUTPATIENT)
Dept: CASE MANAGEMENT | Age: 66
End: 2021-12-09

## 2021-12-09 NOTE — TELEPHONE ENCOUNTER
Received call from amita  at [facility] with Red Flag Complaint. Brief description of triage: 76 y/o with  Bilateral leg swelling onset x 2  Days extends from hip  To ankles       Triage indicates for patient to  Seen by pcp in the next 4  Hours     Care advice provided, patient verbalizes understanding; denies any other questions or concerns; instructed to call back for any new or worsening symptoms. Writer provided warm transfer to [facility]    at [facility] for appointment scheduling. Attention Provider: Thank you for allowing me to participate in the care of your patient. The patient was connected to triage in response to information provided to the ECC/PSC. Please do not respond through this encounter as the response is not directed to a shared pool. Reason for Disposition   SEVERE leg swelling (e.g., swelling extends above knee, entire leg is swollen, weeping fluid)    Answer Assessment - Initial Assessment Questions  1. ONSET: \"When did the swelling start? \" (e.g., minutes, hours, days)      Days     2. LOCATION: \"What part of the leg is swollen? \"  \"Are both legs swollen or just one leg? \"     Both     3. SEVERITY: \"How bad is the swelling? \" (e.g., localized; mild, moderate, severe)   - Localized - small area of swelling localized to one leg   - MILD pedal edema - swelling limited to foot and ankle, pitting edema < 1/4 inch (6 mm) deep, rest and elevation eliminate most or all swelling   - MODERATE edema - swelling of lower leg to knee, pitting edema > 1/4 inch (6 mm) deep, rest and elevation only partially reduce swelling   - SEVERE edema - swelling extends above knee, facial or hand swelling present      Hip to knee     4. REDNESS: \"Does the swelling look red or infected? \"     Denies     5. PAIN: \"Is the swelling painful to touch? \" If so, ask: \"How painful is it? \"   (Scale 1-10; mild, moderate or severe)    Mild tenderness       6. FEVER: \"Do you have a fever? \" If so, ask: \"What is it, how was it measured, and when did it start? \"       Denies     7. CAUSE: \"What do you think is causing the leg swelling? \"      Unsure     8. MEDICAL HISTORY: \"Do you have a history of heart failure, kidney disease, liver failure, or cancer? \"      Denies     9. RECURRENT SYMPTOM: \"Have you had leg swelling before? \" If so, ask: \"When was the last time? \" \"What happened that time? \"      3 wks  Ago     10. OTHER SYMPTOMS: \"Do you have any other symptoms? \" (e.g., chest pain, difficulty breathing)       Dry mouth ,  Laying down pt has diffiulty breathing     11. PREGNANCY: \"Is there any chance you are pregnant? \" \"When was your last menstrual period? \"       N/a    Protocols used: LEG SWELLING AND EDEMA-ADULT-

## 2021-12-09 NOTE — CARE COORDINATION
[facility] Transitions Follow Up Call    2021    Patient: Elenita Guadalupe  Patient : 1955   MRN: [id number]  Reason for Admission: Hyperosmolar Hyperglycemic State (HHS)  Discharge Date: 10/31/21 RARS: Readmission Risk Score: 12.9         Spoke with: [id number] Transitions Subsequent and Final Call    Subsequent and Final Calls  Do you have any ongoing symptoms?: Yes  Onset of Patient-reported symptoms: In the past 7 days  Patient-reported symptoms: Weight Gain, Other, Shortness of Breath  Interventions for patient-reported symptoms: Notified PCP/Physician  Have your medications changed?: Yes  Patient Reports: Pt states he has not been taking diuretics since last PCP appt as he verbalizes thinking PCP stopped them   Do you have any questions related to your medications?: No  Do you currently have any active services?: No  Do you have any needs or concerns that I can assist you with?: No  Identified Barriers: Lack of Motivation, Lack of Education  Care Transitions Interventions    Registered Dietician: Completed    Other Interventions:         Spoke with patient today 21 for TCM/hospital discharge follow up final call for HHS. Patient states he continues monitoring BG as directed and adits FBS today was \"two thirty something\". States he doesn't remember exact reading because he's out driving right now and does not have BG readings with him. Denies any s/s of hyperglycemia. States he is following a low sugar/low carbohydrate diet. Noted dietician has made several attempts trying to contact patient for referral and has been unsuccessful. Patient states he has noticed increased swelling from hips down to knees. States last weight was 286 lbs which is a significant increase from 273 lbs on 21 at PCP HFU appt. States having shortness of breath only when climbing steps but no chest pain.  CTN confirmed with patient who advises he contacted PCP today who wants to see him but patient advises \"today is a bad day because I have too much to do\". Noted note in EMR that patient contacted PCP office. Noted patient only wants to be seen by Dr. Wilfredo Juarez and advises \"today is not a good day with too much to do\". Confirmed with patient he has been off of diuretics since PCP follow up appt last month thinking PCP stopped them. CTN reiterated importance to follow up with PCP ASAP and making aware of severe symptoms that can lead to repeat hospitalization which he acknowledges. CTN reviewed DM/CHF zone tools and knowing when to seek medical attention. CTN will provide warm hand off to ACM  to determine eligibility for Ambulatory Care Coordination. Call was cut short as patient states he is trying to back into a parking spot and can not continue with call. CTN signing off.      Follow Up  Future Appointments   Date Time Provider [place]   1/19/2022  1:00 PM DO Sarah Sanchez [facility]   3/10/2022  1:45 PM DO Sarah Sanchez [facility]       ARLEEN Gee

## 2021-12-10 NOTE — TELEPHONE ENCOUNTER
I am worried about his renal function. So just have him double Bumetanide for one week. That will be two a day for one week only.  Have him call next week

## 2021-12-16 DIAGNOSIS — E11.65 TYPE 2 DIABETES MELLITUS WITH HYPERGLYCEMIA, WITH LONG-TERM CURRENT USE OF INSULIN (HCC): ICD-10-CM

## 2021-12-16 DIAGNOSIS — Z79.4 TYPE 2 DIABETES MELLITUS WITH DIABETIC NEPHROPATHY, WITH LONG-TERM CURRENT USE OF INSULIN (HCC): ICD-10-CM

## 2021-12-16 DIAGNOSIS — I48.91 ATRIAL FIBRILLATION WITH RVR (HCC): ICD-10-CM

## 2021-12-16 DIAGNOSIS — I25.10 CORONARY ARTERY DISEASE INVOLVING NATIVE CORONARY ARTERY OF NATIVE HEART WITHOUT ANGINA PECTORIS: ICD-10-CM

## 2021-12-16 DIAGNOSIS — E03.9 ACQUIRED HYPOTHYROIDISM: ICD-10-CM

## 2021-12-16 DIAGNOSIS — E78.2 MIXED HYPERLIPIDEMIA: ICD-10-CM

## 2021-12-16 DIAGNOSIS — E11.21 TYPE 2 DIABETES MELLITUS WITH DIABETIC NEPHROPATHY, WITH LONG-TERM CURRENT USE OF INSULIN (HCC): ICD-10-CM

## 2021-12-16 DIAGNOSIS — Z79.4 TYPE 2 DIABETES MELLITUS WITH HYPERGLYCEMIA, WITH LONG-TERM CURRENT USE OF INSULIN (HCC): ICD-10-CM

## 2021-12-16 LAB
ALBUMIN SERPL-MCNC: 4.1 G/DL (ref 3.5–5.2)
ALP BLD-CCNC: 177 U/L (ref 40–129)
ALT SERPL-CCNC: 15 U/L (ref 0–40)
ANION GAP SERPL CALCULATED.3IONS-SCNC: 19 MMOL/L (ref 7–16)
AST SERPL-CCNC: 20 U/L (ref 0–39)
BASOPHILS ABSOLUTE: 0.07 E9/L (ref 0–0.2)
BASOPHILS RELATIVE PERCENT: 0.9 % (ref 0–2)
BILIRUB SERPL-MCNC: 1.3 MG/DL (ref 0–1.2)
BUN BLDV-MCNC: 61 MG/DL (ref 6–23)
CALCIUM SERPL-MCNC: 10.1 MG/DL (ref 8.6–10.2)
CHLORIDE BLD-SCNC: 95 MMOL/L (ref 98–107)
CHOLESTEROL, TOTAL: 123 MG/DL (ref 0–199)
CO2: 23 MMOL/L (ref 22–29)
CREAT SERPL-MCNC: 1.7 MG/DL (ref 0.7–1.2)
EOSINOPHILS ABSOLUTE: 0.09 E9/L (ref 0.05–0.5)
EOSINOPHILS RELATIVE PERCENT: 1.2 % (ref 0–6)
GFR AFRICAN AMERICAN: 49
GFR NON-AFRICAN AMERICAN: 41 ML/MIN/1.73
GLUCOSE BLD-MCNC: 383 MG/DL (ref 74–99)
HBA1C MFR BLD: 11 % (ref 4–5.6)
HCT VFR BLD CALC: 39.8 % (ref 37–54)
HDLC SERPL-MCNC: 42 MG/DL
HEMOGLOBIN: 12.4 G/DL (ref 12.5–16.5)
IMMATURE GRANULOCYTES #: 0.03 E9/L
IMMATURE GRANULOCYTES %: 0.4 % (ref 0–5)
INR BLD: 1.2
LDL CHOLESTEROL CALCULATED: 60 MG/DL (ref 0–99)
LYMPHOCYTES ABSOLUTE: 1.33 E9/L (ref 1.5–4)
LYMPHOCYTES RELATIVE PERCENT: 17.6 % (ref 20–42)
MAGNESIUM: 1.9 MG/DL (ref 1.6–2.6)
MCH RBC QN AUTO: 31.4 PG (ref 26–35)
MCHC RBC AUTO-ENTMCNC: 31.2 % (ref 32–34.5)
MCV RBC AUTO: 100.8 FL (ref 80–99.9)
MONOCYTES ABSOLUTE: 0.63 E9/L (ref 0.1–0.95)
MONOCYTES RELATIVE PERCENT: 8.3 % (ref 2–12)
NEUTROPHILS ABSOLUTE: 5.4 E9/L (ref 1.8–7.3)
NEUTROPHILS RELATIVE PERCENT: 71.6 % (ref 43–80)
PDW BLD-RTO: 16.6 FL (ref 11.5–15)
PLATELET # BLD: 178 E9/L (ref 130–450)
PMV BLD AUTO: 11 FL (ref 7–12)
POTASSIUM SERPL-SCNC: 4.4 MMOL/L (ref 3.5–5)
PROTHROMBIN TIME: 12.8 SEC (ref 9.3–12.4)
RBC # BLD: 3.95 E12/L (ref 3.8–5.8)
SODIUM BLD-SCNC: 137 MMOL/L (ref 132–146)
T4 FREE: 1.13 NG/DL (ref 0.93–1.7)
TOTAL PROTEIN: 7.9 G/DL (ref 6.4–8.3)
TRIGL SERPL-MCNC: 104 MG/DL (ref 0–149)
TSH SERPL DL<=0.05 MIU/L-ACNC: 19.42 UIU/ML (ref 0.27–4.2)
VLDLC SERPL CALC-MCNC: 21 MG/DL
WBC # BLD: 7.6 E9/L (ref 4.5–11.5)

## 2021-12-21 ENCOUNTER — TELEPHONE (OUTPATIENT)
Dept: FAMILY MEDICINE CLINIC | Age: 66
End: 2021-12-21

## 2021-12-21 DIAGNOSIS — G89.29 CHRONIC RIGHT SHOULDER PAIN: Primary | ICD-10-CM

## 2021-12-21 DIAGNOSIS — M25.511 CHRONIC RIGHT SHOULDER PAIN: Primary | ICD-10-CM

## 2021-12-21 NOTE — TELEPHONE ENCOUNTER
Pt called asking for referral to Ortho. For second opinion. States he is a lot of pain with his right shoulder MRI on 9.10.21 showed tear of rotator cuff  and asking if there is something he could get for the pain Until he see Ortho.   He saw Dr. Eulalia Dowd before     Last seen 11/18/2021  Next appt 1/19/2022

## 2021-12-22 ENCOUNTER — TELEPHONE (OUTPATIENT)
Dept: FAMILY MEDICINE CLINIC | Age: 66
End: 2021-12-22

## 2021-12-22 DIAGNOSIS — N28.9 RENAL INSUFFICIENCY: Primary | ICD-10-CM

## 2021-12-22 DIAGNOSIS — I87.2 VENOUS INSUFFICIENCY (CHRONIC) (PERIPHERAL): ICD-10-CM

## 2021-12-22 RX ORDER — BUMETANIDE 1 MG/1
1 TABLET ORAL DAILY
Qty: 60 TABLET | Refills: 5 | Status: SHIPPED
Start: 2021-12-22 | End: 2022-04-05 | Stop reason: SDUPTHER

## 2021-12-22 NOTE — TELEPHONE ENCOUNTER
Patient called stating he increased the Bumex for one week on 12.9.21 now he will run out and it will be to soon to refill.   He is asking for new Rx to be sent to the pharmacy    Last seen 11/18/2021  Next appt 1/19/2022      Claudean Patella

## 2021-12-23 ENCOUNTER — TELEPHONE (OUTPATIENT)
Dept: FAMILY MEDICINE CLINIC | Age: 66
End: 2021-12-23

## 2021-12-23 NOTE — TELEPHONE ENCOUNTER
Patient called back stating he will call for alex with Dr. Paula Lundy.   And he will come by and get the meds that are here for him

## 2021-12-23 NOTE — TELEPHONE ENCOUNTER
We can write for another week. I am worried about dehydration. Make sure he has upcoming appointment with kidney doctor. If he does not let me know.  Needs to follow with him regularly

## 2022-01-07 ENCOUNTER — OFFICE VISIT (OUTPATIENT)
Dept: ORTHOPEDIC SURGERY | Age: 67
End: 2022-01-07
Payer: MEDICARE

## 2022-01-07 VITALS — TEMPERATURE: 98 F | HEIGHT: 66 IN | WEIGHT: 307 LBS | BODY MASS INDEX: 49.34 KG/M2

## 2022-01-07 DIAGNOSIS — M77.8 SUBSCAPULARIS TENDINITIS OF RIGHT SHOULDER: ICD-10-CM

## 2022-01-07 DIAGNOSIS — S46.211A RUPTURE OF RIGHT BICEPS TENDON, INITIAL ENCOUNTER: ICD-10-CM

## 2022-01-07 DIAGNOSIS — M19.011 OSTEOARTHRITIS OF RIGHT AC (ACROMIOCLAVICULAR) JOINT: ICD-10-CM

## 2022-01-07 DIAGNOSIS — S40.011A CONTUSION OF MULTIPLE SITES OF RIGHT SHOULDER, INITIAL ENCOUNTER: ICD-10-CM

## 2022-01-07 DIAGNOSIS — M75.101 TEAR OF RIGHT ROTATOR CUFF, UNSPECIFIED TEAR EXTENT, UNSPECIFIED WHETHER TRAUMATIC: Primary | ICD-10-CM

## 2022-01-07 PROCEDURE — 20610 DRAIN/INJ JOINT/BURSA W/O US: CPT | Performed by: ORTHOPAEDIC SURGERY

## 2022-01-07 RX ORDER — TRIAMCINOLONE ACETONIDE 40 MG/ML
40 INJECTION, SUSPENSION INTRA-ARTICULAR; INTRAMUSCULAR ONCE
Status: COMPLETED | OUTPATIENT
Start: 2022-01-07 | End: 2022-01-07

## 2022-01-07 RX ORDER — METOLAZONE 2.5 MG/1
TABLET ORAL
COMMUNITY
Start: 2021-12-02 | End: 2022-04-26

## 2022-01-07 RX ORDER — LOSARTAN POTASSIUM 25 MG/1
TABLET ORAL
COMMUNITY
Start: 2021-12-01 | End: 2022-03-24

## 2022-01-07 RX ADMIN — TRIAMCINOLONE ACETONIDE 40 MG: 40 INJECTION, SUSPENSION INTRA-ARTICULAR; INTRAMUSCULAR at 11:55

## 2022-01-07 NOTE — PROGRESS NOTES
Chief Complaint   Patient presents with    Shoulder Pain     right shoulder pain fell on it over 9 months ago and ROM is limited         HPI:    Patient is 77 y.o. male complaining of right shoulder pain and weakness for 9 months after falling onto outstretched hand. He denies a specific traumatic injury to the right shoulder. Previous treatments include rest, ice, and anti-inflammatory medication and HEP without much relief. He denies any other orthopedic complaints. Follows up after MRI. ROS:    Skin: (-) rash,(-) psoriasis,(-) eczema, (-)skin cancer. Neurologic: (-)numbness, (-)tingling, (-)headaches, (-) LOC. Cardiovascular: (-) Chest pain, (-) swelling in legs/feet, (-) SOB, (-) cramping in legs/feet with walking.     All other review of systems negative except stated above or in HPI      Past Medical History:   Diagnosis Date    Acid reflux     Acute diastolic (congestive) heart failure (HCC) 06/19/2019    Arthritis     Asthma 04/16/2014    Asthmatic bronchitis , chronic (HCC) 11/28/2016    CAD (coronary artery disease)     Chronic bronchitis ([facility]) 04/16/2014    COPD (chronic obstructive pulmonary disease) ([facility])     CB    COPD (chronic obstructive pulmonary disease) ([facility])     Diabetes mellitus ([facility])     Emphysema (subcutaneous) (surgical) resulting from a procedure     H/O cardiovascular stress test 04/24/2021    Lexiscan    Hyperlipidemia     Hypertension     Hypoxemia requiring supplemental oxygen 02/02/2015    LONG TERM ANTICOAGULENT USE     Morbid obesity with BMI of 50.0-59.9, adult ([facility] [address] 11/27/2013    Obesity     Osteoarthritis     Sleep apnea     bilevel positive airway pressure at 13/8 with 2 L oxygen flow     Stage 3 chronic kidney disease (HCC)     Tobacco abuse     Type II or unspecified type diabetes mellitus without mention of complication, not stated as uncontrolled      Past Surgical History:   Procedure Laterality Date    COLONOSCOPY      CORONARY ANGIOPLASTY WITH STENT PLACEMENT  07-23-13    Left main focal eccentric 65% distal stenosis. Large OM1 CX 50% ostial & prox narrow. Large ramus artery & LAD: Minor plaque w/o sign narrow. RCA: Dom vessel w/25% prox narrow & focal hazy eccentric 99% distal stenosis before origin RPDA & RPLCA. LV: Mild inferior hypokinesis EF 55%. Probable mild AS with 10-15mmHg. Successful PCI distal RCA w/3.5 x 12 mm BMS, 0% res stenosis. Normal distal runoff    CORONARY ARTERY BYPASS GRAFT  09-09-13    Dr Fuad Fung; CABG x2, LIMA to LAD, SVG to ramus intermedius; MV repair using 30-mm Future complete ring with magic stitch between A3 and P3; rigid internal fixation of sternum using KLS plates x2; endoscopic vein harvesting of right lower extremity     ECHO COMPL W DOP COLOR FLOW  7/25/2013         HERNIA REPAIR      SINUS SURGERY         Current Outpatient Medications:     losartan (COZAAR) 25 MG tablet, TAKE ONE TABLET BY MOUTH DAILY, Disp: , Rfl:     metOLazone (ZAROXOLYN) 2.5 MG tablet, TAKE 1 TABLET BY MOUTH TWICE WEEKLY, Disp: , Rfl:     bumetanide (BUMEX) 1 MG tablet, Take 1 tablet by mouth daily, Disp: 60 tablet, Rfl: 5    Roflumilast (DALIRESP) 500 MCG tablet, Take 1 tablet by mouth daily, Disp: 30 tablet, Rfl: 5    blood glucose test strips (ACCU-CHEK GUIDE) strip, Test twice daily, Disp: 100 each, Rfl: 11    gabapentin (NEURONTIN) 300 MG capsule, Take 1 capsule by mouth 2 times daily. , Disp: 90 capsule, Rfl: 5    warfarin (COUMADIN) 5 MG tablet, Take 1 tablet by mouth daily, Disp: 30 tablet, Rfl: 3    insulin glargine (BASAGLAR KWIKPEN) 100 UNIT/ML injection pen, Inject 45 Units into the skin 2 times daily, Disp: , Rfl:     levocetirizine (XYZAL) 5 MG tablet, Take 5 mg by mouth nightly, Disp: , Rfl:     spironolactone (ALDACTONE) 25 MG tablet, TAKE ONE TABLET BY MOUTH TWO TIMES A DAY, Disp: 180 tablet, Rfl: 0    hydrOXYzine (VISTARIL) 25 MG capsule, Take one 220 minutes prior to MRI, Disp: 3 capsule, Rfl: 0    amiodarone (CORDARONE) 200 MG tablet, Take 1 tablet by mouth 2 times daily, Disp: 60 tablet, Rfl: 5    pantoprazole (PROTONIX) 40 MG tablet, Take 40 mg by mouth daily, Disp: , Rfl:     metoprolol succinate (TOPROL XL) 25 MG extended release tablet, Take 1 tablet by mouth 2 times daily, Disp: 60 tablet, Rfl: 5    pramipexole (MIRAPEX) 0.25 MG tablet, TAKE TWO TABLETS BY MOUTH THREE TIMES A DAY, Disp: 180 tablet, Rfl: 3    insulin lispro, 1 Unit Dial, (HUMALOG KWIKPEN) 100 UNIT/ML SOPN, Inject 6 units with meals PLUS               No Insulin  140-199           3 Units  200-249           6 Units  250-299           9 Units  300-349         12 Units  350-400         15 Units  Over 400       18 Units (Patient taking differently: Inject 0-18 Units into the skin 3 times daily (before meals) MAX 70 units daily), Disp: 5 pen, Rfl: 0    atorvastatin (LIPITOR) 80 MG tablet, TAKE ONE TABLET BY MOUTH EVERY NIGHT, Disp: 90 tablet, Rfl: 5    montelukast (SINGULAIR) 10 MG tablet, Take 1 tablet by mouth nightly, Disp: 30 tablet, Rfl: 4    CPAP Machine MISC, 1 each by Does not apply route nightly as needed , Disp: , Rfl:     aspirin 81 MG tablet, Take 81 mg by mouth nightly , Disp: , Rfl:   Allergies   Allergen Reactions    Pcn [Penicillins]      Child went to hospital   ? Reaction  Patient tolerates cephalosporins    Sulfa Antibiotics      ? Social History     Socioeconomic History    Marital status:      Spouse name: Kelby Gonzales Number of children: 1    Years of education: Not on file    Highest education level: 11th grade   Occupational History    Not on file   Tobacco Use    Smoking status: Former Smoker     Packs/day: 1.00     Years: 45.00     Pack years: 45.00     Types: Cigarettes     Quit date: 2013     Years since quittin.4    Smokeless tobacco: Former User     Types: [address] date: 10/8/1971   Vaping Use    Vaping Use: Never used   Substance and Sexual Activity    Alcohol use:  No Alcohol/week: 0.0 standard drinks     Comment: 1-2 coffee per day    Drug use: No    Sexual activity: Yes     Partners: Female   Other Topics Concern    Not on file   Social History Narrative    8-26-19  [facility] reviewed SDOH with Brayden Hamlin. He does have money strain but between the income he has coming in and his wife's they are over resources for SARY programs. Offered food panty info to help with end of the month struggles but he declined stating that he tried and was refused due to his income. He belongs to a [employer] and goes weekly so he has some community connections in place. He has an extremely high interest rate on his mortgage which he was encouraged to look into getting lowered to help save money on his house payments. Noticed some difficulty with memory recall. Becomes easily flustered at times. Has no MHI to support applying for [facility] program of SARY. States he does not feel depressed or need any MH treatment. Social Determinants of Health     Financial Resource Strain: Low Risk     Difficulty of Paying Living Expenses: Not hard at all   Food Insecurity: No Food Insecurity    Worried About Running Out of Food in the Last Year: Never true    Jennifer of Food in the Last Year: Never true   Transportation Needs:     Lack of Transportation (Medical): Not on file    Lack of Transportation (Non-Medical):  Not on file   Physical Activity:     Days of Exercise per Week: Not on file    Minutes of Exercise per Session: Not on file   Stress:     Feeling of Stress : Not on file   Social Connections:     Frequency of Communication with Friends and Family: Not on file    Frequency of Social Gatherings with Friends and Family: Not on file    Attends Spiritism Services: Not on file    Active Member of Clubs or Organizations: Not on file    Attends Club or Organization Meetings: Not on file    Marital Status: Not on file   Intimate Partner Violence:     Fear of Current or Ex-Partner: Not on file   Lori Emotionally Abused: Not on file    Physically Abused: Not on file    Sexually Abused: Not on file   Housing Stability:     Unable to Pay for Housing in the Last Year: Not on file    Number of Places Lived in the Last Year: Not on file    Unstable Housing in the Last Year: Not on file     Family History   Problem Relation Age of Onset    Cancer Mother         breast    Cancer Father         stomach    Mental Illness Brother     Stroke Brother     Other Brother            Physical Exam:    Temp 98 °F (36.7 °C)   Ht 5' 6\" (1.676 m)   Wt (!) 307 lb (139.3 kg)   BMI 49.55 kg/m²     GENERAL: alert, appears stated age, cooperative, no acute distress    HEENT: Head is normocephalic, atraumatic. PERRLA. SKIN: Clean, dry, intact. There is not any cellulitis or cutaneous lesions noted in the upper extremities    PULMONARY: breathing is regular and unlabored, no acute distress    CV: The bilateral upper and lower extremities are warm and well-perfused with brisk capillary refill. 2+ pulses UE and LE bilateral.     PSYCHIATRY: Pleasant mood, appropriate behavior, follows commands    NEURO: Sensation is intact distally with light touch with no alteration. Motor exam of the upper extremities show elbow flexion and extension, wrist flexion and extension, and finger abduction grossly intact 5/5. Upper extremity reflexes are bilaterally symmetrical and within normal limits. LYMPH: No lymphedema present distally in upper or lower extremity. MUSCULOSKELETAL:  Shoulder Exam:  Examination of the right shoulder shows: There is not a deformity. There is not erythema. There is not soft tissue swelling. Deltoid region is  tender to palpation. AC Joint is  tender to palpation. Clavicle is not tender to palpation. Bicipital Groove is  tender to palpation. Pectoralis  is not tender to palpation. Scapula/ trapezius is  tender to palpation.   Left:  ROM Full, Strength: Supraspinatus 5/5, Infraspinatus 5/5, Subscapularis 5/5  Right:  ROM 60/60/50, Strength: Supraspinatus 4/5, Infraspinatus 5/5, Subscapularis 5/5    Left Shoulder:  Crepitus:  no   Tenderness:  none   Effusion:   none   Impingement: negative   Empty Can:  negative   Speed's:  negative      Apprehension:  negative   Cross Arm Sign:  negative   Palmerton's:  negative       Neer's:  negative      Belly Press Test:  negative      Drop Arm Test:  negative     Right Shoulder:  Crepitus:  no   Tenderness:  mild   Effusion:   non   Impingement: positive   Empty Can:  positive   Speed's: positive   Apprehension:  not tested   Cross Arm Sign:  positive   Palmerton's:  positive      Neer's:  positive      Belly Press Test:  negative   Drop Arm Test:  positive       no change since last visit. Imaging:  XR SHOULDER RIGHT (MIN 2 VIEWS)    Result Date: 7/22/2021  EXAMINATION: THREE XRAY VIEWS OF THE RIGHT SHOULDER 7/22/2021 10:53 am COMPARISON: 12 June 2021 HISTORY: ORDERING SYSTEM PROVIDED HISTORY: Chronic right shoulder pain TECHNOLOGIST PROVIDED HISTORY: Reason for exam:->pain FINDINGS: Very close proximity of the humeral head to the acromion in the [place] view suggests chronic rotator cuff disease. There is moderate osteoarthritis at the [facility] joint. There is mild osteoarthritis at the glenohumeral joint. No acute fracture. Suspected chronic rotator cuff disease. Osteoarthritis at the [facility] and glenohumeral joints as noted. XR HAND LEFT (MIN 3 VIEWS)    Result Date: 9/28/2021  EXAMINATION: THREE XRAY VIEWS OF THE LEFT HAND 9/28/2021 9:59 am COMPARISON: None. HISTORY: ORDERING SYSTEM PROVIDED HISTORY: Puncture wound TECHNOLOGIST PROVIDED HISTORY: Reason for exam:->Left thumb puncture wound FINDINGS: Multifocal arthritic changes appear most prominent in the proximal wrist.  No acute fracture or evidence of dislocation. No retained radiopaque foreign body is identified. There is calcific atherosclerosis.      No acute osseous abnormality or radiopaque foreign body is identified. MRI SHOULDER RIGHT WO CONTRAST    Result Date: 9/10/2021  EXAMINATION: MRI OF THE RIGHT SHOULDER WITHOUT CONTRAST   9/10/2021 9:28 am TECHNIQUE: Multiplanar multisequence MRI of the right shoulder was performed without the administration of intravenous contrast. COMPARISON: Right shoulder plain radiographs from 07/22/2021 HISTORY: ORDERING SYSTEM PROVIDED HISTORY: Tear of right rotator cuff, unspecified tear extent, unspecified whether traumatic 40-year-old male with suspected right-sided rotator cuff tear FINDINGS: ROTATOR CUFF: Subacromial subdeltoid bursa contiguous with the glenohumeral joint. Complete retracted full-thickness tear of supraspinatus with retraction of the torn fibers from the footplate measuring up to 5 cm on image 21, series 2. Complete retracted full-thickness tear of infraspinatus with retraction of the torn fibers measuring 5.6 cm. Complete retracted full-thickness tear of subscapularis with retraction of the torn fibers measuring up to 3.4 cm. Complete retracted full-thickness tear of teres minor. Moderate to severe atrophy and fatty degeneration of supraspinatus, infraspinatus. Mild-to-moderate atrophy and fatty degeneration of teres minor and subscapularis. BICEPS TENDON: Complete tear of the long head of the biceps tendon. LABRUM: Mild diffuse labral degeneration. GLENOHUMERAL JOINT: Debris containing large glenohumeral joint effusion. Region of low signal in the anterior subscapularis recess measuring 1.7 x 0.9 x 1.2 cm which could represent focal nodular synovitis or debris in the anterior subscapularis recess. Mild glenohumeral chondromalacia. Inferior glenohumeral ligament appears intact. Mild osteophyte spurring of the glenohumeral joints. AC JOINT AND ACROMIOCLAVICULAR ARCH: Mild degenerative changes of the right AC joint. Type 2 acromion. Narrowing of the acromiohumeral distance. BONE MARROW: Marrow edema involving the acromion on image 15, series 4. Marrow edema at the superior and superolateral humeral head. Bone marrow signal intensity within the visualized osseous structures otherwise within normal limits. Superior migration of the humeral head. OUTLET SPACES: Fluid extending through the suprascapular notch. Quadrilateral space grossly unremarkable in appearance. No right axillary lymphadenopathy. 1. Massive rotator cuff tear with rotator cuff arthropathy as detailed above. Moderate to severe atrophy and fatty degeneration of supraspinatus and infraspinatus. Mild-to-moderate atrophy and fatty degeneration of teres minor and subscapularis. 2. Rupture of the long head of the biceps tendon. 3. Large debris containing glenohumeral joint effusion. 4. Focal nodular synovitis or debris in the anterior subscapularis cysts measuring up to 1.7 cm. 5. Mild degenerative changes of the right AC and glenohumeral joints. 6. Superior migration of the humeral head. 7. Mild diffuse labral degeneration. Mild glenohumeral chondromalacia. Jacques was seen today for shoulder pain. Diagnoses and all orders for this visit:    Tear of right rotator cuff, unspecified tear extent, unspecified whether traumatic  -     GA ARTHROCENTESIS ASPIR&/INJ MAJOR JT/BURSA W/O US    Rupture of right biceps tendon, initial encounter  -     GA ARTHROCENTESIS ASPIR&/INJ MAJOR JT/BURSA W/O US    Subscapularis tendinitis of right shoulder    Osteoarthritis of right AC (acromioclavicular) joint    Contusion of multiple sites of right shoulder, initial encounter    Body mass index (BMI) 45.0-49.9, adult ([facility])    BMI 45.0-49.9, adult ([facility] [address]    Other orders  -     triamcinolone acetonide (KENALOG-40) injection 40 mg      Patient seen and examined. X-rays reviewed. Patient has exam and history consistent with rotator cuff pathology. MRI recommended for further evaluation and management of possible rotator cuff tear. Patient seen and examined.   MRI reviewed with patient in detail. Natural history and course discussed with patient in long discussion  Treatment options discussed with patient in detail including risks and benefits. Patient should do well with conservative management as patient exhibits massive rotator cuff tear that appears to be irreparable at this time. Procedure Note Cortisone Injection to Shoulder    The right shoulder was identified as the injection site. The risk and benefits of a cortisone injection were explained and the patient consented to the injection. Under sterile conditions, the shoulder subacromial space was injected with a mixture of 40mg of Kenelog and Marcaine without complication. A sterile bandage was applied. In a 15 minute assessment and discussion, patient was counseled on continued weight loss, diet, and physical activity relating to this condition. He was educated with options in detail including nutrition, joining a health club/ weight loss program, and use of cardio equipment such as the Arc Trainer and the importance of use as well as range of motion and HEP exercises for weight loss. Hai Cameron DO            25 minutes was spent with patient. 50% or greater was spent counseling the patient.

## 2022-01-25 RX ORDER — LOSARTAN POTASSIUM 25 MG/1
TABLET ORAL
Qty: 90 TABLET | Refills: 0 | OUTPATIENT
Start: 2022-01-25

## 2022-02-14 ENCOUNTER — TELEMEDICINE (OUTPATIENT)
Dept: FAMILY MEDICINE CLINIC | Age: 67
End: 2022-02-14
Payer: MEDICARE

## 2022-02-14 DIAGNOSIS — J01.00 ACUTE NON-RECURRENT MAXILLARY SINUSITIS: Primary | ICD-10-CM

## 2022-02-14 DIAGNOSIS — F41.9 ANXIETY: ICD-10-CM

## 2022-02-14 DIAGNOSIS — J01.00 ACUTE NON-RECURRENT MAXILLARY SINUSITIS: ICD-10-CM

## 2022-02-14 PROCEDURE — 4040F PNEUMOC VAC/ADMIN/RCVD: CPT | Performed by: FAMILY MEDICINE

## 2022-02-14 PROCEDURE — 99213 OFFICE O/P EST LOW 20 MIN: CPT | Performed by: FAMILY MEDICINE

## 2022-02-14 PROCEDURE — G8417 CALC BMI ABV UP PARAM F/U: HCPCS | Performed by: FAMILY MEDICINE

## 2022-02-14 PROCEDURE — G8428 CUR MEDS NOT DOCUMENT: HCPCS | Performed by: FAMILY MEDICINE

## 2022-02-14 PROCEDURE — G8484 FLU IMMUNIZE NO ADMIN: HCPCS | Performed by: FAMILY MEDICINE

## 2022-02-14 PROCEDURE — 3017F COLORECTAL CA SCREEN DOC REV: CPT | Performed by: FAMILY MEDICINE

## 2022-02-14 PROCEDURE — 1036F TOBACCO NON-USER: CPT | Performed by: FAMILY MEDICINE

## 2022-02-14 PROCEDURE — 1123F ACP DISCUSS/DSCN MKR DOCD: CPT | Performed by: FAMILY MEDICINE

## 2022-02-14 RX ORDER — BENZONATATE 200 MG/1
200 CAPSULE ORAL 3 TIMES DAILY PRN
Qty: 90 CAPSULE | Refills: 0 | Status: SHIPPED | OUTPATIENT
Start: 2022-02-14 | End: 2022-02-24

## 2022-02-14 RX ORDER — LORAZEPAM 0.5 MG/1
0.5 TABLET ORAL 2 TIMES DAILY PRN
Qty: 60 TABLET | Refills: 5 | Status: SHIPPED
Start: 2022-02-14 | End: 2022-02-16 | Stop reason: SDUPTHER

## 2022-02-14 RX ORDER — BROMPHENIRAMINE MALEATE, PSEUDOEPHEDRINE HYDROCHLORIDE, AND DEXTROMETHORPHAN HYDROBROMIDE 2; 30; 10 MG/5ML; MG/5ML; MG/5ML
5 SYRUP ORAL 4 TIMES DAILY PRN
Qty: 120 ML | Refills: 0 | Status: SHIPPED
Start: 2022-02-14 | End: 2022-02-16 | Stop reason: SDUPTHER

## 2022-02-14 RX ORDER — AZITHROMYCIN 250 MG/1
250 TABLET, FILM COATED ORAL SEE ADMIN INSTRUCTIONS
Qty: 6 TABLET | Refills: 0 | Status: SHIPPED
Start: 2022-02-14 | End: 2022-02-16 | Stop reason: SDUPTHER

## 2022-02-16 RX ORDER — BROMPHENIRAMINE MALEATE, PSEUDOEPHEDRINE HYDROCHLORIDE, AND DEXTROMETHORPHAN HYDROBROMIDE 2; 30; 10 MG/5ML; MG/5ML; MG/5ML
5 SYRUP ORAL 4 TIMES DAILY PRN
Qty: 120 ML | Refills: 0
Start: 2022-02-16 | End: 2022-03-14

## 2022-02-16 RX ORDER — LORAZEPAM 0.5 MG/1
0.5 TABLET ORAL 2 TIMES DAILY PRN
Qty: 60 TABLET | Refills: 5 | Status: SHIPPED
Start: 2022-02-16 | End: 2022-03-18

## 2022-02-16 RX ORDER — AZITHROMYCIN 250 MG/1
250 TABLET, FILM COATED ORAL SEE ADMIN INSTRUCTIONS
Qty: 6 TABLET | Refills: 0
Start: 2022-02-16 | End: 2022-02-21

## 2022-02-16 ASSESSMENT — ENCOUNTER SYMPTOMS
VOMITING: 0
NAUSEA: 0
PHOTOPHOBIA: 0
FACIAL SWELLING: 0
ABDOMINAL DISTENTION: 0
CHEST TIGHTNESS: 0
BACK PAIN: 0
EYE DISCHARGE: 0
ABDOMINAL PAIN: 0
RECTAL PAIN: 0
RHINORRHEA: 1
WHEEZING: 0
CONSTIPATION: 0
EYE PAIN: 0
APNEA: 0
EYE REDNESS: 0
BLOOD IN STOOL: 0
ANAL BLEEDING: 0
DIARRHEA: 0
COLOR CHANGE: 0
CHOKING: 0
VOICE CHANGE: 0
COUGH: 1
SORE THROAT: 0
STRIDOR: 0
SINUS PRESSURE: 1
SINUS PAIN: 1
TROUBLE SWALLOWING: 0
SHORTNESS OF BREATH: 0
EYE ITCHING: 0

## 2022-02-16 NOTE — PROGRESS NOTES
TeleMedicine Video Visit    Elaine Pierce, was evaluated through a synchronous (real-time) audio-video encounter. The patient (or guardian if applicable) is aware that this is a billable service. , which includes applicable co-pays. This virtual visit was conducted with the patient's  (and/or legal guardian's) consent. The visit was conducted pursuant to the emergency declaration under the [facility], [address] [place] [address] and the [employer] Act. Patient identification was verified, and a caregiver was present when appropriate. The patient was located in a state where the provider was licensed to provide care. Patient identification was verified at the start of the visit, including the patient's telephone number and physical location. I discussed with the patient the nature of our telehealth visits, that:     1. Due to the nature of an audio- video modality, the only components of a physical exam that could be done are the elements supported by direct observation. 2. I would evaluate the patient and recommend diagnostics and treatments based on my assessment. 3. If it was felt that the patient should be evaluated in clinic or an emergency room setting, then they would be directed there. 4. Our sessions are not being recorded and that personal health information is protected. 5. Our team would provide follow up care in person if/when the patient needs it. Patient's location: home address in [facility]  Physician  location home address in [facility] other people involved in call  Wife present       , 15 min    This visit was completed virtually using [employer] me   Elaine Pierce is a 77 y.o. male  . Subjective:      Complains of increased sinus pressure and coughing. PND. is the big issue. A lot of anxiety due to move and wife's home dialysis. Discussed options. Has used ativan in past without issues. Patient understands habit forming nature of medication and assumes all risks. Patient will not drink alcohol , work or drive while on medication. Review of Systems   Constitutional: Positive for fatigue. Negative for activity change, appetite change, chills, diaphoresis, fever and unexpected weight change. HENT: Positive for postnasal drip, rhinorrhea, sinus pressure, sinus pain and sneezing. Negative for congestion, dental problem, drooling, ear discharge, ear pain, facial swelling, hearing loss, mouth sores, nosebleeds, sore throat, tinnitus, trouble swallowing and voice change. Eyes: Negative for photophobia, pain, discharge, redness, itching and visual disturbance. Respiratory: Positive for cough. Negative for apnea, choking, chest tightness, shortness of breath, wheezing and stridor. Cardiovascular: Negative for chest pain, palpitations and leg swelling. Gastrointestinal: Negative for abdominal distention, abdominal pain, anal bleeding, blood in stool, constipation, diarrhea, nausea, rectal pain and vomiting. Endocrine: Negative for cold intolerance, heat intolerance, polydipsia, polyphagia and polyuria. Genitourinary: Negative for decreased urine volume, difficulty urinating, dysuria, enuresis, flank pain, frequency, genital sores, hematuria, penile discharge, penile pain, penile swelling, scrotal swelling, testicular pain and urgency. Musculoskeletal: Negative for arthralgias, back pain, gait problem, joint swelling, myalgias, neck pain and neck stiffness. Skin: Negative for color change, pallor, rash and wound. Allergic/Immunologic: Negative for environmental allergies, food allergies and immunocompromised state. Neurological: Negative for dizziness, tremors, seizures, syncope, facial asymmetry, speech difficulty, weakness, light-headedness, numbness and headaches. Hematological: Negative for adenopathy. Does not bruise/bleed easily. Psychiatric/Behavioral: Positive for sleep disturbance. Negative for agitation, behavioral problems, confusion, decreased concentration, dysphoric mood, hallucinations, self-injury and suicidal ideas. The patient is nervous/anxious. The patient is not hyperactive.         Past Medical History:   Diagnosis Date    Acid reflux     Acute diastolic (congestive) heart failure ([facility]) 2019    Arthritis     Asthma 2014    Asthmatic bronchitis , chronic ([facility]) 2016    CAD (coronary artery disease)     Chronic bronchitis ([facility] 2014    COPD (chronic obstructive pulmonary disease) ([facility])     CB    COPD (chronic obstructive pulmonary disease) ([facility])     Diabetes mellitus ([facility])     Emphysema (subcutaneous) (surgical) resulting from a procedure     H/O cardiovascular stress test 2021    Lexiscan    Hyperlipidemia     Hypertension     Hypoxemia requiring supplemental oxygen 2015    LONG TERM ANTICOAGULENT USE     Morbid obesity with BMI of 50.0-59.9, adult ([facility] 2013    Obesity     Osteoarthritis     Sleep apnea     bilevel positive airway pressure at 13/8 with 2 L oxygen flow     Stage 3 chronic kidney disease ([facility])     Tobacco abuse     Type II or unspecified type diabetes mellitus without mention of complication, not stated as uncontrolled        Social History     Socioeconomic History    Marital status:      Spouse name: Marcelo Choudhary Number of children: 1    Years of education: Not on file    Highest education level: 11th grade   Occupational History    Not on file   Tobacco Use    Smoking status: Former Smoker     Packs/day: 1.00     Years: 45.00     Pack years: 45.00     Types: Cigarettes     Quit date: 2013     Years since quittin.5    Smokeless tobacco: Former User     Types: Chew     Quit date: 10/8/1971   Vaping Use    Vaping Use: Never used   Substance and Sexual Activity    Alcohol use: No     Alcohol/week: 0.0 standard drinks     Comment: 1-2 coffee per day    Drug use: No    Sexual activity: Yes     Partners: Female   Other Topics Concern    Not on file   Social History Narrative    8-26-19  [facility] reviewed SDOH with Estela Merino. He does have money strain but between the income he has coming in and his wife's they are over resources for SARY programs. Offered food panty info to help with end of the month struggles but he declined stating that he tried and was refused due to his income. He belongs to a Muslim and goes weekly so he has some community connections in place. He has an extremely high interest rate on his mortgage which he was encouraged to look into getting lowered to help save money on his house payments. Noticed some difficulty with memory recall. Becomes easily flustered at times. Has no MHI to support applying for [facility] program of SARY. States he does not feel depressed or need any MH treatment. Social Determinants of Health     Financial Resource Strain: Low Risk     Difficulty of Paying Living Expenses: Not hard at all   Food Insecurity: No Food Insecurity    Worried About Running Out of Food in the Last Year: Never true    Jennifer of Food in the Last Year: Never true   Transportation Needs:     Lack of Transportation (Medical): Not on file    Lack of Transportation (Non-Medical):  Not on file   Physical Activity:     Days of Exercise per Week: Not on file    Minutes of Exercise per Session: Not on file   Stress:     Feeling of Stress : Not on file   Social Connections:     Frequency of Communication with Friends and Family: Not on file    Frequency of Social Gatherings with Friends and Family: Not on file    Attends Islam Services: Not on file    Active Member of Clubs or Organizations: Not on file    Attends Club or Organization Meetings: Not on file    Marital Status: Not on file   Intimate Partner Violence:     Fear of Current or Ex-Partner: Not on file    Emotionally Abused: Not on file    Physically Abused: Not on file    Sexually Abused: Not on file   Housing Stability:     Unable to Pay for Housing in the Last Year: Not on file    Number of Places Lived in the Last Year: Not on file    Unstable Housing in the Last Year: Not on file       Family History   Problem Relation Age of Onset    Cancer Mother         breast    Cancer Father         stomach    Mental Illness Brother     Stroke Brother     Other Brother        Current Outpatient Medications on File Prior to Visit   Medication Sig Dispense Refill    benzonatate (TESSALON) 200 MG capsule Take 1 capsule by mouth 3 times daily as needed for Cough 90 capsule 0    pramipexole (MIRAPEX) 0.25 MG tablet TAKE TWO TABLETS BY MOUTH THREE TIMES A  tablet 5    losartan (COZAAR) 25 MG tablet TAKE ONE TABLET BY MOUTH DAILY      metOLazone (ZAROXOLYN) 2.5 MG tablet TAKE 1 TABLET BY MOUTH TWICE WEEKLY      bumetanide (BUMEX) 1 MG tablet Take 1 tablet by mouth daily 60 tablet 5    Roflumilast (DALIRESP) 500 MCG tablet Take 1 tablet by mouth daily 30 tablet 5    blood glucose test strips (ACCU-CHEK GUIDE) strip Test twice daily 100 each 11    gabapentin (NEURONTIN) 300 MG capsule Take 1 capsule by mouth 2 times daily.  90 capsule 5    warfarin (COUMADIN) 5 MG tablet Take 1 tablet by mouth daily 30 tablet 3    insulin glargine (BASAGLAR KWIKPEN) 100 UNIT/ML injection pen Inject 45 Units into the skin 2 times daily      levocetirizine (XYZAL) 5 MG tablet Take 5 mg by mouth nightly      spironolactone (ALDACTONE) 25 MG tablet TAKE ONE TABLET BY MOUTH TWO TIMES A  tablet 0    hydrOXYzine (VISTARIL) 25 MG capsule Take one 220 minutes prior to MRI 3 capsule 0    amiodarone (CORDARONE) 200 MG tablet Take 1 tablet by mouth 2 times daily 60 tablet 5    pantoprazole (PROTONIX) 40 MG tablet Take 40 mg by mouth daily      metoprolol succinate (TOPROL XL) 25 MG extended release tablet Take 1 tablet by mouth 2 times daily 60 tablet 5    insulin lispro, 1 Unit Dial, (HUMALOG KWIKPEN) 100 UNIT/ML SOPN Inject 6 units with meals PLUS                 No Insulin   140-199           3 Units   200-249           6 Units   250-299           9 Units   300-349         12 Units   350-400         15 Units   Over 400       18 Units (Patient taking differently: Inject 0-18 Units into the skin 3 times daily (before meals) MAX 70 units daily) 5 pen 0    atorvastatin (LIPITOR) 80 MG tablet TAKE ONE TABLET BY MOUTH EVERY NIGHT 90 tablet 5    montelukast (SINGULAIR) 10 MG tablet Take 1 tablet by mouth nightly 30 tablet 4    CPAP Machine MISC 1 each by Does not apply route nightly as needed       aspirin 81 MG tablet Take 81 mg by mouth nightly        No current facility-administered medications on file prior to visit. Allergies   Allergen Reactions    Pcn [Penicillins]      Child went to hospital   ? Reaction  Patient tolerates cephalosporins    Sulfa Antibiotics      ? I have reviewed his allergies, medications, problem list, medical,social and family history and have updated as needed in the electronic medical record. Objective:     Physical Exam  Constitutional:       General: He is not in acute distress. Appearance: Normal appearance. He is normal weight. He is not ill-appearing, toxic-appearing or diaphoretic. HENT:      Head: Normocephalic and atraumatic. Pulmonary:      Comments: No respiratory distress  Skin:     Comments: No rash, no lesion   Neurological:      General: No focal deficit present. Mental Status: He is alert and oriented to person, place, and time. Psychiatric:         Mood and Affect: Mood normal.         Behavior: Behavior normal.         Thought Content: Thought content normal.         Judgment: Judgment normal.         Assessment / Plan:   Diagnoses and all orders for this visit:    Anxiety  -     LORazepam (ATIVAN) 0.5 MG tablet; Take 1 tablet by mouth 2 times daily as needed for Anxiety for up to 30 days.     Acute non-recurrent maxillary sinusitis  -     azithromycin (ZITHROMAX) 250 MG tablet; Take 1 tablet by mouth See Admin Instructions for 5 days 500mg on day 1 followed by 250mg on days 2 - 5  -     brompheniramine-pseudoephedrine-DM (BROMFED DM) 2-30-10 MG/5ML syrup; Take 5 mLs by mouth 4 times daily as needed for Congestion or Cough        Reviewed healthmaintenance report. Patient is aware of deficiencies and suggested preventative tests.

## 2022-03-14 ENCOUNTER — OFFICE VISIT (OUTPATIENT)
Dept: FAMILY MEDICINE CLINIC | Age: 67
End: 2022-03-14
Payer: MEDICARE

## 2022-03-14 VITALS
SYSTOLIC BLOOD PRESSURE: 124 MMHG | DIASTOLIC BLOOD PRESSURE: 74 MMHG | RESPIRATION RATE: 18 BRPM | WEIGHT: 286 LBS | HEART RATE: 64 BPM | HEIGHT: 66 IN | BODY MASS INDEX: 45.96 KG/M2

## 2022-03-14 DIAGNOSIS — I10 PRIMARY HYPERTENSION: ICD-10-CM

## 2022-03-14 DIAGNOSIS — G47.33 OSA ON CPAP: ICD-10-CM

## 2022-03-14 DIAGNOSIS — E78.2 MIXED HYPERLIPIDEMIA: ICD-10-CM

## 2022-03-14 DIAGNOSIS — Z79.4 TYPE 2 DIABETES MELLITUS WITH HYPERGLYCEMIA, WITH LONG-TERM CURRENT USE OF INSULIN (HCC): ICD-10-CM

## 2022-03-14 DIAGNOSIS — Z00.00 MEDICARE ANNUAL WELLNESS VISIT, SUBSEQUENT: ICD-10-CM

## 2022-03-14 DIAGNOSIS — Z12.2 SCREENING FOR LUNG CANCER: ICD-10-CM

## 2022-03-14 DIAGNOSIS — Z99.89 OSA ON CPAP: ICD-10-CM

## 2022-03-14 DIAGNOSIS — Z13.6 SCREENING FOR AAA (ABDOMINAL AORTIC ANEURYSM): Primary | ICD-10-CM

## 2022-03-14 DIAGNOSIS — R53.82 CHRONIC FATIGUE: ICD-10-CM

## 2022-03-14 DIAGNOSIS — E03.9 ACQUIRED HYPOTHYROIDISM: ICD-10-CM

## 2022-03-14 DIAGNOSIS — E11.65 TYPE 2 DIABETES MELLITUS WITH HYPERGLYCEMIA, WITH LONG-TERM CURRENT USE OF INSULIN (HCC): ICD-10-CM

## 2022-03-14 PROCEDURE — G0439 PPPS, SUBSEQ VISIT: HCPCS | Performed by: FAMILY MEDICINE

## 2022-03-14 PROCEDURE — 3046F HEMOGLOBIN A1C LEVEL >9.0%: CPT | Performed by: FAMILY MEDICINE

## 2022-03-14 RX ORDER — MODAFINIL 100 MG/1
100 TABLET ORAL DAILY
Qty: 30 TABLET | Refills: 0 | Status: SHIPPED | OUTPATIENT
Start: 2022-03-14 | End: 2022-04-13

## 2022-03-14 ASSESSMENT — PATIENT HEALTH QUESTIONNAIRE - PHQ9
SUM OF ALL RESPONSES TO PHQ QUESTIONS 1-9: 0
SUM OF ALL RESPONSES TO PHQ9 QUESTIONS 1 & 2: 0
1. LITTLE INTEREST OR PLEASURE IN DOING THINGS: 0
2. FEELING DOWN, DEPRESSED OR HOPELESS: 0

## 2022-03-15 DIAGNOSIS — N28.9 RENAL INSUFFICIENCY: ICD-10-CM

## 2022-03-15 DIAGNOSIS — E03.9 ACQUIRED HYPOTHYROIDISM: ICD-10-CM

## 2022-03-15 DIAGNOSIS — I50.32 CHRONIC HEART FAILURE WITH PRESERVED EJECTION FRACTION (HCC): ICD-10-CM

## 2022-03-15 DIAGNOSIS — I10 PRIMARY HYPERTENSION: ICD-10-CM

## 2022-03-15 DIAGNOSIS — R53.82 CHRONIC FATIGUE: ICD-10-CM

## 2022-03-15 DIAGNOSIS — Z79.4 TYPE 2 DIABETES MELLITUS WITH HYPERGLYCEMIA, WITH LONG-TERM CURRENT USE OF INSULIN (HCC): ICD-10-CM

## 2022-03-15 DIAGNOSIS — E11.65 TYPE 2 DIABETES MELLITUS WITH HYPERGLYCEMIA, WITH LONG-TERM CURRENT USE OF INSULIN (HCC): ICD-10-CM

## 2022-03-15 LAB
ALBUMIN SERPL-MCNC: 3.6 G/DL (ref 3.5–5.2)
ALP BLD-CCNC: 163 U/L (ref 40–129)
ALT SERPL-CCNC: 21 U/L (ref 0–40)
ANION GAP SERPL CALCULATED.3IONS-SCNC: 14 MMOL/L (ref 7–16)
AST SERPL-CCNC: 20 U/L (ref 0–39)
BILIRUB SERPL-MCNC: 1.2 MG/DL (ref 0–1.2)
BUN BLDV-MCNC: 54 MG/DL (ref 6–23)
CALCIUM SERPL-MCNC: 9.5 MG/DL (ref 8.6–10.2)
CHLORIDE BLD-SCNC: 95 MMOL/L (ref 98–107)
CO2: 30 MMOL/L (ref 22–29)
CREAT SERPL-MCNC: 1.7 MG/DL (ref 0.7–1.2)
GFR AFRICAN AMERICAN: 49
GFR NON-AFRICAN AMERICAN: 40 ML/MIN/1.73
GLUCOSE BLD-MCNC: 244 MG/DL (ref 74–99)
HBA1C MFR BLD: 12.5 % (ref 4–5.6)
HCT VFR BLD CALC: 38.1 % (ref 37–54)
HEMOGLOBIN: 12.3 G/DL (ref 12.5–16.5)
MCH RBC QN AUTO: 32.3 PG (ref 26–35)
MCHC RBC AUTO-ENTMCNC: 32.3 % (ref 32–34.5)
MCV RBC AUTO: 100 FL (ref 80–99.9)
PDW BLD-RTO: 16.1 FL (ref 11.5–15)
PLATELET # BLD: 167 E9/L (ref 130–450)
PMV BLD AUTO: 10.8 FL (ref 7–12)
POTASSIUM SERPL-SCNC: 3.8 MMOL/L (ref 3.5–5)
PRO-BNP: 1978 PG/ML (ref 0–125)
RBC # BLD: 3.81 E12/L (ref 3.8–5.8)
SODIUM BLD-SCNC: 139 MMOL/L (ref 132–146)
TOTAL PROTEIN: 7.1 G/DL (ref 6.4–8.3)
TSH SERPL DL<=0.05 MIU/L-ACNC: 18.12 UIU/ML (ref 0.27–4.2)
WBC # BLD: 7.9 E9/L (ref 4.5–11.5)

## 2022-03-16 NOTE — PROGRESS NOTES
Jessica Harrison is a 77 y.o. male  . Subjective:      Patient's main issues today in addition to his wellness evaluation are again daily fatigue. I discussed how his CPAP was working and to my surprise he has not been using it for an extended period of time. I explained to him that sleep apnea is probably the main cause of his daily fatigue. It may also be hypothyroidism which he refuses to have treated in spite of multiple years of trying to convince him that is important. Sugars although improving are still pretty high more than likely we will have to have him see endocrinology regularly and hopefully they will be able to talk him into thyroid treatment. Patient will restart CPAP immediately. We will set him up for reevaluation of settings. Although he states that he was not really having a problem with how it was working however just has not been using it. Patient's compliance is still very poor and diet although improved is still pretty poor. We discussed these high sugars and multiple risk risk factors and serious risk of endorgan failure and death. This was discussed in front of his wife who was also present. Patient is due for some screening for lung cancer with a low-dose CT as well as screening for abdominal aortic aneurysm with an ultrasound. Once patient's restarts CPAP we will also start on some Provigil this may indeed help with his energy level and hopefully motivation. Again we discussed the need to be very compliant with diabetes control. One of the main issues also were talking about today's his renal function this is been worsening and we need to recheck today. His water intake is poor. We will most likely need to set him up again with nephrology. I explained to him that he could be very well on his way to dialysis as well.       Review of Systems    Past Medical History:   Diagnosis Date    Acid reflux     Acute diastolic (congestive) heart failure ([facility] 06/19/2019    Arthritis     Asthma 2014    Asthmatic bronchitis , chronic ([facility] 2016    CAD (coronary artery disease)     Chronic bronchitis ([facility] 2014    COPD (chronic obstructive pulmonary disease) ([facility])     CB    COPD (chronic obstructive pulmonary disease) (HCC)     Diabetes mellitus (HCC)     Emphysema (subcutaneous) (surgical) resulting from a procedure     H/O cardiovascular stress test 2021    Lexiscan    Hyperlipidemia     Hypertension     Hypoxemia requiring supplemental oxygen 2015    LONG TERM ANTICOAGULENT USE     Morbid obesity with BMI of 50.0-59.9, adult ([facility] 2013    Obesity     Osteoarthritis     Sleep apnea     bilevel positive airway pressure at 13/8 with 2 L oxygen flow     Stage 3 chronic kidney disease ([facility])     Tobacco abuse     Type II or unspecified type diabetes mellitus without mention of complication, not stated as uncontrolled        Social History     Socioeconomic History    Marital status:      Spouse name: Ankit Bailey Number of children: 1    Years of education: Not on file    Highest education level: 11th grade   Occupational History    Not on file   Tobacco Use    Smoking status: Former Smoker     Packs/day: 1.00     Years: 45.00     Pack years: 45.00     Types: Cigarettes     Quit date: 2013     Years since quittin.6    Smokeless tobacco: Former User     Types: Chew     Quit date: 10/8/1971   Vaping Use    Vaping Use: Never used   Substance and Sexual Activity    Alcohol use: No     Alcohol/week: 0.0 standard drinks     Comment: 1-2 coffee per day    Drug use: No    Sexual activity: Yes     Partners: Female   Other Topics Concern    Not on file   Social History Narrative    19  [facility] reviewed SDOH with Dru Meckel. He does have money strain but between the income he has coming in and his wife's they are over resources for SARY programs.   Offered food panty info to help with end of the month struggles but he declined stating that he tried and was refused due to his income. He belongs to a [employer] and goes weekly so he has some community connections in place. He has an extremely high interest rate on his mortgage which he was encouraged to look into getting lowered to help save money on his house payments. Noticed some difficulty with memory recall. Becomes easily flustered at times. Has no MHI to support applying for [facility] program of [facility]. States he does not feel depressed or need any MH treatment. Social Determinants of Health     Financial Resource Strain: Low Risk     Difficulty of Paying Living Expenses: Not hard at all   Food Insecurity: No Food Insecurity    Worried About Running Out of Food in the Last Year: Never true    Jennifer of Food in the Last Year: Never true   Transportation Needs:     Lack of Transportation (Medical): Not on file    Lack of Transportation (Non-Medical):  Not on file   Physical Activity:     Days of Exercise per Week: Not on file    Minutes of Exercise per Session: Not on file   Stress:     Feeling of Stress : Not on file   Social Connections:     Frequency of Communication with Friends and Family: Not on file    Frequency of Social Gatherings with Friends and Family: Not on file    Attends Protestant Services: Not on file    Active Member of [address] [place] [address] [employer] or Organizations: Not on file    Attends Club or Organization Meetings: Not on file    Marital Status: Not on file   Intimate Partner Violence:     Fear of Current or Ex-Partner: Not on file    Emotionally Abused: Not on file    Physically Abused: Not on file    Sexually Abused: Not on file   Housing Stability:     Unable to Pay for Housing in the Last Year: Not on file    Number of [place] in the Last Year: Not on file    Unstable Housing in the Last Year: Not on file       Family History   Problem Relation Age of Onset    Cancer Mother         breast    Cancer Father         stomach    Mental Illness Brother     Stroke Brother     Other Brother        Current Outpatient Medications on File Prior to Visit   Medication Sig Dispense Refill    LORazepam (ATIVAN) 0.5 MG tablet Take 1 tablet by mouth 2 times daily as needed for Anxiety for up to 30 days. 60 tablet 5    pramipexole (MIRAPEX) 0.25 MG tablet TAKE TWO TABLETS BY MOUTH THREE TIMES A  tablet 5    losartan (COZAAR) 25 MG tablet TAKE ONE TABLET BY MOUTH DAILY      metOLazone (ZAROXOLYN) 2.5 MG tablet TAKE 1 TABLET BY MOUTH TWICE WEEKLY      bumetanide (BUMEX) 1 MG tablet Take 1 tablet by mouth daily 60 tablet 5    Roflumilast (DALIRESP) 500 MCG tablet Take 1 tablet by mouth daily 30 tablet 5    blood glucose test strips (ACCU-CHEK GUIDE) strip Test twice daily 100 each 11    gabapentin (NEURONTIN) 300 MG capsule Take 1 capsule by mouth 2 times daily.  90 capsule 5    warfarin (COUMADIN) 5 MG tablet Take 1 tablet by mouth daily 30 tablet 3    insulin glargine (BASAGLAR KWIKPEN) 100 UNIT/ML injection pen Inject 45 Units into the skin 2 times daily      levocetirizine (XYZAL) 5 MG tablet Take 5 mg by mouth nightly      spironolactone (ALDACTONE) 25 MG tablet TAKE ONE TABLET BY MOUTH TWO TIMES A  tablet 0    hydrOXYzine (VISTARIL) 25 MG capsule Take one 220 minutes prior to MRI 3 capsule 0    amiodarone (CORDARONE) 200 MG tablet Take 1 tablet by mouth 2 times daily 60 tablet 5    pantoprazole (PROTONIX) 40 MG tablet Take 40 mg by mouth daily      metoprolol succinate (TOPROL XL) 25 MG extended release tablet Take 1 tablet by mouth 2 times daily 60 tablet 5    insulin lispro, 1 Unit Dial, (HUMALOG KWIKPEN) 100 UNIT/ML SOPN Inject 6 units with meals PLUS                 No Insulin   140-199           3 Units   200-249           6 Units   250-299           9 Units   300-349         12 Units   350-400         15 Units   Over 400       18 Units (Patient taking differently: Inject 0-18 Units into the skin 3 times daily (before meals) MAX 70 units daily) 5 pen 0    atorvastatin (LIPITOR) 80 MG tablet TAKE ONE TABLET BY MOUTH EVERY NIGHT 90 tablet 5    montelukast (SINGULAIR) 10 MG tablet Take 1 tablet by mouth nightly 30 tablet 4    CPAP Machine MISC 1 each by Does not apply route nightly as needed       aspirin 81 MG tablet Take 81 mg by mouth nightly        No current facility-administered medications on file prior to visit. Allergies   Allergen Reactions    Pcn [Penicillins]      Child went to hospital   ? Reaction  Patient tolerates cephalosporins    Sulfa Antibiotics      ? I have reviewed his allergies, medications, problem list, medical,social and family history and have updated as needed in the electronic medical record. Objective:     Physical Exam    Assessment / Plan:   Sydney Guerrero was seen today for medicare awv. Diagnoses and all orders for this visit:    Screening for AAA (abdominal aortic aneurysm)  -     US SCREENING FOR AAA; Future    Screening for lung cancer  -     CT Lung Screen (Initial/Annual); Future    LISS on CPAP  -     modafinil (PROVIGIL) 100 MG tablet; Take 1 tablet by mouth daily for 30 days. -     [address] [place] [address],[facility], DO, Sleep Medicine, [place]    Type 2 diabetes mellitus with hyperglycemia, with long-term current use of insulin ([facility]  -     Hemoglobin A1C; Future    Primary hypertension  -     Comprehensive Metabolic Panel; Future    Mixed hyperlipidemia    Acquired hypothyroidism  -     TSH; Future    Chronic fatigue  -     CBC; Future        Reviewed healthmaintenance report. Patient is aware of deficiencies and suggested preventative tests. Medicare Annual Wellness Visit    Milliela Chandrakant is here for Medicare AWV    Assessment & Plan   Screening for AAA (abdominal aortic aneurysm)  -     US SCREENING FOR AAA; Future  Screening for lung cancer  -     CT Lung Screen (Initial/Annual); Future  LISS on CPAP  -     modafinil (PROVIGIL) 100 MG tablet;  Take 1 tablet by mouth daily for 30 days. , Disp-30 tablet, R-0Normal  -     Jennifer Nevarez Dennislayne Green DO, Sleep Medicine, [place]  Type 2 diabetes mellitus with hyperglycemia, with long-term current use of insulin ([facility]  -     Hemoglobin A1C; Future  Primary hypertension  -     Comprehensive Metabolic Panel; Future  Mixed hyperlipidemia  Acquired hypothyroidism  -     TSH; Future  Chronic fatigue  -     CBC; Future  Medicare annual wellness visit, subsequent      Recommendations for Preventive Services Due: see orders and patient instructions/AVS.  Recommended screening schedule for the next 5-10 years is provided to the patient in written form: see Patient Instructions/AVS.     Return for Medicare Annual Wellness Visit in 1 year. Subjective   The following acute and/or chronic problems were also addressed today:  Diabetes, renal insufficiency, fatigue, noncompliance, LISS    Patient's complete Health Risk Assessment and screening values have been reviewed and are found in Flowsheets. The following problems were reviewed today and where indicated follow up appointments were made and/or referrals ordered. Positive Risk Factor Screenings with Interventions:              Health Habits/Nutrition:     Physical Activity:     Days of Exercise per Week: Not on file    Minutes of Exercise per Session: Not on file        Body mass index: (!) 46.16       Health Habits/Nutrition Interventions:  · Nutritional issues:  educational materials for healthy, well-balanced diet provided             Objective   Vitals:    03/14/22 1421   BP: 124/74   Site: Left Upper Arm   Position: Sitting   Pulse: 64   Resp: 18   Weight: 286 lb (129.7 kg)   Height: 5' 6\" (1.676 m)      Body mass index is 46.16 kg/m². Allergies   Allergen Reactions    Pcn [Penicillins]      Child went to hospital   ? Reaction  Patient tolerates cephalosporins    Sulfa Antibiotics      ? Prior to Visit Medications    Medication Sig Taking?  Authorizing Provider   modafinil (PROVIGIL) 100 MG tablet Take 1 tablet by mouth daily for 30 days. Yes Seferino Rodriguez DO   LORazepam (ATIVAN) 0.5 MG tablet Take 1 tablet by mouth 2 times daily as needed for Anxiety for up to 30 days. Yes Seferino Rodriguez DO   pramipexole (MIRAPEX) 0.25 MG tablet TAKE TWO TABLETS BY MOUTH THREE TIMES A DAY Yes Seferino Rodriguez DO   losartan (COZAAR) 25 MG tablet TAKE ONE TABLET BY MOUTH DAILY Yes Historical Provider, MD   metOLazone (ZAROXOLYN) 2.5 MG tablet TAKE 1 TABLET BY MOUTH TWICE WEEKLY Yes Historical Provider, MD   bumetanide (BUMEX) 1 MG tablet Take 1 tablet by mouth daily Yes Seferino Rodriguez DO   Roflumilast (DALIRESP) 500 MCG tablet Take 1 tablet by mouth daily Yes Seferino Rodriguez DO   blood glucose test strips (ACCU-CHEK GUIDE) strip Test twice daily Yes Seferino Rodriguez DO   gabapentin (NEURONTIN) 300 MG capsule Take 1 capsule by mouth 2 times daily.  Yes Bret Barrett DO   warfarin (COUMADIN) 5 MG tablet Take 1 tablet by mouth daily Yes Bret Barrett, DO   insulin glargine (BASAGLAR KWIKPEN) 100 UNIT/ML injection pen Inject 45 Units into the skin 2 times daily Yes Historical Provider, MD   levocetirizine (XYZAL) 5 MG tablet Take 5 mg by mouth nightly Yes Historical Provider, MD   spironolactone (ALDACTONE) 25 MG tablet TAKE ONE TABLET BY MOUTH TWO TIMES A DAY Yes Landry Dodd MD   hydrOXYzine (VISTARIL) 25 MG capsule Take one 220 minutes prior to MRI Yes Seferino Rodriguez DO   amiodarone (CORDARONE) 200 MG tablet Take 1 tablet by mouth 2 times daily Yes Seferino Rodriguez DO   pantoprazole (PROTONIX) 40 MG tablet Take 40 mg by mouth daily Yes Historical Provider, MD   metoprolol succinate (TOPROL XL) 25 MG extended release tablet Take 1 tablet by mouth 2 times daily Yes Seferino Rodriguez DO   insulin lispro, 1 Unit Dial, (HUMALOG KWIKPEN) 100 UNIT/ML SOPN Inject 6 units with meals PLUS                 No Insulin   140-199           3 Units   200-249           6 Units   250-299           9 Units   300-349         12 Units   350-400         15 Units   Over 400       18 Units  Patient taking differently: Inject 0-18 Units into the skin 3 times daily (before meals) MAX 70 units daily Yes Rafaela Najjar, DO   atorvastatin (LIPITOR) 80 MG tablet TAKE ONE TABLET BY MOUTH EVERY NIGHT Yes Gustabo Delgado DO   montelukast (SINGULAIR) 10 MG tablet Take 1 tablet by mouth nightly Yes Gustabo Delgado DO   CPAP Machine MISC 1 each by Does not apply route nightly as needed  Yes Historical Provider, MD   aspirin 81 MG tablet Take 81 mg by mouth nightly  Yes Historical Provider, MD [place] (Including outside providers/suppliers regularly involved in providing care):   Patient Care Team:  Gustabo Delgado DO as PCP - General (Family Medicine)  Gustabo Delgado DO as PCP - [address] [place] [address]  [facility] Provider  Andrei Barros MD as Consulting Physician (Cardiology)  Johanna Lund MD as Consulting Physician (Cardiothoracic Surgery)  Beryl Hugo DPM as Consulting Physician (Nilay Duff)  Ramona Clark MD as Consulting Physician (Cardiology)    Reviewed and updated this visit:  Tobacco  Allergies  Meds  Problems  Med Hx  Surg Hx  Soc Hx  Fam Hx

## 2022-03-16 NOTE — PATIENT INSTRUCTIONS
Personalized Preventive Plan for Kina Campbell - 3/14/2022  Medicare offers a range of preventive health benefits. Some of the tests and screenings are paid in full while other may be subject to a deductible, co-insurance, and/or copay. Some of these benefits include a comprehensive review of your medical history including lifestyle, illnesses that may run in your family, and various assessments and screenings as appropriate. After reviewing your medical record and screening and assessments performed today your provider may have ordered immunizations, labs, imaging, and/or referrals for you. A list of these orders (if applicable) as well as your Preventive Care list are included within your After Visit Summary for your review. Other Preventive Recommendations:    · A preventive eye exam performed by an eye specialist is recommended every 1-2 years to screen for glaucoma; cataracts, macular degeneration, and other eye disorders. · A preventive dental visit is recommended every 6 months. · Try to get at least 150 minutes of exercise per week or 10,000 steps per day on a pedometer . · Order or download the FREE \"Exercise & Physical Activity: Your Everyday Guide\" from [employer]. Call 9-319.196.8893 or search [employer] online. · You need 6776-3941 mg of calcium and 6582-3747 IU of vitamin D per day. It is possible to meet your calcium requirement with diet alone, but a vitamin D supplement is usually necessary to meet this goal.  · When exposed to the sun, use a sunscreen that protects against both UVA and UVB radiation with an SPF of 30 or greater. Reapply every 2 to 3 hours or after sweating, drying off with a towel, or swimming. · Always wear a seat belt when traveling in a car. Always wear a helmet when riding a bicycle or motorcycle.

## 2022-03-21 ENCOUNTER — NURSE TRIAGE (OUTPATIENT)
Dept: OTHER | Facility: CLINIC | Age: 67
End: 2022-03-21

## 2022-03-21 ENCOUNTER — TELEPHONE (OUTPATIENT)
Dept: FAMILY MEDICINE CLINIC | Age: 67
End: 2022-03-21

## 2022-03-21 NOTE — TELEPHONE ENCOUNTER
Pt wife stating pt also needs letter for medical issues for climbing stairs.  Please advise     Last seen 3/14/2022  Next appt 6/14/2022

## 2022-03-21 NOTE — TELEPHONE ENCOUNTER
Received call from [facility]  at [facility] with [employer]. Subjective: Caller states \"He just showed it to me, you can feel it, it hangs on the R side of abd and it is pretty big. I didn't see any markings for a boil, its way bigger. It is almost like when he had hernia. \"     Current Symptoms: lump under skin on R side of abd, painful , underneath is wrinkling looking   Denies redness or warmth     Onset: 5 months ago; gradual, worsening    Associated Symptoms: NA    Pain Severity: 4/10; burning; constant    Temperature: denies     What has been tried:     Recommended disposition: Go to ED/[facility] Now (Or to Office with PCP Approval) Pt is not agreeable to go to [address] and writer was unable to reach the office after numerous attempts. Care advice provided, patient verbalizes understanding; denies any other questions or concerns; instructed to call back for any new or worsening symptoms. Writer provided a warm transfer to [facility] at [address] [place] [address] to see if on-call provider was available and if not then schedule pt for tomorrow to be seen in office. Pt is not agreeable to go to [address] at this time. Attention Provider: Thank you for allowing me to participate in the care of your patient. The patient was connected to triage in response to information provided to the ECC/PSC. Please do not respond through this encounter as the response is not directed to a shared pool.       Reason for Disposition   Hernia suspected (bulge in groin or abdomen) and painful or vomiting    Protocols used: SKIN LUMP OR LOCALIZED SWELLING-ADULT-OH

## 2022-03-22 ENCOUNTER — OFFICE VISIT (OUTPATIENT)
Dept: FAMILY MEDICINE CLINIC | Age: 67
End: 2022-03-22
Payer: MEDICARE

## 2022-03-22 VITALS
WEIGHT: 296.6 LBS | BODY MASS INDEX: 47.67 KG/M2 | SYSTOLIC BLOOD PRESSURE: 134 MMHG | DIASTOLIC BLOOD PRESSURE: 80 MMHG | OXYGEN SATURATION: 96 % | HEIGHT: 66 IN | RESPIRATION RATE: 18 BRPM | HEART RATE: 54 BPM | TEMPERATURE: 97.3 F

## 2022-03-22 DIAGNOSIS — I48.0 PAF (PAROXYSMAL ATRIAL FIBRILLATION) (HCC): ICD-10-CM

## 2022-03-22 DIAGNOSIS — E11.65 TYPE 2 DIABETES MELLITUS WITH HYPERGLYCEMIA, WITH LONG-TERM CURRENT USE OF INSULIN (HCC): Primary | ICD-10-CM

## 2022-03-22 DIAGNOSIS — Z79.4 TYPE 2 DIABETES MELLITUS WITH HYPERGLYCEMIA, WITH LONG-TERM CURRENT USE OF INSULIN (HCC): Primary | ICD-10-CM

## 2022-03-22 DIAGNOSIS — N18.31 STAGE 3A CHRONIC KIDNEY DISEASE (HCC): ICD-10-CM

## 2022-03-22 DIAGNOSIS — M79.3 PANNICULITIS: Primary | ICD-10-CM

## 2022-03-22 PROCEDURE — 99214 OFFICE O/P EST MOD 30 MIN: CPT | Performed by: SURGERY

## 2022-03-22 PROCEDURE — 3046F HEMOGLOBIN A1C LEVEL >9.0%: CPT | Performed by: FAMILY MEDICINE

## 2022-03-22 RX ORDER — IBUPROFEN 800 MG/1
800 TABLET ORAL 2 TIMES DAILY
Qty: 14 TABLET | Refills: 0 | Status: SHIPPED
Start: 2022-03-22 | End: 2022-04-05

## 2022-03-22 NOTE — PROGRESS NOTES
Claude Lawn (:  1955) is a 77 y.o. male,Established patient, here for evaluation of the following chief complaint(s):  Abdominal Pain (Lump on lower stomach size of lemon, for about 5 months, was larger the other day.)         ASSESSMENT/PLAN:  1. Panniculitis  -     CBC with Auto Differential; Future  -     Comprehensive Metabolic Panel; Future  -     Alpha-1-Antitrypsin; Future  -     PREET; Future  -     XR CHEST (2 VW); Future  -     ibuprofen (ADVIL;MOTRIN) 800 MG tablet; Take 1 tablet by mouth in the morning and at bedtime, Disp-14 tablet, R-0Normal  With respect to the etiology of the patient's panniculitis there is no evidence on exam or by history of infection, no known autoimmune/connective tissue disease or inflammatory disorder, no trauma, no concern for cold panniculitis, no known history of lymphoproliferative disorder, no no predisposition to pancreatic issue or medications associated. Possible that this could represent early calciphylaxis given the fact that he does do insulin injections into the abdomen/sometimes pannus a daily basis. 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     At this point the patient's symptoms will be better suited to be treated by a board-certified dermatologist because medical treatment such as stanozolol, pentoxifylline, hydroxychloroquine, and submicrobial dosing of doxycycline and minocycline further anti-inflammatory/antiangiogenic properties of the realm of the specialist.  For the patient's legs he was advised to use compression therapy if he can tolerate over-the-counter compression socks that he may be able to tolerate medical radiotherapy, elevating the legs 20 minutes at a stretch above the level of the heart. We are going to trial nonsteroidal anti-inflammatory drugs for now given the patient's diabetes mellitus. He was advised that there could be untoward effect between his Coumadin and ibuprofen even if only taking twice per day. He is to monitor for signs and symptoms of bleeding we will keep tighter control of her his INR by him checking within 1 week rather than his next scheduled visit which reportedly was in 1 month according to the patient. Consider systemic corticosteroids if NSAIDs do not beneficial.    Basic work-up to include CBC looking for possible infection or lymphoproliferative disorder, any sign of connective tissue disorder/autoimmune process with PREET. Metabolic profile for similar reasons. Though unlikely this would also help potentially elicit a pancreatic source. Also given patient's history of COPD would not be unreasonable to search for alpha-1 antitrypsin deficiency. Since we all descended from the  [place], it would not be unreasonable to rule out systemic sarcoidosis with chest xr, however unlikely. Moreover this should be self-limiting, needs close follow-up with dermatology and return to primary care physician for continued surveillance. Patient counseled on diet and exercise, use the [employer] pal application in order to track his daily caloric intake and restrict his carbohydrates to 45 g per meal.      Return if symptoms worsen or fail to improve, for f/u with Dr. Felipa Palmer in 15 min slot as soon as available . Subjective   SUBJECTIVE/OBJECTIVE:  HPI:    Abdominal pain: points to and grabs his pannus at the rlq. Started \"a while. .. over the course of years,\" over the last couple weeks he noticed that this same part of the pannus began to get \"hard\" and painful at times. This is an area where he does do injections of insulin.     Denies any rash or ulceration over this area (though on physical exam this area is mildly erythematous). No calor, rubor surrounding the area. No fevers, chills, diaphoresis, dizziness, syncope, anosmia, dysgeusia, any signs or symptoms of skin or systemic infection, dysphagia, GERD, chest pain, palpitations, shortness of breath, DE PAZ, nausea, emesis, change in bowel or bladder function. No recent additions to mid medications and no recent changes to any of his medications. ROS:    Denies 10pt ROS other than noted in HPI. Objective       PHYSICAL EXAM:    /80   Pulse 54   Temp 97.3 °F (36.3 °C) (Temporal)   Resp 18   Ht 5' 6\" (1.676 m)   Wt 296 lb 9.6 oz (134.5 kg)   SpO2 96%   BMI 47.87 kg/m²     AVSS    GA: Well-groomed, appears well, no acute distress. HEENT: Atraumatic normocephalic. Extraocular muscles are grossly intact. Pupils are equal round reactive to light. Conjunctiva pink and moist.  Hearing is grossly intact. Nares patent without external drainage. Buccal mucosa pink and moist.  Posterior oropharynx clear without lesion or exudate. NECK: Trachea is midline, supple, nontender, no lymphadenopathy. CARDIO: Regular rate and rhythm without murmur rub or gallop. Cap refill 2+. Radial pulses 2+ bilaterally. RESPIRATORY: Clear to auscultation bilaterally without wheezes rales or rhonchi. Normal inspiratory and expiratory effort. Normoxic on room air    ABD: Morbidly obese, normoactive bowel sounds. Soft, nontender, no organomegaly. See skin exam    NEURO: Alert, no gross deficit. PSYCH:  Mood is normal and congruent with affect. No signs of psychomotor retardation or agitation. Thought content seems normal, speech is fluent and non-pressured. SKIN: Generally warm pink and dry.   About the patient's right lower quadrant at the infra abdominal fold/pannus is an area approximately 12 cm x 6 cm where the overlying skin is with localized pitting edema, induration, pain, mild erythema that is blanching, with tiny 2 mm to 4 mm palpable subcutaneous nodules that are also tender. Circumferentially over the bilateral lower extremities up to the tibial plateau worse distally in this the similar-appearing lesion to his abdomen however this is with woody induration, xerosis over the skin, pigmentation is dark, purpuric, nonblanching, 1+ pitting edema and diffusely tender. Laurence Shoe' sign is negative. There are otherwise no ulcerations or open wounds present. An electronic signature was used to authenticate this note.     --Chelsea Osullivan, DO

## 2022-03-22 NOTE — PATIENT INSTRUCTIONS
Panniculitis refers to a group of conditions that involve inflammation of subcutaneous fat. Despite having very diverse causes, most forms of panniculitis have the same clinical appearance. Treatment of panniculitis includes:    -Treat the underlying cause, if known (eg, stop a medication, treat an infection)  -Pain relief using anti-inflammatory medications such as aspirin, ibuprofen or diclofenac  -Systemic steroids (oral or injected) to settle the inflammation if NSAIDs do not work  -Anti-inflammatory antibiotics including tetracycline or hydroxychloroquine if indicated  -Potassium iodide  -Surgical removal of persistent or ulcerated lesions. Watch out for s/s of bleeding while taking ibuprofen and coumadin. Get INR checked in one week.

## 2022-03-24 ENCOUNTER — HOSPITAL ENCOUNTER (OUTPATIENT)
Age: 67
Discharge: HOME | End: 2022-03-26
Payer: MEDICARE

## 2022-03-24 ENCOUNTER — HOSPITAL ENCOUNTER (OUTPATIENT)
Dept: CT IMAGING | Age: 67
Discharge: HOME | End: 2022-03-24
Payer: MEDICARE

## 2022-03-24 ENCOUNTER — HOSPITAL ENCOUNTER (OUTPATIENT)
Dept: GENERAL RADIOLOGY | Age: 67
End: 2022-03-24
Payer: MEDICARE

## 2022-03-24 ENCOUNTER — TELEPHONE (OUTPATIENT)
Dept: CASE MANAGEMENT | Age: 67
End: 2022-03-24

## 2022-03-24 DIAGNOSIS — M79.3 PANNICULITIS: ICD-10-CM

## 2022-03-24 DIAGNOSIS — Z12.2 SCREENING FOR LUNG CANCER: ICD-10-CM

## 2022-03-24 LAB
ALBUMIN SERPL-MCNC: 3.9 G/DL (ref 3.5–5.2)
ALP BLD-CCNC: 165 U/L (ref 40–129)
ALT SERPL-CCNC: 22 U/L (ref 0–40)
ANION GAP SERPL CALCULATED.3IONS-SCNC: 15 MMOL/L (ref 7–16)
AST SERPL-CCNC: 23 U/L (ref 0–39)
BASOPHILS ABSOLUTE: 0.06 E9/L (ref 0–0.2)
BASOPHILS RELATIVE PERCENT: 0.9 % (ref 0–2)
BILIRUB SERPL-MCNC: 0.9 MG/DL (ref 0–1.2)
BUN BLDV-MCNC: 58 MG/DL (ref 6–23)
CALCIUM SERPL-MCNC: 9.2 MG/DL (ref 8.6–10.2)
CHLORIDE BLD-SCNC: 97 MMOL/L (ref 98–107)
CO2: 28 MMOL/L (ref 22–29)
CREAT SERPL-MCNC: 2 MG/DL (ref 0.7–1.2)
EOSINOPHILS ABSOLUTE: 0.08 E9/L (ref 0.05–0.5)
EOSINOPHILS RELATIVE PERCENT: 1.3 % (ref 0–6)
GFR AFRICAN AMERICAN: 41
GFR NON-AFRICAN AMERICAN: 34 ML/MIN/1.73
GLUCOSE BLD-MCNC: 158 MG/DL (ref 74–99)
HCT VFR BLD CALC: 37.2 % (ref 37–54)
HEMOGLOBIN: 11.7 G/DL (ref 12.5–16.5)
IMMATURE GRANULOCYTES #: 0.02 E9/L
IMMATURE GRANULOCYTES %: 0.3 % (ref 0–5)
LYMPHOCYTES ABSOLUTE: 1.09 E9/L (ref 1.5–4)
LYMPHOCYTES RELATIVE PERCENT: 17.1 % (ref 20–42)
MCH RBC QN AUTO: 32.3 PG (ref 26–35)
MCHC RBC AUTO-ENTMCNC: 31.5 % (ref 32–34.5)
MCV RBC AUTO: 102.8 FL (ref 80–99.9)
MONOCYTES ABSOLUTE: 0.81 E9/L (ref 0.1–0.95)
MONOCYTES RELATIVE PERCENT: 12.7 % (ref 2–12)
NEUTROPHILS ABSOLUTE: 4.3 E9/L (ref 1.8–7.3)
NEUTROPHILS RELATIVE PERCENT: 67.7 % (ref 43–80)
PDW BLD-RTO: 16.8 FL (ref 11.5–15)
PLATELET # BLD: 187 E9/L (ref 130–450)
PMV BLD AUTO: 11 FL (ref 7–12)
POTASSIUM SERPL-SCNC: 4.4 MMOL/L (ref 3.5–5)
RBC # BLD: 3.62 E12/L (ref 3.8–5.8)
SODIUM BLD-SCNC: 140 MMOL/L (ref 132–146)
TOTAL PROTEIN: 7.1 G/DL (ref 6.4–8.3)
WBC # BLD: 6.4 E9/L (ref 4.5–11.5)

## 2022-03-24 PROCEDURE — 71271 CT THORAX LUNG CANCER SCR C-: CPT

## 2022-03-24 RX ORDER — LOSARTAN POTASSIUM 25 MG/1
TABLET ORAL
Qty: 90 TABLET | Refills: 0 | Status: SHIPPED
Start: 2022-03-24 | End: 2022-04-08 | Stop reason: SDUPTHER

## 2022-03-24 NOTE — TELEPHONE ENCOUNTER
I called the patient and left a message reminding him of his CT lung screening at [facility] on 3/24/2022 at 5:00 pm with an 4:30 pm arrival.  If unable to keep this appt call the office at [phone] to get rescheduled.         Electronically signed by Lina Capmbell on 3/24/22 at 8:35 AM EDT

## 2022-03-25 LAB — ANTI-NUCLEAR ANTIBODY (ANA): NEGATIVE

## 2022-03-27 LAB — ALPHA-1 ANTITRYPSIN: 114 MG/DL (ref 90–200)

## 2022-03-29 ENCOUNTER — TELEPHONE (OUTPATIENT)
Dept: CASE MANAGEMENT | Age: 67
End: 2022-03-29

## 2022-03-29 NOTE — TELEPHONE ENCOUNTER
No call, encounter opened to process CT Lung Screening. CT Lung Screen: 3/24/2022    Impression   1. Chronic pleural-based opacity seen within the right lung base measuring   2.4 cm in maximum transverse diameter consistent with round atelectasis. 2. Vague patchy pulmonary infiltration is seen within the right and left   upper lobes of uncertain etiology.  This finding can represent interstitial   infiltration.  This finding does not have the typical appearance of   pneumonia.  These findings were not present on the patient's prior study of   44/00/1366 and are of uncertain etiology.  Follow-up CT scan can be obtained   in 6 months for further evaluation.       LUNG RADS:   Per ACR Lung-RADS Version 1.1       Category 3, Probably benign (Probably benign finding(s) - short term follow   up suggested; includes nodules with a low likelihood of becoming a clinically   active cancer).       Management: 6 Month LDCT.       RECOMMENDATIONS:   If you would like to register your patient with the [facility] Lung Nodule/Lung   Cancer Screening Program, please contact the Nurse Navigator at   [phone].         Pack years: 39    Social History     Tobacco Use  Smoking Status:  Former Smoker    Start Date:    Quit Date: 07/22/2013   Types: Cigarettes   Packs/Day: 1   Years: 39   Pack Years: 39   Smokeless Tobacco: Former User         Results letter sent to patient via my chart or mailed.      [address]

## 2022-04-02 DIAGNOSIS — N18.31 STAGE 3A CHRONIC KIDNEY DISEASE (HCC): Primary | ICD-10-CM

## 2022-04-02 DIAGNOSIS — J41.1 MUCOPURULENT CHRONIC BRONCHITIS (HCC): Primary | ICD-10-CM

## 2022-04-02 DIAGNOSIS — R91.8 ABNORMAL CT LUNG SCREENING: ICD-10-CM

## 2022-04-05 ENCOUNTER — OFFICE VISIT (OUTPATIENT)
Dept: FAMILY MEDICINE CLINIC | Age: 67
End: 2022-04-05
Payer: MEDICARE

## 2022-04-05 VITALS
BODY MASS INDEX: 48.68 KG/M2 | OXYGEN SATURATION: 94 % | WEIGHT: 302.9 LBS | HEIGHT: 66 IN | RESPIRATION RATE: 16 BRPM | TEMPERATURE: 97.7 F | DIASTOLIC BLOOD PRESSURE: 60 MMHG | HEART RATE: 56 BPM | SYSTOLIC BLOOD PRESSURE: 90 MMHG

## 2022-04-05 DIAGNOSIS — Z79.4 TYPE 2 DIABETES MELLITUS WITH HYPERGLYCEMIA, WITH LONG-TERM CURRENT USE OF INSULIN (HCC): ICD-10-CM

## 2022-04-05 DIAGNOSIS — N28.9 RENAL INSUFFICIENCY: Primary | ICD-10-CM

## 2022-04-05 DIAGNOSIS — M25.511 CHRONIC RIGHT SHOULDER PAIN: ICD-10-CM

## 2022-04-05 DIAGNOSIS — E78.2 MIXED HYPERLIPIDEMIA: ICD-10-CM

## 2022-04-05 DIAGNOSIS — E11.65 TYPE 2 DIABETES MELLITUS WITH HYPERGLYCEMIA, WITH LONG-TERM CURRENT USE OF INSULIN (HCC): ICD-10-CM

## 2022-04-05 DIAGNOSIS — Z79.4 TYPE 2 DIABETES MELLITUS WITH DIABETIC POLYNEUROPATHY, WITH LONG-TERM CURRENT USE OF INSULIN (HCC): ICD-10-CM

## 2022-04-05 DIAGNOSIS — I87.2 VENOUS INSUFFICIENCY (CHRONIC) (PERIPHERAL): ICD-10-CM

## 2022-04-05 DIAGNOSIS — G89.29 CHRONIC RIGHT SHOULDER PAIN: ICD-10-CM

## 2022-04-05 DIAGNOSIS — K43.9 VENTRAL HERNIA WITHOUT OBSTRUCTION OR GANGRENE: ICD-10-CM

## 2022-04-05 DIAGNOSIS — E11.42 TYPE 2 DIABETES MELLITUS WITH DIABETIC POLYNEUROPATHY, WITH LONG-TERM CURRENT USE OF INSULIN (HCC): ICD-10-CM

## 2022-04-05 DIAGNOSIS — I48.0 PAF (PAROXYSMAL ATRIAL FIBRILLATION) (HCC): ICD-10-CM

## 2022-04-05 DIAGNOSIS — E66.01 MORBID OBESITY (HCC): ICD-10-CM

## 2022-04-05 PROCEDURE — 3046F HEMOGLOBIN A1C LEVEL >9.0%: CPT | Performed by: FAMILY MEDICINE

## 2022-04-05 PROCEDURE — 99214 OFFICE O/P EST MOD 30 MIN: CPT | Performed by: FAMILY MEDICINE

## 2022-04-05 RX ORDER — AMITRIPTYLINE HYDROCHLORIDE 50 MG/1
50 TABLET, FILM COATED ORAL NIGHTLY
Qty: 30 TABLET | Refills: 5 | Status: ON HOLD
Start: 2022-04-05 | End: 2022-09-20 | Stop reason: HOSPADM

## 2022-04-05 RX ORDER — TRAMADOL HYDROCHLORIDE 50 MG/1
50 TABLET ORAL EVERY 6 HOURS PRN
Qty: 120 TABLET | Refills: 3 | Status: ON HOLD
Start: 2022-04-05 | End: 2022-09-20 | Stop reason: HOSPADM

## 2022-04-05 RX ORDER — BUMETANIDE 1 MG/1
1 TABLET ORAL DAILY
Qty: 30 TABLET | Refills: 5 | Status: SHIPPED
Start: 2022-04-05 | End: 2022-09-28

## 2022-04-05 NOTE — PROGRESS NOTES
Oziel Dale is a 77 y.o. male  . Subjective:      Patient is complaining of fatigue and daytime somnolence. I explained to him that some of this is probably his sleep apnea which she does not wear his machine very often as well as possibly hypothyroidism. Patient continues to refuse thyroid treatment. He has stopped it every time he started he states that it makes him eat more. I tried to explain to him that this will eventually cause him to lose weight but he seems to stop it every time. Patient sugars are elevated. And once again he is not using his medications as prescribed. He very seldomly if any uses his fast acting insulin. I explained to him the compliance is very important and he has at high risk for chronic renal failure that may require dialysis as well as recurrence of cardiovascular disease and death. Patient's diet is poor again and has gained weight. We adjusted his sliding scale so it is not very aggressive at all so he will at least get some mealtime insulin coverage. Is concerning seems to be hypoglycemia when she really has not had any episodes of. Numbers continue to increase. We will set up with nephrology. Patient complains of severe shoulder severe shoulder pain. His feet have been burning and extremely painful. We do not want him taking any NSAIDs we will provide some tramadol for now which I think is reasonable and some Elavil to see if this helps with the neuropathy. I explained to him that neuropathy will ultimately be treated by improved sugars however if his sugars are too elevated for too long it can become irreversible. Again we are in a set up patient with nephrology and he must keep this appointment is very important. Already set him up with another nephrologist but he wants to go the same 1 that his wife sees which is reasonable. We are also going to set him up with endocrinology because his sugars are just not improving.   Patient's legs again are swelling and we adjusted water pill I explained to him I am nervous about doing this because of renal function and he really needs to see renal soon. Patient to follow with cardiology. Review of Systems   Constitutional: Positive for fatigue. Negative for activity change, appetite change, chills, diaphoresis, fever and unexpected weight change. HENT: Negative for congestion, dental problem, drooling, ear discharge, ear pain, facial swelling, hearing loss, mouth sores, nosebleeds, postnasal drip, rhinorrhea, sinus pressure, sneezing, sore throat, tinnitus, trouble swallowing and voice change. Eyes: Negative for photophobia, pain, discharge, redness, itching and visual disturbance. Respiratory: Negative for apnea, cough, choking, chest tightness, shortness of breath, wheezing and stridor. Cardiovascular: Positive for leg swelling. Negative for chest pain and palpitations. Gastrointestinal: Negative for abdominal distention, abdominal pain, anal bleeding, blood in stool, constipation, diarrhea, nausea, rectal pain and vomiting. Endocrine: Negative for cold intolerance, heat intolerance, polydipsia, polyphagia and polyuria. Genitourinary: Negative for decreased urine volume, difficulty urinating, dysuria, enuresis, flank pain, frequency, genital sores, hematuria, penile discharge, penile pain, penile swelling, scrotal swelling, testicular pain and urgency. Musculoskeletal: Positive for arthralgias and back pain. Negative for gait problem, joint swelling, myalgias, neck pain and neck stiffness. Skin: Negative for color change, pallor, rash and wound. Allergic/Immunologic: Negative for environmental allergies, food allergies and immunocompromised state. Neurological: Positive for numbness. Negative for dizziness, tremors, seizures, syncope, facial asymmetry, speech difficulty, weakness, light-headedness and headaches. Hematological: Negative for adenopathy. Does not bruise/bleed easily. Psychiatric/Behavioral: Positive for sleep disturbance. Negative for agitation, behavioral problems, confusion, decreased concentration, dysphoric mood, hallucinations, self-injury and suicidal ideas. The patient is not nervous/anxious and is not hyperactive.         Past Medical History:   Diagnosis Date    Acid reflux     Acute diastolic (congestive) heart failure ([facility]) 2019    Arthritis     Asthma 2014    Asthmatic bronchitis , chronic ([facility]) 2016    CAD (coronary artery disease)     Chronic bronchitis ([facility]) 2014    COPD (chronic obstructive pulmonary disease) ([facility])     CB    COPD (chronic obstructive pulmonary disease) ([facility])     Diabetes mellitus ([facility])     Emphysema (subcutaneous) (surgical) resulting from a procedure     H/O cardiovascular stress test 2021    Lexiscan    Hyperlipidemia     Hypertension     Hypoxemia requiring supplemental oxygen 2015    LONG TERM ANTICOAGULENT USE     Morbid obesity with BMI of 50.0-59.9, [facility]    Obesity     Osteoarthritis     Sleep apnea     bilevel positive airway pressure at 13/8 with 2 L oxygen flow     Stage 3 chronic kidney disease ([facility])     Tobacco abuse     Type II or unspecified type diabetes mellitus without mention of complication, not stated as uncontrolled        Social History     Socioeconomic History    Marital status:      Spouse name: Luis Alberto Mcfadden Number of children: 1    Years of education: Not on file    Highest education level: 11th grade   Occupational History    Not on file   Tobacco Use    Smoking status: Former Smoker     Packs/day: 1.00     Years: 45.00     Pack years: 45.00     Types: Cigarettes     Quit date: 2013     Years since quittin.7    Smokeless tobacco: Former User     Types: Chew     Quit date: 10/8/1971   Vaping Use    Vaping Use: Never used   Substance and Sexual Activity    Alcohol use: No     Alcohol/week: 0.0 standard drinks     Comment: 1-2 coffee per day    Drug use: No    Sexual activity: Yes     Partners: Female   Other Topics Concern    Not on file   Social History Narrative    8-26-19  [facility] reviewed SDOH with Ghada Powers. He does have money strain but between the income he has coming in and his wife's they are over resources for SARY programs. Offered food panty info to help with end of the month struggles but he declined stating that he tried and was refused due to his income. He belongs to a [employer] and goes weekly so he has some community connections in place. He has an extremely high interest rate on his mortgage which he was encouraged to look into getting lowered to help save money on his house payments. Noticed some difficulty with memory recall. Becomes easily flustered at times. Has no MHI to support applying for [facility] program of SARY. States he does not feel depressed or need any MH treatment. Social Determinants of Health     Financial Resource Strain: Low Risk     Difficulty of Paying Living Expenses: Not hard at all   Food Insecurity: No Food Insecurity    Worried About Running Out of Food in the Last Year: Never true    Jennifer of Food in the Last Year: Never true   Transportation Needs:     Lack of Transportation (Medical): Not on file    Lack of Transportation (Non-Medical):  Not on file   Physical Activity:     Days of Exercise per Week: Not on file    Minutes of Exercise per Session: Not on file   Stress:     Feeling of Stress : Not on file   Social Connections:     Frequency of Communication with Friends and Family: Not on file    Frequency of Social Gatherings with Friends and Family: Not on file    Attends Bahai Services: Not on file    Active Member of Clubs or Organizations: Not on file    Attends Club or Organization Meetings: Not on file    Marital Status: Not on file   Intimate Partner Violence:     Fear of Current or Ex-Partner: Not on file    Emotionally Abused: Not on file    Physically Abused: Not on file    Sexually Abused: Not on file   Housing Stability:     Unable to Pay for Housing in the Last Year: Not on file    Number of Places Lived in the Last Year: Not on file    Unstable Housing in the Last Year: Not on file       Family History   Problem Relation Age of Onset    Cancer Mother         breast    Cancer Father         stomach    Mental Illness Brother     Stroke Brother     Other Brother        Current Outpatient Medications on File Prior to Visit   Medication Sig Dispense Refill    losartan (COZAAR) 25 MG tablet TAKE ONE TABLET BY MOUTH DAILY 90 tablet 0    pramipexole (MIRAPEX) 0.25 MG tablet TAKE TWO TABLETS BY MOUTH THREE TIMES A  tablet 5    metOLazone (ZAROXOLYN) 2.5 MG tablet TAKE 1 TABLET BY MOUTH TWICE WEEKLY      warfarin (COUMADIN) 5 MG tablet Take 1 tablet by mouth daily 30 tablet 3    insulin glargine (BASAGLAR KWIKPEN) 100 UNIT/ML injection pen Inject 45 Units into the skin 2 times daily      levocetirizine (XYZAL) 5 MG tablet Take 5 mg by mouth nightly      spironolactone (ALDACTONE) 25 MG tablet TAKE ONE TABLET BY MOUTH TWO TIMES A  tablet 0    amiodarone (CORDARONE) 200 MG tablet Take 1 tablet by mouth 2 times daily 60 tablet 5    metoprolol succinate (TOPROL XL) 25 MG extended release tablet Take 1 tablet by mouth 2 times daily 60 tablet 5    insulin lispro, 1 Unit Dial, (HUMALOG KWIKPEN) 100 UNIT/ML SOPN Inject 6 units with meals PLUS                 No Insulin   140-199           3 Units   200-249           6 Units   250-299           9 Units   300-349         12 Units   350-400         15 Units   Over 400       18 Units (Patient taking differently: Inject 0-18 Units into the skin 3 times daily (before meals) MAX 70 units daily) 5 pen 0    atorvastatin (LIPITOR) 80 MG tablet TAKE ONE TABLET BY MOUTH EVERY NIGHT 90 tablet 5    montelukast (SINGULAIR) 10 MG tablet Take 1 tablet by mouth nightly 30 tablet 4    CPAP Machine MISC 1 each by Does not apply route nightly as needed       aspirin 81 MG tablet Take 81 mg by mouth nightly       modafinil (PROVIGIL) 100 MG tablet Take 1 tablet by mouth daily for 30 days. (Patient not taking: Reported on 3/22/2022) 30 tablet 0    Roflumilast (DALIRESP) 500 MCG tablet Take 1 tablet by mouth daily (Patient not taking: Reported on 3/22/2022) 30 tablet 5    blood glucose test strips (ACCU-CHEK GUIDE) strip Test twice daily 100 each 11    hydrOXYzine (VISTARIL) 25 MG capsule Take one 220 minutes prior to MRI (Patient not taking: Reported on 3/22/2022) 3 capsule 0    pantoprazole (PROTONIX) 40 MG tablet Take 40 mg by mouth daily (Patient not taking: Reported on 3/22/2022)       No current facility-administered medications on file prior to visit. Allergies   Allergen Reactions    Pcn [Penicillins]      Child went to hospital   ? Reaction  Patient tolerates cephalosporins    Sulfa Antibiotics      ? I have reviewed his allergies, medications, problem list, medical,social and family history and have updated as needed in the electronic medical record. Objective:     Physical Exam  Genitourinary:     Comments: Deferred by patient        Assessment / Plan:   Michelle Higginbotham was seen today for abdominal pain, discuss labs and leg swelling. Diagnoses and all orders for this visit:    Renal insufficiency  -     [facility]FL - Dilcia Bhat MD, Nephrology, [place]  -     losartan (COZAAR) 25 MG tablet; TAKE ONE TABLET BY MOUTH DAILY    Venous insufficiency (chronic) (peripheral)  -     bumetanide (BUMEX) 1 MG tablet; Take 1 tablet by mouth daily    Type 2 diabetes mellitus with diabetic polyneuropathy, with long-term current use of insulin (HCC)  -     amitriptyline (ELAVIL) 50 MG tablet; Take 1 tablet by mouth nightly  -     traMADol (ULTRAM) 50 MG tablet; Take 1 tablet by mouth every 6 hours as needed for Pain. Intended supply: 5 days.  Take lowest dose possible to manage pain  -     [address], [address] Moe Haji MD, Endocrinology, [place]  -     losartan (COZAAR) 25 MG tablet; TAKE ONE TABLET BY MOUTH DAILY  -     insulin glargine (BASAGLAR KWIKPEN) 100 UNIT/ML injection pen; Inject 45 Units into the skin 2 times daily  -     insulin lispro, 1 Unit Dial, (HUMALOG KWIKPEN) 100 UNIT/ML SOPN; Inject 0-18 Units into the skin 3 times daily (before meals) MAX 70 units daily    Chronic right shoulder pain  -     amitriptyline (ELAVIL) 50 MG tablet; Take 1 tablet by mouth nightly  -     traMADol (ULTRAM) 50 MG tablet; Take 1 tablet by mouth every 6 hours as needed for Pain. Intended supply: 5 days. Take lowest dose possible to manage pain  -     External Referral To Orthopedic Surgery    Ventral hernia without obstruction or gangrene  -     US ABDOMEN LIMITED; Future    PAF (paroxysmal atrial fibrillation) (HCC)  -     metoprolol succinate (TOPROL XL) 25 MG extended release tablet; Take 1 tablet by mouth 2 times daily    Mixed hyperlipidemia  -     atorvastatin (LIPITOR) 80 MG tablet; TAKE ONE TABLET BY MOUTH EVERY NIGHT    Type 2 diabetes mellitus with hyperglycemia, with long-term current use of insulin (HCC)  -     insulin glargine (BASAGLAR KWIKPEN) 100 UNIT/ML injection pen; Inject 45 Units into the skin 2 times daily  -     insulin lispro, 1 Unit Dial, (HUMALOG KWIKPEN) 100 UNIT/ML SOPN; Inject 0-18 Units into the skin 3 times daily (before meals) MAX 70 units daily    Morbid obesity ([address]  -     Che Moore MD, Endocrinology, [place]        Reviewed healthmaintenance report. Patient is aware of deficiencies and suggested preventative tests.

## 2022-04-08 RX ORDER — ATORVASTATIN CALCIUM 80 MG/1
TABLET, FILM COATED ORAL
Qty: 90 TABLET | Refills: 5 | Status: SHIPPED | OUTPATIENT
Start: 2022-04-08

## 2022-04-08 RX ORDER — LOSARTAN POTASSIUM 25 MG/1
TABLET ORAL
Qty: 90 TABLET | Refills: 5
Start: 2022-04-08 | End: 2022-08-21

## 2022-04-08 RX ORDER — METOPROLOL SUCCINATE 25 MG/1
25 TABLET, EXTENDED RELEASE ORAL 2 TIMES DAILY
Qty: 60 TABLET | Refills: 5 | Status: ON HOLD
Start: 2022-04-08 | End: 2022-09-20 | Stop reason: HOSPADM

## 2022-04-08 RX ORDER — INSULIN GLARGINE 100 [IU]/ML
45 INJECTION, SOLUTION SUBCUTANEOUS 2 TIMES DAILY
Qty: 5 PEN | Refills: 3
Start: 2022-04-08

## 2022-04-08 RX ORDER — INSULIN LISPRO 100 [IU]/ML
0-18 INJECTION, SOLUTION INTRAVENOUS; SUBCUTANEOUS
Qty: 3 ML | Refills: 3
Start: 2022-04-08

## 2022-04-08 ASSESSMENT — ENCOUNTER SYMPTOMS
WHEEZING: 0
VOMITING: 0
EYE ITCHING: 0
SINUS PRESSURE: 0
EYE REDNESS: 0
TROUBLE SWALLOWING: 0
APNEA: 0
ABDOMINAL DISTENTION: 0
EYE PAIN: 0
STRIDOR: 0
BLOOD IN STOOL: 0
COLOR CHANGE: 0
PHOTOPHOBIA: 0
ANAL BLEEDING: 0
EYE DISCHARGE: 0
SORE THROAT: 0
ABDOMINAL PAIN: 0
NAUSEA: 0
CHOKING: 0
SHORTNESS OF BREATH: 0
RECTAL PAIN: 0
CONSTIPATION: 0
RHINORRHEA: 0
DIARRHEA: 0
FACIAL SWELLING: 0
BACK PAIN: 1
VOICE CHANGE: 0
COUGH: 0
CHEST TIGHTNESS: 0

## 2022-04-18 RX ORDER — AMIODARONE HYDROCHLORIDE 200 MG/1
TABLET ORAL
Qty: 60 TABLET | Refills: 3 | Status: SHIPPED
Start: 2022-04-18 | End: 2022-05-02

## 2022-04-25 ENCOUNTER — OFFICE VISIT (OUTPATIENT)
Dept: CARDIOLOGY CLINIC | Age: 67
End: 2022-04-25
Payer: MEDICARE

## 2022-04-25 VITALS
DIASTOLIC BLOOD PRESSURE: 62 MMHG | HEART RATE: 49 BPM | BODY MASS INDEX: 43.55 KG/M2 | WEIGHT: 271 LBS | HEIGHT: 66 IN | RESPIRATION RATE: 16 BRPM | SYSTOLIC BLOOD PRESSURE: 128 MMHG

## 2022-04-25 DIAGNOSIS — I25.10 CORONARY ARTERY DISEASE INVOLVING NATIVE CORONARY ARTERY OF NATIVE HEART WITHOUT ANGINA PECTORIS: Primary | ICD-10-CM

## 2022-04-25 PROCEDURE — 93000 ELECTROCARDIOGRAM COMPLETE: CPT | Performed by: INTERNAL MEDICINE

## 2022-04-25 PROCEDURE — 99214 OFFICE O/P EST MOD 30 MIN: CPT | Performed by: INTERNAL MEDICINE

## 2022-04-25 RX ORDER — GABAPENTIN 300 MG/1
300 CAPSULE ORAL 3 TIMES DAILY
COMMUNITY
End: 2022-06-14

## 2022-04-25 NOTE — PROGRESS NOTES
[facility] Cardiology Progress Note  Dr. Soundra Opitz      Referring Physician: Frankie Marcos DO  CHIEF COMPLAINT:   Chief Complaint   Patient presents with    Coronary Artery Disease     6 month, patient has no complaints       HISTORY OF PRESENT ILLNESS:   77year old male with history of CHF, CAD, DM, hypertension, hyperlipidemia and COPD is here for a follow up appointment. Patient denies any chest pain, no shortness of breath, no lightheadedness, no dizziness, no palpitations, no pedal edema, no PND, no orthopnea, no syncope, no presyncopal episodes. Functional capacity is at baseline    Past Medical History:   Diagnosis Date    Acid reflux     Acute diastolic (congestive) heart failure ([facility] [address] 06/19/2019    Arthritis     Asthma 04/16/2014    Asthmatic bronchitis , chronic (HCC) 11/28/2016    CAD (coronary artery disease)     Chronic bronchitis (HCC) 04/16/2014    COPD (chronic obstructive pulmonary disease) (HCC)     CB    COPD (chronic obstructive pulmonary disease) (HCC)     Diabetes mellitus ([facility]     Emphysema (subcutaneous) (surgical) resulting from a procedure     H/O cardiovascular stress test 04/24/2021    Lexiscan    Hyperlipidemia     Hypertension     Hypoxemia requiring supplemental oxygen 02/02/2015    LONG TERM ANTICOAGULENT USE     Morbid obesity with BMI of 50.0-59.9, adult ([facility] [address] 11/27/2013    Obesity     Osteoarthritis     Sleep apnea     bilevel positive airway pressure at 13/8 with 2 L oxygen flow     Stage 3 chronic kidney disease ([facility])     Tobacco abuse     Type II or unspecified type diabetes mellitus without mention of complication, not stated as uncontrolled          Past Surgical History:   Procedure Laterality Date    COLONOSCOPY      CORONARY ANGIOPLASTY WITH STENT PLACEMENT  07-23-13    Left main focal eccentric 65% distal stenosis. Large OM1 CX 50% ostial & prox narrow. Large ramus artery & LAD: Minor plaque w/o sign narrow.  RCA: Dom vessel w/25% prox narrow & focal hazy eccentric 99% distal stenosis before origin RPDA & RPLCA. LV: Mild inferior hypokinesis EF 55%. Probable mild AS with 10-15mmHg. Successful PCI distal RCA w/3.5 x 12 mm BMS, 0% res stenosis. Normal distal runoff    CORONARY ARTERY BYPASS GRAFT  09-09-13    Dr Carline Leonard; CABG x2, LIMA to LAD, SVG to ramus intermedius; MV repair using 30-mm Future complete ring with magic stitch between A3 and P3; rigid internal fixation of sternum using KLS plates x2; endoscopic vein harvesting of right lower extremity     ECHO COMPL W DOP COLOR FLOW  7/25/2013         HERNIA REPAIR      SINUS SURGERY           Current Outpatient Medications   Medication Sig Dispense Refill    gabapentin (NEURONTIN) 300 MG capsule Take 300 mg by mouth 3 times daily.       amiodarone (CORDARONE) 200 MG tablet TAKE ONE TABLET BY MOUTH TWO TIMES A DAY 60 tablet 3    losartan (COZAAR) 25 MG tablet TAKE ONE TABLET BY MOUTH DAILY 90 tablet 5    insulin glargine (BASAGLAR KWIKPEN) 100 UNIT/ML injection pen Inject 45 Units into the skin 2 times daily 5 pen 3    insulin lispro, 1 Unit Dial, (HUMALOG KWIKPEN) 100 UNIT/ML SOPN Inject 0-18 Units into the skin 3 times daily (before meals) MAX 70 units daily 3 mL 3    metoprolol succinate (TOPROL XL) 25 MG extended release tablet Take 1 tablet by mouth 2 times daily 60 tablet 5    atorvastatin (LIPITOR) 80 MG tablet TAKE ONE TABLET BY MOUTH EVERY NIGHT 90 tablet 5    bumetanide (BUMEX) 1 MG tablet Take 1 tablet by mouth daily 30 tablet 5    amitriptyline (ELAVIL) 50 MG tablet Take 1 tablet by mouth nightly 30 tablet 5    pramipexole (MIRAPEX) 0.25 MG tablet TAKE TWO TABLETS BY MOUTH THREE TIMES A  tablet 5    metOLazone (ZAROXOLYN) 2.5 MG tablet TAKE 1 TABLET BY MOUTH TWICE WEEKLY      warfarin (COUMADIN) 5 MG tablet Take 1 tablet by mouth daily 30 tablet 3    levocetirizine (XYZAL) 5 MG tablet Take 5 mg by mouth nightly      spironolactone (ALDACTONE) 25 MG tablet TAKE ONE TABLET BY MOUTH TWO TIMES A  tablet 0    montelukast (SINGULAIR) 10 MG tablet Take 1 tablet by mouth nightly 30 tablet 4    aspirin 81 MG tablet Take 81 mg by mouth nightly       traMADol (ULTRAM) 50 MG tablet Take 1 tablet by mouth every 6 hours as needed for Pain. Intended supply: 5 days. Take lowest dose possible to manage pain (Patient not taking: Reported on 2022) 120 tablet 3    Roflumilast (DALIRESP) 500 MCG tablet Take 1 tablet by mouth daily (Patient not taking: Reported on 3/22/2022) 30 tablet 5    blood glucose test strips (ACCU-CHEK GUIDE) strip Test twice daily 100 each 11    hydrOXYzine (VISTARIL) 25 MG capsule Take one 220 minutes prior to MRI (Patient not taking: Reported on 3/22/2022) 3 capsule 0    pantoprazole (PROTONIX) 40 MG tablet Take 40 mg by mouth daily (Patient not taking: Reported on 3/22/2022)      CPAP Machine MISC 1 each by Does not apply route nightly as needed        No current facility-administered medications for this visit.          Allergies as of 2022 - Fully Reviewed 2022   Allergen Reaction Noted    Pcn [penicillins]  2013    Sulfa antibiotics  2013       Social History     Socioeconomic History    Marital status:      Spouse name: Ramona Armijo Number of children: 1    Years of education: Not on file    Highest education level: 11th grade   Occupational History    Not on file   Tobacco Use    Smoking status: Former Smoker     Packs/day: 1.00     Years: 45.00     Pack years: 45.00     Types: Cigarettes     Quit date: 2013     Years since quittin.7    Smokeless tobacco: Former User     Types: Chew     Quit date: 10/8/1971   Vaping Use    Vaping Use: Never used   Substance and Sexual Activity    Alcohol use: No     Alcohol/week: 0.0 standard drinks     Comment: 1-2 coffee per day    Drug use: No    Sexual activity: Yes     Partners: Female   Other Topics Concern    Not on file   Social History Narrative    [facility] reviewed SDOH with Slade Valdez. He does have money strain but between the income he has coming in and his wife's they are over resources for SARY programs. Offered food panty info to help with end of the month struggles but he declined stating that he tried and was refused due to his income. He belongs to a [employer] and goes weekly so he has some community connections in place. He has an extremely high interest rate on his mortgage which he was encouraged to look into getting lowered to help save money on his house payments. Noticed some difficulty with memory recall. Becomes easily flustered at times. Has no MHI to support [facility] S[facility]. States he does not feel depressed or need any MH treatment. Social Determinants of Health     Financial Resource Strain: Low Risk     Difficulty of Paying Living Expenses: Not hard at all   Food Insecurity: No Food Insecurity    Worried About Running Out of Food in the Last Year: Never true    Jennifer of Food in the Last Year: Never true   Transportation Needs:     Lack of Transportation (Medical): Not on file    Lack of Transportation (Non-Medical):  Not on file   Physical Activity:     Days of Exercise per Week: Not on file    Minutes of Exercise per Session: Not on file   Stress:     Feeling of Stress : Not on file   Social Connections:     Frequency of Communication with Friends and Family: Not on file    Frequency of Social Gatherings with Friends and Family: Not on file    Attends Yarsani Services: Not on file    Active Member of Clubs or Organizations: Not on file    Attends Club or Organization Meetings: Not on file    Marital Status: Not on file   Intimate Partner Violence:     Fear of Current or Ex-Partner: Not on file    Emotionally Abused: Not on file    Physically Abused: Not on file    Sexually Abused: Not on file   Housing Stability:     Unable to Pay for Housing in the Last Year: Not on file    Number of Places Lived in the Last Year: Not on file    Unstable Housing in the Last Year: Not on file       Family History   Problem Relation Age of Onset    Cancer Mother         breast    Cancer Father         stomach    Mental Illness Brother     Stroke Brother     Other Brother        REVIEW OF SYSTEMS:     CONSTITUTIONAL:  negative for  fevers, chills, sweats, + fatigue  HEENT:  negative for  tinnitus, earaches, nasal congestion and epistaxis  RESPIRATORY:  negative for  dry cough, cough with sputum, wheezing and hemoptysis  GASTROINTESTINAL:  negative for nausea, vomiting, diarrhea, constipation, pruritus and jaundice  HEMATOLOGIC/LYMPHATIC:  negative for easy bruising, bleeding, lymphadenopathy and petechiae  ENDOCRINE:  negative for heat intolerance, cold intolerance, tremor, hair loss and diabetic symptoms including neither polyuria nor polydipsia nor blurred vision  MUSCULOSKELETAL:  negative for  myalgias, arthralgias, joint swelling, stiff joints and decreased range of motion  NEUROLOGICAL:  negative for memory problems, speech problems, visual disturbance, dysphagia, weakness and numbness      PHYSICAL EXAM:   CONSTITUTIONAL:  awake, alert, cooperative, no apparent distress, and appears stated age  HEAD:  normocepalic, without obvious abnormality, atraumatic, pink, moist mucous membranes. NECK:  Supple, symmetrical, trachea midline, no adenopathy, thyroid symmetric, not enlarged and no tenderness, skin normal  LUNGS:  No increased work of breathing, good air exchange, clear to auscultation bilaterally, no crackles or wheezing  CARDIOVASCULAR:  Normal apical impulse, bradycardic, normal S1 and S2, no S3 or S4, 3/6 systolic murmur at the apex, 3/6 systolic murmur at the left lower sternal border, no JVD, no carotid bruit, + pedal edema, good carotid upstroke bilaterally.   ABDOMEN:  Soft, nontender, no masses, no hepatomegaly or splenomegaly, BS+  CHEST: nontender to palpation, expands symmetrically  MUSCULOSKELETAL:  No clubbing no cyanosis. there is no redness, warmth, or swelling of the joints  full range of motion noted  NEUROLOGIC:  Alert, awake,oriented x3. SKIN:  no bruising or bleeding, normal skin color, texture, turgor and no redness, warmth, or swelling        /62   Pulse (!) 49   Resp 16   Ht 5' 6\" (1.676 m)   Wt 271 lb (122.9 kg)   BMI 43.74 kg/m²     DATA:   I personally reviewed the visit EKG with the following interpretation: Sinus bradycardia, nonspecific T wave changes    EKG 10/28/21 Sinus tachycardia  Incomplete right bundle branch block  Septal infarct , new  Inferior injury pattern  Consider right ventricular involvement in acute inferior infarct  Abnormal ECG     ECHO:6/14/21 Summary   Left ventricular size is grossly normal.   Moderate left ventricular concentric hypertrophy noted. Ejection fraction is visually estimated at 45%. No evidence of left ventricular mass or thrombus noted. The left atrium is moderately dilated   Poor images for bubble study   Mildly dilated right ventricle. Mildly enlarged right atrium size. Moderate mitral regurgitation is present. No evidence of mitral valve stenosis. The aortic valve is trileaflet. The aortic valve appears mildly sclerotic. Physiologic and/or trace aortic regurgitation is noted. Mild aortic stenosis. The aortic valve area is 1.75 cm2 with a maximum gradient of 24.82 mmHg   and a mean gradient of 14.08 mmHg. Moderate tricuspid regurgitation.    Irregular rhythm    Stress Test: 4/24/21       The myocardial perfusion imaging was normal with attenuation.       Overall left ventricular systolic function was normal without regional   wall motion abnormalities.       Overall low risk study.             CABG/MVR 9/9/13 Sternotomy; coronary artery bypass grafting x2, left internal   mammary artery to the LAD, saphenous vein graft to the ramus intermedius;   mitral valve repair using 30-mm Future complete ring with magic stitch   between A3 and P3; rigid internal fixation of the sternum using KLS plates   x2; and endoscopic vein harvesting    Cardiology Labs: BMP:    Lab Results   Component Value Date     03/24/2022    K 4.4 03/24/2022    K 2.8 10/28/2021    CL 97 03/24/2022    CO2 28 03/24/2022    BUN 58 03/24/2022    CREATININE 2.0 03/24/2022     CMP:    Lab Results   Component Value Date     03/24/2022    K 4.4 03/24/2022    K 2.8 10/28/2021    CL 97 03/24/2022    CO2 28 03/24/2022    BUN 58 03/24/2022    CREATININE 2.0 03/24/2022    PROT 7.1 03/24/2022     CBC:    Lab Results   Component Value Date    WBC 6.4 03/24/2022    RBC 3.62 03/24/2022    HGB 11.7 03/24/2022    HCT 37.2 03/24/2022    .8 03/24/2022    RDW 16.8 03/24/2022     03/24/2022     PT/INR:  No results found for: PTINR  PT/INR Warfarin:  No components found for: PTPATWAR, PTINRWAR  PTT:    Lab Results   Component Value Date    APTT 24.9 10/29/2021     PTT Heparin:  No components found for: APTTHEP  Magnesium:    Lab Results   Component Value Date    MG 1.9 12/16/2021     TSH:    Lab Results   Component Value Date    TSH 18.120 03/15/2022     TROPONIN:  No components found for: TROP  BNP:  No results found for: BNP  FASTING LIPID PANEL:    Lab Results   Component Value Date    CHOL 123 12/16/2021    HDL 42 12/16/2021    TRIG 104 12/16/2021     No orders to display     I have personally reviewed the laboratory, cardiac diagnostic and radiographic testing as outlined above:      IMPRESSION:  1. Atrial fibrillation: Now in sinus bradycardia, will continue Coumadin for anticoagulation  2. Combined congestive heart failure: Chronic, compensated, continue current treatment  3. CAD: S/p CABG with LIMA to LAD, SVG to ramus intermedius artery  4. Status post mitral valve repair using #30-mm Future complete ring with magic stitch between A3 and P3, moderate MR on recent echocardiogram  5.   Ischemic cardiomyopathy: Last documented ejection fraction was 45%  6. Mitral valve regurgitation: Moderate  7. Tricuspid valve regurgitation: Moderate  8. Hypertension: Controlled  9. Type 2 diabetes mellitus  10. Hyperlipidemia: On statin  11. Chronic anticoagulation    RECOMMENDATIONS:   1.  will adjust Toprol and amiodarone dose  2. CHF: Daily weight, take an extra Bumex for weight gain of more than 2-3 pounds in 24 hours, compliance with diuretics, low-salt diet were all advised. 3.  Increase risk of bleeding due to being on anti-coagulation, symptoms and signs of bleeding discussed with patient, patient was advised to seek medical attention at the earliest symptoms or signs of bleeding. We will. Preventive cardiology: Low-salt, low-cholesterol diet, daily exercise, total cholesterol of less than 200, LDL of less than 70, adherence to diabetic diet and diabetic medications, were all advised. 5.  Follow-up with Dr. Rosalino Santana as scheduled  6. Follow-up with Dr. Audra Hackett in 4 weeks, sooner if symptomatic for any reason    I have reviewed my findings and recommendations with patient    Electronically signed by Sujatha Sales MD on 4/25/2022 at 1:49 PM  NOTE: This report was transcribed using voice recognition software.  Every effort was made to ensure accuracy; however, inadvertent computerized transcription errors may be present

## 2022-04-26 ENCOUNTER — TELEPHONE (OUTPATIENT)
Dept: CARDIOLOGY CLINIC | Age: 67
End: 2022-04-26

## 2022-04-26 RX ORDER — METOLAZONE 2.5 MG/1
2.5 TABLET ORAL 2 TIMES DAILY
Qty: 30 TABLET | Refills: 0
Start: 2022-04-26 | End: 2022-08-21

## 2022-04-26 NOTE — TELEPHONE ENCOUNTER
Patient called in per your request - he is taking the following medications    Amiodarone 200mg bid  Metoprolol 25mg bid  Metolazone 2.5mg bid

## 2022-04-28 NOTE — TELEPHONE ENCOUNTER
I called him, we can hold the Toprol for today and tomorrow, hold amiodarone for the next couple of days, then he is going to start amiodarone 200 mg Monday Wednesday Friday

## 2022-05-02 RX ORDER — AMIODARONE HYDROCHLORIDE 200 MG/1
200 TABLET ORAL
Qty: 60 TABLET | Refills: 3
Start: 2022-05-02 | End: 2022-08-13

## 2022-05-03 ENCOUNTER — OFFICE VISIT (OUTPATIENT)
Dept: FAMILY MEDICINE CLINIC | Age: 67
End: 2022-05-03
Payer: MEDICARE

## 2022-05-03 VITALS
RESPIRATION RATE: 18 BRPM | BODY MASS INDEX: 44.52 KG/M2 | DIASTOLIC BLOOD PRESSURE: 80 MMHG | HEIGHT: 66 IN | HEART RATE: 54 BPM | SYSTOLIC BLOOD PRESSURE: 116 MMHG | WEIGHT: 277 LBS | OXYGEN SATURATION: 94 %

## 2022-05-03 DIAGNOSIS — J30.1 SEASONAL ALLERGIC RHINITIS DUE TO POLLEN: ICD-10-CM

## 2022-05-03 DIAGNOSIS — E11.65 TYPE 2 DIABETES MELLITUS WITH HYPERGLYCEMIA, WITH LONG-TERM CURRENT USE OF INSULIN (HCC): ICD-10-CM

## 2022-05-03 DIAGNOSIS — N18.32 STAGE 3B CHRONIC KIDNEY DISEASE (HCC): ICD-10-CM

## 2022-05-03 DIAGNOSIS — R09.82 POST-NASAL DRIP: ICD-10-CM

## 2022-05-03 DIAGNOSIS — E03.9 ACQUIRED HYPOTHYROIDISM: ICD-10-CM

## 2022-05-03 DIAGNOSIS — I50.32 CHRONIC DIASTOLIC HEART FAILURE (HCC): ICD-10-CM

## 2022-05-03 DIAGNOSIS — R05.3 CHRONIC COUGH: Primary | ICD-10-CM

## 2022-05-03 DIAGNOSIS — Z79.4 TYPE 2 DIABETES MELLITUS WITH HYPERGLYCEMIA, WITH LONG-TERM CURRENT USE OF INSULIN (HCC): ICD-10-CM

## 2022-05-03 DIAGNOSIS — I48.0 PAF (PAROXYSMAL ATRIAL FIBRILLATION) (HCC): ICD-10-CM

## 2022-05-03 DIAGNOSIS — J01.00 ACUTE NON-RECURRENT MAXILLARY SINUSITIS: ICD-10-CM

## 2022-05-03 DIAGNOSIS — R53.82 CHRONIC FATIGUE: ICD-10-CM

## 2022-05-03 PROCEDURE — 3046F HEMOGLOBIN A1C LEVEL >9.0%: CPT | Performed by: FAMILY MEDICINE

## 2022-05-03 PROCEDURE — 99213 OFFICE O/P EST LOW 20 MIN: CPT | Performed by: FAMILY MEDICINE

## 2022-05-03 RX ORDER — GUAIFENESIN 400 MG/1
400 TABLET ORAL 4 TIMES DAILY PRN
Qty: 80 TABLET | Refills: 0 | Status: SHIPPED | OUTPATIENT
Start: 2022-05-03 | End: 2022-05-23

## 2022-05-03 RX ORDER — AZELASTINE 1 MG/ML
2 SPRAY, METERED NASAL 2 TIMES DAILY
Qty: 120 ML | Refills: 1 | Status: SHIPPED
Start: 2022-05-03 | End: 2022-06-14

## 2022-05-03 RX ORDER — BROMPHENIRAMINE MALEATE, PSEUDOEPHEDRINE HYDROCHLORIDE, AND DEXTROMETHORPHAN HYDROBROMIDE 2; 30; 10 MG/5ML; MG/5ML; MG/5ML
5 SYRUP ORAL 4 TIMES DAILY PRN
Qty: 473 ML | Refills: 0 | Status: SHIPPED | OUTPATIENT
Start: 2022-05-03 | End: 2022-05-23

## 2022-05-03 NOTE — PROGRESS NOTES
Meryle Daft is a 77 y.o. male  . Subjective:      Patient following with both cardiology and nephrology. Sugars sugars have improved with any hemoglobin A1c  8.9 performed today. Patient is due for blood work by nephrology we will add some to that including an official hemoglobin A1c. Still coughing and having postnasal drip. This is worsened since pollen season has started. He did very well on Bromfed and we will give him some more of this and try some Astelin nasal spray. Doing much better overall. We will recheck BNP to see where he has as far as heart failure goes. Again he is to continue to follow with cardiology. We will recheck thyroid. Patient is quit thyroid medication every time we started it because he feels it makes him eat more. Into this him that this may help his symptoms we may want to consider using may be some [place] Thyroid to see if he does better with that but we will see where his numbers are. Review of Systems   Constitutional: Positive for fatigue. Negative for activity change, appetite change, chills, diaphoresis, fever and unexpected weight change. HENT: Positive for postnasal drip, rhinorrhea and sneezing. Negative for congestion, dental problem, drooling, ear discharge, ear pain, facial swelling, hearing loss, mouth sores, nosebleeds, sinus pressure, sore throat, tinnitus, trouble swallowing and voice change. Eyes: Negative for photophobia, pain, discharge, redness, itching and visual disturbance. Respiratory: Positive for cough and wheezing. Negative for apnea, choking, chest tightness, shortness of breath and stridor. Cardiovascular: Negative for chest pain, palpitations and leg swelling. Gastrointestinal: Negative for abdominal distention, abdominal pain, anal bleeding, blood in stool, constipation, diarrhea, nausea, rectal pain and vomiting. Endocrine: Negative for cold intolerance, heat intolerance, polydipsia, polyphagia and polyuria. Genitourinary: Negative for decreased urine volume, difficulty urinating, dysuria, enuresis, flank pain, frequency, genital sores, hematuria, penile discharge, penile pain, penile swelling, scrotal swelling, testicular pain and urgency. Musculoskeletal: Negative for arthralgias, back pain, gait problem, joint swelling, myalgias, neck pain and neck stiffness. Skin: Negative for color change, pallor, rash and wound. Allergic/Immunologic: Negative for environmental allergies, food allergies and immunocompromised state. Neurological: Negative for dizziness, tremors, seizures, syncope, facial asymmetry, speech difficulty, weakness, light-headedness, numbness and headaches. Hematological: Negative for adenopathy. Does not bruise/bleed easily. Psychiatric/Behavioral: Negative for agitation, behavioral problems, confusion, decreased concentration, dysphoric mood, hallucinations, self-injury, sleep disturbance and suicidal ideas. The patient is not nervous/anxious and is not hyperactive.         Past Medical History:   Diagnosis Date    Acid reflux     Acute diastolic (congestive) heart failure ([facility]) 06/19/2019    Arthritis     Asthma 04/16/2014    Asthmatic bronchitis , chronic ([facility]) 11/28/2016    CAD (coronary artery disease)     Chronic bronchitis ([facility]) 04/16/2014    COPD (chronic obstructive pulmonary disease) ([facility])     CB    COPD (chronic obstructive pulmonary disease) ([facility])     Diabetes mellitus ([facility])     Emphysema (subcutaneous) (surgical) resulting from a procedure     H/O cardiovascular stress test 04/24/2021    Lexiscan    Hyperlipidemia     Hypertension     Hypoxemia requiring supplemental oxygen 02/02/2015    LONG TERM ANTICOAGULENT USE     Morbid obesity with BMI of 50.0-59.9, adult ([facility] [address] 11/27/2013    Obesity     Osteoarthritis     Sleep apnea     bilevel positive airway pressure at 13/8 with 2 L oxygen flow     Stage 3 chronic kidney disease (HCC)     Tobacco abuse     Type II or unspecified type diabetes mellitus without mention of complication, not stated as uncontrolled        Social History     Socioeconomic History    Marital status:      Spouse name: Nay Hylton Number of children: 1    Years of education: Not on file    Highest education level: 11th grade   Occupational History    Not on file   Tobacco Use    Smoking status: Former Smoker     Packs/day: 1.00     Years: 45.00     Pack years: 45.00     Types: Cigarettes     Quit date: 2013     Years since quittin.7    Smokeless tobacco: Former User     Types: [address] date: 10/8/1971   Vaping Use    Vaping Use: Never used   Substance and Sexual Activity    Alcohol use: No     Alcohol/week: 0.0 standard drinks     Comment: 1-2 coffee per day    Drug use: No    Sexual activity: Yes     Partners: Female   Other Topics Concern    Not on file   Social History Narrative    19  [facility] reviewed SDOH with Tierney Burk. He does have money strain but between the income he has coming in and his wife's they are over resources for SARY programs. Offered food panty info to help with end of the month struggles but he declined stating that he tried and was refused due to his income. He belongs to a [employer] and goes weekly so he has some community connections in place. He has an extremely high interest rate on his mortgage which he was encouraged to look into getting lowered to help save money on his house payments. Noticed some difficulty with memory recall. Becomes easily flustered at times. Has no MHI to support applying for [facility] program of SARY. States he does not feel depressed or need any MH treatment.         Social Determinants of Health     Financial Resource Strain: Low Risk     Difficulty of Paying Living Expenses: Not hard at all   Food Insecurity: No Food Insecurity    Worried About Running Out of Food in the Last Year: Never true    Jennifer of Food in the Last Year: Never true   Transportation Needs:     Lack of Transportation (Medical): Not on file    Lack of Transportation (Non-Medical): Not on file   Physical Activity:     Days of Exercise per Week: Not on file    Minutes of Exercise per Session: Not on file   Stress:     Feeling of Stress : Not on file   Social Connections:     Frequency of Communication with Friends and Family: Not on file    Frequency of Social Gatherings with Friends and Family: Not on file    Attends Evangelical Services: Not on file    Active Member of [address] [place] [address] or Organizations: Not on file    Attends Club or Organization Meetings: Not on file    Marital Status: Not on file   Intimate Partner Violence:     Fear of Current or Ex-Partner: Not on file    Emotionally Abused: Not on file    Physically Abused: Not on file    Sexually Abused: Not on file   Housing Stability:     Unable to Pay for Housing in the Last Year: Not on file    Number of [place] in the Last Year: Not on file    Unstable Housing in the Last Year: Not on file       Family History   Problem Relation Age of Onset    Cancer Mother         breast    Cancer Father         stomach    Mental Illness Brother     Stroke Brother     Other Brother        Current Outpatient Medications on File Prior to Visit   Medication Sig Dispense Refill    amiodarone (CORDARONE) 200 MG tablet Take 1 tablet by mouth three times a week Take 1 tablet by mouth three times a week M-W-F 60 tablet 3    metOLazone (ZAROXOLYN) 2.5 MG tablet Take 1 tablet by mouth 2 times daily 30 tablet 0    gabapentin (NEURONTIN) 300 MG capsule Take 300 mg by mouth 3 times daily.       losartan (COZAAR) 25 MG tablet TAKE ONE TABLET BY MOUTH DAILY 90 tablet 5    insulin glargine (BASAGLAR KWIKPEN) 100 UNIT/ML injection pen Inject 45 Units into the skin 2 times daily 5 pen 3    insulin lispro, 1 Unit Dial, (HUMALOG KWIKPEN) 100 UNIT/ML SOPN Inject 0-18 Units into the skin 3 times daily (before meals) MAX 70 units daily 3 mL 3    metoprolol succinate (TOPROL XL) 25 MG extended release tablet Take 1 tablet by mouth 2 times daily 60 tablet 5    atorvastatin (LIPITOR) 80 MG tablet TAKE ONE TABLET BY MOUTH EVERY NIGHT 90 tablet 5    bumetanide (BUMEX) 1 MG tablet Take 1 tablet by mouth daily 30 tablet 5    amitriptyline (ELAVIL) 50 MG tablet Take 1 tablet by mouth nightly 30 tablet 5    traMADol (ULTRAM) 50 MG tablet Take 1 tablet by mouth every 6 hours as needed for Pain. Intended supply: 5 days. Take lowest dose possible to manage pain 120 tablet 3    pramipexole (MIRAPEX) 0.25 MG tablet TAKE TWO TABLETS BY MOUTH THREE TIMES A  tablet 5    Roflumilast (DALIRESP) 500 MCG tablet Take 1 tablet by mouth daily 30 tablet 5    blood glucose test strips (ACCU-CHEK GUIDE) strip Test twice daily 100 each 11    warfarin (COUMADIN) 5 MG tablet Take 1 tablet by mouth daily 30 tablet 3    levocetirizine (XYZAL) 5 MG tablet Take 5 mg by mouth nightly      spironolactone (ALDACTONE) 25 MG tablet TAKE ONE TABLET BY MOUTH TWO TIMES A  tablet 0    hydrOXYzine (VISTARIL) 25 MG capsule Take one 220 minutes prior to MRI 3 capsule 0    pantoprazole (PROTONIX) 40 MG tablet Take 40 mg by mouth daily       montelukast (SINGULAIR) 10 MG tablet Take 1 tablet by mouth nightly 30 tablet 4    CPAP Machine MISC 1 each by Does not apply route nightly as needed       aspirin 81 MG tablet Take 81 mg by mouth nightly        No current facility-administered medications on file prior to visit. Allergies   Allergen Reactions    Pcn [Penicillins]      Child went to hospital   ? Reaction  Patient tolerates cephalosporins    Sulfa Antibiotics      ? I have reviewed his allergies, medications, problem list, medical,social and family history and have updated as needed in the electronic medical record. Objective:     Physical Exam  Vitals and nursing note reviewed. Constitutional:       General: He is not in acute distress.      Appearance: He is well-developed. He is not diaphoretic. HENT:      Head: Normocephalic and atraumatic. Right Ear: External ear normal.      Left Ear: External ear normal.      Nose: Congestion and rhinorrhea present. Mouth/Throat:      Pharynx: No oropharyngeal exudate. Eyes:      General: No scleral icterus. Right eye: No discharge. Left eye: No discharge. Conjunctiva/sclera: Conjunctivae normal.      Pupils: Pupils are equal, round, and reactive to light. Neck:      Thyroid: No thyromegaly. Vascular: No JVD. Trachea: No tracheal deviation. Cardiovascular:      Rate and Rhythm: Normal rate and regular rhythm. Heart sounds: Normal heart sounds. No murmur heard. No friction rub. No gallop. Pulmonary:      Effort: Pulmonary effort is normal. No respiratory distress. Breath sounds: No stridor. Wheezing present. No rales. Chest:      Chest wall: No tenderness. Abdominal:      General: Bowel sounds are normal. There is no distension. Palpations: Abdomen is soft. There is no mass. Tenderness: There is no abdominal tenderness. There is no guarding or rebound. Musculoskeletal:         General: No tenderness. Normal range of motion. Cervical back: Normal range of motion and neck supple. Lymphadenopathy:      Cervical: No cervical adenopathy. Skin:     General: Skin is warm and dry. Coloration: Skin is not pale. Findings: No erythema or rash. Neurological:      Mental Status: He is alert and oriented to person, place, and time. Cranial Nerves: No cranial nerve deficit. Motor: No abnormal muscle tone. Coordination: Coordination normal.      Deep Tendon Reflexes: Reflexes are normal and symmetric. Reflexes normal.   Psychiatric:         Behavior: Behavior normal.         Thought Content:  Thought content normal.         Judgment: Judgment normal.         Assessment / Plan:   Mike Dominguez was seen today for 1 month follow-up. Diagnoses and all orders for this visit:    Chronic cough  -     azelastine (ASTELIN) 0.1 % nasal spray; 2 sprays by Nasal route 2 times daily Use in each nostril as directed  -     brompheniramine-pseudoephedrine-DM (BROMFED DM) 2-30-10 MG/5ML syrup; Take 5 mLs by mouth 4 times daily as needed for Congestion or Cough  -     guaiFENesin 400 MG tablet; Take 1 tablet by mouth 4 times daily as needed for Cough    Acute non-recurrent maxillary sinusitis  -     azelastine (ASTELIN) 0.1 % nasal spray; 2 sprays by Nasal route 2 times daily Use in each nostril as directed  -     brompheniramine-pseudoephedrine-DM (BROMFED DM) 2-30-10 MG/5ML syrup; Take 5 mLs by mouth 4 times daily as needed for Congestion or Cough  -     guaiFENesin 400 MG tablet; Take 1 tablet by mouth 4 times daily as needed for Cough    Post-nasal drip  -     azelastine (ASTELIN) 0.1 % nasal spray; 2 sprays by Nasal route 2 times daily Use in each nostril as directed  -     brompheniramine-pseudoephedrine-DM (BROMFED DM) 2-30-10 MG/5ML syrup; Take 5 mLs by mouth 4 times daily as needed for Congestion or Cough  -     guaiFENesin 400 MG tablet; Take 1 tablet by mouth 4 times daily as needed for Cough    Seasonal allergic rhinitis due to pollen  -     azelastine (ASTELIN) 0.1 % nasal spray; 2 sprays by Nasal route 2 times daily Use in each nostril as directed  -     brompheniramine-pseudoephedrine-DM (BROMFED DM) 2-30-10 MG/5ML syrup; Take 5 mLs by mouth 4 times daily as needed for Congestion or Cough        Reviewed healthmaintenance report. Patient is aware of deficiencies and suggested preventative tests.

## 2022-05-08 ASSESSMENT — ENCOUNTER SYMPTOMS
EYE REDNESS: 0
ABDOMINAL DISTENTION: 0
RECTAL PAIN: 0
STRIDOR: 0
EYE ITCHING: 0
SHORTNESS OF BREATH: 0
RHINORRHEA: 1
SORE THROAT: 0
WHEEZING: 1
EYE DISCHARGE: 0
CHEST TIGHTNESS: 0
FACIAL SWELLING: 0
NAUSEA: 0
CONSTIPATION: 0
VOICE CHANGE: 0
PHOTOPHOBIA: 0
COUGH: 1
VOMITING: 0
SINUS PRESSURE: 0
COLOR CHANGE: 0
APNEA: 0
ANAL BLEEDING: 0
ABDOMINAL PAIN: 0
CHOKING: 0
DIARRHEA: 0
BLOOD IN STOOL: 0
TROUBLE SWALLOWING: 0
EYE PAIN: 0
BACK PAIN: 0

## 2022-05-12 DIAGNOSIS — Z79.4 TYPE 2 DIABETES MELLITUS WITH HYPERGLYCEMIA, WITH LONG-TERM CURRENT USE OF INSULIN (HCC): ICD-10-CM

## 2022-05-12 DIAGNOSIS — E11.65 TYPE 2 DIABETES MELLITUS WITH HYPERGLYCEMIA, WITH LONG-TERM CURRENT USE OF INSULIN (HCC): ICD-10-CM

## 2022-05-12 DIAGNOSIS — R53.82 CHRONIC FATIGUE: ICD-10-CM

## 2022-05-12 DIAGNOSIS — J30.1 SEASONAL ALLERGIC RHINITIS DUE TO POLLEN: ICD-10-CM

## 2022-05-12 DIAGNOSIS — J41.0 SIMPLE CHRONIC BRONCHITIS (HCC): ICD-10-CM

## 2022-05-12 DIAGNOSIS — E03.9 ACQUIRED HYPOTHYROIDISM: ICD-10-CM

## 2022-05-12 DIAGNOSIS — I50.32 CHRONIC DIASTOLIC HEART FAILURE (HCC): ICD-10-CM

## 2022-05-12 LAB
ALBUMIN SERPL-MCNC: 4.6 G/DL (ref 3.5–5.2)
ALP BLD-CCNC: 152 U/L (ref 40–129)
ALT SERPL-CCNC: 22 U/L (ref 0–40)
ANION GAP SERPL CALCULATED.3IONS-SCNC: 8 MMOL/L (ref 7–16)
AST SERPL-CCNC: 22 U/L (ref 0–39)
BILIRUB SERPL-MCNC: 1.2 MG/DL (ref 0–1.2)
BUN BLDV-MCNC: 35 MG/DL (ref 6–23)
CALCIUM SERPL-MCNC: 10.3 MG/DL (ref 8.6–10.2)
CHLORIDE BLD-SCNC: 98 MMOL/L (ref 98–107)
CO2: 34 MMOL/L (ref 22–29)
CREAT SERPL-MCNC: 1.3 MG/DL (ref 0.7–1.2)
GFR AFRICAN AMERICAN: >60
GFR NON-AFRICAN AMERICAN: 55 ML/MIN/1.73
GLUCOSE BLD-MCNC: 262 MG/DL (ref 74–99)
HBA1C MFR BLD: 8.8 % (ref 4–5.6)
HCT VFR BLD CALC: 42.4 % (ref 37–54)
HEMOGLOBIN: 13.7 G/DL (ref 12.5–16.5)
MCH RBC QN AUTO: 33 PG (ref 26–35)
MCHC RBC AUTO-ENTMCNC: 32.3 % (ref 32–34.5)
MCV RBC AUTO: 102.2 FL (ref 80–99.9)
PDW BLD-RTO: 14.6 FL (ref 11.5–15)
PLATELET # BLD: 154 E9/L (ref 130–450)
PMV BLD AUTO: 10.5 FL (ref 7–12)
POTASSIUM SERPL-SCNC: 4.5 MMOL/L (ref 3.5–5)
PRO-BNP: 1573 PG/ML (ref 0–125)
RBC # BLD: 4.15 E12/L (ref 3.8–5.8)
SODIUM BLD-SCNC: 140 MMOL/L (ref 132–146)
TOTAL PROTEIN: 8.2 G/DL (ref 6.4–8.3)
TSH SERPL DL<=0.05 MIU/L-ACNC: 25.91 UIU/ML (ref 0.27–4.2)
WBC # BLD: 5.9 E9/L (ref 4.5–11.5)

## 2022-05-12 RX ORDER — MONTELUKAST SODIUM 10 MG/1
TABLET ORAL
Qty: 30 TABLET | Refills: 0 | OUTPATIENT
Start: 2022-05-12

## 2022-05-26 ENCOUNTER — OFFICE VISIT (OUTPATIENT)
Dept: SLEEP CENTER | Age: 67
End: 2022-05-26
Payer: MEDICARE

## 2022-05-26 VITALS
WEIGHT: 270.6 LBS | BODY MASS INDEX: 43.49 KG/M2 | HEART RATE: 56 BPM | HEIGHT: 66 IN | OXYGEN SATURATION: 97 % | RESPIRATION RATE: 20 BRPM | SYSTOLIC BLOOD PRESSURE: 107 MMHG | DIASTOLIC BLOOD PRESSURE: 65 MMHG

## 2022-05-26 DIAGNOSIS — G47.33 OSA (OBSTRUCTIVE SLEEP APNEA): Primary | ICD-10-CM

## 2022-05-26 DIAGNOSIS — E66.9 OBESITY, UNSPECIFIED CLASSIFICATION, UNSPECIFIED OBESITY TYPE, UNSPECIFIED WHETHER SERIOUS COMORBIDITY PRESENT: ICD-10-CM

## 2022-05-26 DIAGNOSIS — G25.81 RLS (RESTLESS LEGS SYNDROME): ICD-10-CM

## 2022-05-26 PROCEDURE — 99204 OFFICE O/P NEW MOD 45 MIN: CPT | Performed by: STUDENT IN AN ORGANIZED HEALTH CARE EDUCATION/TRAINING PROGRAM

## 2022-05-26 ASSESSMENT — SLEEP AND FATIGUE QUESTIONNAIRES
HOW LIKELY ARE YOU TO NOD OFF OR FALL ASLEEP IN A CAR, WHILE STOPPED FOR A FEW MINUTES IN TRAFFIC: 0
HOW LIKELY ARE YOU TO NOD OFF OR FALL ASLEEP WHILE SITTING AND READING: 0
HOW LIKELY ARE YOU TO NOD OFF OR FALL ASLEEP WHEN YOU ARE A PASSENGER IN A CAR FOR AN HOUR WITHOUT A BREAK: 3
HOW LIKELY ARE YOU TO NOD OFF OR FALL ASLEEP WHILE SITTING AND TALKING TO SOMEONE: 3
HOW LIKELY ARE YOU TO NOD OFF OR FALL ASLEEP WHILE SITTING QUIETLY AFTER LUNCH WITHOUT ALCOHOL: 3
HOW LIKELY ARE YOU TO NOD OFF OR FALL ASLEEP WHILE WATCHING TV: 2
ESS TOTAL SCORE: 14
HOW LIKELY ARE YOU TO NOD OFF OR FALL ASLEEP WHILE SITTING INACTIVE IN A PUBLIC PLACE: 0
HOW LIKELY ARE YOU TO NOD OFF OR FALL ASLEEP WHILE LYING DOWN TO REST IN THE AFTERNOON WHEN CIRCUMSTANCES PERMIT: 3

## 2022-05-26 NOTE — PROGRESS NOTES
Reported compliance:  last used 6-8 months        Sleep Study Results  No specialty comments available. Bed time:   9-930p  Wake time:   3a-noon  Sleep Latency (min): 10-15    Sleep Medications: None  Awakenings:  3+    / bathroom  / falls back asleep with difficulty  Estimated Sleep time (hours): 3-4   Daytime Naps: daily for 30  Sleep disturbances: arthritis pain  Sleep Location: Bed  Sleep environment: Sleeps alone  Occupation: Retired -     SLEEP HYGIENE     Activities before bed: TV  Caffeine:  1-1.5   Coffee    0   Soda    0   Tea  Alcohol: rare   Tobacco: none     Sleep ROS:     Snoring: Y   Witnessed apneas: unknown  AM Headache: N  Dry Mouth: Y  Daytime Sleepiness: Y  Difficulty remembering things: sometimes  MVA  or near miss accident due to sleepiness in the past? N  Tonsillectomy? Y  Have you lost or gained weight recently? stable       PARASOMNIAS/ NARCOLEPSY:  Hypnogogic/Hynopompic Hallucinations: N   Sleep paralysis: N  Cataplexy: N   REM behavior symptoms: N  Nightmare: N   Sleep Walking: N  Sleep Talking: N  RLS Symptoms: ache in legs prior to sleep.  Mirapex helps            PMH:  Past Medical History:   Diagnosis Date    Acid reflux     Acute diastolic (congestive) heart failure (HCC) 06/19/2019    Arthritis     Asthma 04/16/2014    Asthmatic bronchitis , chronic (HCC) 11/28/2016    CAD (coronary artery disease)     Chronic bronchitis (HCC) 04/16/2014    COPD (chronic obstructive pulmonary disease) ([facility])     CB    COPD (chronic obstructive pulmonary disease) ([facility])     Diabetes mellitus ([facility])     Emphysema (subcutaneous) (surgical) resulting from a procedure     H/O cardiovascular stress test 04/24/2021    Lexiscan    Hyperlipidemia     Hypertension     Hypoxemia requiring supplemental oxygen 02/02/2015    LONG TERM ANTICOAGULENT USE     Morbid obesity with BMI of 50.0-59.9, adult ([facility] [address] 11/27/2013    Obesity     Osteoarthritis     Sleep apnea     bilevel positive airway pressure at 13/8 with 2 L oxygen flow     Stage 3 chronic kidney disease (HCC)     Tobacco abuse     Type II or unspecified type diabetes mellitus without mention of complication, not stated as uncontrolled         PSH:  Past Surgical History:   Procedure Laterality Date    COLONOSCOPY      CORONARY ANGIOPLASTY WITH STENT PLACEMENT  13    Left main focal eccentric 65% distal stenosis. Large OM1 CX 50% ostial & prox narrow. Large ramus artery & LAD: Minor plaque w/o sign narrow. RCA: Dom vessel w/25% prox narrow & focal hazy eccentric 99% distal stenosis before origin RPDA & RPLCA. LV: Mild inferior hypokinesis EF 55%. Probable mild AS with 10-15mmHg. Successful PCI distal RCA w/3.5 x 12 mm BMS, 0% res stenosis.  Normal distal runoff    CORONARY ARTERY BYPASS GRAFT  13    Dr Carolin Lennox; CABG x2, LIMA to LAD, SVG to ramus intermedius; MV repair using 30-mm Future complete ring with magic stitch between A3 and P3; rigid internal fixation of sternum using KLS plates x2; endoscopic vein harvesting of right lower extremity     ECHO COMPL W DOP COLOR FLOW  2013         HERNIA REPAIR      SINUS SURGERY            Soc Hx:  Social History     Tobacco Use    Smoking status: Former Smoker     Packs/day: 1.00     Years: 45.00     Pack years: 45.00     Types: Cigarettes     Quit date: 2013     Years since quittin.8    Smokeless tobacco: Former User     Types: Chew     Quit date: 10/8/1971   Vaping Use    Vaping Use: Never used   Substance Use Topics    Alcohol use: No     Alcohol/week: 0.0 standard drinks     Comment: 1-2 coffee per day    Drug use: No        Fam Hx:  Family History   Problem Relation Age of Onset    Cancer Mother         breast    Cancer Father         stomach    Mental Illness Brother     Stroke Brother     Other Brother         Current Outpatient Medications   Medication Sig Dispense Refill    azelastine (ASTELIN) 0.1 % nasal spray 2 sprays by Nasal route 2 times daily Use in each nostril as directed 120 mL 1    amiodarone (CORDARONE) 200 MG tablet Take 1 tablet by mouth three times a week Take 1 tablet by mouth three times a week M-W-F 60 tablet 3    metOLazone (ZAROXOLYN) 2.5 MG tablet Take 1 tablet by mouth 2 times daily 30 tablet 0    gabapentin (NEURONTIN) 300 MG capsule Take 300 mg by mouth 3 times daily.  losartan (COZAAR) 25 MG tablet TAKE ONE TABLET BY MOUTH DAILY 90 tablet 5    insulin glargine (BASAGLAR KWIKPEN) 100 UNIT/ML injection pen Inject 45 Units into the skin 2 times daily 5 pen 3    insulin lispro, 1 Unit Dial, (HUMALOG KWIKPEN) 100 UNIT/ML SOPN Inject 0-18 Units into the skin 3 times daily (before meals) MAX 70 units daily 3 mL 3    metoprolol succinate (TOPROL XL) 25 MG extended release tablet Take 1 tablet by mouth 2 times daily 60 tablet 5    atorvastatin (LIPITOR) 80 MG tablet TAKE ONE TABLET BY MOUTH EVERY NIGHT 90 tablet 5    bumetanide (BUMEX) 1 MG tablet Take 1 tablet by mouth daily 30 tablet 5    amitriptyline (ELAVIL) 50 MG tablet Take 1 tablet by mouth nightly 30 tablet 5    traMADol (ULTRAM) 50 MG tablet Take 1 tablet by mouth every 6 hours as needed for Pain. Intended supply: 5 days.  Take lowest dose possible to manage pain 120 tablet 3    pramipexole (MIRAPEX) 0.25 MG tablet TAKE TWO TABLETS BY MOUTH THREE TIMES A  tablet 5    Roflumilast (DALIRESP) 500 MCG tablet Take 1 tablet by mouth daily 30 tablet 5    blood glucose test strips (ACCU-CHEK GUIDE) strip Test twice daily 100 each 11    warfarin (COUMADIN) 5 MG tablet Take 1 tablet by mouth daily 30 tablet 3    levocetirizine (XYZAL) 5 MG tablet Take 5 mg by mouth nightly      spironolactone (ALDACTONE) 25 MG tablet TAKE ONE TABLET BY MOUTH TWO TIMES A  tablet 0    hydrOXYzine (VISTARIL) 25 MG capsule Take one 220 minutes prior to MRI 3 capsule 0    pantoprazole (PROTONIX) 40 MG tablet Take 40 mg by mouth daily       CPAP Machine MISC 1 each by Does not apply route nightly as needed       aspirin 81 MG tablet Take 81 mg by mouth nightly        No current facility-administered medications for this visit. Review of Systems  Constitutional: no chills, no fever and no night sweats. Eyes: no blurred vision and no eyesight problems. ENT: no hearing loss, no nasal congestion, no nasal discharge, no hoarseness and no sore throat. Cardiovascular: no chest pain, no lower extremity edema. Respiratory: no cough, no shortness of breath at rest and no wheezing. Gastrointestinal: no abdominal pain, no nausea and no vomiting. Musculoskeletal: no arthralgias, no back pain and no myalgias. Integumentary: no rashes. Neurological: no difficulty walking, no diplopia, no dizziness, no dysarthria, no facial weakness, no headache, no limb weakness, no memory changes, no numbness, no speech difficulties and no tingling. Endocrine: no changes in appetite, no feelings of weakness and no recent abnormal weight gain/loss. Hematologic/Lymphatic: no tendency for easy bruising or bleeding      Objective:   Physical Exam  /65 (Site: Left Upper Arm, Position: Sitting, Cuff Size: Large Adult)   Pulse 56   Resp 20   Ht 5' 6\" (1.676 m)   Wt 270 lb 9.6 oz (122.7 kg)   SpO2 97%   BMI 43.68 kg/m²             General: No acute distress. BMI Body mass index is 43.68 kg/m². HEENT: Normocephalic, atraumatic. No oropharyngeal lesions. Additional Measurements    05/26/22 1425   Neck circumference (Inches): 19        Mallampati class- 3  Tonsils- 1     Neck: Trachea midline  Lungs: Clear to auscultation bilaterally. No wheezes or crackles  Cardiac: Regular rate and rhythm. No murmurs appreciated. Neurologic: Normal gait. Balance intact. Psychiatric: Alert and oriented. Appropriate affect.  Denies SI/HI  Extremities: No clubbing or no peripheral edema  Skin: No lesions or rashes  Hematologic/lymphatic/immunologic: No bruises or bleeding    Sleep Medicine 5/26/2022 7/9/2019   Sitting and reading 0 -   Watching TV 2 -   Sitting, inactive in a public place (e.g. a theatre or a meeting) 0 -   As a passenger in a car for an hour without a break 3 -   Lying down to rest in the afternoon when circumstances permit 3 -   Sitting and talking to someone 3 -   Sitting quietly after a lunch without alcohol 3 -   In a car, while stopped for a few minutes in traffic 0 -   Total score 14 -   Neck circumference (Inches) 19 19.5       PROCEDURE HISTORY      No specialty comments available. PERTINENT LAB RESULTS  No results found for: FERRITIN       Assessment:      Katalina Colón was seen today for sleep apnea. Diagnoses and all orders for this visit:    LISS (obstructive sleep apnea)  -     DME Order for CPAP as OP    Obesity, unspecified classification, unspecified obesity type, unspecified whether serious comorbidity present  -     Ambulatory Referral to Bariatric Surgery    RLS (restless legs syndrome)    ·    Plan:      1. LISS  - Continue current PAP settings. Patient is agreeable to retrying his CPAP. -Patient advised to bring his CPAP to his DME, so I data download can be received. -He was advised to check the brand of his device and call our office. He was advised about the Respironics recall.  -Patient was offered referral to pulmonology for further pulmonary care and difficulty breathing during the day after using CPAP. Patient declined and will follow with his primary care provider. 2. RLS   -Improved on medication  -Currently on Pramipexole for RLS as prescribed by his primary care provider  -Education provided in handout    3. Obesity. Body mass index is 43.68 kg/m². -Referral to medical weight loss  -Healthy weight loss advised      Patient will follow up Return in about 3 months (around 8/26/2022) for Follow up LISS.     Tania Hagan, [address] [place] [address] Rd -[phone] option 2  F- [phone]

## 2022-05-26 NOTE — PATIENT INSTRUCTIONS
Restless Leg Syndrome     Avoid  - Caffeinated beverages  - Alcohol  - Tobacco     Consider  -Massage  -Excercise  -Stretching  -Warm Baths  -A Relaxis Pad (Non-Pharmaceutical treatment for RLS)        Try  -Maintaining a good sleep schedule and good sleep hygiene  -Massaging the area of discomfort     Learn More at:  Russ           It was a pleasure seeing you today Kailey Moore! Summary of Today's Visit            Please call our sleep nurses to schedule a follow up at - [phone] option 2              1. Continue using your machine nightly     ------------Please bring your machine/Data Card to every visit. -------------     -Request all data downloads be sent to my nurse        2. Contact your DME company about supplies or issues with your machine. As a general guideline, please replace your:  -CPAP mask every 3-6 months  -CPAP hose  every 3-6 months  -Filter every 2-4 weeks  -CPAP/BiPAP/ASV replacements every 5-7+ years     3. Continue healthy weight loss/stabilization, as this is recommended as a long-term intervention. 4. Please do not drive or operate machinery when you feel fatigued/sleepy/drowsy      5. Please try to sleep between 6-8 hours nightly with the CPAP mask    6. Please avoid sedatives, alcohol and caffeinated drinks at/near bed time. 7. Please call your supply (DME) company with any issues with your PAP device. Please call our office with any issues pertaining to the therapy.     ----Please note that complications of LISS if not treated can increase risk for: systemic hypertension, pulmonary hypertension, cardiovascular morbidities (heart attack and stroke), car accidents and all cause mortality.            For general questions or scheduling issues, call my nurse at [phone] option [id number] [facility] [phone] option 418 [place] ----------------------------------------------------------  Common Issues and Solutions     Pillow is dislodging mask - Consider getting a CPAP pillow or switching to a mask with the hose at the top. Dry Mouth - Adjust Humidity and/or try Biotene Gel. (Excessive leak can also cause this)     Leak - Please call your equipment provider  as they may need to adjust your mask. Difficulty tolerating the PAP mask - Contact your equipment company to try a different mask, we can order a \"mini sleep study\"with the sleep tech to try different masks/settings of pressure, also we have a sleep psychologist to help with anxiety related to wearing the PAP mask      ----------------------------------------------------------     For Questions and Concerns: In case of difficulty with your PAP device or mask interface, please FIRST contact your [address] (The company who provides you the CPAP/BiPAP/ASV machine/supplies). If you need the number for any other DME company, please call my Nurse at [phone] option 2     For questions concerning your [place] appointment: Call [phone] option 2  SLEEP LAB SCHEDULING:        ----------------------------------------------------------     Helpful Web Sites: For patients with ALL SLEEP DISORDERS: American Academy of Sleep Medicine http://sleepeducation. org; or [employer]: https://sleepfoundation. org  For patients with LISS: American Sleep Apnea Association: http://cevallos.org/. org  For patients with RLS: RLS Foundation: Russ  For patients with INSOMNIA: https://www.mcdaniel.org/. org/articles/sleep/insomnia-causes-and-cures. htm  For patients with DEPRESSION: ResumeQuery.com.EPS. com/depression            Learning About CPAP for Sleep Apnea  What is CPAP? CPAP/Bi-PAP is a small machine that you use at home every night while you sleep. It increases air pressure in your throat to keep your airway open.  When you have sleep apnea, this can help you sleep better so you feel much better. CPAP stands for \"continuous positive airway pressure. \" Bi-PAP is a different PAP setting that allows for different pressures when you breathe in and out. The CPAP/Bi-PAP machine will have one of the following:  · A mask that covers your nose and mouth  · Prongs that fit into your nose  · A mask that covers your nose only, the most common type. This type is called NCPAP. The N stands for \"nasal.\"  Why is it done? CPAP is a common/effective treatment for obstructive sleep apnea. How does it help? · CPAP can help you have more normal sleep, so you feel less sleepy and more alert during the daytime through the treatment of sleep apnea. · CPAP may help keep heart failure or other heart problems from getting worse. · CPAP may help lower your blood pressure. · If you use CPAP, your bed partner may also sleep better because you are snoring less. What are the side effects? Some people who use CPAP have:  · A dry or stuffy nose and a sore throat. · Irritated skin on the face. · Sore eyes. · Bloating. If you have any of these problems, work with your doctor to fix them. Here are some things you can try:  · Be sure the mask or nasal prongs fit well. · See if your doctor can adjust the pressure of your CPAP. · If your nose is dry, try a humidifier. If these things do not help, you might try a different type of machine. Some machines have air pressure that adjusts on its own. Others have air pressures that are different when you breathe in than when you breathe out. This may reduce discomfort caused by too much pressure in your nose. Where can you learn more? Go to https://TuneenergyjesseniaTelinet.MyScreen. org and sign in to your Wetradetogether account. Enter U001 in the Dustcloud box to learn more about \"Learning About CPAP for Sleep Apnea. \"     If you do not have an account, please click on the \"Sign Up Now\" link.   Current as of: February 24, 2020               Content Version: 12.5  © 2006-2020 Healthwise, Incorporated. Care instructions adapted under license by [facility] ([facility]). If you have questions about a medical condition or this instruction, always ask your healthcare professional. [address] any warranty or liability for your use of this information. The general categories for the treatment of Sleep Apnea (including benefits and potential side effects) include:     1. Supportive Care  -Elevate the head of the bed   -Avoid sleeping on your back      2. Weight loss  -Start a healthy weight loss program  -Consider a referral to Weight Loss Medicine Clinic  -Benefits: Multiple health benefits  -Side Effects: Depends on treatment type     3. Oral Appliance \"Mouth Piece\"  (Mandibular Advancement Device)  -Benefits: CPAP alternative. Decreased snoring, improved sleep quality, decreased sleepiness.  -Side Effects: teeth crowding, oral/dental pain. Regular visits with dentist are advised to watch for side effects. 4. Positive pressure therapy with a machine and a mask (CPAP or BiPAP)  Benefits: Decreased snoring, improved sleep quality, decreased sleepiness. Risks: Aerophagia (swallowing air), dry nose/nose bleeds, dry eyes (due to leak), skin sores/rash (due to mask)     5. Different kinds of surgeries, including nasal surgery, surgery of the throat/windpipe, and nerve stimulation therapy (INSPIRE). Benefits: CPAP alternative. Decreased snoring, improved sleep quality, decreased sleepiness.   Risks: Bleeding, infection, nerve damage

## 2022-06-02 ENCOUNTER — TELEPHONE (OUTPATIENT)
Dept: BARIATRICS/WEIGHT MGMT | Age: 67
End: 2022-06-02

## 2022-06-02 NOTE — TELEPHONE ENCOUNTER
I called patient,patient refused to make appt at this time, phone number given if patient decides to schedule medical weight loss appt.

## 2022-06-04 DIAGNOSIS — J41.0 SIMPLE CHRONIC BRONCHITIS (HCC): ICD-10-CM

## 2022-06-04 DIAGNOSIS — J30.1 SEASONAL ALLERGIC RHINITIS DUE TO POLLEN: ICD-10-CM

## 2022-06-06 DIAGNOSIS — J30.1 SEASONAL ALLERGIC RHINITIS DUE TO POLLEN: ICD-10-CM

## 2022-06-06 DIAGNOSIS — I25.10 CORONARY ARTERY DISEASE INVOLVING NATIVE CORONARY ARTERY OF NATIVE HEART WITHOUT ANGINA PECTORIS: ICD-10-CM

## 2022-06-06 RX ORDER — MONTELUKAST SODIUM 10 MG/1
TABLET ORAL
Qty: 30 TABLET | Refills: 0 | OUTPATIENT
Start: 2022-06-06

## 2022-06-06 RX ORDER — MONTELUKAST SODIUM 10 MG/1
10 TABLET ORAL NIGHTLY
Qty: 30 TABLET | Refills: 4 | Status: SHIPPED | OUTPATIENT
Start: 2022-06-06

## 2022-06-06 RX ORDER — WARFARIN SODIUM 5 MG/1
TABLET ORAL
Qty: 180 TABLET | Refills: 5 | Status: ON HOLD
Start: 2022-06-06 | End: 2022-09-20 | Stop reason: SDUPTHER

## 2022-06-10 NOTE — TELEPHONE ENCOUNTER
Patient requesting the BD pen needles so the insurance will pay for them. Pt states he is using 4 daily. Please advise.     Last seen 5/3/2022  Next appt 6/14/2022    BESSIE/Tadeo

## 2022-06-14 ENCOUNTER — OFFICE VISIT (OUTPATIENT)
Dept: FAMILY MEDICINE CLINIC | Age: 67
End: 2022-06-14
Payer: MEDICARE

## 2022-06-14 VITALS
RESPIRATION RATE: 18 BRPM | HEART RATE: 56 BPM | OXYGEN SATURATION: 95 % | SYSTOLIC BLOOD PRESSURE: 120 MMHG | DIASTOLIC BLOOD PRESSURE: 80 MMHG | HEIGHT: 66 IN | BODY MASS INDEX: 45.16 KG/M2 | WEIGHT: 281 LBS

## 2022-06-14 DIAGNOSIS — I25.5 ISCHEMIC CARDIOMYOPATHY: ICD-10-CM

## 2022-06-14 DIAGNOSIS — R05.3 CHRONIC COUGH: ICD-10-CM

## 2022-06-14 DIAGNOSIS — Z12.5 SCREENING FOR MALIGNANT NEOPLASM OF PROSTATE: ICD-10-CM

## 2022-06-14 DIAGNOSIS — E11.65 TYPE 2 DIABETES MELLITUS WITH HYPERGLYCEMIA, WITH LONG-TERM CURRENT USE OF INSULIN (HCC): Primary | ICD-10-CM

## 2022-06-14 DIAGNOSIS — R91.8 PULMONARY INFILTRATES: ICD-10-CM

## 2022-06-14 DIAGNOSIS — Z79.4 TYPE 2 DIABETES MELLITUS WITH HYPERGLYCEMIA, WITH LONG-TERM CURRENT USE OF INSULIN (HCC): Primary | ICD-10-CM

## 2022-06-14 DIAGNOSIS — E78.2 MIXED HYPERLIPIDEMIA: ICD-10-CM

## 2022-06-14 DIAGNOSIS — I25.10 CORONARY ARTERY DISEASE INVOLVING NATIVE CORONARY ARTERY OF NATIVE HEART WITHOUT ANGINA PECTORIS: ICD-10-CM

## 2022-06-14 DIAGNOSIS — E03.9 ACQUIRED HYPOTHYROIDISM: ICD-10-CM

## 2022-06-14 PROCEDURE — 1123F ACP DISCUSS/DSCN MKR DOCD: CPT | Performed by: FAMILY MEDICINE

## 2022-06-14 PROCEDURE — 3052F HG A1C>EQUAL 8.0%<EQUAL 9.0%: CPT | Performed by: FAMILY MEDICINE

## 2022-06-14 PROCEDURE — 99213 OFFICE O/P EST LOW 20 MIN: CPT | Performed by: FAMILY MEDICINE

## 2022-06-14 RX ORDER — BENZONATATE 200 MG/1
200 CAPSULE ORAL 3 TIMES DAILY PRN
Qty: 30 CAPSULE | Refills: 0 | Status: SHIPPED | OUTPATIENT
Start: 2022-06-14 | End: 2022-06-21

## 2022-06-14 ASSESSMENT — LIFESTYLE VARIABLES: HOW OFTEN DO YOU HAVE A DRINK CONTAINING ALCOHOL: NEVER

## 2022-06-14 ASSESSMENT — PATIENT HEALTH QUESTIONNAIRE - PHQ9
SUM OF ALL RESPONSES TO PHQ QUESTIONS 1-9: 0
2. FEELING DOWN, DEPRESSED OR HOPELESS: 0
SUM OF ALL RESPONSES TO PHQ QUESTIONS 1-9: 0
SUM OF ALL RESPONSES TO PHQ QUESTIONS 1-9: 0
SUM OF ALL RESPONSES TO PHQ9 QUESTIONS 1 & 2: 0
SUM OF ALL RESPONSES TO PHQ QUESTIONS 1-9: 0
1. LITTLE INTEREST OR PLEASURE IN DOING THINGS: 0

## 2022-06-14 NOTE — PROGRESS NOTES
Jorge Raymundo is a 77 y.o. male  . Subjective:      Has gained some weight back again. We will have him follow-up with cardiology especially since his BNP was elevated. No orthopnea or shortness of breath patient was called by pulmonology but did not make appointment. Wants number for pulmonolgy. Blood work was discussed. Kidney numbers have improved patient following up with nephrology. He will has chronic cough states the Tessalon Perles really help. This 1 the reasons we wanted him to see pulmonology. Patient follow-up with cardiology again quicker. We will have obtained CT of chest is recommended as a follow-up although the last CT looked pretty benign. Ellen diet and exercise. Discussed going back to his former diet where he lost weight. Okay taking occasional tramadol doing well on this. Patient is aware of habit forming nature of medication. Patient is to never drink alcohol. Patient is to never work or drive on medication. Patient understands this and assumes all risk. Review of Systems   Constitutional: Positive for fatigue. Negative for activity change, appetite change, chills, diaphoresis, fever and unexpected weight change. HENT: Negative for congestion, dental problem, drooling, ear discharge, ear pain, facial swelling, hearing loss, mouth sores, nosebleeds, postnasal drip, rhinorrhea, sinus pressure, sneezing, sore throat, tinnitus, trouble swallowing and voice change. Eyes: Negative for photophobia, pain, discharge, redness, itching and visual disturbance. Respiratory: Negative for apnea, cough, choking, chest tightness, shortness of breath, wheezing and stridor. Cardiovascular: Negative for chest pain, palpitations and leg swelling. Gastrointestinal: Negative for abdominal distention, abdominal pain, anal bleeding, blood in stool, constipation, diarrhea, nausea, rectal pain and vomiting.    Endocrine: Negative for cold intolerance, heat intolerance, polydipsia, polyphagia and polyuria. Genitourinary: Negative for decreased urine volume, difficulty urinating, dysuria, enuresis, flank pain, frequency, genital sores, hematuria, penile discharge, penile pain, penile swelling, scrotal swelling, testicular pain and urgency. Musculoskeletal: Positive for arthralgias and back pain. Negative for gait problem, joint swelling, myalgias, neck pain and neck stiffness. Skin: Negative for color change, pallor, rash and wound. Allergic/Immunologic: Negative for environmental allergies, food allergies and immunocompromised state. Neurological: Negative for dizziness, tremors, seizures, syncope, facial asymmetry, speech difficulty, weakness, light-headedness, numbness and headaches. Hematological: Negative for adenopathy. Does not bruise/bleed easily. Psychiatric/Behavioral: Negative for agitation, behavioral problems, confusion, decreased concentration, dysphoric mood, hallucinations, self-injury, sleep disturbance and suicidal ideas. The patient is not nervous/anxious and is not hyperactive.         Past Medical History:   Diagnosis Date    Acid reflux     Acute diastolic (congestive) heart failure ([facility]) 06/19/2019    Arthritis     Asthma 04/16/2014    Asthmatic bronchitis , chronic (HCC) 11/28/2016    CAD (coronary artery disease)     Chronic bronchitis (HCC) 04/16/2014    COPD (chronic obstructive pulmonary disease) ([facility])     CB    COPD (chronic obstructive pulmonary disease) ([facility])     Diabetes mellitus ([facility])     Emphysema (subcutaneous) (surgical) resulting from a procedure     H/O cardiovascular stress test 04/24/2021    Lexiscan    Hyperlipidemia     Hypertension     Hypoxemia requiring supplemental oxygen 02/02/2015    LONG TERM ANTICOAGULENT USE     Morbid obesity with BMI of 50.0-59.9, adult ([facility] [address] 11/27/2013    Obesity     Osteoarthritis     Sleep apnea     bilevel positive airway pressure at 13/8 with 2 L oxygen flow     Stage 3 chronic kidney disease ([facility] [address]  Tobacco abuse     Type II or unspecified type diabetes mellitus without mention of complication, not stated as uncontrolled        Social History     Socioeconomic History    Marital status:      Spouse name: Lara Diaz Number of children: 1    Years of education: Not on file    Highest education level: 11th grade   Occupational History    Not on file   Tobacco Use    Smoking status: Former Smoker     Packs/day: 1.00     Years: 45.00     Pack years: 45.00     Types: Cigarettes     Quit date: 2013     Years since quittin.9    Smokeless tobacco: Former User     Types: [address] date: 10/8/1971   Vaping Use    Vaping Use: Never used   Substance and Sexual Activity    Alcohol use: No     Alcohol/week: 0.0 standard drinks     Comment: 1-2 coffee per day    Drug use: No    Sexual activity: Yes     Partners: Female   Other Topics Concern    Not on file   Social History Narrative    19  [facility] reviewed SDOH with Dulce Rivers. He does have money strain but between the income he has coming in and his wife's they are over resources for SARY programs. Offered food panty info to help with end of the month struggles but he declined stating that he tried and was refused due to his income. He belongs to a [employer] and goes weekly so he has some community connections in place. He has an extremely high interest rate on his mortgage which he was encouraged to look into getting lowered to help save money on his house payments. Noticed some difficulty with memory recall. Becomes easily flustered at times. Has no MHI to support applying for [facility] program of SARY. States he does not feel depressed or need any MH treatment.         Social Determinants of Health     Financial Resource Strain: Low Risk     Difficulty of Paying Living Expenses: Not hard at all   Food Insecurity: No Food Insecurity    Worried About Running Out of Food in the Last Year: Never true    [address] in the Last Year: Never true   Transportation Needs:     Lack of Transportation (Medical): Not on file    Lack of Transportation (Non-Medical):  Not on file   Physical Activity:     Days of Exercise per Week: Not on file    Minutes of Exercise per Session: Not on file   Stress:     Feeling of Stress : Not on file   Social Connections:     Frequency of Communication with Friends and Family: Not on file    Frequency of Social Gatherings with Friends and Family: Not on file    Attends Baptism Services: Not on file    Active Member of [address] [place] [address] [employer] or Organizations: Not on file    Attends Club or Organization Meetings: Not on file    Marital Status: Not on file   Intimate Partner Violence:     Fear of Current or Ex-Partner: Not on file    Emotionally Abused: Not on file    Physically Abused: Not on file    Sexually Abused: Not on file   Housing Stability:     Unable to Pay for Housing in the Last Year: Not on file    Number of [place] in the Last Year: Not on file    Unstable Housing in the Last Year: Not on file       Family History   Problem Relation Age of Onset    Cancer Mother         breast    Cancer Father         stomach    Mental Illness Brother     Stroke Brother     Other Brother        Current Outpatient Medications on File Prior to Visit   Medication Sig Dispense Refill    Insulin Pen Needle 32G X 6 MM MISC 1 each by Does not apply route 4 times daily 100 each 3    montelukast (SINGULAIR) 10 MG tablet Take 1 tablet by mouth nightly 30 tablet 4    warfarin (JANTOVEN) 5 MG tablet TAKE 2 TABS BY MOUTH ON MONDAY, WEDNESDAY, FRIDAY, & SATURDAY, THEN TAKE 1 TAB ON TUESDAY, THURSDAY, & SUNDAY 180 tablet 5    azelastine (ASTELIN) 0.1 % nasal spray 2 sprays by Nasal route 2 times daily Use in each nostril as directed 120 mL 1    amiodarone (CORDARONE) 200 MG tablet Take 1 tablet by mouth three times a week Take 1 tablet by mouth three times a week M-W-F 60 tablet 3    metOLazone (ZAROXOLYN) 2.5 MG tablet Take 1 tablet by mouth 2 times daily 30 tablet 0    gabapentin (NEURONTIN) 300 MG capsule Take 300 mg by mouth 3 times daily.  losartan (COZAAR) 25 MG tablet TAKE ONE TABLET BY MOUTH DAILY 90 tablet 5    insulin glargine (BASAGLAR KWIKPEN) 100 UNIT/ML injection pen Inject 45 Units into the skin 2 times daily 5 pen 3    insulin lispro, 1 Unit Dial, (HUMALOG KWIKPEN) 100 UNIT/ML SOPN Inject 0-18 Units into the skin 3 times daily (before meals) MAX 70 units daily 3 mL 3    metoprolol succinate (TOPROL XL) 25 MG extended release tablet Take 1 tablet by mouth 2 times daily 60 tablet 5    atorvastatin (LIPITOR) 80 MG tablet TAKE ONE TABLET BY MOUTH EVERY NIGHT 90 tablet 5    bumetanide (BUMEX) 1 MG tablet Take 1 tablet by mouth daily 30 tablet 5    amitriptyline (ELAVIL) 50 MG tablet Take 1 tablet by mouth nightly 30 tablet 5    traMADol (ULTRAM) 50 MG tablet Take 1 tablet by mouth every 6 hours as needed for Pain. Intended supply: 5 days. Take lowest dose possible to manage pain 120 tablet 3    pramipexole (MIRAPEX) 0.25 MG tablet TAKE TWO TABLETS BY MOUTH THREE TIMES A  tablet 5    Roflumilast (DALIRESP) 500 MCG tablet Take 1 tablet by mouth daily 30 tablet 5    blood glucose test strips (ACCU-CHEK GUIDE) strip Test twice daily 100 each 11    warfarin (COUMADIN) 5 MG tablet Take 1 tablet by mouth daily 30 tablet 3    levocetirizine (XYZAL) 5 MG tablet Take 5 mg by mouth nightly      spironolactone (ALDACTONE) 25 MG tablet TAKE ONE TABLET BY MOUTH TWO TIMES A  tablet 0    hydrOXYzine (VISTARIL) 25 MG capsule Take one 220 minutes prior to MRI 3 capsule 0    pantoprazole (PROTONIX) 40 MG tablet Take 40 mg by mouth daily       CPAP Machine MISC 1 each by Does not apply route nightly as needed       aspirin 81 MG tablet Take 81 mg by mouth nightly        No current facility-administered medications on file prior to visit.        Allergies   Allergen Reactions    Pcn [Penicillins] Child went to hospital   ? Reaction  Patient tolerates cephalosporins    Sulfa Antibiotics      ? I have reviewed his allergies, medications, problem list, medical,social and family history and have updated as needed in the electronic medical record. Objective:     Physical Exam  Vitals and nursing note reviewed. Constitutional:       General: He is not in acute distress. Appearance: He is well-developed. He is not diaphoretic. HENT:      Head: Normocephalic and atraumatic. Right Ear: External ear normal.      Left Ear: External ear normal.      Nose: Nose normal.      Mouth/Throat:      Pharynx: No oropharyngeal exudate. Eyes:      General: No scleral icterus. Right eye: No discharge. Left eye: No discharge. Conjunctiva/sclera: Conjunctivae normal.      Pupils: Pupils are equal, round, and reactive to light. Neck:      Thyroid: No thyromegaly. Vascular: No JVD. Trachea: No tracheal deviation. Cardiovascular:      Rate and Rhythm: Normal rate and regular rhythm. Heart sounds: Normal heart sounds. No murmur heard. No friction rub. No gallop. Pulmonary:      Effort: Pulmonary effort is normal. No respiratory distress. Breath sounds: Normal breath sounds. No stridor. No wheezing or rales. Chest:      Chest wall: No tenderness. Abdominal:      General: Bowel sounds are normal. There is no distension. Palpations: Abdomen is soft. There is no mass. Tenderness: There is no abdominal tenderness. There is no guarding or rebound. Genitourinary:     Comments: Deferred by patient  Musculoskeletal:         General: No tenderness. Normal range of motion. Cervical back: Normal range of motion and neck supple. Comments: Increased spasm L1 to S1 with decreased ROM. + straight leg raising and contralateral straight leg raising tests B/L   Lymphadenopathy:      Cervical: No cervical adenopathy. Skin:     General: Skin is warm and dry. Coloration: Skin is not pale. Findings: No erythema or rash. Neurological:      Mental Status: He is alert and oriented to person, place, and time. Cranial Nerves: No cranial nerve deficit. Motor: No abnormal muscle tone. Coordination: Coordination normal.      Deep Tendon Reflexes: Reflexes are normal and symmetric. Reflexes normal.   Psychiatric:         Behavior: Behavior normal.         Thought Content: Thought content normal.         Judgment: Judgment normal.         Assessment / Plan:   Isela Thomas was seen today for 3 month follow-up. Diagnoses and all orders for this visit:    Type 2 diabetes mellitus with hyperglycemia, with long-term current use of insulin ([facility] [address]  -     CBC; Future  -     Comprehensive Metabolic Panel; Future  -     Hemoglobin A1C; Future    Coronary artery disease involving native coronary artery of native heart without angina pectoris  -     CBC; Future  -     Comprehensive Metabolic Panel; Future    Mixed hyperlipidemia  -     CBC; Future  -     Comprehensive Metabolic Panel; Future  -     Lipid Panel; Future    Ischemic cardiomyopathy  -     Shiva Forrester MD, Cardiology, [facility]  -     CBC; Future  -     Comprehensive Metabolic Panel; Future    Chronic cough  -     benzonatate (TESSALON) 200 MG capsule; Take 1 capsule by mouth 3 times daily as needed for Cough  -     CBC; Future  -     Comprehensive Metabolic Panel; Future    Acquired hypothyroidism  -     CBC; Future  -     Comprehensive Metabolic Panel; Future  -     TSH; Future    Screening for malignant neoplasm of prostate  -     CBC; Future  -     Comprehensive Metabolic Panel; Future  -     PSA Screening; Future        Reviewed healthmaintenance report. Patient is aware of deficiencies and suggested preventative tests.

## 2022-06-16 ASSESSMENT — ENCOUNTER SYMPTOMS
STRIDOR: 0
CHEST TIGHTNESS: 0
RHINORRHEA: 0
EYE DISCHARGE: 0
COUGH: 0
COLOR CHANGE: 0
WHEEZING: 0
VOICE CHANGE: 0
ABDOMINAL DISTENTION: 0
VOMITING: 0
EYE PAIN: 0
ABDOMINAL PAIN: 0
BACK PAIN: 1
NAUSEA: 0
SINUS PRESSURE: 0
ANAL BLEEDING: 0
RECTAL PAIN: 0
CHOKING: 0
SHORTNESS OF BREATH: 0
PHOTOPHOBIA: 0
DIARRHEA: 0
BLOOD IN STOOL: 0
EYE ITCHING: 0
EYE REDNESS: 0
TROUBLE SWALLOWING: 0
SORE THROAT: 0
FACIAL SWELLING: 0
CONSTIPATION: 0
APNEA: 0

## 2022-06-26 DIAGNOSIS — Z79.4 TYPE 2 DIABETES MELLITUS WITH DIABETIC POLYNEUROPATHY, WITH LONG-TERM CURRENT USE OF INSULIN (HCC): ICD-10-CM

## 2022-06-26 DIAGNOSIS — N28.9 RENAL INSUFFICIENCY: ICD-10-CM

## 2022-06-26 DIAGNOSIS — E11.42 TYPE 2 DIABETES MELLITUS WITH DIABETIC POLYNEUROPATHY, WITH LONG-TERM CURRENT USE OF INSULIN (HCC): ICD-10-CM

## 2022-06-27 RX ORDER — LOSARTAN POTASSIUM 25 MG/1
TABLET ORAL
Qty: 90 TABLET | Refills: 0 | OUTPATIENT
Start: 2022-06-27

## 2022-06-28 ENCOUNTER — TELEPHONE (OUTPATIENT)
Dept: ADMINISTRATIVE | Age: 67
End: 2022-06-28

## 2022-06-28 NOTE — TELEPHONE ENCOUNTER
There is a referral in the workque per Dr. Ambar Weiss on Dr. Tomas Hamm pt dx: ischemic cardiomyopathy. Last seen in [facility] by Dr. Litzy Rain on: 4/25/22.

## 2022-06-28 NOTE — TELEPHONE ENCOUNTER
Had spoken to patient - he was just seen and doesn't feel the need to come in again at this time. Will keep his regular scheduled visit.

## 2022-07-22 ENCOUNTER — HOSPITAL ENCOUNTER (EMERGENCY)
Age: 67
Discharge: HOME | End: 2022-07-22
Attending: EMERGENCY MEDICINE
Payer: MEDICARE

## 2022-07-22 ENCOUNTER — APPOINTMENT (OUTPATIENT)
Dept: GENERAL RADIOLOGY | Age: 67
End: 2022-07-22
Payer: MEDICARE

## 2022-07-22 ENCOUNTER — APPOINTMENT (OUTPATIENT)
Dept: CT IMAGING | Age: 67
End: 2022-07-22
Payer: MEDICARE

## 2022-07-22 VITALS
DIASTOLIC BLOOD PRESSURE: 77 MMHG | BODY MASS INDEX: 45.35 KG/M2 | TEMPERATURE: 98.1 F | WEIGHT: 281 LBS | OXYGEN SATURATION: 96 % | SYSTOLIC BLOOD PRESSURE: 139 MMHG | HEART RATE: 54 BPM | RESPIRATION RATE: 20 BRPM

## 2022-07-22 DIAGNOSIS — S80.212A KNEE ABRASION, LEFT, INITIAL ENCOUNTER: ICD-10-CM

## 2022-07-22 DIAGNOSIS — S80.211A KNEE ABRASION, RIGHT, INITIAL ENCOUNTER: ICD-10-CM

## 2022-07-22 DIAGNOSIS — S09.90XA INJURY OF HEAD, INITIAL ENCOUNTER: Primary | ICD-10-CM

## 2022-07-22 PROCEDURE — 73562 X-RAY EXAM OF KNEE 3: CPT

## 2022-07-22 PROCEDURE — 99284 EMERGENCY DEPT VISIT MOD MDM: CPT

## 2022-07-22 PROCEDURE — 70450 CT HEAD/BRAIN W/O DYE: CPT

## 2022-07-22 PROCEDURE — 6370000000 HC RX 637 (ALT 250 FOR IP): Performed by: NURSE PRACTITIONER

## 2022-07-22 RX ORDER — GABAPENTIN 300 MG/1
300 CAPSULE ORAL 3 TIMES DAILY
Status: ON HOLD | COMMUNITY
End: 2022-09-20 | Stop reason: HOSPADM

## 2022-07-22 RX ORDER — DIAPER,BRIEF,INFANT-TODD,DISP
EACH MISCELLANEOUS ONCE
Status: COMPLETED | OUTPATIENT
Start: 2022-07-22 | End: 2022-07-22

## 2022-07-22 RX ADMIN — BACITRACIN: 500 OINTMENT TOPICAL at 12:04

## 2022-07-22 RX ADMIN — Medication: at 12:15

## 2022-07-22 NOTE — ED PROVIDER NOTES
[facility], [place]    Department of Emergency Medicine   ED  Provider Note  Admit Date/RoomTime: 7/22/2022 10:03 AM  ED Room: 20/20 7/22/22  12:56 PM EDT    History of Present Illness:   Merlin Brooks is a 77 y.o. male presenting to the ED for head injury which started prior to arrival.  Patient had a mechanical fall prior to arrival while he was walking, he tripped on a piece of his shoe. Patient complains of small abrasion to the forehead & bilateral knee pain. The complaint has been constant, mild in severity, and worsened by palpation of the area. Patient denies any loss of consciousness. He denies any headache, neck pain, or back pain. He does take coumadin daily. He was ambulatory following the fall. He denies any confusion, weakness, lethargy, vision changes, blurred vision, vision loss, double vision, ams, numbness, tingling. He has been ambulatory. Patient denies any other injuries from the fall.       Review of Systems:   A complete review of systems was performed and pertinent positives and negatives are stated within HPI, all other systems reviewed and are negative.          --------------------------------------------- PAST HISTORY ---------------------------------------------  Past Medical History:  has a past medical history of Acid reflux, Acute diastolic (congestive) heart failure (HCC), Arthritis, Asthma, Asthmatic bronchitis , chronic (HCC), CAD (coronary artery disease), Chronic bronchitis ([facility] [address] COPD (chronic obstructive pulmonary disease) ([facility] [address] COPD (chronic obstructive pulmonary disease) ([facility] [address] Diabetes mellitus ([facility] [address] Emphysema (subcutaneous) (surgical) resulting from a procedure, H/O cardiovascular stress test, Hyperlipidemia, Hypertension, Hypoxemia requiring supplemental oxygen, LONG TERM ANTICOAGULENT USE, Morbid obesity with BMI of 50.0-59.9, adult ([facility] [address] Obesity, Osteoarthritis, Sleep apnea, Stage 3 chronic kidney disease ([facility] [address] Tobacco abuse, and Type II or unspecified type diabetes mellitus without mention of complication, not stated as uncontrolled. Past Surgical History:  has a past surgical history that includes hernia repair; sinus surgery; ECHO Compl W Dop Color Flow (7/25/2013); Colonoscopy; Coronary angioplasty with stent (07-23-13); and Coronary artery bypass graft (09-09-13). Social History:  reports that he quit smoking about 9 years ago. His smoking use included cigarettes. He has a 45.00 pack-year smoking history. He quit smokeless tobacco use about 50 years ago. His smokeless tobacco use included chew. He reports that he does not drink alcohol and does not use drugs. Family History: family history includes Cancer in his father and mother; Mental Illness in his brother; Other in his brother; Stroke in his brother. Unless otherwise noted, family history is non contributory    The patients home medications have been reviewed. Allergies: Pcn [penicillins] and Sulfa antibiotics    -------------------------------------------------- RESULTS -------------------------------------------------  All laboratory and radiology results have been personally reviewed by myself   LABS:  No results found for this visit on 07/22/22. RADIOLOGY:  Interpreted by Radiologist.  XR KNEE RIGHT (3 VIEWS)   Final Result   Tricompartmental osteoarthritis at both knees. See above. Generalized soft   tissue edema at both lower extremities. XR KNEE LEFT (3 VIEWS)   Final Result   Tricompartmental osteoarthritis at both knees. See above. Generalized soft   tissue edema at both lower extremities. CT HEAD WO CONTRAST   Final Result   No acute intracranial abnormality. 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 ------------------------- NURSING NOTES AND VITALS REVIEWED ---------------------------   The nursing notes within the ED encounter and vital signs as below have been reviewed.    /77   Pulse 54   Temp 98.1 °F (36.7 °C) (Oral) Resp 20   Wt 281 lb (127.5 kg)   SpO2 96%   BMI 45.35 kg/m²   Oxygen Saturation Interpretation: Normal      ---------------------------------------------------PHYSICAL EXAM--------------------------------------    Constitutional/General: Alert and oriented x3, well appearing, non toxic in NAD, pleasant  Head: Normocephalic and atraumatic, no garnica sign  Nose: No epistaxis. No rhinorrhea. No pain with palpation of nasal bones  Ears: TM normal, no hemotympanums, no ear bleeding or drainage. Eyes: PERRL, EOMI, conjunctiva normal, sclera non icteric, no eye drainage, no nystagmus, no pain with palpation of orbits  Mouth: Oropharynx clear, without erythema, handling secretions, no trismus, no asymmetry of the posterior oropharynx or uvular edema. No tongue/lip swelling. No bleeding in the posterior pharynx. Neck: Supple, full ROM, non tender to palpation in the midline, no stridor, no crepitus, no meningeal signs. No lymphadenopathy. Respiratory: Lungs clear to auscultation bilaterally, no wheezes, rales, or rhonchi. Not in respiratory distress. Respirations easy and unlabored. Cardiovascular:  S1S2. Regular rate. Regular rhythm. No murmurs, gallops, or rubs. 2+ distal pulses  Chest: No chest wall tenderness  GI:  Abdomen Soft, Non tender, Non distended. +BS x 4 quadrants. No organomegaly, no palpable masses,  No rebound, guarding, or rigidity. Musculoskeletal: Moves all extremities x 4. Warm and well perfused, no clubbing, cyanosis, or edema. Capillary refill <3 seconds. No tenderness to palpation of cervical, thoracic, or lumbar spine or paraspinal muscles. Tenderness to palpation over the knees anteriorly bilaterally. No obvious deformity. No evidence of open fracture. Sensation grossly intact. Integument: skin warm and dry. No rashes. He has small abrasions noted with no lacerations. He has no active bleeding.   Lymphatic: no lymphadenopathy noted  Neurologic: GCS 15, no focal deficits, symmetric strength 5/5 in the upper and lower extremities bilaterally. Neurovascularly intact. Psychiatric: Normal Affect      ------------------------------ ED COURSE/MEDICAL DECISION MAKING----------------------  Medications   topical skin adhesive stick ( Topical Given by Other 7/22/22 1215)   bacitracin zinc ointment ( Topical Given 7/22/22 1204)       ED Course as of 07/22/22 1840 Fri Jul 22, 2022   [address] [place] [address]:     Supervising Physician, on-site, available for consultation, non-participatory in the evaluation or care of this patient   [JS]      ED Course User Index  [JS] Maria Isabel Oneil DO         LACERATION REPAIR  PROCEDURE NOTE:  Risk/benefits/alternatives discussed. Patient provides verbal consent for laceration repair. Performed By: Kole Armenta CNP. Laceration #: 1. Location: Anterior right forehead  Length: 1.5 cm. The wound area was irrigated with sterile saline, cleansend with shur-clens and draped in a sterile fashion. The wound area was anesthetized with not required. WOUND COMPLEXITY:  Debridement: None. Undermining: None. Wound Margins Revised: None. Flaps Aligned: yes. The wound was explored with the following results no foreign body or tendon injury seen. The wound was closed with 1 layer Dermabond. Dressing: None      Medical Decision Making: At this time the patient is without objective evidence of an acute process requiring hospitalization or inpatient management. They have remained hemodynamically stable throughout their entire ED visit and are stable for discharge with outpatient follow up. The plan has been discussed in detail and they are aware of the specific conditions for emergent return, as well as the importance of follow-up. Patient is a 78-year-old male presenting to the emergency department today for evaluation of fall, head injury. He is neurovascularly intact. He has no red flag warning signs.   X-ray of the bilateral knees

## 2022-08-01 DIAGNOSIS — Z79.4 TYPE 2 DIABETES MELLITUS WITH DIABETIC NEPHROPATHY, WITH LONG-TERM CURRENT USE OF INSULIN (HCC): ICD-10-CM

## 2022-08-01 DIAGNOSIS — E11.65 TYPE 2 DIABETES MELLITUS WITH HYPERGLYCEMIA, WITH LONG-TERM CURRENT USE OF INSULIN (HCC): ICD-10-CM

## 2022-08-01 DIAGNOSIS — E11.21 TYPE 2 DIABETES MELLITUS WITH DIABETIC NEPHROPATHY, WITH LONG-TERM CURRENT USE OF INSULIN (HCC): ICD-10-CM

## 2022-08-01 DIAGNOSIS — Z79.4 TYPE 2 DIABETES MELLITUS WITH HYPERGLYCEMIA, WITH LONG-TERM CURRENT USE OF INSULIN (HCC): ICD-10-CM

## 2022-08-01 RX ORDER — BLOOD SUGAR DIAGNOSTIC
STRIP MISCELLANEOUS
Qty: 100 EACH | Refills: 5 | Status: SHIPPED
Start: 2022-08-01 | End: 2022-10-24 | Stop reason: ALTCHOICE

## 2022-08-13 RX ORDER — AMIODARONE HYDROCHLORIDE 200 MG/1
TABLET ORAL
Qty: 60 TABLET | Refills: 5 | Status: ON HOLD
Start: 2022-08-13 | End: 2022-09-20 | Stop reason: HOSPADM

## 2022-08-17 DIAGNOSIS — Z79.4 TYPE 2 DIABETES MELLITUS WITH DIABETIC POLYNEUROPATHY, WITH LONG-TERM CURRENT USE OF INSULIN (HCC): ICD-10-CM

## 2022-08-17 DIAGNOSIS — N28.9 RENAL INSUFFICIENCY: ICD-10-CM

## 2022-08-17 DIAGNOSIS — E11.42 TYPE 2 DIABETES MELLITUS WITH DIABETIC POLYNEUROPATHY, WITH LONG-TERM CURRENT USE OF INSULIN (HCC): ICD-10-CM

## 2022-08-21 RX ORDER — LOSARTAN POTASSIUM 25 MG/1
TABLET ORAL
Qty: 90 TABLET | Refills: 3 | Status: ON HOLD
Start: 2022-08-21 | End: 2022-09-26 | Stop reason: HOSPADM

## 2022-08-21 RX ORDER — METOLAZONE 2.5 MG/1
TABLET ORAL
Qty: 24 TABLET | Refills: 3 | Status: ON HOLD
Start: 2022-08-21 | End: 2022-09-26 | Stop reason: HOSPADM

## 2022-08-25 ENCOUNTER — OFFICE VISIT (OUTPATIENT)
Dept: SLEEP CENTER | Age: 67
End: 2022-08-25
Payer: MEDICARE

## 2022-08-25 VITALS
DIASTOLIC BLOOD PRESSURE: 52 MMHG | BODY MASS INDEX: 44.84 KG/M2 | HEIGHT: 66 IN | RESPIRATION RATE: 20 BRPM | SYSTOLIC BLOOD PRESSURE: 80 MMHG | HEART RATE: 50 BPM | WEIGHT: 279 LBS

## 2022-08-25 DIAGNOSIS — E66.9 OBESITY, UNSPECIFIED CLASSIFICATION, UNSPECIFIED OBESITY TYPE, UNSPECIFIED WHETHER SERIOUS COMORBIDITY PRESENT: ICD-10-CM

## 2022-08-25 DIAGNOSIS — Z87.898 HISTORY OF DIZZINESS: ICD-10-CM

## 2022-08-25 DIAGNOSIS — G25.81 RLS (RESTLESS LEGS SYNDROME): ICD-10-CM

## 2022-08-25 DIAGNOSIS — G47.33 OSA (OBSTRUCTIVE SLEEP APNEA): Primary | ICD-10-CM

## 2022-08-25 PROCEDURE — 1123F ACP DISCUSS/DSCN MKR DOCD: CPT | Performed by: STUDENT IN AN ORGANIZED HEALTH CARE EDUCATION/TRAINING PROGRAM

## 2022-08-25 PROCEDURE — 99214 OFFICE O/P EST MOD 30 MIN: CPT | Performed by: STUDENT IN AN ORGANIZED HEALTH CARE EDUCATION/TRAINING PROGRAM

## 2022-08-25 ASSESSMENT — SLEEP AND FATIGUE QUESTIONNAIRES
HOW LIKELY ARE YOU TO NOD OFF OR FALL ASLEEP WHILE LYING DOWN TO REST IN THE AFTERNOON WHEN CIRCUMSTANCES PERMIT: 3
HOW LIKELY ARE YOU TO NOD OFF OR FALL ASLEEP WHILE SITTING AND READING: 3
HOW LIKELY ARE YOU TO NOD OFF OR FALL ASLEEP WHILE SITTING AND TALKING TO SOMEONE: 0
HOW LIKELY ARE YOU TO NOD OFF OR FALL ASLEEP WHILE SITTING INACTIVE IN A PUBLIC PLACE: 0
HOW LIKELY ARE YOU TO NOD OFF OR FALL ASLEEP IN A CAR, WHILE STOPPED FOR A FEW MINUTES IN TRAFFIC: 0
HOW LIKELY ARE YOU TO NOD OFF OR FALL ASLEEP WHILE WATCHING TV: 2
HOW LIKELY ARE YOU TO NOD OFF OR FALL ASLEEP WHEN YOU ARE A PASSENGER IN A CAR FOR AN HOUR WITHOUT A BREAK: 3
HOW LIKELY ARE YOU TO NOD OFF OR FALL ASLEEP WHILE SITTING QUIETLY AFTER LUNCH WITHOUT ALCOHOL: 3
ESS TOTAL SCORE: 14

## 2022-08-25 NOTE — PROGRESS NOTES
[facility] Sleep Medicine    Patient Name: Alhaji Vazquez  Age: 77 y.o.   : 1955    Date of Visit: 22        Review of Last Visit Summary:    The patient was last seen on 2022 for  Obstructive Sleep Apnea and Obesity. Interim History:     Alhaji Vazquez is a 77 y.o. male in office for follow up. He reports difficulty sleeping and waking up frequently throughout the night. His CPAP was packed away due to a move and he cant remember which box it is in. He has had his current machine for about 1.5 years. No data download available today. Reported compliance:  Last used 11 months ago        Past Medical History:  Past Medical History:   Diagnosis Date    Acid reflux     Acute diastolic (congestive) heart failure ([facility] [address] 2019    Arthritis     Asthma 2014    Asthmatic bronchitis , chronic ([facility] [address] 2016    CAD (coronary artery disease)     Chronic bronchitis (HCC) 2014    COPD (chronic obstructive pulmonary disease) ([facility])     CB    COPD (chronic obstructive pulmonary disease) ([facility])     Diabetes mellitus ([address]     Emphysema (subcutaneous) (surgical) resulting from a procedure     H/O cardiovascular stress test 2021    Lexiscan    Hyperlipidemia     Hypertension     Hypoxemia requiring supplemental oxygen 2015    LONG TERM ANTICOAGULENT USE     Morbid obesity with BMI of 50.0-59.9, adult ([facility] [address] 2013    Obesity     Osteoarthritis     Sleep apnea     bilevel positive airway pressure at 13/8 with 2 L oxygen flow     Stage 3 chronic kidney disease (HCC)     Tobacco abuse     Type II or unspecified type diabetes mellitus without mention of complication, not stated as uncontrolled        Past Surgical History:        Procedure Laterality Date    COLONOSCOPY      CORONARY ANGIOPLASTY WITH STENT PLACEMENT  13    Left main focal eccentric 65% distal stenosis. Large OM1 CX 50% ostial & prox narrow. Large ramus artery & LAD: Minor plaque w/o sign narrow.  RCA: Dom vessel w/25% prox narrow & focal hazy eccentric 99% distal stenosis before origin RPDA & RPLCA. LV: Mild inferior hypokinesis EF 55%. Probable mild AS with 10-15mmHg. Successful PCI distal RCA w/3.5 x 12 mm BMS, 0% res stenosis. Normal distal runoff    CORONARY ARTERY BYPASS GRAFT  13    Dr Jennifer Pederson; CABG x2, LIMA to LAD, SVG to ramus intermedius; MV repair using 30-mm Future complete ring with magic stitch between A3 and P3; rigid internal fixation of sternum using KLS plates x2; endoscopic vein harvesting of right lower extremity     ECHO COMPL W DOP COLOR FLOW  2013         HERNIA REPAIR      SINUS SURGERY         Allergies:  is allergic to pcn [penicillins] and sulfa antibiotics.   Social History:    Social History     Tobacco Use    Smoking status: Former     Packs/day: 1.00     Years: 45.00     Pack years: 45.00     Types: Cigarettes     Quit date: 2013     Years since quittin.0    Smokeless tobacco: Former     Types: Chew     Quit date: 10/8/1971   Vaping Use    Vaping Use: Never used   Substance Use Topics    Alcohol use: No     Alcohol/week: 0.0 standard drinks     Comment: 1-2 coffee per day    Drug use: No        Family History:       Problem Relation Age of Onset    Cancer Mother         breast    Cancer Father         stomach    Mental Illness Brother     Stroke Brother     Other Brother        Current Medications:    Current Outpatient Medications:     losartan (COZAAR) 25 MG tablet, TAKE ONE TABLET BY MOUTH DAILY, Disp: 90 tablet, Rfl: 3    metOLazone (ZAROXOLYN) 2.5 MG tablet, TAKE 1 TABLET BY MOUTH TWICE WEEKLY, Disp: 24 tablet, Rfl: 3    amiodarone (CORDARONE) 200 MG tablet, TAKE ONE TABLET BY MOUTH TWO TIMES A DAY, Disp: 60 tablet, Rfl: 5    blood glucose test strips (ACCU-CHEK GUIDE) strip, USE TO TEST TWO TIMES DAILY AND AS NEEDED FOR SYMPTOMS OF IRREGULAR BLOOD GLUCOSE, Disp: 100 each, Rfl: 5    gabapentin (NEURONTIN) 300 MG capsule, Take 300 mg by mouth in the morning and 300 mg at noon and 300 mg before bedtime. , Disp: , Rfl:     Insulin Pen Needle 32G X 6 MM MISC, 1 each by Does not apply route 4 times daily, Disp: 100 each, Rfl: 3    montelukast (SINGULAIR) 10 MG tablet, Take 1 tablet by mouth nightly, Disp: 30 tablet, Rfl: 4    warfarin (JANTOVEN) 5 MG tablet, TAKE 2 TABS BY MOUTH ON MONDAY, WEDNESDAY, FRIDAY, & SATURDAY, THEN TAKE 1 TAB ON TUESDAY, THURSDAY, & SUNDAY (Patient taking differently: 2.5 mg daily TAKE 2 TABS BY MOUTH ON MONDAY, WEDNESDAY, FRIDAY, & SATURDAY, THEN TAKE 1 TAB ON TUESDAY, THURSDAY, & SUNDAY), Disp: 180 tablet, Rfl: 5    insulin glargine (BASAGLAR KWIKPEN) 100 UNIT/ML injection pen, Inject 45 Units into the skin 2 times daily, Disp: 5 pen, Rfl: 3    insulin lispro, 1 Unit Dial, (HUMALOG KWIKPEN) 100 UNIT/ML SOPN, Inject 0-18 Units into the skin 3 times daily (before meals) MAX 70 units daily, Disp: 3 mL, Rfl: 3    metoprolol succinate (TOPROL XL) 25 MG extended release tablet, Take 1 tablet by mouth 2 times daily, Disp: 60 tablet, Rfl: 5    atorvastatin (LIPITOR) 80 MG tablet, TAKE ONE TABLET BY MOUTH EVERY NIGHT, Disp: 90 tablet, Rfl: 5    bumetanide (BUMEX) 1 MG tablet, Take 1 tablet by mouth daily, Disp: 30 tablet, Rfl: 5    amitriptyline (ELAVIL) 50 MG tablet, Take 1 tablet by mouth nightly, Disp: 30 tablet, Rfl: 5    traMADol (ULTRAM) 50 MG tablet, Take 1 tablet by mouth every 6 hours as needed for Pain. Intended supply: 5 days.  Take lowest dose possible to manage pain, Disp: 120 tablet, Rfl: 3    pramipexole (MIRAPEX) 0.25 MG tablet, TAKE TWO TABLETS BY MOUTH THREE TIMES A DAY, Disp: 180 tablet, Rfl: 5    levocetirizine (XYZAL) 5 MG tablet, Take 5 mg by mouth nightly, Disp: , Rfl:     spironolactone (ALDACTONE) 25 MG tablet, TAKE ONE TABLET BY MOUTH TWO TIMES A DAY, Disp: 180 tablet, Rfl: 0    pantoprazole (PROTONIX) 40 MG tablet, Take 40 mg by mouth daily , Disp: , Rfl:     CPAP Machine MISC, 1 each by Does not apply route nightly as needed , Disp: , Rfl:     aspirin 81 MG tablet, Take 81 mg by mouth nightly , Disp: , Rfl:           Objective:   PHYSICAL EXAM:    BP (!) 80/52   Pulse 50   Resp 20   Ht 5' 6\" (1.676 m)   Wt 279 lb (126.6 kg)   BMI 45.03 kg/m²       (Sleep ROS above)     Constitutional: no chills, no fever   Eyes: no blurred vision and no eyesight problems. ENT: no hoarseness and no sore throat. Cardiovascular: no chest pain,   Respiratory: no cough, no shortness of breath   Gastrointestinal:  no nausea,  no vomiting, no diarrhea. Musculoskeletal: no arthralgias, no back pain and no myalgias. Integumentary: no rashes or lacerations. Neurological:  no diplopia, no dizziness,  no headache, no memory changes,  and no tingling. Endocrine: No chills      Physical exam:  Gen: No acute distress. BMI of Body mass index is 45.03 kg/m². Eyes: PERRL. No sclera icterus. No conjunctival injection. ENT: No nasal/oral discharge. Pharynx clear. Neck: Trachea midline. No obvious mass. Neck circumference     Resp: No accessory muscle use. No crackles. No wheezes. No rhonchi. CV: Regular rate. Regular rhythm. No murmur or rub. Skin: Warm and dry. No bleeding observed   M/S: No cyanosis. No obvious joint deformity. Neuro: Awake. Alert. Moves all four extremities. Psych: Alert and oriented. No anxiety. Sleep Medicine 8/25/2022 5/26/2022 7/9/2019   Sitting and reading 3 0 -   Watching TV 2 2 -   Sitting, inactive in a public place (e.g. a theatre or a meeting) 0 0 -   As a passenger in a car for an hour without a break 3 3 -   Lying down to rest in the afternoon when circumstances permit 3 3 -   Sitting and talking to someone 0 3 -   Sitting quietly after a lunch without alcohol 3 3 -   In a car, while stopped for a few minutes in traffic 0 0 -   Total score 14 14 -   Neck circumference (Inches) - 19 19.5       DATA:     Split Night Schedule. 1/7/2020. AHI 87. SpO2 [place] 69%%.  Recommendation CPAP 14% (SpO2 [place] at this setting 85%)      Assessment:      Zora Camacho was seen today for sleep apnea. Diagnoses and all orders for this visit:    LISS (obstructive sleep apnea)    RLS (restless legs syndrome)    Obesity, unspecified classification, unspecified obesity type, unspecified whether serious comorbidity present     Plan:       Obstructive Sleep Apnea. - Continue current PAP settings  Treatment options  were again discussed with the patient, including weight loss, CPAP titration, APAP trial, oral appliance evaluation, and possible surgical options. After discussion, he is commited to retrying the CPAP, if he can find it. Patient advised that a new prescription could be placed for CPAP, but he was advised that his insurance may not cover a new machine, as it has been less than 5 years, and that he could buy a new machine. He would like to try and find his old machine. 2. RLS. - Stable  - Currently on Pramipexole as prescribed by his PCP  - Education provided in handout.  - Follow up at next visit    3. Obesity. Body mass index is 45.03 kg/m².   -Healthy weight loss advised    4. History of Dizziness  - Recent symptoms of  dizziness. No symptoms currently. Patient advised to call his doctor today and proceed upstairs to the walk in clinic if he would like to be evaluated. - He was advised to call 911 if the symptoms return/become severe. Follow up: Return in about 3 months (around 11/25/2022) for Follow up LISS.

## 2022-08-25 NOTE — PATIENT INSTRUCTIONS
risk of death). Please call our sleep nurses to schedule a follow up at - [phone] option 2              1. Continue using your machine nightly     ------------Please bring your machine/Data Card to every visit. -------------     -Request all data downloads be sent to my nurse        2. Contact your DME company about supplies or issues with your machine. As a general guideline, please replace your:  -CPAP mask every 3-6 months  -CPAP hose  every 3-6 months  -Filter every 2-4 weeks  -CPAP/BiPAP/ASV replacements every 5-7+ years     3. Continue healthy weight loss/stabilization, as this is recommended as a long-term intervention. 4. Please do not drive or operate machinery when you feel fatigued/sleepy/drowsy      5. Please try to sleep between 6-8 hours nightly with the CPAP mask    6. Please avoid sedatives, alcohol and caffeinated drinks at/near bed time. 7. Please call your supply (DME) company with any issues with your PAP device. Please call our office with any issues pertaining to the therapy.     ----Please note that complications of LISS if not treated can increase risk for: systemic hypertension, pulmonary hypertension, cardiovascular morbidities (heart attack and stroke), car accidents and all cause mortality. For general questions or scheduling issues, call my nurse at [phone] option [id number] [place], [place] [phone] option 2  F- [phone]           ----------------------------------------------------------  Common Issues and Solutions     Pillow is dislodging mask - Consider getting a CPAP pillow or switching to a mask with the hose at the top. Dry Mouth - Adjust Humidity and/or try Biotene Gel. (Excessive leak can also cause this)     Leak - Please call your equipment provider  as they may need to adjust your mask.      Difficulty tolerating the PAP mask - Contact your equipment company to try a different mask, we can order a \"mini sleep study\"with the sleep tech to try different masks/settings of pressure, also we have a sleep psychologist to help with anxiety related to wearing the PAP mask      ----------------------------------------------------------     For Questions and Concerns: In case of difficulty with your PAP device or mask interface, please FIRST contact your [address] (The company who provides you the CPAP/BiPAP/ASV machine/supplies). If you need the number for any other [employer] company, please call my Nurse at [phone] option 2     For questions concerning your [place] appointment: Call [phone] option 2  SLEEP LAB SCHEDULING:        ----------------------------------------------------------     Helpful Web Sites: For patients with ALL SLEEP DISORDERS: American Academy of Sleep Medicine http://sleepeducation. org; or [employer]: https://sleepfoundation. org  For patients with LISS: American Sleep Apnea Association: http://cevallos.org/. org  For patients with RLS: RLS Foundation: Pam.ligia  For patients with INSOMNIA: https://www.mcdaniel.org/. org/articles/sleep/insomnia-causes-and-cures. htm  For patients with DEPRESSION: PinkUP.com.[employer]. OnlineSheetMusic/depression            Learning About CPAP for Sleep Apnea  What is CPAP? CPAP/Bi-PAP is a small machine that you use at home every night while you sleep. It increases air pressure in your throat to keep your airway open. When you have sleep apnea, this can help you sleep better so you feel much better. CPAP stands for \"continuous positive airway pressure. \" Bi-PAP is a different PAP setting that allows for different pressures when you breathe in and out. The CPAP/Bi-PAP machine will have one of the following:  A mask that covers your nose and mouth  Prongs that fit into your nose  A mask that covers your nose only, the most common type. This type is called NCPAP.  The N stands for \"nasal.\"  Why is it done?  CPAP is a common/effective treatment for obstructive sleep apnea. How does it help? CPAP can help you have more normal sleep, so you feel less sleepy and more alert during the daytime through the treatment of sleep apnea. CPAP may help keep heart failure or other heart problems from getting worse. CPAP may help lower your blood pressure. If you use CPAP, your bed partner may also sleep better because you are snoring less. What are the side effects? Some people who use CPAP have:  A dry or stuffy nose and a sore throat. Irritated skin on the face. Sore eyes. Bloating. If you have any of these problems, work with your doctor to fix them. Here are some things you can try:  Be sure the mask or nasal prongs fit well. See if your doctor can adjust the pressure of your CPAP. If your nose is dry, try a humidifier. If these things do not help, you might try a different type of machine. Some machines have air pressure that adjusts on its own. Others have air pressures that are different when you breathe in than when you breathe out. This may reduce discomfort caused by too much pressure in your nose. Where can you learn more? Go to https://Space ApepeEpuramat.[employer]els. org and sign in to your [employer] account. Enter L310 in the Inveni box to learn more about \"Learning About CPAP for Sleep Apnea. \"     If you do not have an account, please click on the \"Sign Up Now\" link. Current as of: February 24, 2020               Content Version: 12.5  © 2006-2020 Healthwise, Incorporated. Care instructions adapted under license by [facility] ([facility]). If you have questions about a medical condition or this instruction, always ask your healthcare professional. [place] [address] any warranty or liability for your use of this information. The general categories for the treatment of Sleep Apnea (including benefits and potential side effects) include:     1.  Supportive Care  -Elevate the head of the bed   -Avoid sleeping on your back      2. Weight loss  -Start a healthy weight loss program  -Consider a referral to [facility]  -Benefits: Multiple health benefits  -Side Effects: Depends on treatment type     3. Oral Appliance \"Mouth Piece\"  (Mandibular Advancement Device)  -Benefits: CPAP alternative. Decreased snoring, improved sleep quality, decreased sleepiness.  -Side Effects: teeth crowding, oral/dental pain. Regular visits with dentist are advised to watch for side effects. 4. Positive pressure therapy with a machine and a mask (CPAP or BiPAP)  Benefits: Decreased snoring, improved sleep quality, decreased sleepiness. Risks: Aerophagia (swallowing air), dry nose/nose bleeds, dry eyes (due to leak), skin sores/rash (due to mask)     5. Different kinds of surgeries, including nasal surgery, surgery of the throat/windpipe, and nerve stimulation therapy (INSPIRE). Benefits: CPAP alternative. Decreased snoring, improved sleep quality, decreased sleepiness.   Risks: Bleeding, infection, nerve damage

## 2022-09-15 ENCOUNTER — HOSPITAL ENCOUNTER (INPATIENT)
Age: 67
LOS: 5 days | Discharge: HOME | DRG: 091 | End: 2022-09-20
Attending: EMERGENCY MEDICINE | Admitting: INTERNAL MEDICINE
Payer: MEDICARE

## 2022-09-15 ENCOUNTER — APPOINTMENT (OUTPATIENT)
Dept: CT IMAGING | Age: 67
DRG: 091 | End: 2022-09-15
Payer: MEDICARE

## 2022-09-15 ENCOUNTER — APPOINTMENT (OUTPATIENT)
Dept: GENERAL RADIOLOGY | Age: 67
DRG: 091 | End: 2022-09-15
Payer: MEDICARE

## 2022-09-15 DIAGNOSIS — I25.10 CORONARY ARTERY DISEASE INVOLVING NATIVE CORONARY ARTERY OF NATIVE HEART WITHOUT ANGINA PECTORIS: ICD-10-CM

## 2022-09-15 DIAGNOSIS — R73.9 HYPERGLYCEMIA: ICD-10-CM

## 2022-09-15 DIAGNOSIS — R44.3 HALLUCINATION: Primary | ICD-10-CM

## 2022-09-15 PROBLEM — G93.41 ACUTE METABOLIC ENCEPHALOPATHY: Status: ACTIVE | Noted: 2022-09-15

## 2022-09-15 LAB
ACETAMINOPHEN LEVEL: <5 MCG/ML (ref 10–30)
ALBUMIN SERPL-MCNC: 3.8 G/DL (ref 3.5–5.2)
ALP BLD-CCNC: 181 U/L (ref 40–129)
ALT SERPL-CCNC: 47 U/L (ref 0–40)
AMMONIA: 18 UMOL/L (ref 16–60)
AMPHETAMINE SCREEN, URINE: NOT DETECTED
ANION GAP SERPL CALCULATED.3IONS-SCNC: 12 MMOL/L (ref 7–16)
AST SERPL-CCNC: 36 U/L (ref 0–39)
BACTERIA: ABNORMAL /HPF
BARBITURATE SCREEN URINE: NOT DETECTED
BASOPHILS ABSOLUTE: 0.03 E9/L (ref 0–0.2)
BASOPHILS RELATIVE PERCENT: 0.4 % (ref 0–2)
BENZODIAZEPINE SCREEN, URINE: NOT DETECTED
BILIRUB SERPL-MCNC: 1 MG/DL (ref 0–1.2)
BILIRUBIN DIRECT: 0.3 MG/DL (ref 0–0.3)
BILIRUBIN URINE: NEGATIVE
BILIRUBIN, INDIRECT: 0.7 MG/DL (ref 0–1)
BLOOD, URINE: NEGATIVE
BUN BLDV-MCNC: 42 MG/DL (ref 6–23)
CALCIUM SERPL-MCNC: 9.3 MG/DL (ref 8.6–10.2)
CANNABINOID SCREEN URINE: NOT DETECTED
CHLORIDE BLD-SCNC: 96 MMOL/L (ref 98–107)
CLARITY: CLEAR
CO2: 28 MMOL/L (ref 22–29)
COCAINE METABOLITE SCREEN URINE: NOT DETECTED
COLOR: YELLOW
CREAT SERPL-MCNC: 1.7 MG/DL (ref 0.7–1.2)
EKG ATRIAL RATE: 59 BPM
EKG P AXIS: 66 DEGREES
EKG P-R INTERVAL: 184 MS
EKG Q-T INTERVAL: 438 MS
EKG QRS DURATION: 132 MS
EKG QTC CALCULATION (BAZETT): 433 MS
EKG R AXIS: 94 DEGREES
EKG T AXIS: -103 DEGREES
EKG VENTRICULAR RATE: 59 BPM
EOSINOPHILS ABSOLUTE: 0.11 E9/L (ref 0.05–0.5)
EOSINOPHILS RELATIVE PERCENT: 1.5 % (ref 0–6)
EPITHELIAL CELLS, UA: ABNORMAL /HPF
ETHANOL: <10 MG/DL (ref 0–0.08)
FENTANYL SCREEN, URINE: NOT DETECTED
GFR AFRICAN AMERICAN: 49
GFR NON-AFRICAN AMERICAN: 40 ML/MIN/1.73
GLUCOSE BLD-MCNC: 299 MG/DL (ref 74–99)
GLUCOSE BLD-MCNC: 340 MG/DL
GLUCOSE URINE: >=1000 MG/DL
HCT VFR BLD CALC: 38.7 % (ref 37–54)
HEMOGLOBIN: 12.8 G/DL (ref 12.5–16.5)
IMMATURE GRANULOCYTES #: 0.04 E9/L
IMMATURE GRANULOCYTES %: 0.5 % (ref 0–5)
KETONES, URINE: NEGATIVE MG/DL
LEUKOCYTE ESTERASE, URINE: NEGATIVE
LYMPHOCYTES ABSOLUTE: 0.94 E9/L (ref 1.5–4)
LYMPHOCYTES RELATIVE PERCENT: 12.7 % (ref 20–42)
Lab: NORMAL
MAGNESIUM: 1.9 MG/DL (ref 1.6–2.6)
MCH RBC QN AUTO: 31.7 PG (ref 26–35)
MCHC RBC AUTO-ENTMCNC: 33.1 % (ref 32–34.5)
MCV RBC AUTO: 95.8 FL (ref 80–99.9)
METER GLUCOSE: 337 MG/DL (ref 74–99)
METER GLUCOSE: 340 MG/DL (ref 74–99)
METHADONE SCREEN, URINE: NOT DETECTED
MONOCYTES ABSOLUTE: 0.78 E9/L (ref 0.1–0.95)
MONOCYTES RELATIVE PERCENT: 10.5 % (ref 2–12)
NEUTROPHILS ABSOLUTE: 5.51 E9/L (ref 1.8–7.3)
NEUTROPHILS RELATIVE PERCENT: 74.4 % (ref 43–80)
NITRITE, URINE: NEGATIVE
OPIATE SCREEN URINE: NOT DETECTED
OXYCODONE URINE: NOT DETECTED
PDW BLD-RTO: 15.4 FL (ref 11.5–15)
PH UA: 6 (ref 5–9)
PHENCYCLIDINE SCREEN URINE: NOT DETECTED
PLATELET # BLD: 163 E9/L (ref 130–450)
PMV BLD AUTO: 10.6 FL (ref 7–12)
POTASSIUM REFLEX MAGNESIUM: 3.1 MMOL/L (ref 3.5–5)
PRO-BNP: 1860 PG/ML (ref 0–125)
PROTEIN UA: ABNORMAL MG/DL
RBC # BLD: 4.04 E12/L (ref 3.8–5.8)
RBC UA: ABNORMAL /HPF (ref 0–2)
SALICYLATE, SERUM: <0.3 MG/DL (ref 0–30)
SODIUM BLD-SCNC: 136 MMOL/L (ref 132–146)
SPECIFIC GRAVITY UA: 1.02 (ref 1–1.03)
TOTAL PROTEIN: 7.4 G/DL (ref 6.4–8.3)
TRICYCLIC ANTIDEPRESSANTS SCREEN SERUM: NEGATIVE NG/ML
TROPONIN, HIGH SENSITIVITY: 29 NG/L (ref 0–11)
TROPONIN, HIGH SENSITIVITY: 34 NG/L (ref 0–11)
UROBILINOGEN, URINE: 1 E.U./DL
WBC # BLD: 7.4 E9/L (ref 4.5–11.5)
WBC UA: ABNORMAL /HPF (ref 0–5)

## 2022-09-15 PROCEDURE — 80179 DRUG ASSAY SALICYLATE: CPT

## 2022-09-15 PROCEDURE — 70450 CT HEAD/BRAIN W/O DYE: CPT

## 2022-09-15 PROCEDURE — 93005 ELECTROCARDIOGRAM TRACING: CPT | Performed by: STUDENT IN AN ORGANIZED HEALTH CARE EDUCATION/TRAINING PROGRAM

## 2022-09-15 PROCEDURE — 82140 ASSAY OF AMMONIA: CPT

## 2022-09-15 PROCEDURE — 84484 ASSAY OF TROPONIN QUANT: CPT

## 2022-09-15 PROCEDURE — 99285 EMERGENCY DEPT VISIT HI MDM: CPT

## 2022-09-15 PROCEDURE — 80048 BASIC METABOLIC PNL TOTAL CA: CPT

## 2022-09-15 PROCEDURE — 83735 ASSAY OF MAGNESIUM: CPT

## 2022-09-15 PROCEDURE — 83880 ASSAY OF NATRIURETIC PEPTIDE: CPT

## 2022-09-15 PROCEDURE — 80307 DRUG TEST PRSMV CHEM ANLYZR: CPT

## 2022-09-15 PROCEDURE — 71045 X-RAY EXAM CHEST 1 VIEW: CPT

## 2022-09-15 PROCEDURE — 80076 HEPATIC FUNCTION PANEL: CPT

## 2022-09-15 PROCEDURE — 36415 COLL VENOUS BLD VENIPUNCTURE: CPT

## 2022-09-15 PROCEDURE — 1200000000 HC SEMI PRIVATE

## 2022-09-15 PROCEDURE — 6370000000 HC RX 637 (ALT 250 FOR IP): Performed by: STUDENT IN AN ORGANIZED HEALTH CARE EDUCATION/TRAINING PROGRAM

## 2022-09-15 PROCEDURE — 72125 CT NECK SPINE W/O DYE: CPT

## 2022-09-15 PROCEDURE — 80143 DRUG ASSAY ACETAMINOPHEN: CPT

## 2022-09-15 PROCEDURE — 81001 URINALYSIS AUTO W/SCOPE: CPT

## 2022-09-15 PROCEDURE — 85025 COMPLETE CBC W/AUTO DIFF WBC: CPT

## 2022-09-15 PROCEDURE — 2580000003 HC RX 258: Performed by: INTERNAL MEDICINE

## 2022-09-15 PROCEDURE — 82077 ASSAY SPEC XCP UR&BREATH IA: CPT

## 2022-09-15 PROCEDURE — 82962 GLUCOSE BLOOD TEST: CPT

## 2022-09-15 RX ORDER — SODIUM CHLORIDE 9 MG/ML
INJECTION, SOLUTION INTRAVENOUS PRN
Status: DISCONTINUED | OUTPATIENT
Start: 2022-09-15 | End: 2022-09-20 | Stop reason: HOSPADM

## 2022-09-15 RX ORDER — AMIODARONE HYDROCHLORIDE 200 MG/1
200 TABLET ORAL 2 TIMES DAILY
Status: DISCONTINUED | OUTPATIENT
Start: 2022-09-16 | End: 2022-09-16

## 2022-09-15 RX ORDER — INSULIN GLARGINE 100 [IU]/ML
45 INJECTION, SOLUTION SUBCUTANEOUS 2 TIMES DAILY
Status: DISCONTINUED | OUTPATIENT
Start: 2022-09-16 | End: 2022-09-20 | Stop reason: HOSPADM

## 2022-09-15 RX ORDER — AMITRIPTYLINE HYDROCHLORIDE 25 MG/1
50 TABLET, FILM COATED ORAL NIGHTLY
Status: DISCONTINUED | OUTPATIENT
Start: 2022-09-16 | End: 2022-09-16

## 2022-09-15 RX ORDER — ACETAMINOPHEN 325 MG/1
650 TABLET ORAL EVERY 6 HOURS PRN
Status: DISCONTINUED | OUTPATIENT
Start: 2022-09-15 | End: 2022-09-20 | Stop reason: HOSPADM

## 2022-09-15 RX ORDER — PANTOPRAZOLE SODIUM 40 MG/1
40 TABLET, DELAYED RELEASE ORAL DAILY
Status: DISCONTINUED | OUTPATIENT
Start: 2022-09-16 | End: 2022-09-20 | Stop reason: HOSPADM

## 2022-09-15 RX ORDER — SODIUM CHLORIDE 0.9 % (FLUSH) 0.9 %
5-40 SYRINGE (ML) INJECTION EVERY 12 HOURS SCHEDULED
Status: DISCONTINUED | OUTPATIENT
Start: 2022-09-16 | End: 2022-09-20 | Stop reason: HOSPADM

## 2022-09-15 RX ORDER — SODIUM CHLORIDE 0.9 % (FLUSH) 0.9 %
5-40 SYRINGE (ML) INJECTION PRN
Status: DISCONTINUED | OUTPATIENT
Start: 2022-09-15 | End: 2022-09-20 | Stop reason: HOSPADM

## 2022-09-15 RX ORDER — ASPIRIN 81 MG/1
81 TABLET ORAL NIGHTLY
Status: DISCONTINUED | OUTPATIENT
Start: 2022-09-16 | End: 2022-09-20 | Stop reason: HOSPADM

## 2022-09-15 RX ORDER — AMITRIPTYLINE HYDROCHLORIDE 25 MG/1
50 TABLET, FILM COATED ORAL NIGHTLY
Status: DISCONTINUED | OUTPATIENT
Start: 2022-09-16 | End: 2022-09-15 | Stop reason: CLARIF

## 2022-09-15 RX ORDER — DEXTROSE MONOHYDRATE 100 MG/ML
INJECTION, SOLUTION INTRAVENOUS CONTINUOUS PRN
Status: DISCONTINUED | OUTPATIENT
Start: 2022-09-15 | End: 2022-09-20 | Stop reason: HOSPADM

## 2022-09-15 RX ORDER — CETIRIZINE HYDROCHLORIDE 10 MG/1
10 TABLET ORAL DAILY
Status: DISCONTINUED | OUTPATIENT
Start: 2022-09-16 | End: 2022-09-20 | Stop reason: HOSPADM

## 2022-09-15 RX ORDER — ACETAMINOPHEN 650 MG/1
650 SUPPOSITORY RECTAL EVERY 6 HOURS PRN
Status: DISCONTINUED | OUTPATIENT
Start: 2022-09-15 | End: 2022-09-20 | Stop reason: HOSPADM

## 2022-09-15 RX ORDER — 0.9 % SODIUM CHLORIDE 0.9 %
1000 INTRAVENOUS SOLUTION INTRAVENOUS ONCE
Status: COMPLETED | OUTPATIENT
Start: 2022-09-15 | End: 2022-09-15

## 2022-09-15 RX ORDER — POLYETHYLENE GLYCOL 3350 17 G/17G
17 POWDER, FOR SOLUTION ORAL DAILY PRN
Status: DISCONTINUED | OUTPATIENT
Start: 2022-09-15 | End: 2022-09-20 | Stop reason: HOSPADM

## 2022-09-15 RX ORDER — METOPROLOL SUCCINATE 25 MG/1
25 TABLET, EXTENDED RELEASE ORAL 2 TIMES DAILY
Status: DISCONTINUED | OUTPATIENT
Start: 2022-09-16 | End: 2022-09-17

## 2022-09-15 RX ORDER — MONTELUKAST SODIUM 10 MG/1
10 TABLET ORAL NIGHTLY
Status: DISCONTINUED | OUTPATIENT
Start: 2022-09-16 | End: 2022-09-20 | Stop reason: HOSPADM

## 2022-09-15 RX ADMIN — SODIUM CHLORIDE 1000 ML: 9 INJECTION, SOLUTION INTRAVENOUS at 20:59

## 2022-09-15 RX ADMIN — POTASSIUM BICARBONATE 40 MEQ: 782 TABLET, EFFERVESCENT ORAL at 18:16

## 2022-09-15 ASSESSMENT — PAIN SCALES - GENERAL
PAINLEVEL_OUTOF10: 2
PAINLEVEL_OUTOF10: 0

## 2022-09-15 ASSESSMENT — ENCOUNTER SYMPTOMS
SINUS PRESSURE: 0
ABDOMINAL PAIN: 0
EYE PAIN: 0
SORE THROAT: 0
COUGH: 0
WHEEZING: 0
EYE DISCHARGE: 0
SHORTNESS OF BREATH: 0
VOMITING: 0
NAUSEA: 0
BACK PAIN: 0
DIARRHEA: 0
EYE REDNESS: 0

## 2022-09-15 ASSESSMENT — PAIN - FUNCTIONAL ASSESSMENT: PAIN_FUNCTIONAL_ASSESSMENT: 0-10

## 2022-09-15 ASSESSMENT — PAIN DESCRIPTION - LOCATION: LOCATION: LEG

## 2022-09-15 NOTE — ED PROVIDER NOTES
The history is provided by the patient and medical records. 45-year-old male presents emergency department complaint of elevated blood sugars as well as generalized weakness frequent falls and seeing bugs crawling on his lower extremities. Symptoms started last night. Nothing makes them better or worse. States he was seen here previously for fall however has been falling more frequently at home. Denies hitting his head. Otherwise states is on insulin for his diabetes however forgot to take it this evening and his sugar was elevated into the 300s. In regards to his hallucinations states he sees centipedes crawling on his feet which started last night. And he does have the sensation of crawling in his feet as well. Denies any alcohol or drug use. Denies any other acute complaints this time. The patient presents with elevated blood sugar and hallucinations that has been going on for 1 day. These symptoms are moderate in severity. Symptoms are made better by nothing. Symptoms are made worse by nothing. Associated symptoms include none. Review of Systems   Constitutional:  Negative for chills and fever. HENT:  Negative for ear pain, sinus pressure and sore throat. Eyes:  Negative for pain, discharge and redness. Respiratory:  Negative for cough, shortness of breath and wheezing. Cardiovascular:  Negative for chest pain. Gastrointestinal:  Negative for abdominal pain, diarrhea, nausea and vomiting. Genitourinary:  Negative for dysuria and frequency. Musculoskeletal:  Negative for arthralgias and back pain. Skin:  Negative for rash and wound. Neurological:  Negative for weakness and headaches. Frequent falls states he has generalized weakness   Hematological:  Negative for adenopathy. Psychiatric/Behavioral:          Hallucinations   All other systems reviewed and are negative. Physical Exam  Vitals and nursing note reviewed.    Constitutional:       General: He is not in acute distress. Appearance: He is well-developed. He is obese. He is not toxic-appearing. HENT:      Head: Normocephalic and atraumatic. Eyes:      Conjunctiva/sclera: Conjunctivae normal.   Cardiovascular:      Rate and Rhythm: Normal rate and regular rhythm. Heart sounds: Normal heart sounds. No murmur heard. Pulmonary:      Effort: Pulmonary effort is normal. No respiratory distress. Breath sounds: Normal breath sounds. No wheezing or rales. Abdominal:      General: Bowel sounds are normal.      Palpations: Abdomen is soft. Tenderness: There is no abdominal tenderness. There is no guarding or rebound. Musculoskeletal:         General: No tenderness or deformity. Cervical back: Normal range of motion and neck supple. Skin:     General: Skin is warm and dry. Comments: Does have abrasion to his right anterior aspect of the knee no active bleeding no signs of infection around it. Had Band-Aid applied on it happened several days ago he states. Neurological:      General: No focal deficit present. Mental Status: He is alert and oriented to person, place, and time. Cranial Nerves: No cranial nerve deficit. Sensory: No sensory deficit. Motor: No weakness. Comments: Patient was ambulatory to the bed however did need significant help from going from sitting to standing. Psychiatric:         Mood and Affect: Mood normal.         Thought Content:  Thought content normal.        Procedures     MDM     Amount and/or Complexity of Data Reviewed  Clinical lab tests: reviewed  Tests in the radiology section of CPT®: reviewed  Tests in the medicine section of CPT®: reviewed  Decide to obtain previous medical records or to obtain history from someone other than the patient: yes       80-year-old male presents to the emergency department with complaint of feeling like centipedes are crawling on his legs hallucinations as well as hyperglycemia and a fall recently. He on laboratory work-up is hyperglycemic however not diabetic ketoacidosis. CT scan is unremarkable remaining laboratory work-up is reassuring. However still having these hallucinations urinary analysis was unremarkable for urinary tract infection urine drug screen and serum drug screen unremarkable. Due to patient's continued hallucinations concerning possible metabolic encephalopathy he will be admitted to the hospital at this time for further work-up and evaluation did speak with many providers agreeable as he was otherwise hemodynamically stable here. ED Course as of 09/15/22 2123   Thu Sep 15, 2022   1724 EKG: This EKG is signed by emergency department physician. Rate: 59  Rhythm: Sinus  Interpretation: No acute ST elevation impression sinus rhythm normal axis stable intervals  Comparison: stable as compared to patient's most recent EKG      [CB]   2044 Spoke with Dr. Chu Ngo who is agreeable with admission at this time. [CB]      ED Course User Index  [CB] Meneu Tran MD       ED Course as of 09/15/22 2123   Thu Sep 15, 2022   1724 EKG: This EKG is signed by emergency department physician. Rate: 59  Rhythm: Sinus  Interpretation: No acute ST elevation impression sinus rhythm normal axis stable intervals  Comparison: stable as compared to patient's most recent EKG      [CB]   2044 Spoke with Dr. Chu Ngo who is agreeable with admission at this time.  [CB]      ED Course User Index  [CB] Meenu Tran MD       --------------------------------------------- PAST HISTORY ---------------------------------------------  Past Medical History:  has a past medical history of Acid reflux, Acute diastolic (congestive) heart failure ([facility] Arthritis, Asthma, Asthmatic bronchitis , chronic ([facility] CAD (coronary artery disease), [facility] COPD (chronic obstructive pulmonary disease) [facility] COPD (chronic obstructive pulmonary disease) [facility] Diabetes mellitus ([facility] Emphysema (subcutaneous) (surgical) resulting from a procedure, H/O cardiovascular stress test, Hyperlipidemia, Hypertension, Hypoxemia requiring supplemental oxygen, LONG TERM ANTICOAGULENT USE, Morbid obesity with BMI of 50.0-59.9, adult ([facility] Obesity, Osteoarthritis, Sleep apnea, Stage 3 chronic kidney disease ([facility] Tobacco abuse, and Type II or unspecified type diabetes mellitus without mention of complication, not stated as uncontrolled. Past Surgical History:  has a past surgical history that includes hernia repair; sinus surgery; ECHO Compl W Dop Color Flow (7/25/2013); Colonoscopy; Coronary angioplasty with stent (07-23-13); and Coronary artery bypass graft (09-09-13). Social History:  reports that he quit smoking about 9 years ago. His smoking use included cigarettes. He has a 45.00 pack-year smoking history. He quit smokeless tobacco use about 50 years ago. His smokeless tobacco use included chew. He reports that he does not drink alcohol and does not use drugs. Family History: family history includes Cancer in his father and mother; Mental Illness in his brother; Other in his brother; Stroke in his brother. The patients home medications have been reviewed.     Allergies: Pcn [penicillins] and Sulfa antibiotics    -------------------------------------------------- RESULTS -------------------------------------------------    LABS:  Results for orders placed or performed during the hospital encounter of 09/15/22   CBC with Auto Differential   Result Value Ref Range    WBC 7.4 4.5 - 11.5 E9/L    RBC 4.04 3.80 - 5.80 E12/L    Hemoglobin 12.8 12.5 - 16.5 g/dL    Hematocrit 38.7 37.0 - 54.0 %    MCV 95.8 80.0 - 99.9 fL    MCH 31.7 26.0 - 35.0 pg    MCHC 33.1 32.0 - 34.5 %    RDW 15.4 (H) 11.5 - 15.0 fL    Platelets 744 371 - 044 E9/L    MPV 10.6 7.0 - 12.0 fL    Neutrophils % 74.4 43.0 - 80.0 %    Immature Granulocytes % 0.5 0.0 - 5.0 %    Lymphocytes % 12.7 (L) 20.0 - 42.0 %    Monocytes % 10.5 2.0 - 12.0 %    Eosinophils % 1.5 0.0 - 6.0 %    Basophils % 0.4 0.0 - 2.0 %    Neutrophils Absolute 5.51 1.80 - 7.30 E9/L    Immature Granulocytes # 0.04 E9/L    Lymphocytes Absolute 0.94 (L) 1.50 - 4.00 E9/L    Monocytes Absolute 0.78 0.10 - 0.95 E9/L    Eosinophils Absolute 0.11 0.05 - 0.50 E9/L    Basophils Absolute 0.03 0.00 - 0.20 M7/C   Basic Metabolic Panel w/ Reflex to MG   Result Value Ref Range    Sodium 136 132 - 146 mmol/L    Potassium reflex Magnesium 3.1 (L) 3.5 - 5.0 mmol/L    Chloride 96 (L) 98 - 107 mmol/L    CO2 28 22 - 29 mmol/L    Anion Gap 12 7 - 16 mmol/L    Glucose 299 (H) 74 - 99 mg/dL    BUN 42 (H) 6 - 23 mg/dL    Creatinine 1.7 (H) 0.7 - 1.2 mg/dL    GFR Non-African American 40 >=60 mL/min/1.73    GFR African American 49     Calcium 9.3 8.6 - 10.2 mg/dL   Hepatic Function Panel   Result Value Ref Range    Total Protein 7.4 6.4 - 8.3 g/dL    Albumin 3.8 3.5 - 5.2 g/dL    Alkaline Phosphatase 181 (H) 40 - 129 U/L    ALT 47 (H) 0 - 40 U/L    AST 36 0 - 39 U/L    Total Bilirubin 1.0 0.0 - 1.2 mg/dL    Bilirubin, Direct 0.3 0.0 - 0.3 mg/dL    Bilirubin, Indirect 0.7 0.0 - 1.0 mg/dL   Troponin   Result Value Ref Range    Troponin, High Sensitivity 34 (H) 0 - 11 ng/L   Brain Natriuretic Peptide   Result Value Ref Range    Pro-BNP 1,860 (H) 0 - 125 pg/mL   Urinalysis with Microscopic   Result Value Ref Range    Color, UA Yellow Straw/Yellow    Clarity, UA Clear Clear    Glucose, Ur >=1000 (A) Negative mg/dL    Bilirubin Urine Negative Negative    Ketones, Urine Negative Negative mg/dL    Specific Gravity, UA 1.020 1.005 - 1.030    Blood, Urine Negative Negative    pH, UA 6.0 5.0 - 9.0    Protein, UA TRACE Negative mg/dL    Urobilinogen, Urine 1.0 <2.0 E.U./dL    Nitrite, Urine Negative Negative    Leukocyte Esterase, Urine Negative Negative    WBC, UA NONE 0 - 5 /HPF    RBC, UA NONE 0 - 2 /HPF    Epithelial Cells, UA NONE SEEN /HPF    Bacteria, UA NONE SEEN None Seen /HPF   URINE DRUG SCREEN   Result Value Ref Range    Amphetamine Screen, Urine NOT DETECTED Negative <1000 ng/mL    Barbiturate Screen, Ur NOT DETECTED Negative < 200 ng/mL    Benzodiazepine Screen, Urine NOT DETECTED Negative < 200 ng/mL    Cannabinoid Scrn, Ur NOT DETECTED Negative < 50ng/mL    Cocaine Metabolite Screen, Urine NOT DETECTED Negative < 300 ng/mL    Opiate Scrn, Ur NOT DETECTED Negative < 300ng/mL    PCP Screen, Urine NOT DETECTED Negative < 25 ng/mL    Methadone Screen, Urine NOT DETECTED Negative <300 ng/mL    Oxycodone Urine NOT DETECTED Negative <100 ng/mL    FENTANYL SCREEN, URINE NOT DETECTED Negative <1 ng/mL    Drug Screen Comment: see below    Serum Drug Screen   Result Value Ref Range    Ethanol Lvl <10 mg/dL    Acetaminophen Level <5.0 (L) 10.0 - 43.3 mcg/mL    Salicylate, Serum <4.6 0.0 - 30.0 mg/dL    TCA Scrn NEGATIVE Cutoff:300 ng/mL   Ammonia   Result Value Ref Range    Ammonia 18.0 16.0 - 60.0 umol/L   Magnesium   Result Value Ref Range    Magnesium 1.9 1.6 - 2.6 mg/dL   Troponin   Result Value Ref Range    Troponin, High Sensitivity 29 (H) 0 - 11 ng/L   POCT Glucose   Result Value Ref Range    Glucose 340 mg/dL   POCT Glucose   Result Value Ref Range    Meter Glucose 340 (H) 74 - 99 mg/dL   EKG 12 Lead   Result Value Ref Range    Ventricular Rate 59 BPM    Atrial Rate 59 BPM    P-R Interval 184 ms    QRS Duration 132 ms    Q-T Interval 438 ms    QTc Calculation (Bazett) 433 ms    P Axis 66 degrees    R Axis 94 degrees    T Axis -103 degrees       RADIOLOGY:  XR CHEST 1 VIEW   Final Result   No acute process. CT Head WO Contrast   Final Result   CT HEAD WITHOUT CONTRAST:      1. No skull fracture or acute intracranial abnormality. CT CERVICAL SPINE WITHOUT CONTRAST:      1. Nondisplaced fracture in the right C7 transverse process (coronal   reformats, image 22). 2. Degenerative changes, as described. 3. Calcified plaque along the carotid bulbs, especially on the left.   Further   evaluation with carotid Doppler recommended. CT Cervical Spine WO Contrast   Final Result   CT HEAD WITHOUT CONTRAST:      1. No skull fracture or acute intracranial abnormality. CT CERVICAL SPINE WITHOUT CONTRAST:      1. Nondisplaced fracture in the right C7 transverse process (coronal   reformats, image 22). 2. Degenerative changes, as described. 3. Calcified plaque along the carotid bulbs, especially on the left. Further   evaluation with carotid Doppler recommended. ------------------------- NURSING NOTES AND VITALS REVIEWED ---------------------------  Date / Time Roomed:  9/15/2022  4:37 PM  ED Bed Assignment:  15/15    The nursing notes within the ED encounter and vital signs as below have been reviewed. Patient Vitals for the past 24 hrs:   BP Temp Temp src Pulse Resp SpO2 Height Weight   09/15/22 2015 138/88 -- -- 90 18 96 % -- --   09/15/22 1650 (!) 140/87 98.7 °F (37.1 °C) Oral 88 -- -- 5' 6\" (1.676 m) 287 lb (130.2 kg)       Oxygen Saturation Interpretation: Normal    ------------------------------------------ PROGRESS NOTES ------------------------------------------  Re-evaluation(s):  Time: 2018  Patients symptoms show no change  Repeat physical examination is not changed    Counseling:  I have spoken with the patient and discussed todays results, in addition to providing specific details for the plan of care and counseling regarding the diagnosis and prognosis. Their questions are answered at this time and they are agreeable with the plan of admission.    --------------------------------- ADDITIONAL PROVIDER NOTES ---------------------------------  Consultations:  . Spoke with Dr. Luis Harvey. Discussed case. They will admit the patient.   This patient's ED course included: a personal history and physicial examination, re-evaluation prior to disposition, multiple bedside re-evaluations, IV medications, cardiac monitoring, and continuous pulse oximetry    This patient has remained hemodynamically stable during their ED course. Diagnosis:  1. Hallucination    2. Hyperglycemia        Disposition:  Patient's disposition: Admit to med/surg floor  Patient's condition is stable.          Sanjeev Ramires MD  Resident  09/15/22 0170

## 2022-09-16 ENCOUNTER — APPOINTMENT (OUTPATIENT)
Dept: ULTRASOUND IMAGING | Age: 67
DRG: 091 | End: 2022-09-16
Payer: MEDICARE

## 2022-09-16 ENCOUNTER — TELEPHONE (OUTPATIENT)
Dept: FAMILY MEDICINE CLINIC | Age: 67
End: 2022-09-16

## 2022-09-16 PROBLEM — R41.82 ALTERED MENTAL STATUS: Status: ACTIVE | Noted: 2022-09-16

## 2022-09-16 LAB
ALBUMIN SERPL-MCNC: 3.7 G/DL (ref 3.5–5.2)
ALP BLD-CCNC: 171 U/L (ref 40–129)
ALT SERPL-CCNC: 44 U/L (ref 0–40)
ANION GAP SERPL CALCULATED.3IONS-SCNC: 11 MMOL/L (ref 7–16)
AST SERPL-CCNC: 36 U/L (ref 0–39)
BASOPHILS ABSOLUTE: 0.03 E9/L (ref 0–0.2)
BASOPHILS RELATIVE PERCENT: 0.5 % (ref 0–2)
BILIRUB SERPL-MCNC: 0.9 MG/DL (ref 0–1.2)
BUN BLDV-MCNC: 36 MG/DL (ref 6–23)
CALCIUM SERPL-MCNC: 9.2 MG/DL (ref 8.6–10.2)
CHLORIDE BLD-SCNC: 98 MMOL/L (ref 98–107)
CO2: 28 MMOL/L (ref 22–29)
CREAT SERPL-MCNC: 1.3 MG/DL (ref 0.7–1.2)
EOSINOPHILS ABSOLUTE: 0.13 E9/L (ref 0.05–0.5)
EOSINOPHILS RELATIVE PERCENT: 2 % (ref 0–6)
FOLATE: 10.9 NG/ML (ref 4.8–24.2)
GFR AFRICAN AMERICAN: >60
GFR NON-AFRICAN AMERICAN: 55 ML/MIN/1.73
GLUCOSE BLD-MCNC: 221 MG/DL (ref 74–99)
HBA1C MFR BLD: 9.5 % (ref 4–5.6)
HCT VFR BLD CALC: 38.3 % (ref 37–54)
HEMOGLOBIN: 12.5 G/DL (ref 12.5–16.5)
IMMATURE GRANULOCYTES #: 0.03 E9/L
IMMATURE GRANULOCYTES %: 0.5 % (ref 0–5)
INR BLD: 5.5
LYMPHOCYTES ABSOLUTE: 0.96 E9/L (ref 1.5–4)
LYMPHOCYTES RELATIVE PERCENT: 14.9 % (ref 20–42)
MAGNESIUM: 1.7 MG/DL (ref 1.6–2.6)
MCH RBC QN AUTO: 31.7 PG (ref 26–35)
MCHC RBC AUTO-ENTMCNC: 32.6 % (ref 32–34.5)
MCV RBC AUTO: 97.2 FL (ref 80–99.9)
METER GLUCOSE: 115 MG/DL (ref 74–99)
METER GLUCOSE: 145 MG/DL (ref 74–99)
METER GLUCOSE: 152 MG/DL (ref 74–99)
METER GLUCOSE: 223 MG/DL (ref 74–99)
METER GLUCOSE: 230 MG/DL (ref 74–99)
MONOCYTES ABSOLUTE: 0.65 E9/L (ref 0.1–0.95)
MONOCYTES RELATIVE PERCENT: 10.1 % (ref 2–12)
NEUTROPHILS ABSOLUTE: 4.63 E9/L (ref 1.8–7.3)
NEUTROPHILS RELATIVE PERCENT: 72 % (ref 43–80)
PDW BLD-RTO: 15.4 FL (ref 11.5–15)
PHOSPHORUS: 2.3 MG/DL (ref 2.5–4.5)
PLATELET # BLD: 152 E9/L (ref 130–450)
PMV BLD AUTO: 10.4 FL (ref 7–12)
POTASSIUM SERPL-SCNC: 3.1 MMOL/L (ref 3.5–5)
PROCALCITONIN: 0.14 NG/ML (ref 0–0.08)
PROTHROMBIN TIME: 64.2 SEC (ref 9.3–12.4)
RBC # BLD: 3.94 E12/L (ref 3.8–5.8)
SODIUM BLD-SCNC: 137 MMOL/L (ref 132–146)
TOTAL PROTEIN: 7.3 G/DL (ref 6.4–8.3)
VITAMIN B-12: 635 PG/ML (ref 211–946)
WBC # BLD: 6.4 E9/L (ref 4.5–11.5)

## 2022-09-16 PROCEDURE — 93880 EXTRACRANIAL BILAT STUDY: CPT

## 2022-09-16 PROCEDURE — 1200000000 HC SEMI PRIVATE

## 2022-09-16 PROCEDURE — 36415 COLL VENOUS BLD VENIPUNCTURE: CPT

## 2022-09-16 PROCEDURE — 6370000000 HC RX 637 (ALT 250 FOR IP): Performed by: INTERNAL MEDICINE

## 2022-09-16 PROCEDURE — 82962 GLUCOSE BLOOD TEST: CPT

## 2022-09-16 PROCEDURE — 83735 ASSAY OF MAGNESIUM: CPT

## 2022-09-16 PROCEDURE — 84100 ASSAY OF PHOSPHORUS: CPT

## 2022-09-16 PROCEDURE — 85025 COMPLETE CBC W/AUTO DIFF WBC: CPT

## 2022-09-16 PROCEDURE — 97530 THERAPEUTIC ACTIVITIES: CPT | Performed by: PHYSICAL THERAPIST

## 2022-09-16 PROCEDURE — 97161 PT EVAL LOW COMPLEX 20 MIN: CPT | Performed by: PHYSICAL THERAPIST

## 2022-09-16 PROCEDURE — 82746 ASSAY OF FOLIC ACID SERUM: CPT

## 2022-09-16 PROCEDURE — 82607 VITAMIN B-12: CPT

## 2022-09-16 PROCEDURE — 83036 HEMOGLOBIN GLYCOSYLATED A1C: CPT

## 2022-09-16 PROCEDURE — 80053 COMPREHEN METABOLIC PANEL: CPT

## 2022-09-16 PROCEDURE — 84145 PROCALCITONIN (PCT): CPT

## 2022-09-16 PROCEDURE — 2580000003 HC RX 258: Performed by: INTERNAL MEDICINE

## 2022-09-16 PROCEDURE — 85610 PROTHROMBIN TIME: CPT

## 2022-09-16 RX ORDER — PRAMIPEXOLE DIHYDROCHLORIDE 0.25 MG/1
0.25 TABLET ORAL 3 TIMES DAILY
Status: DISCONTINUED | OUTPATIENT
Start: 2022-09-16 | End: 2022-09-16

## 2022-09-16 RX ORDER — WARFARIN SODIUM 5 MG/1
5 TABLET ORAL
Status: DISCONTINUED | OUTPATIENT
Start: 2022-09-16 | End: 2022-09-17

## 2022-09-16 RX ORDER — METOLAZONE 2.5 MG/1
2.5 TABLET ORAL DAILY
Status: DISCONTINUED | OUTPATIENT
Start: 2022-09-16 | End: 2022-09-20 | Stop reason: HOSPADM

## 2022-09-16 RX ORDER — WARFARIN SODIUM 2.5 MG/1
2.5 TABLET ORAL
Status: DISCONTINUED | OUTPATIENT
Start: 2022-09-18 | End: 2022-09-17

## 2022-09-16 RX ORDER — LOSARTAN POTASSIUM 50 MG/1
25 TABLET ORAL DAILY
Status: DISCONTINUED | OUTPATIENT
Start: 2022-09-16 | End: 2022-09-20 | Stop reason: HOSPADM

## 2022-09-16 RX ORDER — ATORVASTATIN CALCIUM 40 MG/1
80 TABLET, FILM COATED ORAL NIGHTLY
Status: DISCONTINUED | OUTPATIENT
Start: 2022-09-16 | End: 2022-09-20 | Stop reason: HOSPADM

## 2022-09-16 RX ORDER — BUMETANIDE 1 MG/1
1 TABLET ORAL DAILY
Status: DISCONTINUED | OUTPATIENT
Start: 2022-09-16 | End: 2022-09-20 | Stop reason: HOSPADM

## 2022-09-16 RX ORDER — AMIODARONE HYDROCHLORIDE 200 MG/1
200 TABLET ORAL DAILY
Status: DISCONTINUED | OUTPATIENT
Start: 2022-09-17 | End: 2022-09-20 | Stop reason: HOSPADM

## 2022-09-16 RX ORDER — INSULIN LISPRO 100 [IU]/ML
0-8 INJECTION, SOLUTION INTRAVENOUS; SUBCUTANEOUS
Status: DISCONTINUED | OUTPATIENT
Start: 2022-09-16 | End: 2022-09-20 | Stop reason: HOSPADM

## 2022-09-16 RX ORDER — ASPIRIN 81 MG/1
162 TABLET, CHEWABLE ORAL ONCE
Status: COMPLETED | OUTPATIENT
Start: 2022-09-16 | End: 2022-09-16

## 2022-09-16 RX ORDER — SPIRONOLACTONE 25 MG/1
25 TABLET ORAL DAILY
Status: DISCONTINUED | OUTPATIENT
Start: 2022-09-16 | End: 2022-09-20 | Stop reason: HOSPADM

## 2022-09-16 RX ORDER — INSULIN LISPRO 100 [IU]/ML
0-4 INJECTION, SOLUTION INTRAVENOUS; SUBCUTANEOUS NIGHTLY
Status: DISCONTINUED | OUTPATIENT
Start: 2022-09-16 | End: 2022-09-20 | Stop reason: HOSPADM

## 2022-09-16 RX ORDER — AMITRIPTYLINE HYDROCHLORIDE 25 MG/1
25 TABLET, FILM COATED ORAL NIGHTLY
Status: DISCONTINUED | OUTPATIENT
Start: 2022-09-16 | End: 2022-09-16

## 2022-09-16 RX ADMIN — INSULIN GLARGINE 45 UNITS: 100 INJECTION, SOLUTION SUBCUTANEOUS at 08:52

## 2022-09-16 RX ADMIN — ASPIRIN 81 MG: 81 TABLET, COATED ORAL at 19:56

## 2022-09-16 RX ADMIN — SODIUM CHLORIDE, PRESERVATIVE FREE 10 ML: 5 INJECTION INTRAVENOUS at 19:57

## 2022-09-16 RX ADMIN — CARBAMIDE PEROXIDE 10 DROP: 65 SOLUTION/ DROPS AURICULAR (OTIC) at 14:17

## 2022-09-16 RX ADMIN — ATORVASTATIN CALCIUM 80 MG: 40 TABLET, FILM COATED ORAL at 19:56

## 2022-09-16 RX ADMIN — CETIRIZINE HYDROCHLORIDE 10 MG: 10 TABLET, FILM COATED ORAL at 09:33

## 2022-09-16 RX ADMIN — AMIODARONE HYDROCHLORIDE 200 MG: 200 TABLET ORAL at 09:33

## 2022-09-16 RX ADMIN — LOSARTAN POTASSIUM 25 MG: 50 TABLET, FILM COATED ORAL at 18:25

## 2022-09-16 RX ADMIN — MONTELUKAST 10 MG: 10 TABLET, FILM COATED ORAL at 00:20

## 2022-09-16 RX ADMIN — CARBAMIDE PEROXIDE 10 DROP: 65 SOLUTION/ DROPS AURICULAR (OTIC) at 20:11

## 2022-09-16 RX ADMIN — METOPROLOL SUCCINATE 25 MG: 25 TABLET, EXTENDED RELEASE ORAL at 09:33

## 2022-09-16 RX ADMIN — PANTOPRAZOLE SODIUM 40 MG: 40 TABLET, DELAYED RELEASE ORAL at 09:33

## 2022-09-16 RX ADMIN — ASPIRIN 81 MG 162 MG: 81 TABLET ORAL at 00:30

## 2022-09-16 RX ADMIN — SPIRONOLACTONE 25 MG: 25 TABLET ORAL at 18:24

## 2022-09-16 RX ADMIN — INSULIN GLARGINE 45 UNITS: 100 INJECTION, SOLUTION SUBCUTANEOUS at 00:22

## 2022-09-16 RX ADMIN — MONTELUKAST 10 MG: 10 TABLET, FILM COATED ORAL at 19:56

## 2022-09-16 RX ADMIN — INSULIN LISPRO 2 UNITS: 100 INJECTION, SOLUTION INTRAVENOUS; SUBCUTANEOUS at 09:35

## 2022-09-16 RX ADMIN — PRAMIPEXOLE DIHYDROCHLORIDE 0.25 MG: 0.25 TABLET ORAL at 09:02

## 2022-09-16 RX ADMIN — BUMETANIDE 1 MG: 1 TABLET ORAL at 18:24

## 2022-09-16 RX ADMIN — METOPROLOL SUCCINATE 25 MG: 25 TABLET, EXTENDED RELEASE ORAL at 19:56

## 2022-09-16 RX ADMIN — AMIODARONE HYDROCHLORIDE 200 MG: 200 TABLET ORAL at 00:20

## 2022-09-16 RX ADMIN — ACETAMINOPHEN 650 MG: 325 TABLET ORAL at 19:56

## 2022-09-16 RX ADMIN — METOLAZONE 2.5 MG: 2.5 TABLET ORAL at 18:28

## 2022-09-16 RX ADMIN — INSULIN LISPRO 4 UNITS: 100 INJECTION, SOLUTION INTRAVENOUS; SUBCUTANEOUS at 00:31

## 2022-09-16 RX ADMIN — METOPROLOL SUCCINATE 25 MG: 25 TABLET, EXTENDED RELEASE ORAL at 00:20

## 2022-09-16 ASSESSMENT — PAIN DESCRIPTION - LOCATION: LOCATION: BACK

## 2022-09-16 ASSESSMENT — PAIN SCALES - GENERAL
PAINLEVEL_OUTOF10: 0
PAINLEVEL_OUTOF10: 4
PAINLEVEL_OUTOF10: 0

## 2022-09-16 ASSESSMENT — PAIN DESCRIPTION - ORIENTATION: ORIENTATION: MID

## 2022-09-16 ASSESSMENT — PAIN - FUNCTIONAL ASSESSMENT: PAIN_FUNCTIONAL_ASSESSMENT: PREVENTS OR INTERFERES SOME ACTIVE ACTIVITIES AND ADLS

## 2022-09-16 ASSESSMENT — PAIN DESCRIPTION - DESCRIPTORS: DESCRIPTORS: ACHING

## 2022-09-16 NOTE — PROGRESS NOTES
OT SESSION ATTEMPT     Date:2022  Patient Name: Blake Doll  MRN: [id number]  : 1955  Room: [address] [place] [address]     Attempted OT session this date:    [] unavailable due to other medical staff currently with pt   [x] unavailable per nursing staff recommendation-pt going for procedure today    [] Unavailable per nursing staff secondary to lab / radiology results    [] pt declined due to ____. Benefits of participation in therapy reviewed with pt.    [] off unit   [] Other:     Will reattempt OT evaluation and/or treatment at a later time.     Hermann Walker OTR/L; [id number]

## 2022-09-16 NOTE — ACP (ADVANCE CARE PLANNING)
Advance Care Planning   Healthcare Decision Maker:    Primary Decision Maker: Obie Santamaria Spouse - [phone]    Primary Decision Maker: Eliane Philippe - Child - [phone]

## 2022-09-16 NOTE — TELEPHONE ENCOUNTER
Anna/[facility] called to inform that patient should be d'chgd from hospital today or tomorrow and will be needing PT and OT. Anna asked if Dr. Eldon Nolan will follow.     Last seen 6/14/2022  Next appt 9/27/2022

## 2022-09-16 NOTE — PLAN OF CARE
Problem: Discharge Planning  Goal: Discharge to home or other facility with appropriate resources  Outcome: Progressing  Flowsheets (Taken 9/16/2022 0121)  Discharge to home or other facility with appropriate resources:   Identify barriers to discharge with patient and caregiver   Arrange for needed discharge resources and transportation as appropriate   Identify discharge learning needs (meds, wound care, etc)     Problem: Pain  Goal: Verbalizes/displays adequate comfort level or baseline comfort level  Outcome: Progressing     Problem: ABCDS Injury Assessment  Goal: Absence of physical injury  Outcome: Progressing     Problem: Safety - Adult  Goal: Free from fall injury  Outcome: Progressing     Problem: Neurosensory - Adult  Goal: Achieves stable or improved neurological status  Outcome: Progressing  Goal: Achieves maximal functionality and self care  Outcome: Progressing     Problem: Cardiovascular - Adult  Goal: Maintains optimal cardiac output and hemodynamic stability  Outcome: Progressing  Goal: Absence of cardiac dysrhythmias or at baseline  Outcome: Progressing     Problem: Skin/Tissue Integrity - Adult  Goal: Skin integrity remains intact  Outcome: Progressing  Goal: Incisions, wounds, or drain sites healing without S/S of infection  Outcome: Progressing

## 2022-09-16 NOTE — CARE COORDINATION
9-16-Cm note: ( no covid testing) met with pt for transition of care needs, pt lives with his wife, he states he cares for her and would NOT consider rehab at dc, he has a hx of [facility] and would like them again for Physical and Occupational therapy (orders in [facility]), referral called , Radha Amin will follow him for start of care , they can start day after dc due to only needing PT/OT. Pt has a walker, wheelchair and a cane. He also has a CPAP from [facility]. Pt denies any further needs for dc. CM will follow.  Electronically signed by Michael Irizarry RN on 9/16/2022 at 11:41 AM

## 2022-09-16 NOTE — PROGRESS NOTES
INR 5.49 per lab staff. Critical result reported to Dr. Anne Westfall. Order received for Vit K 1mg IVPB, and to hold next dose of coumadin.

## 2022-09-16 NOTE — PROGRESS NOTES
Physical Therapy    Physical Therapy Initial Evaluation/Plan of Care    Room #:  7590/2872-77  Patient Name: Hira Chaudhari  YOB: 1955  MRN: [id number]    Date of Service: 9/16/2022     Tentative placement recommendation: Subacute rehab  Equipment recommendation:  To be determined      Evaluating Physical Therapist: Manohar Dee, PT  #[id number]      Specific Provider Orders/Date/Referring Provider :  09/16/22 0515    PT eval and treat  Start:  09/16/22 0515,   End:  09/16/22 0515,   ONE TIME,   Standing Count:  1 Occurrences,   R         Marko Burn, DO     Admitting Diagnosis:   Hallucination [R44.3]  Hyperglycemia [C72.2]  Acute metabolic encephalopathy [V76.99]    Admitted  with    frequent falls, weakness, altered mental status , hallucinations  Nondisplaced fracture in the right C7 transverse process    Surgery: none  Visit Diagnoses         Codes    Hallucination    -  Primary R44.3    Hyperglycemia     R73.9            Patient Active Problem List   Diagnosis    CAD (coronary artery disease)    Hypertension    Type 2 diabetes mellitus with hyperglycemia, with long-term current use of insulin (HCC)    Tobacco abuse    PAF (paroxysmal atrial fibrillation) ([facility]    Status post coronary artery bypass graft    H/O mitral valve repair    Morbid obesity with BMI of 50.0-59.9, adult ([address]    Chronic bronchitis ([address]    Sleep apnea    Gastroesophageal reflux disease without esophagitis    Hyperlipidemia    Muscular deconditioning    Acute diastolic (congestive) heart failure (HCC)    Acute diastolic heart failure (HCC)    Iron deficiency anemia, unspecified    Acute systolic/diastolic congestive heart failure (HCC)    Atrial fibrillation with RVR (HCC)    Ischemic cardiomyopathy    Nonrheumatic tricuspid valve regurgitation    Chronic anticoagulation    Stage 3 chronic kidney disease ([address]    Acute on chronic congestive heart failure (HCC)    Dizziness    Hyperosmolar hyperglycemic state (HHS) ([facility] [address]    Acute metabolic encephalopathy    Altered mental status        ASSESSMENT of Current Deficits Patient exhibits decreased strength, balance, endurance, and coordination impairing functional mobility, transfers, gait , gait distance, and tolerance to activity are barriers to d/c and require skilled intervention during hospital stay to attain pre hospital level of function. Decreased strength, balance and endurance  increases patient's risk for fall. Distractibility  impacts safety during mobilization and pt exhibits poor insight re: disability      PHYSICAL THERAPY  PLAN OF CARE       Physical therapy plan of care is established based on physician order,  patient diagnosis and clinical assessment    Current Treatment Recommendations:    -Bed Mobility: Lower extremity exercises , Upper extremity exercises , and Trunk control activities   -Standing Balance: Perform strengthening exercises in standing to promote motor control with or without upper extremity support  and Instruct patient on adequate base of support to maintain balance  -Transfers: Provide instruction on proper hand and foot position for adequate transfer of weight onto lower extremities and use of gait device if needed and Cues for hand placement, technique and safety. Provide stabilization to prevent fall   -Gait: Gait training, Standing activities to improve: base of support, weight shift, weight bearing , Exercises to improve trunk control, and Exercises to improve hip and knee control   -Endurance: Utilize Supervised activities to increase level of endurance to allow for safe functional mobility including transfers and gait     PT long term treatment goals are located in below grid    Patient and or family understand(s) diagnosis, prognosis, and plan of care. Frequency of treatments: Patient will be seen  daily.          Prior Level of Function: Patient ambulated independently    Rehab Potential: good   for baseline    Past medical history:   Past Medical History:   Diagnosis Date    Acid reflux     Acute diastolic (congestive) heart failure ([facility] 06/19/2019    Arthritis     Asthma 04/16/2014    Asthmatic bronchitis , chronic ([facility] 11/28/2016    CAD (coronary artery disease)     Chronic bronchitis ([facility] 04/16/2014    COPD (chronic obstructive pulmonary disease) ([facility])     CB    COPD (chronic obstructive pulmonary disease) ([facility])     Diabetes mellitus ([facility]     Emphysema (subcutaneous) (surgical) resulting from a procedure     H/O cardiovascular stress test 04/24/2021    Lexiscan    Hyperlipidemia     Hypertension     Hypoxemia requiring supplemental oxygen 02/02/2015    LONG TERM ANTICOAGULENT USE     Morbid obesity with BMI of 50.0-59.9, adult ([facility] 11/27/2013    Obesity     Osteoarthritis     Sleep apnea     bilevel positive airway pressure at 13/8 with 2 L oxygen flow     Stage 3 chronic kidney disease ([facility])     Tobacco abuse     Type II or unspecified type diabetes mellitus without mention of complication, not stated as uncontrolled      Past Surgical History:   Procedure Laterality Date    COLONOSCOPY      CORONARY ANGIOPLASTY WITH STENT PLACEMENT  07-23-13    Left main focal eccentric 65% distal stenosis. Large OM1 CX 50% ostial & prox narrow. Large ramus artery & LAD: Minor plaque w/o sign narrow. RCA: Dom vessel w/25% prox narrow & focal hazy eccentric 99% distal stenosis before origin RPDA & RPLCA. LV: Mild inferior hypokinesis EF 55%. Probable mild AS with 10-15mmHg. Successful PCI distal RCA w/3.5 x 12 mm BMS, 0% res stenosis.  Normal distal runoff    CORONARY ARTERY BYPASS GRAFT  09-09-13    Dr Franck Alejandro; CABG x2, LIMA to LAD, SVG to ramus intermedius; MV repair using 30-mm Future complete ring with magic stitch between A3 and P3; rigid internal fixation of sternum using KLS plates x2; endoscopic vein harvesting of right lower extremity     ECHO COMPL W DOP COLOR FLOW  7/25/2013         HERNIA REPAIR      SINUS SURGERY SUBJECTIVE:    Precautions: Up with assistance, falls and alarm ,  distractible, poor insight    Social history: Patient lives with spouse in a apartment     with  12 steps  to enter with Rail  Tub shower     Equipment owned:  Sonja Mai,       [id number] [facility]  Mobility Inpatient   How much difficulty turning over in bed?: A Lot  How much difficulty sitting down on / standing up from a chair with arms?: A Lot  How much difficulty moving from lying on back to sitting on side of bed?: A Lot  How much help from another person moving to and from a bed to a chair?: A Lot  How much help from another person needed to walk in hospital room?: A Lot  How much help from another person for climbing 3-5 steps with a railing?: Total  AM-PAC Inpatient Mobility Raw Score : 11  AM-PAC Inpatient T-Scale Score : 33.86  Mobility Inpatient CMS 0-100% Score: 72.57  Mobility Inpatient CMS G-Code Modifier : CL    Nursing cleared patient for PT evaluation. The admitting diagnosis and active problem list as listed above have been reviewed prior to the initiation of this evaluation. OBJECTIVE;   Initial Evaluation  Date: 9/16/2022 Treatment Date:     Short Term/ Long Term   Goals   Was pt agreeable to Eval/treatment? Yes  To be met in 4 days   Pain level   5/10  back     Bed Mobility    Rolling: Moderate assist of 1    Supine to sit: Maximal assist of 1    Sit to supine: Maximal assist of 1    Scooting: Moderate assist of 1    Rolling: Independent    Supine to sit: Independent    Sit to supine: Independent    Scooting: Independent     Transfers Sit to stand:  Moderate assist of 1 x 3 reps from chair and bed  Sit to stand: Independent     Ambulation     12 feet using  wheeled walker with Moderate assist of 1   for walker approximation, upright, and safety and cues for walker approximation, safety, and proper hand placement    75 feet using  wheeled walker with Independent    Stair negotiation: ascended and descended   Not assessed     10 steps with and device supervision    ROM Within functional limits    Increase range of motion 10% of affected joints    Strength BUE:  refer to OT eval  RLE:  3/5  LLE:  3/5  Increase strength in affected mm groups by 1/3 grade   Balance Sitting EOB:  fair    Dynamic Standing:  fair    Sitting EOB:  good    Dynamic Standing: good       Patient is Alert & Oriented x person and place and follows one step directions  distractible  Sensation:  Patient  denies numbness/tingling   Edema:  yes bilateral lower extremities   Endurance: poor fatigues easily however poor insight       Patient education  Patient educated on role of Physical Therapy, risks of immobility, safety and plan of care,  importance of mobility while in hospital , and safety      Patient response to education:   Pt verbalized understanding Pt demonstrated skill Pt requires further education in this area   Yes Partial Yes      Treatment:  Patient practiced and was instructed/facilitated in the following treatment: Patient   Sat edge of bed 10 minutes with Minimal assist of 1 to increase dynamic sitting balance and activity tolerance. seated and standing challenges , trf and gait training     Therapeutic Exercises:  ankle pumps  x 10 reps. At end of session, patient in bed with alarm call light and phone within reach,  all lines and tubes intact, nursing notified. Patient would benefit from continued skilled Physical Therapy to improve functional independence and quality of life. Patient's/ family goals   home    Time in  200  Time out  316    Total Treatment Time  10 minutes    Evaluation time includes thorough review of current medical information, gathering information on past medical history/social history and prior level of function, completion of standardized testing/informal observation of tasks, assessment of data, and development of Plan of care and goals.      CPT codes:  Low Complexity PT evaluation (52733)  Therapeutic activities ()   10 minutes  1 unit(s)    Mary Kate Vázquez, PT

## 2022-09-16 NOTE — H&P
Department of Internal Medicine  History and Physical    PCP: Mimi Monreal DO  Admitting Physician: Dr. Juvenal Lara  Consultants:   Date of Service: 9/15/2022    CHIEF COMPLAINT:  hallucinations    HISTORY OF PRESENT ILLNESS:    Patient is a 80-year-old male who presents to the ED with hallucinations. Overall patient is a poor historian. Patient states that he has been experiencing this ever since his fall in which he hit his head. States he continues to have issues with balance and has fallen multiple times since. He denies passing out. He does at times experience dizziness with position changes however he states that this has not led to a fall. He denies any headache but he does complain of double vision. His hallucinations consist of centipedes he sees everywhere. He has associated itchiness. Additionally since his fall he states that he has not been taking his insulin as directed. Previously his blood sugars were better controlled however recently states that they have been in the 300s. States that he has not been taking his insulin regularly/as consistently. He denies any nausea or emesis. He denies any chest pain. He does complain of increased weakness and shortness of breath with exertion.     PAST MEDICAL Hx:  Past Medical History:   Diagnosis Date    Acid reflux     Acute diastolic (congestive) heart failure ([address] 06/19/2019    Arthritis     Asthma 04/16/2014    Asthmatic bronchitis , chronic ([address] 11/28/2016    CAD (coronary artery disease)     Chronic bronchitis (HCC) 04/16/2014    COPD (chronic obstructive pulmonary disease) (HCC)     CB    COPD (chronic obstructive pulmonary disease) (HCC)     Diabetes mellitus ([address]     Emphysema (subcutaneous) (surgical) resulting from a procedure     H/O cardiovascular stress test 04/24/2021    Lexiscan    Hyperlipidemia     Hypertension     Hypoxemia requiring supplemental oxygen 02/02/2015    LONG TERM ANTICOAGULENT USE     Morbid obesity with BMI of 50.0-59.9, adult ([facility] 11/27/2013    Obesity     Osteoarthritis     Sleep apnea     bilevel positive airway pressure at 13/8 with 2 L oxygen flow     Stage 3 chronic kidney disease ([facility]     Tobacco abuse     Type II or unspecified type diabetes mellitus without mention of complication, not stated as uncontrolled        PAST SURGICAL Hx:   Past Surgical History:   Procedure Laterality Date    COLONOSCOPY      CORONARY ANGIOPLASTY WITH STENT PLACEMENT  07-23-13    Left main focal eccentric 65% distal stenosis. Large OM1 CX 50% ostial & prox narrow. Large ramus artery & LAD: Minor plaque w/o sign narrow. RCA: Dom vessel w/25% prox narrow & focal hazy eccentric 99% distal stenosis before origin RPDA & RPLCA. LV: Mild inferior hypokinesis EF 55%. Probable mild AS with 10-15mmHg. Successful PCI distal RCA w/3.5 x 12 mm BMS, 0% res stenosis. Normal distal runoff    CORONARY ARTERY BYPASS GRAFT  09-09-13    Dr Gia Lanza; CABG x2, LIMA to LAD, SVG to ramus intermedius; MV repair using 30-mm Future complete ring with magic stitch between A3 and P3; rigid internal fixation of sternum using KLS plates x2; endoscopic vein harvesting of right lower extremity     ECHO COMPL W DOP COLOR FLOW  7/25/2013         HERNIA REPAIR      SINUS SURGERY         FAMILY Hx:  Family History   Problem Relation Age of Onset    Cancer Mother         breast    Cancer Father         stomach    Mental Illness Brother     Stroke Brother     Other Brother        HOME MEDICATIONS:  Prior to Admission medications    Medication Sig Start Date End Date Taking?  Authorizing Provider   losartan (COZAAR) 25 MG tablet TAKE ONE TABLET BY MOUTH DAILY 8/21/22   Praveena Monk DO   metOLazone (ZAROXOLYN) 2.5 MG tablet TAKE 1 TABLET BY MOUTH TWICE WEEKLY 8/21/22   Praveena Monk DO   amiodarone (CORDARONE) 200 MG tablet TAKE ONE TABLET BY MOUTH TWO TIMES A DAY 8/13/22   Praveena Monk DO   blood glucose test strips (ACCU-CHEK GUIDE) strip USE TO TEST TWO TIMES DAILY AND AS NEEDED FOR SYMPTOMS OF IRREGULAR BLOOD GLUCOSE 8/1/22   [facility], DO   gabapentin (NEURONTIN) 300 MG capsule Take 300 mg by mouth in the morning and 300 mg at noon and 300 mg before bedtime. Historical Provider, MD   Insulin Pen Needle 32G X 6 MM MISC 1 each by Does not apply route 4 times daily 6/10/22   [facility], DO   montelukast (SINGULAIR) 10 MG tablet Take 1 tablet by mouth nightly 6/6/22   [facility], DO   warfarin (JANTOVEN) 5 MG tablet TAKE 2 TABS BY MOUTH ON MONDAY, WEDNESDAY, FRIDAY, & SATURDAY, THEN TAKE 1 TAB ON TUESDAY, [facility], & SUNDAY  Patient taking differently: 2.5 mg daily TAKE 2 TABS BY MOUTH ON MONDAY, WEDNESDAY, FRIDAY, & SATURDAY, THEN TAKE 1 TAB ON TUESDAY, [facility], & SUNDAY 6/6/22   [facility], DO   insulin glargine (BASAGLAR KWIKPEN) 100 UNIT/ML injection pen Inject 45 Units into the skin 2 times daily 4/8/22   [facility], DO   insulin lispro, 1 Unit Dial, (HUMALOG KWIKPEN) 100 UNIT/ML SOPN Inject 0-18 Units into the skin 3 times daily (before meals) MAX 70 units daily 4/8/22   [facility], DO   metoprolol succinate (TOPROL XL) 25 MG extended release tablet Take 1 tablet by mouth 2 times daily 4/8/22   [facility], DO   atorvastatin (LIPITOR) 80 MG tablet TAKE ONE TABLET BY MOUTH EVERY NIGHT 4/8/22   [facility], DO   bumetanide (BUMEX) 1 MG tablet Take 1 tablet by mouth daily 4/5/22   [facility], DO   amitriptyline (ELAVIL) 50 MG tablet Take 1 tablet by mouth nightly 4/5/22   [facility], DO   traMADol (ULTRAM) 50 MG tablet Take 1 tablet by mouth every 6 hours as needed for Pain. Intended supply: 5 days.  Take lowest dose possible to manage pain 4/5/22 5/5/23  Monalisa John, DO   pramipexole (MIRAPEX) 0.25 MG tablet TAKE TWO TABLETS BY MOUTH THREE TIMES A DAY 1/25/22   Monalisa Lopez DO   levocetirizine (XYZAL) 5 MG tablet Take 5 mg by mouth nightly    Historical Provider, MD   spironolactone (ALDACTONE) 25 MG tablet TAKE ONE TABLET BY MOUTH TWO TIMES A DAY 10/11/21   Vinod Chandler MD   pantoprazole (PROTONIX) 40 MG tablet Take 40 mg by mouth daily     Historical Provider, MD   CPAP Machine MISC 1 each by Does not apply route nightly as needed     Historical Provider, MD   aspirin 81 MG tablet Take 81 mg by mouth nightly     Historical Provider, MD       ALLERGIES:  Pcn [penicillins] and Sulfa antibiotics    SOCIAL Hx:  Social History     Socioeconomic History    Marital status:      Spouse name: [facility]    Number of children: 1    Years of education: Not on file    Highest education level: 11th grade   Occupational History    Not on file   Tobacco Use    Smoking status: Former     Packs/day: 1.00     Years: 45.00     Pack years: 45.00     Types: Cigarettes     Quit date: 2013     Years since quittin.1    Smokeless tobacco: Former     Types: Chew     Quit date: 10/8/1971   Vaping Use    Vaping Use: Never used   Substance and Sexual Activity    Alcohol use: No     Alcohol/week: 0.0 standard drinks     Comment: 1-2 coffee per day    Drug use: No    Sexual activity: Yes     Partners: Female   Other Topics Concern    Not on file   Social History Narrative    19  [facility] reviewed SDOH with Kavin Giraldo. He does have money strain but between the income he has coming in and his wife's they are over resources for SARY programs. Offered food panty info to help with end of the month struggles but he declined stating that he tried and was refused due to his income. He belongs to a [employer] and goes weekly so he has some community connections in place. He has an extremely high interest rate on his mortgage which he was encouraged to look into getting lowered to help save money on his house payments. Noticed some difficulty with memory recall. Becomes easily flustered at times. Has no MHI to support applying for [facility] program of SARY. States he does not feel depressed or need any MH treatment.         Social Determinants of Health     Financial Resource Strain: Low Risk     Difficulty of Paying Living Expenses: Not hard at all   Food Insecurity: No Food Insecurity    Worried About Running Out of Food in the Last Year: Never true    Ran Out of Food in the Last Year: Never true   Transportation Needs: Not on file   Physical Activity: Not on file   Stress: Not on file   Social Connections: Not on file   Intimate Partner Violence: Not on file   Housing Stability: Not on file       ROS: Positive in bold  General:   Denies chills, fatigue, fever, malaise, night sweats or weight loss    Psychological:   Denies anxiety, disorientation or hallucinations    ENT:    Denies epistaxis, headaches, vertigo or visual changes    Cardiovascular:   Denies any chest pain, irregular heartbeats, or palpitations. No paroxysmal nocturnal dyspnea. Respiratory:   Denies shortness of breath, coughing, sputum production, hemoptysis, or wheezing. No orthopnea. Gastrointestinal:   Denies nausea, vomiting, diarrhea, or constipation. Denies any abdominal pain. Denies change in bowel habits or stools. Genito-Urinary:    Denies any urgency, frequency, hematuria. Voiding without difficulty. Musculoskeletal:   Denies joint pain, joint stiffness, joint swelling or muscle pain    Neurology:    Denies any headache or focal neurological deficits. No weakness or paresthesia. Derm:    Denies any rashes, ulcers, or excoriations. Denies bruising. Extremities:   Denies any lower extremity swelling or edema. PHYSICAL EXAM: Abnormal findings noted  VITALS:  Vitals:    09/15/22 2015   BP: 138/88   Pulse: 90   Resp: 18   Temp:    SpO2: 96%         CONSTITUTIONAL:    Awake, alert, cooperative, no apparent distress, and appears stated age    EYES:    EOMI, sclera clear, conjunctiva normal    ENT:    Normocephalic, atraumatic, External ears without lesions.      NECK:    Supple, symmetrical, trachea midline, no JVD    HEMATOLOGIC/LYMPHATICS:    No cervical lymphadenopathy and no supraclavicular lymphadenopathy    LUNGS:    Symmetric. No increased work of breathing, good air exchange, clear to auscultation bilaterally, no wheezes, rhonchi, or rales,     CARDIOVASCULAR:    Normal apical impulse, regular rate and rhythm, normal S1 and S2, no S3 or S4, and no murmur noted    ABDOMEN:    soft, non-distended, non-tender    MUSCULOSKELETAL:    There is no redness, warmth, or swelling of the joints. NEUROLOGIC:    Awake, alert, oriented to name, place and time. SKIN:    No bruising or bleeding. No redness, warmth, or swelling    EXTREMITIES:    Peripheral pulses present. No edema, cyanosis, or swelling.     LINES/CATHETERS     LABORATORY DATA:  CBC with Differential:    Lab Results   Component Value Date/Time    WBC 7.4 09/15/2022 04:59 PM    RBC 4.04 09/15/2022 04:59 PM    HGB 12.8 09/15/2022 04:59 PM    HCT 38.7 09/15/2022 04:59 PM     09/15/2022 04:59 PM    MCV 95.8 09/15/2022 04:59 PM    MCH 31.7 09/15/2022 04:59 PM    MCHC 33.1 09/15/2022 04:59 PM    RDW 15.4 09/15/2022 04:59 PM    SEGSPCT 61 01/20/2014 12:00 PM    LYMPHOPCT 12.7 09/15/2022 04:59 PM    MONOPCT 10.5 09/15/2022 04:59 PM    BASOPCT 0.4 09/15/2022 04:59 PM    MONOSABS 0.78 09/15/2022 04:59 PM    LYMPHSABS 0.94 09/15/2022 04:59 PM    EOSABS 0.11 09/15/2022 04:59 PM    BASOSABS 0.03 09/15/2022 04:59 PM     CMP:    Lab Results   Component Value Date/Time     09/15/2022 04:59 PM    K 3.1 09/15/2022 04:59 PM    CL 96 09/15/2022 04:59 PM    CO2 28 09/15/2022 04:59 PM    BUN 42 09/15/2022 04:59 PM    CREATININE 1.7 09/15/2022 04:59 PM    GFRAA 49 09/15/2022 04:59 PM    LABGLOM 40 09/15/2022 04:59 PM    GLUCOSE 340 09/15/2022 05:16 PM    GLUCOSE 118 10/27/2011 04:07 PM    PROT 7.4 09/15/2022 04:59 PM    LABALBU 3.8 09/15/2022 04:59 PM    LABALBU 4.4 10/27/2011 04:07 PM    CALCIUM 9.3 09/15/2022 04:59 PM    BILITOT 1.0 09/15/2022 04:59 PM    ALKPHOS 181 09/15/2022 04:59 PM    AST 36 09/15/2022 04:59 PM    ALT 47 09/15/2022 04:59 PM       ASSESSMENT/PLAN:  Acute metabolic encephalopathy  JORDEN on CKD  Hyperglycemia  Right C7 transverse process fracture  Calcified plaque along the carotid bulbs particularly on the left  Multiple falls  Coronary artery status post CABG and stent placement  chronic combined systolic and diastolic congestive heart failure  Atrial fibrillation  History of mitral valve repair  Moderate mitral regurgitation  Moderate tricuspid regurgitation  Mild aortic stenosis  Moderate pulmonary hypertension  COPD/Asthma  Sleep apnea without current use of BiPAP  Insulin-dependent diabetes mellitus  Hyperlipidemia  Hypertension  Obesity  History of tobacco abuse      Patient presented with altered mental status. Patient having hallucinations. CT head did not reveal any acute abnormalities. Hallucinations could be secondary to metabolic encephalopathy. Or could be secondary to stroke. Patient will have further work-up for stroke. Neurology will be consulted. Additionally we will try to obtain better blood sugar control as this may be the underlying cause of his hallucinations. Patient will receive the IV fluids. Additionally PT OT will be consulted in the setting of multiple falls.     Susanna March  10:31 PM  9/15/2022    Electronically signed by Eddie Hayes DO on 9/15/22 at 10:31 PM EDT

## 2022-09-16 NOTE — CONSULTS
History Of Present Illness: 55-year-old male presents emergency department complaint of elevated blood sugars as well as generalized weakness frequent falls and seeing bugs crawling on his lower extremities. Symptoms started last night. Nothing makes them better or worse. States he was seen here previously for fall however has been falling more frequently at home. Denies hitting his head. Otherwise states is on insulin for his diabetes however forgot to take it this evening and his sugar was elevated into the 300s. In regards to his hallucinations states he sees centipedes crawling on his feet which started last night. And he does have the sensation of crawling in his feet as well. Denies any alcohol or drug use. As above per ed staff. Patient interviewed and feels back to baseline; lives with wife and acknowledges falls-does not use cane. The patient is a 77 y.o. male with significant past medical history of see below who presents with above.       The patient has the following symptoms:    Change in level of consciousness: alert    New Weakness: no    Numbness or Tingling: yes    Difficulty Swallowing: no    Current Medications:   Scheduled Meds:   insulin lispro  0-8 Units SubCUTAneous TID WC    insulin lispro  0-4 Units SubCUTAneous Nightly    pramipexole  0.25 mg Oral TID    [START ON 9/18/2022] warfarin  2.5 mg Oral Once per day on Sun Tue Thu    warfarin  5 mg Oral Once per day on Mon Wed Fri Sat    amiodarone  200 mg Oral BID    aspirin  81 mg Oral Nightly    insulin glargine  45 Units SubCUTAneous BID    cetirizine  10 mg Oral Daily    metoprolol succinate  25 mg Oral BID    montelukast  10 mg Oral Nightly    pantoprazole  40 mg Oral Daily    sodium chloride flush  5-40 mL IntraVENous 2 times per day    amitriptyline  50 mg Oral Nightly     Continuous Infusions:   dextrose      sodium chloride       PRN Meds:glucose, dextrose bolus **OR** dextrose bolus, glucagon (rDNA), dextrose, sodium chloride flush, sodium chloride, polyethylene glycol, acetaminophen **OR** acetaminophen    Allergies:  Pcn [penicillins] and Sulfa antibiotics    Social History:   TOBACCO:   reports that he quit smoking about 9 years ago. His smoking use included cigarettes. He has a 45.00 pack-year smoking history. He quit smokeless tobacco use about 50 years ago. His smokeless tobacco use included chew. ETOH:   reports no history of alcohol use. Past Medical History:        Diagnosis Date    Acid reflux     Acute diastolic (congestive) heart failure ([facility] 06/19/2019    Arthritis     Asthma 04/16/2014    Asthmatic bronchitis , chronic ([facility] 11/28/2016    CAD (coronary artery disease)     Chronic bronchitis ([facility] 04/16/2014    COPD (chronic obstructive pulmonary disease) ([facility])     CB    COPD (chronic obstructive pulmonary disease) ([facility])     Diabetes mellitus ([facility]     Emphysema (subcutaneous) (surgical) resulting from a procedure     H/O cardiovascular stress test 04/24/2021    Lexiscan    Hyperlipidemia     Hypertension     Hypoxemia requiring supplemental oxygen 02/02/2015    LONG TERM ANTICOAGULENT USE     Morbid obesity with BMI of 50.0-59.9, adult ([facility] 11/27/2013    Obesity     Osteoarthritis     Sleep apnea     bilevel positive airway pressure at 13/8 with 2 L oxygen flow     Stage 3 chronic kidney disease ([facility])     Tobacco abuse     Type II or unspecified type diabetes mellitus without mention of complication, not stated as uncontrolled        Past Surgical History:        Procedure Laterality Date    COLONOSCOPY      CORONARY ANGIOPLASTY WITH STENT PLACEMENT  07-23-13    Left main focal eccentric 65% distal stenosis. Large OM1 CX 50% ostial & prox narrow. Large ramus artery & LAD: Minor plaque w/o sign narrow. RCA: Dom vessel w/25% prox narrow & focal hazy eccentric 99% distal stenosis before origin RPDA & RPLCA. LV: Mild inferior hypokinesis EF 55%. Probable mild AS with 10-15mmHg.  Successful PCI distal RCA w/3.5 x 12 mm BMS, 0% res stenosis. Normal distal runoff    CORONARY ARTERY BYPASS GRAFT  09-09-13    Dr Bharathi Shaw; CABG x2, LIMA to LAD, SVG to ramus intermedius; MV repair using 30-mm Future complete ring with magic stitch between A3 and P3; rigid internal fixation of sternum using KLS plates x2; endoscopic vein harvesting of right lower extremity     ECHO COMPL W DOP COLOR FLOW  7/25/2013         HERNIA REPAIR      SINUS SURGERY           Outside reports reviewed: ER records, historical medical records, lab reports and radiology reports. Patient's medications, allergies, past medical, surgical, social and family histories were reviewed and updated as appropriate. Review of Systems  A comprehensive review of systems was negative except for:       Objective:     Neuro exam 110/68 p 58 t 98  General: normal orientation and alertness. Cranial nerve testing was normal.  Funduscopic eye exam revealed not testable. Motor exam:  4/5 . Deep tendon reflexes were absent bilaterally. Plantar responses were flexor bilaterally. Cerebellar exam noted finger to nose without dysmetria. Sensation was decreased in the lower extremities.       Assessment:   Altered mental status/metabolic encephalopathy( mild renal injury noted)--resolving  Head ct negative  Cervical spine fracture of transverse process secondary to falls  Diabetic neuropathy with sensory ataxia      Plan:   PT/?rehab; use of cane and or walker  Defer warfarin/asa to cardiology/internal medicine  Consider orthopedic/neurosurgical opinion regarding cervical spine transverse process fracture

## 2022-09-16 NOTE — PLAN OF CARE
Problem: Discharge Planning  Goal: Discharge to home or other facility with appropriate resources  9/16/2022 1145 by Shona Bennett RN  Outcome: Progressing  9/16/2022 0613 by Bryant Braxton RN  Outcome: Progressing  Flowsheets (Taken 9/16/2022 0121)  Discharge to home or other facility with appropriate resources:   Identify barriers to discharge with patient and caregiver   Arrange for needed discharge resources and transportation as appropriate   Identify discharge learning needs (meds, wound care, etc)     Problem: Pain  Goal: Verbalizes/displays adequate comfort level or baseline comfort level  9/16/2022 1145 by Shona Bennett RN  Outcome: Progressing  9/16/2022 0613 by Bryant Braxton RN  Outcome: Progressing     Problem: ABCDS Injury Assessment  Goal: Absence of physical injury  9/16/2022 1145 by Shona Bennett RN  Outcome: Progressing  9/16/2022 0613 by Bryant Braxton RN  Outcome: Progressing     Problem: Safety - Adult  Goal: Free from fall injury  9/16/2022 1145 by Shona Bennett RN  Outcome: Progressing  9/16/2022 0613 by Bryant Braxton RN  Outcome: Progressing     Problem: Neurosensory - Adult  Goal: Achieves stable or improved neurological status  9/16/2022 1145 by Shona Bennett RN  Outcome: Progressing  Flowsheets (Taken 9/16/2022 1031)  Achieves stable or improved neurological status: Assess for and report changes in neurological status  9/16/2022 0613 by Bryant Braxton RN  Outcome: Progressing  Goal: Achieves maximal functionality and self care  9/16/2022 1145 by Shona Bennett RN  Outcome: Progressing  9/16/2022 0613 by Bryant Braxton RN  Outcome: Progressing     Problem: Cardiovascular - Adult  Goal: Maintains optimal cardiac output and hemodynamic stability  9/16/2022 1145 by Shona Bennett RN  Outcome: Progressing  9/16/2022 0613 by Bryant Braxton RN  Outcome: Progressing  Goal: Absence of cardiac dysrhythmias or at baseline  9/16/2022 1145 by Shona Bennett RN  Outcome: Progressing  9/16/2022 0613 by Sandy Rebolledo RN  Outcome: Progressing     Problem: Skin/Tissue Integrity - Adult  Goal: Skin integrity remains intact  9/16/2022 1145 by April Gottlieb RN  Outcome: Progressing  Flowsheets (Taken 9/16/2022 1031)  Skin Integrity Remains Intact: Monitor for areas of redness and/or skin breakdown  9/16/2022 0613 by Sandy Rebolledo RN  Outcome: Progressing  Goal: Incisions, wounds, or drain sites healing without S/S of infection  9/16/2022 1145 by April Gottlieb RN  Outcome: Progressing  Flowsheets (Taken 9/16/2022 1031)  Incisions, Wounds, or Drain Sites Healing Without Sign and Symptoms of Infection: ADMISSION and DAILY: Assess and document risk factors for pressure ulcer development  9/16/2022 0613 by Sandy Rebolledo RN  Outcome: Progressing

## 2022-09-16 NOTE — PROGRESS NOTES
Internal Medicine Progress Note    [facility]    [facility]. Bill Griggs., HELGA. Nadine Perales D.O., SENTHIL Dia D.O. Delores Rosario D.O. Aracelis Rousseau, MSN, APRN, NP-C  Yaa Bermudez. Quirino Sood, MSN, APRN-CNP       Primary Care Physician: Barbara Fraga DO   Admitting Physician:  Brian Valdez DO  Admission date and time: 9/15/2022  4:37 PM    Room:  [address] [place] [address]  Admitting diagnosis: Hallucination [R44.3]  Hyperglycemia [V27.9]  Acute metabolic encephalopathy [V78.83]    Patient Name: Alex Angel  MRN: [id number]    Date of Service: 9/16/2022     Subjective:  Roma Casillas is a 77 y.o. male who was seen and examined today,9/16/2022, at the bedside. Patient has just returned from diagnostic procedure. Does relate to having difficulty ambulating. Suspect possible some of the mental status change was secondary to combination of Mirapex and amitriptyline. Patient hallucinating seem to be improving. Will require discharge planning and possible rehab. Has been seen by neurology. INR supratherapeutic and adjustment made    No family present during my examination. Review of System:   Constitutional:   Denies fever or chills, weight loss or gain. Planes of fatigue  HEENT:   Denies ear pain, sore throat, sinus or eye problems. Cardiovascular:   Denies any chest pain, irregular heartbeats, or palpitations. Respiratory:   Relates that shortness of breath with exertion  Gastrointestinal:   Denies nausea, vomiting, diarrhea, or constipation. Denies any abdominal pain. Obese  Genitourinary:    Denies any urgency, frequency, hematuria. Voiding  without difficulty. Extremities:   Denies lower extremity cyanosis. One of the lower extremities with dressing in place  Neurology:    Denies any headache or focal neurological deficits, Denies generalized weakness or memory difficulty.    Psch:   Denies being anxious or depressed. Musculoskeletal:    Denies  myalgias, joint complaints or back pain. Integumentary:   Denies any rashes, ulcers, or excoriations. Denies bruising. Hematologic/Lymphatic:  Denies bruising or bleeding. Physical Exam:  I/O this shift: In: 480 [P.O.:480]  Out: -     Intake/Output Summary (Last 24 hours) at 9/16/2022 1707  Last data filed at 9/16/2022 1235  Gross per 24 hour   Intake 480 ml   Output --   Net 480 ml   No intake/output data recorded. Patient Vitals for the past 96 hrs (Last 3 readings):   Weight   09/15/22 1650 287 lb (130.2 kg)     Vital Signs:   Blood pressure 104/69, pulse 54, temperature 98.1 °F (36.7 °C), temperature source Oral, resp. rate 16, height 5' 6\" (1.676 m), weight 287 lb (130.2 kg), SpO2 96 %. General appearance:  Alert, responsive, oriented to person, place, and time. Well preserved, alert, no distress. Head:  Normocephalic. No masses, lesions or tenderness. Eyes:  PERRLA. EOMI. Sclera clear. Buccal mucosa moist.  ENT:  Ears normal. Mucosa normal.  Neck:    Supple. Trachea midline. No thyromegaly. No JVD. No bruits. Heart:    Regular rhythm. 1/6 systolic ejection murmur  Lungs:    Symmetrical. Clear to auscultation bilaterally. No wheezes. No rhonchi. No rales. Abdomen:   Soft. Non-tender. Non-distended. Bowel sounds positive. No organomegaly or masses. No pain on palpation. Obese  Extremities:    Peripheral pulses present. +1 edema with dressing no ulcers. No cyanosis. No clubbing. Neurologic:    Alert x 3. No focal deficit. Cranial nerves grossly intact. No focal weakness. Cognition appeared normal  Psych:   Behavior is normal. Mood appears normal. Speech is not rapid and/or pressured. Musculoskeletal:   Spine ROM normal. Muscular strength intact. Gait not assessed. Integumentary:  No rashes  Skin normal color and texture.   Genitalia/Breast:  Deferred    Medication:  Scheduled Meds:   insulin lispro  0-8 Units SubCUTAneous TID WC insulin lispro  0-4 Units SubCUTAneous Nightly    [START ON 9/18/2022] warfarin  2.5 mg Oral Once per day on Sun Tue Thu    warfarin  5 mg Oral Once per day on Mon Wed Fri Sat    carbamide peroxide  10 drop Both Ears BID    amiodarone  200 mg Oral BID    aspirin  81 mg Oral Nightly    insulin glargine  45 Units SubCUTAneous BID    cetirizine  10 mg Oral Daily    metoprolol succinate  25 mg Oral BID    montelukast  10 mg Oral Nightly    pantoprazole  40 mg Oral Daily    sodium chloride flush  5-40 mL IntraVENous 2 times per day     Continuous Infusions:   dextrose      sodium chloride         Objective Data:  Recent Labs     09/15/22  1659 09/16/22  0516   WBC 7.4 6.4   RBC 4.04 3.94   HGB 12.8 12.5   HCT 38.7 38.3   MCV 95.8 97.2   MCH 31.7 31.7   MCHC 33.1 32.6   RDW 15.4* 15.4*    152   MPV 10.6 10.4     Lab Results   Component Value Date/Time     09/16/2022 05:16 AM    K 3.1 09/16/2022 05:16 AM    K 3.1 09/15/2022 04:59 PM    CL 98 09/16/2022 05:16 AM    CO2 28 09/16/2022 05:16 AM    ANIONGAP 11 09/16/2022 05:16 AM    GLUCOSE 221 09/16/2022 05:16 AM    GLUCOSE 118 10/27/2011 04:07 PM    BUN 36 09/16/2022 05:16 AM    CREATININE 1.3 09/16/2022 05:16 AM    GFRAA >60 09/16/2022 05:16 AM    LABGLOM 55 09/16/2022 05:16 AM    CALCIUM 9.2 09/16/2022 05:16 AM    BILITOT 0.9 09/16/2022 05:16 AM    ALT 44 09/16/2022 05:16 AM    AST 36 09/16/2022 05:16 AM    ALKPHOS 171 09/16/2022 05:16 AM    PROT 7.3 09/16/2022 05:16 AM    LABALBU 3.7 09/16/2022 05:16 AM    LABALBU 4.4 10/27/2011 04:07 PM     Recent Labs     09/16/22  0516 09/15/22  1659   MG 1.7 1.9      Lab Results   Component Value Date/Time    PHOS 2.3 09/16/2022 05:16 AM    PHOS 1.9 10/31/2021 12:40 PM    PHOS 1.9 10/30/2021 07:36 AM     No results for input(s): TROPONINI in the last 72 hours.     Wound Documentation:   Wound 04/18/21 Buttocks Left (Active)   Number of days: 515       Wound 04/22/21 Pelvis Anterior;Mid Tunneling open wound (Active) was spent at the bedside counseling/coordinating care with the patient and/or family with face to face contact. This time was spent reviewing notes and laboratory data as well as instructing and counseling the patient. Time I spent with the family or surrogate(s) is included only if the patient was incapable of providing the necessary information or participating in medical decisions. I also discussed the differential diagnosis and all of the proposed management plans with the patient and individuals accompanying the patient. I am readily available for any further decision-making and intervention.        Misael Barrett DO, F.A.C.O.I.  9/16/2022  5:07 PM

## 2022-09-16 NOTE — PROGRESS NOTES
Patient standby assist with walker to bathroom. Patient complaining of flies crawling on his legs and ear wax coming out of his ears, explained to patient that there are no flies and no earwax that these are hallucinations, patient states that he knows that already. He continues to pick at ears .

## 2022-09-17 LAB
ALBUMIN SERPL-MCNC: 3.8 G/DL (ref 3.5–5.2)
ALP BLD-CCNC: 134 U/L (ref 40–129)
ALT SERPL-CCNC: 51 U/L (ref 0–40)
ANION GAP SERPL CALCULATED.3IONS-SCNC: 14 MMOL/L (ref 7–16)
AST SERPL-CCNC: 48 U/L (ref 0–39)
BASOPHILS ABSOLUTE: 0.05 E9/L (ref 0–0.2)
BASOPHILS RELATIVE PERCENT: 0.5 % (ref 0–2)
BILIRUB SERPL-MCNC: 1.8 MG/DL (ref 0–1.2)
BUN BLDV-MCNC: 26 MG/DL (ref 6–23)
CALCIUM SERPL-MCNC: 9.7 MG/DL (ref 8.6–10.2)
CHLORIDE BLD-SCNC: 96 MMOL/L (ref 98–107)
CO2: 27 MMOL/L (ref 22–29)
CREAT SERPL-MCNC: 1.2 MG/DL (ref 0.7–1.2)
EOSINOPHILS ABSOLUTE: 0.1 E9/L (ref 0.05–0.5)
EOSINOPHILS RELATIVE PERCENT: 1 % (ref 0–6)
GFR AFRICAN AMERICAN: >60
GFR NON-AFRICAN AMERICAN: >60 ML/MIN/1.73
GLUCOSE BLD-MCNC: 82 MG/DL (ref 74–99)
HCT VFR BLD CALC: 41.9 % (ref 37–54)
HEMOGLOBIN: 13.5 G/DL (ref 12.5–16.5)
IMMATURE GRANULOCYTES #: 0.04 E9/L
IMMATURE GRANULOCYTES %: 0.4 % (ref 0–5)
INR BLD: 2.2
LYMPHOCYTES ABSOLUTE: 1.08 E9/L (ref 1.5–4)
LYMPHOCYTES RELATIVE PERCENT: 10.9 % (ref 20–42)
MCH RBC QN AUTO: 31.3 PG (ref 26–35)
MCHC RBC AUTO-ENTMCNC: 32.2 % (ref 32–34.5)
MCV RBC AUTO: 97 FL (ref 80–99.9)
METER GLUCOSE: 100 MG/DL (ref 74–99)
METER GLUCOSE: 187 MG/DL (ref 74–99)
METER GLUCOSE: 197 MG/DL (ref 74–99)
METER GLUCOSE: 202 MG/DL (ref 74–99)
MONOCYTES ABSOLUTE: 0.87 E9/L (ref 0.1–0.95)
MONOCYTES RELATIVE PERCENT: 8.8 % (ref 2–12)
NEUTROPHILS ABSOLUTE: 7.76 E9/L (ref 1.8–7.3)
NEUTROPHILS RELATIVE PERCENT: 78.4 % (ref 43–80)
PDW BLD-RTO: 15.3 FL (ref 11.5–15)
PLATELET # BLD: 188 E9/L (ref 130–450)
PMV BLD AUTO: 10.5 FL (ref 7–12)
POTASSIUM SERPL-SCNC: 3.3 MMOL/L (ref 3.5–5)
PROTHROMBIN TIME: 25 SEC (ref 9.3–12.4)
RBC # BLD: 4.32 E12/L (ref 3.8–5.8)
SODIUM BLD-SCNC: 137 MMOL/L (ref 132–146)
TOTAL PROTEIN: 7.7 G/DL (ref 6.4–8.3)
WBC # BLD: 9.9 E9/L (ref 4.5–11.5)

## 2022-09-17 PROCEDURE — 2580000003 HC RX 258: Performed by: INTERNAL MEDICINE

## 2022-09-17 PROCEDURE — 6370000000 HC RX 637 (ALT 250 FOR IP): Performed by: INTERNAL MEDICINE

## 2022-09-17 PROCEDURE — 1200000000 HC SEMI PRIVATE

## 2022-09-17 PROCEDURE — 85025 COMPLETE CBC W/AUTO DIFF WBC: CPT

## 2022-09-17 PROCEDURE — 80053 COMPREHEN METABOLIC PANEL: CPT

## 2022-09-17 PROCEDURE — 6370000000 HC RX 637 (ALT 250 FOR IP): Performed by: PSYCHIATRY & NEUROLOGY

## 2022-09-17 PROCEDURE — 85610 PROTHROMBIN TIME: CPT

## 2022-09-17 PROCEDURE — 36415 COLL VENOUS BLD VENIPUNCTURE: CPT

## 2022-09-17 PROCEDURE — 82962 GLUCOSE BLOOD TEST: CPT

## 2022-09-17 RX ORDER — POTASSIUM BICARBONATE 25 MEQ/1
25 TABLET, EFFERVESCENT ORAL ONCE
Status: COMPLETED | OUTPATIENT
Start: 2022-09-17 | End: 2022-09-17

## 2022-09-17 RX ORDER — METOPROLOL SUCCINATE 25 MG/1
12.5 TABLET, EXTENDED RELEASE ORAL 2 TIMES DAILY
Status: DISCONTINUED | OUTPATIENT
Start: 2022-09-17 | End: 2022-09-18

## 2022-09-17 RX ORDER — WARFARIN SODIUM 2.5 MG/1
2.5 TABLET ORAL DAILY
Status: DISCONTINUED | OUTPATIENT
Start: 2022-09-17 | End: 2022-09-18

## 2022-09-17 RX ORDER — RISPERIDONE 1 MG/1
0.5 TABLET, FILM COATED ORAL 2 TIMES DAILY
Status: DISCONTINUED | OUTPATIENT
Start: 2022-09-17 | End: 2022-09-20 | Stop reason: HOSPADM

## 2022-09-17 RX ADMIN — AMIODARONE HYDROCHLORIDE 200 MG: 200 TABLET ORAL at 07:45

## 2022-09-17 RX ADMIN — ASPIRIN 81 MG: 81 TABLET, COATED ORAL at 20:27

## 2022-09-17 RX ADMIN — CARBAMIDE PEROXIDE 10 DROP: 65 SOLUTION/ DROPS AURICULAR (OTIC) at 07:46

## 2022-09-17 RX ADMIN — ATORVASTATIN CALCIUM 80 MG: 40 TABLET, FILM COATED ORAL at 20:27

## 2022-09-17 RX ADMIN — RISPERIDONE 0.5 MG: 1 TABLET ORAL at 11:00

## 2022-09-17 RX ADMIN — CARBAMIDE PEROXIDE 10 DROP: 65 SOLUTION/ DROPS AURICULAR (OTIC) at 20:27

## 2022-09-17 RX ADMIN — CETIRIZINE HYDROCHLORIDE 10 MG: 10 TABLET, FILM COATED ORAL at 07:45

## 2022-09-17 RX ADMIN — METOPROLOL SUCCINATE 12.5 MG: 25 TABLET, EXTENDED RELEASE ORAL at 20:27

## 2022-09-17 RX ADMIN — WARFARIN SODIUM 2.5 MG: 2.5 TABLET ORAL at 17:48

## 2022-09-17 RX ADMIN — RISPERIDONE 0.5 MG: 1 TABLET ORAL at 20:27

## 2022-09-17 RX ADMIN — INSULIN GLARGINE 45 UNITS: 100 INJECTION, SOLUTION SUBCUTANEOUS at 08:35

## 2022-09-17 RX ADMIN — INSULIN GLARGINE 45 UNITS: 100 INJECTION, SOLUTION SUBCUTANEOUS at 20:28

## 2022-09-17 RX ADMIN — SPIRONOLACTONE 25 MG: 25 TABLET ORAL at 07:45

## 2022-09-17 RX ADMIN — SODIUM CHLORIDE, PRESERVATIVE FREE 10 ML: 5 INJECTION INTRAVENOUS at 20:27

## 2022-09-17 RX ADMIN — SODIUM CHLORIDE, PRESERVATIVE FREE 10 ML: 5 INJECTION INTRAVENOUS at 07:45

## 2022-09-17 RX ADMIN — BUMETANIDE 1 MG: 1 TABLET ORAL at 07:45

## 2022-09-17 RX ADMIN — LOSARTAN POTASSIUM 25 MG: 50 TABLET, FILM COATED ORAL at 07:46

## 2022-09-17 RX ADMIN — PANTOPRAZOLE SODIUM 40 MG: 40 TABLET, DELAYED RELEASE ORAL at 07:45

## 2022-09-17 RX ADMIN — MONTELUKAST 10 MG: 10 TABLET, FILM COATED ORAL at 20:27

## 2022-09-17 RX ADMIN — METOLAZONE 2.5 MG: 2.5 TABLET ORAL at 07:45

## 2022-09-17 RX ADMIN — POTASSIUM BICARBONATE 25 MEQ: 977.5 TABLET, EFFERVESCENT ORAL at 13:09

## 2022-09-17 NOTE — PROGRESS NOTES
Notifed Dr Roman Hernandez of low heart rate in 50s,and he adjusted toprol. Also notifed him of low potassium today,see new orders.

## 2022-09-17 NOTE — PROGRESS NOTES
Internal Medicine Progress Note    [facility]    [address]. Almas Hernandez., CLAUDETTE.NÉSTOR.VINICIOOBruceI. Kerry De La Rosa D.O., SENTHIL Anderson D.O. VINOD Rodriguez, MSN, APRN, NP-C  Alcon Zhang. Renee Ryan, MSN, APRN-CNP       Primary Care Physician: Fernie Mooney DO   Admitting Physician:  Declan Hayes DO  Admission date and time: 9/15/2022  4:37 PM    Room:  [address] [place] [address]  Admitting diagnosis: Hallucination [R44.3]  Hyperglycemia [E29.5]  Acute metabolic encephalopathy [U40.69]    Patient Name: Mars Desai  MRN: [id number]    Date of Service: 9/17/2022     Subjective:  Everardo West is a 77 y.o. male who was seen and examined today,9/17/2022, at the bedside. Patient was sitting up in bed. Feels better today. Admits he still continues to have both visual and tactile hallucinations but he is more aware of them and they are less frequent. Do examination the patient's wife did call. He did really know who she was and I had appropriate conversation. States swelling lower extremities is improved. Respiratory wise admits to exertional shortness of breath. No family present during my examination. Review of System:   Constitutional:   Denies fever or chills, weight loss or gain. Patient does admit to fatigue but improved. Still did not sleep well last night. HEENT:   Denies ear pain, sore throat, sinus or eye problems. Cardiovascular:   Denies any chest pain, irregular heartbeats, or palpitations. Respiratory:   Relates that shortness of breath with exertion  Gastrointestinal:   Denies nausea, vomiting, diarrhea, or constipation. Denies any abdominal pain. Genitourinary:    Denies any urgency, frequency, hematuria. Voiding  without difficulty. Extremities:   Denies lower extremity cyanosis. Admits weeping of the bilateral lower extremities.    Neurology:    Denies any headache or focal neurological deficits, Denies generalized weakness or memory difficulty. Psch:   Denies being anxious or depressed. Musculoskeletal:    Denies  myalgias, joint complaints or back pain. Does admit to mild neck pain. Integumentary:   Denies any rashes, ulcers, or excoriations. Denies bruising. Admits to weeping of the bilateral lower extremities. States this is improved. Hematologic/Lymphatic:  Denies bruising or bleeding. Physical Exam:  I/O this shift:  In: 600 [P.O.:600]  Out: -     Intake/Output Summary (Last 24 hours) at 9/17/2022 1357  Last data filed at 9/17/2022 1302  Gross per 24 hour   Intake 840 ml   Output --   Net 840 ml   I/O last 3 completed shifts: In: 720 [P.O.:720]  Out: -   Patient Vitals for the past 96 hrs (Last 3 readings):   Weight   09/15/22 1650 287 lb (130.2 kg)     Vital Signs:   Blood pressure 123/61, pulse 56, temperature 97.2 °F (36.2 °C), temperature source Infrared, resp. rate 18, height 5' 6\" (1.676 m), weight 287 lb (130.2 kg), SpO2 92 %. General appearance: At the time of examination the patient was not agitated nor hallucinating. He was alert, responsive, oriented to person, place, and time. Well preserved, alert, no distress. Head:  Normocephalic. No masses, lesions or tenderness. Eyes:  PERRLA. EOMI. Sclera clear. ENT:  Ears normal. Mucosa normal.  Neck:    Supple. Trachea midline. No thyromegaly. No JVD. No bruits. Heart:    Regular rhythm. 1/6 systolic ejection murmur  Lungs:    Symmetrical. Clear to auscultation bilaterally. No wheezes. No rhonchi. No rales. Abdomen:   Soft. Non-tender. Non-distended. Bowel sounds positive. No organomegaly or masses. No pain on palpation. Obese  Extremities:    Peripheral pulses present. +1 edema. The bilateral lower extremities. Dressings intact to the bilateral lower extremities. Neurologic:    Alert x 3. No focal deficit. Cranial nerves grossly intact. No focal weakness.   Cognition appeared normal. Conversation with us was appropriate as well as with overhead with his wife. Psych:   Behavior is normal. Mood appears normal. Speech is not rapid and/or pressured. Musculoskeletal:   Spine ROM normal. Muscular strength intact. Gait not assessed. Integumentary:  No rashes dressings intact to the bilateral lower extremities.   Genitalia/Breast:  Deferred    Medication:  Scheduled Meds:   risperiDONE  0.5 mg Oral BID    metoprolol succinate  12.5 mg Oral BID    insulin lispro  0-8 Units SubCUTAneous TID     insulin lispro  0-4 Units SubCUTAneous Nightly    [START ON 9/18/2022] warfarin  2.5 mg Oral Once per day on Sun Tue Thu    warfarin  5 mg Oral Once per day on Mon Wed Fri Sat    carbamide peroxide  10 drop Both Ears BID    losartan  25 mg Oral Daily    metOLazone  2.5 mg Oral Daily    amiodarone  200 mg Oral Daily    atorvastatin  80 mg Oral Nightly    bumetanide  1 mg Oral Daily    spironolactone  25 mg Oral Daily    aspirin  81 mg Oral Nightly    insulin glargine  45 Units SubCUTAneous BID    cetirizine  10 mg Oral Daily    montelukast  10 mg Oral Nightly    pantoprazole  40 mg Oral Daily    sodium chloride flush  5-40 mL IntraVENous 2 times per day     Continuous Infusions:   dextrose      sodium chloride         Objective Data:  Recent Labs     09/15/22  1659 09/16/22  0516 09/17/22  0656   WBC 7.4 6.4 9.9   RBC 4.04 3.94 4.32   HGB 12.8 12.5 13.5   HCT 38.7 38.3 41.9   MCV 95.8 97.2 97.0   MCH 31.7 31.7 31.3   MCHC 33.1 32.6 32.2   RDW 15.4* 15.4* 15.3*    152 188   MPV 10.6 10.4 10.5     Lab Results   Component Value Date/Time     09/17/2022 06:56 AM    K 3.3 09/17/2022 06:56 AM    K 3.1 09/15/2022 04:59 PM    CL 96 09/17/2022 06:56 AM    CO2 27 09/17/2022 06:56 AM    ANIONGAP 14 09/17/2022 06:56 AM    GLUCOSE 82 09/17/2022 06:56 AM    GLUCOSE 118 10/27/2011 04:07 PM    BUN 26 09/17/2022 06:56 AM    CREATININE 1.2 09/17/2022 06:56 AM    GFRAA >60 09/17/2022 06:56 AM    LABGLOM >60 09/17/2022 06:56 AM    CALCIUM 9.7 09/17/2022 06:56 AM    BILITOT 1.8 09/17/2022 06:56 AM    ALT 51 09/17/2022 06:56 AM    AST 48 09/17/2022 06:56 AM    ALKPHOS 134 09/17/2022 06:56 AM    PROT 7.7 09/17/2022 06:56 AM    LABALBU 3.8 09/17/2022 06:56 AM    LABALBU 4.4 10/27/2011 04:07 PM     Recent Labs     09/16/22  0516 09/15/22  1659   MG 1.7 1.9      Lab Results   Component Value Date/Time    PHOS 2.3 09/16/2022 05:16 AM    PHOS 1.9 10/31/2021 12:40 PM    PHOS 1.9 10/30/2021 07:36 AM     No results for input(s): TROPONINI in the last 72 hours. Wound Documentation:   Wound 04/18/21 Buttocks Left (Active)   Number of days: 515       Wound 04/22/21 Pelvis Anterior;Mid Tunneling open wound (Active)   Number of days: 512       Wound 09/16/22 Pretibial Right small fluid filled blisters (Active)   Wound Etiology Other 09/16/22 1403   Dressing Status New dressing applied;Clean;Dry; Intact 09/16/22 1403   Wound Cleansed Cleansed with saline 09/16/22 1403   Dressing/Treatment ABD 09/16/22 1403   Dressing Change Due 09/17/22 09/16/22 1403   Drainage Amount None 09/16/22 1403   Odor None 09/16/22 1403   Number of days: 0       Wound 09/16/22 Pretibial Left (Active)   Wound Etiology Other 09/16/22 1403   Dressing Status New dressing applied 09/16/22 1403   Wound Cleansed Cleansed with saline 09/16/22 1403   Dressing/Treatment ABD 09/16/22 1403   Dressing Change Due 09/17/22 09/16/22 1403   Drainage Amount None 09/16/22 1403   Number of days: 0       Assessment:    Acute metabolic encephalopathy with associated hallucination possibly secondary to combination of amitriptyline and Mirapex  JORDEN on CKD  Right C7 transverse process fracture without focal neurological deficit  Calcified plaque along the carotid bulbs particularly on the left  Multiple falls  Coronary artery status post CABG and stent placement  Chronic combined systolic and diastolic congestive heart failure  Persistent atrial fibrillation  History of mitral valve repair  Moderate mitral regurgitation  Moderate tricuspid regurgitation  Mild aortic stenosis  Moderate pulmonary hypertension  Hypokalemia  COPD/Asthma  Sleep apnea without current use of BiPAP  Insulin-dependent diabetes mellitus-hemoglobin A1c 9.5  Hyperlipidemia  Hypertension  Obesity secondary to excess caloric intake  History of tobacco abuse      Plan:     Patient was evaluated by [facility] surgery team.  As they do not treat cervical spine fracture recommendation was for neurosurgery evaluation. As we do not have neurosurgery at this facility we did contact the access line to request possible transfer to [facility] for neurosurgery evaluation. Discussed case with neurosurgeon, Dr. Miranda Moncada. Outcome of conversation was that patient did not need intervention for transverse process fracture therefore patient did not need to be transferred; the area should routinely healing; and treat pain as warranted. Hallucinations somewhat improved after stopping  amitriptyline and Mirapex. Psychiatry was consulted. Recommendations have been reviewed. Patient had informed us that he has not been using the marijuana chewables but did admit to psychiatry that he has been using. May also be contributory. Psychiatry assessment was that of delirium. He was initiated on low-dose risperidone. We will continue to monitor the patient. Psychiatry will follow. Strict intake and output. Currently incomplete. Pulse dose diuretic. Daily weights. Monitor blood glucose levels and treat accordingly. After vitamin K administration patient's INR is 2.2. Potassium supplementation to replete potassium stores. Monitor electrolytes. Neurology consult appreciated. Recommendations reviewed. Physical therapy and possible short-term rehab  Behavior modification and lifestyle changes  Please orders for further plan of care. Labs as ordered.       More than 50% of my time was spent at the bedside counseling/coordinating care with the patient and/or family with face to face contact. This time was spent reviewing notes and laboratory data as well as instructing and counseling the patient. Time I spent with the family or surrogate(s) is included only if the patient was incapable of providing the necessary information or participating in medical decisions. I also discussed the differential diagnosis and all of the proposed management plans with the patient and individuals accompanying the patient. I am readily available for any further decision-making and intervention. The patient was seen, examined and then discussed with Dr. Leon Cross. ARLEEN Banerjee - CNP,  9/17/2022  1:57 PM        I saw and evaluated the patient. I agree with the findings and the plan of care as documented in Sanjuana Diaz NP-C's  note. Yuliya Angel DO, D.O., FACOI  4:00 PM  9/17/2022     I saw and evaluated the patient. I agree with the findings and the plan of care as documented in Sanjuana Diaz NP-C's  note.     Yuliya Angel DO, D.O., FACOI  4:00 PM  9/17/2022

## 2022-09-17 NOTE — PROGRESS NOTES
Patient sitting at side of bed refuses to lay down at this time, he is removing items from wallet and wiping them with bath wipes stating everything is covered in bugs, unable to redirect at this time.

## 2022-09-17 NOTE — PROGRESS NOTES
Patient awake all night. Patient complaining of spiders leaving webs all over him. Patient asked if we have any bleach because that is what he washes with at home. Patient provided with our foam cleanser.

## 2022-09-17 NOTE — PROGRESS NOTES
Department of Orthopedic Surgery  Resident Progress Note    Case discussed with Dr. Blair Coon. Orthopedic surgery does not treat cervical spine fractures at this time. Recommend possible neurosurgery consult if appropriate.      Electronically signed by William Mejia DO on 9/17/2022 at 11:46 AM

## 2022-09-17 NOTE — PROGRESS NOTES
Patient agitated stating the bugs are eating him alive, unable to redirect. He requested new gown, new bed linens, and to wash his hands. New linens and gown provided, patient washed his own hands at sink x 3. Requested foam cleanser be left at bedside. Legs wrapped with Kerlex per patient request to \"keep flies from eating legs\".

## 2022-09-17 NOTE — CONSULTS
PSYCHIATRY ATTENDING CONSULT    REASON FOR CONSULT:  Hallucinations    REQUESTING PHYSICIAN:  Dr. Pinto Duty: Verito Baez moved part of the hospital.\"    HISTORY OF PRESENT ILLNESS:  Lorenza Chan is a 77 y.o. male with no apparent psychiatric history who was admitted on 9/15/22 due to altered mental status. Chart reviewed. Patient was on Elavil 50 mg prior to admission but records indicate he was not even taking this. Patient has apparently had visual and tactile hallucinations in the hospital. Primary team discontinued Mirapex which seems to have help things but then overnight patient did not sleep and was constantly complaining to nursing staff of flies and bugs on his legs. Spoke with patient via telephone for consultation which he consents to. Patient location [address] [facility],[address]. Provider location other office. Dario Simon is generally oriented but confused and loose in associations at times. He speaks of his hospital room being different and believes part of the hospital was somehow moved. I asked him about the events from overnight which he seems to barely recall. Patient is not voicing concerns at the moment but still seems somewhat confused. He is not suicidal or homicidal. He has been using marijuana chewables for pain but denies other drug use. PAST PSYCHIATRIC HISTORY:  Denies psychiatric hospitalizations or suicide attempts.     PAST MEDICAL HISTORY:       Diagnosis Date    Acid reflux     Acute diastolic (congestive) heart failure ([address] 06/19/2019    Arthritis     Asthma 04/16/2014    Asthmatic bronchitis , chronic ([facility] 11/28/2016    CAD (coronary artery disease)     Chronic bronchitis ([facility] [address] 04/16/2014    COPD (chronic obstructive pulmonary disease) ([facility])     CB    COPD (chronic obstructive pulmonary disease) (HCC)     Diabetes mellitus (HCC)     Emphysema (subcutaneous) (surgical) resulting from a procedure     H/O cardiovascular stress test 04/24/2021    Lexiscan    Hyperlipidemia Hypertension     Hypoxemia requiring supplemental oxygen 02/02/2015    LONG TERM ANTICOAGULENT USE     Morbid obesity with BMI of 50.0-59.9, adult ([facility] 11/27/2013    Obesity     Osteoarthritis     Sleep apnea     bilevel positive airway pressure at 13/8 with 2 L oxygen flow     Stage 3 chronic kidney disease (HCC)     Tobacco abuse     Type II or unspecified type diabetes mellitus without mention of complication, not stated as uncontrolled      PAST SURGICAL HISTORY:       Procedure Laterality Date    COLONOSCOPY      CORONARY ANGIOPLASTY WITH STENT PLACEMENT  07-23-13    Left main focal eccentric 65% distal stenosis. Large OM1 CX 50% ostial & prox narrow. Large ramus artery & LAD: Minor plaque w/o sign narrow. RCA: Dom vessel w/25% prox narrow & focal hazy eccentric 99% distal stenosis before origin RPDA & RPLCA. LV: Mild inferior hypokinesis EF 55%. Probable mild AS with 10-15mmHg. Successful PCI distal RCA w/3.5 x 12 mm BMS, 0% res stenosis.  Normal distal runoff    CORONARY ARTERY BYPASS GRAFT  09-09-13    Dr Howard Terrell; CABG x2, LIMA to LAD, SVG to ramus intermedius; MV repair using 30-mm Future complete ring with magic stitch between A3 and P3; rigid internal fixation of sternum using KLS plates x2; endoscopic vein harvesting of right lower extremity     ECHO COMPL W DOP COLOR FLOW  7/25/2013         HERNIA REPAIR      SINUS SURGERY       MEDICATIONS: Current Facility-Administered Medications: insulin lispro (HUMALOG) injection vial 0-8 Units, 0-8 Units, SubCUTAneous, TID WC  insulin lispro (HUMALOG) injection vial 0-4 Units, 0-4 Units, SubCUTAneous, Nightly  [START ON 9/18/2022] warfarin (COUMADIN) tablet 2.5 mg, 2.5 mg, Oral, Once per day on Sun Tue Thu  warfarin (COUMADIN) tablet 5 mg, 5 mg, Oral, Once per day on Mon Wed Fri Sat  carbamide peroxide (DEBROX) 6.5 % otic solution 10 drop, 10 drop, Both Ears, BID  losartan (COZAAR) tablet 25 mg, 25 mg, Oral, Daily  metOLazone (ZAROXOLYN) tablet 2.5 mg, 2.5 mg, Oral, Daily  amiodarone (CORDARONE) tablet 200 mg, 200 mg, Oral, Daily  atorvastatin (LIPITOR) tablet 80 mg, 80 mg, Oral, Nightly  bumetanide (BUMEX) tablet 1 mg, 1 mg, Oral, Daily  spironolactone (ALDACTONE) tablet 25 mg, 25 mg, Oral, Daily  aspirin EC tablet 81 mg, 81 mg, Oral, Nightly  insulin glargine (LANTUS) injection vial 45 Units, 45 Units, SubCUTAneous, BID  cetirizine (ZYRTEC) tablet 10 mg, 10 mg, Oral, Daily  metoprolol succinate (TOPROL XL) extended release tablet 25 mg, 25 mg, Oral, BID  montelukast (SINGULAIR) tablet 10 mg, 10 mg, Oral, Nightly  pantoprazole (PROTONIX) tablet 40 mg, 40 mg, Oral, Daily  glucose chewable tablet 16 g, 4 tablet, Oral, PRN  dextrose bolus 10% 125 mL, 125 mL, IntraVENous, PRN **OR** dextrose bolus 10% 250 mL, 250 mL, IntraVENous, PRN  glucagon (rDNA) injection 1 mg, 1 mg, SubCUTAneous, PRN  dextrose 10 % infusion, , IntraVENous, Continuous PRN  sodium chloride flush 0.9 % injection 5-40 mL, 5-40 mL, IntraVENous, 2 times per day  sodium chloride flush 0.9 % injection 5-40 mL, 5-40 mL, IntraVENous, PRN  0.9 % sodium chloride infusion, , IntraVENous, PRN  polyethylene glycol (GLYCOLAX) packet 17 g, 17 g, Oral, Daily PRN  acetaminophen (TYLENOL) tablet 650 mg, 650 mg, Oral, Q6H PRN **OR** acetaminophen (TYLENOL) suppository 650 mg, 650 mg, Rectal, Q6H PRN    ALLERGIES:  Pcn [penicillins] and Sulfa antibiotics    FAMILY PSYCHIATRIC HISTORY: Unclear. SOCIAL HISTORY: Patient reports he is  and lives with wife. SUBSTANCE ABUSE HISTORY:  reports that he quit smoking about 9 years ago. His smoking use included cigarettes. He has a 45.00 pack-year smoking history. He quit smokeless tobacco use about 50 years ago. His smokeless tobacco use included chew. He reports that he does not drink alcohol and does not use drugs.     VITALS:   Vitals:    09/17/22 0730   BP: 123/61   Pulse: 56   Resp: 18   Temp: 97.2 °F (36.2 °C)   SpO2: 92%     LABS:   Admission on 09/15/2022   Component Date Value Ref Range Status    Glucose 09/15/2022 340  mg/dL Final    Ventricular Rate 09/15/2022 59  BPM Final    Atrial Rate 09/15/2022 59  BPM Final    P-R Interval 09/15/2022 184  ms Final    QRS Duration 09/15/2022 132  ms Final    Q-T Interval 09/15/2022 438  ms Final    QTc Calculation (Bazett) 09/15/2022 433  ms Final    P Axis 09/15/2022 66  degrees Final    R Axis 09/15/2022 94  degrees Final    T Axis 09/15/2022 -103  degrees Final    WBC 09/15/2022 7.4  4.5 - 11.5 E9/L Final    RBC 09/15/2022 4.04  3.80 - 5.80 E12/L Final    Hemoglobin 09/15/2022 12.8  12.5 - 16.5 g/dL Final    Hematocrit 09/15/2022 38.7  37.0 - 54.0 % Final    MCV 09/15/2022 95.8  80.0 - 99.9 fL Final    MCH 09/15/2022 31.7  26.0 - 35.0 pg Final    MCHC 09/15/2022 33.1  32.0 - 34.5 % Final    RDW 09/15/2022 15.4 (A) 11.5 - 15.0 fL Final    Platelets 85/67/0808 163  130 - 450 E9/L Final    MPV 09/15/2022 10.6  7.0 - 12.0 fL Final    Neutrophils % 09/15/2022 74.4  43.0 - 80.0 % Final    Immature Granulocytes % 09/15/2022 0.5  0.0 - 5.0 % Final    Lymphocytes % 09/15/2022 12.7 (A) 20.0 - 42.0 % Final    Monocytes % 09/15/2022 10.5  2.0 - 12.0 % Final    Eosinophils % 09/15/2022 1.5  0.0 - 6.0 % Final    Basophils % 09/15/2022 0.4  0.0 - 2.0 % Final    Neutrophils Absolute 09/15/2022 5.51  1.80 - 7.30 E9/L Final    Immature Granulocytes # 09/15/2022 0.04  E9/L Final    Lymphocytes Absolute 09/15/2022 0.94 (A) 1.50 - 4.00 E9/L Final    Monocytes Absolute 09/15/2022 0.78  0.10 - 0.95 E9/L Final    Eosinophils Absolute 09/15/2022 0.11  0.05 - 0.50 E9/L Final    Basophils Absolute 09/15/2022 0.03  0.00 - 0.20 E9/L Final    Sodium 09/15/2022 136  132 - 146 mmol/L Final    Potassium reflex Magnesium 09/15/2022 3.1 (A) 3.5 - 5.0 mmol/L Final    Chloride 09/15/2022 96 (A) 98 - 107 mmol/L Final    CO2 09/15/2022 28  22 - 29 mmol/L Final    Anion Gap 09/15/2022 12  7 - 16 mmol/L Final    Glucose 09/15/2022 299 (A) 74 - 99 mg/dL Final    BUN 09/15/2022 42 (A) 6 - 23 mg/dL Final    Creatinine 09/15/2022 1.7 (A) 0.7 - 1.2 mg/dL Final    GFR Non- 09/15/2022 40  >=60 mL/min/1.73 Final    Comment: Chronic Kidney Disease: less than 60 ml/min/1.73 sq.m. Kidney Failure: less than 15 ml/min/1.73 sq.m. Results valid for patients 18 years and older. GFR  09/15/2022 49   Final    Calcium 09/15/2022 9.3  8.6 - 10.2 mg/dL Final    Total Protein 09/15/2022 7.4  6.4 - 8.3 g/dL Final    Albumin 09/15/2022 3.8  3.5 - 5.2 g/dL Final    Alkaline Phosphatase 09/15/2022 181 (A) 40 - 129 U/L Final    ALT 09/15/2022 47 (A) 0 - 40 U/L Final    AST 09/15/2022 36  0 - 39 U/L Final    Total Bilirubin 09/15/2022 1.0  0.0 - 1.2 mg/dL Final    Bilirubin, Direct 09/15/2022 0.3  0.0 - 0.3 mg/dL Final    Bilirubin, Indirect 09/15/2022 0.7  0.0 - 1.0 mg/dL Final    Troponin, High Sensitivity 09/15/2022 34 (A) 0 - 11 ng/L Final    Comment: High Sensitivity Troponin values cannot be compared with  other Troponin methodologies. Patients with high levels of Biotin oral intake (i.e. >5 mg/day)  may have falsely decreased Troponin levels. Samples collected  within 8 hours of biotin intake may require additional information  for diagnosis.       Pro-BNP 09/15/2022 1,860 (A) 0 - 125 pg/mL Final    Color, UA 09/15/2022 Yellow  Straw/Yellow Final    Clarity, UA 09/15/2022 Clear  Clear Final    Glucose, Ur 09/15/2022 >=1000 (A) Negative mg/dL Final    Bilirubin Urine 09/15/2022 Negative  Negative Final    Ketones, Urine 09/15/2022 Negative  Negative mg/dL Final    Specific Gravity, UA 09/15/2022 1.020  1.005 - 1.030 Final    Blood, Urine 09/15/2022 Negative  Negative Final    pH, UA 09/15/2022 6.0  5.0 - 9.0 Final    Protein, UA 09/15/2022 TRACE  Negative mg/dL Final    Urobilinogen, Urine 09/15/2022 1.0  <2.0 E.U./dL Final    Nitrite, Urine 09/15/2022 Negative  Negative Final    Leukocyte Esterase, Urine 09/15/2022 Negative  Negative Final    WBC, UA 09/15/2022 NONE  0 - 5 /HPF Final    RBC, UA 09/15/2022 NONE  0 - 2 /HPF Final    Epithelial Cells, UA 09/15/2022 NONE SEEN  /HPF Final    Bacteria, UA 09/15/2022 NONE SEEN  None Seen /HPF Final    Amphetamine Screen, Urine 09/15/2022 NOT DETECTED  Negative <1000 ng/mL Final    Barbiturate Screen, Ur 09/15/2022 NOT DETECTED  Negative < 200 ng/mL Final    Benzodiazepine Screen, Urine 09/15/2022 NOT DETECTED  Negative < 200 ng/mL Final    Cannabinoid Scrn, Ur 09/15/2022 NOT DETECTED  Negative < 50ng/mL Final    Cocaine Metabolite Screen, Urine 09/15/2022 NOT DETECTED  Negative < 300 ng/mL Final    Opiate Scrn, Ur 09/15/2022 NOT DETECTED  Negative < 300ng/mL Final    Note:  The Opiate Screen is not intended to detect Oxycodone. PCP Screen, Urine 09/15/2022 NOT DETECTED  Negative < 25 ng/mL Final    Methadone Screen, Urine 09/15/2022 NOT DETECTED  Negative <300 ng/mL Final    Oxycodone Urine 09/15/2022 NOT DETECTED  Negative <100 ng/mL Final    FENTANYL SCREEN, URINE 09/15/2022 NOT DETECTED  Negative <1 ng/mL Final    Drug Screen Comment: 09/15/2022 see below   Final    Comment: These drug screen results are for medical purposes only and  should not be considered definitive or confirmed. The drug  methodology concentration value must be greater than or equal  to the cutoff to be reported as positive. Confirmatory testing  orders and/or interpretive screening questions can be directed  to toxicology at [phone]. The absence of expected drug(s) and/or metabolite(s) may be due  to inappropriate timing of specimen collection relative to drug  administration, poor drug absorption, diluted/adulterated urine,  or limitations of screening testing methodology.       Ethanol Lvl 09/15/2022 <10  mg/dL Final    Not Detected    Acetaminophen Level 09/15/2022 <5.0 (A) 10.0 - 30.0 mcg/mL Final    Salicylate, Serum 89/68/3149 <0.3  0.0 - 30.0 mg/dL Final    TCA Scrn 09/15/2022 NEGATIVE  Cutoff:300 ng/mL Final    Ammonia 09/15/2022 18.0  16.0 - 60.0 umol/L Final    Meter Glucose 09/15/2022 340 (A) 74 - 99 mg/dL Final    Magnesium 09/15/2022 1.9  1.6 - 2.6 mg/dL Final    Troponin, High Sensitivity 09/15/2022 29 (A) 0 - 11 ng/L Final    Comment: High Sensitivity Troponin values cannot be compared with  other Troponin methodologies. Patients with high levels of Biotin oral intake (i.e. >5 mg/day)  may have falsely decreased Troponin levels. Samples collected  within 8 hours of biotin intake may require additional information  for diagnosis. Meter Glucose 09/15/2022 337 (A) 74 - 99 mg/dL Final    Protime 09/16/2022 64.2 (A) 9.3 - 12.4 sec Final    INR 09/16/2022 5.5 (A)  Final    Phosphorus 09/16/2022 2.3 (A) 2.5 - 4.5 mg/dL Final    Magnesium 09/16/2022 1.7  1.6 - 2.6 mg/dL Final    Sodium 09/16/2022 137  132 - 146 mmol/L Final    Potassium 09/16/2022 3.1 (A) 3.5 - 5.0 mmol/L Final    Chloride 09/16/2022 98  98 - 107 mmol/L Final    CO2 09/16/2022 28  22 - 29 mmol/L Final    Anion Gap 09/16/2022 11  7 - 16 mmol/L Final    Glucose 09/16/2022 221 (A) 74 - 99 mg/dL Final    BUN 09/16/2022 36 (A) 6 - 23 mg/dL Final    Creatinine 09/16/2022 1.3 (A) 0.7 - 1.2 mg/dL Final    GFR Non- 09/16/2022 55  >=60 mL/min/1.73 Final    Comment: Chronic Kidney Disease: less than 60 ml/min/1.73 sq.m. Kidney Failure: less than 15 ml/min/1.73 sq.m. Results valid for patients 18 years and older.       GFR  09/16/2022 >60   Final    Calcium 09/16/2022 9.2  8.6 - 10.2 mg/dL Final    Total Protein 09/16/2022 7.3  6.4 - 8.3 g/dL Final    Albumin 09/16/2022 3.7  3.5 - 5.2 g/dL Final    Total Bilirubin 09/16/2022 0.9  0.0 - 1.2 mg/dL Final    Alkaline Phosphatase 09/16/2022 171 (A) 40 - 129 U/L Final    ALT 09/16/2022 44 (A) 0 - 40 U/L Final    AST 09/16/2022 36  0 - 39 U/L Final    WBC 09/16/2022 6.4  4.5 - 11.5 E9/L Final    RBC 09/16/2022 3.94  3.80 - 5.80 E12/L Final    Hemoglobin 09/16/2022 12.5  12.5 - 16.5 g/dL Final    Hematocrit 09/16/2022 38.3  37.0 - 54.0 % Final    MCV 09/16/2022 97.2  80.0 - 99.9 fL Final    MCH 09/16/2022 31.7  26.0 - 35.0 pg Final    MCHC 09/16/2022 32.6  32.0 - 34.5 % Final    RDW 09/16/2022 15.4 (A) 11.5 - 15.0 fL Final    Platelets 25/82/9213 152  130 - 450 E9/L Final    MPV 09/16/2022 10.4  7.0 - 12.0 fL Final    Neutrophils % 09/16/2022 72.0  43.0 - 80.0 % Final    Immature Granulocytes % 09/16/2022 0.5  0.0 - 5.0 % Final    Lymphocytes % 09/16/2022 14.9 (A) 20.0 - 42.0 % Final    Monocytes % 09/16/2022 10.1  2.0 - 12.0 % Final    Eosinophils % 09/16/2022 2.0  0.0 - 6.0 % Final    Basophils % 09/16/2022 0.5  0.0 - 2.0 % Final    Neutrophils Absolute 09/16/2022 4.63  1.80 - 7.30 E9/L Final    Immature Granulocytes # 09/16/2022 0.03  E9/L Final    Lymphocytes Absolute 09/16/2022 0.96 (A) 1.50 - 4.00 E9/L Final    Monocytes Absolute 09/16/2022 0.65  0.10 - 0.95 E9/L Final    Eosinophils Absolute 09/16/2022 0.13  0.05 - 0.50 E9/L Final    Basophils Absolute 09/16/2022 0.03  0.00 - 0.20 E9/L Final    Procalcitonin 09/16/2022 0.14 (A) 0.00 - 0.08 ng/mL Final    Comment: Suspected Sepsis:  Low likelihood of sepsis  <.50 ng/mL    Increased likelihood of sepsis 0.50-2.00 ng/mL  Antibiotics encouraged    High risk of sepsis/shock   >2.00 ng/mL  Antibiotics strongly encouraged    Suspected Lower Respiratory Tract Infections:  Low likelihood of bacterial infection  <0.24 ng/mL    Increased likelihood of bacterial infection >0.24 ng/mL  Antibiotics encouraged    With successful antibiotic therapy, PCT levels should decrease  rapidly. (Half-life of 24 to 36 hours.)    Procalcitonin values from samples collected within the first  6 hours of systemic infection may still be low. Retesting may be indicated. Values from day 1 and day 4 can be entered into the Change in  Procalcitonin Calculator to determine the patient's  Mortality Risk Prognosis  (www.[employer]-pct-[email or web address] com)    In healthy neonates, plasma Procalcitonin (PCT) concentrations  increase gradually after birth, reaching peak values at about  24 hours of age then decrease to normal values below 0.5                            ng/mL  by 48-72 hours of age. Vitamin B-12 09/16/2022 635  211 - 946 pg/mL Final    Folate 09/16/2022 10.9  4.8 - 24.2 ng/mL Final    Hemoglobin A1C 09/16/2022 9.5 (A) 4.0 - 5.6 % Final    Meter Glucose 09/16/2022 223 (A) 74 - 99 mg/dL Final    Meter Glucose 09/16/2022 230 (A) 74 - 99 mg/dL Final    Meter Glucose 09/16/2022 152 (A) 74 - 99 mg/dL Final    Meter Glucose 09/16/2022 115 (A) 74 - 99 mg/dL Final    Protime 09/17/2022 25.0 (A) 9.3 - 12.4 sec Final    INR 09/17/2022 2.2   Final    Sodium 09/17/2022 137  132 - 146 mmol/L Final    Potassium 09/17/2022 3.3 (A) 3.5 - 5.0 mmol/L Final    Chloride 09/17/2022 96 (A) 98 - 107 mmol/L Final    CO2 09/17/2022 27  22 - 29 mmol/L Final    Anion Gap 09/17/2022 14  7 - 16 mmol/L Final    Glucose 09/17/2022 82  74 - 99 mg/dL Final    BUN 09/17/2022 26 (A) 6 - 23 mg/dL Final    Creatinine 09/17/2022 1.2  0.7 - 1.2 mg/dL Final    GFR Non- 09/17/2022 >60  >=60 mL/min/1.73 Final    Comment: Chronic Kidney Disease: less than 60 ml/min/1.73 sq.m. Kidney Failure: less than 15 ml/min/1.73 sq.m. Results valid for patients 18 years and older.       GFR  09/17/2022 >60   Final    Calcium 09/17/2022 9.7  8.6 - 10.2 mg/dL Final    Total Protein 09/17/2022 7.7  6.4 - 8.3 g/dL Final    Albumin 09/17/2022 3.8  3.5 - 5.2 g/dL Final    Total Bilirubin 09/17/2022 1.8 (A) 0.0 - 1.2 mg/dL Final    Alkaline Phosphatase 09/17/2022 134 (A) 40 - 129 U/L Final    ALT 09/17/2022 51 (A) 0 - 40 U/L Final    AST 09/17/2022 48 (A) 0 - 39 U/L Final    WBC 09/17/2022 9.9  4.5 - 11.5 E9/L Final    RBC 09/17/2022 4.32  3.80 - 5.80 E12/L Final    Hemoglobin 09/17/2022 13.5  12.5 - 16.5 g/dL Final    Hematocrit 09/17/2022 41.9  37.0 - 54.0 % Final    MCV 09/17/2022 97.0  80.0 - 99.9 fL Final    MCH 09/17/2022 31.3  26.0 - 35.0 pg Final    MCHC 09/17/2022 32.2  32.0 - 34.5 % Final    RDW 09/17/2022 15.3 (A) 11.5 - 15.0 fL Final    Platelets 00/12/5064 188  130 - 450 E9/L Final    MPV 09/17/2022 10.5  7.0 - 12.0 fL Final    Neutrophils % 09/17/2022 78.4  43.0 - 80.0 % Final    Immature Granulocytes % 09/17/2022 0.4  0.0 - 5.0 % Final    Lymphocytes % 09/17/2022 10.9 (A) 20.0 - 42.0 % Final    Monocytes % 09/17/2022 8.8  2.0 - 12.0 % Final    Eosinophils % 09/17/2022 1.0  0.0 - 6.0 % Final    Basophils % 09/17/2022 0.5  0.0 - 2.0 % Final    Neutrophils Absolute 09/17/2022 7.76 (A) 1.80 - 7.30 E9/L Final    Immature Granulocytes # 09/17/2022 0.04  E9/L Final    Lymphocytes Absolute 09/17/2022 1.08 (A) 1.50 - 4.00 E9/L Final    Monocytes Absolute 09/17/2022 0.87  0.10 - 0.95 E9/L Final    Eosinophils Absolute 09/17/2022 0.10  0.05 - 0.50 E9/L Final    Basophils Absolute 09/17/2022 0.05  0.00 - 0.20 E9/L Final    Meter Glucose 09/16/2022 145 (A) 74 - 99 mg/dL Final    Meter Glucose 09/17/2022 100 (A) 74 - 99 mg/dL Final         IMAGING: head CT negative    MENTAL STATUS EXAMINATION  Male. Pleasant, cooperative. Mood slightly anxious. Speech clear. Thought process loosely organized. Content void of suicidal or homicidal ideations. Mild paranoia and some delusional thoughts. Denies current hallucinations. Orientation, concentration, recent and remote memory have some impairments. Fund of knowledge limited. Insight and judgment questionable. ASSESSMENT:  Delirium, unspecified    PLAN & RECOMMENDATIONS: Patient still seems delirious although no clear culprit. I am going to start him on a low dose of risperidone and hopefully this curbs the hallucinations and delusional thinking. No indication for psychiatric admission at this time but will follow peripherally.     Total time spent 20 min    Sharron Hurtado MD 9/17/2022 10:39 AM

## 2022-09-17 NOTE — PROGRESS NOTES
Call received from ortho group at [facility] stating they do not see cervical fracture patients, that requires a neurosurgery consult. Information provided to charge nurse. Attempted to contact attending,  transferred call to Alka Hemphill, message stated voicemail full. Need for new consult relayed to day shift RN.

## 2022-09-17 NOTE — PLAN OF CARE
Problem: Discharge Planning  Goal: Discharge to home or other facility with appropriate resources  1/40/4113 [id number] by Tala Ennis RN  Outcome: Progressing     Problem: Pain  Goal: Verbalizes/displays adequate comfort level or baseline comfort level  2/57/8977 1771 by Tala Ennis RN  Outcome: Progressing     Problem: ABCDS Injury Assessment  Goal: Absence of physical injury  7/22/9199 8330 by Tala Ennis RN  Outcome: Progressing     Problem: Safety - Adult  Goal: Free from fall injury  3/82/7221 8168 by Tala Ennis RN  Outcome: Progressing     Problem: Neurosensory - Adult  Goal: Achieves stable or improved neurological status  3/96/9178 5181 by Tala Ennis RN  Outcome: Progressing     Problem: Neurosensory - Adult  Goal: Achieves maximal functionality and self care  1/16/2225 0304 by Tala Ennis RN  Outcome: Progressing     Problem: Cardiovascular - Adult  Goal: Maintains optimal cardiac output and hemodynamic stability  0/69/2209 8788 by Tala Ennis RN  Outcome: Progressing     Problem: Cardiovascular - Adult  Goal: Absence of cardiac dysrhythmias or at baseline  1/11/1536 4573 by Tala Ennis RN  Outcome: Progressing     Problem: Skin/Tissue Integrity - Adult  Goal: Skin integrity remains intact  7/22/0296 8236 by Tala Ennis RN  Outcome: Progressing  Flowsheets (Taken 9/16/2022 1148 by Thea Deutsch, RN)  Skin Integrity Remains Intact: Monitor for areas of redness and/or skin breakdown     Problem: Skin/Tissue Integrity - Adult  Goal: Incisions, wounds, or drain sites healing without S/S of infection  7/60/2570 1422 by Tala Ennis RN  Outcome: Progressing  Flowsheets (Taken 9/16/2022 1148 by Thea Deutsch, RN)  Incisions, Wounds, or Drain Sites Healing Without Sign and Symptoms of Infection: ADMISSION and DAILY: Assess and document risk factors for pressure ulcer development     Problem: Skin/Tissue Integrity  Goal: Absence of new skin breakdown  Description: 1.   Monitor for areas of redness and/or skin breakdown  2. Assess vascular access sites hourly  3. Every 4-6 hours minimum:  Change oxygen saturation probe site  4. Every 4-6 hours:  If on nasal continuous positive airway pressure, respiratory therapy assess nares and determine need for appliance change or resting period.   Outcome: Progressing

## 2022-09-18 ENCOUNTER — APPOINTMENT (OUTPATIENT)
Dept: GENERAL RADIOLOGY | Age: 67
DRG: 091 | End: 2022-09-18
Payer: MEDICARE

## 2022-09-18 LAB
ALBUMIN SERPL-MCNC: 3.6 G/DL (ref 3.5–5.2)
ALP BLD-CCNC: 136 U/L (ref 40–129)
ALT SERPL-CCNC: 47 U/L (ref 0–40)
ANION GAP SERPL CALCULATED.3IONS-SCNC: 10 MMOL/L (ref 7–16)
AST SERPL-CCNC: 37 U/L (ref 0–39)
BASOPHILS ABSOLUTE: 0.04 E9/L (ref 0–0.2)
BASOPHILS RELATIVE PERCENT: 0.7 % (ref 0–2)
BILIRUB SERPL-MCNC: 1.5 MG/DL (ref 0–1.2)
BUN BLDV-MCNC: 28 MG/DL (ref 6–23)
CALCIUM SERPL-MCNC: 9.1 MG/DL (ref 8.6–10.2)
CHLORIDE BLD-SCNC: 97 MMOL/L (ref 98–107)
CO2: 29 MMOL/L (ref 22–29)
CREAT SERPL-MCNC: 1.4 MG/DL (ref 0.7–1.2)
EOSINOPHILS ABSOLUTE: 0.17 E9/L (ref 0.05–0.5)
EOSINOPHILS RELATIVE PERCENT: 2.9 % (ref 0–6)
GFR AFRICAN AMERICAN: >60
GFR NON-AFRICAN AMERICAN: 51 ML/MIN/1.73
GLUCOSE BLD-MCNC: 148 MG/DL (ref 74–99)
HCT VFR BLD CALC: 38.5 % (ref 37–54)
HEMOGLOBIN: 12.5 G/DL (ref 12.5–16.5)
IMMATURE GRANULOCYTES #: 0.03 E9/L
IMMATURE GRANULOCYTES %: 0.5 % (ref 0–5)
INR BLD: 2.3
LYMPHOCYTES ABSOLUTE: 1.09 E9/L (ref 1.5–4)
LYMPHOCYTES RELATIVE PERCENT: 18.6 % (ref 20–42)
MCH RBC QN AUTO: 31.3 PG (ref 26–35)
MCHC RBC AUTO-ENTMCNC: 32.5 % (ref 32–34.5)
MCV RBC AUTO: 96.5 FL (ref 80–99.9)
METER GLUCOSE: 155 MG/DL (ref 74–99)
METER GLUCOSE: 184 MG/DL (ref 74–99)
METER GLUCOSE: 229 MG/DL (ref 74–99)
METER GLUCOSE: 301 MG/DL (ref 74–99)
MONOCYTES ABSOLUTE: 0.55 E9/L (ref 0.1–0.95)
MONOCYTES RELATIVE PERCENT: 9.4 % (ref 2–12)
NEUTROPHILS ABSOLUTE: 3.99 E9/L (ref 1.8–7.3)
NEUTROPHILS RELATIVE PERCENT: 67.9 % (ref 43–80)
PDW BLD-RTO: 15.5 FL (ref 11.5–15)
PLATELET # BLD: 173 E9/L (ref 130–450)
PMV BLD AUTO: 10.1 FL (ref 7–12)
POTASSIUM SERPL-SCNC: 3.5 MMOL/L (ref 3.5–5)
PROTHROMBIN TIME: 26.1 SEC (ref 9.3–12.4)
RBC # BLD: 3.99 E12/L (ref 3.8–5.8)
SODIUM BLD-SCNC: 136 MMOL/L (ref 132–146)
TOTAL PROTEIN: 7.2 G/DL (ref 6.4–8.3)
WBC # BLD: 5.9 E9/L (ref 4.5–11.5)

## 2022-09-18 PROCEDURE — 85610 PROTHROMBIN TIME: CPT

## 2022-09-18 PROCEDURE — 73020 X-RAY EXAM OF SHOULDER: CPT

## 2022-09-18 PROCEDURE — 97535 SELF CARE MNGMENT TRAINING: CPT | Performed by: OCCUPATIONAL THERAPIST

## 2022-09-18 PROCEDURE — 97530 THERAPEUTIC ACTIVITIES: CPT | Performed by: OCCUPATIONAL THERAPIST

## 2022-09-18 PROCEDURE — 2580000003 HC RX 258: Performed by: INTERNAL MEDICINE

## 2022-09-18 PROCEDURE — 1200000000 HC SEMI PRIVATE

## 2022-09-18 PROCEDURE — 6370000000 HC RX 637 (ALT 250 FOR IP): Performed by: INTERNAL MEDICINE

## 2022-09-18 PROCEDURE — 97165 OT EVAL LOW COMPLEX 30 MIN: CPT | Performed by: OCCUPATIONAL THERAPIST

## 2022-09-18 PROCEDURE — 6370000000 HC RX 637 (ALT 250 FOR IP): Performed by: PSYCHIATRY & NEUROLOGY

## 2022-09-18 PROCEDURE — 73060 X-RAY EXAM OF HUMERUS: CPT

## 2022-09-18 PROCEDURE — 80053 COMPREHEN METABOLIC PANEL: CPT

## 2022-09-18 PROCEDURE — 36415 COLL VENOUS BLD VENIPUNCTURE: CPT

## 2022-09-18 PROCEDURE — 2060000000 HC ICU INTERMEDIATE R&B

## 2022-09-18 PROCEDURE — 82962 GLUCOSE BLOOD TEST: CPT

## 2022-09-18 PROCEDURE — 97530 THERAPEUTIC ACTIVITIES: CPT

## 2022-09-18 PROCEDURE — 85025 COMPLETE CBC W/AUTO DIFF WBC: CPT

## 2022-09-18 RX ORDER — WARFARIN SODIUM 3 MG/1
3 TABLET ORAL DAILY
Status: DISCONTINUED | OUTPATIENT
Start: 2022-09-18 | End: 2022-09-20 | Stop reason: HOSPADM

## 2022-09-18 RX ADMIN — CARBAMIDE PEROXIDE 10 DROP: 65 SOLUTION/ DROPS AURICULAR (OTIC) at 08:08

## 2022-09-18 RX ADMIN — INSULIN GLARGINE 45 UNITS: 100 INJECTION, SOLUTION SUBCUTANEOUS at 09:29

## 2022-09-18 RX ADMIN — RISPERIDONE 0.5 MG: 1 TABLET ORAL at 20:13

## 2022-09-18 RX ADMIN — SODIUM CHLORIDE, PRESERVATIVE FREE 10 ML: 5 INJECTION INTRAVENOUS at 20:14

## 2022-09-18 RX ADMIN — PANTOPRAZOLE SODIUM 40 MG: 40 TABLET, DELAYED RELEASE ORAL at 08:07

## 2022-09-18 RX ADMIN — BUMETANIDE 1 MG: 1 TABLET ORAL at 08:07

## 2022-09-18 RX ADMIN — WARFARIN SODIUM 3 MG: 3 TABLET ORAL at 17:26

## 2022-09-18 RX ADMIN — INSULIN LISPRO 4 UNITS: 100 INJECTION, SOLUTION INTRAVENOUS; SUBCUTANEOUS at 20:14

## 2022-09-18 RX ADMIN — ATORVASTATIN CALCIUM 80 MG: 40 TABLET, FILM COATED ORAL at 20:13

## 2022-09-18 RX ADMIN — LOSARTAN POTASSIUM 25 MG: 50 TABLET, FILM COATED ORAL at 08:07

## 2022-09-18 RX ADMIN — RISPERIDONE 0.5 MG: 1 TABLET ORAL at 08:06

## 2022-09-18 RX ADMIN — INSULIN LISPRO 2 UNITS: 100 INJECTION, SOLUTION INTRAVENOUS; SUBCUTANEOUS at 08:11

## 2022-09-18 RX ADMIN — AMIODARONE HYDROCHLORIDE 200 MG: 200 TABLET ORAL at 08:07

## 2022-09-18 RX ADMIN — SODIUM CHLORIDE, PRESERVATIVE FREE 10 ML: 5 INJECTION INTRAVENOUS at 08:08

## 2022-09-18 RX ADMIN — SPIRONOLACTONE 25 MG: 25 TABLET ORAL at 08:06

## 2022-09-18 RX ADMIN — CETIRIZINE HYDROCHLORIDE 10 MG: 10 TABLET, FILM COATED ORAL at 08:07

## 2022-09-18 RX ADMIN — METOLAZONE 2.5 MG: 2.5 TABLET ORAL at 08:07

## 2022-09-18 RX ADMIN — INSULIN GLARGINE 45 UNITS: 100 INJECTION, SOLUTION SUBCUTANEOUS at 20:14

## 2022-09-18 RX ADMIN — CARBAMIDE PEROXIDE 10 DROP: 65 SOLUTION/ DROPS AURICULAR (OTIC) at 20:14

## 2022-09-18 RX ADMIN — ASPIRIN 81 MG: 81 TABLET, COATED ORAL at 20:13

## 2022-09-18 RX ADMIN — ACETAMINOPHEN 650 MG: 325 TABLET ORAL at 20:13

## 2022-09-18 RX ADMIN — MONTELUKAST 10 MG: 10 TABLET, FILM COATED ORAL at 20:13

## 2022-09-18 ASSESSMENT — PAIN - FUNCTIONAL ASSESSMENT: PAIN_FUNCTIONAL_ASSESSMENT: PREVENTS OR INTERFERES WITH MANY ACTIVE NOT PASSIVE ACTIVITIES

## 2022-09-18 ASSESSMENT — PAIN SCALES - GENERAL: PAINLEVEL_OUTOF10: 6

## 2022-09-18 ASSESSMENT — PAIN DESCRIPTION - ORIENTATION: ORIENTATION: RIGHT

## 2022-09-18 ASSESSMENT — PAIN DESCRIPTION - LOCATION: LOCATION: SHOULDER

## 2022-09-18 ASSESSMENT — PAIN DESCRIPTION - DESCRIPTORS: DESCRIPTORS: ACHING;TIGHTNESS

## 2022-09-18 NOTE — PROGRESS NOTES
Dr. Jenny Guerra, covering for Dr. Lupe Mccarty, notified of new ortho consult regarding shoulder via perfect serve.

## 2022-09-18 NOTE — PROGRESS NOTES
Occupational Therapy  OCCUPATIONAL THERAPY INITIAL EVALUATION     Nury Kumar [facility]  [facility]         Date:2022                                                   Patient Name: Mana Post     MRN: [id number]     : 1955     Room: [address] [place] [address]       Evaluating OT: Madelyn Hines, OTR/L; [id number]       Referring Provider and Orders/Date:   OT eval and treat  Start:  22,   End:  22,   ONE TIME,   Standing Count:  1 Occurrences,   R         Danielle Caro DO        Diagnosis:   1. Hallucination    2. Hyperglycemia         Surgery:       Pertinent Medical History:        Past Medical History:   Diagnosis Date    Acid reflux     Acute diastolic (congestive) heart failure ([facility] 2019    Arthritis     Asthma 2014    Asthmatic bronchitis , chronic ([facility] [address] 2016    CAD (coronary artery disease)     Chronic bronchitis ([facility] 2014    COPD (chronic obstructive pulmonary disease) ([facility])     CB    COPD (chronic obstructive pulmonary disease) ([facility])     Diabetes mellitus ([facility]     Emphysema (subcutaneous) (surgical) resulting from a procedure     H/O cardiovascular stress test 2021    Lexiscan    Hyperlipidemia     Hypertension     Hypoxemia requiring supplemental oxygen 2015    LONG TERM ANTICOAGULENT USE     Morbid obesity with BMI of 50.0-59.9, adult ([facility] [address] 2013    Obesity     Osteoarthritis     Sleep apnea     bilevel positive airway pressure at 13/8 with 2 L oxygen flow     Stage 3 chronic kidney disease (HCC)     Tobacco abuse     Type II or unspecified type diabetes mellitus without mention of complication, not stated as uncontrolled           Past Surgical History:   Procedure Laterality Date    COLONOSCOPY      CORONARY ANGIOPLASTY WITH STENT PLACEMENT  13    Left main focal eccentric 65% distal stenosis. Large OM1 CX 50% ostial & prox narrow.  Large ramus artery & LAD: Minor plaque w/o sign narrow. RCA: Dom vessel w/25% prox narrow & focal hazy eccentric 99% distal stenosis before origin RPDA & RPLCA. LV: Mild inferior hypokinesis EF 55%. Probable mild AS with 10-15mmHg. Successful PCI distal RCA w/3.5 x 12 mm BMS, 0% res stenosis.  Normal distal runoff    CORONARY ARTERY BYPASS GRAFT  09-09-13    Dr Guo Violetta; CABG x2, LIMA to LAD, SVG to ramus intermedius; MV repair using 30-mm Future complete ring with magic stitch between A3 and P3; rigid internal fixation of sternum using KLS plates x2; endoscopic vein harvesting of right lower extremity     ECHO COMPL W DOP COLOR FLOW  7/25/2013         HERNIA REPAIR      SINUS SURGERY         Precautions:  Fall Risk, confused    Recommended placement: subacute    Assessment of current deficits     [x] Functional mobility  [x]ADLs  [x] Strength               [x]Cognition     [x] Functional transfers   [x] IADLs         [x] Safety Awareness   [x]Endurance     [] Fine Coordination              [x] Balance      [] Vision/perception   []Sensation      [x]Gross Motor Coordination  [] ROM  [] Delirium                   [] Motor Control     OT PLAN OF CARE   OT POC based on physician orders, patient diagnosis and results of clinical assessment    Frequency/Duration 1-3 days/wk for 2 weeks PRN   Specific OT Treatment Interventions to include:   * Instruction/training on adapted ADL techniques and AE recommendations to increase functional independence within precautions       * Training on energy conservation strategies, correct breathing pattern and techniques to improve independence/tolerance for self-care routine  * Functional transfer/mobility training/DME recommendations for increased independence, safety, and fall prevention  * Patient/Family education to increase follow through with safety techniques and functional independence  * Recommendation of environmental modifications for increased safety with functional transfers/mobility and ADLs  * Cognitive retraining/development of therapeutic activities to improve problem solving, judgement, memory, and attention for increased safety/participation in ADL/IADL tasks  * Therapeutic exercise to improve motor endurance, ROM, and functional strength for ADLs/functional transfers  * Therapeutic activities to facilitate/challenge dynamic balance, stand tolerance for increased safety and independence with ADLs  * Therapeutic activities to facilitate gross/fine motor skills for increased independence with ADLs  * Neuro-muscular re-education: facilitation of righting/equilibrium reactions, midline orientation, scapular stability/mobility, normalization of muscle tone, and facilitation of volitional active controled movement  * Positioning to improve skin integrity, interaction with environment and functional independence     Recommended Adaptive Equipment/DME:  TBD      Home Living: Pt a possible poor historian on eval with patient getting confused on his old home, current home and the one he is in the process of moving too. Pt lives at home with wife and friends that do assist at home. Pts wife is w/c bound and he cares for her. Pt lives in an apartment with 6+4 steps to enter without rails and many cracks in the cement. Pt has to assist his wife with steps and it is very unsafe. He is looking to move currently. Laundry is on the same level.     Bathroom setup: Tub shower with TTB and grab bars; suction cup grab bar, Standard shower head; Standard toilet   DME owned: canes, walker, crutches, 3:1      Prior Level of Function: indep with ADLs , indep with IADLs; ambulated indep without AD   Driving: yes   Occupation: retired   Enjoys: watching TV, \"keep busy\", IADLs    Pain Level: right shoulder 5/10; can not be fixed due to poor muscle structure per pt  Cognition: A&O: 4/4; Follows 2-3 step directions   Memory:  fair-   Sequencing:  fair   Problem solving:  fair- with impulsivity    Judgement/safety:  fair with high fall risk    AM-PAC Daily Activity Inpatient   How much help for putting on and taking off regular lower body clothing?: Total  How much help for Bathing?: A Lot  How much help for Toileting?: A Lot  How much help for putting on and taking off regular upper body clothing?: A Lot  How much help for taking care of personal grooming?: A Little  How much help for eating meals?: None  AM-PAC Inpatient Daily Activity Raw Score: 14  AM-PAC Inpatient ADL T-Scale Score : 33.39  ADL Inpatient CMS 0-100% Score: 59.67  ADL Inpatient CMS G-Code Modifier : CK    Functional Assessment:     Initial Eval Status  Date: 9/18/2022   Treatment Status  Date: STGs = LTGs  Time frame: 10-14 days   Feeding Independent   NA-PLOF   Grooming Stand by Assist sitting up EOB with face/hand wash, oral care and hair comb  Supervision    UB Dressing Moderate Assist sitting EOB with gown management  Minimal Assist    LB Dressing Dependent to safety don and doff socks from sitting EOB. Pt impulsive and almost sliding off EOB  Moderate Assist    Bathing Moderate Assist sitting/standing EOB with assist for katelin feet and buttocks  Minimal Assist    Toileting Moderate Assist for safety and balance with attempts in both sitting and standing with urinal. Extended time and fall risk with walker  Stand by Assist    Bed Mobility  Supine to sit: Stand by Assist   Sit to supine: Stand by Assist     Supine to sit: Independent   Sit to supine: Independent    Functional Transfers Moderate Assist from elevated surface of bed and arm chair with walker. Impulsivity and tipping walker. Returned to bed due to suspecting pt to stand from arm chair.    Stand by Assist    Functional Mobility Moderate Assist for safety and balance with walker  Stand by Assist    Balance Sitting:     Static:  fair+    Dynamic:fair  Standing: fair-  Sitting:     Static:  good    Dynamic:good  Standing: fair+   Activity Tolerance Vitals with activity: WFL overall with fair tolerance to sitting EOB for 25min and standing tolerance fair- for 1-2min average  Increase standing tolerance for >4min with stable vital signs for carry over into toileting, functional tranfers and indep in ADLs   Visual/  Perceptual Glasses: Present; WFL    Reports change in vision since admission: No     NA     Hand Dominance  [x] Right  [] Left    AROM (PROM) Strength Additional Info:  Goal:   RUE  Limited due to shoulder injury due to multiple falls and reported rotator cuff injuries. <25% shoulder function. 2+/5 good  and fair FMC/dexterity noted during ADL tasks  Opposition [x] Intact [] Impaired  Finger to nose [x] Intact [] Impaired 3+/5MMT generally for carry over into self care, functional transfers and functional mobility with AD. LUE WFL 4/5 good  and  FMC/dexterity noted during ADL tasks  Opposition [x] Intact [] Impaired  Finger to nose [x] Intact [] Impaired 5/5MMT generally for carry over into self care, functional transfers and functional mobility with AD. Hearing: WFL   Sensation:  No c/o numbness or tingling   Tone: WFL   Edema: right LE with dressing and wrap in place    Comments: Upon arrival patient supine in bed, but almost falling out. Pt was sleeping with katelin legs over the edge and half of hips off of bed. Pt required mod A for most UB ADLs and dep A LB ADLs tasks. Limited with mod A for standing during LB ADLs and functional transfers. The biggest barriers reflect that of functional transfers, functional mobility, UB/LB ADLs, cognition, activity tolerance, balance, safety and strengthening. At end of session, patient supine with call light and phone within reach, all lines and tubes intact. Overall patient demonstrated decreased independence and safety during completion of ADL/functional transfer/mobility tasks compared to PLOF.  Nursing updated on pt position and status following OT eval. Pt would benefit from continued skilled OT to increase safety and independence with completion of ADL/IADL tasks for functional independence and quality of life. Treatment: OT treatment provided this date includes:  Instruction, education and training on safe facilitation and adapted techniques for completion of ADLs. These include neuromuscular reeducation to facilitate balance/righting reactions,safe functional transfer techniques, proper positioning/alignment to improve interaction with environment and overall function and on adapted techniques/work simplification for completion of ADLs. Education provided on hand/feet placement with bed rails, walker and body mechanics for fall prevention. Cues for energy conservation and safety for in the home at DC, including modifications and DME. Extended time to complete all tasks, including skilled monitoring of patient's response during treatment session and vital signs. Prior to and at the end of session, environmental modifications / line management completed for patients safety and efficiency of treatment session. See above for further details. Rehab Potential: Good  for established goals     Patient / Family Goal: Pain management      Patient and/or family were instructed on functional diagnosis, prognosis/goals and OT plan of care. Demonstrated fair understanding. Eval Complexity: Low  History: Brief review of medical records and additional review of physical, cognitive, or psychosocial history related to current functional performance  Exam: 3+ performance deficits  Assistance/Modification: Max assistance or modifications required to perform tasks. May have comorbidities that affect occupational performance.     Time In: 4225  Time Out: 1127  Total Treatment Time: 25    Min Units   OT Eval Low 97165  x  1   OT Eval Medium 39283      OT Eval High 86512      OT Re-Eval 37541       Therapeutic Ex 10583       Therapeutic Activities 14210  11 1    ADL/Self Care 88401  14 1    Orthotic Management 27945       Manual 09259     Neuro Re-Ed 49235       Non-Billable Time Evaluation Time additionally includes thorough review of current medical information, gathering information on past medical history/social history and prior level of function, interpretation of standardized testing/informal observation of tasks, assessment of data and development of plan of care and goals.             Leah Ryan OTR/L; [id number]

## 2022-09-18 NOTE — PROGRESS NOTES
Physical Therapy    Physical Therapy Treatment Note/Plan of Care    Room #:  2228/5199-22  Patient Name: Shlomo Han  YOB: 1955  MRN: [id number]    Date of Service: 9/18/2022     Tentative placement recommendation: Subacute rehab  Equipment recommendation:  To be determined and Wheeled Walker      Evaluating Physical Therapist: Molly Gallo  #[id number]      Specific Provider Orders/Date/Referring Provider :  09/16/22 0515    PT eval and treat  Start:  09/16/22 0515,   End:  09/16/22 0515,   ONE TIME,   Standing Count:  1 Occurrences,   R         Romi Camejo DO     Admitting Diagnosis:   Hallucination [R44.3]  Hyperglycemia [X68.1]  Acute metabolic encephalopathy [E76.40]    Admitted  with    frequent falls, weakness, altered mental status , hallucinations  Nondisplaced fracture in the right C7 transverse process    Surgery: none  Visit Diagnoses         Codes    Hallucination    -  Primary R44.3    Hyperglycemia     R73.9            Patient Active Problem List   Diagnosis    CAD (coronary artery disease)    Hypertension    Type 2 diabetes mellitus with hyperglycemia, with long-term current use of insulin (HCC)    Tobacco abuse    PAF (paroxysmal atrial fibrillation) ([facility] [address]    Status post coronary artery bypass graft    H/O mitral valve repair    Morbid obesity with BMI of 50.0-59.9, adult ([facility] [address]    Chronic bronchitis ([facility] [address]    Sleep apnea    Gastroesophageal reflux disease without esophagitis    Hyperlipidemia    Muscular deconditioning    Acute diastolic (congestive) heart failure (HCC)    Acute diastolic heart failure (HCC)    Iron deficiency anemia, unspecified    Acute systolic/diastolic congestive heart failure (HCC)    Atrial fibrillation with RVR (HCC)    Ischemic cardiomyopathy    Nonrheumatic tricuspid valve regurgitation    Chronic anticoagulation    Stage 3 chronic kidney disease (HCC)    Acute on chronic congestive heart failure (HCC)    Dizziness    Hyperosm[facility] state ([facility]) [facility]    Acute metabolic encephalopathy    Altered mental status        ASSESSMENT of Current Deficits Patient exhibits decreased strength, balance, endurance, and coordination impairing functional mobility, transfers, gait , gait distance, and tolerance to activity are barriers to d/c and require skilled intervention during hospital stay to attain pre hospital level of function. Patient improved with assist level for bed mobility, transfers and ambulation. Patient shuffles feet during ambulation. Patient has multiple steps to get into apartment and does fatigue quickly, however is improving each day. Decreased strength, balance and endurance  increases patient's risk for fall. PHYSICAL THERAPY  PLAN OF CARE       Physical therapy plan of care is established based on physician order,  patient diagnosis and clinical assessment    Current Treatment Recommendations:    -Bed Mobility: Lower extremity exercises , Upper extremity exercises , and Trunk control activities   -Standing Balance: Perform strengthening exercises in standing to promote motor control with or without upper extremity support  and Instruct patient on adequate base of support to maintain balance  -Transfers: Provide instruction on proper hand and foot position for adequate transfer of weight onto lower extremities and use of gait device if needed and Cues for hand placement, technique and safety. Provide stabilization to prevent fall   -Gait: Gait training, Standing activities to improve: base of support, weight shift, weight bearing , Exercises to improve trunk control, and Exercises to improve hip and knee control   -Endurance: Utilize Supervised activities to increase level of endurance to allow for safe functional mobility including transfers and gait     PT long term treatment goals are located in below grid    Patient and or family understand(s) diagnosis, prognosis, and plan of care.     Frequency of treatments: Patient will be seen  daily. Prior Level of Function: Patient ambulated independently    Rehab Potential: good   for baseline    Past medical history:   Past Medical History:   Diagnosis Date    Acid reflux     Acute diastolic (congestive) heart failure ([facility] 06/19/2019    Arthritis     Asthma 04/16/2014    Asthmatic bronchitis , chronic ([facility] 11/28/2016    CAD (coronary artery disease)     Chronic bronchitis (HCC) 04/16/2014    COPD (chronic obstructive pulmonary disease) ([facility])     CB    COPD (chronic obstructive pulmonary disease) ([facility])     Diabetes mellitus ([facility]     Emphysema (subcutaneous) (surgical) resulting from a procedure     H/O cardiovascular stress test 04/24/2021    Lexiscan    Hyperlipidemia     Hypertension     Hypoxemia requiring supplemental oxygen 02/02/2015    LONG TERM ANTICOAGULENT USE     Morbid obesity with BMI of 50.0-59.9, adult ([facility] 11/27/2013    Obesity     Osteoarthritis     Sleep apnea     bilevel positive airway pressure at 13/8 with 2 L oxygen flow     Stage 3 chronic kidney disease ([facility])     Tobacco abuse     Type II or unspecified type diabetes mellitus without mention of complication, not stated as uncontrolled      Past Surgical History:   Procedure Laterality Date    COLONOSCOPY      CORONARY ANGIOPLASTY WITH STENT PLACEMENT  07-23-13    Left main focal eccentric 65% distal stenosis. Large OM1 CX 50% ostial & prox narrow. Large ramus artery & LAD: Minor plaque w/o sign narrow. RCA: Dom vessel w/25% prox narrow & focal hazy eccentric 99% distal stenosis before origin RPDA & RPLCA. LV: Mild inferior hypokinesis EF 55%. Probable mild AS with 10-15mmHg. Successful PCI distal RCA w/3.5 x 12 mm BMS, 0% res stenosis.  Normal distal runoff    CORONARY ARTERY BYPASS GRAFT  09-09-13    Dr Shelbie Daly; CABG x2, LIMA to LAD, SVG to ramus intermedius; MV repair using 30-mm Future complete ring with magic stitch between A3 and P3; rigid internal fixation of sternum using KLS plates x2; endoscopic vein harvesting of right lower extremity     ECHO COMPL W DOP COLOR FLOW  7/25/2013         HERNIA REPAIR      SINUS SURGERY         SUBJECTIVE:    Precautions: Up with assistance, falls and alarm ,  distractible, poor insight    Social history: Patient lives with spouse in a apartment     with  12 steps  to enter with Rail  Tub shower     Equipment owned: Alex Lida,       [id number] [facility]  Mobility Inpatient   How much difficulty turning over in bed?: A Little  How much difficulty sitting down on / standing up from a chair with arms?: A Little  How much difficulty moving from lying on back to sitting on side of bed?: A Lot  How much help from another person moving to and from a bed to a chair?: A Little  How much help from another person needed to walk in hospital room?: A Little  How much help from another person for climbing 3-5 steps with a railing?: A Lot  AM-PAC Inpatient Mobility Raw Score : 16  AM-PAC Inpatient T-Scale Score : 40.78  Mobility Inpatient CMS 0-100% Score: 54.16  Mobility Inpatient CMS G-Code Modifier : CK    Nursing cleared patient for PT treatment. OBJECTIVE;   Initial Evaluation  Date: 9/16/2022 Treatment Date:  9/18/2022     Short Term/ Long Term   Goals   Was pt agreeable to Eval/treatment? Yes yes To be met in 4 days   Pain level   5/10  back 8/10 right shoulder    Bed Mobility    Rolling: Moderate assist of 1    Supine to sit: Maximal assist of 1    Sit to supine: Maximal assist of 1    Scooting: Moderate assist of 1   Rolling: Minimal assist of 1   Supine to sit: Moderate assist of 1   Sit to supine: Not assessed patient seated edge of bed   Scooting: Minimal assist of 1    Rolling: Independent    Supine to sit: Independent    Sit to supine: Independent    Scooting: Independent     Transfers Sit to stand:  Moderate assist of 1 x 3 reps from chair and bed Sit to stand: Minimal assist of 1 cues for hand placement and safety   Sit to stand: Independent     Ambulation 12 feet using  wheeled walker with Moderate assist of 1   for walker approximation, upright, and safety and cues for walker approximation, safety, and proper hand placement 2x15 feet using  wheeled walker with Minimal assist of 1   cues for upright posture, walker approximation, safety, and pacing     75 feet using  wheeled walker with Independent    Stair negotiation: ascended and descended   Not assessed     10 steps with and device supervision    ROM Within functional limits    Increase range of motion 10% of affected joints    Strength BUE:  refer to OT eval  RLE:  3/5  LLE:  3/5  Increase strength in affected mm groups by 1/3 grade   Balance Sitting EOB:  fair    Dynamic Standing:  fair   Sitting EOB: good   Dynamic Standing: fair with wheeled walker   Sitting EOB:  good    Dynamic Standing: good       Patient is Alert & Oriented x person, place, time, and situation and follows one step directions  distractible  Sensation:  Patient  denies numbness/tingling   Edema:  yes bilateral lower extremities   Endurance: fair -         Patient education  Patient educated on role of Physical Therapy, risks of immobility, safety and plan of care, energy conservation,  importance of mobility while in hospital , safety , and seated exercises      Patient response to education:   Pt verbalized understanding Pt demonstrated skill Pt requires further education in this area   Yes Partial Yes      Treatment:  Patient practiced and was instructed/facilitated in the following treatment: Patient  assisted to edge of bed and Sat edge of bed 10 minutes with Supervision  to increase dynamic sitting balance and activity tolerance. Performed seated exercises. Patient stood to amb to bathroom, performed dynamic standing challenges at commode and sink. Patient amb back to bedside. Therapeutic Exercises:  ankle pumps, heel raises, long arc quad, and seated marching  x 10 reps.        At end of session, patient sitting edge of bed with     call light and phone within reach,  all lines and tubes intact, nursing notified. Patient would benefit from continued skilled Physical Therapy to improve functional independence and quality of life.          Patient's/ family goals   home    Time in  155  Time out  218    Total Treatment Time  23 minutes    CPT codes:  Therapeutic activities (82037)   23 minutes  2 unit(s)    Elsa Murphy, [facility]    #[id number]

## 2022-09-18 NOTE — PROGRESS NOTES
Patient complaining of \"going stir crazy being trapped in this room\", patient ambulated around room and hallway, given popsicle per request. Redirected back to bed.

## 2022-09-18 NOTE — PLAN OF CARE
Problem: Discharge Planning  Goal: Discharge to home or other facility with appropriate resources  Outcome: Progressing  Flowsheets (Taken 9/17/2022 0956 by Mary Pastrana, RN)  Discharge to home or other facility with appropriate resources: Arrange for needed discharge resources and transportation as appropriate     Problem: Pain  Goal: Verbalizes/displays adequate comfort level or baseline comfort level  Outcome: Progressing     Problem: ABCDS Injury Assessment  Goal: Absence of physical injury  Outcome: Progressing  Flowsheets (Taken 9/17/2022 0958 by Mary Pastrana, RN)  Absence of Physical Injury: Implement safety measures based on patient assessment     Problem: Safety - Adult  Goal: Free from fall injury  Outcome: Progressing  Flowsheets (Taken 9/17/2022 0958 by Mary Pastrana, RN)  Free From Fall Injury: Instruct family/caregiver on patient safety     Problem: Neurosensory - Adult  Goal: Achieves stable or improved neurological status  Outcome: Progressing  Flowsheets (Taken 9/17/2022 0956 by Mary Pastrana, RN)  Achieves stable or improved neurological status: Assess for and report changes in neurological status     Problem: Neurosensory - Adult  Goal: Achieves maximal functionality and self care  Outcome: Progressing  Flowsheets (Taken 9/17/2022 0956 by Mary Pastrana, RN)  Achieves maximal functionality and self care: Monitor swallowing and airway patency with patient fatigue and changes in neurological status     Problem: Cardiovascular - Adult  Goal: Maintains optimal cardiac output and hemodynamic stability  Outcome: Progressing  Flowsheets (Taken 9/17/2022 0956 by Mary Pastrana, RN)  Maintains optimal cardiac output and hemodynamic stability: Monitor urine output and notify Licensed Independent Practitioner for values outside of normal range     Problem: Cardiovascular - Adult  Goal: Absence of cardiac dysrhythmias or at baseline  Outcome: Progressing  Flowsheets (Taken 9/17/2022 0956 by Dennis Mott RN)  Absence of cardiac dysrhythmias or at baseline: Monitor cardiac rate and rhythm     Problem: Skin/Tissue Integrity - Adult  Goal: Skin integrity remains intact  Outcome: Progressing  Flowsheets  Taken 9/17/2022 0958 by Dennis Mott RN  Skin Integrity Remains Intact: Monitor for areas of redness and/or skin breakdown  Taken 9/17/2022 0956 by Dennis Mott RN  Skin Integrity Remains Intact: Monitor for areas of redness and/or skin breakdown     Problem: Skin/Tissue Integrity - Adult  Goal: Incisions, wounds, or drain sites healing without S/S of infection  Outcome: Progressing  Flowsheets (Taken 9/17/2022 0956 by Dennis Mott RN)  Incisions, Wounds, or Drain Sites Healing Without Sign and Symptoms of Infection: TWICE DAILY: Assess and document skin integrity     Problem: Skin/Tissue Integrity  Goal: Absence of new skin breakdown  Description: 1. Monitor for areas of redness and/or skin breakdown  2. Assess vascular access sites hourly  3. Every 4-6 hours minimum:  Change oxygen saturation probe site  4. Every 4-6 hours:  If on nasal continuous positive airway pressure, respiratory therapy assess nares and determine need for appliance change or resting period.   Outcome: Progressing     Problem: Chronic Conditions and Co-morbidities  Goal: Patient's chronic conditions and co-morbidity symptoms are monitored and maintained or improved  Outcome: Progressing

## 2022-09-18 NOTE — PROGRESS NOTES
Internal Medicine Progress Note    [facility]    Kavitha Vega. Godwin Sam, SENTHIL Wilson D.O., VINOD Rojas D.O. Melodee Dauphin, MSN, APRN, NP-C  Malissa Hauserr. Pat Aguilar, MSN, APRN-CNP       Primary Care Physician: Praveena Monk DO   Admitting Physician:  Hermelindo Sunshine DO  Admission date and time: 9/15/2022  4:37 PM    Room:  [address] [place] [address]  Admitting diagnosis: Hallucination [R44.3]  Hyperglycemia [C33.5]  Acute metabolic encephalopathy [C59.07]    Patient Name: Byron Jones  MRN: [id number]    Date of Service: 9/18/2022     Subjective:  Jenna Estrada is a 77 y.o. male who was seen and examined today,9/18/2022, at the bedside. Patient was sitting up in bed. States he continues to have visual hallucinations. States that yesterday he thought that someone had sold his hospital room and was moving things all around him. Patient has complaint of right shoulder pain. States he had hit the shoulder when he had fallen. Bruising noted. Patient agreeable to imaging. Patient states that he has noticed that when he is ambulating that his right leg feels heavier at times. Working with therapies. No family present during my examination. Review of System:   Constitutional:   Denies fever or chills, weight loss or gain. Patient does admit to fatigue. States he slept a little better last night. HEENT:   Denies ear pain, sore throat, sinus or eye problems. Cardiovascular:   Denies any chest pain, irregular heartbeats, or palpitations. Respiratory:   Relates that he has shortness of breath with exertion  Gastrointestinal:   Denies nausea, vomiting, diarrhea, or constipation. Denies any abdominal pain. Genitourinary:    Denies any urgency, frequency, hematuria. Voiding  without difficulty. Extremities:   Denies lower extremity cyanosis.   Admits to occasional weeping from the bilateral lower extremities. Positive for edema. States staff is keeping dressings on both legs. Neurology:    Denies any headache or focal neurological deficits, Denies generalized weakness or memory difficulty. Psch:   Denies being anxious or depressed. Musculoskeletal:    Denies  myalgias, joint complaints or back pain. Does admit to mild neck pain. Integumentary:   Denies any rashes, ulcers, or excoriations. Denies bruising. Hematologic/Lymphatic:  Denies bruising or bleeding. Physical Exam:  I/O this shift: In: 840 [P.O.:840]  Out: 1800 [Urine:1800]    Intake/Output Summary (Last 24 hours) at 9/18/2022 1526  Last data filed at 9/18/2022 1427  Gross per 24 hour   Intake 1460 ml   Output 2600 ml   Net -1140 ml   I/O last 3 completed shifts: In: 1460 [P.O.:1460]  Out: 800 [Urine:800]  Patient Vitals for the past 96 hrs (Last 3 readings):   Weight   09/18/22 0602 287 lb 6.4 oz (130.4 kg)   09/17/22 1600 291 lb (132 kg)   09/15/22 1650 287 lb (130.2 kg)     Vital Signs:   Blood pressure 114/73, pulse 52, temperature 96.8 °F (36 °C), temperature source Infrared, resp. rate 18, height 5' 6\" (1.676 m), weight 287 lb 6.4 oz (130.4 kg), SpO2 96 %. General appearance:  Alert and oriented to person and place. Easily reoriented to date/time. In no acute distress. Head:  Normocephalic. No masses, lesions or tenderness. Eyes:  PERRLA. EOMI. Sclera clear. ENT:  Ears normal. Mucosa normal.  Neck:    Supple. Trachea midline. No thyromegaly. No JVD. No bruits. Heart:    Regular rhythm. 1/6 systolic ejection murmur  Lungs:    Symmetrical. Clear to auscultation bilaterally. No wheezes. No rhonchi. No rales. Abdomen:   Soft. Non-tender. Non-distended. Bowel sounds positive. No organomegaly or masses. No pain on palpation. Obese  Extremities:    Peripheral pulses present. 1+ pitting edema the bilateral lower extremities. Dressings intact to the bilateral lower extremities.     Neurologic: Alert.  Oriented to person place. Easily reoriented to date and time. .  No focal deficit. Cranial nerves grossly intact. No focal weakness. Cognition appeared normal.    Psych:   Behavior is normal. Mood appears normal. Speech is not rapid and/or pressured. Musculoskeletal:   Spine ROM normal. Muscular strength intact. Gait not assessed. Integumentary:  No rashes dressings intact to the bilateral lower extremities.   Genitalia/Breast:  Deferred    Medication:  Scheduled Meds:   warfarin  3 mg Oral Daily    risperiDONE  0.5 mg Oral BID    metoprolol succinate  12.5 mg Oral BID    insulin lispro  0-8 Units SubCUTAneous TID WC    insulin lispro  0-4 Units SubCUTAneous Nightly    carbamide peroxide  10 drop Both Ears BID    losartan  25 mg Oral Daily    metOLazone  2.5 mg Oral Daily    amiodarone  200 mg Oral Daily    atorvastatin  80 mg Oral Nightly    bumetanide  1 mg Oral Daily    spironolactone  25 mg Oral Daily    aspirin  81 mg Oral Nightly    insulin glargine  45 Units SubCUTAneous BID    cetirizine  10 mg Oral Daily    montelukast  10 mg Oral Nightly    pantoprazole  40 mg Oral Daily    sodium chloride flush  5-40 mL IntraVENous 2 times per day     Continuous Infusions:   dextrose      sodium chloride         Objective Data:  Recent Labs     09/16/22  0516 09/17/22  0656 09/18/22  0531   WBC 6.4 9.9 5.9   RBC 3.94 4.32 3.99   HGB 12.5 13.5 12.5   HCT 38.3 41.9 38.5   MCV 97.2 97.0 96.5   MCH 31.7 31.3 31.3   MCHC 32.6 32.2 32.5   RDW 15.4* 15.3* 15.5*    188 173   MPV 10.4 10.5 10.1     Lab Results   Component Value Date/Time     09/18/2022 05:31 AM    K 3.5 09/18/2022 05:31 AM    K 3.1 09/15/2022 04:59 PM    CL 97 09/18/2022 05:31 AM    CO2 29 09/18/2022 05:31 AM    ANIONGAP 10 09/18/2022 05:31 AM    GLUCOSE 148 09/18/2022 05:31 AM    GLUCOSE 118 10/27/2011 04:07 PM    BUN 28 09/18/2022 05:31 AM    CREATININE 1.4 09/18/2022 05:31 AM    GFRAA >60 09/18/2022 05:31 AM    LABGLOM 51 09/18/2022 05:31 AM    CALCIUM 9.1 09/18/2022 05:31 AM    BILITOT 1.5 09/18/2022 05:31 AM    ALT 47 09/18/2022 05:31 AM    AST 37 09/18/2022 05:31 AM    ALKPHOS 136 09/18/2022 05:31 AM    PROT 7.2 09/18/2022 05:31 AM    LABALBU 3.6 09/18/2022 05:31 AM    LABALBU 4.4 10/27/2011 04:07 PM     Recent Labs     09/16/22  0516 09/15/22  1659   MG 1.7 1.9      Lab Results   Component Value Date/Time    PHOS 2.3 09/16/2022 05:16 AM    PHOS 1.9 10/31/2021 12:40 PM    PHOS 1.9 10/30/2021 07:36 AM     No results for input(s): TROPONINI in the last 72 hours. Wound Documentation:   Wound 04/18/21 Buttocks Left (Active)   Number of days: 515       Wound 04/22/21 Pelvis Anterior;Mid Tunneling open wound (Active)   Number of days: 512       Wound 09/16/22 Pretibial Right small fluid filled blisters (Active)   Wound Etiology Other 09/16/22 1403   Dressing Status New dressing applied;Clean;Dry; Intact 09/16/22 1403   Wound Cleansed Cleansed with saline 09/16/22 1403   Dressing/Treatment ABD 09/16/22 1403   Dressing Change Due 09/17/22 09/16/22 1403   Drainage Amount None 09/16/22 1403   Odor None 09/16/22 1403   Number of days: 0       Wound 09/16/22 Pretibial Left (Active)   Wound Etiology Other 09/16/22 1403   Dressing Status New dressing applied 09/16/22 1403   Wound Cleansed Cleansed with saline 09/16/22 1403   Dressing/Treatment ABD 09/16/22 1403   Dressing Change Due 09/17/22 09/16/22 1403   Drainage Amount None 09/16/22 1403   Number of days: 0       Assessment:    Acute metabolic encephalopathy with associated hallucination possibly secondary to combination of amitriptyline and Mirapex  JORDEN on CKD  Right C7 transverse process fracture without focal neurological deficit  Calcified plaque along the carotid bulbs particularly on the left  Multiple falls  Coronary artery status post CABG and stent placement  Chronic combined systolic and diastolic congestive heart failure  Persistent atrial fibrillation  History of mitral valve repair  Moderate mitral regurgitation  Moderate tricuspid regurgitation  Mild aortic stenosis  Right shoulder pain status post fall  Moderate pulmonary hypertension  Hypokalemia  COPD/Asthma  Sleep apnea without current use of BiPAP  Insulin-dependent diabetes mellitus-hemoglobin A1c 9.5  Hyperlipidemia  Hypertension  Obesity secondary to excess caloric intake  History of tobacco abuse      Plan:     Patient continues to have visual hallucinations. He has been seen evaluated by psychiatry. Adjustments of medications were made accordingly. Await further psychiatry team recommendations. Patient was evaluated by [facility] surgery team.  As they do not treat cervical spine fracture recommendation was for neurosurgery evaluation. As we do not have neurosurgery at this facility we did contact the access line to request possible transfer to [facility] for neurosurgery evaluation. Discussed case with neurosurgeon, Dr. Alberto Mcdonnell. Outcome of conversation was that patient did not need intervention for transverse process fracture therefore patient did not need to be transferred; the area should routinely healing; and treat pain as warranted. Patient has complaint of right shoulder pain. Patient did have recent fall and bruising is noted therefore will obtain x-ray of the right shoulder/upper extremity. Strict intake and output. Diuretic therapy as prescribed. Daily weights. Monitor blood glucose levels and treat accordingly. Neurology consult appreciated. Recommendations reviewed. Physical therapy and possible short-term rehab  Behavior modification and lifestyle changes  Please orders for further plan of care. Labs as ordered. More than 50% of my time was spent at the bedside counseling/coordinating care with the patient and/or family with face to face contact. This time was spent reviewing notes and laboratory data as well as instructing and counseling the patient.  Time I spent with the family or surrogate(s) is included only if the patient was incapable of providing the necessary information or participating in medical decisions. I also discussed the differential diagnosis and all of the proposed management plans with the patient and individuals accompanying the patient. I am readily available for any further decision-making and intervention. The patient was seen, examined and then discussed with Dr. Jaylon Dodson. Mili Adams, ARLEEN - CNP,  9/18/2022  3:26 PM          I saw and evaluated the patient. I agree with the findings and the plan of care as documented in Mili Adams NP-C's  note.     Brady Anderson DO, D.O., Brennen Salcido  3:48 PM  9/18/2022

## 2022-09-19 LAB
INR BLD: 2.5
METER GLUCOSE: 101 MG/DL (ref 74–99)
METER GLUCOSE: 105 MG/DL (ref 74–99)
METER GLUCOSE: 117 MG/DL (ref 74–99)
METER GLUCOSE: 196 MG/DL (ref 74–99)
PROTHROMBIN TIME: 28.5 SEC (ref 9.3–12.4)

## 2022-09-19 PROCEDURE — 97530 THERAPEUTIC ACTIVITIES: CPT

## 2022-09-19 PROCEDURE — 85610 PROTHROMBIN TIME: CPT

## 2022-09-19 PROCEDURE — 82962 GLUCOSE BLOOD TEST: CPT

## 2022-09-19 PROCEDURE — 6370000000 HC RX 637 (ALT 250 FOR IP): Performed by: INTERNAL MEDICINE

## 2022-09-19 PROCEDURE — 99222 1ST HOSP IP/OBS MODERATE 55: CPT | Performed by: ORTHOPAEDIC SURGERY

## 2022-09-19 PROCEDURE — 6370000000 HC RX 637 (ALT 250 FOR IP): Performed by: PSYCHIATRY & NEUROLOGY

## 2022-09-19 PROCEDURE — 36415 COLL VENOUS BLD VENIPUNCTURE: CPT

## 2022-09-19 PROCEDURE — 1200000000 HC SEMI PRIVATE

## 2022-09-19 PROCEDURE — 2060000000 HC ICU INTERMEDIATE R&B

## 2022-09-19 PROCEDURE — 2580000003 HC RX 258: Performed by: INTERNAL MEDICINE

## 2022-09-19 RX ADMIN — INSULIN GLARGINE 45 UNITS: 100 INJECTION, SOLUTION SUBCUTANEOUS at 20:30

## 2022-09-19 RX ADMIN — ATORVASTATIN CALCIUM 80 MG: 40 TABLET, FILM COATED ORAL at 20:27

## 2022-09-19 RX ADMIN — LOSARTAN POTASSIUM 25 MG: 50 TABLET, FILM COATED ORAL at 09:36

## 2022-09-19 RX ADMIN — CARBAMIDE PEROXIDE 10 DROP: 65 SOLUTION/ DROPS AURICULAR (OTIC) at 09:40

## 2022-09-19 RX ADMIN — METOLAZONE 2.5 MG: 2.5 TABLET ORAL at 09:36

## 2022-09-19 RX ADMIN — CETIRIZINE HYDROCHLORIDE 10 MG: 10 TABLET, FILM COATED ORAL at 09:36

## 2022-09-19 RX ADMIN — PANTOPRAZOLE SODIUM 40 MG: 40 TABLET, DELAYED RELEASE ORAL at 09:36

## 2022-09-19 RX ADMIN — SODIUM CHLORIDE, PRESERVATIVE FREE 10 ML: 5 INJECTION INTRAVENOUS at 09:40

## 2022-09-19 RX ADMIN — INSULIN GLARGINE 45 UNITS: 100 INJECTION, SOLUTION SUBCUTANEOUS at 09:39

## 2022-09-19 RX ADMIN — SODIUM CHLORIDE, PRESERVATIVE FREE 10 ML: 5 INJECTION INTRAVENOUS at 20:29

## 2022-09-19 RX ADMIN — ASPIRIN 81 MG: 81 TABLET, COATED ORAL at 20:28

## 2022-09-19 RX ADMIN — MONTELUKAST 10 MG: 10 TABLET, FILM COATED ORAL at 20:27

## 2022-09-19 RX ADMIN — SPIRONOLACTONE 25 MG: 25 TABLET ORAL at 09:36

## 2022-09-19 RX ADMIN — WARFARIN SODIUM 3 MG: 3 TABLET ORAL at 18:43

## 2022-09-19 RX ADMIN — RISPERIDONE 0.5 MG: 1 TABLET ORAL at 09:36

## 2022-09-19 RX ADMIN — BUMETANIDE 1 MG: 1 TABLET ORAL at 09:36

## 2022-09-19 RX ADMIN — AMIODARONE HYDROCHLORIDE 200 MG: 200 TABLET ORAL at 09:36

## 2022-09-19 RX ADMIN — RISPERIDONE 0.5 MG: 1 TABLET ORAL at 20:27

## 2022-09-19 NOTE — CONSULTS
Department of Orthopedic Surgery  Resident Consult Note          Reason for Consult:  Left shoulder pain     HISTORY OF PRESENT ILLNESS:       Patient is a 77 y.o. male who presents with chronic left shoulder pain after fall, repetitive injury dating several year. Patient currently admitted tot he hospital for elevated blood sugar levels and hallucination. Orthopedics initially consulted for a C7 transverse process fracture however this was deferred to neurosurgery. Orthopedics was re-consulted on mild right shoulder pain that appears to be elevated. Pain is localized to the anterior shoulder and described as mild. Patient does endorse decrease range of motion which have been ongoing for several years. Patient is right hand dominant. Anticoagulation - warfarin. The patient is community Ambulator with assist  - walker. Pt lives at home. Patient has a significant history of multiple falls . Previous Orthopedic Surgeon - yes - Magalie Ordaz  Denies numbness/tingling/paresthesias. Denies any other orthopedic complaints at this time.     Tobacco use: none  Alcohol use: none  Illicit drug use: no history of illicit drug use    Past Medical History:        Diagnosis Date    Acid reflux     Acute diastolic (congestive) heart failure ([facility] 06/19/2019    Arthritis     Asthma 04/16/2014    Asthmatic bronchitis , chronic ([facility] 11/28/2016    CAD (coronary artery disease)     Chronic bronchitis ([facility] 04/16/2014    COPD (chronic obstructive pulmonary disease) ([facility])     CB    COPD (chronic obstructive pulmonary disease) ([facility]     Diabetes mellitus ([facility]     Emphysema (subcutaneous) (surgical) resulting from a procedure     H/O cardiovascular stress test 04/24/2021    Lexiscan    Hyperlipidemia     Hypertension     Hypoxemia requiring supplemental oxygen 02/02/2015    LONG TERM ANTICOAGULENT USE     Morbid obesity with BMI of 50.0-59.9, adult ([facility] 11/27/2013    Obesity     Osteoarthritis     Sleep apnea     bilevel positive airway pressure at 13/8 with 2 L oxygen flow     Stage 3 chronic kidney disease (HCC)     Tobacco abuse     Type II or unspecified type diabetes mellitus without mention of complication, not stated as uncontrolled      Past Surgical History:        Procedure Laterality Date    COLONOSCOPY      CORONARY ANGIOPLASTY WITH STENT PLACEMENT  07-23-13    Left main focal eccentric 65% distal stenosis. Large OM1 CX 50% ostial & prox narrow. Large ramus artery & LAD: Minor plaque w/o sign narrow. RCA: Dom vessel w/25% prox narrow & focal hazy eccentric 99% distal stenosis before origin RPDA & RPLCA. LV: Mild inferior hypokinesis EF 55%. Probable mild AS with 10-15mmHg. Successful PCI distal RCA w/3.5 x 12 mm BMS, 0% res stenosis.  Normal distal runoff    CORONARY ARTERY BYPASS GRAFT  09-09-13    Dr Emmy Rubin; CABG x2, LIMA to LAD, SVG to ramus intermedius; MV repair using 30-mm Future complete ring with magic stitch between A3 and P3; rigid internal fixation of sternum using KLS plates x2; endoscopic vein harvesting of right lower extremity     ECHO COMPL W DOP COLOR FLOW  7/25/2013         HERNIA REPAIR      SINUS SURGERY       Current Medications:   Current Facility-Administered Medications: warfarin (COUMADIN) tablet 3 mg, 3 mg, Oral, Daily  risperiDONE (RISPERDAL) tablet 0.5 mg, 0.5 mg, Oral, BID  insulin lispro (HUMALOG) injection vial 0-8 Units, 0-8 Units, SubCUTAneous, TID WC  insulin lispro (HUMALOG) injection vial 0-4 Units, 0-4 Units, SubCUTAneous, Nightly  carbamide peroxide (DEBROX) 6.5 % otic solution 10 drop, 10 drop, Both Ears, BID  losartan (COZAAR) tablet 25 mg, 25 mg, Oral, Daily  metOLazone (ZAROXOLYN) tablet 2.5 mg, 2.5 mg, Oral, Daily  amiodarone (CORDARONE) tablet 200 mg, 200 mg, Oral, Daily  atorvastatin (LIPITOR) tablet 80 mg, 80 mg, Oral, Nightly  bumetanide (BUMEX) tablet 1 mg, 1 mg, Oral, Daily  spironolactone (ALDACTONE) tablet 25 mg, 25 mg, Oral, Daily  aspirin EC tablet 81 mg, 81 mg, Oral, Nightly  insulin glargine (LANTUS) injection vial 45 Units, 45 Units, SubCUTAneous, BID  cetirizine (ZYRTEC) tablet 10 mg, 10 mg, Oral, Daily  montelukast (SINGULAIR) tablet 10 mg, 10 mg, Oral, Nightly  pantoprazole (PROTONIX) tablet 40 mg, 40 mg, Oral, Daily  glucose chewable tablet 16 g, 4 tablet, Oral, PRN  dextrose bolus 10% 125 mL, 125 mL, IntraVENous, PRN **OR** dextrose bolus 10% 250 mL, 250 mL, IntraVENous, PRN  glucagon (rDNA) injection 1 mg, 1 mg, SubCUTAneous, PRN  dextrose 10 % infusion, , IntraVENous, Continuous PRN  sodium chloride flush 0.9 % injection 5-40 mL, 5-40 mL, IntraVENous, 2 times per day  sodium chloride flush 0.9 % injection 5-40 mL, 5-40 mL, IntraVENous, PRN  0.9 % sodium chloride infusion, , IntraVENous, PRN  polyethylene glycol (GLYCOLAX) packet 17 g, 17 g, Oral, Daily PRN  acetaminophen (TYLENOL) tablet 650 mg, 650 mg, Oral, Q6H PRN **OR** acetaminophen (TYLENOL) suppository 650 mg, 650 mg, Rectal, Q6H PRN  Allergies:  Pcn [penicillins] and Sulfa antibiotics    Social History:   TOBACCO:   reports that he quit smoking about 9 years ago. His smoking use included cigarettes. He has a 45.00 pack-year smoking history. He quit smokeless tobacco use about 50 years ago. His smokeless tobacco use included chew. ETOH:   reports no history of alcohol use. DRUGS:   reports no history of drug use.   ACTIVITIES OF DAILY LIVING:    OCCUPATION:    Family History:       Problem Relation Age of Onset    Cancer Mother         breast    Cancer Father         stomach    Mental Illness Brother     Stroke Brother     Other Brother        REVIEW OF SYSTEMS:  CONSTITUTIONAL:  negative for  fevers, chills  EYES:  negative for blurred vision, visual disturbance  HEENT:  negative for  hearing loss, voice change  RESPIRATORY:  negative for  dyspnea, wheezing  CARDIOVASCULAR:  negative for  chest pain, palpitations  GASTROINTESTINAL:  negative for nausea, vomiting  GENITOURINARY:  negative for frequency, urinary incontinence  HEMATOLOGIC/LYMPHATIC:  negative for bleeding and petechiae  MUSCULOSKELETAL:  positive for  pain and decreased range of motion  NEUROLOGICAL:  negative for headaches, dizziness  BEHAVIOR/PSYCH:  negative for increased agitation and anxiety    PHYSICAL EXAM:    VITALS:  BP (!) 122/58   Pulse 55   Temp 97.6 °F (36.4 °C) (Infrared)   Resp 18   Ht 5' 6\" (1.676 m)   Wt 287 lb 6.4 oz (130.4 kg)   SpO2 95%   BMI 46.39 kg/m²   CONSTITUTIONAL:  awake, alert, cooperative, no apparent distress, and appears stated age  General appearance: alert, well appearing, and in no distress,  normal appearing weight  Mental status: alert, oriented to person, place, and time, normal mood, behavior, speech, dress, motor activity, and thought processes  Abdomen: soft, nondistended   Resp:   resp easy and unlabored, no audible wheezes note  Cardiac: distal pulses palpable, skin well perfused  Neurological: alert, oriented X3, normal speech, no focal findings or movement disorder noted, motor and sensory grossly normal bilaterally, normal muscle tone, no tremors, strength 5/5, normal gait and station  HEENT: normochephalic atraumatic, external ears and eyes normal, sclera normal, neck supple  Extremities:   peripheral pulses normal, no edema, redness or tenderness in the calves   Skin: normal coloration, no rashes or open wounds, no suspicious skin lesions noted  Psych: Affect euthymic   MUSCULOSKELETAL:  Right upper Extremity:  Right riding shoulder can be visualized. No acute skin injuries can be appreciated. Mild joint effusion appreciated. No erythema appreciated Skin intact circumferentially  Mild TTP at the anterior aspect of the humeral head. -TTP at humerus, elbow, forearm, and wrist/hand. Compartments soft and compressible  +AIN/PIN/Ulnar nerve function intact grossly  Patient demonstrates AROM shoulder 0-15 degrees prior to using the contralateral arm for assisted elevation.   +Radial pulse, Brisk Cap refill, hand warm and perfused  Sensation intact to touch in radial/ulnar/median nerve distributions to hand    Secondary Exam:   leftUE: No obvious signs of trauma. -TTP to fingers, hand, wrist, forearm, elbow, humerus, shoulder or clavicle. -- Patient able to flex/extend fingers, wrist, elbow and shoulder with active and passive ROM without pain, +2/4 Radial pulse, cap refill <3sec, +AIN/PIN/Radial/Ulnar/Median N, distal sensation grossly intact to C4-T1 dermatomes, compartments soft and compressible. bilateralLE: No obvious signs of trauma. -TTP to foot, ankle, leg, knee, thigh, hip.-- Patient able to flex/extend toes, ankle, knee and hip with active and passive ROM without pain,+2/4 DP & PT pulses, cap refill <3sec, +5/5 PF/DF/EHL, distal sensation grossly intact to L4-S1 dermatomes, compartments soft and compressible. Pelvis: -TTP, -Log roll, -Heel strike     DATA:    CBC:   Lab Results   Component Value Date/Time    WBC 5.9 09/18/2022 05:31 AM    RBC 3.99 09/18/2022 05:31 AM    HGB 12.5 09/18/2022 05:31 AM    HCT 38.5 09/18/2022 05:31 AM    MCV 96.5 09/18/2022 05:31 AM    MCH 31.3 09/18/2022 05:31 AM    MCHC 32.5 09/18/2022 05:31 AM    RDW 15.5 09/18/2022 05:31 AM     09/18/2022 05:31 AM    MPV 10.1 09/18/2022 05:31 AM     PT/INR:    Lab Results   Component Value Date/Time    PROTIME 26.1 09/18/2022 05:31 AM    PROTIME 10.0 10/02/2019 01:24 PM    INR 2.3 09/18/2022 05:31 AM     CRP/ESR:   Lab Results   Component Value Date/Time    SEDRATE 16 06/09/2011 04:51 PM     Lactic Acid :   Lab Results   Component Value Date/Time    LACTA 1.6 04/18/2021 09:03 AM       Radiology Review:  09/19/22 - XR right shoulder and humerus demonstrates high riding proximal humerus suggesting rotator cuff tear.   No fracture or dislocations noted  MRI Right shoulder 10/2021 reviewed      IMPRESSION:   Right large rotator cuff tear  T7 transverse process fracture    PLAN:  WBAT - LUE  Recommend consult to neurosurgery for injuries sustained to the cervical spine. Pain Control per admitting service  PT/OT when availabe  Continue ice and elevation to decrease swelling  At this time, no acute orthopedic intervention required. Patient has a chronic right rotator cuff injury with fatty atrophy. Patient has seen Dr. Glory Brooke in the past with last visit being in January of this year in which he received an corticosteroid injection. Patient will need to follow up with Dr. Glory Brooke in office for further evaluation and plan for definitive management of the right rotator cuff tear.    Discussed with Dr. Glory Brooke

## 2022-09-19 NOTE — PROGRESS NOTES
Internal Medicine Progress Note    [facility]    Kye Keys. Brandee Kapadia., CLAUDETTE.[email or web address]. Steff Sears D.O., F.A.C.O.I. Milon Hamman, D.O. Dwyane Scull, D.O. Beauty Dull, MSN, APRN, NP-C  Debby Comment. Boby Covington, MSN, APRN-CNP       Primary Care Physician: Fam Sahu DO   Admitting Physician:  Dary Norton DO  Admission date and time: 9/15/2022  4:37 PM    Room:  [facility]290-73  Admitting diagnosis: Hallucination [R44.3]  Hyperglycemia [S03.0]  Acute metabolic encephalopathy [F89.71]    Patient Name: Leonel Estrada  MRN: [id number]    Date of Service: 9/19/2022     Subjective:  Karen Soto is a 77 y.o. male who was seen and examined today,9/19/2022, at the bedside. Karen Soto seems to be resting more comfortably today. He continues to remain somewhat unsteady on his feet but is anxious to work with the therapy teams. His visual and tactile hallucinations seem to be resolving. No family members were present during my examination. Discharge planning was discussed. Review of System:   Constitutional:   Improving malaise and fatigue. HEENT:   Denies ear pain, sore throat, sinus or eye problems. Cardiovascular:   Denies any chest pain, irregular heartbeats, or palpitations. Respiratory:   Improving shortness of breath. Gastrointestinal:   Denies nausea, vomiting, diarrhea, or constipation. Denies any abdominal pain. Genitourinary:    Denies any urgency, frequency, hematuria. Voiding  without difficulty. Extremities:   Denies lower extremity cyanosis. Admits to occasional weeping from the bilateral lower extremities. Positive for edema. States staff is keeping dressings on both legs. Neurology:    Denies any headache or focal neurological deficits, Denies generalized weakness or memory difficulty. Psch:   Denies being anxious or depressed.   Musculoskeletal:    Denies  myalgias, joint complaints or back pain.  Does admit to mild neck pain. Integumentary:   Denies any rashes, ulcers, or excoriations. Denies bruising. Hematologic/Lymphatic:  Denies bruising or bleeding. Physical Exam:  I/O this shift:  In: 300 [P.O.:300]  Out: 1050 [Urine:1050]    Intake/Output Summary (Last 24 hours) at 9/19/2022 1319  Last data filed at 9/19/2022 1312  Gross per 24 hour   Intake 540 ml   Output 3850 ml   Net -3310 ml   I/O last 3 completed shifts: In: 1700 [P.O.:1700]  Out: 4200 [Urine:4200]  Patient Vitals for the past 96 hrs (Last 3 readings):   Weight   09/19/22 0600 286 lb 6.4 oz (129.9 kg)   09/18/22 0602 287 lb 6.4 oz (130.4 kg)   09/17/22 1600 291 lb (132 kg)     Vital Signs:   Blood pressure 103/63, pulse 56, temperature 96.8 °F (36 °C), temperature source Oral, resp. rate 14, height 5' 6\" (1.676 m), weight 286 lb 6.4 oz (129.9 kg), SpO2 95 %. General appearance:  Alert and oriented to person and place. Head:  Normocephalic. No masses, lesions or tenderness. Eyes:  PERRLA. EOMI. Sclera clear. ENT:  Ears normal. Mucosa normal.  Neck:    Supple. Trachea midline. No thyromegaly. No JVD. No bruits. Heart:    Regular rhythm. 1/6 systolic ejection murmur  Lungs:    Symmetrical. Clear to auscultation bilaterally. No wheezes. No rhonchi. No rales. Abdomen:   Soft. Non-tender. Non-distended. Bowel sounds positive. No organomegaly or masses. No pain on palpation. Obese  Extremities:    Peripheral pulses present. 1+ pitting edema the bilateral lower extremities. Dressings intact to the bilateral lower extremities. Neurologic:    Alert. Oriented to person place. Easily reoriented to date and time. .  No focal deficit. Cranial nerves grossly intact. No focal weakness. Cognition appeared normal.    Psych:   Behavior is normal. Mood appears normal. Speech is not rapid and/or pressured. Musculoskeletal:   Spine ROM normal. Muscular strength intact. Gait not assessed.   Integumentary:  No rashes dressings intact to the bilateral lower extremities.   Genitalia/Breast:  Deferred    Medication:  Scheduled Meds:   warfarin  3 mg Oral Daily    risperiDONE  0.5 mg Oral BID    insulin lispro  0-8 Units SubCUTAneous TID WC    insulin lispro  0-4 Units SubCUTAneous Nightly    carbamide peroxide  10 drop Both Ears BID    losartan  25 mg Oral Daily    metOLazone  2.5 mg Oral Daily    amiodarone  200 mg Oral Daily    atorvastatin  80 mg Oral Nightly    bumetanide  1 mg Oral Daily    spironolactone  25 mg Oral Daily    aspirin  81 mg Oral Nightly    insulin glargine  45 Units SubCUTAneous BID    cetirizine  10 mg Oral Daily    montelukast  10 mg Oral Nightly    pantoprazole  40 mg Oral Daily    sodium chloride flush  5-40 mL IntraVENous 2 times per day     Continuous Infusions:   dextrose      sodium chloride         Objective Data:  Recent Labs     09/17/22  0656 09/18/22  0531   WBC 9.9 5.9   RBC 4.32 3.99   HGB 13.5 12.5   HCT 41.9 38.5   MCV 97.0 96.5   MCH 31.3 31.3   MCHC 32.2 32.5   RDW 15.3* 15.5*    173   MPV 10.5 10.1     Lab Results   Component Value Date/Time     09/18/2022 05:31 AM    K 3.5 09/18/2022 05:31 AM    K 3.1 09/15/2022 04:59 PM    CL 97 09/18/2022 05:31 AM    CO2 29 09/18/2022 05:31 AM    ANIONGAP 10 09/18/2022 05:31 AM    GLUCOSE 148 09/18/2022 05:31 AM    GLUCOSE 118 10/27/2011 04:07 PM    BUN 28 09/18/2022 05:31 AM    CREATININE 1.4 09/18/2022 05:31 AM    GFRAA >60 09/18/2022 05:31 AM    LABGLOM 51 09/18/2022 05:31 AM    CALCIUM 9.1 09/18/2022 05:31 AM    BILITOT 1.5 09/18/2022 05:31 AM    ALT 47 09/18/2022 05:31 AM    AST 37 09/18/2022 05:31 AM    ALKPHOS 136 09/18/2022 05:31 AM    PROT 7.2 09/18/2022 05:31 AM    LABALBU 3.6 09/18/2022 05:31 AM    LABALBU 4.4 10/27/2011 04:07 PM     Recent Labs     09/16/22  0516 09/15/22  1659   MG 1.7 1.9      Lab Results   Component Value Date/Time    PHOS 2.3 09/16/2022 05:16 AM    PHOS 1.9 10/31/2021 12:40 PM    PHOS 1.9 10/30/2021 07:36 AM     No results for input(s): TROPONINI in the last 72 hours. Wound Documentation:   Wound 04/18/21 Buttocks Left (Active)   Number of days: 515       Wound 04/22/21 Pelvis Anterior;Mid Tunneling open wound (Active)   Number of days: 512       Wound 09/16/22 Pretibial Right small fluid filled blisters (Active)   Wound Etiology Other 09/16/22 1403   Dressing Status New dressing applied;Clean;Dry; Intact 09/16/22 1403   Wound Cleansed Cleansed with saline 09/16/22 1403   Dressing/Treatment ABD 09/16/22 1403   Dressing Change Due 09/17/22 09/16/22 1403   Drainage Amount None 09/16/22 1403   Odor None 09/16/22 1403   Number of days: 0       Wound 09/16/22 Pretibial Left (Active)   Wound Etiology Other 09/16/22 1403   Dressing Status New dressing applied 09/16/22 1403   Wound Cleansed Cleansed with saline 09/16/22 1403   Dressing/Treatment ABD 09/16/22 1403   Dressing Change Due 09/17/22 09/16/22 1403   Drainage Amount None 09/16/22 1403   Number of days: 0       Assessment:  Medication induced altered mental status with ongoing improvement  Acute on chronic kidney  Frequent falls with right C7 transverse process fracture without focal neurological deficit having discussed the case extensively with the neurosurgical team and plans for ongoing observation  Calcified plaque along the carotid bulbs particularly on the left  Coronary artery status post CABG and stent placement  Chronic combined systolic and diastolic congestive heart failure  Persistent atrial fibrillation  Valvular heart disease with history of mitral valve repair  Not oxygen dependent COPD with chronic respiratory failure  Sleep apnea without current use of BiPAP  Insulin-dependent diabetes mellitus type 2  Hyperlipidemia  Essential hypertension  Obesity secondary to excess caloric intake  History of tobacco abuse      Plan:   Kavin Giraldo did work with the therapy teams today who are recommending subacute rehabilitation.   He states he cannot consider this secondary to his wife's needs at home. We will arrange home health care accordingly. His hallucinations have resolved at this point. All medications have been transitioned to oral with anticipation of discharge home tomorrow. More than 50% of my time was spent at the bedside counseling/coordinating care with the patient and/or family with face to face contact. This time was spent reviewing notes and laboratory data as well as instructing and counseling the patient. Time I spent with the family or surrogate(s) is included only if the patient was incapable of providing the necessary information or participating in medical decisions. I also discussed the differential diagnosis and all of the proposed management plans with the patient and individuals accompanying the patient. I am readily available for any further decision-making and intervention.      Deepak Cole DO, D.O., FACOI  1:19 PM  9/19/2022

## 2022-09-19 NOTE — CARE COORDINATION
9/19/2022 1520 CM for transition of care needs at d/c. Met with pt at the bedside pt plans to return home with his wife. Pt adamantly refuses to consider going to rehab, he worries about his wife's health and transports her to dialysis. Pt states that his neighbor and daughter having been helping his wife and transporting her to dialysis and will continue until he is stronger and feeling better. Pt did request [facility] and they have accepted for PT/OT(orders are in Epic). Pt has a walker, wheelchair and a cane. He also has a CPAP from [facility]. Pt denies any further needs for dc. CM will follow. Pt hopes to d/c tomorrow.   Electronically signed by Miracle Smith RN on 9/19/2022 at 3:45 PM

## 2022-09-19 NOTE — PROGRESS NOTES
Physical Therapy    Physical Therapy Treatment Note/Plan of Care    Room #:  4935/0189-21  Patient Name: Chichi Saleh  YOB: 1955  MRN: [id number]    Date of Service: 9/19/2022     Tentative placement recommendation: Subacute rehab  Equipment recommendation: Christiano Clifton      Evaluating Physical Therapist: Britt Patel, PT  #[id number]      Specific Provider Orders/Date/Referring Provider :  09/16/22 0515    PT eval and treat  Start:  09/16/22 0515,   End:  09/16/22 0515,   ONE TIME,   Standing Count:  1 Occurrences,   R         Eloy Tilley DO     Admitting Diagnosis:   Hallucination [R44.3]  Hyperglycemia [R33.6]  Acute metabolic encephalopathy [K05.52]    Admitted  with    frequent falls, weakness, altered mental status , hallucinations  Nondisplaced fracture in the right C7 transverse process    Surgery: none  Visit Diagnoses         Codes    Hallucination    -  Primary R44.3    Hyperglycemia     R73.9            Patient Active Problem List   Diagnosis    CAD (coronary artery disease)    Hypertension    Type 2 diabetes mellitus with hyperglycemia, with long-term current use of insulin (HCC)    Tobacco abuse    PAF (paroxysmal atrial fibrillation) ([address]    Status post coronary artery bypass graft    H/O mitral valve repair    Morbid obesity with BMI of 50.0-59.9, adult ([address]    Chronic bronchitis ([address]    Sleep apnea    Gastroesophageal reflux disease without esophagitis    Hyperlipidemia    Muscular deconditioning    Acute diastolic (congestive) heart failure (HCC)    Acute diastolic heart failure (HCC)    Iron deficiency anemia, unspecified    Acute systolic/diastolic congestive heart failure (HCC)    Atrial fibrillation with RVR (HCC)    Ischemic cardiomyopathy    Nonrheumatic tricuspid valve regurgitation    Chronic anticoagulation    Stage 3 chronic kidney disease ([address]    Acute on chronic congestive heart failure ([address]    Dizziness    Hyperosmolar hyperglycemic state (HHS) ([address] Acute metabolic encephalopathy    Altered mental status        ASSESSMENT of Current Deficits Patient exhibits decreased strength, balance, endurance, and coordination impairing functional mobility, transfers, gait , gait distance, and tolerance to activity. During ambulation patient with shuffling steps; able to control his own walker especially with turn for a new walker user. Fatigues easily needing multiple standing periods. Bilateral LE swollen L>R. RUE range of motion limited; unable to perform reaching activities during standing pain noted. Decreased strength, balance and endurance  increases patient's risk for fall. PHYSICAL THERAPY  PLAN OF CARE       Physical therapy plan of care is established based on physician order,  patient diagnosis and clinical assessment    Current Treatment Recommendations:    -Bed Mobility: Lower extremity exercises , Upper extremity exercises , and Trunk control activities   -Standing Balance: Perform strengthening exercises in standing to promote motor control with or without upper extremity support  and Instruct patient on adequate base of support to maintain balance  -Transfers: Provide instruction on proper hand and foot position for adequate transfer of weight onto lower extremities and use of gait device if needed and Cues for hand placement, technique and safety. Provide stabilization to prevent fall   -Gait: Gait training, Standing activities to improve: base of support, weight shift, weight bearing , Exercises to improve trunk control, and Exercises to improve hip and knee control   -Endurance: Utilize Supervised activities to increase level of endurance to allow for safe functional mobility including transfers and gait     PT long term treatment goals are located in below grid    Patient and or family understand(s) diagnosis, prognosis, and plan of care. Frequency of treatments: Patient will be seen  daily.          Prior Level of Function: Patient ambulated independently    Rehab Potential: good   for baseline    Past medical history:   Past Medical History:   Diagnosis Date    Acid reflux     Acute diastolic (congestive) heart failure ([facility] 06/19/2019    Arthritis     Asthma 04/16/2014    Asthmatic bronchitis , chronic ([facility] 11/28/2016    CAD (coronary artery disease)     Chronic bronchitis (HCC) 04/16/2014    COPD (chronic obstructive pulmonary disease) ([facility])     CB    COPD (chronic obstructive pulmonary disease) ([facility])     Diabetes mellitus ([facility]     Emphysema (subcutaneous) (surgical) resulting from a procedure     H/O cardiovascular stress test 04/24/2021    Lexiscan    Hyperlipidemia     Hypertension     Hypoxemia requiring supplemental oxygen 02/02/2015    LONG TERM ANTICOAGULENT USE     Morbid obesity with BMI of 50.0-59.9, adult ([facility] 11/27/2013    Obesity     Osteoarthritis     Sleep apnea     bilevel positive airway pressure at 13/8 with 2 L oxygen flow     Stage 3 chronic kidney disease ([facility])     Tobacco abuse     Type II or unspecified type diabetes mellitus without mention of complication, not stated as uncontrolled      Past Surgical History:   Procedure Laterality Date    COLONOSCOPY      CORONARY ANGIOPLASTY WITH STENT PLACEMENT  07-23-13    Left main focal eccentric 65% distal stenosis. Large OM1 CX 50% ostial & prox narrow. Large ramus artery & LAD: Minor plaque w/o sign narrow. RCA: Dom vessel w/25% prox narrow & focal hazy eccentric 99% distal stenosis before origin RPDA & RPLCA. LV: Mild inferior hypokinesis EF 55%. Probable mild AS with 10-15mmHg. Successful PCI distal RCA w/3.5 x 12 mm BMS, 0% res stenosis.  Normal distal runoff    CORONARY ARTERY BYPASS GRAFT  09-09-13    Dr Tommie Miller; CABG x2, LIMA to LAD, SVG to ramus intermedius; MV repair using 30-mm Future complete ring with magic stitch between A3 and P3; rigid internal fixation of sternum using KLS plates x2; endoscopic vein harvesting of right lower extremity     ECHO COMPL W DOP COLOR FLOW 7/25/2013         HERNIA REPAIR      SINUS SURGERY         SUBJECTIVE:    Precautions: Up with assistance, falls and alarm ,  distractible, poor insight    Social history: Patient lives with spouse in a apartment     with  12 steps  to enter with Rail  Tub shower     Equipment owned: [address],       [id number] [facility]  Mobility Inpatient   How much difficulty turning over in bed?: A Little  How much difficulty sitting down on / standing up from a chair with arms?: A Little  How much difficulty moving from lying on back to sitting on side of bed?: A Lot  How much help from another person moving to and from a bed to a chair?: A Little  How much help from another person needed to walk in hospital room?: A Little  How much help from another person for climbing 3-5 steps with a railing?: A Lot  AM-PAC Inpatient Mobility Raw Score : 16  AM-PAC Inpatient T-Scale Score : 40.78  Mobility Inpatient CMS 0-100% Score: 54.16  Mobility Inpatient CMS G-Code Modifier : CK    Nursing cleared patient for PT treatment. OBJECTIVE;   Initial Evaluation  Date: 9/16/2022 Treatment Date:  9/19/2022     Short Term/ Long Term   Goals   Was pt agreeable to Eval/treatment? Yes yes To be met in 4 days   Pain level   5/10  back Right shoulder no number given. Bed Mobility    Rolling: Moderate assist of 1    Supine to sit: Maximal assist of 1    Sit to supine: Maximal assist of 1    Scooting: Moderate assist of 1   Rolling: Not assessed    Supine to sit: Not assessed    Sit to supine: Not assessed    Scooting: Not assessed     Rolling: Independent    Supine to sit: Independent    Sit to supine: Independent    Scooting: Independent     Transfers Sit to stand:  Moderate assist of 1 x 3 reps from chair and bed Sit to stand: Minimal assist of 1 cues for hand placement and safety   Sit to stand: Independent     Ambulation     12 feet using  wheeled walker with Moderate assist of 1   for walker approximation, upright, and safety and cues for walker approximation, safety, and proper hand placement 2x40 feet using  wheeled walker with Minimal assist of 1   cues for upright posture, walker approximation, safety, and pacing and patient with shuffling steps     75 feet using  wheeled walker with Independent    Stair negotiation: ascended and descended   Not assessed  not assessed     10 steps with and device supervision    ROM Within functional limits    Increase range of motion 10% of affected joints    Strength BUE:  refer to OT eval  RLE:  3/5  LLE:  3/5  Increase strength in affected mm groups by 1/3 grade   Balance Sitting EOB:  fair    Dynamic Standing:  fair   Sitting EOB: good   Dynamic Standing: fair with wheeled walker   Sitting EOB:  good    Dynamic Standing: good       Patient is Alert & Oriented x person, place, time, and situation and follows one step directions  distractible  Sensation:  Patient  denies numbness/tingling   Edema:  yes bilateral lower extremities   Endurance: fair -         Patient education  Patient educated on role of Physical Therapy, risks of immobility, safety and plan of care, energy conservation,  importance of mobility while in hospital , safety , and seated exercises      Patient response to education:   Pt verbalized understanding Pt demonstrated skill Pt requires further education in this area   Yes Partial Yes      Treatment:  Patient practiced and was instructed/facilitated in the following treatment: Patient  Sat edge of bed 10 minutes with Supervision  to increase dynamic sitting balance and activity tolerance. Performed seated exercises. Patient stood to amb in room x 2 reps as in chart above. Static standing x 3 minutes and dynamic standing x 5 minutes with no loss of balance. Standing rest periods as needed. Therapeutic Exercises:  ankle pumps, heel raises, and long arc quad  x 10 reps.        At end of session, patient sitting edge of bed with  lunch tray  call light and phone within reach,  all lines and tubes intact, nursing notified. Patient would benefit from continued skilled Physical Therapy to improve functional independence and quality of life.          Patient's/ family goals   home    Time in  1141  Time out  120    Total Treatment Time  24 minutes    CPT codes:  Therapeutic activities (09169)   24 minutes  2 unit(s)    Lovely Haynes, GABBI    #[id number]

## 2022-09-19 NOTE — PROGRESS NOTES
Patient complaining of it raining in his room. Not raining outside, no leaks noted from ceiling or around window. Ensured patient room was safe. No complaints of tactile hallucinations this evening. Patient calm and cooperative.

## 2022-09-19 NOTE — PLAN OF CARE
Problem: Discharge Planning  Goal: Discharge to home or other facility with appropriate resources  Outcome: Progressing  Flowsheets (Taken 9/18/2022 0800 by Mika Miramontes RN)  Discharge to home or other facility with appropriate resources: Identify barriers to discharge with patient and caregiver     Problem: Pain  Goal: Verbalizes/displays adequate comfort level or baseline comfort level  Outcome: Progressing     Problem: ABCDS Injury Assessment  Goal: Absence of physical injury  Outcome: Progressing  Flowsheets (Taken 9/18/2022 1003 by Mika Miramontes RN)  Absence of Physical Injury: Implement safety measures based on patient assessment     Problem: Safety - Adult  Goal: Free from fall injury  Outcome: Progressing  Flowsheets (Taken 9/18/2022 1003 by Mika Miramontes RN)  Free From Fall Injury: Instruct family/caregiver on patient safety     Problem: Neurosensory - Adult  Goal: Achieves stable or improved neurological status  Outcome: Progressing  Flowsheets (Taken 9/18/2022 0800 by Mika Miramontes RN)  Achieves stable or improved neurological status: Assess for and report changes in neurological status     Problem: Neurosensory - Adult  Goal: Achieves maximal functionality and self care  Outcome: Progressing  Flowsheets (Taken 9/18/2022 0800 by Mika Miramontes RN)  Achieves maximal functionality and self care: Monitor swallowing and airway patency with patient fatigue and changes in neurological status     Problem: Cardiovascular - Adult  Goal: Maintains optimal cardiac output and hemodynamic stability  Outcome: Progressing  Flowsheets (Taken 9/18/2022 0800 by Mika Miramontes RN)  Maintains optimal cardiac output and hemodynamic stability: Monitor urine output and notify Licensed Independent Practitioner for values outside of normal range     Problem: Cardiovascular - Adult  Goal: Absence of cardiac dysrhythmias or at baseline  Outcome: Progressing  Flowsheets (Taken 9/18/2022 0800 by Mika Miramontes RN)  Absence of cardiac dysrhythmias or at baseline: Monitor cardiac rate and rhythm     Problem: Skin/Tissue Integrity - Adult  Goal: Skin integrity remains intact  Outcome: Progressing  Flowsheets  Taken 9/18/2022 1003 by Ashley Conley RN  Skin Integrity Remains Intact: Monitor for areas of redness and/or skin breakdown  Taken 9/18/2022 0800 by Ashley Conley RN  Skin Integrity Remains Intact: Monitor for areas of redness and/or skin breakdown     Problem: Skin/Tissue Integrity - Adult  Goal: Incisions, wounds, or drain sites healing without S/S of infection  Outcome: Progressing     Problem: Skin/Tissue Integrity  Goal: Absence of new skin breakdown  Description: 1. Monitor for areas of redness and/or skin breakdown  2. Assess vascular access sites hourly  3. Every 4-6 hours minimum:  Change oxygen saturation probe site  4. Every 4-6 hours:  If on nasal continuous positive airway pressure, respiratory therapy assess nares and determine need for appliance change or resting period.   Outcome: Progressing     Problem: Chronic Conditions and Co-morbidities  Goal: Patient's chronic conditions and co-morbidity symptoms are monitored and maintained or improved  Outcome: Progressing  Flowsheets (Taken 9/18/2022 0800 by Ashley Conley RN)  Care Plan - Patient's Chronic Conditions and Co-Morbidity Symptoms are Monitored and Maintained or Improved: Monitor and assess patient's chronic conditions and comorbid symptoms for stability, deterioration, or improvement

## 2022-09-20 VITALS
SYSTOLIC BLOOD PRESSURE: 114 MMHG | WEIGHT: 286 LBS | DIASTOLIC BLOOD PRESSURE: 46 MMHG | BODY MASS INDEX: 45.96 KG/M2 | RESPIRATION RATE: 20 BRPM | HEART RATE: 88 BPM | OXYGEN SATURATION: 96 % | HEIGHT: 66 IN | TEMPERATURE: 96.1 F

## 2022-09-20 LAB
INR BLD: 2.3
METER GLUCOSE: 245 MG/DL (ref 74–99)
METER GLUCOSE: 91 MG/DL (ref 74–99)
PROTHROMBIN TIME: 26.7 SEC (ref 9.3–12.4)

## 2022-09-20 PROCEDURE — 85610 PROTHROMBIN TIME: CPT

## 2022-09-20 PROCEDURE — 6370000000 HC RX 637 (ALT 250 FOR IP): Performed by: PSYCHIATRY & NEUROLOGY

## 2022-09-20 PROCEDURE — 6370000000 HC RX 637 (ALT 250 FOR IP): Performed by: INTERNAL MEDICINE

## 2022-09-20 PROCEDURE — 97110 THERAPEUTIC EXERCISES: CPT

## 2022-09-20 PROCEDURE — 82962 GLUCOSE BLOOD TEST: CPT

## 2022-09-20 PROCEDURE — 97530 THERAPEUTIC ACTIVITIES: CPT

## 2022-09-20 PROCEDURE — 2580000003 HC RX 258: Performed by: INTERNAL MEDICINE

## 2022-09-20 PROCEDURE — 36415 COLL VENOUS BLD VENIPUNCTURE: CPT

## 2022-09-20 RX ORDER — RISPERIDONE 0.5 MG/1
0.5 TABLET, FILM COATED ORAL 2 TIMES DAILY
Qty: 60 TABLET | Refills: 3 | Status: SHIPPED | OUTPATIENT
Start: 2022-09-20

## 2022-09-20 RX ORDER — AMIODARONE HYDROCHLORIDE 200 MG/1
200 TABLET ORAL DAILY
Qty: 30 TABLET | Refills: 0 | Status: SHIPPED | OUTPATIENT
Start: 2022-09-21 | End: 2022-09-29 | Stop reason: SDUPTHER

## 2022-09-20 RX ORDER — WARFARIN SODIUM 5 MG/1
2.5 TABLET ORAL DAILY
Qty: 15 TABLET | Refills: 0 | Status: ON HOLD
Start: 2022-09-20 | End: 2022-09-26 | Stop reason: HOSPADM

## 2022-09-20 RX ADMIN — INSULIN LISPRO 2 UNITS: 100 INJECTION, SOLUTION INTRAVENOUS; SUBCUTANEOUS at 12:30

## 2022-09-20 RX ADMIN — INSULIN GLARGINE 45 UNITS: 100 INJECTION, SOLUTION SUBCUTANEOUS at 08:49

## 2022-09-20 RX ADMIN — METOLAZONE 2.5 MG: 2.5 TABLET ORAL at 09:09

## 2022-09-20 RX ADMIN — CETIRIZINE HYDROCHLORIDE 10 MG: 10 TABLET, FILM COATED ORAL at 09:10

## 2022-09-20 RX ADMIN — PANTOPRAZOLE SODIUM 40 MG: 40 TABLET, DELAYED RELEASE ORAL at 09:11

## 2022-09-20 RX ADMIN — SPIRONOLACTONE 25 MG: 25 TABLET ORAL at 09:10

## 2022-09-20 RX ADMIN — AMIODARONE HYDROCHLORIDE 200 MG: 200 TABLET ORAL at 09:10

## 2022-09-20 RX ADMIN — LOSARTAN POTASSIUM 25 MG: 50 TABLET, FILM COATED ORAL at 09:10

## 2022-09-20 RX ADMIN — BUMETANIDE 1 MG: 1 TABLET ORAL at 09:10

## 2022-09-20 RX ADMIN — ACETAMINOPHEN 650 MG: 325 TABLET ORAL at 09:20

## 2022-09-20 RX ADMIN — SODIUM CHLORIDE, PRESERVATIVE FREE 10 ML: 5 INJECTION INTRAVENOUS at 09:09

## 2022-09-20 RX ADMIN — RISPERIDONE 0.5 MG: 1 TABLET ORAL at 09:10

## 2022-09-20 ASSESSMENT — PAIN DESCRIPTION - ORIENTATION: ORIENTATION: LEFT

## 2022-09-20 ASSESSMENT — PAIN DESCRIPTION - DESCRIPTORS: DESCRIPTORS: ACHING

## 2022-09-20 ASSESSMENT — PAIN SCALES - GENERAL
PAINLEVEL_OUTOF10: 5
PAINLEVEL_OUTOF10: 8

## 2022-09-20 ASSESSMENT — PAIN DESCRIPTION - LOCATION: LOCATION: SHOULDER

## 2022-09-20 NOTE — PROGRESS NOTES
CLINICAL PHARMACY NOTE: MEDS TO BEDS    Total # of Prescriptions Filled: 1   The following medications were delivered to the patient:  Risperidone 0.5 mg      Additional Documentation:

## 2022-09-20 NOTE — CARE COORDINATION
9/20/2022 1330 CM for transition of care needs at d/c. Pt is being discharged today to home with his wife. Pt is accepted by [facility] for PT/OT(orders are in [address]). Pt states that his neighbor and daughter having been helping his wife and transporting her to dialysis and will continue until he is stronger and feeling better. He also has a CPAP from [facility]. Pt denies any further needs for dc. Pt's neighbor will provide transportation. CM will follow.  Electronically signed by Ruchi Marquez RN on 9/20/2022 at 1:43 PM

## 2022-09-20 NOTE — DISCHARGE SUMMARY
Internal Medicine Progress Note     [facility]     Abbe Warner. Roby Blanchard., CLAUDETTE.A.VINICIOOBruceI. Zo Gandhi D.O., SENTHIL Barkley D.O. Keily Raphael, MSN, APRN, NP-C  Brittany Whitney. Muriel Mckenna, MSN, APRN-CNP       Internal Medicine  Discharge Summary    NAME: Rafi Cameron  :  1955  MRN:  [id number]  PCP:Morales Peña DO  ADMITTED: 9/15/2022      DISCHARGED: 22    ADMITTING PHYSICIAN: Abbe Barrett DO    CONSULTANT(S):   IP CONSULT TO NEUROLOGY  IP CONSULT TO SOCIAL WORK  IP CONSULT TO ORTHOPEDIC SURGERY  IP CONSULT TO PSYCHIATRY  IP CONSULT TO SOCIAL WORK     ADMITTING DIAGNOSIS:   Hallucination [R44.3]  Hyperglycemia [N04.5]  Acute metabolic encephalopathy [H65.29]     DISCHARGE DIAGNOSES:   Medication induced altered mental status with ongoing improvement  Acute on chronic kidney disease stage III  Frequent falls with right C7 transverse process fracture without focal neurological deficit having discussed the case extensively with the neurosurgical team and plans for ongoing observation  Calcified plaque along the carotid bulbs particularly on the left  Coronary artery status post CABG and stent placement  Chronic combined systolic and diastolic congestive heart failure  Persistent atrial fibrillation  Valvular heart disease with history of mitral valve repair  Not oxygen dependent COPD with chronic respiratory failure  Sleep apnea without current use of BiPAP  Insulin-dependent diabetes mellitus type 2  Hyperlipidemia  Essential hypertension  Obesity secondary to excess caloric intake  History of tobacco abuse    BRIEF HISTORY OF PRESENT ILLNESS:   Patient is a 66-year-old male who presents to the ED with hallucinations. Overall patient is a poor historian. Patient states that he has been experiencing this ever since his fall in which he hit his head. States he continues to have issues with balance and has fallen multiple times since. He denies passing out. He does at times experience dizziness with position changes however he states that this has not led to a fall. He denies any headache but he does complain of double vision. His hallucinations consist of centipedes he sees everywhere. He has associated itchiness. Additionally since his fall he states that he has not been taking his insulin as directed. Previously his blood sugars were better controlled however recently states that they have been in the 300s. States that he has not been taking his insulin regularly/as consistently. He denies any nausea or emesis. He denies any chest pain. He does complain of increased weakness and shortness of breath with exertion.     LABS[de-identified]  Lab Results   Component Value Date    WBC 5.9 09/18/2022    HGB 12.5 09/18/2022    HCT 38.5 09/18/2022     09/18/2022     09/18/2022    K 3.5 09/18/2022    CL 97 (L) 09/18/2022    CREATININE 1.4 (H) 09/18/2022    BUN 28 (H) 09/18/2022    CO2 29 09/18/2022    GLUCOSE 148 (H) 09/18/2022    ALT 47 (H) 09/18/2022    AST 37 09/18/2022    INR 2.3 09/20/2022     Lab Results   Component Value Date    INR 2.3 09/20/2022    INR 2.5 09/19/2022    INR 2.3 09/18/2022    PROTIME 26.7 (H) 09/20/2022    PROTIME 28.5 (H) 09/19/2022    PROTIME 26.1 (H) 09/18/2022      Lab Results   Component Value Date    TSH 25.910 (H) 05/12/2022     Lab Results   Component Value Date    TRIG 104 12/16/2021    TRIG 142 10/20/2021    TRIG 109 04/15/2021     Lab Results   Component Value Date    HDL 42 12/16/2021    HDL 60 10/20/2021    HDL 29 04/15/2021     Lab Results   Component Value Date    LDLCALC 60 12/16/2021    LDLCALC 60 10/20/2021    LDLCALC 46 04/15/2021     Lab Results   Component Value Date    LABA1C 9.5 (H) 09/16/2022       IMAGING:  XR SHOULDER RIGHT 1 VW    Result Date: 9/18/2022  EXAMINATION: ONE XRAY VIEW OF THE RIGHT SHOULDER 9/18/2022 2:00 pm COMPARISON: 07/22/2021 HISTORY: ORDERING SYSTEM PROVIDED HISTORY: history fall TECHNOLOGIST PROVIDED HISTORY: Reason for exam:->history fall FINDINGS: Limited views demonstrate superior subluxation of the humeral head. No definite acute fracture is seen. There is new heterotopic ossification surrounding the superior and lateral aspects of the humeral head. No definite acute fracture identified, but evaluation is limited. Superior subluxation of the humeral head, which may be chronic given the surrounding heterotopic ossification. XR HUMERUS RIGHT (MIN 2 VIEWS)    Result Date: 9/18/2022  EXAMINATION: TWO XRAY VIEWS OF THE RIGHT HUMERUS 9/18/2022 2:00 pm COMPARISON: None. HISTORY: ORDERING SYSTEM PROVIDED HISTORY: history fall,eccymotic TECHNOLOGIST PROVIDED HISTORY: Reason for exam:->history fall,eccymotic FINDINGS: No acute fracture of the mid to distal humerus is seen. Visualized elbow joint alignment is intact. No focal soft tissue abnormality. No acute fracture identified. See separately dictated shoulder series. CT Head WO Contrast    Result Date: 9/15/2022  EXAMINATION: CT OF THE HEAD WITHOUT CONTRAST; CT OF THE CERVICAL SPINE WITHOUT CONTRAST 9/15/2022 5:04 pm TECHNIQUE: CT of the head was performed without the administration of intravenous contrast. Automated exposure control, iterative reconstruction, and/or weight based adjustment of the mA/kV was utilized to reduce the radiation dose to as low as reasonably achievable.; CT of the cervical spine was performed without the administration of intravenous contrast. Multiplanar reformatted images are provided for review. Automated exposure control, iterative reconstruction, and/or weight based adjustment of the mA/kV was utilized to reduce the radiation dose to as low as reasonably achievable. COMPARISON: CT of the head without contrast, 07/22/2022.  HISTORY: ORDERING SYSTEM PROVIDED HISTORY: falls frequently and hallucinations TECHNOLOGIST PROVIDED HISTORY: Reason for exam:->falls frequently and hallucinations Has a \"code stroke\" or \"stroke alert\" been called? ->No Decision Support Exception - unselect if not a suspected or confirmed emergency medical condition->Emergency Medical Condition (MA) FINDINGS: CT OF THE BRAIN: BRAIN/VENTRICLES: No mass effect, edema or hemorrhage is seen. Area of encephalomalacia is seen in the right posterior cerebral hemisphere consistent with a chronic MCA infarction. Small lacunar infarction is seen in the body of the right caudate. The remainder of the brain is notable for mild volume loss and mild chronic microvascular ischemic changes. No hydrocephalus or extra-axial fluid is seen. ORBITS: The visualized portion of the orbits demonstrate no acute abnormality. SINUSES: The visualized paranasal sinuses and mastoid air cells demonstrate no acute abnormality. SOFT TISSUES/SKULL: No acute abnormality of the visualized skull or soft tissues. CT CERVICAL SPINE: BONES/ALIGNMENT: There is a nondisplaced fracture through the right C7 transverse process (best seen on the coronal reformats, image 22). The remainder of the bones are intact. DEGENERATIVE CHANGES: Mild loss of disc height with small disc osteophyte complexes are noted at multiple levels. Mild central canal stenoses are noted from C4-5 to C6-7. Moderate right neural foraminal stenosis at C2-3 resulting from facet and uncovertebral joint hypertrophy. Mild stenoses at multiple levels. SOFT TISSUES: There is no prevertebral soft tissue swelling. Calcified atherosclerosis is seen along the bulbs, more so on the left. Both carotid bulbs deviate into the retropharyngeal space. There may be hemodynamically significant stenosis in the left carotid bulb. CT HEAD WITHOUT CONTRAST: 1. No skull fracture or acute intracranial abnormality. CT CERVICAL SPINE WITHOUT CONTRAST: 1. Nondisplaced fracture in the right C7 transverse process (coronal reformats, image 22). 2. Degenerative changes, as described.  3. Calcified plaque along the carotid bulbs, especially on the left. Further evaluation with carotid Doppler recommended. CT Cervical Spine WO Contrast    Result Date: 9/15/2022  EXAMINATION: CT OF THE HEAD WITHOUT CONTRAST; CT OF THE CERVICAL SPINE WITHOUT CONTRAST 9/15/2022 5:04 pm TECHNIQUE: CT of the head was performed without the administration of intravenous contrast. Automated exposure control, iterative reconstruction, and/or weight based adjustment of the mA/kV was utilized to reduce the radiation dose to as low as reasonably achievable.; CT of the cervical spine was performed without the administration of intravenous contrast. Multiplanar reformatted images are provided for review. Automated exposure control, iterative reconstruction, and/or weight based adjustment of the mA/kV was utilized to reduce the radiation dose to as low as reasonably achievable. COMPARISON: CT of the head without contrast, 07/22/2022. HISTORY: ORDERING SYSTEM PROVIDED HISTORY: falls frequently and hallucinations TECHNOLOGIST PROVIDED HISTORY: Reason for exam:->falls frequently and hallucinations Has a \"code stroke\" or \"stroke alert\" been called? ->No Decision Support Exception - unselect if not a suspected or confirmed emergency medical condition->Emergency Medical Condition (MA) FINDINGS: CT OF THE BRAIN: BRAIN/VENTRICLES: No mass effect, edema or hemorrhage is seen. Area of encephalomalacia is seen in the right posterior cerebral hemisphere consistent with a chronic MCA infarction. Small lacunar infarction is seen in the body of the right caudate. The remainder of the brain is notable for mild volume loss and mild chronic microvascular ischemic changes. No hydrocephalus or extra-axial fluid is seen. ORBITS: The visualized portion of the orbits demonstrate no acute abnormality. SINUSES: The visualized paranasal sinuses and mastoid air cells demonstrate no acute abnormality.  SOFT TISSUES/SKULL: No acute abnormality of the visualized skull or soft tissues. CT CERVICAL SPINE: BONES/ALIGNMENT: There is a nondisplaced fracture through the right C7 transverse process (best seen on the coronal reformats, image 22). The remainder of the bones are intact. DEGENERATIVE CHANGES: Mild loss of disc height with small disc osteophyte complexes are noted at multiple levels. Mild central canal stenoses are noted from C4-5 to C6-7. Moderate right neural foraminal stenosis at C2-3 resulting from facet and uncovertebral joint hypertrophy. Mild stenoses at multiple levels. SOFT TISSUES: There is no prevertebral soft tissue swelling. Calcified atherosclerosis is seen along the bulbs, more so on the left. Both carotid bulbs deviate into the retropharyngeal space. There may be hemodynamically significant stenosis in the left carotid bulb. CT HEAD WITHOUT CONTRAST: 1. No skull fracture or acute intracranial abnormality. CT CERVICAL SPINE WITHOUT CONTRAST: 1. Nondisplaced fracture in the right C7 transverse process (coronal reformats, image 22). 2. Degenerative changes, as described. 3. Calcified plaque along the carotid bulbs, especially on the left. Further evaluation with carotid Doppler recommended. XR CHEST 1 VIEW    Result Date: 9/15/2022  EXAMINATION: ONE XRAY VIEW OF THE CHEST 9/15/2022 5:33 pm COMPARISON: 10/22/2021 HISTORY: ORDERING SYSTEM PROVIDED HISTORY: falls and hallucionations TECHNOLOGIST PROVIDED HISTORY: Reason for exam:->falls and hallucionations FINDINGS: The lungs are without acute focal process. There is no effusion or pneumothorax. Cardiomegaly. .  The osseous structures are without acute process. Sternotomy wires noted. No acute process. US CAROTID ARTERY BILATERAL    Result Date: 9/16/2022  EXAMINATION: ULTRASOUND EVALUATION OF THE CAROTID ARTERIES 9/16/2022 TECHNIQUE: Duplex ultrasound using B-mode/gray scaled imaging, Doppler spectral analysis and color flow Doppler was obtained of the carotid arteries.  COMPARISON: None. HISTORY: ORDERING SYSTEM PROVIDED HISTORY: evaluation for carotid artery stenosis TECHNOLOGIST PROVIDED HISTORY: Reason for exam:->evaluation for carotid artery stenosis What reading provider will be dictating this exam?->CRC FINDINGS: RIGHT: The right common carotid artery demonstrates peak systolic velocities of 543, 83 cm/sec in the proximal and distal segments respectively. The right internal carotid artery demonstrates the systolic velocities of 37, 42, 45 cm/sec in the proximal, mid and distal segments respectively. The external carotid artery is patent. The right vertebral artery is not visible. Mild atherosclerotic plaque. ICA/CCA ratio of 0.4 LEFT: The left common carotid artery demonstrates peak systolic velocities of 73, 39 cm/sec in the proximal and distal segments respectively. The left internal carotid artery demonstrates the systolic velocities of 44, 59, 40 cm/sec in the proximal, mid and distal segments respectively. The external carotid artery is patent. The vertebral artery demonstrates normal antegrade flow. Mild atherosclerotic plaque ICA/CCA ratio of 0.8     The right internal carotid artery demonstrates 0-50% stenosis. The left internal carotid artery demonstrates 0-50% stenosis. Right vertebral artery is not visible. Left vertebral artery is patent with a normal direction of flow. HOSPITAL COURSE:   Neva Mao spent an extended period of time hospitalized and this will be a brief synopsis of the events that occurred during the hospitalization. He presented to the hospital with acute altered mental status suffering multiple falls at home. In fact, C7 transverse process fracture was identified but without focal neurologic deficits. The case was discussed with the neurosurgical team who recommended close monitoring. Several medications were felt to be contributing to the patient's altered mental status and these were discontinued accordingly.   This resulted in resolution of his confusion. He was found to have mild renal insufficiency which also improved. Chronic comorbidities were monitored. He worked aggressively with the therapy teams. We have strongly recommended temporary skilled nursing facility placement but he defers as he states he is the primary caregiver for his wife. Home health care will be arranged. He has essentially returned to his chronic baseline. He is acceptable for discharge home at this point     BRIEF PHYSICAL EXAMINATION AND LABORATORIES ON DAY OF DISCHARGE:  VITALS:  BP (!) 130/91   Pulse 88   Temp (!) 96.1 °F (35.6 °C) (Infrared)   Resp 20   Ht 5' 6\" (1.676 m)   Wt 286 lb (129.7 kg)   SpO2 96%   BMI 46.16 kg/m²     HEENT:  PERRLA. EOMI. Sclera clear. Buccal mucosa moist.    Neck:  Supple. Trachea midline. No thyromegaly. No JVD. No bruits. Heart:  Rhythm regular, rate controlled. No murmurs. Lungs:  Symmetrical. Clear to auscultation bilaterally. No wheezes. No rhonchi. No rales. Abdomen: Soft. Non-tender. Non-distended. Bowel sounds positive. No organomegaly or masses. No pain on palpation    Extremities:  Peripheral pulses present. No peripheral edema. No ulcers. Neurologic:  Alert x 3. No focal deficit. Cranial nerves grossly intact. Skin:  No petechia. No hemorrhage. No wounds. DISPOSITION:  The patient's condition is good. At this time the patient is without objective evidence of an acute process requiring continuing hospitalization or inpatient management. They are stable for discharge with outpatient follow-up. I have spoken with the patient and discussed the results of the current hospitalization, in addition to providing specific details for the plan of care and counseling regarding the diagnosis and prognosis. The plan has been discussed in detail and they are aware of the specific conditions for emergent return, as well as the importance of follow-up.   Their questions are answered at this time and they are agreeable with the plan for discharge to home    DISCHARGE MEDICATIONS:   Current Discharge Medication List             Details   risperiDONE (RISPERDAL) 0.5 MG tablet Take 1 tablet by mouth 2 times daily  Qty: 60 tablet, Refills: 3                Details   amiodarone (CORDARONE) 200 MG tablet Take 1 tablet by mouth daily  Qty: 30 tablet, Refills: 0      warfarin (JANTOVEN) 5 MG tablet Take 0.5 tablets by mouth daily TAKE 2 TABS BY MOUTH ON MONDAY, WEDNESDAY, FRIDAY, & SATURDAY, THEN TAKE 1 TAB ON TUESDAY, THURSDAY, & SUNDAY  Qty: 15 tablet, Refills: 0    Associated Diagnoses: Coronary artery disease involving native coronary artery of native heart without angina pectoris                Details   losartan (COZAAR) 25 MG tablet TAKE ONE TABLET BY MOUTH DAILY  Qty: 90 tablet, Refills: 3    Associated Diagnoses: Renal insufficiency; Type 2 diabetes mellitus with diabetic polyneuropathy, with long-term current use of insulin ([facility])      metOLazone (ZAROXOLYN) 2.5 MG tablet TAKE 1 TABLET BY MOUTH TWICE WEEKLY  Qty: 24 tablet, Refills: 3      blood glucose test strips (ACCU-CHEK GUIDE) strip USE TO TEST TWO TIMES DAILY AND AS NEEDED FOR SYMPTOMS OF IRREGULAR BLOOD GLUCOSE  Qty: 100 each, Refills: 5    Associated Diagnoses: Type 2 diabetes mellitus with hyperglycemia, with long-term current use of insulin ([facility]  Type 2 diabetes mellitus with diabetic nephropathy, with long-term current use of insulin ([facility])      Insulin Pen Needle 32G X 6 MM MISC 1 each by Does not apply route 4 times daily  Qty: 100 each, Refills: 3      montelukast (SINGULAIR) 10 MG tablet Take 1 tablet by mouth nightly  Qty: 30 tablet, Refills: 4    Associated Diagnoses: Seasonal allergic rhinitis due to pollen      insulin glargine (BASAGLAR KWIKPEN) 100 UNIT/ML injection pen Inject 45 Units into the skin 2 times daily  Qty: 5 pen, Refills: 3    Associated Diagnoses: Type 2 diabetes mellitus with diabetic polyneuropathy, with long-term current use of insulin ([facility] Type 2 diabetes mellitus with hyperglycemia, with long-term current use of insulin ([facility])      insulin lispro, 1 Unit Dial, (HUMALOG KWIKPEN) 100 UNIT/ML SOPN Inject 0-18 Units into the skin 3 times daily (before meals) MAX 70 units daily  Qty: 3 mL, Refills: 3    Associated Diagnoses: Type 2 diabetes mellitus with diabetic polyneuropathy, with long-term current use of insulin ([facility] Type 2 diabetes mellitus with hyperglycemia, with long-term current use of insulin ([facility])      atorvastatin (LIPITOR) 80 MG tablet TAKE ONE TABLET BY MOUTH EVERY NIGHT  Qty: 90 tablet, Refills: 5    Associated Diagnoses: Mixed hyperlipidemia      bumetanide (BUMEX) 1 MG tablet Take 1 tablet by mouth daily  Qty: 30 tablet, Refills: 5    Associated Diagnoses: Venous insufficiency (chronic) (peripheral)      levocetirizine (XYZAL) 5 MG tablet Take 5 mg by mouth nightly      spironolactone (ALDACTONE) 25 MG tablet TAKE ONE TABLET BY MOUTH TWO TIMES A DAY  Qty: 180 tablet, Refills: 0      pantoprazole (PROTONIX) 40 MG tablet Take 40 mg by mouth daily       CPAP Machine MISC 1 each by Does not apply route nightly as needed       aspirin 81 MG tablet Take 81 mg by mouth nightly     Associated Diagnoses: CAD (coronary artery disease); Status post coronary artery bypass grafting; Hypertension; Bronchitis; Obesity; Tobacco abuse; Diabetes ([facility] PAF (paroxysmal atrial fibrillation) ([facility] Noncompliance             FOLLOW UP/INSTRUCTIONS:  This patient is instructed to follow-up with his primary care physician. Patient is instructed to follow-up with the consults listed above as directed by them. he is instructed to resume home medications and take new medications as indicated in the list above. If the patient has a recurrence of symptoms, he is instructed to go to the ED. Preparing for this patient's discharge, including paperwork, orders, instructions, and meeting with patient did require > 40 minutes.     Aryan Punches, DO 9/20/2022  11:30 AM

## 2022-09-20 NOTE — PROGRESS NOTES
Physical Therapy    Physical Therapy Treatment Note/Plan of Care    Room #:  5764/3161-60  Patient Name: Katherine Fernandez  YOB: 1955  MRN: [id number]    Date of Service: 9/20/2022     Tentative placement recommendation: Subacute rehab  Equipment recommendation: Álvaro Khoury      Evaluating Physical Therapist: Vincent Mireles PT  #[id number]      Specific Provider Orders/Date/Referring Provider :  09/16/22 0515    PT eval and treat  Start:  09/16/22 0515,   End:  09/16/22 0515,   ONE TIME,   Standing Count:  1 Occurrences,   R         Landon Britton,      Admitting Diagnosis:   Hallucination [R44.3]  Hyperglycemia [E10.5]  Acute metabolic encephalopathy [X07.78]    Admitted  with    frequent falls, weakness, altered mental status , hallucinations  Nondisplaced fracture in the right C7 transverse process    Surgery: none  Visit Diagnoses         Codes    Hallucination    -  Primary R44.3    Hyperglycemia     R73.9            Patient Active Problem List   Diagnosis    CAD (coronary artery disease)    Hypertension    Type 2 diabetes mellitus with hyperglycemia, with long-term current use of insulin (HCC)    Tobacco abuse    PAF (paroxysmal atrial fibrillation) ([address]    Status post coronary artery bypass graft    H/O mitral valve repair    Morbid obesity with BMI of 50.0-59.9, adult ([address]    Chronic bronchitis ([address]    Sleep apnea    Gastroesophageal reflux disease without esophagitis    Hyperlipidemia    Muscular deconditioning    Acute diastolic (congestive) heart failure (HCC)    Acute diastolic heart failure (HCC)    Iron deficiency anemia, unspecified    Acute systolic/diastolic congestive heart failure (HCC)    Atrial fibrillation with RVR (HCC)    Ischemic cardiomyopathy    Nonrheumatic tricuspid valve regurgitation    Chronic anticoagulation    Stage 3 chronic kidney disease ([address]    Acute on chronic congestive heart failure ([address]    Dizziness    Hyperosmolar hyperglycemic state (HHS) ([address] Acute metabolic encephalopathy    Altered mental status        ASSESSMENT of Current Deficits Patient exhibits decreased strength, balance, endurance, and coordination impairing functional mobility, transfers, gait , gait distance, and tolerance to activity. Antalgic gait due to right knee pain; with some bruising. Patients pain is limiting gait this session. Able to perform multiple sit to stands progressing to supervision. RUE range of motion limited; unable to perform reaching activities during standing pain noted. No hallucinations this session. Decreased strength, balance and endurance  increases patient's risk for fall. PHYSICAL THERAPY  PLAN OF CARE       Physical therapy plan of care is established based on physician order,  patient diagnosis and clinical assessment    Current Treatment Recommendations:    -Bed Mobility: Lower extremity exercises , Upper extremity exercises , and Trunk control activities   -Standing Balance: Perform strengthening exercises in standing to promote motor control with or without upper extremity support  and Instruct patient on adequate base of support to maintain balance  -Transfers: Provide instruction on proper hand and foot position for adequate transfer of weight onto lower extremities and use of gait device if needed and Cues for hand placement, technique and safety. Provide stabilization to prevent fall   -Gait: Gait training, Standing activities to improve: base of support, weight shift, weight bearing , Exercises to improve trunk control, and Exercises to improve hip and knee control   -Endurance: Utilize Supervised activities to increase level of endurance to allow for safe functional mobility including transfers and gait     PT long term treatment goals are located in below grid    Patient and or family understand(s) diagnosis, prognosis, and plan of care. Frequency of treatments: Patient will be seen  daily.          Prior Level of Function: Patient ambulated independently    Rehab Potential: good   for baseline    Past medical history:   Past Medical History:   Diagnosis Date    Acid reflux     Acute diastolic (congestive) heart failure (HCC) 06/19/2019    Arthritis     Asthma 04/16/2014    Asthmatic bronchitis , chronic ([facility]) 11/28/2016    CAD (coronary artery disease)     Chronic bronchitis ([facility]) 04/16/2014    COPD (chronic obstructive pulmonary disease) ([facility])     CB    COPD (chronic obstructive pulmonary disease) ([facility])     Diabetes mellitus ([facility])     Emphysema (subcutaneous) (surgical) resulting from a procedure     H/O cardiovascular stress test 04/24/2021    Lexiscan    Hyperlipidemia     Hypertension     Hypoxemia requiring supplemental oxygen 02/02/2015    LONG TERM ANTICOAGULENT USE     Morbid obesity with BMI of 50.0-59.9, adult ([facility]) 11/27/2013    Obesity     Osteoarthritis     Sleep apnea     bilevel positive airway pressure at 13/8 with 2 L oxygen flow     Stage 3 chronic kidney disease ([facility])     Tobacco abuse     Type II or unspecified type diabetes mellitus without mention of complication, not stated as uncontrolled      Past Surgical History:   Procedure Laterality Date    COLONOSCOPY      CORONARY ANGIOPLASTY WITH STENT PLACEMENT  07-23-13    Left main focal eccentric 65% distal stenosis. Large OM1 CX 50% ostial & prox narrow. Large ramus artery & LAD: Minor plaque w/o sign narrow. RCA: Dom vessel w/25% prox narrow & focal hazy eccentric 99% distal stenosis before origin RPDA & RPLCA. LV: Mild inferior hypokinesis EF 55%. Probable mild AS with 10-15mmHg. Successful PCI distal RCA w/3.5 x 12 mm BMS, 0% res stenosis.  Normal distal runoff    CORONARY ARTERY BYPASS GRAFT  09-09-13    Dr Cloud Neither; CABG x2, LIMA to LAD, SVG to ramus intermedius; MV repair using 30-mm Future complete ring with magic stitch between A3 and P3; rigid internal fixation of sternum using KLS plates x2; endoscopic vein harvesting of right lower extremity     ECHO COMPL W DOP COLOR FLOW  7/25/2013         HERNIA REPAIR      SINUS SURGERY         SUBJECTIVE:    Precautions: Up with assistance, falls and alarm ,  distractible, poor insight    Social history: Patient lives with spouse in a apartment     with  12 steps  to enter with Rail  Tub shower     Equipment owned: Mary Lou Martinestre,       [id number] [facility]  Mobility Inpatient   How much difficulty turning over in bed?: A Little  How much difficulty sitting down on / standing up from a chair with arms?: A Little  How much difficulty moving from lying on back to sitting on side of bed?: A Lot  How much help from another person moving to and from a bed to a chair?: A Little  How much help from another person needed to walk in hospital room?: A Little  How much help from another person for climbing 3-5 steps with a railing?: A Lot  AM-PAC Inpatient Mobility Raw Score : 16  AM-PAC Inpatient T-Scale Score : 40.78  Mobility Inpatient CMS 0-100% Score: 54.16  Mobility Inpatient CMS G-Code Modifier : CK    Nursing cleared patient for PT treatment. OBJECTIVE;   Initial Evaluation  Date: 9/16/2022 Treatment Date:  9/20/2022     Short Term/ Long Term   Goals   Was pt agreeable to Eval/treatment? Yes yes To be met in 4 days   Pain level   5/10  back 12/10 right shoulder  Complaints of pain in back and right knee. No number given     Bed Mobility    Rolling: Moderate assist of 1    Supine to sit: Maximal assist of 1    Sit to supine: Maximal assist of 1    Scooting: Moderate assist of 1   Rolling: Not assessed    Supine to sit: Moderate assist of 1   Sit to supine: Moderate assist of 1 for bilateral LE. Scooting: Minimal assist of 1 with time given. Rolling: Independent    Supine to sit: Independent    Sit to supine: Independent    Scooting: Independent     Transfers Sit to stand: Moderate assist of 1 x 3 reps from chair and bed Sit to stand: Minimal assist of 1 progressing to supervision.  cues for hand placement and safety   Sit to stand: Independent     Ambulation     12 feet using  wheeled walker with Moderate assist of 1   for walker approximation, upright, and safety and cues for walker approximation, safety, and proper hand placement 3 side steps and 40 feet using  wheeled walker with Minimal assist of 1   cues for upright posture, walker approximation, safety, and pacing and patient with shuffling steps    Patient with antalgic gait this session due to right knee pain quick 360 degree turns, cues for safety. 75 feet using  wheeled walker with Independent    Stair negotiation: ascended and descended   Not assessed  not assessed     10 steps with and device supervision    ROM Within functional limits    Increase range of motion 10% of affected joints    Strength BUE:  refer to OT eval  RLE:  3/5  LLE:  3/5  Increase strength in affected mm groups by 1/3 grade   Balance Sitting EOB:  fair    Dynamic Standing:  fair   Sitting EOB: good   Dynamic Standing: fair with wheeled walker   Sitting EOB:  good    Dynamic Standing: good       Patient is Alert & Oriented x person, place, time, and situation and follows one step directions  distractible  Sensation:  Patient  denies numbness/tingling   Edema:  yes bilateral lower extremities   Endurance: fair -         Patient education  Patient educated on role of Physical Therapy, risks of immobility, safety and plan of care, energy conservation,  importance of mobility while in hospital , safety , and seated exercises      Patient response to education:   Pt verbalized understanding Pt demonstrated skill Pt requires further education in this area   Yes Partial Yes      Treatment:  Patient practiced and was instructed/facilitated in the following treatment: Patient  Sat edge of bed 10 minutes with Supervision  to increase dynamic sitting balance and activity tolerance. Performed seated exercises. Multiple sit to stands reaching activity. Ambulation in room.       Therapeutic Exercises:  ankle pumps, quad sets, heel slide, straight leg raise, and long arc quad  x 10-20 reps. At end of session, patient in bed with  alarm  call light and phone within reach,  all lines and tubes intact, nursing notified. Patient would benefit from continued skilled Physical Therapy to improve functional independence and quality of life.          Patient's/ family goals   home    Time in  200  Time out  1031    Total Treatment Time 25  minutes    CPT codes:  Therapeutic activities (10903)   15 minutes  1 unit(s)  Therapeutic exercises (40544)   10 minutes  1 unit(s)    Batool Pendleton, PTA    #[id number]

## 2022-09-21 ENCOUNTER — TELEPHONE (OUTPATIENT)
Dept: CARDIOLOGY CLINIC | Age: 67
End: 2022-09-21

## 2022-09-21 ENCOUNTER — TELEPHONE (OUTPATIENT)
Dept: FAMILY MEDICINE CLINIC | Age: 67
End: 2022-09-21

## 2022-09-21 RX ORDER — LEVOCETIRIZINE DIHYDROCHLORIDE 5 MG/1
5 TABLET, FILM COATED ORAL NIGHTLY
Qty: 30 TABLET | Refills: 5 | Status: SHIPPED | OUTPATIENT
Start: 2022-09-21

## 2022-09-21 RX ORDER — PANTOPRAZOLE SODIUM 40 MG/1
40 TABLET, DELAYED RELEASE ORAL DAILY
Qty: 30 TABLET | Refills: 5 | Status: SHIPPED | OUTPATIENT
Start: 2022-09-21

## 2022-09-21 RX ORDER — SPIRONOLACTONE 25 MG/1
25 TABLET ORAL 2 TIMES DAILY
Qty: 180 TABLET | Refills: 0 | Status: CANCELLED | OUTPATIENT
Start: 2022-09-21

## 2022-09-21 NOTE — TELEPHONE ENCOUNTER
Galen Rooney RN from [facility] called to inform they are admitting patient for skilled, PT, OT,  to explore community resources, and are setting him up with tele-health. Message routed for informational purpose.

## 2022-09-21 NOTE — TELEPHONE ENCOUNTER
Wife called states the d/c instructions showed 3 meds that the patient does not have.   levocetirizine (XYZAL) 5 MG tablet  pantoprazole (PROTONIX) 40 MG tablet  spironolactone (ALDACTONE) 25 MG tablet  Patient has follow up appt 09/29/2022.  She states those meds were given to him in the hospital, and she is calling Dr. Pascale Morales re: the spironolactone(original prescriber)  Last seen 6/14/2022  Next appt 9/27/2022  Ronna Damon

## 2022-09-21 NOTE — TELEPHONE ENCOUNTER
[facility] Transitions Initial Follow Up Call    Outreach made within 2 business days of discharge: Yes    Patient: Karolyn Mejia Patient : 1955   MRN: [id number]  Reason for Admission: Altered mental status  Discharge Date: 22       Spoke with: Left message to return call back.     Discharge department/facility: [address] [place] [address]    Scheduled appointment with PCP within 7-14 days    Follow Up  Future Appointments   Date Time Provider Valencia Cross   2022 12:30 PM Onofre Rolle DO [facility]   2022  8:40 AM Sayda Burks DO [place], [place]

## 2022-09-21 NOTE — TELEPHONE ENCOUNTER
Was discharged from hospital.  Was on spironolactone but they did not give him Rx. Do you still want him to continue spironolactone. [phone].

## 2022-09-22 ENCOUNTER — TELEPHONE (OUTPATIENT)
Dept: FAMILY MEDICINE CLINIC | Age: 67
End: 2022-09-22

## 2022-09-22 NOTE — TELEPHONE ENCOUNTER
[facility] Transitions Initial Follow Up Call    Outreach made within 2 business days of discharge: Yes    Patient: Brijesh Ramirez Patient : 1955   MRN: [id number]  Reason for Admission: Altered mental status  Discharge Date: 22       Spoke with: Unable to reach 2nd attempt. Left message to return call back.     Discharge department/facility: [address] [place] [address]    Scheduled appointment with PCP within 7-14 days    Follow Up  Future Appointments   Date Time Provider Valencia Cross   2022 12:30 PM [facility]   10/5/2022  3:15 PM DO Vinnie Aviles [facility]   2022  8:40 AM Vance Mcconnell DO [facility] SLEEP [place], [place]

## 2022-09-23 ENCOUNTER — HOSPITAL ENCOUNTER (INPATIENT)
Age: 67
LOS: 2 days | Discharge: HOME HEALTH | DRG: 315 | End: 2022-09-26
Attending: EMERGENCY MEDICINE | Admitting: INTERNAL MEDICINE
Payer: MEDICARE

## 2022-09-23 ENCOUNTER — APPOINTMENT (OUTPATIENT)
Dept: CT IMAGING | Age: 67
DRG: 315 | End: 2022-09-23
Payer: MEDICARE

## 2022-09-23 ENCOUNTER — APPOINTMENT (OUTPATIENT)
Dept: GENERAL RADIOLOGY | Age: 67
DRG: 315 | End: 2022-09-23
Payer: MEDICARE

## 2022-09-23 DIAGNOSIS — R00.0 TACHYCARDIA: Primary | ICD-10-CM

## 2022-09-23 LAB
ALBUMIN SERPL-MCNC: 3.1 G/DL (ref 3.5–5.2)
ALP BLD-CCNC: 174 U/L (ref 40–129)
ALT SERPL-CCNC: 46 U/L (ref 0–40)
ANION GAP SERPL CALCULATED.3IONS-SCNC: 13 MMOL/L (ref 7–16)
AST SERPL-CCNC: 59 U/L (ref 0–39)
BASOPHILS ABSOLUTE: 0.04 E9/L (ref 0–0.2)
BASOPHILS RELATIVE PERCENT: 0.8 % (ref 0–2)
BILIRUB SERPL-MCNC: 0.9 MG/DL (ref 0–1.2)
BUN BLDV-MCNC: 40 MG/DL (ref 6–23)
CALCIUM SERPL-MCNC: 9.1 MG/DL (ref 8.6–10.2)
CHLORIDE BLD-SCNC: 92 MMOL/L (ref 98–107)
CHP ED QC CHECK: YES
CO2: 26 MMOL/L (ref 22–29)
CREAT SERPL-MCNC: 1.7 MG/DL (ref 0.7–1.2)
EOSINOPHILS ABSOLUTE: 0.13 E9/L (ref 0.05–0.5)
EOSINOPHILS RELATIVE PERCENT: 2.5 % (ref 0–6)
GFR AFRICAN AMERICAN: 49
GFR NON-AFRICAN AMERICAN: 40 ML/MIN/1.73
GLUCOSE BLD-MCNC: 322 MG/DL (ref 74–99)
GLUCOSE BLD-MCNC: 345 MG/DL
HCT VFR BLD CALC: 34.3 % (ref 37–54)
HEMOGLOBIN: 11.1 G/DL (ref 12.5–16.5)
IMMATURE GRANULOCYTES #: 0.03 E9/L
IMMATURE GRANULOCYTES %: 0.6 % (ref 0–5)
INR BLD: 2.5
LACTIC ACID, SEPSIS: 2.1 MMOL/L (ref 0.5–1.9)
LYMPHOCYTES ABSOLUTE: 0.87 E9/L (ref 1.5–4)
LYMPHOCYTES RELATIVE PERCENT: 16.9 % (ref 20–42)
MCH RBC QN AUTO: 31.4 PG (ref 26–35)
MCHC RBC AUTO-ENTMCNC: 32.4 % (ref 32–34.5)
MCV RBC AUTO: 97.2 FL (ref 80–99.9)
METER GLUCOSE: 344 MG/DL (ref 74–99)
MONOCYTES ABSOLUTE: 0.76 E9/L (ref 0.1–0.95)
MONOCYTES RELATIVE PERCENT: 14.8 % (ref 2–12)
NEUTROPHILS ABSOLUTE: 3.31 E9/L (ref 1.8–7.3)
NEUTROPHILS RELATIVE PERCENT: 64.4 % (ref 43–80)
PDW BLD-RTO: 15.3 FL (ref 11.5–15)
PLATELET # BLD: 180 E9/L (ref 130–450)
PMV BLD AUTO: 9.2 FL (ref 7–12)
POTASSIUM SERPL-SCNC: 5 MMOL/L (ref 3.5–5)
PRO-BNP: 426 PG/ML (ref 0–125)
PROTHROMBIN TIME: 28.6 SEC (ref 9.3–12.4)
RBC # BLD: 3.53 E12/L (ref 3.8–5.8)
REASON FOR REJECTION: NORMAL
REJECTED TEST: NORMAL
SODIUM BLD-SCNC: 131 MMOL/L (ref 132–146)
TOTAL PROTEIN: 7.5 G/DL (ref 6.4–8.3)
TROPONIN, HIGH SENSITIVITY: 30 NG/L (ref 0–11)
WBC # BLD: 5.1 E9/L (ref 4.5–11.5)

## 2022-09-23 PROCEDURE — 84484 ASSAY OF TROPONIN QUANT: CPT

## 2022-09-23 PROCEDURE — 71045 X-RAY EXAM CHEST 1 VIEW: CPT

## 2022-09-23 PROCEDURE — 96374 THER/PROPH/DIAG INJ IV PUSH: CPT

## 2022-09-23 PROCEDURE — 85610 PROTHROMBIN TIME: CPT

## 2022-09-23 PROCEDURE — 99285 EMERGENCY DEPT VISIT HI MDM: CPT

## 2022-09-23 PROCEDURE — 82962 GLUCOSE BLOOD TEST: CPT

## 2022-09-23 PROCEDURE — 85025 COMPLETE CBC W/AUTO DIFF WBC: CPT

## 2022-09-23 PROCEDURE — 96361 HYDRATE IV INFUSION ADD-ON: CPT

## 2022-09-23 PROCEDURE — 80053 COMPREHEN METABOLIC PANEL: CPT

## 2022-09-23 PROCEDURE — 93005 ELECTROCARDIOGRAM TRACING: CPT | Performed by: EMERGENCY MEDICINE

## 2022-09-23 PROCEDURE — 87040 BLOOD CULTURE FOR BACTERIA: CPT

## 2022-09-23 PROCEDURE — 70450 CT HEAD/BRAIN W/O DYE: CPT

## 2022-09-23 PROCEDURE — 6370000000 HC RX 637 (ALT 250 FOR IP): Performed by: EMERGENCY MEDICINE

## 2022-09-23 PROCEDURE — 36415 COLL VENOUS BLD VENIPUNCTURE: CPT

## 2022-09-23 PROCEDURE — 83605 ASSAY OF LACTIC ACID: CPT

## 2022-09-23 PROCEDURE — 96375 TX/PRO/DX INJ NEW DRUG ADDON: CPT

## 2022-09-23 PROCEDURE — 2580000003 HC RX 258: Performed by: EMERGENCY MEDICINE

## 2022-09-23 PROCEDURE — 83880 ASSAY OF NATRIURETIC PEPTIDE: CPT

## 2022-09-23 RX ORDER — SPIRONOLACTONE 25 MG/1
TABLET ORAL
Qty: 180 TABLET | Refills: 3 | Status: CANCELLED | OUTPATIENT
Start: 2022-09-23

## 2022-09-23 RX ORDER — SPIRONOLACTONE 25 MG/1
25 TABLET ORAL DAILY
Qty: 90 TABLET | Refills: 3 | Status: ON HOLD
Start: 2022-09-23 | End: 2022-09-26 | Stop reason: HOSPADM

## 2022-09-23 RX ORDER — 0.9 % SODIUM CHLORIDE 0.9 %
1000 INTRAVENOUS SOLUTION INTRAVENOUS ONCE
Status: COMPLETED | OUTPATIENT
Start: 2022-09-23 | End: 2022-09-24

## 2022-09-23 RX ADMIN — INSULIN HUMAN 10 UNITS: 100 INJECTION, SOLUTION PARENTERAL at 22:57

## 2022-09-23 RX ADMIN — SODIUM CHLORIDE 1000 ML: 9 INJECTION, SOLUTION INTRAVENOUS at 22:51

## 2022-09-23 ASSESSMENT — PAIN - FUNCTIONAL ASSESSMENT: PAIN_FUNCTIONAL_ASSESSMENT: NONE - DENIES PAIN

## 2022-09-24 PROBLEM — I95.9 HYPOTENSION: Status: ACTIVE | Noted: 2022-09-24

## 2022-09-24 PROBLEM — I95.9 TRANSIENT HYPOTENSION: Status: ACTIVE | Noted: 2022-09-24

## 2022-09-24 LAB
ALBUMIN SERPL-MCNC: 3.6 G/DL (ref 3.5–5.2)
ALP BLD-CCNC: 152 U/L (ref 40–129)
ALT SERPL-CCNC: 36 U/L (ref 0–40)
ANION GAP SERPL CALCULATED.3IONS-SCNC: 9 MMOL/L (ref 7–16)
AST SERPL-CCNC: 33 U/L (ref 0–39)
BACTERIA: ABNORMAL /HPF
BASOPHILS ABSOLUTE: 0.03 E9/L (ref 0–0.2)
BASOPHILS RELATIVE PERCENT: 0.5 % (ref 0–2)
BILIRUB SERPL-MCNC: 0.8 MG/DL (ref 0–1.2)
BILIRUBIN URINE: NEGATIVE
BLOOD, URINE: NEGATIVE
BUN BLDV-MCNC: 36 MG/DL (ref 6–23)
CALCIUM SERPL-MCNC: 8.2 MG/DL (ref 8.6–10.2)
CHLORIDE BLD-SCNC: 96 MMOL/L (ref 98–107)
CHP ED QC CHECK: YES
CLARITY: CLEAR
CO2: 29 MMOL/L (ref 22–29)
COLOR: YELLOW
CREAT SERPL-MCNC: 1.5 MG/DL (ref 0.7–1.2)
EKG ATRIAL RATE: 74 BPM
EKG P AXIS: 85 DEGREES
EKG P-R INTERVAL: 210 MS
EKG Q-T INTERVAL: 482 MS
EKG QRS DURATION: 128 MS
EKG QTC CALCULATION (BAZETT): 535 MS
EKG R AXIS: 91 DEGREES
EKG T AXIS: 95 DEGREES
EKG VENTRICULAR RATE: 74 BPM
EOSINOPHILS ABSOLUTE: 0.13 E9/L (ref 0.05–0.5)
EOSINOPHILS RELATIVE PERCENT: 2.3 % (ref 0–6)
GFR AFRICAN AMERICAN: 57
GFR NON-AFRICAN AMERICAN: 47 ML/MIN/1.73
GLUCOSE BLD-MCNC: 120 MG/DL
GLUCOSE BLD-MCNC: 142 MG/DL
GLUCOSE BLD-MCNC: 188 MG/DL
GLUCOSE BLD-MCNC: 213 MG/DL (ref 74–99)
GLUCOSE URINE: 250 MG/DL
HCT VFR BLD CALC: 36.5 % (ref 37–54)
HEMOGLOBIN: 12 G/DL (ref 12.5–16.5)
IMMATURE GRANULOCYTES #: 0.04 E9/L
IMMATURE GRANULOCYTES %: 0.7 % (ref 0–5)
INR BLD: 3.3
KETONES, URINE: NEGATIVE MG/DL
LACTIC ACID: 1.7 MMOL/L (ref 0.5–2.2)
LEUKOCYTE ESTERASE, URINE: NEGATIVE
LV EF: 55 %
LVEF MODALITY: NORMAL
LYMPHOCYTES ABSOLUTE: 1.03 E9/L (ref 1.5–4)
LYMPHOCYTES RELATIVE PERCENT: 18 % (ref 20–42)
MAGNESIUM: 1.7 MG/DL (ref 1.6–2.6)
MCH RBC QN AUTO: 31.3 PG (ref 26–35)
MCHC RBC AUTO-ENTMCNC: 32.9 % (ref 32–34.5)
MCV RBC AUTO: 95.3 FL (ref 80–99.9)
METER GLUCOSE: 120 MG/DL (ref 74–99)
METER GLUCOSE: 142 MG/DL (ref 74–99)
METER GLUCOSE: 176 MG/DL (ref 74–99)
METER GLUCOSE: 186 MG/DL (ref 74–99)
METER GLUCOSE: 188 MG/DL (ref 74–99)
MONOCYTES ABSOLUTE: 0.67 E9/L (ref 0.1–0.95)
MONOCYTES RELATIVE PERCENT: 11.7 % (ref 2–12)
NEUTROPHILS ABSOLUTE: 3.81 E9/L (ref 1.8–7.3)
NEUTROPHILS RELATIVE PERCENT: 66.8 % (ref 43–80)
NITRITE, URINE: NEGATIVE
PDW BLD-RTO: 15.3 FL (ref 11.5–15)
PH UA: 6.5 (ref 5–9)
PHOSPHORUS: 2.5 MG/DL (ref 2.5–4.5)
PLATELET # BLD: 208 E9/L (ref 130–450)
PMV BLD AUTO: 9.3 FL (ref 7–12)
POTASSIUM SERPL-SCNC: 3.5 MMOL/L (ref 3.5–5)
PROCALCITONIN: 0.15 NG/ML (ref 0–0.08)
PROTEIN UA: NEGATIVE MG/DL
PROTHROMBIN TIME: 38.3 SEC (ref 9.3–12.4)
RBC # BLD: 3.83 E12/L (ref 3.8–5.8)
RBC UA: ABNORMAL /HPF (ref 0–2)
SODIUM BLD-SCNC: 134 MMOL/L (ref 132–146)
SPECIFIC GRAVITY UA: 1.01 (ref 1–1.03)
TOTAL PROTEIN: 6.8 G/DL (ref 6.4–8.3)
TROPONIN, HIGH SENSITIVITY: 44 NG/L (ref 0–11)
TROPONIN, HIGH SENSITIVITY: 45 NG/L (ref 0–11)
TROPONIN, HIGH SENSITIVITY: 46 NG/L (ref 0–11)
UROBILINOGEN, URINE: 1 E.U./DL
WBC # BLD: 5.7 E9/L (ref 4.5–11.5)
WBC UA: ABNORMAL /HPF (ref 0–5)

## 2022-09-24 PROCEDURE — 1200000000 HC SEMI PRIVATE

## 2022-09-24 PROCEDURE — 87088 URINE BACTERIA CULTURE: CPT

## 2022-09-24 PROCEDURE — 82962 GLUCOSE BLOOD TEST: CPT

## 2022-09-24 PROCEDURE — 36415 COLL VENOUS BLD VENIPUNCTURE: CPT

## 2022-09-24 PROCEDURE — 6360000002 HC RX W HCPCS: Performed by: INTERNAL MEDICINE

## 2022-09-24 PROCEDURE — G0378 HOSPITAL OBSERVATION PER HR: HCPCS

## 2022-09-24 PROCEDURE — 6370000000 HC RX 637 (ALT 250 FOR IP): Performed by: INTERNAL MEDICINE

## 2022-09-24 PROCEDURE — 2580000003 HC RX 258: Performed by: INTERNAL MEDICINE

## 2022-09-24 PROCEDURE — 84145 PROCALCITONIN (PCT): CPT

## 2022-09-24 PROCEDURE — 93306 TTE W/DOPPLER COMPLETE: CPT

## 2022-09-24 PROCEDURE — 83735 ASSAY OF MAGNESIUM: CPT

## 2022-09-24 PROCEDURE — 96367 TX/PROPH/DG ADDL SEQ IV INF: CPT

## 2022-09-24 PROCEDURE — 2060000000 HC ICU INTERMEDIATE R&B

## 2022-09-24 PROCEDURE — 6360000004 HC RX CONTRAST MEDICATION: Performed by: NURSE PRACTITIONER

## 2022-09-24 PROCEDURE — 96365 THER/PROPH/DIAG IV INF INIT: CPT

## 2022-09-24 PROCEDURE — 80053 COMPREHEN METABOLIC PANEL: CPT

## 2022-09-24 PROCEDURE — 85610 PROTHROMBIN TIME: CPT

## 2022-09-24 PROCEDURE — P9047 ALBUMIN (HUMAN), 25%, 50ML: HCPCS | Performed by: INTERNAL MEDICINE

## 2022-09-24 PROCEDURE — 81001 URINALYSIS AUTO W/SCOPE: CPT

## 2022-09-24 PROCEDURE — 84484 ASSAY OF TROPONIN QUANT: CPT

## 2022-09-24 PROCEDURE — 96366 THER/PROPH/DIAG IV INF ADDON: CPT

## 2022-09-24 PROCEDURE — 83605 ASSAY OF LACTIC ACID: CPT

## 2022-09-24 PROCEDURE — 6360000002 HC RX W HCPCS: Performed by: NURSE PRACTITIONER

## 2022-09-24 PROCEDURE — 84100 ASSAY OF PHOSPHORUS: CPT

## 2022-09-24 PROCEDURE — 85025 COMPLETE CBC W/AUTO DIFF WBC: CPT

## 2022-09-24 PROCEDURE — 96361 HYDRATE IV INFUSION ADD-ON: CPT

## 2022-09-24 RX ORDER — ACETAMINOPHEN 650 MG/1
650 SUPPOSITORY RECTAL EVERY 6 HOURS PRN
Status: DISCONTINUED | OUTPATIENT
Start: 2022-09-24 | End: 2022-09-26 | Stop reason: HOSPADM

## 2022-09-24 RX ORDER — AMIODARONE HYDROCHLORIDE 200 MG/1
200 TABLET ORAL DAILY
Status: DISCONTINUED | OUTPATIENT
Start: 2022-09-24 | End: 2022-09-26 | Stop reason: HOSPADM

## 2022-09-24 RX ORDER — WARFARIN SODIUM 5 MG/1
5 TABLET ORAL
Status: DISCONTINUED | OUTPATIENT
Start: 2022-09-24 | End: 2022-09-24

## 2022-09-24 RX ORDER — 0.9 % SODIUM CHLORIDE 0.9 %
1000 INTRAVENOUS SOLUTION INTRAVENOUS ONCE
Status: COMPLETED | OUTPATIENT
Start: 2022-09-24 | End: 2022-09-24

## 2022-09-24 RX ORDER — ENOXAPARIN SODIUM 100 MG/ML
30 INJECTION SUBCUTANEOUS 2 TIMES DAILY
Status: DISCONTINUED | OUTPATIENT
Start: 2022-09-24 | End: 2022-09-24 | Stop reason: ALTCHOICE

## 2022-09-24 RX ORDER — DEXTROSE MONOHYDRATE 100 MG/ML
INJECTION, SOLUTION INTRAVENOUS CONTINUOUS PRN
Status: DISCONTINUED | OUTPATIENT
Start: 2022-09-24 | End: 2022-09-26 | Stop reason: HOSPADM

## 2022-09-24 RX ORDER — POLYETHYLENE GLYCOL 3350 17 G/17G
17 POWDER, FOR SOLUTION ORAL DAILY PRN
Status: DISCONTINUED | OUTPATIENT
Start: 2022-09-24 | End: 2022-09-26 | Stop reason: HOSPADM

## 2022-09-24 RX ORDER — 0.9 % SODIUM CHLORIDE 0.9 %
1000 INTRAVENOUS SOLUTION INTRAVENOUS ONCE
Status: DISCONTINUED | OUTPATIENT
Start: 2022-09-24 | End: 2022-09-24

## 2022-09-24 RX ORDER — MONTELUKAST SODIUM 10 MG/1
10 TABLET ORAL NIGHTLY
Status: DISCONTINUED | OUTPATIENT
Start: 2022-09-24 | End: 2022-09-26 | Stop reason: HOSPADM

## 2022-09-24 RX ORDER — POTASSIUM CHLORIDE 20 MEQ/1
40 TABLET, EXTENDED RELEASE ORAL PRN
Status: DISCONTINUED | OUTPATIENT
Start: 2022-09-24 | End: 2022-09-26 | Stop reason: HOSPADM

## 2022-09-24 RX ORDER — ALBUMIN (HUMAN) 12.5 G/50ML
25 SOLUTION INTRAVENOUS ONCE
Status: COMPLETED | OUTPATIENT
Start: 2022-09-24 | End: 2022-09-24

## 2022-09-24 RX ORDER — POTASSIUM CHLORIDE 7.45 MG/ML
10 INJECTION INTRAVENOUS PRN
Status: DISCONTINUED | OUTPATIENT
Start: 2022-09-24 | End: 2022-09-26 | Stop reason: HOSPADM

## 2022-09-24 RX ORDER — ATORVASTATIN CALCIUM 40 MG/1
80 TABLET, FILM COATED ORAL NIGHTLY
Status: DISCONTINUED | OUTPATIENT
Start: 2022-09-24 | End: 2022-09-26 | Stop reason: HOSPADM

## 2022-09-24 RX ORDER — CETIRIZINE HYDROCHLORIDE 10 MG/1
10 TABLET ORAL DAILY
Status: DISCONTINUED | OUTPATIENT
Start: 2022-09-24 | End: 2022-09-26 | Stop reason: HOSPADM

## 2022-09-24 RX ORDER — INSULIN GLARGINE 100 [IU]/ML
45 INJECTION, SOLUTION SUBCUTANEOUS 2 TIMES DAILY
Status: DISCONTINUED | OUTPATIENT
Start: 2022-09-24 | End: 2022-09-26 | Stop reason: HOSPADM

## 2022-09-24 RX ORDER — POTASSIUM CHLORIDE AND SODIUM CHLORIDE 900; 300 MG/100ML; MG/100ML
INJECTION, SOLUTION INTRAVENOUS CONTINUOUS
Status: DISPENSED | OUTPATIENT
Start: 2022-09-24 | End: 2022-09-24

## 2022-09-24 RX ORDER — LEVOCETIRIZINE DIHYDROCHLORIDE 5 MG/1
5 TABLET, FILM COATED ORAL NIGHTLY
Status: DISCONTINUED | OUTPATIENT
Start: 2022-09-24 | End: 2022-09-24 | Stop reason: CLARIF

## 2022-09-24 RX ORDER — ENOXAPARIN SODIUM 100 MG/ML
30 INJECTION SUBCUTANEOUS 2 TIMES DAILY
Status: DISCONTINUED | OUTPATIENT
Start: 2022-09-24 | End: 2022-09-24 | Stop reason: SDUPTHER

## 2022-09-24 RX ORDER — ACETAMINOPHEN 325 MG/1
650 TABLET ORAL EVERY 6 HOURS PRN
Status: DISCONTINUED | OUTPATIENT
Start: 2022-09-24 | End: 2022-09-26 | Stop reason: HOSPADM

## 2022-09-24 RX ORDER — RISPERIDONE 1 MG/1
0.5 TABLET, FILM COATED ORAL 2 TIMES DAILY
Status: DISCONTINUED | OUTPATIENT
Start: 2022-09-24 | End: 2022-09-26 | Stop reason: HOSPADM

## 2022-09-24 RX ORDER — SODIUM CHLORIDE 0.9 % (FLUSH) 0.9 %
5-40 SYRINGE (ML) INJECTION EVERY 12 HOURS SCHEDULED
Status: DISCONTINUED | OUTPATIENT
Start: 2022-09-24 | End: 2022-09-26 | Stop reason: HOSPADM

## 2022-09-24 RX ORDER — SODIUM CHLORIDE 0.9 % (FLUSH) 0.9 %
5-40 SYRINGE (ML) INJECTION PRN
Status: DISCONTINUED | OUTPATIENT
Start: 2022-09-24 | End: 2022-09-26 | Stop reason: HOSPADM

## 2022-09-24 RX ORDER — SODIUM CHLORIDE 9 MG/ML
INJECTION, SOLUTION INTRAVENOUS PRN
Status: DISCONTINUED | OUTPATIENT
Start: 2022-09-24 | End: 2022-09-26 | Stop reason: HOSPADM

## 2022-09-24 RX ORDER — PANTOPRAZOLE SODIUM 40 MG/1
40 TABLET, DELAYED RELEASE ORAL DAILY
Status: DISCONTINUED | OUTPATIENT
Start: 2022-09-24 | End: 2022-09-26 | Stop reason: HOSPADM

## 2022-09-24 RX ORDER — INSULIN LISPRO 100 [IU]/ML
0-8 INJECTION, SOLUTION INTRAVENOUS; SUBCUTANEOUS
Status: DISCONTINUED | OUTPATIENT
Start: 2022-09-24 | End: 2022-09-26 | Stop reason: HOSPADM

## 2022-09-24 RX ORDER — WARFARIN SODIUM 2.5 MG/1
2.5 TABLET ORAL
Status: DISCONTINUED | OUTPATIENT
Start: 2022-09-25 | End: 2022-09-24

## 2022-09-24 RX ORDER — MAGNESIUM SULFATE 1 G/100ML
1000 INJECTION INTRAVENOUS PRN
Status: DISCONTINUED | OUTPATIENT
Start: 2022-09-24 | End: 2022-09-26 | Stop reason: HOSPADM

## 2022-09-24 RX ORDER — INSULIN LISPRO 100 [IU]/ML
0-4 INJECTION, SOLUTION INTRAVENOUS; SUBCUTANEOUS NIGHTLY
Status: DISCONTINUED | OUTPATIENT
Start: 2022-09-24 | End: 2022-09-26 | Stop reason: HOSPADM

## 2022-09-24 RX ORDER — ASPIRIN 81 MG/1
81 TABLET, CHEWABLE ORAL NIGHTLY
Status: DISCONTINUED | OUTPATIENT
Start: 2022-09-24 | End: 2022-09-26 | Stop reason: HOSPADM

## 2022-09-24 RX ADMIN — MONTELUKAST 10 MG: 10 TABLET, FILM COATED ORAL at 03:59

## 2022-09-24 RX ADMIN — PANTOPRAZOLE SODIUM 40 MG: 40 TABLET, DELAYED RELEASE ORAL at 09:11

## 2022-09-24 RX ADMIN — INSULIN GLARGINE 45 UNITS: 100 INJECTION, SOLUTION SUBCUTANEOUS at 21:44

## 2022-09-24 RX ADMIN — ASPIRIN 81 MG CHEWABLE TABLET 81 MG: 81 TABLET CHEWABLE at 00:52

## 2022-09-24 RX ADMIN — INSULIN GLARGINE 45 UNITS: 100 INJECTION, SOLUTION SUBCUTANEOUS at 00:53

## 2022-09-24 RX ADMIN — ASPIRIN 81 MG CHEWABLE TABLET 81 MG: 81 TABLET CHEWABLE at 21:30

## 2022-09-24 RX ADMIN — Medication 10 ML: at 10:57

## 2022-09-24 RX ADMIN — INSULIN GLARGINE 45 UNITS: 100 INJECTION, SOLUTION SUBCUTANEOUS at 09:16

## 2022-09-24 RX ADMIN — PERFLUTREN 1.65 MG: 6.52 INJECTION, SUSPENSION INTRAVENOUS at 10:13

## 2022-09-24 RX ADMIN — SODIUM CHLORIDE 1000 ML: 9 INJECTION, SOLUTION INTRAVENOUS at 00:45

## 2022-09-24 RX ADMIN — RISPERIDONE 0.5 MG: 1 TABLET ORAL at 09:14

## 2022-09-24 RX ADMIN — AMIODARONE HYDROCHLORIDE 200 MG: 200 TABLET ORAL at 09:11

## 2022-09-24 RX ADMIN — CEFEPIME 2000 MG: 2 INJECTION, POWDER, FOR SOLUTION INTRAVENOUS at 04:08

## 2022-09-24 RX ADMIN — ATORVASTATIN CALCIUM 80 MG: 40 TABLET, FILM COATED ORAL at 21:30

## 2022-09-24 RX ADMIN — CETIRIZINE HYDROCHLORIDE 10 MG: 10 TABLET, FILM COATED ORAL at 09:11

## 2022-09-24 RX ADMIN — POTASSIUM CHLORIDE AND SODIUM CHLORIDE: 900; 300 INJECTION, SOLUTION INTRAVENOUS at 08:15

## 2022-09-24 RX ADMIN — ALBUMIN (HUMAN) 25 G: 0.25 INJECTION, SOLUTION INTRAVENOUS at 00:43

## 2022-09-24 RX ADMIN — MONTELUKAST 10 MG: 10 TABLET, FILM COATED ORAL at 21:30

## 2022-09-24 RX ADMIN — RISPERIDONE 0.5 MG: 1 TABLET ORAL at 21:30

## 2022-09-24 RX ADMIN — RISPERIDONE 0.5 MG: 1 TABLET ORAL at 00:52

## 2022-09-24 RX ADMIN — ATORVASTATIN CALCIUM 80 MG: 40 TABLET, FILM COATED ORAL at 00:52

## 2022-09-24 ASSESSMENT — PAIN DESCRIPTION - PAIN TYPE: TYPE: CHRONIC PAIN

## 2022-09-24 ASSESSMENT — PAIN DESCRIPTION - LOCATION
LOCATION: SHOULDER
LOCATION: SHOULDER

## 2022-09-24 ASSESSMENT — PAIN - FUNCTIONAL ASSESSMENT
PAIN_FUNCTIONAL_ASSESSMENT: NONE - DENIES PAIN
PAIN_FUNCTIONAL_ASSESSMENT: 0-10

## 2022-09-24 ASSESSMENT — PAIN SCALES - GENERAL
PAINLEVEL_OUTOF10: 3
PAINLEVEL_OUTOF10: 3

## 2022-09-24 ASSESSMENT — PAIN DESCRIPTION - DESCRIPTORS
DESCRIPTORS: NUMBNESS
DESCRIPTORS: ACHING

## 2022-09-24 ASSESSMENT — PAIN DESCRIPTION - ORIENTATION
ORIENTATION: RIGHT
ORIENTATION: RIGHT

## 2022-09-24 ASSESSMENT — ENCOUNTER SYMPTOMS: TACHYPNEA: 1

## 2022-09-24 NOTE — ED NOTES
BP taken on Left upper arm 92/55, BP taken on right upper arm 82/60     Beto Lawler RN  09/24/22 0009

## 2022-09-24 NOTE — PROGRESS NOTES
Addendum:    Vancomycin discontinued, pharmacy will sign off dosing Vancomycin. Please re-consult if needed. Ronit PenningtonD, BCPS 9/24/2022 12:33 PM   [phone]      Pharmacy Consultation Note  (Antibiotic Dosing and Monitoring)    Initial consult date: 09/24/2022  Consulting physician/provider: Emilia Poe  Drug: Vancomycin  Indication: Sepsis of Unknown Etiology     Age/  Gender Height Weight IBW  Allergy Information   66 y.o./male 5' 6\" (167.6 cm) 286 lb (129.7 kg)     Ideal body weight: 63.8 kg (140 lb 10.5 oz)  Adjusted ideal body weight: 90.2 kg (198 lb 12.7 oz)   Pcn [penicillins] and Sulfa antibiotics      Renal Function:  Recent Labs     09/23/22 2000   BUN 40*   CREATININE 1.7*       Intake/Output Summary (Last 24 hours) at 9/24/2022 0401  Last data filed at 9/24/2022 0355  Gross per 24 hour   Intake 1000 ml   Output --   Net 1000 ml       Vancomycin Monitoring:  Trough:  No results for input(s): VANCOTROUGH in the last 72 hours. Random:  No results for input(s): VANCORANDOM in the last 72 hours. No results for input(s): Gala Munda in the last 72 hours. Historical Cultures:  No results found for: ORG  No results for input(s): BC in the last 72 hours. Vancomycin Administration Times:  Recent vancomycin administrations        No vancomycin IV orders with administrations found. Assessment:  Patient is a 77 y.o. male who has been initiated on vancomycin  Estimated Creatinine Clearance: 55 mL/min (A) (based on SCr of 1.7 mg/dL (H)). To dose vancomycin, pharmacy will be utilizing dosing based off of levels because of patient's renal impairment/insufficiency    Plan:   Will check vancomycin levels when appropriate  Will continue to monitor renal function   Pharmacy to follow      Juan M Gregory [facility] 9/24/2022 4:01 AM

## 2022-09-24 NOTE — PROGRESS NOTES
Pharmacy Consultation Note  (Warfarin Dosing and Monitoring)    Initial consult date: 9/24/22  Consulting Provider: HAKAN Zimmerman    Yimi Salcedo is a 77 y.o. male for whom pharmacy has been asked to manage warfarin therapy. Weight:   Wt Readings from Last 1 Encounters:   09/23/22 286 lb (129.7 kg)       TSH:    Lab Results   Component Value Date/Time    TSH 25.910 05/12/2022 01:46 PM       Hepatic Function Panel:                            Lab Results   Component Value Date/Time    ALKPHOS 152 09/24/2022 04:38 AM    ALT 36 09/24/2022 04:38 AM    AST 33 09/24/2022 04:38 AM    PROT 6.8 09/24/2022 04:38 AM    BILITOT 0.8 09/24/2022 04:38 AM    BILIDIR 0.3 09/15/2022 04:59 PM    IBILI 0.7 09/15/2022 04:59 PM    LABALBU 3.6 09/24/2022 04:38 AM    LABALBU 4.4 10/27/2011 04:07 PM       Current warfarin drug-drug interactions include: amiodarone (incr INR) - home medication    Recent Labs     09/23/22 2000 09/24/22  0438   HGB 11.1* 12.0*    208     Date Warfarin Dose INR Heparin or LMWH Comment   9/24 HOLD 3.3 --                                  Assessment:  Patient is a 77 y.o. male on warfarin for Atrial Fibrillation. Patient's home warfarin dosing regimen is 10 mg M/W/F/Sat and 5 mg Tu/Th/Sun.    Goal INR 2 - 3  INR 3.3 today    Plan:  Hold warfarin tonight  Daily PT/INR until the INR is stable within the therapeutic range  Pharmacist will follow and monitor/adjust dosing as necessary    Thank you for this consult,    Chiki Granados, PharmD, BCPS 9/24/2022 1:00 PM   [phone]

## 2022-09-24 NOTE — CONSULTS
Assessment:  Uncontrolled type 2 diabetes mellitus with hyperglycemia  Hypotension  Acute on chronic kidney failure  Lactic acidosis  History of atrial fibrillation currently in sinus rhythm  Pulmonary hypertension (RVSP 51 on 4/19/2021)  Obstructive sleep apnea  COPD  Acute on chronic systolic and diastolic congestive heart failure  Elevated cardiac enzymes  Elevated liver enzymes  History of asthma  Morbid obesity with BMI 46.16 kg/m²    Plan:   Supplemental oxygen as needed maintain SPO2 greater than 90%  Replace fluid cautiously due to history of CHF maintain MAP greater than 65  Hold antihypertensives  Monitor for volume overload  Continuous cardiac monitor with oximetry  Recheck labs in the morning  EF 55% with indeterminate diastolic function and global systolic function is mildly reduced on 4/19/2021  Twelve-lead EKG showed prolonged QTC of 535  Will recheck echocardiogram  Continue warfarin therapy for his atrial fibrillation  Monitor PT/INR  Start Humalog insulin sliding scale with before meals and at bedtime coverage    History of Present Illness:   Patient is 69-year-old male with a past medical history of chronic systolic and diastolic congestive heart failure, valvular heart disease with history of mitral valve repair,  CABG and stent placement, asthma, acute bronchitis, coronary artery disease, COPD, type 2 diabetes mellitus, hypertension, chronic kidney disease. Sleep apnea with home BiPAP use. He was recently discharged from [place] [id number] on 9/20/2022 for medication induced altered mental status with hallucinations, hyperglycemia, and acute metabolic encephalopathy. He was sent back to the emergency department by his home health care nurse because of hyperglycemia and hypotension. His blood pressure on arrival was 107/69. He has fluctuated throughout the night from the 70s into the low 100s. He remains asymptomatic. His blood glucose reading on arrival 344.   He was given 10 units of regular insulin. He was also given all 1 L normal saline bolus along with 25 g of albumin. This temporarily helped his blood pressure this treatment was repeated 1 time. His blood pressure this morning is 112/59. Emergency room work-up revealed he was slightly hyponatremic at 131, his BUN and creatinine were elevated from baseline 40 and 1.7, on 9/18/2022 his BUN was 28 and creatinine 1.4. His proBNP was 426 and troponin elevated at 30. Albumin 3.1, ALT 46 AST 59, urinalysis negative for bacteria but did contain 250 of glucose. CAT scan of the head showed no acute intracranial abnormality, stable changes and encephalomalacia in the right posterior parietal lobe with a remote some lacunar infarct of the right caudate lobe. His chest x-ray was unremarkable. September 24 2022: We are consulted to see this patient due to hypotension. Patient is seen and examined at the bedside in the emergency department. He was sitting in the edge of the bed alert. Is currently 95% oxygen saturation on room air and denied any shortness of breath. He stated that he is in the emergency department due to his home health care nurse sending him in because his blood pressure is low but he said he felt fine. He denies any fever or chills, he nausea vomiting or diarrhea, he denies any changes in appetite or fluid intake or output. He was given insulin for his hyperglycemia by the emergency department and he also received fluid bolus along with 25 g albumin for his hypotension. This treatment was repeated 1 time which his blood pressure did respond to with his history of congestive heart failure fluid ministration was being monitored closely but it he has poor skin turgor on his bilateral arms but yet he has been 3+ pitting edema both lower extremities and feet. He also has a history of frequent falls.   Patient states he takes no medications for his asthma but he does have a home BiPAP machine that he has not worn in a long time.     Past Medical History:  Past Medical History:   Diagnosis Date    Acid reflux     Acute diastolic (congestive) heart failure ([facility] 2019    Arthritis     Asthma 2014    Asthmatic bronchitis , chronic ([facility] 2016    CAD (coronary artery disease)     Chronic bronchitis ([facility] 2014    COPD (chronic obstructive pulmonary disease) (HCC)     CB    COPD (chronic obstructive pulmonary disease) (HCC)     Diabetes mellitus (HCC)     Emphysema (subcutaneous) (surgical) resulting from a procedure     H/O cardiovascular stress test 2021    Lexiscan    Hyperlipidemia     Hypertension     Hypoxemia requiring supplemental oxygen 2015    LONG TERM ANTICOAGULENT USE     Morbid obesity with BMI of 50.0-59.9, adult ([facility] 2013    Obesity     Osteoarthritis     Sleep apnea     bilevel positive airway pressure at 13/8 with 2 L oxygen flow     Stage 3 chronic kidney disease (HCC)     Tobacco abuse     Type II or unspecified type diabetes mellitus without mention of complication, not stated as uncontrolled         Family History:   Family History   Problem Relation Age of Onset    Cancer Mother         breast    Cancer Father         stomach    Mental Illness Brother     Stroke Brother     Other Brother        Allergies:         Pcn [penicillins] and Sulfa antibiotics    Social history:  Social History     Socioeconomic History    Marital status:      Spouse name: Aidan Matta    Number of children: 1    Years of education: Not on file    Highest education level: 11th grade   Occupational History    Not on file   Tobacco Use    Smoking status: Former     Packs/day: 1.00     Years: 45.00     Pack years: 45.00     Types: Cigarettes     Quit date: 2013     Years since quittin.1    Smokeless tobacco: Former     Types: Chew     Quit date: 10/8/1971   Vaping Use    Vaping Use: Never used   Substance and Sexual Activity    Alcohol use: No     Alcohol/week: 0.0 standard drinks Comment: 1-2 coffee per day    Drug use: No    Sexual activity: Yes     Partners: Female   Other Topics Concern    Not on file   Social History Narrative    8-26-19  [facility] reviewed SDOH with Jordan Ruiz. He does have money strain but between the income he has coming in and his wife's they are over resources for SARY programs. Offered food panty info to help with end of the month struggles but he declined stating that he tried and was refused due to his income. He belongs to a [employer] and goes weekly so he has some community connections in place. He has an extremely high interest rate on his mortgage which he was encouraged to look into getting lowered to help save money on his house payments. Noticed some difficulty with memory recall. Becomes easily flustered at times. Has no MHI to support applying for [facility] program of SARY. States he does not feel depressed or need any MH treatment. Social Determinants of Health     Financial Resource Strain: Low Risk     Difficulty of Paying Living Expenses: Not hard at all   Food Insecurity: No Food Insecurity    Worried About Running Out of Food in the Last Year: Never true    Ran Out of Food in the Last Year: Never true   Transportation Needs: Not on file   Physical Activity: Not on file   Stress: Not on file   Social Connections: Not on file   Intimate Partner Violence: Not on file   Housing Stability: Not on file       Current Medications:   No current facility-administered medications for this encounter.     Current Outpatient Medications:     spironolactone (ALDACTONE) 25 MG tablet, Take 1 tablet by mouth daily, Disp: 90 tablet, Rfl: 3    levocetirizine (XYZAL) 5 MG tablet, Take 1 tablet by mouth nightly, Disp: 30 tablet, Rfl: 5    pantoprazole (PROTONIX) 40 MG tablet, Take 1 tablet by mouth daily, Disp: 30 tablet, Rfl: 5    amiodarone (CORDARONE) 200 MG tablet, Take 1 tablet by mouth daily, Disp: 30 tablet, Rfl: 0    risperiDONE (RISPERDAL) 0.5 MG tablet, Take 1 tablet by mouth 2 times daily, Disp: 60 tablet, Rfl: 3    warfarin (JANTOVEN) 5 MG tablet, Take 0.5 tablets by mouth daily TAKE 2 TABS BY MOUTH ON MONDAY, WEDNESDAY, FRIDAY, & SATURDAY, THEN TAKE 1 TAB ON TUESDAY, THURSDAY, & SUNDAY, Disp: 15 tablet, Rfl: 0    losartan (COZAAR) 25 MG tablet, TAKE ONE TABLET BY MOUTH DAILY, Disp: 90 tablet, Rfl: 3    metOLazone (ZAROXOLYN) 2.5 MG tablet, TAKE 1 TABLET BY MOUTH TWICE WEEKLY, Disp: 24 tablet, Rfl: 3    blood glucose test strips (ACCU-CHEK GUIDE) strip, USE TO TEST TWO TIMES DAILY AND AS NEEDED FOR SYMPTOMS OF IRREGULAR BLOOD GLUCOSE, Disp: 100 each, Rfl: 5    Insulin Pen Needle 32G X 6 MM MISC, 1 each by Does not apply route 4 times daily, Disp: 100 each, Rfl: 3    montelukast (SINGULAIR) 10 MG tablet, Take 1 tablet by mouth nightly, Disp: 30 tablet, Rfl: 4    insulin glargine (BASAGLAR KWIKPEN) 100 UNIT/ML injection pen, Inject 45 Units into the skin 2 times daily, Disp: 5 pen, Rfl: 3    insulin lispro, 1 Unit Dial, (HUMALOG KWIKPEN) 100 UNIT/ML SOPN, Inject 0-18 Units into the skin 3 times daily (before meals) MAX 70 units daily, Disp: 3 mL, Rfl: 3    atorvastatin (LIPITOR) 80 MG tablet, TAKE ONE TABLET BY MOUTH EVERY NIGHT, Disp: 90 tablet, Rfl: 5    bumetanide (BUMEX) 1 MG tablet, Take 1 tablet by mouth daily, Disp: 30 tablet, Rfl: 5    CPAP Machine MISC, 1 each by Does not apply route nightly as needed , Disp: , Rfl:     aspirin 81 MG tablet, Take 81 mg by mouth nightly , Disp: , Rfl:     Review of Systems:   General: denies weight gain, denies loss of appetite, fever, chills, night sweats. HEENT: denies headaches, dizziness, head trauma, visual changes, eye pain, tinnitus, nosebleeds, hoarseness or throat pain    Respiratory: denies chest pain, dyspnea, cough and hemoptysis  Cardiovascular: denies orthopnea, paroxysmal nocturnal dyspnea,  and previous heart attack.   Reports leg swelling  Gastrointestinal: denies pain, nausea vomiting, diarrhea, constipation, melena or bleeding. Genitourinary: denies hematuria, frequency, urgency or dysuria  Neurology: denies syncope, seizures, paralysis, paraesthesia   Endocrine: denies polyuria, polydipsia, skin or hair changes, and heat or cold intolerance  Musculoskeletal: denies joint pain,  arthritis or myalgia reports swelling in legs and feet  Hematologic: denies bleeding, adenopathy and easy bruising  Skin: denies rashes and skin discoloration. Reports scab on right knee from falling  Psychiatry: denies depression    Physical Exam:   Vital Signs:  BP 82/60   Pulse 79   Temp 98 °F (36.7 °C) (Oral)   Resp 17   Ht 5' 6\" (1.676 m)   Wt 286 lb (129.7 kg)   SpO2 98%   BMI 46.16 kg/m²     Input/Output:  No intake/output data recorded. Oxygen requirements: Room air         General appearance: Well developed, not in pain or distress, in no respiratory distress    HEENT: Atraumatic/normocephalic, EOMI, MAK, pharynx clear, dry mucosa,   Neck: Supple, no jugular venous distension, lymphadenopathy, thyromegaly or carotid bruits  Chest: Slightly diminished bilateral breath sounds, no wheezing, no crackles Cardiovascular: Normal S1 , S2, regular rate and rhythm, no murmur, rub or gallop  Abdomen: Normal sounds present, soft, lax with no tenderness, no hepatosplenomegaly, and no masses, obese  Extremities:  Pulses are equally present. 3+ pitting edema bilateral lower extremities and feet  Skin: no rashes, scab on right knee  Neurologic: Alert and oriented x 3, No focal deficit     Investigations:  Labs, radiological imaging and cardiac work up were reviewed        ICU NURSE PRACTITIONER NOTE OF PERSONAL INVOLVEMENT IN CARE  As the nurse practitioner, I certify that I personally reviewed the patient's history and personally examined the patient to confirm the physical findings described above, and that I reviewed the relevant imaging studies and available reports.   I also discussed the differential diagnosis and all of the proposed management plans with the patient and individuals accompanying the patient to this visit. They had the opportunity to ask questions about the proposed management plans and to have those questions answered. This patient has a high probability of sudden, clinically significant deterioration, which requires the highest level of physician preparedness to intervene urgently. I managed/supervised life or organ supporting interventions that required frequent assessment. I devoted my full attention to the direct care of this patient for the amount of time indicated below. Time I spent with family or surrogate(s) is included only if the patient was incapable of providing the necessary information or participating in medical decision-making. Time devoted to teaching and to any procedures I billed separately is not included.   Critical Care Time: 35 minutes      Electronically signed by ARLEEN Pride CNP on 9/24/2022 at 12:26 AM

## 2022-09-24 NOTE — PROGRESS NOTES
Internal Medicine Progress Note    [facility]    Nirmala Martins. Jewell Sahu., F.A.CBruceOBruceI. Hermila Combs D.O., SENTHIL Roberts D.O. Ailyn Sims, MSN, APRN, NP-C  Ophelia Means. Rigoberto Blackburn, MSN, APRN-CNP     Primary Care Physician: Nitesh Chamorro DO   Admitting Physician:  Margarette Swift DO  Admission date and time: 9/23/2022  7:32 PM    Room:  09/09  Admitting diagnosis: Hypotension [I95.9]  Transient hypotension [I95.9]    Patient Name: Ezequiel Lefort  MRN: [id number]    Date of Service: 9/24/2022     Subjective:  Desiree Oropeza is a 77 y.o. male who was seen and examined today,9/24/2022, at the bedside. Desiree Oropeza is seen at the bedside with the RN attempting to stand to use the urinal.  He was admitted yesterday evening after being home for a very brief period of time. The patient states that he was feeling essentially at his baseline however the home health aide or nurse took his blood pressure and thought that he should go right to the hospital.  Squad blood pressures were reportedly low as well however without intervention the patient blood pressures in the emergency department were normal.  His blood sugars were also found to be abnormal as well and the patient had returned to his normal poor eating habits at home. We have had a very andrew discussion with Desiree Oropeza at bedside. Given his return to the hospital in a short period of time it is almost certain that he will need skilled nursing facility placement. He is hesitant to consider this and we will continue to reinforce this. No family present during my examination. Review of System:   Constitutional:   Denies fever or chills, weight loss or gain, positive for chronic fatigue and malaise. HEENT:   Denies ear pain, sore throat, sinus or eye problems. Cardiovascular:   Denies any chest pain, irregular heartbeats, or palpitations.    Respiratory:   Denies shortness of breath, coughing, sputum production, hemoptysis, or wheezing. Gastrointestinal:   Denies nausea, vomiting, diarrhea, or constipation. Denies any abdominal pain. Genitourinary:    Denies any urgency, frequency, hematuria. Voiding  without difficulty. Extremities:   Positive for chronic lower extremity swelling which is no worse  Neurology:    Denies any headache or focal neurological deficits, positive for generalized weakness without focal component  Psch:   Denies being anxious or depressed. No longer expressing hallucinations. Musculoskeletal:    Denies  myalgias, joint complaints or back pain. Integumentary:   Denies any rashes, ulcers, or excoriations. Denies bruising. Hematologic/Lymphatic:  Denies bruising or bleeding. Physical Exam:  I/O this shift:  In: 1150 [IV Piggyback:1150]  Out: -     Intake/Output Summary (Last 24 hours) at 9/24/2022 0647  Last data filed at 9/24/2022 0515  Gross per 24 hour   Intake 1150 ml   Output --   Net 1150 ml   No intake/output data recorded. Patient Vitals for the past 96 hrs (Last 3 readings):   Weight   09/23/22 1944 286 lb (129.7 kg)     Vital Signs:   Blood pressure 122/79, pulse 75, temperature 98 °F (36.7 °C), temperature source Oral, resp. rate 17, height 5' 6\" (1.676 m), weight 286 lb (129.7 kg), SpO2 95 %. General appearance:  Alert, responsive, oriented to person, place, and time. Chronically ill-appearing, no distress. Head:  Normocephalic. No masses, lesions or tenderness. Eyes:  PERRLA. EOMI. Sclera clear. ENT:  Ears normal. Mucosa normal.  Neck:    Supple. Trachea midline. No thyromegaly. No JVD. No bruits. Heart:    Irregular rhythm with controlled rate. S1 and S2, systolic murmur. Normotensive. Lungs:    Symmetrical.  Mildly diminished bibasilar air exchange. Clear to auscultation bilaterally. No wheezes. No rhonchi. No rales. Abdomen:   Soft. Morbidly obese. Non-tender. Non-distended. Bowel sounds positive. No organomegaly or masses.   No pain on palpation. Extremities:    Peripheral pulses present. Chronic lower extremity swelling without superimposed significant pitting peripheral edema. No ulcers. No cyanosis. No clubbing. Neurologic:    Alert x 3. Generally weak without focal deficit. Cranial nerves grossly intact. No focal weakness. Psych:   Behavior is normal. Mood appears normal. Speech is not rapid and/or pressured. No hallucinations. Musculoskeletal:   No unilateral joint edema, erythema or warmth. Gait not assessed. Integumentary:  No rashes  Skin normal color and texture.   Genitalia/Breast:  Deferred    Medication:  Scheduled Meds:   sodium chloride flush  5-40 mL IntraVENous 2 times per day    amiodarone  200 mg Oral Daily    aspirin  81 mg Oral Nightly    atorvastatin  80 mg Oral Nightly    insulin glargine  45 Units SubCUTAneous BID    montelukast  10 mg Oral Nightly    pantoprazole  40 mg Oral Daily    risperiDONE  0.5 mg Oral BID    warfarin  5 mg Oral Once per day on Mon Wed Fri Sat    [START ON 9/25/2022] warfarin  2.5 mg Oral Once per day on Sun Thu    cetirizine  10 mg Oral Daily    insulin lispro  0-8 Units SubCUTAneous TID     insulin lispro  0-4 Units SubCUTAneous Nightly     Continuous Infusions:   dextrose      sodium chloride         Objective Data:  CBC with Differential:    Lab Results   Component Value Date/Time    WBC 5.7 09/24/2022 04:38 AM    RBC 3.83 09/24/2022 04:38 AM    HGB 12.0 09/24/2022 04:38 AM    HCT 36.5 09/24/2022 04:38 AM     09/24/2022 04:38 AM    MCV 95.3 09/24/2022 04:38 AM    MCH 31.3 09/24/2022 04:38 AM    MCHC 32.9 09/24/2022 04:38 AM    RDW 15.3 09/24/2022 04:38 AM    SEGSPCT 61 01/20/2014 12:00 PM    LYMPHOPCT 18.0 09/24/2022 04:38 AM    MONOPCT 11.7 09/24/2022 04:38 AM    BASOPCT 0.5 09/24/2022 04:38 AM    MONOSABS 0.67 09/24/2022 04:38 AM    LYMPHSABS 1.03 09/24/2022 04:38 AM    EOSABS 0.13 09/24/2022 04:38 AM    BASOSABS 0.03 09/24/2022 04:38 AM     CMP:    Lab Results   Component Value Date/Time     09/24/2022 04:38 AM    K 3.5 09/24/2022 04:38 AM    K 3.1 09/15/2022 04:59 PM    CL 96 09/24/2022 04:38 AM    CO2 29 09/24/2022 04:38 AM    BUN 36 09/24/2022 04:38 AM    CREATININE 1.5 09/24/2022 04:38 AM    GFRAA 57 09/24/2022 04:38 AM    LABGLOM 47 09/24/2022 04:38 AM    GLUCOSE 188 09/24/2022 04:47 AM    GLUCOSE 118 10/27/2011 04:07 PM    PROT 6.8 09/24/2022 04:38 AM    LABALBU 3.6 09/24/2022 04:38 AM    LABALBU 4.4 10/27/2011 04:07 PM    CALCIUM 8.2 09/24/2022 04:38 AM    BILITOT 0.8 09/24/2022 04:38 AM    ALKPHOS 152 09/24/2022 04:38 AM    AST 33 09/24/2022 04:38 AM    ALT 36 09/24/2022 04:38 AM     PT/INR:    Lab Results   Component Value Date/Time    PROTIME 38.3 09/24/2022 04:38 AM    PROTIME 10.0 10/02/2019 01:24 PM    INR 3.3 09/24/2022 04:38 AM       Wound Documentation:   Wound 04/18/21 Buttocks Left (Active)   Number of days: 742       Wound 04/22/21 Pelvis Anterior;Mid Tunneling open wound (Active)   Number of days: 519       Wound 09/16/22 Pretibial Right small fluid filled blisters (Active)   Wound Etiology Other 09/19/22 2041   Dressing Status Clean;Dry; Intact 09/19/22 2041   Wound Cleansed Cleansed with saline 09/19/22 2041   Dressing/Treatment Open to air 09/19/22 2041   Dressing Change Due 09/18/22 09/18/22 2058   Drainage Amount None 09/19/22 2041   Odor None 09/19/22 0715   Number of days: 7       Wound 09/16/22 Pretibial Left (Active)   Wound Etiology Other 09/19/22 2041   Dressing Status New dressing applied 09/18/22 2058   Wound Cleansed Cleansed with saline 09/18/22 2058   Dressing/Treatment Open to air 09/19/22 2041   Dressing Change Due 09/18/22 09/18/22 2058   Drainage Amount None 09/19/22 0715   Number of days: 7       Assessment:  Transient hypotension that resolved without direct intervention  Acute on chronic kidney disease stage III, resolved  Hyperglycemia in the setting of insulin-dependent diabetes and likely dietary noncompliance  Recent hospitalization for frequent falls with right C7 transverse process fracture without focal neurologic deficits, conservative medical management as per neurosurgery at that time  Calcified plaques along the carotid bulbs, left greater than right  Mildly elevated troponin with underlying coronary artery disease and prior CABG and stent placement  Chronic compensated systolic and diastolic congestive heart failure, LVEF 45%  Persistent atrial fibrillation on chronic warfarin therapy, INR 3.0  Valvular heart disease with history of mitral valve repair  On oxygen dependent COPD with chronic respiratory failure  Morbid obesity with BMI 46.16 kg per metered squared obstructive sleep apnea with current use of BiPAP  Hyperlipidemia    Plan:   Marietta Espinoza is a 59-year-old male discharged from our service on September 20 after a long hospitalization in which his altered mental status and hallucinations were addressed with the discontinuation of sedating and psychoactive medications and the institution of Risperdal.  He tolerated this well, performed well enough with therapy that he wished to return home to assist in the care of his wife who was debilitated as well. He returned to the hospital at the request of home health care agency provider, medical assistant versus RN, due to reportedly low blood pressures. Squad blood pressures were low as well however when an appropriate sitting cuff was placed the patient's blood pressure was normal.  He received some fluids and albumin with ongoing stability we discussed gentle fluids for an additional 12 hours or so with discontinuation. We will hold diuretics for that timeframe as well and monitor volume status closely. There is no evidence of infection and thusly antibiotics are discontinued to avoid unnecessary administration. We discussed the case with the ICU APRN and we agree the patient can be downgraded to telemetry and they no longer need to follow.   Renal function has normalized and we will look to resume blood pressure and diuretic medications tomorrow if warranted. Troponin is mildly elevated in the setting of demand ischemia and we will update echocardiogram to evaluate for any new regional wall motion abnormalities or interval worsening of known cardiomyopathy. Remains without any acute neurologic findings or neurosurgical findings in the setting of recent C7 transverse process fracture. PT, OT and social work will follow for discharge planning purposes as it is becoming increasingly apparent that Salvatore Luis is not appropriate to be at home presently. We anticipate that he will be resistant to this we will attempt to reinforce this on subsequent encounters. Chronic morbidities, lab values and vital signs are being monitored and addressed accordingly. Pharmacy will follow for warfarin dosing. Continue current therapy. See orders for further plan of care. More than 50% of my  time was spent at the bedside counseling/coordinating care with the patient and/or family with face to face contact. This time was spent reviewing notes and laboratory data as well as instructing and counseling the patient. Time I spent with the family or surrogate(s) is included only if the patient was incapable of providing the necessary information or participating in medical decisions. I also discussed the differential diagnosis and all of the proposed management plans with the patient and individuals accompanying the patient. Salvatore Luis requires this high level of physician care and nursing on the IMC/Telemetry unit due the complexity of decision management and chance of rapid decline or death. Continued cardiac monitoring and higher level of nursing are required. I am readily available for any further decision-making and intervention.      Jason Kerr, ARLEEN - CNP  9/24/2022  6:47 AM

## 2022-09-24 NOTE — H&P
Department of Internal Medicine  History and Physical    PCP: Emilia Kennedy DO  Admitting Physician: Dr. Leon Rodriguez  Consultants:   Date of Service: 9/24/2022    CHIEF COMPLAINT: Hyperglycemia    HISTORY OF PRESENT ILLNESS:    Patient is a 49-year-old male who presented to the ED due to elevated blood sugars. Patient's aide noted his elevated blood sugar and called EMS. Patient states that he has been feeling okay. He has been more drowsy since starting his new medication for hallucinations. He denies any recurrent hallucinations. However he did admit to feeling a little lightheaded. He denies any changes in his medications. In the ED his blood pressures were persistently low. He did receive a fluid bolus. However his blood pressure still remained very low and in the setting of congestive heart failure patient was admitted for fluid resuscitation.     PAST MEDICAL Hx:  Past Medical History:   Diagnosis Date    Acid reflux     Acute diastolic (congestive) heart failure ([facility] [address] 06/19/2019    Arthritis     Asthma 04/16/2014    Asthmatic bronchitis , chronic ([facility] [address] 11/28/2016    CAD (coronary artery disease)     Chronic bronchitis (HCC) 04/16/2014    COPD (chronic obstructive pulmonary disease) ([facility])     CB    COPD (chronic obstructive pulmonary disease) ([facility])     Diabetes mellitus ([address]     Emphysema (subcutaneous) (surgical) resulting from a procedure     H/O cardiovascular stress test 04/24/2021    Lexiscan    Hyperlipidemia     Hypertension     Hypoxemia requiring supplemental oxygen 02/02/2015    LONG TERM ANTICOAGULENT USE     Morbid obesity with BMI of 50.0-59.9, adult ([facility] [address] 11/27/2013    Obesity     Osteoarthritis     Sleep apnea     bilevel positive airway pressure at 13/8 with 2 L oxygen flow     Stage 3 chronic kidney disease (HCC)     Tobacco abuse     Type II or unspecified type diabetes mellitus without mention of complication, not stated as uncontrolled        PAST SURGICAL Hx:   Past Surgical History:   Procedure Laterality Date    COLONOSCOPY      CORONARY ANGIOPLASTY WITH STENT PLACEMENT  07-23-13    Left main focal eccentric 65% distal stenosis. Large OM1 CX 50% ostial & prox narrow. Large ramus artery & LAD: Minor plaque w/o sign narrow. RCA: Dom vessel w/25% prox narrow & focal hazy eccentric 99% distal stenosis before origin RPDA & RPLCA. LV: Mild inferior hypokinesis EF 55%. Probable mild AS with 10-15mmHg. Successful PCI distal RCA w/3.5 x 12 mm BMS, 0% res stenosis. Normal distal runoff    CORONARY ARTERY BYPASS GRAFT  09-09-13    Dr Mayra Yañez; CABG x2, LIMA to LAD, SVG to ramus intermedius; MV repair using 30-mm Future complete ring with magic stitch between A3 and P3; rigid internal fixation of sternum using KLS plates x2; endoscopic vein harvesting of right lower extremity     ECHO COMPL W DOP COLOR FLOW  7/25/2013         HERNIA REPAIR      SINUS SURGERY         FAMILY Hx:  Family History   Problem Relation Age of Onset    Cancer Mother         breast    Cancer Father         stomach    Mental Illness Brother     Stroke Brother     Other Brother        HOME MEDICATIONS:  Prior to Admission medications    Medication Sig Start Date End Date Taking?  Authorizing Provider   spironolactone (ALDACTONE) 25 MG tablet Take 1 tablet by mouth daily 9/23/22   Tom Smith MD   levocetirizine (XYZAL) 5 MG tablet Take 1 tablet by mouth nightly 9/21/22   Lindy Lombardo DO   pantoprazole (PROTONIX) 40 MG tablet Take 1 tablet by mouth daily 9/21/22   Lindy Lombardo DO   amiodarone (CORDARONE) 200 MG tablet Take 1 tablet by mouth daily 9/21/22 10/21/22  Archie Brown DO   risperiDONE (RISPERDAL) 0.5 MG tablet Take 1 tablet by mouth 2 times daily 9/20/22   Archie Brown DO   warfarin (JANTOVEN) 5 MG tablet Take 0.5 tablets by mouth daily TAKE 2 TABS BY MOUTH ON MONDAY, WEDNESDAY, FRIDAY, & SATURDAY, THEN TAKE 1 TAB ON TUESDAY, THURSDAY, & WIL 9/20/22 10/20/22  Archie Brown DO   losartan (COZAAR) 25 MG tablet TAKE ONE TABLET BY MOUTH DAILY 8/21/22   [facility], DO   metOLazone (ZAROXOLYN) 2.5 MG tablet TAKE 1 TABLET BY MOUTH TWICE WEEKLY 8/21/22   [facility], DO   blood glucose test strips (ACCU-CHEK GUIDE) strip USE TO TEST TWO TIMES DAILY AND AS NEEDED FOR SYMPTOMS OF IRREGULAR BLOOD GLUCOSE 8/1/22   [facility], DO   Insulin Pen Needle 32G X 6 MM MISC 1 each by Does not apply route 4 times daily 6/10/22   [facility], DO   montelukast (SINGULAIR) 10 MG tablet Take 1 tablet by mouth nightly 6/6/22   [facility], DO   insulin glargine (BASAGLAR KWIKPEN) 100 UNIT/ML injection pen Inject 45 Units into the skin 2 times daily 4/8/22   [facility], DO   insulin lispro, 1 Unit Dial, (HUMALOG KWIKPEN) 100 UNIT/ML SOPN Inject 0-18 Units into the skin 3 times daily (before meals) MAX 70 units daily 4/8/22   [facility], DO   atorvastatin (LIPITOR) 80 MG tablet TAKE ONE TABLET BY MOUTH EVERY NIGHT 4/8/22   [facility], DO   metoprolol succinate (TOPROL XL) 25 MG extended release tablet Take 1 tablet by mouth 2 times daily 4/8/22 9/20/22  [facility], DO   bumetanide (BUMEX) 1 MG tablet Take 1 tablet by mouth daily 4/5/22   [facility], DO   amitriptyline (ELAVIL) 50 MG tablet Take 1 tablet by mouth nightly  Patient not taking: Reported on 9/15/2022 4/5/22 9/20/22  [facility], DO   pramipexole (MIRAPEX) 0.25 MG tablet TAKE TWO TABLETS BY MOUTH THREE TIMES A DAY 1/25/22 9/20/22  [facility], DO   CPAP Machine MISC 1 each by Does not apply route nightly as needed     Historical Provider, MD   aspirin 81 MG tablet Take 81 mg by mouth nightly     Historical Provider, MD       ALLERGIES:  Pcn [penicillins] and Sulfa antibiotics    SOCIAL Hx:  Social History     Socioeconomic History    Marital status:      Spouse name: Bessy Weiss    Number of children: 1    Years of education: Not on file    Highest education level: 11th grade   Occupational History    Not on file   Tobacco Use Smoking status: Former     Packs/day: 1.00     Years: 45.00     Pack years: 45.00     Types: Cigarettes     Quit date: 2013     Years since quittin.1    Smokeless tobacco: Former     Types: Chew     Quit date: 10/8/1971   Vaping Use    Vaping Use: Never used   Substance and Sexual Activity    Alcohol use: No     Alcohol/week: 0.0 standard drinks     Comment: 1-2 coffee per day    Drug use: No    Sexual activity: Yes     Partners: Female   Other Topics Concern    Not on file   Social History Narrative    19  [facility] reviewed SDOH with Saundra Workman. He does have money strain but between the income he has coming in and his wife's they are over resources for SARY programs. Offered food panty info to help with end of the month struggles but he declined stating that he tried and was refused due to his income. He belongs to a [employer] and goes weekly so he has some community connections in place. He has an extremely high interest rate on his mortgage which he was encouraged to look into getting lowered to help save money on his house payments. Noticed some difficulty with memory recall. Becomes easily flustered at times. Has no MHI to support applying for [facility] program of SARY. States he does not feel depressed or need any MH treatment.         Social Determinants of Health     Financial Resource Strain: Low Risk     Difficulty of Paying Living Expenses: Not hard at all   Food Insecurity: No Food Insecurity    Worried About Running Out of Food in the Last Year: Never true    Ran Out of Food in the Last Year: Never true   Transportation Needs: Not on file   Physical Activity: Not on file   Stress: Not on file   Social Connections: Not on file   Intimate Partner Violence: Not on file   Housing Stability: Not on file       ROS: Positive in bold  General:   Denies chills, fatigue, fever, malaise, night sweats or weight loss    Psychological:   Denies anxiety, disorientation or hallucinations    ENT:    Denies epistaxis, swelling.   Patient has bilateral lower extremity edema    LINES/CATHETERS     LABORATORY DATA:  CBC with Differential:    Lab Results   Component Value Date/Time    WBC 5.1 09/23/2022 08:00 PM    RBC 3.53 09/23/2022 08:00 PM    HGB 11.1 09/23/2022 08:00 PM    HCT 34.3 09/23/2022 08:00 PM     09/23/2022 08:00 PM    MCV 97.2 09/23/2022 08:00 PM    MCH 31.4 09/23/2022 08:00 PM    MCHC 32.4 09/23/2022 08:00 PM    RDW 15.3 09/23/2022 08:00 PM    SEGSPCT 61 01/20/2014 12:00 PM    LYMPHOPCT 16.9 09/23/2022 08:00 PM    MONOPCT 14.8 09/23/2022 08:00 PM    BASOPCT 0.8 09/23/2022 08:00 PM    MONOSABS 0.76 09/23/2022 08:00 PM    LYMPHSABS 0.87 09/23/2022 08:00 PM    EOSABS 0.13 09/23/2022 08:00 PM    BASOSABS 0.04 09/23/2022 08:00 PM     CMP:    Lab Results   Component Value Date/Time     09/23/2022 08:00 PM    K 5.0 09/23/2022 08:00 PM    K 3.1 09/15/2022 04:59 PM    CL 92 09/23/2022 08:00 PM    CO2 26 09/23/2022 08:00 PM    BUN 40 09/23/2022 08:00 PM    CREATININE 1.7 09/23/2022 08:00 PM    GFRAA 49 09/23/2022 08:00 PM    LABGLOM 40 09/23/2022 08:00 PM    GLUCOSE 322 09/23/2022 08:00 PM    GLUCOSE 118 10/27/2011 04:07 PM    PROT 7.5 09/23/2022 08:00 PM    LABALBU 3.1 09/23/2022 08:00 PM    LABALBU 4.4 10/27/2011 04:07 PM    CALCIUM 9.1 09/23/2022 08:00 PM    BILITOT 0.9 09/23/2022 08:00 PM    ALKPHOS 174 09/23/2022 08:00 PM    AST 59 09/23/2022 08:00 PM    ALT 46 09/23/2022 08:00 PM       ASSESSMENT/PLAN:  Persistent hypotension  Acute on chronic kidney disease stage III  Frequent falls with right C7 transverse process fracture   Calcified plaque along the carotid bulbs particularly on the left  Coronary artery status post CABG and stent placement  Chronic combined systolic and diastolic congestive heart failure  Persistent atrial fibrillation  Valvular heart disease with history of mitral valve repair  Not oxygen dependent COPD with chronic respiratory failure  Sleep apnea without current use of

## 2022-09-25 LAB
ALBUMIN SERPL-MCNC: 3.7 G/DL (ref 3.5–5.2)
ALP BLD-CCNC: 143 U/L (ref 40–129)
ALT SERPL-CCNC: 40 U/L (ref 0–40)
ANION GAP SERPL CALCULATED.3IONS-SCNC: 9 MMOL/L (ref 7–16)
AST SERPL-CCNC: 39 U/L (ref 0–39)
BASOPHILS ABSOLUTE: 0.04 E9/L (ref 0–0.2)
BASOPHILS RELATIVE PERCENT: 0.7 % (ref 0–2)
BILIRUB SERPL-MCNC: 0.8 MG/DL (ref 0–1.2)
BUN BLDV-MCNC: 26 MG/DL (ref 6–23)
CALCIUM SERPL-MCNC: 9 MG/DL (ref 8.6–10.2)
CHLORIDE BLD-SCNC: 99 MMOL/L (ref 98–107)
CO2: 28 MMOL/L (ref 22–29)
CREAT SERPL-MCNC: 1.4 MG/DL (ref 0.7–1.2)
EOSINOPHILS ABSOLUTE: 0.21 E9/L (ref 0.05–0.5)
EOSINOPHILS RELATIVE PERCENT: 3.9 % (ref 0–6)
GFR AFRICAN AMERICAN: >60
GFR NON-AFRICAN AMERICAN: 51 ML/MIN/1.73
GLUCOSE BLD-MCNC: 79 MG/DL (ref 74–99)
HCT VFR BLD CALC: 40 % (ref 37–54)
HEMOGLOBIN: 13.1 G/DL (ref 12.5–16.5)
IMMATURE GRANULOCYTES #: 0.04 E9/L
IMMATURE GRANULOCYTES %: 0.7 % (ref 0–5)
INR BLD: 3.1
LYMPHOCYTES ABSOLUTE: 1.02 E9/L (ref 1.5–4)
LYMPHOCYTES RELATIVE PERCENT: 19.1 % (ref 20–42)
MAGNESIUM: 1.7 MG/DL (ref 1.6–2.6)
MCH RBC QN AUTO: 31.6 PG (ref 26–35)
MCHC RBC AUTO-ENTMCNC: 32.8 % (ref 32–34.5)
MCV RBC AUTO: 96.6 FL (ref 80–99.9)
METER GLUCOSE: 135 MG/DL (ref 74–99)
METER GLUCOSE: 148 MG/DL (ref 74–99)
METER GLUCOSE: 86 MG/DL (ref 74–99)
METER GLUCOSE: 96 MG/DL (ref 74–99)
MONOCYTES ABSOLUTE: 0.75 E9/L (ref 0.1–0.95)
MONOCYTES RELATIVE PERCENT: 14 % (ref 2–12)
NEUTROPHILS ABSOLUTE: 3.28 E9/L (ref 1.8–7.3)
NEUTROPHILS RELATIVE PERCENT: 61.6 % (ref 43–80)
PDW BLD-RTO: 15.3 FL (ref 11.5–15)
PHOSPHORUS: 1.9 MG/DL (ref 2.5–4.5)
PLATELET # BLD: 222 E9/L (ref 130–450)
PMV BLD AUTO: 9.3 FL (ref 7–12)
POTASSIUM SERPL-SCNC: 3.8 MMOL/L (ref 3.5–5)
PROTHROMBIN TIME: 35.7 SEC (ref 9.3–12.4)
RBC # BLD: 4.14 E12/L (ref 3.8–5.8)
SODIUM BLD-SCNC: 136 MMOL/L (ref 132–146)
TOTAL PROTEIN: 7.3 G/DL (ref 6.4–8.3)
WBC # BLD: 5.3 E9/L (ref 4.5–11.5)

## 2022-09-25 PROCEDURE — G0378 HOSPITAL OBSERVATION PER HR: HCPCS

## 2022-09-25 PROCEDURE — 85025 COMPLETE CBC W/AUTO DIFF WBC: CPT

## 2022-09-25 PROCEDURE — 2060000000 HC ICU INTERMEDIATE R&B

## 2022-09-25 PROCEDURE — 1200000000 HC SEMI PRIVATE

## 2022-09-25 PROCEDURE — 83735 ASSAY OF MAGNESIUM: CPT

## 2022-09-25 PROCEDURE — 82962 GLUCOSE BLOOD TEST: CPT

## 2022-09-25 PROCEDURE — 36415 COLL VENOUS BLD VENIPUNCTURE: CPT

## 2022-09-25 PROCEDURE — 2500000003 HC RX 250 WO HCPCS: Performed by: NURSE PRACTITIONER

## 2022-09-25 PROCEDURE — 85610 PROTHROMBIN TIME: CPT

## 2022-09-25 PROCEDURE — 2580000003 HC RX 258: Performed by: INTERNAL MEDICINE

## 2022-09-25 PROCEDURE — 80053 COMPREHEN METABOLIC PANEL: CPT

## 2022-09-25 PROCEDURE — 97161 PT EVAL LOW COMPLEX 20 MIN: CPT | Performed by: PHYSICAL THERAPIST

## 2022-09-25 PROCEDURE — 6370000000 HC RX 637 (ALT 250 FOR IP): Performed by: INTERNAL MEDICINE

## 2022-09-25 PROCEDURE — 84100 ASSAY OF PHOSPHORUS: CPT

## 2022-09-25 PROCEDURE — 2580000003 HC RX 258: Performed by: NURSE PRACTITIONER

## 2022-09-25 RX ADMIN — ASPIRIN 81 MG CHEWABLE TABLET 81 MG: 81 TABLET CHEWABLE at 20:36

## 2022-09-25 RX ADMIN — PANTOPRAZOLE SODIUM 40 MG: 40 TABLET, DELAYED RELEASE ORAL at 09:59

## 2022-09-25 RX ADMIN — INSULIN GLARGINE 45 UNITS: 100 INJECTION, SOLUTION SUBCUTANEOUS at 20:39

## 2022-09-25 RX ADMIN — RISPERIDONE 0.5 MG: 1 TABLET ORAL at 20:36

## 2022-09-25 RX ADMIN — INSULIN GLARGINE 45 UNITS: 100 INJECTION, SOLUTION SUBCUTANEOUS at 10:05

## 2022-09-25 RX ADMIN — MONTELUKAST 10 MG: 10 TABLET, FILM COATED ORAL at 20:36

## 2022-09-25 RX ADMIN — ATORVASTATIN CALCIUM 80 MG: 40 TABLET, FILM COATED ORAL at 20:36

## 2022-09-25 RX ADMIN — Medication 10 ML: at 20:36

## 2022-09-25 RX ADMIN — RISPERIDONE 0.5 MG: 1 TABLET ORAL at 09:59

## 2022-09-25 RX ADMIN — SODIUM PHOSPHATE, MONOBASIC, MONOHYDRATE 14.43 MMOL: 276; 142 INJECTION, SOLUTION INTRAVENOUS at 10:17

## 2022-09-25 RX ADMIN — ACETAMINOPHEN 650 MG: 325 TABLET ORAL at 20:36

## 2022-09-25 RX ADMIN — CETIRIZINE HYDROCHLORIDE 10 MG: 10 TABLET, FILM COATED ORAL at 09:59

## 2022-09-25 RX ADMIN — AMIODARONE HYDROCHLORIDE 200 MG: 200 TABLET ORAL at 09:59

## 2022-09-25 RX ADMIN — Medication 10 ML: at 09:59

## 2022-09-25 ASSESSMENT — PAIN DESCRIPTION - LOCATION: LOCATION: SHOULDER

## 2022-09-25 ASSESSMENT — PAIN SCALES - GENERAL
PAINLEVEL_OUTOF10: 0
PAINLEVEL_OUTOF10: 3
PAINLEVEL_OUTOF10: 0

## 2022-09-25 ASSESSMENT — PAIN DESCRIPTION - ORIENTATION: ORIENTATION: RIGHT

## 2022-09-25 ASSESSMENT — PAIN DESCRIPTION - DESCRIPTORS: DESCRIPTORS: ACHING

## 2022-09-25 NOTE — PROGRESS NOTES
activity, and stair negotiation  are barriers to d/c and require skilled intervention during hospital stay to attain pre hospital level of function. Decreased strength, balance and endurance  increases patient's risk for fall. Patient with difficulty oxygenating impairing endurance and functional independence        PHYSICAL THERAPY  PLAN OF CARE       Physical therapy plan of care is established based on physician order,  patient diagnosis and clinical assessment    Current Treatment Recommendations:    -Endurance: Utilize Supervised activities to increase level of endurance to allow for safe functional mobility including transfers and gait   -Stairs: Stair training with instruction on proper technique and hand placement on rail    PT long term treatment goals are located in below grid    Patient and or family understand(s) diagnosis, prognosis, and plan of care. Frequency of treatments: Patient will be seen  daily.          Prior Level of Function: Patient ambulated independently    Rehab Potential: good   for baseline    Past medical history:   Past Medical History:   Diagnosis Date    Acid reflux     Acute diastolic (congestive) heart failure ([facility] 06/19/2019    Arthritis     Asthma 04/16/2014    Asthmatic bronchitis , chronic ([facility] 11/28/2016    CAD (coronary artery disease)     Chronic bronchitis (HCC) 04/16/2014    COPD (chronic obstructive pulmonary disease) ([facility])     CB    COPD (chronic obstructive pulmonary disease) (HCC)     Diabetes mellitus ([facility]     Emphysema (subcutaneous) (surgical) resulting from a procedure     H/O cardiovascular stress test 04/24/2021    Lexiscan    Hyperlipidemia     Hypertension     Hypoxemia requiring supplemental oxygen 02/02/2015    LONG TERM ANTICOAGULENT USE     Morbid obesity with BMI of 50.0-59.9, adult ([facility] 11/27/2013    Obesity     Osteoarthritis     Sleep apnea     bilevel positive airway pressure at 13/8 with 2 L oxygen flow     Stage 3 chronic kidney disease ([facility]     Tobacco abuse     Type II or unspecified type diabetes mellitus without mention of complication, not stated as uncontrolled      Past Surgical History:   Procedure Laterality Date    COLONOSCOPY      CORONARY ANGIOPLASTY WITH STENT PLACEMENT  07-23-13    Left main focal eccentric 65% distal stenosis. Large OM1 CX 50% ostial & prox narrow. Large ramus artery & LAD: Minor plaque w/o sign narrow. RCA: Dom vessel w/25% prox narrow & focal hazy eccentric 99% distal stenosis before origin RPDA & RPLCA. LV: Mild inferior hypokinesis EF 55%. Probable mild AS with 10-15mmHg. Successful PCI distal RCA w/3.5 x 12 mm BMS, 0% res stenosis. Normal distal runoff    CORONARY ARTERY BYPASS GRAFT  09-09-13    Dr Hou Distance; CABG x2, LIMA to LAD, SVG to ramus intermedius; MV repair using 30-mm Future complete ring with magic stitch between A3 and P3; rigid internal fixation of sternum using KLS plates x2; endoscopic vein harvesting of right lower extremity     ECHO COMPL W DOP COLOR FLOW  7/25/2013         HERNIA REPAIR      SINUS SURGERY         SUBJECTIVE:    Precautions:  Up with assistance, falls and alarm ,      Social history: Patient lives with spouse in a apartment     with  12 steps  to enter with Rail  Tub shower     Equipment owned: [employer] [place],       [address]   How much difficulty turning over in bed?: None  How much difficulty sitting down on / standing up from a chair with arms?: None  How much difficulty moving from lying on back to sitting on side of bed?: A Little  How much help from another person moving to and from a bed to a chair?: None  How much help from another person needed to walk in hospital room?: None  How much help from another person for climbing 3-5 steps with a railing?: A Little  AM-PAC Inpatient Mobility Raw Score : 22  AM-PAC Inpatient T-Scale Score : 53.28  Mobility Inpatient CMS 0-100% Score: 20.91  Mobility Inpatient CMS G-Code Modifier : 3548 groopify cleared patient for PT evaluation. The admitting diagnosis and active problem list as listed above have been reviewed prior to the initiation of this evaluation. OBJECTIVE;   Initial Evaluation  Date: 9/25/2022 Treatment Date:     Short Term/ Long Term   Goals   Was pt agreeable to Eval/treatment? Yes  To be met in 3 days   Pain level   0/10        Bed Mobility    Rolling: Independent    Supine to sit: Minimal assist of 1    Sit to supine: Minimal assist of 1    Scooting: Independent    Rolling: Independent    Supine to sit:  Independent    Sit to supine: Independent    Scooting: Independent     Transfers Sit to stand: Independent        Ambulation     2x150 feet using  no device with Independent         Stair negotiation: ascended and descended   5 steps with rail supervision     10 steps with rail independent    ROM Within functional limits    Increase range of motion 10% of affected joints    Strength BUE:   4/5  RLE:  4/5  LLE:  4/5  Increase strength in affected mm groups by 1/3 grade   Balance Sitting EOB:  good    Dynamic Standing:  good minus  Sitting EOB:  good    Dynamic Standing: good       Patient is Alert & Oriented x person, place, time, and situation and follows directions    Sensation:  Patient  reports numbness/tingling and neuropathy bilateral lower extremities     Edema:  no   Endurance: fair       Vitals: room air   Blood Pressure at rest  Blood Pressure during session    Heart Rate at rest 74 Heart Rate during session 87   SPO2 at rest 98%  SPO2 during session 93%     Patient education  Patient educated on role of Physical Therapy, risks of immobility, safety and plan of care and  importance of mobility while in hospital      Patient response to education:   Pt verbalized understanding Pt demonstrated skill Pt requires further education in this area   Yes Partial Yes      Treatment:  Patient practiced and was instructed/facilitated in the following treatment: Patient   Sat edge of bed 5 minutes with Independent to increase dynamic sitting balance and activity tolerance. seated and standing challenges      Therapeutic Exercises:  ankle pumps  x 10 reps. At end of session, patient sitting edge of bed with  call light and phone within reach,  all lines and tubes intact, nursing notified. Patient would benefit from continued skilled Physical Therapy to improve functional independence and quality of life. Patient's/ family goals   home    Time in  435  Time out  455    Total Treatment Time  0 minutes    Evaluation time includes thorough review of current medical information, gathering information on past medical history/social history and prior level of function, completion of standardized testing/informal observation of tasks, assessment of data, and development of Plan of care and goals.      CPT codes:  Low Complexity PT evaluation (41345)  No treatment billed    Meera Lacy, PT

## 2022-09-25 NOTE — PROGRESS NOTES
Internal Medicine Progress Note    [facility]    Dallin Graff. Taras Bernard., CLAUDETTE.[email or web address].[email or web address]. Saulo Price D.O., SENTHIL Fitch D.O. Penelope Kirk, MSN, APRN, NP-C  Mendozajo annne Daniella. Isidro Dominguez, MSN, APRN-CNP     Primary Care Physician: Pia Saini DO   Admitting Physician:  Heather Hdz DO  Admission date and time: 9/23/2022  7:32 PM    Room:  [address] [place] [address]  Admitting diagnosis: Hypotension [I95.9]  Transient hypotension [I95.9]    Patient Name: Vik Cavanaugh  MRN: [id number]    Date of Service: 9/25/2022     Subjective:  Kasey Shin is a 77 y.o. male who was seen and examined today,9/25/2022, at the bedside. Kasey Shin is sleeping upon arrival but awakens easily and maintains alertness. He is oriented appropriately and answers all questions as expected. We reviewed the results of the work-up and plan of care moving forward. He is aware that our recommendations would be for skilled nursing facility placement. He does have multiple social issues at play presently and is resistant to this. Voices no other acute complaints or concerns and is feeling much better overall. No family present during my examination. Review of System:   Constitutional:   Denies fever or chills, weight loss or gain, positive for chronic fatigue and malaise. HEENT:   Denies ear pain, sore throat, sinus or eye problems. Cardiovascular:   Denies any chest pain, or palpitations. Positive for history of atrial fibrillation on chronic warfarin therapy. Respiratory:   Denies shortness of breath, coughing, sputum production, hemoptysis, or wheezing. Gastrointestinal:   Denies nausea, vomiting, diarrhea, or constipation. Denies any abdominal pain. Positive for improving appetite  Genitourinary:    Denies any urgency, frequency, hematuria. Voiding  without difficulty.   Extremities:   Positive for chronic lower extremity swelling which is no worse  Neurology:    Denies any headache or focal neurological deficits, positive for generalized weakness without focal component  Psch:   Denies being anxious or depressed. No longer expressing hallucinations. Musculoskeletal:    Denies  myalgias, joint complaints or back pain. Integumentary:   Denies any rashes, ulcers, or excoriations. Denies bruising. Hematologic/Lymphatic:  Denies bruising or bleeding. Physical Exam:  I/O this shift:  In: 180 [P.O.:180]  Out: -     Intake/Output Summary (Last 24 hours) at 9/25/2022 1147  Last data filed at 9/25/2022 0830  Gross per 24 hour   Intake 441.75 ml   Output 450 ml   Net -8.25 ml     I/O last 3 completed shifts: In: 1543.9 [I.V.:393.5; IV Piggyback:1150.4]  Out: 2850 [RCTOR:4163]  Patient Vitals for the past 96 hrs (Last 3 readings):   Weight   09/23/22 1944 286 lb (129.7 kg)       Vital Signs:   Blood pressure (!) 99/48, pulse 77, temperature 98.2 °F (36.8 °C), temperature source Oral, resp. rate 18, height 5' 6\" (1.676 m), weight 286 lb (129.7 kg), SpO2 96 %. General appearance:  Alert, responsive, oriented to person, place, and time. Chronically ill-appearing, no distress. Head:  Normocephalic. No masses, lesions or tenderness. Eyes:  PERRLA. EOMI. Sclera clear. ENT:  Ears normal. Mucosa normal.  Neck:    Supple. Trachea midline. No thyromegaly. No JVD. No bruits. Heart:    Irregular rhythm with controlled rate. S1 and S2, systolic murmur. Normotensive. Lungs:    Symmetrical.  Mildly diminished bibasilar air exchange. Clear to auscultation bilaterally. No wheezes. No rhonchi. No rales. Abdomen:   Soft. Morbidly obese. Non-tender. Non-distended. Bowel sounds positive. No organomegaly or masses. No pain on palpation. Extremities:    Peripheral pulses present. Chronic lower extremity swelling without superimposed significant pitting peripheral edema. No ulcers. No cyanosis. No clubbing. Neurologic:    Alert x 3. Generally weak without focal deficit. Cranial nerves grossly intact. No focal weakness. Psych:   Behavior is normal. Mood appears normal. Speech is not rapid and/or pressured. No hallucinations. Musculoskeletal:   No unilateral joint edema, erythema or warmth. Gait not assessed. Integumentary:  No rashes  Skin normal color and texture.   Genitalia/Breast:  Deferred    Medication:  Scheduled Meds:   sodium chloride flush  5-40 mL IntraVENous 2 times per day    amiodarone  200 mg Oral Daily    aspirin  81 mg Oral Nightly    atorvastatin  80 mg Oral Nightly    insulin glargine  45 Units SubCUTAneous BID    montelukast  10 mg Oral Nightly    pantoprazole  40 mg Oral Daily    risperiDONE  0.5 mg Oral BID    cetirizine  10 mg Oral Daily    insulin lispro  0-8 Units SubCUTAneous TID WC    insulin lispro  0-4 Units SubCUTAneous Nightly     Continuous Infusions:   dextrose      sodium chloride         Objective Data:  CBC with Differential:    Lab Results   Component Value Date/Time    WBC 5.3 09/25/2022 04:58 AM    RBC 4.14 09/25/2022 04:58 AM    HGB 13.1 09/25/2022 04:58 AM    HCT 40.0 09/25/2022 04:58 AM     09/25/2022 04:58 AM    MCV 96.6 09/25/2022 04:58 AM    MCH 31.6 09/25/2022 04:58 AM    MCHC 32.8 09/25/2022 04:58 AM    RDW 15.3 09/25/2022 04:58 AM    SEGSPCT 61 01/20/2014 12:00 PM    LYMPHOPCT 19.1 09/25/2022 04:58 AM    MONOPCT 14.0 09/25/2022 04:58 AM    BASOPCT 0.7 09/25/2022 04:58 AM    MONOSABS 0.75 09/25/2022 04:58 AM    LYMPHSABS 1.02 09/25/2022 04:58 AM    EOSABS 0.21 09/25/2022 04:58 AM    BASOSABS 0.04 09/25/2022 04:58 AM     CMP:    Lab Results   Component Value Date/Time     09/25/2022 04:58 AM    K 3.8 09/25/2022 04:58 AM    K 3.1 09/15/2022 04:59 PM    CL 99 09/25/2022 04:58 AM    CO2 28 09/25/2022 04:58 AM    BUN 26 09/25/2022 04:58 AM    CREATININE 1.4 09/25/2022 04:58 AM    GFRAA >60 09/25/2022 04:58 AM    LABGLOM 51 09/25/2022 04:58 AM    GLUCOSE 79 09/25/2022 04:58 AM    GLUCOSE 118 10/27/2011 04:07 PM    PROT 7.3 09/25/2022 04:58 AM    LABALBU 3.7 09/25/2022 04:58 AM    LABALBU 4.4 10/27/2011 04:07 PM    CALCIUM 9.0 09/25/2022 04:58 AM    BILITOT 0.8 09/25/2022 04:58 AM    ALKPHOS 143 09/25/2022 04:58 AM    AST 39 09/25/2022 04:58 AM    ALT 40 09/25/2022 04:58 AM     PT/INR:    Lab Results   Component Value Date/Time    PROTIME 35.7 09/25/2022 04:58 AM    PROTIME 10.0 10/02/2019 01:24 PM    INR 3.1 09/25/2022 04:58 AM       Wound Documentation:   Wound 04/18/21 Buttocks Left (Active)   Number of days: 310       Wound 04/22/21 Pelvis Anterior;Mid Tunneling open wound (Active)   Number of days: 519       Wound 09/16/22 Pretibial Right small fluid filled blisters (Active)   Wound Etiology Other 09/19/22 2041   Dressing Status Clean;Dry; Intact 09/19/22 2041   Wound Cleansed Cleansed with saline 09/19/22 2041   Dressing/Treatment Open to air 09/19/22 2041   Dressing Change Due 09/18/22 09/18/22 2058   Drainage Amount None 09/19/22 2041   Odor None 09/19/22 0715   Number of days: 7       Wound 09/16/22 Pretibial Left (Active)   Wound Etiology Other 09/19/22 2041   Dressing Status New dressing applied 09/18/22 2058   Wound Cleansed Cleansed with saline 09/18/22 2058   Dressing/Treatment Open to air 09/19/22 2041   Dressing Change Due 09/18/22 09/18/22 2058   Drainage Amount None 09/19/22 0715   Number of days: 7       Assessment:  Transient hypotension that resolved without direct intervention  Acute on chronic kidney disease stage III, resolved  Hyperglycemia in the setting of insulin-dependent diabetes and likely dietary noncompliance  Recent hospitalization for frequent falls with right C7 transverse process fracture without focal neurologic deficits, conservative medical management as per neurosurgery at that time  Calcified plaques along the carotid bulbs, left greater than right  Mildly elevated troponin with underlying coronary artery disease and prior CABG and stent placement  Chronic compensated systolic and diastolic congestive heart failure, LVEF 45%  Persistent atrial fibrillation on chronic warfarin therapy, INR 3.0  Valvular heart disease with history of mitral valve repair  Chronic oxygen dependent COPD with chronic respiratory failure  Morbid obesity with BMI 46.16 kg per metered squared obstructive sleep apnea with current use of BiPAP  Hyperlipidemia    Plan:   Herberth Perez is doing much better overall. His blood pressures overall have been appropriate since fluids have been administered and albumin was given as well. Antihypertensives have been held as well and his heart rate is being maintained with amiodarone. Renal functions have trended back down to baseline and he has good urinary output. Blood glucose values are more well controlled on carbohydrate restricted diet and appropriate medications again reinforcing noncompliance with medications, dietary recommendations or both upon discharge from the hospital last.  Remains without any focal neurologic symptoms in the setting of recent cervical spine injury. He appears compensated from a heart failure standpoint. In regards to his warfarin anticoagulation, an extensive discussion was held in this regard. I had a discussion with the pharmacist yesterday, Rajat December, who looked extensively into the patient's anticoagulation history and spoke with the patient's wife via telephone. Apparently we have not had regular INR monitoring since December and has had INR is ranging between 1.0 and greater than 5 on his current dose. In regards to parameters for weight and body mass index to be transitioned to Eliquis, the pharmacist states that he is within the range that he can be considered for Eliquis especially considering his very inconsistent INR values on the the same dose of warfarin. I had this conversation with the patient today along with Dr. Daniella Templeton the patient would prefer transition to Eliquis as well.   I have asked the pharmacist to institute Eliquis therapy once INR drops within a range that this would be appropriate. He remains without any overt hallucinations or additional issues in the setting of institution of Risperdal.  PT, OT and  following for discharge planning purposes. He is improving to the degree where we are discussing discharge planning and timeframe within the next day or so. He is resistant to the idea of skilled nursing facility placement due to social issues at play as they are moving and he wants to be present to determine which items should be moved to the new house in which items should be disposed of or given away. He would not consider staying in the hospital or rehab facility be on Wednesday due to this and he is aware that he would likely not receive any additional benefit of maintaining hospitalization or rehab placement for 1 to 2 days beyond discharge. He understands importance of medication and treatment adherence as well as close outpatient follow-up. Chronic morbidities, lab values and vital signs are being monitored and addressed accordingly. Continue current therapy. See orders for further plan of care. More than 50% of my  time was spent at the bedside counseling/coordinating care with the patient and/or family with face to face contact. This time was spent reviewing notes and laboratory data as well as instructing and counseling the patient. Time I spent with the family or surrogate(s) is included only if the patient was incapable of providing the necessary information or participating in medical decisions. I also discussed the differential diagnosis and all of the proposed management plans with the patient and individuals accompanying the patient. Jean Marie Oconnor requires this high level of physician care and nursing on the C/Telemetry unit due the complexity of decision management and chance of rapid decline or death. Continued cardiac monitoring and higher level of nursing are required.  I am readily available for any further decision-making and intervention.      Livier Juarez, ARLEEN - CNP  9/25/2022  11:47 AM

## 2022-09-25 NOTE — PLAN OF CARE
Problem: Discharge Planning  Goal: Discharge to home or other facility with appropriate resources  9/25/2022 1754 by Ivett Cash RN  Outcome: Progressing  9/25/2022 1520 by Karilyn Sandhoff, RN  Outcome: Progressing     Problem: Safety - Adult  Goal: Free from fall injury  9/25/2022 1754 by Ivett Cash RN  Outcome: Progressing  9/25/2022 1520 by Karilyn Sandhoff, RN  Outcome: Progressing     Problem: ABCDS Injury Assessment  Goal: Absence of physical injury  9/25/2022 1754 by Ivett Cash RN  Outcome: Progressing  9/25/2022 1520 by Karilyn Sandhoff, RN  Outcome: Progressing     Problem: Pain  Goal: Verbalizes/displays adequate comfort level or baseline comfort level  9/25/2022 1754 by Ivett Cash RN  Outcome: Progressing  9/25/2022 1520 by Karilyn Sandhoff, RN  Outcome: Progressing

## 2022-09-25 NOTE — PROGRESS NOTES
Pharmacy Consultation Note  (Warfarin Dosing and Monitoring)    Initial consult date: 9/24/22  Consulting Provider: HAKAN Antony  Consult: Transition patient to Eliquis 5 mg twice daily once appropriate INR ranges met    Pharmacy has been consulted to transition patient from warfarin to Eliquis for atrial fibrillation. INR = 3.1 today, hold initiation of Eliquis until INR falls to < 2.0.     Ronit AmosD, BCPS 9/25/2022 1:22 PM   [phone]

## 2022-09-26 VITALS
DIASTOLIC BLOOD PRESSURE: 70 MMHG | RESPIRATION RATE: 16 BRPM | HEART RATE: 73 BPM | WEIGHT: 286 LBS | OXYGEN SATURATION: 94 % | SYSTOLIC BLOOD PRESSURE: 121 MMHG | BODY MASS INDEX: 45.96 KG/M2 | HEIGHT: 66 IN | TEMPERATURE: 97.7 F

## 2022-09-26 LAB
ALBUMIN SERPL-MCNC: 3.1 G/DL (ref 3.5–5.2)
ALP BLD-CCNC: 150 U/L (ref 40–129)
ALT SERPL-CCNC: 35 U/L (ref 0–40)
ANION GAP SERPL CALCULATED.3IONS-SCNC: 10 MMOL/L (ref 7–16)
AST SERPL-CCNC: 34 U/L (ref 0–39)
BASOPHILS ABSOLUTE: 0.05 E9/L (ref 0–0.2)
BASOPHILS RELATIVE PERCENT: 1 % (ref 0–2)
BILIRUB SERPL-MCNC: 0.8 MG/DL (ref 0–1.2)
BUN BLDV-MCNC: 23 MG/DL (ref 6–23)
CALCIUM SERPL-MCNC: 9.2 MG/DL (ref 8.6–10.2)
CHLORIDE BLD-SCNC: 101 MMOL/L (ref 98–107)
CO2: 26 MMOL/L (ref 22–29)
CREAT SERPL-MCNC: 1.1 MG/DL (ref 0.7–1.2)
EOSINOPHILS ABSOLUTE: 0.21 E9/L (ref 0.05–0.5)
EOSINOPHILS RELATIVE PERCENT: 4.2 % (ref 0–6)
GFR AFRICAN AMERICAN: >60
GFR NON-AFRICAN AMERICAN: >60 ML/MIN/1.73
GLUCOSE BLD-MCNC: 110 MG/DL (ref 74–99)
HCT VFR BLD CALC: 38.3 % (ref 37–54)
HEMOGLOBIN: 12.6 G/DL (ref 12.5–16.5)
IMMATURE GRANULOCYTES #: 0.03 E9/L
IMMATURE GRANULOCYTES %: 0.6 % (ref 0–5)
INR BLD: 2.3
LYMPHOCYTES ABSOLUTE: 0.92 E9/L (ref 1.5–4)
LYMPHOCYTES RELATIVE PERCENT: 18.2 % (ref 20–42)
MAGNESIUM: 1.8 MG/DL (ref 1.6–2.6)
MCH RBC QN AUTO: 31.6 PG (ref 26–35)
MCHC RBC AUTO-ENTMCNC: 32.9 % (ref 32–34.5)
MCV RBC AUTO: 96 FL (ref 80–99.9)
METER GLUCOSE: 105 MG/DL (ref 74–99)
METER GLUCOSE: 106 MG/DL (ref 74–99)
METER GLUCOSE: 84 MG/DL (ref 74–99)
MONOCYTES ABSOLUTE: 0.77 E9/L (ref 0.1–0.95)
MONOCYTES RELATIVE PERCENT: 15.2 % (ref 2–12)
NEUTROPHILS ABSOLUTE: 3.07 E9/L (ref 1.8–7.3)
NEUTROPHILS RELATIVE PERCENT: 60.8 % (ref 43–80)
PDW BLD-RTO: 15.2 FL (ref 11.5–15)
PHOSPHORUS: 2.7 MG/DL (ref 2.5–4.5)
PLATELET # BLD: 212 E9/L (ref 130–450)
PMV BLD AUTO: 9.4 FL (ref 7–12)
POTASSIUM SERPL-SCNC: 3.5 MMOL/L (ref 3.5–5)
PROTHROMBIN TIME: 26.4 SEC (ref 9.3–12.4)
RBC # BLD: 3.99 E12/L (ref 3.8–5.8)
SODIUM BLD-SCNC: 137 MMOL/L (ref 132–146)
TOTAL PROTEIN: 6.9 G/DL (ref 6.4–8.3)
URINE CULTURE, ROUTINE: NORMAL
WBC # BLD: 5.1 E9/L (ref 4.5–11.5)

## 2022-09-26 PROCEDURE — 83735 ASSAY OF MAGNESIUM: CPT

## 2022-09-26 PROCEDURE — 84100 ASSAY OF PHOSPHORUS: CPT

## 2022-09-26 PROCEDURE — 2580000003 HC RX 258: Performed by: INTERNAL MEDICINE

## 2022-09-26 PROCEDURE — 85610 PROTHROMBIN TIME: CPT

## 2022-09-26 PROCEDURE — G0378 HOSPITAL OBSERVATION PER HR: HCPCS

## 2022-09-26 PROCEDURE — 97165 OT EVAL LOW COMPLEX 30 MIN: CPT | Performed by: OCCUPATIONAL THERAPIST

## 2022-09-26 PROCEDURE — 82962 GLUCOSE BLOOD TEST: CPT

## 2022-09-26 PROCEDURE — 85025 COMPLETE CBC W/AUTO DIFF WBC: CPT

## 2022-09-26 PROCEDURE — 80053 COMPREHEN METABOLIC PANEL: CPT

## 2022-09-26 PROCEDURE — 6370000000 HC RX 637 (ALT 250 FOR IP): Performed by: INTERNAL MEDICINE

## 2022-09-26 PROCEDURE — 36415 COLL VENOUS BLD VENIPUNCTURE: CPT

## 2022-09-26 RX ADMIN — PANTOPRAZOLE SODIUM 40 MG: 40 TABLET, DELAYED RELEASE ORAL at 07:56

## 2022-09-26 RX ADMIN — RISPERIDONE 0.5 MG: 1 TABLET ORAL at 07:56

## 2022-09-26 RX ADMIN — AMIODARONE HYDROCHLORIDE 200 MG: 200 TABLET ORAL at 07:56

## 2022-09-26 RX ADMIN — CETIRIZINE HYDROCHLORIDE 10 MG: 10 TABLET, FILM COATED ORAL at 07:56

## 2022-09-26 RX ADMIN — INSULIN GLARGINE 45 UNITS: 100 INJECTION, SOLUTION SUBCUTANEOUS at 07:54

## 2022-09-26 RX ADMIN — Medication 10 ML: at 07:57

## 2022-09-26 ASSESSMENT — PAIN SCALES - GENERAL: PAINLEVEL_OUTOF10: 0

## 2022-09-26 NOTE — HOME CARE
PATIENT IS ACTIVE WITH [facility] FOR SN/PT/OT/MS AND WILL NEED [facility]IRTHA ORDERS UPON DISCHARGE.      Sommer Herndon LPN   [facility]

## 2022-09-26 NOTE — CARE COORDINATION
Free 30 day trial card for Eliquis  explained and given to patient. Instructed patient to follow-up with PCP within 1-2 weeks in order to obtain subsequent prescription for this medication at which time PCP will complete prior authorization if required. Patient verbalized understanding.  [facility]

## 2022-09-26 NOTE — PLAN OF CARE
Problem: Discharge Planning  Goal: Discharge to home or other facility with appropriate resources  Outcome: Completed     Problem: Safety - Adult  Goal: Free from fall injury  Outcome: Completed     Problem: ABCDS Injury Assessment  Goal: Absence of physical injury  Outcome: Completed     Problem: Pain  Goal: Verbalizes/displays adequate comfort level or baseline comfort level  Outcome: Completed     Problem: Chronic Conditions and Co-morbidities  Goal: Patient's chronic conditions and co-morbidity symptoms are monitored and maintained or improved  Outcome: Completed

## 2022-09-26 NOTE — PROGRESS NOTES
Occupational Therapy  OCCUPATIONAL THERAPY INITIAL EVALUATION     [facility]  [address]         Date:2022                                                   Patient Name: Karolyn Mejia     MRN: [id number]     : 1955     Room: [address] [place] [address]       Evaluating OT: Sudha Guzman OTR/L; [id number]       Referring Provider and Orders/Date:    OT eval and treat  Start:  22 1245,   End:  22 1245,   ONE TIME,   Standing Count:  1 Occurrences,   R         Danielle Mullen DO        Diagnosis: hypotension     Surgery: none      Pertinent Medical History:        Past Medical History:   Diagnosis Date    Acid reflux     Acute diastolic (congestive) heart failure ([facility] 2019    Arthritis     Asthma 2014    Asthmatic bronchitis , chronic ([facility] [address] 2016    CAD (coronary artery disease)     Chronic bronchitis ([facility] [address] 2014    COPD (chronic obstructive pulmonary disease) ([address]     CB    COPD (chronic obstructive pulmonary disease) ([address]     Diabetes mellitus ([address]     Emphysema (subcutaneous) (surgical) resulting from a procedure     H/O cardiovascular stress test 2021    Lexiscan    Hyperlipidemia     Hypertension     Hypoxemia requiring supplemental oxygen 2015    LONG TERM ANTICOAGULENT USE     Morbid obesity with BMI of 50.0-59.9, adult ([address] 2013    Obesity     Osteoarthritis     Sleep apnea     bilevel positive airway pressure at 13/8 with 2 L oxygen flow     Stage 3 chronic kidney disease ([address]     Tobacco abuse     Type II or unspecified type diabetes mellitus without mention of complication, not stated as uncontrolled           Past Surgical History:   Procedure Laterality Date    COLONOSCOPY      CORONARY ANGIOPLASTY WITH STENT PLACEMENT  13    Left main focal eccentric 65% distal stenosis. Large OM1 CX 50% ostial & prox narrow. Large ramus artery & LAD: Minor plaque w/o sign narrow.  RCA: Francis Dewitt activities to improve problem solving, judgement, memory, and attention for increased safety/participation in ADL/IADL tasks  * Therapeutic exercise to improve motor endurance, ROM, and functional strength for ADLs/functional transfers  * Therapeutic activities to facilitate/challenge dynamic balance, stand tolerance for increased safety and independence with ADLs  * Therapeutic activities to facilitate gross/fine motor skills for increased independence with ADLs  * Neuro-muscular re-education: facilitation of righting/equilibrium reactions, midline orientation, scapular stability/mobility, normalization of muscle tone, and facilitation of volitional active controled movement  * Positioning to improve skin integrity, interaction with environment and functional independence     Recommended Adaptive Equipment/DME:  TBD      Home Living: Pt lives at home with wife and friends that do assist at home. Pts wife is w/c bound and he cares for her. Pt lives in an apartment with 6+4 steps to enter without rails and many cracks in the cement. Pt has to assist his wife with steps and it is very unsafe. He is looking to move currently. Laundry is on the same level.     Bathroom setup: Tub shower with TTB and grab bars; suction cup grab bar, Standard shower head; Standard toilet   DME owned: canes, walker, crutches, 3:1      Prior Level of Function: indep with ADLs , indep with IADLs; ambulated indep without AD   Driving: yes   Occupation: retired   Enjoys: watching TV, \"keep busy\", IADLs    Pain Level: right shoulder 5/10; can not be fixed due to poor muscle structure per pt  Cognition: A&O: 4/4; Follows 2-3 step directions              Memory:  fair-              Sequencing:  fair              Problem solving:  fair- with impulsivity stating that he was waiting for his ride to pick him up              Judgement/safety:  fair with high fall risk    AM-PAC Daily Activity Inpatient   How much help for putting on and taking off regular lower body clothing?: Total  How much help for Bathing?: A Lot  How much help for Toileting?: A Little  How much help for putting on and taking off regular upper body clothing?: A Little  How much help for taking care of personal grooming?: A Little  How much help for eating meals?: None  AM-PAC Inpatient Daily Activity Raw Score: 16  AM-PAC Inpatient ADL T-Scale Score : 35.96  ADL Inpatient CMS 0-100% Score: 53.32  ADL Inpatient CMS G-Code Modifier : CK    Functional Assessment:      Initial Eval Status  Date: 9/26/2022   Treatment Status  Date: STGs = LTGs  Time frame: 10-14 days   Feeding Independent    NA-PLOF   Grooming Stand by Assist sitting up EOB with face/hand wash, oral care and hair comb   Indep   UB Dressing SBA sitting EOB with shirt management   Indep   LB Dressing Dependent to safety don and doff socks from sitting EOB. Moderate Assist    Bathing Moderate Assist sitting/standing EOB with assist for katelin feet and buttocks suspected   Minimal Assist    Toileting Min Assist for safety and balance with attempts in both sitting and standing with urinal. Extended time and fall risk with walker   Stand by Assist    Bed Mobility  Supine to sit: Stand by Assist   Sit to supine: Stand by Assist      Supine to sit: Independent   Sit to supine: Independent    Functional Transfers SBAssist from elevated surface of bed and arm chair with walker. Impulsivity and tipping walker.      Mod Indep   Functional Mobility SBAssist for safety and balance with walker   Mod indep   Balance Sitting:     Static:  fair+    Dynamic:fair+  Standing: fair   Sitting:     Static:  good    Dynamic:good  Standing: fair+   Activity Tolerance Vitals with activity: WFL overall with fair tolerance to sitting EOB and standing tolerance for 1-2min average   Increase standing tolerance for >4min with stable vital signs for carry over into toileting, functional tranfers and indep in ADLs   Visual/  Perceptual Glasses: Present; [facility] Reports change in vision since admission: No      NA      Hand Dominance  [x] Right   [] Left     AROM (PROM) Strength Additional Info:  Goal:   RUE  Limited due to shoulder injury due to multiple falls and reported rotator cuff injuries. <25% shoulder function. 2+/5 good  and fair FMC/dexterity noted during ADL tasks  Opposition [x] Intact [] Impaired  Finger to nose [x] Intact [] Impaired 3+/5MMT generally for carry over into self care, functional transfers and functional mobility with AD. LUE WFL 4/5 good  and  FMC/dexterity noted during ADL tasks  Opposition [x] Intact [] Impaired  Finger to nose [x] Intact [] Impaired 5/5MMT generally for carry over into self care, functional transfers and functional mobility with AD. Hearing: WFL   Sensation:  No c/o numbness or tingling   Tone: WFL   Edema: right LE with dressing and wrap in place    Comments: Upon arrival patient supine. Pt required SBA for most UB ADLs and dep A LB ADLs tasks. Limited with SBA for standing during LB ADLs and functional transfers. Session short due to pt DC from facility. The biggest barriers reflect that of functional transfers, functional mobility, UB/LB ADLs, cognition, activity tolerance, balance, safety and strengthening. At end of session, patient sitting EOB with call light and phone within reach, all lines and tubes intact. Overall patient demonstrated decreased independence and safety during completion of ADL/functional transfer/mobility tasks compared to PLOF. Nursing updated on pt position and status following OT eval. Pt would benefit from continued skilled OT to increase safety and independence with completion of ADL/IADL tasks for functional independence and quality of life. Rehab Potential: Good  for established goals     Patient / Family Goal: Pain management      Patient and/or family were instructed on functional diagnosis, prognosis/goals and OT plan of care. Demonstrated good understanding. Eval Complexity: Low  History: Brief review of medical records and additional review of physical, cognitive, or psychosocial history related to current functional performance  Exam: 3+ performance deficits  Assistance/Modification: Mod assistance or modifications required to perform tasks. May have comorbidities that affect occupational performance. Time In: 1338  Time Out: 1348  Total Treatment Time: 0    Min Units   OT Eval Low 97165  x  1   OT Eval Medium 62941      OT Eval High 45277      OT Re-Eval [id number]       Therapeutic Ex 36849       Therapeutic Activities 42298       ADL/Self Care 22858       Orthotic Management 74529       Manual [id number]     Neuro Re-Ed [id number]       Non-Billable Time          Evaluation Time additionally includes thorough review of current medical information, gathering information on past medical history/social history and prior level of function, interpretation of standardized testing/informal observation of tasks, assessment of data and development of plan of care and goals.             Lang Burkitt OTR/L; [id number]

## 2022-09-26 NOTE — DISCHARGE SUMMARY
Internal Medicine Progress Note     [facility]     Eduardo Evans. Trish Davis., CLAUDETTE.A.C.OBruceI. Yanelis Carias D.O., VENKATOLIZZY Ramirez D.O. Pratima Stallworth, MSN, APRN, NP-C  Ju Payton. Hua Kong, MSN, APRN-CNP       Internal Medicine  Discharge Summary    NAME: Mesfin Madrigal  :  1955  MRN:  [id number]  PCP:Morales Juarez DO  ADMITTED: 2022      DISCHARGED: 22    ADMITTING PHYSICIAN: Michael Chinchilla DO    CONSULTANT(S):   IP CONSULT TO SOCIAL WORK  IP CONSULT TO PHARMACY  IP CONSULT TO SOCIAL WORK  IP CONSULT TO SOCIAL WORK     ADMITTING DIAGNOSIS:   Hypotension [I95.9]  Transient hypotension [I95.9]     DISCHARGE DIAGNOSES:   Transient hypotension that resolved without direct intervention after holding multiple antihypertensive and diuretic medications  Acute on chronic kidney disease stage III, resolved  Hyperglycemia in the setting of insulin-dependent diabetes and likely dietary noncompliance, hemoglobin A1c 9.5%  Recent hospitalization for frequent falls with right C7 transverse process fracture without focal neurologic deficits, conservative medical management as per neurosurgery at that time  Calcified plaques along the carotid bulbs, left greater than right  Mildly elevated troponin with underlying coronary artery disease and prior CABG and stent placement  Chronic compensated systolic and diastolic congestive heart failure, LVEF 45%  Persistent atrial fibrillation on chronic warfarin therapy, transition to Eliquis due to inconsistent INR monitoring and labile INR values  Valvular heart disease with history of mitral valve repair  Chronic oxygen dependent COPD with chronic respiratory failure  Morbid obesity with BMI 46.16 kg per metered squared obstructive sleep apnea with current use of BiPAP  Hyperlipidemia       BRIEF HISTORY OF PRESENT ILLNESS:   Patient is a 66-year-old male who presented to the ED due to elevated blood sugars. Patient's aide noted his elevated blood sugar and called EMS. Patient states that he has been feeling okay. He has been more drowsy since starting his new medication for hallucinations. He denies any recurrent hallucinations. However he did admit to feeling a little lightheaded. He denies any changes in his medications. In the ED his blood pressures were persistently low. He did receive a fluid bolus. However his blood pressure still remained very low and in the setting of congestive heart failure patient was admitted for fluid resuscitation. LABS[de-identified]  Lab Results   Component Value Date    WBC 5.1 09/26/2022    HGB 12.6 09/26/2022    HCT 38.3 09/26/2022     09/26/2022     09/26/2022    K 3.5 09/26/2022     09/26/2022    CREATININE 1.1 09/26/2022    BUN 23 09/26/2022    CO2 26 09/26/2022    GLUCOSE 110 (H) 09/26/2022    ALT 35 09/26/2022    AST 34 09/26/2022    INR 2.3 09/26/2022     Lab Results   Component Value Date    INR 2.3 09/26/2022    INR 3.1 09/25/2022    INR 3.3 09/24/2022    PROTIME 26.4 (H) 09/26/2022    PROTIME 35.7 (H) 09/25/2022    PROTIME 38.3 (H) 09/24/2022      Lab Results   Component Value Date    TSH 25.910 (H) 05/12/2022     Lab Results   Component Value Date    TRIG 104 12/16/2021    TRIG 142 10/20/2021    TRIG 109 04/15/2021     Lab Results   Component Value Date    HDL 42 12/16/2021    HDL 60 10/20/2021    HDL 29 04/15/2021     Lab Results   Component Value Date    LDLCALC 60 12/16/2021    LDLCALC 60 10/20/2021    LDLCALC 46 04/15/2021     Lab Results   Component Value Date    LABA1C 9.5 (H) 09/16/2022       IMAGING:  XR SHOULDER RIGHT 1 VW    Result Date: 9/18/2022  EXAMINATION: ONE XRAY VIEW OF THE RIGHT SHOULDER 9/18/2022 2:00 pm COMPARISON: 07/22/2021 HISTORY: ORDERING SYSTEM PROVIDED HISTORY: history fall TECHNOLOGIST PROVIDED HISTORY: Reason for exam:->history fall FINDINGS: Limited views demonstrate superior subluxation of the humeral head.   No definite acute fracture is seen. There is new heterotopic ossification surrounding the superior and lateral aspects of the humeral head. No definite acute fracture identified, but evaluation is limited. Superior subluxation of the humeral head, which may be chronic given the surrounding heterotopic ossification. XR HUMERUS RIGHT (MIN 2 VIEWS)    Result Date: 9/18/2022  EXAMINATION: TWO XRAY VIEWS OF THE RIGHT HUMERUS 9/18/2022 2:00 pm COMPARISON: None. HISTORY: ORDERING SYSTEM PROVIDED HISTORY: history fall,eccymotic TECHNOLOGIST PROVIDED HISTORY: Reason for exam:->history fall,eccymotic FINDINGS: No acute fracture of the mid to distal humerus is seen. Visualized elbow joint alignment is intact. No focal soft tissue abnormality. No acute fracture identified. See separately dictated shoulder series. CT HEAD WO CONTRAST    Result Date: 9/23/2022  EXAMINATION: CT OF THE HEAD WITHOUT CONTRAST  9/23/2022 8:41 pm TECHNIQUE: CT of the head was performed without the administration of intravenous contrast. Automated exposure control, iterative reconstruction, and/or weight based adjustment of the mA/kV was utilized to reduce the radiation dose to as low as reasonably achievable. COMPARISON: 09/15/2022 HISTORY: ORDERING SYSTEM PROVIDED HISTORY: other TECHNOLOGIST PROVIDED HISTORY: Has a \"code stroke\" or \"stroke alert\" been called? ->No Reason for exam:->other Decision Support Exception - unselect if not a suspected or confirmed emergency medical condition->Emergency Medical Condition (MA) FINDINGS: BRAIN/VENTRICLES: There is no acute intracranial hemorrhage, mass effect or midline shift. No abnormal extra-axial fluid collection. The gray-white differentiation is maintained without evidence of an acute infarct. There is no evidence of hydrocephalus. Stable changes of encephalomalacia right posterior parietal lobe. Remote lacunar infarct right caudate lobe.  ORBITS: The visualized portion of the orbits demonstrate no acute abnormality. SINUSES: The visualized paranasal sinuses and mastoid air cells demonstrate no acute abnormality. SOFT TISSUES/SKULL:  No acute abnormality of the visualized skull or soft tissues. No acute intracranial abnormality. Stable changes of encephalomalacia right posterior parietal lobe. Remote lacunar infarct right caudate lobe. CT Head WO Contrast    Result Date: 9/15/2022  EXAMINATION: CT OF THE HEAD WITHOUT CONTRAST; CT OF THE CERVICAL SPINE WITHOUT CONTRAST 9/15/2022 5:04 pm TECHNIQUE: CT of the head was performed without the administration of intravenous contrast. Automated exposure control, iterative reconstruction, and/or weight based adjustment of the mA/kV was utilized to reduce the radiation dose to as low as reasonably achievable.; CT of the cervical spine was performed without the administration of intravenous contrast. Multiplanar reformatted images are provided for review. Automated exposure control, iterative reconstruction, and/or weight based adjustment of the mA/kV was utilized to reduce the radiation dose to as low as reasonably achievable. COMPARISON: CT of the head without contrast, 07/22/2022. HISTORY: ORDERING SYSTEM PROVIDED HISTORY: falls frequently and hallucinations TECHNOLOGIST PROVIDED HISTORY: Reason for exam:->falls frequently and hallucinations Has a \"code stroke\" or \"stroke alert\" been called? ->No Decision Support Exception - unselect if not a suspected or confirmed emergency medical condition->Emergency Medical Condition (MA) FINDINGS: CT OF THE BRAIN: BRAIN/VENTRICLES: No mass effect, edema or hemorrhage is seen. Area of encephalomalacia is seen in the right posterior cerebral hemisphere consistent with a chronic MCA infarction. Small lacunar infarction is seen in the body of the right caudate. The remainder of the brain is notable for mild volume loss and mild chronic microvascular ischemic changes. No hydrocephalus or extra-axial fluid is seen.  ORBITS: The visualized portion of the orbits demonstrate no acute abnormality. SINUSES: The visualized paranasal sinuses and mastoid air cells demonstrate no acute abnormality. SOFT TISSUES/SKULL: No acute abnormality of the visualized skull or soft tissues. CT CERVICAL SPINE: BONES/ALIGNMENT: There is a nondisplaced fracture through the right C7 transverse process (best seen on the coronal reformats, image 22). The remainder of the bones are intact. DEGENERATIVE CHANGES: Mild loss of disc height with small disc osteophyte complexes are noted at multiple levels. Mild central canal stenoses are noted from C4-5 to C6-7. Moderate right neural foraminal stenosis at C2-3 resulting from facet and uncovertebral joint hypertrophy. Mild stenoses at multiple levels. SOFT TISSUES: There is no prevertebral soft tissue swelling. Calcified atherosclerosis is seen along the bulbs, more so on the left. Both carotid bulbs deviate into the retropharyngeal space. There may be hemodynamically significant stenosis in the left carotid bulb. CT HEAD WITHOUT CONTRAST: 1. No skull fracture or acute intracranial abnormality. CT CERVICAL SPINE WITHOUT CONTRAST: 1. Nondisplaced fracture in the right C7 transverse process (coronal reformats, image 22). 2. Degenerative changes, as described. 3. Calcified plaque along the carotid bulbs, especially on the left. Further evaluation with carotid Doppler recommended.      CT Cervical Spine WO Contrast    Result Date: 9/15/2022  EXAMINATION: CT OF THE HEAD WITHOUT CONTRAST; CT OF THE CERVICAL SPINE WITHOUT CONTRAST 9/15/2022 5:04 pm TECHNIQUE: CT of the head was performed without the administration of intravenous contrast. Automated exposure control, iterative reconstruction, and/or weight based adjustment of the mA/kV was utilized to reduce the radiation dose to as low as reasonably achievable.; CT of the cervical spine was performed without the administration of intravenous contrast. Multiplanar reformatted images are provided for review. Automated exposure control, iterative reconstruction, and/or weight based adjustment of the mA/kV was utilized to reduce the radiation dose to as low as reasonably achievable. COMPARISON: CT of the head without contrast, 07/22/2022. HISTORY: ORDERING SYSTEM PROVIDED HISTORY: falls frequently and hallucinations TECHNOLOGIST PROVIDED HISTORY: Reason for exam:->falls frequently and hallucinations Has a \"code stroke\" or \"stroke alert\" been called? ->No Decision Support Exception - unselect if not a suspected or confirmed emergency medical condition->Emergency Medical Condition (MA) FINDINGS: CT OF THE BRAIN: BRAIN/VENTRICLES: No mass effect, edema or hemorrhage is seen. Area of encephalomalacia is seen in the right posterior cerebral hemisphere consistent with a chronic MCA infarction. Small lacunar infarction is seen in the body of the right caudate. The remainder of the brain is notable for mild volume loss and mild chronic microvascular ischemic changes. No hydrocephalus or extra-axial fluid is seen. ORBITS: The visualized portion of the orbits demonstrate no acute abnormality. SINUSES: The visualized paranasal sinuses and mastoid air cells demonstrate no acute abnormality. SOFT TISSUES/SKULL: No acute abnormality of the visualized skull or soft tissues. CT CERVICAL SPINE: BONES/ALIGNMENT: There is a nondisplaced fracture through the right C7 transverse process (best seen on the coronal reformats, image 22). The remainder of the bones are intact. DEGENERATIVE CHANGES: Mild loss of disc height with small disc osteophyte complexes are noted at multiple levels. Mild central canal stenoses are noted from C4-5 to C6-7. Moderate right neural foraminal stenosis at C2-3 resulting from facet and uncovertebral joint hypertrophy. Mild stenoses at multiple levels. SOFT TISSUES: There is no prevertebral soft tissue swelling.   Calcified atherosclerosis is seen along the bulbs, more so on the left. Both carotid bulbs deviate into the retropharyngeal space. There may be hemodynamically significant stenosis in the left carotid bulb. CT HEAD WITHOUT CONTRAST: 1. No skull fracture or acute intracranial abnormality. CT CERVICAL SPINE WITHOUT CONTRAST: 1. Nondisplaced fracture in the right C7 transverse process (coronal reformats, image 22). 2. Degenerative changes, as described. 3. Calcified plaque along the carotid bulbs, especially on the left. Further evaluation with carotid Doppler recommended. XR CHEST PORTABLE    Result Date: 9/23/2022  EXAMINATION: ONE XRAY VIEW OF THE CHEST 9/23/2022 8:30 pm COMPARISON: None. HISTORY: ORDERING SYSTEM PROVIDED HISTORY: Possible sepsis TECHNOLOGIST PROVIDED HISTORY: Reason for exam:->Possible sepsis FINDINGS: The lungs are without acute focal process. There is no effusion or pneumothorax. The cardiomediastinal silhouette is without acute process. The osseous structures are without acute process. Sternotomy wires noted. No acute process. XR CHEST 1 VIEW    Result Date: 9/15/2022  EXAMINATION: ONE XRAY VIEW OF THE CHEST 9/15/2022 5:33 pm COMPARISON: 10/22/2021 HISTORY: ORDERING SYSTEM PROVIDED HISTORY: falls and hallucionations TECHNOLOGIST PROVIDED HISTORY: Reason for exam:->falls and hallucionations FINDINGS: The lungs are without acute focal process. There is no effusion or pneumothorax. Cardiomegaly. .  The osseous structures are without acute process. Sternotomy wires noted. No acute process. US CAROTID ARTERY BILATERAL    Result Date: 9/16/2022  EXAMINATION: ULTRASOUND EVALUATION OF THE CAROTID ARTERIES 9/16/2022 TECHNIQUE: Duplex ultrasound using B-mode/gray scaled imaging, Doppler spectral analysis and color flow Doppler was obtained of the carotid arteries. COMPARISON: None.  HISTORY: ORDERING SYSTEM PROVIDED HISTORY: evaluation for carotid artery stenosis TECHNOLOGIST PROVIDED HISTORY: Reason for exam:->evaluation for carotid artery stenosis What reading provider will be dictating this exam?->CRC FINDINGS: RIGHT: The right common carotid artery demonstrates peak systolic velocities of 940, 83 cm/sec in the proximal and distal segments respectively. The right internal carotid artery demonstrates the systolic velocities of 37, 42, 45 cm/sec in the proximal, mid and distal segments respectively. The external carotid artery is patent. The right vertebral artery is not visible. Mild atherosclerotic plaque. ICA/CCA ratio of 0.4 LEFT: The left common carotid artery demonstrates peak systolic velocities of 73, 39 cm/sec in the proximal and distal segments respectively. The left internal carotid artery demonstrates the systolic velocities of 44, 59, 40 cm/sec in the proximal, mid and distal segments respectively. The external carotid artery is patent. The vertebral artery demonstrates normal antegrade flow. Mild atherosclerotic plaque ICA/CCA ratio of 0.8     The right internal carotid artery demonstrates 0-50% stenosis. The left internal carotid artery demonstrates 0-50% stenosis. Right vertebral artery is not visible. Left vertebral artery is patent with a normal direction of flow. HOSPITAL COURSE:   Memo Benavidez did well for the hospitalization. He was admitted early in the morning on September 24 secondary to elevated blood sugars and low blood pressure. Extensive work-up was undertaken to evaluate for potential infectious contributors to hypotension. Infectious work-up and cultures returned negative. Multiple antihypertensive and diuretic medications were held upon admission and gentle fluids and albumin were provided with stabilization. We have discussed resumption of parsed regimen upon discharge. Losartan was not resumed. Zaroxolyn and Aldactone were not resumed. Bumex, amiodarone were continued.   He will discuss resumption of these medications if warranted with his primary care provider and cardiologist upon diminished bibasilar air exchange. Clear to auscultation bilaterally. No wheezes. No rhonchi. No rales. Abdomen: Soft. Morbidly obese. Non-tender. Non-distended. Bowel sounds positive. No organomegaly or masses. No pain on palpation    Extremities:  Peripheral pulses present. Interval reduction in degree of lower extremity peripheral edema. No ulcers. Neurologic:  Alert x 3. Generally weak without focal deficit. Cranial nerves grossly intact. Skin:  No petechia. No hemorrhage. No wounds. DISPOSITION:  The patient's condition is good. At this time the patient is without objective evidence of an acute process requiring continuing hospitalization or inpatient management. They are stable for discharge with outpatient follow-up. I have spoken with the patient and his wife via telephone  and discussed the results of the current hospitalization, in addition to providing specific details for the plan of care and counseling regarding the diagnosis and prognosis. The plan has been discussed in detail and they are aware of the specific conditions for emergent return, as well as the importance of follow-up.   Their questions are answered at this time and they are agreeable with the plan for discharge to home    DISCHARGE MEDICATIONS:   Current Discharge Medication List             Details   apixaban (ELIQUIS) 5 MG TABS tablet Take 1 tablet by mouth 2 times daily  Qty: 60 tablet, Refills: 0                Details   levocetirizine (XYZAL) 5 MG tablet Take 1 tablet by mouth nightly  Qty: 30 tablet, Refills: 5      pantoprazole (PROTONIX) 40 MG tablet Take 1 tablet by mouth daily  Qty: 30 tablet, Refills: 5      amiodarone (CORDARONE) 200 MG tablet Take 1 tablet by mouth daily  Qty: 30 tablet, Refills: 0      risperiDONE (RISPERDAL) 0.5 MG tablet Take 1 tablet by mouth 2 times daily  Qty: 60 tablet, Refills: 3      blood glucose test strips (ACCU-CHEK GUIDE) strip USE TO TEST TWO TIMES DAILY AND AS NEEDED FOR SYMPTOMS OF IRREGULAR BLOOD GLUCOSE  Qty: 100 each, Refills: 5    Associated Diagnoses: Type 2 diabetes mellitus with hyperglycemia, with long-term current use of insulin ([facility] Type 2 diabetes mellitus with diabetic nephropathy, with long-term current use of insulin ([facility])      Insulin Pen Needle 32G X 6 MM MISC 1 each by Does not apply route 4 times daily  Qty: 100 each, Refills: 3      montelukast (SINGULAIR) 10 MG tablet Take 1 tablet by mouth nightly  Qty: 30 tablet, Refills: 4    Associated Diagnoses: Seasonal allergic rhinitis due to pollen      insulin glargine (BASAGLAR KWIKPEN) 100 UNIT/ML injection pen Inject 45 Units into the skin 2 times daily  Qty: 5 pen, Refills: 3    Associated Diagnoses: Type 2 diabetes mellitus with diabetic polyneuropathy, with long-term current use of insulin ([facility] Type 2 diabetes mellitus with hyperglycemia, with long-term current use of insulin ([facility])      insulin lispro, 1 Unit Dial, (HUMALOG KWIKPEN) 100 UNIT/ML SOPN Inject 0-18 Units into the skin 3 times daily (before meals) MAX 70 units daily  Qty: 3 mL, Refills: 3    Associated Diagnoses: Type 2 diabetes mellitus with diabetic polyneuropathy, with long-term current use of insulin ([facility] Type 2 diabetes mellitus with hyperglycemia, with long-term current use of insulin ([facility])      atorvastatin (LIPITOR) 80 MG tablet TAKE ONE TABLET BY MOUTH EVERY NIGHT  Qty: 90 tablet, Refills: 5    Associated Diagnoses: Mixed hyperlipidemia      bumetanide (BUMEX) 1 MG tablet Take 1 tablet by mouth daily  Qty: 30 tablet, Refills: 5    Associated Diagnoses: Venous insufficiency (chronic) (peripheral)      CPAP Machine MISC 1 each by Does not apply route nightly as needed       aspirin 81 MG tablet Take 81 mg by mouth nightly     Associated Diagnoses: CAD (coronary artery disease); Status post coronary artery bypass grafting; Hypertension; Bronchitis; Obesity;  Tobacco abuse; Diabetes ([facility] PAF (paroxysmal atrial fibrillation) ([facility] Noncompliance             FOLLOW UP/INSTRUCTIONS:  This patient is instructed to follow-up with his primary care physician. Patient is instructed to follow-up with the consults listed above as directed by them. he is instructed to resume home medications and take new medications as indicated in the list above. If the patient has a recurrence of symptoms, he is instructed to go to the ED. Preparing for this patient's discharge, including paperwork, orders, instructions, and meeting with patient did require > 40 minutes.     ARLEEN Joyner CNP     9/26/2022  11:33 AM

## 2022-09-26 NOTE — PROGRESS NOTES
CLINICAL PHARMACY NOTE: MEDS TO BEDS    Total # of Prescriptions Filled: 1   The following medications were delivered to the patient:  Eliquis 5 mg    Additional Documentation:

## 2022-09-26 NOTE — CARE COORDINATION
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 Pt denies any further needs, family will transport home . Electronically signed by Katia Quevedo RN on 9/26/2022 at 11:32 AM

## 2022-09-27 ENCOUNTER — TELEPHONE (OUTPATIENT)
Dept: FAMILY MEDICINE CLINIC | Age: 67
End: 2022-09-27

## 2022-09-27 NOTE — TELEPHONE ENCOUNTER
[facility] Transitions Initial Follow Up Call    Outreach made within 2 business days of discharge: Yes    Patient: Annabel Live Patient : 1955   MRN: [id number]  Reason for Admission: Hypotension  Discharge Date: 22       Spoke with: Jean Marie Oconnor    Discharge department/facility: [address] [place] [address]    TCM Interactive Patient Contact:  Was patient able to fill all prescriptions: Yes  Was patient instructed to bring all medications to the follow-up visit: Yes  Is patient taking all medications as directed in the discharge summary?  Yes  Does patient understand their discharge instructions: Yes  Does patient have questions or concerns that need addressed prior to 7-14 day follow up office visit: no    Scheduled appointment with PCP within 7-14 days    Follow Up  Future Appointments   Date Time Provider [place]   2022  2:30 PM Daniel Muhammad, [facility] [address]   10/5/2022  3:15 PM DO Derrick Medellin [facility]   2022  8:40 AM Sayda Rosales DO [address] [facility]

## 2022-09-28 DIAGNOSIS — E11.42 TYPE 2 DIABETES MELLITUS WITH DIABETIC POLYNEUROPATHY, WITH LONG-TERM CURRENT USE OF INSULIN (HCC): ICD-10-CM

## 2022-09-28 DIAGNOSIS — G89.29 CHRONIC RIGHT SHOULDER PAIN: ICD-10-CM

## 2022-09-28 DIAGNOSIS — Z79.4 TYPE 2 DIABETES MELLITUS WITH DIABETIC POLYNEUROPATHY, WITH LONG-TERM CURRENT USE OF INSULIN (HCC): ICD-10-CM

## 2022-09-28 DIAGNOSIS — M25.511 CHRONIC RIGHT SHOULDER PAIN: ICD-10-CM

## 2022-09-28 DIAGNOSIS — I87.2 VENOUS INSUFFICIENCY (CHRONIC) (PERIPHERAL): ICD-10-CM

## 2022-09-28 RX ORDER — BUMETANIDE 1 MG/1
TABLET ORAL
Qty: 30 TABLET | Refills: 0 | Status: SHIPPED
Start: 2022-09-28 | End: 2022-10-24

## 2022-09-28 RX ORDER — AMITRIPTYLINE HYDROCHLORIDE 50 MG/1
50 TABLET, FILM COATED ORAL NIGHTLY
Qty: 30 TABLET | Refills: 0 | Status: SHIPPED
Start: 2022-09-28 | End: 2022-10-24

## 2022-09-29 ENCOUNTER — OFFICE VISIT (OUTPATIENT)
Dept: FAMILY MEDICINE CLINIC | Age: 67
End: 2022-09-29

## 2022-09-29 VITALS
HEART RATE: 77 BPM | OXYGEN SATURATION: 97 % | SYSTOLIC BLOOD PRESSURE: 124 MMHG | DIASTOLIC BLOOD PRESSURE: 84 MMHG | WEIGHT: 283 LBS | BODY MASS INDEX: 45.48 KG/M2 | RESPIRATION RATE: 18 BRPM | HEIGHT: 66 IN

## 2022-09-29 DIAGNOSIS — E11.65 TYPE 2 DIABETES MELLITUS WITH HYPERGLYCEMIA, WITH LONG-TERM CURRENT USE OF INSULIN (HCC): ICD-10-CM

## 2022-09-29 DIAGNOSIS — R40.4 TRANSIENT ALTERATION OF AWARENESS: ICD-10-CM

## 2022-09-29 DIAGNOSIS — Z79.4 TYPE 2 DIABETES MELLITUS WITH DIABETIC POLYNEUROPATHY, WITH LONG-TERM CURRENT USE OF INSULIN (HCC): ICD-10-CM

## 2022-09-29 DIAGNOSIS — Z79.4 TYPE 2 DIABETES MELLITUS WITH HYPERGLYCEMIA, WITH LONG-TERM CURRENT USE OF INSULIN (HCC): ICD-10-CM

## 2022-09-29 DIAGNOSIS — I25.10 CORONARY ARTERY DISEASE INVOLVING NATIVE CORONARY ARTERY OF NATIVE HEART WITHOUT ANGINA PECTORIS: ICD-10-CM

## 2022-09-29 DIAGNOSIS — I48.0 PAF (PAROXYSMAL ATRIAL FIBRILLATION) (HCC): Primary | ICD-10-CM

## 2022-09-29 DIAGNOSIS — E11.42 TYPE 2 DIABETES MELLITUS WITH DIABETIC POLYNEUROPATHY, WITH LONG-TERM CURRENT USE OF INSULIN (HCC): ICD-10-CM

## 2022-09-29 DIAGNOSIS — Z91.81 STATUS POST FALL: ICD-10-CM

## 2022-09-29 DIAGNOSIS — E11.21 TYPE 2 DIABETES MELLITUS WITH DIABETIC NEPHROPATHY, WITH LONG-TERM CURRENT USE OF INSULIN (HCC): ICD-10-CM

## 2022-09-29 DIAGNOSIS — E78.2 MIXED HYPERLIPIDEMIA: ICD-10-CM

## 2022-09-29 DIAGNOSIS — Z09 HOSPITAL DISCHARGE FOLLOW-UP: ICD-10-CM

## 2022-09-29 DIAGNOSIS — E03.9 ACQUIRED HYPOTHYROIDISM: ICD-10-CM

## 2022-09-29 DIAGNOSIS — Z79.4 TYPE 2 DIABETES MELLITUS WITH DIABETIC NEPHROPATHY, WITH LONG-TERM CURRENT USE OF INSULIN (HCC): ICD-10-CM

## 2022-09-29 LAB
BLOOD CULTURE, ROUTINE: NORMAL
CULTURE, BLOOD 2: NORMAL

## 2022-09-29 PROCEDURE — 1111F DSCHRG MED/CURRENT MED MERGE: CPT | Performed by: FAMILY MEDICINE

## 2022-09-29 PROCEDURE — 99495 TRANSJ CARE MGMT MOD F2F 14D: CPT | Performed by: FAMILY MEDICINE

## 2022-09-29 RX ORDER — AMIODARONE HYDROCHLORIDE 200 MG/1
200 TABLET ORAL DAILY
Qty: 30 TABLET | Refills: 5 | Status: ON HOLD
Start: 2022-09-29 | End: 2022-10-28 | Stop reason: HOSPADM

## 2022-09-29 SDOH — ECONOMIC STABILITY: FOOD INSECURITY: WITHIN THE PAST 12 MONTHS, YOU WORRIED THAT YOUR FOOD WOULD RUN OUT BEFORE YOU GOT MONEY TO BUY MORE.: NEVER TRUE

## 2022-09-29 SDOH — ECONOMIC STABILITY: FOOD INSECURITY: WITHIN THE PAST 12 MONTHS, THE FOOD YOU BOUGHT JUST DIDN'T LAST AND YOU DIDN'T HAVE MONEY TO GET MORE.: NEVER TRUE

## 2022-09-29 ASSESSMENT — SOCIAL DETERMINANTS OF HEALTH (SDOH): HOW HARD IS IT FOR YOU TO PAY FOR THE VERY BASICS LIKE FOOD, HOUSING, MEDICAL CARE, AND HEATING?: NOT VERY HARD

## 2022-10-03 ASSESSMENT — ENCOUNTER SYMPTOMS
SHORTNESS OF BREATH: 0
CHEST TIGHTNESS: 0

## 2022-10-03 NOTE — PROGRESS NOTES
Post-Discharge Transitional Care Follow Up      Rowena Beasley   YOB: 1955    Date of Office Visit:  9/29/2022  Date of Hospital Admission: 9/23/22  Date of Hospital Discharge: 9/26/22  Readmission Risk Score (high >=14%. Medium >=10%):Readmission Risk Score: 19.2      Care management risk score Rising risk (score 2-5) and Complex Care (Scores >=6): No Risk Score On File     Non face to face  following discharge, date last encounter closed (first attempt may have been earlier): 09/27/2022     Call initiated 2 business days of discharge: Yes     PAF (paroxysmal atrial fibrillation) ([facility] [address]  -     CBC; Future  -     Comprehensive Metabolic Panel; Future  -     TSH; Future  -     apixaban (ELIQUIS) 5 MG TABS tablet; Take 1 tablet by mouth 2 times daily, Disp-60 tablet, R-3Normal  -     amiodarone (CORDARONE) 200 MG tablet; Take 1 tablet by mouth daily, Disp-30 tablet, R-5Normal  Type 2 diabetes mellitus with hyperglycemia, with long-term current use of insulin (HCC)  -     CBC; Future  -     Comprehensive Metabolic Panel; Future  -     Hemoglobin A1C; Future  -     TSH; Future  -      DIABETES FOOT EXAM  -     MICROALBUMIN, UR; Future  Type 2 diabetes mellitus with diabetic polyneuropathy, with long-term current use of insulin (HCC)  -     CBC; Future  -     Comprehensive Metabolic Panel; Future  -     Hemoglobin A1C; Future  -     TSH; Future  -     HM DIABETES FOOT EXAM  -     MICROALBUMIN, UR; Future  Type 2 diabetes mellitus with diabetic nephropathy, with long-term current use of insulin (HCC)  -     CBC; Future  -     Comprehensive Metabolic Panel; Future  -     Hemoglobin A1C; Future  -     TSH; Future  -     MICROALBUMIN, UR; Future  Coronary artery disease involving native coronary artery of native heart without angina pectoris  -     CBC; Future  -     Comprehensive Metabolic Panel; Future  -     TSH; Future  Acquired hypothyroidism  -     CBC;  Future  -     Comprehensive Metabolic Panel; Future  -     TSH; Future  Mixed hyperlipidemia  -     Comprehensive Metabolic Panel; Future  -     Lipid Panel; Future  Status post fall  -     Comprehensive Metabolic Panel; Future  Transient alteration of awareness  Hospital discharge follow-up  -     NE DISCHARGE MEDS RECONCILED W/ CURRENT OUTPATIENT MED LIST      Medical Decision Making: moderate complexity  Return in 1 month (on 10/29/2022). Subjective:   HPI    Inpatient course: Discharge summary reviewed- see chart. Interval history/Current status: Improved    Patient Active Problem List   Diagnosis    CAD (coronary artery disease)    Hypertension    Type 2 diabetes mellitus with hyperglycemia, with long-term current use of insulin (HCC)    Tobacco abuse    PAF (paroxysmal atrial fibrillation) ([address]    Status post coronary artery bypass graft    H/O mitral valve repair    Morbid obesity with BMI of 50.0-59.9, adult ([facility])    Chronic bronchitis (HCC)    Sleep apnea    Gastroesophageal reflux disease without esophagitis    Hyperlipidemia    Muscular deconditioning    Acute diastolic (congestive) heart failure (HCC)    Acute diastolic heart failure (HCC)    Iron deficiency anemia, unspecified    Acute systolic/diastolic congestive heart failure (HCC)    Atrial fibrillation with RVR ([facility])    Ischemic cardiomyopathy    Nonrheumatic tricuspid valve regurgitation    Chronic anticoagulation    Stage 3 chronic kidney disease ([address]    Acute on chronic congestive heart failure ([address]    Dizziness    Hyperosmolar hyperglycemic state (HHS) ([address]    Acute metabolic encephalopathy    Altered mental status    Hypotension    Transient hypotension       Medications listed as ordered at the time of discharge from hospital     Medication List            Accurate as of September 29, 2022 11:59 PM. If you have any questions, ask your nurse or doctor.                 CONTINUE taking these medications      Accu-Chek Guide strip  Generic drug: blood glucose test strips  USE TO TEST TWO TIMES DAILY AND AS NEEDED FOR SYMPTOMS OF IRREGULAR BLOOD GLUCOSE     amiodarone 200 MG tablet  Commonly known as: CORDARONE  Take 1 tablet by mouth daily     amitriptyline 50 MG tablet  Commonly known as: ELAVIL  TAKE 1 TABLET BY MOUTH NIGHTLY     apixaban 5 MG Tabs tablet  Commonly known as: Eliquis  Take 1 tablet by mouth 2 times daily     aspirin 81 MG tablet     atorvastatin 80 MG tablet  Commonly known as: LIPITOR  TAKE ONE TABLET BY MOUTH EVERY NIGHT     Basaglar KwikPen 100 UNIT/ML injection pen  Generic drug: insulin glargine  Inject 45 Units into the skin 2 times daily     bumetanide 1 MG tablet  Commonly known as: BUMEX  TAKE ONE TABLET BY MOUTH EVERY DAY     CPAP Machine Misc     insulin lispro (1 Unit Dial) 100 UNIT/ML Sopn  Commonly known as: HumaLOG KwikPen  Inject 0-18 Units into the skin 3 times daily (before meals) MAX 70 units daily     Insulin Pen Needle 32G X 6 MM Misc  1 each by Does not apply route 4 times daily     levocetirizine 5 MG tablet  Commonly known as: XYZAL  Take 1 tablet by mouth nightly     montelukast 10 MG tablet  Commonly known as: SINGULAIR  Take 1 tablet by mouth nightly     pantoprazole 40 MG tablet  Commonly known as: PROTONIX  Take 1 tablet by mouth daily     risperiDONE 0.5 MG tablet  Commonly known as: RISPERDAL  Take 1 tablet by mouth 2 times daily               Where to Get Your Medications        These medications were sent to [address] [place], [place] - [address] [facility] [phone] -  [phone]  [place] [address]      Phone: [phone]   amiodarone 200 MG tablet  apixaban 5 MG Tabs tablet          Medications marked \"taking\" at this time  Outpatient Medications Marked as Taking for the 9/29/22 encounter (Office Visit) with Kelly Pelletier DO   Medication Sig Dispense Refill    apixaban (ELIQUIS) 5 MG TABS tablet Take 1 tablet by mouth 2 times daily 60 tablet 3    amiodarone (CORDARONE) 200 MG tablet Take 1 tablet by mouth daily 30 tablet 5    bumetanide (BUMEX) 1 MG tablet TAKE ONE TABLET BY MOUTH EVERY DAY 30 tablet 0    amitriptyline (ELAVIL) 50 MG tablet TAKE 1 TABLET BY MOUTH NIGHTLY 30 tablet 0    levocetirizine (XYZAL) 5 MG tablet Take 1 tablet by mouth nightly 30 tablet 5    pantoprazole (PROTONIX) 40 MG tablet Take 1 tablet by mouth daily 30 tablet 5    risperiDONE (RISPERDAL) 0.5 MG tablet Take 1 tablet by mouth 2 times daily 60 tablet 3    blood glucose test strips (ACCU-CHEK GUIDE) strip USE TO TEST TWO TIMES DAILY AND AS NEEDED FOR SYMPTOMS OF IRREGULAR BLOOD GLUCOSE 100 each 5    Insulin Pen Needle 32G X 6 MM MISC 1 each by Does not apply route 4 times daily 100 each 3    montelukast (SINGULAIR) 10 MG tablet Take 1 tablet by mouth nightly 30 tablet 4    insulin glargine (BASAGLAR KWIKPEN) 100 UNIT/ML injection pen Inject 45 Units into the skin 2 times daily 5 pen 3    insulin lispro, 1 Unit Dial, (HUMALOG KWIKPEN) 100 UNIT/ML SOPN Inject 0-18 Units into the skin 3 times daily (before meals) MAX 70 units daily 3 mL 3    atorvastatin (LIPITOR) 80 MG tablet TAKE ONE TABLET BY MOUTH EVERY NIGHT 90 tablet 5    CPAP Machine MISC 1 each by Does not apply route nightly as needed       aspirin 81 MG tablet Take 81 mg by mouth nightly           Medications patient taking as of now reconciled against medications ordered at time of hospital discharge: Yes    Review of Systems    Objective:    /84 (Site: Left Upper Arm, Position: Sitting)   Pulse 77   Resp 18   Ht 5' 6\" (1.676 m)   Wt 283 lb (128.4 kg)   SpO2 97%   BMI 45.68 kg/m²   Physical Exam  HEENT within normal limits  Cardiovascular regular rate and rhythm with no murmurs  Respiratory clear to auscultation bilaterally no wheezes rhonchi rales  Abdomen soft nontender nondistended no masses no organomegaly  Extremities negative C/C/E pulses  An electronic signature was used to authenticate this note.   --Janett Query, DO

## 2022-10-04 DIAGNOSIS — S46.211A RUPTURE OF RIGHT BICEPS TENDON, INITIAL ENCOUNTER: Primary | ICD-10-CM

## 2022-10-04 NOTE — TELEPHONE ENCOUNTER
Patient's wife, Angelica Mccormick, called for refills.     Last seen 9/29/2022  Next appt 1/4/2023  [facility]

## 2022-10-04 NOTE — ED PROVIDER NOTES
20-year-old male presenting with low blood pressure and high blood sugar. Reportedly home health nurse advised him to come to the emergency department. Upon arrival he is awake, alert, oriented x4. He is not in any distress. His blood pressure is low, his blood sugar is high, he answers questions appropriately and is a more chronically ill-appearing. He was recently discharged from the hospital.  These are recurrent problems, persistent, moderate severity, associate with his recent mission to the hospital.       Family History   Problem Relation Age of Onset    Cancer Mother         breast    Cancer Father         stomach    Mental Illness Brother     Stroke Brother     Other Brother      Past Surgical History:   Procedure Laterality Date    COLONOSCOPY      CORONARY ANGIOPLASTY WITH STENT PLACEMENT  07-23-13    Left main focal eccentric 65% distal stenosis. Large OM1 CX 50% ostial & prox narrow. Large ramus artery & LAD: Minor plaque w/o sign narrow. RCA: Dom vessel w/25% prox narrow & focal hazy eccentric 99% distal stenosis before origin RPDA & RPLCA. LV: Mild inferior hypokinesis EF 55%. Probable mild AS with 10-15mmHg. Successful PCI distal RCA w/3.5 x 12 mm BMS, 0% res stenosis. Normal distal runoff    CORONARY ARTERY BYPASS GRAFT  09-09-13    Dr Jennifer Pederson; CABG x2, LIMA to LAD, SVG to ramus intermedius; MV repair using 30-mm Future complete ring with magic stitch between A3 and P3; rigid internal fixation of sternum using KLS plates x2; endoscopic vein harvesting of right lower extremity     ECHO COMPL W DOP COLOR FLOW  7/25/2013         HERNIA REPAIR      SINUS SURGERY         Review of Systems   Constitutional:  Negative for chills and fever. Respiratory:  Negative for chest tightness and shortness of breath. Cardiovascular:         Low BP   Endocrine:        Elevated glucose   Neurological:  Positive for light-headedness. All other systems reviewed and are negative.      Physical Exam  Constitutional:       General: He is not in acute distress. Appearance: He is well-developed. He is obese. HENT:      Head: Normocephalic and atraumatic. Eyes:      Pupils: Pupils are equal, round, and reactive to light. Neck:      Thyroid: No thyromegaly. Cardiovascular:      Rate and Rhythm: Regular rhythm. Tachycardia present. Pulmonary:      Effort: Pulmonary effort is normal. No respiratory distress. Breath sounds: Normal breath sounds. No wheezing. Abdominal:      General: There is no distension. Palpations: Abdomen is soft. There is no mass. Tenderness: There is no abdominal tenderness. There is no guarding or rebound. Musculoskeletal:         General: No tenderness. Normal range of motion. Cervical back: Normal range of motion and neck supple. Skin:     General: Skin is warm and dry. Findings: No erythema. Neurological:      Mental Status: He is alert and oriented to person, place, and time. Cranial Nerves: No cranial nerve deficit. Psychiatric:         Mood and Affect: Mood normal.        Procedures     [facility]       ED Course as of 10/03/22 2230   Fri Sep 23, 2022   2227 Patient resting comfortably on reexamination. No distress. He is awake and alert and oriented. His blood pressure is not low, his blood sugars in the 300s. [SO]   2336 Patient in good spirits, alert and oriented. His last blood pressure was 506 systolic. [SO]   5943 Patient blood pressure remains on the lower side at this point. He is about 88 systolic. He says he is feeling better but he takes 2 blood pressure medications normally he reports. [SO]      ED Course User Index  [SO] Braulio Sinclair DO      ED Course as of 10/03/22 2230   Fri Sep 23, 2022   2227 Patient resting comfortably on reexamination. No distress. He is awake and alert and oriented. His blood pressure is not low, his blood sugars in the 300s. [SO]   2336 Patient in good spirits, alert and oriented.   His last blood pressure was 982 systolic. [SO]   1935 Patient blood pressure remains on the lower side at this point. He is about 88 systolic. He says he is feeling better but he takes 2 blood pressure medications normally he reports. [SO]      ED Course User Index  [SO] Lang Osullivan, DO       --------------------------------------------- PAST HISTORY ---------------------------------------------  Past Medical History:  has a past medical history of Acid reflux, Acute diastolic (congestive) heart failure ([facility] [address] Arthritis, Asthma, Asthmatic bronchitis , chronic (HCC), CAD (coronary artery disease), Chronic bronchitis ([facility] [address] COPD (chronic obstructive pulmonary disease) ([facility] [address] COPD (chronic obstructive pulmonary disease) ([facility] [address] Diabetes mellitus ([facility] [address] Emphysema (subcutaneous) (surgical) resulting from a procedure, H/O cardiovascular stress test, Hyperlipidemia, Hypertension, Hypoxemia requiring supplemental oxygen, LONG TERM ANTICOAGULENT USE, Morbid obesity with BMI of 50.0-59.9, adult ([facility] [address] Obesity, Osteoarthritis, Sleep apnea, Stage 3 chronic kidney disease ([facility] [address] Tobacco abuse, and Type II or unspecified type diabetes mellitus without mention of complication, not stated as uncontrolled. Past Surgical History:  has a past surgical history that includes hernia repair; sinus surgery; ECHO Compl W Dop Color Flow (7/25/2013); Colonoscopy; Coronary angioplasty with stent (07-23-13); and Coronary artery bypass graft (09-09-13). Social History:  reports that he quit smoking about 9 years ago. His smoking use included cigarettes. He has a 45.00 pack-year smoking history. He quit smokeless tobacco use about 51 years ago. His smokeless tobacco use included chew. He reports that he does not drink alcohol and does not use drugs. Family History: family history includes Cancer in his father and mother; Mental Illness in his brother; Other in his brother; Stroke in his brother.      The patients home medications have been reviewed.     Allergies: Pcn [penicillins] and Sulfa antibiotics    -------------------------------------------------- RESULTS -------------------------------------------------    LABS:  Results for orders placed or performed during the hospital encounter of 09/23/22   Culture, Blood 1    Specimen: Blood   Result Value Ref Range    Blood Culture, Routine 5 Days no growth    Culture, Blood 2    Specimen: Blood   Result Value Ref Range    Culture, Blood 2 5 Days no growth    Culture, Urine    Specimen: Urine, clean catch   Result Value Ref Range    Urine Culture, Routine Growth not present    CBC with Auto Differential   Result Value Ref Range    WBC 5.1 4.5 - 11.5 E9/L    RBC 3.53 (L) 3.80 - 5.80 E12/L    Hemoglobin 11.1 (L) 12.5 - 16.5 g/dL    Hematocrit 34.3 (L) 37.0 - 54.0 %    MCV 97.2 80.0 - 99.9 fL    MCH 31.4 26.0 - 35.0 pg    MCHC 32.4 32.0 - 34.5 %    RDW 15.3 (H) 11.5 - 15.0 fL    Platelets 860 698 - 620 E9/L    MPV 9.2 7.0 - 12.0 fL    Neutrophils % 64.4 43.0 - 80.0 %    Immature Granulocytes % 0.6 0.0 - 5.0 %    Lymphocytes % 16.9 (L) 20.0 - 42.0 %    Monocytes % 14.8 (H) 2.0 - 12.0 %    Eosinophils % 2.5 0.0 - 6.0 %    Basophils % 0.8 0.0 - 2.0 %    Neutrophils Absolute 3.31 1.80 - 7.30 E9/L    Immature Granulocytes # 0.03 E9/L    Lymphocytes Absolute 0.87 (L) 1.50 - 4.00 E9/L    Monocytes Absolute 0.76 0.10 - 0.95 E9/L    Eosinophils Absolute 0.13 0.05 - 0.50 E9/L    Basophils Absolute 0.04 0.00 - 0.20 E9/L   Comprehensive Metabolic Panel   Result Value Ref Range    Sodium 131 (L) 132 - 146 mmol/L    Potassium 5.0 3.5 - 5.0 mmol/L    Chloride 92 (L) 98 - 107 mmol/L    CO2 26 22 - 29 mmol/L    Anion Gap 13 7 - 16 mmol/L    Glucose 322 (H) 74 - 99 mg/dL    BUN 40 (H) 6 - 23 mg/dL    Creatinine 1.7 (H) 0.7 - 1.2 mg/dL    GFR Non-African American 40 >=60 mL/min/1.73    GFR African American 49     Calcium 9.1 8.6 - 10.2 mg/dL    Total Protein 7.5 6.4 - 8.3 g/dL    Albumin 3.1 (L) 3.5 - 5.2 g/dL    Total Bilirubin 0.9 0.0 - 1.2 mg/dL    Alkaline Phosphatase 174 (H) 40 - 129 U/L    ALT 46 (H) 0 - 40 U/L    AST 59 (H) 0 - 39 U/L   Brain Natriuretic Peptide   Result Value Ref Range    Pro- (H) 0 - 125 pg/mL   Protime-INR   Result Value Ref Range    Protime 28.6 (H) 9.3 - 12.4 sec    INR 2.5    Lactate, Sepsis   Result Value Ref Range    Lactic Acid, Sepsis 2.1 (H) 0.5 - 1.9 mmol/L   Urinalysis with Microscopic   Result Value Ref Range    Color, UA Yellow Straw/Yellow    Clarity, UA Clear Clear    Glucose, Ur 250 (A) Negative mg/dL    Bilirubin Urine Negative Negative    Ketones, Urine Negative Negative mg/dL    Specific Gravity, UA 1.010 1.005 - 1.030    Blood, Urine Negative Negative    pH, UA 6.5 5.0 - 9.0    Protein, UA Negative Negative mg/dL    Urobilinogen, Urine 1.0 <2.0 E.U./dL    Nitrite, Urine Negative Negative    Leukocyte Esterase, Urine Negative Negative    WBC, UA 0-1 0 - 5 /HPF    RBC, UA 0-1 0 - 2 /HPF    Bacteria, UA NONE SEEN None Seen /HPF   SPECIMEN REJECTION   Result Value Ref Range    Rejected Test trop     Reason for Rejection see below    Troponin   Result Value Ref Range    Troponin, High Sensitivity 30 (H) 0 - 11 ng/L   Magnesium   Result Value Ref Range    Magnesium 1.7 1.6 - 2.6 mg/dL   Phosphorus   Result Value Ref Range    Phosphorus 2.5 2.5 - 4.5 mg/dL   Lactic Acid   Result Value Ref Range    Lactic Acid 1.7 0.5 - 2.2 mmol/L   Troponin   Result Value Ref Range    Troponin, High Sensitivity 45 (H) 0 - 11 ng/L   Protime-INR   Result Value Ref Range    Protime 38.3 (H) 9.3 - 12.4 sec    INR 3.3    Procalcitonin   Result Value Ref Range    Procalcitonin 0.15 (H) 0.00 - 0.08 ng/mL   Comprehensive Metabolic Panel   Result Value Ref Range    Sodium 134 132 - 146 mmol/L    Potassium 3.5 3.5 - 5.0 mmol/L    Chloride 96 (L) 98 - 107 mmol/L    CO2 29 22 - 29 mmol/L    Anion Gap 9 7 - 16 mmol/L    Glucose 213 (H) 74 - 99 mg/dL    BUN 36 (H) 6 - 23 mg/dL    Creatinine 1.5 (H) 0.7 - 1.2 mg/dL GFR Non-African American 47 >=60 mL/min/1.73    GFR African American 57     Calcium 8.2 (L) 8.6 - 10.2 mg/dL    Total Protein 6.8 6.4 - 8.3 g/dL    Albumin 3.6 3.5 - 5.2 g/dL    Total Bilirubin 0.8 0.0 - 1.2 mg/dL    Alkaline Phosphatase 152 (H) 40 - 129 U/L    ALT 36 0 - 40 U/L    AST 33 0 - 39 U/L   CBC with Auto Differential   Result Value Ref Range    WBC 5.7 4.5 - 11.5 E9/L    RBC 3.83 3.80 - 5.80 E12/L    Hemoglobin 12.0 (L) 12.5 - 16.5 g/dL    Hematocrit 36.5 (L) 37.0 - 54.0 %    MCV 95.3 80.0 - 99.9 fL    MCH 31.3 26.0 - 35.0 pg    MCHC 32.9 32.0 - 34.5 %    RDW 15.3 (H) 11.5 - 15.0 fL    Platelets 905 525 - 740 E9/L    MPV 9.3 7.0 - 12.0 fL    Neutrophils % 66.8 43.0 - 80.0 %    Immature Granulocytes % 0.7 0.0 - 5.0 %    Lymphocytes % 18.0 (L) 20.0 - 42.0 %    Monocytes % 11.7 2.0 - 12.0 %    Eosinophils % 2.3 0.0 - 6.0 %    Basophils % 0.5 0.0 - 2.0 %    Neutrophils Absolute 3.81 1.80 - 7.30 E9/L    Immature Granulocytes # 0.04 E9/L    Lymphocytes Absolute 1.03 (L) 1.50 - 4.00 E9/L    Monocytes Absolute 0.67 0.10 - 0.95 E9/L    Eosinophils Absolute 0.13 0.05 - 0.50 E9/L    Basophils Absolute 0.03 0.00 - 0.20 E9/L   Troponin   Result Value Ref Range    Troponin, High Sensitivity 44 (H) 0 - 11 ng/L   Troponin   Result Value Ref Range    Troponin, High Sensitivity 46 (H) 0 - 11 ng/L   CBC with Auto Differential   Result Value Ref Range    WBC 5.3 4.5 - 11.5 E9/L    RBC 4.14 3.80 - 5.80 E12/L    Hemoglobin 13.1 12.5 - 16.5 g/dL    Hematocrit 40.0 37.0 - 54.0 %    MCV 96.6 80.0 - 99.9 fL    MCH 31.6 26.0 - 35.0 pg    MCHC 32.8 32.0 - 34.5 %    RDW 15.3 (H) 11.5 - 15.0 fL    Platelets 754 576 - 372 E9/L    MPV 9.3 7.0 - 12.0 fL    Neutrophils % 61.6 43.0 - 80.0 %    Immature Granulocytes % 0.7 0.0 - 5.0 %    Lymphocytes % 19.1 (L) 20.0 - 42.0 %    Monocytes % 14.0 (H) 2.0 - 12.0 %    Eosinophils % 3.9 0.0 - 6.0 %    Basophils % 0.7 0.0 - 2.0 %    Neutrophils Absolute 3.28 1.80 - 7.30 E9/L    Immature Granulocytes # 0.04 E9/L    Lymphocytes Absolute 1.02 (L) 1.50 - 4.00 E9/L    Monocytes Absolute 0.75 0.10 - 0.95 E9/L    Eosinophils Absolute 0.21 0.05 - 0.50 E9/L    Basophils Absolute 0.04 0.00 - 0.20 E9/L   Comprehensive Metabolic Panel   Result Value Ref Range    Sodium 136 132 - 146 mmol/L    Potassium 3.8 3.5 - 5.0 mmol/L    Chloride 99 98 - 107 mmol/L    CO2 28 22 - 29 mmol/L    Anion Gap 9 7 - 16 mmol/L    Glucose 79 74 - 99 mg/dL    BUN 26 (H) 6 - 23 mg/dL    Creatinine 1.4 (H) 0.7 - 1.2 mg/dL    GFR Non-African American 51 >=60 mL/min/1.73    GFR African American >60     Calcium 9.0 8.6 - 10.2 mg/dL    Total Protein 7.3 6.4 - 8.3 g/dL    Albumin 3.7 3.5 - 5.2 g/dL    Total Bilirubin 0.8 0.0 - 1.2 mg/dL    Alkaline Phosphatase 143 (H) 40 - 129 U/L    ALT 40 0 - 40 U/L    AST 39 0 - 39 U/L   Magnesium   Result Value Ref Range    Magnesium 1.7 1.6 - 2.6 mg/dL   Phosphorus   Result Value Ref Range    Phosphorus 1.9 (L) 2.5 - 4.5 mg/dL   Protime-INR   Result Value Ref Range    Protime 35.7 (H) 9.3 - 12.4 sec    INR 3.1    CBC with Auto Differential   Result Value Ref Range    WBC 5.1 4.5 - 11.5 E9/L    RBC 3.99 3.80 - 5.80 E12/L    Hemoglobin 12.6 12.5 - 16.5 g/dL    Hematocrit 38.3 37.0 - 54.0 %    MCV 96.0 80.0 - 99.9 fL    MCH 31.6 26.0 - 35.0 pg    MCHC 32.9 32.0 - 34.5 %    RDW 15.2 (H) 11.5 - 15.0 fL    Platelets 107 726 - 226 E9/L    MPV 9.4 7.0 - 12.0 fL    Neutrophils % 60.8 43.0 - 80.0 %    Immature Granulocytes % 0.6 0.0 - 5.0 %    Lymphocytes % 18.2 (L) 20.0 - 42.0 %    Monocytes % 15.2 (H) 2.0 - 12.0 %    Eosinophils % 4.2 0.0 - 6.0 %    Basophils % 1.0 0.0 - 2.0 %    Neutrophils Absolute 3.07 1.80 - 7.30 E9/L    Immature Granulocytes # 0.03 E9/L    Lymphocytes Absolute 0.92 (L) 1.50 - 4.00 E9/L    Monocytes Absolute 0.77 0.10 - 0.95 E9/L    Eosinophils Absolute 0.21 0.05 - 0.50 E9/L    Basophils Absolute 0.05 0.00 - 0.20 E9/L   Comprehensive Metabolic Panel   Result Value Ref Range    Sodium 137 132 - 146 mmol/L    Potassium 3.5 3.5 - 5.0 mmol/L    Chloride 101 98 - 107 mmol/L    CO2 26 22 - 29 mmol/L    Anion Gap 10 7 - 16 mmol/L    Glucose 110 (H) 74 - 99 mg/dL    BUN 23 6 - 23 mg/dL    Creatinine 1.1 0.7 - 1.2 mg/dL    GFR Non-African American >60 >=60 mL/min/1.73    GFR African American >60     Calcium 9.2 8.6 - 10.2 mg/dL    Total Protein 6.9 6.4 - 8.3 g/dL    Albumin 3.1 (L) 3.5 - 5.2 g/dL    Total Bilirubin 0.8 0.0 - 1.2 mg/dL    Alkaline Phosphatase 150 (H) 40 - 129 U/L    ALT 35 0 - 40 U/L    AST 34 0 - 39 U/L   Magnesium   Result Value Ref Range    Magnesium 1.8 1.6 - 2.6 mg/dL   Phosphorus   Result Value Ref Range    Phosphorus 2.7 2.5 - 4.5 mg/dL   Protime-INR   Result Value Ref Range    Protime 26.4 (H) 9.3 - 12.4 sec    INR 2.3    POCT Glucose   Result Value Ref Range    Glucose 345 mg/dL    QC OK? yes    POCT Glucose   Result Value Ref Range    Meter Glucose 344 (H) 74 - 99 mg/dL   POCT glucose   Result Value Ref Range    Glucose 188 mg/dL    QC OK? yes    POCT glucose   Result Value Ref Range    Glucose 120 mg/dL    QC OK? yes    POCT glucose   Result Value Ref Range    Glucose 142 mg/dL    QC OK?  yes    POCT Glucose   Result Value Ref Range    Meter Glucose 188 (H) 74 - 99 mg/dL   POCT Glucose   Result Value Ref Range    Meter Glucose 120 (H) 74 - 99 mg/dL   POCT Glucose   Result Value Ref Range    Meter Glucose 142 (H) 74 - 99 mg/dL   POCT Glucose   Result Value Ref Range    Meter Glucose 176 (H) 74 - 99 mg/dL   POCT Glucose   Result Value Ref Range    Meter Glucose 186 (H) 74 - 99 mg/dL   POCT Glucose   Result Value Ref Range    Meter Glucose 86 74 - 99 mg/dL   POCT Glucose   Result Value Ref Range    Meter Glucose 96 74 - 99 mg/dL   POCT Glucose   Result Value Ref Range    Meter Glucose 148 (H) 74 - 99 mg/dL   POCT Glucose   Result Value Ref Range    Meter Glucose 135 (H) 74 - 99 mg/dL   POCT Glucose   Result Value Ref Range    Meter Glucose 106 (H) 74 - 99 mg/dL   POCT Glucose   Result Value Ref Range    Meter Glucose 105 (H) 74 - 99 mg/dL   POCT Glucose   Result Value Ref Range    Meter Glucose 84 74 - 99 mg/dL   EKG 12 Lead   Result Value Ref Range    Ventricular Rate 74 BPM    Atrial Rate 74 BPM    P-R Interval 210 ms    QRS Duration 128 ms    Q-T Interval 482 ms    QTc Calculation (Bazett) 535 ms    P Axis 85 degrees    R Axis 91 degrees    T Axis 95 degrees       RADIOLOGY:  CT HEAD WO CONTRAST   Final Result   No acute intracranial abnormality. Stable changes of encephalomalacia right posterior parietal lobe. Remote   lacunar infarct right caudate lobe. XR CHEST PORTABLE   Final Result   No acute process. ------------------------- NURSING NOTES AND VITALS REVIEWED ---------------------------  Date / Time Roomed:  9/23/2022  7:32 PM  ED Bed Assignment:  [id number]    The nursing notes within the ED encounter and vital signs as below have been reviewed. No data found. Oxygen Saturation Interpretation: Abnormal at baseline, wearing his oxygen    ------------------------------------------ PROGRESS NOTES ------------------------------------------  Re-evaluation(s):   Patients symptoms show no change  Repeat physical examination is not changed -patient remains tachycardic and hypotensive. Counseling:  I have spoken with the patient and discussed todays results, in addition to providing specific details for the plan of care and counseling regarding the diagnosis and prognosis. Their questions are answered at this time and they are agreeable with the plan of admission.    --------------------------------- ADDITIONAL PROVIDER NOTES ---------------------------------  Consultations:  Spoke with admitting team.  Discussed case. They will admit the patient.   This patient's ED course included: a personal history and physicial examination and re-evaluation prior to disposition    This patient has remained hemodynamically stable during their ED course. Diagnosis:  1. Tachycardia        Disposition:  Patient's disposition: Admit to telemetry  Patient's condition is stable.            Marcia Douglass DO  10/03/22 [id number]

## 2022-10-06 ENCOUNTER — TELEPHONE (OUTPATIENT)
Dept: CARDIOLOGY CLINIC | Age: 67
End: 2022-10-06

## 2022-10-07 NOTE — TELEPHONE ENCOUNTER
[facility] care calling in to report an elevated heart rate between 114-121 taken about 2 hours after taking his medications. They are looking for any recommendations. Please advise.
Karin at [facility] notified.
Left message for [facility]
We can go on his amiodarone to 2 daily
Cell Phone/PDA (specify)
Normal

## 2022-10-07 NOTE — PROGRESS NOTES
Physician Progress Note      PATIENT:               Wendi BORREGO #:                  [id number]  :                       1955  ADMIT DATE:       9/15/2022 4:37 PM  Michelle Roldan DATE:        2022 4:29 PM  RESPONDING  PROVIDER #:        Gabbi Alvarado DO          QUERY TEXT:    Patient admitted with altered mental status, hallucinations. Documentation   reflects Metabolic encephalopathy as admitting diagnosis and Medication   induced altered mental status as discharge diagnosis. If possible, please   document in the  discharge summary:      The medical record reflects the following:  Risk Factors: AMS,  JORDEN, DM, COPD,  Clinical Indicators: Per Neuro cx : AMS /metabolic encephalopathy( mild renal   injury noted). Per Neuro cx: Delirium, no clear culprit. CT head neg, Urine &   serum DS negative, Glucose max 340 on admission,  visual & auditory   hallucinations, confusion  Treatment: Stop Mirapex, add Risperdal. Neuro, Psych consults. Thank you, Narcisa Ahn RN [phone]  Options provided:  -- Both Metabolic encephalopathy due to JORDEN and Toxic Encephalopathy due to   Amitriptyline and Mirapex adverse effect  -- Metabolic encephalopathy due to JORDEN only  -- Toxic Metabolic encephalopathy due to Amitriptyline and Mirapex adverse   effect  -- Encephalopathy ruled out and AMS due to Amitriptyline and Mirapex adverse   effect  -- Other - I will add my own diagnosis  -- Disagree - Not applicable / Not valid  -- Disagree - Clinically unable to determine / Unknown  -- Refer to Clinical Documentation Reviewer    PROVIDER RESPONSE TEXT:    Both Metabolic encephalopathy due to JORDEN and Toxic Encephalopathy due to   Amitriptyline and Mirapex adverse effect.     Query created by: Dayana Palmer on 10/5/2022 4:37 PM      Electronically signed by:  Gabbi Alvarado DO 10/7/2022 9:14 AM

## 2022-10-11 ENCOUNTER — OFFICE VISIT (OUTPATIENT)
Dept: ORTHOPEDIC SURGERY | Age: 67
End: 2022-10-11
Payer: MEDICARE

## 2022-10-11 VITALS — BODY MASS INDEX: 46.77 KG/M2 | TEMPERATURE: 98 F | HEIGHT: 66 IN | WEIGHT: 291 LBS

## 2022-10-11 DIAGNOSIS — M12.811 ROTATOR CUFF ARTHROPATHY, RIGHT: ICD-10-CM

## 2022-10-11 DIAGNOSIS — M75.101 TEAR OF RIGHT ROTATOR CUFF, UNSPECIFIED TEAR EXTENT, UNSPECIFIED WHETHER TRAUMATIC: Primary | ICD-10-CM

## 2022-10-11 PROCEDURE — 20610 DRAIN/INJ JOINT/BURSA W/O US: CPT | Performed by: ORTHOPAEDIC SURGERY

## 2022-10-11 PROCEDURE — 1123F ACP DISCUSS/DSCN MKR DOCD: CPT | Performed by: ORTHOPAEDIC SURGERY

## 2022-10-11 PROCEDURE — 99214 OFFICE O/P EST MOD 30 MIN: CPT | Performed by: ORTHOPAEDIC SURGERY

## 2022-10-11 RX ORDER — TRIAMCINOLONE ACETONIDE 40 MG/ML
40 INJECTION, SUSPENSION INTRA-ARTICULAR; INTRAMUSCULAR ONCE
Status: COMPLETED | OUTPATIENT
Start: 2022-10-11 | End: 2022-10-17

## 2022-10-11 NOTE — PROGRESS NOTES
Chief Complaint   Patient presents with    Shoulder Pain     RT shoulder is doing much better. Less pain, less mobility but still doing therapy. HPI:    Patient is 77 y.o. male complaining of right shoulder pain and weakness for 1 month after falling onto outstretched hand. He denies a specific traumatic injury to the right shoulder. Previous treatments include rest, ice, and anti-inflammatory medication and HEP without much relief. He denies any other orthopedic complaints. Follows up after for recheck. ROS:    Skin: (-) rash,(-) psoriasis,(-) eczema, (-)skin cancer. Neurologic: (-)numbness, (-)tingling, (-)headaches, (-) LOC. Cardiovascular: (-) Chest pain, (-) swelling in legs/feet, (-) SOB, (-) cramping in legs/feet with walking.     All other review of systems negative except stated above or in HPI      Past Medical History:   Diagnosis Date    Acid reflux     Acute diastolic (congestive) heart failure ([facility] [address] 06/19/2019    Arthritis     Asthma 04/16/2014    Asthmatic bronchitis , chronic ([address] 11/28/2016    CAD (coronary artery disease)     Chronic bronchitis ([address] 04/16/2014    COPD (chronic obstructive pulmonary disease) (HCC)     CB    COPD (chronic obstructive pulmonary disease) ([address]     Diabetes mellitus ([address]     Emphysema (subcutaneous) (surgical) resulting from a procedure     H/O cardiovascular stress test 04/24/2021    Lexiscan    Hyperlipidemia     Hypertension     Hypoxemia requiring supplemental oxygen 02/02/2015    LONG TERM ANTICOAGULENT USE     Morbid obesity with BMI of 50.0-59.9, adult ([address] 11/27/2013    Obesity     Osteoarthritis     Sleep apnea     bilevel positive airway pressure at 13/8 with 2 L oxygen flow     Stage 3 chronic kidney disease (HCC)     Tobacco abuse     Type II or unspecified type diabetes mellitus without mention of complication, not stated as uncontrolled      Past Surgical History:   Procedure Laterality Date    COLONOSCOPY      CORONARY ANGIOPLASTY WITH STENT PLACEMENT  07-23-13    Left main focal eccentric 65% distal stenosis. Large OM1 CX 50% ostial & prox narrow. Large ramus artery & LAD: Minor plaque w/o sign narrow. RCA: Dom vessel w/25% prox narrow & focal hazy eccentric 99% distal stenosis before origin RPDA & RPLCA. LV: Mild inferior hypokinesis EF 55%. Probable mild AS with 10-15mmHg. Successful PCI distal RCA w/3.5 x 12 mm BMS, 0% res stenosis.  Normal distal runoff    CORONARY ARTERY BYPASS GRAFT  09-09-13    Dr Patti Britton; CABG x2, LIMA to LAD, SVG to ramus intermedius; MV repair using 30-mm Future complete ring with magic stitch between A3 and P3; rigid internal fixation of sternum using KLS plates x2; endoscopic vein harvesting of right lower extremity     ECHO COMPL W DOP COLOR FLOW  7/25/2013         HERNIA REPAIR      SINUS SURGERY         Current Outpatient Medications:     Insulin Pen Needle 32G X 6 MM MISC, 1 each by Does not apply route 4 times daily, Disp: 100 each, Rfl: 3    apixaban (ELIQUIS) 5 MG TABS tablet, Take 1 tablet by mouth 2 times daily, Disp: 60 tablet, Rfl: 3    amiodarone (CORDARONE) 200 MG tablet, Take 1 tablet by mouth daily, Disp: 30 tablet, Rfl: 5    bumetanide (BUMEX) 1 MG tablet, TAKE ONE TABLET BY MOUTH EVERY DAY (Patient not taking: Reported on 10/11/2022), Disp: 30 tablet, Rfl: 0    amitriptyline (ELAVIL) 50 MG tablet, TAKE 1 TABLET BY MOUTH NIGHTLY, Disp: 30 tablet, Rfl: 0    levocetirizine (XYZAL) 5 MG tablet, Take 1 tablet by mouth nightly, Disp: 30 tablet, Rfl: 5    pantoprazole (PROTONIX) 40 MG tablet, Take 1 tablet by mouth daily, Disp: 30 tablet, Rfl: 5    risperiDONE (RISPERDAL) 0.5 MG tablet, Take 1 tablet by mouth 2 times daily, Disp: 60 tablet, Rfl: 3    blood glucose test strips (ACCU-CHEK GUIDE) strip, USE TO TEST TWO TIMES DAILY AND AS NEEDED FOR SYMPTOMS OF IRREGULAR BLOOD GLUCOSE, Disp: 100 each, Rfl: 5    montelukast (SINGULAIR) 10 MG tablet, Take 1 tablet by mouth nightly, Disp: 30 tablet, Rfl: 4 insulin glargine (BASAGLAR KWIKPEN) 100 UNIT/ML injection pen, Inject 45 Units into the skin 2 times daily, Disp: 5 pen, Rfl: 3    insulin lispro, 1 Unit Dial, (HUMALOG KWIKPEN) 100 UNIT/ML SOPN, Inject 0-18 Units into the skin 3 times daily (before meals) MAX 70 units daily, Disp: 3 mL, Rfl: 3    atorvastatin (LIPITOR) 80 MG tablet, TAKE ONE TABLET BY MOUTH EVERY NIGHT, Disp: 90 tablet, Rfl: 5    CPAP Machine MISC, 1 each by Does not apply route nightly as needed , Disp: , Rfl:     aspirin 81 MG tablet, Take 81 mg by mouth nightly , Disp: , Rfl:   Allergies   Allergen Reactions    Pcn [Penicillins]      Child went to hospital   ? Reaction  Patient tolerates cephalosporins    Sulfa Antibiotics      ? Social History     Socioeconomic History    Marital status:      Spouse name: Jenniffer Denver    Number of children: 1    Years of education: Not on file    Highest education level: 11th grade   Occupational History    Not on file   Tobacco Use    Smoking status: Former     Packs/day: 1.00     Years: 45.00     Pack years: 45.00     Types: Cigarettes     Quit date: 2013     Years since quittin.2    Smokeless tobacco: Former     Types: Chew     Quit date: 10/8/1971   Vaping Use    Vaping Use: Never used   Substance and Sexual Activity    Alcohol use: No     Alcohol/week: 0.0 standard drinks     Comment: 1-2 coffee per day    Drug use: No    Sexual activity: Yes     Partners: Female   Other Topics Concern    Not on file   Social History Narrative    19  [facility] reviewed SDOH with Aaron Smith. He does have money strain but between the income he has coming in and his wife's they are over resources for [employer] programs. Offered food panty info to help with end of the month struggles but he declined stating that he tried and was refused due to his income. He belongs to a [employer] and goes weekly so he has some community connections in place.   He has an extremely high interest rate on his mortgage which he was encouraged to look into getting lowered to help save money on his house payments. Noticed some difficulty with memory recall. Becomes easily flustered at times. Has no MHI to support applying for [facility] program of [facility]. States he does not feel depressed or need any MH treatment. Social Determinants of Health     Financial Resource Strain: Low Risk     Difficulty of Paying Living Expenses: Not very hard   Food Insecurity: No Food Insecurity    Worried About Running Out of Food in the Last Year: Never true    Ran Out of Food in the Last Year: Never true   Transportation Needs: Not on file   Physical Activity: Not on file   Stress: Not on file   Social Connections: Not on file   Intimate Partner Violence: Not on file   Housing Stability: Not on file     Family History   Problem Relation Age of Onset    Cancer Mother         breast    Cancer Father         stomach    Mental Illness Brother     Stroke Brother     Other Brother            Physical Exam:    Temp 98 °F (36.7 °C)   Ht 5' 6\" (1.676 m)   Wt 291 lb (132 kg)   BMI 46.97 kg/m²     GENERAL: alert, appears stated age, cooperative, no acute distress    HEENT: Head is normocephalic, atraumatic. PERRLA. SKIN: Clean, dry, intact. There is not any cellulitis or cutaneous lesions noted in the lower extremities     PULMONARY: breathing is regular and unlabored, no acute distress     CV: The bilateral lower extremities are warm and well-perfused with brisk capillary refill. 2+ pulses LE bilateral.     ABDOMINAL: Nontender, nondistended     PSYCHIATRY: Pleasant mood, appropriate behavior, follows commands     NEURO: Sensation is intact distally with light touch with no alteration. Motor exam of the lower extremities show quadriceps, hamstrings, foot dorsiflexion and plantarflexion grossly intact 5/5. LYMPH: No lymphedema present distally in upper or lower extremity. MUSCULOSKELETAL:  Shoulder Exam:  Examination of the right shoulder shows:   There is not a deformity. There is not erythema. There is not soft tissue swelling. Deltoid region is  tender to palpation. AC Joint is  tender to palpation. Clavicle is not tender to palpation. Bicipital Groove is  tender to palpation. Pectoralis  is not tender to palpation. Scapula/ trapezius is  tender to palpation. Left:  ROM Full, Strength: Supraspinatus 5/5, Infraspinatus 5/5, Subscapularis 5/5  Right:  ROM 80/80/50, Strength: Supraspinatus 4/5, Infraspinatus 5/5, Subscapularis 5/5    Left Shoulder:  Crepitus:  no   Tenderness:  none   Effusion:   none   Impingement: negative   Empty Can:  negative   Speed's:  negative      Apprehension:  negative   Cross Arm Sign:  negative   [place]'s:  negative       Neer's:  negative      Belly Press Test:  negative      Drop Arm Test:  negative     Right Shoulder:  Crepitus:  no   Tenderness:  mild   Effusion:   non   Impingement: positive   Empty Can:  positive   Speed's: positive   Apprehension:  not tested   Cross Arm Sign:  positive   [place]'s:  positive      Neer's:  positive      Belly Press Test:  negative   Drop Arm Test:  positive       no change since last visit. Imaging:  XR SHOULDER RIGHT (MIN 2 VIEWS)    Result Date: 7/22/2021  EXAMINATION: THREE XRAY VIEWS OF THE RIGHT SHOULDER 7/22/2021 10:53 am COMPARISON: 12 June 2021 HISTORY: ORDERING SYSTEM PROVIDED HISTORY: Chronic right shoulder pain TECHNOLOGIST PROVIDED HISTORY: Reason for exam:->pain FINDINGS: Very close proximity of the humeral head to the acromion in the [place] view suggests chronic rotator cuff disease. There is moderate osteoarthritis at the [facility] joint. There is mild osteoarthritis at the glenohumeral joint. No acute fracture. Suspected chronic rotator cuff disease. Osteoarthritis at the [facility] and glenohumeral joints as noted. XR HAND LEFT (MIN 3 VIEWS)    Result Date: 9/28/2021  EXAMINATION: THREE XRAY VIEWS OF THE LEFT HAND 9/28/2021 9:59 am COMPARISON: None.  HISTORY: ORDERING SYSTEM PROVIDED HISTORY: Puncture wound TECHNOLOGIST PROVIDED HISTORY: Reason for exam:->Left thumb puncture wound FINDINGS: Multifocal arthritic changes appear most prominent in the proximal wrist.  No acute fracture or evidence of dislocation. No retained radiopaque foreign body is identified. There is calcific atherosclerosis. No acute osseous abnormality or radiopaque foreign body is identified. MRI SHOULDER RIGHT WO CONTRAST    Result Date: 9/10/2021  EXAMINATION: MRI OF THE RIGHT SHOULDER WITHOUT CONTRAST   9/10/2021 9:28 am TECHNIQUE: Multiplanar multisequence MRI of the right shoulder was performed without the administration of intravenous contrast. COMPARISON: Right shoulder plain radiographs from 07/22/2021 HISTORY: ORDERING SYSTEM PROVIDED HISTORY: Tear of right rotator cuff, unspecified tear extent, unspecified whether traumatic 35-year-old male with suspected right-sided rotator cuff tear FINDINGS: ROTATOR CUFF: Subacromial subdeltoid bursa contiguous with the glenohumeral joint. Complete retracted full-thickness tear of supraspinatus with retraction of the torn fibers from the footplate measuring up to 5 cm on image 21, series 2. Complete retracted full-thickness tear of infraspinatus with retraction of the torn fibers measuring 5.6 cm. Complete retracted full-thickness tear of subscapularis with retraction of the torn fibers measuring up to 3.4 cm. Complete retracted full-thickness tear of teres minor. Moderate to severe atrophy and fatty degeneration of supraspinatus, infraspinatus. Mild-to-moderate atrophy and fatty degeneration of teres minor and subscapularis. BICEPS TENDON: Complete tear of the long head of the biceps tendon. LABRUM: Mild diffuse labral degeneration. GLENOHUMERAL JOINT: Debris containing large glenohumeral joint effusion.  Region of low signal in the anterior subscapularis recess measuring 1.7 x 0.9 x 1.2 cm which could represent focal nodular synovitis or debris in the anterior subscapularis recess. Mild glenohumeral chondromalacia. Inferior glenohumeral ligament appears intact. Mild osteophyte spurring of the glenohumeral joints. AC JOINT AND ACROMIOCLAVICULAR ARCH: Mild degenerative changes of the right AC joint. Type 2 acromion. Narrowing of the acromiohumeral distance. BONE MARROW: Marrow edema involving the acromion on image 15, series 4. Marrow edema at the superior and superolateral humeral head. Bone marrow signal intensity within the visualized osseous structures otherwise within normal limits. Superior migration of the humeral head. OUTLET SPACES: Fluid extending through the suprascapular notch. Quadrilateral space grossly unremarkable in appearance. No right axillary lymphadenopathy. 1. Massive rotator cuff tear with rotator cuff arthropathy as detailed above. Moderate to severe atrophy and fatty degeneration of supraspinatus and infraspinatus. Mild-to-moderate atrophy and fatty degeneration of teres minor and subscapularis. 2. Rupture of the long head of the biceps tendon. 3. Large debris containing glenohumeral joint effusion. 4. Focal nodular synovitis or debris in the anterior subscapularis cysts measuring up to 1.7 cm. 5. Mild degenerative changes of the right AC and glenohumeral joints. 6. Superior migration of the humeral head. 7. Mild diffuse labral degeneration. Mild glenohumeral chondromalacia. Chelly Redman was seen today for shoulder pain. Diagnoses and all orders for this visit:    Tear of right rotator cuff, unspecified tear extent, unspecified whether traumatic  -     Amb External Referral To Physical Therapy  -     GA ARTHROCENTESIS ASPIR&/INJ MAJOR JT/BURSA W/O US    Rotator cuff arthropathy, right    Other orders  -     triamcinolone acetonide (KENALOG-40) injection 40 mg      Patient seen and examined. X-rays reviewed. Patient has exam and history consistent with rotator cuff pathology.   MRI recommended for further evaluation and management of possible rotator cuff tear. Patient seen and examined. MRI reviewed with patient in detail. Natural history and course discussed with patient in long discussion  Treatment options discussed with patient in detail including risks and benefits. Patient should do well with conservative management as patient exhibits massive rotator cuff tear that appears to be irreparable at this time. Procedure Note Cortisone Injection to Shoulder    The right shoulder was identified as the injection site. The risk and benefits of a cortisone injection were explained and the patient consented to the injection. Under sterile conditions, the shoulder subacromial space was injected with a mixture of 40mg of Kenelog and Marcaine without complication. A sterile bandage was applied. In a 15 minute assessment and discussion, patient was counseled on weight loss, healthy diet, and physical activity relating to this condition. He was educated with options in detail including nutrition, joining a health club/ weight loss program, and use of cardio equipment such as the Arc Trainer and the importance of use as well as range of motion and HEP exercises for weight loss and general health. Louis Botello, DO            25 minutes was spent with patient. 50% or greater was spent counseling the patient.

## 2022-10-13 ENCOUNTER — TELEPHONE (OUTPATIENT)
Dept: FAMILY MEDICINE CLINIC | Age: 67
End: 2022-10-13

## 2022-10-13 NOTE — TELEPHONE ENCOUNTER
Pt has met occupational therapy goals. Pt may need an order for PT for his right shoulder pending advisement from Dr Vanessa Blanchard.     Last seen 9/29/2022  Next appt 1/4/2023

## 2022-10-17 RX ADMIN — TRIAMCINOLONE ACETONIDE 40 MG: 40 INJECTION, SUSPENSION INTRA-ARTICULAR; INTRAMUSCULAR at 07:57

## 2022-10-18 NOTE — CONSULTS
Department of Orthopedic Surgery  Consult Note              Reason for Consult:  Right shoulder pain      HISTORY OF PRESENT ILLNESS:                                     Patient is a 77 y.o. male who presents with chronic Right shoulder pain after fall, repetitive injury dating several year. Patient currently admitted tot he hospital for elevated blood sugar levels and hallucination. Orthopedics initially consulted for a C7 transverse process fracture however this was deferred to neurosurgery. Orthopedics was re-consulted on mild right shoulder pain that appears to be elevated. Pain is localized to the anterior shoulder and described as mild. Patient does endorse decrease range of motion which have been ongoing for several years. Patient is right hand dominant. Anticoagulation - warfarin. The patient is community Ambulator with assist  - walker. Pt lives at home. Patient has a significant history of multiple falls. Denies numbness/tingling/paresthesias. Denies any other orthopedic complaints at this time.      Tobacco use: none  Alcohol use: none  Illicit drug use: no history of illicit drug use     Past Medical History:    Past Medical History             Diagnosis Date    Acid reflux      Acute diastolic (congestive) heart failure ([facility] 06/19/2019    Arthritis      Asthma 04/16/2014    Asthmatic bronchitis , chronic ([facility] [address] 11/28/2016    CAD (coronary artery disease)      Chronic bronchitis (HCC) 04/16/2014    COPD (chronic obstructive pulmonary disease) (HCC)       CB    COPD (chronic obstructive pulmonary disease) (HCC)      Diabetes mellitus ([facility] [address]      Emphysema (subcutaneous) (surgical) resulting from a procedure      H/O cardiovascular stress test 04/24/2021     Lexiscan    Hyperlipidemia      Hypertension      Hypoxemia requiring supplemental oxygen 02/02/2015    LONG TERM ANTICOAGULENT USE      Morbid obesity with BMI of 50.0-59.9, adult ([facility] [address] 11/27/2013    Obesity      Osteoarthritis      Sleep apnea bilevel positive airway pressure at 13/8 with 2 L oxygen flow     Stage 3 chronic kidney disease (HCC)      Tobacco abuse      Type II or unspecified type diabetes mellitus without mention of complication, not stated as uncontrolled           Past Surgical History:    Past Surgical History             Procedure Laterality Date    COLONOSCOPY        CORONARY ANGIOPLASTY WITH STENT PLACEMENT   07-23-13     Left main focal eccentric 65% distal stenosis. Large OM1 CX 50% ostial & prox narrow. Large ramus artery & LAD: Minor plaque w/o sign narrow. RCA: Dom vessel w/25% prox narrow & focal hazy eccentric 99% distal stenosis before origin RPDA & RPLCA. LV: Mild inferior hypokinesis EF 55%. Probable mild AS with 10-15mmHg. Successful PCI distal RCA w/3.5 x 12 mm BMS, 0% res stenosis.  Normal distal runoff    CORONARY ARTERY BYPASS GRAFT   09-09-13     Dr Oneta Primrose; CABG x2, LIMA to LAD, SVG to ramus intermedius; MV repair using 30-mm Future complete ring with magic stitch between A3 and P3; rigid internal fixation of sternum using KLS plates x2; endoscopic vein harvesting of right lower extremity     ECHO COMPL W DOP COLOR FLOW   7/25/2013          HERNIA REPAIR        SINUS SURGERY             Current Medications:     Current Hospital Medications   Current Facility-Administered Medications: warfarin (COUMADIN) tablet 3 mg, 3 mg, Oral, Daily  risperiDONE (RISPERDAL) tablet 0.5 mg, 0.5 mg, Oral, BID  insulin lispro (HUMALOG) injection vial 0-8 Units, 0-8 Units, SubCUTAneous, TID WC  insulin lispro (HUMALOG) injection vial 0-4 Units, 0-4 Units, SubCUTAneous, Nightly  carbamide peroxide (DEBROX) 6.5 % otic solution 10 drop, 10 drop, Both Ears, BID  losartan (COZAAR) tablet 25 mg, 25 mg, Oral, Daily  metOLazone (ZAROXOLYN) tablet 2.5 mg, 2.5 mg, Oral, Daily  amiodarone (CORDARONE) tablet 200 mg, 200 mg, Oral, Daily  atorvastatin (LIPITOR) tablet 80 mg, 80 mg, Oral, Nightly  bumetanide (BUMEX) tablet 1 mg, 1 mg, Oral, Daily  spironolactone (ALDACTONE) tablet 25 mg, 25 mg, Oral, Daily  aspirin EC tablet 81 mg, 81 mg, Oral, Nightly  insulin glargine (LANTUS) injection vial 45 Units, 45 Units, SubCUTAneous, BID  cetirizine (ZYRTEC) tablet 10 mg, 10 mg, Oral, Daily  montelukast (SINGULAIR) tablet 10 mg, 10 mg, Oral, Nightly  pantoprazole (PROTONIX) tablet 40 mg, 40 mg, Oral, Daily  glucose chewable tablet 16 g, 4 tablet, Oral, PRN  dextrose bolus 10% 125 mL, 125 mL, IntraVENous, PRN **OR** dextrose bolus 10% 250 mL, 250 mL, IntraVENous, PRN  glucagon (rDNA) injection 1 mg, 1 mg, SubCUTAneous, PRN  dextrose 10 % infusion, , IntraVENous, Continuous PRN  sodium chloride flush 0.9 % injection 5-40 mL, 5-40 mL, IntraVENous, 2 times per day  sodium chloride flush 0.9 % injection 5-40 mL, 5-40 mL, IntraVENous, PRN  0.9 % sodium chloride infusion, , IntraVENous, PRN  polyethylene glycol (GLYCOLAX) packet 17 g, 17 g, Oral, Daily PRN  acetaminophen (TYLENOL) tablet 650 mg, 650 mg, Oral, Q6H PRN **OR** acetaminophen (TYLENOL) suppository 650 mg, 650 mg, Rectal, Q6H PRN     Allergies:  Pcn [penicillins] and Sulfa antibiotics     Social History:   TOBACCO:   reports that he quit smoking about 9 years ago. His smoking use included cigarettes. He has a 45.00 pack-year smoking history. He quit smokeless tobacco use about 50 years ago. His smokeless tobacco use included chew. ETOH:   reports no history of alcohol use. DRUGS:   reports no history of drug use.   ACTIVITIES OF DAILY LIVING:    OCCUPATION:    Family History:   Family History             Problem Relation Age of Onset    Cancer Mother           breast    Cancer Father           stomach    Mental Illness Brother      Stroke Brother      Other Brother              REVIEW OF SYSTEMS:  CONSTITUTIONAL:  negative for  fevers, chills  EYES:  negative for blurred vision, visual disturbance  HEENT:  negative for  hearing loss, voice change  RESPIRATORY:  negative for  dyspnea, wheezing  CARDIOVASCULAR:  negative for  chest pain, palpitations  GASTROINTESTINAL:  negative for nausea, vomiting  GENITOURINARY:  negative for frequency, urinary incontinence  HEMATOLOGIC/LYMPHATIC:  negative for bleeding and petechiae  MUSCULOSKELETAL:  positive for  pain and decreased range of motion  NEUROLOGICAL:  negative for headaches, dizziness  BEHAVIOR/PSYCH:  negative for increased agitation and anxiety     PHYSICAL EXAM:    VITALS:  BP (!) 122/58   Pulse 55   Temp 97.6 °F (36.4 °C) (Infrared)   Resp 18   Ht 5' 6\" (1.676 m)   Wt 287 lb 6.4 oz (130.4 kg)   SpO2 95%   BMI 46.39 kg/m²   CONSTITUTIONAL:  awake, alert, cooperative, no apparent distress, and appears stated age  General appearance: alert, well appearing, and in no distress,  normal appearing weight  Mental status: alert, oriented to person, place, and time, normal mood, behavior, speech, dress, motor activity, and thought processes  Abdomen: soft, nondistended   Resp:   resp easy and unlabored, no audible wheezes note  Cardiac: distal pulses palpable, skin well perfused  Neurological: alert, oriented X3, normal speech, no focal findings or movement disorder noted, motor and sensory grossly normal bilaterally, normal muscle tone, no tremors, strength 5/5, normal gait and station  HEENT: normochephalic atraumatic, external ears and eyes normal, sclera normal, neck supple  Extremities:   peripheral pulses normal, no edema, redness or tenderness in the calves   Skin: normal coloration, no rashes or open wounds, no suspicious skin lesions noted  Psych: Affect euthymic   MUSCULOSKELETAL:  Right upper Extremity:  Right riding shoulder can be visualized. No acute skin injuries can be appreciated. Mild joint effusion appreciated. No erythema appreciated Skin intact circumferentially  Mild TTP at the anterior aspect of the humeral head. -TTP at humerus, elbow, forearm, and wrist/hand.    Compartments soft and compressible  +AIN/PIN/Ulnar nerve function intact grossly  Patient demonstrates AROM shoulder 0-15 degrees prior to using the contralateral arm for assisted elevation. +Radial pulse, Brisk Cap refill, hand warm and perfused  Sensation intact to touch in radial/ulnar/median nerve distributions to hand     Secondary Exam:   leftUE: No obvious signs of trauma. -TTP to fingers, hand, wrist, forearm, elbow, humerus, shoulder or clavicle. -- Patient able to flex/extend fingers, wrist, elbow and shoulder with active and passive ROM without pain, +2/4 Radial pulse, cap refill <3sec, +AIN/PIN/Radial/Ulnar/Median N, distal sensation grossly intact to C4-T1 dermatomes, compartments soft and compressible. bilateralLE: No obvious signs of trauma. -TTP to foot, ankle, leg, knee, thigh, hip.-- Patient able to flex/extend toes, ankle, knee and hip with active and passive ROM without pain,+2/4 DP & PT pulses, cap refill <3sec, +5/5 PF/DF/EHL, distal sensation grossly intact to L4-S1 dermatomes, compartments soft and compressible. Pelvis: -TTP, -Log roll, -Heel strike      DATA:    CBC:         Lab Results   Component Value Date/Time     WBC 5.9 09/18/2022 05:31 AM     RBC 3.99 09/18/2022 05:31 AM     HGB 12.5 09/18/2022 05:31 AM     HCT 38.5 09/18/2022 05:31 AM     MCV 96.5 09/18/2022 05:31 AM     MCH 31.3 09/18/2022 05:31 AM     MCHC 32.5 09/18/2022 05:31 AM     RDW 15.5 09/18/2022 05:31 AM      09/18/2022 05:31 AM     MPV 10.1 09/18/2022 05:31 AM      PT/INR:          Lab Results   Component Value Date/Time     PROTIME 26.1 09/18/2022 05:31 AM     PROTIME 10.0 10/02/2019 01:24 PM     INR 2.3 09/18/2022 05:31 AM      CRP/ESR:         Lab Results   Component Value Date/Time     SEDRATE 16 06/09/2011 04:51 PM      Lactic Acid :         Lab Results   Component Value Date/Time     LACTA 1.6 04/18/2021 09:03 AM         Radiology Review:  09/19/22 - XR right shoulder and humerus demonstrates high riding proximal humerus suggesting rotator cuff tear.   No fracture or dislocations noted  MRI Right shoulder 10/2021 reviewed        IMPRESSION:   Right large rotator cuff tear  T7 transverse process fracture     PLAN:  Patient seen and examined. Imaging reviewed with patient in detail. Natural history and course discussed with patient in long discussion  Treatment options discussed with patient in detail including risks and benefits. Patient should do well with conservative management as patient would like to avoid surgery at this time. WBAT - LUE  Recommend consult to neurosurgery for injuries sustained to the cervical spine. Pain Control per admitting service  PT/OT when availabe  Continue ice and elevation to decrease swelling  At this time, no acute orthopedic intervention required. Patient has a chronic right rotator cuff injury with fatty atrophy. Patient has seen in the past with last visit being in January of this year in which he received an corticosteroid injection. Patient will need to follow up in office for further evaluation and plan for definitive management of the right rotator cuff tear.

## 2022-10-24 ENCOUNTER — HOSPITAL ENCOUNTER (INPATIENT)
Age: 67
LOS: 4 days | Discharge: HOME | DRG: 291 | End: 2022-10-28
Attending: EMERGENCY MEDICINE | Admitting: INTERNAL MEDICINE
Payer: MEDICARE

## 2022-10-24 ENCOUNTER — APPOINTMENT (OUTPATIENT)
Dept: GENERAL RADIOLOGY | Age: 67
DRG: 291 | End: 2022-10-24
Payer: MEDICARE

## 2022-10-24 ENCOUNTER — TELEPHONE (OUTPATIENT)
Dept: CARDIOLOGY CLINIC | Age: 67
End: 2022-10-24

## 2022-10-24 ENCOUNTER — TELEPHONE (OUTPATIENT)
Dept: FAMILY MEDICINE CLINIC | Age: 67
End: 2022-10-24

## 2022-10-24 ENCOUNTER — TELEPHONE (OUTPATIENT)
Dept: MEDSURG UNIT | Age: 67
End: 2022-10-24

## 2022-10-24 DIAGNOSIS — I48.91 ATRIAL FIBRILLATION WITH RVR (HCC): Primary | ICD-10-CM

## 2022-10-24 DIAGNOSIS — E11.42 TYPE 2 DIABETES MELLITUS WITH DIABETIC POLYNEUROPATHY, WITH LONG-TERM CURRENT USE OF INSULIN (HCC): ICD-10-CM

## 2022-10-24 DIAGNOSIS — E11.21 TYPE 2 DIABETES MELLITUS WITH DIABETIC NEPHROPATHY, WITH LONG-TERM CURRENT USE OF INSULIN (HCC): ICD-10-CM

## 2022-10-24 DIAGNOSIS — Z79.4 TYPE 2 DIABETES MELLITUS WITH DIABETIC POLYNEUROPATHY, WITH LONG-TERM CURRENT USE OF INSULIN (HCC): ICD-10-CM

## 2022-10-24 DIAGNOSIS — Z79.4 TYPE 2 DIABETES MELLITUS WITH DIABETIC POLYNEUROPATHY, WITH LONG-TERM CURRENT USE OF INSULIN (HCC): Primary | ICD-10-CM

## 2022-10-24 DIAGNOSIS — G89.29 CHRONIC RIGHT SHOULDER PAIN: ICD-10-CM

## 2022-10-24 DIAGNOSIS — Z79.4 TYPE 2 DIABETES MELLITUS WITH DIABETIC NEPHROPATHY, WITH LONG-TERM CURRENT USE OF INSULIN (HCC): ICD-10-CM

## 2022-10-24 DIAGNOSIS — M25.511 CHRONIC RIGHT SHOULDER PAIN: ICD-10-CM

## 2022-10-24 DIAGNOSIS — I50.9 ACUTE ON CHRONIC CONGESTIVE HEART FAILURE, UNSPECIFIED HEART FAILURE TYPE (HCC): ICD-10-CM

## 2022-10-24 DIAGNOSIS — E11.42 TYPE 2 DIABETES MELLITUS WITH DIABETIC POLYNEUROPATHY, WITH LONG-TERM CURRENT USE OF INSULIN (HCC): Primary | ICD-10-CM

## 2022-10-24 DIAGNOSIS — I87.2 VENOUS INSUFFICIENCY (CHRONIC) (PERIPHERAL): ICD-10-CM

## 2022-10-24 LAB
ANION GAP SERPL CALCULATED.3IONS-SCNC: 9 MMOL/L (ref 7–16)
APTT: 29.5 SEC (ref 24.5–35.1)
BASOPHILS ABSOLUTE: 0.03 E9/L (ref 0–0.2)
BASOPHILS RELATIVE PERCENT: 0.4 % (ref 0–2)
BUN BLDV-MCNC: 21 MG/DL (ref 6–23)
CALCIUM SERPL-MCNC: 9.3 MG/DL (ref 8.6–10.2)
CHLORIDE BLD-SCNC: 99 MMOL/L (ref 98–107)
CO2: 26 MMOL/L (ref 22–29)
CREAT SERPL-MCNC: 1.1 MG/DL (ref 0.7–1.2)
EOSINOPHILS ABSOLUTE: 0.04 E9/L (ref 0.05–0.5)
EOSINOPHILS RELATIVE PERCENT: 0.6 % (ref 0–6)
GFR SERPL CREATININE-BSD FRML MDRD: >60 ML/MIN/1.73
GLUCOSE BLD-MCNC: 248 MG/DL (ref 74–99)
HCT VFR BLD CALC: 39.9 % (ref 37–54)
HEMOGLOBIN: 12.8 G/DL (ref 12.5–16.5)
IMMATURE GRANULOCYTES #: 0.03 E9/L
IMMATURE GRANULOCYTES %: 0.4 % (ref 0–5)
LYMPHOCYTES ABSOLUTE: 0.88 E9/L (ref 1.5–4)
LYMPHOCYTES RELATIVE PERCENT: 12.8 % (ref 20–42)
MCH RBC QN AUTO: 31.7 PG (ref 26–35)
MCHC RBC AUTO-ENTMCNC: 32.1 % (ref 32–34.5)
MCV RBC AUTO: 98.8 FL (ref 80–99.9)
METER GLUCOSE: 248 MG/DL (ref 74–99)
MONOCYTES ABSOLUTE: 0.67 E9/L (ref 0.1–0.95)
MONOCYTES RELATIVE PERCENT: 9.7 % (ref 2–12)
NEUTROPHILS ABSOLUTE: 5.23 E9/L (ref 1.8–7.3)
NEUTROPHILS RELATIVE PERCENT: 76.1 % (ref 43–80)
PDW BLD-RTO: 15.9 FL (ref 11.5–15)
PLATELET # BLD: 170 E9/L (ref 130–450)
PMV BLD AUTO: 9.8 FL (ref 7–12)
POTASSIUM REFLEX MAGNESIUM: 5.8 MMOL/L (ref 3.5–5)
PRO-BNP: 2248 PG/ML (ref 0–125)
RBC # BLD: 4.04 E12/L (ref 3.8–5.8)
REASON FOR REJECTION: NORMAL
REJECTED TEST: NORMAL
SODIUM BLD-SCNC: 134 MMOL/L (ref 132–146)
TROPONIN, HIGH SENSITIVITY: 30 NG/L (ref 0–11)
TROPONIN, HIGH SENSITIVITY: 30 NG/L (ref 0–11)
WBC # BLD: 6.9 E9/L (ref 4.5–11.5)

## 2022-10-24 PROCEDURE — 94664 DEMO&/EVAL PT USE INHALER: CPT

## 2022-10-24 PROCEDURE — 6360000002 HC RX W HCPCS: Performed by: NURSE PRACTITIONER

## 2022-10-24 PROCEDURE — 93005 ELECTROCARDIOGRAM TRACING: CPT | Performed by: NURSE PRACTITIONER

## 2022-10-24 PROCEDURE — 2500000003 HC RX 250 WO HCPCS: Performed by: NURSE PRACTITIONER

## 2022-10-24 PROCEDURE — 96374 THER/PROPH/DIAG INJ IV PUSH: CPT

## 2022-10-24 PROCEDURE — 6370000000 HC RX 637 (ALT 250 FOR IP): Performed by: NURSE PRACTITIONER

## 2022-10-24 PROCEDURE — 2580000003 HC RX 258: Performed by: NURSE PRACTITIONER

## 2022-10-24 PROCEDURE — 84484 ASSAY OF TROPONIN QUANT: CPT

## 2022-10-24 PROCEDURE — 87040 BLOOD CULTURE FOR BACTERIA: CPT

## 2022-10-24 PROCEDURE — 80048 BASIC METABOLIC PNL TOTAL CA: CPT

## 2022-10-24 PROCEDURE — 82962 GLUCOSE BLOOD TEST: CPT

## 2022-10-24 PROCEDURE — 99285 EMERGENCY DEPT VISIT HI MDM: CPT

## 2022-10-24 PROCEDURE — 36415 COLL VENOUS BLD VENIPUNCTURE: CPT

## 2022-10-24 PROCEDURE — 85025 COMPLETE CBC W/AUTO DIFF WBC: CPT

## 2022-10-24 PROCEDURE — 2060000000 HC ICU INTERMEDIATE R&B

## 2022-10-24 PROCEDURE — 84145 PROCALCITONIN (PCT): CPT

## 2022-10-24 PROCEDURE — 94640 AIRWAY INHALATION TREATMENT: CPT

## 2022-10-24 PROCEDURE — 83036 HEMOGLOBIN GLYCOSYLATED A1C: CPT

## 2022-10-24 PROCEDURE — 71046 X-RAY EXAM CHEST 2 VIEWS: CPT

## 2022-10-24 PROCEDURE — 2500000003 HC RX 250 WO HCPCS: Performed by: EMERGENCY MEDICINE

## 2022-10-24 PROCEDURE — 85730 THROMBOPLASTIN TIME PARTIAL: CPT

## 2022-10-24 PROCEDURE — 96375 TX/PRO/DX INJ NEW DRUG ADDON: CPT

## 2022-10-24 PROCEDURE — 83880 ASSAY OF NATRIURETIC PEPTIDE: CPT

## 2022-10-24 PROCEDURE — 6360000002 HC RX W HCPCS: Performed by: EMERGENCY MEDICINE

## 2022-10-24 PROCEDURE — 0202U NFCT DS 22 TRGT SARS-COV-2: CPT

## 2022-10-24 RX ORDER — AMITRIPTYLINE HYDROCHLORIDE 25 MG/1
50 TABLET, FILM COATED ORAL NIGHTLY
Status: DISCONTINUED | OUTPATIENT
Start: 2022-10-24 | End: 2022-10-28 | Stop reason: HOSPADM

## 2022-10-24 RX ORDER — BUMETANIDE 1 MG/1
TABLET ORAL
Qty: 30 TABLET | Refills: 3 | Status: SHIPPED | OUTPATIENT
Start: 2022-10-24

## 2022-10-24 RX ORDER — SODIUM CHLORIDE 0.9 % (FLUSH) 0.9 %
5-40 SYRINGE (ML) INJECTION EVERY 12 HOURS SCHEDULED
Status: DISCONTINUED | OUTPATIENT
Start: 2022-10-24 | End: 2022-10-28 | Stop reason: HOSPADM

## 2022-10-24 RX ORDER — ACETAMINOPHEN 325 MG/1
650 TABLET ORAL EVERY 6 HOURS PRN
Status: DISCONTINUED | OUTPATIENT
Start: 2022-10-24 | End: 2022-10-28 | Stop reason: HOSPADM

## 2022-10-24 RX ORDER — DEXTROSE MONOHYDRATE 100 MG/ML
INJECTION, SOLUTION INTRAVENOUS CONTINUOUS PRN
Status: DISCONTINUED | OUTPATIENT
Start: 2022-10-24 | End: 2022-10-28 | Stop reason: HOSPADM

## 2022-10-24 RX ORDER — FUROSEMIDE 10 MG/ML
60 INJECTION INTRAMUSCULAR; INTRAVENOUS ONCE
Status: COMPLETED | OUTPATIENT
Start: 2022-10-24 | End: 2022-10-24

## 2022-10-24 RX ORDER — MAGNESIUM SULFATE 1 G/100ML
1000 INJECTION INTRAVENOUS PRN
Status: DISCONTINUED | OUTPATIENT
Start: 2022-10-24 | End: 2022-10-28 | Stop reason: HOSPADM

## 2022-10-24 RX ORDER — INSULIN LISPRO 100 [IU]/ML
0-8 INJECTION, SOLUTION INTRAVENOUS; SUBCUTANEOUS
Status: DISCONTINUED | OUTPATIENT
Start: 2022-10-25 | End: 2022-10-28 | Stop reason: HOSPADM

## 2022-10-24 RX ORDER — ALBUTEROL SULFATE 2.5 MG/3ML
2.5 SOLUTION RESPIRATORY (INHALATION) EVERY 4 HOURS PRN
Status: DISCONTINUED | OUTPATIENT
Start: 2022-10-24 | End: 2022-10-28 | Stop reason: HOSPADM

## 2022-10-24 RX ORDER — METOPROLOL SUCCINATE 25 MG/1
25 TABLET, EXTENDED RELEASE ORAL 2 TIMES DAILY
Status: DISCONTINUED | OUTPATIENT
Start: 2022-10-24 | End: 2022-10-25

## 2022-10-24 RX ORDER — ONDANSETRON 4 MG/1
4 TABLET, ORALLY DISINTEGRATING ORAL EVERY 8 HOURS PRN
Status: DISCONTINUED | OUTPATIENT
Start: 2022-10-24 | End: 2022-10-25 | Stop reason: ALTCHOICE

## 2022-10-24 RX ORDER — ASPIRIN 81 MG/1
81 TABLET ORAL NIGHTLY
Status: DISCONTINUED | OUTPATIENT
Start: 2022-10-24 | End: 2022-10-28 | Stop reason: HOSPADM

## 2022-10-24 RX ORDER — SODIUM CHLORIDE 0.9 % (FLUSH) 0.9 %
5-40 SYRINGE (ML) INJECTION PRN
Status: DISCONTINUED | OUTPATIENT
Start: 2022-10-24 | End: 2022-10-28 | Stop reason: HOSPADM

## 2022-10-24 RX ORDER — RISPERIDONE 1 MG/1
0.5 TABLET ORAL 2 TIMES DAILY
Status: DISCONTINUED | OUTPATIENT
Start: 2022-10-24 | End: 2022-10-28 | Stop reason: HOSPADM

## 2022-10-24 RX ORDER — BUMETANIDE 0.25 MG/ML
0.5 INJECTION, SOLUTION INTRAMUSCULAR; INTRAVENOUS ONCE
Status: COMPLETED | OUTPATIENT
Start: 2022-10-24 | End: 2022-10-24

## 2022-10-24 RX ORDER — INSULIN GLARGINE 100 [IU]/ML
45 INJECTION, SOLUTION SUBCUTANEOUS 2 TIMES DAILY
Status: DISCONTINUED | OUTPATIENT
Start: 2022-10-24 | End: 2022-10-28 | Stop reason: HOSPADM

## 2022-10-24 RX ORDER — SODIUM CHLORIDE 9 MG/ML
INJECTION, SOLUTION INTRAVENOUS PRN
Status: DISCONTINUED | OUTPATIENT
Start: 2022-10-24 | End: 2022-10-28 | Stop reason: HOSPADM

## 2022-10-24 RX ORDER — AMIODARONE HYDROCHLORIDE 200 MG/1
200 TABLET ORAL DAILY
Status: DISCONTINUED | OUTPATIENT
Start: 2022-10-25 | End: 2022-10-26

## 2022-10-24 RX ORDER — POTASSIUM CHLORIDE 20 MEQ/1
40 TABLET, EXTENDED RELEASE ORAL PRN
Status: DISCONTINUED | OUTPATIENT
Start: 2022-10-24 | End: 2022-10-28 | Stop reason: HOSPADM

## 2022-10-24 RX ORDER — ATORVASTATIN CALCIUM 40 MG/1
80 TABLET, FILM COATED ORAL NIGHTLY
Status: DISCONTINUED | OUTPATIENT
Start: 2022-10-24 | End: 2022-10-28 | Stop reason: HOSPADM

## 2022-10-24 RX ORDER — BUMETANIDE 1 MG/1
1 TABLET ORAL DAILY
Status: DISCONTINUED | OUTPATIENT
Start: 2022-10-25 | End: 2022-10-26

## 2022-10-24 RX ORDER — ONDANSETRON 2 MG/ML
4 INJECTION INTRAMUSCULAR; INTRAVENOUS EVERY 6 HOURS PRN
Status: DISCONTINUED | OUTPATIENT
Start: 2022-10-24 | End: 2022-10-25 | Stop reason: ALTCHOICE

## 2022-10-24 RX ORDER — INSULIN LISPRO 100 [IU]/ML
10 INJECTION, SOLUTION INTRAVENOUS; SUBCUTANEOUS
Status: DISCONTINUED | OUTPATIENT
Start: 2022-10-25 | End: 2022-10-28 | Stop reason: HOSPADM

## 2022-10-24 RX ORDER — AMITRIPTYLINE HYDROCHLORIDE 50 MG/1
TABLET, FILM COATED ORAL
Qty: 30 TABLET | Refills: 3 | Status: SHIPPED | OUTPATIENT
Start: 2022-10-24

## 2022-10-24 RX ORDER — PANTOPRAZOLE SODIUM 40 MG/1
40 TABLET, DELAYED RELEASE ORAL DAILY
Status: DISCONTINUED | OUTPATIENT
Start: 2022-10-25 | End: 2022-10-28 | Stop reason: HOSPADM

## 2022-10-24 RX ORDER — METOPROLOL TARTRATE 5 MG/5ML
5 INJECTION INTRAVENOUS ONCE
Status: COMPLETED | OUTPATIENT
Start: 2022-10-24 | End: 2022-10-24

## 2022-10-24 RX ORDER — POTASSIUM CHLORIDE 7.45 MG/ML
10 INJECTION INTRAVENOUS PRN
Status: DISCONTINUED | OUTPATIENT
Start: 2022-10-24 | End: 2022-10-28 | Stop reason: HOSPADM

## 2022-10-24 RX ORDER — BUDESONIDE 0.5 MG/2ML
0.5 INHALANT ORAL 2 TIMES DAILY
Status: DISCONTINUED | OUTPATIENT
Start: 2022-10-24 | End: 2022-10-28 | Stop reason: HOSPADM

## 2022-10-24 RX ORDER — POLYETHYLENE GLYCOL 3350 17 G/17G
17 POWDER, FOR SOLUTION ORAL DAILY PRN
Status: DISCONTINUED | OUTPATIENT
Start: 2022-10-24 | End: 2022-10-28 | Stop reason: HOSPADM

## 2022-10-24 RX ORDER — INSULIN LISPRO 100 [IU]/ML
0-4 INJECTION, SOLUTION INTRAVENOUS; SUBCUTANEOUS NIGHTLY
Status: DISCONTINUED | OUTPATIENT
Start: 2022-10-24 | End: 2022-10-28 | Stop reason: HOSPADM

## 2022-10-24 RX ORDER — ARFORMOTEROL TARTRATE 15 UG/2ML
15 SOLUTION RESPIRATORY (INHALATION) 2 TIMES DAILY
Status: DISCONTINUED | OUTPATIENT
Start: 2022-10-24 | End: 2022-10-28 | Stop reason: HOSPADM

## 2022-10-24 RX ORDER — ACETAMINOPHEN 650 MG/1
650 SUPPOSITORY RECTAL EVERY 6 HOURS PRN
Status: DISCONTINUED | OUTPATIENT
Start: 2022-10-24 | End: 2022-10-28 | Stop reason: HOSPADM

## 2022-10-24 RX ADMIN — BUMETANIDE 0.5 MG: 0.25 INJECTION INTRAMUSCULAR; INTRAVENOUS at 23:14

## 2022-10-24 RX ADMIN — METOPROLOL SUCCINATE 25 MG: 25 TABLET, FILM COATED, EXTENDED RELEASE ORAL at 23:09

## 2022-10-24 RX ADMIN — INSULIN GLARGINE 45 UNITS: 100 INJECTION, SOLUTION SUBCUTANEOUS at 23:18

## 2022-10-24 RX ADMIN — BUDESONIDE 500 MCG: 0.5 INHALANT RESPIRATORY (INHALATION) at 23:21

## 2022-10-24 RX ADMIN — ATORVASTATIN CALCIUM 80 MG: 40 TABLET, FILM COATED ORAL at 23:09

## 2022-10-24 RX ADMIN — ARFORMOTEROL TARTRATE 15 MCG: 15 SOLUTION RESPIRATORY (INHALATION) at 23:21

## 2022-10-24 RX ADMIN — RISPERIDONE 0.5 MG: 1 TABLET ORAL at 23:12

## 2022-10-24 RX ADMIN — AMITRIPTYLINE HYDROCHLORIDE 50 MG: 25 TABLET, FILM COATED ORAL at 23:12

## 2022-10-24 RX ADMIN — Medication 10 ML: at 23:45

## 2022-10-24 RX ADMIN — FUROSEMIDE 60 MG: 10 INJECTION, SOLUTION INTRAMUSCULAR; INTRAVENOUS at 19:46

## 2022-10-24 RX ADMIN — ASPIRIN 81 MG: 81 TABLET, COATED ORAL at 23:12

## 2022-10-24 RX ADMIN — METOPROLOL TARTRATE 5 MG: 5 INJECTION, SOLUTION INTRAVENOUS at 19:48

## 2022-10-24 RX ADMIN — APIXABAN 5 MG: 5 TABLET, FILM COATED ORAL at 23:12

## 2022-10-24 ASSESSMENT — ENCOUNTER SYMPTOMS
SORE THROAT: 0
SHORTNESS OF BREATH: 1
WHEEZING: 0
CHEST TIGHTNESS: 0
VOMITING: 0
BACK PAIN: 0
DIARRHEA: 0
COUGH: 0
NAUSEA: 0
ABDOMINAL PAIN: 0
SINUS PRESSURE: 0

## 2022-10-24 NOTE — TELEPHONE ENCOUNTER
His heart rate is too fast, he should go to the emergency room, and waiting till the 17th is not a good idea

## 2022-10-24 NOTE — ED PROVIDER NOTES
Chief complaint:  Short of breath    HPI history provided by the patient  Patient resents complaining of water weight gain, increasing bilateral leg edema with increasing shortness of breath worsened with laying flat and exertion. Denies chest pain, lightheadedness or syncope. No fevers, sweats or chills, no cough or congestion or runny nose. No unilateral leg swelling. No calf pain. No abdominal pain. No nausea vomiting or diarrhea. States he is taking his diuretics as instructed. No specific treatment otherwise prior to arrival.    Review of Systems   Constitutional:  Negative for chills, diaphoresis, fatigue and fever. HENT:  Negative for congestion, sinus pressure and sore throat. Respiratory:  Positive for shortness of breath. Negative for cough, chest tightness and wheezing. Cardiovascular:  Positive for leg swelling. Negative for chest pain and palpitations. Gastrointestinal:  Negative for abdominal pain, diarrhea, nausea and vomiting. Genitourinary:  Negative for dysuria, flank pain, frequency and urgency. Musculoskeletal:  Negative for arthralgias, back pain, gait problem, joint swelling, myalgias, neck pain and neck stiffness. Skin:  Negative for rash and wound. Neurological:  Negative for dizziness, seizures, syncope, weakness, light-headedness, numbness and headaches. All other systems reviewed and are negative. Physical Exam  Vitals and nursing note reviewed. Constitutional:       General: He is awake. He is not in acute distress. Appearance: He is well-developed. He is morbidly obese. He is not ill-appearing, toxic-appearing or diaphoretic. HENT:      Head: Normocephalic and atraumatic. Eyes:      General: No scleral icterus. Extraocular Movements: Extraocular movements intact. Conjunctiva/sclera: Conjunctivae normal.      Pupils: Pupils are equal, round, and reactive to light. Cardiovascular:      Rate and Rhythm: Normal rate and regular rhythm. findings consistent with congestive heart failure. He also had tachycardia, EKG revealed atrial fibrillation with RVR. He remained hemodynamically stable throughout his stay and was medicated for CHF as well as his atrial fibrillation. Patient ultimately admitted to [facility]. ED Course as of 10/24/22 2131   Mon Oct 24, 2022   2048 Case discussed with Kaylan Zuniga, he knows the patient well, they will admit the patient. [NC]      ED Course User Index  [NC] Saige Aceves DO     EKG Interpretation    Interpreted by emergency department physician    Rhythm: atrial fibrillation - rapid  Rate: 123  Axis: right  Ectopy: none  Conduction: right bundle branch block (incomplete)  ST Segments: nonspecific changes  T Waves: no acute change  Q Waves: none    Clinical Impression: atrial fibrillation (chronic)    Saige Aceves DO     ED Course as of 10/24/22 2131   Mon Oct 24, 2022   2048 Case discussed with Kaylan Zuniga, he knows the patient well, they will admit the patient.  [NC]      ED Course User Index  [NC] Saige Aceves, DO       --------------------------------------------- PAST HISTORY ---------------------------------------------  Past Medical History:  has a past medical history of Acid reflux, Acute diastolic (congestive) heart failure (HCC), Arthritis, Asthma, Asthmatic bronchitis , chronic (HCC), CAD (coronary artery disease), Chronic bronchitis ([address] COPD (chronic obstructive pulmonary disease) ([address] COPD (chronic obstructive pulmonary disease) ([address] Diabetes mellitus ([address] Emphysema (subcutaneous) (surgical) resulting from a procedure, H/O cardiovascular stress test, Hyperlipidemia, Hypertension, Hypoxemia requiring supplemental oxygen, LONG TERM ANTICOAGULENT USE, Morbid obesity with BMI of 50.0-59.9, adult ([address] Obesity, Osteoarthritis, Sleep apnea, Stage 3 chronic kidney disease ([address] Tobacco abuse, and Type II or unspecified type diabetes mellitus without mention of complication, not stated as uncontrolled. Past Surgical History:  has a past surgical history that includes hernia repair; sinus surgery; ECHO Compl W Dop Color Flow (7/25/2013); Colonoscopy; Coronary angioplasty with stent (07-23-13); and Coronary artery bypass graft (09-09-13). Social History:  reports that he quit smoking about 9 years ago. His smoking use included cigarettes. He has a 45.00 pack-year smoking history. He quit smokeless tobacco use about 51 years ago. His smokeless tobacco use included chew. He reports that he does not drink alcohol and does not use drugs. Family History: family history includes Cancer in his father and mother; Mental Illness in his brother; Other in his brother; Stroke in his brother. The patients home medications have been reviewed.     Allergies: Pcn [penicillins] and Sulfa antibiotics    -------------------------------------------------- RESULTS -------------------------------------------------    LABS:  Results for orders placed or performed during the hospital encounter of 10/24/22   CBC with Auto Differential   Result Value Ref Range    WBC 6.9 4.5 - 11.5 E9/L    RBC 4.04 3.80 - 5.80 E12/L    Hemoglobin 12.8 12.5 - 16.5 g/dL    Hematocrit 39.9 37.0 - 54.0 %    MCV 98.8 80.0 - 99.9 fL    MCH 31.7 26.0 - 35.0 pg    MCHC 32.1 32.0 - 34.5 %    RDW 15.9 (H) 11.5 - 15.0 fL    Platelets 483 024 - 605 E9/L    MPV 9.8 7.0 - 12.0 fL    Neutrophils % 76.1 43.0 - 80.0 %    Immature Granulocytes % 0.4 0.0 - 5.0 %    Lymphocytes % 12.8 (L) 20.0 - 42.0 %    Monocytes % 9.7 2.0 - 12.0 %    Eosinophils % 0.6 0.0 - 6.0 %    Basophils % 0.4 0.0 - 2.0 %    Neutrophils Absolute 5.23 1.80 - 7.30 E9/L    Immature Granulocytes # 0.03 E9/L    Lymphocytes Absolute 0.88 (L) 1.50 - 4.00 E9/L    Monocytes Absolute 0.67 0.10 - 0.95 E9/L    Eosinophils Absolute 0.04 (L) 0.05 - 0.50 E9/L    Basophils Absolute 0.03 0.00 - 0.20 N9/I   Basic Metabolic Panel w/ Reflex to MG   Result Value Ref Range    Sodium 134 132 - 146 mmol/L    Potassium reflex Magnesium 5.8 (H) 3.5 - 5.0 mmol/L    Chloride 99 98 - 107 mmol/L    CO2 26 22 - 29 mmol/L    Anion Gap 9 7 - 16 mmol/L    Glucose 248 (H) 74 - 99 mg/dL    BUN 21 6 - 23 mg/dL    Creatinine 1.1 0.7 - 1.2 mg/dL    Est, Glom Filt Rate >60 >=60 mL/min/1.73    Calcium 9.3 8.6 - 10.2 mg/dL   Brain Natriuretic Peptide   Result Value Ref Range    Pro-BNP 2,248 (H) 0 - 125 pg/mL   APTT   Result Value Ref Range    aPTT 29.5 24.5 - 35.1 sec   SPECIMEN REJECTION   Result Value Ref Range    Rejected Test TRP5     Reason for Rejection see below    Troponin   Result Value Ref Range    Troponin, High Sensitivity 30 (H) 0 - 11 ng/L       RADIOLOGY:  XR CHEST (2 VW)   Final Result   Mild bibasilar opacities and pleural effusion suspicious for early CHF. Superimposed pneumonia cannot be excluded. ------------------------- NURSING NOTES AND VITALS REVIEWED ---------------------------  Date / Time Roomed:  10/24/2022  5:21 PM  ED Bed Assignment:  07/07    The nursing notes within the ED encounter and vital signs as below have been reviewed.      Patient Vitals for the past 24 hrs:   BP Temp Temp src Pulse Resp SpO2 Weight   10/24/22 2116 -- -- -- (!) 109 18 94 % --   10/24/22 2106 -- -- -- (!) 120 17 96 % --   10/24/22 2056 -- -- -- (!) 118 24 -- --   10/24/22 2046 -- -- -- (!) 103 22 (!) 89 % --   10/24/22 2036 114/78 -- -- 96 12 90 % --   10/24/22 1954 -- -- -- (!) 123 20 92 % --   10/24/22 1953 -- -- -- (!) 122 16 93 % --   10/24/22 1952 -- -- -- (!) 120 14 94 % --   10/24/22 1951 -- -- -- (!) 120 19 94 % --   10/24/22 1950 (!) 128/94 -- -- (!) 117 16 94 % --   10/24/22 1718 113/68 -- -- (!) 130 24 -- (!) 308 lb (139.7 kg)   10/24/22 1633 -- 98.6 °F (37 °C) Infrared (!) 124 18 92 % --       Oxygen Saturation Interpretation: Abnormal and Improved after treatment    ------------------------------------------ PROGRESS NOTES ------------------------------------------  Re-evaluation(s):  Time: 2030  Patients symptoms show no change  Repeat physical examination is not changed    Counseling:  I have spoken with the patient and discussed todays results, in addition to providing specific details for the plan of care and counseling regarding the diagnosis and prognosis. Their questions are answered at this time and they are agreeable with the plan of admission.    --------------------------------- ADDITIONAL PROVIDER NOTES ---------------------------------  Consultations: This patient's ED course included: a personal history and physicial examination, re-evaluation prior to disposition, multiple bedside re-evaluations, IV medications, cardiac monitoring, and continuous pulse oximetry    This patient has remained hemodynamically stable during their ED course. Diagnosis:  1. Atrial fibrillation with RVR ([facility]    2. Acute on chronic congestive heart failure, unspecified heart failure type ([facility] [address]        Disposition:  Patient's disposition: Admit to [facility]  Patient's condition is stable.          Corin Wiley DO  10/24/22 2132

## 2022-10-24 NOTE — ED NOTES
Department of Emergency Medicine  FIRST PROVIDER TRIAGE NOTE             Independent MLP           10/24/22  5:21 PM EDT    Date of Encounter: 10/24/22   MRN: [id number]      HPI: Mars Desai is a 77 y.o. male who presents to the ED for shortness of breath, tachycardia, leg swelling    ROS: Negative for cp. PE: Gen Appearance/Constitutional: alert  Pulm: Respirations easy and unlabored    Vitals:    10/24/22 1718   BP: 113/68   Pulse: (!) 130   Resp: 24   Temp:    SpO2:        Past medical history, surgical history, and medications reviewed. Initial Plan of Care: All treatment areas within department are currently occupied. Plan to order/initiate the following while awaiting opening in ED: labs, EKG, and imaging studies. Initiate treatment/testing, proceed to treatment area when bed available for ED Attending/MLP to continue care.     Electronically signed by ARLEEN Moran CNP   DD: 10/24/22          ARLEEN Moran CNP  10/24/22 1438

## 2022-10-24 NOTE — TELEPHONE ENCOUNTER
Patient asking for referral to the [facility]. Renal insufficiency. Please advise.     Last seen 9/29/2022  Next appt 10/25/2022

## 2022-10-24 NOTE — TELEPHONE ENCOUNTER
Karin from home health care called and said the patients heart rate has been between 116-125. He has swelling in his hands and feet, without SOB. I was able to get him an appointment with Piper Weathers on November 17th. Please advise. Thankyou.

## 2022-10-25 LAB
ADENOVIRUS BY PCR: NOT DETECTED
ALBUMIN SERPL-MCNC: 3.6 G/DL (ref 3.5–5.2)
ALP BLD-CCNC: 125 U/L (ref 40–129)
ALT SERPL-CCNC: 18 U/L (ref 0–40)
ANION GAP SERPL CALCULATED.3IONS-SCNC: 8 MMOL/L (ref 7–16)
AST SERPL-CCNC: 14 U/L (ref 0–39)
BASOPHILS ABSOLUTE: 0.03 E9/L (ref 0–0.2)
BASOPHILS RELATIVE PERCENT: 0.5 % (ref 0–2)
BILIRUB SERPL-MCNC: 1.2 MG/DL (ref 0–1.2)
BILIRUBIN DIRECT: 0.4 MG/DL (ref 0–0.3)
BILIRUBIN, INDIRECT: 0.8 MG/DL (ref 0–1)
BORDETELLA PARAPERTUSSIS BY PCR: NOT DETECTED
BORDETELLA PERTUSSIS BY PCR: NOT DETECTED
BUN BLDV-MCNC: 23 MG/DL (ref 6–23)
CALCIUM SERPL-MCNC: 9.1 MG/DL (ref 8.6–10.2)
CHLAMYDOPHILIA PNEUMONIAE BY PCR: NOT DETECTED
CHLORIDE BLD-SCNC: 98 MMOL/L (ref 98–107)
CO2: 30 MMOL/L (ref 22–29)
CORONAVIRUS 229E BY PCR: NOT DETECTED
CORONAVIRUS HKU1 BY PCR: NOT DETECTED
CORONAVIRUS NL63 BY PCR: NOT DETECTED
CORONAVIRUS OC43 BY PCR: NOT DETECTED
CREAT SERPL-MCNC: 1.1 MG/DL (ref 0.7–1.2)
EOSINOPHILS ABSOLUTE: 0.07 E9/L (ref 0.05–0.5)
EOSINOPHILS RELATIVE PERCENT: 1.2 % (ref 0–6)
GFR SERPL CREATININE-BSD FRML MDRD: >60 ML/MIN/1.73
GLUCOSE BLD-MCNC: 201 MG/DL (ref 74–99)
HBA1C MFR BLD: 8.3 % (ref 4–5.6)
HCT VFR BLD CALC: 38 % (ref 37–54)
HEMOGLOBIN: 12.1 G/DL (ref 12.5–16.5)
HUMAN METAPNEUMOVIRUS BY PCR: NOT DETECTED
HUMAN RHINOVIRUS/ENTEROVIRUS BY PCR: NOT DETECTED
IMMATURE GRANULOCYTES #: 0.03 E9/L
IMMATURE GRANULOCYTES %: 0.5 % (ref 0–5)
INFLUENZA A BY PCR: NOT DETECTED
INFLUENZA B BY PCR: NOT DETECTED
LYMPHOCYTES ABSOLUTE: 1.02 E9/L (ref 1.5–4)
LYMPHOCYTES RELATIVE PERCENT: 17.2 % (ref 20–42)
MAGNESIUM: 1.9 MG/DL (ref 1.6–2.6)
MCH RBC QN AUTO: 31.5 PG (ref 26–35)
MCHC RBC AUTO-ENTMCNC: 31.8 % (ref 32–34.5)
MCV RBC AUTO: 99 FL (ref 80–99.9)
METER GLUCOSE: 137 MG/DL (ref 74–99)
METER GLUCOSE: 213 MG/DL (ref 74–99)
METER GLUCOSE: 222 MG/DL (ref 74–99)
METER GLUCOSE: 65 MG/DL (ref 74–99)
METER GLUCOSE: 70 MG/DL (ref 74–99)
MONOCYTES ABSOLUTE: 0.61 E9/L (ref 0.1–0.95)
MONOCYTES RELATIVE PERCENT: 10.3 % (ref 2–12)
MYCOPLASMA PNEUMONIAE BY PCR: NOT DETECTED
NEUTROPHILS ABSOLUTE: 4.16 E9/L (ref 1.8–7.3)
NEUTROPHILS RELATIVE PERCENT: 70.3 % (ref 43–80)
PARAINFLUENZA VIRUS 1 BY PCR: NOT DETECTED
PARAINFLUENZA VIRUS 2 BY PCR: NOT DETECTED
PARAINFLUENZA VIRUS 3 BY PCR: NOT DETECTED
PARAINFLUENZA VIRUS 4 BY PCR: NOT DETECTED
PDW BLD-RTO: 16.2 FL (ref 11.5–15)
PHOSPHORUS: 3.5 MG/DL (ref 2.5–4.5)
PLATELET # BLD: 170 E9/L (ref 130–450)
PMV BLD AUTO: 9.6 FL (ref 7–12)
POTASSIUM SERPL-SCNC: 3.7 MMOL/L (ref 3.5–5)
PROCALCITONIN: 0.13 NG/ML (ref 0–0.08)
RBC # BLD: 3.84 E12/L (ref 3.8–5.8)
RESPIRATORY SYNCYTIAL VIRUS BY PCR: NOT DETECTED
SARS-COV-2, PCR: NOT DETECTED
SODIUM BLD-SCNC: 136 MMOL/L (ref 132–146)
T4 FREE: 0.55 NG/DL (ref 0.93–1.7)
TOTAL PROTEIN: 7 G/DL (ref 6.4–8.3)
TROPONIN, HIGH SENSITIVITY: 30 NG/L (ref 0–11)
TSH SERPL DL<=0.05 MIU/L-ACNC: 39.18 UIU/ML (ref 0.27–4.2)
WBC # BLD: 5.9 E9/L (ref 4.5–11.5)

## 2022-10-25 PROCEDURE — 97161 PT EVAL LOW COMPLEX 20 MIN: CPT | Performed by: PHYSICAL THERAPIST

## 2022-10-25 PROCEDURE — 84484 ASSAY OF TROPONIN QUANT: CPT

## 2022-10-25 PROCEDURE — 6360000002 HC RX W HCPCS: Performed by: NURSE PRACTITIONER

## 2022-10-25 PROCEDURE — 97530 THERAPEUTIC ACTIVITIES: CPT | Performed by: OCCUPATIONAL THERAPIST

## 2022-10-25 PROCEDURE — 80076 HEPATIC FUNCTION PANEL: CPT

## 2022-10-25 PROCEDURE — 36415 COLL VENOUS BLD VENIPUNCTURE: CPT

## 2022-10-25 PROCEDURE — 87040 BLOOD CULTURE FOR BACTERIA: CPT

## 2022-10-25 PROCEDURE — 84443 ASSAY THYROID STIM HORMONE: CPT

## 2022-10-25 PROCEDURE — 6370000000 HC RX 637 (ALT 250 FOR IP): Performed by: NURSE PRACTITIONER

## 2022-10-25 PROCEDURE — 80048 BASIC METABOLIC PNL TOTAL CA: CPT

## 2022-10-25 PROCEDURE — 84439 ASSAY OF FREE THYROXINE: CPT

## 2022-10-25 PROCEDURE — 2580000003 HC RX 258: Performed by: NURSE PRACTITIONER

## 2022-10-25 PROCEDURE — APPSS60 APP SPLIT SHARED TIME 46-60 MINUTES: Performed by: PHYSICIAN ASSISTANT

## 2022-10-25 PROCEDURE — 85025 COMPLETE CBC W/AUTO DIFF WBC: CPT

## 2022-10-25 PROCEDURE — 97165 OT EVAL LOW COMPLEX 30 MIN: CPT | Performed by: OCCUPATIONAL THERAPIST

## 2022-10-25 PROCEDURE — 82962 GLUCOSE BLOOD TEST: CPT

## 2022-10-25 PROCEDURE — 83735 ASSAY OF MAGNESIUM: CPT

## 2022-10-25 PROCEDURE — 94640 AIRWAY INHALATION TREATMENT: CPT

## 2022-10-25 PROCEDURE — 84100 ASSAY OF PHOSPHORUS: CPT

## 2022-10-25 PROCEDURE — 99222 1ST HOSP IP/OBS MODERATE 55: CPT | Performed by: INTERNAL MEDICINE

## 2022-10-25 PROCEDURE — 2060000000 HC ICU INTERMEDIATE R&B

## 2022-10-25 RX ORDER — PROCHLORPERAZINE EDISYLATE 5 MG/ML
10 INJECTION INTRAMUSCULAR; INTRAVENOUS EVERY 6 HOURS PRN
Status: DISCONTINUED | OUTPATIENT
Start: 2022-10-25 | End: 2022-10-28 | Stop reason: HOSPADM

## 2022-10-25 RX ORDER — METOPROLOL SUCCINATE 25 MG/1
25 TABLET, EXTENDED RELEASE ORAL DAILY
Status: DISCONTINUED | OUTPATIENT
Start: 2022-10-25 | End: 2022-10-28 | Stop reason: HOSPADM

## 2022-10-25 RX ORDER — LEVOTHYROXINE SODIUM 0.05 MG/1
50 TABLET ORAL DAILY
Status: DISCONTINUED | OUTPATIENT
Start: 2022-10-25 | End: 2022-10-28 | Stop reason: HOSPADM

## 2022-10-25 RX ORDER — PROMETHAZINE HYDROCHLORIDE 25 MG/1
25 TABLET ORAL EVERY 6 HOURS PRN
Status: DISCONTINUED | OUTPATIENT
Start: 2022-10-25 | End: 2022-10-28 | Stop reason: HOSPADM

## 2022-10-25 RX ADMIN — INSULIN GLARGINE 45 UNITS: 100 INJECTION, SOLUTION SUBCUTANEOUS at 09:31

## 2022-10-25 RX ADMIN — APIXABAN 5 MG: 5 TABLET, FILM COATED ORAL at 20:39

## 2022-10-25 RX ADMIN — APIXABAN 5 MG: 5 TABLET, FILM COATED ORAL at 08:24

## 2022-10-25 RX ADMIN — INSULIN LISPRO 10 UNITS: 100 INJECTION, SOLUTION INTRAVENOUS; SUBCUTANEOUS at 09:33

## 2022-10-25 RX ADMIN — AMITRIPTYLINE HYDROCHLORIDE 50 MG: 25 TABLET, FILM COATED ORAL at 20:38

## 2022-10-25 RX ADMIN — RISPERIDONE 0.5 MG: 1 TABLET ORAL at 20:39

## 2022-10-25 RX ADMIN — INSULIN LISPRO 10 UNITS: 100 INJECTION, SOLUTION INTRAVENOUS; SUBCUTANEOUS at 12:52

## 2022-10-25 RX ADMIN — INSULIN LISPRO 2 UNITS: 100 INJECTION, SOLUTION INTRAVENOUS; SUBCUTANEOUS at 09:32

## 2022-10-25 RX ADMIN — INSULIN LISPRO 10 UNITS: 100 INJECTION, SOLUTION INTRAVENOUS; SUBCUTANEOUS at 16:41

## 2022-10-25 RX ADMIN — Medication 10 ML: at 09:34

## 2022-10-25 RX ADMIN — BUDESONIDE 500 MCG: 0.5 INHALANT RESPIRATORY (INHALATION) at 17:56

## 2022-10-25 RX ADMIN — INSULIN GLARGINE 45 UNITS: 100 INJECTION, SOLUTION SUBCUTANEOUS at 22:12

## 2022-10-25 RX ADMIN — Medication 10 ML: at 20:42

## 2022-10-25 RX ADMIN — ATORVASTATIN CALCIUM 80 MG: 40 TABLET, FILM COATED ORAL at 20:38

## 2022-10-25 RX ADMIN — LEVOTHYROXINE SODIUM 50 MCG: 0.05 TABLET ORAL at 12:50

## 2022-10-25 RX ADMIN — RISPERIDONE 0.5 MG: 1 TABLET ORAL at 08:24

## 2022-10-25 RX ADMIN — INSULIN LISPRO 2 UNITS: 100 INJECTION, SOLUTION INTRAVENOUS; SUBCUTANEOUS at 12:51

## 2022-10-25 RX ADMIN — ASPIRIN 81 MG: 81 TABLET, COATED ORAL at 20:39

## 2022-10-25 RX ADMIN — ARFORMOTEROL TARTRATE 15 MCG: 15 SOLUTION RESPIRATORY (INHALATION) at 17:56

## 2022-10-25 RX ADMIN — PANTOPRAZOLE SODIUM 40 MG: 40 TABLET, DELAYED RELEASE ORAL at 08:24

## 2022-10-25 RX ADMIN — BUDESONIDE 500 MCG: 0.5 INHALANT RESPIRATORY (INHALATION) at 06:41

## 2022-10-25 RX ADMIN — ARFORMOTEROL TARTRATE 15 MCG: 15 SOLUTION RESPIRATORY (INHALATION) at 06:41

## 2022-10-25 ASSESSMENT — PAIN SCALES - GENERAL: PAINLEVEL_OUTOF10: 0

## 2022-10-25 NOTE — PROGRESS NOTES
Occupational Therapy  OCCUPATIONAL THERAPY INITIAL EVALUATION     [employer] [facility]  [facility] [place]         Date:10/25/2022                                                   Patient Name: Tomer Longoria     MRN: [id number]     : 1955     Room: [facility]-       Evaluating OT: Doyle Palmer OTR/L; [id number]       Referring Provider and Orders/Date:    OT eval and treat  Start:  10/24/22 2230,   End:  10/24/22 2230,   ONE TIME,   Standing Count:  1 Occurrences,   R         Srini Harper, APRN - CNP        Diagnosis:   1. Atrial fibrillation with RVR ([facility]    2.  Acute on chronic congestive heart failure, unspecified heart failure type [facility])         Surgery: none      Pertinent Medical History:        Past Medical History:   Diagnosis Date    Acid reflux     Acute diastolic (congestive) heart failure ([address] 2019    Arthritis     Asthma 2014    Asthmatic bronchitis , chronic ([address] 2016    CAD (coronary artery disease)     Chronic bronchitis ([address] 2014    COPD (chronic obstructive pulmonary disease) ([facility])     CB    COPD (chronic obstructive pulmonary disease) ([address]     Diabetes mellitus ([address]     Emphysema (subcutaneous) (surgical) resulting from a procedure     H/O cardiovascular stress test 2021    Lexiscan    Hyperlipidemia     Hypertension     Hypoxemia requiring supplemental oxygen 2015    LONG TERM ANTICOAGULENT USE     Morbid obesity with BMI of 50.0-59.9, adult ([address] 2013    Obesity     Osteoarthritis     Sleep apnea     bilevel positive airway pressure at 13/8 with 2 L oxygen flow     Stage 3 chronic kidney disease (HCC)     Tobacco abuse     Type II or unspecified type diabetes mellitus without mention of complication, not stated as uncontrolled           Past Surgical History:   Procedure Laterality Date    COLONOSCOPY      CORONARY ANGIOPLASTY WITH STENT PLACEMENT  13    Left main focal eccentric 65% distal stenosis. Large OM1 CX 50% ostial & prox narrow. Large ramus artery & LAD: Minor plaque w/o sign narrow. RCA: Dom vessel w/25% prox narrow & focal hazy eccentric 99% distal stenosis before origin RPDA & RPLCA. LV: Mild inferior hypokinesis EF 55%. Probable mild AS with 10-15mmHg. Successful PCI distal RCA w/3.5 x 12 mm BMS, 0% res stenosis.  Normal distal runoff    CORONARY ARTERY BYPASS GRAFT  09-09-13    Dr Gia Lanza; CABG x2, LIMA to LAD, SVG to ramus intermedius; MV repair using 30-mm Future complete ring with magic stitch between A3 and P3; rigid internal fixation of sternum using KLS plates x2; endoscopic vein harvesting of right lower extremity     ECHO COMPL W DOP COLOR FLOW  7/25/2013         HERNIA REPAIR      SINUS SURGERY         Precautions:  Fall Risk     Recommended placement: subacute    Assessment of current deficits     [x] Functional mobility  [x]ADLs  [x] Strength               []Cognition     [x] Functional transfers   [x] IADLs         [x] Safety Awareness   [x]Endurance     [] Fine Coordination              [x] Balance      [] Vision/perception   []Sensation      [x]Gross Motor Coordination  [] ROM  [] Delirium                   [] Motor Control     OT PLAN OF CARE   OT POC based on physician orders, patient diagnosis and results of clinical assessment    Frequency/Duration 1-3 days/wk for 2 weeks PRN   Specific OT Treatment Interventions to include:   * Instruction/training on adapted ADL techniques and AE recommendations to increase functional independence within precautions       * Training on energy conservation strategies, correct breathing pattern and techniques to improve independence/tolerance for self-care routine  * Functional transfer/mobility training/DME recommendations for increased independence, safety, and fall prevention  * Patient/Family education to increase follow through with safety techniques and functional independence  * Recommendation of environmental modifications for increased safety with functional transfers/mobility and ADLs  * Therapeutic exercise to improve motor endurance, ROM, and functional strength for ADLs/functional transfers  * Therapeutic activities to facilitate/challenge dynamic balance, stand tolerance for increased safety and independence with ADLs  * Therapeutic activities to facilitate gross/fine motor skills for increased independence with ADLs  * Neuro-muscular re-education: facilitation of righting/equilibrium reactions, midline orientation, scapular stability/mobility, normalization of muscle tone, and facilitation of volitional active controled movement  * Positioning to improve skin integrity, interaction with environment and functional independence     Recommended Adaptive Equipment/DME:  TBD      Home Living: Pt lives at home with wife and friends that do assist at home. Pt lives in an apartment with 6+4 steps to enter without rails and many cracks in the cement. Lived in basement level. Pt has to assist his wife with steps and it is very unsafe. Laundry is on the same level. Bathroom setup: Tub shower with TTB and grab bars; suction cup grab bar, Standard shower head; Standard toilet   DME owned: canes, walker, crutches, 3:1      Prior Level of Function: indep with ADLs , assist with IADLs (friends); ambulated indep without AD   Driving: yes   Occupation: retired   Enjoys: watching TV, \"keep busy\", IADLs    Pain Level: none  Cognition: A&O: 3/4 confusion with home set up, situation and PLOF;  Follows 2 step directions   Memory:  fair-   Sequencing:  fair-   Problem solving:  fair-   Judgement/safety:  fair-    AM-PAC Daily Activity Inpatient   How much help for putting on and taking off regular lower body clothing?: A Lot  How much help for Bathing?: A Lot  How much help for Toileting?: A Lot  How much help for putting on and taking off regular upper body clothing?: A Lot  How much help for taking care of personal grooming?: A Little  How much help for eating meals?: None  AM-PAC Inpatient Daily Activity Raw Score: 15  AM-PAC Inpatient ADL T-Scale Score : 34.69  ADL Inpatient CMS 0-100% Score: 56.46  ADL Inpatient CMS G-Code Modifier : CK     Functional Assessment:     Initial Eval Status  Date: 10/25/2022   Treatment Status  Date: STGs = LTGs  Time frame: 10-14 days   Feeding Independent   NA-PLOF   Grooming Supervision and set up from sitting for face wash, hand wash and oral care  Independent    UB Dressing Moderate Assist with gown management from sitting up in arm chair  Stand by Assist    LB Dressing Maximal Assist with socks and pants from sitting/standing  Moderate Assist    Bathing Moderate Assist with sponge bathing from sitting/standing but assist for LB  Minimal Assist    Toileting Minimal Assist for safety and balance with urinal  Stand by Assist    Bed Mobility  Supine to sit: Moderate Assist   Supine to sit: Minimal Assist   Sit to supine: Stand by Assist    Functional Transfers Minimal Assist with walker from bed and arm chair  Stand by Assist    Functional Mobility Minimal Assist with walker for short distance functional mobility throughout the room to simulate house hold distances. Stand by Assist    Balance Sitting:     Static:  fair+    Dynamic:fair  Standing: fair-  Sitting:     Static:  good    Dynamic:fair+  Standing: fair   Activity Tolerance Vitals with activity: room air  Sitting tolerance EOB 3min with request for back support. Standing tolerance 1min average  Increase standing tolerance for >3min with stable vital signs for carry over into toileting, functional tranfers and indep in ADLs   Visual/  Perceptual Glasses: Present; WFL     Reports change in vision since admission: No        NA     Hand Dominance  [x] Right   [] Left     AROM (PROM) Strength Additional Info:  Goal:   RUE  Limited due to shoulder injury due to multiple falls and reported rotator cuff injuries. <25% shoulder function.   2+/5 good  and fair FMC/dexterity noted during ADL tasks  Opposition [x] Intact [] Impaired  Finger to nose [x] Intact [] Impaired 3+/5MMT generally for carry over into self care, functional transfers and functional mobility with AD. LUE WFL 4/5 good  and  FMC/dexterity noted during ADL tasks  Opposition [x] Intact [] Impaired  Finger to nose [x] Intact [] Impaired 5/5MMT generally for carry over into self care, functional transfers and functional mobility with AD. Hearing: WFL   Sensation:  No c/o numbness or tingling   Tone: WFL   Edema: none    Comments: Upon arrival patient sleeping in bed at an angle and almost falling out. Pt required mod A for most UB ADLs and max A LB ADLs tasks. Limited with min A for standing during LB ADLs and functional transfers. The biggest barriers reflect that of functional transfers, functional mobility, UB/LB ADLs, cognition, activity tolerance, balance, safety and strengthening. At end of session, patient sitting up in arm chair with call light and phone within reach, chair alarm, all lines and tubes intact. Overall patient demonstrated decreased independence and safety during completion of ADL/functional transfer/mobility tasks compared to PLOF. Nursing updated on pt position and status following OT eval. Pt would benefit from continued skilled OT to increase safety and independence with completion of ADL/IADL tasks for functional independence and quality of life. Treatment: OT treatment provided this date includes:  Instruction, education and training on safe facilitation and adapted techniques for completion of ADLs. These include neuromuscular reeducation to facilitate balance/righting reactions,safe functional transfer techniques, proper positioning/alignment to improve interaction with environment and overall function and on adapted techniques/work simplification for completion of ADLs.  Education provided on hand/feet placement with bed rails, walker, arm chair and body mechanics for fall prevention. Cues for energy conservation and safety for in the home at DC, including modifications and DME. Extended time to complete all tasks, including skilled monitoring of patient's response during treatment session and vital signs. Prior to and at the end of session, environmental modifications / line management completed for patients safety and efficiency of treatment session. See above for further details. Rehab Potential: Good for established goals     Patient / Family Goal: O2 management      Patient and/or family were instructed on functional diagnosis, prognosis/goals and OT plan of care. Demonstrated fair understanding. Eval Complexity: Low  History: Brief review of medical records and additional review of physical, cognitive, or psychosocial history related to current functional performance  Exam: 3+ performance deficits  Assistance/Modification: Mod assistance or modifications required to perform tasks. May have comorbidities that affect occupational performance. Time In: 1322  Time Out: 1325  Total Treatment Time: 11    Min Units   OT Eval Low 97165  x  1   OT Eval Medium 52590      OT Eval High 49353      OT Re-Eval [id number]       Therapeutic Ex 36013       Therapeutic Activities 85095  11 1    ADL/Self Care 39304       Orthotic Management 92969       Manual [id number]     Neuro Re-Ed 37594       Non-Billable Time          Evaluation Time additionally includes thorough review of current medical information, gathering information on past medical history/social history and prior level of function, interpretation of standardized testing/informal observation of tasks, assessment of data and development of plan of care and goals.             Aramis Gonzalez OTR/L; [id number]

## 2022-10-25 NOTE — H&P
Department of Internal Medicine  History and Physical Examination     Primary Care Physician: Polly Drake DO   Admitting Physician:  Alexander Messina DO  Admission date and time: 10/24/2022  5:21 PM    Room:  06/0635-01  Admitting diagnosis: Atrial fibrillation with rapid ventricular response ([facility] [I48.91]  Atrial fibrillation with RVR ([facility] [I48.91]  Acute on chronic congestive heart failure, unspecified heart failure type [facility]) [I50.9]    Patient Name: Lonnie Edward  MRN: [id number]    Date of Service: 10/25/2022     Chief Complaint: Shortness of breath    HISTORY OF PRESENT ILLNESS:    Lonnie Edward is a 49-year-old male well-known to our service who presented last evening with recurrence of shortness of breath and edema. Patient was recently seen and discharged from our service with our strong recommendations for skilled nursing facility placement given his labile volume status and the frequency of his hospitalizations. He represented to the hospital with increasing shortness of breath on exertion, edema and palpitations. ER work-up revealed decompensated congestive heart failure and A. fib with RVR. He was discussed with ER physician last evening with plans for readmission to the service of Dr. Pia Easton for further care and management. Carine Graham is seen and examined at bedside today. He confirms weight gain, lack of compliance with fluid and sodium restriction. States that he reach out to his cardiologist to attempt to get escalation in his water medications without improvement. He states that he is otherwise taking all medications as prescribed. He denies fevers or chills, sick contact or exposures. No headache or acute neurologic symptoms aside from that of mild generalized weakness. No neck pain or stiffness. No present chest discomfort, palpitations or fluttering. Positive for dyspnea on exertion and increased fatigue on exertion with no exertionally provoked chest pain.   No shortness of breath at rest.  No cough, URI complaints or hemoptysis. No abdominal pain, nausea, vomiting, bowel changes, hematochezia or melena. No acute urinary complaints. Admits to weight gain in the form of fluid retention in the lower extremities and somewhat in the abdomen as well. PAST MEDICAL Hx:  Past Medical History:   Diagnosis Date    Acid reflux     Acute diastolic (congestive) heart failure ([facility] 06/19/2019    Arthritis     Asthma 04/16/2014    Asthmatic bronchitis , chronic ([facility] 11/28/2016    CAD (coronary artery disease)     Chronic bronchitis (HCC) 04/16/2014    COPD (chronic obstructive pulmonary disease) ([facility])     CB    COPD (chronic obstructive pulmonary disease) ([facility])     Diabetes mellitus ([facility]     Emphysema (subcutaneous) (surgical) resulting from a procedure     H/O cardiovascular stress test 04/24/2021    Lexiscan    Hyperlipidemia     Hypertension     Hypoxemia requiring supplemental oxygen 02/02/2015    LONG TERM ANTICOAGULENT USE     Morbid obesity with BMI of 50.0-59.9, adult ([facility] 11/27/2013    Obesity     Osteoarthritis     Sleep apnea     bilevel positive airway pressure at 13/8 with 2 L oxygen flow     Stage 3 chronic kidney disease ([facility])     Tobacco abuse     Type II or unspecified type diabetes mellitus without mention of complication, not stated as uncontrolled        PAST SURGICAL Hx:   Past Surgical History:   Procedure Laterality Date    COLONOSCOPY      CORONARY ANGIOPLASTY WITH STENT PLACEMENT  07-23-13    Left main focal eccentric 65% distal stenosis. Large OM1 CX 50% ostial & prox narrow. Large ramus artery & LAD: Minor plaque w/o sign narrow. RCA: Dom vessel w/25% prox narrow & focal hazy eccentric 99% distal stenosis before origin RPDA & RPLCA. LV: Mild inferior hypokinesis EF 55%. Probable mild AS with 10-15mmHg. Successful PCI distal RCA w/3.5 x 12 mm BMS, 0% res stenosis.  Normal distal runoff    CORONARY ARTERY BYPASS GRAFT  09-09-13    Dr Guo Violetta; CABG x2, LIMA to LAD, SVG to ramus intermedius; MV repair using 30-mm Future complete ring with magic stitch between A3 and P3; rigid internal fixation of sternum using KLS plates x2; endoscopic vein harvesting of right lower extremity     ECHO COMPL W DOP COLOR FLOW  7/25/2013         HERNIA REPAIR      SINUS SURGERY         FAMILY Hx:  Family History   Problem Relation Age of Onset    Cancer Mother         breast    Cancer Father         stomach    Mental Illness Brother     Stroke Brother     Other Brother        HOME MEDICATIONS:  Prior to Admission medications    Medication Sig Start Date End Date Taking?  Authorizing Provider   amitriptyline (ELAVIL) 50 MG tablet TAKE ONE TABLET BY MOUTH NIGHTLY 10/24/22   Jose Angel Damarysol, DO   bumetanide (BUMEX) 1 MG tablet TAKE ONE TABLET BY MOUTH EVERY DAY 10/24/22   Jose Angel Mansfield, DO   apixaban (ELIQUIS) 5 MG TABS tablet Take 1 tablet by mouth 2 times daily 9/29/22 10/29/22  Jose Angel Mansfield, DO   amiodarone (CORDARONE) 200 MG tablet Take 1 tablet by mouth daily 9/29/22 10/29/22  Jose Angel Mansfield, DO   levocetirizine (XYZAL) 5 MG tablet Take 1 tablet by mouth nightly 9/21/22   Jose Angel Daubs, DO   pantoprazole (PROTONIX) 40 MG tablet Take 1 tablet by mouth daily 9/21/22   Jose Angel Mansfield, DO   risperiDONE (RISPERDAL) 0.5 MG tablet Take 1 tablet by mouth 2 times daily 9/20/22   Barbara Roblero, DO   montelukast (SINGULAIR) 10 MG tablet Take 1 tablet by mouth nightly 6/6/22   Jose Angel Mansfield, DO   insulin glargine (BASAGLAR KWIKPEN) 100 UNIT/ML injection pen Inject 45 Units into the skin 2 times daily 4/8/22   Jose Angel Mansfield, DO   insulin lispro, 1 Unit Dial, (HUMALOG KWIKPEN) 100 UNIT/ML SOPN Inject 0-18 Units into the skin 3 times daily (before meals) MAX 70 units daily 4/8/22   Jose Angel Mansfield, DO   atorvastatin (LIPITOR) 80 MG tablet TAKE ONE TABLET BY MOUTH EVERY NIGHT 4/8/22   Jose Angel Mansfield, DO   metoprolol succinate (TOPROL XL) 25 MG extended release tablet Take 1 tablet by mouth 2 times daily 22  Radha Kay DO   pramipexole (MIRAPEX) 0.25 MG tablet TAKE TWO TABLETS BY MOUTH THREE TIMES A DAY 22  Radha Kay DO   CPAP Machine MISC 1 each by Does not apply route nightly as needed     Historical Provider, MD   aspirin 81 MG tablet Take 81 mg by mouth nightly     Historical Provider, MD       ALLERGIES:  Pcn [penicillins] and Sulfa antibiotics    SOCIAL Hx:  Social History     Socioeconomic History    Marital status:      Spouse name: Erika Eli    Number of children: 1    Years of education: Not on file    Highest education level: 11th grade   Occupational History    Not on file   Tobacco Use    Smoking status: Former     Packs/day: 1.00     Years: 45.00     Pack years: 45.00     Types: Cigarettes     Quit date: 2013     Years since quittin.2    Smokeless tobacco: Former     Types: Chew     Quit date: 10/8/1971   Vaping Use    Vaping Use: Never used   Substance and Sexual Activity    Alcohol use: No     Alcohol/week: 0.0 standard drinks     Comment: 1-2 coffee per day    Drug use: No    Sexual activity: Yes     Partners: Female   Other Topics Concern    Not on file   Social History Narrative    19  [facility] reviewed SDOH with [facility]. He does have money strain but between the income he has coming in and his wife's they are over resources for SARY programs. Offered food panty info to help with end of the month struggles but he declined stating that he tried and was refused due to his income. He belongs to a [employer] and goes weekly so he has some community connections in place. He has an extremely high interest rate on his mortgage which he was encouraged to look into getting lowered to help save money on his house payments. Noticed some difficulty with memory recall. Becomes easily flustered at times. Has no MHI to support applying for [facility] program of SARY. States he does not feel depressed or need any MH treatment.         Social Determinants of Health Financial Resource Strain: Low Risk     Difficulty of Paying Living Expenses: Not very hard   Food Insecurity: No Food Insecurity    Worried About Running Out of Food in the Last Year: Never true    Ran Out of Food in the Last Year: Never true   Transportation Needs: Not on file   Physical Activity: Not on file   Stress: Not on file   Social Connections: Not on file   Intimate Partner Violence: Not on file   Housing Stability: Not on file     ROS: 12 point review of symptoms was conducted, pertinent positives and negative were reviewed, aside from that all 12 systems were reviewed and negative. PHYSICAL EXAM:  VITALS:  Vitals:    10/25/22 1115   BP: (!) 113/59   Pulse: 79   Resp:    Temp:    SpO2:          CONSTITUTIONAL:    Awake, alert, cooperative, acute on chronic ill appearance, no apparent distress    EYES:    PERRL, EOMI, sclera clear without icterus, conjunctiva normal    ENT:    Normocephalic, atraumatic, sinuses nontender on palpation. External ears without lesions. Oral pharynx with moist mucus membranes. NECK:    Supple, symmetrical, trachea midline, no adenopathy, thyroid symmetric, not enlarged and no tenderness, skin normal, no bruits, no JVD    HEMATOLOGIC/LYMPHATICS:    No cervical lymphadenopathy and no supraclavicular lymphadenopathy    LUNGS:    Symmetric. No increased work of breathing, diminished bibasilar air exchange, clear to auscultation bilaterally, no wheezes, rhonchi, or rales,     CARDIOVASCULAR:    Normal apical impulse,  Rhythm with controlled rate, S1 and S2, systolic murmur    ABDOMEN:    With the obese contour, normal bowel sounds, soft, non-distended, non-tender, no rebound or guarding elicited on palpation     MUSCULOSKELETAL:    There is no redness, warmth, or swelling of the joints. Tone is normal.    NEUROLOGIC:    Awake, alert, oriented to name, place and time. Cranial nerves II-XII are grossly intact.   Mild generalized weakness without focal component. SKIN:    No bruising or bleeding. No redness, warmth, or swelling    EXTREMITIES:    Peripheral pulses present.   Bilateral pitting edema, acute on chronic    LABORATORY DATA:  CBC with Differential:    Lab Results   Component Value Date/Time    WBC 5.9 10/25/2022 05:57 AM    RBC 3.84 10/25/2022 05:57 AM    HGB 12.1 10/25/2022 05:57 AM    HCT 38.0 10/25/2022 05:57 AM     10/25/2022 05:57 AM    MCV 99.0 10/25/2022 05:57 AM    MCH 31.5 10/25/2022 05:57 AM    MCHC 31.8 10/25/2022 05:57 AM    RDW 16.2 10/25/2022 05:57 AM    SEGSPCT 61 01/20/2014 12:00 PM    LYMPHOPCT 17.2 10/25/2022 05:57 AM    MONOPCT 10.3 10/25/2022 05:57 AM    BASOPCT 0.5 10/25/2022 05:57 AM    MONOSABS 0.61 10/25/2022 05:57 AM    LYMPHSABS 1.02 10/25/2022 05:57 AM    EOSABS 0.07 10/25/2022 05:57 AM    BASOSABS 0.03 10/25/2022 05:57 AM     BMP:    Lab Results   Component Value Date/Time     10/25/2022 05:57 AM    K 3.7 10/25/2022 05:57 AM    K 5.8 10/24/2022 05:46 PM    CL 98 10/25/2022 05:57 AM    CO2 30 10/25/2022 05:57 AM    BUN 23 10/25/2022 05:57 AM    LABALBU 3.6 10/25/2022 05:57 AM    LABALBU 4.4 10/27/2011 04:07 PM    CREATININE 1.1 10/25/2022 05:57 AM    CALCIUM 9.1 10/25/2022 05:57 AM    GFRAA >60 09/26/2022 05:47 AM    LABGLOM >60 10/25/2022 05:57 AM    GLUCOSE 201 10/25/2022 05:57 AM    GLUCOSE 118 10/27/2011 04:07 PM       ASSESSMENT:  Acutely decompensated systolic and diastolic congestive heart failure with LVEF 45% on most recent echo  Atrial fibrillation with rapid ventricular response likely contributing to #1, maintained on chronic Eliquis  New onset hypothyroidism in the setting of amiodarone therapy requiring the institution of Synthroid  Kidney disease stage III  Insulin-dependent diabetes mellitus type 2  Recent hospitalization for frequent falls with right C7 transverse process fracture without neurologic deficits being managed conservatively by neurosurgery  Calcified plaques along the carotid bulbs left greater than right on previous imaging  Coronary artery disease with prior coronary artery bypass grafting and stent placement, asymptomatic  Valvular heart disease with history of mitral valve repair  Chronic oxygen dependent COPD with chronic respiratory failure  Morbid obesity BMI 49.71 kg per metered squared  Hyperlipidemia  Recurrent failure to thrive symptomatology    PLAN:  Tomer Longoria is a 77-year-old male who is hospitalized frequently in the setting of noncompliance with medication and treatment recommendations. He was previously recommended for skilled nursing facility given his frequent falls, recurrent hospitalizations and ongoing noncompliance with medication and treatment recommendations. He refused as documented previously as he had a move that he felt he needed to attend to and this was more important than his own health. He returned to noncompliance with fluid and sodium restriction and has now acutely decompensated from a volume standpoint. He was given IV diuretics in the ER and this was continued and clinically he has improved. He is also in atrial fibrillation with RVR. He was transition from warfarin to Eliquis given ongoing subtherapeutic INRs and intermittent monitoring as an outpatient. He has been compliant with Eliquis. His Toprol-XL was escalated on admission however this is been scaled back by the cardiovascular consultant due to borderline hypotension. Amiodarone remains in place. Amiodarone is a relatively new addition to the patient's regimen and he has developed increasing hypothyroidism, this will be monitored closely. Synthroid has been instituted. TSH and free T4 will need to be repeated in approximately 6 weeks for evaluation of therapeutic response. We will monitor renal function electrolytes closely in setting of IV diuresis. Close monitoring of vital signs in the setting of bouts of intermittent/transient hypotension due to diuretic therapy.   PT, OT and social work will follow for discharge planning purposes. The patient may require skilled nursing facility placement upon discharge and he would be agreeable with this presently. Underlying co-morbidites will be addressed during hospitalization as well. Labs and vital signs will be monitored closely and addressed accordingly. See additional orders for details.      ARLEEN Guthrie CNP  11:24 AM  10/25/2022    Electronically signed by ARLEEN Guthrie CNP on 10/25/22 at 11:24 AM EDT

## 2022-10-25 NOTE — ACP (ADVANCE CARE PLANNING)
Advance Care Planning   Healthcare Decision Maker:    Primary Decision Maker: Renee Ronnell Spouse - [phone]    Primary Decision Maker: Marbryan Niece - Child - [phone]

## 2022-10-25 NOTE — HOME CARE
Current with [facility] for SN/SW. Will  need MIRTHA orders prior to discharge if appropriate.   Be Rahman LPN, [facility]

## 2022-10-25 NOTE — PROGRESS NOTES
Physical Therapy    Physical Therapy Initial Evaluation/Plan of Care    Room #:  0635/0635-01  Patient Name: Rowena Beasley  YOB: 1955  MRN: [id number]    Date of Service: 10/25/2022     Tentative placement recommendation: Home Health Physical Therapy   Equipment recommendation: Patient has needed equipment       Evaluating Physical Therapist: Lorraine Espinal PT  #[id number]      Specific Provider Orders/Date/Referring Provider :  10/24/22 2230    PT eval and treat  Start:  10/24/22 2230,   End:  10/24/22 2230,   ONE TIME,   Standing Count:  1 Occurrences,   R         Gladys Teague, APRN - CNP     Admitting Diagnosis:   Atrial fibrillation with rapid ventricular response ([facility] [I48.91]  Atrial fibrillation with RVR ([facility] [I48.91]  Acute on chronic congestive heart failure, unspecified heart failure type ([address] [I50.9]    Admitted with    bilateral lower extremities swelling, shortness of breath   Surgery: none      Patient Active Problem List   Diagnosis    CAD (coronary artery disease)    Hypertension    Type 2 diabetes mellitus with hyperglycemia, with long-term current use of insulin (HCC)    Tobacco abuse    PAF (paroxysmal atrial fibrillation) ([address]    Status post coronary artery bypass graft    H/O mitral valve repair    Morbid obesity with BMI of 50.0-59.9, adult ([address]    Chronic bronchitis ([address]    Sleep apnea    Gastroesophageal reflux disease without esophagitis    Hyperlipidemia    Muscular deconditioning    Acute diastolic (congestive) heart failure (HCC)    Acute diastolic heart failure (HCC)    Iron deficiency anemia, unspecified    Acute systolic/diastolic congestive heart failure (HCC)    Atrial fibrillation with RVR (HCC)    Ischemic cardiomyopathy    Nonrheumatic tricuspid valve regurgitation    Chronic anticoagulation    Stage 3 chronic kidney disease ([address]    Acute on chronic congestive heart failure ([address]    Dizziness    Hyperosmolar hyperglycemic state (HHS) ([address]    Acute metabolic encephalopathy    Altered mental status    Hypotension    Transient hypotension    Atrial fibrillation with rapid ventricular response (HCC)        ASSESSMENT of Current Deficits Patient exhibits decreased strength, balance, and endurance impairing functional mobility, transfers, gait , gait distance, and tolerance to activity are barriers to d/c and require skilled intervention during hospital stay to attain pre hospital level of function. Decreased strength, balance and endurance  increases patient's risk for fall. Limited gait d/t shortness of breath on exertion and weakness in bilateral lower extremities        PHYSICAL THERAPY  PLAN OF CARE       Physical therapy plan of care is established based on physician order,  patient diagnosis and clinical assessment    Current Treatment Recommendations:    -Bed Mobility: Lower extremity exercises , Upper extremity exercises , and Trunk control activities   -Standing Balance: Perform strengthening exercises in standing to promote motor control with or without upper extremity support   -Transfers: Provide instruction on proper hand and foot position for adequate transfer of weight onto lower extremities and use of gait device if needed and Cues for hand placement, technique and safety. Provide stabilization to prevent fall   -Gait: Gait training and Standing activities to improve: base of support, weight shift, weight bearing    -Endurance: Utilize Supervised activities to increase level of endurance to allow for safe functional mobility including transfers and gait     PT long term treatment goals are located in below grid    Patient and or family understand(s) diagnosis, prognosis, and plan of care. Frequency of treatments: Patient will be seen  daily.          Prior Level of Function: Patient ambulated independently with wheeled walker   Rehab Potential: good   for baseline    Past medical history:   Past Medical History:   Diagnosis Date    Acid reflux Acute diastolic (congestive) heart failure (HCC) 06/19/2019    Arthritis     Asthma 04/16/2014    Asthmatic bronchitis , chronic ([facility] 11/28/2016    CAD (coronary artery disease)     Chronic bronchitis ([facility] 04/16/2014    COPD (chronic obstructive pulmonary disease) ([facility])     CB    COPD (chronic obstructive pulmonary disease) ([facility])     Diabetes mellitus ([facility]     Emphysema (subcutaneous) (surgical) resulting from a procedure     H/O cardiovascular stress test 04/24/2021    Lexiscan    Hyperlipidemia     Hypertension     Hypoxemia requiring supplemental oxygen 02/02/2015    LONG TERM ANTICOAGULENT USE     Morbid obesity with BMI of 50.0-59.9, adult ([facility] 11/27/2013    Obesity     Osteoarthritis     Sleep apnea     bilevel positive airway pressure at 13/8 with 2 L oxygen flow     Stage 3 chronic kidney disease ([facility])     Tobacco abuse     Type II or unspecified type diabetes mellitus without mention of complication, not stated as uncontrolled      Past Surgical History:   Procedure Laterality Date    COLONOSCOPY      CORONARY ANGIOPLASTY WITH STENT PLACEMENT  07-23-13    Left main focal eccentric 65% distal stenosis. Large OM1 CX 50% ostial & prox narrow. Large ramus artery & LAD: Minor plaque w/o sign narrow. RCA: Dom vessel w/25% prox narrow & focal hazy eccentric 99% distal stenosis before origin RPDA & RPLCA. LV: Mild inferior hypokinesis EF 55%. Probable mild AS with 10-15mmHg. Successful PCI distal RCA w/3.5 x 12 mm BMS, 0% res stenosis. Normal distal runoff    CORONARY ARTERY BYPASS GRAFT  09-09-13    Dr Gerhardt Garret; CABG x2, LIMA to LAD, SVG to ramus intermedius; MV repair using 30-mm Future complete ring with magic stitch between A3 and P3; rigid internal fixation of sternum using KLS plates x2; endoscopic vein harvesting of right lower extremity     ECHO COMPL W DOP COLOR FLOW  7/25/2013         HERNIA REPAIR      SINUS SURGERY         SUBJECTIVE:    Precautions:  Up with assistance, falls and alarm ,     Social history: Patient lives with spouse in a apartment     with  12 steps  to enter with Rail  Tub shower     Equipment owned: Ananth Alvarado,       [id number] [facility]   How much difficulty turning over in bed?: A Lot  How much difficulty sitting down on / standing up from a chair with arms?: A Little  How much difficulty moving from lying on back to sitting on side of bed?: A Lot  How much help from another person moving to and from a bed to a chair?: A Little  How much help from another person needed to walk in hospital room?: A Little  How much help from another person for climbing 3-5 steps with a railing?: Total  AM-PAC Inpatient Mobility Raw Score : 14  AM-PAC Inpatient T-Scale Score : 38.1  Mobility Inpatient CMS 0-100% Score: 61.29  Mobility Inpatient CMS G-Code Modifier : CL    Nursing cleared patient for PT evaluation. The admitting diagnosis and active problem list as listed above have been reviewed prior to the initiation of this evaluation. OBJECTIVE;   Initial Evaluation  Date: 10/25/2022 Treatment Date:     Short Term/ Long Term   Goals   Was pt agreeable to Eval/treatment? Yes  To be met in 3 days   Pain level   0/10        Bed Mobility    Rolling: Moderate assist of 1    Supine to sit: Moderate assist of 1    Sit to supine: Moderate assist of 1    Scooting: Moderate assist of 1    Rolling: Independent    Supine to sit:  Independent    Sit to supine: Independent    Scooting: Independent     Transfers Sit to stand: Minimal assist of 1    Sit to stand: Independent     Ambulation     50 feet using  wheeled walker with Minimal assist of 1   for walker approximation, upright, multiplane instability, and safety and cues for upright posture, walker approximation, safety, and pacing    100 feet using  wheeled walker with Independent    Stair negotiation: ascended and descended   Not assessed     10 steps with rail independent   Use step up box if needed for training   ROM Within functional limits        Strength BUE:  refer to OT eval  RLE:  3+/5  LLE:  3+/5  Increase strength in affected mm groups by 1/3 grade   Balance Sitting EOB:  good    Dynamic Standing:  fair with wheeled walker   Sitting EOB:  good    Dynamic Standing: fair +with wheeled walker      Patient is Alert & Oriented x person, place, time, and situation and follows directions    Sensation:  Patient  reports neuropathy bilateral lower extremities     Edema:  yes bilateral lower extremities   Endurance: poor      Vitals: room air   Blood Pressure at rest  Blood Pressure during session    Heart Rate at rest  79 Heart Rate during session  95   SPO2 at rest 97 %  SPO2 during session 96 %     Patient education  Patient educated on role of Physical Therapy, risks of immobility, safety and plan of care and  importance of mobility while in hospital      Patient response to education:   Pt verbalized understanding Pt demonstrated skill Pt requires further education in this area   Yes Partial Yes      Treatment:  Patient practiced and was instructed/facilitated in the following treatment: Patient       fatigued from days events and wishing to eval only this session     Therapeutic Exercises:  ankle pumps  x 10 reps. At end of session, patient in chair with alarm call light and phone within reach,  all lines and tubes intact, nursing notified. Patient would benefit from continued skilled Physical Therapy to improve functional independence and quality of life. Patient's/ family goals   home    Time in  65  Time out  510    Total Treatment Time  0 minutes    Evaluation time includes thorough review of current medical information, gathering information on past medical history/social history and prior level of function, completion of standardized testing/informal observation of tasks, assessment of data, and development of Plan of care and goals.      CPT codes:  Low Complexity PT evaluation (77722)  No treatment karen Pollard, PT

## 2022-10-25 NOTE — PLAN OF CARE
Problem: Discharge Planning  Goal: Discharge to home or other facility with appropriate resources  10/25/2022 1349 by Jeb Kemp RN  Outcome: Progressing  10/25/2022 0231 by Ji Malin RN  Outcome: Progressing     Problem: Safety - Adult  Goal: Free from fall injury  10/25/2022 1349 by Jeb Kemp RN  Outcome: Progressing  10/25/2022 0231 by Ji Malin RN  Outcome: Progressing     Problem: ABCDS Injury Assessment  Goal: Absence of physical injury  10/25/2022 1349 by Jeb Kemp RN  Outcome: Progressing  10/25/2022 0231 by Ji Malin RN  Outcome: Progressing     Problem: Chronic Conditions and Co-morbidities  Goal: Patient's chronic conditions and co-morbidity symptoms are monitored and maintained or improved  Outcome: Progressing

## 2022-10-25 NOTE — CARE COORDINATION
10/25/2022 1500 CM met with pt at the bedside for transition of care needs at d/c. Pt resides with his wife in a house and is active with [id number] [facility](resumption of care orders needed if d/c home). In th past pt has not been agreeable to SNF for rehab but is receptive, list was provided for pt to review. Pt has a walker, wheelchair and a cane. He also has a CPAP from [address]. Pt's neighbor has been transporting pt's wife to dialysis. CM will follow.   Electronically signed by Isabella Drummond RN on 10/25/2022 at 3:13 PM

## 2022-10-25 NOTE — CONSULTS
Inpatient [facility] Cardiology Consultation      Reason for Consult: AF RVR, CHF    Consulting Physician: Dr. Pratik Marinelli    Requesting Physician: Dr. Arden Davis    Date of Consultation: 10/25/2022    HISTORY OF PRESENT ILLNESS:   Patient is a 77year old WM known to Dr. Neha Marx. He is being seen in consultation this hospital admission by Dr. Pratik Marinelli for evaluation and recommendations regarding CHF and AF RVR. Due to patient's COVID-19 rule out status and in droplet plus isolation, interview was obtained via telephone in order to preserve PPE and limit exposure. 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 Increase Amiodarone 200 mg to two tablets daily per Dr. Patricia Martell encounter 10/24/2022 --> again tachycardia noted by [facility], now with symptoms of CHF --> advised to go to ED per Dr. Neha Marx for further evaluation    Patient presented to [facility] on October 24, 2022 with complaints of tachycardia, SOB and peripheral edema. He states shortly after his hospital discharge 9/2022, he began to have issues with tachycardia when [facility] nurse would take his vitals. Possible medication adjustment per telephone encounter as noted above, however home medication list states Amiodarone remained at 200 mg one tablet daily.   He continued to have issues with tachycardia, and over the past week or two, has noticed progressively worsening DE PAZ, peripheral edema and weight gain.  Admits to dry cough and generalized fatigue. Denies CP, N/V, palpitations, diaphoresis, dizziness, near syncope, syncope, orthopnea or PND. Admits medication compliance, and still notes of good urinary response to PO Lasix        Please note: past medical records were reviewed per electronic medical record (EMR) - see detailed reports under Past Medical/ Surgical History. PAST MEDICAL HISTORY:    Forty-five pack year smoker, abstinent since 07/22/2013  HTN. Recent issues with hypotension (requiring admission 9/2022) -- improved after antihypertensive adjustment  HLD, on statin therapy  Insulin requiring T2DM   Morbid obesity, BMI 49  LISS. Uses nocturnal BiPAP  COPD/Asthma   GERD  CKD. Baseline Cr 1.1 to 1.3  Recurrent mechanical falls   R C7 transverse fracture s/p mechanical fall 9/2022, conservative management per Neurosurgery  S/p Sinus surgery and herniorrhaphy  Chronic HFmEF with improved EF  ? Combined cardiomyopathy (ischemic/valvular/tachycardia)  Pulmonary HTN by echocardiogram  Severe MR s/p MVr 2013  TTE 7/2013: LVEF 60-65%. Probably hypokinetic inferior wall. Stage II DD. Moderately dilated LA. Moderate to severe MR  CABG and MVr, [facility], 09/09/2013 (Dr. Franck Alejandro). LIMA-LAD, SVG-RI and 30 mm MV ring   Echo, 10/03/2013. Mild LVE. Mild global hypokinesis. EF 45-50%. Marked left atrial enlargement. E/E' 35. Mild to moderate AS (32/17 mmHg, 1.6 cm², VTR ratio 0.51). Status post mitral valve ring repair. Mild mitral regurgitation. MVA 2.1 cm². RVSP 40 mmHg. Echocardiogram 06/20/2019 (Dr. Reggie Badillo): Technically suboptimal study in spite contrast administration. Left ventricle is grossly normal in size. Mild left ventricular concentric hypertrophy noted. Regional wall motion abnormality suggested with inferior hypokinesis. Mild-moderately reduced left ventricular systolic dysfunction suggested. Calculated EF 41%. The left atrium is dilated. Enlarged right atrium size.  Moderate mitral annular calcification. Mild mitral regurgitation is present. The aortic valve appears moderately sclerotic. Mild tricuspid regurgitation. TTE 4/2021 (Dr. Kye Botello): Technically limited study. Compared to prior echo, changes noted. Moderate LVH. LVEF 55%. No regional wall motion abnormalities. Indeterminate diastolic function. Mildly enlarged RV with mildly reduced RV systolic function. Moderate AS, GRISELDA is recommended for further evaluation. Moderate pulmonary hypertension, RVSP 51. TTE 9/29/2022 (Dr. Addie Jha): Mild LVH. LVEF 55%. No regional wall motion abnormalities. Borderline dilated RV. Mild to moderate AS  CAD s/p PCI and CABG 2013  [facility] admission, 07/23/2013 with two to three days of intermittent exertional chest discomfort. A prolonged episode occurred on the day of admission associated with inferior ST elevation and reciprocal lateral ST depression. Peak CPK 1418, .6, troponin-I 54.75. CBC and BMP normal.  Urgent cardiac catheterization, 07/23/2013. LV mild inferior hypokinesis, EF 55%. [facility] eccentric focal 65% distal stenosis. LAD and CX normal.  OM1 50% ostial and proximal stenosis. RCA 25% mid stenosis, 99% focal distal stenosis. PCI distal RCA, 07/23/2013. 3.5 x 12 mm Vision BMS, no residual stenosis. Surgical consultation, Dr. Harris Heart, 07/23/2013. Plans made for one month of aspirin and Plavix with elective CABG one week after discontinuation of these medications. CABG and MVr, [facility], 09/09/2013 (Dr. Harris Heart). LIMA-LAD, SVG-RI and 30 mm MV ring. Lexiscan MPS 07/12/2019: Electrocardiographically normal regadenoson infusion with a  clinically non-ischemic response  Myocardial perfusion imaging showed no reversible ischemia,  small apical fixed defect. Overall left ventricular systolic function was normal without regional wall motion abnormalities. EF 60%. Low risk general pharmacologic stress test  Lexiscan MPS 4/2021: MPI was normal with attenuation.   Normal wall motion, LVEF 65%. Overall low risk study. Cardiac OP assessment, 10/01/2013.  per minute. Beta blocker started. NSR noted on OV 11/27/2013. PAF, on Amiodarone therapy and OAC with Coumadin --> switched to Eliquis 9/2022 admission due to \"inconsistent INR monitoring and labile INR values\"    PAST SURGICAL HISTORY:    Past Surgical History:   Procedure Laterality Date    COLONOSCOPY      CORONARY ANGIOPLASTY WITH STENT PLACEMENT  07-23-13    Left main focal eccentric 65% distal stenosis. Large OM1 CX 50% ostial & prox narrow. Large ramus artery & LAD: Minor plaque w/o sign narrow. RCA: Dom vessel w/25% prox narrow & focal hazy eccentric 99% distal stenosis before origin RPDA & RPLCA. LV: Mild inferior hypokinesis EF 55%. Probable mild AS with 10-15mmHg. Successful PCI distal RCA w/3.5 x 12 mm BMS, 0% res stenosis. Normal distal runoff    CORONARY ARTERY BYPASS GRAFT  09-09-13    Dr Merritt Khanna; CABG x2, LIMA to LAD, SVG to ramus intermedius; MV repair using 30-mm Future complete ring with magic stitch between A3 and P3; rigid internal fixation of sternum using KLS plates x2; endoscopic vein harvesting of right lower extremity     ECHO COMPL W DOP COLOR FLOW  7/25/2013         HERNIA REPAIR      SINUS SURGERY         HOME MEDICATIONS:  Prior to Admission medications    Medication Sig Start Date End Date Taking?  Authorizing Provider   amitriptyline (ELAVIL) 50 MG tablet TAKE ONE TABLET BY MOUTH NIGHTLY 10/24/22   Aziza Regulus, DO   bumetanide (BUMEX) 1 MG tablet TAKE ONE TABLET BY MOUTH EVERY DAY 10/24/22   Aziza Regulus, DO   apixaban (ELIQUIS) 5 MG TABS tablet Take 1 tablet by mouth 2 times daily 9/29/22 10/29/22  Aziza Regulus, DO   amiodarone (CORDARONE) 200 MG tablet Take 1 tablet by mouth daily 9/29/22 10/29/22  Aziza Regulus, DO   levocetirizine (XYZAL) 5 MG tablet Take 1 tablet by mouth nightly 9/21/22   Aziza Regulus, DO   pantoprazole (PROTONIX) 40 MG tablet Take 1 tablet by mouth daily 9/21/22 Lei Nine, DO   risperiDONE (RISPERDAL) 0.5 MG tablet Take 1 tablet by mouth 2 times daily 9/20/22   Alyssa Priest, DO   montelukast (SINGULAIR) 10 MG tablet Take 1 tablet by mouth nightly 6/6/22   Lie Nine, DO   insulin glargine (BASAGLAR KWIKPEN) 100 UNIT/ML injection pen Inject 45 Units into the skin 2 times daily 4/8/22   Lei Nine, DO   insulin lispro, 1 Unit Dial, (HUMALOG KWIKPEN) 100 UNIT/ML SOPN Inject 0-18 Units into the skin 3 times daily (before meals) MAX 70 units daily 4/8/22   Leimargo Crowder, DO   atorvastatin (LIPITOR) 80 MG tablet TAKE ONE TABLET BY MOUTH EVERY NIGHT 4/8/22   Lei Nine, DO   metoprolol succinate (TOPROL XL) 25 MG extended release tablet Take 1 tablet by mouth 2 times daily 4/8/22 9/20/22  Leimargo Crowder, DO   pramipexole (MIRAPEX) 0.25 MG tablet TAKE TWO TABLETS BY MOUTH THREE TIMES A DAY 1/25/22 9/20/22  Leimargo Crowder, DO   CPAP Machine MISC 1 each by Does not apply route nightly as needed     Historical Provider, MD   aspirin 81 MG tablet Take 81 mg by mouth nightly     Historical Provider, MD       CURRENT MEDICATIONS:      Current Facility-Administered Medications:     promethazine (PHENERGAN) tablet 25 mg, 25 mg, Oral, Q6H PRN **OR** prochlorperazine (COMPAZINE) injection 10 mg, 10 mg, IntraVENous, Q6H PRN, Windy Hilliard, APRN - CNP    amiodarone (CORDARONE) tablet 200 mg, 200 mg, Oral, Daily, Windy Hilliard, APRN - CNP    amitriptyline (ELAVIL) tablet 50 mg, 50 mg, Oral, Nightly, Windy Hilliard, APRN - CNP, 50 mg at 10/24/22 2312    apixaban (ELIQUIS) tablet 5 mg, 5 mg, Oral, BID, Windy Hilliard, APRN - CNP, 5 mg at 10/25/22 6128    aspirin EC tablet 81 mg, 81 mg, Oral, Nightly, Windy Hilliard, APRN - CNP, 81 mg at 10/24/22 2312    atorvastatin (LIPITOR) tablet 80 mg, 80 mg, Oral, Nightly, ARLEEN Easton - CNP, 80 mg at 10/24/22 2309    [Held by provider] bumetanide (BUMEX) tablet 1 mg, 1 mg, Oral, Daily, ARLEEN Easton - LIZ    insulin glargine (LANTUS) injection vial 45 Units, 45 Units, SubCUTAneous, BID, Danville Osgood, APRN - CNP, 45 Units at 10/24/22 2318    pantoprazole (PROTONIX) tablet 40 mg, 40 mg, Oral, Daily, [place] Osgood, APRN - CNP, 40 mg at 10/25/22 1671    risperiDONE (RISPERDAL) tablet 0.5 mg, 0.5 mg, Oral, BID, Danville Osgood, APRN - CNP, 0.5 mg at 10/25/22 3872    metoprolol succinate (TOPROL XL) extended release tablet 25 mg, 25 mg, Oral, BID, Dan[place] Osgood, APRN - CNP, 25 mg at 10/24/22 2309    magnesium sulfate 1000 mg in dextrose 5% 100 mL IVPB, 1,000 mg, IntraVENous, PRN, [place] Osgood, APRN - CNP    sodium phosphate 15 mmol in sodium chloride 0.9 % 250 mL IVPB, 15 mmol, IntraVENous, PRN **OR** sodium phosphate 30 mmol in sodium chloride 0.9 % 500 mL IVPB, 30 mmol, IntraVENous, PRN, Dan[place] Osgood, APRN - CNP    potassium chloride (KLOR-CON M) extended release tablet 40 mEq, 40 mEq, Oral, PRN **OR** potassium bicarb-citric acid (EFFER-K) effervescent tablet 40 mEq, 40 mEq, Oral, PRN **OR** potassium chloride 10 mEq/100 mL IVPB (Peripheral Line), 10 mEq, IntraVENous, PRN, Dan[place] Osgood, APRN - CNP    albuterol (PROVENTIL) nebulizer solution 2.5 mg, 2.5 mg, Nebulization, Q4H PRN, Danville Osgood, APRN - CNP    budesonide (PULMICORT) nebulizer suspension 500 mcg, 0.5 mg, Nebulization, BID, Danville Osgood, APRN - CNP, 500 mcg at 10/25/22 5530    Arformoterol Tartrate (BROVANA) nebulizer solution 15 mcg, 15 mcg, Nebulization, BID, Danville Osgood, APRN - CNP, 15 mcg at 10/25/22 0641    sodium chloride flush 0.9 % injection 5-40 mL, 5-40 mL, IntraVENous, 2 times per day, Danville Osgood, APRN - CNP, 10 mL at 10/24/22 1178    sodium chloride flush 0.9 % injection 5-40 mL, 5-40 mL, IntraVENous, PRN, Danville Osgood, APRN - CNP    0.9 % sodium chloride infusion, , IntraVENous, PRN, Danville Osgood, APRN - CNP    acetaminophen (TYLENOL) tablet 650 mg, 650 mg, Oral, Q6H PRN **OR** acetaminophen (TYLENOL) suppository 650 mg, 650 mg, Rectal, Q6H PRN, ARLEEN Fuller - CNP    polyethylene glycol [facility]) packet 17 g, 17 g, Oral, Daily PRN, Wendy Alva APRN - CNP    glucose chewable tablet 16 g, 4 tablet, Oral, PRN, Agnesew Yazminis, APRN - CNP    dextrose bolus 10% 125 mL, 125 mL, IntraVENous, PRN **OR** dextrose bolus 10% 250 mL, 250 mL, IntraVENous, PRN, Wendy Alva, APRN - CNP    glucagon (rDNA) injection 1 mg, 1 mg, SubCUTAneous, PRN, Agnesew Nida, APRN - CNP    dextrose 10 % infusion, , IntraVENous, Continuous PRN, Wendy Alva APRN - CNP    insulin lispro (HUMALOG) injection vial 0-8 Units, 0-8 Units, SubCUTAneous, TID WC, Wendy Alva, APRN - CNP    insulin lispro (HUMALOG) injection vial 0-4 Units, 0-4 Units, SubCUTAneous, Nightly, Wendy Alva, APRN - CNP    insulin lispro (HUMALOG) injection vial 10 Units, 10 Units, SubCUTAneous, TID WC, Wendy Alva, APRN - CNP      ALLERGIES:  Pcn [penicillins] and Sulfa antibiotics    SOCIAL HISTORY:    Lives in an apartment with his wife. Has walker to use PRN (rarely uses per patient). Former tobacco smoker, quit in 2015. Smoked 1 ppd for 20+ years. Denies ETOH or illicit drug use    FAMILY HISTORY:   Non-contributory at this time due to patient's advanced age      REVIEW OF SYSTEMS:     Negative except as noted above in HPI      PHYSICAL EXAM:   BP 96/69   Pulse (!) 104   Temp 97.7 °F (36.5 °C) (Oral)   Resp 19   Ht 5' 6\" (1.676 m)   Wt (!) 308 lb (139.7 kg)   SpO2 95%   BMI 49.71 kg/m²   Due to patient's COVID-19 rule out status and in [facility] plus isolation, physical exam was not performed in order to preserve PPE and limit exposure      DATA:    Telemetry: AF with HR in the 90s - low 100s    Diagnostic:  All diagnostic testing and lab work thus far this admission reviewed in detail. CXR 10/24/2022  Impression  Mild bibasilar opacities and pleural effusion suspicious for early CHF. Superimposed pneumonia cannot be excluded.       Intake/Output Summary (Last 24 hours) at 10/25/2022 4643  Last data filed at 10/25/2022 0827  Gross per 24 hour   Intake 440 ml   Output 900 ml   Net -460 ml       Labs:   CBC:   Recent Labs     10/24/22  1746 10/25/22  0557   WBC 6.9 5.9   HGB 12.8 12.1*   HCT 39.9 38.0    170     BMP:   Recent Labs     10/24/22  1746 10/25/22  0557    136   K 5.8* 3.7   CO2 26 30*   BUN 21 23   CREATININE 1.1 1.1   LABGLOM >60 >60   CALCIUM 9.3 9.1     Mag:   Recent Labs     10/25/22  0557   MG 1.9     Phos:   Recent Labs     10/25/22  0557   PHOS 3.5     TSH:   Recent Labs     10/25/22  0557   TSH 39.180*     Ref Range & Units 10/25/22 0557    T4 Free 0.93 - 1.70 ng/dL 0.55 Low       HgA1c:   Lab Results   Component Value Date    LABA1C 9.5 (H) 09/16/2022     APTT:  Recent Labs     10/24/22  1746   APTT 29.5     FASTING LIPID PANEL:  Lab Results   Component Value Date/Time    CHOL 123 12/16/2021 08:56 AM    HDL 42 12/16/2021 08:56 AM    LDLCALC 60 12/16/2021 08:56 AM    TRIG 104 12/16/2021 08:56 AM     LIVER PROFILE:  Recent Labs     10/25/22  0557   AST 14   ALT 18   LABALBU 3.6     Ref Range & Units 10/24/22 2253 10/24/22 1927    Troponin, High Sensitivity 0 - 11 ng/L 30 High   30 High  CM       Ref Range & Units 10/25/22 0009   Troponin, High Sensitivity 0 - 11 ng/L 30 High       Ref Range & Units 10/24/22 1746    Pro-BNP 0 - 125 pg/mL 2,248 High       BLOOD CULTURES PENDING    ASSESSMENT:  PAF, now with RVR. 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 COVID-19 rule out  Acute hypoxic respiratory failure, now on RA  Abnormal TSH and T4 this admission  CAD s/p PCI/BMS to the RCA in 2013, s/p CABG x 2 (LIMA-LAD, SVG-RI) in 2013.   Non-ischemic Lexiscan stress test 4/2021  Severe MR s/p MVr 2013 at time of CABG  Mild to moderate AS on TTE 9/2022  HTN, now with borderline low BP  HLD, on statin therapy   Insulin requiring T2DM   Former tobacco smoker   COPD/Asthma   CKD  Morbid obesity, BMI 49  LISS, uses BiPAP at night  GERD  Recurrent mechanical falls  R C7 transverse fracture s/p mechanical fall 9/2022, conservative management per Neurosurgery      RECOMMENDATIONS:  Gentle IV diuresis given borderline hypotension  Monitor strict intake and output, daily weights  Monitor renal function and electrolytes   Decrease Toprol-XL to 25 mg daily with holding parameters, can titrate up as tolerated   Further GDMT (prior cardiomyopathy/HFmEF) limited by borderline low BP at this time    Continue Amiodarone PO  Continue other cardiac medications the same at this time   Consider limited TTE for re-evaluation of LV function  Rest as per primary service and other consultants  Above as per Dr. Margy Salcido -- discussed and made in collaboration with him. Further recommendations to follow as per him          NOTE: This report was transcribed using voice recognition software. Every effort was made to ensure accuracy; however, inadvertent computerized transcription errors may be present. Ana Ho, [address] [place] [address], [place] [address] Cardiology    Electronically signed by Megan Durand PA-C on 10/25/2022 at 8:33 AM        Addendum:                                                    Inpatient CARDIOLOGY Consultation      Our RAYMUNDO exam, assessment reviewed and reflect my work. I personally saw, examined, and evaluated the patient today. I spent more than 50% of total consult time today. I am the primary author for the consult notes. I personally reviewed the medications, rhythm strips, pertinent labs and test reports. I directly participated in the medical-decision making, ordering pertinent tests and medication adjustment.     Reason for Consult:  CHF    Date of Consultation: 10/25/2022    Patient previously known to Dr. 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 Patient was not seen in person due to COVID-19 r/o status, to limit personal exposure and limit PPE utilization. Our RAYMUNDO talked to him over phone. He states shortly after his hospital discharge 9/2022, he began to have issues with tachycardia when [facility] nurse would take his vitals. Possible medication adjustment per telephone encounter as noted above, however home medication list states Amiodarone remained at 200 mg one tablet daily. He continued to have issues with tachycardia, and over the past week or two, has noticed progressively worsening DE PAZ, peripheral edema and weight gain. Admits to dry cough and generalized fatigue. Denies CP, N/V, palpitations, diaphoresis, dizziness, near syncope, syncope, orthopnea or PND. Admits medication compliance, and still notes of good urinary response to PO Lasix. REVIEW OF SYSTEMS:   Review of rest of 12 systems negative except as mentioned in HPI. Please note: past medical records were reviewed per electronic medical record (EMR) - see detailed reports under Past Medical/ Surgical History.        Past Medical History:    Past Medical History:   Diagnosis Date    Acid reflux     Acute diastolic (congestive) heart failure ([facility] [address] 06/19/2019    Arthritis     Asthma 04/16/2014    Asthmatic bronchitis , chronic ([facility] [address] 11/28/2016    CAD (coronary artery disease) Chronic bronchitis ([email or web address] 04/16/2014    COPD (chronic obstructive pulmonary disease) ([facility])     CB    COPD (chronic obstructive pulmonary disease) ([facility])     Diabetes mellitus ([email or web address]     Emphysema (subcutaneous) (surgical) resulting from a procedure     H/O cardiovascular stress test 04/24/2021    Lexiscan    Hyperlipidemia     Hypertension     Hypoxemia requiring supplemental oxygen 02/02/2015    LONG TERM ANTICOAGULENT USE     Morbid obesity with BMI of 50.0-59.9, adult ([email or web address] 11/27/2013    Obesity     Osteoarthritis     Sleep apnea     bilevel positive airway pressure at 13/8 with 2 L oxygen flow     Stage 3 chronic kidney disease ([facility])     Tobacco abuse     Type II or unspecified type diabetes mellitus without mention of complication, not stated as uncontrolled        Past Surgical History:    Past Surgical History:   Procedure Laterality Date    COLONOSCOPY      CORONARY ANGIOPLASTY WITH STENT PLACEMENT  07-23-13    Left main focal eccentric 65% distal stenosis. Large OM1 CX 50% ostial & prox narrow. Large ramus artery & LAD: Minor plaque w/o sign narrow. RCA: Dom vessel w/25% prox narrow & focal hazy eccentric 99% distal stenosis before origin RPDA & RPLCA. LV: Mild inferior hypokinesis EF 55%. Probable mild AS with 10-15mmHg. Successful PCI distal RCA w/3.5 x 12 mm BMS, 0% res stenosis.  Normal distal runoff    CORONARY ARTERY BYPASS GRAFT  09-09-13    Dr Rajwinder Alonso; CABG x2, LIMA to LAD, SVG to ramus intermedius; MV repair using 30-mm Future complete ring with magic stitch between A3 and P3; rigid internal fixation of sternum using KLS plates x2; endoscopic vein harvesting of right lower extremity     ECHO COMPL W DOP COLOR FLOW  7/25/2013         HERNIA REPAIR      SINUS SURGERY           Allergies:    Pcn [penicillins] and Sulfa antibiotics    Social History:    Social History     Socioeconomic History    Marital status:      Spouse name: Savanah Harrington    Number of children: 1    Years of education: Not on file Highest education level: 11th grade   Occupational History    Not on file   Tobacco Use    Smoking status: Former     Packs/day: 1.00     Years: 45.00     Pack years: 45.00     Types: Cigarettes     Quit date: 2013     Years since quittin.2    Smokeless tobacco: Former     Types: Chew     Quit date: 10/8/1971   Vaping Use    Vaping Use: Never used   Substance and Sexual Activity    Alcohol use: No     Alcohol/week: 0.0 standard drinks     Comment: 1-2 coffee per day    Drug use: No    Sexual activity: Yes     Partners: Female   Other Topics Concern    Not on file   Social History Narrative    19  [facility] reviewed SDOH with Syeda Benito. He does have money strain but between the income he has coming in and his wife's they are over resources for SARY programs. Offered food panty info to help with end of the month struggles but he declined stating that he tried and was refused due to his income. He belongs to a [employer] and goes weekly so he has some community connections in place. He has an extremely high interest rate on his mortgage which he was encouraged to look into getting lowered to help save money on his house payments. Noticed some difficulty with memory recall. Becomes easily flustered at times. Has no MHI to support applying for [facility] program of SARY. States he does not feel depressed or need any MH treatment.         Social Determinants of Health     Financial Resource Strain: Low Risk     Difficulty of Paying Living Expenses: Not very hard   Food Insecurity: No Food Insecurity    Worried About Running Out of Food in the Last Year: Never true    Ran Out of Food in the Last Year: Never true   Transportation Needs: Not on file   Physical Activity: Not on file   Stress: Not on file   Social Connections: Not on file   Intimate Partner Violence: Not on file   Housing Stability: Not on file       Family History:   Family History   Problem Relation Age of Onset    Cancer Mother         breast    Cancer Father         stomach    Mental Illness Brother     Stroke Brother     Other Brother          Medications Prior to admit: Reviewed  Prior to Admission medications    Medication Sig Start Date End Date Taking?  Authorizing Provider   amitriptyline (ELAVIL) 50 MG tablet TAKE ONE TABLET BY MOUTH NIGHTLY 10/24/22   [facility]   bumetanide (BUMEX) 1 MG tablet TAKE ONE TABLET BY MOUTH EVERY DAY 10/24/22   [facility]   apixaban (ELIQUIS) 5 MG TABS tablet Take 1 tablet by mouth 2 times daily 9/29/22 10/29/22  [facility]   amiodarone (CORDARONE) 200 MG tablet Take 1 tablet by mouth daily 9/29/22 10/29/22  [facility]   levocetirizine (XYZAL) 5 MG tablet Take 1 tablet by mouth nightly 9/21/22   [facility], DO   pantoprazole (PROTONIX) 40 MG tablet Take 1 tablet by mouth daily 9/21/22   [facility]   risperiDONE (RISPERDAL) 0.5 MG tablet Take 1 tablet by mouth 2 times daily 9/20/22   Hardeep Arauz, DO   montelukast (SINGULAIR) 10 MG tablet Take 1 tablet by mouth nightly 6/6/22   [facility]   insulin glargine (BASAGLAR KWIKPEN) 100 UNIT/ML injection pen Inject 45 Units into the skin 2 times daily 4/8/22   [facility], DO   insulin lispro, 1 Unit Dial, (HUMALOG KWIKPEN) 100 UNIT/ML SOPN Inject 0-18 Units into the skin 3 times daily (before meals) MAX 70 units daily 4/8/22   [facility]   atorvastatin (LIPITOR) 80 MG tablet TAKE ONE TABLET BY MOUTH EVERY NIGHT 4/8/22   [facility]   metoprolol succinate (TOPROL XL) 25 MG extended release tablet Take 1 tablet by mouth 2 times daily 4/8/22 9/20/22  [facility]   pramipexole (MIRAPEX) 0.25 MG tablet TAKE TWO TABLETS BY MOUTH THREE TIMES A DAY 1/25/22 9/20/22  Montgomery Chava, DO   CPAP Machine MISC 1 each by Does not apply route nightly as needed     Historical Provider, MD   aspirin 81 MG tablet Take 81 mg by mouth nightly     Historical Provider, MD         DATA:      ECG - Reviewed     Tele strips: Reviewed    Diagnostic:      Intake/Output Summary (Last 24 hours) at 10/25/2022 1621  Last data filed at 10/25/2022 0845  Gross per 24 hour   Intake 680 ml   Output 900 ml   Net -220 ml       IMAGING STUDIES: Reviewed          LABS:      Cardiac enzymes:  Recent Labs     10/24/22  1927 10/24/22  2253 10/25/22  0009   TROPHS 30* 30* 30*     CBC:   Recent Labs     10/24/22  1746 10/25/22  0557   WBC 6.9 5.9   HGB 12.8 12.1*   HCT 39.9 38.0    170     BMP:   Recent Labs     10/24/22  1746 10/25/22  0557    136   K 5.8* 3.7   CO2 26 30*   BUN 21 23   CREATININE 1.1 1.1   LABGLOM >60 >60   CALCIUM 9.3 9.1     Mag:   Recent Labs     10/25/22  0557   MG 1.9     Phos:   Recent Labs     10/25/22  0557   PHOS 3.5     TSH:   Recent Labs     10/25/22  0557   TSH 39.180*     HgA1c:   Lab Results   Component Value Date    LABA1C 8.3 (H) 10/24/2022     No results found for: EAG  proBNP:   Recent Labs     10/24/22  1746   PROBNP 2,248*     PT/INR: No results for input(s): PROTIME, INR in the last 72 hours.   APTT:  Recent Labs     10/24/22  1746   APTT 29.5     FASTING LIPID PANEL:  Lab Results   Component Value Date/Time    CHOL 123 12/16/2021 08:56 AM    HDL 42 12/16/2021 08:56 AM    LDLCALC 60 12/16/2021 08:56 AM    TRIG 104 12/16/2021 08:56 AM     LIVER PROFILE:  Recent Labs     10/25/22  0557   AST 14   ALT 18   LABALBU 3.6       Current Inpatient Medications:   metoprolol succinate  25 mg Oral Daily    levothyroxine  50 mcg Oral Daily    amiodarone  200 mg Oral Daily    amitriptyline  50 mg Oral Nightly    apixaban  5 mg Oral BID    aspirin  81 mg Oral Nightly    atorvastatin  80 mg Oral Nightly    [Held by provider] bumetanide  1 mg Oral Daily    insulin glargine  45 Units SubCUTAneous BID    pantoprazole  40 mg Oral Daily    risperiDONE  0.5 mg Oral BID    budesonide  0.5 mg Nebulization BID    Arformoterol Tartrate  15 mcg Nebulization BID    sodium chloride flush  5-40 mL IntraVENous 2 times per day insulin lispro  0-8 Units SubCUTAneous TID WC    insulin lispro  0-4 Units SubCUTAneous Nightly    insulin lispro  10 Units SubCUTAneous TID WC       IV Infusions (if any):   sodium chloride      dextrose         PHYSICAL EXAM:     BP (!) 115/58   Pulse 79   Temp 97.6 °F (36.4 °C) (Oral)   Resp 20   Ht 5' 6\" (1.676 m)   Wt (!) 308 lb (139.7 kg)   SpO2 96%   BMI 49.71 kg/m²       Patient was not seen in person due to COVID-19 r/o status, to limit personal exposure and limit PPE utilization. Our RAYMUNDO talked to the patient        IMPRESSION / RECOMMENDATIONS:    PAF - now with RVR. Chronic Amiodarone and Eliquis therapy - Adjust BB for BP and HR. Acute on chronic  HF with recovered EF  -Gentle IV diuresis given borderline hypotension; Monitor strict I/os, daily weights, BP, renal function and electrolytes   Last EF 55% on TTE 9/2022  proBNP 426 September 2022 --> now 2248  Weight 286 lbs on 9/23/2022 --> now 308 lbs  Possible PNA on CXR. COVID-19 rule out status  Acute hypoxic respiratory failure, now on Room air  Abnormal TSH and T4 this admission  CAD s/p PCI/BMS to the RCA in 2013, s/p CABG x 2 (LIMA-LAD, SVG-RI) in 2013.   Non-ischemic Lexiscan stress test 4/2021  Severe MR s/p MVr 2013 at time of CABG  Mild to moderate AS on TTE 9/2022  HTN, now with borderline low BP  HLD, on statin therapy   Insulin requiring T2DM   COPD/Asthma   Morbid obesity, BMI 49  LISS, uses BiPAP at night  GERD  Recurrent mechanical falls  R C7 transverse fracture s/p mechanical fall 9/2022, conservative management per Neurosurgery            Electronically signed by Mejia Morales MD on 10/25/2022   [facility]) Cardiology

## 2022-10-26 LAB
ALBUMIN SERPL-MCNC: 3.4 G/DL (ref 3.5–5.2)
ALP BLD-CCNC: 125 U/L (ref 40–129)
ALT SERPL-CCNC: 17 U/L (ref 0–40)
ANION GAP SERPL CALCULATED.3IONS-SCNC: 12 MMOL/L (ref 7–16)
ANION GAP SERPL CALCULATED.3IONS-SCNC: 7 MMOL/L (ref 7–16)
AST SERPL-CCNC: 16 U/L (ref 0–39)
BASOPHILS ABSOLUTE: 0.03 E9/L (ref 0–0.2)
BASOPHILS RELATIVE PERCENT: 0.5 % (ref 0–2)
BILIRUB SERPL-MCNC: 0.9 MG/DL (ref 0–1.2)
BILIRUBIN DIRECT: 0.3 MG/DL (ref 0–0.3)
BILIRUBIN, INDIRECT: 0.6 MG/DL (ref 0–1)
BUN BLDV-MCNC: 30 MG/DL (ref 6–23)
BUN BLDV-MCNC: 30 MG/DL (ref 6–23)
CALCIUM SERPL-MCNC: 9 MG/DL (ref 8.6–10.2)
CALCIUM SERPL-MCNC: 9.1 MG/DL (ref 8.6–10.2)
CHLORIDE BLD-SCNC: 98 MMOL/L (ref 98–107)
CHLORIDE BLD-SCNC: 99 MMOL/L (ref 98–107)
CO2: 26 MMOL/L (ref 22–29)
CO2: 30 MMOL/L (ref 22–29)
CREAT SERPL-MCNC: 1.4 MG/DL (ref 0.7–1.2)
CREAT SERPL-MCNC: 1.5 MG/DL (ref 0.7–1.2)
EKG ATRIAL RATE: 119 BPM
EKG Q-T INTERVAL: 280 MS
EKG QRS DURATION: 108 MS
EKG QTC CALCULATION (BAZETT): 400 MS
EKG R AXIS: 157 DEGREES
EKG T AXIS: 123 DEGREES
EKG VENTRICULAR RATE: 123 BPM
EOSINOPHILS ABSOLUTE: 0.1 E9/L (ref 0.05–0.5)
EOSINOPHILS RELATIVE PERCENT: 1.8 % (ref 0–6)
GFR SERPL CREATININE-BSD FRML MDRD: 51 ML/MIN/1.73
GFR SERPL CREATININE-BSD FRML MDRD: 55 ML/MIN/1.73
GLUCOSE BLD-MCNC: 148 MG/DL (ref 74–99)
GLUCOSE BLD-MCNC: 153 MG/DL (ref 74–99)
HCT VFR BLD CALC: 38.9 % (ref 37–54)
HEMOGLOBIN: 12.3 G/DL (ref 12.5–16.5)
IMMATURE GRANULOCYTES #: 0.03 E9/L
IMMATURE GRANULOCYTES %: 0.5 % (ref 0–5)
LYMPHOCYTES ABSOLUTE: 0.96 E9/L (ref 1.5–4)
LYMPHOCYTES RELATIVE PERCENT: 16.9 % (ref 20–42)
MAGNESIUM: 2.1 MG/DL (ref 1.6–2.6)
MCH RBC QN AUTO: 31.9 PG (ref 26–35)
MCHC RBC AUTO-ENTMCNC: 31.6 % (ref 32–34.5)
MCV RBC AUTO: 100.8 FL (ref 80–99.9)
METER GLUCOSE: 141 MG/DL (ref 74–99)
METER GLUCOSE: 142 MG/DL (ref 74–99)
METER GLUCOSE: 154 MG/DL (ref 74–99)
METER GLUCOSE: 170 MG/DL (ref 74–99)
MONOCYTES ABSOLUTE: 0.64 E9/L (ref 0.1–0.95)
MONOCYTES RELATIVE PERCENT: 11.3 % (ref 2–12)
NEUTROPHILS ABSOLUTE: 3.92 E9/L (ref 1.8–7.3)
NEUTROPHILS RELATIVE PERCENT: 69 % (ref 43–80)
PDW BLD-RTO: 16.5 FL (ref 11.5–15)
PHOSPHORUS: 4.6 MG/DL (ref 2.5–4.5)
PLATELET # BLD: 160 E9/L (ref 130–450)
PMV BLD AUTO: 10.2 FL (ref 7–12)
POTASSIUM SERPL-SCNC: 4 MMOL/L (ref 3.5–5)
POTASSIUM SERPL-SCNC: 4.1 MMOL/L (ref 3.5–5)
RBC # BLD: 3.86 E12/L (ref 3.8–5.8)
SODIUM BLD-SCNC: 135 MMOL/L (ref 132–146)
SODIUM BLD-SCNC: 137 MMOL/L (ref 132–146)
TOTAL PROTEIN: 6.8 G/DL (ref 6.4–8.3)
WBC # BLD: 5.7 E9/L (ref 4.5–11.5)

## 2022-10-26 PROCEDURE — 6360000002 HC RX W HCPCS: Performed by: NURSE PRACTITIONER

## 2022-10-26 PROCEDURE — 97530 THERAPEUTIC ACTIVITIES: CPT

## 2022-10-26 PROCEDURE — 6370000000 HC RX 637 (ALT 250 FOR IP): Performed by: NURSE PRACTITIONER

## 2022-10-26 PROCEDURE — 84100 ASSAY OF PHOSPHORUS: CPT

## 2022-10-26 PROCEDURE — APPSS60 APP SPLIT SHARED TIME 46-60 MINUTES: Performed by: PHYSICIAN ASSISTANT

## 2022-10-26 PROCEDURE — 97110 THERAPEUTIC EXERCISES: CPT

## 2022-10-26 PROCEDURE — 2060000000 HC ICU INTERMEDIATE R&B

## 2022-10-26 PROCEDURE — 93010 ELECTROCARDIOGRAM REPORT: CPT | Performed by: INTERNAL MEDICINE

## 2022-10-26 PROCEDURE — 94640 AIRWAY INHALATION TREATMENT: CPT

## 2022-10-26 PROCEDURE — 80076 HEPATIC FUNCTION PANEL: CPT

## 2022-10-26 PROCEDURE — 36415 COLL VENOUS BLD VENIPUNCTURE: CPT

## 2022-10-26 PROCEDURE — 85025 COMPLETE CBC W/AUTO DIFF WBC: CPT

## 2022-10-26 PROCEDURE — 82962 GLUCOSE BLOOD TEST: CPT

## 2022-10-26 PROCEDURE — 99233 SBSQ HOSP IP/OBS HIGH 50: CPT | Performed by: INTERNAL MEDICINE

## 2022-10-26 PROCEDURE — 80048 BASIC METABOLIC PNL TOTAL CA: CPT

## 2022-10-26 PROCEDURE — 2580000003 HC RX 258: Performed by: NURSE PRACTITIONER

## 2022-10-26 PROCEDURE — 83735 ASSAY OF MAGNESIUM: CPT

## 2022-10-26 PROCEDURE — 6370000000 HC RX 637 (ALT 250 FOR IP): Performed by: PHYSICIAN ASSISTANT

## 2022-10-26 PROCEDURE — 6360000002 HC RX W HCPCS: Performed by: PHYSICIAN ASSISTANT

## 2022-10-26 RX ORDER — FUROSEMIDE 10 MG/ML
40 INJECTION INTRAMUSCULAR; INTRAVENOUS DAILY
Status: DISCONTINUED | OUTPATIENT
Start: 2022-10-27 | End: 2022-10-27

## 2022-10-26 RX ORDER — FUROSEMIDE 10 MG/ML
20 INJECTION INTRAMUSCULAR; INTRAVENOUS ONCE
Status: COMPLETED | OUTPATIENT
Start: 2022-10-26 | End: 2022-10-26

## 2022-10-26 RX ADMIN — INSULIN GLARGINE 45 UNITS: 100 INJECTION, SOLUTION SUBCUTANEOUS at 08:16

## 2022-10-26 RX ADMIN — APIXABAN 5 MG: 5 TABLET, FILM COATED ORAL at 08:15

## 2022-10-26 RX ADMIN — ASPIRIN 81 MG: 81 TABLET, COATED ORAL at 22:01

## 2022-10-26 RX ADMIN — FUROSEMIDE 20 MG: 10 INJECTION, SOLUTION INTRAVENOUS at 09:50

## 2022-10-26 RX ADMIN — BUDESONIDE 500 MCG: 0.5 INHALANT RESPIRATORY (INHALATION) at 19:35

## 2022-10-26 RX ADMIN — FUROSEMIDE 20 MG: 10 INJECTION, SOLUTION INTRAVENOUS at 17:46

## 2022-10-26 RX ADMIN — LEVOTHYROXINE SODIUM 50 MCG: 0.05 TABLET ORAL at 05:13

## 2022-10-26 RX ADMIN — AMIODARONE HYDROCHLORIDE 200 MG: 200 TABLET ORAL at 08:15

## 2022-10-26 RX ADMIN — RISPERIDONE 0.5 MG: 1 TABLET ORAL at 22:01

## 2022-10-26 RX ADMIN — ARFORMOTEROL TARTRATE 15 MCG: 15 SOLUTION RESPIRATORY (INHALATION) at 06:35

## 2022-10-26 RX ADMIN — ATORVASTATIN CALCIUM 80 MG: 40 TABLET, FILM COATED ORAL at 22:00

## 2022-10-26 RX ADMIN — INSULIN LISPRO 10 UNITS: 100 INJECTION, SOLUTION INTRAVENOUS; SUBCUTANEOUS at 08:16

## 2022-10-26 RX ADMIN — METOPROLOL SUCCINATE 25 MG: 25 TABLET, EXTENDED RELEASE ORAL at 08:15

## 2022-10-26 RX ADMIN — Medication 10 ML: at 22:01

## 2022-10-26 RX ADMIN — PANTOPRAZOLE SODIUM 40 MG: 40 TABLET, DELAYED RELEASE ORAL at 08:15

## 2022-10-26 RX ADMIN — Medication 10 ML: at 08:21

## 2022-10-26 RX ADMIN — INSULIN GLARGINE 45 UNITS: 100 INJECTION, SOLUTION SUBCUTANEOUS at 22:02

## 2022-10-26 RX ADMIN — AMITRIPTYLINE HYDROCHLORIDE 50 MG: 25 TABLET, FILM COATED ORAL at 22:00

## 2022-10-26 RX ADMIN — APIXABAN 5 MG: 5 TABLET, FILM COATED ORAL at 22:00

## 2022-10-26 RX ADMIN — RISPERIDONE 0.5 MG: 1 TABLET ORAL at 08:15

## 2022-10-26 RX ADMIN — BUDESONIDE 500 MCG: 0.5 INHALANT RESPIRATORY (INHALATION) at 06:35

## 2022-10-26 RX ADMIN — ARFORMOTEROL TARTRATE 15 MCG: 15 SOLUTION RESPIRATORY (INHALATION) at 19:35

## 2022-10-26 NOTE — PROGRESS NOTES
Inpatient [id number] [facility] Cardiology Progress Note    Date of Service: 10/26/2022    Reason for Follow-up: CHF, AF RVR    SUBJECTIVE:     Patient is a 77year old WM known to Dr. Ravindra Barrett. Patient states he is feeling better today. His SOB has improved, and denies dyspnea with ambulation yesterday. Denies dyspnea at rest, but appears mildly dyspneic with conversation. Continues to have peripheral edema bilaterally, which the patient states he does not typically have at baseline. He has not been urinating much since hospital admission. Continues to note of dry cough and generalized fatigue. Denies CP, N/V, palpitations, diaphoresis, dizziness, near syncope, syncope, orthopnea or PND  All labs, vital signs and telemetry reviewed      HOME MEDICATIONS:  Prior to Admission medications    Medication Sig Start Date End Date Taking?  Authorizing Provider   amitriptyline (ELAVIL) 50 MG tablet TAKE ONE TABLET BY MOUTH NIGHTLY 10/24/22   Hugo Larsen, DO   bumetanide (BUMEX) 1 MG tablet TAKE ONE TABLET BY MOUTH EVERY DAY 10/24/22   Hugo Larsen, DO   apixaban (ELIQUIS) 5 MG TABS tablet Take 1 tablet by mouth 2 times daily 9/29/22 10/29/22  Hugo Larsen, DO   amiodarone (CORDARONE) 200 MG tablet Take 1 tablet by mouth daily 9/29/22 10/29/22  Hugo Larsen, DO   levocetirizine (XYZAL) 5 MG tablet Take 1 tablet by mouth nightly 9/21/22   Hugo Larsen, DO   pantoprazole (PROTONIX) 40 MG tablet Take 1 tablet by mouth daily 9/21/22   Hugo Larsen, DO   risperiDONE (RISPERDAL) 0.5 MG tablet Take 1 tablet by mouth 2 times daily 9/20/22   Rendell Arauz, DO   montelukast (SINGULAIR) 10 MG tablet Take 1 tablet by mouth nightly 6/6/22   Hugo Larsen, DO   insulin glargine (BASAGLAR KWIKPEN) 100 UNIT/ML injection pen Inject 45 Units into the skin 2 times daily 4/8/22   Hugo Larsen, DO   insulin lispro, 1 Unit Dial, (HUMALOG KWIKPEN) 100 UNIT/ML SOPN Inject 0-18 Units into the skin 3 times daily (before meals) MAX 70 units daily 4/8/22   Milus Perales, DO   atorvastatin (LIPITOR) 80 MG tablet TAKE ONE TABLET BY MOUTH EVERY NIGHT 4/8/22   Milus Perales, DO   metoprolol succinate (TOPROL XL) 25 MG extended release tablet Take 1 tablet by mouth 2 times daily 4/8/22 9/20/22  Milus Perales, DO   pramipexole (MIRAPEX) 0.25 MG tablet TAKE TWO TABLETS BY MOUTH THREE TIMES A DAY 1/25/22 9/20/22  Milus Perales, DO   CPAP Machine MISC 1 each by Does not apply route nightly as needed     Historical Provider, MD   aspirin 81 MG tablet Take 81 mg by mouth nightly     Historical Provider, MD       CURRENT MEDICATIONS:      Current Facility-Administered Medications:     promethazine (PHENERGAN) tablet 25 mg, 25 mg, Oral, Q6H PRN **OR** prochlorperazine (COMPAZINE) injection 10 mg, 10 mg, IntraVENous, Q6H PRN, Carlin Conch, APRN - CNP    metoprolol succinate (TOPROL XL) extended release tablet 25 mg, 25 mg, Oral, Daily, Cem Rose PA-C, 25 mg at 10/26/22 0815    levothyroxine (SYNTHROID) tablet 50 mcg, 50 mcg, Oral, Daily, Carlin Conch, APRN - CNP, 50 mcg at 10/26/22 4162    amiodarone (CORDARONE) tablet 200 mg, 200 mg, Oral, Daily, Carlin Conch, APRN - CNP, 200 mg at 10/26/22 0815    amitriptyline (ELAVIL) tablet 50 mg, 50 mg, Oral, Nightly, Carlin Conch, APRN - CNP, 50 mg at 10/25/22 2038    apixaban (ELIQUIS) tablet 5 mg, 5 mg, Oral, BID, Carlin Conch, APRN - CNP, 5 mg at 10/26/22 0815    aspirin EC tablet 81 mg, 81 mg, Oral, Nightly, Carlin Conch, APRN - CNP, 81 mg at 10/25/22 2039    atorvastatin (LIPITOR) tablet 80 mg, 80 mg, Oral, Nightly, Carlin Conch, APRN - CNP, 80 mg at 10/25/22 2038    [Held by provider] bumetanide (BUMEX) tablet 1 mg, 1 mg, Oral, Daily, Carlin Conch, APRN - CNP    insulin glargine (LANTUS) injection vial 45 Units, 45 Units, SubCUTAneous, BID, ARLEEN Morales CNP, 45 Units at 10/26/22 0816    pantoprazole (PROTONIX) tablet 40 mg, 40 mg, Oral, Daily, ARLEEN Morales CNP, 40 mg at 10/26/22 0815    risperiDONE (RISPERDAL) tablet 0.5 mg, 0.5 mg, Oral, BID, Nikki Nian, APRN - CNP, 0.5 mg at 10/26/22 0815    magnesium sulfate 1000 mg in dextrose 5% 100 mL IVPB, 1,000 mg, IntraVENous, PRN, Nikki Nian, APRN - CNP    sodium phosphate 15 mmol in sodium chloride 0.9 % 250 mL IVPB, 15 mmol, IntraVENous, PRN **OR** sodium phosphate 30 mmol in sodium chloride 0.9 % 500 mL IVPB, 30 mmol, IntraVENous, PRN, Nikki Nian, APRN - CNP    potassium chloride (KLOR-CON M) extended release tablet 40 mEq, 40 mEq, Oral, PRN **OR** potassium bicarb-citric acid (EFFER-K) effervescent tablet 40 mEq, 40 mEq, Oral, PRN **OR** potassium chloride 10 mEq/100 mL IVPB (Peripheral Line), 10 mEq, IntraVENous, PRN, Nikki Nian, APRN - CNP    albuterol (PROVENTIL) nebulizer solution 2.5 mg, 2.5 mg, Nebulization, Q4H PRN, Nikki Nian, APRN - CNP    budesonide (PULMICORT) nebulizer suspension 500 mcg, 0.5 mg, Nebulization, BID, Nikki Nian, APRN - CNP, 500 mcg at 10/26/22 4759    Arformoterol Tartrate (BROVANA) nebulizer solution 15 mcg, 15 mcg, Nebulization, BID, Nikki Nian, APRN - CNP, 15 mcg at 10/26/22 0250    sodium chloride flush 0.9 % injection 5-40 mL, 5-40 mL, IntraVENous, 2 times per day, Nikki Nian, APRN - CNP, 10 mL at 10/26/22 0855    sodium chloride flush 0.9 % injection 5-40 mL, 5-40 mL, IntraVENous, PRN, Nikki Nian, APRN - CNP    0.9 % sodium chloride infusion, , IntraVENous, PRN, Nikki Nian, APRN - CNP    acetaminophen (TYLENOL) tablet 650 mg, 650 mg, Oral, Q6H PRN **OR** acetaminophen (TYLENOL) suppository 650 mg, 650 mg, Rectal, Q6H PRN, Nikki Nian, APRN - CNP    polyethylene glycol (GLYCOLAX) packet 17 g, 17 g, Oral, Daily PRN, Nikki Nian, APRN - CNP    glucose chewable tablet 16 g, 4 tablet, Oral, PRN, Nikki Nian, APRN - CNP    dextrose bolus 10% 125 mL, 125 mL, IntraVENous, PRN **OR** dextrose bolus 10% 250 mL, 250 mL, IntraVENous, PRN, Nikki Nian, APRN - CNP    glucagon (rDNA) injection 1 mg, 1 mg, SubCUTAneous, PRN, Nikki Nian, APRN - CNP    dextrose 10 % infusion, , IntraVENous, Continuous PRN, Nikki Nian, APRN - CNP    insulin lispro (HUMALOG) injection vial 0-8 Units, 0-8 Units, SubCUTAneous, TID WC, Nikki Nian, APRN - CNP, 2 Units at 10/25/22 1251    insulin lispro (HUMALOG) injection vial 0-4 Units, 0-4 Units, SubCUTAneous, Nightly, Nikki Nian, APRN - CNP    insulin lispro (HUMALOG) injection vial 10 Units, 10 Units, SubCUTAneous, TID WC, Nikki Nian, APRN - CNP, 10 Units at 10/26/22 8130      ALLERGIES:  Pcn [penicillins] and Sulfa antibiotics        REVIEW OF SYSTEMS:     Negative except as noted above in HPI      PHYSICAL EXAM:   /73   Pulse 78   Temp 98 °F (36.7 °C) (Oral)   Resp 20   Ht 5' 6\" (1.676 m)   Wt (!) 308 lb (139.7 kg)   SpO2 94%   BMI 49.71 kg/m²   CONST:  Well developed, morbidly obese WM who appears stated age. Awake, alert, cooperative, no apparent distress. HEENT:   Head- Normocephalic, atraumatic. Eyes- Conjunctivae pink, anicteric. Neck-  No stridor, trachea midline, thick neck - difficult assessing JVD  CHEST: Chest symmetrical and non-tender to palpation. No accessory muscle use or intercostal retractions. RESPIRATORY: Lung sounds - diminished with rare rales  CARDIOVASCULAR:     No noted carotid bruit. Heart Ausculation- Irregularly irregular rhythm with normal rate, 9-3/1 systolic murmur RUSB  PV: 1-2+ bilateral lower extremity edema. Pedal pulses palpable, no clubbing or cyanosis. ABDOMEN: Soft, non-tender to light palpation. Bowel sounds present. MS: Good muscle strength and tone. No atrophy or abnormal movements. SKIN: Warm and dry. NEURO / PSYCH: Oriented to person, place and time. Speech clear and appropriate. Follows all commands. Pleasant affect. DATA:    Telemetry: AF with HR in the 80-90s    Diagnostic:  All diagnostic testing and lab work thus far this admission reviewed in detail.     CXR 10/24/2022  Impression  Mild bibasilar opacities and pleural effusion suspicious for early CHF. Superimposed pneumonia cannot be excluded. Intake/Output Summary (Last 24 hours) at 10/26/2022 0846  Last data filed at 10/25/2022 2251  Gross per 24 hour   Intake 480 ml   Output --   Net 480 ml       Labs:   CBC:   Recent Labs     10/25/22  0557 10/26/22  0452   WBC 5.9 5.7   HGB 12.1* 12.3*   HCT 38.0 38.9    160     BMP:   Recent Labs     10/25/22  0557 10/26/22  0452    135   K 3.7 4.0   CO2 30* 30*   BUN 23 30*   CREATININE 1.1 1.4*   LABGLOM >60 55   CALCIUM 9.1 9.1     Mag:   Recent Labs     10/25/22  0557 10/26/22  0452   MG 1.9 2.1     Phos:   Recent Labs     10/25/22  0557 10/26/22  0452   PHOS 3.5 4.6*     TSH:   Recent Labs     10/25/22  0557   TSH 39.180*     Ref Range & Units 10/25/22 0557     T4 Free 0.93 - 1.70 ng/dL 0.55 Low       HgA1c:   Lab Results   Component Value Date    LABA1C 8.3 (H) 10/24/2022     APTT:  Recent Labs     10/24/22  1746   APTT 29.5     FASTING LIPID PANEL:  Lab Results   Component Value Date/Time    CHOL 123 12/16/2021 08:56 AM    HDL 42 12/16/2021 08:56 AM    LDLCALC 60 12/16/2021 08:56 AM    TRIG 104 12/16/2021 08:56 AM     LIVER PROFILE:  Recent Labs     10/25/22  0557 10/26/22  0452   AST 14 16   ALT 18 17   LABALBU 3.6 3.4*     Ref Range & Units 10/24/22 2253 10/24/22 1927     Troponin, High Sensitivity 0 - 11 ng/L 30 High   30 High  CM         Ref Range & Units 10/25/22 0009   Troponin, High Sensitivity 0 - 11 ng/L 30 High        Ref Range & Units 10/24/22 1746     Pro-BNP 0 - 125 pg/mL 2,248 High        BLOOD CULTURES PENDING      ASSESSMENT:  PAF, now with CVR today but remains in AF.   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 Respiratory panel negative   Acute hypoxic respiratory failure, now on RA  JORDEN/CKD  Abnormal TSH and T4 this admission  Anemia   CAD s/p PCI/BMS to the RCA in 2013, s/p CABG x 2 (LIMA-LAD, SVG-RI) in 2013. Non-ischemic Lexiscan stress test 4/2021  Severe MR s/p MVr 2013 at time of CABG  Mild to moderate AS on TTE 9/2022  HTN, now with borderline low BP  HLD, on statin therapy   Insulin requiring T2DM   Former tobacco smoker   COPD/Asthma   Morbid obesity, BMI 49  LISS, uses BiPAP at night  GERD  Recurrent mechanical falls  R C7 transverse fracture s/p mechanical fall 9/2022, conservative management per Neurosurgery        RECOMMENDATIONS:  Discontinue PO Amiodarone as per Dr. Glory Britton given recurrent AF and now with abnormal TSH/T4   One dose IV Lasix 20 mg this AM, assess response. If kidney function remains stable, will give additional 20 mg IV later today --> Lasix 40 mg IV daily starting tomorrow  Re-check BMP this evening to reassess kidney function  Strict intake and output, daily weights  Continue BB therapy, further titration limited at this time given borderline hypotension  Further GDMT for history of cardiomyopathy limited at this time given borderline hypotension  Recent cardiac testing reviewed  Rest as per primary service and other consultants  Above as per Dr. Glory Britton, A+P discussed and made in collaboration with him. Further recommendations to follow as per him         NOTE: This report was transcribed using voice recognition software. Every effort was made to ensure accuracy; however, inadvertent computerized transcription errors may be present. [facility], [address] [place] [address], [place] [address] Cardiology    Electronically signed by Lucero Madera PA-C on 10/26/2022 at 8:46 AM              Addendum:    Our RAYMUNDO exam, assessment reviewed and reflect my work. I personally saw, examined, and evaluated the patient today. I spent more than 50% of total consult time today.  I am the primary author for the consult notes. I personally reviewed the medications, rhythm strips, pertinent labs and test reports. I directly participated in the medical-decision making, ordering pertinent tests and medication adjustment. PATIENT SEEN IN FOLLOW UP FOR: CHF    Hospital Day: [address] Lenny Pond is a 77year old patient known to Dr. Jeramy Olivares: Denies any CP,, breathing better, No orthopnea    ROS: Review of rest of 10 systems negative except as mentioned above    OBJECTIVE: No acute distress. See Assessment     Diagnostics:       Telemetry: Reviewed        Intake/Output Summary (Last 24 hours) at 10/26/2022 1739  Last data filed at 10/26/2022 1435  Gross per 24 hour   Intake 960 ml   Output 1600 ml   Net -640 ml       Labs:   CBC:   Recent Labs     10/25/22  0557 10/26/22  0452   WBC 5.9 5.7   HGB 12.1* 12.3*   HCT 38.0 38.9    160     BMP:   Recent Labs     10/26/22  0452 10/26/22  1543    137   K 4.0 4.1   CO2 30* 26   BUN 30* 30*   CREATININE 1.4* 1.5*   LABGLOM 55 51   CALCIUM 9.1 9.0     Mag:   Recent Labs     10/25/22  0557 10/26/22  0452   MG 1.9 2.1     Phos:   Recent Labs     10/25/22  0557 10/26/22  0452   PHOS 3.5 4.6*     TSH:   Recent Labs     10/25/22  0557   TSH 39.180*     HgA1c:     BNP: No results for input(s): BNP in the last 72 hours. PT/INR: No results for input(s): PROTIME, INR in the last 72 hours.   APTT:  Recent Labs     10/24/22  1746   APTT 29.5     CARDIAC ENZYMES:  Recent Labs     10/24/22  1927 10/24/22  2253 10/25/22  0009   TROPHS 30* 30* 30*     FASTING LIPID PANEL:  Lab Results   Component Value Date/Time    CHOL 123 12/16/2021 08:56 AM    HDL 42 12/16/2021 08:56 AM    LDLCALC 60 12/16/2021 08:56 AM    TRIG 104 12/16/2021 08:56 AM     LIVER PROFILE:  Recent Labs     10/25/22  0557 10/26/22  0452   AST 14 16   ALT 18 17   LABALBU 3.6 3.4*       Current Inpatient Medications:   [START ON 10/27/2022] furosemide  40 mg IntraVENous Daily    furosemide 20 mg IntraVENous Once    metoprolol succinate  25 mg Oral Daily    levothyroxine  50 mcg Oral Daily    amitriptyline  50 mg Oral Nightly    apixaban  5 mg Oral BID    aspirin  81 mg Oral Nightly    atorvastatin  80 mg Oral Nightly    insulin glargine  45 Units SubCUTAneous BID    pantoprazole  40 mg Oral Daily    risperiDONE  0.5 mg Oral BID    budesonide  0.5 mg Nebulization BID    Arformoterol Tartrate  15 mcg Nebulization BID    sodium chloride flush  5-40 mL IntraVENous 2 times per day    insulin lispro  0-8 Units SubCUTAneous TID WC    insulin lispro  0-4 Units SubCUTAneous Nightly    insulin lispro  10 Units SubCUTAneous TID WC       IV Infusions (if any):   sodium chloride      dextrose           PHYSICAL EXAM:     CONSTITUTIONAL:   /70   Pulse 80   Temp 98.5 °F (36.9 °C) (Oral)   Resp 19   Ht 5' 6\" (1.676 m)   Wt (!) 308 lb (139.7 kg)   SpO2 94%   BMI 49.71 kg/m²   Pulse  Av.4  Min: 78  Max: 91  Systolic (22KQU), OCP:417 , Min:90 , DRR:716    Diastolic (24BXS), BS, Min:61, Max:73          CONST:  Well developed, morbidly obese WM who appears stated age. Awake, alert, cooperative, no apparent distress. HEENT:   Head- Normocephalic, atraumatic. Eyes- Conjunctivae pink, anicteric. Neck-  No stridor, trachea midline, thick neck - difficult assessing JVD  CHEST: Chest symmetrical and non-tender to palpation. No accessory muscle use or intercostal retractions. RESPIRATORY: Lung sounds - diminished with rare rales  CARDIOVASCULAR:     No noted carotid bruit. Heart Ausculation- Irregularly irregular rhythm with normal rate, 9-1/8 systolic murmur RUSB  PV: 1-2+ bilateral lower extremity edema. Pedal pulses palpable, no clubbing or cyanosis. ABDOMEN: Soft, non-tender to light palpation. Bowel sounds present. MS: n/a   SKIN: Warm and dry. NEURO / PSYCH: Oriented to person      Impression/Plan:    Acute on chronic  HFpEF  - His EF improved. EF 55%.  Better, Gentle IV diuresis given borderline hypotension; Monitor strict I/Os, daily weights, BP, renal function and electrolytes     PAF - now with RVR - Discontinue Amiodarone due to hypothyroidism; On Eliquis for [address]. Continue BB; Add Digoxin if needed for rate control. Acute hypoxic respiratory failure - On Room air    CAD s/p PCI/BMS to the RCA in 2013, s/p CABG x 2 (LIMA-LAD, SVG-RI) in 2013. Non-ischemic Lexiscan stress test 4/2021    Severe MR - s/p MVr 2013 at time of CABG    Mild to moderate AS on TTE 9/2022    HTN, now with borderline low BP    HLD, on statin therapy     Morbid obesity, BMI 49    LISS, uses BiPAP at night        Above recommendations discussed with him.       D/w  [facility]        Electronically signed by Nga Melgoza MD on 10/26/2022

## 2022-10-26 NOTE — CARE COORDINATION
10/26/2022 1550 CM for transition of care needs at d/c. Pt is agreeable to short term rehab and was provided a list of facilities for review. Pt resides with his wife in a house and is active with [facility](resumption of care orders needed if d/c home). Pt has a walker, wheelchair and a cane. He also has a CPAP from [facility]. Pt states that he recently moved and lost the cord to his Cpap, cm will check into getting him a new cord. Pt's neighbor has been transporting pt's wife to dialysis. CM will follow.  Electronically signed by Diann Brower RN on 10/26/2022 at 3:58 PM

## 2022-10-26 NOTE — PROGRESS NOTES
Internal Medicine Progress Note    [facility]    Rashid Adams. Valeri Machado., F.A.C.OBruceI. Marv Bunn D.O., VENKATOLIZZY Briceno D.O. Pedro Armenta, MSN, APRN, NP-C  Jordyn Mendoza. Tanya Ray, MSN, APRN-CNP     Primary Care Physician: Yuki Castro DO   Admitting Physician:  Aryan Duncan DO  Admission date and time: 10/24/2022  5:21 PM    Room:  [address] [place] [address]  Admitting diagnosis: Atrial fibrillation with rapid ventricular response ([facility] [I48.91]  Atrial fibrillation with RVR ([facility] [I48.91]  Acute on chronic congestive heart failure, unspecified heart failure type [facility]) [I50.9]    Patient Name: Pascale Delarosa  MRN: [id number]    Date of Service: 10/26/2022     Subjective:  Kavin Giraldo is a 77 y.o. male who was seen and examined today,10/26/2022, at the bedside. He is feeling much better overall. He is less short of breath with exertion and has no resting dyspnea. He believes his edema has improved as well. We discussed the need for IV diuretics throughout the day today with consideration for transition to pills in the next day or so. He is agreeable to skilled nursing facility placement Case management/social work team is in helping to arrange this. I discussed the case directly with the cardiovascular team. No family present during my examination. Review of System:   Constitutional:   Denies fever or chills, weight loss or gain, fatigue or malaise. HEENT:   Denies ear pain, sore throat, sinus or eye problems. Cardiovascular:   Denies any chest pain, irregular heartbeats, or palpitations. Respiratory:   No resting dyspnea. Very little exertional dyspnea. Coughing at times without sputum. Gastrointestinal:   Denies nausea, vomiting, diarrhea, or constipation. Denies any abdominal pain. Genitourinary:    Denies any urgency, frequency, hematuria. Voiding  without difficulty. Extremities:   Acute on chronic edema with some interval improvement.    Neurology: Denies any headache or focal neurological deficits, generally weak without focal component. Psch:   Denies being anxious or depressed. Musculoskeletal:    Denies  myalgias, joint complaints or back pain. Integumentary:   Denies any rashes, ulcers, or excoriations. Denies bruising. Hematologic/Lymphatic:  Denies bruising or bleeding. Physical Exam:  I/O this shift:  In: -   Out: 1000 [Urine:1000]    Intake/Output Summary (Last 24 hours) at 10/26/2022 1126  Last data filed at 10/26/2022 1119  Gross per 24 hour   Intake 480 ml   Output 1000 ml   Net -520 ml   I/O last 3 completed shifts: In: 1160 [P.O.:1160]  Out: 900 [Urine:900]  Patient Vitals for the past 96 hrs (Last 3 readings):   Weight   10/24/22 2230 (!) 308 lb (139.7 kg)   10/24/22 1718 (!) 308 lb (139.7 kg)     Vital Signs:   Blood pressure 100/73, pulse 78, temperature 98 °F (36.7 °C), temperature source Oral, resp. rate 20, height 5' 6\" (1.676 m), weight (!) 308 lb (139.7 kg), SpO2 94 %. General appearance:  Alert, responsive, oriented to person, place, and time. Acute on chronic ill appearance, no distress. Head:  Normocephalic. No masses, lesions or tenderness. Eyes:  PERRLA. EOMI. Sclera clear. ENT:  Ears normal. Mucosa normal.  Neck:    Supple. Trachea midline. No thyromegaly. No JVD. No bruits. Heart:    Irregular rhythm with controlled rate, S1 and S2, systolic murmur lungs:    Symmetrical.  Diminished bibasilar air exchange. Clear to auscultation bilaterally. No wheezes. No rhonchi. No rales. Abdomen:   Soft. Morbidly obese. Non-tender. Non-distended. Bowel sounds positive. No organomegaly or masses. No pain on palpation. Extremities:    Peripheral pulses present. Acute on chronic peripheral edema with interval improvement . Neurologic:    Alert x 3. Generally weak without focal deficit. Cranial nerves grossly intact. No focal weakness.   Psych:   Behavior is normal. Mood appears normal. Speech is not rapid and/or pressured. Musculoskeletal:   No unilateral joint edema, erythema or warmth. Gait not assessed. Integumentary:  No rashes  Skin normal color and texture.   Genitalia/Breast:  Deferred    Medication:  Scheduled Meds:   [START ON 10/27/2022] furosemide  40 mg IntraVENous Daily    furosemide  20 mg IntraVENous Once    metoprolol succinate  25 mg Oral Daily    levothyroxine  50 mcg Oral Daily    amitriptyline  50 mg Oral Nightly    apixaban  5 mg Oral BID    aspirin  81 mg Oral Nightly    atorvastatin  80 mg Oral Nightly    insulin glargine  45 Units SubCUTAneous BID    pantoprazole  40 mg Oral Daily    risperiDONE  0.5 mg Oral BID    budesonide  0.5 mg Nebulization BID    Arformoterol Tartrate  15 mcg Nebulization BID    sodium chloride flush  5-40 mL IntraVENous 2 times per day    insulin lispro  0-8 Units SubCUTAneous TID WC    insulin lispro  0-4 Units SubCUTAneous Nightly    insulin lispro  10 Units SubCUTAneous TID WC     Continuous Infusions:   sodium chloride      dextrose         Objective Data:  CBC with Differential:    Lab Results   Component Value Date/Time    WBC 5.7 10/26/2022 04:52 AM    RBC 3.86 10/26/2022 04:52 AM    HGB 12.3 10/26/2022 04:52 AM    HCT 38.9 10/26/2022 04:52 AM     10/26/2022 04:52 AM    .8 10/26/2022 04:52 AM    MCH 31.9 10/26/2022 04:52 AM    MCHC 31.6 10/26/2022 04:52 AM    RDW 16.5 10/26/2022 04:52 AM    SEGSPCT 61 01/20/2014 12:00 PM    LYMPHOPCT 16.9 10/26/2022 04:52 AM    MONOPCT 11.3 10/26/2022 04:52 AM    BASOPCT 0.5 10/26/2022 04:52 AM    MONOSABS 0.64 10/26/2022 04:52 AM    LYMPHSABS 0.96 10/26/2022 04:52 AM    EOSABS 0.10 10/26/2022 04:52 AM    BASOSABS 0.03 10/26/2022 04:52 AM     BMP:    Lab Results   Component Value Date/Time     10/26/2022 04:52 AM    K 4.0 10/26/2022 04:52 AM    K 5.8 10/24/2022 05:46 PM    CL 98 10/26/2022 04:52 AM    CO2 30 10/26/2022 04:52 AM    BUN 30 10/26/2022 04:52 AM    LABALBU 3.4 10/26/2022 04:52 AM    LABALBU 4.4 10/27/2011 04:07 PM    CREATININE 1.4 10/26/2022 04:52 AM    CALCIUM 9.1 10/26/2022 04:52 AM    GFRAA >60 09/26/2022 05:47 AM    LABGLOM 55 10/26/2022 04:52 AM    GLUCOSE 153 10/26/2022 04:52 AM    GLUCOSE 118 10/27/2011 04:07 PM       Wound Documentation:   Wound 04/18/21 Buttocks Left (Active)   Number of days: 555       Wound 04/22/21 Pelvis Anterior;Mid Tunneling open wound (Active)   Number of days: 551       Wound 09/16/22 Pretibial Right small fluid filled blisters (Active)   Wound Etiology Other 10/26/22 0800   Dressing Status Clean;Dry; Intact 10/26/22 0800   Dressing/Treatment Open to air 10/26/22 0800   Drainage Amount None 10/26/22 0800   Odor None 10/26/22 0800   Number of days: 39       Wound 09/16/22 Pretibial Left (Active)   Dressing/Treatment Open to air 10/26/22 0800   Number of days: 39       Assessment:  Acutely decompensated systolic and diastolic congestive heart failure with LVEF 45% on most recent echo  Atrial fibrillation with rapid ventricular response likely contributing to #1, maintained on chronic Eliquis  New onset hypothyroidism in the setting of amiodarone therapy requiring the institution of Synthroid, with amiodarone discontinued October 26, 2022 by the cardiovascular team  Chronic kidney disease stage III  Insulin-dependent diabetes mellitus type 2  Recent hospitalization for frequent falls with right C7 transverse process fracture without neurologic deficits being managed conservatively by neurosurgery  Calcified plaques along the carotid bulbs left greater than right on previous imaging  Coronary artery disease with prior coronary artery bypass grafting and stent placement, asymptomatic  Valvular heart disease with history of mitral valve repair  Chronic oxygen dependent COPD with chronic respiratory failure  Morbid obesity BMI 49.71 kg per metered squared  Hyperlipidemia  Recurrent failure to thrive symptomatology    Plan:   Haider Pendleton is a 40-year-old male admitted with acutely decompensated diastolic congestive heart failure and A. fib RVR. This is a recurring issue for him medical optimization is being undertaken with the assistance of the cardiovascular team and he is diuresing accordingly. Diuretics were adjusted by the cardiologist today and we will continue to monitor response to therapy with consideration for transition to pills in the next 24 to 48 hours anticipated. Close monitoring of renal function and electrolytes as well as vital signs as his blood pressures are noted to be borderline low. Amiodarone therapy has been discontinued as patient's thyroid studies have become increasingly abnormal.  Synthroid was instituted yesterday, repeat TSH and free T4 as discussed. His chronic morbidities, lab values and vital signs are being monitored and addressed accordingly. PT, OT and social work are following as we anticipate discharge to a skilled nursing facility. Continue current therapy. See orders for further plan of care. More than 50% of my  time was spent at the bedside counseling/coordinating care with the patient and/or family with face to face contact. This time was spent reviewing notes and laboratory data as well as instructing and counseling the patient. Time I spent with the family or surrogate(s) is included only if the patient was incapable of providing the necessary information or participating in medical decisions. I also discussed the differential diagnosis and all of the proposed management plans with the patient and individuals accompanying the patient. [address] [place] [address] requires this high level of physician care and nursing on the IMC/Telemetry unit due the complexity of decision management and chance of rapid decline or death. Continued cardiac monitoring and higher level of nursing are required. I am readily available for any further decision-making and intervention.      ARLEEN Narvaez - CNP  10/26/2022  11:26 AM

## 2022-10-26 NOTE — PROGRESS NOTES
Physical Therapy    Physical Therapy Treatment Note/Plan of Care    Room #:  0635/0635-01  Patient Name: Lorenza Chan  YOB: 1955  MRN: [id number]    Date of Service: 10/26/2022     Tentative placement recommendation: Subacute vs Home Health Physical Therapy if patient meets goals  Equipment recommendation: Patient has needed equipment       Evaluating Physical Therapist: Julius Sargent, PT  #[id number]      Specific Provider Orders/Date/Referring Provider :  10/24/22 2230    PT eval and treat  Start:  10/24/22 2230,   End:  10/24/22 2230,   ONE TIME,   Standing Count:  1 Occurrences,   R         Dayana Lopez, APRN - CNP     Admitting Diagnosis:   Atrial fibrillation with rapid ventricular response ([facility] [I48.91]  Atrial fibrillation with RVR ([facility] [I48.91]  Acute on chronic congestive heart failure, unspecified heart failure type ([address] [I50.9]    Admitted with    bilateral lower extremities swelling, shortness of breath   Surgery: none      Patient Active Problem List   Diagnosis    CAD (coronary artery disease)    Hypertension    Type 2 diabetes mellitus with hyperglycemia, with long-term current use of insulin (HCC)    Tobacco abuse    PAF (paroxysmal atrial fibrillation) ([address]    Status post coronary artery bypass graft    H/O mitral valve repair    Morbid obesity with BMI of 50.0-59.9, adult ([address]    Chronic bronchitis ([address]    Sleep apnea    Gastroesophageal reflux disease without esophagitis    Hyperlipidemia    Muscular deconditioning    Acute diastolic (congestive) heart failure (HCC)    Acute diastolic heart failure (HCC)    Iron deficiency anemia, unspecified    Acute systolic/diastolic congestive heart failure (HCC)    Atrial fibrillation with RVR ([address]    Ischemic cardiomyopathy    Nonrheumatic tricuspid valve regurgitation    Chronic anticoagulation    Stage 3 chronic kidney disease (HCC)    Acute on chronic congestive heart failure (HCC)    Dizziness    Hyperosmolar hyperglycemic state (HHS) ([facility]    Acute metabolic encephalopathy    Altered mental status    Hypotension    Transient hypotension    Atrial fibrillation with rapid ventricular response (HCC)        ASSESSMENT of Current Deficits Patient exhibits decreased strength, balance, and endurance impairing functional mobility, transfers, gait , gait distance, and tolerance to activity are barriers to d/c and require skilled intervention during hospital stay to attain pre hospital level of function. Decreased strength, balance and endurance  increases patient's risk for fall. Patient needing minimal assist with supine to sit due to his c/o right shoulder pain. Patient needing a seated rest period during ambulation due to shortness of breath on exertion and impaired endurance. PHYSICAL THERAPY  PLAN OF CARE       Physical therapy plan of care is established based on physician order,  patient diagnosis and clinical assessment    Current Treatment Recommendations:    -Bed Mobility: Lower extremity exercises , Upper extremity exercises , and Trunk control activities   -Standing Balance: Perform strengthening exercises in standing to promote motor control with or without upper extremity support   -Transfers: Provide instruction on proper hand and foot position for adequate transfer of weight onto lower extremities and use of gait device if needed and Cues for hand placement, technique and safety. Provide stabilization to prevent fall   -Gait: Gait training and Standing activities to improve: base of support, weight shift, weight bearing    -Endurance: Utilize Supervised activities to increase level of endurance to allow for safe functional mobility including transfers and gait     PT long term treatment goals are located in below grid    Patient and or family understand(s) diagnosis, prognosis, and plan of care. Frequency of treatments: Patient will be seen  daily.          Prior Level of Function: Patient ambulated independently with wheeled walker   Rehab Potential: good   for baseline    Past medical history:   Past Medical History:   Diagnosis Date    Acid reflux     Acute diastolic (congestive) heart failure ([facility] 06/19/2019    Arthritis     Asthma 04/16/2014    Asthmatic bronchitis , chronic ([facility] 11/28/2016    CAD (coronary artery disease)     Chronic bronchitis (HCC) 04/16/2014    COPD (chronic obstructive pulmonary disease) ([facility])     CB    COPD (chronic obstructive pulmonary disease) ([facility])     Diabetes mellitus ([facility]     Emphysema (subcutaneous) (surgical) resulting from a procedure     H/O cardiovascular stress test 04/24/2021    Lexiscan    Hyperlipidemia     Hypertension     Hypoxemia requiring supplemental oxygen 02/02/2015    LONG TERM ANTICOAGULENT USE     Morbid obesity with BMI of 50.0-59.9, adult ([facility] 11/27/2013    Obesity     Osteoarthritis     Sleep apnea     bilevel positive airway pressure at 13/8 with 2 L oxygen flow     Stage 3 chronic kidney disease ([facility])     Tobacco abuse     Type II or unspecified type diabetes mellitus without mention of complication, not stated as uncontrolled      Past Surgical History:   Procedure Laterality Date    COLONOSCOPY      CORONARY ANGIOPLASTY WITH STENT PLACEMENT  07-23-13    Left main focal eccentric 65% distal stenosis. Large OM1 CX 50% ostial & prox narrow. Large ramus artery & LAD: Minor plaque w/o sign narrow. RCA: Dom vessel w/25% prox narrow & focal hazy eccentric 99% distal stenosis before origin RPDA & RPLCA. LV: Mild inferior hypokinesis EF 55%. Probable mild AS with 10-15mmHg. Successful PCI distal RCA w/3.5 x 12 mm BMS, 0% res stenosis.  Normal distal runoff    CORONARY ARTERY BYPASS GRAFT  09-09-13    Dr Tommie Miller; CABG x2, LIMA to LAD, SVG to ramus intermedius; MV repair using 30-mm Future complete ring with magic stitch between A3 and P3; rigid internal fixation of sternum using KLS plates x2; endoscopic vein harvesting of right lower extremity     ECHO COMPL W DOP COLOR FLOW 7/25/2013         HERNIA REPAIR      SINUS SURGERY         SUBJECTIVE:    Precautions: Up with assistance, falls and alarm ,     Social history: Patient lives with spouse in a apartment     with  12 steps  to enter with Rail  Tub shower     Equipment owned: Penelope Cagle,       [address] [facility]   How much difficulty turning over in bed?: A Lot  How much difficulty sitting down on / standing up from a chair with arms?: A Little  How much difficulty moving from lying on back to sitting on side of bed?: A Little  How much help from another person moving to and from a bed to a chair?: A Little  How much help from another person needed to walk in hospital room?: A Little  How much help from another person for climbing 3-5 steps with a railing?: A Lot  AM-PAC Inpatient Mobility Raw Score : 16  AM-PAC Inpatient T-Scale Score : 40.78  Mobility Inpatient CMS 0-100% Score: 54.16  Mobility Inpatient CMS G-Code Modifier : CK    Nursing cleared patient for PT treatment. OBJECTIVE;   Initial Evaluation  Date: 10/25/2022 Treatment Date:  10/26/2022       Short Term/ Long Term   Goals   Was pt agreeable to Eval/treatment? Yes yes To be met in 3 days   Pain level   0/10    No number given  Right shoulder    Bed Mobility    Rolling: Moderate assist of 1    Supine to sit: Moderate assist of 1    Sit to supine: Moderate assist of 1    Scooting: Moderate assist of 1   Rolling: Moderate assist of 1   Supine to sit: Minimal assist of 1   Sit to supine: Not assessed    Scooting: Minimal assist of 1    Rolling: Independent    Supine to sit:  Independent    Sit to supine: Independent    Scooting: Independent     Transfers Sit to stand: Minimal assist of 1   Sit to stand: Minimal assist of 1 Cues for hand placement and safety     Sit to stand: Independent     Ambulation     50 feet using  wheeled walker with Minimal assist of 1   for walker approximation, upright, multiplane instability, and safety and cues for upright posture, walker approximation, safety, and pacing 2 x 90 feet using  wheeled walker with Minimal assist of 1   cues for upright posture, safety, pacing, and pursed lip breathing     100 feet using  wheeled walker with Independent    Stair negotiation: ascended and descended   Not assessed     10 steps with rail independent   Use step up box if needed for training   ROM Within functional limits        Strength BUE:  refer to OT eval  RLE:  3+/5  LLE:  3+/5  Increase strength in affected mm groups by 1/3 grade   Balance Sitting EOB:  good    Dynamic Standing:  fair with wheeled walker  Sitting EOB: good   Dynamic Standing: fair with wheeled walker   Sitting EOB:  good    Dynamic Standing: fair +with wheeled walker      Patient is Alert & Oriented x person, place, time, and situation and follows directions    Sensation:  Patient  reports neuropathy bilateral lower extremities     Edema:  yes bilateral lower extremities   Endurance: poor      Vitals: room air   Blood Pressure at rest  Blood Pressure during session    Heart Rate at rest   Heart Rate during session     SPO2 at rest 96 %  SPO2 during session 93%     Patient education  Patient educated on role of Physical Therapy, risks of immobility, safety and plan of care and  importance of mobility while in hospital      Patient response to education:   Pt verbalized understanding Pt demonstrated skill Pt requires further education in this area   Yes Partial Yes      Treatment:  Patient practiced and was instructed/facilitated in the following treatment: Patient assisted to edge of bed,   Sat edge of bed 5 minutes with Supervision  to increase dynamic sitting balance and activity tolerance. Pt stood, ambulated in the hallway, needed a seated rest period in the waiting room, instructed pt on pursed lip breathing and pacing, ambulated back to the chair in his room. Pt performed seated exercises.       Therapeutic Exercises:  ankle pumps, hip abduction/adduction, long arc quad, and seated marching,  x 20 reps. AROM    At end of session, patient in chair with  .  call light and phone within reach,  all lines and tubes intact, nursing notified. Patient would benefit from continued skilled Physical Therapy to improve functional independence and quality of life. Patient's/ family goals   home    Time in  14:32  Time out  14:59    Total Treatment Time  27 minutes      CPT codes:    Therapeutic activities (17395)   17 minutes  1 unit(s)  Therapeutic exercises (73957)   10 minutes  1 unit(s)    Hank Monet  [facility]  LIC # [id number]

## 2022-10-27 LAB
ALBUMIN SERPL-MCNC: 3.6 G/DL (ref 3.5–5.2)
ALP BLD-CCNC: 130 U/L (ref 40–129)
ALT SERPL-CCNC: 17 U/L (ref 0–40)
ANION GAP SERPL CALCULATED.3IONS-SCNC: 9 MMOL/L (ref 7–16)
AST SERPL-CCNC: 16 U/L (ref 0–39)
BASOPHILS ABSOLUTE: 0.05 E9/L (ref 0–0.2)
BASOPHILS RELATIVE PERCENT: 0.8 % (ref 0–2)
BILIRUB SERPL-MCNC: 0.8 MG/DL (ref 0–1.2)
BILIRUBIN DIRECT: <0.2 MG/DL (ref 0–0.3)
BILIRUBIN, INDIRECT: ABNORMAL MG/DL (ref 0–1)
BUN BLDV-MCNC: 33 MG/DL (ref 6–23)
CALCIUM SERPL-MCNC: 9 MG/DL (ref 8.6–10.2)
CHLORIDE BLD-SCNC: 100 MMOL/L (ref 98–107)
CO2: 28 MMOL/L (ref 22–29)
CREAT SERPL-MCNC: 1.3 MG/DL (ref 0.7–1.2)
EOSINOPHILS ABSOLUTE: 0.12 E9/L (ref 0.05–0.5)
EOSINOPHILS RELATIVE PERCENT: 2 % (ref 0–6)
GFR SERPL CREATININE-BSD FRML MDRD: >60 ML/MIN/1.73
GLUCOSE BLD-MCNC: 127 MG/DL (ref 74–99)
HCT VFR BLD CALC: 38.4 % (ref 37–54)
HEMOGLOBIN: 12.3 G/DL (ref 12.5–16.5)
IMMATURE GRANULOCYTES #: 0.04 E9/L
IMMATURE GRANULOCYTES %: 0.7 % (ref 0–5)
LYMPHOCYTES ABSOLUTE: 1.12 E9/L (ref 1.5–4)
LYMPHOCYTES RELATIVE PERCENT: 18.3 % (ref 20–42)
MAGNESIUM: 2 MG/DL (ref 1.6–2.6)
MCH RBC QN AUTO: 32.2 PG (ref 26–35)
MCHC RBC AUTO-ENTMCNC: 32 % (ref 32–34.5)
MCV RBC AUTO: 100.5 FL (ref 80–99.9)
METER GLUCOSE: 123 MG/DL (ref 74–99)
METER GLUCOSE: 173 MG/DL (ref 74–99)
METER GLUCOSE: 178 MG/DL (ref 74–99)
METER GLUCOSE: 185 MG/DL (ref 74–99)
MONOCYTES ABSOLUTE: 0.66 E9/L (ref 0.1–0.95)
MONOCYTES RELATIVE PERCENT: 10.8 % (ref 2–12)
NEUTROPHILS ABSOLUTE: 4.13 E9/L (ref 1.8–7.3)
NEUTROPHILS RELATIVE PERCENT: 67.4 % (ref 43–80)
PDW BLD-RTO: 16.5 FL (ref 11.5–15)
PHOSPHORUS: 3.6 MG/DL (ref 2.5–4.5)
PLATELET # BLD: 169 E9/L (ref 130–450)
PMV BLD AUTO: 10 FL (ref 7–12)
POTASSIUM SERPL-SCNC: 4.1 MMOL/L (ref 3.5–5)
RBC # BLD: 3.82 E12/L (ref 3.8–5.8)
SODIUM BLD-SCNC: 137 MMOL/L (ref 132–146)
TOTAL PROTEIN: 6.7 G/DL (ref 6.4–8.3)
WBC # BLD: 6.1 E9/L (ref 4.5–11.5)

## 2022-10-27 PROCEDURE — 2060000000 HC ICU INTERMEDIATE R&B

## 2022-10-27 PROCEDURE — 82962 GLUCOSE BLOOD TEST: CPT

## 2022-10-27 PROCEDURE — 83735 ASSAY OF MAGNESIUM: CPT

## 2022-10-27 PROCEDURE — 94640 AIRWAY INHALATION TREATMENT: CPT

## 2022-10-27 PROCEDURE — 36415 COLL VENOUS BLD VENIPUNCTURE: CPT

## 2022-10-27 PROCEDURE — 80076 HEPATIC FUNCTION PANEL: CPT

## 2022-10-27 PROCEDURE — 6370000000 HC RX 637 (ALT 250 FOR IP): Performed by: NURSE PRACTITIONER

## 2022-10-27 PROCEDURE — 6370000000 HC RX 637 (ALT 250 FOR IP): Performed by: INTERNAL MEDICINE

## 2022-10-27 PROCEDURE — 85025 COMPLETE CBC W/AUTO DIFF WBC: CPT

## 2022-10-27 PROCEDURE — 84100 ASSAY OF PHOSPHORUS: CPT

## 2022-10-27 PROCEDURE — 2580000003 HC RX 258: Performed by: NURSE PRACTITIONER

## 2022-10-27 PROCEDURE — 97530 THERAPEUTIC ACTIVITIES: CPT

## 2022-10-27 PROCEDURE — 97110 THERAPEUTIC EXERCISES: CPT

## 2022-10-27 PROCEDURE — 6360000002 HC RX W HCPCS: Performed by: NURSE PRACTITIONER

## 2022-10-27 PROCEDURE — 99233 SBSQ HOSP IP/OBS HIGH 50: CPT | Performed by: INTERNAL MEDICINE

## 2022-10-27 PROCEDURE — 80048 BASIC METABOLIC PNL TOTAL CA: CPT

## 2022-10-27 RX ORDER — FUROSEMIDE 40 MG/1
40 TABLET ORAL DAILY
Status: DISCONTINUED | OUTPATIENT
Start: 2022-10-27 | End: 2022-10-28 | Stop reason: HOSPADM

## 2022-10-27 RX ORDER — MIDODRINE HYDROCHLORIDE 5 MG/1
2.5 TABLET ORAL
Status: DISCONTINUED | OUTPATIENT
Start: 2022-10-27 | End: 2022-10-28 | Stop reason: HOSPADM

## 2022-10-27 RX ADMIN — ASPIRIN 81 MG: 81 TABLET, COATED ORAL at 21:01

## 2022-10-27 RX ADMIN — BUDESONIDE 500 MCG: 0.5 INHALANT RESPIRATORY (INHALATION) at 18:21

## 2022-10-27 RX ADMIN — FUROSEMIDE 40 MG: 40 TABLET ORAL at 16:39

## 2022-10-27 RX ADMIN — RISPERIDONE 0.5 MG: 1 TABLET ORAL at 08:45

## 2022-10-27 RX ADMIN — LEVOTHYROXINE SODIUM 50 MCG: 0.05 TABLET ORAL at 05:44

## 2022-10-27 RX ADMIN — ATORVASTATIN CALCIUM 80 MG: 40 TABLET, FILM COATED ORAL at 21:00

## 2022-10-27 RX ADMIN — Medication 10 ML: at 08:45

## 2022-10-27 RX ADMIN — ARFORMOTEROL TARTRATE 15 MCG: 15 SOLUTION RESPIRATORY (INHALATION) at 18:20

## 2022-10-27 RX ADMIN — APIXABAN 5 MG: 5 TABLET, FILM COATED ORAL at 21:00

## 2022-10-27 RX ADMIN — BUDESONIDE 500 MCG: 0.5 INHALANT RESPIRATORY (INHALATION) at 06:37

## 2022-10-27 RX ADMIN — APIXABAN 5 MG: 5 TABLET, FILM COATED ORAL at 08:45

## 2022-10-27 RX ADMIN — INSULIN GLARGINE 45 UNITS: 100 INJECTION, SOLUTION SUBCUTANEOUS at 21:02

## 2022-10-27 RX ADMIN — ARFORMOTEROL TARTRATE 15 MCG: 15 SOLUTION RESPIRATORY (INHALATION) at 06:37

## 2022-10-27 RX ADMIN — RISPERIDONE 0.5 MG: 1 TABLET ORAL at 21:01

## 2022-10-27 RX ADMIN — MIDODRINE HYDROCHLORIDE 2.5 MG: 5 TABLET ORAL at 11:19

## 2022-10-27 RX ADMIN — Medication 10 ML: at 21:01

## 2022-10-27 RX ADMIN — MIDODRINE HYDROCHLORIDE 2.5 MG: 5 TABLET ORAL at 16:39

## 2022-10-27 RX ADMIN — PANTOPRAZOLE SODIUM 40 MG: 40 TABLET, DELAYED RELEASE ORAL at 08:45

## 2022-10-27 RX ADMIN — INSULIN GLARGINE 45 UNITS: 100 INJECTION, SOLUTION SUBCUTANEOUS at 08:32

## 2022-10-27 RX ADMIN — AMITRIPTYLINE HYDROCHLORIDE 50 MG: 25 TABLET, FILM COATED ORAL at 21:01

## 2022-10-27 ASSESSMENT — PAIN SCALES - GENERAL: PAINLEVEL_OUTOF10: 0

## 2022-10-27 NOTE — PROGRESS NOTES
Internal Medicine Progress Note    [facility]    Breana Figueroacharlee. Alex Mejia., F.A.C.OBruceI. Kipp Snellen, D.O., VENKATOLIZZY Merritt D.O. Marissa Brooke, MSN, APRN, NP-C  Ingris Harris. Pat Lopez, MSN, APRN-CNP     Primary Care Physician: Chelly Lim DO   Admitting Physician:  Deepak Cole DO  Admission date and time: 10/24/2022  5:21 PM    Room:  [address] [place] [address]  Admitting diagnosis: Atrial fibrillation with rapid ventricular response ([facility] [I48.91]  Atrial fibrillation with RVR ([facility] [I48.91]  Acute on chronic congestive heart failure, unspecified heart failure type [facility]) [I50.9]    Patient Name: Blake Doll  MRN: [id number]    Date of Service: 10/27/2022     Subjective:  Deon Garcia is a 77 y.o. male who was seen and examined today,10/27/2022, at the bedside. He is feeling much better overall. Feels better after showering. States breathing is relatively the same as yesterday but is still improved from when he came into the hospital.  Admits to exertional shortness of breath. States swelling lower extremities is slightly improved today as well. Discharge planning discussed. Patient is agreeable to skilled facility placement for rehab. No family present during my examination. Review of System:   Constitutional:   Denies fever or chills, weight loss or gain, admits to mild fatigue. HEENT:   Denies ear pain, sore throat, sinus or eye problems. Cardiovascular:   Denies any chest pain, irregular heartbeats, or palpitations. Respiratory:   No resting dyspnea. Very little exertional dyspnea. Coughing at times without sputum. Gastrointestinal:   Denies nausea, vomiting, diarrhea, or constipation. Denies any abdominal pain. Genitourinary:    Denies any urgency, frequency, hematuria. Voiding  without difficulty. Extremities:   Acute on chronic edema with some interval improvement.    Neurology:    Denies any headache or focal neurological deficits, generally weak without focal component. Psch:   Denies being anxious or depressed. Musculoskeletal:    Denies  myalgias, joint complaints or back pain. Integumentary:   Denies any rashes, ulcers, or excoriations. Denies bruising. Hematologic/Lymphatic:  Denies bruising or bleeding. Physical Exam:  I/O this shift:  In: 120 [P.O.:120]  Out: -     Intake/Output Summary (Last 24 hours) at 10/27/2022 1650  Last data filed at 10/27/2022 0928  Gross per 24 hour   Intake 460 ml   Output 700 ml   Net -240 ml   I/O last 3 completed shifts: In: 1300 [P.O.:1300]  Out: 2300 [Urine:2300]  Patient Vitals for the past 96 hrs (Last 3 readings):   Weight   10/27/22 0554 292 lb (132.5 kg)   10/27/22 0530 292 lb (132.5 kg)   10/24/22 2230 (!) 308 lb (139.7 kg)     Vital Signs:   Blood pressure 87/62, pulse 88, temperature 98.2 °F (36.8 °C), temperature source Oral, resp. rate 16, height 5' 6\" (1.676 m), weight 292 lb (132.5 kg), SpO2 97 %. General appearance:  Alert, responsive, oriented to person, place, and time. In no acute distress. Head:  Normocephalic. No masses, lesions or tenderness. Eyes:  PERRLA. EOMI. Sclera clear. ENT:  Ears normal. Mucosa normal.  Neck:    Supple. Trachea midline. No thyromegaly. No JVD. No bruits. Heart:    Irregular rate and rhythm with controlled rate, S1 and S2, systolic murmur   lungs:    Symmetrical.  Diminished bibasilar air exchange. Clear to auscultation bilaterally. No wheezes. No rhonchi. No rales. Abdomen:   Soft. Morbidly obese. Non-tender. Non-distended. Bowel sounds positive. No organomegaly or masses. No pain on palpation. Extremities:    Peripheral pulses present. 1-2+ pitting edema bilateral lower extremities. Neurologic:    Alert x 3. Generally weak without focal deficit. Cranial nerves grossly intact. No focal weakness. Psych:   Behavior is normal. Mood appears normal. Speech is not rapid and/or pressured.   Musculoskeletal:   No unilateral joint edema, erythema or warmth. Gait not assessed. Integumentary:  No rashes  Skin normal color and texture. Tattoos.   Genitalia/Breast:  Deferred    Medication:  Scheduled Meds:   midodrine  2.5 mg Oral TID     furosemide  40 mg Oral Daily    metoprolol succinate  25 mg Oral Daily    levothyroxine  50 mcg Oral Daily    amitriptyline  50 mg Oral Nightly    apixaban  5 mg Oral BID    aspirin  81 mg Oral Nightly    atorvastatin  80 mg Oral Nightly    insulin glargine  45 Units SubCUTAneous BID    pantoprazole  40 mg Oral Daily    risperiDONE  0.5 mg Oral BID    budesonide  0.5 mg Nebulization BID    Arformoterol Tartrate  15 mcg Nebulization BID    sodium chloride flush  5-40 mL IntraVENous 2 times per day    insulin lispro  0-8 Units SubCUTAneous TID     insulin lispro  0-4 Units SubCUTAneous Nightly    insulin lispro  10 Units SubCUTAneous TID      Continuous Infusions:   sodium chloride      dextrose         Objective Data:  CBC with Differential:    Lab Results   Component Value Date/Time    WBC 6.1 10/27/2022 05:30 AM    RBC 3.82 10/27/2022 05:30 AM    HGB 12.3 10/27/2022 05:30 AM    HCT 38.4 10/27/2022 05:30 AM     10/27/2022 05:30 AM    .5 10/27/2022 05:30 AM    MCH 32.2 10/27/2022 05:30 AM    MCHC 32.0 10/27/2022 05:30 AM    RDW 16.5 10/27/2022 05:30 AM    SEGSPCT 61 01/20/2014 12:00 PM    LYMPHOPCT 18.3 10/27/2022 05:30 AM    MONOPCT 10.8 10/27/2022 05:30 AM    BASOPCT 0.8 10/27/2022 05:30 AM    MONOSABS 0.66 10/27/2022 05:30 AM    LYMPHSABS 1.12 10/27/2022 05:30 AM    EOSABS 0.12 10/27/2022 05:30 AM    BASOSABS 0.05 10/27/2022 05:30 AM     BMP:    Lab Results   Component Value Date/Time     10/27/2022 05:30 AM    K 4.1 10/27/2022 05:30 AM    K 5.8 10/24/2022 05:46 PM     10/27/2022 05:30 AM    CO2 28 10/27/2022 05:30 AM    BUN 33 10/27/2022 05:30 AM    LABALBU 3.6 10/27/2022 05:30 AM    LABALBU 4.4 10/27/2011 04:07 PM    CREATININE 1.3 10/27/2022 05:30 AM    CALCIUM 9.0 10/27/2022 05:30 AM    GFRAA >60 09/26/2022 05:47 AM    LABGLOM >60 10/27/2022 05:30 AM    GLUCOSE 127 10/27/2022 05:30 AM    GLUCOSE 118 10/27/2011 04:07 PM       Wound Documentation:   Wound 04/18/21 Buttocks Left (Active)   Number of days: 555       Wound 04/22/21 Pelvis Anterior;Mid Tunneling open wound (Active)   Number of days: 551       Wound 09/16/22 Pretibial Right small fluid filled blisters (Active)   Wound Etiology Other 10/26/22 0800   Dressing Status Clean;Dry; Intact 10/26/22 0800   Dressing/Treatment Open to air 10/26/22 0800   Drainage Amount None 10/26/22 0800   Odor None 10/26/22 0800   Number of days: 39       Wound 09/16/22 Pretibial Left (Active)   Dressing/Treatment Open to air 10/26/22 0800   Number of days: 39       Assessment:  Acute on chronic systolic and diastolic congestive heart failure with LVEF 45% on most recent echo  Atrial fibrillation with rapid ventricular response likely contributing to #1, maintained on chronic Eliquis  New onset hypothyroidism in the setting of amiodarone therapy requiring the institution of Synthroid, with amiodarone discontinued October 26, 2022 by the cardiovascular team-TSH was found to be 39.180-Free T4 0.55 chronic kidney disease stage III  Insulin-dependent diabetes mellitus type 2-hemoglobin A1c 8.3  Recent hospitalization for frequent falls with right C7 transverse process fracture without neurologic deficits being managed conservatively by neurosurgery  Calcified plaques along the carotid bulbs left greater than right on previous imaging  Coronary artery disease with prior coronary artery bypass grafting and stent placement, asymptomatic  Valvular heart disease with history of mitral valve repair  Chronic oxygen dependent COPD with chronic respiratory failure  Morbid obesity BMI 49.71 kg per metered squared  Hyperlipidemia  Recurrent failure to thrive symptomatology    Plan:    Respiratory wise patient states not too different from yesterday.   But still improved from the day before. Continue supportive oxygen therapy  Continue diuretic therapy. Monitor intake and output. Daily weights. Monitor renal function closely. Regards to patient's atrial fibrillation. Rates are better controlled. Continue anticoagulation therapy. Continue beta-blocker therapy. Cardiology is following patient recommendations of been reviewed. Patient does admit to deconditioning and weakness. He is now agreeable to skilled nursing facility placement for rehab. Await precertification. Monitor blood glucose levels and treat accordingly. Hypotension noted. Nursing staff to obtain blood pressures on the lower extremities if systolic blood pressure less than 100 mmHg. His chronic morbidities, lab values and vital signs are being monitored and addressed accordingly. PT, OT and social work are following as we anticipate discharge to a skilled nursing facility. Continue current therapy. See orders for further plan of care. More than 50% of my  time was spent at the bedside counseling/coordinating care with the patient and/or family with face to face contact. This time was spent reviewing notes and laboratory data as well as instructing and counseling the patient. Time I spent with the family or surrogate(s) is included only if the patient was incapable of providing the necessary information or participating in medical decisions. I also discussed the differential diagnosis and all of the proposed management plans with the patient and individuals accompanying the patient. Santino Castillo requires this high level of physician care and nursing on the IMC/Telemetry unit due the complexity of decision management and chance of rapid decline or death. Continued cardiac monitoring and higher level of nursing are required. I am readily available for any further decision-making and intervention. The patient was seen, examined and then discussed with Dr. Joshua Branch.        ARLEEN Greer - CNP  10/27/2022  4:50 PM        I saw and evaluated the patient. I agree with the findings and the plan of care as documented in [facility] NP-C's  note.     Yuridia José DO, D.O., [facility]  5:21 PM  10/27/2022

## 2022-10-27 NOTE — PROGRESS NOTES
OT BEDSIDE TREATMENT NOTE      Date:10/27/2022  Patient Name: Kavon Hardin  MRN: [id number]  : 1955  Room: [address] [place] [address]        Evaluating OT: Ayana Greene OTR/L; [id number]        Referring Provider and Orders/Date:    OT eval and treat  Start:  10/24/22 2230,   End:  10/24/22 2230,   ONE TIME,   Standing Count:  1 Occurrences,   R         Foster Flatten, APRN - CNP         Diagnosis:   1. Atrial fibrillation with RVR ([facility]    2. Acute on chronic congestive heart failure, unspecified heart failure type [facility])          Surgery: none      Pertinent Medical History:        Past Medical History        Past Medical History:   Diagnosis Date    Acid reflux      Acute diastolic (congestive) heart failure ([facility] 2019    Arthritis      Asthma 2014    Asthmatic bronchitis , chronic ([facility] 2016    CAD (coronary artery disease)      Chronic bronchitis ([facility]) 2014    COPD (chronic obstructive pulmonary disease) ([facility])       CB    COPD (chronic obstructive pulmonary disease) ([facility])      Diabetes mellitus ([facility]      Emphysema (subcutaneous) (surgical) resulting from a procedure      H/O cardiovascular stress test 2021     Lexiscan    Hyperlipidemia      Hypertension      Hypoxemia requiring supplemental oxygen 2015    LONG TERM ANTICOAGULENT USE      Morbid obesity with BMI of 50.0-59.9, adult ([facility] 2013    Obesity      Osteoarthritis      Sleep apnea       bilevel positive airway pressure at 13/8 with 2 L oxygen flow     Stage 3 chronic kidney disease (HCC)      Tobacco abuse      Type II or unspecified type diabetes mellitus without mention of complication, not stated as uncontrolled               Past Surgical History         Past Surgical History:   Procedure Laterality Date    COLONOSCOPY        CORONARY ANGIOPLASTY WITH STENT PLACEMENT   13     Left main focal eccentric 65% distal stenosis. Large OM1 CX 50% ostial & prox narrow.  Large ramus artery & LAD: Minor plaque w/o sign narrow. RCA: Dom vessel w/25% prox narrow & focal hazy eccentric 99% distal stenosis before origin RPDA & RPLCA. LV: Mild inferior hypokinesis EF 55%. Probable mild AS with 10-15mmHg. Successful PCI distal RCA w/3.5 x 12 mm BMS, 0% res stenosis.  Normal distal runoff    CORONARY ARTERY BYPASS GRAFT   09-09-13     Dr Nel Whitney; CABG x2, LIMA to LAD, SVG to ramus intermedius; MV repair using 30-mm Future complete ring with magic stitch between A3 and P3; rigid internal fixation of sternum using KLS plates x2; endoscopic vein harvesting of right lower extremity     ECHO COMPL W DOP COLOR FLOW   7/25/2013          HERNIA REPAIR        SINUS SURGERY               Precautions:  Fall Risk     Recommended placement: subacute vs Home with OP therapy; pt prefers home with OP therapy d/t concerns with his wife's health    Assessment of current deficits     [x] Functional mobility           [x]ADLs           [x] Strength                  []Cognition     [x] Functional transfers         [x] IADLs         [x] Safety Awareness   [x]Endurance     [] Fine Coordination                        [x] Balance      [] Vision/perception   []Sensation       [x]Gross Motor Coordination            [] ROM           [] Delirium                   [] Motor Control      OT PLAN OF CARE   OT POC based on physician orders, patient diagnosis and results of clinical assessment     Frequency/Duration 1-3 days/wk for 2 weeks PRN   Specific OT Treatment Interventions to include:   * Instruction/training on adapted ADL techniques and AE recommendations to increase functional independence within precautions       * Training on energy conservation strategies, correct breathing pattern and techniques to improve independence/tolerance for self-care routine  * Functional transfer/mobility training/DME recommendations for increased independence, safety, and fall prevention  * Patient/Family education to increase follow through with safety techniques and functional independence  * Recommendation of environmental modifications for increased safety with functional transfers/mobility and ADLs  * Therapeutic exercise to improve motor endurance, ROM, and functional strength for ADLs/functional transfers  * Therapeutic activities to facilitate/challenge dynamic balance, stand tolerance for increased safety and independence with ADLs  * Therapeutic activities to facilitate gross/fine motor skills for increased independence with ADLs  * Neuro-muscular re-education: facilitation of righting/equilibrium reactions, midline orientation, scapular stability/mobility, normalization of muscle tone, and facilitation of volitional active controled movement  * Positioning to improve skin integrity, interaction with environment and functional independence      Recommended Adaptive Equipment/DME:  TBD , tub transfer bench, continue to assess      Home Living: Pt lives at home with wife and friends that do assist at home. Pt lives in an apartment with 6+4 steps to enter without rails and many cracks in the cement. Lived in basement level. Pt has to assist his wife with steps and it is very unsafe. Laundry is on the same level.     Bathroom setup: Tub shower with TTB and grab bars; suction cup grab bar, Standard shower head; Standard toilet   DME owned: canes, walker, crutches, 3:1      Prior Level of Function: indep with ADLs , assist with IADLs (friends); ambulated indep without AD   Driving: yes   Occupation: retired   Enjoys: watching TV, \"keep busy\", IADLs     Pain Level: none  Cognition: A&O: 4/4 ; Follows 2-3 step directions              Memory:  fair              Sequencing:  fair              Problem solving:  fair /fair-              Judgement/safety: fair/ fair-     AM-PAC Daily Activity Inpatient   How much help for putting on and taking off regular lower body clothing?: A Lot  How much help for Bathing?: A Lot  How much help for Toileting?: A Little  How much help for putting on and taking off regular upper body clothing?: A Little  How much help for taking care of personal grooming?: A Little  How much help for eating meals?: None  AM-PAC Inpatient Daily Activity Raw Score: 17                Functional Assessment:      Initial Eval Status  Date: 10/25/2022   Treatment Status  Date:10/27/22 STGs = LTGs  Time frame: 10-14 days   Feeding Independent  N/T  NA-PLOF   Grooming Supervision and set up from sitting for face wash, hand wash and oral care Supervision for simulated grooming tasks in clinic when standing sinkside; pt completed grooming tasks in shower with intermittent supervision this AM Independent    UB Dressing Moderate Assist with gown management from sitting up in arm chair N/T; pt had min assist to don gown prior to session  Stand by Assist    LB Dressing Maximal Assist with socks and pants from sitting/standing  pt able to don pants prior to session with intermittent supervision; continue to assess Moderate Assist    Bathing Moderate Assist with sponge bathing from sitting/standing but assist for LB  nsg states pt had completed shower with intermittent supervision this AM; simulated tub transfer in clinic with pt able to step to/from tub/shower seat with use of safety bars and min A progressing to SBA for safety Minimal Assist    Toileting Minimal Assist for safety and balance with urinal  Transfer to/from [facility] over toilet in clinic with cuing for hand placement  Stand by Assist    Bed Mobility  Supine to sit: Moderate Assist  Pt seated EOB on arrival; pt able to support B LE's to supine with SBA and cuing for technique Supine to sit: Minimal Assist   Sit to supine: Stand by Assist    Functional Transfers Minimal Assist with walker from bed and arm chair Supervision for sit to stand transfers to/from EOB, w/c and multiple surfaces in clinic with no AD; simulated car transfer at supervision with no AD   Stand by Assist    Functional Mobility Minimal Assist with walker for short distance functional mobility throughout the room to simulate house hold distances. Supervision with no AD in room and clinic; no LOB noted  Stand by Assist    Balance Sitting:     Static:  fair+    Dynamic:fair  Standing: fair-  Sitting:     Static:  fair    Dynamic:fair  Standing: fair Sitting:     Static:  good    Dynamic:fair+  Standing: fair   Activity Tolerance Vitals with activity: room air  Sitting tolerance EOB 3min with request for back support. Standing tolerance 1min average Fair/fair minus; pt able to complete activities, however demo's decreased endurance and SOB with activities  Increase standing tolerance for >3min with stable vital signs for carry over into toileting, functional tranfers and indep in ADLs   Visual/  Perceptual Glasses: Present; WFL     Reports change in vision since admission: No         NA      Hand Dominance  [x] Right   [] Left   Trunk Ex's: 10 x trunk flexion/extension and 10 x in R/L lateral flexion with SBA in attempts to increase sitting balance for ADL tasks d/t core strength fatigue    Hearing: WFL   Sensation:  No c/o numbness or tingling   Tone: WFL   Edema: none noted      Comments: Patient cleared by nursing staff. Nsg states pt was able to shower and get his pants on with intermittent supervision this AM.  Upon arrival pt seated EOB. Pt agreeable to OT tx session. Pt educated with regards to bed mobility, hand placement, safety awareness, static sitting balance,  standing balance, transfer training, functional mobility, trunk ex's, car, tub and toilet transfers, ECT's. At end of session pt supine in bed  with  call light within reach. Overall, pt demonstrated fair independence and safety during completion of ADL/functional transfers/mobility tasks. Pt would benefit from continued skilled OT to increase safety and independence with completion of ADL/IADL tasks for functional independence and quality of life.       Pt required cues and education as noted above for safe facilitation and completion of tasks. Therapist provided skilled monitoring of patient's response during treatment session. Prior to and at the end of session, environmental modifications completed for patients safety and efficiency of treatment session. Overall, patient demonstrates minimal difficulties with completion of BADLs and IADLs. Factors contributing to these difficulties include decreased endurance, SOB and generalized weakness. As noted above, patient likely to benefit from further OT intervention to increase independence, safety, and overall quality of life. Treatment:     Bed mobility: Facilitated bed mobility with cues for proper body mechanics and sequencing to prepare for ADL completion. Functional transfers: Facilitated transfers from various surfaces in room, w/c and clinic with cues for body alignment, safety and hand placement. ADL completion: Self-care retraining for the above-mentioned ADLs; training on proper hand placement, safety technique, sequencing, and energy conservation techniques. Postural Balance: Sitting/standing balance retraining to improve righting reactions with postural changes during ADLs. Skilled positioning: Proper positioning to improve interaction with environment, overall functioning to ease breathing  Therapeutic Ex's: To increase trunk/core strength required for functional transfers and ADL participation. Pt has made fair progress towards set goals      OT 1-3 days/wk for 2 weeks PRN     Treatment Time also includes thorough review of current medical information, gathering information on past medical history/social history and prior level of function, informal observation of tasks, assessment of data and education on plan of care and goals.     Treatment Time In: 3:09 PM    Treatment Time Out: 3:34 PM           Treatment Charges: Mins Units   ADL/Home Mgt     [id number]       Thera Activities     12220 15 1   Ther Ex                 [id number] 10 1   Manual Therapy    [id number]     Neuro Re-ed         [id number]     Orthotic manage/training                               17313     Non Billable Time     Total Timed Treatment 25 093 [facility] [address]

## 2022-10-27 NOTE — PROGRESS NOTES
[facility]) Physicians        CARDIOLOGY                 INPATIENT PROGRESS NOTE          PATIENT SEEN IN FOLLOW UP FOR: CHF    Hospital Day: Freedom Henry is a 77year old patient known to Dr. Carine De La Cruz: Denies any CP, breathing better, No orthopnea    ROS: Review of rest of 10 systems negative except as mentioned above    OBJECTIVE: No acute distress. See Assessment     Diagnostics:       Telemetry: Reviewed        Intake/Output Summary (Last 24 hours) at 10/27/2022 0919  Last data filed at 10/27/2022 0447  Gross per 24 hour   Intake 820 ml   Output 2300 ml   Net -1480 ml       Labs:   CBC:   Recent Labs     10/26/22  0452 10/27/22  0530   WBC 5.7 6.1   HGB 12.3* 12.3*   HCT 38.9 38.4    169     BMP:   Recent Labs     10/26/22  1543 10/27/22  0530    137   K 4.1 4.1   CO2 26 28   BUN 30* 33*   CREATININE 1.5* 1.3*   LABGLOM 51 >60   CALCIUM 9.0 9.0     Mag:   Recent Labs     10/26/22  0452 10/27/22  0530   MG 2.1 2.0     Phos:   Recent Labs     10/26/22  0452 10/27/22  0530   PHOS 4.6* 3.6     TSH:   Recent Labs     10/25/22  0557   TSH 39.180*     HgA1c:     BNP: No results for input(s): BNP in the last 72 hours. PT/INR: No results for input(s): PROTIME, INR in the last 72 hours.   APTT:  Recent Labs     10/24/22  1746   APTT 29.5     CARDIAC ENZYMES:  Recent Labs     10/24/22  1927 10/24/22  2253 10/25/22  0009   TROPHS 30* 30* 30*     FASTING LIPID PANEL:  Lab Results   Component Value Date/Time    CHOL 123 12/16/2021 08:56 AM    HDL 42 12/16/2021 08:56 AM    LDLCALC 60 12/16/2021 08:56 AM    TRIG 104 12/16/2021 08:56 AM     LIVER PROFILE:  Recent Labs     10/26/22  0452 10/27/22  0530   AST 16 16   ALT 17 17   LABALBU 3.4* 3.6       Current Inpatient Medications:   furosemide  40 mg IntraVENous Daily    metoprolol succinate  25 mg Oral Daily    levothyroxine  50 mcg Oral Daily    amitriptyline  50 mg Oral Nightly    apixaban  5 mg Oral BID    aspirin  81 mg Oral Nightly    atorvastatin  80 mg Oral Nightly    insulin glargine  45 Units SubCUTAneous BID    pantoprazole  40 mg Oral Daily    risperiDONE  0.5 mg Oral BID    budesonide  0.5 mg Nebulization BID    Arformoterol Tartrate  15 mcg Nebulization BID    sodium chloride flush  5-40 mL IntraVENous 2 times per day    insulin lispro  0-8 Units SubCUTAneous TID WC    insulin lispro  0-4 Units SubCUTAneous Nightly    insulin lispro  10 Units SubCUTAneous TID WC       IV Infusions (if any):   sodium chloride      dextrose           PHYSICAL EXAM:     CONSTITUTIONAL:   BP 84/67   Pulse 91   Temp 98 °F (36.7 °C) (Oral)   Resp 16   Ht 5' 6\" (1.676 m)   Wt 292 lb (132.5 kg)   SpO2 98%   BMI 47.13 kg/m²   Pulse  Av.8  Min: 80  Max: 578  Systolic (54ATC), HCN:812 , Min:84 , MDP:071    Diastolic (63ZAO), BRA:05, Min:67, Max:77    CONST:  Well developed, morbidly obese WM who appears stated age, Awake, alert, cooperative, no apparent distress. HEENT:   Head- Normocephalic, atraumatic. Eyes- Conjunctivae pink, anicteric. Neck-  No stridor, difficult assessing JVD,No noted carotid bruit. CHEST: Chest symmetrical No accessory muscle uses. RESPIRATORY: Lung sounds - diminished at bases  CARDIOVASCULAR:     Heart Ausculation- Irregularly irregular rhythm with normal rate, 2/6 systolic murmur RUSB  PV: 1-2+ bilateral lower extremity edema. Pedal pulses palpable, no cyanosis. ABDOMEN: Soft, non-tender to light palpation. Bowel sounds present. MS: n/a   SKIN: Warm and dry. NEURO / PSYCH: Oriented to person        Impression/Plan:     Acute on chronic  HFpEF  - His EF improved. EF 55%. Better, Change to PO Lasix given borderline hypotension; Monitor strict I/Os, daily weights, BP, renal function and electrolytes      PAF - Rates controlled, Amiodarone discontinued due to hypothyroidism; On Eliquis for [facility]. Continue BB; Add Digoxin if needed for rate control. Low BP - His medications held as needed;  Add Midodrine    Acute hypoxic respiratory failure -better     CAD s/p PCI/BMS to the RCA in 2013, s/p CABG x 2 (LIMA-LAD, SVG-RI) in 2013. Non-ischemic Lexiscan stress test 4/2021 - Continue ASA,BB,Statin      Severe MR - s/p MVr 2013 at time of CABG     Mild to moderate AS on TTE 9/2022     HLD - On statin      Morbid obesity, BMI 49 - Diet, exercise and weight loss discussed. LISS, uses BiPAP at night              Tentative discharge tomorrow       Above recommendations discussed with him.       Electronically signed by Mejia Morales MD on 10/27/2022 at 9:19 AM  [facility]) Cardiology

## 2022-10-27 NOTE — CARE COORDINATION
10/27/2022 1520 CM for transition of care needs. Per Romana Foster rep at [facility] they can accept and per Dr Yumiko Galdamez they can start [address] Romana Foster requested updated OT note, CM sent urgent message to OT. WILL NEED HENS(initiated), Signed LISA and a RAPID COVID the day of d/c. Pt lost his cord off of his Cpap, CM spoke to [facility] at [address] and she states that they have had the new cord for his Cpap waiting for him to pick it up. CM requested that it be delivered to his house and [facility] states that's not a problem. CM/SS will follow. Electronically signed by Sesar Gauthier RN on 10/27/2022 at 3:29 PM     Addendum 10/27/2022 1545 Per OT after working with pt, he wants to go home with [facility] for PT/OT and Nursing. Pt keeps going between [place] [address] and SNF. Rep at [facility] is still starting [address] If pt goes home he is active with [facility] resumption of care orders) and he has all needed DME. CM/SS will follow.  Electronically signed by Sesar Gauthier RN on 10/27/2022 at 4:04 PM

## 2022-10-27 NOTE — CARE COORDINATION
10/27/2022 1200 CM met with pt at the bedside for transition of care needs. PT/OT recommending Subacute vs HHC if goals are met. Pt was provided a list of SNF choices and wants referrals to [facility] and [facility]. Referrals made to [facility] rep at [address] [place] [address] rep at [facility] they will review for acceptance. Pt is from home with his wife and was active with [facility](Resumption of care orders will be needed if he returns home). Pt has a walker, wheelchair and a cane. He also has a CPAP from [facility]. Pt states that he recently moved and lost the cord to his Cpap, cm will check into getting him a new cord. Pt's neighbor has been transporting pt's wife to dialysis. CM will follow. Electronically signed by Tray Shabazz RN on 10/27/2022 at 12:22 PM     Addendum 10/27/2022 [address] [place] [address] at [facility] returned call and states that they don't have any beds at this time. CM spoke to [facility] at [facility] and she states that they have had the new cord for his Cpap waiting for him to pick it up. CM requested that it be delivered to his house and [facility] states that's not a problem.  Electronically signed by Tray Shabazz RN on 10/27/2022 at 12:55 PM

## 2022-10-27 NOTE — PROGRESS NOTES
Physical Therapy    Physical Therapy Treatment Note/Plan of Care    Room #:  0635/0635-01  Patient Name: Tomer Longoria  YOB: 1955  MRN: [id number]    Date of Service: 10/27/2022     Tentative placement recommendation: Subacute vs Home Health Physical Therapy if patient meets goals  Equipment recommendation: Patient has needed equipment       Evaluating Physical Therapist: Jessica Garcia, PT  #[id number]      Specific Provider Orders/Date/Referring Provider :  10/24/22 2230    PT eval and treat  Start:  10/24/22 2230,   End:  10/24/22 2230,   ONE TIME,   Standing Count:  1 Occurrences,   R         Srini Harper, APRN - CNP     Admitting Diagnosis:   Atrial fibrillation with rapid ventricular response ([facility] [I48.91]  Atrial fibrillation with RVR ([facility] [I48.91]  Acute on chronic congestive heart failure, unspecified heart failure type ([facility] [I50.9]    Admitted with    bilateral lower extremities swelling, shortness of breath   Surgery: none      Patient Active Problem List   Diagnosis    CAD (coronary artery disease)    Hypertension    Type 2 diabetes mellitus with hyperglycemia, with long-term current use of insulin (HCC)    Tobacco abuse    PAF (paroxysmal atrial fibrillation) ([facility]    Status post coronary artery bypass graft    H/O mitral valve repair    Morbid obesity with BMI of 50.0-59.9, adult ([facility]    Chronic bronchitis ([facility]    Sleep apnea    Gastroesophageal reflux disease without esophagitis    Hyperlipidemia    Muscular deconditioning    Acute diastolic (congestive) heart failure (HCC)    Acute diastolic heart failure (HCC)    Iron deficiency anemia, unspecified    Acute systolic/diastolic congestive heart failure (HCC)    Atrial fibrillation with RVR (HCC)    Ischemic cardiomyopathy    Nonrheumatic tricuspid valve regurgitation    Chronic anticoagulation    Stage 3 chronic kidney disease (HCC)    Acute on chronic congestive heart failure (HCC)    Dizziness    Hyperosmolar hyperglycemic state (HHS) ([facility]    Acute metabolic encephalopathy    Altered mental status    Hypotension    Transient hypotension    Atrial fibrillation with rapid ventricular response (HCC)        ASSESSMENT of Current Deficits Patient exhibits decreased strength, balance, and endurance impairing functional mobility, transfers, gait , gait distance, and tolerance to activity are barriers to d/c and require skilled intervention during hospital stay to attain pre hospital level of function. Decreased strength, balance and endurance  increases patient's risk for fall. Patient needing a seated and standing rest period during ambulation due to shortness of breath and impaired endurance. PHYSICAL THERAPY  PLAN OF CARE       Physical therapy plan of care is established based on physician order,  patient diagnosis and clinical assessment    Current Treatment Recommendations:    -Bed Mobility: Lower extremity exercises , Upper extremity exercises , and Trunk control activities   -Standing Balance: Perform strengthening exercises in standing to promote motor control with or without upper extremity support   -Transfers: Provide instruction on proper hand and foot position for adequate transfer of weight onto lower extremities and use of gait device if needed and Cues for hand placement, technique and safety. Provide stabilization to prevent fall   -Gait: Gait training and Standing activities to improve: base of support, weight shift, weight bearing    -Endurance: Utilize Supervised activities to increase level of endurance to allow for safe functional mobility including transfers and gait     PT long term treatment goals are located in below grid    Patient and or family understand(s) diagnosis, prognosis, and plan of care. Frequency of treatments: Patient will be seen  daily.          Prior Level of Function: Patient ambulated independently with wheeled walker   Rehab Potential: good   for baseline    Past medical history:   Past Medical History:   Diagnosis Date    Acid reflux     Acute diastolic (congestive) heart failure ([facility] 06/19/2019    Arthritis     Asthma 04/16/2014    Asthmatic bronchitis , chronic ([facility] 11/28/2016    CAD (coronary artery disease)     Chronic bronchitis ([facility] 04/16/2014    COPD (chronic obstructive pulmonary disease) ([facility])     CB    COPD (chronic obstructive pulmonary disease) ([facility])     Diabetes mellitus ([facility]     Emphysema (subcutaneous) (surgical) resulting from a procedure     H/O cardiovascular stress test 04/24/2021    Lexiscan    Hyperlipidemia     Hypertension     Hypoxemia requiring supplemental oxygen 02/02/2015    LONG TERM ANTICOAGULENT USE     Morbid obesity with BMI of 50.0-59.9, adult ([facility] 11/27/2013    Obesity     Osteoarthritis     Sleep apnea     bilevel positive airway pressure at 13/8 with 2 L oxygen flow     Stage 3 chronic kidney disease ([facility])     Tobacco abuse     Type II or unspecified type diabetes mellitus without mention of complication, not stated as uncontrolled      Past Surgical History:   Procedure Laterality Date    COLONOSCOPY      CORONARY ANGIOPLASTY WITH STENT PLACEMENT  07-23-13    Left main focal eccentric 65% distal stenosis. Large OM1 CX 50% ostial & prox narrow. Large ramus artery & LAD: Minor plaque w/o sign narrow. RCA: Dom vessel w/25% prox narrow & focal hazy eccentric 99% distal stenosis before origin RPDA & RPLCA. LV: Mild inferior hypokinesis EF 55%. Probable mild AS with 10-15mmHg. Successful PCI distal RCA w/3.5 x 12 mm BMS, 0% res stenosis.  Normal distal runoff    CORONARY ARTERY BYPASS GRAFT  09-09-13    Dr Bharathi Shaw; CABG x2, LIMA to LAD, SVG to ramus intermedius; MV repair using 30-mm Future complete ring with magic stitch between A3 and P3; rigid internal fixation of sternum using KLS plates x2; endoscopic vein harvesting of right lower extremity     ECHO COMPL W DOP COLOR FLOW  7/25/2013         HERNIA REPAIR      SINUS SURGERY         SUBJECTIVE:    Precautions: Up with assistance, falls and alarm ,     Social history: Patient lives with spouse in a apartment     with  12 steps  to enter with Rail  Tub shower     Equipment owned: Christiano Clifton,       [facility]   How much difficulty turning over in bed?: A Little  How much difficulty sitting down on / standing up from a chair with arms?: A Little  How much difficulty moving from lying on back to sitting on side of bed?: A Little  How much help from another person moving to and from a bed to a chair?: A Little  How much help from another person needed to walk in hospital room?: A Little  How much help from another person for climbing 3-5 steps with a railing?: A Lot  AM-PAC Inpatient Mobility Raw Score : 17  AM-PAC Inpatient T-Scale Score : 42.13  Mobility Inpatient CMS 0-100% Score: 50.57  Mobility Inpatient CMS G-Code Modifier : CK    Nursing cleared patient for PT treatment. OBJECTIVE;   Initial Evaluation  Date: 10/25/2022 Treatment Date:  10/27/2022       Short Term/ Long Term   Goals   Was pt agreeable to Eval/treatment? Yes yes To be met in 3 days   Pain level   0/10    No number given  Right shoulder    Bed Mobility    Rolling: Moderate assist of 1    Supine to sit: Moderate assist of 1    Sit to supine: Moderate assist of 1    Scooting: Moderate assist of 1   Rolling: Supervision    Supine to sit: Supervision    Sit to supine: Supervision    Scooting: Supervision     Rolling: Independent    Supine to sit:  Independent    Sit to supine: Independent    Scooting: Independent     Transfers Sit to stand: Minimal assist of 1   Sit to stand: Supervision  Cues for hand placement and safety     Sit to stand: Independent     Ambulation     50 feet using  wheeled walker with Minimal assist of 1   for walker approximation, upright, multiplane instability, and safety and cues for upright posture, walker approximation, safety, and pacing 90 feet; 2 x 70 feet using  wheeled walker with Supervision    cues for upright posture, safety, pacing, and pursed lip breathing     100 feet using  wheeled walker with Independent    Stair negotiation: ascended and descended   Not assessed     10 steps with rail independent   Use step up box if needed for training   ROM Within functional limits        Strength BUE:  refer to OT bunny  RLE:  3+/5  LLE:  3+/5  Increase strength in affected mm groups by 1/3 grade   Balance Sitting EOB:  good    Dynamic Standing:  fair with wheeled walker  Sitting EOB: good   Dynamic Standing: fair+  with wheeled walker   Sitting EOB:  good    Dynamic Standing: fair +with wheeled walker      Patient is Alert & Oriented x person, place, time, and situation and follows directions    Sensation:  Patient  reports neuropathy bilateral lower extremities     Edema:  yes bilateral lower extremities   Endurance: poor      Vitals: room air   Blood Pressure at rest  Blood Pressure during session    Heart Rate at rest   Heart Rate during session     SPO2 at rest %  SPO2 during session %     Patient education  Patient educated on role of Physical Therapy, risks of immobility, safety and plan of care and  importance of mobility while in hospital      Patient response to education:   Pt verbalized understanding Pt demonstrated skill Pt requires further education in this area   Yes Partial Yes      Treatment:  Patient practiced and was instructed/facilitated in the following treatment: Patient sitting on the edge of the bed at start of tx session. Pt stated he just got done taking a shower. Pt rested and stated he had to laid down due to low back pain. Pt to edge of bed,   Sat edge of bed 8 minutes with Supervision  to increase dynamic sitting balance and activity tolerance. Pt stood, ambulated in the hallway, needed a seated rest period in the waiting room, instructed pt on pursed lip breathing and pacing.  Pt stood, ambulated in the hallway, needing a standing rest period, and back to the edge of the bed. Therapeutic Exercises:  not performed    At end of session, patient sitting edge of bed with  HIGGINS present  call light and phone within reach,  all lines and tubes intact, nursing notified. Patient would benefit from continued skilled Physical Therapy to improve functional independence and quality of life. Patient's/ family goals   home    Time in  14:30  Time out  15:00    Total Treatment Time  30 minutes      CPT codes:    Therapeutic activities (13719)   23 minutes  2 unit(s)  Non billable time 7 minutes    Anil Monet  [facility]  LIC # [id number]

## 2022-10-28 VITALS
RESPIRATION RATE: 18 BRPM | WEIGHT: 180.56 LBS | HEIGHT: 66 IN | SYSTOLIC BLOOD PRESSURE: 120 MMHG | DIASTOLIC BLOOD PRESSURE: 83 MMHG | BODY MASS INDEX: 29.02 KG/M2 | OXYGEN SATURATION: 96 % | HEART RATE: 82 BPM | TEMPERATURE: 97.4 F

## 2022-10-28 LAB
ALBUMIN SERPL-MCNC: 3.5 G/DL (ref 3.5–5.2)
ALP BLD-CCNC: 121 U/L (ref 40–129)
ALT SERPL-CCNC: 17 U/L (ref 0–40)
ANION GAP SERPL CALCULATED.3IONS-SCNC: 8 MMOL/L (ref 7–16)
AST SERPL-CCNC: 16 U/L (ref 0–39)
BASOPHILS ABSOLUTE: 0.03 E9/L (ref 0–0.2)
BASOPHILS RELATIVE PERCENT: 0.6 % (ref 0–2)
BILIRUB SERPL-MCNC: 0.9 MG/DL (ref 0–1.2)
BILIRUBIN DIRECT: 0.3 MG/DL (ref 0–0.3)
BILIRUBIN, INDIRECT: 0.6 MG/DL (ref 0–1)
BUN BLDV-MCNC: 29 MG/DL (ref 6–23)
CALCIUM SERPL-MCNC: 8.6 MG/DL (ref 8.6–10.2)
CHLORIDE BLD-SCNC: 99 MMOL/L (ref 98–107)
CO2: 29 MMOL/L (ref 22–29)
CREAT SERPL-MCNC: 1.3 MG/DL (ref 0.7–1.2)
EOSINOPHILS ABSOLUTE: 0.11 E9/L (ref 0.05–0.5)
EOSINOPHILS RELATIVE PERCENT: 2.2 % (ref 0–6)
GFR SERPL CREATININE-BSD FRML MDRD: >60 ML/MIN/1.73
GLUCOSE BLD-MCNC: 87 MG/DL (ref 74–99)
HCT VFR BLD CALC: 37.1 % (ref 37–54)
HEMOGLOBIN: 11.9 G/DL (ref 12.5–16.5)
IMMATURE GRANULOCYTES #: 0.02 E9/L
IMMATURE GRANULOCYTES %: 0.4 % (ref 0–5)
LYMPHOCYTES ABSOLUTE: 0.85 E9/L (ref 1.5–4)
LYMPHOCYTES RELATIVE PERCENT: 16.7 % (ref 20–42)
MAGNESIUM: 2.1 MG/DL (ref 1.6–2.6)
MCH RBC QN AUTO: 32.1 PG (ref 26–35)
MCHC RBC AUTO-ENTMCNC: 32.1 % (ref 32–34.5)
MCV RBC AUTO: 100 FL (ref 80–99.9)
METER GLUCOSE: 102 MG/DL (ref 74–99)
METER GLUCOSE: 127 MG/DL (ref 74–99)
MONOCYTES ABSOLUTE: 0.67 E9/L (ref 0.1–0.95)
MONOCYTES RELATIVE PERCENT: 13.1 % (ref 2–12)
NEUTROPHILS ABSOLUTE: 3.42 E9/L (ref 1.8–7.3)
NEUTROPHILS RELATIVE PERCENT: 67 % (ref 43–80)
PDW BLD-RTO: 16.4 FL (ref 11.5–15)
PHOSPHORUS: 3.6 MG/DL (ref 2.5–4.5)
PLATELET # BLD: 161 E9/L (ref 130–450)
PMV BLD AUTO: 9.9 FL (ref 7–12)
POTASSIUM SERPL-SCNC: 3.4 MMOL/L (ref 3.5–5)
RBC # BLD: 3.71 E12/L (ref 3.8–5.8)
SODIUM BLD-SCNC: 136 MMOL/L (ref 132–146)
TOTAL PROTEIN: 6.4 G/DL (ref 6.4–8.3)
WBC # BLD: 5.1 E9/L (ref 4.5–11.5)

## 2022-10-28 PROCEDURE — 6370000000 HC RX 637 (ALT 250 FOR IP): Performed by: PHYSICIAN ASSISTANT

## 2022-10-28 PROCEDURE — 83735 ASSAY OF MAGNESIUM: CPT

## 2022-10-28 PROCEDURE — 6360000002 HC RX W HCPCS: Performed by: NURSE PRACTITIONER

## 2022-10-28 PROCEDURE — 85025 COMPLETE CBC W/AUTO DIFF WBC: CPT

## 2022-10-28 PROCEDURE — 80048 BASIC METABOLIC PNL TOTAL CA: CPT

## 2022-10-28 PROCEDURE — 2580000003 HC RX 258: Performed by: NURSE PRACTITIONER

## 2022-10-28 PROCEDURE — 36415 COLL VENOUS BLD VENIPUNCTURE: CPT

## 2022-10-28 PROCEDURE — 94640 AIRWAY INHALATION TREATMENT: CPT

## 2022-10-28 PROCEDURE — 82962 GLUCOSE BLOOD TEST: CPT

## 2022-10-28 PROCEDURE — 6370000000 HC RX 637 (ALT 250 FOR IP): Performed by: NURSE PRACTITIONER

## 2022-10-28 PROCEDURE — 80076 HEPATIC FUNCTION PANEL: CPT

## 2022-10-28 PROCEDURE — 6370000000 HC RX 637 (ALT 250 FOR IP): Performed by: INTERNAL MEDICINE

## 2022-10-28 PROCEDURE — 84100 ASSAY OF PHOSPHORUS: CPT

## 2022-10-28 RX ORDER — METOLAZONE 5 MG/1
5 TABLET ORAL
Qty: 8 TABLET | Refills: 1 | Status: SHIPPED | OUTPATIENT
Start: 2022-10-31 | End: 2022-11-16

## 2022-10-28 RX ORDER — MIDODRINE HYDROCHLORIDE 2.5 MG/1
2.5 TABLET ORAL
Qty: 90 TABLET | Refills: 1 | Status: SHIPPED | OUTPATIENT
Start: 2022-10-28

## 2022-10-28 RX ORDER — LEVOTHYROXINE SODIUM 0.05 MG/1
50 TABLET ORAL DAILY
Qty: 30 TABLET | Refills: 1 | Status: SHIPPED | OUTPATIENT
Start: 2022-10-29

## 2022-10-28 RX ORDER — METOPROLOL SUCCINATE 25 MG/1
25 TABLET, EXTENDED RELEASE ORAL DAILY
Qty: 30 TABLET | Refills: 1 | Status: SHIPPED | OUTPATIENT
Start: 2022-10-29

## 2022-10-28 RX ADMIN — FUROSEMIDE 40 MG: 40 TABLET ORAL at 08:58

## 2022-10-28 RX ADMIN — METOPROLOL SUCCINATE 25 MG: 25 TABLET, EXTENDED RELEASE ORAL at 08:59

## 2022-10-28 RX ADMIN — BUDESONIDE 500 MCG: 0.5 INHALANT RESPIRATORY (INHALATION) at 06:22

## 2022-10-28 RX ADMIN — POTASSIUM CHLORIDE 40 MEQ: 1500 TABLET, EXTENDED RELEASE ORAL at 08:59

## 2022-10-28 RX ADMIN — MIDODRINE HYDROCHLORIDE 2.5 MG: 5 TABLET ORAL at 08:58

## 2022-10-28 RX ADMIN — INSULIN LISPRO 10 UNITS: 100 INJECTION, SOLUTION INTRAVENOUS; SUBCUTANEOUS at 08:27

## 2022-10-28 RX ADMIN — PANTOPRAZOLE SODIUM 40 MG: 40 TABLET, DELAYED RELEASE ORAL at 08:59

## 2022-10-28 RX ADMIN — INSULIN LISPRO 10 UNITS: 100 INJECTION, SOLUTION INTRAVENOUS; SUBCUTANEOUS at 11:58

## 2022-10-28 RX ADMIN — MIDODRINE HYDROCHLORIDE 2.5 MG: 5 TABLET ORAL at 11:57

## 2022-10-28 RX ADMIN — ARFORMOTEROL TARTRATE 15 MCG: 15 SOLUTION RESPIRATORY (INHALATION) at 06:22

## 2022-10-28 RX ADMIN — LEVOTHYROXINE SODIUM 50 MCG: 0.05 TABLET ORAL at 06:13

## 2022-10-28 RX ADMIN — INSULIN GLARGINE 45 UNITS: 100 INJECTION, SOLUTION SUBCUTANEOUS at 08:27

## 2022-10-28 RX ADMIN — Medication 10 ML: at 08:59

## 2022-10-28 RX ADMIN — RISPERIDONE 0.5 MG: 1 TABLET ORAL at 08:58

## 2022-10-28 RX ADMIN — APIXABAN 5 MG: 5 TABLET, FILM COATED ORAL at 08:58

## 2022-10-28 ASSESSMENT — PAIN SCALES - GENERAL: PAINLEVEL_OUTOF10: 0

## 2022-10-28 NOTE — DISCHARGE INSTRUCTIONS
HEART FAILURE  / CONGESTIVE HEART FAILURE  DISCHARGE INSTRUCTIONS:  GUIDELINES TO FOLLOW AT HOME    Self- Managed Care:     MEDICATIONS:  Take your medication as directed. If you are experiencing any side effects, inform your doctor, Do not stop taking any of your medications without letting your doctor know. Check with your doctor before taking any over-the-counter medications / herbal / or dietary supplements. They may interfere with your other medications. Do not take ibuprofen (Advil or Motrin) and naproxen (Aleve) without talking to your doctor first. They could make your heart failure worse. WEIGHT MONITORING:   Weigh yourself everyday (with the same scale) around the same time of the day and write it down. (you can chart them on a calendar or keep track of them on paper. Notify your doctor of a weight gain of 3 pounds or more in 1 day   OR a total of 5 pounds or more in 1 week    Take your weight record to your doctor visits  Also, the same goes if you loose more than 3# in one day, let your heart doctor know. DIET:   Cardiac heart healthy diet- Low saturated / low trans fat, no added salt, caffeine restricted, Low sodium diet-   No more than 2,000mg (2 grams) of salt / sodium per day (which equals to a little less than  a teaspoon of salt)  If your doctor wants you on a fluid restriction. ..it is usually recommended a fluid limit of 2,000cc -  Fluid restriction- 2,000 ml (milliliters) = 64 ounces = you can have 8 glasses of fluid per day (each glass 8 ounces)    Follow a low salt diet - avoid using salt at the table, avoid / limit use of canned soups, processed / packaged foods, salted snacks, olives and pickles. Do not use a salt substitute without checking with your doctor, they may contain a high amount of potassioum. (Mrs. Jem Schwartz is safe to use).     Limit the use of alcohol       CALL YOUR DOCTOR THE FIRST DAY YOU NOTICE ANY OF THESE   SYMPTOMS:  You have a weight gain of 3 pounds or more in 1 day         OR 5 pounds or more in one week  More shortness of breath  More swelling of your stomach, legs, ankles or feet  Feeling more tired, No energy  Dry hacky cough  Dizziness  More chest pain / discomfort       (CALL 911 IF ANY OF THE FOLLOWING OCCURS  Chest pain (not relieved with nitroglycerine, if you have been prescribed this medication)  Severe shortness of breath  Faint / Pass out  Confusion / cannot think clearly  If symptoms get worse           SMOKING - TOBACCO USE:  * IF YOU SMOKE - STOP! Kick the habit. [address] Program is offered at [facility] and [address] [facility]. Call [phone] extension [phone] for more information. ACTIVITY:   (Ask your doctor when you will be able to return to work and before starting any exercise program.  Do not drive unless unless your doctor has given you permission to do so). Start light exercise. Even if you can only do a small amount, exercise will help you get stronger, have more energy, help manage your weight and decrease  stress. Walking is an easy way to get exercise. Start out slowly and  increase the amount you walk as tolerated  If you become short of breath, dizzy or have chest pain; stop, sit down, and rest.  If you feel \"wiped out\" the day after you exercise, walk at a slower pace or for a shorter distance. You can gradually increase the pace or amount of time. (Do not exercise right after a meal or in extreme temperatures, such as above 85 degrees, if the air is really humid, or wind chill is less than 20 degrees)                                             ADDITIONAL INFORMATION:  Avoid getting sick from colds and the flu. Stay away from friends or family that you know may have a contagious illness  Get plenty of rest   Get a flu shot each year. Get a pneumococcal vaccine shot.  If you have had one before, ask your doctor whether you need another dose. My Goal for Self-management of Heart Failure Includes 5 steps :    1. Notice a change in symptoms ( weight gain, short of breath, leg swelling, decreased activity level, bloating. ...)    2. Evaluate the change: (use the Heart Failure Zones )     3. Decide to take action: decide what your options are, such as: (call your doctor for an extra visit, take a prescribed medication, such as your water pill if your doctor has given you directions to do so, [address])    4. Come up with a strategy:  (now you call the doctor for advice / appointment. This is where you take action!!! Do not wait, catch the symptom early and treat it before it worsens. 5. Evaluate the response: The next day, check your Heart Failure Zones: are you in the GREEN ZONE (safe zone)? Worsening symptoms of YELLOW ZONE? Or have you moved to the RED ZONE and need to call 911 or go to the Emergency Room for evaluation? Call your doctor's office to update them on your symptoms of heart failure.

## 2022-10-28 NOTE — PLAN OF CARE
Problem: Discharge Planning  Goal: Discharge to home or other facility with appropriate resources  10/28/2022 1439 by Royal Moore RN  Outcome: Completed  10/28/2022 0057 by Thania Belle RN  Outcome: Progressing  Flowsheets (Taken 10/27/2022 2100)  Discharge to home or other facility with appropriate resources: Identify barriers to discharge with patient and caregiver     Problem: Safety - Adult  Goal: Free from fall injury  10/28/2022 1439 by Royal Moore RN  Outcome: Completed  10/28/2022 0057 by Thania Belle RN  Outcome: Progressing     Problem: ABCDS Injury Assessment  Goal: Absence of physical injury  10/28/2022 1439 by Royal Moore RN  Outcome: Completed  10/28/2022 0057 by Thania Belle RN  Outcome: Progressing     Problem: Chronic Conditions and Co-morbidities  Goal: Patient's chronic conditions and co-morbidity symptoms are monitored and maintained or improved  10/28/2022 1439 by Royal Moore RN  Outcome: Completed  10/28/2022 0057 by Thania Belle RN  Outcome: Progressing  Flowsheets (Taken 10/27/2022 2100)  Care Plan - Patient's Chronic Conditions and Co-Morbidity Symptoms are Monitored and Maintained or Improved: Monitor and assess patient's chronic conditions and comorbid symptoms for stability, deterioration, or improvement     Problem: Skin/Tissue Integrity  Goal: Absence of new skin breakdown  Description: 1. Monitor for areas of redness and/or skin breakdown  2. Assess vascular access sites hourly  3. Every 4-6 hours minimum:  Change oxygen saturation probe site  4. Every 4-6 hours:  If on nasal continuous positive airway pressure, respiratory therapy assess nares and determine need for appliance change or resting period.   Outcome: Completed

## 2022-10-28 NOTE — PROGRESS NOTES
Patient was unable to urinate in a urinal and tried to use a hat in the toilet, but peed all over the floor every time. If accurate I and O's are necessary, we might need to consider a walsh.

## 2022-10-28 NOTE — CONSULTS
Nutrition Education    Heart Failure Guidelines attached to discharge instructions, contact information provided    Reyna Jorge RD, LD  Contact Number: [phone]

## 2022-10-28 NOTE — CARE COORDINATION
10-28-Cm note: pt is discharged, he plans on going home with resumption of [facility], order in chart, home care called with dc date of today, [facility] to [facility] insurance precert. Pt states his Massiel Franks will provide transport home .  Electronically signed by Becky Stringer RN on 10/28/2022 at 2:01 PM

## 2022-10-28 NOTE — PLAN OF CARE
Patient's chart updated to reflect:      . - HF care plan, HF education points and HF discharge instructions.  -Orders: 2 gram sodium diet, daily weights, I/O.  -PCP and cardiology follow up appointments to be scheduled within 7 days of hospital discharge. -CHF education session will be provided to the patient prior to hospital discharge.   Viv QIU, RN  Heart Failure Navigator

## 2022-10-28 NOTE — DISCHARGE INSTR - DIET
Good nutrition is important when healing from an illness, injury, or surgery. Follow any nutrition recommendations given to you during your hospital stay. If you were given an oral nutrition supplement while in the hospital, continue to take this supplement at home. You can take it with meals, in-between meals, and/or before bedtime. These supplements can be purchased at most local grocery stores, pharmacies, and chain super-stores. If you have any questions about your diet or nutrition, call the hospital and ask for the dietitian. Heart Failure Nutrition Therapy  This nutrition therapy will help you feel better and support your heart. This plan focuses on:  Limiting sodium in your diet. Salt (sodium) makes your body hold water. When your body holds too much water, you can feel  shortness of breath and swelling. You can prevent these symptoms by eating less salt. Limiting fluid in your diet. For some patients, drinking too much fluid can make heart failure worse. It can cause symptoms such  as shortness of breath and swelling. Limiting fluids can help relieve some of your symptoms. Managing your weight. Your registered dietitian nutritionist (RDN) can help you choose a healthy weight for your body type. You can achieve these goals by:  Reading food labels to keep track of how much sodium is in the foods you eat. Limiting foods that are high in sodium. Checking your weight to make sure youre not retaining too much fluid. Reading the Food Label: How Much Sodium Is Too Much? The nutrition plan for heart failure usually limits the sodium you get from food and drinks to 2,000 milligrams per day. Salt is the main  source of sodium. Read the nutrition label to find out how much sodium is in 1 serving of a food. Select foods with 140 milligrams of sodium or less per serving. Foods with more than 300 milligrams of sodium per serving may not fit into a reduced-sodium meal plan. Check serving sizes.  If you eat more than 1 serving, you will get more sodium than the amount listed. Copyright Academy of Nutrition and Dietetics. This handout may be duplicated for client education. Page 2/5  Cutting Back on Sodium  Avoid processed foods. Eat more fresh foods. Fresh and frozen fruits and vegetables without added juices or sauces are naturally low in sodium. Fresh meats are lower in sodium than processed meats, such as watson, sausage, and hot dogs. Read the nutrition label  or ask your  to help you find a fresh meat that is low in sodium. Eat less salt, at the table and when cooking. Just 1 teaspoon of table salt has 2,300 milligrams of sodium. Leave the salt out of recipes for pasta, casseroles, and soups. Ask your RDN how to cook your favorite recipes without sodium. Be a smart . Look for food packages that say salt-free or sodium-free.  These items contain less than 5 milligrams of sodium per  serving. Very-low-sodium products contain less than 35 milligrams of sodium per serving. Low-sodium products contain less than 140 milligrams of sodium per serving. Unsalted or no added salt products may still be high in sodium. Check the nutrition label. Add flavors to your food without adding sodium. Try lemon juice, lime juice, fruit juice, or vinegar. Dry or fresh herbs add flavor. Try basil, bay leaf, dill, rosemary, parsley, rayray, dry mustard, nutmeg, thyme, and paprika. Pepper, red pepper flakes, and cayenne pepper can add spice to your meals without adding sodium. Hot sauce contains  sodium, but if you use just a drop or two, it will not add up to much. Buy a sodium-free seasoning blend or make your own at home. Use caution when you eat outside your home. Restaurant foods can be very high in sodium. Ask for nutrition information. Many restaurants provide nutrition facts on their menus or websites. Let your  know that you want your food to be cooked without salt.  Ask for your salad dressing and sauces to come  on the side.   Fluid Restriction  Your doctor may ask you to follow a fluid restriction in addition to taking diuretics (water pills). Ask your doctor how much fluid you can  have. Foods that are liquid at room temperature are considered a fluid, such as popsicles, soup, ice cream, and Jell-O. Here are some  common conversions that will help you measure your fluid intake every day:  1,000 milliliters = 1 liter or 4 cups 1 fluid ounce = 30 milliliters  1 cup = 240 milliliters 2,000 milliliters = 2 liters or 8 cups  1,500 milliliters = 1½ liters or 6 cups  Weight Monitoring  Weigh yourself each day. Sudden weight gain is a sign that fluid is building up in your body. Follow these guidelines:  Weigh yourself every morning. If you gain 3 or more pounds in 1-2 days or 5 or more pounds within 1 week, call your doctor. Your  doctor may adjust your medicine to get rid of the extra fluid. Talk with your doctor or RDN about what a healthy weight is for you. Talk with your doctor to find out what type of physical activity is best for you. Foods Recommended  Copyright Academy of Nutrition and Dietetics. This handout may be duplicated for client education.  Page 3/5  Food Group Recommended Foods  Grains Bread with less than 80 milligrams sodium per slice (yeast breads usually have less sodium than those made with baking  soda)  Homemade bread made with reduced-sodium baking soda  Many cold cereals, especially shredded wheat and puffed rice  Oats, grits, or cream of wheat  Dry pastas, noodles, quinoa, and rice  Vegetables Fresh and frozen vegetables without added sauces, salt, or sodium  Homemade soups (salt free or low sodium)  Low-sodium or sodium-free canned vegetables and soups  Fruits Fresh and canned fruits  Dried fruits, such as raisins, cranberries, and prunes  Dairy (Milk  and Milk  Products)  Milk or milk powder  Rice milk and soy milk  Yogurt, including Greek yogurt  Small amounts of natural, block cheese or reduced-sodium cheese (Swiss, ricotta, and fresh mozzarella are lower in  sodium than others)  Regular or soft cream cheese and low-sodium cottage cheese  Protein  Foods  (Meat,  Poultry, Fish,  Beans)  Fresh meats and fish  [place]) watson (except if packaged in a sodium solution)  Canned or packed tuna (no more than 4 ounces at 1 serving)  Dried beans and peas; edamame (fresh soybeans)  Eggs or egg beaters (if less than 200 mg per serving)  Unsalted nuts or peanut butter  Desserts  and Snacks  Fresh fruit or applesauce  [place] food cake  Granola bars  Unsalted pretzels, popcorn, or nuts  Pudding or gelatin with whipped cream topping  Homemade rice-crispy treats  Vanilla wafers  Frozen fruit bars  Fats Tub or liquid margarine  Unsaturated fat oils (canola, olive, corn, sunflower, safflower, peanut)  Condiments Fresh or dried herbs; low-sodium ketchup; vinegar; lemon or lime juice; pepper; salt-free seasoning mixes and marinades  (salt-free seasoning blend); simple salad dressings (vinegar and oil); salt-free sauces  Foods Not Recommended  Food Group Foods Not Recommended  Grains Breads or crackers topped with salt  Cereals (hot/cold) with more than 300 milligrams sodium per serving  Biscuits, cornbread, and other quick breads prepared with baking soda  Prepackaged bread crumbs  Self-rising flours  Copyright Academy of Nutrition and Dietetics. This handout may be duplicated for client education.  Page 4/5  Vegetables Canned vegetables (unless they are salt free or low sodium)  Frozen vegetables with seasoning and sauces  Sauerkraut and pickled vegetables  Canned or dried soups (unless they are salt free or low sodium)  [place] fries and onion rings  Fruits Dried fruits preserved with sodium-containing additives  Dairy (Milk and Milk  Products)  Buttermilk  Processed cheeses  Cottage cheese (unless a low-sodium variety)  Feta cheese; shredded cheese (has more sodium than block cheese); singles slices and string cheese  Protein Foods (Meat,  Poultry, Fish, Beans)  Cured meats: watson, ham, sausage, pepperoni, and hot dogs  Canned meats: chili, Janny sausage, sardines, and ham  Smoked fish and meats  Frozen meals that have more than 600 milligrams sodium  Fats Salted butter or margarine  Condiments Salt, sea salt, kosher salt, onion salt, and garlic salt  Seasoning mixes containing salt (Lemon Pepper or Bouillon cubes)  Catsup or ketchup, BBQ sauce, Worcestershire and soy sauce  Salsa, pickles, olives, relish  Salad dressings: ranch, blue cheese, [place], and Moosic Rell  Alcohol Check with your doctor.

## 2022-10-29 NOTE — DISCHARGE SUMMARY
[address]                               DISCHARGE SUMMARY    PATIENT NAME: Sabas Hirsch                   :        1955  MED REC NO:   [id number]                            ROOM:       1802  ACCOUNT NO:   [de-identified]                           ADMIT DATE: 10/24/2022  PROVIDER:     Kailyn Izaguirre DO                  DISCHARGE DATE:  10/28/2022      ADMITTING DIAGNOSES:  Acute-on-chronic systolic and diastolic congestive  heart failure, left ventricular ejection fraction 45% on most recent  echocardiogram.  Paroxysmal atrial fibrillation with rapid ventricular  response, maintained on chronic Eliquis therapy, new onset  hypothyroidism in the setting of Cordarone therapy requiring the  institution of Synthroid, insulin-dependent diabetes mellitus type 2  with hemoglobin A1c 8.3. Recent hospitalization due to frequent fall  with right C7 transverse fracture, calcified plaque of the carotid bulb,  coronary artery disease with prior coronary artery bypass grafting and  stent placement, valvular heart disease and mitral valve repair, chronic  oxygen dependency, COPD with chronic respiratory failure, morbid obesity  with elevated BMI of 49.71, hyperlipidemia, recent failure to thrive. COMPLICATION:  There were no complications. OPERATIONS:  No operation. CONSULTATIONS OBTAINED:  [facility]. No OMT was given. ADMITTING PHYSICIANS:  Dr. Mackenzie Choi and Dr. Poncho Borges. CHIEF COMPLAINT AND HISTORY OF CHIEF COMPLAINT:  This is a 68-year-old  white male who was admitted to [address] [place] [address]. The patient  presented to the hospital here on 10/25/2022. The patient presented to  the hospital here with recurrent shortness of breath. The patient was  recently seen and discharged from our service with strong recommendation  to be placed to a nursing home facility.   The patient did have variable  volume status and with frequent optimization, the patient declined. The  patient presented to the hospital here with decompensated congestive  heart failure and atrial fibrillation, he was admitted at this time. PAST MEDICAL HISTORY:  Positive diastolic congestive  heart failure, history of arthritis, asthma, coronary artery disease,  chronic bronchitis, COPD, hyperlipidemia, hypertension, morbid obesity,  osteoarthritis, sleep apnea. PHYSICAL EXAMINATION:  VITAL SIGNS:  Blood pressure to be 113/59, pulse 79, respirations 18. GENERAL APPEARANCE:  This is a 68-year-old white male who is alert,  responsive, oriented to person, place, and time. HEENT:  Normal.  NECK:  With adequate carotid upstroke without bruits. Trachea midline. Thyroid not palpable. LUNGS:  Coarse, diminished at the bases. HEART:  Fairly regular with systolic ejection murmur. ABDOMEN:  Soft. EXTREMITIES:  Without edema. LABORATORY DATA:  While the patient was in the hospital, he had the  following laboratory studies performed:  Hemoglobin and hematocrit were  12.1 and 38.0 respectively. White count 5000. Platelet count was  adequate. BUN and creatinine were 23 and 1.1 respectively. Electrolytes were stable. HOSPITAL STAY:  The patient was admitted directly to the hospital to the  intermediate care unit. The patient admitted under the service of Dr. Chente De Anda and Dr. Merary Rosales. Consultation was obtained with [address] [place] [address] Cardiology. Adjustments were made at this time. The patient initially was agreeable  to go to a skilled nursing facility, arrangements were made, and  precertification was instituted. The patient did receive physical  therapy and progressed at this time. The patient as always changed his  mind at this time and refused to go to the nursing home and wanted to be  discharge home. Had been seen by physical therapy and this was felt to  be an option with close followup on outpatient basis.   Subsequently, the  patient was discharged on 10/28/2022. Reviewing his lab at this time  did show a glucose 102, BUN and creatinine were 29 and 1.3 respectively. Electrolytes were satisfactory. Sodium 136, potassium 3.4, chloride 99,  CO2 29. His CMP was normal.  Mag and phos were satisfactory. Hemoglobin and hematocrit were stable at 11.9 and 37.1 respectively. White count was 5000. Platelet count was adequate. The patient's blood  pressure at this time was 120/83, temperature 97.4, pulse 83,  respirations 18, O2 sat 96%. Height 5 feet 6 inches. Weight 180  pounds. The patient was discharged on Synthroid 50 mcg daily, repeat  TSH in 6 weeks. Zaroxolyn 5 mg twice a week. Midodrine/Proamatine was  prescribed at 2.5 q.8. The patient will continue Elavil 50 mg at  bedtime, metoprolol XL 25 daily, apixaban which is Eliquis 5 b.i.d.,  aspirin 81 daily, atorvastatin 80 mg daily. Basilar insulin 45 units  subcu q.12, Bumex 1 mg daily. CPAP machine at home nightly with use of  oxygen, Xyzal 1 tablet daily, Singulair 10 mg at bedtime, Protonix 40  daily, Risperdal 0.5 mg b.i.d. The patient was discharged home by private car. Home nursing will be  involved upon discharge along with followup with outpatient physical  therapy. The patient is recommended sodium and fluid restricted diet,  compliant at this time. The patient will follow up with Dr. Carl Burnham his primary care doctor in one week along with [facility]. More than 40 minutes were spent on the day of discharge with more than  50% of the time spent face to face with the patient discussing plan of  care and treatment at this time.         Luz Elena Matiasast, DO    D: 10/28/2022 04:04:16       T: 10/29/2022 4:57:38     RUDDY/HIRO  Job#: [id number]     Doc#: [id number]    CC:

## 2022-10-29 NOTE — ADT AUTH CERT
Atrial Fibrillation - Care Day 4 (10/27/2022) by Jayjay Gillette RN       Review Status Review Entered   Completed 10/28/2022 1226       Created By   Jayjay Gillette RN      Criteria Review      Care Day: 4 Care Date: 10/27/2022 Level of Care: Intermediate Care    Guideline Day 2    Level Of Care    (X) ICU, intermediate care, or telemetry to discharge    10/28/2022 12:26 PM EDT by Tyrell Orellana      pcu tele    (X) Complete discharge planning    10/28/2022 12:26 PM EDT by Tyrell Orellana      consult dietician/heart failure nurse coordinator/CM following    Clinical Status    ( ) * Hemodynamic stability    (X) * Sinus rhythm or acceptable ventricular rate    10/28/2022 12:26 [facility] Irregularly irregular rhythm with normal rate, 2/6 systolic murmur RUSB    (X) * No evidence of acute myocardial ischemia    10/28/2022 12:26 PM EDT by [address] [place] [address]- Irregularly irregular rhythm with normal rate, 2/6 systolic murmur RUSB    (X) * Mental status at baseline    (X) * Tachypnea absent    10/28/2022 12:26 PM EDT by Kirill Willams    (X) * Hypoxemia absent    10/28/2022 12:26 PM EDT by Tyrell Orellana      RA  PAP naps and HS    (X) * Anticoagulants regimen for next level of care established    (X) * Antiarrhythmic medication absent or no requirement for further inpatient ECG monitoring    (X) * Discharge plans and education understood    Activity    (X) * Ambulatory or acceptable for next level of care    10/28/2022 12:26 PM EDT by Tyrell Orellana      see additional notes PT/OT    Routes    (X) * Oral hydration    10/28/2022 12:26 PM EDT by Tyrell Orellana      strict I&O    (X) * Oral medications or regimen acceptable for next level of care    (X) * Oral diet or acceptable for next level of care    Interventions    (X) Cardiac monitoring    10/28/2022 12:26 PM EDT by Tyrell Orellana      tele    Medications    (X) Anticoagulants    10/28/2022 12:26 PM EDT by Gabrielle Diggs      eliquis 5mg po bid  asa 81 mg po hs       Definitions for Care Day 4    Tachypnea absent    (X) Tachypnea absent, as indicated by respiratory rate of  1 or more  of the following  (1) (2):       (X) Less than or equal to 18 breaths per minute in adult or child 15years of age or older       Hypoxemia absent    (X) Hypoxemia absent, as indicated by  1 or more  of the following  (1):       (X) Oxygen requirements are stable and arranged for at next level of care. * Milestone   Additional Notes   DATE: 10/27 PCU Tele            PERTINENT UPDATES:   Low BP - His medications held as needed; Add Midodrine         VITALS:   T98.2, rr 16, hr 90, BP 84/67, 97% RA         ABNL/PERTINENT LABS/RADIOLOGY/DIAGNOSTIC STUDIES:   Bun 33   Cr 1.3      Hgb 12.3   Alk phos 130               PHYSICAL EXAM:   Heart:     Irregular rate and rhythm with controlled rate, S1 and S2, systolic murmur    lungs:     Symmetrical.  Diminished bibasilar air exchange. Clear to auscultation bilaterally. No wheezes. No rhonchi. No rales. Extremities:     Peripheral pulses present. 1-2+ pitting edema bilateral lower extremities. MD CONSULTS/ASSESSMENT AND PLAN:      Cardiology   Impression/Plan:       Acute on chronic  HFpEF  - His EF improved. EF 55%. Better, Change to PO Lasix given borderline hypotension; Monitor strict I/Os, daily weights, BP, renal function and electrolytes        PAF - Rates controlled, Amiodarone discontinued due to hypothyroidism; On Eliquis for [address]. Continue BB; Add Digoxin if needed for rate control. Low BP - His medications held as needed; Add Midodrine       Acute hypoxic respiratory failure -better       CAD s/p PCI/BMS to the RCA in 2013, s/p CABG x 2 (LIMA-LAD, SVG-RI) in 2013.   Non-ischemic Lexiscan stress test 4/2021 - Continue ASA,BB,Statin        Severe MR - s/p MVr 2013 at time of CABG       Mild to moderate AS on TTE 9/2022       HLD - On statin        Morbid obesity, BMI 49 - Diet, exercise and weight loss discussed. LISS, uses BiPAP at night               Internal Med   Assessment:   · Acute on chronic systolic and diastolic congestive heart failure with LVEF 45% on most recent echo   · Atrial fibrillation with rapid ventricular response likely contributing to #1, maintained on chronic Eliquis   · New onset hypothyroidism in the setting of amiodarone therapy requiring the institution of Synthroid, with amiodarone discontinued October 26, 2022 by the cardiovascular team-TSH was found to be 39.180-Free T4 0.55 chronic kidney disease stage III   · Insulin-dependent diabetes mellitus type 2-hemoglobin A1c 8.3   · Recent hospitalization for frequent falls with right C7 transverse process fracture without neurologic deficits being managed conservatively by neurosurgery   · Calcified plaques along the carotid bulbs left greater than right on previous imaging   · Coronary artery disease with prior coronary artery bypass grafting and stent placement, asymptomatic   · Valvular heart disease with history of mitral valve repair   · Chronic oxygen dependent COPD with chronic respiratory failure   · Morbid obesity BMI 49.71 kg per metered squared   · Hyperlipidemia   · Recurrent failure to thrive symptomatology       · Plan:      Respiratory wise patient states not too different from yesterday. But still improved from the day before. Continue supportive oxygen therapy   · Continue diuretic therapy. Monitor intake and output. Daily weights. Monitor renal function closely. · Regards to patient's atrial fibrillation. Rates are better controlled. Continue anticoagulation therapy. Continue beta-blocker therapy. Cardiology is following patient recommendations of been reviewed. · Patient does admit to deconditioning and weakness. He is now agreeable to skilled nursing facility placement for rehab. Await precertification. · Monitor blood glucose levels and treat accordingly. Created By   Duane Chambers RN      Criteria Review      Care Day: 3 Care Date: 10/26/2022 Level of Care: Intermediate Care    Guideline Day 2    Level Of Care    (X) ICU, intermediate care, or telemetry to discharge    10/28/2022 11:58 AM EDT by Merceda Fast      pcu tele    Clinical Status    (X) * Hemodynamic stability    10/28/2022 11:58 AM EDT by Fungos      HR 83  /70    (X) * Sinus rhythm or acceptable ventricular rate    10/28/2022 11:58 AM EDT by [address] [place] [address]- Irregularly irregular rhythm with normal rate, 0-8/4 systolic murmur RUSB    ( ) * No evidence of acute myocardial ischemia    10/28/2022 11:58 AM EDT by Merceda Fast      trop 10/25 30 High    (X) * Mental status at baseline    (X) * Tachypnea absent    10/28/2022 11:58 AM EDT by Merceda Fast      RR18-20    (X) * Hypoxemia absent    10/28/2022 11:58 AM EDT by Merceda Fast      RA 92%    (X) * Anticoagulants regimen for next level of care established    (X) * Antiarrhythmic medication absent or no requirement for further inpatient ECG monitoring    (X) * Discharge plans and education understood    Activity    (X) * Ambulatory or acceptable for next level of care    10/28/2022 11:58 AM EDT by Mila, [address] if patient meets goals  AM-PAC Inpatient Mobility Raw Score : 16  -see additional notes    Routes    (X) * Oral hydration    10/28/2022 11:58 AM EDT by Merceda Fast      I&O strict    (X) * Oral medications or regimen acceptable for next level of care    (X) * Oral diet or acceptable for next level of care    Interventions    (X) Cardiac monitoring    10/28/2022 11:58 AM EDT by Fungos      tele    Medications    (X) Anticoagulants    10/28/2022 11:58 AM EDT by Merceda Fast      asa 81mg po hs  eliquis 5mg po bid    (X) Possible rate and rhythm control medications    10/28/2022 11:58 AM EDT by Merceda Fast      amiodarone 200mg po daily  toprol 25 mg po daily       Definitions for Care Day 3    Hemodynamic stability    (X) Hemodynamic stability, as indicated by  1 or more  of the following :       (X) Patient hemodynamically stable, as indicated by  ALL  of the following  (1) (2) (3) (4) (5):          (X) Tachycardia absent          (X) Hypotension absent          (X) No evidence of inadequate perfusion (eg, no myocardial ischemia)          (X) No other hemodynamic abnormalities (eg, no Orthostatic hypotension)       Tachycardia absent    (X) Tachycardia absent, as indicated by  1 or more  of the following  (1) (2):       (X) Heart rate less than or equal to 100 beats per minute in adult or child 6 years or older       Hypotension absent    (X) Hypotension absent, as indicated by  1 or more  of the following  (1) (2) (3) (4):       (X) SBP greater than or equal to 90 mm Hg and without recent decrease greater than 40 mm Hg from       baseline in adult or child 10 years or older       Hypoxemia absent    (X) Hypoxemia absent, as indicated by  1 or more  of the following  (1):       (X) Oxygen requirements are stable and arranged for at next level of care. * Milestone   Additional Notes   DATE: 10/26 PCU Tele         PERTINENT UPDATES:   Appears mildly dyspneic with conversation. Continues to have peripheral edema bilaterally, which the patient states he does not typically have at baseline. He has not been urinating much since hospital admission. Continues to note of dry cough and generalized fatigue. VITALS:   T98.6, RR20, , /73, SpO2 93% RA      ABNL/PERTINENT LABS/RADIOLOGY/DIAGNOSTIC STUDIES:   Bun 30   Cr 1.4, 1.5   Hgb 12.3   Albumin 3.4   Phos 4.6         PHYSICAL EXAM:   RESPIRATORY: Lung sounds - diminished with rare rales   CARDIOVASCULAR:      No noted carotid bruit. Heart Ausculation- Irregularly irregular rhythm with normal rate, 4-6/9 systolic murmur RUSB   PV: 1-2+ bilateral lower extremity edema.  Pedal pulses palpable, no clubbing or cyanosis. MD CONSULTS/ASSESSMENT AND PLAN:      Cardiology   ASSESSMENT:   1. PAF, now with CVR today but remains in AF. Chronic Amiodarone and Eliquis therapy   2. Acute on chronic HFmEF with recovered EF   a. Last EF 55% on TTE 9/2022   b. proBNP 426 September 2022 --> now 2248   c. Weight 286 lbs on 9/23/2022 --> now 308 lbs   3. 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 Possible PNA on CXR. Respiratory panel negative    5. Acute hypoxic respiratory failure, now on RA   6. JORDEN/CKD   7. Abnormal TSH and T4 this admission   8. Anemia    9. CAD s/p PCI/BMS to the RCA in 2013, s/p CABG x 2 (LIMA-LAD, SVG-RI) in 2013. Non-ischemic Lexiscan stress test 4/2021   10. Severe MR s/p MVr 2013 at time of CABG   11. Mild to moderate AS on TTE 9/2022   12. HTN, now with borderline low BP   13. HLD, on statin therapy    14. Insulin requiring T2DM    15. Former tobacco smoker    16. COPD/Asthma    17. Morbid obesity, BMI 49   18. LISS, uses BiPAP at night   19. GERD   20. Recurrent mechanical falls   21. R C7 transverse fracture s/p mechanical fall 9/2022, conservative management per Neurosurgery          RECOMMENDATIONS:   1. Discontinue PO Amiodarone as per Dr. Miguelangel Ordaz given recurrent AF and now with abnormal TSH/T4    2. One dose IV Lasix 20 mg this AM, assess response. If kidney function remains stable, will give additional 20 mg IV later today --> Lasix 40 mg IV daily starting tomorrow   3. Re-check BMP this evening to reassess kidney function   4. Strict intake and output, daily weights   5. Continue BB therapy, further titration limited at this time given borderline hypotension   6. Further GDMT for history of cardiomyopathy limited at this time given borderline hypotension   7. Recent cardiac testing reviewed   8. Rest as per primary service and other consultants   9. Above as per Dr. Miguelangel Ordaz, A+P discussed and made in collaboration with him. Internal Med   Assessment:   · Acutely decompensated systolic and diastolic congestive heart failure with LVEF 45% on most recent echo   · Atrial fibrillation with rapid ventricular response likely contributing to #1, maintained on chronic Eliquis   · New onset hypothyroidism in the setting of amiodarone therapy requiring the institution of Synthroid, with amiodarone discontinued October 26, 2022 by the cardiovascular team   · Chronic kidney disease stage III   · Insulin-dependent diabetes mellitus type 2   · Recent hospitalization for frequent falls with right C7 transverse process fracture without neurologic deficits being managed conservatively by neurosurgery   · Calcified plaques along the carotid bulbs left greater than right on previous imaging   · Coronary artery disease with prior coronary artery bypass grafting and stent placement, asymptomatic   · Valvular heart disease with history of mitral valve repair   · Chronic oxygen dependent COPD with chronic respiratory failure   · Morbid obesity BMI 49.71 kg per metered squared   · Hyperlipidemia   · Recurrent failure to thrive symptomatology       Plan:     55-year-old male admitted with acutely decompensated diastolic congestive heart failure and A. fib RVR. This is a recurring issue for him medical optimization is being undertaken with the assistance of the cardiovascular team and he is diuresing accordingly. Diuretics were adjusted by the cardiologist today and we will continue to monitor response to therapy with consideration for transition to pills in the next 24 to 48 hours anticipated. Close monitoring of renal function and electrolytes as well as vital signs as his blood pressures are noted to be borderline low. Amiodarone therapy has been discontinued as patient's thyroid studies have become increasingly abnormal.  Synthroid was instituted yesterday, repeat TSH and free T4 as discussed.   His chronic morbidities, lab values and vital signs are being monitored and addressed accordingly. PT, OT and social work are following as we anticipate discharge to a skilled nursing facility. Continue current therapy. Onelia Cedeño requires this high level of physician care and nursing on the IMC/Telemetry unit due the complexity of decision management and chance of rapid decline or death. Continued cardiac monitoring and higher level of nursing are required. I am readily available for any further decision-making and intervention. PT/CM ASSESSMENT OR NOTES:   PT- Patient practiced and was instructed/facilitated in the following treatment: Patient assisted to edge of bed,   Sat edge of bed 5 minutes with Supervision  to increase dynamic sitting balance and activity tolerance. Pt stood, ambulated in the hallway, needed a seated rest period in the waiting room, instructed pt on pursed lip breathing and pacing, ambulated back to the chair in his room. Pt performed seated exercises. Therapeutic Exercises:  ankle pumps, hip abduction/adduction, long arc quad, and seated marching,  x 20 reps. AROM      CM - Pt is agreeable to short term rehab and was provided a list of facilities for review. Pt resides with his wife in a house. Pt has a walker, wheelchair and a cane. He also has a CPAP from [address]. Pt states that he recently moved and lost the cord to his Cpap, cm will check into getting him a new cord. Pt's neighbor has been transporting pt's wife to dialysis.

## 2022-10-30 LAB
BLOOD CULTURE, ROUTINE: NORMAL
CULTURE, BLOOD 2: NORMAL

## 2022-10-31 ENCOUNTER — TELEPHONE (OUTPATIENT)
Dept: FAMILY MEDICINE CLINIC | Age: 67
End: 2022-10-31

## 2022-11-09 ENCOUNTER — HOSPITAL ENCOUNTER (OUTPATIENT)
Dept: OTHER | Age: 67
Setting detail: THERAPIES SERIES
Discharge: HOME | End: 2022-11-09
Payer: MEDICARE

## 2022-11-09 VITALS
BODY MASS INDEX: 47.45 KG/M2 | SYSTOLIC BLOOD PRESSURE: 137 MMHG | RESPIRATION RATE: 18 BRPM | DIASTOLIC BLOOD PRESSURE: 86 MMHG | WEIGHT: 294 LBS | HEART RATE: 91 BPM | OXYGEN SATURATION: 98 %

## 2022-11-09 DIAGNOSIS — I50.9 ACUTE ON CHRONIC CONGESTIVE HEART FAILURE, UNSPECIFIED HEART FAILURE TYPE (HCC): Primary | ICD-10-CM

## 2022-11-09 LAB
ANION GAP SERPL CALCULATED.3IONS-SCNC: 9 MMOL/L (ref 7–16)
BUN BLDV-MCNC: 23 MG/DL (ref 6–23)
CALCIUM SERPL-MCNC: 8.9 MG/DL (ref 8.6–10.2)
CHLORIDE BLD-SCNC: 97 MMOL/L (ref 98–107)
CO2: 29 MMOL/L (ref 22–29)
CREAT SERPL-MCNC: 1.1 MG/DL (ref 0.7–1.2)
GFR SERPL CREATININE-BSD FRML MDRD: >60 ML/MIN/1.73
GLUCOSE BLD-MCNC: 366 MG/DL (ref 74–99)
POTASSIUM SERPL-SCNC: 3.8 MMOL/L (ref 3.5–5)
PRO-BNP: 2042 PG/ML (ref 0–125)
SODIUM BLD-SCNC: 135 MMOL/L (ref 132–146)

## 2022-11-09 PROCEDURE — 83880 ASSAY OF NATRIURETIC PEPTIDE: CPT

## 2022-11-09 PROCEDURE — 2580000003 HC RX 258: Performed by: NURSE PRACTITIONER

## 2022-11-09 PROCEDURE — 80048 BASIC METABOLIC PNL TOTAL CA: CPT

## 2022-11-09 PROCEDURE — 36415 COLL VENOUS BLD VENIPUNCTURE: CPT

## 2022-11-09 PROCEDURE — 99204 OFFICE O/P NEW MOD 45 MIN: CPT

## 2022-11-09 PROCEDURE — 2500000003 HC RX 250 WO HCPCS: Performed by: NURSE PRACTITIONER

## 2022-11-09 PROCEDURE — 96374 THER/PROPH/DIAG INJ IV PUSH: CPT

## 2022-11-09 RX ORDER — BUMETANIDE 0.25 MG/ML
2 INJECTION, SOLUTION INTRAMUSCULAR; INTRAVENOUS ONCE
Status: DISCONTINUED | OUTPATIENT
Start: 2022-11-09 | End: 2022-11-09

## 2022-11-09 RX ORDER — SODIUM CHLORIDE 0.9 % (FLUSH) 0.9 %
5-40 SYRINGE (ML) INJECTION ONCE
Status: COMPLETED | OUTPATIENT
Start: 2022-11-09 | End: 2022-11-09

## 2022-11-09 RX ORDER — SODIUM CHLORIDE 0.9 % (FLUSH) 0.9 %
10 SYRINGE (ML) INJECTION ONCE
Status: DISCONTINUED | OUTPATIENT
Start: 2022-11-09 | End: 2022-11-09

## 2022-11-09 RX ORDER — BUMETANIDE 0.25 MG/ML
2 INJECTION, SOLUTION INTRAMUSCULAR; INTRAVENOUS ONCE
Status: COMPLETED | OUTPATIENT
Start: 2022-11-09 | End: 2022-11-09

## 2022-11-09 RX ADMIN — BUMETANIDE 2 MG: 0.25 INJECTION, SOLUTION INTRAMUSCULAR; INTRAVENOUS at 11:55

## 2022-11-09 RX ADMIN — SODIUM CHLORIDE, PRESERVATIVE FREE 10 ML: 5 INJECTION INTRAVENOUS at 11:56

## 2022-11-09 NOTE — PROGRESS NOTES
Congestive Heart Failure [facility]   1955      Referring Provider: Dr. Sim Han   Primary Care Physician: Dr. Kendra Shabazz  Cardiologist: Dr. Sim Han   Nephrologist: [facility]     History of Present Illness:     Hira Chaudhari is a 77 y.o. male with a history of HFpEF, most recent EF 55% 9/24/2022. Patient Story:  He does  have dyspnea with exertion, shortness of breath, or decline in overall functional capacity. He does not have orthopnea, PND, nocturnal cough or hemoptysis. He does have abdominal distention or bloating, early satiety, anorexia/change in appetite. He does has a good urinary response to  oral diuretic. He does have  lower extremity edema. He denies lightheadedness, dizziness. He denies palpitations, syncope or near syncope. He does not complain of chest pain, pressure, discomfort. Allergies   Allergen Reactions    Pcn [Penicillins]      Child went to hospital   ? Reaction  Patient tolerates cephalosporins    Sulfa Antibiotics      ? No outpatient medications have been marked as taking for the 11/9/22 encounter [facility] Encounter) with [facility] CHF ROOM 1.      Current Outpatient Medications on File Prior to Encounter   Medication Sig Dispense Refill    midodrine (PROAMATINE) 2.5 MG tablet Take 1 tablet by mouth 3 times daily (with meals) 90 tablet 1    levothyroxine (SYNTHROID) 50 MCG tablet Take 1 tablet by mouth Daily 30 tablet 1    metoprolol succinate (TOPROL XL) 25 MG extended release tablet Take 1 tablet by mouth daily 30 tablet 1    metOLazone (ZAROXOLYN) 5 MG tablet Take 1 tablet by mouth Twice a Week 8 tablet 1    amitriptyline (ELAVIL) 50 MG tablet TAKE ONE TABLET BY MOUTH NIGHTLY 30 tablet 3    bumetanide (BUMEX) 1 MG tablet TAKE ONE TABLET BY MOUTH EVERY DAY 30 tablet 3    apixaban (ELIQUIS) 5 MG TABS tablet Take 1 tablet by mouth 2 times daily 60 tablet 3    levocetirizine (XYZAL) 5 MG tablet Take 1 tablet by mouth nightly 30 tablet 5    pantoprazole (PROTONIX) 40 MG tablet Take 1 tablet by mouth daily 30 tablet 5    risperiDONE (RISPERDAL) 0.5 MG tablet Take 1 tablet by mouth 2 times daily 60 tablet 3    montelukast (SINGULAIR) 10 MG tablet Take 1 tablet by mouth nightly 30 tablet 4    insulin glargine (BASAGLAR KWIKPEN) 100 UNIT/ML injection pen Inject 45 Units into the skin 2 times daily 5 pen 3    insulin lispro, 1 Unit Dial, (HUMALOG KWIKPEN) 100 UNIT/ML SOPN Inject 0-18 Units into the skin 3 times daily (before meals) MAX 70 units daily 3 mL 3    atorvastatin (LIPITOR) 80 MG tablet TAKE ONE TABLET BY MOUTH EVERY NIGHT 90 tablet 5    [DISCONTINUED] pramipexole (MIRAPEX) 0.25 MG tablet TAKE TWO TABLETS BY MOUTH THREE TIMES A  tablet 5    CPAP Machine MISC 1 each by Does not apply route nightly as needed       aspirin 81 MG tablet Take 81 mg by mouth nightly        No current facility-administered medications on file prior to encounter.        Guideline directed medical:  ARNI/ACE I/ARB: No  Beta blocker:  Yes metoprolol succinate 25mg daily  Aldosterone antagonist:  No  SGLT2i:  No    Physical Examination:     /86 Comment: pt took his medications at 0800 am  Pulse 91   Resp 18   Wt 294 lb (133.4 kg)   SpO2 98%   BMI 47.45 kg/m²     Assessment  Charting Type: Shift assessment    Neurological  Level of Consciousness: Alert (0)  Orientation Level: Oriented X4  Cognition: Follows commands  Speech: Clear  L Hand : Strong         HEENT (Head, Ears, Eyes, Nose, & Throat)  HEENT (WDL): Within Defined Limits    Respiratory  Respiratory Pattern: Regular  Respiratory Depth: Shallow  Respiratory Quality/Effort: Unlabored, Snoring, Sonorous, Dyspnea with exertion  Chest Assessment: Chest expansion symmetrical, Trachea midline  L Breath Sounds: Fine Crackles  R Breath Sounds: Fine Crackles              Cardiac  Cardiac Regularity: Irregular  Heart Sounds: No adventitious heart sounds  Cardiac Rhythm: Atrial fib    Rhythm Interpretation  Heart Rate: 91         Gastrointestinal  Abdominal (WDL): Within Defined Limits  Abdomen Inspection: Soft, Rounded  RUQ Bowel Sounds: Active  LUQ Bowel Sounds: Active  RLQ Bowel Sounds: Active  LLQ Bowel Sounds: Active  Tenderness: Soft, No guarding, Nontender, No rebound          Bowel Sounds  RUQ Bowel Sounds: Active  LUQ Bowel Sounds: Active  RLQ Bowel Sounds: Active  LLQ Bowel Sounds:  Active    Peripheral Vascular  Peripheral Vascular (WDL): Exceptions to WDL  Edema: Right lower extremity, Left lower extremity  RLE Edema: Pitting, +3  LLE Edema: +3, Pitting              Musculoskeletal  RUE: Weakness  LUE: Weakness  RL Extremity: Unsteady, Swelling  LL Extremity: Unsteady, Swelling    Genitourinary  Genitourinary (WDL): Within Defined Limits  Suprapubic Tenderness: No  Dysuria (Pain/Burning w/Urination): No    Psychosocial  Psychosocial (WDL): Within Defined Limits  Family Behaviors: Calm, Cooperative                        Heart Rate: 91                     LAB DATA:    Last 3 BMP      Sodium (mmol/L)   Date Value   11/09/2022 135   10/28/2022 136   10/27/2022 137     Potassium (mmol/L)   Date Value   11/09/2022 3.8   10/28/2022 3.4 (L)   10/27/2022 4.1     Potassium reflex Magnesium (mmol/L)   Date Value   10/24/2022 5.8 (H)   09/15/2022 3.1 (L)   10/28/2021 2.8 (L)     Chloride (mmol/L)   Date Value   11/09/2022 97 (L)   10/28/2022 99   10/27/2022 100     CO2 (mmol/L)   Date Value   11/09/2022 29   10/28/2022 29   10/27/2022 28     BUN (mg/dL)   Date Value   11/09/2022 23   10/28/2022 29 (H)   10/27/2022 33 (H)     Glucose (mg/dL)   Date Value   11/09/2022 366 (H)   10/28/2022 87   10/27/2022 127 (H)   10/27/2011 118 (H)   06/09/2011 96     Calcium (mg/dL)   Date Value   11/09/2022 8.9   10/28/2022 8.6   10/27/2022 9.0       Last 3 BNP       Pro-BNP (pg/mL)   Date Value   11/09/2022 2,042 (H)   10/24/2022 2,248 (H)   09/23/2022 426 (H)          CBC: No results for input(s): WBC, HGB, PLT in the last 72 hours. BMP:    Recent Labs     11/09/22  1157      K 3.8   CL 97*   CO2 29   BUN 23   CREATININE 1.1   GLUCOSE 366*     Hepatic: No results for input(s): AST, ALT, ALB, BILITOT, ALKPHOS in the last 72 hours. Troponin: No results for input(s): TROPONINI in the last 72 hours. BNP: No results for input(s): BNP in the last 72 hours. Lipids: No results for input(s): CHOL, HDL in the last 72 hours. Invalid input(s): LDLCALCU  INR: No results for input(s): INR in the last 72 hours. WEIGHTS:    Wt Readings from Last 3 Encounters:   11/09/22 294 lb (133.4 kg)   10/28/22 180 lb 8.9 oz (81.9 kg)   10/11/22 291 lb (132 kg)         TELEMETRY:  Cardiac Regularity: Irregular  Cardiac Rhythm/Interpretation: afib  Hx; AFIB    ASSESSMENT:  [place] [employer] visit with CHF clinic today. Discussed with patient the purpose of CHF clinic and importance of daily weights and doing a self check every day to monitor for changes. Pt was hospstalized with Acute on Chronic HF 10/24-10/28. Upon assessment bilateral lungs have fine crackles in bases, abdomen distended, + bloated, +3 pitting edema BLE. Admits to eating Spam for dinner last night. Educated about low sodium diet and options. Patient is interested in attending cardiac rehab. Will send over a referral to Dr. Chelsea Cueva. Went over the three heart failure zones and to call cardiologist if in yellow zone immediately to prevent any further decline. Patient has a working scale. Patient is agreeable to future CHF clinic visits. Patient did not have metolazone at home and has not taken it since discharge. Called [facility] and they transferred the script to [facility] pharmacy. Pt has not taken metolazone since discharged. [facility] pharmacy will send the medication over night. Pt has an HFU scheduled on 11/17 with Tracie MCCLENDON and 11/10 HFU scheduled with DR. Mary Lou Lyle       2:59 PM Called and left a voicemail to contact the CHF clinic re: exact pharmacy updates. Interventions completed this visit:  IV diuretics given yes, IV bumex 2mg x1  Lab work obtained yes, pro-bnp and bmp   Reviewed currently prescribed medications with patient, educated on importance of compliance and answered any questions regarding their medication  Educated on signs and symptoms of HF  Educated on low sodium diet    PLAN:  Scheduled to follow up in CHF clinic on   Future Appointments   Date Time Provider [place]   11/10/2022  2:30 PM Fam Sahu DO [facility] AND [facility]   11/16/2022 11:00 AM [facility] CHF ROOM 1 [facility]   11/17/2022  1:30 PM ARLEEN Valle CNP CARDIO [facility]   12/13/2022  8:40 AM Niloufar Dennie Bering, DO [facility] SLEEP [facility]   1/4/2023  2:30 PM Renu     Given clinic phone number and aware of signs and symptoms to call with any HF change in symptoms.

## 2022-11-16 ENCOUNTER — HOSPITAL ENCOUNTER (OUTPATIENT)
Dept: OTHER | Age: 67
Setting detail: THERAPIES SERIES
Discharge: HOME | End: 2022-11-16
Payer: MEDICARE

## 2022-11-16 VITALS
WEIGHT: 286 LBS | HEART RATE: 94 BPM | RESPIRATION RATE: 18 BRPM | DIASTOLIC BLOOD PRESSURE: 74 MMHG | OXYGEN SATURATION: 93 % | BODY MASS INDEX: 46.16 KG/M2 | SYSTOLIC BLOOD PRESSURE: 114 MMHG

## 2022-11-16 DIAGNOSIS — I50.9 ACUTE ON CHRONIC CONGESTIVE HEART FAILURE, UNSPECIFIED HEART FAILURE TYPE (HCC): Primary | ICD-10-CM

## 2022-11-16 DIAGNOSIS — Z79.899 NEW MEDICATION ADDED: ICD-10-CM

## 2022-11-16 LAB
ANION GAP SERPL CALCULATED.3IONS-SCNC: 10 MMOL/L (ref 7–16)
BUN BLDV-MCNC: 19 MG/DL (ref 6–23)
CALCIUM SERPL-MCNC: 9.2 MG/DL (ref 8.6–10.2)
CHLORIDE BLD-SCNC: 95 MMOL/L (ref 98–107)
CO2: 28 MMOL/L (ref 22–29)
CREAT SERPL-MCNC: 1.2 MG/DL (ref 0.7–1.2)
GFR SERPL CREATININE-BSD FRML MDRD: >60 ML/MIN/1.73
GLUCOSE BLD-MCNC: 383 MG/DL (ref 74–99)
POTASSIUM SERPL-SCNC: 3.7 MMOL/L (ref 3.5–5)
PRO-BNP: 1800 PG/ML (ref 0–125)
SODIUM BLD-SCNC: 133 MMOL/L (ref 132–146)

## 2022-11-16 PROCEDURE — 99214 OFFICE O/P EST MOD 30 MIN: CPT | Performed by: NURSE PRACTITIONER

## 2022-11-16 PROCEDURE — 83880 ASSAY OF NATRIURETIC PEPTIDE: CPT

## 2022-11-16 PROCEDURE — 36415 COLL VENOUS BLD VENIPUNCTURE: CPT

## 2022-11-16 PROCEDURE — 80048 BASIC METABOLIC PNL TOTAL CA: CPT

## 2022-11-16 PROCEDURE — 99214 OFFICE O/P EST MOD 30 MIN: CPT

## 2022-11-16 RX ORDER — METOLAZONE 2.5 MG/1
TABLET ORAL
Qty: 8 TABLET | Refills: 3 | Status: SHIPPED | OUTPATIENT
Start: 2022-11-16

## 2022-11-16 NOTE — DISCHARGE INSTRUCTIONS
Start Jardiance 10 mg daily     2. Continue rest of current cardiac medications     3. Please wear your CPAP nightly, once fixed    4. Return to CHF clinic in 1 week as scheduled    5. Follow up with Dr. Nancy Newton in 3 months - sooner if needed    6. Low sodium diet (2 grams)    7. Stay hydrated, 64 oz    8. Weight yourself daily     9. If your weight goes up by 3-5 lbs within 2-3 days or you have increased shortness of breath, swelling you can take 1 extra dose of your water pill (BUMEX/ Bumetanide). If this extra pill does not help your symptoms then please call the CHF clinic at [phone].

## 2022-11-16 NOTE — PROGRESS NOTES
[facility]  [facility] Visit         Reason for Visit: Heart Failure    Primary Cardiologist: Dr. Makenzie Ventura         History of Present Illness:     Mr. Misha Lu is a 79year old male with PMHx of HFmrEF, PAF, CAD s/p CABG, s/p MVrp (2013), PAF (Eliquis), hypotension, HLD, COPD, T2DM, obesity, former tobacco abuse, recurrent mechanical falls, C7 transverse fracture s/p mechanical fall 9/2022 treated conservatively    He recently presented to the emergency room with complaints of shortness of breath, peripheral edema and tachycardia. He was admitted to the hospital for acute heart failure and atrial fibrillation with RVR. He was IV diuresed. With subjective improvement. During hospitalization given hypotension midodrine was added. Rate controlled with beta-blocker and amiodarone was discontinued due to normal thyroid function. He subjectively improved and was discharged home with close follow-up scheduled in the CHF clinic given high risk of readmission. He presents today in follow-up, since discharge from the hospital he has been compliant with all of his current cardiac medications. He reports good urinary response to oral diuretic with clear yellow urine production. He is currently not weighing himself daily however does have a scale at home. He reports improvement in lower extremity edema as well as shortness of breath. He denies any chest pain, pressure, heaviness, palpitations, dizziness, lightheadedness, near-syncope, syncope, strokelike symptoms, abdominal bloating, early satiety. He does have a history of obstructive sleep apnea, however this has been untreated for several months due to a broken machine, the part that he needs was delivered yesterday and he will start wearing his CPAP nightly again.         Patient Active Problem List    Diagnosis Date Noted    Status post coronary artery bypass graft 11/27/2013     Priority: High    H/O mitral valve repair 11/27/2013     Priority: High    Morbid obesity with BMI of 50.0-59.9, adult ([facility] [address] 11/27/2013     Priority: High    CAD (coronary artery disease) 07/23/2013     Priority: High    Atrial fibrillation with rapid ventricular response ([address] 10/24/2022     Priority: Medium    Hypotension 09/24/2022     Priority: Medium    Transient hypotension 09/24/2022     Priority: Medium    Altered mental status 09/16/2022     Priority: Medium    Acute metabolic encephalopathy 10/74/0565     Priority: Medium    PAF (paroxysmal atrial fibrillation) ([address] 09/11/2013     Priority: Medium    Hypertension 07/23/2013     Priority: Medium    Type 2 diabetes mellitus with hyperglycemia, with long-term current use of insulin ([address] 07/23/2013     Priority: Medium    Tobacco abuse 07/23/2013     Priority: Medium    Chronic bronchitis ([address] 04/16/2014     Priority: Low    Sleep apnea 04/16/2014     Priority: Low    Hyperosmolar hyperglycemic state (HHS) ([facility] [address] 10/28/2021    Dizziness 06/12/2021    Acute on chronic congestive heart failure (HCC)     Atrial fibrillation with RVR ([address]     Ischemic cardiomyopathy     Nonrheumatic tricuspid valve regurgitation     Chronic anticoagulation     Stage 3 chronic kidney disease ([address]     Acute systolic/diastolic congestive heart failure ([address] 04/18/2021    Iron deficiency anemia, unspecified 07/22/2019     This Diagnosis was added to the Problem List based on transcribed orders from ANGELES Pablo         Acute diastolic heart failure ([facility] [address] 07/15/2019     This Diagnosis was added to the Problem List based on transcribed orders from ANGELES Pablo      Acute diastolic (congestive) heart failure ([address] 06/19/2019    Muscular deconditioning 05/15/2019    Gastroesophageal reflux disease without esophagitis 06/10/2018    Hyperlipidemia 06/10/2018     PAST MEDICAL HISTORY:    Forty-five pack year smoker, abstinent since 07/22/2013  HTN.   Recent issues with hypotension (requiring admission 9/2022) -- improved after antihypertensive adjustment  HLD, on statin therapy  Insulin requiring T2DM   Morbid obesity, BMI 49  LISS. Uses nocturnal BiPAP  COPD/Asthma   GERD  CKD. Baseline Cr 1.1 to 1.3  Recurrent mechanical falls   R C7 transverse fracture s/p mechanical fall 9/2022, conservative management per Neurosurgery  S/p Sinus surgery and herniorrhaphy  Chronic HFmEF with improved EF  ? Combined cardiomyopathy (ischemic/valvular/tachycardia)  Pulmonary HTN by echocardiogram  Severe MR s/p MVr 2013  TTE 7/2013: LVEF 60-65%. Probably hypokinetic inferior wall. Stage II DD. Moderately dilated LA. Moderate to severe MR  CABG and MVr, [facility], 09/09/2013 (Dr. Elon Brunner). LIMA-LAD, SVG-RI and 30 mm MV ring   Echo, 10/03/2013. Mild LVE. Mild global hypokinesis. EF 45-50%. Marked left atrial enlargement. E/E' 35. Mild to moderate AS (32/17 mmHg, 1.6 cm², VTR ratio 0.51). Status post mitral valve ring repair. Mild mitral regurgitation. MVA 2.1 cm². RVSP 40 mmHg. Echocardiogram 06/20/2019 (Dr. Stephanie Zarco): Technically suboptimal study in spite contrast administration. Left ventricle is grossly normal in size. Mild left ventricular concentric hypertrophy noted. Regional wall motion abnormality suggested with inferior hypokinesis. Mild-moderately reduced left ventricular systolic dysfunction suggested. Calculated EF 41%. The left atrium is dilated. Enlarged right atrium size. Moderate mitral annular calcification. Mild mitral regurgitation is present. The aortic valve appears moderately sclerotic. Mild tricuspid regurgitation. TTE 4/2021 (Dr. Evangelina Collier): Technically limited study. Compared to prior echo, changes noted. Moderate LVH. LVEF 55%. No regional wall motion abnormalities. Indeterminate diastolic function. Mildly enlarged RV with mildly reduced RV systolic function. Moderate AS, GRISELDA is recommended for further evaluation. Moderate pulmonary hypertension, RVSP 51. TTE 9/29/2022 (Dr. Cecily Reza): Mild LVH. LVEF 55%.   No regional wall motion abnormalities. Borderline dilated RV. Mild to moderate AS  CAD s/p PCI and CABG 2013  [facility] admission, 07/23/2013 with two to three days of intermittent exertional chest discomfort. A prolonged episode occurred on the day of admission associated with inferior ST elevation and reciprocal lateral ST depression. Peak CPK 1418, .6, troponin-I 54.75. CBC and BMP normal.  Urgent cardiac catheterization, 07/23/2013. LV mild inferior hypokinesis, EF 55%. [facility] eccentric focal 65% distal stenosis. LAD and CX normal.  OM1 50% ostial and proximal stenosis. RCA 25% mid stenosis, 99% focal distal stenosis. PCI distal RCA, 07/23/2013. 3.5 x 12 mm Vision BMS, no residual stenosis. Surgical consultation, Dr. Allison Jaquez, 07/23/2013. Plans made for one month of aspirin and Plavix with elective CABG one week after discontinuation of these medications. CABG and MVr, [facility], 09/09/2013 (Dr. Allison Jaquez). RENEE-LAD, SVG-RI and 30 mm MV ring. Lexiscan MPS 07/12/2019: Electrocardiographically normal regadenoson infusion with a  clinically non-ischemic response  Myocardial perfusion imaging showed no reversible ischemia,  small apical fixed defect. Overall left ventricular systolic function was normal without regional wall motion abnormalities. EF 60%. Low risk general pharmacologic stress test  Lexiscan MPS 4/2021: MPI was normal with attenuation. Normal wall motion, LVEF 65%. Overall low risk study. Cardiac OP assessment, 10/01/2013.  per minute. Beta blocker started. NSR noted on OV 11/27/2013. PAF, on Amiodarone therapy and OAC with Coumadin --> switched to Eliquis 9/2022 admission due to \"inconsistent INR monitoring and labile INR values\"      CARDIAC TESTING:    Andi Wells (8/2017)  Gated SPECT left ventricular ejection fraction was calculated to   be 62%, with normal myocardial thickening and wall motion. Impression:    1.  Electrocardiographically normal regadenoson infusion with a   clinically non-ischemic response  2. Myocardial perfusion imaging normal without any reversible   ischemia; small fixed apical defect. 3. Overall left ventricular systolic function was normal without   regional wall motion abnormalities. TTE (7/26/2017, Dr. Rosio Huynh)   Summary   Technically suboptimal study   Left ventricle is normal in size . Regional wall motion abnormalities. Normal left ventricular ejection fraction. EF 55%. There is doppler evidence of stage II diastolic dysfunction. The left atrium is borderline dilated. Mild mitral annular calcification. Mild mitral regurgitation is present. Mild aortic regurgitation is noted. Mild to moderate tricuspid regurgitation. Pulmonary hypertension is mild to moderate . TTE (6/19/2019, Dr. Roiso Huynh)   Summary   Technically suboptimal study in spite contrast administration. Left ventricle is grossly normal in size . Mild left ventricular concentric hypertrophy noted. Regional wall motion abnormality suggested with inferior hypokinesis. Mild-moderately reduced left ventricular systolic dysfunction suggested. The left atrium is dilated. Enlarged right atrium size. Moderate mitral annular calcification. Mild mitral regurgitation is present. The aortic valve appears moderately sclerotic. Mild tricuspid regurgitation.       Past Medical History:   Diagnosis Date    Acid reflux     Acute diastolic (congestive) heart failure ([address] 06/19/2019    Arthritis     Asthma 04/16/2014    Asthmatic bronchitis , chronic ([address] 11/28/2016    CAD (coronary artery disease)     Chronic bronchitis ([address] 04/16/2014    COPD (chronic obstructive pulmonary disease) (HCC)     CB    COPD (chronic obstructive pulmonary disease) (HCC)     Diabetes mellitus (HCC)     Emphysema (subcutaneous) (surgical) resulting from a procedure     H/O cardiovascular stress test 04/24/2021    Lexiscan    Hyperlipidemia     Hypertension     Hypoxemia requiring supplemental oxygen 02/02/2015    LONG TERM ANTICOAGULENT USE     Morbid obesity with BMI of 50.0-59.9, adult ([facility] [address] 11/27/2013    Obesity     Osteoarthritis     Sleep apnea     bilevel positive airway pressure at 13/8 with 2 L oxygen flow     Stage 3 chronic kidney disease (HCC)     Tobacco abuse     Type II or unspecified type diabetes mellitus without mention of complication, not stated as uncontrolled        Past Surgical History:   Procedure Laterality Date    COLONOSCOPY      CORONARY ANGIOPLASTY WITH STENT PLACEMENT  07-23-13    Left main focal eccentric 65% distal stenosis. Large OM1 CX 50% ostial & prox narrow. Large ramus artery & LAD: Minor plaque w/o sign narrow. RCA: Dom vessel w/25% prox narrow & focal hazy eccentric 99% distal stenosis before origin RPDA & RPLCA. LV: Mild inferior hypokinesis EF 55%. Probable mild AS with 10-15mmHg. Successful PCI distal RCA w/3.5 x 12 mm BMS, 0% res stenosis. Normal distal runoff    CORONARY ARTERY BYPASS GRAFT  09-09-13    Dr Marie Velazquez; CABG x2, LIMA to LAD, SVG to ramus intermedius; MV repair using 30-mm Future complete ring with magic stitch between A3 and P3; rigid internal fixation of sternum using KLS plates x2; endoscopic vein harvesting of right lower extremity     ECHO COMPL W DOP COLOR FLOW  7/25/2013         HERNIA REPAIR      SINUS SURGERY           Allergies   Allergen Reactions    Pcn [Penicillins]      Child went to hospital   ? Reaction  Patient tolerates cephalosporins    Sulfa Antibiotics      ?        Current Outpatient Medications on File Prior to Encounter   Medication Sig Dispense Refill    midodrine (PROAMATINE) 2.5 MG tablet Take 1 tablet by mouth 3 times daily (with meals) 90 tablet 1    levothyroxine (SYNTHROID) 50 MCG tablet Take 1 tablet by mouth Daily 30 tablet 1    metoprolol succinate (TOPROL XL) 25 MG extended release tablet Take 1 tablet by mouth daily 30 tablet 1    metOLazone (ZAROXOLYN) 5 MG tablet Take 1 tablet by mouth Twice a Week 8 tablet 1 amitriptyline (ELAVIL) 50 MG tablet TAKE ONE TABLET BY MOUTH NIGHTLY 30 tablet 3    bumetanide (BUMEX) 1 MG tablet TAKE ONE TABLET BY MOUTH EVERY DAY 30 tablet 3    apixaban (ELIQUIS) 5 MG TABS tablet Take 1 tablet by mouth 2 times daily 60 tablet 3    levocetirizine (XYZAL) 5 MG tablet Take 1 tablet by mouth nightly 30 tablet 5    pantoprazole (PROTONIX) 40 MG tablet Take 1 tablet by mouth daily 30 tablet 5    risperiDONE (RISPERDAL) 0.5 MG tablet Take 1 tablet by mouth 2 times daily 60 tablet 3    montelukast (SINGULAIR) 10 MG tablet Take 1 tablet by mouth nightly 30 tablet 4    insulin glargine (BASAGLAR KWIKPEN) 100 UNIT/ML injection pen Inject 45 Units into the skin 2 times daily 5 pen 3    insulin lispro, 1 Unit Dial, (HUMALOG KWIKPEN) 100 UNIT/ML SOPN Inject 0-18 Units into the skin 3 times daily (before meals) MAX 70 units daily 3 mL 3    atorvastatin (LIPITOR) 80 MG tablet TAKE ONE TABLET BY MOUTH EVERY NIGHT 90 tablet 5    [DISCONTINUED] pramipexole (MIRAPEX) 0.25 MG tablet TAKE TWO TABLETS BY MOUTH THREE TIMES A  tablet 5    CPAP Machine MISC 1 each by Does not apply route nightly as needed       aspirin 81 MG tablet Take 81 mg by mouth nightly        No current facility-administered medications on file prior to encounter. Review of Systems:   Cardiac: As per HPI  General: No fever, chills, rigors  Pulmonary: As per HPI  HEENT: No visual disturbances, difficult swallowing  GI: No nausea, vomiting, abdominal pain  : No dysuria or hematuria  Endocrine: No thyroid disease or diabetes  Musculoskeletal: COX x 4, no focal motor deficits  Skin: Intact, no rashes  Neuro/Psych: No headache or seizures          Weights:   Wt Readings from Last 3 Encounters:   11/09/22 294 lb (133.4 kg)   10/28/22 180 lb 8.9 oz (81.9 kg)   10/11/22 291 lb (132 kg)             Physical Examination:     BP: 112/74  Pulse: 94  Resp: 18    CONSTITUTIONAL: Alert and oriented times 3, no acute distress and cooperative to examination with proper mood and affect. SKIN: Skin color, texture, turgor normal. No rashes or lesions. LYMPH: no cervical nodes, no inguinal nodes  HEENT: Head is normocephalic, atraumatic. EOMI, PERRLA. NECK: Supple, symmetrical, trachea midline, no adenopathy, thyroid symmetric, not enlarged and no tenderness, skin normal.  CHEST/LUNGS: chest symmetric with normal A/P diameter, normal respiratory rate and rhythm, lungs clear to auscultation without wheezes, rales or rhonchi. No accessory muscle use. Scars None   CARDIOVASCULAR: Heart sounds are normal.  Regular rate and rhythm without murmur, gallop or rub. Normal S1 and S2. Carotid and femoral pulses 2+/4 and equal bilaterally. ABDOMEN: Normal shape. No and Laparoscopic scar(s) present. Normal bowel sounds. No bruits. soft, nondistended, no masses or organomegaly. no evidence of hernia. Percussion: Normal without hepatosplenomegally. Tenderness: absent. RECTAL: deferred, not clinically indicated  NEUROLOGIC: There are no focalizing motor or sensory deficits. CN II-XII are grossly intact. EXTREMITIES: no cyanosis, no clubbing. 2+ bilateral lower extremity edema. Warm and well perfused. All the following diagnostics were personally reviewed and interpreted by me. LAB DATA:     10/28/22 04:53 11/9/22 11:57   Sodium 136 135   Potassium 3.4 (L) 3.8   Chloride 99 97 (L)   CO2 29 29   BUN,BUNPL 29 (H) 23   Creatinine 1.3 (H) 1.1   Anion Gap 8 9   Est, Glom Filt Rate >60 >60   Magnesium 2.1    Glucose, Random 87 366 (H)   CALCIUM, SERUM, 909014 8.6 8.9   Phosphorus 3.6    Total Protein 6.4    Meter Glucose     Pro-BNP  2,042 (H)       IMAGING:    CXR (10/24/2022)  FINDINGS:  Mild bibasilar patchy opacities. The heart is mildly enlarged. No  pneumothorax. There is blunting of the posterior costophrenic angle. There  are median sternotomy wires. Impression  Mild bibasilar opacities and pleural effusion suspicious for early CHF.   Superimposed pneumonia cannot be excluded. CARDIAC TESTING:    NM Stress (4/24/2021)  FINDINGS: The overall quality of the study was fair. Left ventricular cavity size was noted to be normal.  Rotational analog analysis demonstrated generalized soft tissue  attenuation noted. The gated SPECT stress imaging in the short, vertical long, and  horizontal long axis demonstrated normal homogeneous tracer  distribution throughout the myocardium. The resting images are normal   Gated SPECT left ventricular ejection fraction was calculated to be  65%, with normal wall motion. Impression: The myocardial perfusion imaging was normal with attenuation. Overall left ventricular systolic function was normal without regional  wall motion abnormalities. Overall low risk study. TTE (9/29/2022)   Summary   Left ventricular size is grossly normal.   Mild left ventricular concentric hypertrophy noted. Ejection fraction is visually estimated at 55%. No evidence of left ventricular mass or thrombus noted. No regional wall motion abnormalities seen. Normal sized left atrium. Borderline dilated right ventricle. Physiologic and/or trace mitral regurgitation is present. No evidence of mitral valve stenosis. Aortic valve replacement   No evidence of aortic valve regurgitation. Mild-to-moderate aortic stenosis. The aortic valve area is 1.2 cm2 with a maximum gradient of 40.35 mmHg and   a mean gradient of 23.37 mmHg. Physiologic and/or trace tricuspid regurgitation. Regular rhythm. Telemetry  Atrial fibrillation       ASSESSMENT:  Chronic HFpEF  ACC stage C / NYHA class III  Near euvolemic  PAF - currently in atrial fibrillation, rate controlled on BB. Amiodarone d/c due to thyroid.  [address] on [facility]    CAD s/p CABG (9/2013) RENEE-LAD, SVG-RI -  last ischemic evaluation (4/2021) nonischemic   VHD s/p MVrp   Hypotension - on midodrine   COPD  T2DM  HLD  Hypothyroidism   Obesity  LISS - noncompliant  Iron deficiency Deconditioning       PLAN:  Start Jardiance 10 mg daily      2. Continue rest of current cardiac medications      3. Please wear your CPAP nightly, once fixed     4. Return to CHF clinic in 1 week as scheduled     5. Follow up with Dr. Edwin Echeverria in 3 months - sooner if needed     6. Low sodium diet (2 grams)     7. Stay hydrated, 64 oz     8. Weight yourself daily      9. If your weight goes up by 3-5 lbs within 2-3 days or you have increased shortness of breath, swelling you can take 1 extra dose of your water pill (BUMEX/ Bumetanide). If this extra pill does not help your symptoms then please call the CHF clinic at [phone].          Yasmin BACON-CNP  [facility]

## 2022-11-16 NOTE — PROGRESS NOTES
Congestive Heart Failure [facility]   1955      Referring Provider: Dr. Analia Rivers   Primary Care Physician: Dr. Max Wood  Cardiologist: Dr. Analia Rivers   Nephrologist: [facility]     History of Present Illness:     Pascale Delarosa is a 79 y.o. male with a history of HFpEF, most recent EF 55% 9/24/2022. Patient Story:  He does  have dyspnea with exertion, shortness of breath, or decline in overall functional capacity. He does not have orthopnea, PND, nocturnal cough or hemoptysis. He does have abdominal distention or bloating, early satiety, anorexia/change in appetite. He does has a good urinary response to  oral diuretic. He does have  lower extremity edema. He denies lightheadedness, dizziness. He denies palpitations, syncope or near syncope. He does not complain of chest pain, pressure, discomfort. C/O intermittent RUQ sharp pain when he was getting dressed that did not last. Gynecomastia noted on assessment, pt states that this is not new. Allergies   Allergen Reactions    Pcn [Penicillins]      Child went to hospital   ? Reaction  Patient tolerates cephalosporins    Sulfa Antibiotics      ? No outpatient medications have been marked as taking for the 11/16/22 encounter [facility] Encounter) with [facility] CHF ROOM 1.      Current Outpatient Medications on File Prior to Encounter   Medication Sig Dispense Refill    midodrine (PROAMATINE) 2.5 MG tablet Take 1 tablet by mouth 3 times daily (with meals) 90 tablet 1    levothyroxine (SYNTHROID) 50 MCG tablet Take 1 tablet by mouth Daily 30 tablet 1    metoprolol succinate (TOPROL XL) 25 MG extended release tablet Take 1 tablet by mouth daily 30 tablet 1    amitriptyline (ELAVIL) 50 MG tablet TAKE ONE TABLET BY MOUTH NIGHTLY 30 tablet 3    bumetanide (BUMEX) 1 MG tablet TAKE ONE TABLET BY MOUTH EVERY DAY 30 tablet 3    levocetirizine (XYZAL) 5 MG tablet Take 1 tablet by mouth nightly 30 tablet 5    pantoprazole (PROTONIX) 40 MG tablet Take 1 tablet by mouth daily 30 tablet 5    risperiDONE (RISPERDAL) 0.5 MG tablet Take 1 tablet by mouth 2 times daily 60 tablet 3    montelukast (SINGULAIR) 10 MG tablet Take 1 tablet by mouth nightly 30 tablet 4    insulin glargine (BASAGLAR KWIKPEN) 100 UNIT/ML injection pen Inject 45 Units into the skin 2 times daily 5 pen 3    insulin lispro, 1 Unit Dial, (HUMALOG KWIKPEN) 100 UNIT/ML SOPN Inject 0-18 Units into the skin 3 times daily (before meals) MAX 70 units daily 3 mL 3    atorvastatin (LIPITOR) 80 MG tablet TAKE ONE TABLET BY MOUTH EVERY NIGHT 90 tablet 5    [DISCONTINUED] pramipexole (MIRAPEX) 0.25 MG tablet TAKE TWO TABLETS BY MOUTH THREE TIMES A  tablet 5    CPAP Machine MISC 1 each by Does not apply route nightly as needed       aspirin 81 MG tablet Take 81 mg by mouth nightly        No current facility-administered medications on file prior to encounter.        Guideline directed medical:  ARNI/ACE I/ARB: No  Beta blocker:  Yes metoprolol succinate 25mg daily  Aldosterone antagonist:  No  SGLT2i:  No    Physical Examination:     /74 Comment: 100/palp manual  Pulse 94   Resp 18   Wt 286 lb (129.7 kg)   SpO2 93%   BMI 46.16 kg/m²     Assessment  Charting Type: Shift assessment (CHF Clinic)    Neurological  Level of Consciousness: Alert (0)  Orientation Level: Oriented X4  Cognition: Follows commands  Speech: Clear  L Hand : Strong         HEENT (Head, Ears, Eyes, Nose, & Throat)  HEENT (WDL): Within Defined Limits    Respiratory  Respiratory Pattern: Regular  Respiratory Depth: Shallow  Respiratory Quality/Effort: Unlabored, Snoring, Sonorous, Dyspnea with exertion  Chest Assessment: Chest expansion symmetrical, Trachea midline  L Breath Sounds: Clear  R Breath Sounds: Clear              Cardiac  Cardiac Regularity: Irregular  Heart Sounds: No adventitious heart sounds  Cardiac Rhythm: Atrial fib    Rhythm Interpretation  Heart Rate: 94         Gastrointestinal  Abdominal (WDL): Within Defined Limits  Abdomen Inspection: Soft, Rounded  RUQ Bowel Sounds: Active  LUQ Bowel Sounds: Active  RLQ Bowel Sounds: Active  LLQ Bowel Sounds: Active  Tenderness: Soft, No guarding, Nontender, No rebound          Bowel Sounds  RUQ Bowel Sounds: Active  LUQ Bowel Sounds: Active  RLQ Bowel Sounds: Active  LLQ Bowel Sounds:  Active    Peripheral Vascular  Peripheral Vascular (WDL): Exceptions to WDL  Edema: Right lower extremity, Left lower extremity  RLE Edema: Pitting, +3 (3+ to mid calf/ 2+below knee)  LLE Edema: Pitting, +2 (2+ ankle / trace below knee)              Musculoskeletal  RL Extremity: Swelling  LL Extremity: Swelling    Genitourinary  Genitourinary (WDL): Within Defined Limits  Suprapubic Tenderness: No  Dysuria (Pain/Burning w/Urination): No    Psychosocial  Psychosocial (WDL): Within Defined Limits  Family Behaviors: Calm, Cooperative                        Heart Rate: 94                     LAB DATA:    Last 3 BMP      Sodium (mmol/L)   Date Value   11/16/2022 133   11/09/2022 135   10/28/2022 136     Potassium (mmol/L)   Date Value   11/16/2022 3.7   11/09/2022 3.8   10/28/2022 3.4 (L)     Potassium reflex Magnesium (mmol/L)   Date Value   10/24/2022 5.8 (H)   09/15/2022 3.1 (L)   10/28/2021 2.8 (L)     Chloride (mmol/L)   Date Value   11/16/2022 95 (L)   11/09/2022 97 (L)   10/28/2022 99     CO2 (mmol/L)   Date Value   11/16/2022 28   11/09/2022 29   10/28/2022 29     BUN (mg/dL)   Date Value   11/16/2022 19   11/09/2022 23   10/28/2022 29 (H)     Glucose (mg/dL)   Date Value   11/16/2022 383 (H)   11/09/2022 366 (H)   10/28/2022 87   10/27/2011 118 (H)   06/09/2011 96     Calcium (mg/dL)   Date Value   11/16/2022 9.2   11/09/2022 8.9   10/28/2022 8.6       Last 3 BNP       Pro-BNP (pg/mL)   Date Value   11/16/2022 1,800 (H)   11/09/2022 2,042 (H)   10/24/2022 2,248 (H)          CBC: No results for input(s): WBC, HGB, PLT in the last 72 hours. BMP:    Recent Labs     11/16/22  1145      K 3.7   CL 95*   CO2 28   BUN 19   CREATININE 1.2   GLUCOSE 383*       Hepatic: No results for input(s): AST, ALT, ALB, BILITOT, ALKPHOS in the last 72 hours. Troponin: No results for input(s): TROPONINI in the last 72 hours. BNP: No results for input(s): BNP in the last 72 hours. Lipids: No results for input(s): CHOL, HDL in the last 72 hours. Invalid input(s): LDLCALCU  INR: No results for input(s): INR in the last 72 hours. WEIGHTS:    Wt Readings from Last 3 Encounters:   11/16/22 286 lb (129.7 kg)   11/09/22 294 lb (133.4 kg)   10/28/22 180 lb 8.9 oz (81.9 kg)         TELEMETRY:  Cardiac Regularity: Irregular  Cardiac Rhythm/Interpretation: afib  Hx; AFIB    ASSESSMENT:  Rafi Cameron Second visit with CHF clinic today. Weight is decreased by 8 pounds from last visit on 11/9/22. Discussed with patient the importance of daily weights and doing a self check every day to monitor for changes. Pt states that he has not been weighing himself daily at home due to vision problems. States that he is unable to see the scale. Upon assessment bilateral lungs are clear, abdomen is soft, +3 pitting edema Right ankle up to mid shin 2+ and 2+ pitting in Left ankle to mis shin/ trace below knee. Admits to eating Pizza and hot dogs for dinner last week. Reinforced low sodium diet and options. Patient is interested in attending cardiac rehab. Jayy BHARDWAJ is to see patient today while he is in the clinic and will discuss with patient. Reinforced the three heart failure zones and to call the HF clinic or cardiologist if in yellow zone immediately to prevent any further decline. F/U with clinic in 1 week.      Interventions completed this visit:  IV diuretics given NO  Lab work obtained yes, pro-bnp and bmp   Reviewed currently prescribed medications with patient, educated on importance of compliance and answered any questions regarding their medication  Educated on signs and symptoms of HF  Educated on low sodium diet    PLAN:  Scheduled to follow up in CHF clinic on   Future Appointments   Date Time Provider Valencia Cross   11/23/2022  9:00 AM [facility] CHF ROOM 1 [facility]  [facility]   12/13/2022  8:40 AM Sayda Howe DO [facility] SLEEP [facility]   1/4/2023  2:30 PM Renu     Given clinic phone number and aware of signs and symptoms to call with any HF change in symptoms.

## 2022-11-16 NOTE — PLAN OF CARE
Came to chf clinic for rn chf visit. Was added to MAXI Sanabria CNP schedule today ( she seen patient )       I called patient s home after discharged from [facility] CNP visit . [facility] RN is there Christiano Mccracken  did med rec with me on the phone. He had transferred all meds to [facility] for mail delivery and pill packing. Patient cannot read the labels, wife had tried to help manage pill box fills but not successful. Therefore he is getting set up with [facility]. Per MAXI Sanabria CNP escribe the newly prescribed Jardiance from today's visit to [facility]. Also reviewed the metolazone. NO metolazone is at [facility]. Escribe 2.5mg twice a week to [facility].

## 2022-11-22 ENCOUNTER — TELEPHONE (OUTPATIENT)
Dept: OTHER | Age: 67
End: 2022-11-22

## 2022-11-22 NOTE — TELEPHONE ENCOUNTER
Patient requesting appt on 11/23 be rescheduled due to car being in the shop. Patient also states he got a bill from CHF clinic for 90.00 and patient cannot afford coming so frequently.     Patient rescheduled for:    Future Appointments   Date Time Provider Valencia Cross   11/23/2022  9:00 AM [facility] CHF ROOM 1 [facility]   12/2/2022  1:00  [facility] [address]   12/9/2022 10:30 AM [facility] CHF ROOM 1 [facility]   12/13/2022  8:40 AM Sayda Shea [place] [facility] SLEEP [facility]   1/4/2023  2:30 PM DO [facility]

## 2022-11-23 ENCOUNTER — HOSPITAL ENCOUNTER (OUTPATIENT)
Dept: OTHER | Age: 67
Discharge: HOME | End: 2022-11-23

## 2022-11-25 DIAGNOSIS — J30.1 SEASONAL ALLERGIC RHINITIS DUE TO POLLEN: ICD-10-CM

## 2022-11-29 RX ORDER — MONTELUKAST SODIUM 10 MG/1
TABLET ORAL
Qty: 30 TABLET | Refills: 0 | Status: SHIPPED | OUTPATIENT
Start: 2022-11-29

## 2022-12-02 ENCOUNTER — HOSPITAL ENCOUNTER (OUTPATIENT)
Dept: CARDIAC REHAB | Age: 67
Discharge: HOME | End: 2022-12-02

## 2022-12-02 VITALS
HEIGHT: 66 IN | WEIGHT: 281.3 LBS | RESPIRATION RATE: 14 BRPM | HEART RATE: 77 BPM | BODY MASS INDEX: 45.21 KG/M2 | SYSTOLIC BLOOD PRESSURE: 134 MMHG | OXYGEN SATURATION: 96 % | DIASTOLIC BLOOD PRESSURE: 72 MMHG

## 2022-12-02 ASSESSMENT — PATIENT HEALTH QUESTIONNAIRE - PHQ9
SUM OF ALL RESPONSES TO PHQ QUESTIONS 1-9: 12
4. FEELING TIRED OR HAVING LITTLE ENERGY: 2
3. TROUBLE FALLING OR STAYING ASLEEP: 3
SUM OF ALL RESPONSES TO PHQ QUESTIONS 1-9: 12
2. FEELING DOWN, DEPRESSED OR HOPELESS: 3
10. IF YOU CHECKED OFF ANY PROBLEMS, HOW DIFFICULT HAVE THESE PROBLEMS MADE IT FOR YOU TO DO YOUR WORK, TAKE CARE OF THINGS AT HOME, OR GET ALONG WITH OTHER PEOPLE: 1
6. FEELING BAD ABOUT YOURSELF - OR THAT YOU ARE A FAILURE OR HAVE LET YOURSELF OR YOUR FAMILY DOWN: 0
SUM OF ALL RESPONSES TO PHQ QUESTIONS 1-9: 12
SUM OF ALL RESPONSES TO PHQ QUESTIONS 1-9: 12
7. TROUBLE CONCENTRATING ON THINGS, SUCH AS READING THE NEWSPAPER OR WATCHING TELEVISION: 2
8. MOVING OR SPEAKING SO SLOWLY THAT OTHER PEOPLE COULD HAVE NOTICED. OR THE OPPOSITE, BEING SO FIGETY OR RESTLESS THAT YOU HAVE BEEN MOVING AROUND A LOT MORE THAN USUAL: 1
9. THOUGHTS THAT YOU WOULD BE BETTER OFF DEAD, OR OF HURTING YOURSELF: 0
5. POOR APPETITE OR OVEREATING: 0
SUM OF ALL RESPONSES TO PHQ9 QUESTIONS 1 & 2: 4
1. LITTLE INTEREST OR PLEASURE IN DOING THINGS: 1

## 2022-12-02 ASSESSMENT — EJECTION FRACTION: EF_VALUE: 55

## 2022-12-16 ENCOUNTER — HOSPITAL ENCOUNTER (OUTPATIENT)
Dept: OTHER | Age: 67
Setting detail: THERAPIES SERIES
Discharge: HOME | End: 2022-12-16
Payer: MEDICARE

## 2022-12-16 VITALS
DIASTOLIC BLOOD PRESSURE: 72 MMHG | HEART RATE: 97 BPM | WEIGHT: 276 LBS | BODY MASS INDEX: 44.55 KG/M2 | SYSTOLIC BLOOD PRESSURE: 100 MMHG | OXYGEN SATURATION: 97 % | RESPIRATION RATE: 18 BRPM

## 2022-12-16 LAB
ANION GAP SERPL CALCULATED.3IONS-SCNC: 10 MMOL/L (ref 7–16)
BUN BLDV-MCNC: 18 MG/DL (ref 6–23)
CALCIUM SERPL-MCNC: 9.1 MG/DL (ref 8.6–10.2)
CHLORIDE BLD-SCNC: 98 MMOL/L (ref 98–107)
CO2: 30 MMOL/L (ref 22–29)
CREAT SERPL-MCNC: 1.1 MG/DL (ref 0.7–1.2)
GFR SERPL CREATININE-BSD FRML MDRD: >60 ML/MIN/1.73
GLUCOSE BLD-MCNC: 183 MG/DL (ref 74–99)
POTASSIUM SERPL-SCNC: 3.8 MMOL/L (ref 3.5–5)
PRO-BNP: 1389 PG/ML (ref 0–125)
SODIUM BLD-SCNC: 138 MMOL/L (ref 132–146)

## 2022-12-16 PROCEDURE — 36415 COLL VENOUS BLD VENIPUNCTURE: CPT

## 2022-12-16 PROCEDURE — 80048 BASIC METABOLIC PNL TOTAL CA: CPT

## 2022-12-16 PROCEDURE — 83880 ASSAY OF NATRIURETIC PEPTIDE: CPT

## 2022-12-16 PROCEDURE — 99214 OFFICE O/P EST MOD 30 MIN: CPT

## 2022-12-16 RX ORDER — AMIODARONE HYDROCHLORIDE 200 MG/1
200 TABLET ORAL DAILY
COMMUNITY

## 2022-12-16 NOTE — PROGRESS NOTES
Congestive Heart Failure [address] [facility]   1955      Referring Provider: Dr. Edwin Echeverria   Primary Care Physician: Dr. Duke Mirza  Cardiologist: Dr. Edwin Echeverria   Nephrologist: [facility]     History of Present Illness:     Pablo Hightower is a 79 y.o. male with a history of HFpEF, most recent EF 55% 9/24/2022. Patient Story:  He does  have dyspnea with exertion, shortness of breath, or decline in overall functional capacity. He does not have orthopnea, PND, nocturnal cough or hemoptysis. He does have abdominal distention or bloating, early satiety, anorexia/change in appetite. He does has a good urinary response to  oral diuretic. He does have lower extremity edema. He denies lightheadedness, dizziness. He denies palpitations, syncope or near syncope. He does not complain of chest pain, pressure, discomfort. Allergies   Allergen Reactions    Pcn [Penicillins]      Child went to hospital   ? Reaction  Patient tolerates cephalosporins    Sulfa Antibiotics      ? No outpatient medications have been marked as taking for the 12/16/22 encounter [facility] Encounter) with [facility] CHF ROOM 1. Current Outpatient Medications on File Prior to Encounter   Medication Sig Dispense Refill    montelukast (SINGULAIR) 10 MG tablet TAKE ONE TABLET BY MOUTH NIGHTLY 30 tablet 0    apixaban (ELIQUIS) 5 MG TABS tablet Take 5 mg by mouth 2 times daily (Dr Justin Oconnor)      empagliflozin (JARDIANCE) 10 MG tablet Take 1 tablet by mouth daily 30 tablet 5    metOLazone (ZAROXOLYN) 2.5 MG tablet Take metolazone twice a week. (Monday and Thursday). Pill pack this med.  (Stop all other metolazone scripts) 8 tablet 3    midodrine (PROAMATINE) 2.5 MG tablet Take 1 tablet by mouth 3 times daily (with meals) 90 tablet 1    levothyroxine (SYNTHROID) 50 MCG tablet Take 1 tablet by mouth Daily 30 tablet 1    metoprolol succinate (TOPROL XL) 25 MG extended release tablet Take 1 tablet by mouth daily 30 tablet 1    amitriptyline (ELAVIL) 50 MG tablet TAKE ONE TABLET BY MOUTH NIGHTLY 30 tablet 3    bumetanide (BUMEX) 1 MG tablet TAKE ONE TABLET BY MOUTH EVERY DAY 30 tablet 3    levocetirizine (XYZAL) 5 MG tablet Take 1 tablet by mouth nightly 30 tablet 5    pantoprazole (PROTONIX) 40 MG tablet Take 1 tablet by mouth daily 30 tablet 5    risperiDONE (RISPERDAL) 0.5 MG tablet Take 1 tablet by mouth 2 times daily 60 tablet 3    insulin glargine (BASAGLAR KWIKPEN) 100 UNIT/ML injection pen Inject 45 Units into the skin 2 times daily 5 pen 3    insulin lispro, 1 Unit Dial, (HUMALOG KWIKPEN) 100 UNIT/ML SOPN Inject 0-18 Units into the skin 3 times daily (before meals) MAX 70 units daily 3 mL 3    atorvastatin (LIPITOR) 80 MG tablet TAKE ONE TABLET BY MOUTH EVERY NIGHT 90 tablet 5    [DISCONTINUED] pramipexole (MIRAPEX) 0.25 MG tablet TAKE TWO TABLETS BY MOUTH THREE TIMES A  tablet 5    CPAP Machine MISC 1 each by Does not apply route nightly as needed       aspirin 81 MG tablet Take 81 mg by mouth nightly        No current facility-administered medications on file prior to encounter.        Guideline directed medical:  ARNI/ACE I/ARB: No  Beta blocker:  Yes metoprolol succinate 25mg daily  Aldosterone antagonist:  No  SGLT2i:  No    Physical Examination:     /72   Pulse 97   Resp 18   Wt 276 lb (125.2 kg)   SpO2 97%   BMI 44.55 kg/m²     Assessment  Charting Type: Shift assessment (CHF Clinic)    Neurological  Level of Consciousness: Alert (0)  Orientation Level: Oriented X4  Cognition: Follows commands  Speech: Clear         HEENT (Head, Ears, Eyes, Nose, & Throat)  HEENT (WDL): Within Defined Limits    Respiratory  Respiratory Pattern: Regular  Respiratory Depth: Shallow  Respiratory Quality/Effort: Unlabored, Snoring, Sonorous, Dyspnea with exertion  Chest Assessment: Chest expansion symmetrical, Trachea midline  L Breath Sounds: Clear, Diminished  R Breath Sounds: Clear, Diminished              Cardiac  Cardiac Regularity: Irregular  Heart Sounds: No adventitious heart sounds  Cardiac Rhythm: Atrial fib    Rhythm Interpretation  Heart Rate: 97      Gastrointestinal  Abdominal (WDL): Within Defined Limits  Abdomen Inspection: Soft, Rounded  RUQ Bowel Sounds: Active  LUQ Bowel Sounds: Active  RLQ Bowel Sounds: Active  LLQ Bowel Sounds: Active  Tenderness: Soft, No guarding, Nontender, No rebound     Bowel Sounds  RUQ Bowel Sounds: Active  LUQ Bowel Sounds: Active  RLQ Bowel Sounds: Active  LLQ Bowel Sounds:  Active    Peripheral Vascular  Peripheral Vascular (WDL): Exceptions to WDL  Edema: Right lower extremity, Left lower extremity  RLE Edema: Trace, Pitting (3+ to mid calf/ 2+below knee)  LLE Edema: Pitting, +1 (2+ ankle / trace below knee)    Musculoskeletal  RL Extremity: Swelling  LL Extremity: Swelling    Genitourinary  Genitourinary (WDL): Within Defined Limits  Suprapubic Tenderness: No  Dysuria (Pain/Burning w/Urination): No    Psychosocial  Psychosocial (WDL): Within Defined Limits  Family Behaviors: Calm, Cooperative    Heart Rate: 97      LAB DATA:    Last 3 BMP      Sodium (mmol/L)   Date Value   12/16/2022 138   11/16/2022 133   11/09/2022 135     Potassium (mmol/L)   Date Value   12/16/2022 3.8   11/16/2022 3.7   11/09/2022 3.8     Potassium reflex Magnesium (mmol/L)   Date Value   10/24/2022 5.8 (H)   09/15/2022 3.1 (L)   10/28/2021 2.8 (L)     Chloride (mmol/L)   Date Value   12/16/2022 98   11/16/2022 95 (L)   11/09/2022 97 (L)     CO2 (mmol/L)   Date Value   12/16/2022 30 (H)   11/16/2022 28   11/09/2022 29     BUN (mg/dL)   Date Value   12/16/2022 18   11/16/2022 19   11/09/2022 23     Glucose (mg/dL)   Date Value   12/16/2022 183 (H)   11/16/2022 383 (H)   11/09/2022 366 (H)   10/27/2011 118 (H)   06/09/2011 96     Calcium (mg/dL)   Date Value   12/16/2022 9.1   11/16/2022 9.2   11/09/2022 8.9       Last 3 BNP       Pro-BNP (pg/mL)   Date Value 12/16/2022 1,389 (H)   11/16/2022 1,800 (H)   11/09/2022 2,042 (H)        CBC: No results for input(s): WBC, HGB, PLT in the last 72 hours. BMP:    Recent Labs     12/16/22  1020      K 3.8   CL 98   CO2 30*   BUN 18   CREATININE 1.1   GLUCOSE 183*         Hepatic: No results for input(s): AST, ALT, ALB, BILITOT, ALKPHOS in the last 72 hours. Troponin: No results for input(s): TROPONINI in the last 72 hours. BNP: No results for input(s): BNP in the last 72 hours. Lipids: No results for input(s): CHOL, HDL in the last 72 hours. Invalid input(s): LDLCALCU  INR: No results for input(s): INR in the last 72 hours. WEIGHTS:    Wt Readings from Last 3 Encounters:   12/16/22 276 lb (125.2 kg)   12/02/22 281 lb 4.8 oz (127.6 kg)   11/16/22 286 lb (129.7 kg)         TELEMETRY:  Cardiac Regularity: Irregular  Cardiac Rhythm/Interpretation: afib  Hx; AFIB    ASSESSMENT:  Oralia Fox Weight is decreased by 10 pounds from last visit on 11/16 /22. Denies any SOB, orthopnea or dizziness. Pt feels good and in enrolled   Cardiac Rehab three daily a week. Upon assessment bilateral lungs are clear, abdomen soft, +1 pitting edema LLE and  trace pitting edema on RLE which is improvement since the last CHF clinic appointment. F/U with clinic in 1 month. Interventions completed this visit:  IV diuretics given NO  Lab work obtained yes, pro-bnp and bmp   Reviewed currently prescribed medications with patient, educated on importance of compliance and answered any questions regarding their medication  Educated on signs and symptoms of HF  Educated on low sodium diet    PLAN:  Scheduled to follow up in CHF clinic on   Future Appointments   Date Time Provider Valencia Cross   1/4/2023  2:30 PM DO Angel [facility]field [facility]   1/16/2023 11:00 AM [facility] CHF ROOM [address]     Given clinic phone number and aware of signs and symptoms to call with any HF change in symptoms.

## 2022-12-18 DIAGNOSIS — J30.1 SEASONAL ALLERGIC RHINITIS DUE TO POLLEN: ICD-10-CM

## 2022-12-20 RX ORDER — MONTELUKAST SODIUM 10 MG/1
TABLET ORAL
Qty: 30 TABLET | Refills: 3 | Status: SHIPPED | OUTPATIENT
Start: 2022-12-20

## 2022-12-20 RX ORDER — APIXABAN 5 MG/1
TABLET, FILM COATED ORAL
Qty: 60 TABLET | Refills: 10 | OUTPATIENT
Start: 2022-12-20

## 2022-12-28 ENCOUNTER — HOSPITAL ENCOUNTER (OUTPATIENT)
Dept: CARDIAC REHAB | Age: 67
Discharge: HOME | End: 2022-12-28

## 2022-12-28 PROCEDURE — 9900000038 HC CARDIAC REHAB PHASE 3 - MONTHLY

## 2023-01-04 ENCOUNTER — OFFICE VISIT (OUTPATIENT)
Dept: FAMILY MEDICINE CLINIC | Age: 68
End: 2023-01-04
Payer: MEDICARE

## 2023-01-04 VITALS
RESPIRATION RATE: 18 BRPM | HEART RATE: 100 BPM | DIASTOLIC BLOOD PRESSURE: 80 MMHG | HEIGHT: 66 IN | OXYGEN SATURATION: 97 % | WEIGHT: 270 LBS | SYSTOLIC BLOOD PRESSURE: 122 MMHG | BODY MASS INDEX: 43.39 KG/M2

## 2023-01-04 DIAGNOSIS — E11.21 TYPE 2 DIABETES MELLITUS WITH DIABETIC NEPHROPATHY, WITH LONG-TERM CURRENT USE OF INSULIN (HCC): ICD-10-CM

## 2023-01-04 DIAGNOSIS — R53.82 CHRONIC FATIGUE: ICD-10-CM

## 2023-01-04 DIAGNOSIS — E03.9 ACQUIRED HYPOTHYROIDISM: ICD-10-CM

## 2023-01-04 DIAGNOSIS — I50.9 ACUTE ON CHRONIC CONGESTIVE HEART FAILURE, UNSPECIFIED HEART FAILURE TYPE (HCC): ICD-10-CM

## 2023-01-04 DIAGNOSIS — E78.2 MIXED HYPERLIPIDEMIA: ICD-10-CM

## 2023-01-04 DIAGNOSIS — E61.1 IRON DEFICIENCY: ICD-10-CM

## 2023-01-04 DIAGNOSIS — R29.898 SEVERE MUSCLE DECONDITIONING: ICD-10-CM

## 2023-01-04 DIAGNOSIS — E66.01 MORBID OBESITY WITH BMI OF 50.0-59.9, ADULT (HCC): Primary | ICD-10-CM

## 2023-01-04 DIAGNOSIS — E11.65 TYPE 2 DIABETES MELLITUS WITH HYPERGLYCEMIA, WITH LONG-TERM CURRENT USE OF INSULIN (HCC): ICD-10-CM

## 2023-01-04 DIAGNOSIS — K21.9 GASTROESOPHAGEAL REFLUX DISEASE WITHOUT ESOPHAGITIS: ICD-10-CM

## 2023-01-04 DIAGNOSIS — E11.42 TYPE 2 DIABETES MELLITUS WITH DIABETIC POLYNEUROPATHY, WITH LONG-TERM CURRENT USE OF INSULIN (HCC): ICD-10-CM

## 2023-01-04 DIAGNOSIS — I50.31 ACUTE DIASTOLIC HEART FAILURE (HCC): ICD-10-CM

## 2023-01-04 DIAGNOSIS — E53.8 VITAMIN B 12 DEFICIENCY: ICD-10-CM

## 2023-01-04 DIAGNOSIS — Z79.4 TYPE 2 DIABETES MELLITUS WITH DIABETIC POLYNEUROPATHY, WITH LONG-TERM CURRENT USE OF INSULIN (HCC): ICD-10-CM

## 2023-01-04 DIAGNOSIS — Z79.4 TYPE 2 DIABETES MELLITUS WITH HYPERGLYCEMIA, WITH LONG-TERM CURRENT USE OF INSULIN (HCC): ICD-10-CM

## 2023-01-04 DIAGNOSIS — Z79.4 TYPE 2 DIABETES MELLITUS WITH DIABETIC NEPHROPATHY, WITH LONG-TERM CURRENT USE OF INSULIN (HCC): ICD-10-CM

## 2023-01-04 DIAGNOSIS — R53.1 GENERALIZED WEAKNESS: ICD-10-CM

## 2023-01-04 DIAGNOSIS — Z79.899 NEW MEDICATION ADDED: ICD-10-CM

## 2023-01-04 LAB
BASOPHILS ABSOLUTE: 0.05 E9/L (ref 0–0.2)
BASOPHILS RELATIVE PERCENT: 0.7 % (ref 0–2)
EOSINOPHILS ABSOLUTE: 0.07 E9/L (ref 0.05–0.5)
EOSINOPHILS RELATIVE PERCENT: 0.9 % (ref 0–6)
FOLATE: 9 NG/ML (ref 4.8–24.2)
HBA1C MFR BLD: 11.5 % (ref 4–5.6)
HCT VFR BLD CALC: 44.6 % (ref 37–54)
HEMOGLOBIN: 15.1 G/DL (ref 12.5–16.5)
IMMATURE GRANULOCYTES #: 0.05 E9/L
IMMATURE GRANULOCYTES %: 0.7 % (ref 0–5)
LYMPHOCYTES ABSOLUTE: 1.41 E9/L (ref 1.5–4)
LYMPHOCYTES RELATIVE PERCENT: 18.4 % (ref 20–42)
MCH RBC QN AUTO: 31.8 PG (ref 26–35)
MCHC RBC AUTO-ENTMCNC: 33.9 % (ref 32–34.5)
MCV RBC AUTO: 93.9 FL (ref 80–99.9)
MONOCYTES ABSOLUTE: 0.59 E9/L (ref 0.1–0.95)
MONOCYTES RELATIVE PERCENT: 7.7 % (ref 2–12)
NEUTROPHILS ABSOLUTE: 5.49 E9/L (ref 1.8–7.3)
NEUTROPHILS RELATIVE PERCENT: 71.6 % (ref 43–80)
PDW BLD-RTO: 14.5 FL (ref 11.5–15)
PLATELET # BLD: 205 E9/L (ref 130–450)
PMV BLD AUTO: 11 FL (ref 7–12)
RBC # BLD: 4.75 E12/L (ref 3.8–5.8)
VITAMIN B-12: 903 PG/ML (ref 211–946)
WBC # BLD: 7.7 E9/L (ref 4.5–11.5)

## 2023-01-04 PROCEDURE — 3074F SYST BP LT 130 MM HG: CPT | Performed by: FAMILY MEDICINE

## 2023-01-04 PROCEDURE — 3046F HEMOGLOBIN A1C LEVEL >9.0%: CPT | Performed by: FAMILY MEDICINE

## 2023-01-04 PROCEDURE — 3078F DIAST BP <80 MM HG: CPT | Performed by: FAMILY MEDICINE

## 2023-01-04 PROCEDURE — 99214 OFFICE O/P EST MOD 30 MIN: CPT | Performed by: FAMILY MEDICINE

## 2023-01-04 PROCEDURE — 1123F ACP DISCUSS/DSCN MKR DOCD: CPT | Performed by: FAMILY MEDICINE

## 2023-01-04 RX ORDER — MIDODRINE HYDROCHLORIDE 2.5 MG/1
TABLET ORAL
Qty: 90 TABLET | Refills: 10 | OUTPATIENT
Start: 2023-01-04

## 2023-01-04 RX ORDER — METOPROLOL SUCCINATE 25 MG/1
TABLET, EXTENDED RELEASE ORAL
Qty: 30 TABLET | Refills: 10 | OUTPATIENT
Start: 2023-01-04

## 2023-01-04 RX ORDER — LEVOTHYROXINE SODIUM 0.05 MG/1
TABLET ORAL
Qty: 30 TABLET | Refills: 10 | OUTPATIENT
Start: 2023-01-04

## 2023-01-04 ASSESSMENT — PATIENT HEALTH QUESTIONNAIRE - PHQ9
SUM OF ALL RESPONSES TO PHQ9 QUESTIONS 1 & 2: 0
SUM OF ALL RESPONSES TO PHQ QUESTIONS 1-9: 0
SUM OF ALL RESPONSES TO PHQ QUESTIONS 1-9: 0
1. LITTLE INTEREST OR PLEASURE IN DOING THINGS: 0
SUM OF ALL RESPONSES TO PHQ QUESTIONS 1-9: 0
2. FEELING DOWN, DEPRESSED OR HOPELESS: 0
SUM OF ALL RESPONSES TO PHQ QUESTIONS 1-9: 0

## 2023-01-04 NOTE — PROGRESS NOTES
Annie Teixeira is a 79 y.o. male  . Subjective:      Very fatigued. Following with Jeremiah and CHF clinic. We are due to recheck his regular blood work . we are due to recheck his regular blood work. Patient has upcoming appointment with nephrology. Patient continues to try to lose weight and is actually down 6 pounds since last visit. She is severely decongestion and we will add home physical therapy since it is very difficult for him to get out and needs to continue with congestive heart clinic. We will repeat BNP today along with regular blood work. We will set up with chest x-ray to evaluate for worsening of CHF. Review of Systems   Constitutional:  Positive for fatigue. Negative for activity change, appetite change, chills, diaphoresis, fever and unexpected weight change. HENT:  Negative for congestion, dental problem, drooling, ear discharge, ear pain, facial swelling, hearing loss, mouth sores, nosebleeds, postnasal drip, rhinorrhea, sinus pressure, sneezing, sore throat, tinnitus, trouble swallowing and voice change. Eyes:  Negative for photophobia, pain, discharge, redness, itching and visual disturbance. Respiratory:  Negative for apnea, cough, choking, chest tightness, shortness of breath, wheezing and stridor. Cardiovascular:  Negative for chest pain, palpitations and leg swelling. Gastrointestinal:  Negative for abdominal distention, abdominal pain, anal bleeding, blood in stool, constipation, diarrhea, nausea, rectal pain and vomiting. Endocrine: Negative for cold intolerance, heat intolerance, polydipsia, polyphagia and polyuria. Genitourinary:  Negative for decreased urine volume, difficulty urinating, dysuria, enuresis, flank pain, frequency, genital sores, hematuria, penile discharge, penile pain, penile swelling, scrotal swelling, testicular pain and urgency. Musculoskeletal:  Positive for arthralgias, gait problem and myalgias.  Negative for back pain, joint swelling, neck pain and neck stiffness. Skin:  Negative for color change, pallor, rash and wound. Allergic/Immunologic: Negative for environmental allergies, food allergies and immunocompromised state. Neurological:  Positive for weakness. Negative for dizziness, tremors, seizures, syncope, facial asymmetry, speech difficulty, light-headedness, numbness and headaches. Hematological:  Negative for adenopathy. Does not bruise/bleed easily. Psychiatric/Behavioral:  Negative for agitation, behavioral problems, confusion, decreased concentration, dysphoric mood, hallucinations, self-injury, sleep disturbance and suicidal ideas. The patient is not nervous/anxious and is not hyperactive.       Past Medical History:   Diagnosis Date    Acid reflux     Acute diastolic (congestive) heart failure ([facility] 06/19/2019    Arthritis     Asthma 04/16/2014    Asthmatic bronchitis , chronic ([facility] 11/28/2016    CAD (coronary artery disease)     Chronic bronchitis ([facility] 04/16/2014    COPD (chronic obstructive pulmonary disease) ([facility])     CB    COPD (chronic obstructive pulmonary disease) ([facility])     Diabetes mellitus ([facility]     Emphysema (subcutaneous) (surgical) resulting from a procedure     H/O cardiovascular stress test 04/24/2021    Lexiscan    Hyperlipidemia     Hypertension     Hypoxemia requiring supplemental oxygen 02/02/2015    LONG TERM ANTICOAGULENT USE     Morbid obesity with BMI of 50.0-59.9, adult ([facility] 11/27/2013    Obesity     Osteoarthritis     Sleep apnea     bilevel positive airway pressure at 13/8 with 2 L oxygen flow     Stage 3 chronic kidney disease (HCC)     Tobacco abuse     Type II or unspecified type diabetes mellitus without mention of complication, not stated as uncontrolled        Social History     Socioeconomic History    Marital status:      Spouse name: Bright Campbell    Number of children: 1    Years of education: Not on file    Highest education level: 11th grade   Occupational History    Not on file   Tobacco Use    Smoking status: Former     Packs/day: 1.00     Years: 45.00     Pack years: 45.00     Types: Cigarettes     Quit date: 2013     Years since quittin.4    Smokeless tobacco: Former     Types: Chew     Quit date: 10/8/1971   Vaping Use    Vaping Use: Never used   Substance and Sexual Activity    Alcohol use: No     Alcohol/week: 0.0 standard drinks     Comment: 1-2 coffee per day    Drug use: Yes     Types: Marijuana Linda Lux)    Sexual activity: Yes     Partners: Female   Other Topics Concern    Not on file   Social History Narrative    19  [facility] reviewed SDOH with Xavi Hernandez. He does have money strain but between the income he has coming in and his wife's they are over resources for SARY programs. Offered food panty info to help with end of the month struggles but he declined stating that he tried and was refused due to his income. He belongs to a [employer] and goes weekly so he has some community connections in place. He has an extremely high interest rate on his mortgage which he was encouraged to look into getting lowered to help save money on his house payments. Noticed some difficulty with memory recall. Becomes easily flustered at times. Has no MHI to support applying for [facility] program of SARY. States he does not feel depressed or need any MH treatment.         Social Determinants of Health     Financial Resource Strain: Low Risk     Difficulty of Paying Living Expenses: Not very hard   Food Insecurity: No Food Insecurity    Worried About Running Out of Food in the Last Year: Never true    Ran Out of Food in the Last Year: Never true   Transportation Needs: Not on file   Physical Activity: Not on file   Stress: Not on file   Social Connections: Not on file   Intimate Partner Violence: Not on file   Housing Stability: Not on file       Family History   Problem Relation Age of Onset    Cancer Mother         breast    Cancer Father         stomach    Mental Illness Brother     Stroke Brother     Other Brother        Current Outpatient Medications on File Prior to Visit   Medication Sig Dispense Refill    montelukast (SINGULAIR) 10 MG tablet TAKE 1 TABLET BY MOUTH EVERY NIGHT 30 tablet 3    amiodarone (CORDARONE) 200 MG tablet Take 200 mg by mouth daily      apixaban (ELIQUIS) 5 MG TABS tablet Take 5 mg by mouth 2 times daily (Dr Marco Shelton)      empagliflozin (JARDIANCE) 10 MG tablet Take 1 tablet by mouth daily 30 tablet 5    metOLazone (ZAROXOLYN) 2.5 MG tablet Take metolazone twice a week. (Monday and Thursday). Pill pack this med.  (Stop all other metolazone scripts) 8 tablet 3    midodrine (PROAMATINE) 2.5 MG tablet Take 1 tablet by mouth 3 times daily (with meals) 90 tablet 1    levothyroxine (SYNTHROID) 50 MCG tablet Take 1 tablet by mouth Daily 30 tablet 1    metoprolol succinate (TOPROL XL) 25 MG extended release tablet Take 1 tablet by mouth daily 30 tablet 1    amitriptyline (ELAVIL) 50 MG tablet TAKE ONE TABLET BY MOUTH NIGHTLY 30 tablet 3    bumetanide (BUMEX) 1 MG tablet TAKE ONE TABLET BY MOUTH EVERY DAY 30 tablet 3    levocetirizine (XYZAL) 5 MG tablet Take 1 tablet by mouth nightly 30 tablet 5    pantoprazole (PROTONIX) 40 MG tablet Take 1 tablet by mouth daily 30 tablet 5    risperiDONE (RISPERDAL) 0.5 MG tablet Take 1 tablet by mouth 2 times daily 60 tablet 3    insulin glargine (BASAGLAR KWIKPEN) 100 UNIT/ML injection pen Inject 45 Units into the skin 2 times daily 5 pen 3    insulin lispro, 1 Unit Dial, (HUMALOG KWIKPEN) 100 UNIT/ML SOPN Inject 0-18 Units into the skin 3 times daily (before meals) MAX 70 units daily 3 mL 3    atorvastatin (LIPITOR) 80 MG tablet TAKE ONE TABLET BY MOUTH EVERY NIGHT 90 tablet 5    CPAP Machine MISC 1 each by Does not apply route nightly as needed       aspirin 81 MG tablet Take 81 mg by mouth nightly       [DISCONTINUED] pramipexole (MIRAPEX) 0.25 MG tablet TAKE TWO TABLETS BY MOUTH THREE TIMES A  tablet 5     No current facility-administered medications on file prior to visit. Allergies   Allergen Reactions    Pcn [Penicillins]      Child went to hospital   ? Reaction  Patient tolerates cephalosporins    Sulfa Antibiotics      ? I have reviewed his allergies, medications, problem list, medical,social and family history and have updated as needed in the electronic medical record. Objective:     Physical Exam  Vitals and nursing note reviewed. Constitutional:       General: He is not in acute distress. Appearance: He is well-developed. He is not diaphoretic. HENT:      Head: Normocephalic and atraumatic. Right Ear: External ear normal.      Left Ear: External ear normal.      Nose: Nose normal.      Mouth/Throat:      Pharynx: No oropharyngeal exudate. Eyes:      General: No scleral icterus. Right eye: No discharge. Left eye: No discharge. Conjunctiva/sclera: Conjunctivae normal.      Pupils: Pupils are equal, round, and reactive to light. Neck:      Thyroid: No thyromegaly. Vascular: No JVD. Trachea: No tracheal deviation. Cardiovascular:      Rate and Rhythm: Normal rate and regular rhythm. Heart sounds: Normal heart sounds. No murmur heard. No friction rub. No gallop. Pulmonary:      Effort: Pulmonary effort is normal. No respiratory distress. Breath sounds: Normal breath sounds. No stridor. No wheezing or rales. Chest:      Chest wall: No tenderness. Abdominal:      General: Bowel sounds are normal. There is no distension. Palpations: Abdomen is soft. There is no mass. Tenderness: There is no abdominal tenderness. There is no guarding or rebound. Genitourinary:     Comments: Deferred by patient   Musculoskeletal:         General: No tenderness. Normal range of motion. Cervical back: Normal range of motion and neck supple. Lymphadenopathy:      Cervical: No cervical adenopathy. Skin:     General: Skin is warm and dry. Coloration: Skin is not pale. Findings: No erythema or rash. Neurological:      Mental Status: He is alert and oriented to person, place, and time. Cranial Nerves: No cranial nerve deficit. Motor: No abnormal muscle tone. Coordination: Coordination normal.      Deep Tendon Reflexes: Reflexes are normal and symmetric. Reflexes normal.   Psychiatric:         Behavior: Behavior normal.         Thought Content: Thought content normal.         Judgment: Judgment normal.       Assessment / Plan:   Jean Marie Oconnor was seen today for 3 month follow-up. Diagnoses and all orders for this visit:    Morbid obesity with BMI of 50.0-59.9, adult ([facility])    Chronic fatigue  -     CBC with Auto Differential; Future  -     Comprehensive Metabolic Panel; Future    Type 2 diabetes mellitus with diabetic polyneuropathy, with long-term current use of insulin ([facility])  -     insulin glargine (BASAGLAR KWIKPEN) 100 UNIT/ML injection pen; Inject 45 Units into the skin 2 times daily  -     insulin lispro, 1 Unit Dial, (HUMALOG KWIKPEN) 100 UNIT/ML SOPN; Inject 0-18 Units into the skin 3 times daily (before meals) MAX 70 units daily    Type 2 diabetes mellitus with diabetic nephropathy, with long-term current use of insulin ([facility])  -     Hemoglobin A1C; Future    Acute diastolic heart failure ([facility])  -     Brain Natriuretic Peptide; Future  -     XR CHEST STANDARD (2 VW); Future    Acquired hypothyroidism  -     TSH; Future  -     T4, Free; Future  -     levothyroxine (SYNTHROID) 50 MCG tablet; Take 1 tablet by mouth Daily    Iron deficiency  -     Iron and TIBC; Future    Mixed hyperlipidemia  -     atorvastatin (LIPITOR) 80 MG tablet; TAKE ONE TABLET BY MOUTH EVERY NIGHT    Vitamin B 12 deficiency  -     Vitamin B12 & Folate;  Future    Severe muscle deconditioning  -     [address] [facility]    Generalized weakness  -     [facility]    Acute on chronic congestive heart failure, unspecified heart failure type ([facility]  - empagliflozin (JARDIANCE) 10 MG tablet; Take 1 tablet by mouth daily    New medication added  -     empagliflozin (JARDIANCE) 10 MG tablet; Take 1 tablet by mouth daily    Type 2 diabetes mellitus with hyperglycemia, with long-term current use of insulin (HCC)  -     insulin glargine (BASAGLAR KWIKPEN) 100 UNIT/ML injection pen; Inject 45 Units into the skin 2 times daily  -     insulin lispro, 1 Unit Dial, (HUMALOG KWIKPEN) 100 UNIT/ML SOPN; Inject 0-18 Units into the skin 3 times daily (before meals) MAX 70 units daily    Gastroesophageal reflux disease without esophagitis  -     pantoprazole (PROTONIX) 40 MG tablet; Take 1 tablet by mouth daily      Reviewed healthmaintenance report. Patient is aware of deficiencies and suggested preventative tests.

## 2023-01-05 ENCOUNTER — TELEPHONE (OUTPATIENT)
Dept: FAMILY MEDICINE CLINIC | Age: 68
End: 2023-01-05

## 2023-01-05 LAB
ALBUMIN SERPL-MCNC: 4 G/DL (ref 3.5–5.2)
ALP BLD-CCNC: 146 U/L (ref 40–129)
ALT SERPL-CCNC: 29 U/L (ref 0–40)
ANION GAP SERPL CALCULATED.3IONS-SCNC: 16 MMOL/L (ref 7–16)
AST SERPL-CCNC: 28 U/L (ref 0–39)
BILIRUB SERPL-MCNC: 1 MG/DL (ref 0–1.2)
BUN BLDV-MCNC: 18 MG/DL (ref 6–23)
CALCIUM SERPL-MCNC: 9.5 MG/DL (ref 8.6–10.2)
CHLORIDE BLD-SCNC: 91 MMOL/L (ref 98–107)
CO2: 29 MMOL/L (ref 22–29)
CREAT SERPL-MCNC: 1.2 MG/DL (ref 0.7–1.2)
GFR SERPL CREATININE-BSD FRML MDRD: >60 ML/MIN/1.73
GLUCOSE BLD-MCNC: 373 MG/DL (ref 74–99)
IRON SATURATION: 34 % (ref 20–55)
IRON: 89 MCG/DL (ref 59–158)
POTASSIUM SERPL-SCNC: 3.1 MMOL/L (ref 3.5–5)
PRO-BNP: 2151 PG/ML (ref 0–125)
SODIUM BLD-SCNC: 136 MMOL/L (ref 132–146)
T4 FREE: 0.95 NG/DL (ref 0.93–1.7)
TOTAL IRON BINDING CAPACITY: 260 MCG/DL (ref 250–450)
TOTAL PROTEIN: 7.9 G/DL (ref 6.4–8.3)
TSH SERPL DL<=0.05 MIU/L-ACNC: 28.48 UIU/ML (ref 0.27–4.2)

## 2023-01-05 RX ORDER — ATORVASTATIN CALCIUM 80 MG/1
TABLET, FILM COATED ORAL
Qty: 90 TABLET | Refills: 5
Start: 2023-01-05

## 2023-01-05 RX ORDER — PANTOPRAZOLE SODIUM 40 MG/1
40 TABLET, DELAYED RELEASE ORAL DAILY
Qty: 90 TABLET | Refills: 5 | Status: SHIPPED | OUTPATIENT
Start: 2023-01-05

## 2023-01-05 RX ORDER — INSULIN GLARGINE 100 [IU]/ML
45 INJECTION, SOLUTION SUBCUTANEOUS 2 TIMES DAILY
Qty: 4 ADJUSTABLE DOSE PRE-FILLED PEN SYRINGE | Refills: 3
Start: 2023-01-05

## 2023-01-05 RX ORDER — LEVOTHYROXINE SODIUM 0.05 MG/1
50 TABLET ORAL DAILY
Qty: 90 TABLET | Refills: 3 | Status: SHIPPED | OUTPATIENT
Start: 2023-01-05

## 2023-01-05 RX ORDER — INSULIN LISPRO 100 [IU]/ML
0-18 INJECTION, SOLUTION INTRAVENOUS; SUBCUTANEOUS
Qty: 3 ML | Refills: 3
Start: 2023-01-05

## 2023-01-05 ASSESSMENT — ENCOUNTER SYMPTOMS
ANAL BLEEDING: 0
VOMITING: 0
BACK PAIN: 0
EYE PAIN: 0
COLOR CHANGE: 0
TROUBLE SWALLOWING: 0
VOICE CHANGE: 0
WHEEZING: 0
CONSTIPATION: 0
COUGH: 0
NAUSEA: 0
APNEA: 0
BLOOD IN STOOL: 0
RHINORRHEA: 0
CHEST TIGHTNESS: 0
EYE ITCHING: 0
PHOTOPHOBIA: 0
RECTAL PAIN: 0
SHORTNESS OF BREATH: 0
SORE THROAT: 0
ABDOMINAL PAIN: 0
SINUS PRESSURE: 0
ABDOMINAL DISTENTION: 0
EYE REDNESS: 0
EYE DISCHARGE: 0
CHOKING: 0
STRIDOR: 0
FACIAL SWELLING: 0
DIARRHEA: 0

## 2023-01-05 NOTE — TELEPHONE ENCOUNTER
[facility] called to inform patient will be starting home care on Monday, 1/9/23.     Last seen 1/4/2023  Next appt 4/6/2023

## 2023-01-06 ENCOUNTER — HOSPITAL ENCOUNTER (EMERGENCY)
Age: 68
Discharge: HOME | End: 2023-01-06
Attending: EMERGENCY MEDICINE
Payer: MEDICARE

## 2023-01-06 ENCOUNTER — APPOINTMENT (OUTPATIENT)
Dept: GENERAL RADIOLOGY | Age: 68
End: 2023-01-06
Payer: MEDICARE

## 2023-01-06 ENCOUNTER — TELEPHONE (OUTPATIENT)
Dept: FAMILY MEDICINE CLINIC | Age: 68
End: 2023-01-06

## 2023-01-06 VITALS
TEMPERATURE: 97.8 F | RESPIRATION RATE: 14 BRPM | DIASTOLIC BLOOD PRESSURE: 77 MMHG | OXYGEN SATURATION: 97 % | HEART RATE: 90 BPM | SYSTOLIC BLOOD PRESSURE: 122 MMHG

## 2023-01-06 DIAGNOSIS — R73.9 HYPERGLYCEMIA: ICD-10-CM

## 2023-01-06 DIAGNOSIS — I50.9 ACUTE ON CHRONIC HEART FAILURE, UNSPECIFIED HEART FAILURE TYPE (HCC): Primary | ICD-10-CM

## 2023-01-06 DIAGNOSIS — E87.6 HYPOKALEMIA: ICD-10-CM

## 2023-01-06 LAB
ANION GAP SERPL CALCULATED.3IONS-SCNC: 13 MMOL/L (ref 7–16)
BASOPHILS ABSOLUTE: 0.03 E9/L (ref 0–0.2)
BASOPHILS RELATIVE PERCENT: 0.4 % (ref 0–2)
BETA-HYDROXYBUTYRATE: 0.11 MMOL/L (ref 0.02–0.27)
BUN BLDV-MCNC: 16 MG/DL (ref 6–23)
CALCIUM SERPL-MCNC: 9.6 MG/DL (ref 8.6–10.2)
CHLORIDE BLD-SCNC: 88 MMOL/L (ref 98–107)
CO2: 33 MMOL/L (ref 22–29)
CREAT SERPL-MCNC: 1.3 MG/DL (ref 0.7–1.2)
EKG ATRIAL RATE: 267 BPM
EKG Q-T INTERVAL: 452 MS
EKG QRS DURATION: 118 MS
EKG QTC CALCULATION (BAZETT): 561 MS
EKG R AXIS: 103 DEGREES
EKG T AXIS: -82 DEGREES
EKG VENTRICULAR RATE: 93 BPM
EOSINOPHILS ABSOLUTE: 0.11 E9/L (ref 0.05–0.5)
EOSINOPHILS RELATIVE PERCENT: 1.6 % (ref 0–6)
GFR SERPL CREATININE-BSD FRML MDRD: >60 ML/MIN/1.73
GLUCOSE BLD-MCNC: 407 MG/DL (ref 74–99)
HCT VFR BLD CALC: 44.8 % (ref 37–54)
HEMOGLOBIN: 15.2 G/DL (ref 12.5–16.5)
IMMATURE GRANULOCYTES #: 0.05 E9/L
IMMATURE GRANULOCYTES %: 0.7 % (ref 0–5)
LYMPHOCYTES ABSOLUTE: 1.15 E9/L (ref 1.5–4)
LYMPHOCYTES RELATIVE PERCENT: 16.8 % (ref 20–42)
MAGNESIUM: 1.7 MG/DL (ref 1.6–2.6)
MCH RBC QN AUTO: 31.7 PG (ref 26–35)
MCHC RBC AUTO-ENTMCNC: 33.9 % (ref 32–34.5)
MCV RBC AUTO: 93.3 FL (ref 80–99.9)
MONOCYTES ABSOLUTE: 0.48 E9/L (ref 0.1–0.95)
MONOCYTES RELATIVE PERCENT: 7 % (ref 2–12)
NEUTROPHILS ABSOLUTE: 5.03 E9/L (ref 1.8–7.3)
NEUTROPHILS RELATIVE PERCENT: 73.5 % (ref 43–80)
PDW BLD-RTO: 14.7 FL (ref 11.5–15)
PH VENOUS: 7.38 (ref 7.35–7.45)
PLATELET # BLD: 203 E9/L (ref 130–450)
PMV BLD AUTO: 10.5 FL (ref 7–12)
POTASSIUM SERPL-SCNC: 3.1 MMOL/L (ref 3.5–5)
PRO-BNP: 1931 PG/ML (ref 0–125)
RBC # BLD: 4.8 E12/L (ref 3.8–5.8)
SODIUM BLD-SCNC: 134 MMOL/L (ref 132–146)
TROPONIN, HIGH SENSITIVITY: 39 NG/L (ref 0–11)
WBC # BLD: 6.9 E9/L (ref 4.5–11.5)

## 2023-01-06 PROCEDURE — 96375 TX/PRO/DX INJ NEW DRUG ADDON: CPT

## 2023-01-06 PROCEDURE — 96365 THER/PROPH/DIAG IV INF INIT: CPT

## 2023-01-06 PROCEDURE — 80048 BASIC METABOLIC PNL TOTAL CA: CPT

## 2023-01-06 PROCEDURE — 83880 ASSAY OF NATRIURETIC PEPTIDE: CPT

## 2023-01-06 PROCEDURE — 82010 KETONE BODYS QUAN: CPT

## 2023-01-06 PROCEDURE — 71046 X-RAY EXAM CHEST 2 VIEWS: CPT

## 2023-01-06 PROCEDURE — 99285 EMERGENCY DEPT VISIT HI MDM: CPT

## 2023-01-06 PROCEDURE — 82800 BLOOD PH: CPT

## 2023-01-06 PROCEDURE — 6370000000 HC RX 637 (ALT 250 FOR IP): Performed by: STUDENT IN AN ORGANIZED HEALTH CARE EDUCATION/TRAINING PROGRAM

## 2023-01-06 PROCEDURE — 93010 ELECTROCARDIOGRAM REPORT: CPT | Performed by: INTERNAL MEDICINE

## 2023-01-06 PROCEDURE — 93005 ELECTROCARDIOGRAM TRACING: CPT | Performed by: STUDENT IN AN ORGANIZED HEALTH CARE EDUCATION/TRAINING PROGRAM

## 2023-01-06 PROCEDURE — 84484 ASSAY OF TROPONIN QUANT: CPT

## 2023-01-06 PROCEDURE — 83735 ASSAY OF MAGNESIUM: CPT

## 2023-01-06 PROCEDURE — 85025 COMPLETE CBC W/AUTO DIFF WBC: CPT

## 2023-01-06 PROCEDURE — 96372 THER/PROPH/DIAG INJ SC/IM: CPT

## 2023-01-06 PROCEDURE — 6360000002 HC RX W HCPCS: Performed by: STUDENT IN AN ORGANIZED HEALTH CARE EDUCATION/TRAINING PROGRAM

## 2023-01-06 RX ORDER — POTASSIUM CHLORIDE 7.45 MG/ML
10 INJECTION INTRAVENOUS ONCE
Status: COMPLETED | OUTPATIENT
Start: 2023-01-06 | End: 2023-01-06

## 2023-01-06 RX ORDER — POTASSIUM CHLORIDE 20 MEQ/1
20 TABLET, EXTENDED RELEASE ORAL DAILY
Qty: 14 TABLET | Refills: 0 | Status: SHIPPED | OUTPATIENT
Start: 2023-01-06 | End: 2023-01-20

## 2023-01-06 RX ORDER — FUROSEMIDE 10 MG/ML
20 INJECTION INTRAMUSCULAR; INTRAVENOUS ONCE
Status: COMPLETED | OUTPATIENT
Start: 2023-01-06 | End: 2023-01-06

## 2023-01-06 RX ORDER — POTASSIUM BICARBONATE 25 MEQ/1
50 TABLET, EFFERVESCENT ORAL ONCE
Status: COMPLETED | OUTPATIENT
Start: 2023-01-06 | End: 2023-01-06

## 2023-01-06 RX ADMIN — POTASSIUM CHLORIDE 10 MEQ: 7.46 INJECTION, SOLUTION INTRAVENOUS at 17:11

## 2023-01-06 RX ADMIN — POTASSIUM BICARBONATE 50 MEQ: 978 TABLET, EFFERVESCENT ORAL at 18:30

## 2023-01-06 RX ADMIN — FUROSEMIDE 20 MG: 10 INJECTION, SOLUTION INTRAMUSCULAR; INTRAVENOUS at 17:25

## 2023-01-06 RX ADMIN — INSULIN HUMAN 10 UNITS: 100 INJECTION, SOLUTION PARENTERAL at 17:22

## 2023-01-06 ASSESSMENT — ENCOUNTER SYMPTOMS
SINUS PRESSURE: 0
WHEEZING: 0
BACK PAIN: 0
EYE DISCHARGE: 0
SHORTNESS OF BREATH: 1
EYE PAIN: 0
EYE REDNESS: 0
COUGH: 0
ABDOMINAL PAIN: 0
NAUSEA: 0
SORE THROAT: 0
VOMITING: 0
DIARRHEA: 0

## 2023-01-06 ASSESSMENT — PAIN - FUNCTIONAL ASSESSMENT: PAIN_FUNCTIONAL_ASSESSMENT: NONE - DENIES PAIN

## 2023-01-06 NOTE — RESULT ENCOUNTER NOTE
Let patient know that sugars have gone up and proBNP that we ordered has doubled. Although chest x-ray looked pretty good this suggests another exacerbation of his congestive heart failure and since he was not feeling well I recommend that he goes to the ER to be evaluated as soon as possible.

## 2023-01-06 NOTE — ED PROVIDER NOTES
The history is provided by the patient and medical records. 29-year-old male present emergency department plaint shortness of breath with exertion does have a history of CHF is on Lasix. Was told by his family doctor, and due to worsening blood work. He otherwise denies any fevers chills denies any chest pain denies any abdominal pain. States he only walk approximately 10 to 20 feet before having to stop and take a breath. Denies any other acute complaints this time. Does also elicit his sugar normally runs between 200 -300 is not very compliant with his medications. Review of Systems   Constitutional:  Negative for chills and fever. HENT:  Negative for ear pain, sinus pressure and sore throat. Eyes:  Negative for pain, discharge and redness. Respiratory:  Positive for shortness of breath. Negative for cough and wheezing. Cardiovascular:  Negative for chest pain. Gastrointestinal:  Negative for abdominal pain, diarrhea, nausea and vomiting. Genitourinary:  Negative for dysuria and frequency. Musculoskeletal:  Negative for arthralgias and back pain. Skin:  Negative for rash and wound. Neurological:  Negative for weakness and headaches. Hematological:  Negative for adenopathy. All other systems reviewed and are negative. Physical Exam  Vitals and nursing note reviewed. Constitutional:       Appearance: He is well-developed. He is not toxic-appearing. HENT:      Head: Normocephalic and atraumatic. Eyes:      Conjunctiva/sclera: Conjunctivae normal.   Cardiovascular:      Rate and Rhythm: Normal rate and regular rhythm. Heart sounds: Normal heart sounds. No murmur heard. Pulmonary:      Effort: Pulmonary effort is normal. No respiratory distress. Breath sounds: Normal breath sounds. No wheezing or rales. Abdominal:      General: Bowel sounds are normal.      Palpations: Abdomen is soft. Tenderness: There is no abdominal tenderness.  There is no guarding or rebound. Musculoskeletal:         General: No tenderness or deformity. Cervical back: Normal range of motion and neck supple. Skin:     General: Skin is warm and dry. Neurological:      Mental Status: He is alert and oriented to person, place, and time. GCS: GCS eye subscore is 4. GCS verbal subscore is 5. GCS motor subscore is 6. Procedures     MDM     Amount and/or Complexity of Data Reviewed  Clinical lab tests: reviewed  Tests in the radiology section of CPT®: reviewed  Tests in the medicine section of CPT®: reviewed  Decide to obtain previous medical records or to obtain history from someone other than the patient: yes         Diagnostic results    LABS:    Labs Reviewed   CBC WITH AUTO DIFFERENTIAL - Abnormal; Notable for the following components:       Result Value    Lymphocytes % 16.8 (*)     Lymphocytes Absolute 1.15 (*)     All other components within normal limits   BASIC METABOLIC PANEL - Abnormal; Notable for the following components:    Potassium 3.1 (*)     Chloride 88 (*)     CO2 33 (*)     Glucose 407 (*)     Creatinine 1.3 (*)     All other components within normal limits   BRAIN NATRIURETIC PEPTIDE - Abnormal; Notable for the following components:    Pro-BNP 1,931 (*)     All other components within normal limits   TROPONIN - Abnormal; Notable for the following components:    Troponin, High Sensitivity 39 (*)     All other components within normal limits   PH, VENOUS   BETA-HYDROXYBUTYRATE   MAGNESIUM   Patient's BNP slightly elevated from his baseline as well as noted be hypokalemic at potassium 3.1 and troponin is on patient's normal baseline. As interpreted by me, the above displayed labs are abnormal. All other labs obtained during this visit were within normal range or not returned as of this dictation.       EKG Interpretation  Interpreted by emergency department physician, Hugo Wilson MD      See ED course        RADIOLOGY:   Non-plain film images such as CT, Ultrasound and MRI are read by the radiologist. Plain radiographic images are visualized and preliminarily interpreted by the ED Provider with the below findings:    -Chest x-ray with no signs of pneumonia or pneumothorax does show slight pulmonary congestion otherwise agree with radiologist    Interpretation per the Radiologist below, if available at the time of this note:    XR CHEST (2 VW)   Final Result   Development of cardiomegaly with mild interstitial pulmonary edema. XR CHEST STANDARD (2 VW)    Result Date: 1/4/2023  EXAMINATION: TWO XRAY VIEWS OF THE CHEST 1/4/2023 3:04 pm COMPARISON: None. HISTORY: ORDERING SYSTEM PROVIDED HISTORY: Acute diastolic heart failure [facility]) TECHNOLOGIST PROVIDED HISTORY: Reason for exam:->recent heart failure FINDINGS: The lungs are without acute focal process. There is no effusion or pneumothorax. The cardiomediastinal silhouette is without acute process. The osseous structures are without acute process. No acute process. Eventration of right hemidiaphragm. No results found. MDM    History From: The patient    CONSULTS: (Who and What was discussed)    Did speak with the CHF clinic who will set the patient up to be seen on Monday. Social Determinants of Health : Patient is noncompliant with his medications leading to potential of worsening outcomes for the patient.     Chronic Conditions affecting care:    has a past medical history of Acid reflux, Acute diastolic (congestive) heart failure ([address] (06/19/2019), Arthritis, Asthma (04/16/2014), Asthmatic bronchitis , chronic ([address] (11/28/2016), CAD (coronary artery disease), Chronic bronchitis ([address] (04/16/2014), COPD (chronic obstructive pulmonary disease) ([address] COPD (chronic obstructive pulmonary disease) ([address] Diabetes mellitus ([address] Emphysema (subcutaneous) (surgical) resulting from a procedure, H/O cardiovascular stress test (04/24/2021), Hyperlipidemia, Hypertension, Hypoxemia requiring supplemental oxygen (02/02/2015), LONG TERM ANTICOAGULENT USE, Morbid obesity with BMI of 50.0-59.9, adult ([facility] (11/27/2013), Obesity, Osteoarthritis, Sleep apnea, Stage 3 chronic kidney disease ([facility] Tobacco abuse, and Type II or unspecified type diabetes mellitus without mention of complication, not stated as uncontrolled. Records Reviewed( Source) record review from 1/6/2023 showed patient had telephone encounter that advised due to elevated sugars and proBNP he should go to the emergency department. CC/HPI Summary, DDx, ED Course, and Reassessment:   Differential diagnosis including but not limited to CHF exacerbation, STEMI  Patient 49-year-old male presented with complaint of shortness of breath worse with ambulation. Was advised by his family doctor to come to the ER due to worsening laboratory work-up for his CHF. He otherwise states he gets a little short of breath worse with walking. Denies any other acute complaints at this time. Laboratory work-up significant for slight elevation his Bnp to his baseline troponin around his normal baseline and patient was noted to be hypokalemic. Blood sugar was also elevated at 407 no evidence of DKA. Patient was saturating well on room air. He was given a dose of Lasix as well as insulin and given oral potassium as well as IV potassium due to his hypokalemia. Did speak with the CHF clinic who will be able to get the patient in to be seen on Monday. Patient does not meet inpatient admission criteria at this time due to saturating well on room air and is not overtly fluid overloaded on examination. He is safe to be discharged home will be given oral potassium to go home on due to is not on this at home. He is agreeable this time.     Disposition Considerations (Tests not ordered but considered, Shared Decision Making, Pt Expectation of Test or Tx.): Upon shared decision making patient safe to be discharged home with his acute on chronic CHF that is mild exacerbation. As well as his hyperglycemia nondiabetic ketoacidosis.   And his hypokalemia      Medications   furosemide (LASIX) injection 20 mg (20 mg IntraVENous Given 1/6/23 1725)   potassium bicarbonate (K-LYTE) disintegrating tablet 50 mEq (50 mEq Oral Given 1/6/23 1830)   insulin regular (HUMULIN R;NOVOLIN R) injection 10 Units (10 Units SubCUTAneous Given 1/6/23 1722)   potassium chloride 10 mEq/100 mL IVPB (Peripheral Line) (0 mEq IntraVENous Stopped 1/6/23 1829)         I am the primary provider of record    ED Course as of 01/07/23 0726 Fri Jan 06, 2023   1607 Atrial fibrillation right axis incomplete right bundle branch block heart rate 93 stable intervals no acute ST elevations or depressions  EKG is interpreted by myself  Stable as compared to previous [CB]      ED Course User Index  [CB] Logan Mullen MD         ED Course as of 01/07/23 0726 Fri Jan 06, 2023   1607 Atrial fibrillation right axis incomplete right bundle branch block heart rate 93 stable intervals no acute ST elevations or depressions  EKG is interpreted by myself  Stable as compared to previous [CB]      ED Course User Index  [CB] Logan Mullen MD       --------------------------------------------- PAST HISTORY ---------------------------------------------  Past Medical History:  has a past medical history of Acid reflux, Acute diastolic (congestive) heart failure ([facility] Arthritis, Asthma, Asthmatic bronchitis , chronic ([facility] CAD (coronary artery disease), Chronic bronchitis ([facility] COPD (chronic obstructive pulmonary disease) ([facility] COPD (chronic obstructive pulmonary disease) ([facility] Diabetes mellitus ([facility] Emphysema (subcutaneous) (surgical) resulting from a procedure, H/O cardiovascular stress test, Hyperlipidemia, Hypertension, Hypoxemia requiring supplemental oxygen, LONG TERM ANTICOAGULENT USE, Morbid obesity with BMI of 50.0-59.9, adult ([facility] Obesity, Osteoarthritis, Sleep apnea, Stage 3 chronic kidney disease ([facility] Tobacco abuse, and Type II or unspecified type diabetes mellitus without mention of complication, not stated as uncontrolled. Past Surgical History:  has a past surgical history that includes hernia repair; sinus surgery; ECHO Compl W Dop Color Flow (07/25/2013); Colonoscopy; Coronary angioplasty with stent (07/23/2013); Coronary artery bypass graft (09/09/2013); and Tonsillectomy. Social History:  reports that he quit smoking about 9 years ago. His smoking use included cigarettes. He has a 45.00 pack-year smoking history. He quit smokeless tobacco use about 51 years ago. His smokeless tobacco use included chew. He reports current drug use. Drug: Marijuana Charmayne Stai). He reports that he does not drink alcohol. Family History: family history includes Cancer in his father and mother; Mental Illness in his brother; Other in his brother; Stroke in his brother. The patients home medications have been reviewed.     Allergies: Pcn [penicillins] and Sulfa antibiotics    -------------------------------------------------- RESULTS -------------------------------------------------  Labs:  Results for orders placed or performed during the hospital encounter of 01/06/23   CBC with Auto Differential   Result Value Ref Range    WBC 6.9 4.5 - 11.5 E9/L    RBC 4.80 3.80 - 5.80 E12/L    Hemoglobin 15.2 12.5 - 16.5 g/dL    Hematocrit 44.8 37.0 - 54.0 %    MCV 93.3 80.0 - 99.9 fL    MCH 31.7 26.0 - 35.0 pg    MCHC 33.9 32.0 - 34.5 %    RDW 14.7 11.5 - 15.0 fL    Platelets 518 872 - 256 E9/L    MPV 10.5 7.0 - 12.0 fL    Neutrophils % 73.5 43.0 - 80.0 %    Immature Granulocytes % 0.7 0.0 - 5.0 %    Lymphocytes % 16.8 (L) 20.0 - 42.0 %    Monocytes % 7.0 2.0 - 12.0 %    Eosinophils % 1.6 0.0 - 6.0 %    Basophils % 0.4 0.0 - 2.0 %    Neutrophils Absolute 5.03 1.80 - 7.30 E9/L    Immature Granulocytes # 0.05 E9/L    Lymphocytes Absolute 1.15 (L) 1.50 - 4.00 E9/L    Monocytes Absolute 0.48 0.10 - 0.95 E9/L    Eosinophils Absolute 0. 11 0.05 - 0.50 E9/L    Basophils Absolute 0.03 0.00 - 0.20 M6/C   Basic Metabolic Panel   Result Value Ref Range    Sodium 134 132 - 146 mmol/L    Potassium 3.1 (L) 3.5 - 5.0 mmol/L    Chloride 88 (L) 98 - 107 mmol/L    CO2 33 (H) 22 - 29 mmol/L    Anion Gap 13 7 - 16 mmol/L    Glucose 407 (H) 74 - 99 mg/dL    BUN 16 6 - 23 mg/dL    Creatinine 1.3 (H) 0.7 - 1.2 mg/dL    Est, Glom Filt Rate >60 >=60 mL/min/1.73    Calcium 9.6 8.6 - 10.2 mg/dL   Brain Natriuretic Peptide   Result Value Ref Range    Pro-BNP 1,931 (H) 0 - 125 pg/mL   PH, VENOUS   Result Value Ref Range    pH, Misha 7.38 7.35 - 7.45   Beta-Hydroxybutyrate   Result Value Ref Range    Beta-Hydroxybutyrate 0.11 0.02 - 0.27 mmol/L   Magnesium   Result Value Ref Range    Magnesium 1.7 1.6 - 2.6 mg/dL   Troponin   Result Value Ref Range    Troponin, High Sensitivity 39 (H) 0 - 11 ng/L   EKG 12 Lead   Result Value Ref Range    Ventricular Rate 93 BPM    Atrial Rate 267 BPM    QRS Duration 118 ms    Q-T Interval 452 ms    QTc Calculation (Bazett) 561 ms    R Axis 103 degrees    T Axis -82 degrees       Radiology:  XR CHEST (2 VW)   Final Result   Development of cardiomegaly with mild interstitial pulmonary edema. ------------------------- NURSING NOTES AND VITALS REVIEWED ---------------------------  Date / Time Roomed:  1/6/2023  2:29 PM  ED Bed Assignment:  ZOILA/ZOILA    The nursing notes within the ED encounter and vital signs as below have been reviewed. /77   Pulse 90   Temp 97.8 °F (36.6 °C) (Infrared)   Resp 14   SpO2 97%   Oxygen Saturation Interpretation: Normal      ------------------------------------------ PROGRESS NOTES ------------------------------------------  7:25 AM EST  I have spoken with the patient and discussed todays results, in addition to providing specific details for the plan of care and counseling regarding the diagnosis and prognosis.   Their questions are answered at this time and they are agreeable with the plan. I discussed at length with them reasons for immediate return here for re evaluation. They will followup with their primary care physician by calling their office on Monday.      --------------------------------- ADDITIONAL PROVIDER NOTES ---------------------------------  At this time the patient is without objective evidence of an acute process requiring hospitalization or inpatient management. They have remained hemodynamically stable throughout their entire ED visit and are stable for discharge with outpatient follow-up. The plan has been discussed in detail and they are aware of the specific conditions for emergent return, as well as the importance of follow-up. Discharge Medication List as of 1/6/2023  6:39 PM        START taking these medications    Details   potassium chloride (KLOR-CON M) 20 MEQ extended release tablet Take 1 tablet by mouth daily for 14 days, Disp-14 tablet, R-0Normal             Diagnosis:  1. Acute on chronic heart failure, unspecified heart failure type ([address]    2. Hyperglycemia    3. Hypokalemia        Disposition:  Patient's disposition: Discharge to home  Patient's condition is stable.        Danny Barlow MD  Resident  01/07/23 5743

## 2023-01-06 NOTE — TELEPHONE ENCOUNTER
----- Message from Alan Saldivar DO sent at 1/6/2023 12:35 AM EST -----  Let patient know that sugars have gone up and proBNP that we ordered has doubled. Although chest x-ray looked pretty good this suggests another exacerbation of his congestive heart failure and since he was not feeling well I recommend that he goes to the ER to be evaluated as soon as possible.

## 2023-01-06 NOTE — DISCHARGE INSTRUCTIONS
Please take double the dose of your pack potassium tonight as well as a double dose tomorrow and then resume daily dosing the following day.

## 2023-01-09 ENCOUNTER — TELEPHONE (OUTPATIENT)
Dept: FAMILY MEDICINE CLINIC | Age: 68
End: 2023-01-09

## 2023-01-09 ENCOUNTER — HOSPITAL ENCOUNTER (OUTPATIENT)
Dept: OTHER | Age: 68
Setting detail: THERAPIES SERIES
Discharge: HOME | End: 2023-01-09

## 2023-01-09 DIAGNOSIS — R53.1 GENERALIZED WEAKNESS: ICD-10-CM

## 2023-01-09 DIAGNOSIS — R29.898 SEVERE MUSCLE DECONDITIONING: Primary | ICD-10-CM

## 2023-01-09 NOTE — PROGRESS NOTES
Patient was a no call and a no show for today's CHF appointment. Called and spoke to the wife to reschedule and she stated \"He can't come tomorrow because he has a \"kidney\" doctor appointment and he can't come on Wednesday because I have dialysis and the visiting nurse said he should not be driving. \" Wife stated that she will have patient call to reschedule tomorrow morning.

## 2023-01-09 NOTE — TELEPHONE ENCOUNTER
Patient's wife Shari Said called. She stated [facility] was out to see the pt. She stated the pt would benefit from using a lift chair. Pt wife stated the order can be faxed to [facility]. Pt has severe muscle deconditioning and generalized weakness. Please advise.   Last seen 1/4/2023  Next appt 4/6/2023  [address] [phone]

## 2023-01-11 ENCOUNTER — TELEPHONE (OUTPATIENT)
Dept: FAMILY MEDICINE CLINIC | Age: 68
End: 2023-01-11

## 2023-01-11 NOTE — TELEPHONE ENCOUNTER
[facility] called to inform that patient's blood sugar has been very high. Bang Troy stated patient informed this morning his blood sugar was 420. Patient had a bowl of cereal and when asked to check his blood sugar while Bang Troy was there, it was 534. Bang Troy stated patient took Humalog, but is not sure what he did with his sliding scale directions and she asked if this could be discussed with patient's wife and a copy of the scale sent to them. Bang Troy informed that patient told her he's not checking his blood sugar like he should and he's not eating properly. Bang Troy asked for advisement if patient needs additional home support/education for this. Please advise.     Last seen 1/4/2023  Next appt 4/6/2023

## 2023-01-12 ENCOUNTER — TELEMEDICINE (OUTPATIENT)
Dept: FAMILY MEDICINE CLINIC | Age: 68
End: 2023-01-12
Payer: MEDICARE

## 2023-01-12 DIAGNOSIS — I50.31 ACUTE DIASTOLIC HEART FAILURE (HCC): ICD-10-CM

## 2023-01-12 DIAGNOSIS — Z79.4 TYPE 2 DIABETES MELLITUS WITH DIABETIC POLYNEUROPATHY, WITH LONG-TERM CURRENT USE OF INSULIN (HCC): ICD-10-CM

## 2023-01-12 DIAGNOSIS — R06.02 SOB (SHORTNESS OF BREATH) ON EXERTION: ICD-10-CM

## 2023-01-12 DIAGNOSIS — Z12.5 SCREENING FOR MALIGNANT NEOPLASM OF PROSTATE: ICD-10-CM

## 2023-01-12 DIAGNOSIS — E11.65 TYPE 2 DIABETES MELLITUS WITH HYPERGLYCEMIA, WITH LONG-TERM CURRENT USE OF INSULIN (HCC): ICD-10-CM

## 2023-01-12 DIAGNOSIS — R53.1 GENERALIZED WEAKNESS: ICD-10-CM

## 2023-01-12 DIAGNOSIS — I48.0 PAF (PAROXYSMAL ATRIAL FIBRILLATION) (HCC): ICD-10-CM

## 2023-01-12 DIAGNOSIS — E03.9 ACQUIRED HYPOTHYROIDISM: ICD-10-CM

## 2023-01-12 DIAGNOSIS — R06.01 ORTHOPNEA: ICD-10-CM

## 2023-01-12 DIAGNOSIS — E11.42 TYPE 2 DIABETES MELLITUS WITH DIABETIC POLYNEUROPATHY, WITH LONG-TERM CURRENT USE OF INSULIN (HCC): ICD-10-CM

## 2023-01-12 DIAGNOSIS — Z79.4 TYPE 2 DIABETES MELLITUS WITH HYPERGLYCEMIA, WITH LONG-TERM CURRENT USE OF INSULIN (HCC): ICD-10-CM

## 2023-01-12 DIAGNOSIS — E78.2 MIXED HYPERLIPIDEMIA: ICD-10-CM

## 2023-01-12 DIAGNOSIS — R29.898 SEVERE MUSCLE DECONDITIONING: Primary | ICD-10-CM

## 2023-01-12 PROCEDURE — 99214 OFFICE O/P EST MOD 30 MIN: CPT | Performed by: FAMILY MEDICINE

## 2023-01-12 PROCEDURE — 1123F ACP DISCUSS/DSCN MKR DOCD: CPT | Performed by: FAMILY MEDICINE

## 2023-01-12 PROCEDURE — 3046F HEMOGLOBIN A1C LEVEL >9.0%: CPT | Performed by: FAMILY MEDICINE

## 2023-01-12 NOTE — PROGRESS NOTES
Chichi Saleh is a 79 y.o. male  . Subjective:      Patient's blood sugars have been out of control. Apparently he was not really taking Humalog because he did not have a sliding scale he had lost.  He states that he is taking the Basaglar but at a lower dose. We will increase this dose of Basaglar and we add a sliding scale and follow-up with him in a week to see where his numbers are. Of course if the numbers are still running high home health is can let us know or he can call as well. I want patient to get into see cardiology because of possible exacerbation of CHF however patient is following with CHF clinic. His new sliding scale is 200- 250 (5 unit), 251 to 300 (12 units),  301 to 400 (15 units) and over 401 (20 U). Patient to call whenever it is over 400 as well. Went over 6 times. Patient read it back to me. We will increase the basaglar to 50 units twice a day. We will set up a 1 week appointment again and adjust the sliding scale if needed. Again the Home nurse coming in. We will attempt to get freestyle keith. This will help monitor sugars more easily since he is not really checking that often. We will continue following with CHF clinic. We will set up with cardiology again for appointment since he does not seem to have 1 scheduled. Seems to be having more SOB and orthopnea. BNP elevated last blood work. Renal numbers improved. Patient is following with renal regularly. At last visit patient was very deconditioned and weak hopefully the home physical and occupational therapy will help with strengthening and improve function. This was an extended visit at 43 minutes. Also we increased patient's thyroid medication based on his last numbers and will need to recheck that again. Review of Systems   Constitutional:  Positive for fatigue. Negative for activity change, appetite change, chills, diaphoresis, fever and unexpected weight change.    HENT:  Negative for congestion, dental problem, drooling, ear discharge, ear pain, facial swelling, hearing loss, mouth sores, nosebleeds, postnasal drip, rhinorrhea, sinus pressure, sneezing, sore throat, tinnitus, trouble swallowing and voice change. Eyes:  Negative for photophobia, pain, discharge, redness, itching and visual disturbance. Respiratory:  Negative for apnea, cough, choking, chest tightness, shortness of breath, wheezing and stridor. Cardiovascular:  Negative for chest pain, palpitations and leg swelling. Gastrointestinal:  Negative for abdominal distention, abdominal pain, anal bleeding, blood in stool, constipation, diarrhea, nausea, rectal pain and vomiting. Endocrine: Negative for cold intolerance, heat intolerance, polydipsia, polyphagia and polyuria. Genitourinary:  Positive for frequency and urgency. Negative for decreased urine volume, difficulty urinating, dysuria, enuresis, flank pain, genital sores, hematuria, penile discharge, penile pain, penile swelling, scrotal swelling and testicular pain. Musculoskeletal:  Positive for arthralgias and back pain. Negative for gait problem, joint swelling, myalgias, neck pain and neck stiffness. Skin:  Negative for color change, pallor, rash and wound. Allergic/Immunologic: Negative for environmental allergies, food allergies and immunocompromised state. Neurological:  Positive for weakness. Negative for dizziness, tremors, seizures, syncope, facial asymmetry, speech difficulty, light-headedness, numbness and headaches. Hematological:  Negative for adenopathy. Does not bruise/bleed easily. Psychiatric/Behavioral:  Negative for agitation, behavioral problems, confusion, decreased concentration, dysphoric mood, hallucinations, self-injury, sleep disturbance and suicidal ideas. The patient is not nervous/anxious and is not hyperactive.       Past Medical History:   Diagnosis Date    Acid reflux     Acute diastolic (congestive) heart failure ([facility] 06/19/2019 Arthritis     Asthma 2014    Asthmatic bronchitis , chronic ([facility] 2016    CAD (coronary artery disease)     Chronic bronchitis ([facility] 2014    COPD (chronic obstructive pulmonary disease) ([facility])     CB    COPD (chronic obstructive pulmonary disease) (HCC)     Diabetes mellitus (HCC)     Emphysema (subcutaneous) (surgical) resulting from a procedure     H/O cardiovascular stress test 2021    Lexiscan    Hyperlipidemia     Hypertension     Hypoxemia requiring supplemental oxygen 2015    LONG TERM ANTICOAGULENT USE     Morbid obesity with BMI of 50.0-59.9, adult ([facility] 2013    Obesity     Osteoarthritis     Sleep apnea     bilevel positive airway pressure at 13/8 with 2 L oxygen flow     Stage 3 chronic kidney disease ([facility])     Tobacco abuse     Type II or unspecified type diabetes mellitus without mention of complication, not stated as uncontrolled        Social History     Socioeconomic History    Marital status:      Spouse name: Dereje Chacon    Number of children: 1    Years of education: Not on file    Highest education level: 11th grade   Occupational History    Not on file   Tobacco Use    Smoking status: Former     Packs/day: 1.00     Years: 45.00     Pack years: 45.00     Types: Cigarettes     Quit date: 2013     Years since quittin.4    Smokeless tobacco: Former     Types: Chew     Quit date: 10/8/1971   Vaping Use    Vaping Use: Never used   Substance and Sexual Activity    Alcohol use: No     Alcohol/week: 0.0 standard drinks     Comment: 1-2 coffee per day    Drug use: Yes     Types: Marijuana Katheren Ling)    Sexual activity: Yes     Partners: Female   Other Topics Concern    Not on file   Social History Narrative    19  [facility] reviewed SDOH with Christiano Sosa. He does have money strain but between the income he has coming in and his wife's they are over resources for SARY programs.   Offered food panty info to help with end of the month struggles but he declined stating that he tried and was refused due to his income. He belongs to a [employer] and goes weekly so he has some community connections in place. He has an extremely high interest rate on his mortgage which he was encouraged to look into getting lowered to help save money on his house payments. Noticed some difficulty with memory recall. Becomes easily flustered at times. Has no MHI to support applying for [facility] program of [facility]. States he does not feel depressed or need any MH treatment.         Social Determinants of Health     Financial Resource Strain: Low Risk     Difficulty of Paying Living Expenses: Not very hard   Food Insecurity: No Food Insecurity    Worried About Running Out of Food in the Last Year: Never true    Ran Out of Food in the Last Year: Never true   Transportation Needs: Not on file   Physical Activity: Not on file   Stress: Not on file   Social Connections: Not on file   Intimate Partner Violence: Not on file   Housing Stability: Not on file       Family History   Problem Relation Age of Onset    Cancer Mother         breast    Cancer Father         stomach    Mental Illness Brother     Stroke Brother     Other Brother        Current Outpatient Medications on File Prior to Visit   Medication Sig Dispense Refill    potassium chloride (KLOR-CON M) 20 MEQ extended release tablet Take 1 tablet by mouth daily for 14 days 14 tablet 0    empagliflozin (JARDIANCE) 10 MG tablet Take 1 tablet by mouth daily 30 tablet 5    levothyroxine (SYNTHROID) 50 MCG tablet Take 1 tablet by mouth Daily 90 tablet 3    pantoprazole (PROTONIX) 40 MG tablet Take 1 tablet by mouth daily 90 tablet 5    insulin glargine (BASAGLAR KWIKPEN) 100 UNIT/ML injection pen Inject 45 Units into the skin 2 times daily 4 Adjustable Dose Pre-filled Pen Syringe 3    insulin lispro, 1 Unit Dial, (HUMALOG KWIKPEN) 100 UNIT/ML SOPN Inject 0-18 Units into the skin 3 times daily (before meals) MAX 70 units daily 3 mL 3    atorvastatin (LIPITOR) 80 MG tablet TAKE ONE TABLET BY MOUTH EVERY NIGHT 90 tablet 5    montelukast (SINGULAIR) 10 MG tablet TAKE 1 TABLET BY MOUTH EVERY NIGHT 30 tablet 3    amiodarone (CORDARONE) 200 MG tablet Take 200 mg by mouth daily      apixaban (ELIQUIS) 5 MG TABS tablet Take 5 mg by mouth 2 times daily (Dr Ana Villalta)      metOLazone (ZAROXOLYN) 2.5 MG tablet Take metolazone twice a week. (Monday and Thursday). Pill pack this med. (Stop all other metolazone scripts) 8 tablet 3    midodrine (PROAMATINE) 2.5 MG tablet Take 1 tablet by mouth 3 times daily (with meals) 90 tablet 1    metoprolol succinate (TOPROL XL) 25 MG extended release tablet Take 1 tablet by mouth daily 30 tablet 1    amitriptyline (ELAVIL) 50 MG tablet TAKE ONE TABLET BY MOUTH NIGHTLY 30 tablet 3    bumetanide (BUMEX) 1 MG tablet TAKE ONE TABLET BY MOUTH EVERY DAY 30 tablet 3    levocetirizine (XYZAL) 5 MG tablet Take 1 tablet by mouth nightly 30 tablet 5    risperiDONE (RISPERDAL) 0.5 MG tablet Take 1 tablet by mouth 2 times daily 60 tablet 3    CPAP Machine MISC 1 each by Does not apply route nightly as needed       aspirin 81 MG tablet Take 81 mg by mouth nightly       [DISCONTINUED] pramipexole (MIRAPEX) 0.25 MG tablet TAKE TWO TABLETS BY MOUTH THREE TIMES A  tablet 5     No current facility-administered medications on file prior to visit. Allergies   Allergen Reactions    Pcn [Penicillins]      Child went to hospital   ? Reaction  Patient tolerates cephalosporins    Sulfa Antibiotics      ? I have reviewed his allergies, medications, problem list, medical,social and family history and have updated as needed in the electronic medical record. Objective:     Physical Exam  Constitutional:       General: He is not in acute distress. Appearance: Normal appearance. He is normal weight. He is not ill-appearing, toxic-appearing or diaphoretic. HENT:      Head: Normocephalic and atraumatic.    Genitourinary:     Comments: Deferred by patient Neurological:      General: No focal deficit present. Mental Status: He is alert and oriented to person, place, and time. Psychiatric:         Mood and Affect: Mood normal.         Behavior: Behavior normal.         Thought Content: Thought content normal.         Judgment: Judgment normal.       Assessment / Plan:   Dario Simon was seen today for diabetes. Diagnoses and all orders for this visit:    Severe muscle deconditioning    Type 2 diabetes mellitus with diabetic polyneuropathy, with long-term current use of insulin ([facility])  -     insulin glargine (BASAGLAR KWIKPEN) 100 UNIT/ML injection pen; Inject 50 Units into the skin 2 times daily  -     insulin lispro, 1 Unit Dial, (HUMALOG KWIKPEN) 100 UNIT/ML SOPN; 0 to 20 units . Sliding scale MAX 70 units daily  -     Continuous Blood Gluc  (FREESTYLE STEPHANIE 2 READER) MEDINA; As directed  -     Continuous Blood Gluc Sensor (FREESTYLE STEPHANIE 2 SENSOR) MISC; As directed  -     Comprehensive Metabolic Panel; Future    Type 2 diabetes mellitus with hyperglycemia, with long-term current use of insulin ([facility])  -     insulin glargine (BASAGLAR KWIKPEN) 100 UNIT/ML injection pen; Inject 50 Units into the skin 2 times daily  -     insulin lispro, 1 Unit Dial, (HUMALOG KWIKPEN) 100 UNIT/ML SOPN; 0 to 20 units . Sliding scale MAX 70 units daily  -     Continuous Blood Gluc  (FREESTYLE STEPHANIE 2 READER) MEDINA; As directed  -     Nick Irizarry MD, Endocrinology, [place]  -     Continuous Blood Gluc Sensor (FREESTYLE STEPHANIE 2 SENSOR) MISC; As directed    Generalized weakness    Acute diastolic heart failure ([facility]  -     Antonio Ford MD, Cardiology, [place]  -     Brain Natriuretic Peptide; Future    Orthopnea    SOB (shortness of breath) on exertion    Mixed hyperlipidemia    Acquired hypothyroidism  -     TSH;  Future  -     T4, Free; Future    PAF (paroxysmal atrial fibrillation) ([facility])    Screening for malignant neoplasm of prostate  -     PSA Screening; Future      Reviewed healthmaintenance report. Patient is aware of deficiencies and suggested preventative tests.

## 2023-01-12 NOTE — TELEPHONE ENCOUNTER
We need to ask him what his insulin Glargine (lantus or basaglar)dosage is. He should be taking 42 units twice a day. This is in addition to his humalog. He said he was taking at last visit. Not it says \"not taking\" on med list. We can send him a sliding scale but I need to know where he is at with this. I will try to get him a freestyle lashonda as well for continuous monitoring. He is also going to be set back up with endocrinology. He will have to see them regularly. This will take some time. They can make virtual appointment as well with wife present and we can discuss sliding scale. All you have to do is find out if taking insulin glargine and dosage. Sorry so wordy,  Thank you!

## 2023-01-12 NOTE — TELEPHONE ENCOUNTER
I called patient's home and [facility] requesting a call back.

## 2023-01-12 NOTE — TELEPHONE ENCOUNTER
Patient's wife, Kimberly Marrufo, called back and I informed that Dr. Tammy Jean advised patient to use 42 units of Basaglar or Lantus. Vivianjacdenis Yaron stated patient has been using 45 units. Maryandenis Yaron informed they don't know where patient's sliding scale is so he has been using hers. I informed that patient should make a VV Appt to discuss dosage and that there is an open appt today at 9:30 am that I can schedule a VV Appt to only discuss insulin/sliding scale. Kimberly Marrufo was agreeable. I advised that patient is ready with a notepad and pen to write down the directions/advisement from Dr. Tammy Jean.

## 2023-01-13 RX ORDER — INSULIN LISPRO 100 [IU]/ML
INJECTION, SOLUTION INTRAVENOUS; SUBCUTANEOUS
Qty: 3 ML | Refills: 3
Start: 2023-01-13

## 2023-01-13 RX ORDER — FLASH GLUCOSE SCANNING READER
EACH MISCELLANEOUS
Qty: 1 EACH | Refills: 4 | Status: SHIPPED | OUTPATIENT
Start: 2023-01-13

## 2023-01-13 RX ORDER — INSULIN GLARGINE 100 [IU]/ML
50 INJECTION, SOLUTION SUBCUTANEOUS 2 TIMES DAILY
Qty: 4 ADJUSTABLE DOSE PRE-FILLED PEN SYRINGE | Refills: 3 | Status: SHIPPED | OUTPATIENT
Start: 2023-01-13

## 2023-01-13 RX ORDER — FLASH GLUCOSE SENSOR
KIT MISCELLANEOUS
Qty: 1 EACH | Refills: 4 | Status: SHIPPED | OUTPATIENT
Start: 2023-01-13

## 2023-01-13 ASSESSMENT — ENCOUNTER SYMPTOMS
CHEST TIGHTNESS: 0
SHORTNESS OF BREATH: 0
SINUS PRESSURE: 0
RECTAL PAIN: 0
VOMITING: 0
COLOR CHANGE: 0
COUGH: 0
STRIDOR: 0
CONSTIPATION: 0
APNEA: 0
CHOKING: 0
ABDOMINAL PAIN: 0
NAUSEA: 0
EYE REDNESS: 0
VOICE CHANGE: 0
PHOTOPHOBIA: 0
EYE PAIN: 0
FACIAL SWELLING: 0
BLOOD IN STOOL: 0
ABDOMINAL DISTENTION: 0
EYE ITCHING: 0
SORE THROAT: 0
BACK PAIN: 1
WHEEZING: 0
TROUBLE SWALLOWING: 0
RHINORRHEA: 0
ANAL BLEEDING: 0
DIARRHEA: 0
EYE DISCHARGE: 0

## 2023-01-17 ENCOUNTER — TELEPHONE (OUTPATIENT)
Dept: FAMILY MEDICINE CLINIC | Age: 68
End: 2023-01-17

## 2023-01-17 DIAGNOSIS — Z79.4 TYPE 2 DIABETES MELLITUS WITH HYPERGLYCEMIA, WITH LONG-TERM CURRENT USE OF INSULIN (HCC): Primary | ICD-10-CM

## 2023-01-17 DIAGNOSIS — E11.65 TYPE 2 DIABETES MELLITUS WITH HYPERGLYCEMIA, WITH LONG-TERM CURRENT USE OF INSULIN (HCC): Primary | ICD-10-CM

## 2023-01-17 RX ORDER — INSULIN GLARGINE 100 [IU]/ML
INJECTION, SOLUTION SUBCUTANEOUS
Qty: 15 ADJUSTABLE DOSE PRE-FILLED PEN SYRINGE | Refills: 4 | Status: SHIPPED | OUTPATIENT
Start: 2023-01-17

## 2023-01-17 NOTE — TELEPHONE ENCOUNTER
Patient's insurance company called. She stated the Reza Dodson is not formulary but the lantus is. She is requesting new Rx be sent in for the Lantus. Please advise.   Last seen 1/12/2023  Next appt 4/6/2023  William

## 2023-01-17 NOTE — TELEPHONE ENCOUNTER
Insurance called stating they do not cover [place], they only cover [employer] or [employer] products or they can try to get it through a DME company like [address]?

## 2023-01-17 NOTE — TELEPHONE ENCOUNTER
[facility] called to report that she was just with patient and informed that patient is very tired and drowsy. Mimi Diaz had patient check his blood sugar which was 347 before eating anything. Patient injected 50 units of Insulin, rechecked blood sugar and it was 340. Mimi Diaz stated patient is having a very difficult time with the lancets and test strips and 25 minutes of his 60 minute PT appt was spent on his blood sugar. Mimi Diaz asked if Dr. Ambar Weiss would approve home nursing for medication management as well as a continuous blood glucose meter. Mimi Diaz also informed that patient's wife tries to help patient, however, due to her own health issues and dialysis, it's difficult for her as well. Please advise.     Last seen 1/12/2023  Next appt 4/6/2023

## 2023-01-17 NOTE — TELEPHONE ENCOUNTER
I called Sheryle Dus and left a detailed  msg informing that Dr. Ken Turner is agreeable w/home nursing orders for medication management and that orders were sent in for Continuous Blood Glucose /Sensors. I called patient and informed of above.

## 2023-01-18 ENCOUNTER — TELEPHONE (OUTPATIENT)
Dept: ADMINISTRATIVE | Age: 68
End: 2023-01-18

## 2023-01-18 NOTE — TELEPHONE ENCOUNTER
placed a referral for Acute diastolic heart failure. Please advise when  would like to see pt. Thank you.

## 2023-02-10 RX ORDER — RISPERIDONE 0.5 MG/1
0.5 TABLET ORAL 2 TIMES DAILY
Qty: 60 TABLET | Refills: 3 | Status: CANCELLED | OUTPATIENT
Start: 2023-02-10

## 2023-02-10 NOTE — TELEPHONE ENCOUNTER
Patient's wife, Carmen Shin, called for refill.     Last seen 1/12/2023  Next appt 2/13/2023  Ronna Yang/Tadeo

## 2023-02-13 ENCOUNTER — TELEMEDICINE (OUTPATIENT)
Dept: FAMILY MEDICINE CLINIC | Age: 68
End: 2023-02-13
Payer: MEDICARE

## 2023-02-13 DIAGNOSIS — R53.82 CHRONIC FATIGUE: ICD-10-CM

## 2023-02-13 DIAGNOSIS — E03.9 ACQUIRED HYPOTHYROIDISM: ICD-10-CM

## 2023-02-13 DIAGNOSIS — Z79.4 TYPE 2 DIABETES MELLITUS WITH HYPERGLYCEMIA, WITH LONG-TERM CURRENT USE OF INSULIN (HCC): Primary | ICD-10-CM

## 2023-02-13 DIAGNOSIS — F34.1 DYSTHYMIA: ICD-10-CM

## 2023-02-13 DIAGNOSIS — E78.2 MIXED HYPERLIPIDEMIA: ICD-10-CM

## 2023-02-13 DIAGNOSIS — R53.1 GENERALIZED WEAKNESS: ICD-10-CM

## 2023-02-13 DIAGNOSIS — E11.65 TYPE 2 DIABETES MELLITUS WITH HYPERGLYCEMIA, WITH LONG-TERM CURRENT USE OF INSULIN (HCC): Primary | ICD-10-CM

## 2023-02-13 PROCEDURE — 1123F ACP DISCUSS/DSCN MKR DOCD: CPT | Performed by: FAMILY MEDICINE

## 2023-02-13 PROCEDURE — 3046F HEMOGLOBIN A1C LEVEL >9.0%: CPT | Performed by: FAMILY MEDICINE

## 2023-02-13 PROCEDURE — 99214 OFFICE O/P EST MOD 30 MIN: CPT | Performed by: FAMILY MEDICINE

## 2023-02-13 RX ORDER — RISPERIDONE 0.5 MG/1
0.5 TABLET ORAL
Qty: 30 TABLET | Refills: 3 | Status: SHIPPED | OUTPATIENT
Start: 2023-02-13

## 2023-02-13 RX ORDER — LEVOTHYROXINE SODIUM 0.1 MG/1
100 TABLET ORAL DAILY
Qty: 90 TABLET | Refills: 4 | Status: SHIPPED | OUTPATIENT
Start: 2023-02-13

## 2023-02-13 SDOH — ECONOMIC STABILITY: FOOD INSECURITY: WITHIN THE PAST 12 MONTHS, YOU WORRIED THAT YOUR FOOD WOULD RUN OUT BEFORE YOU GOT MONEY TO BUY MORE.: PATIENT DECLINED

## 2023-02-13 SDOH — ECONOMIC STABILITY: INCOME INSECURITY: HOW HARD IS IT FOR YOU TO PAY FOR THE VERY BASICS LIKE FOOD, HOUSING, MEDICAL CARE, AND HEATING?: PATIENT DECLINED

## 2023-02-13 SDOH — ECONOMIC STABILITY: HOUSING INSECURITY
IN THE LAST 12 MONTHS, WAS THERE A TIME WHEN YOU DID NOT HAVE A STEADY PLACE TO SLEEP OR SLEPT IN A SHELTER (INCLUDING NOW)?: PATIENT REFUSED

## 2023-02-13 SDOH — ECONOMIC STABILITY: FOOD INSECURITY: WITHIN THE PAST 12 MONTHS, THE FOOD YOU BOUGHT JUST DIDN'T LAST AND YOU DIDN'T HAVE MONEY TO GET MORE.: PATIENT DECLINED

## 2023-02-16 ENCOUNTER — OFFICE VISIT (OUTPATIENT)
Dept: CARDIOLOGY CLINIC | Age: 68
End: 2023-02-16
Payer: MEDICARE

## 2023-02-16 VITALS
RESPIRATION RATE: 16 BRPM | WEIGHT: 257 LBS | BODY MASS INDEX: 41.3 KG/M2 | DIASTOLIC BLOOD PRESSURE: 76 MMHG | HEART RATE: 98 BPM | SYSTOLIC BLOOD PRESSURE: 112 MMHG | HEIGHT: 66 IN

## 2023-02-16 DIAGNOSIS — I25.10 CORONARY ARTERY DISEASE INVOLVING NATIVE CORONARY ARTERY OF NATIVE HEART WITHOUT ANGINA PECTORIS: Primary | ICD-10-CM

## 2023-02-16 PROCEDURE — 99214 OFFICE O/P EST MOD 30 MIN: CPT | Performed by: INTERNAL MEDICINE

## 2023-02-16 PROCEDURE — 1123F ACP DISCUSS/DSCN MKR DOCD: CPT | Performed by: INTERNAL MEDICINE

## 2023-02-16 PROCEDURE — 3078F DIAST BP <80 MM HG: CPT | Performed by: INTERNAL MEDICINE

## 2023-02-16 PROCEDURE — 93000 ELECTROCARDIOGRAM COMPLETE: CPT | Performed by: INTERNAL MEDICINE

## 2023-02-16 PROCEDURE — 3074F SYST BP LT 130 MM HG: CPT | Performed by: INTERNAL MEDICINE

## 2023-02-16 NOTE — PROGRESS NOTES
[id number] [facility] Cardiology Progress Note  Dr. Camilo Harper      Referring Physician: Meryl Ac DO  CHIEF COMPLAINT:   Chief Complaint   Patient presents with    Atrial Fibrillation     OV per Richarda Room DX CHF        HISTORY OF PRESENT ILLNESS:   Patient is 79year old male with history of atrial fibrillation, CHF, CAD, DM, hypertension, hyperlipidemia and COPD is here for a follow up appointment. Patient denies any chest pain, no shortness of breath, no lightheadedness, no dizziness, no palpitations, no pedal edema, no PND, no orthopnea, no syncope, no presyncopal episodes. Functional capacity is at baseline    Past Medical History:   Diagnosis Date    Acid reflux     Acute diastolic (congestive) heart failure ([facility] [address] 06/19/2019    Arthritis     Asthma 04/16/2014    Asthmatic bronchitis , chronic ([facility] [address] 11/28/2016    CAD (coronary artery disease)     Chronic bronchitis (HCC) 04/16/2014    COPD (chronic obstructive pulmonary disease) ([facility])     CB    COPD (chronic obstructive pulmonary disease) (HCC)     Diabetes mellitus ([facility] [address]     Emphysema (subcutaneous) (surgical) resulting from a procedure     H/O cardiovascular stress test 04/24/2021    Lexiscan    Hyperlipidemia     Hypertension     Hypoxemia requiring supplemental oxygen 02/02/2015    LONG TERM ANTICOAGULENT USE     Morbid obesity with BMI of 50.0-59.9, adult ([facility] [address] 11/27/2013    Obesity     Osteoarthritis     Sleep apnea     bilevel positive airway pressure at 13/8 with 2 L oxygen flow     Stage 3 chronic kidney disease ([facility] [address]     Tobacco abuse     Type II or unspecified type diabetes mellitus without mention of complication, not stated as uncontrolled          Past Surgical History:   Procedure Laterality Date    COLONOSCOPY      CORONARY ANGIOPLASTY WITH STENT PLACEMENT  07/23/2013    Left main focal eccentric 65% distal stenosis. Large OM1 CX 50% ostial & prox narrow. Large ramus artery & LAD: Minor plaque w/o sign narrow.  RCA: Dom vessel w/25% prox narrow & focal hazy eccentric 99% distal stenosis before origin RPDA & RPLCA. LV: Mild inferior hypokinesis EF 55%. Probable mild AS with 10-15mmHg. Successful PCI distal RCA w/3.5 x 12 mm BMS, 0% res stenosis. Normal distal runoff    CORONARY ARTERY BYPASS GRAFT  09/09/2013    Dr Rosalba Kline; CABG x2, LIMA to LAD, SVG to ramus intermedius; MV repair using 30-mm Future complete ring with magic stitch between A3 and P3; rigid internal fixation of sternum using KLS plates x2; endoscopic vein harvesting of right lower extremity     ECHO COMPL W DOP COLOR FLOW  07/25/2013         HERNIA REPAIR      SINUS SURGERY      TONSILLECTOMY           Current Outpatient Medications   Medication Sig Dispense Refill    risperiDONE (RISPERDAL) 0.5 MG tablet Take 1 tablet by mouth nightly 30 tablet 3    levothyroxine (SYNTHROID) 100 MCG tablet Take 1 tablet by mouth Daily 90 tablet 4    insulin glargine (LANTUS SOLOSTAR) 100 UNIT/ML injection pen 50 units twice a day (Patient taking differently: 60 units twice a day) 15 Adjustable Dose Pre-filled Pen Syringe 4    insulin lispro, 1 Unit Dial, (HUMALOG KWIKPEN) 100 UNIT/ML SOPN 0 to 20 units . Sliding scale MAX 70 units daily 3 mL 3    Continuous Blood Gluc  (FREESTYLE STEPHANIE 2 READER) MEDINA As directed 1 each 4    Continuous Blood Gluc Sensor (FREESTYLE STEPHANIE 2 SENSOR) MISC As directed 1 each 4    potassium chloride (KLOR-CON M) 20 MEQ extended release tablet Take 1 tablet by mouth daily for 14 days 14 tablet 0    pantoprazole (PROTONIX) 40 MG tablet Take 1 tablet by mouth daily 90 tablet 5    atorvastatin (LIPITOR) 80 MG tablet TAKE ONE TABLET BY MOUTH EVERY NIGHT 90 tablet 5    montelukast (SINGULAIR) 10 MG tablet TAKE 1 TABLET BY MOUTH EVERY NIGHT 30 tablet 3    metOLazone (ZAROXOLYN) 2.5 MG tablet Take metolazone twice a week. (Monday and Thursday). Pill pack this med.  (Stop all other metolazone scripts) 8 tablet 3    midodrine (PROAMATINE) 2.5 MG tablet Take 1 tablet by mouth 3 times daily (with meals) 90 tablet 1    metoprolol succinate (TOPROL XL) 25 MG extended release tablet Take 1 tablet by mouth daily 30 tablet 1    amitriptyline (ELAVIL) 50 MG tablet TAKE ONE TABLET BY MOUTH NIGHTLY 30 tablet 3    bumetanide (BUMEX) 1 MG tablet TAKE ONE TABLET BY MOUTH EVERY DAY 30 tablet 3    CPAP Machine MISC 1 each by Does not apply route nightly as needed       aspirin 81 MG tablet Take 81 mg by mouth nightly       empagliflozin (JARDIANCE) 10 MG tablet Take 1 tablet by mouth daily (Patient not taking: Reported on 2023) 30 tablet 5    amiodarone (CORDARONE) 200 MG tablet Take 200 mg by mouth daily (Patient not taking: Reported on 2023)      apixaban (ELIQUIS) 5 MG TABS tablet Take 5 mg by mouth 2 times daily (Dr Serenity Whitfield) (Patient not taking: Reported on 2023)      levocetirizine (XYZAL) 5 MG tablet Take 1 tablet by mouth nightly (Patient not taking: Reported on 2023) 30 tablet 5     No current facility-administered medications for this visit.          Allergies as of 2023 - Fully Reviewed 2023   Allergen Reaction Noted    Pcn [penicillins]  2013    Sulfa antibiotics  2013       Social History     Socioeconomic History    Marital status:      Spouse name: Tiffanie Gilliland    Number of children: 1    Years of education: Not on file    Highest education level: 11th grade   Occupational History    Not on file   Tobacco Use    Smoking status: Former     Packs/day: 1.00     Years: 45.00     Pack years: 45.00     Types: Cigarettes     Quit date: 2013     Years since quittin.5    Smokeless tobacco: Former     Types: Chew     Quit date: 10/8/1971   Vaping Use    Vaping Use: Never used   Substance and Sexual Activity    Alcohol use: No     Alcohol/week: 0.0 standard drinks     Comment: 1-2 coffee per day    Drug use: Yes     Types: Marijuana Valdene Arroyo)    Sexual activity: Yes     Partners: Female   Other Topics Concern    Not on file   Social History Narrative    19  [facility] reviewed SDOH with Karen Reardon. He does have money strain but between the income he has coming in and his wife's they are over resources for SARY programs. Offered food panty info to help with end of the month struggles but he declined stating that he tried and was refused due to his income. He belongs to a [employer] and goes weekly so he has some community connections in place. He has an extremely high interest rate on his mortgage which he was encouraged to look into getting lowered to help save money on his house payments. Noticed some difficulty with memory recall. Becomes easily flustered at times. Has no MHI to support applying for [facility] program of SARY. States he does not feel depressed or need any MH treatment. Social Determinants of Health     Financial Resource Strain: Unknown    Difficulty of Paying Living Expenses: Patient refused   Food Insecurity: Unknown    Worried About Running Out of Food in the Last Year: Patient refused    [address] in the Last Year: Patient refused   Transportation Needs: Unknown    Lack of Transportation (Medical):  Not on file    Lack of Transportation (Non-Medical): Patient refused   Physical Activity: Not on file   Stress: Not on file   Social Connections: Not on file   Intimate Partner Violence: Not on file   Housing Stability: Unknown    Unable to Pay for Housing in the Last Year: Not on file    Number of [place] in the Last Year: Not on file    Unstable Housing in the Last Year: Patient refused       Family History   Problem Relation Age of Onset    Cancer Mother         breast    Cancer Father         stomach    Mental Illness Brother     Stroke Brother     Other Brother        REVIEW OF SYSTEMS:     CONSTITUTIONAL:  negative for  fevers, chills, sweats, + fatigue  HEENT:  negative for  tinnitus, earaches, nasal congestion and epistaxis  RESPIRATORY:  negative for  dry cough, cough with sputum, wheezing and hemoptysis  GASTROINTESTINAL:  negative for nausea, vomiting, diarrhea, constipation, pruritus and jaundice  HEMATOLOGIC/LYMPHATIC:  negative for easy bruising, bleeding, lymphadenopathy and petechiae  ENDOCRINE:  negative for heat intolerance, cold intolerance, tremor, hair loss and diabetic symptoms including neither polyuria nor polydipsia nor blurred vision  MUSCULOSKELETAL:  negative for  myalgias, arthralgias, joint swelling, stiff joints and decreased range of motion  NEUROLOGICAL:  negative for memory problems, speech problems, visual disturbance, dysphagia, weakness and numbness      PHYSICAL EXAM:   CONSTITUTIONAL:  awake, alert, cooperative, no apparent distress, and appears stated age  HEAD:  normocepalic, without obvious abnormality, atraumatic, pink, moist mucous membranes. NECK:  Supple, symmetrical, trachea midline, no adenopathy, thyroid symmetric, not enlarged and no tenderness, skin normal  LUNGS:  No increased work of breathing, good air exchange, clear to auscultation bilaterally, no crackles or wheezing  CARDIOVASCULAR:  Normal apical impulse, irregularly irregular, normal S1 and S2, no S3, 3/6 systolic murmur at the apex, 3/6 systolic murmur at the left lower sternal border, no JVD, no carotid bruit, no pedal edema, good carotid upstroke bilaterally. ABDOMEN:  Soft, nontender, no masses, no hepatomegaly or splenomegaly, BS+  CHEST: nontender to palpation, expands symmetrically  MUSCULOSKELETAL:  No clubbing no cyanosis. there is no redness, warmth, or swelling of the joints  full range of motion noted  NEUROLOGIC:  Alert, awake,oriented x3.   SKIN:  no bruising or bleeding, normal skin color, texture, turgor and no redness, warmth, or swelling        /76   Pulse 98   Resp 16   Ht 5' 6\" (1.676 m)   Wt 257 lb (116.6 kg)   BMI 41.48 kg/m²     DATA:   I personally reviewed the visit EKG with the following interpretation: Atrial fibrillation, controlled ventricular response, right bundle branch block, nonspecific T wave changes    EKG 4/25/2022, sinus bradycardia, nonspecific T wave changes    EKG 10/28/21 Sinus tachycardia  Incomplete right bundle branch block  Septal infarct , new  Inferior injury pattern  Consider right ventricular involvement in acute inferior infarct  Abnormal ECG     ECHO: 9/24/2022, Summary   Left ventricular size is grossly normal.   Mild left ventricular concentric hypertrophy noted. Ejection fraction is visually estimated at 55%. No evidence of left ventricular mass or thrombus noted. No regional wall motion abnormalities seen. Normal sized left atrium. Borderline dilated right ventricle. Physiologic and/or trace mitral regurgitation is present. No evidence of mitral valve stenosis. Aortic valve replacement   No evidence of aortic valve regurgitation. Mild-to-moderate aortic stenosis. The aortic valve area is 1.2 cm2 with a maximum gradient of 40.35 mmHg and   a mean gradient of 23.37 mmHg. Physiologic and/or trace tricuspid regurgitation. Regular rhythm. 6/14/21 Summary   Left ventricular size is grossly normal.   Moderate left ventricular concentric hypertrophy noted. Ejection fraction is visually estimated at 45%. No evidence of left ventricular mass or thrombus noted. The left atrium is moderately dilated   Poor images for bubble study   Mildly dilated right ventricle. Mildly enlarged right atrium size. Moderate mitral regurgitation is present. No evidence of mitral valve stenosis. The aortic valve is trileaflet. The aortic valve appears mildly sclerotic. Physiologic and/or trace aortic regurgitation is noted. Mild aortic stenosis. The aortic valve area is 1.75 cm2 with a maximum gradient of 24.82 mmHg   and a mean gradient of 14.08 mmHg. Moderate tricuspid regurgitation. Irregular rhythm    Stress Test: 4/24/21       The myocardial perfusion imaging was normal with attenuation.        Overall left ventricular systolic function was normal without regional   wall motion abnormalities. Overall low risk study.              CABG/MVR 9/9/13 Sternotomy; coronary artery bypass grafting x2, left internal   mammary artery to the LAD, saphenous vein graft to the ramus intermedius;   mitral valve repair using 30-mm Future complete ring with magic stitch   between A3 and P3; rigid internal fixation of the sternum using KLS plates   x2; and endoscopic vein harvesting    Cardiology Labs: BMP:    Lab Results   Component Value Date/Time     01/06/2023 02:57 PM    K 3.1 01/06/2023 02:57 PM    K 5.8 10/24/2022 05:46 PM    CL 88 01/06/2023 02:57 PM    CO2 33 01/06/2023 02:57 PM    BUN 16 01/06/2023 02:57 PM    CREATININE 1.3 01/06/2023 02:57 PM     CMP:    Lab Results   Component Value Date/Time     01/06/2023 02:57 PM    K 3.1 01/06/2023 02:57 PM    K 5.8 10/24/2022 05:46 PM    CL 88 01/06/2023 02:57 PM    CO2 33 01/06/2023 02:57 PM    BUN 16 01/06/2023 02:57 PM    CREATININE 1.3 01/06/2023 02:57 PM    PROT 7.9 01/04/2023 03:20 PM     CBC:    Lab Results   Component Value Date/Time    WBC 6.9 01/06/2023 02:57 PM    RBC 4.80 01/06/2023 02:57 PM    HGB 15.2 01/06/2023 02:57 PM    HCT 44.8 01/06/2023 02:57 PM    MCV 93.3 01/06/2023 02:57 PM    RDW 14.7 01/06/2023 02:57 PM     01/06/2023 02:57 PM     PT/INR:  No results found for: PTINR  PT/INR Warfarin:  No components found for: PTPATWAR, PTINRWAR  PTT:    Lab Results   Component Value Date/Time    APTT 29.5 10/24/2022 05:46 PM     PTT Heparin:  No components found for: APTTHEP  Magnesium:    Lab Results   Component Value Date/Time    MG 1.7 01/06/2023 02:57 PM     TSH:    Lab Results   Component Value Date/Time    TSH 28.480 01/04/2023 03:20 PM     TROPONIN:  No components found for: TROP  BNP:  No results found for: BNP  FASTING LIPID PANEL:    Lab Results   Component Value Date/Time    CHOL 123 12/16/2021 08:56 AM    HDL 42 12/16/2021 08:56 AM    TRIG 104 12/16/2021 08:56 AM     No orders to display     I have personally reviewed the laboratory, cardiac diagnostic and radiographic testing as outlined above:      IMPRESSION:  1. Atrial fibrillation: Controlled ventricular response, continue current treatment, will continue Coumadin for anticoagulation  2. Combined congestive heart failure: Chronic, compensated, continue current treatment  3. CAD: S/p CABG with LIMA to LAD, SVG to ramus intermedius artery  4. Status post mitral valve repair using #30-mm Future complete ring with magic stitch between A3 and P3, moderate MR on recent echocardiogram  5. Aortic valve stenosis: Mild to moderate  6. Hypertension: Controlled  7. Type 2 diabetes mellitus  8. Hyperlipidemia: On statin  9. Chronic anticoagulation    RECOMMENDATIONS:   1.  will adjust Toprol and amiodarone dose  2. CHF: Daily weight, take an extra Bumex for weight gain of more than 2-3 pounds in 24 hours, compliance with diuretics, low-salt diet were all advised. 3.  Increase risk of bleeding due to being on anti-coagulation, symptoms and signs of bleeding discussed with patient, patient was advised to seek medical attention at the earliest symptoms or signs of bleeding. 4.  Preventive cardiology: Low-salt, low-cholesterol diet, daily exercise, total cholesterol of less than 200, LDL of less than 70, adherence to diabetic diet and diabetic medications, were all advised. 5.  Follow-up with Dr. Jose Rivers as scheduled  6. Follow-up with Dr. Sunday Salomon in 6 months, sooner if symptomatic for any reason    I have reviewed my findings and recommendations with patient    Electronically signed by Naty Zhu MD on 2/16/2023 at 11:40 AM  NOTE: This report was transcribed using voice recognition software.  Every effort was made to ensure accuracy; however, inadvertent computerized transcription errors may be present

## 2023-02-17 ASSESSMENT — ENCOUNTER SYMPTOMS
EYE ITCHING: 0
ABDOMINAL DISTENTION: 0
FACIAL SWELLING: 0
EYE REDNESS: 0
COUGH: 0
RHINORRHEA: 0
EYE DISCHARGE: 0
SHORTNESS OF BREATH: 0
PHOTOPHOBIA: 0
SINUS PRESSURE: 0
VOICE CHANGE: 0
DIARRHEA: 0
ABDOMINAL PAIN: 0
CHOKING: 0
BACK PAIN: 0
WHEEZING: 0
ANAL BLEEDING: 0
APNEA: 0
SORE THROAT: 0
BLOOD IN STOOL: 0
TROUBLE SWALLOWING: 0
COLOR CHANGE: 0
CHEST TIGHTNESS: 0
CONSTIPATION: 0
STRIDOR: 0
VOMITING: 0
RECTAL PAIN: 0
EYE PAIN: 0
NAUSEA: 0

## 2023-02-17 NOTE — PROGRESS NOTES
Todd Polo is a 79 y.o. male  . Subjective:      Patient doing much better since discharge from hospital.  Following up with cardiology and cardiac rehab. Patient has been fatigued in the daytime. Patient's thyroid is still very low we will increase this. We will eliminate daytime dose of Risperdal and just have him take it at night. Follow-up in 2 weeks and see if his symptoms have improved. Review of Systems   Constitutional:  Positive for fatigue. Negative for activity change, appetite change, chills, diaphoresis, fever and unexpected weight change. HENT:  Negative for congestion, dental problem, drooling, ear discharge, ear pain, facial swelling, hearing loss, mouth sores, nosebleeds, postnasal drip, rhinorrhea, sinus pressure, sneezing, sore throat, tinnitus, trouble swallowing and voice change. Eyes:  Negative for photophobia, pain, discharge, redness, itching and visual disturbance. Respiratory:  Negative for apnea, cough, choking, chest tightness, shortness of breath, wheezing and stridor. Cardiovascular:  Negative for chest pain, palpitations and leg swelling. Gastrointestinal:  Negative for abdominal distention, abdominal pain, anal bleeding, blood in stool, constipation, diarrhea, nausea, rectal pain and vomiting. Endocrine: Negative for cold intolerance, heat intolerance, polydipsia, polyphagia and polyuria. Genitourinary:  Negative for decreased urine volume, difficulty urinating, dysuria, enuresis, flank pain, frequency, genital sores, hematuria, penile discharge, penile pain, penile swelling, scrotal swelling, testicular pain and urgency. Musculoskeletal:  Negative for arthralgias, back pain, gait problem, joint swelling, myalgias, neck pain and neck stiffness. Skin:  Negative for color change, pallor, rash and wound. Allergic/Immunologic: Negative for environmental allergies, food allergies and immunocompromised state.    Neurological:  Negative for dizziness, tremors, seizures, syncope, facial asymmetry, speech difficulty, weakness, light-headedness, numbness and headaches. Hematological:  Negative for adenopathy. Does not bruise/bleed easily. Psychiatric/Behavioral:  Negative for agitation, behavioral problems, confusion, decreased concentration, dysphoric mood, hallucinations, self-injury, sleep disturbance and suicidal ideas. The patient is not nervous/anxious and is not hyperactive.       Past Medical History:   Diagnosis Date    Acid reflux     Acute diastolic (congestive) heart failure ([facility] 2019    Arthritis     Asthma 2014    Asthmatic bronchitis , chronic ([facility] 2016    CAD (coronary artery disease)     Chronic bronchitis ([facility] 2014    COPD (chronic obstructive pulmonary disease) ([facility])     CB    COPD (chronic obstructive pulmonary disease) (HCC)     Diabetes mellitus ([facility])     Emphysema (subcutaneous) (surgical) resulting from a procedure     H/O cardiovascular stress test 2021    Lexiscan    Hyperlipidemia     Hypertension     Hypoxemia requiring supplemental oxygen 2015    LONG TERM ANTICOAGULENT USE     Morbid obesity with BMI of 50.0-59.9, adult ([facility] 2013    Obesity     Osteoarthritis     Sleep apnea     bilevel positive airway pressure at 13/8 with 2 L oxygen flow     Stage 3 chronic kidney disease (HCC)     Tobacco abuse     Type II or unspecified type diabetes mellitus without mention of complication, not stated as uncontrolled        Social History     Socioeconomic History    Marital status:      Spouse name: Lyric Walter    Number of children: 1    Years of education: Not on file    Highest education level: 11th grade   Occupational History    Not on file   Tobacco Use    Smoking status: Former     Packs/day: 1.00     Years: 45.00     Pack years: 45.00     Types: Cigarettes     Quit date: 2013     Years since quittin.5    Smokeless tobacco: Former     Types: Chew     Quit date: 10/8/1971   Vaping Use    Vaping Use: Never used   Substance and Sexual Activity    Alcohol use: No     Alcohol/week: 0.0 standard drinks     Comment: 1-2 coffee per day    Drug use: Yes     Types: Marijuana Estil Catena)    Sexual activity: Yes     Partners: Female   Other Topics Concern    Not on file   Social History Narrative    8-26-19  [facility] reviewed SDOH with Arti Montanez. He does have money strain but between the income he has coming in and his wife's they are over resources for SARY programs. Offered food panty info to help with end of the month struggles but he declined stating that he tried and was refused due to his income. He belongs to a [employer] and goes weekly so he has some community connections in place. He has an extremely high interest rate on his mortgage which he was encouraged to look into getting lowered to help save money on his house payments. Noticed some difficulty with memory recall. Becomes easily flustered at times. Has no MHI to support applying for [facility] [address] [address] program of [facility]. States he does not feel depressed or need any MH treatment. Social Determinants of Health     Financial Resource Strain: Unknown    Difficulty of Paying Living Expenses: Patient refused   Food Insecurity: Unknown    Worried About Running Out of Food in the Last Year: Patient refused    [address] in the Last Year: Patient refused   Transportation Needs: Unknown    Lack of Transportation (Medical):  Not on file    Lack of Transportation (Non-Medical): Patient refused   Physical Activity: Not on file   Stress: Not on file   Social Connections: Not on file   Intimate Partner Violence: Not on file   Housing Stability: Unknown    Unable to Pay for Housing in the Last Year: Not on file    Number of Places Lived in the Last Year: Not on file    Unstable Housing in the Last Year: Patient refused       Family History   Problem Relation Age of Onset    Cancer Mother         breast    Cancer Father         stomach    Mental Illness Brother Stroke Brother     Other Brother        Current Outpatient Medications on File Prior to Visit   Medication Sig Dispense Refill    insulin glargine (LANTUS SOLOSTAR) 100 UNIT/ML injection pen 50 units twice a day (Patient taking differently: 60 units twice a day) 15 Adjustable Dose Pre-filled Pen Syringe 4    insulin lispro, 1 Unit Dial, (HUMALOG KWIKPEN) 100 UNIT/ML SOPN 0 to 20 units . Sliding scale MAX 70 units daily 3 mL 3    Continuous Blood Gluc  (FREESTYLE STEPHANIE 2 READER) MEDINA As directed 1 each 4    Continuous Blood Gluc Sensor (FREESTYLE STEPHANIE 2 SENSOR) MISC As directed 1 each 4    empagliflozin (JARDIANCE) 10 MG tablet Take 1 tablet by mouth daily (Patient not taking: Reported on 2/16/2023) 30 tablet 5    pantoprazole (PROTONIX) 40 MG tablet Take 1 tablet by mouth daily 90 tablet 5    atorvastatin (LIPITOR) 80 MG tablet TAKE ONE TABLET BY MOUTH EVERY NIGHT 90 tablet 5    montelukast (SINGULAIR) 10 MG tablet TAKE 1 TABLET BY MOUTH EVERY NIGHT 30 tablet 3    amiodarone (CORDARONE) 200 MG tablet Take 200 mg by mouth daily (Patient not taking: Reported on 2/16/2023)      apixaban (ELIQUIS) 5 MG TABS tablet Take 5 mg by mouth 2 times daily (Dr Manjit Tan) (Patient not taking: Reported on 2/16/2023)      metOLazone (ZAROXOLYN) 2.5 MG tablet Take metolazone twice a week. (Monday and Thursday). Pill pack this med.  (Stop all other metolazone scripts) 8 tablet 3    midodrine (PROAMATINE) 2.5 MG tablet Take 1 tablet by mouth 3 times daily (with meals) 90 tablet 1    metoprolol succinate (TOPROL XL) 25 MG extended release tablet Take 1 tablet by mouth daily 30 tablet 1    amitriptyline (ELAVIL) 50 MG tablet TAKE ONE TABLET BY MOUTH NIGHTLY 30 tablet 3    bumetanide (BUMEX) 1 MG tablet TAKE ONE TABLET BY MOUTH EVERY DAY 30 tablet 3    levocetirizine (XYZAL) 5 MG tablet Take 1 tablet by mouth nightly (Patient not taking: Reported on 2/16/2023) 30 tablet 5    CPAP Machine MISC 1 each by Does not apply route nightly as needed       aspirin 81 MG tablet Take 81 mg by mouth nightly       potassium chloride (KLOR-CON M) 20 MEQ extended release tablet Take 1 tablet by mouth daily for 14 days 14 tablet 0    [DISCONTINUED] pramipexole (MIRAPEX) 0.25 MG tablet TAKE TWO TABLETS BY MOUTH THREE TIMES A  tablet 5     No current facility-administered medications on file prior to visit. Allergies   Allergen Reactions    Pcn [Penicillins]      Child went to hospital   ? Reaction  Patient tolerates cephalosporins    Sulfa Antibiotics      ? I have reviewed his allergies, medications, problem list, medical,social and family history and have updated as needed in the electronic medical record. Objective:     Physical Exam  Constitutional:       General: He is not in acute distress. Appearance: Normal appearance. He is normal weight. He is not ill-appearing, toxic-appearing or diaphoretic. HENT:      Head: Normocephalic and atraumatic. Pulmonary:      Comments: No respiratory distress  Skin:     Comments: No rash, no lesion   Neurological:      General: No focal deficit present. Mental Status: He is alert and oriented to person, place, and time. Psychiatric:         Mood and Affect: Mood normal.         Behavior: Behavior normal.         Thought Content: Thought content normal.         Judgment: Judgment normal.       Assessment / Plan:   Rob Call was seen today for follow-up. Diagnoses and all orders for this visit:    Type 2 diabetes mellitus with hyperglycemia, with long-term current use of insulin ([facility]  -     Hemoglobin A1C; Future    Acquired hypothyroidism  -     TSH; Future  -     T4, Free; Future  -     levothyroxine (SYNTHROID) 100 MCG tablet; Take 1 tablet by mouth Daily    Mixed hyperlipidemia    Generalized weakness    Chronic fatigue  -     Comprehensive Metabolic Panel; Future  -     CBC with Auto Differential; Future    Dysthymia  -     risperiDONE (RISPERDAL) 0.5 MG tablet;  Take 1 tablet by mouth nightly    TeleMedicine Video Visit    Kate Alanis, was evaluated through a synchronous (real-time) audio-video encounter. The patient (or guardian if applicable) is aware that this is a billable service. , which includes applicable co-pays. This virtual visit was conducted with the patient's  (and/or legal guardian's) consent. The visit was conducted pursuant to the emergency declaration under the [address] [place] [address] and the [employer] General Act. Patient identification was verified, and a caregiver was present when appropriate. The patient was located in a state where the provider was licensed to provide care. Patient identification was verified at the start of the visit, including the patient's telephone number and physical location. I discussed with the patient the nature of our telehealth visits, that:     Due to the nature of an audio- video modality, the only components of a physical exam that could be done are the elements supported by direct observation. I would evaluate the patient and recommend diagnostics and treatments based on my assessment. If it was felt that the patient should be evaluated in clinic or an emergency room setting, then they would be directed there. Our sessions are not being recorded and that personal health information is protected. Our team would provide follow up care in person if/when the patient needs it. Patient's location: home address in [facility]  Physician  location other address in [facility] other people involved in call  wife           This visit was completed virtually using [employer]. me       Reviewed healthmaintenance report. Patient is aware of deficiencies and suggested preventative tests.

## 2023-02-23 DIAGNOSIS — R53.82 CHRONIC FATIGUE: ICD-10-CM

## 2023-02-23 DIAGNOSIS — Z79.4 TYPE 2 DIABETES MELLITUS WITH HYPERGLYCEMIA, WITH LONG-TERM CURRENT USE OF INSULIN (HCC): ICD-10-CM

## 2023-02-23 DIAGNOSIS — E03.9 ACQUIRED HYPOTHYROIDISM: ICD-10-CM

## 2023-02-23 DIAGNOSIS — Z12.5 SCREENING FOR MALIGNANT NEOPLASM OF PROSTATE: ICD-10-CM

## 2023-02-23 DIAGNOSIS — I50.31 ACUTE DIASTOLIC HEART FAILURE (HCC): ICD-10-CM

## 2023-02-23 DIAGNOSIS — E11.65 TYPE 2 DIABETES MELLITUS WITH HYPERGLYCEMIA, WITH LONG-TERM CURRENT USE OF INSULIN (HCC): ICD-10-CM

## 2023-02-23 LAB
ALBUMIN SERPL-MCNC: 3.7 G/DL (ref 3.5–5.2)
ALP BLD-CCNC: 106 U/L (ref 40–129)
ALT SERPL-CCNC: 18 U/L (ref 0–40)
ANION GAP SERPL CALCULATED.3IONS-SCNC: 18 MMOL/L (ref 7–16)
AST SERPL-CCNC: 23 U/L (ref 0–39)
BASOPHILS ABSOLUTE: 0.06 E9/L (ref 0–0.2)
BASOPHILS RELATIVE PERCENT: 0.9 % (ref 0–2)
BILIRUB SERPL-MCNC: 1.4 MG/DL (ref 0–1.2)
BUN BLDV-MCNC: 16 MG/DL (ref 6–23)
CALCIUM SERPL-MCNC: 9.2 MG/DL (ref 8.6–10.2)
CHLORIDE BLD-SCNC: 101 MMOL/L (ref 98–107)
CO2: 24 MMOL/L (ref 22–29)
CREAT SERPL-MCNC: 1.2 MG/DL (ref 0.7–1.2)
EOSINOPHILS ABSOLUTE: 0.11 E9/L (ref 0.05–0.5)
EOSINOPHILS RELATIVE PERCENT: 1.7 % (ref 0–6)
GFR SERPL CREATININE-BSD FRML MDRD: >60 ML/MIN/1.73
GLUCOSE BLD-MCNC: 235 MG/DL (ref 74–99)
HBA1C MFR BLD: 10.5 % (ref 4–5.6)
HCT VFR BLD CALC: 43.3 % (ref 37–54)
HEMOGLOBIN: 14.7 G/DL (ref 12.5–16.5)
IMMATURE GRANULOCYTES #: 0.03 E9/L
IMMATURE GRANULOCYTES %: 0.5 % (ref 0–5)
LYMPHOCYTES ABSOLUTE: 1.23 E9/L (ref 1.5–4)
LYMPHOCYTES RELATIVE PERCENT: 18.7 % (ref 20–42)
MCH RBC QN AUTO: 31.5 PG (ref 26–35)
MCHC RBC AUTO-ENTMCNC: 33.9 % (ref 32–34.5)
MCV RBC AUTO: 92.7 FL (ref 80–99.9)
MONOCYTES ABSOLUTE: 0.63 E9/L (ref 0.1–0.95)
MONOCYTES RELATIVE PERCENT: 9.6 % (ref 2–12)
NEUTROPHILS ABSOLUTE: 4.53 E9/L (ref 1.8–7.3)
NEUTROPHILS RELATIVE PERCENT: 68.6 % (ref 43–80)
PDW BLD-RTO: 15 FL (ref 11.5–15)
PLATELET # BLD: 209 E9/L (ref 130–450)
PMV BLD AUTO: 10.4 FL (ref 7–12)
POTASSIUM SERPL-SCNC: 3.5 MMOL/L (ref 3.5–5)
PRO-BNP: 1969 PG/ML (ref 0–125)
PROSTATE SPECIFIC ANTIGEN: 0.39 NG/ML (ref 0–4)
RBC # BLD: 4.67 E12/L (ref 3.8–5.8)
SODIUM BLD-SCNC: 143 MMOL/L (ref 132–146)
T4 FREE: 1.53 NG/DL (ref 0.93–1.7)
TOTAL PROTEIN: 7.2 G/DL (ref 6.4–8.3)
TSH SERPL DL<=0.05 MIU/L-ACNC: 15.45 UIU/ML (ref 0.27–4.2)
WBC # BLD: 6.6 E9/L (ref 4.5–11.5)

## 2023-03-16 ENCOUNTER — TELEMEDICINE (OUTPATIENT)
Dept: FAMILY MEDICINE CLINIC | Age: 68
End: 2023-03-16
Payer: MEDICARE

## 2023-03-16 DIAGNOSIS — E03.9 ACQUIRED HYPOTHYROIDISM: Primary | ICD-10-CM

## 2023-03-16 DIAGNOSIS — E11.65 TYPE 2 DIABETES MELLITUS WITH HYPERGLYCEMIA, WITH LONG-TERM CURRENT USE OF INSULIN (HCC): ICD-10-CM

## 2023-03-16 DIAGNOSIS — Z79.4 TYPE 2 DIABETES MELLITUS WITH HYPERGLYCEMIA, WITH LONG-TERM CURRENT USE OF INSULIN (HCC): ICD-10-CM

## 2023-03-16 DIAGNOSIS — J42 CHRONIC BRONCHITIS, UNSPECIFIED CHRONIC BRONCHITIS TYPE (HCC): ICD-10-CM

## 2023-03-16 DIAGNOSIS — R29.898 MUSCULAR DECONDITIONING: ICD-10-CM

## 2023-03-16 DIAGNOSIS — E78.2 MIXED HYPERLIPIDEMIA: ICD-10-CM

## 2023-03-16 DIAGNOSIS — R53.82 CHRONIC FATIGUE: ICD-10-CM

## 2023-03-16 PROCEDURE — 99214 OFFICE O/P EST MOD 30 MIN: CPT | Performed by: FAMILY MEDICINE

## 2023-03-16 PROCEDURE — 3046F HEMOGLOBIN A1C LEVEL >9.0%: CPT | Performed by: FAMILY MEDICINE

## 2023-03-16 PROCEDURE — 1123F ACP DISCUSS/DSCN MKR DOCD: CPT | Performed by: FAMILY MEDICINE

## 2023-03-16 RX ORDER — LEVOTHYROXINE SODIUM 0.15 MG/1
150 TABLET ORAL DAILY
Qty: 90 TABLET | Refills: 5 | Status: SHIPPED | OUTPATIENT
Start: 2023-03-16

## 2023-03-16 NOTE — PROGRESS NOTES
Pascale Delarosa is a 79 y.o. male  . Subjective:      Doing well overall. Patient's thyroid is still low so we will increase. He is still tired in the daytime and weak. Again he is improving. He is good to start or rather restart congestive heart failure clinic and going to be doing Silver sneakers at the [employer]. I feel this activity will really help him in CHF clinical be able to monitor her him a little bit better. Recommend that he gets an eye exam.  We will repeat his blood work in another 3 months. Review of Systems   Constitutional:  Positive for fatigue and unexpected weight change. Negative for activity change, appetite change, chills, diaphoresis and fever. HENT:  Negative for congestion, dental problem, drooling, ear discharge, ear pain, facial swelling, hearing loss, mouth sores, nosebleeds, postnasal drip, rhinorrhea, sinus pressure, sneezing, sore throat, tinnitus, trouble swallowing and voice change. Eyes:  Negative for photophobia, pain, discharge, redness, itching and visual disturbance. Respiratory:  Negative for apnea, cough, choking, chest tightness, shortness of breath, wheezing and stridor. Cardiovascular:  Negative for chest pain, palpitations and leg swelling. Gastrointestinal:  Negative for abdominal distention, abdominal pain, anal bleeding, blood in stool, constipation, diarrhea, nausea, rectal pain and vomiting. Endocrine: Negative for cold intolerance, heat intolerance, polydipsia, polyphagia and polyuria. Genitourinary:  Negative for decreased urine volume, difficulty urinating, dysuria, enuresis, flank pain, frequency, genital sores, hematuria, penile discharge, penile pain, penile swelling, scrotal swelling, testicular pain and urgency. Musculoskeletal:  Negative for arthralgias, back pain, gait problem, joint swelling, myalgias, neck pain and neck stiffness. Skin:  Negative for color change, pallor, rash and wound.   Allergic/Immunologic: Negative for environmental allergies, food allergies and immunocompromised state.   Neurological:  Negative for dizziness, tremors, seizures, syncope, facial asymmetry, speech difficulty, weakness, light-headedness, numbness and headaches.   Hematological:  Negative for adenopathy. Does not bruise/bleed easily.   Psychiatric/Behavioral:  Positive for sleep disturbance. Negative for agitation, behavioral problems, confusion, decreased concentration, dysphoric mood, hallucinations, self-injury and suicidal ideas. The patient is not nervous/anxious and is not hyperactive.      Past Medical History:   Diagnosis Date    Acid reflux     Acute diastolic (congestive) heart failure ([facility]) 06/19/2019    Arthritis     Asthma 04/16/2014    Asthmatic bronchitis , chronic ([facility]) 11/28/2016    CAD (coronary artery disease)     Chronic bronchitis ([facility]) 04/16/2014    COPD (chronic obstructive pulmonary disease) ([facility])     CB    COPD (chronic obstructive pulmonary disease) ([facility])     Diabetes mellitus ([facility])     Emphysema (subcutaneous) (surgical) resulting from a procedure     H/O cardiovascular stress test 04/24/2021    Lexiscan    Hyperlipidemia     Hypertension     Hypoxemia requiring supplemental oxygen 02/02/2015    LONG TERM ANTICOAGULENT USE     Morbid obesity with BMI of 50.0-59.9, adult ([facility]) 11/27/2013    Obesity     Osteoarthritis     Sleep apnea     bilevel positive airway pressure at 13/8 with 2 L oxygen flow     Stage 3 chronic kidney disease ([facility])     Tobacco abuse     Type II or unspecified type diabetes mellitus without mention of complication, not stated as uncontrolled        Social History     Socioeconomic History    Marital status:      Spouse name: Marium    Number of children: 1    Years of education: Not on file    Highest education level: 11th grade   Occupational History    Not on file   Tobacco Use    Smoking status: Former     Packs/day: 1.00     Years: 45.00     Pack years:  45.00     Types: Cigarettes     Quit date: 2013     Years since quittin.6    Smokeless tobacco: Former     Types: Chew     Quit date: 10/8/1971   Vaping Use    Vaping Use: Never used   Substance and Sexual Activity    Alcohol use: No     Alcohol/week: 0.0 standard drinks     Comment: 1-2 coffee per day    Drug use: Yes     Types: Marijuana [place] Cashing)    Sexual activity: Yes     Partners: Female   Other Topics Concern    Not on file   Social History Narrative    19  [facility] reviewed SDOH with Edwena Paget. He does have money strain but between the income he has coming in and his wife's they are over resources for SARY programs. Offered food panty info to help with end of the month struggles but he declined stating that he tried and was refused due to his income. He belongs to a [employer] and goes weekly so he has some community connections in place. He has an extremely high interest rate on his mortgage which he was encouraged to look into getting lowered to help save money on his house payments. Noticed some difficulty with memory recall. Becomes easily flustered at times. Has no MHI to support applying for [facility]"" program of SARY. States he does not feel depressed or need any MH treatment. Social Determinants of Health     Financial Resource Strain: Unknown    Difficulty of Paying Living Expenses: Patient refused   Food Insecurity: Unknown    Worried About Running Out of Food in the Last Year: Patient refused    [address] in the Last Year: Patient refused   Transportation Needs: Unknown    Lack of Transportation (Medical):  Not on file    Lack of Transportation (Non-Medical): Patient refused   Physical Activity: Not on file   Stress: Not on file   Social Connections: Not on file   Intimate Partner Violence: Not on file   Housing Stability: Unknown    Unable to Pay for Housing in the Last Year: Not on file    Number of [place] in the Last Year: Not on file    Unstable Housing in the Last Year: Patient refused       Family History   Problem Relation Age of Onset    Cancer Mother         breast    Cancer Father         stomach    Mental Illness Brother     Stroke Brother     Other Brother        Current Outpatient Medications on File Prior to Visit   Medication Sig Dispense Refill    risperiDONE (RISPERDAL) 0.5 MG tablet Take 1 tablet by mouth nightly 30 tablet 3    insulin glargine (LANTUS SOLOSTAR) 100 UNIT/ML injection pen 50 units twice a day (Patient taking differently: 60 units twice a day) 15 Adjustable Dose Pre-filled Pen Syringe 4    insulin lispro, 1 Unit Dial, (HUMALOG KWIKPEN) 100 UNIT/ML SOPN 0 to 20 units . Sliding scale MAX 70 units daily 3 mL 3    Continuous Blood Gluc  (FREESTYLE STEPHANIE 2 READER) MEDINA As directed 1 each 4    Continuous Blood Gluc Sensor (FREESTYLE STEPHANIE 2 SENSOR) MISC As directed 1 each 4    empagliflozin (JARDIANCE) 10 MG tablet Take 1 tablet by mouth daily 30 tablet 5    pantoprazole (PROTONIX) 40 MG tablet Take 1 tablet by mouth daily 90 tablet 5    atorvastatin (LIPITOR) 80 MG tablet TAKE ONE TABLET BY MOUTH EVERY NIGHT 90 tablet 5    montelukast (SINGULAIR) 10 MG tablet TAKE 1 TABLET BY MOUTH EVERY NIGHT 30 tablet 3    amiodarone (CORDARONE) 200 MG tablet Take 200 mg by mouth daily      apixaban (ELIQUIS) 5 MG TABS tablet Take 5 mg by mouth 2 times daily (Dr Taylor Jordan)      metOLazone (ZAROXOLYN) 2.5 MG tablet Take metolazone twice a week. (Monday and Thursday). Pill pack this med.  (Stop all other metolazone scripts) 8 tablet 3    midodrine (PROAMATINE) 2.5 MG tablet Take 1 tablet by mouth 3 times daily (with meals) 90 tablet 1    metoprolol succinate (TOPROL XL) 25 MG extended release tablet Take 1 tablet by mouth daily 30 tablet 1    amitriptyline (ELAVIL) 50 MG tablet TAKE ONE TABLET BY MOUTH NIGHTLY 30 tablet 3    bumetanide (BUMEX) 1 MG tablet TAKE ONE TABLET BY MOUTH EVERY DAY 30 tablet 3    levocetirizine (XYZAL) 5 MG tablet Take 1 tablet by mouth nightly 30 tablet 5    CPAP Machine MISC 1 each by Does not apply route nightly as needed       aspirin 81 MG tablet Take 81 mg by mouth nightly       potassium chloride (KLOR-CON M) 20 MEQ extended release tablet Take 1 tablet by mouth daily for 14 days 14 tablet 0    [DISCONTINUED] pramipexole (MIRAPEX) 0.25 MG tablet TAKE TWO TABLETS BY MOUTH THREE TIMES A  tablet 5     No current facility-administered medications on file prior to visit. Allergies   Allergen Reactions    Pcn [Penicillins]      Child went to hospital   ? Reaction  Patient tolerates cephalosporins    Sulfa Antibiotics      ? I have reviewed his allergies, medications, problem list, medical,social and family history and have updated as needed in the electronic medical record. Objective:     Physical Exam  Constitutional:       General: He is not in acute distress. Appearance: Normal appearance. He is normal weight. He is not ill-appearing, toxic-appearing or diaphoretic. HENT:      Head: Normocephalic and atraumatic. Neurological:      General: No focal deficit present. Mental Status: He is alert and oriented to person, place, and time. Psychiatric:         Mood and Affect: Mood normal.         Behavior: Behavior normal.         Thought Content: Thought content normal.         Judgment: Judgment normal.       Assessment / Plan:   Santino Castillo was seen today for follow-up. Diagnoses and all orders for this visit:    Acquired hypothyroidism  -     levothyroxine (SYNTHROID) 150 MCG tablet; Take 1 tablet by mouth daily  -     TSH; Future  -     T4, Free; Future    Chronic bronchitis, unspecified chronic bronchitis type ([facility]    Type 2 diabetes mellitus with hyperglycemia, with long-term current use of insulin ([facility])  -     Hemoglobin A1C; Future  -     MICROALBUMIN / CREATININE URINE RATIO; Future    Muscular deconditioning    Mixed hyperlipidemia  -     Lipid Panel;  Future    Chronic fatigue  -     CBC with Auto Differential; Future  -     Comprehensive Metabolic Panel; Future    Extended visit at 38 min  Reviewed healthmaintenance report. Patient is aware of deficiencies and suggested preventative tests.

## 2023-03-19 ASSESSMENT — ENCOUNTER SYMPTOMS
RHINORRHEA: 0
RECTAL PAIN: 0
TROUBLE SWALLOWING: 0
COLOR CHANGE: 0
PHOTOPHOBIA: 0
SHORTNESS OF BREATH: 0
SINUS PRESSURE: 0
DIARRHEA: 0
CHOKING: 0
WHEEZING: 0
EYE DISCHARGE: 0
ANAL BLEEDING: 0
ABDOMINAL DISTENTION: 0
ABDOMINAL PAIN: 0
EYE ITCHING: 0
BACK PAIN: 0
CONSTIPATION: 0
BLOOD IN STOOL: 0
FACIAL SWELLING: 0
VOMITING: 0
COUGH: 0
SORE THROAT: 0
VOICE CHANGE: 0
CHEST TIGHTNESS: 0
EYE PAIN: 0
NAUSEA: 0
EYE REDNESS: 0
APNEA: 0
STRIDOR: 0

## 2023-03-21 ENCOUNTER — HOSPITAL ENCOUNTER (EMERGENCY)
Age: 68
Discharge: HOME | End: 2023-03-21
Attending: EMERGENCY MEDICINE
Payer: MEDICARE

## 2023-03-21 ENCOUNTER — APPOINTMENT (OUTPATIENT)
Dept: CT IMAGING | Age: 68
End: 2023-03-21
Payer: MEDICARE

## 2023-03-21 VITALS
TEMPERATURE: 98.1 F | WEIGHT: 287 LBS | RESPIRATION RATE: 20 BRPM | BODY MASS INDEX: 46.32 KG/M2 | DIASTOLIC BLOOD PRESSURE: 84 MMHG | OXYGEN SATURATION: 96 % | HEART RATE: 74 BPM | SYSTOLIC BLOOD PRESSURE: 154 MMHG
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
## 2023-03-21 DIAGNOSIS — T14.8XXA HEMATOMA: ICD-10-CM

## 2023-03-21 PROCEDURE — 72125 CT NECK SPINE W/O DYE: CPT

## 2023-03-21 PROCEDURE — 99284 EMERGENCY DEPT VISIT MOD MDM: CPT

## 2023-03-21 PROCEDURE — 70450 CT HEAD/BRAIN W/O DYE: CPT

## 2023-03-21 ASSESSMENT — ENCOUNTER SYMPTOMS
BACK PAIN: 0
WHEEZING: 0
SHORTNESS OF BREATH: 0
SORE THROAT: 0
DIARRHEA: 0
ABDOMINAL PAIN: 0
SINUS PRESSURE: 0
EYE DISCHARGE: 0
NAUSEA: 0
EYE REDNESS: 0
COUGH: 0
EYE PAIN: 0
VOMITING: 0

## 2023-03-21 NOTE — ED PROVIDER NOTES
documentation as noted by the resident. History: Patient presents to the ED for evaluation of a fall. Patient states that he was putting his shoes on and lost his balance falling forward. He does head. No LOC. Denies neck or back pain. Patient is on Eliquis. There was no chest pain, dizziness, or shortness breath preceding the fall. My findings: Jamal Garrison is a 79 y.o. male whom is in no distress. Physical exam reveals patient lying in bed comfortably. Patient has a moderate size left-sided cephalhematoma above the left eye. No surrounding bony crepitance. Area is tender to palpation. Normal extraocular movements. Sensation grossly intact. No focal neurological deficits. No ataxia with finger-to-nose. No midline cervical spine tenderness or bony crepitance. My plan: Symptomatic and supportive care. Appropriate imaging    Electronically signed by Yulissa Caal DO on 3/21/23 at 10:03 AM EDT       [MS]   104 Chart review performed by me patient had appoint with Dr. Marylene Gamer for hypothyroidism on 3/16/2023.  [FG]      ED Course User Index  [FG] Sridevi Garcia DO  [MS] Yulissa Caal DO       --------------------------------------------- PAST HISTORY ---------------------------------------------  Past Medical History:  has a past medical history of Acid reflux, Acute diastolic (congestive) heart failure (HCC), Arthritis, Asthma, Asthmatic bronchitis , chronic (HCC), CAD (coronary artery disease), Chronic bronchitis ([address] COPD (chronic obstructive pulmonary disease) ([address] COPD (chronic obstructive pulmonary disease) ([address] Diabetes mellitus ([address] Emphysema (subcutaneous) (surgical) resulting from a procedure, H/O cardiovascular stress test, Hyperlipidemia, Hypertension, Hypoxemia requiring supplemental oxygen, LONG TERM ANTICOAGULENT USE, Morbid obesity with BMI of 50.0-59.9, adult ([address] Obesity, Osteoarthritis, Sleep apnea, Stage 3 chronic kidney disease ([facility] Tobacco abuse, and Type

## 2023-03-24 DIAGNOSIS — J30.1 SEASONAL ALLERGIC RHINITIS DUE TO POLLEN: ICD-10-CM

## 2023-03-24 RX ORDER — MONTELUKAST SODIUM 10 MG/1
10 TABLET ORAL NIGHTLY
Qty: 30 TABLET | Refills: 3 | Status: CANCELLED | OUTPATIENT
Start: 2023-03-24

## 2023-03-26 DIAGNOSIS — J30.1 SEASONAL ALLERGIC RHINITIS DUE TO POLLEN: ICD-10-CM

## 2023-03-26 RX ORDER — MONTELUKAST SODIUM 10 MG/1
10 TABLET ORAL NIGHTLY
Qty: 90 TABLET | Refills: 3 | Status: SHIPPED | OUTPATIENT
Start: 2023-03-26

## 2023-04-05 ENCOUNTER — TELEPHONE (OUTPATIENT)
Dept: FAMILY MEDICINE CLINIC | Age: 68
End: 2023-04-05

## 2023-04-05 DIAGNOSIS — R29.898 SEVERE MUSCLE DECONDITIONING: ICD-10-CM

## 2023-04-05 DIAGNOSIS — R53.1 GENERALIZED WEAKNESS: Primary | ICD-10-CM

## 2023-04-05 DIAGNOSIS — Z91.81 STATUS POST FALL: ICD-10-CM

## 2023-04-05 NOTE — TELEPHONE ENCOUNTER
Patient's wife Bev Ace called stating she would like Umer Rubio to have more PT. Pt was D/C on 3.7.23. So he will need a new referral to [facility]. Wife sated he is weak. Please advise.   Last seen 3/16/2023  Next appt 4/6/2023

## 2023-04-07 NOTE — ED NOTES
Report called and given to Audra Chairez RN for room 520-1.      Kasandra Paz RN  04/18/21 2489 Acute UTI

## 2023-04-20 DIAGNOSIS — G89.29 CHRONIC RIGHT SHOULDER PAIN: ICD-10-CM

## 2023-04-20 DIAGNOSIS — E11.42 TYPE 2 DIABETES MELLITUS WITH DIABETIC POLYNEUROPATHY, WITH LONG-TERM CURRENT USE OF INSULIN (HCC): ICD-10-CM

## 2023-04-20 DIAGNOSIS — M25.511 CHRONIC RIGHT SHOULDER PAIN: ICD-10-CM

## 2023-04-20 DIAGNOSIS — Z79.4 TYPE 2 DIABETES MELLITUS WITH DIABETIC POLYNEUROPATHY, WITH LONG-TERM CURRENT USE OF INSULIN (HCC): ICD-10-CM

## 2023-04-20 RX ORDER — AMITRIPTYLINE HYDROCHLORIDE 50 MG/1
TABLET, FILM COATED ORAL
Qty: 30 TABLET | Refills: 0 | Status: SHIPPED | OUTPATIENT
Start: 2023-04-20

## 2023-04-24 ENCOUNTER — TELEPHONE (OUTPATIENT)
Dept: FAMILY MEDICINE CLINIC | Age: 68
End: 2023-04-24

## 2023-04-24 NOTE — TELEPHONE ENCOUNTER
----- Message from Dheeraj Campbell sent at 4/24/2023 11:40 AM EDT -----  Subject: Appointment Request    Reason for Call: Established Patient Appointment needed: Routine Existing   Condition Follow Up    QUESTIONS    Reason for appointment request? Available appointments did not meet   patient need     Additional Information for Provider?  wife call because  is falling   more often then previously discussed and would like an appointment in   office  ---------------------------------------------------------------------------  --------------  [address] [employer]  [phone]; OK to leave message on voicemail  ---------------------------------------------------------------------------  --------------  SCRIPT ANSWERS  COVID Screen: Armand Robin

## 2023-05-02 ENCOUNTER — TELEPHONE (OUTPATIENT)
Dept: FAMILY MEDICINE CLINIC | Age: 68
End: 2023-05-02

## 2023-05-02 NOTE — TELEPHONE ENCOUNTER
Patient called asking if Dr. Jovan Vila would put an order in for an X ray of his lower back. Patient stated since he fell awhile ago and was it the ED, it's been hurting. I asked patient if he had another fall since he was at the ED on 3/21/23. Patient denied this.       Last seen 4/6/2023  Next appt 5/30/2023

## 2023-05-03 DIAGNOSIS — M54.50 CHRONIC BILATERAL LOW BACK PAIN, UNSPECIFIED WHETHER SCIATICA PRESENT: Primary | ICD-10-CM

## 2023-05-03 DIAGNOSIS — G89.29 CHRONIC BILATERAL LOW BACK PAIN, UNSPECIFIED WHETHER SCIATICA PRESENT: Primary | ICD-10-CM

## 2023-05-11 ENCOUNTER — TELEPHONE (OUTPATIENT)
Dept: FAMILY MEDICINE CLINIC | Age: 68
End: 2023-05-11

## 2023-05-11 DIAGNOSIS — J41.1 MUCOPURULENT CHRONIC BRONCHITIS (HCC): ICD-10-CM

## 2023-05-11 DIAGNOSIS — I50.33 ACUTE ON CHRONIC DIASTOLIC CONGESTIVE HEART FAILURE (HCC): Primary | ICD-10-CM

## 2023-05-11 NOTE — TELEPHONE ENCOUNTER
Genesis Luther called from [facility], stated they have d/c patient and patient expressed an interest in the CHF clinic at [address] [place] [address].  Patient would like a referral for pulmonary rehab  Last seen 4/6/2023  Next appt 5/30/2023

## 2023-05-12 DIAGNOSIS — I87.2 VENOUS INSUFFICIENCY (CHRONIC) (PERIPHERAL): ICD-10-CM

## 2023-05-14 RX ORDER — BUMETANIDE 1 MG/1
TABLET ORAL
Qty: 30 TABLET | Refills: 0 | Status: SHIPPED | OUTPATIENT
Start: 2023-05-14

## 2023-05-19 ENCOUNTER — TELEPHONE (OUTPATIENT)
Dept: NEUROLOGY | Age: 68
End: 2023-05-19

## 2023-05-22 ENCOUNTER — TELEPHONE (OUTPATIENT)
Dept: FAMILY MEDICINE CLINIC | Age: 68
End: 2023-05-22

## 2023-05-22 DIAGNOSIS — F41.9 ANXIETY: Primary | ICD-10-CM

## 2023-05-22 RX ORDER — DIAZEPAM 10 MG/1
TABLET ORAL
Qty: 2 TABLET | Refills: 0 | Status: SHIPPED | OUTPATIENT
Start: 2023-05-22 | End: 2023-06-07

## 2023-05-22 NOTE — TELEPHONE ENCOUNTER
Patient called to ask if Dr. Hussain Weir would send RX to help him relax prior to his [facility]ck, scheduled 6/12/23.     Last seen 4/6/2023  Next appt 6/28/2023  Ronna Yang/Tadeo

## 2023-05-31 ENCOUNTER — HOSPITAL ENCOUNTER (OUTPATIENT)
Dept: OTHER | Age: 68
Setting detail: THERAPIES SERIES
Discharge: HOME | End: 2023-05-31
Payer: MEDICARE

## 2023-05-31 VITALS
SYSTOLIC BLOOD PRESSURE: 111 MMHG | WEIGHT: 248.03 LBS | HEART RATE: 93 BPM | BODY MASS INDEX: 40.03 KG/M2 | DIASTOLIC BLOOD PRESSURE: 77 MMHG | RESPIRATION RATE: 18 BRPM | OXYGEN SATURATION: 100 %

## 2023-05-31 LAB
ANION GAP SERPL CALCULATED.3IONS-SCNC: 11 MMOL/L (ref 7–16)
BNP BLD-MCNC: 2627 PG/ML (ref 0–125)
BUN SERPL-MCNC: 7 MG/DL (ref 6–23)
CALCIUM SERPL-MCNC: 8.8 MG/DL (ref 8.6–10.2)
CHLORIDE SERPL-SCNC: 104 MMOL/L (ref 98–107)
CO2 SERPL-SCNC: 23 MMOL/L (ref 22–29)
CREAT SERPL-MCNC: 0.7 MG/DL (ref 0.7–1.2)
GLUCOSE SERPL-MCNC: 240 MG/DL (ref 74–99)
POTASSIUM SERPL-SCNC: 3.2 MMOL/L (ref 3.5–5)
SODIUM SERPL-SCNC: 138 MMOL/L (ref 132–146)

## 2023-05-31 PROCEDURE — 36415 COLL VENOUS BLD VENIPUNCTURE: CPT

## 2023-05-31 PROCEDURE — 99214 OFFICE O/P EST MOD 30 MIN: CPT

## 2023-05-31 PROCEDURE — 80048 BASIC METABOLIC PNL TOTAL CA: CPT

## 2023-05-31 PROCEDURE — 83880 ASSAY OF NATRIURETIC PEPTIDE: CPT

## 2023-05-31 NOTE — PROGRESS NOTES
Congestive Heart Failure [address] [facility]   1955      Referring Provider: Dr. Sherrie Alanis   Primary Care Physician: Dr. Bi Springer  Cardiologist: Dr. Sherrie Alanis   Nephrologist: ?    History of Present Illness:     Mela Delarosa is a 79 y.o. male with a history of HFpEF, most recent EF 55% 9/24/2022. Patient Story:  He does  have dyspnea with exertion which has unchanged, denies any shortness of breath at rest, or decline in overall functional capacity. He does not have orthopnea, PND, nocturnal cough or hemoptysis (one pillow). He does not have abdominal distention or bloating, early satiety, anorexia/change in appetite. He does has a good urinary response to  oral diuretic. He does have lower extremity edema. He denies lightheadedness, dizziness unless he stands up too quickly. He denies palpitations, syncope or near syncope. He does not complain of chest pain, pressure, discomfort. Allergies   Allergen Reactions    Pcn [Penicillins]      Child went to hospital   ? Reaction  Patient tolerates cephalosporins    Sulfa Antibiotics      ? No outpatient medications have been marked as taking for the 5/31/23 encounter [facility] Encounter) with [facility] CHF ROOM 1.      Current Outpatient Medications on File Prior to Encounter   Medication Sig Dispense Refill    bumetanide (BUMEX) 1 MG tablet TAKE ONE TABLET BY MOUTH EVERY DAY 30 tablet 0    amitriptyline (ELAVIL) 50 MG tablet TAKE ONE TABLET BY MOUTH NIGHTLY 30 tablet 0    montelukast (SINGULAIR) 10 MG tablet Take 1 tablet by mouth nightly 90 tablet 3    levothyroxine (SYNTHROID) 150 MCG tablet Take 1 tablet by mouth daily 90 tablet 5    risperiDONE (RISPERDAL) 0.5 MG tablet Take 1 tablet by mouth nightly 30 tablet 3    insulin glargine (LANTUS SOLOSTAR) 100 UNIT/ML injection pen 50 units twice a day (Patient taking differently: 60 units twice a day) 15 Adjustable Dose Pre-filled Pen Syringe 4

## 2023-05-31 NOTE — PROGRESS NOTES
Called and spoke with Yesica Monica, patient's wife re: medication list.     Pt is currently taking:     Levothyroxine 150mg daily   Protonix 40mg daily   Rispersal 0.5mg nightly  Bumex 1mg daily   Elavil 50mg nightly   Metolazone 2.5mg on mondays and thursdays   Singulair 10 mg nightly   Atorvastatin 80 mg nightly   Aspirin 81mg daily       Pt is not currently taking:     Jardiance 10 mg daily   Amiodarone 200 mg daily  Eliquis 5 mg daily   Midodrine 2.5 mg TID   Metoprolol succinate 25 mg daily   Xyzal 5 mg nightly

## 2023-06-01 ENCOUNTER — OFFICE VISIT (OUTPATIENT)
Dept: CARDIOLOGY CLINIC | Age: 68
End: 2023-06-01
Payer: MEDICARE

## 2023-06-01 VITALS
BODY MASS INDEX: 40.18 KG/M2 | HEART RATE: 133 BPM | WEIGHT: 250 LBS | SYSTOLIC BLOOD PRESSURE: 106 MMHG | RESPIRATION RATE: 16 BRPM | DIASTOLIC BLOOD PRESSURE: 52 MMHG | HEIGHT: 66 IN

## 2023-06-01 DIAGNOSIS — I25.10 CORONARY ARTERY DISEASE INVOLVING NATIVE CORONARY ARTERY OF NATIVE HEART WITHOUT ANGINA PECTORIS: Primary | ICD-10-CM

## 2023-06-01 PROCEDURE — 99214 OFFICE O/P EST MOD 30 MIN: CPT | Performed by: INTERNAL MEDICINE

## 2023-06-01 PROCEDURE — 3074F SYST BP LT 130 MM HG: CPT | Performed by: INTERNAL MEDICINE

## 2023-06-01 PROCEDURE — 3078F DIAST BP <80 MM HG: CPT | Performed by: INTERNAL MEDICINE

## 2023-06-01 PROCEDURE — 1123F ACP DISCUSS/DSCN MKR DOCD: CPT | Performed by: INTERNAL MEDICINE

## 2023-06-01 PROCEDURE — 93000 ELECTROCARDIOGRAM COMPLETE: CPT | Performed by: INTERNAL MEDICINE

## 2023-06-01 RX ORDER — ACETAMINOPHEN 500 MG
500 TABLET ORAL EVERY 6 HOURS PRN
COMMUNITY

## 2023-06-01 RX ORDER — POTASSIUM CHLORIDE 20 MEQ/1
20 TABLET, EXTENDED RELEASE ORAL DAILY
Qty: 90 TABLET | Refills: 3 | Status: SHIPPED | OUTPATIENT
Start: 2023-06-01 | End: 2023-08-30

## 2023-06-01 NOTE — PROGRESS NOTES
intermedius artery  4. Status post mitral valve repair using #30-mm Future complete ring with magic stitch between A3 and P3, moderate MR on recent echocardiogram  5. Ischemic cardiomyopathy: Last documented ejection fraction was 45%  6. Mitral valve regurgitation: Moderate  7. Tricuspid valve regurgitation: Moderate  8. Hypotension: Will wean off midodrine  9. Type 2 diabetes mellitus  10. Hyperlipidemia: On statin  11. Chronic anticoagulation    RECOMMENDATIONS:   1.  will increase amiodarone to twice daily for a week, will bring back for an EKG for follow-up  2. Wean off midodrine  3. Continue the rest of medications  4. CHF: Daily weight, take an extra Bumex for weight gain of more than 2-3 pounds in 24 hours, compliance with diuretics, low-salt diet were all advised. 5.  Increase risk of bleeding due to being on anti-coagulation, symptoms and signs of bleeding discussed with patient, patient was advised to seek medical attention at the earliest symptoms or signs of bleeding. 6.  Preventive cardiology: Low-salt, low-cholesterol diet, daily exercise, total cholesterol of less than 200, LDL of less than 70, adherence to diabetic diet and diabetic medications, were all advised. 7.  Follow-up with Dr. Maldonado Standing as scheduled  8. Follow-up with Dr. Kirill Keene in 3 months, sooner if symptomatic for any reason    I have reviewed my findings and recommendations with patient    Electronically signed by Michael Brown MD on 6/1/2023 at 2:56 PM  NOTE: This report was transcribed using voice recognition software.  Every effort was made to ensure accuracy; however, inadvertent computerized transcription errors may be present

## 2023-06-07 ENCOUNTER — NURSE ONLY (OUTPATIENT)
Dept: CARDIOLOGY CLINIC | Age: 68
End: 2023-06-07
Payer: MEDICARE

## 2023-06-07 DIAGNOSIS — I25.10 CORONARY ARTERY DISEASE INVOLVING NATIVE CORONARY ARTERY OF NATIVE HEART WITHOUT ANGINA PECTORIS: Primary | ICD-10-CM

## 2023-06-07 PROCEDURE — 93000 ELECTROCARDIOGRAM COMPLETE: CPT | Performed by: INTERNAL MEDICINE

## 2023-06-08 DIAGNOSIS — I65.23 CALCIFICATION OF BOTH CAROTID ARTERIES: ICD-10-CM

## 2023-06-08 DIAGNOSIS — R90.89 ABNORMAL CT OF BRAIN: Primary | ICD-10-CM

## 2023-06-08 DIAGNOSIS — I67.9 CEREBROVASCULAR DISEASE: ICD-10-CM

## 2023-06-28 ENCOUNTER — TELEPHONE (OUTPATIENT)
Dept: FAMILY MEDICINE CLINIC | Age: 68
End: 2023-06-28

## 2023-06-28 ENCOUNTER — TELEPHONE (OUTPATIENT)
Dept: CARDIOLOGY CLINIC | Age: 68
End: 2023-06-28

## 2023-06-28 ENCOUNTER — TELEPHONE (OUTPATIENT)
Dept: OTHER | Age: 68
End: 2023-06-28

## 2023-06-28 ENCOUNTER — OFFICE VISIT (OUTPATIENT)
Dept: FAMILY MEDICINE CLINIC | Age: 68
End: 2023-06-28
Payer: MEDICARE

## 2023-06-28 ENCOUNTER — HOSPITAL ENCOUNTER (OUTPATIENT)
Dept: OTHER | Age: 68
Setting detail: THERAPIES SERIES
Discharge: HOME | End: 2023-06-28
Payer: MEDICARE

## 2023-06-28 VITALS
DIASTOLIC BLOOD PRESSURE: 81 MMHG | HEART RATE: 120 BPM | WEIGHT: 241 LBS | OXYGEN SATURATION: 97 % | RESPIRATION RATE: 18 BRPM | BODY MASS INDEX: 38.9 KG/M2 | SYSTOLIC BLOOD PRESSURE: 112 MMHG

## 2023-06-28 VITALS
HEIGHT: 66 IN | SYSTOLIC BLOOD PRESSURE: 122 MMHG | HEART RATE: 70 BPM | BODY MASS INDEX: 38.57 KG/M2 | RESPIRATION RATE: 18 BRPM | DIASTOLIC BLOOD PRESSURE: 80 MMHG | WEIGHT: 240 LBS

## 2023-06-28 DIAGNOSIS — E11.42 TYPE 2 DIABETES MELLITUS WITH DIABETIC POLYNEUROPATHY, WITH LONG-TERM CURRENT USE OF INSULIN (HCC): ICD-10-CM

## 2023-06-28 DIAGNOSIS — E78.2 MIXED HYPERLIPIDEMIA: ICD-10-CM

## 2023-06-28 DIAGNOSIS — R53.83 FATIGUE, UNSPECIFIED TYPE: ICD-10-CM

## 2023-06-28 DIAGNOSIS — Z79.4 TYPE 2 DIABETES MELLITUS WITH DIABETIC POLYNEUROPATHY, WITH LONG-TERM CURRENT USE OF INSULIN (HCC): Primary | ICD-10-CM

## 2023-06-28 DIAGNOSIS — E03.9 ACQUIRED HYPOTHYROIDISM: ICD-10-CM

## 2023-06-28 DIAGNOSIS — E11.42 TYPE 2 DIABETES MELLITUS WITH DIABETIC POLYNEUROPATHY, WITH LONG-TERM CURRENT USE OF INSULIN (HCC): Primary | ICD-10-CM

## 2023-06-28 DIAGNOSIS — I50.9 ACUTE ON CHRONIC CONGESTIVE HEART FAILURE, UNSPECIFIED HEART FAILURE TYPE (HCC): ICD-10-CM

## 2023-06-28 DIAGNOSIS — I87.2 VENOUS INSUFFICIENCY (CHRONIC) (PERIPHERAL): ICD-10-CM

## 2023-06-28 DIAGNOSIS — I25.10 CORONARY ARTERY DISEASE INVOLVING NATIVE CORONARY ARTERY OF NATIVE HEART WITHOUT ANGINA PECTORIS: ICD-10-CM

## 2023-06-28 DIAGNOSIS — I48.0 PAF (PAROXYSMAL ATRIAL FIBRILLATION) (HCC): ICD-10-CM

## 2023-06-28 DIAGNOSIS — Z79.4 TYPE 2 DIABETES MELLITUS WITH DIABETIC POLYNEUROPATHY, WITH LONG-TERM CURRENT USE OF INSULIN (HCC): ICD-10-CM

## 2023-06-28 DIAGNOSIS — I48.91 ATRIAL FIBRILLATION WITH RVR (HCC): ICD-10-CM

## 2023-06-28 LAB
ALBUMIN SERPL-MCNC: 4.2 G/DL (ref 3.5–5.2)
ALP SERPL-CCNC: 121 U/L (ref 40–129)
ALT SERPL-CCNC: 10 U/L (ref 0–40)
ANION GAP SERPL CALCULATED.3IONS-SCNC: 11 MMOL/L (ref 7–16)
ANION GAP SERPL CALCULATED.3IONS-SCNC: 18 MMOL/L (ref 7–16)
AST SERPL-CCNC: 15 U/L (ref 0–39)
BASOPHILS # BLD: 0.07 E9/L (ref 0–0.2)
BASOPHILS NFR BLD: 0.9 % (ref 0–2)
BILIRUB SERPL-MCNC: 1.4 MG/DL (ref 0–1.2)
BNP BLD-MCNC: 1601 PG/ML (ref 0–125)
BUN SERPL-MCNC: 16 MG/DL (ref 6–23)
BUN SERPL-MCNC: 19 MG/DL (ref 6–23)
CALCIUM SERPL-MCNC: 9.1 MG/DL (ref 8.6–10.2)
CALCIUM SERPL-MCNC: 9.8 MG/DL (ref 8.6–10.2)
CHLORIDE SERPL-SCNC: 98 MMOL/L (ref 98–107)
CHLORIDE SERPL-SCNC: 99 MMOL/L (ref 98–107)
CHOLESTEROL, TOTAL: 141 MG/DL (ref 0–199)
CO2 SERPL-SCNC: 23 MMOL/L (ref 22–29)
CO2 SERPL-SCNC: 25 MMOL/L (ref 22–29)
CREAT SERPL-MCNC: 0.9 MG/DL (ref 0.7–1.2)
CREAT SERPL-MCNC: 1 MG/DL (ref 0.7–1.2)
CREAT UR-MCNC: 253 MG/DL (ref 40–278)
EOSINOPHIL # BLD: 0.1 E9/L (ref 0.05–0.5)
EOSINOPHIL NFR BLD: 1.3 % (ref 0–6)
ERYTHROCYTE [DISTWIDTH] IN BLOOD BY AUTOMATED COUNT: 15.1 FL (ref 11.5–15)
GLUCOSE SERPL-MCNC: 215 MG/DL (ref 74–99)
GLUCOSE SERPL-MCNC: 236 MG/DL (ref 74–99)
HBA1C MFR BLD: 8.6 %
HCT VFR BLD AUTO: 50 % (ref 37–54)
HDLC SERPL-MCNC: 45 MG/DL
HGB BLD-MCNC: 15.7 G/DL (ref 12.5–16.5)
IMM GRANULOCYTES # BLD: 0.04 E9/L
IMM GRANULOCYTES NFR BLD: 0.5 % (ref 0–5)
LDLC SERPL CALC-MCNC: 73 MG/DL (ref 0–99)
LYMPHOCYTES # BLD: 1.55 E9/L (ref 1.5–4)
LYMPHOCYTES NFR BLD: 19.9 % (ref 20–42)
MCH RBC QN AUTO: 31.7 PG (ref 26–35)
MCHC RBC AUTO-ENTMCNC: 31.4 % (ref 32–34.5)
MCV RBC AUTO: 100.8 FL (ref 80–99.9)
MICROALBUMIN UR-MCNC: 275.9 MG/L
MICROALBUMIN/CREAT UR-RTO: 109.1 (ref 0–30)
MONOCYTES # BLD: 0.68 E9/L (ref 0.1–0.95)
MONOCYTES NFR BLD: 8.8 % (ref 2–12)
NEUTROPHILS # BLD: 5.33 E9/L (ref 1.8–7.3)
NEUTS SEG NFR BLD: 68.6 % (ref 43–80)
PLATELET # BLD AUTO: 188 E9/L (ref 130–450)
PMV BLD AUTO: 10.8 FL (ref 7–12)
POTASSIUM SERPL-SCNC: 4.1 MMOL/L (ref 3.5–5)
POTASSIUM SERPL-SCNC: 5 MMOL/L (ref 3.5–5)
PROT SERPL-MCNC: 7.6 G/DL (ref 6.4–8.3)
RBC # BLD AUTO: 4.96 E12/L (ref 3.8–5.8)
SODIUM SERPL-SCNC: 135 MMOL/L (ref 132–146)
SODIUM SERPL-SCNC: 139 MMOL/L (ref 132–146)
T4 FREE SERPL-MCNC: 2.71 NG/DL (ref 0.93–1.7)
TRIGL SERPL-MCNC: 117 MG/DL (ref 0–149)
TSH SERPL-MCNC: 0.12 UIU/ML (ref 0.27–4.2)
VLDLC SERPL CALC-MCNC: 23 MG/DL
WBC # BLD: 7.8 E9/L (ref 4.5–11.5)

## 2023-06-28 PROCEDURE — 3052F HG A1C>EQUAL 8.0%<EQUAL 9.0%: CPT | Performed by: FAMILY MEDICINE

## 2023-06-28 PROCEDURE — 99214 OFFICE O/P EST MOD 30 MIN: CPT

## 2023-06-28 PROCEDURE — 3078F DIAST BP <80 MM HG: CPT | Performed by: FAMILY MEDICINE

## 2023-06-28 PROCEDURE — 83880 ASSAY OF NATRIURETIC PEPTIDE: CPT

## 2023-06-28 PROCEDURE — 99214 OFFICE O/P EST MOD 30 MIN: CPT | Performed by: FAMILY MEDICINE

## 2023-06-28 PROCEDURE — 83037 HB GLYCOSYLATED A1C HOME DEV: CPT | Performed by: FAMILY MEDICINE

## 2023-06-28 PROCEDURE — 36415 COLL VENOUS BLD VENIPUNCTURE: CPT

## 2023-06-28 PROCEDURE — 1123F ACP DISCUSS/DSCN MKR DOCD: CPT | Performed by: FAMILY MEDICINE

## 2023-06-28 PROCEDURE — 80048 BASIC METABOLIC PNL TOTAL CA: CPT

## 2023-06-28 PROCEDURE — 3074F SYST BP LT 130 MM HG: CPT | Performed by: FAMILY MEDICINE

## 2023-06-28 RX ORDER — METOPROLOL SUCCINATE 25 MG/1
25 TABLET, EXTENDED RELEASE ORAL DAILY
Qty: 90 TABLET | Refills: 2 | Status: SHIPPED | OUTPATIENT
Start: 2023-06-28

## 2023-06-28 RX ORDER — BUMETANIDE 1 MG/1
1 TABLET ORAL DAILY
Qty: 90 TABLET | Refills: 3 | Status: SHIPPED | OUTPATIENT
Start: 2023-06-28

## 2023-06-28 RX ORDER — LANCETS 30 GAUGE
1 EACH MISCELLANEOUS 3 TIMES DAILY
Qty: 300 EACH | Refills: 3 | Status: SHIPPED | OUTPATIENT
Start: 2023-06-28

## 2023-06-28 RX ORDER — AMIODARONE HYDROCHLORIDE 200 MG/1
200 TABLET ORAL DAILY
Qty: 30 TABLET | Refills: 0 | Status: SHIPPED | OUTPATIENT
Start: 2023-06-28

## 2023-06-28 RX ORDER — POTASSIUM CHLORIDE 20 MEQ/1
20 TABLET, EXTENDED RELEASE ORAL DAILY
Qty: 90 TABLET | Refills: 2 | Status: SHIPPED | OUTPATIENT
Start: 2023-06-28 | End: 2024-03-24

## 2023-06-28 RX ORDER — BUMETANIDE 1 MG/1
1 TABLET ORAL DAILY
Qty: 90 TABLET | Refills: 3 | Status: SHIPPED
Start: 2023-06-28 | End: 2023-06-28

## 2023-06-28 RX ORDER — METOPROLOL SUCCINATE 25 MG/1
25 TABLET, EXTENDED RELEASE ORAL DAILY
Qty: 90 TABLET | Refills: 4 | Status: SHIPPED
Start: 2023-06-28 | End: 2023-06-28

## 2023-06-28 RX ORDER — ATORVASTATIN CALCIUM 80 MG/1
TABLET, FILM COATED ORAL
Qty: 90 TABLET | Refills: 2 | Status: SHIPPED | OUTPATIENT
Start: 2023-06-28

## 2023-06-28 RX ORDER — GLUCOSAMINE HCL/CHONDROITIN SU 500-400 MG
CAPSULE ORAL
Qty: 300 STRIP | Refills: 3 | Status: SHIPPED | OUTPATIENT
Start: 2023-06-28

## 2023-06-28 RX ORDER — LEVOTHYROXINE SODIUM 0.15 MG/1
150 TABLET ORAL DAILY
Qty: 90 TABLET | Refills: 5 | Status: SHIPPED | OUTPATIENT
Start: 2023-06-28

## 2023-06-28 RX ORDER — AMIODARONE HYDROCHLORIDE 200 MG/1
200 TABLET ORAL DAILY
Qty: 90 TABLET | Refills: 3 | Status: SHIPPED
Start: 2023-06-28 | End: 2023-06-28

## 2023-06-28 NOTE — TELEPHONE ENCOUNTER
Dawne Kansas, RN Marni Cranker, MA  Patient a fib RVR at today's appt. Rates 108-130's. /81. Per review of notes patient just seen Dr. Casper Carney 6/1/23-(a fib rvr) and had a recent MRI cancelled due to the same (afib rvr)     Upon careful review------Does NOT have a current prescription for Amiodarone OR Metoprolol due to changing pharmacies. Was with a pill packing service----- NOW AT [facility] and cardiac meds (metoprolol, jardiance, OR amiodarone never got filled at [facility]). Patient is also on Metolazone 2.5 mg 2 x weekly- does he need to continue this?      Please advise,       Anne Monroe RN

## 2023-07-05 ASSESSMENT — ENCOUNTER SYMPTOMS
VOICE CHANGE: 0
DIARRHEA: 0
STRIDOR: 0
EYE PAIN: 0
SINUS PRESSURE: 0
RHINORRHEA: 0
ABDOMINAL DISTENTION: 0
FACIAL SWELLING: 0
ABDOMINAL PAIN: 0
CHOKING: 0
COLOR CHANGE: 0
NAUSEA: 0
CHEST TIGHTNESS: 0
EYE DISCHARGE: 0
COUGH: 0
RECTAL PAIN: 0
SHORTNESS OF BREATH: 0
BLOOD IN STOOL: 0
CONSTIPATION: 0
SORE THROAT: 0
VOMITING: 0
WHEEZING: 0
TROUBLE SWALLOWING: 0
ANAL BLEEDING: 0
APNEA: 0
EYE ITCHING: 0
BACK PAIN: 0
PHOTOPHOBIA: 0
EYE REDNESS: 0

## 2023-07-10 ENCOUNTER — HOSPITAL ENCOUNTER (OUTPATIENT)
Dept: MRI IMAGING | Age: 68
Discharge: HOME | End: 2023-07-12
Payer: MEDICARE

## 2023-07-10 DIAGNOSIS — R90.89 ABNORMAL CT OF BRAIN: ICD-10-CM

## 2023-07-10 DIAGNOSIS — I67.9 CEREBROVASCULAR DISEASE: ICD-10-CM

## 2023-07-10 DIAGNOSIS — I65.23 CALCIFICATION OF BOTH CAROTID ARTERIES: ICD-10-CM

## 2023-07-10 PROCEDURE — 70544 MR ANGIOGRAPHY HEAD W/O DYE: CPT

## 2023-07-10 PROCEDURE — 70547 MR ANGIOGRAPHY NECK W/O DYE: CPT

## 2023-07-12 ENCOUNTER — HOSPITAL ENCOUNTER (OUTPATIENT)
Dept: OTHER | Age: 68
Setting detail: THERAPIES SERIES
Discharge: HOME | End: 2023-07-12
Payer: MEDICARE

## 2023-07-12 VITALS
OXYGEN SATURATION: 98 % | SYSTOLIC BLOOD PRESSURE: 97 MMHG | HEART RATE: 92 BPM | RESPIRATION RATE: 18 BRPM | BODY MASS INDEX: 36.48 KG/M2 | DIASTOLIC BLOOD PRESSURE: 60 MMHG | WEIGHT: 226 LBS

## 2023-07-12 LAB
ANION GAP SERPL CALCULATED.3IONS-SCNC: 14 MMOL/L (ref 7–16)
BNP BLD-MCNC: 2970 PG/ML (ref 0–125)
BUN SERPL-MCNC: 20 MG/DL (ref 6–23)
CALCIUM SERPL-MCNC: 9 MG/DL (ref 8.6–10.2)
CHLORIDE SERPL-SCNC: 100 MMOL/L (ref 98–107)
CO2 SERPL-SCNC: 24 MMOL/L (ref 22–29)
CREAT SERPL-MCNC: 1.1 MG/DL (ref 0.7–1.2)
GLUCOSE SERPL-MCNC: 184 MG/DL (ref 74–99)
POTASSIUM SERPL-SCNC: 3.5 MMOL/L (ref 3.5–5)
SODIUM SERPL-SCNC: 138 MMOL/L (ref 132–146)

## 2023-07-12 PROCEDURE — 99214 OFFICE O/P EST MOD 30 MIN: CPT

## 2023-07-12 PROCEDURE — 36415 COLL VENOUS BLD VENIPUNCTURE: CPT

## 2023-07-12 PROCEDURE — 80048 BASIC METABOLIC PNL TOTAL CA: CPT

## 2023-07-12 PROCEDURE — 83880 ASSAY OF NATRIURETIC PEPTIDE: CPT

## 2023-07-12 RX ORDER — METOLAZONE 2.5 MG/1
2.5 TABLET ORAL
COMMUNITY
End: 2023-07-13 | Stop reason: SDUPTHER

## 2023-07-12 ASSESSMENT — PATIENT HEALTH QUESTIONNAIRE - PHQ9
1. LITTLE INTEREST OR PLEASURE IN DOING THINGS: NOT AT ALL
2. FEELING DOWN, DEPRESSED OR HOPELESS: NOT AT ALL
SUM OF ALL RESPONSES TO PHQ9 QUESTIONS 1 & 2: 0

## 2023-07-12 NOTE — PROGRESS NOTES
Congestive Heart Failure [address] [facility]   1955    Referring Provider: Dr. Haja De Jesus   Primary Care Physician: Dr. Richard Rees  Cardiologist: Dr. Haja De Jesus   Nephrologist:     History of Present Illness:   Elder Greene is a 79 y.o. male with a history of HFpEF, most recent EF 55% 9/24/2022. Patient Story:  He does have dyspnea with exertion, however denies any shortness of breath at rest, or decline in overall functional capacity. He does not have orthopnea, PND, nocturnal cough or hemoptysis (one pillow). He does not have abdominal distention or bloating, early satiety, anorexia/change in appetite. He does have a good urinary response to oral diuretic. He does not have lower extremity edema. He denies lightheadedness, dizziness unless he stands up too quickly. He denies palpitations, syncope or near syncope. He does not complain of chest pain, pressure, discomfort. Allergies   Allergen Reactions    Pcn [Penicillins]      Child went to hospital   ? Reaction  Patient tolerates cephalosporins    Sulfa Antibiotics      ? No outpatient medications have been marked as taking for the 7/12/23 encounter [facility] Encounter) with [facility] CHF ROOM 1.      Current Outpatient Medications on File Prior to Encounter   Medication Sig Dispense Refill    levothyroxine (SYNTHROID) 150 MCG tablet Take 1 tablet by mouth daily 90 tablet 5    apixaban (ELIQUIS) 5 MG TABS tablet Take 1 tablet by mouth 2 times daily (Dr Joe Li) 180 tablet 2    amiodarone (CORDARONE) 200 MG tablet Take 1 tablet by mouth daily 30 tablet 0    atorvastatin (LIPITOR) 80 MG tablet TAKE ONE TABLET BY MOUTH EVERY NIGHT 90 tablet 2    bumetanide (BUMEX) 1 MG tablet Take 1 tablet by mouth daily 90 tablet 3    empagliflozin (JARDIANCE) 10 MG tablet Take 1 tablet by mouth daily 90 tablet 2    metoprolol succinate (TOPROL XL) 25 MG extended release tablet Take 1 tablet by mouth daily 90 tablet 2

## 2023-07-13 ENCOUNTER — TELEPHONE (OUTPATIENT)
Dept: FAMILY MEDICINE CLINIC | Age: 68
End: 2023-07-13

## 2023-07-13 ENCOUNTER — TELEPHONE (OUTPATIENT)
Dept: CARDIOLOGY CLINIC | Age: 68
End: 2023-07-13

## 2023-07-13 DIAGNOSIS — E11.65 TYPE 2 DIABETES MELLITUS WITH HYPERGLYCEMIA, WITH LONG-TERM CURRENT USE OF INSULIN (HCC): ICD-10-CM

## 2023-07-13 DIAGNOSIS — Z79.4 TYPE 2 DIABETES MELLITUS WITH HYPERGLYCEMIA, WITH LONG-TERM CURRENT USE OF INSULIN (HCC): ICD-10-CM

## 2023-07-13 DIAGNOSIS — Z79.899 MEDICATION MANAGEMENT: ICD-10-CM

## 2023-07-13 DIAGNOSIS — I48.91 ATRIAL FIBRILLATION WITH RVR (HCC): ICD-10-CM

## 2023-07-13 DIAGNOSIS — I25.10 CORONARY ARTERY DISEASE INVOLVING NATIVE CORONARY ARTERY OF NATIVE HEART WITHOUT ANGINA PECTORIS: Primary | ICD-10-CM

## 2023-07-13 RX ORDER — APIXABAN 5 MG/1
TABLET, FILM COATED ORAL
Qty: 60 TABLET | Refills: 0 | OUTPATIENT
Start: 2023-07-13

## 2023-07-13 RX ORDER — METOLAZONE 2.5 MG/1
2.5 TABLET ORAL
Qty: 30 TABLET | Refills: 1 | Status: SHIPPED | OUTPATIENT
Start: 2023-07-13

## 2023-07-13 NOTE — TELEPHONE ENCOUNTER
NABILA Bergman MA Caller: Unspecified Sujit Darby,  4:47 PM)  Tarri Parents,     Pt Known to DR. Daniels. Last Chf clinic appointment was two weeks ago and pt was afib rvr and upon further investigation patient did not  his amiodarone or metoprolol succinate. Confirmed with his wife that he picked up his metoprolol succinate and amiodarone 200 mg daily. Denies any missing any doses. A fib RVR at today's appt. HR  BPM. BP 97/60 mmHg. Denies any asymptomatic. Denies any palpations, dizziness or SOB. Pt has decrease his weight by 15 lbs. Upon assessment bilateral lungs are clear, abdomen soft, trace pitting edema. Denies any SOB, orthopnea or dizziness. Increased pro-bnp at today's visit.  No IV diuretics were given at today's visit.     07/12/23     Sodium: 138   Potassium: 3.5   Chloride: 100   CO2: 24   BUN,BUNPL: 20   Creatinine: 1.1   Anion Gap: 14   Est, Glom Filt Rate: >60   Glucose, Random: 184 (H)   CALCIUM, SERUM, 853180: 9.0   Pro-BNP: 2,970 (H)     Please advise

## 2023-07-13 NOTE — TELEPHONE ENCOUNTER
Patient's wife, Yulisa Flor, called to ask if Dr. Marianne Brooke would put an order in for patient to have [address] for medication management. Yulisa Flor stated that when patient was at the [address] [place] [address], patient informed them that he's having difficulty with his medications, what he's supposed to take and when. Jacqueline informed that patient was advised to contact Dr. Marianne Brooke and ask for a referral.  Yulisa Flor also informed me that patient ran out of Eliquis and has not taken it for 2 days. I informed her that this medication is extremely important and patient should not be missing doses. Yulisa Chacho stated she's trying to help patient with his meds, but there are so many and she has her own health issues, it's difficult. A refill request for Eliquis is in a separate phone encounter since the pharmacy sent it electronically.       Last seen 6/28/2023  Next appt Visit date not found

## 2023-07-13 NOTE — TELEPHONE ENCOUNTER
Patient's wife, Kumar Carroll, called for a refill, stating patient has note taken his Eliquis for 2 days since he ran out.       Last seen 6/28/2023  Next appt Visit date not found  [place]/Tadeo

## 2023-07-13 NOTE — TELEPHONE ENCOUNTER
Patient called to ask for a letter from Dr. Janeen Davey to allow them to get out of their lease. Patient informed it just needs to state that they are unable to climb steps. Patient stated they will pick it up at the ofc when it's ready.     Last seen 6/28/2023  Next appt Visit date not found

## 2023-07-14 NOTE — TELEPHONE ENCOUNTER
Per Dr. Gabriela Ibarra -     Cardiology writes for his eliquis. I will set up with home health for medication management. He has an updated list of medications and we went through each bottle by bottle.  Unsure why he is still having issues, but they will call him

## 2023-07-20 ENCOUNTER — TELEPHONE (OUTPATIENT)
Dept: FAMILY MEDICINE CLINIC | Age: 68
End: 2023-07-20

## 2023-07-20 NOTE — TELEPHONE ENCOUNTER
Mark Munoz from [facility] care called to inform patient is admitted to their care. Reports syncopal episode 07/19/2023, hit shoulder, woke to wife helping him up. States no apparent distress. ALSO would like the specifics of patient's sliding scale for the humalog.  Can be faxed to home care   [phone]

## 2023-07-24 NOTE — TELEPHONE ENCOUNTER
Last sliding scale I can find is from 2020?   Glucose: Dose:                No Insulin   140-199           3 Units   200-249 6 Units   250-299 9 Units   300-349 12 Units   350-400 15 Units   Over 400 18 Units   MAX 70 units daily

## 2023-07-27 DIAGNOSIS — F34.1 DYSTHYMIA: ICD-10-CM

## 2023-07-31 RX ORDER — RISPERIDONE 0.5 MG/1
0.5 TABLET ORAL
Qty: 30 TABLET | Refills: 0 | OUTPATIENT
Start: 2023-07-31

## 2023-08-01 ENCOUNTER — HOSPITAL ENCOUNTER (OUTPATIENT)
Dept: OTHER | Age: 68
Setting detail: THERAPIES SERIES
Discharge: HOME | End: 2023-08-01

## 2023-08-01 ENCOUNTER — TELEPHONE (OUTPATIENT)
Dept: OTHER | Age: 68
End: 2023-08-01

## 2023-08-01 NOTE — TELEPHONE ENCOUNTER
7:19 AM  Patient unable to secure transportation for today's CHF clinic appt. Needs 1 week in advance.  Rescheduled for:     Future Appointments   Date Time Provider [address]   8/1/2023 10:15 AM [facility] CHF ROOM 2 [facility]   8/16/2023 11:00 AM [facility] CHF ROOM 2 [facility] CHF [facility]   8/23/2023  8:45 AM Lesley Fernando MD [facility]   9/6/2023  2:20 PM Carlo Castro, [address] [place] [address] Neurology -   12/1/2023  1:00 PM Lesley Fernando MD [facility]

## 2023-08-12 ENCOUNTER — APPOINTMENT (OUTPATIENT)
Dept: GENERAL RADIOLOGY | Age: 68
End: 2023-08-12
Payer: MEDICARE

## 2023-08-12 ENCOUNTER — HOSPITAL ENCOUNTER (EMERGENCY)
Age: 68
Discharge: HOME | End: 2023-08-13
Attending: EMERGENCY MEDICINE
Payer: MEDICARE

## 2023-08-12 VITALS
DIASTOLIC BLOOD PRESSURE: 64 MMHG | SYSTOLIC BLOOD PRESSURE: 116 MMHG | HEIGHT: 66 IN | WEIGHT: 247 LBS | OXYGEN SATURATION: 95 % | BODY MASS INDEX: 39.7 KG/M2 | RESPIRATION RATE: 18 BRPM | HEART RATE: 94 BPM | TEMPERATURE: 98.5 F

## 2023-08-12 DIAGNOSIS — G89.29 ACUTE EXACERBATION OF CHRONIC LOW BACK PAIN: ICD-10-CM

## 2023-08-12 DIAGNOSIS — M54.50 ACUTE EXACERBATION OF CHRONIC LOW BACK PAIN: ICD-10-CM
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
## 2023-08-12 PROCEDURE — 6360000002 HC RX W HCPCS

## 2023-08-12 PROCEDURE — 96372 THER/PROPH/DIAG INJ SC/IM: CPT

## 2023-08-12 PROCEDURE — 6370000000 HC RX 637 (ALT 250 FOR IP)

## 2023-08-12 PROCEDURE — 99284 EMERGENCY DEPT VISIT MOD MDM: CPT

## 2023-08-12 PROCEDURE — 72100 X-RAY EXAM L-S SPINE 2/3 VWS: CPT

## 2023-08-12 RX ORDER — ORPHENADRINE CITRATE 30 MG/ML
60 INJECTION INTRAMUSCULAR; INTRAVENOUS ONCE
Status: COMPLETED | OUTPATIENT
Start: 2023-08-12 | End: 2023-08-12

## 2023-08-12 RX ORDER — HYDROCODONE BITARTRATE AND ACETAMINOPHEN 5; 325 MG/1; MG/1
1 TABLET ORAL ONCE
Status: COMPLETED | OUTPATIENT
Start: 2023-08-12 | End: 2023-08-12

## 2023-08-12 RX ORDER — PREDNISONE 20 MG/1
20 TABLET ORAL 2 TIMES DAILY
Qty: 10 TABLET | Refills: 0 | Status: SHIPPED | OUTPATIENT
Start: 2023-08-12 | End: 2023-08-17

## 2023-08-12 RX ORDER — ACETAMINOPHEN 500 MG
500 TABLET ORAL EVERY 8 HOURS PRN
Qty: 21 TABLET | Refills: 0 | Status: ON HOLD | OUTPATIENT
Start: 2023-08-12 | End: 2023-08-19

## 2023-08-12 RX ORDER — PREDNISONE 20 MG/1
40 TABLET ORAL ONCE
Status: COMPLETED | OUTPATIENT
Start: 2023-08-12 | End: 2023-08-12

## 2023-08-12 RX ADMIN — HYDROCODONE BITARTRATE AND ACETAMINOPHEN 1 TABLET: 5; 325 TABLET ORAL at 20:56

## 2023-08-12 RX ADMIN — ORPHENADRINE CITRATE 60 MG: 60 INJECTION INTRAMUSCULAR; INTRAVENOUS at 20:58

## 2023-08-12 RX ADMIN — PREDNISONE 40 MG: 20 TABLET ORAL at 20:56

## 2023-08-12 ASSESSMENT — PAIN DESCRIPTION - LOCATION
LOCATION: BACK
LOCATION: BACK

## 2023-08-12 ASSESSMENT — PAIN SCALES - GENERAL
PAINLEVEL_OUTOF10: 7
PAINLEVEL_OUTOF10: 4

## 2023-08-12 ASSESSMENT — PAIN DESCRIPTION - DESCRIPTORS: DESCRIPTORS: ACHING

## 2023-08-12 ASSESSMENT — PAIN DESCRIPTION - ORIENTATION: ORIENTATION: LOWER

## 2023-08-13 NOTE — ED PROVIDER NOTES
[address]        Pt Name: Lily Arroyo  MRN: [id number]  [address] 1955  Date of evaluation: 8/12/2023  Provider: Carmelo Aguilar DO  PCP: Madelene Denver, DO  Note Started: 8:21 PM EDT 8/12/23    CHIEF COMPLAINT       Chief Complaint   Patient presents with    Back Pain    Fall     Slipped off edge of bed today -loc, on eliquis        HISTORY OF PRESENT ILLNESS: 1 or more Elements   History From: Patient    Limitations to history : None    Lily Arroyo is a 79 y.o. male who presents to the emergency department with chief complaint of fall. Patient states that he was sitting on the edge of his bed attempting to stand up when he slid off the edge of the bed and landed on his tailbone/lower back. He states that he began having pain in his right and left paraspinal regions in the lumbar spine. He does state that he has been having an exacerbation of lower back pain over the past several days without significant cause however this fall did seem to flareup his pain. He states that the fall was very slow and gentle and he did not hit anything other than his back. Patient denies any head injury, loss of consciousness, however does state he is on Eliquis. Patient states that he did not fall at a rapid rate and that he is not having any headache or cervical spine tenderness. Patient otherwise denies any neurologic deficits including saddle anesthesia, midline tenderness, lower extremity numbness or weakness from baseline, or bowel or bladder loss of control. Patient denies any lightheadedness or dizziness, fever or chills, nausea or vomiting, chest pain, shortness of breath, abdominal pain, hematuria or dysuria, constipation or diarrhea. Nursing Notes were all reviewed and agreed with or any disagreements were addressed in the HPI. REVIEW OF SYSTEMS :      Positives and Pertinent negatives as per HPI.      PAST

## 2023-08-15 ENCOUNTER — TELEPHONE (OUTPATIENT)
Dept: FAMILY MEDICINE CLINIC | Age: 68
End: 2023-08-15

## 2023-08-15 DIAGNOSIS — M54.41 ACUTE BILATERAL LOW BACK PAIN WITH BILATERAL SCIATICA: Primary | ICD-10-CM

## 2023-08-15 DIAGNOSIS — M54.42 ACUTE BILATERAL LOW BACK PAIN WITH BILATERAL SCIATICA: Primary | ICD-10-CM

## 2023-08-15 RX ORDER — BACLOFEN 10 MG/1
10 TABLET ORAL 3 TIMES DAILY
Qty: 60 TABLET | Refills: 0 | Status: SHIPPED | OUTPATIENT
Start: 2023-08-15 | End: 2023-09-04

## 2023-08-15 NOTE — TELEPHONE ENCOUNTER
Patient called to schedule an ED Follow Up Appt for a back spasm and he asked if Dr. Lu Ramirez would also prescribe a muscle relaxer and something for the pain. I informed patient that appts are scheduling a ways out and since patient has a modifier for additional time, will need to call back with an appt. Please advise regarding RX.     Last seen 6/28/2023  Next appt Visit date not found  [place]/Tadeo

## 2023-08-16 DIAGNOSIS — I87.2 VENOUS INSUFFICIENCY (CHRONIC) (PERIPHERAL): ICD-10-CM

## 2023-08-16 DIAGNOSIS — E11.42 TYPE 2 DIABETES MELLITUS WITH DIABETIC POLYNEUROPATHY, WITH LONG-TERM CURRENT USE OF INSULIN (HCC): ICD-10-CM

## 2023-08-16 DIAGNOSIS — Z59.82 TRANSPORTATION UNAVAILABLE: ICD-10-CM

## 2023-08-16 DIAGNOSIS — E66.01 MORBID OBESITY WITH BMI OF 50.0-59.9, ADULT (HCC): Primary | ICD-10-CM

## 2023-08-16 DIAGNOSIS — Z79.4 TYPE 2 DIABETES MELLITUS WITH DIABETIC POLYNEUROPATHY, WITH LONG-TERM CURRENT USE OF INSULIN (HCC): ICD-10-CM

## 2023-08-16 DIAGNOSIS — I48.0 PAF (PAROXYSMAL ATRIAL FIBRILLATION) (HCC): ICD-10-CM

## 2023-08-16 DIAGNOSIS — Z59.9 FINANCIAL DIFFICULTIES: ICD-10-CM

## 2023-08-16 SDOH — ECONOMIC STABILITY - INCOME SECURITY: PROBLEM RELATED TO HOUSING AND ECONOMIC CIRCUMSTANCES, UNSPECIFIED: Z59.9

## 2023-08-16 SDOH — ECONOMIC STABILITY - TRANSPORTATION SECURITY: TRANSPORTATION INSECURITY: Z59.82

## 2023-08-16 NOTE — TELEPHONE ENCOUNTER
I called patient and informed him, as noted by Dr. ELIZABETH. Patient was agreeable with both. Patient stated that it is very difficult for him to get transportation to appointments since he nor his wife drive. I asked patient if he would be agreeable to speak w/our  to assist him with possible resources and patient was agreeable.       Last seen 6/28/2023  Next appt Visit date not found

## 2023-08-16 NOTE — TELEPHONE ENCOUNTER
Per Dr. Jack Dodge -     I called in medications and set up with physical medicine, they will call him

## 2023-08-17 ENCOUNTER — APPOINTMENT (OUTPATIENT)
Dept: CT IMAGING | Age: 68
End: 2023-08-17
Payer: MEDICARE

## 2023-08-17 ENCOUNTER — HOSPITAL ENCOUNTER (EMERGENCY)
Age: 68
Discharge: OTHER INSTITUTION | End: 2023-08-17
Attending: STUDENT IN AN ORGANIZED HEALTH CARE EDUCATION/TRAINING PROGRAM
Payer: MEDICARE

## 2023-08-17 ENCOUNTER — HOSPITAL ENCOUNTER (INPATIENT)
Age: 68
LOS: 6 days | Discharge: NURSING, REHAB OR LONG-TERM CARE | DRG: 542 | End: 2023-08-23
Attending: EMERGENCY MEDICINE | Admitting: FAMILY MEDICINE
Payer: MEDICARE

## 2023-08-17 VITALS
HEART RATE: 120 BPM | SYSTOLIC BLOOD PRESSURE: 144 MMHG | RESPIRATION RATE: 14 BRPM | DIASTOLIC BLOOD PRESSURE: 109 MMHG | TEMPERATURE: 97.7 F | OXYGEN SATURATION: 95 %

## 2023-08-17 DIAGNOSIS — S22.49XA CLOSED FRACTURE OF MULTIPLE RIBS, UNSPECIFIED LATERALITY, INITIAL ENCOUNTER: ICD-10-CM

## 2023-08-17 DIAGNOSIS — S22.009A CLOSED FRACTURE OF THORACIC VERTEBRA, UNSPECIFIED FRACTURE MORPHOLOGY, UNSPECIFIED THORACIC VERTEBRAL LEVEL, INITIAL ENCOUNTER (HCC): Primary | ICD-10-CM

## 2023-08-17 DIAGNOSIS — E11.65 TYPE 2 DIABETES MELLITUS WITH HYPERGLYCEMIA, WITH LONG-TERM CURRENT USE OF INSULIN (HCC): ICD-10-CM

## 2023-08-17 DIAGNOSIS — Z79.4 TYPE 2 DIABETES MELLITUS WITH HYPERGLYCEMIA, WITH LONG-TERM CURRENT USE OF INSULIN (HCC): ICD-10-CM

## 2023-08-17 DIAGNOSIS — S22.49XA CLOSED FRACTURE OF MULTIPLE RIBS, UNSPECIFIED LATERALITY, INITIAL ENCOUNTER: Primary | ICD-10-CM

## 2023-08-17 DIAGNOSIS — S22.009A CLOSED FRACTURE OF TRANSVERSE PROCESS OF THORACIC VERTEBRA, INITIAL ENCOUNTER (HCC): ICD-10-CM

## 2023-08-17 DIAGNOSIS — S32.020A COMPRESSION FRACTURE OF L2 VERTEBRA, INITIAL ENCOUNTER (HCC): ICD-10-CM

## 2023-08-17 DIAGNOSIS — S22.41XD CLOSED FRACTURE OF MULTIPLE RIBS OF RIGHT SIDE WITH ROUTINE HEALING, SUBSEQUENT ENCOUNTER: ICD-10-CM

## 2023-08-17 DIAGNOSIS — I48.91 ATRIAL FIBRILLATION WITH RAPID VENTRICULAR RESPONSE (HCC): ICD-10-CM

## 2023-08-17 PROBLEM — S22.080A COMPRESSION FRACTURE OF T11 VERTEBRA, INITIAL ENCOUNTER (HCC): Status: ACTIVE | Noted: 2023-08-17

## 2023-08-17 LAB
ALBUMIN SERPL-MCNC: 3.2 G/DL (ref 3.5–5.2)
ALP SERPL-CCNC: 98 U/L (ref 40–129)
ALT SERPL-CCNC: 20 U/L (ref 0–40)
ANION GAP SERPL CALCULATED.3IONS-SCNC: 11 MMOL/L (ref 7–16)
AST SERPL-CCNC: 22 U/L (ref 0–39)
BASOPHILS # BLD: 0.03 K/UL (ref 0–0.2)
BASOPHILS NFR BLD: 0 % (ref 0–2)
BILIRUB SERPL-MCNC: 1.2 MG/DL (ref 0–1.2)
BUN SERPL-MCNC: 20 MG/DL (ref 6–23)
CALCIUM SERPL-MCNC: 9.2 MG/DL (ref 8.6–10.2)
CHLORIDE SERPL-SCNC: 100 MMOL/L (ref 98–107)
CO2 SERPL-SCNC: 28 MMOL/L (ref 22–29)
CREAT SERPL-MCNC: 1 MG/DL (ref 0.7–1.2)
EOSINOPHIL # BLD: 0.06 K/UL (ref 0.05–0.5)
EOSINOPHILS RELATIVE PERCENT: 1 % (ref 0–6)
ERYTHROCYTE [DISTWIDTH] IN BLOOD BY AUTOMATED COUNT: 14.2 % (ref 11.5–15)
GFR SERPL CREATININE-BSD FRML MDRD: >60 ML/MIN/1.73M2
GLUCOSE BLD-MCNC: 141 MG/DL
GLUCOSE BLD-MCNC: 141 MG/DL (ref 74–99)
GLUCOSE SERPL-MCNC: 135 MG/DL (ref 74–99)
HCT VFR BLD AUTO: 42.7 % (ref 37–54)
HGB BLD-MCNC: 14.7 G/DL (ref 12.5–16.5)
IMM GRANULOCYTES # BLD AUTO: 0.11 K/UL (ref 0–0.58)
IMM GRANULOCYTES NFR BLD: 1 % (ref 0–5)
LACTATE BLDV-SCNC: 1.4 MMOL/L (ref 0.5–2.2)
LIPASE SERPL-CCNC: 14 U/L (ref 13–60)
LYMPHOCYTES NFR BLD: 0.9 K/UL (ref 1.5–4)
LYMPHOCYTES RELATIVE PERCENT: 11 % (ref 20–42)
MAGNESIUM SERPL-MCNC: 2.1 MG/DL (ref 1.6–2.6)
MCH RBC QN AUTO: 32 PG (ref 26–35)
MCHC RBC AUTO-ENTMCNC: 34.4 G/DL (ref 32–34.5)
MCV RBC AUTO: 93 FL (ref 80–99.9)
MONOCYTES NFR BLD: 0.7 K/UL (ref 0.1–0.95)
MONOCYTES NFR BLD: 8 % (ref 2–12)
NEUTROPHILS NFR BLD: 79 % (ref 43–80)
NEUTS SEG NFR BLD: 6.68 K/UL (ref 1.8–7.3)
PLATELET, FLUORESCENCE: 139 K/UL (ref 130–450)
PMV BLD AUTO: 10.1 FL (ref 7–12)
POTASSIUM SERPL-SCNC: 2.9 MMOL/L (ref 3.5–5)
PROT SERPL-MCNC: 6.6 G/DL (ref 6.4–8.3)
RBC # BLD AUTO: 4.59 M/UL (ref 3.8–5.8)
SODIUM SERPL-SCNC: 139 MMOL/L (ref 132–146)
WBC OTHER # BLD: 8.5 K/UL (ref 4.5–11.5)

## 2023-08-17 PROCEDURE — 99285 EMERGENCY DEPT VISIT HI MDM: CPT

## 2023-08-17 PROCEDURE — 72192 CT PELVIS W/O DYE: CPT

## 2023-08-17 PROCEDURE — 72128 CT CHEST SPINE W/O DYE: CPT

## 2023-08-17 PROCEDURE — 85025 COMPLETE CBC W/AUTO DIFF WBC: CPT

## 2023-08-17 PROCEDURE — 83690 ASSAY OF LIPASE: CPT

## 2023-08-17 PROCEDURE — 83735 ASSAY OF MAGNESIUM: CPT

## 2023-08-17 PROCEDURE — 72131 CT LUMBAR SPINE W/O DYE: CPT

## 2023-08-17 PROCEDURE — 72125 CT NECK SPINE W/O DYE: CPT

## 2023-08-17 PROCEDURE — 99222 1ST HOSP IP/OBS MODERATE 55: CPT | Performed by: NEUROLOGICAL SURGERY

## 2023-08-17 PROCEDURE — 96372 THER/PROPH/DIAG INJ SC/IM: CPT

## 2023-08-17 PROCEDURE — 2500000003 HC RX 250 WO HCPCS: Performed by: EMERGENCY MEDICINE

## 2023-08-17 PROCEDURE — 74177 CT ABD & PELVIS W/CONTRAST: CPT

## 2023-08-17 PROCEDURE — 82962 GLUCOSE BLOOD TEST: CPT

## 2023-08-17 PROCEDURE — 6370000000 HC RX 637 (ALT 250 FOR IP): Performed by: STUDENT IN AN ORGANIZED HEALTH CARE EDUCATION/TRAINING PROGRAM

## 2023-08-17 PROCEDURE — 80053 COMPREHEN METABOLIC PANEL: CPT

## 2023-08-17 PROCEDURE — 70450 CT HEAD/BRAIN W/O DYE: CPT

## 2023-08-17 PROCEDURE — 96375 TX/PRO/DX INJ NEW DRUG ADDON: CPT

## 2023-08-17 PROCEDURE — 6360000002 HC RX W HCPCS: Performed by: STUDENT IN AN ORGANIZED HEALTH CARE EDUCATION/TRAINING PROGRAM

## 2023-08-17 PROCEDURE — 71275 CT ANGIOGRAPHY CHEST: CPT

## 2023-08-17 PROCEDURE — 96366 THER/PROPH/DIAG IV INF ADDON: CPT

## 2023-08-17 PROCEDURE — 93005 ELECTROCARDIOGRAM TRACING: CPT | Performed by: EMERGENCY MEDICINE

## 2023-08-17 PROCEDURE — 1200000000 HC SEMI PRIVATE

## 2023-08-17 PROCEDURE — 83605 ASSAY OF LACTIC ACID: CPT

## 2023-08-17 PROCEDURE — 96365 THER/PROPH/DIAG IV INF INIT: CPT

## 2023-08-17 PROCEDURE — 6360000004 HC RX CONTRAST MEDICATION: Performed by: RADIOLOGY

## 2023-08-17 RX ORDER — POTASSIUM CHLORIDE 20 MEQ/1
40 TABLET, EXTENDED RELEASE ORAL ONCE
Status: COMPLETED | OUTPATIENT
Start: 2023-08-17 | End: 2023-08-17

## 2023-08-17 RX ORDER — MORPHINE SULFATE 4 MG/ML
4 INJECTION, SOLUTION INTRAMUSCULAR; INTRAVENOUS ONCE
Status: COMPLETED | OUTPATIENT
Start: 2023-08-17 | End: 2023-08-17

## 2023-08-17 RX ORDER — ORPHENADRINE CITRATE 30 MG/ML
60 INJECTION INTRAMUSCULAR; INTRAVENOUS ONCE
Status: COMPLETED | OUTPATIENT
Start: 2023-08-17 | End: 2023-08-17

## 2023-08-17 RX ORDER — POTASSIUM CHLORIDE 7.45 MG/ML
10 INJECTION INTRAVENOUS
Status: COMPLETED | OUTPATIENT
Start: 2023-08-17 | End: 2023-08-17

## 2023-08-17 RX ORDER — METOPROLOL TARTRATE 5 MG/5ML
2.5 INJECTION INTRAVENOUS ONCE
Status: COMPLETED | OUTPATIENT
Start: 2023-08-17 | End: 2023-08-17

## 2023-08-17 RX ADMIN — ORPHENADRINE CITRATE 60 MG: 60 INJECTION INTRAMUSCULAR; INTRAVENOUS at 10:19

## 2023-08-17 RX ADMIN — MORPHINE SULFATE 4 MG: 4 INJECTION, SOLUTION INTRAMUSCULAR; INTRAVENOUS at 17:08

## 2023-08-17 RX ADMIN — IOPAMIDOL 70 ML: 755 INJECTION, SOLUTION INTRAVENOUS at 14:07

## 2023-08-17 RX ADMIN — MORPHINE SULFATE 4 MG: 4 INJECTION, SOLUTION INTRAMUSCULAR; INTRAVENOUS at 10:19

## 2023-08-17 RX ADMIN — POTASSIUM CHLORIDE 10 MEQ: 10 INJECTION, SOLUTION INTRAVENOUS at 17:11

## 2023-08-17 RX ADMIN — POTASSIUM CHLORIDE 10 MEQ: 10 INJECTION, SOLUTION INTRAVENOUS at 14:24

## 2023-08-17 RX ADMIN — POTASSIUM CHLORIDE 40 MEQ: 1500 TABLET, EXTENDED RELEASE ORAL at 14:32

## 2023-08-17 RX ADMIN — METOROPROLOL TARTRATE 2.5 MG: 5 INJECTION, SOLUTION INTRAVENOUS at 23:58

## 2023-08-17 ASSESSMENT — LIFESTYLE VARIABLES: HOW OFTEN DO YOU HAVE A DRINK CONTAINING ALCOHOL: NEVER

## 2023-08-17 ASSESSMENT — ENCOUNTER SYMPTOMS
COUGH: 0
VOMITING: 0
DIARRHEA: 0
ABDOMINAL PAIN: 0
SHORTNESS OF BREATH: 0
EYE DISCHARGE: 0
EYE PAIN: 0
BACK PAIN: 1
SINUS PRESSURE: 0
EYE REDNESS: 0
SORE THROAT: 0
WHEEZING: 0
NAUSEA: 0

## 2023-08-17 ASSESSMENT — PAIN - FUNCTIONAL ASSESSMENT
PAIN_FUNCTIONAL_ASSESSMENT: NONE - DENIES PAIN
PAIN_FUNCTIONAL_ASSESSMENT: 0-10

## 2023-08-17 ASSESSMENT — PAIN SCALES - GENERAL: PAINLEVEL_OUTOF10: 8

## 2023-08-17 ASSESSMENT — PAIN DESCRIPTION - LOCATION: LOCATION: BACK

## 2023-08-17 NOTE — ED NOTES
Report called to Jed Narayan at [facility] and the [facility] ER.       Kay Barahona RN  08/17/23 1946

## 2023-08-17 NOTE — CARE COORDINATION
Case Management Assessment  Initial Evaluation    Date/Time of Evaluation: 8/17/2023 4:57 PM  Assessment Completed by: RITU Sanders    If patient is discharged prior to next notation, then this note serves as note for discharge by case management. Patient Name: Aida Dickerson                   YOB: 1955  Diagnosis: No admission diagnoses are documented for this encounter. Date / Time: 8/17/2023 10:04 AM    Patient Admission Status: Emergency   Readmission Risk (Low < 19, Mod (19-27), High > 27): Readmission Risk Score: 20.4    Current PCP: Danna Banda, DO  PCP verified by CM? Yes    Chart Reviewed: Yes      History Provided by: Patient  Patient Orientation: Alert and Oriented, Person, Place, Situation, Self    Patient Cognition: Alert    Hospitalization in the last 30 days (Readmission):  No    If yes, Readmission Assessment in CM Navigator will be completed. Advance Directives:      Code Status: Prior   Patient's Primary Decision Maker is: Legal Next of Kin    Primary Decision MakerWes Andrés - Spouse - [phone]    Secondary Decision Maker: Manju Arredondo Child - [phone]    Discharge Planning:    Patient lives with:   Type of Home:    Primary Care Giver: Self  Patient Support Systems include: None   Current Financial resources:    Current community resources:    Current services prior to admission:              Current DME:              Type of Home Care services:       ADLS  Prior functional level: Independent in ADLs/IADLs  Current functional level: Assistance with the following:, Mobility, Shopping, Housework, Dressing, Bathing    PT AM-PAC:   /24  OT AM-PAC:   /24    Family can provide assistance at DC: No  Would you like Case Management to discuss the discharge plan with any other family members/significant others, and if so, who?  No  Plans to Return to Present Housing: Unknown at present  Other Identified Issues/Barriers to RETURNING to

## 2023-08-17 NOTE — ED PROVIDER NOTES
HPI   This is a 59-year-old male patient who presents to emergency department for evaluation of back pain. He was seen here recently few days ago for initial back pain after he had slid off the edge of the bed. He is on Eliquis. He reports being discharged with prednisone and he has been on baclofen, pain initially improved but then worsened. He denies any numbness or weakness anywhere. No urinary retention no bowel or bladder incontinence. Pain worse with ambulation. Review of Systems   Constitutional:  Negative for chills and fever. HENT:  Negative for ear pain, sinus pressure and sore throat. Eyes:  Negative for pain, discharge and redness. Respiratory:  Negative for cough, shortness of breath and wheezing. Cardiovascular:  Negative for chest pain. Gastrointestinal:  Negative for abdominal pain, diarrhea, nausea and vomiting. Genitourinary:  Negative for dysuria and frequency. Musculoskeletal:  Positive for back pain. Negative for arthralgias. Skin:  Negative for rash and wound. Neurological:  Negative for weakness and headaches. Hematological:  Negative for adenopathy. All other systems reviewed and are negative. Physical Exam  Vitals and nursing note reviewed. Constitutional:       Appearance: He is well-developed. HENT:      Head: Normocephalic and atraumatic. Eyes:      Conjunctiva/sclera: Conjunctivae normal.   Cardiovascular:      Rate and Rhythm: Normal rate and regular rhythm. Heart sounds: Normal heart sounds. No murmur heard. Pulmonary:      Effort: Pulmonary effort is normal. No respiratory distress. Breath sounds: Normal breath sounds. No wheezing or rales. Abdominal:      General: Bowel sounds are normal.      Palpations: Abdomen is soft. Tenderness: There is no abdominal tenderness. There is no guarding or rebound. Musculoskeletal:         General: No tenderness or deformity. Cervical back: Normal range of motion and neck supple.

## 2023-08-17 NOTE — ACP (ADVANCE CARE PLANNING)
Advance Care Planning   Healthcare Decision Maker:    Primary Decision Maker: Ramona Bates Spouse - [phone]    Secondary Decision Maker: Pritesh Crr - Child - [phone]    Click here to complete Healthcare Decision Makers including selection of the Healthcare Decision Maker Relationship (ie \"Primary\"). Today we documented Decision Maker(s) consistent with Legal Next of Kin hierarchy.      Electronically signed by RITU Orellana on 8/17/2023 at 4:46 PM

## 2023-08-17 NOTE — ED NOTES
Patient nodding off in the middle of conversation. Able to be aroused easily but still nodding off.       Kranthi Lovett RN  08/17/23 1952

## 2023-08-17 NOTE — ED NOTES
It has come to this nurses attention the patient's is positive for covid and is on the 4th floor being treated. Dr. Zora Prajapati notified.       Kranthi Lovett RN  08/17/23 1954

## 2023-08-18 PROBLEM — S22.49XA CLOSED FRACTURE OF MULTIPLE RIBS: Status: ACTIVE | Noted: 2023-08-18

## 2023-08-18 LAB
ANION GAP SERPL CALCULATED.3IONS-SCNC: 15 MMOL/L (ref 7–16)
B PARAP IS1001 DNA NPH QL NAA+NON-PROBE: NOT DETECTED
B PERT DNA SPEC QL NAA+PROBE: NOT DETECTED
BASOPHILS # BLD: 0.02 K/UL (ref 0–0.2)
BASOPHILS NFR BLD: 0 % (ref 0–2)
BUN SERPL-MCNC: 15 MG/DL (ref 6–23)
C PNEUM DNA NPH QL NAA+NON-PROBE: NOT DETECTED
CALCIUM SERPL-MCNC: 8.7 MG/DL (ref 8.6–10.2)
CHLORIDE SERPL-SCNC: 100 MMOL/L (ref 98–107)
CO2 SERPL-SCNC: 23 MMOL/L (ref 22–29)
CREAT SERPL-MCNC: 0.8 MG/DL (ref 0.7–1.2)
EKG ATRIAL RATE: 125 BPM
EKG Q-T INTERVAL: 396 MS
EKG QRS DURATION: 114 MS
EKG QTC CALCULATION (BAZETT): 584 MS
EKG R AXIS: 132 DEGREES
EKG T AXIS: -84 DEGREES
EKG VENTRICULAR RATE: 131 BPM
EOSINOPHIL # BLD: 0.01 K/UL (ref 0.05–0.5)
EOSINOPHILS RELATIVE PERCENT: 0 % (ref 0–6)
ERYTHROCYTE [DISTWIDTH] IN BLOOD BY AUTOMATED COUNT: 14.8 % (ref 11.5–15)
FLUAV RNA NPH QL NAA+NON-PROBE: NOT DETECTED
FLUBV RNA NPH QL NAA+NON-PROBE: NOT DETECTED
GFR SERPL CREATININE-BSD FRML MDRD: >60 ML/MIN/1.73M2
GLUCOSE BLD-MCNC: 218 MG/DL (ref 74–99)
GLUCOSE SERPL-MCNC: 108 MG/DL (ref 74–99)
HADV DNA NPH QL NAA+NON-PROBE: NOT DETECTED
HCOV 229E RNA NPH QL NAA+NON-PROBE: NOT DETECTED
HCOV HKU1 RNA NPH QL NAA+NON-PROBE: NOT DETECTED
HCOV NL63 RNA NPH QL NAA+NON-PROBE: NOT DETECTED
HCOV OC43 RNA NPH QL NAA+NON-PROBE: NOT DETECTED
HCT VFR BLD AUTO: 48.9 % (ref 37–54)
HGB BLD-MCNC: 16.2 G/DL (ref 12.5–16.5)
HMPV RNA NPH QL NAA+NON-PROBE: NOT DETECTED
HPIV1 RNA NPH QL NAA+NON-PROBE: NOT DETECTED
HPIV2 RNA NPH QL NAA+NON-PROBE: NOT DETECTED
HPIV3 RNA NPH QL NAA+NON-PROBE: NOT DETECTED
HPIV4 RNA NPH QL NAA+NON-PROBE: NOT DETECTED
IMM GRANULOCYTES # BLD AUTO: 0.08 K/UL (ref 0–0.58)
IMM GRANULOCYTES NFR BLD: 1 % (ref 0–5)
LYMPHOCYTES NFR BLD: 0.74 K/UL (ref 1.5–4)
LYMPHOCYTES RELATIVE PERCENT: 7 % (ref 20–42)
M PNEUMO DNA NPH QL NAA+NON-PROBE: NOT DETECTED
MCH RBC QN AUTO: 31.9 PG (ref 26–35)
MCHC RBC AUTO-ENTMCNC: 33.1 G/DL (ref 32–34.5)
MCV RBC AUTO: 96.3 FL (ref 80–99.9)
MONOCYTES NFR BLD: 0.68 K/UL (ref 0.1–0.95)
MONOCYTES NFR BLD: 6 % (ref 2–12)
NEUTROPHILS NFR BLD: 87 % (ref 43–80)
NEUTS SEG NFR BLD: 9.81 K/UL (ref 1.8–7.3)
PLATELET, FLUORESCENCE: 144 K/UL (ref 130–450)
PMV BLD AUTO: 11 FL (ref 7–12)
POTASSIUM SERPL-SCNC: 3.7 MMOL/L (ref 3.5–5)
RBC # BLD AUTO: 5.08 M/UL (ref 3.8–5.8)
REASON FOR REJECTION: NORMAL
RSV RNA NPH QL NAA+NON-PROBE: NOT DETECTED
RV+EV RNA NPH QL NAA+NON-PROBE: NOT DETECTED
SARS-COV-2 RNA NPH QL NAA+NON-PROBE: DETECTED
SODIUM SERPL-SCNC: 138 MMOL/L (ref 132–146)
SPECIMEN DESCRIPTION: ABNORMAL
SPECIMEN SOURCE: NORMAL
WBC OTHER # BLD: 11.3 K/UL (ref 4.5–11.5)
ZZ NTE CLEAN UP: ORDERED TEST: NORMAL

## 2023-08-18 PROCEDURE — S5553 INSULIN LONG ACTING 5 U: HCPCS | Performed by: FAMILY MEDICINE

## 2023-08-18 PROCEDURE — 2500000003 HC RX 250 WO HCPCS: Performed by: EMERGENCY MEDICINE

## 2023-08-18 PROCEDURE — 1200000000 HC SEMI PRIVATE

## 2023-08-18 PROCEDURE — 6370000000 HC RX 637 (ALT 250 FOR IP): Performed by: FAMILY MEDICINE

## 2023-08-18 PROCEDURE — 93010 ELECTROCARDIOGRAM REPORT: CPT | Performed by: INTERNAL MEDICINE

## 2023-08-18 PROCEDURE — 0202U NFCT DS 22 TRGT SARS-COV-2: CPT

## 2023-08-18 PROCEDURE — 2580000003 HC RX 258: Performed by: FAMILY MEDICINE

## 2023-08-18 PROCEDURE — 85025 COMPLETE CBC W/AUTO DIFF WBC: CPT

## 2023-08-18 PROCEDURE — 82962 GLUCOSE BLOOD TEST: CPT

## 2023-08-18 PROCEDURE — 2580000003 HC RX 258: Performed by: EMERGENCY MEDICINE

## 2023-08-18 PROCEDURE — 99222 1ST HOSP IP/OBS MODERATE 55: CPT | Performed by: SURGERY

## 2023-08-18 PROCEDURE — 80048 BASIC METABOLIC PNL TOTAL CA: CPT

## 2023-08-18 RX ORDER — POTASSIUM CHLORIDE 20 MEQ/1
20 TABLET, EXTENDED RELEASE ORAL DAILY
Status: DISCONTINUED | OUTPATIENT
Start: 2023-08-18 | End: 2023-08-23 | Stop reason: HOSPADM

## 2023-08-18 RX ORDER — ASPIRIN 81 MG/1
81 TABLET ORAL NIGHTLY
Status: DISCONTINUED | OUTPATIENT
Start: 2023-08-18 | End: 2023-08-23 | Stop reason: HOSPADM

## 2023-08-18 RX ORDER — PROMETHAZINE HYDROCHLORIDE 12.5 MG/1
12.5 TABLET ORAL EVERY 6 HOURS PRN
Status: DISCONTINUED | OUTPATIENT
Start: 2023-08-18 | End: 2023-08-23 | Stop reason: HOSPADM

## 2023-08-18 RX ORDER — METOPROLOL SUCCINATE 25 MG/1
25 TABLET, EXTENDED RELEASE ORAL DAILY
Status: DISCONTINUED | OUTPATIENT
Start: 2023-08-18 | End: 2023-08-19

## 2023-08-18 RX ORDER — ACETAMINOPHEN 325 MG/1
650 TABLET ORAL EVERY 6 HOURS PRN
Status: DISCONTINUED | OUTPATIENT
Start: 2023-08-18 | End: 2023-08-23 | Stop reason: HOSPADM

## 2023-08-18 RX ORDER — DILTIAZEM HYDROCHLORIDE 5 MG/ML
10 INJECTION INTRAVENOUS ONCE
Status: COMPLETED | OUTPATIENT
Start: 2023-08-18 | End: 2023-08-18

## 2023-08-18 RX ORDER — ACETAMINOPHEN 650 MG/1
650 SUPPOSITORY RECTAL EVERY 6 HOURS PRN
Status: DISCONTINUED | OUTPATIENT
Start: 2023-08-18 | End: 2023-08-23 | Stop reason: HOSPADM

## 2023-08-18 RX ORDER — POTASSIUM CHLORIDE 7.45 MG/ML
10 INJECTION INTRAVENOUS PRN
Status: DISCONTINUED | OUTPATIENT
Start: 2023-08-18 | End: 2023-08-23 | Stop reason: HOSPADM

## 2023-08-18 RX ORDER — PANTOPRAZOLE SODIUM 40 MG/1
40 TABLET, DELAYED RELEASE ORAL DAILY
Status: DISCONTINUED | OUTPATIENT
Start: 2023-08-18 | End: 2023-08-23 | Stop reason: HOSPADM

## 2023-08-18 RX ORDER — BUMETANIDE 1 MG/1
1 TABLET ORAL DAILY
Status: DISCONTINUED | OUTPATIENT
Start: 2023-08-18 | End: 2023-08-23 | Stop reason: HOSPADM

## 2023-08-18 RX ORDER — MONTELUKAST SODIUM 10 MG/1
10 TABLET ORAL NIGHTLY
Status: DISCONTINUED | OUTPATIENT
Start: 2023-08-18 | End: 2023-08-23 | Stop reason: HOSPADM

## 2023-08-18 RX ORDER — SODIUM CHLORIDE 0.9 % (FLUSH) 0.9 %
10 SYRINGE (ML) INJECTION EVERY 12 HOURS SCHEDULED
Status: DISCONTINUED | OUTPATIENT
Start: 2023-08-18 | End: 2023-08-23 | Stop reason: HOSPADM

## 2023-08-18 RX ORDER — ATORVASTATIN CALCIUM 40 MG/1
80 TABLET, FILM COATED ORAL NIGHTLY
Status: DISCONTINUED | OUTPATIENT
Start: 2023-08-18 | End: 2023-08-23 | Stop reason: HOSPADM

## 2023-08-18 RX ORDER — SODIUM CHLORIDE 0.9 % (FLUSH) 0.9 %
10 SYRINGE (ML) INJECTION PRN
Status: DISCONTINUED | OUTPATIENT
Start: 2023-08-18 | End: 2023-08-23 | Stop reason: HOSPADM

## 2023-08-18 RX ORDER — ONDANSETRON 2 MG/ML
4 INJECTION INTRAMUSCULAR; INTRAVENOUS EVERY 6 HOURS PRN
Status: DISCONTINUED | OUTPATIENT
Start: 2023-08-18 | End: 2023-08-23 | Stop reason: HOSPADM

## 2023-08-18 RX ORDER — MAGNESIUM SULFATE IN WATER 40 MG/ML
2000 INJECTION, SOLUTION INTRAVENOUS PRN
Status: DISCONTINUED | OUTPATIENT
Start: 2023-08-18 | End: 2023-08-23 | Stop reason: HOSPADM

## 2023-08-18 RX ORDER — METOLAZONE 2.5 MG/1
2.5 TABLET ORAL
Status: DISCONTINUED | OUTPATIENT
Start: 2023-08-21 | End: 2023-08-23 | Stop reason: HOSPADM

## 2023-08-18 RX ORDER — POTASSIUM CHLORIDE 20 MEQ/1
40 TABLET, EXTENDED RELEASE ORAL PRN
Status: DISCONTINUED | OUTPATIENT
Start: 2023-08-18 | End: 2023-08-23 | Stop reason: HOSPADM

## 2023-08-18 RX ORDER — SODIUM CHLORIDE 9 MG/ML
INJECTION, SOLUTION INTRAVENOUS PRN
Status: DISCONTINUED | OUTPATIENT
Start: 2023-08-18 | End: 2023-08-23 | Stop reason: HOSPADM

## 2023-08-18 RX ORDER — INSULIN GLARGINE-YFGN 100 [IU]/ML
50 INJECTION, SOLUTION SUBCUTANEOUS 2 TIMES DAILY
Status: DISCONTINUED | OUTPATIENT
Start: 2023-08-18 | End: 2023-08-23

## 2023-08-18 RX ORDER — BACLOFEN 10 MG/1
10 TABLET ORAL 3 TIMES DAILY
Status: DISCONTINUED | OUTPATIENT
Start: 2023-08-18 | End: 2023-08-23 | Stop reason: HOSPADM

## 2023-08-18 RX ORDER — POLYETHYLENE GLYCOL 3350 17 G/17G
17 POWDER, FOR SOLUTION ORAL DAILY PRN
Status: DISCONTINUED | OUTPATIENT
Start: 2023-08-18 | End: 2023-08-23 | Stop reason: HOSPADM

## 2023-08-18 RX ORDER — AMIODARONE HYDROCHLORIDE 200 MG/1
200 TABLET ORAL DAILY
Status: DISCONTINUED | OUTPATIENT
Start: 2023-08-18 | End: 2023-08-23 | Stop reason: HOSPADM

## 2023-08-18 RX ORDER — LEVOTHYROXINE SODIUM 0.15 MG/1
150 TABLET ORAL DAILY
Status: DISCONTINUED | OUTPATIENT
Start: 2023-08-18 | End: 2023-08-23 | Stop reason: HOSPADM

## 2023-08-18 RX ADMIN — BACLOFEN 10 MG: 10 TABLET ORAL at 08:24

## 2023-08-18 RX ADMIN — DILTIAZEM HYDROCHLORIDE 10 MG: 5 INJECTION INTRAVENOUS at 01:21

## 2023-08-18 RX ADMIN — ATORVASTATIN CALCIUM 80 MG: 40 TABLET, FILM COATED ORAL at 20:49

## 2023-08-18 RX ADMIN — ASPIRIN 81 MG: 81 TABLET, COATED ORAL at 20:48

## 2023-08-18 RX ADMIN — BACLOFEN 10 MG: 10 TABLET ORAL at 14:29

## 2023-08-18 RX ADMIN — INSULIN GLARGINE-YFGN 50 UNITS: 100 INJECTION, SOLUTION SUBCUTANEOUS at 08:28

## 2023-08-18 RX ADMIN — AMIODARONE HYDROCHLORIDE 200 MG: 200 TABLET ORAL at 08:27

## 2023-08-18 RX ADMIN — EMPAGLIFLOZIN 10 MG: 10 TABLET, FILM COATED ORAL at 08:28

## 2023-08-18 RX ADMIN — BACLOFEN 10 MG: 10 TABLET ORAL at 20:48

## 2023-08-18 RX ADMIN — BUMETANIDE 1 MG: 1 TABLET ORAL at 08:27

## 2023-08-18 RX ADMIN — ACETAMINOPHEN 650 MG: 325 TABLET ORAL at 20:51

## 2023-08-18 RX ADMIN — INSULIN GLARGINE-YFGN 50 UNITS: 100 INJECTION, SOLUTION SUBCUTANEOUS at 20:55

## 2023-08-18 RX ADMIN — MONTELUKAST 10 MG: 10 TABLET, FILM COATED ORAL at 20:48

## 2023-08-18 RX ADMIN — METOPROLOL SUCCINATE 25 MG: 25 TABLET, EXTENDED RELEASE ORAL at 08:27

## 2023-08-18 RX ADMIN — SODIUM CHLORIDE 5 MG/HR: 900 INJECTION, SOLUTION INTRAVENOUS at 03:32

## 2023-08-18 RX ADMIN — SODIUM CHLORIDE, PRESERVATIVE FREE 10 ML: 5 INJECTION INTRAVENOUS at 14:29

## 2023-08-18 RX ADMIN — POTASSIUM CHLORIDE 20 MEQ: 1500 TABLET, EXTENDED RELEASE ORAL at 08:28

## 2023-08-18 RX ADMIN — SODIUM CHLORIDE 10 MG/HR: 900 INJECTION, SOLUTION INTRAVENOUS at 15:40

## 2023-08-18 RX ADMIN — PANTOPRAZOLE SODIUM 40 MG: 40 TABLET, DELAYED RELEASE ORAL at 08:27

## 2023-08-18 RX ADMIN — LEVOTHYROXINE SODIUM 150 MCG: 0.14 TABLET ORAL at 08:26

## 2023-08-18 RX ADMIN — SODIUM CHLORIDE, PRESERVATIVE FREE 10 ML: 5 INJECTION INTRAVENOUS at 20:50

## 2023-08-18 RX ADMIN — APIXABAN 5 MG: 5 TABLET, FILM COATED ORAL at 08:25

## 2023-08-18 RX ADMIN — APIXABAN 5 MG: 5 TABLET, FILM COATED ORAL at 20:48

## 2023-08-18 ASSESSMENT — PAIN - FUNCTIONAL ASSESSMENT
PAIN_FUNCTIONAL_ASSESSMENT: PREVENTS OR INTERFERES SOME ACTIVE ACTIVITIES AND ADLS
PAIN_FUNCTIONAL_ASSESSMENT: PREVENTS OR INTERFERES SOME ACTIVE ACTIVITIES AND ADLS

## 2023-08-18 ASSESSMENT — PAIN DESCRIPTION - ORIENTATION
ORIENTATION: MID;LOWER
ORIENTATION: MID;LOWER

## 2023-08-18 ASSESSMENT — PAIN DESCRIPTION - DESCRIPTORS
DESCRIPTORS: ACHING;DISCOMFORT
DESCRIPTORS: SORE
DESCRIPTORS: ACHING;DISCOMFORT

## 2023-08-18 ASSESSMENT — PAIN DESCRIPTION - PAIN TYPE: TYPE: ACUTE PAIN

## 2023-08-18 ASSESSMENT — PAIN DESCRIPTION - LOCATION
LOCATION: BACK

## 2023-08-18 ASSESSMENT — PAIN SCALES - GENERAL
PAINLEVEL_OUTOF10: 9
PAINLEVEL_OUTOF10: 10
PAINLEVEL_OUTOF10: 6

## 2023-08-18 ASSESSMENT — PAIN DESCRIPTION - FREQUENCY: FREQUENCY: CONTINUOUS

## 2023-08-18 ASSESSMENT — PAIN DESCRIPTION - ONSET: ONSET: ON-GOING

## 2023-08-18 NOTE — ED NOTES
Went into patient room noted afib rvr on monitor no s/s of distress hx of a fib.  Notified ER MD new orders placed      Tresa Gates RN  08/18/23 0000

## 2023-08-18 NOTE — CONSULTS
[address], [address] [place] [address]                                  CONSULTATION    PATIENT NAME: Mariela Babb                   :        1955  MED REC NO:   [id number]                            ROOM:       10  ACCOUNT NO:   [de-identified]                           ADMIT DATE: 2023  PROVIDER:     Oziel Agrawal MD    CONSULT DATE:  2023    REASON FOR CONSULTATION:  T11 compression fracture with T3 through T7  transverse process fractures. HISTORY OF PRESENT ILLNESS:  The patient is a 51-year-old gentleman who  apparently slid out of bed on Saturday. Since then he has had back pain  and he describes it as a sharp stabbing pain. Denies any new numbness,  tingling or weakness or loss of control of bowel or bladder function. Subsequently presented to the emergency room with worsening pain. Part  of his workup included CT scan of his thoracic spine that showed a T11  compression fracture and T3 through T7 transverse process fractures. As  a result of that, Neurosurgery Service was consulted. PAST MEDICAL HISTORY:  Positive for CHF, acid reflux, arthritis, asthma,  coronary artery disease, COPD, hyperlipidemia, hypertension,  osteoarthritis, sleep apnea and chronic kidney disease. PAST SURGICAL HISTORY:  Positive for colonoscopy, coronary angioplasty  with stent placement, coronary artery bypass grafting, hernia repair,  sinus surgery and tonsillectomy. FAMILY HISTORY:  Positive for cancer in his mother and father. SOCIAL HISTORY:  Negative for tobacco use. He does not consume any  alcoholic beverages. ALLERGIES:  Include PENICILLIN and SULFA. REVIEW OF SYSTEMS:  HEENT:  Negative for headache, double vision, or  blurry vision. CARDIOVASCULAR:  Negative for chest pain, arrhythmia or  palpitations. RESPIRATORY:  Negative for shortness of breath, asthma,  bronchitis or pneumonia.

## 2023-08-18 NOTE — ED NOTES
Pt resting with eyes closed, respirations even and unlabored, vss on monitor      Cristin Whitlock RN  08/17/23 3529

## 2023-08-18 NOTE — DISCHARGE SUMMARY
Physician Discharge Summary     Patient ID:  Joe Springer  [id number]  46 y.o.  1955    Admit date: 8/17/2023    Discharge date and time: 8/23/2023  8:00 PM     Admitting Physician: Deep Blood DO     Admission Diagnoses: Atrial fibrillation with rapid ventricular response ([address]) [I48.91]  Closed fracture of multiple ribs, unspecified laterality, initial encounter [S22.49XA]  Closed fracture of thoracic vertebra, unspecified fracture morphology, unspecified thoracic vertebral level, initial encounter ([address]) [S22.009A]  Compression fracture of T11 vertebra, initial encounter ([address]) [S22.080A]  Compression fracture of L2 vertebra, initial encounter ([address]) [S32.020A]    Discharge Diagnoses: Principal Problem:    Compression fracture of T11 vertebra, initial encounter ([address])  Active Problems:    Fx dorsal vertebra-closed ([address])    Closed fracture of multiple ribs  Resolved Problems:    * No resolved hospital problems. *      Admission Condition: good    Discharged Condition: stable    Indication for Admission: fall    Hospital Course/Procedures/Operation/treatments:   8/18: Denies any new pain aside from b/l buttock pain from sitting in the bed for so long. Denies any BLE and BUE sensory/motor changes. Consults:   IP CONSULT TO TRAUMA SURGERY  IP CONSULT TO NEUROSURGERY  INPATIENT CONSULT TO ORTHOTIST/PROSTHETIST  IP CONSULT TO NEUROSURGERY  IP CONSULT TO CARDIOLOGY  IP CONSULT TO INFECTIOUS DISEASES  IP CONSULT TO PHARMACY  IP CONSULT TO PODIATRY    Significant Diagnostic Studies:   CT HEAD WO CONTRAST    Result Date: 8/17/2023  EXAMINATION: CT OF THE HEAD WITHOUT CONTRAST  8/17/2023 2:07 pm TECHNIQUE: CT of the head was performed without the administration of intravenous contrast. Automated exposure control, iterative reconstruction, and/or weight based adjustment of the mA/kV was utilized to reduce the radiation dose to as low as reasonably achievable. COMPARISON: None.  HISTORY: ORDERING SYSTEM 1 tablet by mouth daily  Qty: 30 tablet, Refills: 0      atorvastatin (LIPITOR) 80 MG tablet TAKE ONE TABLET BY MOUTH EVERY NIGHT  Qty: 90 tablet, Refills: 2    Associated Diagnoses: Mixed hyperlipidemia      bumetanide (BUMEX) 1 MG tablet Take 1 tablet by mouth daily  Qty: 90 tablet, Refills: 3    Associated Diagnoses: Venous insufficiency (chronic) (peripheral)      empagliflozin (JARDIANCE) 10 MG tablet Take 1 tablet by mouth daily  Qty: 90 tablet, Refills: 2    Comments: . Associated Diagnoses: Acute on chronic congestive heart failure, unspecified heart failure type (HCC)      metoprolol succinate (TOPROL XL) 25 MG extended release tablet Take 1 tablet by mouth daily  Qty: 90 tablet, Refills: 2      potassium chloride (KLOR-CON M) 20 MEQ extended release tablet Take 1 tablet by mouth daily  Qty: 90 tablet, Refills: 2      blood glucose monitor kit and supplies PLEASE PROVIDE WITH BRAND COVERED BY INSURANCE  Qty: 1 kit, Refills: 3    Comments: Brand per patient preference. May round up to next available package size. Associated Diagnoses: Type 2 diabetes mellitus with diabetic polyneuropathy, with long-term current use of insulin ([facility])      Lancets MISC 1 each by Does not apply route 3 times daily PLEASE PROVIDE W/BRAND INSURANCE COVERS  Qty: 300 each, Refills: 3    Associated Diagnoses: Type 2 diabetes mellitus with diabetic polyneuropathy, with long-term current use of insulin ([facility])      blood glucose monitor strips Test 3 times a day & as needed for symptoms of irregular blood glucose. PLEASE PROVIDE W/BRAND INSURANCE COVERS  Qty: 300 strip, Refills: 3    Comments: (1) Identify specific brand and product. (2) Include specific quantity. (3) Include frequency.   Associated Diagnoses: Type 2 diabetes mellitus with diabetic polyneuropathy, with long-term current use of insulin ([facility])      montelukast (SINGULAIR) 10 MG tablet Take 1 tablet by mouth nightly  Qty: 90 tablet, Refills: 3    Associated Diagnoses:

## 2023-08-18 NOTE — H&P
Hospitalist History & Physical      PCP: Noah Stover DO    Date of Service: Pt seen/examined on 8/17/2023     Chief Complaint:  had concerns including Consultation (Trauma transfer from [facility], multiple t spine fx and left 5th/6th rib fx, fall 1 week ago at [facility]. ). History Of Present Illness:    Mr. Tommy Fernandez, a 79y.o. year old male  who  has a past medical history of Acid reflux, Acute diastolic (congestive) heart failure (HCC), Arthritis, Asthma, Asthmatic bronchitis , chronic (HCC), CAD (coronary artery disease), Chronic bronchitis ([address]), COPD (chronic obstructive pulmonary disease) ([address]), COPD (chronic obstructive pulmonary disease) ([facility]), Diabetes mellitus ([address] Emphysema (subcutaneous) (surgical) resulting from a procedure, H/O cardiovascular stress test, Hyperlipidemia, Hypertension, Hypoxemia requiring supplemental oxygen, LONG TERM ANTICOAGULENT USE, Morbid obesity with BMI of 50.0-59.9, adult ([address]), Obesity, Osteoarthritis, Sleep apnea, Stage 3 chronic kidney disease ([address]), Tobacco abuse, and Type II or unspecified type diabetes mellitus without mention of complication, not stated as uncontrolled. Patient was transferred from [address] [place] [address] to [facility] emergency department for trauma consult. Patient currently fell out of bed about a week ago. Unfortunately trauma does not involve itself with injuries that have taken place so long ago. Patient was diagnosed with multiple rib fractures and multiple compression fractures of the spine. Neurosurgery was consulted. Vital signs are within normal limits and stable. Laboratory studies notable for potassium 2.9. Medicine consulted for admission.       Past Medical History:   Diagnosis Date    Acid reflux     Acute diastolic (congestive) heart failure ([address]) 06/19/2019    Arthritis     Asthma 04/16/2014    Asthmatic bronchitis , chronic ([address]) 11/28/2016    CAD (coronary artery disease)     Chronic bronchitis ([address]) 04/16/2014    COPD or resolution. Pleural thickening at the lung bases bilaterally. Some patchy ground-glass opacity to suggest mild atelectatic change in the superior aspect of the right lower lobe. Minimal ground-glass opacity posteriorly within the upper lobes bilaterally. No definite consolidative infiltrate. Upper Abdomen: Limited images of the upper abdomen are unremarkable. Soft Tissues/Bones: No acute bone or soft tissue abnormality. Degenerative changes seen within the spine. No acute chest wall abnormality. The patient is status post median sternotomy. Healed rib fractures involving the right posterior ribs. Associated pleural thickening. Abdomen/pelvis: Organs: The liver is homogeneous in appearance. Stones layering in the gallbladder with mild distension. No wall thickening or biliary ductal dilatation. The pancreas is homogeneous in appearance. The spleen is unremarkable. Both adrenal glands are within normal limits. Symmetric enhancement of the renal parenchyma. No stones or distension seen in the renal collecting system. GI tract: The stomach is unremarkable. No wall thickening. Small bowel is within normal limits. No mucosal abnormality. Stool seen scattered diffusely throughout the colon. No signs of obvious obstruction or obstructing lesion. The appendix is normal. Pelvis: Bladder is incompletely distended. Wall thickening. Correlation to urinalysis identified to exclude a mild cystitis. The prostate is heterogeneous. Mild enlargement. Retroperitoneum/peritoneum: No abdominal retroperitoneal lymphadenopathy. Extensive atherosclerotic disease within the abdominal aorta and iliac vessels. No pelvic adenopathy. No abnormal mass or fluid collections identified. Bony structures reveal minimal degenerative changes seen throughout the spine and pelvis. There is no ventral abdominal wall mass or defect. No CT evidence of pulmonary embolism.   Findings favoring rounded atelectatic changes at the

## 2023-08-18 NOTE — ED PROVIDER NOTES
HPI:  8/17/23, Time: 8:13 PM EDT         Tommy Fernandez is a 79 y.o. male history of acid reflux heart failure asthmatic bronchitis history of coronary disease history of COPD history of diabetes history of hypoxemia history of osteoarthritis sleep apnea and chronic kidney disease history of tobacco use presenting to the ED for history of fall with back pain, beginning 1 week ago. The complaint has been persistent, moderate in severity, and worsened by movement of lower back. With history of fall. Patient complained of lower back pain. Patient reporting no chest pain or difficulty breathing was seen at outlying facility and reportedly diagnosed with thoracic spine fractures. He also has a lumbar spine fracture. Patient reports he slid out of bed. Patient reporting no abdominal pain no vomiting or diarrhea. Patient's wife reportedly has COVID and is being seen at outlying facility. ROS:   Pertinent positives and negatives are stated within HPI, all other systems reviewed and are negative.  --------------------------------------------- PAST HISTORY ---------------------------------------------  Past Medical History:  has a past medical history of Acid reflux, Acute diastolic (congestive) heart failure (HCC), Arthritis, Asthma, Asthmatic bronchitis , chronic (HCC), CAD (coronary artery disease), Chronic bronchitis ([address]), COPD (chronic obstructive pulmonary disease) ([address]), COPD (chronic obstructive pulmonary disease) ([address]), Diabetes mellitus ([address]), Emphysema (subcutaneous) (surgical) resulting from a procedure, H/O cardiovascular stress test, Hyperlipidemia, Hypertension, Hypoxemia requiring supplemental oxygen, LONG TERM ANTICOAGULENT USE, Morbid obesity with BMI of 50.0-59.9, adult ([address]), Obesity, Osteoarthritis, Sleep apnea, Stage 3 chronic kidney disease ([address]), Tobacco abuse, and Type II or unspecified type diabetes mellitus without mention of complication, not stated as uncontrolled.     Past Surgical

## 2023-08-18 NOTE — PROGRESS NOTES
Trauma Tertiary Survey    Admit Date: 8/17/2023  Hospital day 1    CC:  Fall    Alcohol pre-screening:  Men: How many times in the past year have you had 5 or more drinks in a day?  none  How much do you drink on a daily basis? none    Drug Pre-screening:    How many times in the past year have you used a recreational drug or used a prescription medication for non medical reasons? No    Mood Prescreening:    During the past two weeks, have you been bothered by little interest or pleasure doing things? No  During the past two weeks, have you been bothered by feeling down, depressed or hopeless? No      Scheduled Meds:   amiodarone  200 mg Oral Daily    apixaban  5 mg Oral BID    aspirin  81 mg Oral Nightly    atorvastatin  80 mg Oral Nightly    baclofen  10 mg Oral TID    bumetanide  1 mg Oral Daily    empagliflozin  10 mg Oral Daily    insulin glargine-yfgn  50 Units SubCUTAneous BID    levothyroxine  150 mcg Oral Daily    [START ON 8/21/2023] metOLazone  2.5 mg Oral Once per day on Mon Thu    metoprolol succinate  25 mg Oral Daily    montelukast  10 mg Oral Nightly    potassium chloride  20 mEq Oral Daily    pantoprazole  40 mg Oral Daily    sodium chloride flush  10 mL IntraVENous 2 times per day     Continuous Infusions:   sodium chloride      dilTIAZem (CARDIZEM) 100 mg in 0.9% sodium chloride 100 mL ([facility]) 10 mg/hr (08/18/23 0533)     PRN Meds:sodium chloride flush, sodium chloride, promethazine **OR** ondansetron, polyethylene glycol, acetaminophen **OR** acetaminophen, potassium chloride **OR** potassium alternative oral replacement **OR** potassium chloride, magnesium sulfate    Subjective:     Denies any new pain aside from b/l buttock pain from sitting in the bed for so long. Denies any BLE and BUE sensory/motor changes.     Objective:   Patient Vitals for the past 8 hrs:   BP Temp Pulse Resp SpO2   08/18/23 0533 -- -- (!) 116 -- --   08/18/23 0529 114/85 -- 99 20 94 %   08/18/23 0459 (!) 137/97 Right Yes    Hand grasp:   Left  Present      Right  Present  Plantar flexion: Left  Present      Right   Present    PHYSICAL EXAM  General: No apparent distress, comfortable   HEENT: Trachea midline, no masses, Pupils equal round   Chest: Respiratory effort was normal with no retractions or use of accessory muscles. Cardiovascular: Extremities warm, well perfused,   Abdomen:  Soft and non distended. No tenderness, guarding, rebound, or rigidity   Extremities: Moves all 4 extremeties, No pedal edema     Spine:   Spine Tenderness ROM   Cervical 0/10 Normal   Thoracic 0 /10 Normal   Lumbar 0 /10 Normal     Musculoskeletal:    Joint Tenderness Swelling ROM   Right shoulder Absent absent normal   Left shoulder absent absent normal   Right elbow absent absent normal   Left elbow absent absent normal   Right wrist absent absent normal   Left wrist absent absent normal   Right hand grasp Absent absent normal   Left hand grasp absent absent normal   Right hip absent absent normal   Left hip absent absent normal   Right knee Absent absent normal   Left knee absent absent normal   Right ankle absent absent normal   Left ankle absent absent normal   Right foot Absent absent normal   Left foot absent absent normal       CONSULTS: Neurosurgery    PROCEDURES:     INJURIES:      Principal Problem:    Compression fracture of T11 vertebra, initial encounter ([address])  Active Problems:    Fx dorsal vertebra-closed (HCC)  Resolved Problems:    * No resolved hospital problems. *        Assessment/Plan:   Alexey Javier, 78 yo male, s/p \"slid\" out of bed.  T3- 7 TP fx, T11 compression fx, L Rib 5-6 fx, L2 sup endplate fx , CAD, COPD, CKD , HLD, HTN    Neuro:  GCS 15  T3-T7 TP fx, T11 compression fx  TLSO brace per NSGY  L Rib fx  Pain control  CV: HR near normal limits, no acute issues   CAD, HTN- home meds  Pulm: tolerating room air   COPD- treat with home meds and breathing tx as needed  GI: tolerating general diet   Renal: no acute

## 2023-08-18 NOTE — DISCHARGE INSTRUCTIONS
TRAUMA SERVICES DISCHARGE INSTRUCTIONS    Call [phone], option 2, for any questions/concerns and for follow-up appointment in 1 week(s). Please follow the instructions checked below:  Please follow-up with your primary care provider. ACTIVITY INSTRUCTIONS  Increase activity as tolerated  No heavy lifting or strenuous activity  Take your incentive spirometer home and use 4-6 times/day   []  No driving until cleared by ***    WOUND/DRESSING INSTRUCTIONS:  You may shower. No sitting in bath tub, hot tub or swimming until cleared by physician. Ice to areas of pain for first 24 hours. Heat to areas of pain after that. Wash areas of lacerations/abrasions with soap & water. Rinse well. Pat dry with clean towel. Apply thin layer of Bacitracin, Neosporin, or triple antibiotic cream to affected area 2-3 times per day. Keep wounds clean and dry. []  Sutures/Staples are to be removed in *** {DAY/WK/MON/YRS:20513}. MEDICATION INSTRUCTIONS  Take medication as prescribed. When taking pain medications, you may experience dizziness or drowsiness. Do not drink alcohol or drive when taking these medications. You may experience constipation while taking pain medication. You may take over the counter stool softeners such as docusate (Colace), sennosides S (Senokot-S), or Miralax.   []  You may take Ibuprofen (over the counter) as directed for mild pain. --You may take up to 800mg every 8 hours for pain, please take with food or milk.   []  You may take acetaminophen (Tylenol) products. Do NOT take more than 4000mg of Tylenol in 24h. []  Do not take any other acetaminophen (Tylenol) products if you are taking Percocet or Norco, as these contain Tylenol. --Do NOT take more than 4000mg of Tylenol in 24h. OPIOID MEDICATION INSTRUCTIONS  Read the medication guide that is included with your prescription. Take your medication exactly as prescribed.   Store medication away from children and in a safe

## 2023-08-18 NOTE — PROGRESS NOTES
4 Eyes Skin Assessment     NAME:  Janet Seip OF BIRTH:  1955  MEDICAL RECORD NUMBER:  [id number]    The patient is being assessed for  Admission    I agree that at least one RN has performed a thorough Head to Toe Skin Assessment on the patient. ALL assessment sites listed below have been assessed. Areas assessed by both nurses:    Head, Face, Ears, Shoulders, Back, Chest, Arms, Elbows, Hands, Sacrum. Buttock, Coccyx, Ischium, and Legs. Feet and Heels        Does the Patient have a Wound?  No noted wound(s)       Casey Prevention initiated by RN: Yes  Wound Care Orders initiated by RN: No    Pressure Injury (Stage 3,4, Unstageable, DTI, NWPT, and Complex wounds) if present, place Wound referral order by RN under : No    New Ostomies, if present place, Ostomy referral order under : No     Nurse 1 eSignature: Electronically signed by Frantz Jimenez RN on 8/18/23 at 12:14 PM EDT    **SHARE this note so that the co-signing nurse can place an eSignature**    Nurse 2 eSignature: Electronically signed by Masha Potter RN on 8/18/23 at 5:37 PM EDT

## 2023-08-18 NOTE — ED NOTES
Pt resting with eyes closed, respirations even and unlabored, vss on monitor, pt is snoring and is easily awakened.      Camron Juan RN  08/17/23 8232

## 2023-08-19 LAB
ANION GAP SERPL CALCULATED.3IONS-SCNC: 13 MMOL/L (ref 7–16)
BUN SERPL-MCNC: 19 MG/DL (ref 6–23)
CALCIUM SERPL-MCNC: 8.4 MG/DL (ref 8.6–10.2)
CHLORIDE SERPL-SCNC: 98 MMOL/L (ref 98–107)
CO2 SERPL-SCNC: 23 MMOL/L (ref 22–29)
CREAT SERPL-MCNC: 0.9 MG/DL (ref 0.7–1.2)
GFR SERPL CREATININE-BSD FRML MDRD: >60 ML/MIN/1.73M2
GLUCOSE BLD-MCNC: 132 MG/DL (ref 74–99)
GLUCOSE BLD-MCNC: 148 MG/DL (ref 74–99)
GLUCOSE SERPL-MCNC: 187 MG/DL (ref 74–99)
PLATELET CONFIRMATION: NORMAL
POTASSIUM SERPL-SCNC: 3.7 MMOL/L (ref 3.5–5)
SODIUM SERPL-SCNC: 134 MMOL/L (ref 132–146)

## 2023-08-19 PROCEDURE — 6370000000 HC RX 637 (ALT 250 FOR IP): Performed by: STUDENT IN AN ORGANIZED HEALTH CARE EDUCATION/TRAINING PROGRAM

## 2023-08-19 PROCEDURE — S5553 INSULIN LONG ACTING 5 U: HCPCS | Performed by: FAMILY MEDICINE

## 2023-08-19 PROCEDURE — 99223 1ST HOSP IP/OBS HIGH 75: CPT | Performed by: INTERNAL MEDICINE

## 2023-08-19 PROCEDURE — 1200000000 HC SEMI PRIVATE

## 2023-08-19 PROCEDURE — 82962 GLUCOSE BLOOD TEST: CPT

## 2023-08-19 PROCEDURE — 2580000003 HC RX 258: Performed by: FAMILY MEDICINE

## 2023-08-19 PROCEDURE — 93005 ELECTROCARDIOGRAM TRACING: CPT | Performed by: STUDENT IN AN ORGANIZED HEALTH CARE EDUCATION/TRAINING PROGRAM

## 2023-08-19 PROCEDURE — 36415 COLL VENOUS BLD VENIPUNCTURE: CPT

## 2023-08-19 PROCEDURE — 6370000000 HC RX 637 (ALT 250 FOR IP): Performed by: FAMILY MEDICINE

## 2023-08-19 PROCEDURE — 80048 BASIC METABOLIC PNL TOTAL CA: CPT

## 2023-08-19 PROCEDURE — 94640 AIRWAY INHALATION TREATMENT: CPT

## 2023-08-19 RX ORDER — METOPROLOL SUCCINATE 25 MG/1
25 TABLET, EXTENDED RELEASE ORAL DAILY
Status: DISCONTINUED | OUTPATIENT
Start: 2023-08-19 | End: 2023-08-23 | Stop reason: HOSPADM

## 2023-08-19 RX ORDER — ALBUTEROL SULFATE 2.5 MG/3ML
2.5 SOLUTION RESPIRATORY (INHALATION) EVERY 6 HOURS PRN
Status: DISCONTINUED | OUTPATIENT
Start: 2023-08-19 | End: 2023-08-23 | Stop reason: HOSPADM

## 2023-08-19 RX ORDER — IPRATROPIUM BROMIDE AND ALBUTEROL SULFATE 2.5; .5 MG/3ML; MG/3ML
1 SOLUTION RESPIRATORY (INHALATION)
Status: DISCONTINUED | OUTPATIENT
Start: 2023-08-19 | End: 2023-08-20

## 2023-08-19 RX ADMIN — ASPIRIN 81 MG: 81 TABLET, COATED ORAL at 22:11

## 2023-08-19 RX ADMIN — INSULIN GLARGINE-YFGN 50 UNITS: 100 INJECTION, SOLUTION SUBCUTANEOUS at 22:13

## 2023-08-19 RX ADMIN — MONTELUKAST 10 MG: 10 TABLET, FILM COATED ORAL at 22:10

## 2023-08-19 RX ADMIN — SODIUM CHLORIDE, PRESERVATIVE FREE 10 ML: 5 INJECTION INTRAVENOUS at 09:09

## 2023-08-19 RX ADMIN — APIXABAN 5 MG: 5 TABLET, FILM COATED ORAL at 22:10

## 2023-08-19 RX ADMIN — INSULIN GLARGINE-YFGN 50 UNITS: 100 INJECTION, SOLUTION SUBCUTANEOUS at 09:05

## 2023-08-19 RX ADMIN — ACETAMINOPHEN 650 MG: 325 TABLET ORAL at 22:11

## 2023-08-19 RX ADMIN — IPRATROPIUM BROMIDE AND ALBUTEROL SULFATE 1 DOSE: .5; 2.5 SOLUTION RESPIRATORY (INHALATION) at 21:51

## 2023-08-19 RX ADMIN — ACETAMINOPHEN 650 MG: 325 TABLET ORAL at 06:33

## 2023-08-19 RX ADMIN — IPRATROPIUM BROMIDE AND ALBUTEROL SULFATE 1 DOSE: .5; 2.5 SOLUTION RESPIRATORY (INHALATION) at 12:34

## 2023-08-19 RX ADMIN — IPRATROPIUM BROMIDE AND ALBUTEROL SULFATE 1 DOSE: .5; 2.5 SOLUTION RESPIRATORY (INHALATION) at 16:49

## 2023-08-19 RX ADMIN — BACLOFEN 10 MG: 10 TABLET ORAL at 22:10

## 2023-08-19 RX ADMIN — METOPROLOL SUCCINATE 25 MG: 25 TABLET, EXTENDED RELEASE ORAL at 18:03

## 2023-08-19 RX ADMIN — POTASSIUM CHLORIDE 20 MEQ: 1500 TABLET, EXTENDED RELEASE ORAL at 09:06

## 2023-08-19 RX ADMIN — AMIODARONE HYDROCHLORIDE 200 MG: 200 TABLET ORAL at 09:05

## 2023-08-19 RX ADMIN — EMPAGLIFLOZIN 10 MG: 10 TABLET, FILM COATED ORAL at 09:06

## 2023-08-19 RX ADMIN — BACLOFEN 10 MG: 10 TABLET ORAL at 09:06

## 2023-08-19 RX ADMIN — LEVOTHYROXINE SODIUM 150 MCG: 0.14 TABLET ORAL at 06:32

## 2023-08-19 RX ADMIN — ATORVASTATIN CALCIUM 80 MG: 40 TABLET, FILM COATED ORAL at 22:10

## 2023-08-19 RX ADMIN — SODIUM CHLORIDE, PRESERVATIVE FREE 10 ML: 5 INJECTION INTRAVENOUS at 22:11

## 2023-08-19 RX ADMIN — PANTOPRAZOLE SODIUM 40 MG: 40 TABLET, DELAYED RELEASE ORAL at 09:06

## 2023-08-19 RX ADMIN — BUMETANIDE 1 MG: 1 TABLET ORAL at 09:06

## 2023-08-19 RX ADMIN — ACETAMINOPHEN 650 MG: 325 TABLET ORAL at 14:53

## 2023-08-19 RX ADMIN — BACLOFEN 10 MG: 10 TABLET ORAL at 14:52

## 2023-08-19 RX ADMIN — APIXABAN 5 MG: 5 TABLET, FILM COATED ORAL at 09:06

## 2023-08-19 ASSESSMENT — PAIN SCALES - GENERAL
PAINLEVEL_OUTOF10: 0
PAINLEVEL_OUTOF10: 4
PAINLEVEL_OUTOF10: 6
PAINLEVEL_OUTOF10: 1
PAINLEVEL_OUTOF10: 3
PAINLEVEL_OUTOF10: 5

## 2023-08-19 ASSESSMENT — PAIN DESCRIPTION - DESCRIPTORS
DESCRIPTORS: ACHING
DESCRIPTORS: ACHING;DISCOMFORT
DESCRIPTORS: ACHING

## 2023-08-19 ASSESSMENT — PAIN DESCRIPTION - ORIENTATION
ORIENTATION: MID
ORIENTATION: MID;LOWER

## 2023-08-19 ASSESSMENT — PAIN DESCRIPTION - LOCATION
LOCATION: BACK

## 2023-08-19 ASSESSMENT — PAIN - FUNCTIONAL ASSESSMENT
PAIN_FUNCTIONAL_ASSESSMENT: PREVENTS OR INTERFERES SOME ACTIVE ACTIVITIES AND ADLS
PAIN_FUNCTIONAL_ASSESSMENT: ACTIVITIES ARE NOT PREVENTED

## 2023-08-19 NOTE — PROGRESS NOTES
Messaged Dr. Diana Ford about holding the patients toprol due to his low blood pressure, and about ordering PT and OT. Per Dr. Diana Ford \"You can hold toprol xl. Thanks. Sure. \"

## 2023-08-19 NOTE — PLAN OF CARE

## 2023-08-19 NOTE — CONSULTS
Department of Internal Medicine  Infectious Diseases   Consult Note    Reason for Consult: COVID 19 infection       Requesting Physician:  Dr Addison Marie:            This is a 79 yrs old male with hx of  asthma, CHF, COPD, DM, HTN who sustained multiple rib fractures, T3 to T7 transverse process fractures after a fall ( pt slid out of the bed ) . Pt was transferred to the [facility] for neurosurgery evaluation,. Pt reported his wife was recently diagnosed with COVID 19 infection   Pt denied fever, chills , shortness of breath   WBC was 8 k -11 K   SARS CoV 2 PCR +ve     Past Medical History:      Past Medical History:   Diagnosis Date    Acid reflux     Acute diastolic (congestive) heart failure ([facility]) 06/19/2019    Arthritis     Asthma 04/16/2014    Asthmatic bronchitis , chronic ([address]) 11/28/2016    CAD (coronary artery disease)     Chronic bronchitis ([facility]) 04/16/2014    COPD (chronic obstructive pulmonary disease) ([facility])     CB    COPD (chronic obstructive pulmonary disease) ([facility])     Diabetes mellitus ([address])     Emphysema (subcutaneous) (surgical) resulting from a procedure     H/O cardiovascular stress test 04/24/2021    Lexiscan    Hyperlipidemia     Hypertension     Hypoxemia requiring supplemental oxygen 02/02/2015    LONG TERM ANTICOAGULENT USE     Morbid obesity with BMI of 50.0-59.9, adult ([address]) 11/27/2013    Obesity     Osteoarthritis     Sleep apnea     bilevel positive airway pressure at 13/8 with 2 L oxygen flow     Stage 3 chronic kidney disease ([address])     Tobacco abuse     Type II or unspecified type diabetes mellitus without mention of complication, not stated as uncontrolled          Past Surgical History:      Past Surgical History:   Procedure Laterality Date    COLONOSCOPY      CORONARY ANGIOPLASTY WITH STENT PLACEMENT  07/23/2013    Left main focal eccentric 65% distal stenosis. Large OM1 CX 50% ostial & prox narrow.  Large ramus artery & LAD: Minor plaque w/o sign

## 2023-08-19 NOTE — PROGRESS NOTES
Patient BP 87/61 and HR 81, Cardizem drip stopped at this time. Dr. Tom Jimenez notified with [facility] at this time.

## 2023-08-19 NOTE — CONSULTS
CHIEF COMPLAINT: CHF/Afib/CAD/VHD/COVID/Fall    HISTORY OF PRESENT ILLNESS: Patient is a 79 y.o. male seen at the request of Spike Gambino DO and followed at our office by Dr. Taylor Foster. Presented with fall. Patient with extensive cardiac history. Diagnosed with COVID. Consulted for SOB and CHF. Patient states some SOB. No CP. In afib.      Past Medical History:   Diagnosis Date    Acid reflux     Acute diastolic (congestive) heart failure ([address]) 06/19/2019    Arthritis     Asthma 04/16/2014    Asthmatic bronchitis , chronic ([address]) 11/28/2016    CAD (coronary artery disease)     Chronic bronchitis ([address]) 04/16/2014    COPD (chronic obstructive pulmonary disease) ([facility])     CB    COPD (chronic obstructive pulmonary disease) (HCC)     Diabetes mellitus ([facility])     Emphysema (subcutaneous) (surgical) resulting from a procedure     H/O cardiovascular stress test 04/24/2021    Lexiscan    Hyperlipidemia     Hypertension     Hypoxemia requiring supplemental oxygen 02/02/2015    LONG TERM ANTICOAGULENT USE     Morbid obesity with BMI of 50.0-59.9, adult ([address]) 11/27/2013    Obesity     Osteoarthritis     Sleep apnea     bilevel positive airway pressure at 13/8 with 2 L oxygen flow     Stage 3 chronic kidney disease (HCC)     Tobacco abuse     Type II or unspecified type diabetes mellitus without mention of complication, not stated as uncontrolled        Patient Active Problem List   Diagnosis    CAD (coronary artery disease)    Hypertension    Type 2 diabetes mellitus with hyperglycemia, with long-term current use of insulin (HCC)    Tobacco abuse    PAF (paroxysmal atrial fibrillation) ([facility])    Status post coronary artery bypass graft    H/O mitral valve repair    Morbid obesity with BMI of 50.0-59.9, adult (HCC)    Chronic bronchitis (HCC)    Sleep apnea    Gastroesophageal reflux disease without esophagitis    Hyperlipidemia    Muscular deconditioning    Acute diastolic (congestive) heart failure (HCC)    Acute VINOD  Cardiologist  Cardiology, [facility]) Physicians

## 2023-08-19 NOTE — PROGRESS NOTES
Patient up in chair, refusing to wear TLSO brace. Patient educated on importance of wearing brace, patient continues to refuse.

## 2023-08-20 LAB
ANION GAP SERPL CALCULATED.3IONS-SCNC: 14 MMOL/L (ref 7–16)
BASOPHILS # BLD: 0.01 K/UL (ref 0–0.2)
BASOPHILS NFR BLD: 0 % (ref 0–2)
BUN SERPL-MCNC: 15 MG/DL (ref 6–23)
CALCIUM SERPL-MCNC: 8.1 MG/DL (ref 8.6–10.2)
CHLORIDE SERPL-SCNC: 100 MMOL/L (ref 98–107)
CO2 SERPL-SCNC: 22 MMOL/L (ref 22–29)
CREAT SERPL-MCNC: 0.9 MG/DL (ref 0.7–1.2)
EOSINOPHIL # BLD: 0.07 K/UL (ref 0.05–0.5)
EOSINOPHILS RELATIVE PERCENT: 1 % (ref 0–6)
ERYTHROCYTE [DISTWIDTH] IN BLOOD BY AUTOMATED COUNT: 14.9 % (ref 11.5–15)
GFR SERPL CREATININE-BSD FRML MDRD: >60 ML/MIN/1.73M2
GLUCOSE BLD-MCNC: 182 MG/DL (ref 74–99)
GLUCOSE BLD-MCNC: 92 MG/DL (ref 74–99)
GLUCOSE SERPL-MCNC: 95 MG/DL (ref 74–99)
HCT VFR BLD AUTO: 39.4 % (ref 37–54)
HGB BLD-MCNC: 13.4 G/DL (ref 12.5–16.5)
IMM GRANULOCYTES # BLD AUTO: 0.07 K/UL (ref 0–0.58)
IMM GRANULOCYTES NFR BLD: 1 % (ref 0–5)
LYMPHOCYTES NFR BLD: 0.78 K/UL (ref 1.5–4)
LYMPHOCYTES RELATIVE PERCENT: 13 % (ref 20–42)
MAGNESIUM SERPL-MCNC: 1.9 MG/DL (ref 1.6–2.6)
MCH RBC QN AUTO: 31.8 PG (ref 26–35)
MCHC RBC AUTO-ENTMCNC: 34 G/DL (ref 32–34.5)
MCV RBC AUTO: 93.4 FL (ref 80–99.9)
MONOCYTES NFR BLD: 0.54 K/UL (ref 0.1–0.95)
MONOCYTES NFR BLD: 9 % (ref 2–12)
NEUTROPHILS NFR BLD: 75 % (ref 43–80)
NEUTS SEG NFR BLD: 4.44 K/UL (ref 1.8–7.3)
PLATELET # BLD AUTO: 147 K/UL (ref 130–450)
PMV BLD AUTO: 9.8 FL (ref 7–12)
POTASSIUM SERPL-SCNC: 3.4 MMOL/L (ref 3.5–5)
RBC # BLD AUTO: 4.22 M/UL (ref 3.8–5.8)
SODIUM SERPL-SCNC: 136 MMOL/L (ref 132–146)
WBC OTHER # BLD: 5.9 K/UL (ref 4.5–11.5)

## 2023-08-20 PROCEDURE — 6370000000 HC RX 637 (ALT 250 FOR IP): Performed by: STUDENT IN AN ORGANIZED HEALTH CARE EDUCATION/TRAINING PROGRAM

## 2023-08-20 PROCEDURE — 2580000003 HC RX 258: Performed by: FAMILY MEDICINE

## 2023-08-20 PROCEDURE — 6370000000 HC RX 637 (ALT 250 FOR IP): Performed by: FAMILY MEDICINE

## 2023-08-20 PROCEDURE — 80048 BASIC METABOLIC PNL TOTAL CA: CPT

## 2023-08-20 PROCEDURE — 94640 AIRWAY INHALATION TREATMENT: CPT

## 2023-08-20 PROCEDURE — 99233 SBSQ HOSP IP/OBS HIGH 50: CPT | Performed by: INTERNAL MEDICINE

## 2023-08-20 PROCEDURE — S5553 INSULIN LONG ACTING 5 U: HCPCS | Performed by: FAMILY MEDICINE

## 2023-08-20 PROCEDURE — 97530 THERAPEUTIC ACTIVITIES: CPT

## 2023-08-20 PROCEDURE — 97535 SELF CARE MNGMENT TRAINING: CPT

## 2023-08-20 PROCEDURE — 83735 ASSAY OF MAGNESIUM: CPT

## 2023-08-20 PROCEDURE — 1200000000 HC SEMI PRIVATE

## 2023-08-20 PROCEDURE — 82962 GLUCOSE BLOOD TEST: CPT

## 2023-08-20 PROCEDURE — 36415 COLL VENOUS BLD VENIPUNCTURE: CPT

## 2023-08-20 PROCEDURE — 85025 COMPLETE CBC W/AUTO DIFF WBC: CPT

## 2023-08-20 PROCEDURE — 97165 OT EVAL LOW COMPLEX 30 MIN: CPT

## 2023-08-20 RX ORDER — IPRATROPIUM BROMIDE AND ALBUTEROL SULFATE 2.5; .5 MG/3ML; MG/3ML
1 SOLUTION RESPIRATORY (INHALATION) EVERY 4 HOURS PRN
Status: DISCONTINUED | OUTPATIENT
Start: 2023-08-20 | End: 2023-08-23 | Stop reason: HOSPADM

## 2023-08-20 RX ADMIN — SODIUM CHLORIDE, PRESERVATIVE FREE 10 ML: 5 INJECTION INTRAVENOUS at 09:01

## 2023-08-20 RX ADMIN — PANTOPRAZOLE SODIUM 40 MG: 40 TABLET, DELAYED RELEASE ORAL at 08:59

## 2023-08-20 RX ADMIN — APIXABAN 5 MG: 5 TABLET, FILM COATED ORAL at 20:55

## 2023-08-20 RX ADMIN — IPRATROPIUM BROMIDE AND ALBUTEROL SULFATE 1 DOSE: .5; 2.5 SOLUTION RESPIRATORY (INHALATION) at 09:55

## 2023-08-20 RX ADMIN — MONTELUKAST 10 MG: 10 TABLET, FILM COATED ORAL at 20:55

## 2023-08-20 RX ADMIN — BACLOFEN 10 MG: 10 TABLET ORAL at 08:59

## 2023-08-20 RX ADMIN — POTASSIUM CHLORIDE 20 MEQ: 1500 TABLET, EXTENDED RELEASE ORAL at 08:59

## 2023-08-20 RX ADMIN — AMIODARONE HYDROCHLORIDE 200 MG: 200 TABLET ORAL at 08:59

## 2023-08-20 RX ADMIN — APIXABAN 5 MG: 5 TABLET, FILM COATED ORAL at 08:59

## 2023-08-20 RX ADMIN — LEVOTHYROXINE SODIUM 150 MCG: 0.14 TABLET ORAL at 06:29

## 2023-08-20 RX ADMIN — INSULIN GLARGINE-YFGN 50 UNITS: 100 INJECTION, SOLUTION SUBCUTANEOUS at 20:58

## 2023-08-20 RX ADMIN — INSULIN GLARGINE-YFGN 50 UNITS: 100 INJECTION, SOLUTION SUBCUTANEOUS at 09:00

## 2023-08-20 RX ADMIN — EMPAGLIFLOZIN 10 MG: 10 TABLET, FILM COATED ORAL at 08:59

## 2023-08-20 RX ADMIN — ATORVASTATIN CALCIUM 80 MG: 40 TABLET, FILM COATED ORAL at 20:55

## 2023-08-20 RX ADMIN — METOPROLOL SUCCINATE 25 MG: 25 TABLET, EXTENDED RELEASE ORAL at 08:59

## 2023-08-20 RX ADMIN — BUMETANIDE 1 MG: 1 TABLET ORAL at 08:58

## 2023-08-20 RX ADMIN — SODIUM CHLORIDE, PRESERVATIVE FREE 10 ML: 5 INJECTION INTRAVENOUS at 20:55

## 2023-08-20 RX ADMIN — ASPIRIN 81 MG: 81 TABLET, COATED ORAL at 20:55

## 2023-08-20 RX ADMIN — ACETAMINOPHEN 650 MG: 325 TABLET ORAL at 09:00

## 2023-08-20 RX ADMIN — BACLOFEN 10 MG: 10 TABLET ORAL at 20:55

## 2023-08-20 RX ADMIN — POTASSIUM CHLORIDE 40 MEQ: 1500 TABLET, EXTENDED RELEASE ORAL at 09:09

## 2023-08-20 RX ADMIN — BACLOFEN 10 MG: 10 TABLET ORAL at 14:52

## 2023-08-20 RX ADMIN — ACETAMINOPHEN 650 MG: 325 TABLET ORAL at 20:56

## 2023-08-20 ASSESSMENT — PAIN DESCRIPTION - ORIENTATION: ORIENTATION: LOWER;MID

## 2023-08-20 ASSESSMENT — PAIN SCALES - GENERAL
PAINLEVEL_OUTOF10: 6
PAINLEVEL_OUTOF10: 3
PAINLEVEL_OUTOF10: 2
PAINLEVEL_OUTOF10: 0

## 2023-08-20 ASSESSMENT — PAIN DESCRIPTION - LOCATION
LOCATION: BACK
LOCATION: BACK

## 2023-08-20 ASSESSMENT — PAIN DESCRIPTION - DESCRIPTORS
DESCRIPTORS: ACHING
DESCRIPTORS: ACHING

## 2023-08-20 NOTE — PROGRESS NOTES
Patient expresses that he would like to go to a nursing facility at discharge. Patients wife is at [facility] and he requests that he goes to the same facility as his wife when she is discharged. Patient does not yet know where she is going.

## 2023-08-20 NOTE — PROGRESS NOTES
CHIEF COMPLAINT: CHF/Afib/CAD/VHD/COVID/Fall    HISTORY OF PRESENT ILLNESS: Patient is a 79 y.o. male seen at the request of Afua Robles DO and followed at our office by Dr. Syed Rsoa. Presented with fall. Patient with extensive cardiac history. Diagnosed with COVID. Consulted for SOB and CHF. Patient states some SOB. No CP. In afib.      Past Medical History:   Diagnosis Date    Acid reflux     Acute diastolic (congestive) heart failure ([address]) 06/19/2019    Arthritis     Asthma 04/16/2014    Asthmatic bronchitis , chronic ([address]) 11/28/2016    CAD (coronary artery disease)     Chronic bronchitis ([address]) 04/16/2014    COPD (chronic obstructive pulmonary disease) ([facility])     CB    COPD (chronic obstructive pulmonary disease) (HCC)     Diabetes mellitus ([facility])     Emphysema (subcutaneous) (surgical) resulting from a procedure     H/O cardiovascular stress test 04/24/2021    Lexiscan    Hyperlipidemia     Hypertension     Hypoxemia requiring supplemental oxygen 02/02/2015    LONG TERM ANTICOAGULENT USE     Morbid obesity with BMI of 50.0-59.9, adult ([address]) 11/27/2013    Obesity     Osteoarthritis     Sleep apnea     bilevel positive airway pressure at 13/8 with 2 L oxygen flow     Stage 3 chronic kidney disease (HCC)     Tobacco abuse     Type II or unspecified type diabetes mellitus without mention of complication, not stated as uncontrolled        Patient Active Problem List   Diagnosis    CAD (coronary artery disease)    Hypertension    Type 2 diabetes mellitus with hyperglycemia, with long-term current use of insulin (HCC)    Tobacco abuse    PAF (paroxysmal atrial fibrillation) ([facility])    Status post coronary artery bypass graft    H/O mitral valve repair    Morbid obesity with BMI of 50.0-59.9, adult (HCC)    Chronic bronchitis (HCC)    Sleep apnea    Gastroesophageal reflux disease without esophagitis    Hyperlipidemia    Muscular deconditioning    Acute diastolic (congestive) heart failure (HCC)    Acute

## 2023-08-20 NOTE — PROGRESS NOTES
Occupational Therapy  OCCUPATIONAL THERAPY INITIAL EVALUATION    BRUNO Vargas [address] [facility]   [address], [place], [place]      Date:2023                                                Patient Name: Onofre Ortiz  MRN: [id number]  : 1955  Room: [address] [place] [address]    Evaluating OT: aMrtha Borrego OTR/L #[id number]     Referring Provider: Padmini Alvarado MD  Specific Provider Orders/Date: OT eval and treat 23    Diagnosis: Atrial fibrillation with rapid ventricular response ([address]) [I48.91]  Closed fracture of multiple ribs, unspecified laterality, initial encounter [S22.49XA]  Closed fracture of thoracic vertebra, unspecified fracture morphology, unspecified thoracic vertebral level, initial encounter ([address]) [S22.009A]  Compression fracture of T11 vertebra, initial encounter ([address]) [S22.080A]  Compression fracture of L2 vertebra, initial encounter ([address]) [H61.516R]   Pt admitted to hospital following fall out of bed. T11 compression fracture with T3 through T7  transverse process fractures      Pertinent Medical History:  has a past medical history of Acid reflux, Acute diastolic (congestive) heart failure (HCC), Arthritis, Asthma, Asthmatic bronchitis , chronic (HCC), CAD (coronary artery disease), Chronic bronchitis (HCC), COPD (chronic obstructive pulmonary disease) ([address]), COPD (chronic obstructive pulmonary disease) ([address]), Diabetes mellitus ([address]), Emphysema (subcutaneous) (surgical) resulting from a procedure, H/O cardiovascular stress test, Hyperlipidemia, Hypertension, Hypoxemia requiring supplemental oxygen, LONG TERM ANTICOAGULENT USE, Morbid obesity with BMI of 50.0-59.9, adult ([address]), Obesity, Osteoarthritis, Sleep apnea, Stage 3 chronic kidney disease ([address]), Tobacco abuse, and Type II or unspecified type diabetes mellitus without mention of complication, not stated as uncontrolled.        Precautions:  Fall Risk, Droplet plus (COVID-19),  spinal precautions, orientation, scapular stability/mobility, normalization of muscle tone, and facilitation of volitional active controled movement    Recommended Adaptive Equipment:  TBD     Home Living: Pt lives with wife (whom patient assists with care) in a 1 story home with level entry     Bathroom setup: tub/shower combo    Equipment owned: shower chair, w/w    Prior Level of Function: mod I with ADLs , mod I with IADLs; ambulated without AD   Driving: no   Occupation: na    Pain Level: Pt reported mild back pain this session;  Therapist educated pt on spinal precautions, TLSO and facilitated repositioning to address pain      Cognition: A&O: 4/4; Follows 1-2 step directions   Memory:  good   Sequencing:  fair   Problem solving:  fair   Judgement/safety:  fair-   + impulsivity      Functional Assessment:  AM-PAC Daily Activity Raw Score: 14/24   Initial Eval Status  Date: 8/20/23 Treatment Status  Date: STGs = LTGs  Time frame: 10-14 days   Feeding Independent      Grooming Minimal Assist     Standing at sink   Modified [place]    UB Dressing Stand by Assist     Max A  (TLSO)  Modified [place]    LB Dressing Dependent   Modified [place]    Bathing Moderate Assist  Modified [place]    Toileting Moderate Assist   Modified [place]    Bed Mobility  Supine to sit: Minimal Assist   Sit to supine: NT    Log roll technique    Supine to sit: Modified [place]   Sit to supine: Modified [place]    Functional Transfers Minimal Assist   Modified [place]    Functional Mobility Minimal Assist     Ambulated in room including to/from bathroom with w/w   Modified [place]    Balance Sitting:     Static:  SBA    Dynamic:SBA  Standing: min A     Activity Tolerance F-  F+   Visual/  Perceptual wfl                  Hand Dominance right   Strength ROM Additional Info:    RUE  WFL, formal MMT deferred due to spinal precautions  WFL good  and wfl FMC/dexterity noted during ADL tasks     [facility] formal MMT

## 2023-08-20 NOTE — PROGRESS NOTES
Department of Internal Medicine  Infectious Diseases  Progress  Note    C/C : COVID 19 infection     Pt denies fever or chills  Denies SOB   Reports chest pain   Afebrile       Current Facility-Administered Medications   Medication Dose Route Frequency Provider Last Rate Last Admin    ipratropium 0.5 mg-albuterol 2.5 mg (DUONEB) nebulizer solution 1 Dose  1 Dose Inhalation Q4H PRN Karin Vila MD        metoprolol succinate (TOPROL XL) extended release tablet 25 mg  25 mg Oral Daily Karin Vila MD   25 mg at 08/20/23 0859    albuterol (PROVENTIL) (2.5 MG/3ML) 0.083% nebulizer solution 2.5 mg  2.5 mg Nebulization Q6H PRN Karin Vila MD        perflutren lipid microspheres (DEFINITY) injection 1.5 mL  1.5 mL IntraVENous ONCE PRN Isabel Boateng DO        nirmatrelvir/ritonavir 300/100 (PAXLOVID) 3 tablet (Patient Supplied)  3 tablet Oral Q12H Claude Fields MD   3 tablet at 08/20/23 0630    amiodarone (CORDARONE) tablet 200 mg  200 mg Oral Daily Arrie Rinks, DO   200 mg at 08/20/23 0859    apixaban (ELIQUIS) tablet 5 mg  5 mg Oral BID Alysa Rinks, DO   5 mg at 08/20/23 0652    aspirin EC tablet 81 mg  81 mg Oral Nightly Arrie Rinks, DO   81 mg at 08/19/23 2211    atorvastatin (LIPITOR) tablet 80 mg  80 mg Oral Nightly Arrshmuel Rinks, DO   80 mg at 08/19/23 2210    baclofen (LIORESAL) tablet 10 mg  10 mg Oral TID Arrshmuel Rinks, DO   10 mg at 08/20/23 1452    bumetanide (BUMEX) tablet 1 mg  1 mg Oral Daily Arrshmuel Rinks, DO   1 mg at 08/20/23 0858    empagliflozin (JARDIANCE) tablet 10 mg  10 mg Oral Daily Arrshmuel Rinks, DO   10 mg at 08/20/23 0859    insulin glargine-yfgn (SEMGLEE-YFGN) injection vial 50 Units  50 Units SubCUTAneous BID Alysa Lunsford, DO   50 Units at 08/20/23 0900    levothyroxine (SYNTHROID) tablet 150 mcg  150 mcg Oral Daily Alysa Lunsford DO   150 mcg at 08/20/23 0629    [START ON 8/21/2023] metOLazone (ZAROXOLYN) tablet 2.5 mg  2.5 mg Oral Once per day on Mon Thu Adrianne Taylor

## 2023-08-21 ENCOUNTER — TELEPHONE (OUTPATIENT)
Dept: FAMILY MEDICINE CLINIC | Age: 68
End: 2023-08-21

## 2023-08-21 LAB
ANION GAP SERPL CALCULATED.3IONS-SCNC: 12 MMOL/L (ref 7–16)
BASOPHILS # BLD: 0.03 K/UL (ref 0–0.2)
BASOPHILS NFR BLD: 1 % (ref 0–2)
BUN SERPL-MCNC: 14 MG/DL (ref 6–23)
CALCIUM SERPL-MCNC: 8.4 MG/DL (ref 8.6–10.2)
CHLORIDE SERPL-SCNC: 104 MMOL/L (ref 98–107)
CO2 SERPL-SCNC: 22 MMOL/L (ref 22–29)
CREAT SERPL-MCNC: 0.8 MG/DL (ref 0.7–1.2)
EKG ATRIAL RATE: 122 BPM
EKG Q-T INTERVAL: 410 MS
EKG QRS DURATION: 118 MS
EKG QTC CALCULATION (BAZETT): 493 MS
EKG R AXIS: 73 DEGREES
EKG T AXIS: -124 DEGREES
EKG VENTRICULAR RATE: 87 BPM
EOSINOPHIL # BLD: 0.12 K/UL (ref 0.05–0.5)
EOSINOPHILS RELATIVE PERCENT: 2 % (ref 0–6)
ERYTHROCYTE [DISTWIDTH] IN BLOOD BY AUTOMATED COUNT: 14.8 % (ref 11.5–15)
GFR SERPL CREATININE-BSD FRML MDRD: >60 ML/MIN/1.73M2
GLUCOSE BLD-MCNC: 71 MG/DL (ref 74–99)
GLUCOSE BLD-MCNC: 83 MG/DL (ref 74–99)
GLUCOSE BLD-MCNC: 93 MG/DL (ref 74–99)
GLUCOSE SERPL-MCNC: 97 MG/DL (ref 74–99)
HCT VFR BLD AUTO: 40.1 % (ref 37–54)
HGB BLD-MCNC: 13.3 G/DL (ref 12.5–16.5)
IMM GRANULOCYTES # BLD AUTO: 0.09 K/UL (ref 0–0.58)
IMM GRANULOCYTES NFR BLD: 2 % (ref 0–5)
LYMPHOCYTES NFR BLD: 1.1 K/UL (ref 1.5–4)
LYMPHOCYTES RELATIVE PERCENT: 22 % (ref 20–42)
MCH RBC QN AUTO: 31.4 PG (ref 26–35)
MCHC RBC AUTO-ENTMCNC: 33.2 G/DL (ref 32–34.5)
MCV RBC AUTO: 94.6 FL (ref 80–99.9)
MONOCYTES NFR BLD: 0.37 K/UL (ref 0.1–0.95)
MONOCYTES NFR BLD: 7 % (ref 2–12)
NEUTROPHILS NFR BLD: 67 % (ref 43–80)
NEUTS SEG NFR BLD: 3.41 K/UL (ref 1.8–7.3)
PLATELET # BLD AUTO: 154 K/UL (ref 130–450)
PMV BLD AUTO: 9.9 FL (ref 7–12)
POTASSIUM SERPL-SCNC: 4.1 MMOL/L (ref 3.5–5)
RBC # BLD AUTO: 4.24 M/UL (ref 3.8–5.8)
SODIUM SERPL-SCNC: 138 MMOL/L (ref 132–146)
WBC OTHER # BLD: 5.1 K/UL (ref 4.5–11.5)

## 2023-08-21 PROCEDURE — 6370000000 HC RX 637 (ALT 250 FOR IP): Performed by: FAMILY MEDICINE

## 2023-08-21 PROCEDURE — 97530 THERAPEUTIC ACTIVITIES: CPT

## 2023-08-21 PROCEDURE — 99232 SBSQ HOSP IP/OBS MODERATE 35: CPT | Performed by: INTERNAL MEDICINE

## 2023-08-21 PROCEDURE — 82962 GLUCOSE BLOOD TEST: CPT

## 2023-08-21 PROCEDURE — 97535 SELF CARE MNGMENT TRAINING: CPT

## 2023-08-21 PROCEDURE — 2580000003 HC RX 258: Performed by: FAMILY MEDICINE

## 2023-08-21 PROCEDURE — 36415 COLL VENOUS BLD VENIPUNCTURE: CPT

## 2023-08-21 PROCEDURE — 80048 BASIC METABOLIC PNL TOTAL CA: CPT

## 2023-08-21 PROCEDURE — 85025 COMPLETE CBC W/AUTO DIFF WBC: CPT

## 2023-08-21 PROCEDURE — 93010 ELECTROCARDIOGRAM REPORT: CPT | Performed by: INTERNAL MEDICINE

## 2023-08-21 PROCEDURE — S5553 INSULIN LONG ACTING 5 U: HCPCS | Performed by: FAMILY MEDICINE

## 2023-08-21 PROCEDURE — 2060000000 HC ICU INTERMEDIATE R&B

## 2023-08-21 RX ADMIN — SODIUM CHLORIDE, PRESERVATIVE FREE 10 ML: 5 INJECTION INTRAVENOUS at 09:39

## 2023-08-21 RX ADMIN — APIXABAN 5 MG: 5 TABLET, FILM COATED ORAL at 09:27

## 2023-08-21 RX ADMIN — SODIUM CHLORIDE, PRESERVATIVE FREE 10 ML: 5 INJECTION INTRAVENOUS at 20:31

## 2023-08-21 RX ADMIN — EMPAGLIFLOZIN 10 MG: 10 TABLET, FILM COATED ORAL at 09:27

## 2023-08-21 RX ADMIN — APIXABAN 5 MG: 5 TABLET, FILM COATED ORAL at 20:31

## 2023-08-21 RX ADMIN — PANTOPRAZOLE SODIUM 40 MG: 40 TABLET, DELAYED RELEASE ORAL at 09:27

## 2023-08-21 RX ADMIN — MONTELUKAST 10 MG: 10 TABLET, FILM COATED ORAL at 20:31

## 2023-08-21 RX ADMIN — BACLOFEN 10 MG: 10 TABLET ORAL at 09:26

## 2023-08-21 RX ADMIN — ASPIRIN 81 MG: 81 TABLET, COATED ORAL at 20:30

## 2023-08-21 RX ADMIN — ACETAMINOPHEN 650 MG: 325 TABLET ORAL at 06:33

## 2023-08-21 RX ADMIN — INSULIN GLARGINE-YFGN 50 UNITS: 100 INJECTION, SOLUTION SUBCUTANEOUS at 09:26

## 2023-08-21 RX ADMIN — BACLOFEN 10 MG: 10 TABLET ORAL at 20:30

## 2023-08-21 RX ADMIN — BACLOFEN 10 MG: 10 TABLET ORAL at 13:16

## 2023-08-21 RX ADMIN — ATORVASTATIN CALCIUM 80 MG: 40 TABLET, FILM COATED ORAL at 20:30

## 2023-08-21 RX ADMIN — LEVOTHYROXINE SODIUM 150 MCG: 0.14 TABLET ORAL at 06:33

## 2023-08-21 RX ADMIN — AMIODARONE HYDROCHLORIDE 200 MG: 200 TABLET ORAL at 09:26

## 2023-08-21 RX ADMIN — POTASSIUM CHLORIDE 20 MEQ: 1500 TABLET, EXTENDED RELEASE ORAL at 09:26

## 2023-08-21 ASSESSMENT — PAIN DESCRIPTION - DESCRIPTORS: DESCRIPTORS: ACHING;DISCOMFORT

## 2023-08-21 ASSESSMENT — PAIN SCALES - GENERAL
PAINLEVEL_OUTOF10: 6
PAINLEVEL_OUTOF10: 2

## 2023-08-21 ASSESSMENT — PAIN DESCRIPTION - LOCATION: LOCATION: BACK

## 2023-08-21 ASSESSMENT — PAIN - FUNCTIONAL ASSESSMENT: PAIN_FUNCTIONAL_ASSESSMENT: PREVENTS OR INTERFERES SOME ACTIVE ACTIVITIES AND ADLS

## 2023-08-21 ASSESSMENT — PAIN DESCRIPTION - ORIENTATION: ORIENTATION: LOWER

## 2023-08-21 NOTE — TELEPHONE ENCOUNTER
LSW attempted phone call to patient regarding social work referral due to transportation and      [facility] stated you've reached [facility], leave a message. LSW then noted patient currently admitted.

## 2023-08-21 NOTE — ACP (ADVANCE CARE PLANNING)
Advance Care Planning   Healthcare Decision Maker:    Primary Decision Maker: Genesis Dumont Spouse - [phone]    Secondary Decision Maker: Sadiq Smalls - Child - [phone]    Click here to complete Healthcare Decision Makers including selection of the Healthcare Decision Maker Relationship (ie \"Primary\").

## 2023-08-21 NOTE — PROGRESS NOTES
Occupational Therapy  OT BEDSIDE TREATMENT NOTE    [facility] [address] [place] [address]   [address], [place]       Date:2023  Patient Name: Rdaha Mason  MRN: [id number]  : 1955  Room: [address] [place] [address]     Per OT Eval:    Evaluating OT: Minal Chen OTR/L #[id number]      Referring Provider: Eusebio Carrera MD  Specific Provider Orders/Date: OT eval and treat 23     Diagnosis: Atrial fibrillation with rapid ventricular response ([address]) [I48.91]  Closed fracture of multiple ribs, unspecified laterality, initial encounter [S22.49XA]  Closed fracture of thoracic vertebra, unspecified fracture morphology, unspecified thoracic vertebral level, initial encounter ([address]) [S22.009A]  Compression fracture of T11 vertebra, initial encounter ([address]) [S22.080A]  Compression fracture of L2 vertebra, initial encounter ([address]) [Y41.033P]   Pt admitted to hospital following fall out of bed. T11 compression fracture with T3 through T7  transverse process fractures     Pertinent Medical History:  has a past medical history of Acid reflux, Acute diastolic (congestive) heart failure (HCC), Arthritis, Asthma, Asthmatic bronchitis , chronic (HCC), CAD (coronary artery disease), Chronic bronchitis (HCC), COPD (chronic obstructive pulmonary disease) ([address]), COPD (chronic obstructive pulmonary disease) ([address]), Diabetes mellitus ([address]), Emphysema (subcutaneous) (surgical) resulting from a procedure, H/O cardiovascular stress test, Hyperlipidemia, Hypertension, Hypoxemia requiring supplemental oxygen, LONG TERM ANTICOAGULENT USE, Morbid obesity with BMI of 50.0-59.9, adult ([address]), Obesity, Osteoarthritis, Sleep apnea, Stage 3 chronic kidney disease ([address]), Tobacco abuse, and Type II or unspecified type diabetes mellitus without mention of complication, not stated as uncontrolled.       Precautions:  Fall Risk, Droplet plus (COVID-19),  spinal precautions, TLSO, bed/chair alarm      Assessment of Modified [place]    Functional Transfers Minimal Assist   Min A  Sit < > stand  Stand pivot Modified [place]    Functional Mobility Minimal Assist      Ambulated in room including to/from bathroom with w/w   Min A  HHA, recommending walker from ns for room use  Modified [place]    Balance Sitting:     Static:  SBA    Dynamic:SBA  Standing: min A  Sitting: Supervision  Standing: Min A HHA     Activity Tolerance F-  Fair  Controlled pain   Vitals WFL F+   Visual/  Perceptual wfl                       Education:  Pt was educated through out treatment regarding proper technique & safety with bed mobility, functional transfers & light mobility, maintaining spinal precautions, proper placement & use of TLSO and ADL compensatory strategies to ease tasks, improve safety & prevent falls to return home safely. Comments: Upon arrival pt was in bed, nsg requesting assist & education on placement of TLSO brace. At end of session pt was seated in chair, chair alarm, nsg present, all lines and tubes intact, call light within reach. Pt has made Fair progress towards set goals. Continue with current plan of care    Treatment Time In: 9:05            Treatment Time Out: 9:30           Treatment Charges: Mins Units   Ther Ex  78095     Manual Therapy 13332     Thera Activities 55640 15 1   ADL/Home Mgt 63696 10 1   Neuro Re-ed 60672     Group Therapy      Orthotic manage/training  36701     Non-Billable Time     Total Timed Treatment 25 2       Katiana SCHMIDT  [address] [facility]

## 2023-08-21 NOTE — PROGRESS NOTES
Department of Internal Medicine  Infectious Diseases  Progress  Note    C/C : COVID 19 infection     Pt denies fever or chills  Denies SOB   Reports chest pain   Afebrile       Current Facility-Administered Medications   Medication Dose Route Frequency Provider Last Rate Last Admin    ipratropium 0.5 mg-albuterol 2.5 mg (DUONEB) nebulizer solution 1 Dose  1 Dose Inhalation Q4H PRN Claudette Barbosa MD        metoprolol succinate (TOPROL XL) extended release tablet 25 mg  25 mg Oral Daily Claudette Barbosa MD   25 mg at 08/20/23 0859    albuterol (PROVENTIL) (2.5 MG/3ML) 0.083% nebulizer solution 2.5 mg  2.5 mg Nebulization Q6H PRN Cluadette Barbosa MD        perflutren lipid microspheres (DEFINITY) injection 1.5 mL  1.5 mL IntraVENous ONCE PRN Greyson Smith DO        nirmatrelvir/ritonavir 300/100 (PAXLOVID) 3 tablet (Patient Supplied)  3 tablet Oral Q12H Sobeida Lee MD   3 tablet at 08/21/23 3190    amiodarone (CORDARONE) tablet 200 mg  200 mg Oral Daily Renelda Spire, DO   200 mg at 08/21/23 3464    apixaban (ELIQUIS) tablet 5 mg  5 mg Oral BID Renelda Spire, DO   5 mg at 08/21/23 3715    aspirin EC tablet 81 mg  81 mg Oral Nightly Renelda Spire, DO   81 mg at 08/20/23 2055    atorvastatin (LIPITOR) tablet 80 mg  80 mg Oral Nightly Renelda Spire, DO   80 mg at 08/20/23 2055    baclofen (LIORESAL) tablet 10 mg  10 mg Oral TID Renelda Spire, DO   10 mg at 08/21/23 1316    bumetanide (BUMEX) tablet 1 mg  1 mg Oral Daily Claudette Barbosa MD   1 mg at 08/20/23 0858    empagliflozin (JARDIANCE) tablet 10 mg  10 mg Oral Daily Renelda Spire, DO   10 mg at 08/21/23 2711    insulin glargine-yfgn (SEMGLEE-YFGN) injection vial 50 Units  50 Units SubCUTAneous BID Renelda Spire, DO   50 Units at 08/21/23 4820    levothyroxine (SYNTHROID) tablet 150 mcg  150 mcg Oral Daily Syed Galvez DO   150 mcg at 08/21/23 5041    metOLazone (ZAROXOLYN) tablet 2.5 mg  2.5 mg Oral Once per day on Mon Thu Claudette Barbosa MD rounded atelectatic  changes at the right lung base. Ground-glass opacity to suggest atelectatic  change posteriorly within the dependent portions of the lung fields. There  is no definite pulmonary contusion. No acute chest wall abnormality. No CT evidence of acute intra-abdominal or pelvic abnormality. Mild to  moderate stool burden to suggest clinical presentation of constipation. Stones in the gallbladder with no wall thickening or biliary ductal  dilatation. Incomplete distension of the bladder with wall thickening. Correlation to  urinalysis to exclude possible cystitis. Findings may be related to the  incomplete distension.            IMPRESSION:     COVID 19 infection - asymptomatic       RECOMMENDATIONS:       Watch for hypoxia   Paxlovid 3 tab bid ( day 3 of 5)  - check QTc  493 m sec

## 2023-08-21 NOTE — DISCHARGE INSTR - COC
Certification: I certify the above information and transfer of Cleven Klinefelter  is necessary for the continuing treatment of the diagnosis listed and that he requires [address] for less 30 days.      Update Admission H&P: {CHP DME Changes in [place]:[id number]}    PHYSICIAN SIGNATURE:  Electronically signed by Rene Alcaraz MD on 8/23/23 at 3:44 PM EDT

## 2023-08-21 NOTE — PROGRESS NOTES
Spoke to pharmacy about the paxlovid expiration, and verified that the paxlovid has an extended expiration date until 2024.

## 2023-08-21 NOTE — PROGRESS NOTES
Patient is requesting for his toenails to be trimmed. Messaged Dr. Anderson Rather about getting a podiatry consult.

## 2023-08-22 LAB
ANION GAP SERPL CALCULATED.3IONS-SCNC: 11 MMOL/L (ref 7–16)
BASOPHILS # BLD: 0.04 K/UL (ref 0–0.2)
BASOPHILS NFR BLD: 1 % (ref 0–2)
BUN SERPL-MCNC: 13 MG/DL (ref 6–23)
CALCIUM SERPL-MCNC: 8.7 MG/DL (ref 8.6–10.2)
CHLORIDE SERPL-SCNC: 105 MMOL/L (ref 98–107)
CO2 SERPL-SCNC: 24 MMOL/L (ref 22–29)
CREAT SERPL-MCNC: 0.9 MG/DL (ref 0.7–1.2)
EOSINOPHIL # BLD: 0.1 K/UL (ref 0.05–0.5)
EOSINOPHILS RELATIVE PERCENT: 2 % (ref 0–6)
ERYTHROCYTE [DISTWIDTH] IN BLOOD BY AUTOMATED COUNT: 14.9 % (ref 11.5–15)
GFR SERPL CREATININE-BSD FRML MDRD: >60 ML/MIN/1.73M2
GLUCOSE BLD-MCNC: 104 MG/DL (ref 74–99)
GLUCOSE BLD-MCNC: 106 MG/DL (ref 74–99)
GLUCOSE BLD-MCNC: 165 MG/DL (ref 74–99)
GLUCOSE BLD-MCNC: 84 MG/DL (ref 74–99)
GLUCOSE SERPL-MCNC: 97 MG/DL (ref 74–99)
HCT VFR BLD AUTO: 43.1 % (ref 37–54)
HGB BLD-MCNC: 14.1 G/DL (ref 12.5–16.5)
IMM GRANULOCYTES # BLD AUTO: 0.09 K/UL (ref 0–0.58)
IMM GRANULOCYTES NFR BLD: 1 % (ref 0–5)
LEFT VENTRICULAR EJECTION FRACTION HIGH VALUE: 55 %
LV EF: 50 %
LV EF: 53 %
LVEF MODALITY: NORMAL
LYMPHOCYTES NFR BLD: 1.13 K/UL (ref 1.5–4)
LYMPHOCYTES RELATIVE PERCENT: 17 % (ref 20–42)
MCH RBC QN AUTO: 31.6 PG (ref 26–35)
MCHC RBC AUTO-ENTMCNC: 32.7 G/DL (ref 32–34.5)
MCV RBC AUTO: 96.6 FL (ref 80–99.9)
MONOCYTES NFR BLD: 0.42 K/UL (ref 0.1–0.95)
MONOCYTES NFR BLD: 6 % (ref 2–12)
NEUTROPHILS NFR BLD: 74 % (ref 43–80)
NEUTS SEG NFR BLD: 4.99 K/UL (ref 1.8–7.3)
PLATELET # BLD AUTO: 170 K/UL (ref 130–450)
PMV BLD AUTO: 9.8 FL (ref 7–12)
POTASSIUM SERPL-SCNC: 4.3 MMOL/L (ref 3.5–5)
RBC # BLD AUTO: 4.46 M/UL (ref 3.8–5.8)
SODIUM SERPL-SCNC: 140 MMOL/L (ref 132–146)
WBC OTHER # BLD: 6.8 K/UL (ref 4.5–11.5)

## 2023-08-22 PROCEDURE — 99232 SBSQ HOSP IP/OBS MODERATE 35: CPT | Performed by: INTERNAL MEDICINE

## 2023-08-22 PROCEDURE — 97530 THERAPEUTIC ACTIVITIES: CPT

## 2023-08-22 PROCEDURE — 6370000000 HC RX 637 (ALT 250 FOR IP): Performed by: STUDENT IN AN ORGANIZED HEALTH CARE EDUCATION/TRAINING PROGRAM

## 2023-08-22 PROCEDURE — 97161 PT EVAL LOW COMPLEX 20 MIN: CPT

## 2023-08-22 PROCEDURE — S5553 INSULIN LONG ACTING 5 U: HCPCS | Performed by: FAMILY MEDICINE

## 2023-08-22 PROCEDURE — 82962 GLUCOSE BLOOD TEST: CPT

## 2023-08-22 PROCEDURE — 36415 COLL VENOUS BLD VENIPUNCTURE: CPT

## 2023-08-22 PROCEDURE — 93306 TTE W/DOPPLER COMPLETE: CPT

## 2023-08-22 PROCEDURE — 2580000003 HC RX 258: Performed by: FAMILY MEDICINE

## 2023-08-22 PROCEDURE — 6370000000 HC RX 637 (ALT 250 FOR IP): Performed by: FAMILY MEDICINE

## 2023-08-22 PROCEDURE — 97535 SELF CARE MNGMENT TRAINING: CPT

## 2023-08-22 PROCEDURE — 85025 COMPLETE CBC W/AUTO DIFF WBC: CPT

## 2023-08-22 PROCEDURE — 2060000000 HC ICU INTERMEDIATE R&B

## 2023-08-22 PROCEDURE — 80048 BASIC METABOLIC PNL TOTAL CA: CPT

## 2023-08-22 RX ADMIN — INSULIN GLARGINE-YFGN 50 UNITS: 100 INJECTION, SOLUTION SUBCUTANEOUS at 09:16

## 2023-08-22 RX ADMIN — AMIODARONE HYDROCHLORIDE 200 MG: 200 TABLET ORAL at 09:13

## 2023-08-22 RX ADMIN — ASPIRIN 81 MG: 81 TABLET, COATED ORAL at 20:30

## 2023-08-22 RX ADMIN — APIXABAN 5 MG: 5 TABLET, FILM COATED ORAL at 09:13

## 2023-08-22 RX ADMIN — BACLOFEN 10 MG: 10 TABLET ORAL at 20:30

## 2023-08-22 RX ADMIN — EMPAGLIFLOZIN 10 MG: 10 TABLET, FILM COATED ORAL at 09:13

## 2023-08-22 RX ADMIN — LEVOTHYROXINE SODIUM 150 MCG: 0.14 TABLET ORAL at 07:31

## 2023-08-22 RX ADMIN — BUMETANIDE 1 MG: 1 TABLET ORAL at 09:13

## 2023-08-22 RX ADMIN — PANTOPRAZOLE SODIUM 40 MG: 40 TABLET, DELAYED RELEASE ORAL at 09:13

## 2023-08-22 RX ADMIN — INSULIN GLARGINE-YFGN 50 UNITS: 100 INJECTION, SOLUTION SUBCUTANEOUS at 20:32

## 2023-08-22 RX ADMIN — SODIUM CHLORIDE, PRESERVATIVE FREE 10 ML: 5 INJECTION INTRAVENOUS at 09:16

## 2023-08-22 RX ADMIN — SODIUM CHLORIDE, PRESERVATIVE FREE 10 ML: 5 INJECTION INTRAVENOUS at 20:31

## 2023-08-22 RX ADMIN — ATORVASTATIN CALCIUM 80 MG: 40 TABLET, FILM COATED ORAL at 20:31

## 2023-08-22 RX ADMIN — BACLOFEN 10 MG: 10 TABLET ORAL at 09:13

## 2023-08-22 RX ADMIN — MONTELUKAST 10 MG: 10 TABLET, FILM COATED ORAL at 20:30

## 2023-08-22 RX ADMIN — METOPROLOL SUCCINATE 25 MG: 25 TABLET, EXTENDED RELEASE ORAL at 09:13

## 2023-08-22 RX ADMIN — BACLOFEN 10 MG: 10 TABLET ORAL at 14:02

## 2023-08-22 RX ADMIN — ACETAMINOPHEN 650 MG: 325 TABLET ORAL at 20:36

## 2023-08-22 RX ADMIN — POTASSIUM CHLORIDE 20 MEQ: 1500 TABLET, EXTENDED RELEASE ORAL at 09:13

## 2023-08-22 RX ADMIN — APIXABAN 5 MG: 5 TABLET, FILM COATED ORAL at 20:30

## 2023-08-22 ASSESSMENT — PAIN DESCRIPTION - LOCATION
LOCATION: BACK
LOCATION: BACK

## 2023-08-22 ASSESSMENT — PAIN DESCRIPTION - ORIENTATION
ORIENTATION: MID
ORIENTATION: MID

## 2023-08-22 ASSESSMENT — PAIN DESCRIPTION - DESCRIPTORS
DESCRIPTORS: ACHING;NAGGING
DESCRIPTORS: ACHING;NAGGING

## 2023-08-22 ASSESSMENT — PAIN SCALES - GENERAL
PAINLEVEL_OUTOF10: 7
PAINLEVEL_OUTOF10: 7

## 2023-08-22 NOTE — PROGRESS NOTES
Occupational Therapy  OT BEDSIDE TREATMENT NOTE    [facility] [address] [place] [address]   [address], [place], [place]       Date:2023  Patient Name: Miryam Hoffmann  MRN: [id number]  : 1955  Room: [address] [place] [address]     Per OT Eval:    Evaluating OT: Ulysses Uriostegui OTR/L #[id number]      Referring Provider: Jaz Cortes MD  Specific Provider Orders/Date: OT eval and treat 23     Diagnosis: Atrial fibrillation with rapid ventricular response ([address]) [I48.91]  Closed fracture of multiple ribs, unspecified laterality, initial encounter [S22.49XA]  Closed fracture of thoracic vertebra, unspecified fracture morphology, unspecified thoracic vertebral level, initial encounter ([address]) [S22.009A]  Compression fracture of T11 vertebra, initial encounter ([address]) [S22.080A]  Compression fracture of L2 vertebra, initial encounter ([address]) [Z44.199P]   Pt admitted to hospital following fall out of bed. T11 compression fracture with T3 through T7  transverse process fractures     Pertinent Medical History:  has a past medical history of Acid reflux, Acute diastolic (congestive) heart failure (HCC), Arthritis, Asthma, Asthmatic bronchitis , chronic (HCC), CAD (coronary artery disease), Chronic bronchitis (HCC), COPD (chronic obstructive pulmonary disease) ([address]), COPD (chronic obstructive pulmonary disease) ([address]), Diabetes mellitus ([address]), Emphysema (subcutaneous) (surgical) resulting from a procedure, H/O cardiovascular stress test, Hyperlipidemia, Hypertension, Hypoxemia requiring supplemental oxygen, LONG TERM ANTICOAGULENT USE, Morbid obesity with BMI of 50.0-59.9, adult ([address]), Obesity, Osteoarthritis, Sleep apnea, Stage 3 chronic kidney disease ([address]), Tobacco abuse, and Type II or unspecified type diabetes mellitus without mention of complication, not stated as uncontrolled.       Precautions:  Fall Risk, Droplet plus (COVID-19),  spinal precautions, TLSO, bed/chair alarm      Assessment of reactions, midline orientation, scapular stability/mobility, normalization of muscle tone, and facilitation of volitional active controled movement     Recommended Adaptive Equipment:  TBD      Home Living: Pt lives with wife (whom patient assists with care) in a 1 story home with level entry     Bathroom setup: tub/shower combo    Equipment owned: shower chair, w/w     Prior Level of Function: mod I with ADLs , mod I with IADLs; ambulated without AD   Driving: no   Occupation: na     Pain Level: 5/10 R low back pain, Therapist educated pt on spinal precautions, TLSO and facilitated repositioning to address pain       Cognition: A&O: 4/4; Follows 1-2 step directions, pleasant & cooperative, motivated, impulsive              Memory:  good             Sequencing:  fair             Problem solving:  fair             Judgement/safety:  fair-  + impulsivity                Functional Assessment:  AM-PAC Daily Activity Raw Score: 15/24    Initial Eval Status  Date: 8/20/23 Treatment Status  Date: 8/22/23 STGs = LTGs  Time frame: 10-14 days   Feeding Independent        Grooming Minimal Assist      Standing at sink  SBA  declined to stand at sink, seated wash face, hands & combing hair, pt self assisting R UE due to limited ROM, chronic  Modified [place]    UB Dressing Stand by Assist      Max A  (TLSO) SBA with gown supine  Max A TLSO  Bed level Modified [place]    LB Dressing Dependent   Max A  Simulated, continued education on AE  Modified [place]    Bathing Moderate Assist  Mod A  Simulated, recommending LH sponge  Modified [place]    Toileting Moderate Assist   Mod A  Simulated, declined need  Modified [place]    Bed Mobility  Supine to sit: Minimal Assist   Sit to supine: NT     Log roll technique   SBA rolling side to side  Cues for log roll technique      Mod A  Supine to sit   Bed flat with difficulty  Supine to sit: Modified [place]   Sit to supine: Modified [place]    Functional

## 2023-08-22 NOTE — PROGRESS NOTES
Physical Therapy    Initial Assessment     Name: Cleven Klinefelter  : 1955  MRN: [id number]      Date of Service: 2023    Evaluating PT: Sabrina Mooney, PT, DPT [id number]      Room #:  2414/5484-V  Diagnosis:  Atrial fibrillation with rapid ventricular response ([address]) [I48.91]  Closed fracture of multiple ribs, unspecified laterality, initial encounter [S22.49XA]  Closed fracture of thoracic vertebra, unspecified fracture morphology, unspecified thoracic vertebral level, initial encounter ([address]) [S22.009A]  Compression fracture of T11 vertebra, initial encounter ([address]) [S22.080A]  Compression fracture of L2 vertebra, initial encounter ([address]) Glenn Siegel  PMHx/PSHx:   has a past medical history of Acid reflux, Acute diastolic (congestive) heart failure (HCC), Arthritis, Asthma, Asthmatic bronchitis , chronic (HCC), CAD (coronary artery disease), Chronic bronchitis ([address]), COPD (chronic obstructive pulmonary disease) ([address]), COPD (chronic obstructive pulmonary disease) ([address]), Diabetes mellitus ([address]), Emphysema (subcutaneous) (surgical) resulting from a procedure, H/O cardiovascular stress test, Hyperlipidemia, Hypertension, Hypoxemia requiring supplemental oxygen, LONG TERM ANTICOAGULENT USE, Morbid obesity with BMI of 50.0-59.9, adult ([address]), Obesity, Osteoarthritis, Sleep apnea, Stage 3 chronic kidney disease ([address]), Tobacco abuse, and Type II or unspecified type diabetes mellitus without mention of complication, not stated as uncontrolled. Precautions:  Fall risk, Droplet plus isolation (COVID-19), T11 compression fx, T3-T7 TP fxs, Spinal neutrality, Soft TLSO, Bed/chair alarm    SUBJECTIVE:    Pt lives with wife in a single story house with level entry. Pt ambulated without AD prior to admission. Pt is wife's caregiver. OBJECTIVE:   Initial Evaluation  Date: 23 Treatment Date: Short Term/ Long Term   Goals   AM-PAC 6 Clicks 92/85     Was pt agreeable to Eval/treatment? Yes     Does pt have pain?  R of ambulation. Pt was provided waffle cushion for comfort while sitting in chair. Pt was left in chair with all needs met at conclusion of session. Alarm activated. RN present. Treatment:  Patient practiced and was instructed in the following treatment:    Therapeutic activities:  Bed mobility: Pt was cued for log roll technique during bed mobility transfers. Transfers: Pt was cued for hand placement during sit <> stand transfers. Pt completed multiple transfers from different surface heights (EOB x1, chair x2). Ambulation: Pt was cued for [employer] technique and safety when ambulating. Pt ambulated x2 bouts. Education: Pt was educated on spinal neutrality, log roll technique, and TLSO brace. Pt's/family goals:  1. To return home. Prognosis is Good for reaching above PT goals. Patient and or family understand(s) diagnosis, prognosis, and plan of care. Yes. PHYSICAL THERAPY PLAN OF CARE:    PT POC is established based on physician order and patient diagnosis     Referring provider/PT Order:    Start   Ordering Provider    08/19/23 0930  PT eval and treat  Start:  08/19/23 0930,   End:  08/19/23 0930,   ONE TIME,   Standing Count:  1 Occurrences,   Robe Bey MD      Diagnosis:  Atrial fibrillation with rapid ventricular response ([address]) [I48.91]  Closed fracture of multiple ribs, unspecified laterality, initial encounter [S22.49XA]  Closed fracture of thoracic vertebra, unspecified fracture morphology, unspecified thoracic vertebral level, initial encounter ([address]) [S22.009A]  Compression fracture of T11 vertebra, initial encounter ([address]) [S22.080A]  Compression fracture of L2 vertebra, initial encounter ([address]) [S32.020A]  Specific instructions for next treatment:  Progress activity.     Current Treatment Recommendations:     [x] Strengthening to improve independence with functional mobility   [] ROM to improve independence with functional mobility   [x] Balance Training to improve

## 2023-08-22 NOTE — PROGRESS NOTES
INPATIENT CARDIOLOGY FOLLOW-UP    Name: Martinez Morales    Age: 79 y.o. Date of Admission: 8/17/2023  8:24 PM    Date of Service: 8/22/2023    Chief Complaint: Follow-up for CHF/Afib/CAD/VHD/COVID/Fall    Interim History:  No new overnight cardiac complaints. Patient complaining of increased back pain otherwise no cardiac issues. Currently with no complaints of CP, SOB, palpitations, dizziness, or lightheadedness. Atrial fibrillation on telemetry.     Review of Systems:   Cardiac: As per HPI  General: No fever, chills  Pulmonary: As per HPI  HEENT: No visual disturbances, difficult swallowing  GI: No nausea, vomiting  Endocrine: No thyroid disease or DM  Musculoskeletal: COX x 4, no focal motor deficits  Skin: Intact, no rashes  Neuro/Psych: No headache or seizures    Problem List:  Patient Active Problem List   Diagnosis    CAD (coronary artery disease)    Hypertension    Type 2 diabetes mellitus with hyperglycemia, with long-term current use of insulin (HCC)    Tobacco abuse    PAF (paroxysmal atrial fibrillation) ([address])    Status post coronary artery bypass graft    H/O mitral valve repair    Morbid obesity with BMI of 50.0-59.9, adult (HCC)    Chronic bronchitis (HCC)    Sleep apnea    Gastroesophageal reflux disease without esophagitis    Hyperlipidemia    Muscular deconditioning    Acute diastolic (congestive) heart failure (HCC)    Acute diastolic heart failure (HCC)    Iron deficiency anemia, unspecified    Acute systolic/diastolic congestive heart failure (HCC)    Atrial fibrillation with RVR (HCC)    Ischemic cardiomyopathy    Nonrheumatic tricuspid valve regurgitation    Chronic anticoagulation    Stage 3 chronic kidney disease ([address])    Acute on chronic congestive heart failure (HCC)    Dizziness    Hyperosmolar hyperglycemic state (HHS) ([address])    Acute metabolic encephalopathy    Altered mental status    Hypotension    Transient hypotension    Atrial fibrillation with rapid ventricular response CKMB 50.3 (H) 07/24/2013    TROPONINI 0.01 04/19/2021    TROPONINI 0.01 04/18/2021    TROPONINI 0.02 04/18/2021     Lab Results   Component Value Date    INR 2.3 09/26/2022    INR 3.1 09/25/2022    INR 3.3 09/24/2022    PROTIME 26.4 (H) 09/26/2022    PROTIME 35.7 (H) 09/25/2022    PROTIME 38.3 (H) 09/24/2022     Lab Results   Component Value Date    TSH 0.120 (L) 06/28/2023     Lab Results   Component Value Date    LABA1C 8.6 06/28/2023     No results found for: EAG  Lab Results   Component Value Date    CHOL 141 06/28/2023    CHOL 123 12/16/2021    CHOL 148 10/20/2021     Lab Results   Component Value Date    TRIG 117 06/28/2023    TRIG 104 12/16/2021    TRIG 142 10/20/2021     Lab Results   Component Value Date    HDL 45 06/28/2023    HDL 42 12/16/2021    HDL 60 10/20/2021     Lab Results   Component Value Date    LDLCALC 73 06/28/2023    LDLCALC 60 12/16/2021    LDLCALC 60 10/20/2021     Lab Results   Component Value Date    LABVLDL 23 06/28/2023    LABVLDL 21 12/16/2021    LABVLDL 28 10/20/2021     No results found for: CHOLHDLRATIO    Cardiac Tests:  ECG: Atrial fibrillation, incomplete right bundle block, inferior infarct age undetermined, ST-T changes suggestive of anterolateral ischemia, normal EKG. Telemetry findings reviewed: Atrial fibrillation heart rate in the 90s, no arrhythmias overnight. Labs and vitals reviewed: As above blood pressure 116/75 heart rate 81, sats 96 % on room air    Labs-BMP normal CBC normal    CTA chest:  No CT evidence of pulmonary embolism. Findings favoring rounded atelectatic  changes at the right lung base. Ground-glass opacity to suggest atelectatic  change posteriorly within the dependent portions of the lung fields. There  is no definite pulmonary contusion. No acute chest wall abnormality. No CT evidence of acute intra-abdominal or pelvic abnormality. Mild to  moderate stool burden to suggest clinical presentation of constipation.   Stones in the

## 2023-08-22 NOTE — PROGRESS NOTES
Department of Internal Medicine  Infectious Diseases  Progress  Note    C/C : COVID 19 infection     Pt denies fever or chills  Denies SOB   Reports chest pain   Afebrile       Current Facility-Administered Medications   Medication Dose Route Frequency Provider Last Rate Last Admin    ipratropium 0.5 mg-albuterol 2.5 mg (DUONEB) nebulizer solution 1 Dose  1 Dose Inhalation Q4H PRN Purnima Atkinson MD        metoprolol succinate (TOPROL XL) extended release tablet 25 mg  25 mg Oral Daily Purnima Atkinson MD   25 mg at 08/22/23 0913    albuterol (PROVENTIL) (2.5 MG/3ML) 0.083% nebulizer solution 2.5 mg  2.5 mg Nebulization Q6H PRN Purnima Atkinson MD        perflutren lipid microspheres (DEFINITY) injection 1.5 mL  1.5 mL IntraVENous ONCE PRN Wash Curet, DO        nirmatrelvir/ritonavir 300/100 (PAXLOVID) 3 tablet (Patient Supplied)  3 tablet Oral Q12H Ancelmo Fields MD   3 tablet at 08/22/23 7813    amiodarone (CORDARONE) tablet 200 mg  200 mg Oral Daily Viola Tran, DO   200 mg at 08/22/23 0913    apixaban (ELIQUIS) tablet 5 mg  5 mg Oral BID Viola Tran, DO   5 mg at 08/22/23 0913    aspirin EC tablet 81 mg  81 mg Oral Nightly Viola Tran, DO   81 mg at 08/21/23 2030    atorvastatin (LIPITOR) tablet 80 mg  80 mg Oral Nightly Viola Tran, DO   80 mg at 08/21/23 2030    baclofen (LIORESAL) tablet 10 mg  10 mg Oral TID Viola Tran, DO   10 mg at 08/22/23 0913    bumetanide (BUMEX) tablet 1 mg  1 mg Oral Daily Purnima Atkinson MD   1 mg at 08/22/23 0913    empagliflozin (JARDIANCE) tablet 10 mg  10 mg Oral Daily Viola Tran, DO   10 mg at 08/22/23 0913    insulin glargine-yfgn (SEMGLEE-YFGN) injection vial 50 Units  50 Units SubCUTAneous BID Viola Tran DO   50 Units at 08/22/23 0916    levothyroxine (SYNTHROID) tablet 150 mcg  150 mcg Oral Daily Viola Tran DO   150 mcg at 08/22/23 9736    metOLazone (ZAROXOLYN) tablet 2.5 mg  2.5 mg Oral Once per day on Mon Thu Purnima Atkinson MD

## 2023-08-23 VITALS
RESPIRATION RATE: 20 BRPM | BODY MASS INDEX: 39.7 KG/M2 | OXYGEN SATURATION: 97 % | HEIGHT: 66 IN | HEART RATE: 79 BPM | SYSTOLIC BLOOD PRESSURE: 112 MMHG | DIASTOLIC BLOOD PRESSURE: 65 MMHG | TEMPERATURE: 97.6 F | WEIGHT: 247 LBS

## 2023-08-23 LAB
ANION GAP SERPL CALCULATED.3IONS-SCNC: 11 MMOL/L (ref 7–16)
BASOPHILS # BLD: 0.04 K/UL (ref 0–0.2)
BASOPHILS NFR BLD: 1 % (ref 0–2)
BUN SERPL-MCNC: 15 MG/DL (ref 6–23)
CALCIUM SERPL-MCNC: 8.9 MG/DL (ref 8.6–10.2)
CHLORIDE SERPL-SCNC: 97 MMOL/L (ref 98–107)
CO2 SERPL-SCNC: 25 MMOL/L (ref 22–29)
CREAT SERPL-MCNC: 0.9 MG/DL (ref 0.7–1.2)
EOSINOPHIL # BLD: 0.05 K/UL (ref 0.05–0.5)
EOSINOPHILS RELATIVE PERCENT: 1 % (ref 0–6)
ERYTHROCYTE [DISTWIDTH] IN BLOOD BY AUTOMATED COUNT: 14.9 % (ref 11.5–15)
GFR SERPL CREATININE-BSD FRML MDRD: >60 ML/MIN/1.73M2
GLUCOSE BLD-MCNC: 178 MG/DL (ref 74–99)
GLUCOSE BLD-MCNC: 60 MG/DL (ref 74–99)
GLUCOSE BLD-MCNC: 65 MG/DL (ref 74–99)
GLUCOSE BLD-MCNC: 73 MG/DL (ref 74–99)
GLUCOSE BLD-MCNC: 78 MG/DL (ref 74–99)
GLUCOSE BLD-MCNC: 81 MG/DL (ref 74–99)
GLUCOSE SERPL-MCNC: 156 MG/DL (ref 74–99)
HCT VFR BLD AUTO: 44.4 % (ref 37–54)
HGB BLD-MCNC: 14.3 G/DL (ref 12.5–16.5)
IMM GRANULOCYTES # BLD AUTO: 0.07 K/UL (ref 0–0.58)
IMM GRANULOCYTES NFR BLD: 1 % (ref 0–5)
LYMPHOCYTES NFR BLD: 1.21 K/UL (ref 1.5–4)
LYMPHOCYTES RELATIVE PERCENT: 18 % (ref 20–42)
MCH RBC QN AUTO: 31 PG (ref 26–35)
MCHC RBC AUTO-ENTMCNC: 32.2 G/DL (ref 32–34.5)
MCV RBC AUTO: 96.3 FL (ref 80–99.9)
MONOCYTES NFR BLD: 0.44 K/UL (ref 0.1–0.95)
MONOCYTES NFR BLD: 7 % (ref 2–12)
NEUTROPHILS NFR BLD: 73 % (ref 43–80)
NEUTS SEG NFR BLD: 4.85 K/UL (ref 1.8–7.3)
PLATELET # BLD AUTO: 169 K/UL (ref 130–450)
PMV BLD AUTO: 9.8 FL (ref 7–12)
POTASSIUM SERPL-SCNC: 4.5 MMOL/L (ref 3.5–5)
RBC # BLD AUTO: 4.61 M/UL (ref 3.8–5.8)
SODIUM SERPL-SCNC: 133 MMOL/L (ref 132–146)
WBC OTHER # BLD: 6.7 K/UL (ref 4.5–11.5)

## 2023-08-23 PROCEDURE — S5553 INSULIN LONG ACTING 5 U: HCPCS | Performed by: FAMILY MEDICINE

## 2023-08-23 PROCEDURE — 85025 COMPLETE CBC W/AUTO DIFF WBC: CPT

## 2023-08-23 PROCEDURE — 6370000000 HC RX 637 (ALT 250 FOR IP): Performed by: STUDENT IN AN ORGANIZED HEALTH CARE EDUCATION/TRAINING PROGRAM

## 2023-08-23 PROCEDURE — 36415 COLL VENOUS BLD VENIPUNCTURE: CPT

## 2023-08-23 PROCEDURE — 6370000000 HC RX 637 (ALT 250 FOR IP): Performed by: FAMILY MEDICINE

## 2023-08-23 PROCEDURE — 80048 BASIC METABOLIC PNL TOTAL CA: CPT

## 2023-08-23 PROCEDURE — 82962 GLUCOSE BLOOD TEST: CPT

## 2023-08-23 PROCEDURE — 99232 SBSQ HOSP IP/OBS MODERATE 35: CPT | Performed by: INTERNAL MEDICINE

## 2023-08-23 PROCEDURE — 2580000003 HC RX 258: Performed by: FAMILY MEDICINE

## 2023-08-23 RX ORDER — POLYETHYLENE GLYCOL 3350 17 G/17G
17 POWDER, FOR SOLUTION ORAL DAILY PRN
Qty: 527 G | Refills: 2 | Status: SHIPPED | OUTPATIENT
Start: 2023-08-23 | End: 2023-11-24

## 2023-08-23 RX ORDER — INSULIN GLARGINE-YFGN 100 [IU]/ML
20 INJECTION, SOLUTION SUBCUTANEOUS 2 TIMES DAILY
Qty: 1.2 ML | Refills: 0 | Status: SHIPPED | OUTPATIENT
Start: 2023-08-23

## 2023-08-23 RX ORDER — INSULIN GLARGINE-YFGN 100 [IU]/ML
20 INJECTION, SOLUTION SUBCUTANEOUS 2 TIMES DAILY
Status: DISCONTINUED | OUTPATIENT
Start: 2023-08-23 | End: 2023-08-23 | Stop reason: HOSPADM

## 2023-08-23 RX ORDER — ALBUTEROL SULFATE 2.5 MG/3ML
2.5 SOLUTION RESPIRATORY (INHALATION) EVERY 6 HOURS PRN
Qty: 120 EACH | Refills: 3 | Status: SHIPPED | OUTPATIENT
Start: 2023-08-23

## 2023-08-23 RX ADMIN — SODIUM CHLORIDE, PRESERVATIVE FREE 10 ML: 5 INJECTION INTRAVENOUS at 10:05

## 2023-08-23 RX ADMIN — AMIODARONE HYDROCHLORIDE 200 MG: 200 TABLET ORAL at 10:04

## 2023-08-23 RX ADMIN — PANTOPRAZOLE SODIUM 40 MG: 40 TABLET, DELAYED RELEASE ORAL at 10:04

## 2023-08-23 RX ADMIN — EMPAGLIFLOZIN 10 MG: 10 TABLET, FILM COATED ORAL at 10:04

## 2023-08-23 RX ADMIN — BUMETANIDE 1 MG: 1 TABLET ORAL at 10:04

## 2023-08-23 RX ADMIN — ACETAMINOPHEN 650 MG: 325 TABLET ORAL at 18:00

## 2023-08-23 RX ADMIN — POTASSIUM CHLORIDE 20 MEQ: 1500 TABLET, EXTENDED RELEASE ORAL at 10:04

## 2023-08-23 RX ADMIN — BACLOFEN 10 MG: 10 TABLET ORAL at 15:05

## 2023-08-23 RX ADMIN — APIXABAN 5 MG: 5 TABLET, FILM COATED ORAL at 10:04

## 2023-08-23 RX ADMIN — BACLOFEN 10 MG: 10 TABLET ORAL at 10:04

## 2023-08-23 RX ADMIN — INSULIN GLARGINE-YFGN 50 UNITS: 100 INJECTION, SOLUTION SUBCUTANEOUS at 10:03

## 2023-08-23 RX ADMIN — ACETAMINOPHEN 650 MG: 325 TABLET ORAL at 10:02

## 2023-08-23 RX ADMIN — LEVOTHYROXINE SODIUM 150 MCG: 0.14 TABLET ORAL at 06:08

## 2023-08-23 RX ADMIN — METOPROLOL SUCCINATE 25 MG: 25 TABLET, EXTENDED RELEASE ORAL at 10:04

## 2023-08-23 ASSESSMENT — PAIN SCALES - GENERAL
PAINLEVEL_OUTOF10: 4
PAINLEVEL_OUTOF10: 4
PAINLEVEL_OUTOF10: 6

## 2023-08-23 ASSESSMENT — PAIN DESCRIPTION - DESCRIPTORS
DESCRIPTORS: ACHING;SORE;DISCOMFORT
DESCRIPTORS: ACHING
DESCRIPTORS: ACHING;DISCOMFORT

## 2023-08-23 ASSESSMENT — PAIN DESCRIPTION - LOCATION
LOCATION: BACK
LOCATION: RIB CAGE
LOCATION: BACK

## 2023-08-23 ASSESSMENT — PAIN DESCRIPTION - ORIENTATION
ORIENTATION: MID
ORIENTATION: MID
ORIENTATION: RIGHT

## 2023-08-23 NOTE — PROGRESS NOTES
Patient was educated this morning about making sure he turns himself in bed.  Patient stated he can do it perfectly fine and doesn't need help turning

## 2023-08-23 NOTE — PROGRESS NOTES
Department of Internal Medicine  Infectious Diseases  Progress  Note    C/C : COVID 19 infection     Pt denies fever or chills  Denies SOB   Reports chest pain   Afebrile       Current Facility-Administered Medications   Medication Dose Route Frequency Provider Last Rate Last Admin    insulin glargine-yfgn (SEMGLEE-YFGN) injection vial 20 Units  20 Units SubCUTAneous BID Bing Harmon MD        ipratropium 0.5 mg-albuterol 2.5 mg (DUONEB) nebulizer solution 1 Dose  1 Dose Inhalation Q4H PRN Padmini Alvarado MD        metoprolol succinate (TOPROL XL) extended release tablet 25 mg  25 mg Oral Daily Padmini Alvarado MD   25 mg at 08/23/23 1004    albuterol (PROVENTIL) (2.5 MG/3ML) 0.083% nebulizer solution 2.5 mg  2.5 mg Nebulization Q6H PRN Padmini Alvarado MD        perflutren lipid microspheres (DEFINITY) injection 1.5 mL  1.5 mL IntraVENous ONCE PRN Carmen Eye, DO        nirmatrelvir/ritonavir 300/100 (PAXLOVID) 3 tablet (Patient Supplied)  3 tablet Oral Q12H Claude Fields MD   3 tablet at 08/23/23 1002    amiodarone (CORDARONE) tablet 200 mg  200 mg Oral Daily Carol Hymen, DO   200 mg at 08/23/23 1004    apixaban (ELIQUIS) tablet 5 mg  5 mg Oral BID Carol Hymen, DO   5 mg at 08/23/23 1004    aspirin EC tablet 81 mg  81 mg Oral Nightly Carol Hymen, DO   81 mg at 08/22/23 2030    atorvastatin (LIPITOR) tablet 80 mg  80 mg Oral Nightly Carol Hymen, DO   80 mg at 08/22/23 2031    baclofen (LIORESAL) tablet 10 mg  10 mg Oral TID Carol Hymen, DO   10 mg at 08/23/23 1004    bumetanide (BUMEX) tablet 1 mg  1 mg Oral Daily Padmini Alvarado MD   1 mg at 08/23/23 1004    empagliflozin (JARDIANCE) tablet 10 mg  10 mg Oral Daily Carol Hymen, DO   10 mg at 08/23/23 1004    levothyroxine (SYNTHROID) tablet 150 mcg  150 mcg Oral Daily Carol Hymen, DO   150 mcg at 08/23/23 5027    metOLazone (ZAROXOLYN) tablet 2.5 mg  2.5 mg Oral Once per day on Mon Thu MD santiago Mendoza (SINGULAIR) atelectatic  changes at the right lung base. Ground-glass opacity to suggest atelectatic  change posteriorly within the dependent portions of the lung fields. There  is no definite pulmonary contusion. No acute chest wall abnormality. No CT evidence of acute intra-abdominal or pelvic abnormality. Mild to  moderate stool burden to suggest clinical presentation of constipation. Stones in the gallbladder with no wall thickening or biliary ductal  dilatation. Incomplete distension of the bladder with wall thickening. Correlation to  urinalysis to exclude possible cystitis. Findings may be related to the  incomplete distension.            IMPRESSION:     COVID 19 infection - asymptomatic       RECOMMENDATIONS:       Paxlovid 3 tab bid ( day 5 of 5)   PT

## 2023-08-23 NOTE — CONSULTS
[address], [address] [place] [address]                                  CONSULTATION    PATIENT NAME: Davion Ayala                   :        1955  MED REC NO:   [id number]                            ROOM:       8505  ACCOUNT NO:   [de-identified]                           ADMIT DATE: 2023  PROVIDER:     Michelle Grace DPM    CONSULT DATE:  2023    HISTORY OF PRESENT ILLNESS:  I was asked to see this 51-year-old male  today for lower extremity foot problems and consultation. He is  admitted to the hospital [facility]. PAST MEDICAL HISTORY:  He has a past medical history of diabetes and I  am asked to see him with regard to his fungal infection of his nails and  his foot care. REVIEW OF SYSTEMS: His review of systems and his PMH well documented on  the H and P. Positive history of COPD, hyperlipidemia, hypertension,  sleep apnea, obesity, type 2 diabetes, _____ tobacco abuse. His meds which I reviewed today as well as his labs are reviewed and are  noncontributory _____ lower extremity complaints as well as social  history was reviewed as well. Review of systems otherwise unchanged. PHYSICAL EXAMINATION:  _____ consistent with fungal infection of his nails _____ and fungal  infection. He has palpable pulses with no signs of ischemia to his  foot. He has no breaks in his skin. He has transient neuropathy of his  foot ____ range of motion abnormalities. ASSESSMENT:  Diabetes, _____ onychomycosis, and transient neuropathy. PLAN:  We will examine today. I debrided all the nails today in length  _____ I recommend no other treatment at this time and I will help  followup with his podiatrist as an outpatient _____ patient.         Michelle Grace DPM    D: 2023 7:53:40       T: 2023 10:18:59     PRATIBHA  Job#: [id number]     Doc#: [id number]    CC:

## 2023-08-23 NOTE — CARE COORDINATION
SOCIAL WORK/CASEMANAGEMENT TRANSITION OF CARE PLANNINGLylecuong FROST, [address] [facility]):  precert started late yesterday for [facility]. All discharge paper work is in place with pasrr. Pt told rn that he was going him and his wife was leaving [facility]. I called the wife and she said they had issues with staffing last night but it was better today and she is staying. She wants pt to go to [facility] and will call him to let him know he is still to go there. Pt remains in isolation for covid. Lazaro Del Valle, RICHYW  6/96/9992  Precert obtained for [facility]. Echo was negative, so per cardio notes there were going to sign off. I called nancy Quach and Libby but not available. Pas was over $150 to transport. Wife called and her , Mely John at [phone] can take pt in covid precautions. I called chikis and he will be here around 7 p.m. rn notified. I sent perfect serve to dr. Carrol Teran for discharge order , etc. Sent message to dr. Israel Shin that pt is leaving as well, pt is done with his Paxloivid today was day 5/5.  Lazaro Del Valle, RITU  8/23/2023
SOCIAL WORK/CASEMANAGEMENT TRANSITION OF CARE PLANNINGMarisaalicia FROST, [address] [facility]):  precert started late yesterday for [facility]. All discharge paper work is in place with paszohreh. Pt told rn that he was going him and his wife was leaving [facility]. I called the wife and she said they had issues with staffing last night but it was better today and she is staying. She wants pt to go to [facility] and will call him to let him know he is still to go there. Pt remains in isolation for covid. RITU Rose  8/44/6598  Precert obtained for [facility]. Echo was negative, so per cardio notes there were going to sign off. I called nancy Borjas and Libby but not available. Pas was over $150 to transport. Wife called and her , Lenka Beard at [phone] can take pt in covid precautions. I called chikis and he will be here around 7 p.m. rn notified. I sent perfect serve to dr. Trey Rousseau for discharge order , etc. Sent message to dr. Margot Essex that pt is leaving as well, pt is done with his Paxloivid today was day 5/5. RITU Rose  8/23/2023  ID is in agreement. RITU Rose  8/23/2023
SOCIAL WORK/CASEMANAGEMENT TRANSITION OF CARE PLANNINGOris Karina FROST, [address] [facility]):  waiting for PT eval then will start precert for pt to go to [facility]. OT saw yesterday. Echo is pending. Tlso brace is in the room. Podiatry to see for nail trimming. Cardio is following. All discharge paper work with \[facility]\"" in place. RITU Julio  8/22/2023  PT eval in [facility] and [facility] to start precert since it will take a couple of days. Per cardio if echo is negative they will sign off.  RITU Julio  8/22/2023
19.4    Current PCP: Princess Muniz, DO  PCP verified by CM? Yes    Chart Reviewed: Yes      History Provided by: Patient  Patient Orientation: Alert and Oriented    Patient Cognition: Alert    Hospitalization in the last 30 days (Readmission):  No    If yes, Readmission Assessment in CM Navigator will be completed. Advance Directives:      Code Status: Full Code   Patient's Primary Decision Maker is:      Primary Decision Maker: Marium Martinez - Spouse - [phone]    Secondary Decision Maker: Kimber Brannon Child - [phone]    Discharge Planning:    Patient lives with: Spouse/Significant Other Type of Home: House  Primary Care Giver: Self  Patient Support Systems include: Spouse/Significant Other, Children   Current Financial resources:    Current community resources:    Current services prior to admission:              Current DME:              Type of Home Care services:       ADLS  Prior functional level: Independent in ADLs/IADLs  Current functional level: Other (see comment) (PT to eval.)    PT AM-PAC:   /24  OT AM-PAC: 15 /24    Family can provide assistance at DC: No  Would you like Case Management to discuss the discharge plan with any other family members/significant others, and if so, who?  Yes  Plans to Return to Present Housing: No  Other Identified Issues/Barriers to RETURNING to current housing:  needs patricia   Potential Assistance needed at discharge:              Potential DME:    Patient expects to discharge to:    Plan for transportation at discharge:      Financial    Payor: [employer] [address] / [facility] / Product Type: *No Product type* /     Does insurance require precert for SNF: Yes    Potential assistance Purchasing Medications:    Meds-to-Beds request: Yes      [id number] - Aayush Trey - [address] [place] [address]  Phone: [phone] Fax: [phone]    [facility]

## 2023-08-23 NOTE — PROGRESS NOTES
INPATIENT CARDIOLOGY FOLLOW-UP    Name: Lesli Schilder    Age: 79 y.o. Date of Admission: 8/17/2023  8:24 PM    Date of Service: 8/23/2023    Chief Complaint: Follow-up for CHF/Afib/CAD/VHD/COVID/Fall    Interim History:  No new overnight cardiac complaints. Patient complaining of increased back pain otherwise no cardiac issues. Currently with no complaints of CP, SOB, palpitations, dizziness, or lightheadedness. Atrial fibrillation on telemetry.     Review of Systems:   Cardiac: As per HPI  General: No fever, chills  Pulmonary: As per HPI  HEENT: No visual disturbances, difficult swallowing  GI: No nausea, vomiting  Endocrine: No thyroid disease or DM  Musculoskeletal: COX x 4, no focal motor deficits  Skin: Intact, no rashes  Neuro/Psych: No headache or seizures    Problem List:  Patient Active Problem List   Diagnosis    CAD (coronary artery disease)    Hypertension    Type 2 diabetes mellitus with hyperglycemia, with long-term current use of insulin (HCC)    Tobacco abuse    PAF (paroxysmal atrial fibrillation) ([address])    Status post coronary artery bypass graft    H/O mitral valve repair    Morbid obesity with BMI of 50.0-59.9, adult (HCC)    Chronic bronchitis (HCC)    Sleep apnea    Gastroesophageal reflux disease without esophagitis    Hyperlipidemia    Muscular deconditioning    Acute diastolic (congestive) heart failure (HCC)    Acute diastolic heart failure (HCC)    Iron deficiency anemia, unspecified    Acute systolic/diastolic congestive heart failure (HCC)    Atrial fibrillation with RVR (HCC)    Ischemic cardiomyopathy    Nonrheumatic tricuspid valve regurgitation    Chronic anticoagulation    Stage 3 chronic kidney disease ([address])    Acute on chronic congestive heart failure (HCC)    Dizziness    Hyperosmolar hyperglycemic state (HHS) ([address])    Acute metabolic encephalopathy    Altered mental status    Hypotension    Transient hypotension    Atrial fibrillation with rapid ventricular response with no wall thickening or biliary ductal  dilatation. Incomplete distension of the bladder with wall thickening. Correlation to  urinalysis to exclude possible cystitis. Findings may be related to the  incomplete distension. Echo Summary 9/29/2022([facility]):   Left ventricular size is grossly normal.   Mild left ventricular concentric hypertrophy noted. Ejection fraction is visually estimated at 55%. No evidence of left ventricular mass or thrombus noted. No regional wall motion abnormalities seen. Normal sized left atrium. Borderline dilated right ventricle. Physiologic and/or trace mitral regurgitation is present. No evidence of mitral valve stenosis. Aortic valve replacement   No evidence of aortic valve regurgitation. Mild-to-moderate aortic stenosis. The aortic valve area is 1.2 cm2 with a maximum gradient of 40.35 mmHg and a mean gradient of 23.37 mmHg. Physiologic and/or trace tricuspid regurgitation. Regular rhythm. Addendum: Personally reviewed. No AVR. Moderate AS of a trileaflet AV. Stable MV repair functioning. Normal LVEF, normal RV size with reduced function. TTE-8/22/2023:  Normal left ventricle size. Ejection fraction is visually estimated at 50-55%. Low normal low systolic   function. Atrial fibrillation may affect the evaluation of LV systolic   function . No regional wall motion abnormalities seen. Normal left ventricle wall thickness. Indeterminate diastolic function. The left atrium is mildly dilated. Normal right ventricular size. Right ventricle global systolic function is mildly reduced . TAPSE 15 mm. Status post mitral valve repair, mild mitral stenosis. MVA by continuity equation 1.9 cm2. There is low flow, low gradient moderate aortic stenosis with valve area   of 1.3 sq cm. AV peak velocity 2.7 m/s, mean gradient 16 mmHg, DVI 0.32   Moderate aortic regurgitation is noted. Mild tricuspid regurgitation. RVSP is 42 mmHg.  Pulmonary

## 2023-08-28 ENCOUNTER — TELEPHONE (OUTPATIENT)
Dept: FAMILY MEDICINE CLINIC | Age: 68
End: 2023-08-28

## 2023-08-28 DIAGNOSIS — S22.43XD MULTIPLE CLOSED FRACTURES OF RIBS OF BOTH SIDES WITH ROUTINE HEALING, SUBSEQUENT ENCOUNTER: ICD-10-CM

## 2023-08-28 DIAGNOSIS — Z91.81 STATUS POST FALL: ICD-10-CM

## 2023-08-28 DIAGNOSIS — I50.9 ACUTE ON CHRONIC CONGESTIVE HEART FAILURE, UNSPECIFIED HEART FAILURE TYPE (HCC): Primary | ICD-10-CM

## 2023-08-28 DIAGNOSIS — R26.89 BALANCE DISORDER: ICD-10-CM

## 2023-08-28 DIAGNOSIS — R29.898 MUSCULAR DECONDITIONING: ICD-10-CM

## 2023-08-28 NOTE — TELEPHONE ENCOUNTER
Patient called stating he will be discharging for the rehab facility today. He is asking for [facility]. Pt was informed he would need an apt. He just wanted a msg sent to the doctor. Please advise.   Last seen 6/28/2023  Next appt Visit date not found

## 2023-08-28 NOTE — PROGRESS NOTES
Physician Progress Note      PATIENT:               Valery Butts  CSN #:                  [id number]  :                       1955  ADMIT DATE:       2023 8:24 PM  [address] [facility] DATE:        2023 8:00 PM  RESPONDING  PROVIDER #:        Rosario Aguirre MD          QUERY TEXT:    Pt admitted with multiple compression fractures of the spine. Pt noted to have   diminished bone density in CT of the thoracic spine report on . If   possible, please document in progress notes and discharge summary if you are   evaluating and/or treating any of the following: The medical record reflects the following:  Risk Factors: age 79  Clinical Indicators: Per CT thoracic spine report on  \". .. Bone density is   diminished. [facility] \", Per trauma surgery consult note \". .. Patient states he slid   from his bed landing onto his lumbar back. [facility] \"  Treatment: Trauma surgery and neurosurgery consults, TLSO brace    Thank you,  Marlon العلي RN, BSN, CDIS  Clinical Documentation Improvement  [email or web address]  Options provided:  -- Osteoporotic T11 compression fracture and T3-7 transverse process fractures   following fall which would not usually break a normal, healthy bone  -- Traumatic T11 compression fracture and T3-7 transverse process fractures  -- Other - I will add my own diagnosis  -- Disagree - Not applicable / Not valid  -- Disagree - Clinically unable to determine / Unknown  -- Refer to Clinical Documentation Reviewer    PROVIDER RESPONSE TEXT:    This patient has a traumatic T11 compression fracture and T3-7 transverse   process fractures.     Query created by: Marlon العلي on 2023 1:50 PM      Electronically signed by:  Rosario Aguirre MD 2023 7:57 PM

## 2023-08-29 ENCOUNTER — TELEPHONE (OUTPATIENT)
Dept: FAMILY MEDICINE CLINIC | Age: 68
End: 2023-08-29

## 2023-08-29 NOTE — TELEPHONE ENCOUNTER
Called Bud and advised HH was ordered yesterday through [facility].

## 2023-08-29 NOTE — TELEPHONE ENCOUNTER
----- Message from Andreas Rodriguez sent at 8/29/2023 11:21 AM EDT -----  Subject: Message to Provider    QUESTIONS  Information for Provider? Antony wants to make sure that this patient is   already receiving home health care. Patient states that he is, but Antony was   called by [email or web address] in [place] who was trying to set the patient up   with home health. Please advise Antony at the number provided below. Thanks  ---------------------------------------------------------------------------  --------------  Nakia Richardson INFO  [phone]; OK to leave message on voicemail  ---------------------------------------------------------------------------  --------------  SCRIPT ANSWERS  Relationship to Patient? Covered Entity  Covered Entity Type? Home Health Care? Representative Name?  Antony from [place] [address] [place] [address]

## 2023-08-30 ENCOUNTER — TELEPHONE (OUTPATIENT)
Dept: FAMILY MEDICINE CLINIC | Age: 68
End: 2023-08-30

## 2023-08-30 NOTE — TELEPHONE ENCOUNTER
I called patient's wife, Belen, regarding her appt 9/20/23 and she asked when Brandy Hernandez is going to be scheduled to see Dr. Markie Puckett. Please advise due to length of appt needed with having a modifier.     Last seen 6/28/2023  Next appt Visit date not found

## 2023-09-01 NOTE — TELEPHONE ENCOUNTER
I was given a msg that patient called to ask if he could have his appt the same day as his wife, Schuyler Cristina, on 9/20/23. I called patient and informed him that a msg was sent to Dr. Tamiko Stubbs and MA. I informed patient that both are out of the ofc today and that someone will contact him once a response is given. Patient was agreeable.

## 2023-09-06 ENCOUNTER — TELEPHONE (OUTPATIENT)
Dept: FAMILY MEDICINE CLINIC | Age: 68
End: 2023-09-06

## 2023-09-06 NOTE — TELEPHONE ENCOUNTER
Dalton Booker from [facility] called she is asking for PT,OT and SN for the pt. Also needs med compliance. Please advise.   Last seen 6/28/2023  Next appt 10/9/2023

## 2023-09-07 NOTE — TELEPHONE ENCOUNTER
Gladys/Nurse from [facility] Home transitions program called to follow up on request for Skilled Nursing, citing concerns for patient since he is having increased difficulty with his vision and getting over [facility]. Andrew Chris informed that he and his wife are sharing a walker and between the two of them, they are having a lot of problems taking care of themselves and each other. Andrew Perez asked if the order could please be placed today.   Andrew Perez - [phone]      Last seen 6/28/2023  Next appt 10/9/2023

## 2023-09-08 NOTE — TELEPHONE ENCOUNTER
I returned call to [address] [place] [address] and left msg lockett/Devika for [facility], informing that Dr. Michelle Hall put orders in. I returned Gladys's call and informed her that orders have been placed. I called patient and informed him that home care orders have been placed.

## 2023-09-15 DIAGNOSIS — S22.080A COMPRESSION FRACTURE OF T11 VERTEBRA, INITIAL ENCOUNTER (HCC): Primary | ICD-10-CM

## 2023-09-20 ENCOUNTER — TELEPHONE (OUTPATIENT)
Dept: FAMILY MEDICINE CLINIC | Age: 68
End: 2023-09-20

## 2023-09-20 NOTE — TELEPHONE ENCOUNTER
Pati Gutiérrez O.T. Therapy called from [facility] to let you know she is going to be seeing pt for 4 weeks for OT. This is for informational purpose only.      Last seen 6/28/2023  Next appt 10/9/2023     Pati Gutiérrez [phone]

## 2023-10-04 ENCOUNTER — OFFICE VISIT (OUTPATIENT)
Dept: NEUROSURGERY | Age: 68
End: 2023-10-04
Payer: MEDICARE

## 2023-10-04 ENCOUNTER — HOSPITAL ENCOUNTER (OUTPATIENT)
Dept: GENERAL RADIOLOGY | Age: 68
Discharge: HOME | End: 2023-10-06
Payer: MEDICARE

## 2023-10-04 ENCOUNTER — HOSPITAL ENCOUNTER (OUTPATIENT)
Age: 68
Discharge: HOME | End: 2023-10-06
Payer: MEDICARE

## 2023-10-04 DIAGNOSIS — S22.080A COMPRESSION FRACTURE OF T11 VERTEBRA, INITIAL ENCOUNTER (HCC): ICD-10-CM

## 2023-10-04 DIAGNOSIS — S22.080D CLOSED WEDGE COMPRESSION FRACTURE OF T11 VERTEBRA WITH ROUTINE HEALING, SUBSEQUENT ENCOUNTER: Primary | ICD-10-CM

## 2023-10-04 PROCEDURE — 1123F ACP DISCUSS/DSCN MKR DOCD: CPT | Performed by: STUDENT IN AN ORGANIZED HEALTH CARE EDUCATION/TRAINING PROGRAM

## 2023-10-04 PROCEDURE — 72100 X-RAY EXAM L-S SPINE 2/3 VWS: CPT

## 2023-10-04 PROCEDURE — 99213 OFFICE O/P EST LOW 20 MIN: CPT | Performed by: STUDENT IN AN ORGANIZED HEALTH CARE EDUCATION/TRAINING PROGRAM

## 2023-10-04 PROCEDURE — 99212 OFFICE O/P EST SF 10 MIN: CPT

## 2023-10-04 NOTE — PROGRESS NOTES
Hospital Follow-up     This is a 79year old male who presents to the office for a 1 month  follow-up s/p T11 compression fracture      Subjective: Patient states he is doing well. He denies any significant back pain or pain down the legs. No numbness or weakness with this pain. He does admit to some brace noncompliance. Discussed importance of wearing brace, and r/b/a to not wearing brace, patient expresses understanding. XR reviewed with patient. Physical Exam:              WDWN, no apparent distress              Non-labored breathing               Vitals Stable              Alert and oriented x3              CN 3-12 intact              PERRL              EOMI              COX well              Motor strength symmetric              Sensation to LT intact bilaterally               Imaging: 10/4/2023 XR Lumbar Spine  T11 fracture hard to visualize, no acute processes seen-final read pending. Assessment: This is a 79 y.o.  male presenting for a 1 month follow-up s/p T11 compression fracture. Plan:  -Pain control and expectations discussed  -Continue brace and restrictions, Discussed importance of wearing brace, and r/b/a to not wearing brace, patient expresses understanding. -OARRS report reviewed   -Follow-up in neurosurgery clinic in 2 months with repeat XR  -Call or return to neurosurgery office sooner if symptoms worsen or if new issues arise in the interim.     Electronically signed by Onelia Phillip PA-C on 10/4/2023 at 5:32 PM

## 2023-10-09 ENCOUNTER — OFFICE VISIT (OUTPATIENT)
Dept: FAMILY MEDICINE CLINIC | Age: 68
End: 2023-10-09
Payer: MEDICARE

## 2023-10-09 ENCOUNTER — TELEPHONE (OUTPATIENT)
Dept: FAMILY MEDICINE CLINIC | Age: 68
End: 2023-10-09

## 2023-10-09 VITALS
HEART RATE: 70 BPM | BODY MASS INDEX: 38.25 KG/M2 | DIASTOLIC BLOOD PRESSURE: 80 MMHG | HEIGHT: 66 IN | RESPIRATION RATE: 18 BRPM | OXYGEN SATURATION: 95 % | WEIGHT: 238 LBS | SYSTOLIC BLOOD PRESSURE: 130 MMHG

## 2023-10-09 DIAGNOSIS — E03.9 ACQUIRED HYPOTHYROIDISM: ICD-10-CM

## 2023-10-09 DIAGNOSIS — E11.65 TYPE 2 DIABETES MELLITUS WITH HYPERGLYCEMIA, WITH LONG-TERM CURRENT USE OF INSULIN (HCC): ICD-10-CM

## 2023-10-09 DIAGNOSIS — Z79.4 TYPE 2 DIABETES MELLITUS WITH DIABETIC POLYNEUROPATHY, WITH LONG-TERM CURRENT USE OF INSULIN (HCC): Primary | ICD-10-CM

## 2023-10-09 DIAGNOSIS — E11.42 TYPE 2 DIABETES MELLITUS WITH DIABETIC POLYNEUROPATHY, WITH LONG-TERM CURRENT USE OF INSULIN (HCC): Primary | ICD-10-CM

## 2023-10-09 DIAGNOSIS — Z79.4 TYPE 2 DIABETES MELLITUS WITH HYPERGLYCEMIA, WITH LONG-TERM CURRENT USE OF INSULIN (HCC): ICD-10-CM

## 2023-10-09 DIAGNOSIS — R53.82 CHRONIC FATIGUE: ICD-10-CM

## 2023-10-09 DIAGNOSIS — E78.2 MIXED HYPERLIPIDEMIA: ICD-10-CM

## 2023-10-09 LAB — HBA1C MFR BLD: 6.6 %

## 2023-10-09 PROCEDURE — 99214 OFFICE O/P EST MOD 30 MIN: CPT | Performed by: FAMILY MEDICINE

## 2023-10-09 PROCEDURE — 83036 HEMOGLOBIN GLYCOSYLATED A1C: CPT | Performed by: FAMILY MEDICINE

## 2023-10-09 PROCEDURE — 3074F SYST BP LT 130 MM HG: CPT | Performed by: FAMILY MEDICINE

## 2023-10-09 PROCEDURE — 3078F DIAST BP <80 MM HG: CPT | Performed by: FAMILY MEDICINE

## 2023-10-09 PROCEDURE — 3044F HG A1C LEVEL LT 7.0%: CPT | Performed by: FAMILY MEDICINE

## 2023-10-09 PROCEDURE — 1123F ACP DISCUSS/DSCN MKR DOCD: CPT | Performed by: FAMILY MEDICINE

## 2023-10-09 RX ORDER — FLASH GLUCOSE SENSOR
KIT MISCELLANEOUS
Qty: 1 EACH | Refills: 3 | Status: SHIPPED | OUTPATIENT
Start: 2023-10-09

## 2023-10-09 RX ORDER — DULOXETIN HYDROCHLORIDE 20 MG/1
20 CAPSULE, DELAYED RELEASE ORAL NIGHTLY
Qty: 90 CAPSULE | Refills: 4 | Status: SHIPPED | OUTPATIENT
Start: 2023-10-09

## 2023-10-09 NOTE — TELEPHONE ENCOUNTER
Jennifer BENOIT called to inform patient will be continuing OT for a couple more weeks, stating patient is improving

## 2023-10-09 NOTE — PROGRESS NOTES
Hypertension     Hypoxemia requiring supplemental oxygen 02/02/2015    LONG TERM ANTICOAGULENT USE     Morbid obesity with BMI of 50.0-59.9, adult ([address]) 11/27/2013    Obesity     Osteoarthritis     Sleep apnea     bilevel positive airway pressure at 13/8 with 2 L oxygen flow     Stage 3 chronic kidney disease (HCC)     Tobacco abuse     Type II or unspecified type diabetes mellitus without mention of complication, not stated as uncontrolled        Social History     Socioeconomic History    Marital status:      Spouse name: Osman Mccarty    Number of children: 1    Years of education: Not on file    Highest education level: 11th grade   Occupational History    Not on file   Tobacco Use    Smoking status: Former     Packs/day: 1.00     Years: 45.00     Additional pack years: 0.00     Total pack years: 45.00     Types: Cigarettes     Quit date: 7/22/2013     Years since quitting: 10.2    Smokeless tobacco: Former     Types: Chew     Quit date: 10/8/1971   Vaping Use    Vaping Use: Never used   Substance and Sexual Activity    Alcohol use: No     Alcohol/week: 0.0 standard drinks of alcohol     Comment: 1-2 coffee per day    Drug use: Yes     Types: Marijuana Melanie Sella)    Sexual activity: Yes     Partners: Female   Other Topics Concern    Not on file   Social History Narrative    8-26-19  [facility] reviewed SDOH with Mukesh Officer. He does have money strain but between the income he has coming in and his wife's they are over resources for SARY programs. Offered food panty info to help with end of the month struggles but he declined stating that he tried and was refused due to his income. He belongs to a [employer] and goes weekly so he has some community connections in place. He has an extremely high interest rate on his mortgage which he was encouraged to look into getting lowered to help save money on his house payments. Noticed some difficulty with memory recall. Becomes easily flustered at times.   Has no MHI to support

## 2023-10-10 ASSESSMENT — ENCOUNTER SYMPTOMS
VOICE CHANGE: 0
EYE PAIN: 0
EYE ITCHING: 0
ABDOMINAL PAIN: 0
WHEEZING: 0
TROUBLE SWALLOWING: 0
FACIAL SWELLING: 0
SINUS PRESSURE: 0
NAUSEA: 0
COLOR CHANGE: 0
RECTAL PAIN: 0
ABDOMINAL DISTENTION: 0
BACK PAIN: 1
ANAL BLEEDING: 0
EYE DISCHARGE: 0
STRIDOR: 0
VOMITING: 0
APNEA: 0
CHOKING: 0
SORE THROAT: 0
BLOOD IN STOOL: 0
CONSTIPATION: 0
DIARRHEA: 0
COUGH: 0
SHORTNESS OF BREATH: 0
EYE REDNESS: 0
PHOTOPHOBIA: 0
RHINORRHEA: 0
CHEST TIGHTNESS: 0

## 2023-10-26 ENCOUNTER — TELEPHONE (OUTPATIENT)
Dept: FAMILY MEDICINE CLINIC | Age: 68
End: 2023-10-26

## 2023-10-26 NOTE — TELEPHONE ENCOUNTER
[facility] called to inform that patient is being discharged from home OT and is doing really well. Marlen King informed that patient has also been able to get some additional support at his home.     Last seen 10/9/2023  Next appt 12/11/2023

## 2023-10-31 NOTE — PROCEDURES
Lexiscan Stress EKG Report:    Dx CP    Baseline EKG: nonspecific ST and T waves changes, atrial fibrillation. Stress EKG: No ST-T changes. Arrhythmias: None. Symptoms: None. Summary:  Unremarkable lexiscan stress EKG. See separate report for stress perfusion results. Saumya Rider D.O.   Cardiologist  Cardiology, [facility]
(1) More than 48 hours/None

## 2023-11-01 DIAGNOSIS — F34.1 DYSTHYMIA: ICD-10-CM

## 2023-11-01 RX ORDER — RISPERIDONE 0.5 MG/1
0.5 TABLET ORAL
Qty: 30 TABLET | Refills: 0 | OUTPATIENT
Start: 2023-11-01

## 2023-11-02 ENCOUNTER — TELEPHONE (OUTPATIENT)
Dept: FAMILY MEDICINE CLINIC | Age: 68
End: 2023-11-02

## 2023-11-02 NOTE — TELEPHONE ENCOUNTER
Patient called stating the eliquis is to expensive and he is asking for something else. Please advise.   Last seen 10/9/2023  Next appt 12/11/2023  BESSIE/Tadeo

## 2023-11-03 NOTE — TELEPHONE ENCOUNTER
Per Dr. Michelle Hall  This is really something we will have to discuss because their is really only one other medication that would be an alternative and it is also very expensive . If he could call his insurance and see what they prefer, that would be a help . We can also set him up with prescription assist that would be a help     Left detailed msg.  On VM

## 2023-11-21 RX ORDER — WARFARIN SODIUM 5 MG/1
5 TABLET ORAL DAILY
Qty: 30 TABLET | Refills: 3 | Status: SHIPPED
Start: 2023-11-21 | End: 2023-12-11

## 2023-11-29 ENCOUNTER — OFFICE VISIT (OUTPATIENT)
Dept: FAMILY MEDICINE CLINIC | Age: 68
End: 2023-11-29
Payer: MEDICARE

## 2023-11-29 VITALS
OXYGEN SATURATION: 98 % | RESPIRATION RATE: 18 BRPM | HEIGHT: 66 IN | SYSTOLIC BLOOD PRESSURE: 130 MMHG | DIASTOLIC BLOOD PRESSURE: 80 MMHG | BODY MASS INDEX: 38.57 KG/M2 | HEART RATE: 67 BPM | WEIGHT: 240 LBS

## 2023-11-29 DIAGNOSIS — I48.91 ATRIAL FIBRILLATION WITH RVR (HCC): ICD-10-CM

## 2023-11-29 DIAGNOSIS — R53.82 CHRONIC FATIGUE: ICD-10-CM

## 2023-11-29 DIAGNOSIS — H26.9 CATARACT OF BOTH EYES, UNSPECIFIED CATARACT TYPE: ICD-10-CM

## 2023-11-29 DIAGNOSIS — E03.9 ACQUIRED HYPOTHYROIDISM: ICD-10-CM

## 2023-11-29 DIAGNOSIS — H54.7 VISION LOSS: ICD-10-CM

## 2023-11-29 DIAGNOSIS — I48.0 PAF (PAROXYSMAL ATRIAL FIBRILLATION) (HCC): Primary | ICD-10-CM

## 2023-11-29 DIAGNOSIS — E11.65 TYPE 2 DIABETES MELLITUS WITH HYPERGLYCEMIA, WITH LONG-TERM CURRENT USE OF INSULIN (HCC): ICD-10-CM

## 2023-11-29 DIAGNOSIS — E78.2 MIXED HYPERLIPIDEMIA: ICD-10-CM

## 2023-11-29 DIAGNOSIS — E11.42 TYPE 2 DIABETES MELLITUS WITH DIABETIC POLYNEUROPATHY, WITH LONG-TERM CURRENT USE OF INSULIN (HCC): ICD-10-CM

## 2023-11-29 DIAGNOSIS — Z79.4 TYPE 2 DIABETES MELLITUS WITH HYPERGLYCEMIA, WITH LONG-TERM CURRENT USE OF INSULIN (HCC): ICD-10-CM

## 2023-11-29 DIAGNOSIS — F51.01 PRIMARY INSOMNIA: ICD-10-CM

## 2023-11-29 DIAGNOSIS — Z79.4 TYPE 2 DIABETES MELLITUS WITH DIABETIC POLYNEUROPATHY, WITH LONG-TERM CURRENT USE OF INSULIN (HCC): ICD-10-CM

## 2023-11-29 LAB
ABSOLUTE IMMATURE GRANULOCYTE: 0.03 K/UL (ref 0–0.58)
ALBUMIN SERPL-MCNC: 3.8 G/DL (ref 3.5–5.2)
ALP BLD-CCNC: 109 U/L (ref 40–129)
ALT SERPL-CCNC: 9 U/L (ref 0–40)
ANION GAP SERPL CALCULATED.3IONS-SCNC: 14 MMOL/L (ref 7–16)
AST SERPL-CCNC: 16 U/L (ref 0–39)
BASOPHILS ABSOLUTE: 0.05 K/UL (ref 0–0.2)
BASOPHILS RELATIVE PERCENT: 1 % (ref 0–2)
BILIRUB SERPL-MCNC: 0.7 MG/DL (ref 0–1.2)
BUN BLDV-MCNC: 16 MG/DL (ref 6–23)
CALCIUM SERPL-MCNC: 9.1 MG/DL (ref 8.6–10.2)
CHLORIDE BLD-SCNC: 99 MMOL/L (ref 98–107)
CHOLESTEROL: 203 MG/DL
CO2: 23 MMOL/L (ref 22–29)
CREAT SERPL-MCNC: 1 MG/DL (ref 0.7–1.2)
CREATININE URINE: 39.8 MG/DL (ref 40–278)
EOSINOPHILS ABSOLUTE: 0.09 K/UL (ref 0.05–0.5)
EOSINOPHILS RELATIVE PERCENT: 2 % (ref 0–6)
GFR SERPL CREATININE-BSD FRML MDRD: >60 ML/MIN/1.73M2
GLUCOSE BLD-MCNC: 241 MG/DL (ref 74–99)
HBA1C MFR BLD: 6.7 % (ref 4–5.6)
HCT VFR BLD CALC: 47.1 % (ref 37–54)
HDLC SERPL-MCNC: 44 MG/DL
HEMOGLOBIN: 14.8 G/DL (ref 12.5–16.5)
IMMATURE GRANULOCYTES: 1 % (ref 0–5)
INTERNATIONAL NORMALIZATION RATIO, POC: 1.3
LDL CHOLESTEROL: 138 MG/DL
LYMPHOCYTES ABSOLUTE: 1.09 K/UL (ref 1.5–4)
LYMPHOCYTES RELATIVE PERCENT: 19 % (ref 20–42)
MCH RBC QN AUTO: 32.3 PG (ref 26–35)
MCHC RBC AUTO-ENTMCNC: 31.4 G/DL (ref 32–34.5)
MCV RBC AUTO: 102.8 FL (ref 80–99.9)
MICROALBUMIN/CREAT 24H UR: 77 MG/L (ref 0–19)
MICROALBUMIN/CREAT UR-RTO: 194 MCG/MG CREAT (ref 0–30)
MONOCYTES ABSOLUTE: 0.53 K/UL (ref 0.1–0.95)
MONOCYTES RELATIVE PERCENT: 9 % (ref 2–12)
NEUTROPHILS ABSOLUTE: 3.99 K/UL (ref 1.8–7.3)
NEUTROPHILS RELATIVE PERCENT: 69 % (ref 43–80)
PDW BLD-RTO: 14.9 % (ref 11.5–15)
PLATELET # BLD: 142 K/UL (ref 130–450)
PMV BLD AUTO: 10.7 FL (ref 7–12)
POTASSIUM SERPL-SCNC: 4.6 MMOL/L (ref 3.5–5)
PROTHROMBIN TIME, POC: 15.5
RBC # BLD: 4.58 M/UL (ref 3.8–5.8)
SODIUM BLD-SCNC: 136 MMOL/L (ref 132–146)
T4 FREE: 1.7 NG/DL (ref 0.9–1.7)
TOTAL PROTEIN: 7.1 G/DL (ref 6.4–8.3)
TRIGL SERPL-MCNC: 103 MG/DL
TSH SERPL DL<=0.05 MIU/L-ACNC: 1.97 UIU/ML (ref 0.27–4.2)
VLDLC SERPL CALC-MCNC: 21 MG/DL
WBC # BLD: 5.8 K/UL (ref 4.5–11.5)

## 2023-11-29 PROCEDURE — 99214 OFFICE O/P EST MOD 30 MIN: CPT | Performed by: FAMILY MEDICINE

## 2023-11-29 PROCEDURE — 3074F SYST BP LT 130 MM HG: CPT | Performed by: FAMILY MEDICINE

## 2023-11-29 PROCEDURE — 3078F DIAST BP <80 MM HG: CPT | Performed by: FAMILY MEDICINE

## 2023-11-29 PROCEDURE — 3044F HG A1C LEVEL LT 7.0%: CPT | Performed by: FAMILY MEDICINE

## 2023-11-29 PROCEDURE — 85610 PROTHROMBIN TIME: CPT | Performed by: FAMILY MEDICINE

## 2023-11-29 PROCEDURE — 1123F ACP DISCUSS/DSCN MKR DOCD: CPT | Performed by: FAMILY MEDICINE

## 2023-11-29 RX ORDER — TRAZODONE HYDROCHLORIDE 50 MG/1
50 TABLET ORAL NIGHTLY
Qty: 30 TABLET | Refills: 3 | Status: SHIPPED | OUTPATIENT
Start: 2023-11-29

## 2023-11-29 NOTE — PROGRESS NOTES
Yue Nieto is a 76 y.o. male  . Subjective:      INR is 1.5. just started last week on coumadin. We will have him keep on this until we can restart Eliquis. We had to hold on Eliquis because insurance would not cover. Was doing well on it. Will monitor INR. Will attempt to get patient a home aid that with setting up medications. I want to make sure with the patient's visual problems that his medications are all in order to be taken, especially with changing the Coumadin doses. It is the shame that we have to start Coumadin at this time with his wife just passing and visual issues. If he has any problems he is to let me know. Hopefully we can get cataract surgery sooner, but a home aid will help. His last home health care has run out. Review of Systems   Constitutional:  Positive for fatigue. Negative for activity change, appetite change, chills, diaphoresis, fever and unexpected weight change. HENT:  Negative for congestion, dental problem, drooling, ear discharge, ear pain, facial swelling, hearing loss, mouth sores, nosebleeds, postnasal drip, rhinorrhea, sinus pressure, sneezing, sore throat, tinnitus, trouble swallowing and voice change. Eyes:  Negative for photophobia, pain, discharge, redness, itching and visual disturbance. Respiratory:  Negative for apnea, cough, choking, chest tightness, shortness of breath, wheezing and stridor. Cardiovascular:  Negative for chest pain, palpitations and leg swelling. Gastrointestinal:  Negative for abdominal distention, abdominal pain, anal bleeding, blood in stool, constipation, diarrhea, nausea, rectal pain and vomiting. Endocrine: Negative for cold intolerance, heat intolerance, polydipsia, polyphagia and polyuria.    Genitourinary:  Negative for decreased urine volume, difficulty urinating, dysuria, enuresis, flank pain, frequency, genital sores, hematuria, penile discharge, penile pain, penile swelling, scrotal swelling, testicular pain and

## 2023-12-01 ENCOUNTER — OFFICE VISIT (OUTPATIENT)
Dept: CARDIOLOGY CLINIC | Age: 68
End: 2023-12-01
Payer: MEDICARE

## 2023-12-01 ENCOUNTER — TELEPHONE (OUTPATIENT)
Dept: FAMILY MEDICINE CLINIC | Age: 68
End: 2023-12-01

## 2023-12-01 VITALS
HEART RATE: 90 BPM | HEIGHT: 66 IN | SYSTOLIC BLOOD PRESSURE: 108 MMHG | BODY MASS INDEX: 38.89 KG/M2 | WEIGHT: 242 LBS | RESPIRATION RATE: 16 BRPM | DIASTOLIC BLOOD PRESSURE: 72 MMHG

## 2023-12-01 DIAGNOSIS — I25.10 CORONARY ARTERY DISEASE INVOLVING NATIVE CORONARY ARTERY OF NATIVE HEART WITHOUT ANGINA PECTORIS: Primary | ICD-10-CM

## 2023-12-01 PROCEDURE — 1123F ACP DISCUSS/DSCN MKR DOCD: CPT | Performed by: INTERNAL MEDICINE

## 2023-12-01 PROCEDURE — 99214 OFFICE O/P EST MOD 30 MIN: CPT | Performed by: INTERNAL MEDICINE

## 2023-12-01 PROCEDURE — 3074F SYST BP LT 130 MM HG: CPT | Performed by: INTERNAL MEDICINE

## 2023-12-01 PROCEDURE — 93000 ELECTROCARDIOGRAM COMPLETE: CPT | Performed by: INTERNAL MEDICINE

## 2023-12-01 PROCEDURE — 3078F DIAST BP <80 MM HG: CPT | Performed by: INTERNAL MEDICINE

## 2023-12-01 RX ORDER — DULOXETIN HYDROCHLORIDE 20 MG/1
20 CAPSULE, DELAYED RELEASE ORAL DAILY
COMMUNITY

## 2023-12-01 NOTE — TELEPHONE ENCOUNTER
[phone]      [phone]      [address], [address] [facility]    [facility]

## 2023-12-01 NOTE — TELEPHONE ENCOUNTER
Mitesh Arias from [facility] home care called to inform their office does not provide aides to see patient for pill pack assistance.  She recommended [facility] or another non-skilled agency  Last seen 11/29/2023  Next appt 12/11/2023

## 2023-12-01 NOTE — PROGRESS NOTES
[facility] Cardiology Progress Note  Dr. Thai Chapman      Referring Physician: Rigoberto Piña DO  CHIEF COMPLAINT:   Chief Complaint   Patient presents with    Coronary Artery Disease     6 month       HISTORY OF PRESENT ILLNESS:   Patient is 76year old male with history of CHF, CAD, DM, hypertension, hyperlipidemia and COPD is here for a follow up appointment. Shortness of breath is at baseline, occasional palpitations, no pedal edema, no PND, no orthopnea, no syncope, no presyncopal episodes. Functional capacity is at baseline    Past Medical History:   Diagnosis Date    Acid reflux     Acute diastolic (congestive) heart failure ([address]) 06/19/2019    Arthritis     Asthma 04/16/2014    Asthmatic bronchitis , chronic 11/28/2016    CAD (coronary artery disease)     Chronic bronchitis ([address]) 04/16/2014    COPD (chronic obstructive pulmonary disease) ([facility])     CB    COPD (chronic obstructive pulmonary disease) ([facility])     Diabetes mellitus ([address])     Emphysema (subcutaneous) (surgical) resulting from a procedure     H/O cardiovascular stress test 04/24/2021    Lexiscan    Hyperlipidemia     Hypertension     Hypoxemia requiring supplemental oxygen 02/02/2015    LONG TERM ANTICOAGULENT USE     Morbid obesity with BMI of 50.0-59.9, adult ([address]) 11/27/2013    Obesity     Osteoarthritis     Sleep apnea     bilevel positive airway pressure at 13/8 with 2 L oxygen flow     Stage 3 chronic kidney disease ([facility])     Tobacco abuse     Type II or unspecified type diabetes mellitus without mention of complication, not stated as uncontrolled          Past Surgical History:   Procedure Laterality Date    COLONOSCOPY      CORONARY ANGIOPLASTY WITH STENT PLACEMENT  07/23/2013    Left main focal eccentric 65% distal stenosis. Large OM1 CX 50% ostial & prox narrow. Large ramus artery & LAD: Minor plaque w/o sign narrow. RCA: Dom vessel w/25% prox narrow & focal hazy eccentric 99% distal stenosis before origin RPDA & RPLCA.  LV: Mild inferior

## 2023-12-03 ASSESSMENT — ENCOUNTER SYMPTOMS
WHEEZING: 0
COUGH: 0
TROUBLE SWALLOWING: 0
SHORTNESS OF BREATH: 0
CHEST TIGHTNESS: 0
APNEA: 0
EYE ITCHING: 0
STRIDOR: 0
CHOKING: 0
VOMITING: 0
COLOR CHANGE: 0
BLOOD IN STOOL: 0
FACIAL SWELLING: 0
RHINORRHEA: 0
EYE DISCHARGE: 0
VOICE CHANGE: 0
PHOTOPHOBIA: 0
BACK PAIN: 0
ABDOMINAL PAIN: 0
CONSTIPATION: 0
EYE PAIN: 0
SINUS PRESSURE: 0
NAUSEA: 0
DIARRHEA: 0
EYE REDNESS: 0
SORE THROAT: 0
ANAL BLEEDING: 0
ABDOMINAL DISTENTION: 0
RECTAL PAIN: 0

## 2023-12-04 NOTE — TELEPHONE ENCOUNTER
Per Dr. Debbie Cisneros -     Can we call them Monday to see if they can come in to set up his medications until after his surgery on eyes

## 2023-12-04 NOTE — TELEPHONE ENCOUNTER
I called patient and LMOM informing of my call to [facility], as recommended by Dr. Alf Sal. I called One Carli Howe and informed her.

## 2023-12-04 NOTE — TELEPHONE ENCOUNTER
I called [facility], spoke w/Ami, informing as noted by Dr. Janine Meredith. Ami informed me that she can assist in getting him onto the Passport Program which would help with medication assistance, transportation and meal delivery. Ami informed stated patient will be contacted within about 1 wk to set up an appt for evaluation.

## 2023-12-05 ENCOUNTER — SOCIAL WORK (OUTPATIENT)
Dept: FAMILY MEDICINE CLINIC | Age: 68
End: 2023-12-05

## 2023-12-05 ENCOUNTER — HOSPITAL ENCOUNTER (OUTPATIENT)
Dept: GENERAL RADIOLOGY | Age: 68
Discharge: HOME | End: 2023-12-07
Payer: MEDICARE

## 2023-12-05 ENCOUNTER — OFFICE VISIT (OUTPATIENT)
Dept: NEUROSURGERY | Age: 68
End: 2023-12-05
Payer: MEDICARE

## 2023-12-05 ENCOUNTER — HOSPITAL ENCOUNTER (OUTPATIENT)
Age: 68
Discharge: HOME | End: 2023-12-07
Payer: MEDICARE

## 2023-12-05 DIAGNOSIS — S22.080D CLOSED WEDGE COMPRESSION FRACTURE OF T11 VERTEBRA WITH ROUTINE HEALING, SUBSEQUENT ENCOUNTER: ICD-10-CM

## 2023-12-05 DIAGNOSIS — S22.080D CLOSED WEDGE COMPRESSION FRACTURE OF T11 VERTEBRA WITH ROUTINE HEALING, SUBSEQUENT ENCOUNTER: Primary | ICD-10-CM

## 2023-12-05 PROCEDURE — 1123F ACP DISCUSS/DSCN MKR DOCD: CPT | Performed by: STUDENT IN AN ORGANIZED HEALTH CARE EDUCATION/TRAINING PROGRAM

## 2023-12-05 PROCEDURE — 99212 OFFICE O/P EST SF 10 MIN: CPT

## 2023-12-05 PROCEDURE — 72070 X-RAY EXAM THORAC SPINE 2VWS: CPT

## 2023-12-05 PROCEDURE — 99213 OFFICE O/P EST LOW 20 MIN: CPT | Performed by: STUDENT IN AN ORGANIZED HEALTH CARE EDUCATION/TRAINING PROGRAM

## 2023-12-05 NOTE — PROGRESS NOTES
LSW initiated first phone call to patient on 12.5.23 at 9:15am regarding referral to Social Work Department. LSW was unable to make contact with pt but was able to leave a voicemail for pt with reason for call and contact information. LSW will continue to attempt contact with pt.

## 2023-12-05 NOTE — PROGRESS NOTES
Hospital Follow-up     This is a 79year old male who presents to the office for a 3 month  follow-up s/p T11 compression fracture      Subjective: Patient states he is doing well. He denies any back pain. No pain down the legs. No numbness or weakness. No other complaints. Brace complaint. XR reviewed with patient. Physical Exam:              WDWN, no apparent distress              Non-labored breathing               Vitals Stable              Alert and oriented x3              CN 3-12 intact              PERRL              EOMI              COX well              Motor strength symmetric              Sensation to LT intact bilaterally               Imagin2023 XR Thoracic Spine  T11 fracture hard seen, no acute processes seen-final read pending. Assessment: This is a 76 y.o.  male presenting for a 3 month follow-up s/p T11 compression fracture. Plan:  -Pain control and expectations discussed  -Can discontinue brace and restrictions  -OARRS report reviewed   -Follow-up in neurosurgery clinic prn  -Call or return to neurosurgery office sooner if symptoms worsen or if new issues arise in the interim.     Electronically signed by Kenny Mccabe PA-C on 2023 at 1:24 PM

## 2023-12-11 ENCOUNTER — OFFICE VISIT (OUTPATIENT)
Dept: FAMILY MEDICINE CLINIC | Age: 68
End: 2023-12-11
Payer: MEDICARE

## 2023-12-11 ENCOUNTER — SOCIAL WORK (OUTPATIENT)
Dept: FAMILY MEDICINE CLINIC | Age: 68
End: 2023-12-11

## 2023-12-11 VITALS
OXYGEN SATURATION: 93 % | BODY MASS INDEX: 38.73 KG/M2 | WEIGHT: 241 LBS | RESPIRATION RATE: 18 BRPM | DIASTOLIC BLOOD PRESSURE: 80 MMHG | HEIGHT: 66 IN | HEART RATE: 93 BPM | SYSTOLIC BLOOD PRESSURE: 124 MMHG

## 2023-12-11 DIAGNOSIS — Z79.4 TYPE 2 DIABETES MELLITUS WITH HYPERGLYCEMIA, WITH LONG-TERM CURRENT USE OF INSULIN (HCC): ICD-10-CM

## 2023-12-11 DIAGNOSIS — F51.01 PRIMARY INSOMNIA: ICD-10-CM

## 2023-12-11 DIAGNOSIS — I50.9 CHRONIC CONGESTIVE HEART FAILURE, UNSPECIFIED HEART FAILURE TYPE (HCC): ICD-10-CM

## 2023-12-11 DIAGNOSIS — M62.838 MUSCLE SPASMS OF BOTH LOWER EXTREMITIES: ICD-10-CM

## 2023-12-11 DIAGNOSIS — E03.9 ACQUIRED HYPOTHYROIDISM: ICD-10-CM

## 2023-12-11 DIAGNOSIS — I48.0 PAF (PAROXYSMAL ATRIAL FIBRILLATION) (HCC): Primary | ICD-10-CM

## 2023-12-11 DIAGNOSIS — R53.82 CHRONIC FATIGUE: ICD-10-CM

## 2023-12-11 DIAGNOSIS — J06.9 VIRAL URI: ICD-10-CM

## 2023-12-11 DIAGNOSIS — J42 CHRONIC BRONCHITIS, UNSPECIFIED CHRONIC BRONCHITIS TYPE (HCC): ICD-10-CM

## 2023-12-11 DIAGNOSIS — E11.65 TYPE 2 DIABETES MELLITUS WITH HYPERGLYCEMIA, WITH LONG-TERM CURRENT USE OF INSULIN (HCC): ICD-10-CM

## 2023-12-11 DIAGNOSIS — E78.2 MIXED HYPERLIPIDEMIA: ICD-10-CM

## 2023-12-11 DIAGNOSIS — H26.9 CATARACT OF BOTH EYES, UNSPECIFIED CATARACT TYPE: ICD-10-CM

## 2023-12-11 LAB
INTERNATIONAL NORMALIZATION RATIO, POC: 1.3
PROTHROMBIN TIME, POC: 15.7

## 2023-12-11 PROCEDURE — 3044F HG A1C LEVEL LT 7.0%: CPT | Performed by: FAMILY MEDICINE

## 2023-12-11 PROCEDURE — 1123F ACP DISCUSS/DSCN MKR DOCD: CPT | Performed by: FAMILY MEDICINE

## 2023-12-11 PROCEDURE — 85610 PROTHROMBIN TIME: CPT | Performed by: FAMILY MEDICINE

## 2023-12-11 PROCEDURE — 3078F DIAST BP <80 MM HG: CPT | Performed by: FAMILY MEDICINE

## 2023-12-11 PROCEDURE — 3074F SYST BP LT 130 MM HG: CPT | Performed by: FAMILY MEDICINE

## 2023-12-11 PROCEDURE — 99214 OFFICE O/P EST MOD 30 MIN: CPT | Performed by: FAMILY MEDICINE

## 2023-12-11 RX ORDER — BACLOFEN 10 MG/1
10 TABLET ORAL 2 TIMES DAILY PRN
Qty: 60 TABLET | Refills: 2 | Status: SHIPPED | OUTPATIENT
Start: 2023-12-11

## 2023-12-11 RX ORDER — WARFARIN SODIUM 7.5 MG/1
7.5 TABLET ORAL DAILY
Qty: 30 TABLET | Refills: 4 | Status: SHIPPED | OUTPATIENT
Start: 2023-12-11

## 2023-12-11 RX ORDER — BROMPHENIRAMINE MALEATE, PSEUDOEPHEDRINE HYDROCHLORIDE, AND DEXTROMETHORPHAN HYDROBROMIDE 2; 30; 10 MG/5ML; MG/5ML; MG/5ML
5 SYRUP ORAL 4 TIMES DAILY PRN
Qty: 118 ML | Refills: 0 | Status: SHIPPED | OUTPATIENT
Start: 2023-12-11

## 2023-12-11 NOTE — PROGRESS NOTES
and family history and have updated as needed in the electronic medical record. Objective:     Physical Exam  Vitals and nursing note reviewed. Constitutional:       General: He is not in acute distress. Appearance: He is well-developed. He is not diaphoretic. HENT:      Head: Normocephalic and atraumatic. Right Ear: External ear normal.      Left Ear: External ear normal.      Nose: Nose normal.      Mouth/Throat:      Pharynx: No oropharyngeal exudate. Eyes:      General: No scleral icterus. Right eye: No discharge. Left eye: No discharge. Conjunctiva/sclera: Conjunctivae normal.      Pupils: Pupils are equal, round, and reactive to light. Neck:      Thyroid: No thyromegaly. Vascular: No JVD. Trachea: No tracheal deviation. Cardiovascular:      Rate and Rhythm: Normal rate and regular rhythm. Heart sounds: Normal heart sounds. No murmur heard. No friction rub. No gallop. Pulmonary:      Effort: Pulmonary effort is normal. No respiratory distress. Breath sounds: Normal breath sounds. No stridor. No wheezing or rales. Chest:      Chest wall: No tenderness. Abdominal:      General: Bowel sounds are normal. There is no distension. Palpations: Abdomen is soft. There is no mass. Tenderness: There is no abdominal tenderness. There is no guarding or rebound. Genitourinary:     Comments: Deferred by patient   Musculoskeletal:         General: No tenderness. Normal range of motion. Cervical back: Normal range of motion and neck supple. Lymphadenopathy:      Cervical: No cervical adenopathy. Skin:     General: Skin is warm and dry. Coloration: Skin is not pale. Findings: No erythema or rash. Neurological:      Mental Status: He is alert and oriented to person, place, and time. Cranial Nerves: No cranial nerve deficit. Motor: No abnormal muscle tone.       Coordination: Coordination normal.      Deep Tendon

## 2023-12-11 NOTE — PROGRESS NOTES
LSW initiated phone call to patient regarding referral to Social Work Department. LSW was able to make contact with pt, but pt was on his way to the doctors. Pt stated he would call LSW back when he returned home. LSW provided pt with contact information to return LSW phone call.

## 2023-12-13 ENCOUNTER — TELEPHONE (OUTPATIENT)
Dept: FAMILY MEDICINE CLINIC | Age: 68
End: 2023-12-13

## 2023-12-13 NOTE — TELEPHONE ENCOUNTER
LSW initiated phone call to patient regarding referral to Social Work Department. Pt stated he forgot to call LSW back and that he has someone coming on 1.9.24 to provide assistance and denied needing additional assistance from LSW currently. LSW provided pt with contact information if additional assistance was needed after 1.9.24 meeting.

## 2024-01-03 ENCOUNTER — TELEPHONE (OUTPATIENT)
Dept: FAMILY MEDICINE CLINIC | Age: 69
End: 2024-01-03

## 2024-01-03 NOTE — TELEPHONE ENCOUNTER
Patient called asking to go back on the eliquis.  Pt stated he is out of the donut hole, so his insurance will now pay for it and he  would rather have the eliquis instead of being on the coumadin.  Please advise.  Last seen 12/11/2023  Next appt 3/12/2024  BESSIE/Tadeo

## 2024-01-04 NOTE — TELEPHONE ENCOUNTER
Per Dr Machuca   Very good. Will restart     Pt informed Rx sent to the pharmacy.  Pt also was informed to contact his insurance company for a DME company we  can send the Rx for his free style keith to.  Pt verbalized understanding.

## 2024-01-05 ENCOUNTER — TELEPHONE (OUTPATIENT)
Dept: FAMILY MEDICINE CLINIC | Age: 69
End: 2024-01-05

## 2024-01-05 NOTE — TELEPHONE ENCOUNTER
Patient's insurance company called stating the Freestyle Yas needs a PA. And that can be done through [employer].  Last seen 12/11/2023  Next appt 3/12/2024

## 2024-01-09 NOTE — TELEPHONE ENCOUNTER
After reviewing the chart the Rx for pt Freestyle Yas 2  was ordered on 10/9/2023.  Could you look at that please.or is it to old?

## 2024-01-12 ENCOUNTER — TELEPHONE (OUTPATIENT)
Dept: FAMILY MEDICINE CLINIC | Age: 69
End: 2024-01-12

## 2024-01-12 NOTE — TELEPHONE ENCOUNTER
Patient called stating Lindsay has not rec'd order to  the [facility] [facility] and he continues to get charged for it.  I informed patient the the order was written by Dr. Machuca on 10/10/23 and faxed to [facility] 10/11/23.  I informed him that I will refax.    I called [facility], spoke w/Sumaya, who informed me that she does not have record of this and provided me with the fax number, [phone].  I refaxed order along w/holden.

## 2024-01-15 ENCOUNTER — TELEPHONE (OUTPATIENT)
Dept: CARDIOLOGY CLINIC | Age: 69
End: 2024-01-15

## 2024-01-15 NOTE — TELEPHONE ENCOUNTER
Patient need cardiac clearance for Cataract extraction and Implantation. Date of procedure unknown. Last in office 12/1//2023.    Please advise   [phone]

## 2024-01-16 NOTE — TELEPHONE ENCOUNTER
Patient is at acceptable risk for perioperative cardiovascular event for the planned procedure (cataract extraction and implantation), patient may proceed without any further cardiac testing.  May hold Eliquis 2 to 3 days prior to the procedure, to be resumed postprocedure when deemed safe from ophthalmology standpoint  Please feel free to call for any further information

## 2024-01-22 ENCOUNTER — TELEPHONE (OUTPATIENT)
Dept: FAMILY MEDICINE CLINIC | Age: 69
End: 2024-01-22

## 2024-01-22 RX ORDER — AZITHROMYCIN 250 MG/1
250 TABLET, FILM COATED ORAL SEE ADMIN INSTRUCTIONS
Qty: 6 TABLET | Refills: 0 | Status: SHIPPED | OUTPATIENT
Start: 2024-01-22 | End: 2024-01-27

## 2024-01-22 RX ORDER — IPRATROPIUM BROMIDE 42 UG/1
2 SPRAY, METERED NASAL 4 TIMES DAILY
Qty: 15 ML | Refills: 3 | Status: SHIPPED | OUTPATIENT
Start: 2024-01-22

## 2024-01-22 NOTE — TELEPHONE ENCOUNTER
Patient called C/O runny nose and sinus drainage for about 3 days.  Pt stated he did not try anything OTC because it does not work. He is asking for Rx please advise.  Last seen 12/11/2023  Next appt 3/12/2024  BESSIE/Tadeo

## 2024-01-23 ENCOUNTER — TELEPHONE (OUTPATIENT)
Dept: FAMILY MEDICINE CLINIC | Age: 69
End: 2024-01-23

## 2024-01-23 NOTE — TELEPHONE ENCOUNTER
Darryl/[facility] pharmacist called to inform that there is a major arrhythmia risk between Azithromycin 250 mg and Amiodarone.  Please advise.

## 2024-01-24 ENCOUNTER — TELEPHONE (OUTPATIENT)
Dept: FAMILY MEDICINE CLINIC | Age: 69
End: 2024-01-24

## 2024-01-24 RX ORDER — DOXYCYCLINE HYCLATE 100 MG
100 TABLET ORAL 2 TIMES DAILY
Qty: 10 TABLET | Refills: 0 | Status: SHIPPED | OUTPATIENT
Start: 2024-01-24 | End: 2024-01-29

## 2024-01-24 NOTE — TELEPHONE ENCOUNTER
Patient called stating he was just scheduled for Rt eye Cataract SX w/Dr. Schuster on 2/5/24 and is needing Pre Op Clearance.  Please advise if patient needs to be seen or if he can be cleared w/o an appt.     Last seen 12/11/2023  Next appt 3/12/2024

## 2024-01-25 NOTE — TELEPHONE ENCOUNTER
Per Dr. Machuca -    I was aware of surgery and is cleared for it. We need to have them send a new clearance form. He is completely cleared . I want surgery done asap. We have been waiting for it       I called patient and informed him, as noted by Dr. Machuca.  I informed patient that I will call Dr. Schuster's ofc and ask to have a new Pre Op Clearance form faxed.  Patient asked if our ofc will contact him once Dr. Machuca completes the form and it's faxed back.    I called Dr. Schuster's ofc, spoke w/William who stated she will fax the form to our ofc at [phone].

## 2024-01-29 ENCOUNTER — TELEPHONE (OUTPATIENT)
Dept: OTHER | Age: 69
End: 2024-01-29

## 2024-01-29 DIAGNOSIS — I50.41 ACUTE COMBINED SYSTOLIC (CONGESTIVE) AND DIASTOLIC (CONGESTIVE) HEART FAILURE (HCC): Primary | ICD-10-CM

## 2024-01-29 NOTE — TELEPHONE ENCOUNTER
Murtaza Martinez 1955     Patient interested in re-establishing into the CHF clinic. Placed CHW referral and sent an inSnapd Appet message to Vanessa Chaney.      Future Appointments   Date Time Provider Department Center   2/27/2024 12:45 PM [facility] CHF ROOM 1 [facility]   3/12/2024  1:30 PM Morales Machuca DO [facility]   5/21/2024  1:00 PM [facility]

## 2024-01-30 ENCOUNTER — TELEPHONE (OUTPATIENT)
Dept: FAMILY MEDICINE CLINIC | Age: 69
End: 2024-01-30

## 2024-01-30 NOTE — TELEPHONE ENCOUNTER
Called to see if you wanted him to take an alternate medication when Jardiance is on hold for 3 days prior surgery; call back [phone]

## 2024-01-31 ENCOUNTER — TELEPHONE (OUTPATIENT)
Dept: OTHER | Age: 69
End: 2024-01-31

## 2024-01-31 DIAGNOSIS — Z13.9 ENCOUNTER FOR SCREENING INVOLVING SOCIAL DETERMINANTS OF HEALTH (SDOH): ICD-10-CM

## 2024-01-31 DIAGNOSIS — Z59.82 LACK OF ACCESS TO TRANSPORTATION: Primary | ICD-10-CM

## 2024-01-31 SDOH — ECONOMIC STABILITY - TRANSPORTATION SECURITY: TRANSPORTATION INSECURITY: Z59.82

## 2024-01-31 NOTE — TELEPHONE ENCOUNTER
CHF CHW Initial Call  1/31/2024  1:01 PM    CHW received referral from Luna Edwards RN.  Patient need: transportation   Notes: CHW called patient to assist with transportation. Pt does not have transportation benefits with insurance. I sent Antonio a referral to pt upcoming appointment on 2/27/24 @12:45Pm. Antonio will email me if he can tranport pt that day.     Patient in agreement with plan and further assistance.  [x] Agreed    [] Declined      CHW informed patient of the services and resources that can be provided to them. CHW instructed patient to call CHF CHW at [phone] for any future assistance.     Electronically signed by Vanessa Fernandez on 1/31/2024 at 1:01 PM

## 2024-02-02 NOTE — TELEPHONE ENCOUNTER
CHW emailed Antonio to arrange transportation for patient's CHF clinic appointment on 2/27/24 at 12:45 PM. Antonio confirmed ride and patient will be picked up at [address]  [address]  [place] [address] at 11:45AM.     Vanessa PARADA [facility] Heart failure Clinics  Office Phone [phone] for any future needs.              Offered and provided

## 2024-02-13 ENCOUNTER — TELEPHONE (OUTPATIENT)
Dept: FAMILY MEDICINE CLINIC | Age: 69
End: 2024-02-13

## 2024-02-13 NOTE — TELEPHONE ENCOUNTER
Kelly called in to request Dr. Machuca or staff contact pt regarding severe neuropathy pain. Pt rating it 9/10 to , reports he is not taking any medication for this. Pt unable to come into office due to transportation.     Spoke with pt, he is able to come into office but needs 2 days notice. Appt scheduled for Thursday but pt is hoping to have something sent to help with increased pain in the meantime.     Last Appointment:  12/11/2023  Future Appointments   Date Time Provider Department Center   2/15/2024  9:15 AM Morales Machuca DO [facility]   2/27/2024 12:45 PM [facility] ROOM 1 [facility]   3/12/2024  1:30 PM Morales Machuca DO [facility]   5/21/2024  1:00 PM Vitor Daniels MD [facility]

## 2024-02-14 DIAGNOSIS — E11.42 DIABETIC PERIPHERAL NEUROPATHY (HCC): Primary | ICD-10-CM

## 2024-02-15 NOTE — TELEPHONE ENCOUNTER
Per Dr. Machuca      What will be a more beneficial use of his time will be to set up with treatment for neuropathy . It will be [facility] . They will give him three day notice. Its for Qutenza treatment. One of the only things that work.       Relayed to pt who wishes to keep appt made for today to discuss further with Dr. Machuca

## 2024-02-19 ENCOUNTER — TELEPHONE (OUTPATIENT)
Dept: FAMILY MEDICINE CLINIC | Age: 69
End: 2024-02-19

## 2024-02-19 NOTE — TELEPHONE ENCOUNTER
Per Dr. Machuca -    Its not a neurologist. It is for a specific treatment for neuropathy. Unfortunately its the only one who does it. If can't go let me know and I will take a different direction     I called patient back and informed him, as noted above by Dr. Machuca.  Patient verbalized understanding and was agreeable.  I noted referral was placed and faxed.

## 2024-02-19 NOTE — TELEPHONE ENCOUNTER
----- Message from Gladys Chrisrosaanny sent at 2/19/2024 12:34 PM EST -----  Subject: Referral Request    Reason for referral request? Neuopathy  Provider patient wants to be referred to(if known):     Provider Phone Number(if known):    Additional Information for Provider? Murtaza would like to get a referral   for a Neurologist that is closer to him, he is not able to go to [place]   it is too far for him. He can be reached at [phone] ok to leave a   message  ---------------------------------------------------------------------------  --------------  CALL BACK INFO    [phone]; OK to leave message on voicemail  ---------------------------------------------------------------------------  --------------

## 2024-02-22 DIAGNOSIS — K21.9 GASTROESOPHAGEAL REFLUX DISEASE WITHOUT ESOPHAGITIS: ICD-10-CM

## 2024-02-22 RX ORDER — PANTOPRAZOLE SODIUM 40 MG/1
40 TABLET, DELAYED RELEASE ORAL DAILY
Qty: 90 TABLET | Refills: 5 | Status: SHIPPED | OUTPATIENT
Start: 2024-02-22

## 2024-02-22 NOTE — TELEPHONE ENCOUNTER
PT called in for refill    Last Appointment:  12/11/2023  Future Appointments   Date Time Provider Department Center   2/27/2024 12:45 PM [facility] CHF ROOM 1 [facility] CHF [place]   3/12/2024  1:30 PM Morales Machuca DO [facility]   5/21/2024  1:00 PM Vitor Daniels MD [facility]

## 2024-02-27 ENCOUNTER — HOSPITAL ENCOUNTER (OUTPATIENT)
Dept: OTHER | Age: 69
Setting detail: THERAPIES SERIES
Discharge: HOME | End: 2024-02-27
Payer: MEDICARE

## 2024-02-27 VITALS
RESPIRATION RATE: 16 BRPM | OXYGEN SATURATION: 93 % | HEART RATE: 85 BPM | WEIGHT: 249 LBS | DIASTOLIC BLOOD PRESSURE: 66 MMHG | SYSTOLIC BLOOD PRESSURE: 100 MMHG | BODY MASS INDEX: 40.19 KG/M2

## 2024-02-27 LAB
ANION GAP SERPL CALCULATED.3IONS-SCNC: 11 MMOL/L (ref 7–16)
BNP SERPL-MCNC: 3027 PG/ML (ref 0–450)
BUN SERPL-MCNC: 27 MG/DL (ref 6–23)
CALCIUM SERPL-MCNC: 8.9 MG/DL (ref 8.6–10.2)
CHLORIDE SERPL-SCNC: 102 MMOL/L (ref 98–107)
CO2 SERPL-SCNC: 23 MMOL/L (ref 22–29)
CREAT SERPL-MCNC: 1.2 MG/DL (ref 0.7–1.2)
GFR SERPL CREATININE-BSD FRML MDRD: >60 ML/MIN/1.73M2
GLUCOSE SERPL-MCNC: 71 MG/DL (ref 74–99)
POTASSIUM SERPL-SCNC: 3.7 MMOL/L (ref 3.5–5)
SODIUM SERPL-SCNC: 136 MMOL/L (ref 132–146)

## 2024-02-27 PROCEDURE — 99214 OFFICE O/P EST MOD 30 MIN: CPT

## 2024-02-27 PROCEDURE — 96374 THER/PROPH/DIAG INJ IV PUSH: CPT

## 2024-02-27 PROCEDURE — 80048 BASIC METABOLIC PNL TOTAL CA: CPT

## 2024-02-27 PROCEDURE — 6360000002 HC RX W HCPCS: Performed by: INTERNAL MEDICINE

## 2024-02-27 PROCEDURE — 2580000003 HC RX 258: Performed by: INTERNAL MEDICINE

## 2024-02-27 PROCEDURE — 83880 ASSAY OF NATRIURETIC PEPTIDE: CPT

## 2024-02-27 PROCEDURE — 36415 COLL VENOUS BLD VENIPUNCTURE: CPT

## 2024-02-27 RX ORDER — SODIUM CHLORIDE 0.9 % (FLUSH) 0.9 %
5-40 SYRINGE (ML) INJECTION 2 TIMES DAILY
Status: DISCONTINUED | OUTPATIENT
Start: 2024-02-27 | End: 2024-02-28 | Stop reason: HOSPADM

## 2024-02-27 RX ORDER — BUMETANIDE 0.25 MG/ML
2 INJECTION INTRAMUSCULAR; INTRAVENOUS ONCE
Status: COMPLETED | OUTPATIENT
Start: 2024-02-27 | End: 2024-02-27

## 2024-02-27 RX ORDER — OFLOXACIN 3 MG/ML
3 SOLUTION/ DROPS OPHTHALMIC 3 TIMES DAILY
COMMUNITY

## 2024-02-27 RX ADMIN — BUMETANIDE 2 MG: 0.25 INJECTION INTRAMUSCULAR; INTRAVENOUS at 13:43

## 2024-02-27 RX ADMIN — SODIUM CHLORIDE, PRESERVATIVE FREE 10 ML: 5 INJECTION INTRAVENOUS at 13:45

## 2024-02-27 NOTE — DISCHARGE INSTRUCTIONS
Hold Jardiance until seen by CHF Clinic on Monday 3/4/24 and increase Bumex to 2 mg oral daily until 3/4/24

## 2024-02-27 NOTE — PLAN OF CARE
Problem: Chronic Conditions and Co-morbidities  Goal: Patient's chronic conditions and co-morbidity symptoms are monitored and maintained or improved  2/27/2024 1325 by Harleen Camara RN  Outcome: Progressing  2/27/2024 1313 by Harleen Camara RN  Outcome: Progressing     Problem: Discharge Planning  Goal: Discharge to home or other facility with appropriate resources  2/27/2024 1325 by Harleen Camara RN  Outcome: Progressing  2/27/2024 1313 by Harleen Camara RN  Outcome: Progressing

## 2024-02-27 NOTE — PROGRESS NOTES
(H)   11/29/2023 16   08/23/2023 15     Creatinine (mg/dL)   Date Value   02/27/2024 1.2   11/29/2023 1.0   08/23/2023 0.9     Glucose (mg/dL)   Date Value   02/27/2024 71 (L)   11/29/2023 241 (H)   08/23/2023 156 (H)   10/27/2011 118 (H)   06/09/2011 96     Calcium (mg/dL)   Date Value   02/27/2024 8.9   11/29/2023 9.1   08/23/2023 8.9     BNP:  Pro-BNP (pg/mL)   Date Value   02/27/2024 3,027 (H)   07/12/2023 2,970 (H)   06/28/2023 1,601 (H)      CBC:  WBC (k/uL)   Date Value   11/29/2023 5.8     Hemoglobin (g/dL)   Date Value   11/29/2023 14.8     Hematocrit (%)   Date Value   11/29/2023 47.1     Platelets (k/uL)   Date Value   11/29/2023 142     Iron Studies:  Iron (mcg/dL)   Date Value   01/04/2023 89     TIBC (mcg/dL)   Date Value   01/04/2023 260     Hepatic:  AST (U/L)   Date Value   11/29/2023 16     ALT (U/L)   Date Value   11/29/2023 9     Total Bilirubin (mg/dL)   Date Value   11/29/2023 0.7     Alkaline Phosphatase (U/L)   Date Value   11/29/2023 109     INR:  INR (no units)   Date Value   09/26/2022 2.3     INR,(POC) (no units)   Date Value   12/11/2023 1.3         Wt Readings from Last 3 Encounters:   02/27/24 112.9 kg (249 lb)   02/15/24 114.8 kg (253 lb)   12/11/23 109.3 kg (241 lb)           ASSESSMENT/PLAN:    [] Euvolemic          [x] Hypervolemic, with increase from baseline:  [x] Shortness of breath/DE PAZ  [x] JVD  [] HJR  [] Abnormal lung assessment:   [] Orthopnea  [] PND  [] Decreased urinary response to oral diuretic   [] Scrotal swelling   [x] Lower extremity edema  [] Compression stockings provided  [] Decline in functional capacity (unable to perform activities they had previously been able to do)  [x] Weight gain 23 pounds since last seen in the CHF Clinic in July    [x] IV diuretics given IV BUMEX 2 mg  [] Provider notified of recurrent IV diuretic use    Additional Notes:     Patient was last seen in the CHF Clinic in July and is re-establishing with the CHF Clinic. Patient has a 23

## 2024-02-27 NOTE — FLOWSHEET NOTE
02/27/24 1301   Over the past 2 weeks, how often have you been bothered by any of the following problems?   Little interest or pleasure in doing things Not at all   Feeling down, depressed, or hopeless Not at all   Patient Health Questionnaire-2 Score 0

## 2024-03-04 ENCOUNTER — HOSPITAL ENCOUNTER (OUTPATIENT)
Dept: OTHER | Age: 69
Setting detail: THERAPIES SERIES
Discharge: HOME | End: 2024-03-04
Payer: MEDICARE

## 2024-03-04 VITALS
SYSTOLIC BLOOD PRESSURE: 106 MMHG | OXYGEN SATURATION: 98 % | RESPIRATION RATE: 17 BRPM | BODY MASS INDEX: 40.19 KG/M2 | DIASTOLIC BLOOD PRESSURE: 65 MMHG | HEART RATE: 72 BPM | WEIGHT: 249 LBS

## 2024-03-04 LAB
ANION GAP SERPL CALCULATED.3IONS-SCNC: 11 MMOL/L (ref 7–16)
BNP SERPL-MCNC: 2550 PG/ML (ref 0–450)
BUN SERPL-MCNC: 30 MG/DL (ref 6–23)
CALCIUM SERPL-MCNC: 9.2 MG/DL (ref 8.6–10.2)
CHLORIDE SERPL-SCNC: 101 MMOL/L (ref 98–107)
CO2 SERPL-SCNC: 26 MMOL/L (ref 22–29)
CREAT SERPL-MCNC: 1.2 MG/DL (ref 0.7–1.2)
GFR SERPL CREATININE-BSD FRML MDRD: >60 ML/MIN/1.73M2
GLUCOSE SERPL-MCNC: 166 MG/DL (ref 74–99)
POTASSIUM SERPL-SCNC: 4 MMOL/L (ref 3.5–5)
SODIUM SERPL-SCNC: 138 MMOL/L (ref 132–146)

## 2024-03-04 PROCEDURE — 36415 COLL VENOUS BLD VENIPUNCTURE: CPT

## 2024-03-04 PROCEDURE — 99214 OFFICE O/P EST MOD 30 MIN: CPT

## 2024-03-04 PROCEDURE — 96374 THER/PROPH/DIAG INJ IV PUSH: CPT

## 2024-03-04 PROCEDURE — 83880 ASSAY OF NATRIURETIC PEPTIDE: CPT

## 2024-03-04 PROCEDURE — 6360000002 HC RX W HCPCS: Performed by: INTERNAL MEDICINE

## 2024-03-04 PROCEDURE — 80048 BASIC METABOLIC PNL TOTAL CA: CPT

## 2024-03-04 PROCEDURE — 2580000003 HC RX 258: Performed by: INTERNAL MEDICINE

## 2024-03-04 RX ORDER — BUMETANIDE 0.25 MG/ML
2 INJECTION INTRAMUSCULAR; INTRAVENOUS ONCE
Status: DISCONTINUED | OUTPATIENT
Start: 2024-03-04 | End: 2024-03-04 | Stop reason: ALTCHOICE

## 2024-03-04 RX ORDER — SODIUM CHLORIDE 0.9 % (FLUSH) 0.9 %
10 SYRINGE (ML) INJECTION PRN
Status: DISCONTINUED | OUTPATIENT
Start: 2024-03-04 | End: 2024-03-05 | Stop reason: HOSPADM

## 2024-03-04 RX ADMIN — BUMETANIDE 2 MG: 0.25 INJECTION INTRAMUSCULAR; INTRAVENOUS at 07:57

## 2024-03-04 RX ADMIN — SODIUM CHLORIDE, PRESERVATIVE FREE 10 ML: 5 INJECTION INTRAVENOUS at 07:58

## 2024-03-04 NOTE — PROGRESS NOTES
Congestive Heart Failure Clinic   [facility]      Referring Provider:   Primary Care Physician: Morales Machuca DO   Cardiologist:   Nephrologist: [facility]      HISTORY OF PRESENT ILLNESS:     Murtaza Martinez is a 68 y.o. (1955) male with a history of HFpEF (EF> 50%)  Pre Cupid:     Lab Results   Component Value Date    LVEF 53 08/22/2023     Post Cupid:  No results found for: \"EFBP\"      He presents to the CHF clinic for ongoing evaluation and monitoring of heart failure.    In the CHF clinic today he denies any adverse symptoms except:  [] Dizziness or lightheadedness   [] Syncope or near syncope  [] Recent Fall  [] Shortness of breath at rest   [] Dyspnea with exertion  [] Decline in functional capacity (unable to perform activities they had previously been able to do)  [x] Fatigue   [] Orthopnea  [] PND  [] Nocturnal cough  [] Hemoptysis  [] Chest pain, pressure, or discomfort  [] Palpitations  [] Abdominal distention  [] Abdominal bloating  [] Early satiety  [] Blood in stool   [] Diarrhea  [] Constipation  [] Nausea/Vomiting  [] Decreased urinary response to oral diuretic   [] Scrotal swelling   [x] Lower extremity edema  [] Used PRN doses of oral diuretic   [] Weight gain     Wt Readings from Last 3 Encounters:   03/04/24 112.9 kg (249 lb)   02/27/24 112.9 kg (249 lb)   02/15/24 114.8 kg (253 lb)           SOCIAL HISTORY:  [x] Denies tobacco, alcohol or illicit drug abuse  [] Tobacco use:  [] ETOH use:  [] Illicit drug use:        MEDICATIONS:    Allergies   Allergen Reactions    Pcn [Penicillins]      Child went to hospital   ? Reaction  Patient tolerates cephalosporins    Sulfa Antibiotics      ?     Prior to Visit Medications    Medication Sig Taking? Authorizing Provider   ofloxacin (OCUFLOX) 0.3 % solution Place 3 drops into the left eye 3 times daily  Provider, MD Lesvia   prednisoLONE acetate (PRED MILD) 0.12 %

## 2024-03-12 ENCOUNTER — OFFICE VISIT (OUTPATIENT)
Dept: FAMILY MEDICINE CLINIC | Age: 69
End: 2024-03-12

## 2024-03-12 VITALS
RESPIRATION RATE: 18 BRPM | SYSTOLIC BLOOD PRESSURE: 118 MMHG | BODY MASS INDEX: 40.82 KG/M2 | HEART RATE: 88 BPM | OXYGEN SATURATION: 95 % | WEIGHT: 254 LBS | HEIGHT: 66 IN | DIASTOLIC BLOOD PRESSURE: 80 MMHG

## 2024-03-12 DIAGNOSIS — E78.2 MIXED HYPERLIPIDEMIA: ICD-10-CM

## 2024-03-12 DIAGNOSIS — E03.9 ACQUIRED HYPOTHYROIDISM: ICD-10-CM

## 2024-03-12 DIAGNOSIS — E11.42 DIABETIC PERIPHERAL NEUROPATHY (HCC): ICD-10-CM

## 2024-03-12 DIAGNOSIS — R53.82 CHRONIC FATIGUE: ICD-10-CM

## 2024-03-12 DIAGNOSIS — Z79.4 TYPE 2 DIABETES MELLITUS WITH HYPERGLYCEMIA, WITH LONG-TERM CURRENT USE OF INSULIN (HCC): Primary | ICD-10-CM

## 2024-03-12 DIAGNOSIS — I50.9 ACUTE ON CHRONIC CONGESTIVE HEART FAILURE, UNSPECIFIED HEART FAILURE TYPE (HCC): ICD-10-CM

## 2024-03-12 DIAGNOSIS — E11.65 TYPE 2 DIABETES MELLITUS WITH HYPERGLYCEMIA, WITH LONG-TERM CURRENT USE OF INSULIN (HCC): Primary | ICD-10-CM

## 2024-03-12 DIAGNOSIS — E11.42 TYPE 2 DIABETES MELLITUS WITH DIABETIC POLYNEUROPATHY, WITHOUT LONG-TERM CURRENT USE OF INSULIN (HCC): ICD-10-CM

## 2024-03-12 DIAGNOSIS — M62.838 MUSCLE SPASMS OF BOTH LOWER EXTREMITIES: ICD-10-CM

## 2024-03-12 DIAGNOSIS — E53.8 FOLIC ACID DEFICIENCY: ICD-10-CM

## 2024-03-12 LAB — HBA1C MFR BLD: 6.5 %

## 2024-03-12 RX ORDER — BACLOFEN 20 MG/1
20 TABLET ORAL 3 TIMES DAILY PRN
Qty: 90 TABLET | Refills: 3 | Status: SHIPPED | OUTPATIENT
Start: 2024-03-12

## 2024-03-12 RX ORDER — BACLOFEN 10 MG/1
10 TABLET ORAL 2 TIMES DAILY PRN
Qty: 60 TABLET | Refills: 2 | Status: CANCELLED | OUTPATIENT
Start: 2024-03-12

## 2024-03-12 RX ORDER — DULOXETIN HYDROCHLORIDE 30 MG/1
30 CAPSULE, DELAYED RELEASE ORAL DAILY
Qty: 90 CAPSULE | Refills: 4 | Status: SHIPPED | OUTPATIENT
Start: 2024-03-12

## 2024-03-15 ENCOUNTER — HOSPITAL ENCOUNTER (OUTPATIENT)
Dept: OTHER | Age: 69
Setting detail: THERAPIES SERIES
Discharge: HOME | End: 2024-03-15
Payer: MEDICARE

## 2024-03-15 ENCOUNTER — TELEPHONE (OUTPATIENT)
Dept: OTHER | Age: 69
End: 2024-03-15

## 2024-03-15 VITALS
RESPIRATION RATE: 18 BRPM | SYSTOLIC BLOOD PRESSURE: 107 MMHG | BODY MASS INDEX: 41.64 KG/M2 | WEIGHT: 258 LBS | HEART RATE: 86 BPM | DIASTOLIC BLOOD PRESSURE: 72 MMHG | OXYGEN SATURATION: 96 %

## 2024-03-15 DIAGNOSIS — E03.9 ACQUIRED HYPOTHYROIDISM: ICD-10-CM

## 2024-03-15 DIAGNOSIS — E11.42 TYPE 2 DIABETES MELLITUS WITH DIABETIC POLYNEUROPATHY, WITHOUT LONG-TERM CURRENT USE OF INSULIN (HCC): ICD-10-CM

## 2024-03-15 DIAGNOSIS — R53.82 CHRONIC FATIGUE: ICD-10-CM

## 2024-03-15 DIAGNOSIS — E78.2 MIXED HYPERLIPIDEMIA: ICD-10-CM

## 2024-03-15 DIAGNOSIS — E11.42 DIABETIC PERIPHERAL NEUROPATHY (HCC): ICD-10-CM

## 2024-03-15 DIAGNOSIS — E11.65 TYPE 2 DIABETES MELLITUS WITH HYPERGLYCEMIA, WITH LONG-TERM CURRENT USE OF INSULIN (HCC): ICD-10-CM

## 2024-03-15 DIAGNOSIS — E53.8 FOLIC ACID DEFICIENCY: ICD-10-CM

## 2024-03-15 DIAGNOSIS — Z79.4 TYPE 2 DIABETES MELLITUS WITH HYPERGLYCEMIA, WITH LONG-TERM CURRENT USE OF INSULIN (HCC): ICD-10-CM

## 2024-03-15 LAB
ALBUMIN SERPL-MCNC: 3.6 G/DL (ref 3.5–5.2)
ALP SERPL-CCNC: 121 U/L (ref 40–129)
ALT SERPL-CCNC: 9 U/L (ref 0–40)
ANION GAP SERPL CALCULATED.3IONS-SCNC: 9 MMOL/L (ref 7–16)
AST SERPL-CCNC: 14 U/L (ref 0–39)
BASOPHILS # BLD: 0.03 K/UL (ref 0–0.2)
BASOPHILS NFR BLD: 1 % (ref 0–2)
BILIRUB SERPL-MCNC: 1.2 MG/DL (ref 0–1.2)
BNP SERPL-MCNC: 2127 PG/ML (ref 0–450)
BUN SERPL-MCNC: 25 MG/DL (ref 6–23)
CALCIUM SERPL-MCNC: 8.8 MG/DL (ref 8.6–10.2)
CHLORIDE SERPL-SCNC: 100 MMOL/L (ref 98–107)
CHOLEST SERPL-MCNC: 155 MG/DL
CO2 SERPL-SCNC: 26 MMOL/L (ref 22–29)
CREAT SERPL-MCNC: 1.1 MG/DL (ref 0.7–1.2)
EOSINOPHIL # BLD: 0.09 K/UL (ref 0.05–0.5)
EOSINOPHILS RELATIVE PERCENT: 2 % (ref 0–6)
ERYTHROCYTE [DISTWIDTH] IN BLOOD BY AUTOMATED COUNT: 15.3 % (ref 11.5–15)
FOLATE SERPL-MCNC: 12.5 NG/ML (ref 4.8–24.2)
GFR SERPL CREATININE-BSD FRML MDRD: >60 ML/MIN/1.73M2
GLUCOSE SERPL-MCNC: 167 MG/DL (ref 74–99)
HBA1C MFR BLD: 6.6 % (ref 4–5.6)
HCT VFR BLD AUTO: 40.1 % (ref 37–54)
HDLC SERPL-MCNC: 41 MG/DL
HGB BLD-MCNC: 13.4 G/DL (ref 12.5–16.5)
IMM GRANULOCYTES # BLD AUTO: 0.03 K/UL (ref 0–0.58)
IMM GRANULOCYTES NFR BLD: 1 % (ref 0–5)
LDLC SERPL CALC-MCNC: 102 MG/DL
LYMPHOCYTES NFR BLD: 1.02 K/UL (ref 1.5–4)
LYMPHOCYTES RELATIVE PERCENT: 18 % (ref 20–42)
MCH RBC QN AUTO: 32.1 PG (ref 26–35)
MCHC RBC AUTO-ENTMCNC: 33.4 G/DL (ref 32–34.5)
MCV RBC AUTO: 95.9 FL (ref 80–99.9)
MONOCYTES NFR BLD: 0.64 K/UL (ref 0.1–0.95)
MONOCYTES NFR BLD: 11 % (ref 2–12)
NEUTROPHILS NFR BLD: 68 % (ref 43–80)
NEUTS SEG NFR BLD: 3.8 K/UL (ref 1.8–7.3)
PLATELET, FLUORESCENCE: 126 K/UL (ref 130–450)
PMV BLD AUTO: 10.4 FL (ref 7–12)
POTASSIUM SERPL-SCNC: 3.7 MMOL/L (ref 3.5–5)
PROT SERPL-MCNC: 6.9 G/DL (ref 6.4–8.3)
RBC # BLD AUTO: 4.18 M/UL (ref 3.8–5.8)
SODIUM SERPL-SCNC: 135 MMOL/L (ref 132–146)
T4 FREE SERPL-MCNC: 1.9 NG/DL (ref 0.9–1.7)
TRIGL SERPL-MCNC: 62 MG/DL
TSH SERPL DL<=0.05 MIU/L-ACNC: 1.27 UIU/ML (ref 0.27–4.2)
VIT B12 SERPL-MCNC: 473 PG/ML (ref 211–946)
VLDLC SERPL CALC-MCNC: 12 MG/DL
WBC OTHER # BLD: 5.6 K/UL (ref 4.5–11.5)

## 2024-03-15 PROCEDURE — 83036 HEMOGLOBIN GLYCOSYLATED A1C: CPT

## 2024-03-15 PROCEDURE — 99214 OFFICE O/P EST MOD 30 MIN: CPT

## 2024-03-15 PROCEDURE — 36415 COLL VENOUS BLD VENIPUNCTURE: CPT

## 2024-03-15 PROCEDURE — 82746 ASSAY OF FOLIC ACID SERUM: CPT

## 2024-03-15 PROCEDURE — 83880 ASSAY OF NATRIURETIC PEPTIDE: CPT

## 2024-03-15 PROCEDURE — 82607 VITAMIN B-12: CPT

## 2024-03-15 PROCEDURE — 84481 FREE ASSAY (FT-3): CPT

## 2024-03-15 PROCEDURE — 84439 ASSAY OF FREE THYROXINE: CPT

## 2024-03-15 PROCEDURE — 84443 ASSAY THYROID STIM HORMONE: CPT

## 2024-03-15 PROCEDURE — 96374 THER/PROPH/DIAG INJ IV PUSH: CPT

## 2024-03-15 PROCEDURE — 85025 COMPLETE CBC W/AUTO DIFF WBC: CPT

## 2024-03-15 PROCEDURE — 2580000003 HC RX 258: Performed by: INTERNAL MEDICINE

## 2024-03-15 PROCEDURE — 80061 LIPID PANEL: CPT

## 2024-03-15 PROCEDURE — 80053 COMPREHEN METABOLIC PANEL: CPT

## 2024-03-15 PROCEDURE — 6360000002 HC RX W HCPCS: Performed by: INTERNAL MEDICINE

## 2024-03-15 RX ORDER — BUMETANIDE 0.25 MG/ML
2 INJECTION INTRAMUSCULAR; INTRAVENOUS ONCE
Status: DISCONTINUED | OUTPATIENT
Start: 2024-03-15 | End: 2024-03-15 | Stop reason: ALTCHOICE

## 2024-03-15 RX ORDER — SODIUM CHLORIDE 0.9 % (FLUSH) 0.9 %
5-40 SYRINGE (ML) INJECTION 2 TIMES DAILY
Status: DISCONTINUED | OUTPATIENT
Start: 2024-03-15 | End: 2024-03-15 | Stop reason: ALTCHOICE

## 2024-03-15 RX ADMIN — BUMETANIDE 2 MG: 0.25 INJECTION INTRAMUSCULAR; INTRAVENOUS at 09:26

## 2024-03-15 RX ADMIN — SODIUM CHLORIDE, PRESERVATIVE FREE 10 ML: 5 INJECTION INTRAVENOUS at 09:27

## 2024-03-15 NOTE — PROGRESS NOTES
5/21/2024  1:00 PM Vitor Daniels MD [facility]   7/17/2024 12:30 PM Morales Machuca DO [facility]               
done

## 2024-03-15 NOTE — PROGRESS NOTES
Emailed Antonio the transportation form for patient's appointment on 4/3/24 at 8:45 awaiting return email to confirm. Made patient aware of tentative appointment date and time.   DISPLAY PLAN FREE TEXT

## 2024-03-16 ASSESSMENT — ENCOUNTER SYMPTOMS
CONSTIPATION: 0
NAUSEA: 0
WHEEZING: 0
EYE PAIN: 0
ABDOMINAL PAIN: 0
STRIDOR: 0
VOMITING: 0
FACIAL SWELLING: 0
BACK PAIN: 1
ANAL BLEEDING: 0
APNEA: 0
BLOOD IN STOOL: 0
CHEST TIGHTNESS: 0
SINUS PRESSURE: 0
EYE REDNESS: 0
RHINORRHEA: 0
EYE DISCHARGE: 0
DIARRHEA: 0
EYE ITCHING: 0
SORE THROAT: 0
COLOR CHANGE: 0
PHOTOPHOBIA: 0
RECTAL PAIN: 0
TROUBLE SWALLOWING: 0
VOICE CHANGE: 0
SHORTNESS OF BREATH: 0
CHOKING: 0
COUGH: 0
ABDOMINAL DISTENTION: 0

## 2024-03-16 NOTE — PROGRESS NOTES
Murtaza Martinez is a 68 y.o. male  .  Subjective:      Doing better overall.  Jardiance was to be held until the ninth of this month.  This was held by CHF clinic which has been following regularly.  Patient has follow-up this week with cardiology.  At that point he can ask the doctor if they want to keep him off of it however it said till the ninth so may go ahead and restart it as of now.  Main issue is weight gain and this has happened post stopping the Jardiance so it may be in fact caused by that.  However we will check his thyroid again.  Could be some fluid weight due to recent exacerbation of CHF.  Second big complaint is burning peripheral neuropathy.  We discussed Qutenza treatment.  We set him up with an appointment for this prior but he did not understand exactly what it was and is agreeable now.  Will also increase his Cymbalta and most likely increase it up to 60 mg eventually.  Patient has been following up with ophthalmology and had recent cataract surgery.  Discussed repeating a low-dose CT of chest he wants to hold off on this for now.  Discussed vaccinations and recommendations.   patient has had transportation issues and has arranged for some transportation to CHF clinic hopefully we get him some transportation to go to pain management for Qutenza treatment.  Will follow regularly.  This was an extended visit at 40 minutes discussing case.  We will set him up with extra time for each visit since he takes a little longer typically and he is to bring in all of his medications at the next visit so we can go over them actual bottles.        Review of Systems   Constitutional:  Positive for fatigue and unexpected weight change. Negative for activity change, appetite change, chills, diaphoresis and fever.   HENT:  Negative for congestion, dental problem, drooling, ear discharge, ear pain, facial swelling, hearing loss, mouth sores, nosebleeds, postnasal drip, rhinorrhea, sinus pressure, sneezing, sore

## 2024-03-18 LAB — T3FREE SERPL-MCNC: 1.41 PG/ML (ref 2–4.4)

## 2024-03-18 NOTE — TELEPHONE ENCOUNTER
Emailed Antonio the transportation form for patient's appointment on 4/3/24 at 8:45 awaiting return email to confirm. Made patient aware of tentative appointment date and time.

## 2024-03-19 LAB — DIABETIC RETINOPATHY: POSITIVE

## 2024-03-25 ENCOUNTER — TELEPHONE (OUTPATIENT)
Dept: OTHER | Age: 69
End: 2024-03-25

## 2024-03-25 NOTE — TELEPHONE ENCOUNTER
Received email from Antonio asking to switch  patient's appointment from 4/3/24 at 0845 to 4/2/24 at 0845. Sent email to Antonio to confirm.

## 2024-03-28 ENCOUNTER — TELEPHONE (OUTPATIENT)
Dept: OTHER | Age: 69
End: 2024-03-28

## 2024-03-28 NOTE — TELEPHONE ENCOUNTER
Called Antonio Yoo to confirm transportation for patient. Emailed transportation form and made patient aware.

## 2024-04-02 ENCOUNTER — HOSPITAL ENCOUNTER (OUTPATIENT)
Dept: OTHER | Age: 69
Discharge: HOME | End: 2024-04-02

## 2024-04-04 ENCOUNTER — HOSPITAL ENCOUNTER (OUTPATIENT)
Dept: OTHER | Age: 69
Setting detail: THERAPIES SERIES
Discharge: HOME | End: 2024-04-04
Payer: MEDICARE

## 2024-04-04 ENCOUNTER — TELEPHONE (OUTPATIENT)
Dept: OTHER | Age: 69
End: 2024-04-04

## 2024-04-04 VITALS
SYSTOLIC BLOOD PRESSURE: 113 MMHG | WEIGHT: 240 LBS | DIASTOLIC BLOOD PRESSURE: 71 MMHG | HEART RATE: 83 BPM | OXYGEN SATURATION: 100 % | RESPIRATION RATE: 18 BRPM | BODY MASS INDEX: 38.74 KG/M2

## 2024-04-04 LAB
ANION GAP SERPL CALCULATED.3IONS-SCNC: 10 MMOL/L (ref 7–16)
BNP SERPL-MCNC: 2455 PG/ML (ref 0–450)
BUN SERPL-MCNC: 24 MG/DL (ref 6–23)
CALCIUM SERPL-MCNC: 9 MG/DL (ref 8.6–10.2)
CHLORIDE SERPL-SCNC: 100 MMOL/L (ref 98–107)
CO2 SERPL-SCNC: 28 MMOL/L (ref 22–29)
CREAT SERPL-MCNC: 1.2 MG/DL (ref 0.7–1.2)
GFR SERPL CREATININE-BSD FRML MDRD: 69 ML/MIN/1.73M2
GLUCOSE SERPL-MCNC: 178 MG/DL (ref 74–99)
POTASSIUM SERPL-SCNC: 3.7 MMOL/L (ref 3.5–5)
SODIUM SERPL-SCNC: 138 MMOL/L (ref 132–146)

## 2024-04-04 PROCEDURE — 80048 BASIC METABOLIC PNL TOTAL CA: CPT

## 2024-04-04 PROCEDURE — 96374 THER/PROPH/DIAG INJ IV PUSH: CPT

## 2024-04-04 PROCEDURE — 36415 COLL VENOUS BLD VENIPUNCTURE: CPT

## 2024-04-04 PROCEDURE — 6360000002 HC RX W HCPCS: Performed by: INTERNAL MEDICINE

## 2024-04-04 PROCEDURE — 2580000003 HC RX 258: Performed by: INTERNAL MEDICINE

## 2024-04-04 PROCEDURE — 99214 OFFICE O/P EST MOD 30 MIN: CPT

## 2024-04-04 PROCEDURE — 83880 ASSAY OF NATRIURETIC PEPTIDE: CPT

## 2024-04-04 RX ORDER — INSULIN LISPRO 100 [IU]/ML
INJECTION, SOLUTION INTRAVENOUS; SUBCUTANEOUS
COMMUNITY

## 2024-04-04 RX ORDER — BUMETANIDE 0.25 MG/ML
2 INJECTION INTRAMUSCULAR; INTRAVENOUS ONCE
Status: COMPLETED | OUTPATIENT
Start: 2024-04-04 | End: 2024-04-04

## 2024-04-04 RX ORDER — SODIUM CHLORIDE 0.9 % (FLUSH) 0.9 %
5-40 SYRINGE (ML) INJECTION 2 TIMES DAILY
Status: DISCONTINUED | OUTPATIENT
Start: 2024-04-04 | End: 2024-04-05 | Stop reason: HOSPADM

## 2024-04-04 RX ADMIN — BUMETANIDE 2 MG: 0.25 INJECTION INTRAMUSCULAR; INTRAVENOUS at 09:26

## 2024-04-04 RX ADMIN — SODIUM CHLORIDE, PRESERVATIVE FREE 10 ML: 5 INJECTION INTRAVENOUS at 09:27

## 2024-04-04 NOTE — PROGRESS NOTES
result faxed   [] Behavorial health consultant called        Scheduled to follow up in CHF clinic on:   Future Appointments   Date Time Provider Department Center   4/22/2024  8:15 AM [facility] CHF ROOM 1 [facility]   5/21/2024  1:00 PM [facility]   7/17/2024 12:30 PM Morales Machuca DO [facility]

## 2024-04-04 NOTE — TELEPHONE ENCOUNTER
[employer] Scheduling sheet emailed to Antonio Carr to transport patient to next appoinment on 4/22/24 at 0815 a.m. awaiting confirmation.

## 2024-04-08 ENCOUNTER — TELEPHONE (OUTPATIENT)
Dept: OTHER | Age: 69
End: 2024-04-08

## 2024-04-22 ENCOUNTER — HOSPITAL ENCOUNTER (OUTPATIENT)
Dept: OTHER | Age: 69
Setting detail: THERAPIES SERIES
Discharge: HOME | End: 2024-04-22
Payer: MEDICARE

## 2024-04-22 VITALS
BODY MASS INDEX: 37.94 KG/M2 | OXYGEN SATURATION: 97 % | RESPIRATION RATE: 18 BRPM | SYSTOLIC BLOOD PRESSURE: 119 MMHG | WEIGHT: 235.05 LBS | DIASTOLIC BLOOD PRESSURE: 83 MMHG | HEART RATE: 83 BPM

## 2024-04-22 LAB
ANION GAP SERPL CALCULATED.3IONS-SCNC: 11 MMOL/L (ref 7–16)
BNP SERPL-MCNC: 2193 PG/ML (ref 0–450)
BUN SERPL-MCNC: 19 MG/DL (ref 6–23)
CALCIUM SERPL-MCNC: 8.9 MG/DL (ref 8.6–10.2)
CHLORIDE SERPL-SCNC: 100 MMOL/L (ref 98–107)
CO2 SERPL-SCNC: 26 MMOL/L (ref 22–29)
CREAT SERPL-MCNC: 1.2 MG/DL (ref 0.7–1.2)
GFR SERPL CREATININE-BSD FRML MDRD: 66 ML/MIN/1.73M2
GLUCOSE SERPL-MCNC: 132 MG/DL (ref 74–99)
POTASSIUM SERPL-SCNC: 3.8 MMOL/L (ref 3.5–5)
SODIUM SERPL-SCNC: 137 MMOL/L (ref 132–146)

## 2024-04-22 PROCEDURE — 99214 OFFICE O/P EST MOD 30 MIN: CPT

## 2024-04-22 PROCEDURE — 80048 BASIC METABOLIC PNL TOTAL CA: CPT

## 2024-04-22 PROCEDURE — 83880 ASSAY OF NATRIURETIC PEPTIDE: CPT

## 2024-04-22 PROCEDURE — 36415 COLL VENOUS BLD VENIPUNCTURE: CPT

## 2024-04-22 ASSESSMENT — PATIENT HEALTH QUESTIONNAIRE - PHQ9
SUM OF ALL RESPONSES TO PHQ QUESTIONS 1-9: 0
1. LITTLE INTEREST OR PLEASURE IN DOING THINGS: NOT AT ALL
SUM OF ALL RESPONSES TO PHQ QUESTIONS 1-9: 0
2. FEELING DOWN, DEPRESSED OR HOPELESS: NOT AT ALL
SUM OF ALL RESPONSES TO PHQ9 QUESTIONS 1 & 2: 0
SUM OF ALL RESPONSES TO PHQ QUESTIONS 1-9: 0
SUM OF ALL RESPONSES TO PHQ QUESTIONS 1-9: 0

## 2024-04-22 NOTE — PROGRESS NOTES
Congestive Heart Failure Clinic   [facility]      Referring Provider:   Primary Care Physician: Morales Machuca DO   Cardiologist:   Nephrologist: [facility]      HISTORY OF PRESENT ILLNESS:     Murtaza Martinez is a 68 y.o. (1955) male with a history of HFpEF (EF> 50%)  Pre Cupid:     Lab Results   Component Value Date    LVEF 53 08/22/2023     Post Cupid:  No results found for: \"EFBP\"      He presents to the CHF clinic for ongoing evaluation and monitoring of heart failure.    In the CHF clinic today he denies any adverse symptoms except:  [] Dizziness or lightheadedness   [] Syncope or near syncope  [] Recent Fall  [] Shortness of breath at rest   [] Dyspnea with exertion  [] Decline in functional capacity (unable to perform activities they had previously been able to do)  [x] Fatigue   [] Orthopnea  [] PND  [] Nocturnal cough  [] Hemoptysis  [] Chest pain, pressure, or discomfort  [] Palpitations  [] Abdominal distention  [] Abdominal bloating  [] Early satiety  [] Blood in stool   [] Diarrhea  [] Constipation  [] Nausea/Vomiting  [] Decreased urinary response to oral diuretic   [] Scrotal swelling   [x] Lower extremity edema  [] Used PRN doses of oral diuretic   [] Weight gain     Wt Readings from Last 3 Encounters:   04/22/24 106.6 kg (235 lb 0.8 oz)   04/04/24 108.9 kg (240 lb)   03/15/24 117 kg (258 lb)     SOCIAL HISTORY:  [x] Denies tobacco, alcohol or illicit drug abuse  [] Tobacco use:  [] ETOH use:  [] Illicit drug use:        MEDICATIONS:    Allergies   Allergen Reactions    Pcn [Penicillins]      Child went to hospital   ? Reaction  Patient tolerates cephalosporins    Sulfa Antibiotics      ?     Prior to Visit Medications    Medication Sig Taking? Authorizing Provider   HUMALOG 100 UNIT/ML SOLN injection vial Inject into the skin 3 times daily (before meals) Patient uses sliding scale  3 units 140-199  6 units

## 2024-04-29 ENCOUNTER — TELEPHONE (OUTPATIENT)
Dept: FAMILY MEDICINE CLINIC | Age: 69
End: 2024-04-29

## 2024-04-29 NOTE — TELEPHONE ENCOUNTER
Pt called checking on his refill for his keith sensors pt stated the order needed to go to [facility] so I called them and the woman stated she got the fax but it did not state in the notes of how  much insulin the pt is using. And they need that in the notes just not on pt med list.  Pt stated he only has 2 days left on his sensor.  Please advise.  Last seen 3/12/2024  Next appt 7/17/2024

## 2024-05-01 NOTE — TELEPHONE ENCOUNTER
Per Dr. Machuca -  Will do another addendum to note and send.     Patient called the ofc to follow up.  I spoke w/Kayley ESPINOZA MA who informed me that she faxed addendum to [facility] first thing this morning.

## 2024-05-07 ENCOUNTER — TELEPHONE (OUTPATIENT)
Dept: OTHER | Age: 69
End: 2024-05-07

## 2024-05-07 NOTE — TELEPHONE ENCOUNTER
Murtaza Martinez 1955     Pt is scheduled with CHF clinic for tomorrow 5/8 however pt does not have transportation set up. Reschedule for next available for the CHF clinic and [facility] scheduled. Sent a paper request per Antonio-- Jennifer Patrick.       Future Appointments   Date Time Provider Department Center   5/8/2024  8:15 AM [facility] CHF ROOM 1 [facility] [place]   5/21/2024  1:00 PM Vitor Daniels MD [facility]   5/29/2024  9:00 AM [facility] CHF ROOM 1 [facility] [place]   7/17/2024 12:30 PM Morales Machuca DO [facility]      12:37 PM Called patient and left a VM with updates and provided a contact number to call back.

## 2024-05-08 ENCOUNTER — HOSPITAL ENCOUNTER (OUTPATIENT)
Dept: OTHER | Age: 69
Setting detail: THERAPIES SERIES
Discharge: HOME | End: 2024-05-08
Payer: MEDICARE

## 2024-05-08 VITALS
RESPIRATION RATE: 18 BRPM | OXYGEN SATURATION: 93 % | HEART RATE: 89 BPM | WEIGHT: 234 LBS | BODY MASS INDEX: 37.77 KG/M2

## 2024-05-08 LAB
ANION GAP SERPL CALCULATED.3IONS-SCNC: 13 MMOL/L (ref 7–16)
BNP SERPL-MCNC: 2353 PG/ML (ref 0–450)
BUN SERPL-MCNC: 24 MG/DL (ref 6–23)
CALCIUM SERPL-MCNC: 9 MG/DL (ref 8.6–10.2)
CHLORIDE SERPL-SCNC: 100 MMOL/L (ref 98–107)
CO2 SERPL-SCNC: 24 MMOL/L (ref 22–29)
CREAT SERPL-MCNC: 1.1 MG/DL (ref 0.7–1.2)
GFR, ESTIMATED: 73 ML/MIN/1.73M2
GLUCOSE SERPL-MCNC: 197 MG/DL (ref 74–99)
POTASSIUM SERPL-SCNC: 3.9 MMOL/L (ref 3.5–5)
SODIUM SERPL-SCNC: 137 MMOL/L (ref 132–146)

## 2024-05-08 PROCEDURE — 80048 BASIC METABOLIC PNL TOTAL CA: CPT

## 2024-05-08 PROCEDURE — 83880 ASSAY OF NATRIURETIC PEPTIDE: CPT

## 2024-05-08 PROCEDURE — 36415 COLL VENOUS BLD VENIPUNCTURE: CPT

## 2024-05-08 PROCEDURE — 99214 OFFICE O/P EST MOD 30 MIN: CPT

## 2024-05-08 ASSESSMENT — PATIENT HEALTH QUESTIONNAIRE - PHQ9
SUM OF ALL RESPONSES TO PHQ QUESTIONS 1-9: 0
1. LITTLE INTEREST OR PLEASURE IN DOING THINGS: NOT AT ALL
2. FEELING DOWN, DEPRESSED OR HOPELESS: NOT AT ALL
SUM OF ALL RESPONSES TO PHQ QUESTIONS 1-9: 0
SUM OF ALL RESPONSES TO PHQ9 QUESTIONS 1 & 2: 0

## 2024-05-08 NOTE — PROGRESS NOTES
0./    Congestive Heart Failure Clinic   [facility]      Referring Provider:   Primary Care Physician: Morales Machuca DO   Cardiologist:   Nephrologist: [facility]      HISTORY OF PRESENT ILLNESS:     Murtaza Martinez is a 68 y.o. (1955) male with a history of HFpEF (EF> 50%)  Pre Cupid:     Lab Results   Component Value Date    LVEF 53 08/22/2023     Post Cupid:  No results found for: \"EFBP\"      He presents to the CHF clinic for ongoing evaluation and monitoring of heart failure.    In the CHF clinic today he denies any adverse symptoms except:  [] Dizziness or lightheadedness   [] Syncope or near syncope  [] Recent Fall  [] Shortness of breath at rest   [] Dyspnea with exertion  [] Decline in functional capacity (unable to perform activities they had previously been able to do)  [x] Fatigue   [] Orthopnea  [] PND  [] Nocturnal cough  [] Hemoptysis  [] Chest pain, pressure, or discomfort  [] Palpitations  [] Abdominal distention  [] Abdominal bloating  [] Early satiety  [] Blood in stool   [] Diarrhea  [] Constipation  [] Nausea/Vomiting  [] Decreased urinary response to oral diuretic   [] Scrotal swelling   [] Lower extremity edema  [] Used PRN doses of oral diuretic   [] Weight gain     Wt Readings from Last 3 Encounters:   05/08/24 106.1 kg (234 lb)   04/22/24 106.6 kg (235 lb 0.8 oz)   04/04/24 108.9 kg (240 lb)     SOCIAL HISTORY:  [x] Denies tobacco, alcohol or illicit drug abuse  [] Tobacco use:  [] ETOH use:  [] Illicit drug use:        MEDICATIONS:    Allergies   Allergen Reactions    Pcn [Penicillins]      Child went to hospital   ? Reaction  Patient tolerates cephalosporins    Sulfa Antibiotics      ?     Prior to Visit Medications    Medication Sig Taking? Authorizing Provider   HUMALOG 100 UNIT/ML SOLN injection vial Inject into the skin 3 times daily (before meals) Patient uses sliding scale  3 units 140-199  6

## 2024-05-29 ENCOUNTER — TELEPHONE (OUTPATIENT)
Dept: OTHER | Age: 69
End: 2024-05-29

## 2024-05-29 ENCOUNTER — HOSPITAL ENCOUNTER (OUTPATIENT)
Dept: OTHER | Age: 69
Setting detail: THERAPIES SERIES
Discharge: HOME | End: 2024-05-29
Payer: MEDICARE

## 2024-05-29 VITALS
OXYGEN SATURATION: 92 % | WEIGHT: 232 LBS | SYSTOLIC BLOOD PRESSURE: 100 MMHG | RESPIRATION RATE: 16 BRPM | HEART RATE: 95 BPM | BODY MASS INDEX: 37.45 KG/M2

## 2024-05-29 LAB
ANION GAP SERPL CALCULATED.3IONS-SCNC: 12 MMOL/L (ref 7–16)
BNP SERPL-MCNC: 3519 PG/ML (ref 0–450)
BUN SERPL-MCNC: 20 MG/DL (ref 6–23)
CALCIUM SERPL-MCNC: 8.8 MG/DL (ref 8.6–10.2)
CHLORIDE SERPL-SCNC: 96 MMOL/L (ref 98–107)
CO2 SERPL-SCNC: 22 MMOL/L (ref 22–29)
CREAT SERPL-MCNC: 1.3 MG/DL (ref 0.7–1.2)
GFR, ESTIMATED: 58 ML/MIN/1.73M2
GLUCOSE SERPL-MCNC: 225 MG/DL (ref 74–99)
POTASSIUM SERPL-SCNC: 4.2 MMOL/L (ref 3.5–5)
SODIUM SERPL-SCNC: 130 MMOL/L (ref 132–146)

## 2024-05-29 PROCEDURE — 36415 COLL VENOUS BLD VENIPUNCTURE: CPT

## 2024-05-29 PROCEDURE — 83880 ASSAY OF NATRIURETIC PEPTIDE: CPT

## 2024-05-29 PROCEDURE — 99214 OFFICE O/P EST MOD 30 MIN: CPT

## 2024-05-29 PROCEDURE — 80048 BASIC METABOLIC PNL TOTAL CA: CPT

## 2024-05-29 RX ORDER — FLUTICASONE PROPIONATE 50 MCG
1 SPRAY, SUSPENSION (ML) NASAL DAILY
COMMUNITY

## 2024-05-29 NOTE — TELEPHONE ENCOUNTER
Called and gave patient the following instructions per :  Increase Amiodarone 400 mg daily  Return to CHF Clinic on Monday Juanita 3, 2023.  Reinforced to patient to take Bumex 2 mg twice daily as prescribed    Patient repeated instructions back to me and verbalized an understanding.

## 2024-05-29 NOTE — PROGRESS NOTES
1507 5/29/2024  Called   re: updating of patient vitals, labs and assessment from today's CHF clinic visit. Received orders.    >> 137 >> 130  CR 1.2 >> 1.1 >> 1.3  Pro-bnp 2,193 >> 2,353 >> 3,519    Amiodarone 200 mg daily   Bumex 2 mg BID however missed skipped doses three time a week.   Jardiance 10 mg daily   Metoprolol succinate 25 mg daily   Potassium 20 meq daily      bpm AFib    BP (!) 100/0 Comment: pt did not take his meds this AM  Pulse 95   Resp 16   Wt 105.2 kg (232 lb)   SpO2 92%   BMI 37.45 kg/m²     Increase amiodarone 400 mg daily for one week and then return back down to the Amiodarone 200 mg daily .   2. Follow up on Monday with CHF clinic and repeat blood work.  3. Reinforce the importance of compliance with diuretics.       Future Appointments   Date Time Provider Department Center   6/14/2024  8:15 AM [facility] CHF ROOM 1 [facility]   7/2/2024  8:15 AM [facility] CHF ROOM 1 [facility]   7/17/2024 12:30 PM Morales Machuca DO [facility]   8/16/2024  3:00 PM [facility]         
meals, can goods, etc)  [] CHF CHW consulted  [] Low sodium meal delivery options given to patient  [] Dietician consulted   [] Low sodium recipes provided  [] Sodium free seasoning provided   [x] Fluid intake 6-8 cups (around 64 oz)  [x] Reviewed currently prescribed medications with patient, educated on importance of compliance and answered any questions regarding their medication  [] Pill box provided to patient  [] Patient using pill packing pharmacy   [] CPAP/BiPAP use  [] Low impact exercise / cardiac rehab   [] LifeVest use  [x] Patient aware of signs and symptoms of worsening HF, CHF clinic phone number provided and made aware to call clinic for sooner if evaluation if needed     [] Difficulty affording medications  [] CHF CHW consulted  [] Prescription assistance information given   [] [facility] medication assistance program information given   [] Sample medications provided to patient to help bridge gap until affordability N/A    [x] PHQ assessment completed while in CHF clinic (1st visit, every 3 months and PRN)      5/8/2024     9:22 AM 4/22/2024     8:52 AM 2/15/2024     9:23 AM 4/6/2023     1:14 PM 1/4/2023     2:27 PM 12/2/2022    12:00 PM 6/14/2022     1:46 PM   PHQ Scores   PHQ2 Score 0 0 0 0 0 4 0   PHQ9 Score 0 0 6 0 0 12 0     Interpretation of Total Score Depression Severity:   1-4 = Minimal depression  5-9 = Mild depression  10-14 = Moderate depression  15-19 = Moderately severe depression  20-27 = Severe depression   [] Patient provided community resources for counseling services  [] PCP called and PHQ result faxed   [] Behavorial health consultant called      Scheduled to follow up in CHF clinic on:   Future Appointments   Date Time Provider Department Center   6/3/2024  9:30 AM [facility] CHF ROOM 2 [facility]   6/14/2024  8:15 AM [facility] CHF ROOM 1 [facility]   7/2/2024  8:15 AM [facility] CHF ROOM 1 [facility]   7/17/2024 12:30 PM Morales Machuca DO [facility]

## 2024-05-31 ENCOUNTER — TELEPHONE (OUTPATIENT)
Dept: OTHER | Age: 69
End: 2024-05-31

## 2024-05-31 NOTE — TELEPHONE ENCOUNTER
1230 PM Called patient re: appointment reminder for Monday for the CHF clinic. Pt states that he has not been taking the Amiodarone 200 mg daily ( On 5/29--> Pt was Afib RVR at [facility] and  Dr. Daniels increased Amiodarone 400 mg daily)       4:03 PM Notified  that he is NOT taking Amiodarone 200 mg daily and clarified what dose he should be taking.     Received orders:     Amiodarone 200 mg daily and Dr. Daniels will call into the pharmacy.       Future Appointments   Date Time Provider Department Center   6/3/2024  8:45 AM [facility] CHF ROOM 2 [facility]   6/14/2024  8:15 AM [facility] CHF ROOM 1 [facility]   7/2/2024  8:15 AM [facility] CHF ROOM 1 [facility]   7/17/2024 12:30 PM Morales Machuca DO [facility]   8/16/2024  3:00 PM Vitor Daniels MD [facility]

## 2024-05-31 NOTE — TELEPHONE ENCOUNTER
1230 PM Called patient re: appointment reminder for Monday for the CHF clinic. Pt states that he has not been taking the Amiodarone 200 mg daily ( On 5/29--> Pt was Afib RVR at [facility] and  Dr. Daniels increase Amiodarone 400 mg daily)      4:03 PM Notified  that he is NOT taking Amiodarone 200 mg daily and clairfed what dose he should be taking.     Received orders:   Amiodarone 200 mg daily and Dr. Daniels will call into the pharmacy.     4:05 PM Dalila DAHL called patient re: new medication to be called into the pharmacy. I have reviewed the provider's instructions with the patient, answering all questions to his satisfaction.      Future Appointments   Date Time Provider Department Center   6/3/2024  8:45 AM [facility] CHF ROOM 2 [facility]   6/14/2024  8:15 AM [facility] CHF ROOM 1 [facility]   7/2/2024  8:15 AM [facility] CHF ROOM 1 [facility]   7/17/2024 12:30 PM Morales Machuca DO [facility]   8/16/2024  3:00 PM Vitor Daniels MD [facility]

## 2024-06-03 ENCOUNTER — HOSPITAL ENCOUNTER (OUTPATIENT)
Dept: OTHER | Age: 69
Setting detail: THERAPIES SERIES
Discharge: HOME | End: 2024-06-03
Payer: MEDICARE

## 2024-06-03 VITALS
WEIGHT: 228 LBS | BODY MASS INDEX: 36.8 KG/M2 | HEART RATE: 96 BPM | DIASTOLIC BLOOD PRESSURE: 58 MMHG | OXYGEN SATURATION: 96 % | SYSTOLIC BLOOD PRESSURE: 97 MMHG | RESPIRATION RATE: 17 BRPM

## 2024-06-03 LAB
ANION GAP SERPL CALCULATED.3IONS-SCNC: 11 MMOL/L (ref 7–16)
BNP SERPL-MCNC: 1837 PG/ML (ref 0–450)
BUN SERPL-MCNC: 22 MG/DL (ref 6–23)
CALCIUM SERPL-MCNC: 9.2 MG/DL (ref 8.6–10.2)
CHLORIDE SERPL-SCNC: 102 MMOL/L (ref 98–107)
CO2 SERPL-SCNC: 28 MMOL/L (ref 22–29)
CREAT SERPL-MCNC: 1.1 MG/DL (ref 0.7–1.2)
GFR, ESTIMATED: 77 ML/MIN/1.73M2
GLUCOSE SERPL-MCNC: 171 MG/DL (ref 74–99)
POTASSIUM SERPL-SCNC: 4.1 MMOL/L (ref 3.5–5)
SODIUM SERPL-SCNC: 141 MMOL/L (ref 132–146)

## 2024-06-03 PROCEDURE — 83880 ASSAY OF NATRIURETIC PEPTIDE: CPT

## 2024-06-03 PROCEDURE — 80048 BASIC METABOLIC PNL TOTAL CA: CPT

## 2024-06-03 PROCEDURE — 36415 COLL VENOUS BLD VENIPUNCTURE: CPT

## 2024-06-03 PROCEDURE — 99214 OFFICE O/P EST MOD 30 MIN: CPT

## 2024-06-03 NOTE — PROGRESS NOTES
Error    
(before meals) Patient uses sliding scale  3 units 140-199  6 units 200-249  9 units  250-299  12 units 300-349  15  units 350-399  > 400 18 units and no more than 70 units/day  Provider, MD Lesvia   baclofen (LIORESAL) 20 MG tablet Take 1 tablet by mouth 3 times daily as needed (muscle spasms)  Morales Machuca DO   DULoxetine (CYMBALTA) 30 MG extended release capsule Take 1 capsule by mouth daily  Morales Machuca DO   empagliflozin (JARDIANCE) 10 MG tablet Take 1 tablet by mouth daily  Morales Machuca DO   pantoprazole (PROTONIX) 40 MG tablet Take 1 tablet by mouth daily  Morales Machuca DO   apixaban (ELIQUIS) 5 MG TABS tablet Take 1 tablet by mouth 2 times daily (Dr Machuca)  Morales Machuca DO   Continuous Blood Gluc Sensor (FREESTYLE STEPHANIE 14 DAY SENSOR) MISC As directed  Morales Machuca DO   Continuous Blood Gluc  (FREESTYLE STEPHANIE 2 READER) MEDINA As directed  Morales Machuca DO   albuterol (PROVENTIL) (2.5 MG/3ML) 0.083% nebulizer solution Take 3 mLs by nebulization every 6 hours as needed for Wheezing  Patient not taking: Reported on 3/15/2024  Bing Harmon MD   Respiratory Therapy Supplies (FULL KIT NEBULIZER SET) MISC Use as directed with nebulized medication.  Bing Harmon MD   insulin glargine-yfgn (SEMGLEE-YFGN) 100 UNIT/ML injection vial Inject 20 Units into the skin 2 times daily  Bing Harmon MD   levothyroxine (SYNTHROID) 150 MCG tablet Take 1 tablet by mouth daily  Morales Machuca DO   amiodarone (CORDARONE) 200 MG tablet Take 1 tablet by mouth daily  Vitor Daniels MD   atorvastatin (LIPITOR) 80 MG tablet TAKE ONE TABLET BY MOUTH EVERY NIGHT  Patient not taking: Reported on 6/3/2024  Vitor Daniels MD   bumetanide (BUMEX) 1 MG tablet Take 1 tablet by mouth daily  Patient taking differently: Take 1 tablet by mouth 2 times daily  Vitor Daniels MD   metoprolol succinate (TOPROL XL) 25 MG extended release tablet Take 1 tablet by mouth daily  Vitor Daniels MD

## 2024-06-14 ENCOUNTER — HOSPITAL ENCOUNTER (OUTPATIENT)
Dept: OTHER | Age: 69
Setting detail: THERAPIES SERIES
Discharge: HOME | End: 2024-06-14
Payer: MEDICARE

## 2024-06-14 VITALS
WEIGHT: 226 LBS | OXYGEN SATURATION: 94 % | DIASTOLIC BLOOD PRESSURE: 76 MMHG | HEART RATE: 75 BPM | SYSTOLIC BLOOD PRESSURE: 103 MMHG | BODY MASS INDEX: 36.48 KG/M2

## 2024-06-14 LAB
ANION GAP SERPL CALCULATED.3IONS-SCNC: 14 MMOL/L (ref 7–16)
BNP SERPL-MCNC: 1910 PG/ML (ref 0–450)
BUN SERPL-MCNC: 26 MG/DL (ref 6–23)
CALCIUM SERPL-MCNC: 9.8 MG/DL (ref 8.6–10.2)
CHLORIDE SERPL-SCNC: 100 MMOL/L (ref 98–107)
CO2 SERPL-SCNC: 23 MMOL/L (ref 22–29)
CREAT SERPL-MCNC: 1.4 MG/DL (ref 0.7–1.2)
GFR, ESTIMATED: 57 ML/MIN/1.73M2
GLUCOSE SERPL-MCNC: 208 MG/DL (ref 74–99)
POTASSIUM SERPL-SCNC: 4.3 MMOL/L (ref 3.5–5)
SODIUM SERPL-SCNC: 137 MMOL/L (ref 132–146)

## 2024-06-14 PROCEDURE — 83880 ASSAY OF NATRIURETIC PEPTIDE: CPT

## 2024-06-14 PROCEDURE — 36415 COLL VENOUS BLD VENIPUNCTURE: CPT

## 2024-06-14 PROCEDURE — 80048 BASIC METABOLIC PNL TOTAL CA: CPT

## 2024-06-14 PROCEDURE — 99214 OFFICE O/P EST MOD 30 MIN: CPT

## 2024-06-14 NOTE — PROGRESS NOTES
Congestive Heart Failure Clinic   [facility]    Referring Provider:   Primary Care Physician: Morales Machuca DO   Cardiologist:   Nephrologist: [facility]    HISTORY OF PRESENT ILLNESS:     Murtaza Martinez is a 68 y.o. (1955) male with a history of HFpEF (EF> 50%)  Pre Cupid:     Lab Results   Component Value Date    LVEF 53 08/22/2023     He presents to the CHF clinic for ongoing evaluation and monitoring of heart failure.    In the CHF clinic today he denies any adverse symptoms except:  [] Dizziness or lightheadedness   [] Syncope or near syncope  [] Recent Fall  [] Shortness of breath at rest   [] Dyspnea with exertion  [] Decline in functional capacity (unable to perform activities they had previously been able to do)  [x] Fatigue   [] Orthopnea  [] PND  [] Nocturnal cough  [] Hemoptysis  [] Chest pain, pressure, or discomfort  [] Palpitations  [] Abdominal distention  [] Abdominal bloating  [] Early satiety  [] Blood in stool   [] Diarrhea  [x] Constipation  [] Nausea/Vomiting  [] Decreased urinary response to oral diuretic   [] Scrotal swelling   [] Lower extremity edema  [] Used PRN doses of oral diuretic   [] Weight gain     Wt Readings from Last 3 Encounters:   06/14/24 102.5 kg (226 lb)   06/03/24 103.4 kg (228 lb)   05/29/24 105.2 kg (232 lb)     SOCIAL HISTORY:  [x] Denies tobacco, alcohol or illicit drug abuse  [] Tobacco use:  [] ETOH use:  [] Illicit drug use:        MEDICATIONS:    Allergies   Allergen Reactions    Pcn [Penicillins]      Child went to hospital   ? Reaction  Patient tolerates cephalosporins    Sulfa Antibiotics      ?     Prior to Visit Medications    Medication Sig Taking? Authorizing Provider   fluticasone (FLONASE ALLERGY RELIEF) 50 MCG/ACT nasal spray 1 spray by Each Nostril route daily  Provider, MD Lesvia   HUMALOG 100 UNIT/ML SOLN injection vial Inject into the skin 3 times daily

## 2024-07-02 ENCOUNTER — HOSPITAL ENCOUNTER (OUTPATIENT)
Dept: OTHER | Age: 69
Setting detail: THERAPIES SERIES
Discharge: HOME | End: 2024-07-02
Payer: MEDICARE

## 2024-07-03 ENCOUNTER — HOSPITAL ENCOUNTER (OUTPATIENT)
Dept: OTHER | Age: 69
Setting detail: THERAPIES SERIES
Discharge: HOME | End: 2024-07-03
Payer: MEDICARE

## 2024-07-03 ENCOUNTER — TELEPHONE (OUTPATIENT)
Dept: OTHER | Age: 69
End: 2024-07-03

## 2024-07-03 VITALS
OXYGEN SATURATION: 96 % | WEIGHT: 228 LBS | HEART RATE: 90 BPM | BODY MASS INDEX: 36.8 KG/M2 | RESPIRATION RATE: 16 BRPM | SYSTOLIC BLOOD PRESSURE: 110 MMHG

## 2024-07-03 DIAGNOSIS — E03.9 ACQUIRED HYPOTHYROIDISM: ICD-10-CM

## 2024-07-03 LAB
ANION GAP SERPL CALCULATED.3IONS-SCNC: 8 MMOL/L (ref 7–16)
BNP SERPL-MCNC: 2242 PG/ML (ref 0–450)
BUN SERPL-MCNC: 24 MG/DL (ref 6–23)
CALCIUM SERPL-MCNC: 9 MG/DL (ref 8.6–10.2)
CHLORIDE SERPL-SCNC: 97 MMOL/L (ref 98–107)
CO2 SERPL-SCNC: 30 MMOL/L (ref 22–29)
CREAT SERPL-MCNC: 1.1 MG/DL (ref 0.7–1.2)
GFR, ESTIMATED: 74 ML/MIN/1.73M2
GLUCOSE SERPL-MCNC: 111 MG/DL (ref 74–99)
POTASSIUM SERPL-SCNC: 3.6 MMOL/L (ref 3.5–5)
SODIUM SERPL-SCNC: 135 MMOL/L (ref 132–146)

## 2024-07-03 PROCEDURE — 83880 ASSAY OF NATRIURETIC PEPTIDE: CPT

## 2024-07-03 PROCEDURE — 2580000003 HC RX 258: Performed by: CLINICAL NURSE SPECIALIST

## 2024-07-03 PROCEDURE — 6360000002 HC RX W HCPCS: Performed by: CLINICAL NURSE SPECIALIST

## 2024-07-03 PROCEDURE — 99214 OFFICE O/P EST MOD 30 MIN: CPT

## 2024-07-03 PROCEDURE — 80048 BASIC METABOLIC PNL TOTAL CA: CPT

## 2024-07-03 PROCEDURE — 96374 THER/PROPH/DIAG INJ IV PUSH: CPT

## 2024-07-03 PROCEDURE — 36415 COLL VENOUS BLD VENIPUNCTURE: CPT

## 2024-07-03 RX ORDER — CALCIUM CARBONATE 500(1250)
500 TABLET ORAL DAILY
COMMUNITY

## 2024-07-03 RX ORDER — LEVOTHYROXINE SODIUM 0.15 MG/1
150 TABLET ORAL DAILY
Qty: 90 TABLET | Refills: 5 | Status: SHIPPED | OUTPATIENT
Start: 2024-07-03

## 2024-07-03 RX ORDER — BUMETANIDE 0.25 MG/ML
2 INJECTION INTRAMUSCULAR; INTRAVENOUS ONCE
Status: COMPLETED | OUTPATIENT
Start: 2024-07-03 | End: 2024-07-03

## 2024-07-03 RX ORDER — SODIUM CHLORIDE 0.9 % (FLUSH) 0.9 %
5-40 SYRINGE (ML) INJECTION 2 TIMES DAILY
Status: DISCONTINUED | OUTPATIENT
Start: 2024-07-03 | End: 2024-07-04 | Stop reason: HOSPADM

## 2024-07-03 RX ADMIN — SODIUM CHLORIDE, PRESERVATIVE FREE 10 ML: 5 INJECTION INTRAVENOUS at 07:47

## 2024-07-03 RX ADMIN — BUMETANIDE 2 MG: 0.25 INJECTION INTRAMUSCULAR; INTRAVENOUS at 07:47

## 2024-07-03 NOTE — TELEPHONE ENCOUNTER
Pt took last tablet today.      Last seen 3/12/2024  Next appt 7/17/2024    Requested Prescriptions     Pending Prescriptions Disp Refills    levothyroxine (SYNTHROID) 150 MCG tablet 90 tablet 5     Sig: Take 1 tablet by mouth daily      [facility]/ [address]

## 2024-07-03 NOTE — PROGRESS NOTES
Congestive Heart Failure Clinic   [facility]    Referring Provider:   Primary Care Physician: Morales Machuca DO   Cardiologist:   Nephrologist: [facility]    HISTORY OF PRESENT ILLNESS:     Murtaza Martinez is a 68 y.o. (1955) male with a history of HFpEF (EF> 50%)  Pre Cupid:     Lab Results   Component Value Date    LVEF 53 08/22/2023     He presents to the CHF clinic for ongoing evaluation and monitoring of heart failure.    In the CHF clinic today he denies any adverse symptoms except:  [] Dizziness or lightheadedness   [] Syncope or near syncope  [] Recent Fall  [] Shortness of breath at rest   [] Dyspnea with exertion  [] Decline in functional capacity (unable to perform activities they had previously been able to do)  [x] Fatigue   [] Orthopnea  [] PND  [] Nocturnal cough  [] Hemoptysis  [] Chest pain, pressure, or discomfort  [] Palpitations  [] Abdominal distention  [] Abdominal bloating  [] Early satiety  [] Blood in stool   [] Diarrhea  [] Constipation  [] Nausea/Vomiting  [] Decreased urinary response to oral diuretic   [] Scrotal swelling   [] Lower extremity edema  [] Used PRN doses of oral diuretic   [] Weight gain     Wt Readings from Last 3 Encounters:   07/03/24 103.4 kg (228 lb)   06/14/24 102.5 kg (226 lb)   06/03/24 103.4 kg (228 lb)     SOCIAL HISTORY:  [x] Denies tobacco, alcohol or illicit drug abuse  [] Tobacco use:  [] ETOH use:  [] Illicit drug use:        MEDICATIONS:    Allergies   Allergen Reactions    Pcn [Penicillins]      Child went to hospital   ? Reaction  Patient tolerates cephalosporins    Sulfa Antibiotics      ?     Prior to Visit Medications    Medication Sig Taking? Authorizing Provider   calcium carbonate (OSCAL) 500 MG TABS tablet Take 1 tablet by mouth daily Unsure of dose Yes Provider, MD Lesvia   fluticasone (FLONASE ALLERGY RELIEF) 50 MCG/ACT nasal spray 1 spray by Each

## 2024-07-03 NOTE — TELEPHONE ENCOUNTER
Received the following orders from Linda BHARDWAJ:  Please ask him to take an extra 40 meq of KlorCon today     Called and spoke with patient and gave him the above information. He repeated instructions back to me and verbalized an understanding.

## 2024-07-03 NOTE — RESULT ENCOUNTER NOTE
CHF clinic visit and labs reviewed  Weight up 2 lbs, has missed some doses of Bumex   Given IV diuretic     Please ask him to take an extra 40 meq of KlorCon today   Follow up as scheduled

## 2024-07-17 ENCOUNTER — TELEPHONE (OUTPATIENT)
Dept: FAMILY MEDICINE CLINIC | Age: 69
End: 2024-07-17

## 2024-07-17 NOTE — TELEPHONE ENCOUNTER
Patient called to ask if Dr. Mcahuca would give him a RX for a Handicap Placard.  I informed him that Dr. Machuca is out of the ofc this wk.  Patient stated, once done he will  at the ofc.     Last seen 3/12/2024  Next appt 8/30/2024    Requested Prescriptions      No prescriptions requested or ordered in this encounter

## 2024-07-17 NOTE — TELEPHONE ENCOUNTER
Not appropriate for subgroup.  He will be seeing his cardiologist prior to appointment with Dr. Machuca.

## 2024-07-17 NOTE — TELEPHONE ENCOUNTER
Patient called asking if he could have his appt moved up from 8/30/24 since he feels he's having issues with his prostate and also wanted to know if Dr. Machuca would order lab work prior to his appt.  I noted that patient has a modifier and requires 60'.  I informed patient that there are no earlier appts.  I also informed him that he has labs ordered and I will ask Dr. Machuca about ordering PSA.   Patient stated he is going on a vacation in a couple of months and wants to be sure he's ok to travel.      Last seen 3/12/2024  Next appt 8/30/2024    Requested Prescriptions      No prescriptions requested or ordered in this encounter

## 2024-07-18 ENCOUNTER — TELEPHONE (OUTPATIENT)
Dept: OTHER | Age: 69
End: 2024-07-18

## 2024-07-18 ENCOUNTER — HOSPITAL ENCOUNTER (OUTPATIENT)
Dept: OTHER | Age: 69
Setting detail: THERAPIES SERIES
Discharge: HOME | End: 2024-07-18
Payer: MEDICARE

## 2024-07-18 VITALS
HEART RATE: 91 BPM | RESPIRATION RATE: 18 BRPM | WEIGHT: 222 LBS | OXYGEN SATURATION: 94 % | SYSTOLIC BLOOD PRESSURE: 115 MMHG | DIASTOLIC BLOOD PRESSURE: 78 MMHG | BODY MASS INDEX: 35.83 KG/M2

## 2024-07-18 LAB
ANION GAP SERPL CALCULATED.3IONS-SCNC: 13 MMOL/L (ref 7–16)
BNP SERPL-MCNC: 2654 PG/ML (ref 0–450)
BUN SERPL-MCNC: 24 MG/DL (ref 6–23)
CALCIUM SERPL-MCNC: 9.4 MG/DL (ref 8.6–10.2)
CHLORIDE SERPL-SCNC: 99 MMOL/L (ref 98–107)
CO2 SERPL-SCNC: 28 MMOL/L (ref 22–29)
CREAT SERPL-MCNC: 1.3 MG/DL (ref 0.7–1.2)
GFR, ESTIMATED: 58 ML/MIN/1.73M2
GLUCOSE SERPL-MCNC: 104 MG/DL (ref 74–99)
POTASSIUM SERPL-SCNC: 3.4 MMOL/L (ref 3.5–5)
SODIUM SERPL-SCNC: 140 MMOL/L (ref 132–146)

## 2024-07-18 PROCEDURE — 36415 COLL VENOUS BLD VENIPUNCTURE: CPT

## 2024-07-18 PROCEDURE — 80048 BASIC METABOLIC PNL TOTAL CA: CPT

## 2024-07-18 PROCEDURE — 99214 OFFICE O/P EST MOD 30 MIN: CPT

## 2024-07-18 PROCEDURE — 83880 ASSAY OF NATRIURETIC PEPTIDE: CPT

## 2024-07-18 RX ORDER — TRAZODONE HYDROCHLORIDE 50 MG/1
50 TABLET ORAL NIGHTLY
COMMUNITY

## 2024-07-18 NOTE — PLAN OF CARE
Problem: Chronic Conditions and Co-morbidities  Goal: Patient's chronic conditions and co-morbidity symptoms are monitored and maintained or improved  Flowsheets (Taken 7/18/2024 1347)  Care Plan - Patient's Chronic Conditions and Co-Morbidity Symptoms are Monitored and Maintained or Improved: Monitor and assess patient's chronic conditions and comorbid symptoms for stability, deterioration, or improvement

## 2024-07-18 NOTE — PROGRESS NOTES
Regular  Respiratory Depth: Normal  L Breath Sounds: Clear  R Breath Sounds: Clear  Breath Sounds  Respiratory Pattern: Regular     Cardiac  Cardiac Regularity: Irregular  Cardiac Rhythm: Atrial fib  Rhythm Interpretation  Cardiac Rhythm: Atrial fib  Pulse: 91     Gastrointestinal  Abdomen Inspection: Soft  RUQ Bowel Sounds: Active  LUQ Bowel Sounds: Active  RLQ Bowel Sounds: Active  LLQ Bowel Sounds: Active      Bowel Sounds  RUQ Bowel Sounds: Active  LUQ Bowel Sounds: Active  RLQ Bowel Sounds: Active  LLQ Bowel Sounds: Active  Peripheral Vascular  RLE Edema: Pitting, +1 (+1-+2 to mid shin)  LLE Edema: Pitting, +1           Genitourinary  Genitourinary (WDL): Within Defined Limits  Psychosocial  Psychosocial (WDL): Within Defined Limits              Pulse: 91               LAB DATA:  BMP:  Sodium (mmol/L)   Date Value   07/18/2024 140   07/03/2024 135   06/14/2024 137     Potassium (mmol/L)   Date Value   07/18/2024 3.4 (L)   07/03/2024 3.6   06/14/2024 4.3     Potassium reflex Magnesium (mmol/L)   Date Value   10/24/2022 5.8 (H)   09/15/2022 3.1 (L)   10/28/2021 2.8 (L)     Chloride (mmol/L)   Date Value   07/18/2024 99   07/03/2024 97 (L)   06/14/2024 100     CO2 (mmol/L)   Date Value   07/18/2024 28   07/03/2024 30 (H)   06/14/2024 23     BUN (mg/dL)   Date Value   07/18/2024 24 (H)   07/03/2024 24 (H)   06/14/2024 26 (H)     Creatinine (mg/dL)   Date Value   07/18/2024 1.3 (H)   07/03/2024 1.1   06/14/2024 1.4 (H)     Glucose (mg/dL)   Date Value   07/18/2024 104 (H)   07/03/2024 111 (H)   06/14/2024 208 (H)   10/27/2011 118 (H)   06/09/2011 96     Calcium (mg/dL)   Date Value   07/18/2024 9.4   07/03/2024 9.0   06/14/2024 9.8     BNP:  Pro-BNP (pg/mL)   Date Value   07/18/2024 2,654 (H)   07/03/2024 2,242 (H)   06/14/2024 1,910 (H)      CBC:  WBC (k/uL)   Date Value   03/15/2024 5.6     Hemoglobin (g/dL)   Date Value   03/15/2024 13.4     Hematocrit (%)   Date Value   03/15/2024 40.1     Platelets (k/uL)

## 2024-07-18 NOTE — TELEPHONE ENCOUNTER
11:20 AM  Updated the patient on recent potassium level of K+3.4. Patient instructed by Dr. Daniels to take x2 EXTRA (20mEq) Potassium Chloride tablets today and tomorrow. Patient verbalizes understanding.   Aysha Hou RN

## 2024-07-22 DIAGNOSIS — Z79.4 TYPE 2 DIABETES MELLITUS WITH HYPERGLYCEMIA, WITH LONG-TERM CURRENT USE OF INSULIN (HCC): ICD-10-CM

## 2024-07-22 DIAGNOSIS — Z12.5 SCREENING FOR MALIGNANT NEOPLASM OF PROSTATE: Primary | ICD-10-CM

## 2024-07-22 DIAGNOSIS — E11.65 TYPE 2 DIABETES MELLITUS WITH HYPERGLYCEMIA, WITH LONG-TERM CURRENT USE OF INSULIN (HCC): ICD-10-CM

## 2024-07-22 DIAGNOSIS — R53.82 CHRONIC FATIGUE: ICD-10-CM

## 2024-07-22 DIAGNOSIS — E78.2 MIXED HYPERLIPIDEMIA: ICD-10-CM

## 2024-07-23 NOTE — TELEPHONE ENCOUNTER
Per Dr. Machuca -    I have put in the bw orders. If you can find a 30 min appointment or very last of day that is 15 min. That will be fine         I called patient and informed him that labs were ordered.  Appt was scheduled for 7/31/24 at 4:00 pm.

## 2024-07-23 NOTE — TELEPHONE ENCOUNTER
Per Dr. Machuca -  Placard is done       I called patient and informed him that his Placard is done.  Patient stated he will pick it up when he has his appt next week, 7/31/24.

## 2024-07-30 DIAGNOSIS — E78.2 MIXED HYPERLIPIDEMIA: ICD-10-CM

## 2024-07-30 DIAGNOSIS — E11.65 TYPE 2 DIABETES MELLITUS WITH HYPERGLYCEMIA, WITH LONG-TERM CURRENT USE OF INSULIN (HCC): ICD-10-CM

## 2024-07-30 DIAGNOSIS — Z79.4 TYPE 2 DIABETES MELLITUS WITH HYPERGLYCEMIA, WITH LONG-TERM CURRENT USE OF INSULIN (HCC): ICD-10-CM

## 2024-07-30 DIAGNOSIS — Z12.5 SCREENING FOR MALIGNANT NEOPLASM OF PROSTATE: ICD-10-CM

## 2024-07-30 DIAGNOSIS — R53.82 CHRONIC FATIGUE: ICD-10-CM

## 2024-07-30 LAB
BASOPHILS ABSOLUTE: 0.07 K/UL (ref 0–0.2)
BASOPHILS RELATIVE PERCENT: 1 % (ref 0–2)
EOSINOPHILS ABSOLUTE: 0.15 K/UL (ref 0.05–0.5)
EOSINOPHILS RELATIVE PERCENT: 2 % (ref 0–6)
HBA1C MFR BLD: 7.3 % (ref 4–5.6)
HCT VFR BLD CALC: 51.4 % (ref 37–54)
HEMOGLOBIN: 17.1 G/DL (ref 12.5–16.5)
IMMATURE GRANULOCYTES %: 1 % (ref 0–5)
IMMATURE GRANULOCYTES ABSOLUTE: 0.04 K/UL (ref 0–0.58)
LYMPHOCYTES ABSOLUTE: 1.53 K/UL (ref 1.5–4)
LYMPHOCYTES RELATIVE PERCENT: 19 % (ref 20–42)
MCH RBC QN AUTO: 33.1 PG (ref 26–35)
MCHC RBC AUTO-ENTMCNC: 33.3 G/DL (ref 32–34.5)
MCV RBC AUTO: 99.6 FL (ref 80–99.9)
MONOCYTES ABSOLUTE: 0.88 K/UL (ref 0.1–0.95)
MONOCYTES RELATIVE PERCENT: 11 % (ref 2–12)
NEUTROPHILS ABSOLUTE: 5.23 K/UL (ref 1.8–7.3)
NEUTROPHILS RELATIVE PERCENT: 66 % (ref 43–80)
PDW BLD-RTO: 15 % (ref 11.5–15)
PLATELET # BLD: 209 K/UL (ref 130–450)
PMV BLD AUTO: 10.8 FL (ref 7–12)
RBC # BLD: 5.16 M/UL (ref 3.8–5.8)
WBC # BLD: 7.9 K/UL (ref 4.5–11.5)

## 2024-07-31 ENCOUNTER — OFFICE VISIT (OUTPATIENT)
Dept: FAMILY MEDICINE CLINIC | Age: 69
End: 2024-07-31
Payer: MEDICARE

## 2024-07-31 VITALS
WEIGHT: 215 LBS | BODY MASS INDEX: 34.55 KG/M2 | HEART RATE: 100 BPM | SYSTOLIC BLOOD PRESSURE: 120 MMHG | OXYGEN SATURATION: 98 % | HEIGHT: 66 IN | RESPIRATION RATE: 18 BRPM | DIASTOLIC BLOOD PRESSURE: 80 MMHG

## 2024-07-31 DIAGNOSIS — Z79.4 TYPE 2 DIABETES MELLITUS WITH HYPERGLYCEMIA, WITH LONG-TERM CURRENT USE OF INSULIN (HCC): ICD-10-CM

## 2024-07-31 DIAGNOSIS — N40.1 BENIGN PROSTATIC HYPERPLASIA WITH WEAK URINARY STREAM: Primary | ICD-10-CM

## 2024-07-31 DIAGNOSIS — I25.10 CORONARY ARTERY DISEASE INVOLVING NATIVE CORONARY ARTERY OF NATIVE HEART WITHOUT ANGINA PECTORIS: ICD-10-CM

## 2024-07-31 DIAGNOSIS — E11.42 TYPE 2 DIABETES MELLITUS WITH DIABETIC POLYNEUROPATHY, WITHOUT LONG-TERM CURRENT USE OF INSULIN (HCC): ICD-10-CM

## 2024-07-31 DIAGNOSIS — R35.0 URINARY FREQUENCY: ICD-10-CM

## 2024-07-31 DIAGNOSIS — E11.65 TYPE 2 DIABETES MELLITUS WITH HYPERGLYCEMIA, WITH LONG-TERM CURRENT USE OF INSULIN (HCC): ICD-10-CM

## 2024-07-31 DIAGNOSIS — E78.2 MIXED HYPERLIPIDEMIA: ICD-10-CM

## 2024-07-31 DIAGNOSIS — R53.82 CHRONIC FATIGUE: ICD-10-CM

## 2024-07-31 DIAGNOSIS — R39.12 BENIGN PROSTATIC HYPERPLASIA WITH WEAK URINARY STREAM: Primary | ICD-10-CM

## 2024-07-31 LAB
ALBUMIN: 3.9 G/DL (ref 3.5–5.2)
ALP BLD-CCNC: 134 U/L (ref 40–129)
ALT SERPL-CCNC: 25 U/L (ref 0–40)
ANION GAP SERPL CALCULATED.3IONS-SCNC: 15 MMOL/L (ref 7–16)
AST SERPL-CCNC: 30 U/L (ref 0–39)
BILIRUB SERPL-MCNC: 1.5 MG/DL (ref 0–1.2)
BUN BLDV-MCNC: 16 MG/DL (ref 6–23)
CALCIUM SERPL-MCNC: 9.5 MG/DL (ref 8.6–10.2)
CHLORIDE BLD-SCNC: 97 MMOL/L (ref 98–107)
CHOLESTEROL, TOTAL: 144 MG/DL
CO2: 28 MMOL/L (ref 22–29)
CREAT SERPL-MCNC: 1.3 MG/DL (ref 0.7–1.2)
GFR, ESTIMATED: 60 ML/MIN/1.73M2
GLUCOSE BLD-MCNC: 76 MG/DL (ref 74–99)
HDLC SERPL-MCNC: 56 MG/DL
LDL CHOLESTEROL: 70 MG/DL
POTASSIUM SERPL-SCNC: 4.1 MMOL/L (ref 3.5–5)
PROSTATE SPECIFIC ANTIGEN: 0.43 NG/ML (ref 0–4)
SODIUM BLD-SCNC: 140 MMOL/L (ref 132–146)
TOTAL PROTEIN: 7.8 G/DL (ref 6.4–8.3)
TRIGL SERPL-MCNC: 88 MG/DL
TSH SERPL DL<=0.05 MIU/L-ACNC: 0.14 UIU/ML (ref 0.27–4.2)
VLDLC SERPL CALC-MCNC: 18 MG/DL

## 2024-07-31 PROCEDURE — G8417 CALC BMI ABV UP PARAM F/U: HCPCS | Performed by: FAMILY MEDICINE

## 2024-07-31 PROCEDURE — 3079F DIAST BP 80-89 MM HG: CPT | Performed by: FAMILY MEDICINE

## 2024-07-31 PROCEDURE — 1036F TOBACCO NON-USER: CPT | Performed by: FAMILY MEDICINE

## 2024-07-31 PROCEDURE — 3051F HG A1C>EQUAL 7.0%<8.0%: CPT | Performed by: FAMILY MEDICINE

## 2024-07-31 PROCEDURE — G8427 DOCREV CUR MEDS BY ELIG CLIN: HCPCS | Performed by: FAMILY MEDICINE

## 2024-07-31 PROCEDURE — 99214 OFFICE O/P EST MOD 30 MIN: CPT | Performed by: FAMILY MEDICINE

## 2024-07-31 PROCEDURE — 3017F COLORECTAL CA SCREEN DOC REV: CPT | Performed by: FAMILY MEDICINE

## 2024-07-31 PROCEDURE — 1123F ACP DISCUSS/DSCN MKR DOCD: CPT | Performed by: FAMILY MEDICINE

## 2024-07-31 PROCEDURE — 3074F SYST BP LT 130 MM HG: CPT | Performed by: FAMILY MEDICINE

## 2024-07-31 PROCEDURE — 2022F DILAT RTA XM EVC RTNOPTHY: CPT | Performed by: FAMILY MEDICINE

## 2024-07-31 RX ORDER — TADALAFIL 5 MG/1
5 TABLET ORAL DAILY
Qty: 90 TABLET | Refills: 4 | Status: SHIPPED | OUTPATIENT
Start: 2024-07-31

## 2024-07-31 RX ORDER — DUTASTERIDE 0.5 MG/1
0.5 CAPSULE, LIQUID FILLED ORAL DAILY
Qty: 90 CAPSULE | Refills: 3 | Status: SHIPPED | OUTPATIENT
Start: 2024-07-31

## 2024-07-31 NOTE — PROGRESS NOTES
Murtaza Martinez is a 68 y.o. male  .  Subjective:      Having issues with urinary dribbling and decreased stream. Also complaining of ED. Will most likely set up with urologist. Following with cardiology and CHF clinic. Sugars have improved.         Review of Systems   Constitutional:  Positive for fatigue. Negative for activity change, appetite change, chills, diaphoresis, fever and unexpected weight change.   HENT:  Negative for congestion, dental problem, drooling, ear discharge, ear pain, facial swelling, hearing loss, mouth sores, nosebleeds, postnasal drip, rhinorrhea, sinus pressure, sneezing, sore throat, tinnitus, trouble swallowing and voice change.    Eyes:  Negative for photophobia, pain, discharge, redness, itching and visual disturbance.   Respiratory:  Negative for apnea, cough, choking, chest tightness, shortness of breath, wheezing and stridor.    Cardiovascular:  Negative for chest pain, palpitations and leg swelling.   Gastrointestinal:  Negative for abdominal distention, abdominal pain, anal bleeding, blood in stool, constipation, diarrhea, nausea, rectal pain and vomiting.   Endocrine: Negative for cold intolerance, heat intolerance, polydipsia, polyphagia and polyuria.   Genitourinary:  Positive for difficulty urinating and frequency. Negative for decreased urine volume, dysuria, enuresis, flank pain, genital sores, hematuria, penile discharge, penile pain, penile swelling, scrotal swelling, testicular pain and urgency.   Musculoskeletal:  Negative for arthralgias, back pain, gait problem, joint swelling, myalgias, neck pain and neck stiffness.   Skin:  Negative for color change, pallor, rash and wound.   Allergic/Immunologic: Negative for environmental allergies, food allergies and immunocompromised state.   Neurological:  Negative for dizziness, tremors, seizures, syncope, facial asymmetry, speech difficulty, weakness, light-headedness, numbness and headaches.   Hematological:  Negative for

## 2024-08-03 ASSESSMENT — ENCOUNTER SYMPTOMS
COUGH: 0
ABDOMINAL DISTENTION: 0
NAUSEA: 0
CHEST TIGHTNESS: 0
DIARRHEA: 0
VOICE CHANGE: 0
ANAL BLEEDING: 0
SHORTNESS OF BREATH: 0
EYE REDNESS: 0
RHINORRHEA: 0
FACIAL SWELLING: 0
EYE PAIN: 0
STRIDOR: 0
VOMITING: 0
CHOKING: 0
TROUBLE SWALLOWING: 0
EYE DISCHARGE: 0
BACK PAIN: 0
BLOOD IN STOOL: 0
PHOTOPHOBIA: 0
EYE ITCHING: 0
WHEEZING: 0
COLOR CHANGE: 0
SORE THROAT: 0
CONSTIPATION: 0
ABDOMINAL PAIN: 0
SINUS PRESSURE: 0
APNEA: 0
RECTAL PAIN: 0

## 2024-08-08 ENCOUNTER — TELEPHONE (OUTPATIENT)
Dept: FAMILY MEDICINE CLINIC | Age: 69
End: 2024-08-08

## 2024-08-08 ENCOUNTER — HOSPITAL ENCOUNTER (OUTPATIENT)
Dept: OTHER | Age: 69
Setting detail: THERAPIES SERIES
Discharge: HOME | End: 2024-08-08
Payer: MEDICARE

## 2024-08-08 VITALS
DIASTOLIC BLOOD PRESSURE: 69 MMHG | HEART RATE: 96 BPM | RESPIRATION RATE: 18 BRPM | BODY MASS INDEX: 35.99 KG/M2 | OXYGEN SATURATION: 98 % | SYSTOLIC BLOOD PRESSURE: 112 MMHG | WEIGHT: 223 LBS

## 2024-08-08 LAB
ANION GAP SERPL CALCULATED.3IONS-SCNC: 7 MMOL/L (ref 7–16)
BNP SERPL-MCNC: 2497 PG/ML (ref 0–450)
BUN SERPL-MCNC: 19 MG/DL (ref 6–23)
CALCIUM SERPL-MCNC: 9.1 MG/DL (ref 8.6–10.2)
CHLORIDE SERPL-SCNC: 97 MMOL/L (ref 98–107)
CO2 SERPL-SCNC: 30 MMOL/L (ref 22–29)
CREAT SERPL-MCNC: 1.5 MG/DL (ref 0.7–1.2)
GFR, ESTIMATED: 52 ML/MIN/1.73M2
GLUCOSE SERPL-MCNC: 124 MG/DL (ref 74–99)
POTASSIUM SERPL-SCNC: 4.7 MMOL/L (ref 3.5–5)
SODIUM SERPL-SCNC: 134 MMOL/L (ref 132–146)

## 2024-08-08 PROCEDURE — 80048 BASIC METABOLIC PNL TOTAL CA: CPT

## 2024-08-08 PROCEDURE — 83880 ASSAY OF NATRIURETIC PEPTIDE: CPT

## 2024-08-08 PROCEDURE — 36415 COLL VENOUS BLD VENIPUNCTURE: CPT

## 2024-08-08 PROCEDURE — 99214 OFFICE O/P EST MOD 30 MIN: CPT

## 2024-08-08 RX ORDER — LEVOCETIRIZINE DIHYDROCHLORIDE 5 MG/1
5 TABLET, FILM COATED ORAL NIGHTLY
COMMUNITY

## 2024-08-08 ASSESSMENT — PATIENT HEALTH QUESTIONNAIRE - PHQ9
SUM OF ALL RESPONSES TO PHQ9 QUESTIONS 1 & 2: 0
SUM OF ALL RESPONSES TO PHQ QUESTIONS 1-9: 0
1. LITTLE INTEREST OR PLEASURE IN DOING THINGS: NOT AT ALL
SUM OF ALL RESPONSES TO PHQ QUESTIONS 1-9: 0
2. FEELING DOWN, DEPRESSED OR HOPELESS: NOT AT ALL
SUM OF ALL RESPONSES TO PHQ QUESTIONS 1-9: 0
SUM OF ALL RESPONSES TO PHQ QUESTIONS 1-9: 0

## 2024-08-08 NOTE — TELEPHONE ENCOUNTER
Pt is requesting paperwork for handicap placard and would like to  today if possible.     Last seen 7/31/2024  Next appt 8/30/2024    Requested Prescriptions      No prescriptions requested or ordered in this encounter

## 2024-08-08 NOTE — PROGRESS NOTES
Congestive Heart Failure Clinic   [facility]    Referring Provider:   Primary Care Physician: Morales Machuca DO   Cardiologist:   Nephrologist: [facility]    HISTORY OF PRESENT ILLNESS:     Murtaza Martinez is a 68 y.o. (1955) male with a history of HFpEF (EF> 50%)  Pre Cupid:     Lab Results   Component Value Date    LVEF 53 08/22/2023     He presents to the CHF clinic for ongoing evaluation and monitoring of heart failure.    In the CHF clinic today he denies any adverse symptoms except:  [] Dizziness or lightheadedness   [] Syncope or near syncope  [] Recent Fall  [] Shortness of breath at rest   [] Dyspnea with exertion  [] Decline in functional capacity (unable to perform activities they had previously been able to do)  [x] Fatigue   [] Orthopnea  [] PND  [] Nocturnal cough  [] Hemoptysis  [] Chest pain, pressure, or discomfort  [] Palpitations  [] Abdominal distention  [] Abdominal bloating  [] Early satiety  [] Blood in stool   [] Diarrhea  [] Constipation  [] Nausea/Vomiting  [] Decreased urinary response to oral diuretic   [] Scrotal swelling   [] Lower extremity edema  [] Used PRN doses of oral diuretic   [] Weight gain     Wt Readings from Last 3 Encounters:   08/08/24 101.2 kg (223 lb)   07/31/24 97.5 kg (215 lb)   07/18/24 100.7 kg (222 lb)     SOCIAL HISTORY:  [x] Denies tobacco, alcohol or illicit drug abuse  [] Tobacco use:  [] ETOH use:  [] Illicit drug use:        MEDICATIONS:    Allergies   Allergen Reactions    Pcn [Penicillins]      Child went to hospital   ? Reaction  Patient tolerates cephalosporins    Sulfa Antibiotics      ?     Prior to Visit Medications    Medication Sig Taking? Authorizing Provider   levocetirizine (XYZAL) 5 MG tablet Take 1 tablet by mouth nightly Yes Provider, MD Lesvia   dutasteride (AVODART) 0.5 MG capsule Take 1 capsule by mouth daily  Morales Machuca DO   tadalafil

## 2024-08-08 NOTE — PLAN OF CARE
Problem: Chronic Conditions and Co-morbidities  Goal: Patient's chronic conditions and co-morbidity symptoms are monitored and maintained or improved  Flowsheets (Taken 8/8/2024 2715)  Care Plan - Patient's Chronic Conditions and Co-Morbidity Symptoms are Monitored and Maintained or Improved: Monitor and assess patient's chronic conditions and comorbid symptoms for stability, deterioration, or improvement

## 2024-08-16 ENCOUNTER — OFFICE VISIT (OUTPATIENT)
Dept: CARDIOLOGY CLINIC | Age: 69
End: 2024-08-16
Payer: MEDICARE

## 2024-08-16 VITALS
SYSTOLIC BLOOD PRESSURE: 114 MMHG | DIASTOLIC BLOOD PRESSURE: 62 MMHG | HEIGHT: 66 IN | RESPIRATION RATE: 16 BRPM | HEART RATE: 97 BPM | BODY MASS INDEX: 36.96 KG/M2 | WEIGHT: 230 LBS

## 2024-08-16 DIAGNOSIS — I25.10 CORONARY ARTERY DISEASE INVOLVING NATIVE CORONARY ARTERY OF NATIVE HEART WITHOUT ANGINA PECTORIS: Primary | ICD-10-CM

## 2024-08-16 PROCEDURE — 1036F TOBACCO NON-USER: CPT | Performed by: INTERNAL MEDICINE

## 2024-08-16 PROCEDURE — 99214 OFFICE O/P EST MOD 30 MIN: CPT | Performed by: INTERNAL MEDICINE

## 2024-08-16 PROCEDURE — 3078F DIAST BP <80 MM HG: CPT | Performed by: INTERNAL MEDICINE

## 2024-08-16 PROCEDURE — 93000 ELECTROCARDIOGRAM COMPLETE: CPT | Performed by: INTERNAL MEDICINE

## 2024-08-16 PROCEDURE — 3074F SYST BP LT 130 MM HG: CPT | Performed by: INTERNAL MEDICINE

## 2024-08-16 PROCEDURE — 1123F ACP DISCUSS/DSCN MKR DOCD: CPT | Performed by: INTERNAL MEDICINE

## 2024-08-16 PROCEDURE — G8417 CALC BMI ABV UP PARAM F/U: HCPCS | Performed by: INTERNAL MEDICINE

## 2024-08-16 PROCEDURE — 3017F COLORECTAL CA SCREEN DOC REV: CPT | Performed by: INTERNAL MEDICINE

## 2024-08-16 PROCEDURE — G8427 DOCREV CUR MEDS BY ELIG CLIN: HCPCS | Performed by: INTERNAL MEDICINE

## 2024-08-16 NOTE — PROGRESS NOTES
[facility] Cardiology Progress Note  Dr. Vitor Daniels      Referring Physician: Morales Machuca DO  CHIEF COMPLAINT:   Chief Complaint   Patient presents with    Atrial Fibrillation     6 month        HISTORY OF PRESENT ILLNESS:   Patient is 68 year old male with history of CHF, CAD, DM, hypertension, hyperlipidemia and COPD is here for a follow up appointment.  Shortness of breath is at baseline, + pedal edema, occasional palpitations,no PND, no orthopnea, no syncope, no presyncopal episodes.  Functional capacity is at baseline    Past Medical History:   Diagnosis Date    Acid reflux     Acute diastolic (congestive) heart failure ([facility]) 06/19/2019    Arthritis     Asthma 04/16/2014    Asthmatic bronchitis , chronic ([facility]) 11/28/2016    CAD (coronary artery disease)     Chronic bronchitis ([facility]) 04/16/2014    COPD (chronic obstructive pulmonary disease) ([facility])     CB    COPD (chronic obstructive pulmonary disease) ([facility])     Diabetes mellitus ([facility])     Emphysema (subcutaneous) (surgical) resulting from a procedure     H/O cardiovascular stress test 04/24/2021    Lexiscan    Hyperlipidemia     Hypertension     Hypoxemia requiring supplemental oxygen 02/02/2015    LONG TERM ANTICOAGULENT USE     Morbid obesity with BMI of 50.0-59.9, adult ([facility]) 11/27/2013    Obesity     Osteoarthritis     Sleep apnea     bilevel positive airway pressure at 13/8 with 2 L oxygen flow     Stage 3 chronic kidney disease ([facility])     Tobacco abuse     Type II or unspecified type diabetes mellitus without mention of complication, not stated as uncontrolled          Past Surgical History:   Procedure Laterality Date    COLONOSCOPY      CORONARY ANGIOPLASTY WITH STENT PLACEMENT  07/23/2013    Left main focal eccentric 65% distal stenosis. Large OM1 CX 50% ostial & prox narrow. Large ramus artery & LAD: Minor plaque w/o sign narrow. RCA: Dom vessel w/25% prox narrow & focal hazy eccentric 99% distal stenosis before origin RPDA & RPLCA. LV: Mild inferior 
bundle branch block, poor R wave progression, nonspecific T wave changes    EKG 6/1/2023, atrial fibrillation, rapid ventricular response, low voltage QRS in the limb leads, poor R wave progression, bundle branch block with right axis deviation    EKG 2/16/2020, sinus bradycardia, nonspecific T wave changes    EKG 10/28/21 Sinus tachycardia  Incomplete right bundle branch block  Septal infarct , new  Inferior injury pattern  Consider right ventricular involvement in acute inferior infarct  Abnormal ECG     ECHO:6/14/21 Summary   Left ventricular size is grossly normal.   Moderate left ventricular concentric hypertrophy noted.   Ejection fraction is visually estimated at 45%.   No evidence of left ventricular mass or thrombus noted.   The left atrium is moderately dilated   Poor images for bubble study   Mildly dilated right ventricle.   Mildly enlarged right atrium size.   Moderate mitral regurgitation is present.   No evidence of mitral valve stenosis.   The aortic valve is trileaflet.   The aortic valve appears mildly sclerotic.   Physiologic and/or trace aortic regurgitation is noted.   Mild aortic stenosis.   The aortic valve area is 1.75 cm2 with a maximum gradient of 24.82 mmHg   and a mean gradient of 14.08 mmHg.   Moderate tricuspid regurgitation.   Irregular rhythm    Stress Test: 4/24/21       The myocardial perfusion imaging was normal with attenuation.       Overall left ventricular systolic function was normal without regional   wall motion abnormalities.       Overall low risk study.             CABG/MVR 9/9/13 Sternotomy; coronary artery bypass grafting x2, left internal   mammary artery to the LAD, saphenous vein graft to the ramus intermedius;   mitral valve repair using 30-mm Future complete ring with magic stitch   between A3 and P3; rigid internal fixation of the sternum using KLS plates   x2; and endoscopic vein harvesting    Cardiology Labs: BMP:    Lab Results   Component Value Date/Time    NA

## 2024-08-22 ENCOUNTER — TELEPHONE (OUTPATIENT)
Dept: FAMILY MEDICINE CLINIC | Age: 69
End: 2024-08-22

## 2024-08-22 DIAGNOSIS — I87.2 VENOUS INSUFFICIENCY (CHRONIC) (PERIPHERAL): ICD-10-CM

## 2024-08-22 LAB
LEFT VENTRICULAR EJECTION FRACTION HIGH VALUE: 55 %
LEFT VENTRICULAR EJECTION FRACTION HIGH VALUE: 55 %
LEFT VENTRICULAR EJECTION FRACTION MODE: NORMAL
LV EF: 50 %
LV EF: 50 %

## 2024-08-22 RX ORDER — BUMETANIDE 1 MG/1
1 TABLET ORAL DAILY
Qty: 90 TABLET | Refills: 3 | Status: SHIPPED | OUTPATIENT
Start: 2024-08-22

## 2024-08-22 NOTE — TELEPHONE ENCOUNTER
Kelly/Dandelion called on behalf of patient to ask Dr. Machuca to send RX for Freestyle Yas 14 Day Sensors to [facility], since his insurance changed and will only be covered through them.  Kelly stated patient is completely out of Sensors and he doesn't have any test strips or lancets either to check his blood sugar.    [facility] -  F - [phone]  P - [phone]      Last seen 7/31/2024  Next appt 8/30/2024    Requested Prescriptions      No prescriptions requested or ordered in this encounter

## 2024-08-22 NOTE — TELEPHONE ENCOUNTER
Last seen 7/31/2024  Next appt 8/30/2024    Requested Prescriptions     Pending Prescriptions Disp Refills    bumetanide (BUMEX) 1 MG tablet [Pharmacy Med Name: Bumetanide Oral Tablet 1 MG] 90 tablet 0     Sig: TAKE ONE TABLET BY MOUTH DAILY

## 2024-08-28 DIAGNOSIS — I25.10 CORONARY ARTERY DISEASE INVOLVING NATIVE CORONARY ARTERY OF NATIVE HEART WITHOUT ANGINA PECTORIS: ICD-10-CM

## 2024-08-28 DIAGNOSIS — E78.2 MIXED HYPERLIPIDEMIA: ICD-10-CM

## 2024-08-28 DIAGNOSIS — N40.1 BENIGN PROSTATIC HYPERPLASIA WITH WEAK URINARY STREAM: ICD-10-CM

## 2024-08-28 DIAGNOSIS — E11.42 TYPE 2 DIABETES MELLITUS WITH DIABETIC POLYNEUROPATHY, WITHOUT LONG-TERM CURRENT USE OF INSULIN (HCC): ICD-10-CM

## 2024-08-28 DIAGNOSIS — Z79.4 TYPE 2 DIABETES MELLITUS WITH HYPERGLYCEMIA, WITH LONG-TERM CURRENT USE OF INSULIN (HCC): ICD-10-CM

## 2024-08-28 DIAGNOSIS — R53.82 CHRONIC FATIGUE: ICD-10-CM

## 2024-08-28 DIAGNOSIS — R39.12 BENIGN PROSTATIC HYPERPLASIA WITH WEAK URINARY STREAM: ICD-10-CM

## 2024-08-28 DIAGNOSIS — E11.65 TYPE 2 DIABETES MELLITUS WITH HYPERGLYCEMIA, WITH LONG-TERM CURRENT USE OF INSULIN (HCC): ICD-10-CM

## 2024-08-28 LAB
ALBUMIN: 3.9 G/DL (ref 3.5–5.2)
ALP BLD-CCNC: 131 U/L (ref 40–129)
ALT SERPL-CCNC: 13 U/L (ref 0–40)
ANION GAP SERPL CALCULATED.3IONS-SCNC: 11 MMOL/L (ref 7–16)
AST SERPL-CCNC: 17 U/L (ref 0–39)
BASOPHILS ABSOLUTE: 0.04 K/UL (ref 0–0.2)
BASOPHILS RELATIVE PERCENT: 1 % (ref 0–2)
BILIRUB SERPL-MCNC: 1.3 MG/DL (ref 0–1.2)
BUN BLDV-MCNC: 20 MG/DL (ref 6–23)
CALCIUM SERPL-MCNC: 9.6 MG/DL (ref 8.6–10.2)
CHLORIDE BLD-SCNC: 101 MMOL/L (ref 98–107)
CO2: 27 MMOL/L (ref 22–29)
CREAT SERPL-MCNC: 1.1 MG/DL (ref 0.7–1.2)
EOSINOPHILS ABSOLUTE: 0.12 K/UL (ref 0.05–0.5)
EOSINOPHILS RELATIVE PERCENT: 2 % (ref 0–6)
GFR, ESTIMATED: 76 ML/MIN/1.73M2
GLUCOSE BLD-MCNC: 137 MG/DL (ref 74–99)
HCT VFR BLD CALC: 44.4 % (ref 37–54)
HEMOGLOBIN: 14.3 G/DL (ref 12.5–16.5)
IMMATURE GRANULOCYTES %: 1 % (ref 0–5)
IMMATURE GRANULOCYTES ABSOLUTE: 0.04 K/UL (ref 0–0.58)
LYMPHOCYTES ABSOLUTE: 0.88 K/UL (ref 1.5–4)
LYMPHOCYTES RELATIVE PERCENT: 15 % (ref 20–42)
MCH RBC QN AUTO: 33.5 PG (ref 26–35)
MCHC RBC AUTO-ENTMCNC: 32.2 G/DL (ref 32–34.5)
MCV RBC AUTO: 104 FL (ref 80–99.9)
MONOCYTES ABSOLUTE: 0.71 K/UL (ref 0.1–0.95)
MONOCYTES RELATIVE PERCENT: 12 % (ref 2–12)
NEUTROPHILS ABSOLUTE: 4.23 K/UL (ref 1.8–7.3)
NEUTROPHILS RELATIVE PERCENT: 70 % (ref 43–80)
PDW BLD-RTO: 16.6 % (ref 11.5–15)
PLATELET # BLD: 149 K/UL (ref 130–450)
PMV BLD AUTO: 10.7 FL (ref 7–12)
POTASSIUM SERPL-SCNC: 4.7 MMOL/L (ref 3.5–5)
RBC # BLD: 4.27 M/UL (ref 3.8–5.8)
SODIUM BLD-SCNC: 139 MMOL/L (ref 132–146)
TOTAL PROTEIN: 7.4 G/DL (ref 6.4–8.3)
TSH SERPL DL<=0.05 MIU/L-ACNC: 0.04 UIU/ML (ref 0.27–4.2)
WBC # BLD: 6 K/UL (ref 4.5–11.5)

## 2024-08-29 LAB — HBA1C MFR BLD: 6.7 % (ref 4–5.6)

## 2024-08-30 ENCOUNTER — OFFICE VISIT (OUTPATIENT)
Dept: FAMILY MEDICINE CLINIC | Age: 69
End: 2024-08-30
Payer: MEDICARE

## 2024-08-30 VITALS
DIASTOLIC BLOOD PRESSURE: 80 MMHG | HEIGHT: 66 IN | SYSTOLIC BLOOD PRESSURE: 118 MMHG | OXYGEN SATURATION: 97 % | RESPIRATION RATE: 18 BRPM | HEART RATE: 89 BPM | BODY MASS INDEX: 36.64 KG/M2 | WEIGHT: 228 LBS

## 2024-08-30 DIAGNOSIS — R53.82 CHRONIC FATIGUE: ICD-10-CM

## 2024-08-30 DIAGNOSIS — E78.2 MIXED HYPERLIPIDEMIA: ICD-10-CM

## 2024-08-30 DIAGNOSIS — Z87.891 PERSONAL HISTORY OF TOBACCO USE: ICD-10-CM

## 2024-08-30 DIAGNOSIS — N40.1 BENIGN PROSTATIC HYPERPLASIA WITH WEAK URINARY STREAM: Primary | ICD-10-CM

## 2024-08-30 DIAGNOSIS — Z00.00 MEDICARE ANNUAL WELLNESS VISIT, SUBSEQUENT: ICD-10-CM

## 2024-08-30 DIAGNOSIS — R39.12 BENIGN PROSTATIC HYPERPLASIA WITH WEAK URINARY STREAM: Primary | ICD-10-CM

## 2024-08-30 DIAGNOSIS — K21.9 GASTROESOPHAGEAL REFLUX DISEASE WITHOUT ESOPHAGITIS: ICD-10-CM

## 2024-08-30 DIAGNOSIS — J42 CHRONIC BRONCHITIS, UNSPECIFIED CHRONIC BRONCHITIS TYPE (HCC): ICD-10-CM

## 2024-08-30 DIAGNOSIS — I48.0 PAF (PAROXYSMAL ATRIAL FIBRILLATION) (HCC): ICD-10-CM

## 2024-08-30 DIAGNOSIS — E11.42 DIABETIC PERIPHERAL NEUROPATHY (HCC): ICD-10-CM

## 2024-08-30 DIAGNOSIS — E11.42 TYPE 2 DIABETES MELLITUS WITH DIABETIC POLYNEUROPATHY, WITHOUT LONG-TERM CURRENT USE OF INSULIN (HCC): ICD-10-CM

## 2024-08-30 DIAGNOSIS — E03.9 ACQUIRED HYPOTHYROIDISM: ICD-10-CM

## 2024-08-30 DIAGNOSIS — J30.1 SEASONAL ALLERGIC RHINITIS DUE TO POLLEN: ICD-10-CM

## 2024-08-30 PROCEDURE — 1036F TOBACCO NON-USER: CPT | Performed by: FAMILY MEDICINE

## 2024-08-30 PROCEDURE — G8427 DOCREV CUR MEDS BY ELIG CLIN: HCPCS | Performed by: FAMILY MEDICINE

## 2024-08-30 PROCEDURE — 3023F SPIROM DOC REV: CPT | Performed by: FAMILY MEDICINE

## 2024-08-30 PROCEDURE — 3017F COLORECTAL CA SCREEN DOC REV: CPT | Performed by: FAMILY MEDICINE

## 2024-08-30 PROCEDURE — 99214 OFFICE O/P EST MOD 30 MIN: CPT | Performed by: FAMILY MEDICINE

## 2024-08-30 PROCEDURE — 3044F HG A1C LEVEL LT 7.0%: CPT | Performed by: FAMILY MEDICINE

## 2024-08-30 PROCEDURE — 2022F DILAT RTA XM EVC RTNOPTHY: CPT | Performed by: FAMILY MEDICINE

## 2024-08-30 PROCEDURE — G0439 PPPS, SUBSEQ VISIT: HCPCS | Performed by: FAMILY MEDICINE

## 2024-08-30 PROCEDURE — 1123F ACP DISCUSS/DSCN MKR DOCD: CPT | Performed by: FAMILY MEDICINE

## 2024-08-30 PROCEDURE — G8417 CALC BMI ABV UP PARAM F/U: HCPCS | Performed by: FAMILY MEDICINE

## 2024-08-30 PROCEDURE — 3079F DIAST BP 80-89 MM HG: CPT | Performed by: FAMILY MEDICINE

## 2024-08-30 PROCEDURE — G0296 VISIT TO DETERM LDCT ELIG: HCPCS | Performed by: FAMILY MEDICINE

## 2024-08-30 PROCEDURE — 3074F SYST BP LT 130 MM HG: CPT | Performed by: FAMILY MEDICINE

## 2024-08-30 RX ORDER — TRAZODONE HYDROCHLORIDE 50 MG/1
50 TABLET, FILM COATED ORAL NIGHTLY
Qty: 90 TABLET | Refills: 2 | Status: SHIPPED | OUTPATIENT
Start: 2024-08-30

## 2024-08-30 RX ORDER — METOPROLOL SUCCINATE 25 MG/1
25 TABLET, EXTENDED RELEASE ORAL DAILY
Qty: 90 TABLET | Refills: 2 | Status: SHIPPED | OUTPATIENT
Start: 2024-08-30

## 2024-08-30 RX ORDER — PANTOPRAZOLE SODIUM 40 MG/1
40 TABLET, DELAYED RELEASE ORAL DAILY
Qty: 90 TABLET | Refills: 5 | Status: SHIPPED | OUTPATIENT
Start: 2024-08-30

## 2024-08-30 RX ORDER — POTASSIUM CHLORIDE 1500 MG/1
20 TABLET, EXTENDED RELEASE ORAL DAILY
Qty: 90 TABLET | Refills: 2 | Status: SHIPPED | OUTPATIENT
Start: 2024-08-30 | End: 2025-05-27

## 2024-08-30 RX ORDER — DULOXETIN HYDROCHLORIDE 30 MG/1
30 CAPSULE, DELAYED RELEASE ORAL DAILY
Qty: 90 CAPSULE | Refills: 4 | Status: SHIPPED | OUTPATIENT
Start: 2024-08-30

## 2024-08-30 RX ORDER — TADALAFIL 5 MG/1
5 TABLET ORAL DAILY
Qty: 90 TABLET | Refills: 4 | Status: SHIPPED | OUTPATIENT
Start: 2024-08-30

## 2024-08-30 ASSESSMENT — PATIENT HEALTH QUESTIONNAIRE - PHQ9
9. THOUGHTS THAT YOU WOULD BE BETTER OFF DEAD, OR OF HURTING YOURSELF: NOT AT ALL
3. TROUBLE FALLING OR STAYING ASLEEP: NOT AT ALL
SUM OF ALL RESPONSES TO PHQ9 QUESTIONS 1 & 2: 0
7. TROUBLE CONCENTRATING ON THINGS, SUCH AS READING THE NEWSPAPER OR WATCHING TELEVISION: NOT AT ALL
8. MOVING OR SPEAKING SO SLOWLY THAT OTHER PEOPLE COULD HAVE NOTICED. OR THE OPPOSITE, BEING SO FIGETY OR RESTLESS THAT YOU HAVE BEEN MOVING AROUND A LOT MORE THAN USUAL: NOT AT ALL
SUM OF ALL RESPONSES TO PHQ QUESTIONS 1-9: 0
5. POOR APPETITE OR OVEREATING: NOT AT ALL
SUM OF ALL RESPONSES TO PHQ QUESTIONS 1-9: 0
1. LITTLE INTEREST OR PLEASURE IN DOING THINGS: NOT AT ALL
10. IF YOU CHECKED OFF ANY PROBLEMS, HOW DIFFICULT HAVE THESE PROBLEMS MADE IT FOR YOU TO DO YOUR WORK, TAKE CARE OF THINGS AT HOME, OR GET ALONG WITH OTHER PEOPLE: NOT DIFFICULT AT ALL
4. FEELING TIRED OR HAVING LITTLE ENERGY: NOT AT ALL
SUM OF ALL RESPONSES TO PHQ QUESTIONS 1-9: 0
2. FEELING DOWN, DEPRESSED OR HOPELESS: NOT AT ALL
6. FEELING BAD ABOUT YOURSELF - OR THAT YOU ARE A FAILURE OR HAVE LET YOURSELF OR YOUR FAMILY DOWN: NOT AT ALL
SUM OF ALL RESPONSES TO PHQ QUESTIONS 1-9: 0

## 2024-08-30 ASSESSMENT — LIFESTYLE VARIABLES: HOW OFTEN DO YOU HAVE A DRINK CONTAINING ALCOHOL: NEVER

## 2024-08-30 NOTE — PROGRESS NOTES
Murtaza Martinez is a 68 y.o. male  .  Subjective:      Has a lot of redundant skin . Causes a lot of skin breakdown, candida and boils. May be of benefit from this standpoint and from a musculoskeletal standpoint. Discussed ed. Discussed bph. If no improvement will send to urology. Discussed diet and exercise at length. Discussed sugars. Discussed cardiology follow up. Discussed bw.         Review of Systems    Past Medical History:   Diagnosis Date    Acid reflux     Acute diastolic (congestive) heart failure ([facility]) 2019    Arthritis     Asthma 2014    Asthmatic bronchitis , chronic ([facility]) 2016    CAD (coronary artery disease)     Chronic bronchitis ([facility]) 2014    COPD (chronic obstructive pulmonary disease) ([facility])     CB    COPD (chronic obstructive pulmonary disease) ([facility])     Diabetes mellitus ([facility])     Emphysema (subcutaneous) (surgical) resulting from a procedure     H/O cardiovascular stress test 2021    Lexiscan    Hyperlipidemia     Hypertension     Hypoxemia requiring supplemental oxygen 2015    LONG TERM ANTICOAGULENT USE     Morbid obesity with BMI of 50.0-59.9, adult ([facility]) 2013    Obesity     Osteoarthritis     Sleep apnea     bilevel positive airway pressure at 13/8 with 2 L oxygen flow     Stage 3 chronic kidney disease ([facility])     Tobacco abuse     Type II or unspecified type diabetes mellitus without mention of complication, not stated as uncontrolled        Social History     Socioeconomic History    Marital status:      Spouse name: Marium    Number of children: 1    Years of education: Not on file    Highest education level: 11th grade   Occupational History    Not on file   Tobacco Use    Smoking status: Former     Current packs/day: 0.00     Average packs/day: 1 pack/day for 45.0 years (45.0 ttl pk-yrs)     Types: Cigarettes     Start date: 1968     Quit date: 2013     Years since quittin.1    Smokeless tobacco: Former     Types: Chew

## 2024-09-05 ENCOUNTER — TELEPHONE (OUTPATIENT)
Dept: FAMILY MEDICINE CLINIC | Age: 69
End: 2024-09-05

## 2024-09-05 ENCOUNTER — HOSPITAL ENCOUNTER (OUTPATIENT)
Dept: OTHER | Age: 69
Setting detail: THERAPIES SERIES
Discharge: HOME | End: 2024-09-05
Payer: MEDICARE

## 2024-09-05 VITALS
HEART RATE: 84 BPM | RESPIRATION RATE: 18 BRPM | WEIGHT: 227 LBS | DIASTOLIC BLOOD PRESSURE: 68 MMHG | BODY MASS INDEX: 36.64 KG/M2 | SYSTOLIC BLOOD PRESSURE: 131 MMHG | OXYGEN SATURATION: 98 %

## 2024-09-05 LAB
ANION GAP SERPL CALCULATED.3IONS-SCNC: 9 MMOL/L (ref 7–16)
BNP SERPL-MCNC: 3588 PG/ML (ref 0–450)
BUN SERPL-MCNC: 15 MG/DL (ref 6–23)
CALCIUM SERPL-MCNC: 8.7 MG/DL (ref 8.6–10.2)
CHLORIDE SERPL-SCNC: 105 MMOL/L (ref 98–107)
CO2 SERPL-SCNC: 26 MMOL/L (ref 22–29)
CREAT SERPL-MCNC: 1.1 MG/DL (ref 0.7–1.2)
GFR, ESTIMATED: 72 ML/MIN/1.73M2
GLUCOSE SERPL-MCNC: 141 MG/DL (ref 74–99)
POTASSIUM SERPL-SCNC: 3.8 MMOL/L (ref 3.5–5)
SODIUM SERPL-SCNC: 140 MMOL/L (ref 132–146)

## 2024-09-05 PROCEDURE — 96374 THER/PROPH/DIAG INJ IV PUSH: CPT

## 2024-09-05 PROCEDURE — 99214 OFFICE O/P EST MOD 30 MIN: CPT

## 2024-09-05 PROCEDURE — 36415 COLL VENOUS BLD VENIPUNCTURE: CPT

## 2024-09-05 PROCEDURE — 2580000003 HC RX 258: Performed by: INTERNAL MEDICINE

## 2024-09-05 PROCEDURE — 6360000002 HC RX W HCPCS: Performed by: INTERNAL MEDICINE

## 2024-09-05 PROCEDURE — 83880 ASSAY OF NATRIURETIC PEPTIDE: CPT

## 2024-09-05 PROCEDURE — 80048 BASIC METABOLIC PNL TOTAL CA: CPT

## 2024-09-05 RX ORDER — BUMETANIDE 0.25 MG/ML
2 INJECTION INTRAMUSCULAR; INTRAVENOUS ONCE
Status: COMPLETED | OUTPATIENT
Start: 2024-09-05 | End: 2024-09-05

## 2024-09-05 RX ORDER — SODIUM CHLORIDE 0.9 % (FLUSH) 0.9 %
5-40 SYRINGE (ML) INJECTION ONCE
Status: COMPLETED | OUTPATIENT
Start: 2024-09-05 | End: 2024-09-05

## 2024-09-05 RX ADMIN — SODIUM CHLORIDE, PRESERVATIVE FREE 10 ML: 5 INJECTION INTRAVENOUS at 07:48

## 2024-09-05 RX ADMIN — BUMETANIDE 2 MG: 0.25 INJECTION INTRAMUSCULAR; INTRAVENOUS at 07:47

## 2024-09-05 ASSESSMENT — PATIENT HEALTH QUESTIONNAIRE - PHQ9
SUM OF ALL RESPONSES TO PHQ QUESTIONS 1-9: 1
2. FEELING DOWN, DEPRESSED OR HOPELESS: SEVERAL DAYS
SUM OF ALL RESPONSES TO PHQ9 QUESTIONS 1 & 2: 1
SUM OF ALL RESPONSES TO PHQ QUESTIONS 1-9: 1
1. LITTLE INTEREST OR PLEASURE IN DOING THINGS: NOT AT ALL
SUM OF ALL RESPONSES TO PHQ QUESTIONS 1-9: 1
SUM OF ALL RESPONSES TO PHQ QUESTIONS 1-9: 1

## 2024-09-05 NOTE — PROGRESS NOTES
Congestive Heart Failure Clinic   [facility]    Referring Provider:   Primary Care Physician: Morales Machuca DO   Cardiologist:   Nephrologist: [facility]    HISTORY OF PRESENT ILLNESS:     Murtaza Martinez is a 68 y.o. (1955) male with a history of HFpEF (EF> 50%)  Pre Cupid:     Lab Results   Component Value Date    LVEF 50 08/22/2024     He presents to the CHF clinic for ongoing evaluation and monitoring of heart failure.    In the CHF clinic today he denies any adverse symptoms except:    [] Dizziness or lightheadedness   [] Syncope or near syncope  [] Recent Fall  [] Shortness of breath at rest   [] Dyspnea with exertion  [] Decline in functional capacity (unable to perform activities they had previously been able to do)  [x] Fatigue   [] Orthopnea  [] PND  [] Nocturnal cough  [] Hemoptysis  [] Chest pain, pressure, or discomfort  [] Palpitations  [] Abdominal distention  [] Abdominal bloating  [] Early satiety  [] Blood in stool   [] Diarrhea  [] Constipation  [] Nausea/Vomiting  [] Decreased urinary response to oral diuretic   [] Scrotal swelling   [x] Lower extremity edema  [] Used PRN doses of oral diuretic   [x] Weight gain 5 lbs in one month      Wt Readings from Last 3 Encounters:   09/05/24 103 kg (227 lb)   08/30/24 103.4 kg (228 lb)   08/16/24 104.3 kg (230 lb)     SOCIAL HISTORY:  [x] Denies tobacco, alcohol or illicit drug abuse  [] Tobacco use:  [] ETOH use:  [] Illicit drug use:        MEDICATIONS:    Allergies   Allergen Reactions    Pcn [Penicillins]      Child went to hospital   ? Reaction  Patient tolerates cephalosporins    Sulfa Antibiotics      ?     Prior to Visit Medications    Medication Sig Taking? Authorizing Provider   potassium chloride (KLOR-CON M) 20 MEQ extended release tablet Take 1 tablet by mouth daily Yes Morales Machuca DO   DULoxetine (CYMBALTA) 30 MG extended release capsule Take 1

## 2024-09-05 NOTE — TELEPHONE ENCOUNTER
I spoke w/patient who informed me that he was put on Tadalafil for his prostate and doesn't feel like it's working.  Please advise.     Last seen 8/30/2024  Next appt 12/4/2024    Requested Prescriptions      No prescriptions requested or ordered in this encounter      [facility]

## 2024-09-05 NOTE — TELEPHONE ENCOUNTER
Per Dr. Machuca -    It may take some time. If this does not work the next step is urology evaluation

## 2024-09-06 ENCOUNTER — TELEPHONE (OUTPATIENT)
Dept: FAMILY MEDICINE CLINIC | Age: 69
End: 2024-09-06

## 2024-09-06 NOTE — TELEPHONE ENCOUNTER
Anibal/[employer] called to follow up on fax she is resending which is needing to be reviewed by Dr. Machuca and either approved or denied.  She is asking to receive a call back once the fax is rec'd.      Last seen 8/30/2024  Next appt 12/4/2024    Requested Prescriptions      No prescriptions requested or ordered in this encounter

## 2024-09-08 DIAGNOSIS — N52.9 ERECTILE DYSFUNCTION, UNSPECIFIED ERECTILE DYSFUNCTION TYPE: ICD-10-CM

## 2024-09-08 DIAGNOSIS — N40.1 BENIGN PROSTATIC HYPERPLASIA WITH WEAK URINARY STREAM: Primary | ICD-10-CM

## 2024-09-08 DIAGNOSIS — R39.12 BENIGN PROSTATIC HYPERPLASIA WITH WEAK URINARY STREAM: Primary | ICD-10-CM

## 2024-09-08 RX ORDER — SILDENAFIL 100 MG/1
100 TABLET, FILM COATED ORAL DAILY PRN
Qty: 14 TABLET | Refills: 3 | Status: SHIPPED | OUTPATIENT
Start: 2024-09-08

## 2024-09-10 ENCOUNTER — HOSPITAL ENCOUNTER (OUTPATIENT)
Dept: OTHER | Age: 69
Setting detail: THERAPIES SERIES
Discharge: HOME | End: 2024-09-10
Payer: MEDICARE

## 2024-09-23 ENCOUNTER — TELEPHONE (OUTPATIENT)
Dept: FAMILY MEDICINE CLINIC | Age: 69
End: 2024-09-23
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
## 2024-09-24 ENCOUNTER — HOSPITAL ENCOUNTER (OUTPATIENT)
Dept: OTHER | Age: 69
Setting detail: THERAPIES SERIES
Discharge: HOME | End: 2024-09-24
Payer: MEDICARE

## 2024-09-24 VITALS
SYSTOLIC BLOOD PRESSURE: 112 MMHG | HEART RATE: 107 BPM | DIASTOLIC BLOOD PRESSURE: 75 MMHG | WEIGHT: 220 LBS | BODY MASS INDEX: 35.51 KG/M2 | RESPIRATION RATE: 17 BRPM | OXYGEN SATURATION: 96 %

## 2024-09-24 LAB
ANION GAP SERPL CALCULATED.3IONS-SCNC: 9 MMOL/L (ref 7–16)
BNP SERPL-MCNC: 2572 PG/ML (ref 0–450)
BUN SERPL-MCNC: 16 MG/DL (ref 6–23)
CALCIUM SERPL-MCNC: 9.1 MG/DL (ref 8.6–10.2)
CHLORIDE SERPL-SCNC: 99 MMOL/L (ref 98–107)
CO2 SERPL-SCNC: 29 MMOL/L (ref 22–29)
CREAT SERPL-MCNC: 0.9 MG/DL (ref 0.7–1.2)
GFR, ESTIMATED: >90 ML/MIN/1.73M2
GLUCOSE SERPL-MCNC: 162 MG/DL (ref 74–99)
POTASSIUM SERPL-SCNC: 3.9 MMOL/L (ref 3.5–5)
SODIUM SERPL-SCNC: 137 MMOL/L (ref 132–146)

## 2024-09-24 PROCEDURE — 36415 COLL VENOUS BLD VENIPUNCTURE: CPT

## 2024-09-24 PROCEDURE — 99214 OFFICE O/P EST MOD 30 MIN: CPT

## 2024-09-24 PROCEDURE — 80048 BASIC METABOLIC PNL TOTAL CA: CPT

## 2024-09-24 PROCEDURE — 83880 ASSAY OF NATRIURETIC PEPTIDE: CPT

## 2024-10-11 DIAGNOSIS — M54.41 ACUTE BILATERAL LOW BACK PAIN WITH BILATERAL SCIATICA: Primary | ICD-10-CM

## 2024-10-11 DIAGNOSIS — M54.42 ACUTE BILATERAL LOW BACK PAIN WITH BILATERAL SCIATICA: Primary | ICD-10-CM

## 2024-10-12 DIAGNOSIS — M51.16 LUMBAR DISC DISEASE WITH RADICULOPATHY: Primary | ICD-10-CM

## 2024-10-16 ENCOUNTER — HOSPITAL ENCOUNTER (OUTPATIENT)
Dept: OTHER | Age: 69
Setting detail: THERAPIES SERIES
Discharge: HOME | End: 2024-10-16
Payer: MEDICARE

## 2024-10-16 VITALS
BODY MASS INDEX: 35.99 KG/M2 | DIASTOLIC BLOOD PRESSURE: 69 MMHG | OXYGEN SATURATION: 94 % | RESPIRATION RATE: 18 BRPM | WEIGHT: 223 LBS | SYSTOLIC BLOOD PRESSURE: 119 MMHG | HEART RATE: 74 BPM

## 2024-10-16 LAB
ANION GAP SERPL CALCULATED.3IONS-SCNC: 8 MMOL/L (ref 7–16)
BNP SERPL-MCNC: 3016 PG/ML (ref 0–450)
BUN SERPL-MCNC: 29 MG/DL (ref 6–23)
CALCIUM SERPL-MCNC: 8.9 MG/DL (ref 8.6–10.2)
CHLORIDE SERPL-SCNC: 102 MMOL/L (ref 98–107)
CO2 SERPL-SCNC: 27 MMOL/L (ref 22–29)
CREAT SERPL-MCNC: 1.4 MG/DL (ref 0.7–1.2)
GFR, ESTIMATED: 57 ML/MIN/1.73M2
GLUCOSE SERPL-MCNC: 187 MG/DL (ref 74–99)
POTASSIUM SERPL-SCNC: 3.7 MMOL/L (ref 3.5–5)
SODIUM SERPL-SCNC: 137 MMOL/L (ref 132–146)

## 2024-10-16 PROCEDURE — 99214 OFFICE O/P EST MOD 30 MIN: CPT

## 2024-10-16 PROCEDURE — 2580000003 HC RX 258: Performed by: INTERNAL MEDICINE

## 2024-10-16 PROCEDURE — 36415 COLL VENOUS BLD VENIPUNCTURE: CPT

## 2024-10-16 PROCEDURE — 80048 BASIC METABOLIC PNL TOTAL CA: CPT

## 2024-10-16 PROCEDURE — 96374 THER/PROPH/DIAG INJ IV PUSH: CPT

## 2024-10-16 PROCEDURE — 83880 ASSAY OF NATRIURETIC PEPTIDE: CPT

## 2024-10-16 PROCEDURE — 6360000002 HC RX W HCPCS: Performed by: INTERNAL MEDICINE

## 2024-10-16 RX ORDER — SODIUM CHLORIDE 0.9 % (FLUSH) 0.9 %
5-40 SYRINGE (ML) INJECTION 2 TIMES DAILY
Status: DISCONTINUED | OUTPATIENT
Start: 2024-10-16 | End: 2024-10-17 | Stop reason: HOSPADM

## 2024-10-16 RX ORDER — BUMETANIDE 0.25 MG/ML
2 INJECTION INTRAMUSCULAR; INTRAVENOUS ONCE
Status: COMPLETED | OUTPATIENT
Start: 2024-10-16 | End: 2024-10-16

## 2024-10-16 RX ADMIN — BUMETANIDE 2 MG: 0.25 INJECTION INTRAMUSCULAR; INTRAVENOUS at 07:47

## 2024-10-16 RX ADMIN — SODIUM CHLORIDE, PRESERVATIVE FREE 10 ML: 5 INJECTION INTRAVENOUS at 07:47

## 2024-10-16 NOTE — PROGRESS NOTES
Congestive Heart Failure Clinic   [facility]    Referring Provider:   Primary Care Physician: Morales Machuca DO   Cardiologist:   Nephrologist: [facility]    HISTORY OF PRESENT ILLNESS:     Murtaza Martinez is a 68 y.o. (1955) male with a history of HFpEF (EF> 50%)  Pre Cupid:     Lab Results   Component Value Date    LVEF 50 08/22/2024    LVEF 50 08/22/2024     He presents to the CHF clinic for ongoing evaluation and monitoring of heart failure.    In the CHF clinic today he denies any adverse symptoms except:    [] Dizziness or lightheadedness   [] Syncope or near syncope  [] Recent Fall  [] Shortness of breath at rest   [] Dyspnea with exertion  [] Decline in functional capacity (unable to perform activities they had previously been able to do)  [x] Fatigue   [] Orthopnea  [] PND  [] Nocturnal cough  [] Hemoptysis  [] Chest pain, pressure, or discomfort  [] Palpitations  [] Abdominal distention  [] Abdominal bloating  [] Early satiety  [] Blood in stool   [] Diarrhea  [] Constipation  [] Nausea/Vomiting  [] Decreased urinary response to oral diuretic   [] Scrotal swelling   [x] Lower extremity edema  [] Used PRN doses of oral diuretic   [x] Weight gain  3 pounds since 9/24/2024    Wt Readings from Last 3 Encounters:   10/16/24 101.2 kg (223 lb)   09/24/24 99.8 kg (220 lb)   09/05/24 103 kg (227 lb)     SOCIAL HISTORY:  [x] Denies tobacco, alcohol or illicit drug abuse  [] Tobacco use:  [] ETOH use:  [] Illicit drug use:        MEDICATIONS:    Allergies   Allergen Reactions    Pcn [Penicillins]      Child went to hospital   ? Reaction  Patient tolerates cephalosporins    Sulfa Antibiotics      ?     Prior to Visit Medications    Medication Sig Taking? Authorizing Provider   potassium chloride (KLOR-CON M) 20 MEQ extended release tablet Take 1 tablet by mouth daily Yes Morales Machuca DO   DULoxetine (CYMBALTA) 30 MG

## 2024-10-22 ENCOUNTER — TELEPHONE (OUTPATIENT)
Dept: FAMILY MEDICINE CLINIC | Age: 69
End: 2024-10-22

## 2024-10-22 DIAGNOSIS — I87.2 VENOUS INSUFFICIENCY (CHRONIC) (PERIPHERAL): ICD-10-CM

## 2024-10-22 DIAGNOSIS — E11.42 DIABETIC PERIPHERAL NEUROPATHY (HCC): ICD-10-CM

## 2024-10-22 RX ORDER — BUMETANIDE 1 MG/1
1 TABLET ORAL 2 TIMES DAILY
Qty: 180 TABLET | Refills: 3 | Status: SHIPPED | OUTPATIENT
Start: 2024-10-22

## 2024-10-22 RX ORDER — POTASSIUM CHLORIDE 1500 MG/1
20 TABLET, EXTENDED RELEASE ORAL 2 TIMES DAILY
Qty: 180 TABLET | Refills: 2 | Status: SHIPPED | OUTPATIENT
Start: 2024-10-22 | End: 2025-07-19

## 2024-10-22 RX ORDER — TRAZODONE HYDROCHLORIDE 50 MG/1
50 TABLET, FILM COATED ORAL NIGHTLY
Qty: 90 TABLET | Refills: 2 | Status: SHIPPED | OUTPATIENT
Start: 2024-10-22

## 2024-10-22 NOTE — TELEPHONE ENCOUNTER
Taylor with [facility] clinical staff stated the pt reported there were changes to the following prescriptions: Potassium chloride (KLOR-CON M) 20 MEQ extended release tablettraZODone (DESYREL) 50 MG tablet, bumetanide (BUMEX) 1 MG tablet. Pt stated he was told to take these BID. [facility] to confirm and if there has been a change, they would like new prescriptions to reflect the change.     Last seen 8/30/2024  Next appt 12/4/2024

## 2024-10-30 ENCOUNTER — HOSPITAL ENCOUNTER (OUTPATIENT)
Dept: OTHER | Age: 69
Setting detail: THERAPIES SERIES
Discharge: HOME | End: 2024-10-30
Payer: MEDICARE

## 2024-10-30 VITALS
SYSTOLIC BLOOD PRESSURE: 117 MMHG | HEART RATE: 97 BPM | DIASTOLIC BLOOD PRESSURE: 69 MMHG | RESPIRATION RATE: 18 BRPM | WEIGHT: 235 LBS | OXYGEN SATURATION: 93 % | BODY MASS INDEX: 37.93 KG/M2

## 2024-10-30 LAB
ANION GAP SERPL CALCULATED.3IONS-SCNC: 9 MMOL/L (ref 7–16)
BNP SERPL-MCNC: 2537 PG/ML (ref 0–450)
BUN SERPL-MCNC: 16 MG/DL (ref 6–23)
CALCIUM SERPL-MCNC: 8.8 MG/DL (ref 8.6–10.2)
CHLORIDE SERPL-SCNC: 99 MMOL/L (ref 98–107)
CO2 SERPL-SCNC: 28 MMOL/L (ref 22–29)
CREAT SERPL-MCNC: 0.8 MG/DL (ref 0.7–1.2)
GFR, ESTIMATED: >90 ML/MIN/1.73M2
GLUCOSE SERPL-MCNC: 185 MG/DL (ref 74–99)
POTASSIUM SERPL-SCNC: 3.9 MMOL/L (ref 3.5–5)
SODIUM SERPL-SCNC: 136 MMOL/L (ref 132–146)

## 2024-10-30 PROCEDURE — 96374 THER/PROPH/DIAG INJ IV PUSH: CPT

## 2024-10-30 PROCEDURE — 6360000002 HC RX W HCPCS: Performed by: INTERNAL MEDICINE

## 2024-10-30 PROCEDURE — 83880 ASSAY OF NATRIURETIC PEPTIDE: CPT

## 2024-10-30 PROCEDURE — 36415 COLL VENOUS BLD VENIPUNCTURE: CPT

## 2024-10-30 PROCEDURE — 99214 OFFICE O/P EST MOD 30 MIN: CPT

## 2024-10-30 PROCEDURE — 80048 BASIC METABOLIC PNL TOTAL CA: CPT

## 2024-10-30 PROCEDURE — 2580000003 HC RX 258: Performed by: INTERNAL MEDICINE

## 2024-10-30 RX ORDER — SODIUM CHLORIDE 0.9 % (FLUSH) 0.9 %
5-40 SYRINGE (ML) INJECTION 2 TIMES DAILY
Status: DISCONTINUED | OUTPATIENT
Start: 2024-10-30 | End: 2024-10-31 | Stop reason: HOSPADM

## 2024-10-30 RX ORDER — BUMETANIDE 0.25 MG/ML
2 INJECTION, SOLUTION INTRAMUSCULAR; INTRAVENOUS ONCE
Status: COMPLETED | OUTPATIENT
Start: 2024-10-30 | End: 2024-10-30

## 2024-10-30 RX ADMIN — SODIUM CHLORIDE, PRESERVATIVE FREE 10 ML: 5 INJECTION INTRAVENOUS at 08:54

## 2024-10-30 RX ADMIN — BUMETANIDE 2 MG: 0.25 INJECTION INTRAMUSCULAR; INTRAVENOUS at 08:53

## 2024-10-30 NOTE — PROGRESS NOTES
needed  Patient not taking: Reported on 10/16/2024  Provider, MD Lesvia        GUIDELINE DIRECTED MEDICAL THERAPY for HFpEF:  SGLT2i:  (2a indication)  Jardiance 10 mg daily  Aldosterone antagonist: (2b indication)  No  ARNI/ACE I/ARB: (2b indication, with LVEF on lower end of spectrum)  No    Diuretic Management: Bumex 2 mg BID     HEART FAILURE FOCUSED PHYSICAL EXAMINATION:    Vitals:    10/30/24 0845   BP: 117/69   Pulse:    Resp:    SpO2:                       HEENT (Head, Ears, Eyes, Nose, & Throat)  HEENT (WDL): Within Defined Limits  Respiratory  Respiratory Pattern: Regular  Respiratory Depth: Normal  L Breath Sounds: Fine Crackles  R Breath Sounds: Fine Crackles  Breath Sounds  Respiratory Pattern: Regular     Cardiac  Cardiac Regularity: Irregular  Heart Sounds: S1, S2  Cardiac Rhythm: Atrial fib  Rhythm Interpretation  Cardiac Rhythm: Atrial fib  Pulse: 97 ()     Gastrointestinal  Abdominal (WDL): Within Defined Limits  RUQ Bowel Sounds: Active  LUQ Bowel Sounds: Active  RLQ Bowel Sounds: Active  LLQ Bowel Sounds: Active      Bowel Sounds  RUQ Bowel Sounds: Active  LUQ Bowel Sounds: Active  RLQ Bowel Sounds: Active  LLQ Bowel Sounds: Active  Peripheral Vascular  RLE Edema: +2, Pitting  LLE Edema: +2, Pitting              Psychosocial  Psychosocial (WDL): Within Defined Limits              Pulse: 97 ()               LAB DATA:  BMP:  Sodium (mmol/L)   Date Value   10/30/2024 136   10/16/2024 137   09/24/2024 137     Potassium (mmol/L)   Date Value   10/30/2024 3.9   10/16/2024 3.7   09/24/2024 3.9     Potassium reflex Magnesium (mmol/L)   Date Value   10/24/2022 5.8 (H)   09/15/2022 3.1 (L)   10/28/2021 2.8 (L)     Chloride (mmol/L)   Date Value   10/30/2024 99   10/16/2024 102   09/24/2024 99     CO2 (mmol/L)   Date Value   10/30/2024 28   10/16/2024 27   09/24/2024 29     BUN (mg/dL)   Date Value   10/30/2024 16   10/16/2024 29 (H)   09/24/2024 16     Creatinine (mg/dL)   Date Value

## 2024-11-07 ENCOUNTER — HOSPITAL ENCOUNTER (OUTPATIENT)
Dept: OTHER | Age: 69
Setting detail: THERAPIES SERIES
End: 2024-11-07
Payer: MEDICARE

## 2024-11-07 ENCOUNTER — HOSPITAL ENCOUNTER (OUTPATIENT)
Dept: OTHER | Age: 69
Setting detail: THERAPIES SERIES
Discharge: HOME | End: 2024-11-07
Payer: MEDICARE

## 2024-11-07 VITALS
WEIGHT: 231.03 LBS | BODY MASS INDEX: 37.29 KG/M2 | HEART RATE: 87 BPM | DIASTOLIC BLOOD PRESSURE: 55 MMHG | SYSTOLIC BLOOD PRESSURE: 118 MMHG | RESPIRATION RATE: 18 BRPM | OXYGEN SATURATION: 96 %

## 2024-11-07 LAB
ANION GAP SERPL CALCULATED.3IONS-SCNC: 7 MMOL/L (ref 7–16)
BNP SERPL-MCNC: 2532 PG/ML (ref 0–450)
BUN SERPL-MCNC: 25 MG/DL (ref 6–23)
CALCIUM SERPL-MCNC: 9.5 MG/DL (ref 8.6–10.2)
CHLORIDE SERPL-SCNC: 103 MMOL/L (ref 98–107)
CO2 SERPL-SCNC: 29 MMOL/L (ref 22–29)
CREAT SERPL-MCNC: 1.2 MG/DL (ref 0.7–1.2)
GFR, ESTIMATED: 68 ML/MIN/1.73M2
GLUCOSE SERPL-MCNC: 193 MG/DL (ref 74–99)
POTASSIUM SERPL-SCNC: 4.4 MMOL/L (ref 3.5–5)
SODIUM SERPL-SCNC: 139 MMOL/L (ref 132–146)

## 2024-11-07 PROCEDURE — 83880 ASSAY OF NATRIURETIC PEPTIDE: CPT

## 2024-11-07 PROCEDURE — 96374 THER/PROPH/DIAG INJ IV PUSH: CPT

## 2024-11-07 PROCEDURE — 6360000002 HC RX W HCPCS: Performed by: INTERNAL MEDICINE

## 2024-11-07 PROCEDURE — 99214 OFFICE O/P EST MOD 30 MIN: CPT

## 2024-11-07 PROCEDURE — 80048 BASIC METABOLIC PNL TOTAL CA: CPT

## 2024-11-07 PROCEDURE — 36415 COLL VENOUS BLD VENIPUNCTURE: CPT

## 2024-11-07 PROCEDURE — 2580000003 HC RX 258: Performed by: INTERNAL MEDICINE

## 2024-11-07 RX ORDER — SODIUM CHLORIDE 0.9 % (FLUSH) 0.9 %
5-40 SYRINGE (ML) INJECTION ONCE
Status: DISCONTINUED | OUTPATIENT
Start: 2024-11-07 | End: 2024-11-07 | Stop reason: ALTCHOICE

## 2024-11-07 RX ORDER — BUMETANIDE 0.25 MG/ML
2 INJECTION, SOLUTION INTRAMUSCULAR; INTRAVENOUS ONCE
Status: DISCONTINUED | OUTPATIENT
Start: 2024-11-07 | End: 2024-11-07 | Stop reason: ALTCHOICE

## 2024-11-07 RX ADMIN — SODIUM CHLORIDE, PRESERVATIVE FREE 10 ML: 5 INJECTION INTRAVENOUS at 08:17

## 2024-11-07 RX ADMIN — BUMETANIDE 2 MG: 0.25 INJECTION INTRAMUSCULAR; INTRAVENOUS at 08:17

## 2024-11-07 NOTE — PROGRESS NOTES
0 0 0 0 0   PHQ9 Score 1 0 0 0 0 6 0     Interpretation of Total Score Depression Severity:   1-4 = Minimal depression  5-9 = Mild depression  10-14 = Moderate depression  15-19 = Moderately severe depression  20-27 = Severe depression   [] Patient provided community resources for counseling services  [] PCP called and PHQ result faxed   [] Behavorial health consultant called      Scheduled to follow up in CHF clinic on:   Future Appointments   Date Time Provider Department Center   11/21/2024  7:30 AM [facility] CHF ROOM 1 [facility]   12/4/2024  1:30 PM Morales Machuca DO [facility] DEP

## 2024-11-08 ENCOUNTER — TELEPHONE (OUTPATIENT)
Dept: OTHER | Age: 69
End: 2024-11-08

## 2024-11-08 NOTE — RESULT ENCOUNTER NOTE
Labs and CHF clinic note reviewed  Increase Bumex 2 mg BID x 3 days then reduce to 2 mg daily   Follow up in CHF clinic as scheduled  Sooner if needed    Thank you

## 2024-11-08 NOTE — TELEPHONE ENCOUNTER
8:50 AM 11/8/2024 Called patient re: new medication changes. I have reviewed the provider's instructions with the patient, answering all questions to his satisfaction.    Future Appointments   Date Time Provider Department Center   11/21/2024  7:30 AM [facility] CHF ROOM 1 [facility]   12/4/2024  1:30 PM Morales Machuca DO [facility] DEP

## 2024-11-08 NOTE — TELEPHONE ENCOUNTER
----- Message from ARLEEN Briseno CNP sent at 11/7/2024 10:13 PM EST -----  Labs and CHF clinic note reviewed  Increase Bumex 2 mg BID x 3 days then reduce to 2 mg daily   Follow up in CHF clinic as scheduled  Sooner if needed    Thank you

## 2024-11-21 ENCOUNTER — HOSPITAL ENCOUNTER (OUTPATIENT)
Dept: OTHER | Age: 69
Setting detail: THERAPIES SERIES
Discharge: HOME | End: 2024-11-21
Payer: MEDICARE

## 2024-11-21 VITALS
WEIGHT: 218 LBS | DIASTOLIC BLOOD PRESSURE: 90 MMHG | SYSTOLIC BLOOD PRESSURE: 135 MMHG | HEART RATE: 92 BPM | RESPIRATION RATE: 17 BRPM | BODY MASS INDEX: 35.19 KG/M2 | OXYGEN SATURATION: 97 %

## 2024-11-21 LAB
ANION GAP SERPL CALCULATED.3IONS-SCNC: 9 MMOL/L (ref 7–16)
BNP SERPL-MCNC: 2954 PG/ML (ref 0–450)
BUN SERPL-MCNC: 24 MG/DL (ref 6–23)
CALCIUM SERPL-MCNC: 9.1 MG/DL (ref 8.6–10.2)
CHLORIDE SERPL-SCNC: 102 MMOL/L (ref 98–107)
CO2 SERPL-SCNC: 29 MMOL/L (ref 22–29)
CREAT SERPL-MCNC: 1.1 MG/DL (ref 0.7–1.2)
GFR, ESTIMATED: 70 ML/MIN/1.73M2
GLUCOSE SERPL-MCNC: 229 MG/DL (ref 74–99)
POTASSIUM SERPL-SCNC: 4 MMOL/L (ref 3.5–5)
SODIUM SERPL-SCNC: 140 MMOL/L (ref 132–146)

## 2024-11-21 PROCEDURE — 80048 BASIC METABOLIC PNL TOTAL CA: CPT

## 2024-11-21 PROCEDURE — 36415 COLL VENOUS BLD VENIPUNCTURE: CPT

## 2024-11-21 PROCEDURE — 83880 ASSAY OF NATRIURETIC PEPTIDE: CPT

## 2024-11-21 PROCEDURE — 99214 OFFICE O/P EST MOD 30 MIN: CPT

## 2024-11-21 NOTE — PROGRESS NOTES
Congestive Heart Failure Clinic   [facility]    Referring Provider:   Primary Care Physician: Morales Machuca DO   Cardiologist:   Nephrologist: [facility]    HISTORY OF PRESENT ILLNESS:     Murtaza Martinez is a 69 y.o. (1955) male with a history of HFpEF (EF> 50%)  Pre Cupid:     Lab Results   Component Value Date    LVEF 50 08/22/2024    LVEF 50 08/22/2024     He presents to the CHF clinic for ongoing evaluation and monitoring of heart failure.    In the CHF clinic today he denies any adverse symptoms except:    [] Dizziness or lightheadedness   [] Syncope or near syncope  [] Recent Fall  [] Shortness of breath at rest   [x] Dyspnea with exertion  [] Decline in functional capacity (unable to perform activities they had previously been able to do)  [x] Fatigue   [] Orthopnea  [] PND  [] Nocturnal cough  [] Hemoptysis  [] Chest pain, pressure, or discomfort  [] Palpitations  [x] Abdominal distention  [] Abdominal bloating  [] Early satiety  [] Blood in stool   [] Diarrhea  [] Constipation  [] Nausea/Vomiting  [] Decreased urinary response to oral diuretic   [] Scrotal swelling   [x] Lower extremity edema  [] Used PRN doses of oral diuretic   [] Weight gain     Wt Readings from Last 3 Encounters:   11/21/24 98.9 kg (218 lb)   11/07/24 104.8 kg (231 lb 0.4 oz)   10/30/24 106.6 kg (235 lb)     SOCIAL HISTORY:  [x] Denies tobacco, alcohol or illicit drug abuse  [] Tobacco use:  [] ETOH use:  [] Illicit drug use:        MEDICATIONS:    Allergies   Allergen Reactions    Pcn [Penicillins]      Child went to hospital   ? Reaction  Patient tolerates cephalosporins    Sulfa Antibiotics      ?     Prior to Visit Medications    Medication Sig Taking? Authorizing Provider   bumetanide (BUMEX) 1 MG tablet Take 1 tablet by mouth 2 times daily Yes Morales Machuca DO   potassium chloride (KLOR-CON M) 20 MEQ extended release tablet Take 1

## 2024-12-04 ENCOUNTER — OFFICE VISIT (OUTPATIENT)
Dept: FAMILY MEDICINE CLINIC | Age: 69
End: 2024-12-04
Payer: MEDICARE

## 2024-12-04 VITALS
BODY MASS INDEX: 36.32 KG/M2 | OXYGEN SATURATION: 97 % | SYSTOLIC BLOOD PRESSURE: 124 MMHG | RESPIRATION RATE: 18 BRPM | HEIGHT: 66 IN | DIASTOLIC BLOOD PRESSURE: 80 MMHG | HEART RATE: 80 BPM | WEIGHT: 226 LBS

## 2024-12-04 DIAGNOSIS — E11.65 TYPE 2 DIABETES MELLITUS WITH HYPERGLYCEMIA, WITH LONG-TERM CURRENT USE OF INSULIN (HCC): ICD-10-CM

## 2024-12-04 DIAGNOSIS — Z79.4 TYPE 2 DIABETES MELLITUS WITH HYPERGLYCEMIA, WITH LONG-TERM CURRENT USE OF INSULIN (HCC): ICD-10-CM

## 2024-12-04 DIAGNOSIS — E03.9 ACQUIRED HYPOTHYROIDISM: ICD-10-CM

## 2024-12-04 DIAGNOSIS — R53.83 FATIGUE, UNSPECIFIED TYPE: ICD-10-CM

## 2024-12-04 DIAGNOSIS — N40.0 BENIGN PROSTATIC HYPERPLASIA, UNSPECIFIED WHETHER LOWER URINARY TRACT SYMPTOMS PRESENT: ICD-10-CM

## 2024-12-04 DIAGNOSIS — N52.9 ERECTILE DYSFUNCTION, UNSPECIFIED ERECTILE DYSFUNCTION TYPE: Primary | ICD-10-CM

## 2024-12-04 DIAGNOSIS — E34.9 HYPOTESTOSTERONISM: ICD-10-CM

## 2024-12-04 DIAGNOSIS — J41.1 MUCOPURULENT CHRONIC BRONCHITIS (HCC): ICD-10-CM

## 2024-12-04 DIAGNOSIS — Z23 NEED FOR PNEUMOCOCCAL VACCINATION: ICD-10-CM

## 2024-12-04 DIAGNOSIS — E78.2 MIXED HYPERLIPIDEMIA: ICD-10-CM

## 2024-12-04 PROCEDURE — 1159F MED LIST DOCD IN RCRD: CPT | Performed by: FAMILY MEDICINE

## 2024-12-04 PROCEDURE — 2022F DILAT RTA XM EVC RTNOPTHY: CPT | Performed by: FAMILY MEDICINE

## 2024-12-04 PROCEDURE — 90677 PCV20 VACCINE IM: CPT | Performed by: FAMILY MEDICINE

## 2024-12-04 PROCEDURE — 3074F SYST BP LT 130 MM HG: CPT | Performed by: FAMILY MEDICINE

## 2024-12-04 PROCEDURE — 1036F TOBACCO NON-USER: CPT | Performed by: FAMILY MEDICINE

## 2024-12-04 PROCEDURE — 3079F DIAST BP 80-89 MM HG: CPT | Performed by: FAMILY MEDICINE

## 2024-12-04 PROCEDURE — 3044F HG A1C LEVEL LT 7.0%: CPT | Performed by: FAMILY MEDICINE

## 2024-12-04 PROCEDURE — 99214 OFFICE O/P EST MOD 30 MIN: CPT | Performed by: FAMILY MEDICINE

## 2024-12-04 PROCEDURE — 1123F ACP DISCUSS/DSCN MKR DOCD: CPT | Performed by: FAMILY MEDICINE

## 2024-12-04 PROCEDURE — G8417 CALC BMI ABV UP PARAM F/U: HCPCS | Performed by: FAMILY MEDICINE

## 2024-12-04 PROCEDURE — 3017F COLORECTAL CA SCREEN DOC REV: CPT | Performed by: FAMILY MEDICINE

## 2024-12-04 PROCEDURE — G0009 ADMIN PNEUMOCOCCAL VACCINE: HCPCS | Performed by: FAMILY MEDICINE

## 2024-12-04 PROCEDURE — G8484 FLU IMMUNIZE NO ADMIN: HCPCS | Performed by: FAMILY MEDICINE

## 2024-12-04 PROCEDURE — G8427 DOCREV CUR MEDS BY ELIG CLIN: HCPCS | Performed by: FAMILY MEDICINE

## 2024-12-04 PROCEDURE — 3023F SPIROM DOC REV: CPT | Performed by: FAMILY MEDICINE

## 2024-12-04 RX ORDER — VARDENAFIL HYDROCHLORIDE 20 MG/1
20 TABLET ORAL PRN
Qty: 30 TABLET | Refills: 4 | Status: SHIPPED | OUTPATIENT
Start: 2024-12-04

## 2024-12-04 RX ORDER — BENZONATATE 200 MG/1
200 CAPSULE ORAL 3 TIMES DAILY PRN
Qty: 90 CAPSULE | Refills: 2 | Status: SHIPPED | OUTPATIENT
Start: 2024-12-04 | End: 2025-12-04

## 2024-12-04 RX ORDER — DILTIAZEM HYDROCHLORIDE 60 MG/1
2 TABLET, FILM COATED ORAL 2 TIMES DAILY
Qty: 10.2 G | Refills: 3 | Status: SHIPPED | OUTPATIENT
Start: 2024-12-04

## 2024-12-04 NOTE — PROGRESS NOTES
heard.     No friction rub. No gallop.   Pulmonary:      Effort: Pulmonary effort is normal. No respiratory distress.      Breath sounds: Normal breath sounds. No stridor. No wheezing or rales.   Chest:      Chest wall: No tenderness.   Abdominal:      General: Bowel sounds are normal. There is no distension.      Palpations: Abdomen is soft. There is no mass.      Tenderness: There is no abdominal tenderness. There is no guarding or rebound.   Genitourinary:     Comments: Will follow with own gynecologist for gynecological and breast care.     Musculoskeletal:         General: No tenderness. Normal range of motion.      Cervical back: Normal range of motion and neck supple.   Lymphadenopathy:      Cervical: No cervical adenopathy.   Skin:     General: Skin is warm and dry.      Coloration: Skin is not pale.      Findings: No erythema or rash.   Neurological:      Mental Status: He is alert and oriented to person, place, and time.      Cranial Nerves: No cranial nerve deficit.      Motor: No abnormal muscle tone.      Coordination: Coordination normal.      Deep Tendon Reflexes: Reflexes are normal and symmetric. Reflexes normal.   Psychiatric:         Behavior: Behavior normal.         Thought Content: Thought content normal.         Judgment: Judgment normal.         Assessment / Plan:   Murtaza was seen today for 3 month follow-up.    Diagnoses and all orders for this visit:    Erectile dysfunction, unspecified erectile dysfunction type  -     External Referral To Urology  -     vardenafil (LEVITRA) 20 MG tablet; Take 1 tablet by mouth as needed for Erectile Dysfunction  -     Testosterone; Future    Benign prostatic hyperplasia, unspecified whether lower urinary tract symptoms present  -     External Referral To Urology  -     PSA, Diagnostic; Future    Type 2 diabetes mellitus with hyperglycemia, with long-term current use of insulin (HCC)  -     Hemoglobin A1C; Future    Acquired hypothyroidism  -     TSH;

## 2024-12-07 ASSESSMENT — ENCOUNTER SYMPTOMS
FACIAL SWELLING: 0
DIARRHEA: 0
WHEEZING: 0
ABDOMINAL DISTENTION: 0
SHORTNESS OF BREATH: 0
EYE PAIN: 0
TROUBLE SWALLOWING: 0
RECTAL PAIN: 0
PHOTOPHOBIA: 0
ANAL BLEEDING: 0
NAUSEA: 0
COUGH: 0
CONSTIPATION: 0
BACK PAIN: 0
APNEA: 0
BLOOD IN STOOL: 0
EYE ITCHING: 0
SINUS PRESSURE: 0
CHEST TIGHTNESS: 0
COLOR CHANGE: 0
ABDOMINAL PAIN: 0
EYE REDNESS: 0
CHOKING: 0
EYE DISCHARGE: 0
VOMITING: 0
RHINORRHEA: 0
VOICE CHANGE: 0
SORE THROAT: 0
STRIDOR: 0

## 2024-12-11 ENCOUNTER — HOSPITAL ENCOUNTER (OUTPATIENT)
Dept: OTHER | Age: 69
Setting detail: THERAPIES SERIES
Discharge: HOME | End: 2024-12-11
Payer: MEDICARE

## 2024-12-11 VITALS
RESPIRATION RATE: 18 BRPM | WEIGHT: 236 LBS | BODY MASS INDEX: 38.09 KG/M2 | SYSTOLIC BLOOD PRESSURE: 104 MMHG | OXYGEN SATURATION: 92 % | HEART RATE: 93 BPM

## 2024-12-11 LAB
ANION GAP SERPL CALCULATED.3IONS-SCNC: 10 MMOL/L (ref 7–16)
BNP SERPL-MCNC: 3670 PG/ML (ref 0–450)
BUN SERPL-MCNC: 20 MG/DL (ref 6–23)
CALCIUM SERPL-MCNC: 8.7 MG/DL (ref 8.6–10.2)
CHLORIDE SERPL-SCNC: 99 MMOL/L (ref 98–107)
CO2 SERPL-SCNC: 27 MMOL/L (ref 22–29)
CREAT SERPL-MCNC: 1.1 MG/DL (ref 0.7–1.2)
GFR, ESTIMATED: 71 ML/MIN/1.73M2
GLUCOSE SERPL-MCNC: 214 MG/DL (ref 74–99)
POTASSIUM SERPL-SCNC: 3.9 MMOL/L (ref 3.5–5)
SODIUM SERPL-SCNC: 136 MMOL/L (ref 132–146)

## 2024-12-11 PROCEDURE — 2580000003 HC RX 258: Performed by: INTERNAL MEDICINE

## 2024-12-11 PROCEDURE — 6360000002 HC RX W HCPCS: Performed by: INTERNAL MEDICINE

## 2024-12-11 PROCEDURE — 99214 OFFICE O/P EST MOD 30 MIN: CPT

## 2024-12-11 PROCEDURE — 36415 COLL VENOUS BLD VENIPUNCTURE: CPT

## 2024-12-11 PROCEDURE — 83880 ASSAY OF NATRIURETIC PEPTIDE: CPT

## 2024-12-11 PROCEDURE — 80048 BASIC METABOLIC PNL TOTAL CA: CPT

## 2024-12-11 PROCEDURE — 96374 THER/PROPH/DIAG INJ IV PUSH: CPT

## 2024-12-11 RX ORDER — BUMETANIDE 0.25 MG/ML
2 INJECTION, SOLUTION INTRAMUSCULAR; INTRAVENOUS ONCE
Status: COMPLETED | OUTPATIENT
Start: 2024-12-11 | End: 2024-12-11

## 2024-12-11 RX ORDER — SODIUM CHLORIDE 0.9 % (FLUSH) 0.9 %
5-40 SYRINGE (ML) INJECTION ONCE
Status: COMPLETED | OUTPATIENT
Start: 2024-12-11 | End: 2024-12-11

## 2024-12-11 RX ADMIN — SODIUM CHLORIDE, PRESERVATIVE FREE 10 ML: 5 INJECTION INTRAVENOUS at 08:19

## 2024-12-11 RX ADMIN — BUMETANIDE 2 MG: 0.25 INJECTION INTRAMUSCULAR; INTRAVENOUS at 08:19

## 2024-12-11 ASSESSMENT — PATIENT HEALTH QUESTIONNAIRE - PHQ9
SUM OF ALL RESPONSES TO PHQ QUESTIONS 1-9: 0
SUM OF ALL RESPONSES TO PHQ QUESTIONS 1-9: 0
SUM OF ALL RESPONSES TO PHQ9 QUESTIONS 1 & 2: 0
2. FEELING DOWN, DEPRESSED OR HOPELESS: NOT AT ALL
SUM OF ALL RESPONSES TO PHQ QUESTIONS 1-9: 0
SUM OF ALL RESPONSES TO PHQ QUESTIONS 1-9: 0
1. LITTLE INTEREST OR PLEASURE IN DOING THINGS: NOT AT ALL

## 2024-12-11 NOTE — PROGRESS NOTES
Congestive Heart Failure Clinic   [facility]    Referring Provider:   Primary Care Physician: Morales Machuca DO   Cardiologist:   Nephrologist: [facility]    HISTORY OF PRESENT ILLNESS:     Murtaza Martinez is a 69 y.o. (1955) male with a history of HFpEF (EF> 50%)  Pre Cupid:     Lab Results   Component Value Date    LVEF 50 08/22/2024    LVEF 50 08/22/2024     He presents to the CHF clinic for ongoing evaluation and monitoring of heart failure.    In the CHF clinic today he denies any adverse symptoms except:    [] Dizziness or lightheadedness   [] Syncope or near syncope  [] Recent Fall  [] Shortness of breath at rest   [x] Dyspnea with exertion  [] Decline in functional capacity (unable to perform activities they had previously been able to do)  [] Fatigue   [] Orthopnea  [] PND  [x] Nocturnal cough  [] Hemoptysis  [] Chest pain, pressure, or discomfort  [] Palpitations  [] Abdominal distention  [] Abdominal bloating  [] Early satiety  [] Blood in stool   [] Diarrhea  [] Constipation  [] Nausea/Vomiting   [] Decreased urinary response to oral diuretic   [] Scrotal swelling   [x] Lower extremity edema  [] Used PRN doses of oral diuretic   [x] Weight gain     Wt Readings from Last 3 Encounters:   12/11/24 107 kg (236 lb)   12/04/24 102.5 kg (226 lb)   11/21/24 98.9 kg (218 lb)     SOCIAL HISTORY:  [x] Denies tobacco, alcohol or illicit drug abuse  [] Tobacco use:  [] ETOH use:  [] Illicit drug use:      MEDICATIONS:    Allergies   Allergen Reactions    Pcn [Penicillins]      Child went to hospital   ? Reaction  Patient tolerates cephalosporins    Sulfa Antibiotics      ?     Prior to Visit Medications    Medication Sig Taking? Authorizing Provider   benzonatate (TESSALON) 200 MG capsule Take 1 capsule by mouth 3 times daily as needed for Cough Yes Morales Machuca DO   bumetanide (BUMEX) 1 MG tablet Take 1 tablet by mouth 2

## 2024-12-17 ENCOUNTER — HOSPITAL ENCOUNTER (OUTPATIENT)
Dept: OTHER | Age: 69
Setting detail: THERAPIES SERIES
Discharge: HOME | End: 2024-12-17
Payer: MEDICARE

## 2024-12-17 VITALS
SYSTOLIC BLOOD PRESSURE: 108 MMHG | DIASTOLIC BLOOD PRESSURE: 69 MMHG | RESPIRATION RATE: 16 BRPM | BODY MASS INDEX: 37.83 KG/M2 | WEIGHT: 234.4 LBS | HEART RATE: 78 BPM | OXYGEN SATURATION: 94 %

## 2024-12-17 LAB
ANION GAP SERPL CALCULATED.3IONS-SCNC: 11 MMOL/L (ref 7–16)
BNP SERPL-MCNC: 3525 PG/ML (ref 0–450)
BUN SERPL-MCNC: 19 MG/DL (ref 6–23)
CALCIUM SERPL-MCNC: 8.9 MG/DL (ref 8.6–10.2)
CHLORIDE SERPL-SCNC: 101 MMOL/L (ref 98–107)
CO2 SERPL-SCNC: 27 MMOL/L (ref 22–29)
CREAT SERPL-MCNC: 1.4 MG/DL (ref 0.7–1.2)
GFR, ESTIMATED: 55 ML/MIN/1.73M2
GLUCOSE SERPL-MCNC: 228 MG/DL (ref 74–99)
POTASSIUM SERPL-SCNC: 4.2 MMOL/L (ref 3.5–5)
SODIUM SERPL-SCNC: 139 MMOL/L (ref 132–146)

## 2024-12-17 PROCEDURE — 96374 THER/PROPH/DIAG INJ IV PUSH: CPT

## 2024-12-17 PROCEDURE — 2580000003 HC RX 258: Performed by: INTERNAL MEDICINE

## 2024-12-17 PROCEDURE — 99214 OFFICE O/P EST MOD 30 MIN: CPT

## 2024-12-17 PROCEDURE — 80048 BASIC METABOLIC PNL TOTAL CA: CPT

## 2024-12-17 PROCEDURE — 83880 ASSAY OF NATRIURETIC PEPTIDE: CPT

## 2024-12-17 PROCEDURE — 36415 COLL VENOUS BLD VENIPUNCTURE: CPT

## 2024-12-17 PROCEDURE — 6360000002 HC RX W HCPCS: Performed by: INTERNAL MEDICINE

## 2024-12-17 RX ORDER — SODIUM CHLORIDE 0.9 % (FLUSH) 0.9 %
5-40 SYRINGE (ML) INJECTION 2 TIMES DAILY
Status: DISCONTINUED | OUTPATIENT
Start: 2024-12-17 | End: 2024-12-18 | Stop reason: HOSPADM

## 2024-12-17 RX ORDER — BUMETANIDE 0.25 MG/ML
2 INJECTION, SOLUTION INTRAMUSCULAR; INTRAVENOUS ONCE
Status: COMPLETED | OUTPATIENT
Start: 2024-12-17 | End: 2024-12-17

## 2024-12-17 RX ADMIN — BUMETANIDE 2 MG: 0.25 INJECTION INTRAMUSCULAR; INTRAVENOUS at 08:02

## 2024-12-17 RX ADMIN — SODIUM CHLORIDE, PRESERVATIVE FREE 10 ML: 5 INJECTION INTRAVENOUS at 08:03

## 2024-12-17 NOTE — PROGRESS NOTES
9:23 AM   PHQ Scores   PHQ2 Score 0 1 0 0 0 0 0   PHQ9 Score 0 1 0 0 0 0 6     Interpretation of Total Score Depression Severity:   1-4 = Minimal depression  5-9 = Mild depression  10-14 = Moderate depression  15-19 = Moderately severe depression  20-27 = Severe depression   [] Patient provided community resources for counseling services  [] PCP called and PHQ result faxed   [] Behavorial health consultant called      Scheduled to follow up in CHF clinic on:   Future Appointments   Date Time Provider Department Center   1/3/2025  7:30 AM [facility] CHF ROOM 3 [place] [facility]

## 2024-12-18 ENCOUNTER — TELEPHONE (OUTPATIENT)
Dept: FAMILY MEDICINE CLINIC | Age: 69
End: 2024-12-18

## 2024-12-18 DIAGNOSIS — J30.1 SEASONAL ALLERGIC RHINITIS DUE TO POLLEN: ICD-10-CM

## 2024-12-18 RX ORDER — MONTELUKAST SODIUM 10 MG/1
10 TABLET ORAL NIGHTLY
Qty: 90 TABLET | Refills: 0 | Status: SHIPPED | OUTPATIENT
Start: 2024-12-18

## 2024-12-18 NOTE — TELEPHONE ENCOUNTER
Called pt and he said he already talked to someone in the office and scheduled an appt for 12/20/24.    ----- Message from Stacey SIMMONS sent at 12/18/2024  8:13 AM EST -----  Regarding: ECC Escalation To Practice  ECC Escalation To Practice      Type of Escalation: Acute Care Symptom  --------------------------------------------------------------------------------------------------------------------------    Information for Provider:  Patient is looking for appointment for: Symptom - coughing phlegm with a little bit blood  Reasons for Message: Patient disconnected       --------------------------------------------------------------------------------------------------------------------------    Relationship to Patient: Self     Call Back Info: OK to leave message on voicemail  Preferred Call Back Number: Phone   [phone]

## 2024-12-20 ENCOUNTER — OFFICE VISIT (OUTPATIENT)
Dept: FAMILY MEDICINE CLINIC | Age: 69
End: 2024-12-20

## 2024-12-20 VITALS
OXYGEN SATURATION: 95 % | BODY MASS INDEX: 36.32 KG/M2 | HEIGHT: 66 IN | SYSTOLIC BLOOD PRESSURE: 116 MMHG | DIASTOLIC BLOOD PRESSURE: 80 MMHG | HEART RATE: 74 BPM | RESPIRATION RATE: 18 BRPM | WEIGHT: 226 LBS

## 2024-12-20 DIAGNOSIS — Z79.4 TYPE 2 DIABETES MELLITUS WITH HYPERGLYCEMIA, WITH LONG-TERM CURRENT USE OF INSULIN (HCC): ICD-10-CM

## 2024-12-20 DIAGNOSIS — R29.898 MUSCULAR DECONDITIONING: ICD-10-CM

## 2024-12-20 DIAGNOSIS — Z87.891 PERSONAL HISTORY OF TOBACCO USE: ICD-10-CM

## 2024-12-20 DIAGNOSIS — J41.1 MUCOPURULENT CHRONIC BRONCHITIS (HCC): ICD-10-CM

## 2024-12-20 DIAGNOSIS — E03.9 ACQUIRED HYPOTHYROIDISM: ICD-10-CM

## 2024-12-20 DIAGNOSIS — R53.82 CHRONIC FATIGUE: ICD-10-CM

## 2024-12-20 DIAGNOSIS — K43.9 VENTRAL HERNIA WITHOUT OBSTRUCTION OR GANGRENE: Primary | ICD-10-CM

## 2024-12-20 DIAGNOSIS — E53.8 FOLIC ACID DEFICIENCY: ICD-10-CM

## 2024-12-20 DIAGNOSIS — R53.83 FATIGUE, UNSPECIFIED TYPE: ICD-10-CM

## 2024-12-20 DIAGNOSIS — R39.12 BENIGN PROSTATIC HYPERPLASIA WITH WEAK URINARY STREAM: ICD-10-CM

## 2024-12-20 DIAGNOSIS — E11.65 TYPE 2 DIABETES MELLITUS WITH HYPERGLYCEMIA, WITH LONG-TERM CURRENT USE OF INSULIN (HCC): ICD-10-CM

## 2024-12-20 DIAGNOSIS — E34.9 HYPOTESTOSTERONISM: ICD-10-CM

## 2024-12-20 DIAGNOSIS — R29.898 UPPER EXTREMITY WEAKNESS: ICD-10-CM

## 2024-12-20 DIAGNOSIS — R97.20 ELEVATED PSA: ICD-10-CM

## 2024-12-20 DIAGNOSIS — N52.9 ERECTILE DYSFUNCTION, UNSPECIFIED ERECTILE DYSFUNCTION TYPE: ICD-10-CM

## 2024-12-20 DIAGNOSIS — E78.2 MIXED HYPERLIPIDEMIA: ICD-10-CM

## 2024-12-20 DIAGNOSIS — J01.00 ACUTE NON-RECURRENT MAXILLARY SINUSITIS: ICD-10-CM

## 2024-12-20 DIAGNOSIS — N40.1 BENIGN PROSTATIC HYPERPLASIA WITH WEAK URINARY STREAM: ICD-10-CM

## 2024-12-20 DIAGNOSIS — R29.898 WEAKNESS OF BOTH LOWER EXTREMITIES: ICD-10-CM

## 2024-12-20 LAB
ALBUMIN: 3.8 G/DL (ref 3.5–5.2)
ALP BLD-CCNC: 163 U/L (ref 40–129)
ALT SERPL-CCNC: 19 U/L (ref 0–40)
ANION GAP SERPL CALCULATED.3IONS-SCNC: 9 MMOL/L (ref 7–16)
AST SERPL-CCNC: 33 U/L (ref 0–39)
BASOPHILS ABSOLUTE: 0.04 K/UL (ref 0–0.2)
BASOPHILS RELATIVE PERCENT: 1 % (ref 0–2)
BILIRUB SERPL-MCNC: 1.9 MG/DL (ref 0–1.2)
BUN BLDV-MCNC: 13 MG/DL (ref 6–23)
CALCIUM SERPL-MCNC: 9.7 MG/DL (ref 8.6–10.2)
CHLORIDE BLD-SCNC: 99 MMOL/L (ref 98–107)
CHOLESTEROL, TOTAL: 135 MG/DL
CO2: 28 MMOL/L (ref 22–29)
CREAT SERPL-MCNC: 1 MG/DL (ref 0.7–1.2)
EOSINOPHILS ABSOLUTE: 0.06 K/UL (ref 0.05–0.5)
EOSINOPHILS RELATIVE PERCENT: 1 % (ref 0–6)
FOLATE: 13.3 NG/ML (ref 4.8–24.2)
GFR, ESTIMATED: 86 ML/MIN/1.73M2
GLUCOSE BLD-MCNC: 191 MG/DL (ref 74–99)
HBA1C MFR BLD: 7.8 % (ref 4–5.6)
HCT VFR BLD CALC: 43.9 % (ref 37–54)
HDLC SERPL-MCNC: 52 MG/DL
HEMOGLOBIN: 13.5 G/DL (ref 12.5–16.5)
IMMATURE GRANULOCYTES %: 1 % (ref 0–5)
IMMATURE GRANULOCYTES ABSOLUTE: 0.04 K/UL (ref 0–0.58)
LDL CHOLESTEROL: 69 MG/DL
LYMPHOCYTES ABSOLUTE: 0.77 K/UL (ref 1.5–4)
LYMPHOCYTES RELATIVE PERCENT: 10 % (ref 20–42)
MCH RBC QN AUTO: 32.1 PG (ref 26–35)
MCHC RBC AUTO-ENTMCNC: 30.8 G/DL (ref 32–34.5)
MCV RBC AUTO: 104.5 FL (ref 80–99.9)
MONOCYTES ABSOLUTE: 0.68 K/UL (ref 0.1–0.95)
MONOCYTES RELATIVE PERCENT: 9 % (ref 2–12)
NEUTROPHILS ABSOLUTE: 5.84 K/UL (ref 1.8–7.3)
NEUTROPHILS RELATIVE PERCENT: 79 % (ref 43–80)
PDW BLD-RTO: 16.5 % (ref 11.5–15)
PLATELET # BLD: 198 K/UL (ref 130–450)
PMV BLD AUTO: 10.9 FL (ref 7–12)
POTASSIUM SERPL-SCNC: 4.6 MMOL/L (ref 3.5–5)
PROSTATE SPECIFIC ANTIGEN: 0.18 NG/ML (ref 0–4)
RBC # BLD: 4.2 M/UL (ref 3.8–5.8)
SODIUM BLD-SCNC: 136 MMOL/L (ref 132–146)
T4 FREE: 1.9 NG/DL (ref 0.9–1.7)
TOTAL PROTEIN: 7.9 G/DL (ref 6.4–8.3)
TRIGL SERPL-MCNC: 68 MG/DL
TSH SERPL DL<=0.05 MIU/L-ACNC: 0.46 UIU/ML (ref 0.27–4.2)
VITAMIN B-12: 674 PG/ML (ref 211–946)
VLDLC SERPL CALC-MCNC: 14 MG/DL
WBC # BLD: 7.4 K/UL (ref 4.5–11.5)

## 2024-12-20 RX ORDER — AZITHROMYCIN 250 MG/1
TABLET, FILM COATED ORAL
Qty: 6 TABLET | Refills: 0 | Status: SHIPPED | OUTPATIENT
Start: 2024-12-20 | End: 2024-12-30

## 2024-12-21 ASSESSMENT — ENCOUNTER SYMPTOMS
SHORTNESS OF BREATH: 0
DIARRHEA: 0
EYE PAIN: 0
STRIDOR: 0
VOICE CHANGE: 0
EYE DISCHARGE: 0
WHEEZING: 1
CHOKING: 0
RHINORRHEA: 0
SINUS PRESSURE: 1
EYE ITCHING: 0
BACK PAIN: 0
VOMITING: 0
COUGH: 1
SORE THROAT: 0
RECTAL PAIN: 0
CHEST TIGHTNESS: 0
ANAL BLEEDING: 0
CONSTIPATION: 0
NAUSEA: 0
COLOR CHANGE: 0
ABDOMINAL PAIN: 0
BLOOD IN STOOL: 0
ABDOMINAL DISTENTION: 0
APNEA: 0
EYE REDNESS: 0
TROUBLE SWALLOWING: 0
FACIAL SWELLING: 0
PHOTOPHOBIA: 0

## 2024-12-23 ENCOUNTER — TELEPHONE (OUTPATIENT)
Dept: FAMILY MEDICINE CLINIC | Age: 69
End: 2024-12-23

## 2024-12-23 DIAGNOSIS — R29.898 BILATERAL ARM WEAKNESS: ICD-10-CM

## 2024-12-23 DIAGNOSIS — R29.898 LEG WEAKNESS, BILATERAL: ICD-10-CM

## 2024-12-23 DIAGNOSIS — R26.89 BALANCE DISORDER: ICD-10-CM

## 2024-12-23 DIAGNOSIS — R29.898 SEVERE MUSCLE DECONDITIONING: Primary | ICD-10-CM

## 2024-12-26 ENCOUNTER — TELEPHONE (OUTPATIENT)
Dept: PULMONOLOGY | Age: 69
End: 2024-12-26

## 2024-12-26 NOTE — TELEPHONE ENCOUNTER
1st attempt - attempted to contact pt to schedule consultation. Pt did not answer. VM left requesting a call back.

## 2025-01-03 ENCOUNTER — HOSPITAL ENCOUNTER (OUTPATIENT)
Dept: OTHER | Age: 70
Setting detail: THERAPIES SERIES
Discharge: HOME | End: 2025-01-03
Payer: MEDICARE

## 2025-01-03 VITALS
BODY MASS INDEX: 35.99 KG/M2 | OXYGEN SATURATION: 94 % | RESPIRATION RATE: 16 BRPM | SYSTOLIC BLOOD PRESSURE: 120 MMHG | HEART RATE: 118 BPM | WEIGHT: 223 LBS | DIASTOLIC BLOOD PRESSURE: 81 MMHG

## 2025-01-03 LAB
ANION GAP SERPL CALCULATED.3IONS-SCNC: 12 MMOL/L (ref 7–16)
BNP SERPL-MCNC: 3012 PG/ML (ref 0–450)
BUN SERPL-MCNC: 14 MG/DL (ref 6–23)
CALCIUM SERPL-MCNC: 9.3 MG/DL (ref 8.6–10.2)
CHLORIDE SERPL-SCNC: 100 MMOL/L (ref 98–107)
CO2 SERPL-SCNC: 25 MMOL/L (ref 22–29)
CREAT SERPL-MCNC: 1.2 MG/DL (ref 0.7–1.2)
GFR, ESTIMATED: 69 ML/MIN/1.73M2
GLUCOSE SERPL-MCNC: 198 MG/DL (ref 74–99)
POTASSIUM SERPL-SCNC: 4.1 MMOL/L (ref 3.5–5)
SODIUM SERPL-SCNC: 137 MMOL/L (ref 132–146)

## 2025-01-03 PROCEDURE — 36415 COLL VENOUS BLD VENIPUNCTURE: CPT

## 2025-01-03 PROCEDURE — 83880 ASSAY OF NATRIURETIC PEPTIDE: CPT

## 2025-01-03 PROCEDURE — 80048 BASIC METABOLIC PNL TOTAL CA: CPT

## 2025-01-03 PROCEDURE — 96374 THER/PROPH/DIAG INJ IV PUSH: CPT

## 2025-01-03 PROCEDURE — 99214 OFFICE O/P EST MOD 30 MIN: CPT

## 2025-01-03 PROCEDURE — 6360000002 HC RX W HCPCS: Performed by: INTERNAL MEDICINE

## 2025-01-03 PROCEDURE — 2500000003 HC RX 250 WO HCPCS: Performed by: INTERNAL MEDICINE

## 2025-01-03 RX ORDER — BUMETANIDE 0.25 MG/ML
2 INJECTION, SOLUTION INTRAMUSCULAR; INTRAVENOUS ONCE
Status: COMPLETED | OUTPATIENT
Start: 2025-01-03 | End: 2025-01-03

## 2025-01-03 RX ORDER — SODIUM CHLORIDE 0.9 % (FLUSH) 0.9 %
5-40 SYRINGE (ML) INJECTION 2 TIMES DAILY
Status: DISCONTINUED | OUTPATIENT
Start: 2025-01-03 | End: 2025-01-04 | Stop reason: HOSPADM

## 2025-01-03 RX ADMIN — BUMETANIDE 2 MG: 0.25 INJECTION INTRAMUSCULAR; INTRAVENOUS at 07:30

## 2025-01-03 RX ADMIN — SODIUM CHLORIDE, PRESERVATIVE FREE 10 ML: 5 INJECTION INTRAVENOUS at 07:30

## 2025-01-03 NOTE — PROGRESS NOTES
Creatinine (mg/dL)   Date Value   01/03/2025 1.2   12/20/2024 1.0   12/17/2024 1.4 (H)     Glucose (mg/dL)   Date Value   01/03/2025 198 (H)   12/20/2024 191 (H)   12/17/2024 228 (H)   10/27/2011 118 (H)   06/09/2011 96     Calcium (mg/dL)   Date Value   01/03/2025 9.3   12/20/2024 9.7   12/17/2024 8.9     BNP:  NT Pro-BNP (pg/mL)   Date Value   01/03/2025 3,012 (H)   12/17/2024 3,525 (H)   12/11/2024 3,670 (H)      CBC:  WBC (k/uL)   Date Value   12/20/2024 7.4     Hemoglobin (g/dL)   Date Value   12/20/2024 13.5     Hematocrit (%)   Date Value   12/20/2024 43.9     Platelets (k/uL)   Date Value   12/20/2024 198     Iron Studies:  Iron (mcg/dL)   Date Value   01/04/2023 89     TIBC (mcg/dL)   Date Value   01/04/2023 260     Hepatic:  AST (U/L)   Date Value   12/20/2024 33     ALT (U/L)   Date Value   12/20/2024 19     Total Bilirubin (mg/dL)   Date Value   12/20/2024 1.9 (H)     Alkaline Phosphatase (U/L)   Date Value   12/20/2024 163 (H)     INR:  INR (no units)   Date Value   09/26/2022 2.3     INR,(POC) (no units)   Date Value   12/11/2023 1.3         Wt Readings from Last 3 Encounters:   01/03/25 101.2 kg (223 lb)   12/20/24 102.5 kg (226 lb)   12/17/24 106.3 kg (234 lb 6.4 oz)       ASSESSMENT/PLAN:    [] Euvolemic           [x] Hypervolemic, with increase from baseline:  [x] Shortness of breath/DE PAZ  [x] JVD   [x] HJR   [] Abnormal lung assessment:   [] Orthopnea  [] PND  [] Decreased urinary response to oral diuretic   [] Scrotal swelling   [x] Lower extremity edema    [] Compression stockings provided  [] Decline in functional capacity (unable to perform activities they had previously been able to do)  [] Weight gain     [x] IV diuretics given IV bumex 2 mg x1  [] Provider notified of recurrent IV diuretic use    Additional Notes: Patient presented to the CHF clinic with a decreased weight gain of 9 pounds since 12/17/24. Patient did not take morning medications and heart rate is 118.Called

## 2025-01-06 PROBLEM — K43.6 VENTRAL HERNIA WITH OBSTRUCTION AND WITHOUT GANGRENE: Status: ACTIVE | Noted: 2025-01-06

## 2025-01-09 ENCOUNTER — HOSPITAL ENCOUNTER (OUTPATIENT)
Dept: OTHER | Age: 70
Setting detail: THERAPIES SERIES
Discharge: HOME | End: 2025-01-09
Payer: MEDICARE

## 2025-01-09 VITALS
BODY MASS INDEX: 33.89 KG/M2 | HEART RATE: 115 BPM | RESPIRATION RATE: 17 BRPM | WEIGHT: 210 LBS | OXYGEN SATURATION: 97 %

## 2025-01-09 LAB
ANION GAP SERPL CALCULATED.3IONS-SCNC: 8 MMOL/L (ref 7–16)
BNP SERPL-MCNC: 2314 PG/ML (ref 0–450)
BUN SERPL-MCNC: 14 MG/DL (ref 6–23)
CALCIUM SERPL-MCNC: 9.5 MG/DL (ref 8.6–10.2)
CHLORIDE SERPL-SCNC: 99 MMOL/L (ref 98–107)
CO2 SERPL-SCNC: 31 MMOL/L (ref 22–29)
CREAT SERPL-MCNC: 1.1 MG/DL (ref 0.7–1.2)
GFR, ESTIMATED: 71 ML/MIN/1.73M2
GLUCOSE SERPL-MCNC: 217 MG/DL (ref 74–99)
POTASSIUM SERPL-SCNC: 4.4 MMOL/L (ref 3.5–5)
SODIUM SERPL-SCNC: 138 MMOL/L (ref 132–146)

## 2025-01-09 PROCEDURE — 99214 OFFICE O/P EST MOD 30 MIN: CPT

## 2025-01-09 PROCEDURE — 83880 ASSAY OF NATRIURETIC PEPTIDE: CPT

## 2025-01-09 PROCEDURE — 36415 COLL VENOUS BLD VENIPUNCTURE: CPT

## 2025-01-09 PROCEDURE — 80048 BASIC METABOLIC PNL TOTAL CA: CPT

## 2025-01-09 NOTE — PROGRESS NOTES
Congestive Heart Failure Clinic   [facility]    Referring Provider:   Primary Care Physician: Morales Machuca DO   Cardiologist:   Nephrologist: [facility]    HISTORY OF PRESENT ILLNESS:     Murtaza Martinez is a 69 y.o. (1955) male with a history of HFpEF (EF> 50%)  Pre Cupid:     Lab Results   Component Value Date    LVEF 50 08/22/2024    LVEF 50 08/22/2024     He presents to the CHF clinic for ongoing evaluation and monitoring of heart failure.    In the CHF clinic today he denies any adverse symptoms except:    [] Dizziness or lightheadedness   [] Syncope or near syncope  [] Recent Fall  [x] Shortness of breath at rest   [x] Dyspnea with exertion  [] Decline in functional capacity (unable to perform activities they had previously been able to do)  [] Fatigue   [] Orthopnea  [] PND  [x] Nocturnal cough  [] Hemoptysis  [] Chest discomfort-patient reports coughing up blood  [] Palpitations  [] Abdominal distention  [] Abdominal bloating  [] Early satiety  [] Blood in stool   [] Diarrhea  [] Constipation  [] Nausea/Vomiting   [] Decreased urinary response to oral diuretic   [] Scrotal swelling   [x] Lower extremity edema  [] Used PRN doses of oral diuretic   [] Weight gain     Wt Readings from Last 3 Encounters:   01/09/25 95.3 kg (210 lb)   01/03/25 101.2 kg (223 lb)   12/20/24 102.5 kg (226 lb)     SOCIAL HISTORY:  [x] Denies tobacco, alcohol or illicit drug abuse  [] Tobacco use:  [] ETOH use:  [] Illicit drug use:      MEDICATIONS:    Allergies   Allergen Reactions    Pcn [Penicillins]      Child went to hospital   ? Reaction  Patient tolerates cephalosporins    Sulfa Antibiotics      ?     Prior to Visit Medications    Medication Sig Taking? Authorizing Provider   montelukast (SINGULAIR) 10 MG tablet TAKE ONE TABLET BY MOUTH EVERY NIGHT Yes Morales Machuca DO   SYMBICORT 80-4.5 MCG/ACT AERO Inhale 2 puffs into the lungs

## 2025-01-15 ENCOUNTER — TELEPHONE (OUTPATIENT)
Dept: PHYSICAL THERAPY | Age: 70
End: 2025-01-15

## 2025-01-15 PROBLEM — R29.898 SEVERE MUSCLE DECONDITIONING: Status: ACTIVE | Noted: 2025-01-15

## 2025-01-15 PROBLEM — R29.898 BILATERAL ARM WEAKNESS: Status: ACTIVE | Noted: 2025-01-15

## 2025-01-15 PROBLEM — R29.898 LEG WEAKNESS, BILATERAL: Status: ACTIVE | Noted: 2025-01-15

## 2025-01-15 PROBLEM — R26.89 BALANCE DISORDER: Status: ACTIVE | Noted: 2025-01-15

## 2025-01-15 NOTE — TELEPHONE ENCOUNTER
Date: 1/15/2025       Patient Name: Murtaza Martinez  : 1955      MRN: [id number]    No show/no call for PT evaluation scheduled at 1100 today.    Justin Reyes, PT

## 2025-01-16 ENCOUNTER — HOSPITAL ENCOUNTER (OUTPATIENT)
Dept: OTHER | Age: 70
Setting detail: THERAPIES SERIES
Discharge: HOME | End: 2025-01-16
Payer: MEDICARE

## 2025-01-16 VITALS
BODY MASS INDEX: 34.54 KG/M2 | SYSTOLIC BLOOD PRESSURE: 126 MMHG | OXYGEN SATURATION: 97 % | WEIGHT: 214 LBS | DIASTOLIC BLOOD PRESSURE: 88 MMHG | HEART RATE: 108 BPM | RESPIRATION RATE: 18 BRPM

## 2025-01-16 LAB
ANION GAP SERPL CALCULATED.3IONS-SCNC: 11 MMOL/L (ref 7–16)
BNP SERPL-MCNC: 2904 PG/ML (ref 0–450)
BUN SERPL-MCNC: 18 MG/DL (ref 6–23)
CALCIUM SERPL-MCNC: 9.5 MG/DL (ref 8.6–10.2)
CHLORIDE SERPL-SCNC: 100 MMOL/L (ref 98–107)
CO2 SERPL-SCNC: 27 MMOL/L (ref 22–29)
CREAT SERPL-MCNC: 1 MG/DL (ref 0.7–1.2)
GFR, ESTIMATED: 85 ML/MIN/1.73M2
GLUCOSE SERPL-MCNC: 179 MG/DL (ref 74–99)
POTASSIUM SERPL-SCNC: 4 MMOL/L (ref 3.5–5)
SODIUM SERPL-SCNC: 138 MMOL/L (ref 132–146)

## 2025-01-16 PROCEDURE — 99214 OFFICE O/P EST MOD 30 MIN: CPT

## 2025-01-16 PROCEDURE — 80048 BASIC METABOLIC PNL TOTAL CA: CPT

## 2025-01-16 PROCEDURE — 36415 COLL VENOUS BLD VENIPUNCTURE: CPT

## 2025-01-16 PROCEDURE — 83880 ASSAY OF NATRIURETIC PEPTIDE: CPT

## 2025-01-16 NOTE — PROGRESS NOTES
Congestive Heart Failure Clinic   [facility]    Referring Provider:   Primary Care Physician: Morales Machuca DO   Cardiologist:   Nephrologist: [facility]    HISTORY OF PRESENT ILLNESS:     Murtaza Martinez is a 69 y.o. (1955) male with a history of HFpEF (EF> 50%)  Pre Cupid:     Lab Results   Component Value Date    LVEF 50 08/22/2024    LVEF 50 08/22/2024     He presents to the CHF clinic for ongoing evaluation and monitoring of heart failure.    In the CHF clinic today he denies any adverse symptoms except:    [] Dizziness or lightheadedness   [] Syncope or near syncope  [] Recent Fall  [] Shortness of breath at rest   [x] Dyspnea with exertion  [] Decline in functional capacity (unable to perform activities they had previously been able to do)  [] Fatigue   [] Orthopnea  [] PND  [x] Nocturnal cough  [] Hemoptysis  [] Chest discomfort  [] Palpitations  [] Abdominal distention  [] Abdominal bloating  [] Early satiety  [] Blood in stool   [] Diarrhea  [] Constipation  [] Nausea/Vomiting   [] Decreased urinary response to oral diuretic   [] Scrotal swelling   [x] Lower extremity edema  [] Used PRN doses of oral diuretic   [] Weight gain     Wt Readings from Last 3 Encounters:   01/16/25 97.1 kg (214 lb)   01/09/25 95.3 kg (210 lb)   01/03/25 101.2 kg (223 lb)     SOCIAL HISTORY:  [x] Denies tobacco, alcohol or illicit drug abuse  [] Tobacco use:  [] ETOH use:  [] Illicit drug use:      MEDICATIONS:    Allergies   Allergen Reactions    Pcn [Penicillins]      Child went to hospital   ? Reaction  Patient tolerates cephalosporins    Sulfa Antibiotics      ?     Prior to Visit Medications    Medication Sig Taking? Authorizing Provider   montelukast (SINGULAIR) 10 MG tablet TAKE ONE TABLET BY MOUTH EVERY NIGHT Yes Morales Machuca DO   SYMBICORT 80-4.5 MCG/ACT AERO Inhale 2 puffs into the lungs 2 times daily Yes Morales Machuca,

## 2025-01-16 NOTE — DISCHARGE INSTR - LAB
Medication order from Dr. Daniels from 1/9/24:      instructed Murtaza to take Amiodarone 200 mg twice a day for the next week and then follow back up in the CHF clinic in 1/22/24.

## 2025-01-20 ENCOUNTER — INITIAL CONSULT (OUTPATIENT)
Dept: SURGERY | Age: 70
End: 2025-01-20
Payer: MEDICARE

## 2025-01-20 VITALS
TEMPERATURE: 97.6 F | HEART RATE: 100 BPM | WEIGHT: 214 LBS | HEIGHT: 66 IN | SYSTOLIC BLOOD PRESSURE: 116 MMHG | DIASTOLIC BLOOD PRESSURE: 76 MMHG | BODY MASS INDEX: 34.39 KG/M2 | RESPIRATION RATE: 16 BRPM | OXYGEN SATURATION: 95 %

## 2025-01-20 DIAGNOSIS — R10.9 ABDOMINAL PAIN, ACUTE: Primary | ICD-10-CM

## 2025-01-20 PROCEDURE — 1123F ACP DISCUSS/DSCN MKR DOCD: CPT | Performed by: SURGERY

## 2025-01-20 PROCEDURE — 99203 OFFICE O/P NEW LOW 30 MIN: CPT | Performed by: SURGERY

## 2025-01-20 PROCEDURE — G8427 DOCREV CUR MEDS BY ELIG CLIN: HCPCS | Performed by: SURGERY

## 2025-01-20 PROCEDURE — G8417 CALC BMI ABV UP PARAM F/U: HCPCS | Performed by: SURGERY

## 2025-01-20 PROCEDURE — 3074F SYST BP LT 130 MM HG: CPT | Performed by: SURGERY

## 2025-01-20 PROCEDURE — 1036F TOBACCO NON-USER: CPT | Performed by: SURGERY

## 2025-01-20 PROCEDURE — 3017F COLORECTAL CA SCREEN DOC REV: CPT | Performed by: SURGERY

## 2025-01-20 PROCEDURE — 3078F DIAST BP <80 MM HG: CPT | Performed by: SURGERY

## 2025-01-20 PROCEDURE — 1159F MED LIST DOCD IN RCRD: CPT | Performed by: SURGERY

## 2025-01-20 PROCEDURE — 1125F AMNT PAIN NOTED PAIN PRSNT: CPT | Performed by: SURGERY

## 2025-01-20 PROCEDURE — 1160F RVW MEDS BY RX/DR IN RCRD: CPT | Performed by: SURGERY

## 2025-01-20 NOTE — PROGRESS NOTES
General Surgery History and Physical  [facility]    Patient's Name/Date of Birth: Murtaza Martinez / 1955    Date: January 20, 2025     Surgeon: Rafat Hayes MD    PCP: Morales Machuca DO     Chief Complaint: Abdominal pain bulge    HPI:   Murtaza Martinez is a 69 y.o. male who presents for evaluation of abdomianl pain, bulge midline and right. Timing is none, radiation to midline and right, alleviated by none and started months to years and severity is 5/10. he isolated history of previous bilateral inguinal hernia repairs and no other abdominal surgeries. he has no history of previous repairs of the hernia in the midline but states bilateral inguinal's was done. Denies nausea, vomiting, fever, chills, SOB, chest pain, diarrhea or constipation. No history of abnormal colonoscopy he states the last 1 was within 5 years with Dr. Miller.     Patient Active Problem List   Diagnosis    CAD (coronary artery disease)    Hypertension    Type 2 diabetes mellitus with hyperglycemia, with long-term current use of insulin ([facility])    Tobacco abuse    PAF (paroxysmal atrial fibrillation) ([facility])    Status post coronary artery bypass graft    H/O mitral valve repair    Morbid obesity with BMI of 50.0-59.9, adult    Chronic bronchitis ([facility])    Sleep apnea    Gastroesophageal reflux disease without esophagitis    Hyperlipidemia    Muscular deconditioning    Acute diastolic (congestive) heart failure (HCC)    Acute diastolic heart failure (HCC)    Iron deficiency anemia, unspecified    Acute systolic/diastolic congestive heart failure ([facility])    Atrial fibrillation with RVR ([facility])    Ischemic cardiomyopathy    Nonrheumatic tricuspid valve regurgitation    Chronic anticoagulation    Stage 3 chronic kidney disease (HCC)    Acute on chronic congestive heart failure (HCC)    Dizziness    Hyperosmolar hyperglycemic state (HHS) ([facility])    Acute metabolic encephalopathy    Altered mental status    Hypotension

## 2025-01-21 DIAGNOSIS — J30.1 SEASONAL ALLERGIC RHINITIS DUE TO POLLEN: ICD-10-CM

## 2025-01-21 RX ORDER — AMIODARONE HYDROCHLORIDE 200 MG/1
200 TABLET ORAL DAILY
Qty: 30 TABLET | Refills: 0 | Status: SHIPPED
Start: 2025-01-21 | End: 2025-01-22 | Stop reason: SDUPTHER

## 2025-01-21 RX ORDER — MONTELUKAST SODIUM 10 MG/1
10 TABLET ORAL NIGHTLY
Qty: 90 TABLET | Refills: 0 | OUTPATIENT
Start: 2025-01-21

## 2025-01-21 RX ORDER — APIXABAN 5 MG/1
TABLET, FILM COATED ORAL
Qty: 180 TABLET | Refills: 0 | OUTPATIENT
Start: 2025-01-21

## 2025-01-22 ENCOUNTER — HOSPITAL ENCOUNTER (OUTPATIENT)
Dept: OTHER | Age: 70
Setting detail: THERAPIES SERIES
Discharge: HOME | End: 2025-01-22
Payer: MEDICARE

## 2025-01-22 ENCOUNTER — TRANSCRIBE ORDERS (OUTPATIENT)
Dept: ADMINISTRATIVE | Age: 70
End: 2025-01-22

## 2025-01-22 VITALS
HEART RATE: 120 BPM | OXYGEN SATURATION: 95 % | SYSTOLIC BLOOD PRESSURE: 142 MMHG | BODY MASS INDEX: 35.19 KG/M2 | RESPIRATION RATE: 17 BRPM | WEIGHT: 218 LBS

## 2025-01-22 DIAGNOSIS — I73.9 PERIPHERAL VASCULAR DISEASE, UNSPECIFIED (HCC): Primary | ICD-10-CM

## 2025-01-22 LAB
ANION GAP SERPL CALCULATED.3IONS-SCNC: 11 MMOL/L (ref 7–16)
BNP SERPL-MCNC: 4531 PG/ML (ref 0–450)
BUN SERPL-MCNC: 18 MG/DL (ref 6–23)
CALCIUM SERPL-MCNC: 9.6 MG/DL (ref 8.6–10.2)
CHLORIDE SERPL-SCNC: 99 MMOL/L (ref 98–107)
CO2 SERPL-SCNC: 27 MMOL/L (ref 22–29)
CREAT SERPL-MCNC: 1.1 MG/DL (ref 0.7–1.2)
GFR, ESTIMATED: 72 ML/MIN/1.73M2
GLUCOSE SERPL-MCNC: 243 MG/DL (ref 74–99)
POTASSIUM SERPL-SCNC: 3.9 MMOL/L (ref 3.5–5)
SODIUM SERPL-SCNC: 137 MMOL/L (ref 132–146)

## 2025-01-22 PROCEDURE — 80048 BASIC METABOLIC PNL TOTAL CA: CPT

## 2025-01-22 PROCEDURE — 99214 OFFICE O/P EST MOD 30 MIN: CPT

## 2025-01-22 PROCEDURE — 83880 ASSAY OF NATRIURETIC PEPTIDE: CPT

## 2025-01-22 PROCEDURE — 36415 COLL VENOUS BLD VENIPUNCTURE: CPT

## 2025-01-22 NOTE — PROGRESS NOTES
bridge gap until affordability     [x] PHQ assessment completed while in CHF clinic (1st visit, every 3 months and PRN)      12/11/2024     8:06 AM 9/5/2024     7:36 AM 8/30/2024     1:15 PM 8/8/2024     7:42 AM 5/8/2024     9:22 AM 4/22/2024     8:52 AM 2/15/2024     9:23 AM   PHQ Scores   PHQ2 Score 0 1 0 0 0 0 0   PHQ9 Score 0 1 0 0 0 0 6     Interpretation of Total Score Depression Severity:   1-4 = Minimal depression  5-9 = Mild depression  10-14 = Moderate depression  15-19 = Moderately severe depression  20-27 = Severe depression   [] Patient provided community resources for counseling services  [] PCP called and PHQ result faxed   [] Behavorial health consultant called      Scheduled to follow up in CHF clinic on:   Future Appointments   Date Time Provider Department Center   1/28/2025 11:00 AM [facility]   2/3/2025  7:30 AM [facility] CHF ROOM 1 [facility]   2/5/2025  9:45 AM Rafat Hayes MD [facility]   3/4/2025  1:00 PM Larry Becker MD [facility]   3/12/2025 12:00 PM Vitor Daniels MD [facility]   3/20/2025  9:15 AM Morales Machuca DO [facility] ECC DEP

## 2025-01-23 RX ORDER — AMIODARONE HYDROCHLORIDE 200 MG/1
200 TABLET ORAL DAILY
Qty: 30 TABLET | Refills: 3 | Status: SHIPPED | OUTPATIENT
Start: 2025-01-23

## 2025-01-27 DIAGNOSIS — N52.9 ERECTILE DYSFUNCTION, UNSPECIFIED ERECTILE DYSFUNCTION TYPE: ICD-10-CM

## 2025-01-27 DIAGNOSIS — E03.9 ACQUIRED HYPOTHYROIDISM: ICD-10-CM

## 2025-01-27 DIAGNOSIS — E11.65 TYPE 2 DIABETES MELLITUS WITH HYPERGLYCEMIA, WITH LONG-TERM CURRENT USE OF INSULIN (HCC): ICD-10-CM

## 2025-01-27 DIAGNOSIS — R53.83 FATIGUE, UNSPECIFIED TYPE: ICD-10-CM

## 2025-01-27 DIAGNOSIS — E78.2 MIXED HYPERLIPIDEMIA: ICD-10-CM

## 2025-01-27 DIAGNOSIS — Z79.4 TYPE 2 DIABETES MELLITUS WITH HYPERGLYCEMIA, WITH LONG-TERM CURRENT USE OF INSULIN (HCC): ICD-10-CM

## 2025-01-27 DIAGNOSIS — N40.0 BENIGN PROSTATIC HYPERPLASIA, UNSPECIFIED WHETHER LOWER URINARY TRACT SYMPTOMS PRESENT: ICD-10-CM

## 2025-01-27 DIAGNOSIS — E34.9 HYPOTESTOSTERONISM: ICD-10-CM

## 2025-01-27 LAB
ALBUMIN: 3.8 G/DL (ref 3.5–5.2)
ALP BLD-CCNC: 173 U/L (ref 40–129)
ALT SERPL-CCNC: 16 U/L (ref 0–40)
ANION GAP SERPL CALCULATED.3IONS-SCNC: 16 MMOL/L (ref 7–16)
AST SERPL-CCNC: 20 U/L (ref 0–39)
BASOPHILS ABSOLUTE: 0.04 K/UL (ref 0–0.2)
BASOPHILS RELATIVE PERCENT: 1 % (ref 0–2)
BILIRUB SERPL-MCNC: 0.9 MG/DL (ref 0–1.2)
BUN BLDV-MCNC: 14 MG/DL (ref 6–23)
CALCIUM SERPL-MCNC: 9.5 MG/DL (ref 8.6–10.2)
CHLORIDE BLD-SCNC: 99 MMOL/L (ref 98–107)
CHOLESTEROL, TOTAL: 123 MG/DL
CO2: 27 MMOL/L (ref 22–29)
CREAT SERPL-MCNC: 1.1 MG/DL (ref 0.7–1.2)
EOSINOPHILS ABSOLUTE: 0.11 K/UL (ref 0.05–0.5)
EOSINOPHILS RELATIVE PERCENT: 2 % (ref 0–6)
GFR, ESTIMATED: 77 ML/MIN/1.73M2
GLUCOSE BLD-MCNC: 274 MG/DL (ref 74–99)
HBA1C MFR BLD: 8.1 % (ref 4–5.6)
HCT VFR BLD CALC: 41.9 % (ref 37–54)
HDLC SERPL-MCNC: 49 MG/DL
HEMOGLOBIN: 13 G/DL (ref 12.5–16.5)
IMMATURE GRANULOCYTES %: 1 % (ref 0–5)
IMMATURE GRANULOCYTES ABSOLUTE: 0.05 K/UL (ref 0–0.58)
LDL CHOLESTEROL: 55 MG/DL
LYMPHOCYTES ABSOLUTE: 0.79 K/UL (ref 1.5–4)
LYMPHOCYTES RELATIVE PERCENT: 12 % (ref 20–42)
MCH RBC QN AUTO: 32 PG (ref 26–35)
MCHC RBC AUTO-ENTMCNC: 31 G/DL (ref 32–34.5)
MCV RBC AUTO: 103.2 FL (ref 80–99.9)
MONOCYTES ABSOLUTE: 0.69 K/UL (ref 0.1–0.95)
MONOCYTES RELATIVE PERCENT: 10 % (ref 2–12)
NEUTROPHILS ABSOLUTE: 4.97 K/UL (ref 1.8–7.3)
NEUTROPHILS RELATIVE PERCENT: 75 % (ref 43–80)
PDW BLD-RTO: 16.1 % (ref 11.5–15)
PLATELET # BLD: 180 K/UL (ref 130–450)
PMV BLD AUTO: 11 FL (ref 7–12)
POTASSIUM SERPL-SCNC: 5 MMOL/L (ref 3.5–5)
PROSTATE SPECIFIC ANTIGEN: 0.2 NG/ML (ref 0–4)
RBC # BLD: 4.06 M/UL (ref 3.8–5.8)
SODIUM BLD-SCNC: 142 MMOL/L (ref 132–146)
T4 FREE: 1.6 NG/DL (ref 0.9–1.7)
TESTOSTERONE TOTAL: 397 NG/DL (ref 193–740)
TOTAL PROTEIN: 7.3 G/DL (ref 6.4–8.3)
TRIGL SERPL-MCNC: 96 MG/DL
TSH SERPL DL<=0.05 MIU/L-ACNC: 1.17 UIU/ML (ref 0.27–4.2)
VLDLC SERPL CALC-MCNC: 19 MG/DL
WBC # BLD: 6.7 K/UL (ref 4.5–11.5)

## 2025-01-31 ENCOUNTER — APPOINTMENT (OUTPATIENT)
Dept: CT IMAGING | Age: 70
End: 2025-01-31
Payer: MEDICARE

## 2025-01-31 ENCOUNTER — HOSPITAL ENCOUNTER (EMERGENCY)
Age: 70
Discharge: HOME | End: 2025-02-01
Attending: EMERGENCY MEDICINE
Payer: MEDICARE

## 2025-01-31 ENCOUNTER — APPOINTMENT (OUTPATIENT)
Dept: GENERAL RADIOLOGY | Age: 70
End: 2025-01-31
Payer: MEDICARE

## 2025-01-31 VITALS
SYSTOLIC BLOOD PRESSURE: 122 MMHG | HEART RATE: 80 BPM | OXYGEN SATURATION: 95 % | RESPIRATION RATE: 16 BRPM | TEMPERATURE: 97.4 F | DIASTOLIC BLOOD PRESSURE: 74 MMHG
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
## 2025-01-31 DIAGNOSIS — F10.920 ACUTE ALCOHOLIC INTOXICATION WITHOUT COMPLICATION (HCC): ICD-10-CM

## 2025-01-31 DIAGNOSIS — R11.10 VOMITING IN ADULT PATIENT: ICD-10-CM

## 2025-01-31 LAB
ALBUMIN SERPL-MCNC: 4 G/DL (ref 3.5–5.2)
ALP SERPL-CCNC: 181 U/L (ref 40–129)
ALT SERPL-CCNC: 16 U/L (ref 0–40)
ANION GAP SERPL CALCULATED.3IONS-SCNC: 14 MMOL/L (ref 7–16)
AST SERPL-CCNC: 21 U/L (ref 0–39)
BASOPHILS # BLD: 0.04 K/UL (ref 0–0.2)
BASOPHILS NFR BLD: 1 % (ref 0–2)
BILIRUB SERPL-MCNC: 1.2 MG/DL (ref 0–1.2)
BUN SERPL-MCNC: 23 MG/DL (ref 6–23)
CALCIUM SERPL-MCNC: 9.2 MG/DL (ref 8.6–10.2)
CHLORIDE SERPL-SCNC: 95 MMOL/L (ref 98–107)
CHP ED QC CHECK: NORMAL
CO2 SERPL-SCNC: 27 MMOL/L (ref 22–29)
CREAT SERPL-MCNC: 1.2 MG/DL (ref 0.7–1.2)
EOSINOPHIL # BLD: 0.1 K/UL (ref 0.05–0.5)
EOSINOPHILS RELATIVE PERCENT: 1 % (ref 0–6)
ERYTHROCYTE [DISTWIDTH] IN BLOOD BY AUTOMATED COUNT: 16 % (ref 11.5–15)
GFR, ESTIMATED: 64 ML/MIN/1.73M2
GLUCOSE BLD-MCNC: 373 MG/DL
GLUCOSE BLD-MCNC: 373 MG/DL (ref 74–99)
GLUCOSE SERPL-MCNC: 248 MG/DL (ref 74–99)
HCT VFR BLD AUTO: 46.1 % (ref 37–54)
HGB BLD-MCNC: 14.5 G/DL (ref 12.5–16.5)
IMM GRANULOCYTES # BLD AUTO: 0.06 K/UL (ref 0–0.58)
IMM GRANULOCYTES NFR BLD: 1 % (ref 0–5)
INR PPP: 1.1
LIPASE SERPL-CCNC: 125 U/L (ref 13–60)
LYMPHOCYTES NFR BLD: 0.82 K/UL (ref 1.5–4)
LYMPHOCYTES RELATIVE PERCENT: 11 % (ref 20–42)
MCH RBC QN AUTO: 31.7 PG (ref 26–35)
MCHC RBC AUTO-ENTMCNC: 31.5 G/DL (ref 32–34.5)
MCV RBC AUTO: 100.7 FL (ref 80–99.9)
MONOCYTES NFR BLD: 0.62 K/UL (ref 0.1–0.95)
MONOCYTES NFR BLD: 8 % (ref 2–12)
NEUTROPHILS NFR BLD: 79 % (ref 43–80)
NEUTS SEG NFR BLD: 6.04 K/UL (ref 1.8–7.3)
PARTIAL THROMBOPLASTIN TIME: 33.5 SEC (ref 24.5–35.1)
PLATELET # BLD AUTO: 202 K/UL (ref 130–450)
PMV BLD AUTO: 10.7 FL (ref 7–12)
POTASSIUM SERPL-SCNC: 3.6 MMOL/L (ref 3.5–5)
PROT SERPL-MCNC: 8.2 G/DL (ref 6.4–8.3)
PROTHROMBIN TIME: 11.9 SEC (ref 9.3–12.4)
RBC # BLD AUTO: 4.58 M/UL (ref 3.8–5.8)
SODIUM SERPL-SCNC: 136 MMOL/L (ref 132–146)
WBC OTHER # BLD: 7.7 K/UL (ref 4.5–11.5)

## 2025-01-31 PROCEDURE — 72125 CT NECK SPINE W/O DYE: CPT

## 2025-01-31 PROCEDURE — 85730 THROMBOPLASTIN TIME PARTIAL: CPT

## 2025-01-31 PROCEDURE — 70450 CT HEAD/BRAIN W/O DYE: CPT

## 2025-01-31 PROCEDURE — 80053 COMPREHEN METABOLIC PANEL: CPT

## 2025-01-31 PROCEDURE — 83690 ASSAY OF LIPASE: CPT

## 2025-01-31 PROCEDURE — 71045 X-RAY EXAM CHEST 1 VIEW: CPT

## 2025-01-31 PROCEDURE — 74177 CT ABD & PELVIS W/CONTRAST: CPT

## 2025-01-31 PROCEDURE — 82962 GLUCOSE BLOOD TEST: CPT

## 2025-01-31 PROCEDURE — 85025 COMPLETE CBC W/AUTO DIFF WBC: CPT

## 2025-01-31 PROCEDURE — 85610 PROTHROMBIN TIME: CPT

## 2025-01-31 PROCEDURE — 6360000004 HC RX CONTRAST MEDICATION: Performed by: RADIOLOGY

## 2025-01-31 PROCEDURE — 99285 EMERGENCY DEPT VISIT HI MDM: CPT

## 2025-01-31 RX ORDER — DOXYCYCLINE 100 MG/1
100 CAPSULE ORAL ONCE
Status: COMPLETED | OUTPATIENT
Start: 2025-02-01 | End: 2025-02-01

## 2025-01-31 RX ORDER — IOPAMIDOL 755 MG/ML
75 INJECTION, SOLUTION INTRAVASCULAR
Status: COMPLETED | OUTPATIENT
Start: 2025-01-31 | End: 2025-01-31

## 2025-01-31 RX ORDER — DOXYCYCLINE HYCLATE 100 MG
100 TABLET ORAL 2 TIMES DAILY
Qty: 10 TABLET | Refills: 0 | Status: SHIPPED | OUTPATIENT
Start: 2025-01-31 | End: 2025-02-05

## 2025-01-31 RX ADMIN — IOPAMIDOL 75 ML: 755 INJECTION, SOLUTION INTRAVENOUS at 22:37

## 2025-01-31 ASSESSMENT — LIFESTYLE VARIABLES
HOW MANY STANDARD DRINKS CONTAINING ALCOHOL DO YOU HAVE ON A TYPICAL DAY: 3 OR 4
HOW OFTEN DO YOU HAVE A DRINK CONTAINING ALCOHOL: MONTHLY OR LESS

## 2025-02-01 PROCEDURE — 6370000000 HC RX 637 (ALT 250 FOR IP)

## 2025-02-01 RX ADMIN — DOXYCYCLINE HYCLATE 100 MG: 100 CAPSULE ORAL at 04:10

## 2025-02-01 NOTE — ED NOTES
Patient ambulated to and from the bathroom independently, filled a whole urinal bottle and said he also urinated in the toilet. Patient given food and water.
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
## 2025-02-01 NOTE — ED NOTES
Patient ambulatory and has keys to his house. Patient has no one in the area to give him a ride home.

## 2025-02-03 ENCOUNTER — TELEPHONE (OUTPATIENT)
Dept: OTHER | Age: 70
End: 2025-02-03

## 2025-02-03 ENCOUNTER — HOSPITAL ENCOUNTER (OUTPATIENT)
Dept: OTHER | Age: 70
Setting detail: THERAPIES SERIES
Discharge: HOME | End: 2025-02-03
Payer: MEDICARE

## 2025-02-03 VITALS
DIASTOLIC BLOOD PRESSURE: 76 MMHG | BODY MASS INDEX: 34.12 KG/M2 | RESPIRATION RATE: 16 BRPM | SYSTOLIC BLOOD PRESSURE: 110 MMHG | OXYGEN SATURATION: 100 % | WEIGHT: 211.4 LBS | HEART RATE: 95 BPM

## 2025-02-03 LAB
ANION GAP SERPL CALCULATED.3IONS-SCNC: 11 MMOL/L (ref 7–16)
BNP SERPL-MCNC: 4530 PG/ML (ref 0–450)
BUN SERPL-MCNC: 23 MG/DL (ref 6–23)
CALCIUM SERPL-MCNC: 9.5 MG/DL (ref 8.6–10.2)
CHLORIDE SERPL-SCNC: 96 MMOL/L (ref 98–107)
CO2 SERPL-SCNC: 31 MMOL/L (ref 22–29)
CREAT SERPL-MCNC: 1.4 MG/DL (ref 0.7–1.2)
GFR, ESTIMATED: 53 ML/MIN/1.73M2
GLUCOSE SERPL-MCNC: 215 MG/DL (ref 74–99)
POTASSIUM SERPL-SCNC: 3.8 MMOL/L (ref 3.5–5)
SODIUM SERPL-SCNC: 138 MMOL/L (ref 132–146)

## 2025-02-03 PROCEDURE — 80048 BASIC METABOLIC PNL TOTAL CA: CPT

## 2025-02-03 PROCEDURE — 83880 ASSAY OF NATRIURETIC PEPTIDE: CPT

## 2025-02-03 PROCEDURE — 36415 COLL VENOUS BLD VENIPUNCTURE: CPT

## 2025-02-03 PROCEDURE — 99214 OFFICE O/P EST MOD 30 MIN: CPT

## 2025-02-03 RX ORDER — TRAZODONE HYDROCHLORIDE 50 MG/1
50 TABLET, FILM COATED ORAL NIGHTLY
COMMUNITY

## 2025-02-03 NOTE — TELEPHONE ENCOUNTER
Murtaza Martinez 1955       Samples given to patient: at [facility] name: Eliquis     Dosage: 5  mg     Quantity: 2 boxes   Lot number: [id number]  Exp. date: 3/2026, 3/2026  Instructions: Eliquis 5 mg BID    Sent an in-basket message to Vanessa PARADA RE: assistance with eliquis.

## 2025-02-03 NOTE — PROGRESS NOTES
01/27/2025 99   01/22/2025 99     CO2 (mmol/L)   Date Value   01/31/2025 27   01/27/2025 27   01/22/2025 27     BUN (mg/dL)   Date Value   01/31/2025 23   01/27/2025 14   01/22/2025 18     Creatinine (mg/dL)   Date Value   01/31/2025 1.2   01/27/2025 1.1   01/22/2025 1.1     Glucose (mg/dL)   Date Value   01/31/2025 373   01/31/2025 248 (H)   01/27/2025 274 (H)   10/27/2011 118 (H)   06/09/2011 96     Calcium (mg/dL)   Date Value   01/31/2025 9.2   01/27/2025 9.5   01/22/2025 9.6     BNP:  NT Pro-BNP (pg/mL)   Date Value   01/22/2025 4,531 (H)   01/16/2025 2,904 (H)   01/09/2025 2,314 (H)      CBC:  WBC (k/uL)   Date Value   01/31/2025 7.7     Hemoglobin (g/dL)   Date Value   01/31/2025 14.5     Hematocrit (%)   Date Value   01/31/2025 46.1     Platelets (k/uL)   Date Value   01/31/2025 202     Iron Studies:  Iron (mcg/dL)   Date Value   01/04/2023 89     TIBC (mcg/dL)   Date Value   01/04/2023 260     Hepatic:  AST (U/L)   Date Value   01/31/2025 21     ALT (U/L)   Date Value   01/31/2025 16     Total Bilirubin (mg/dL)   Date Value   01/31/2025 1.2     Alkaline Phosphatase (U/L)   Date Value   01/31/2025 181 (H)     INR:  INR (no units)   Date Value   01/31/2025 1.1         Wt Readings from Last 3 Encounters:   02/03/25 95.9 kg (211 lb 6.4 oz)   01/22/25 98.9 kg (218 lb)   01/20/25 97.1 kg (214 lb)       ASSESSMENT/PLAN:    [x] Euvolemic           [] Hypervolemic, with increase from baseline:  [] Shortness of breath/DE PAZ  [] JVD   [] HJR   [] Abnormal lung assessment:   [] Orthopnea  [] PND  [] Decreased urinary response to oral diuretic   [] Scrotal swelling   [] Lower extremity edema  (stable)   [] Compression stockings provided  [] Decline in functional capacity (unable to perform activities they had previously been able to do)  [] Weight gain     [] IV diuretics given No   [] Provider notified of recurrent IV diuretic use    Additional Notes: Patient presented to the CHF clinic with a weight loss of 7 pounds.

## 2025-02-12 ENCOUNTER — APPOINTMENT (OUTPATIENT)
Dept: OTHER | Age: 70
End: 2025-02-12
Payer: MEDICARE

## 2025-02-13 ENCOUNTER — HOSPITAL ENCOUNTER (OUTPATIENT)
Dept: OTHER | Age: 70
Setting detail: THERAPIES SERIES
Discharge: HOME | End: 2025-02-13
Payer: MEDICARE

## 2025-02-13 VITALS
HEART RATE: 96 BPM | BODY MASS INDEX: 34.7 KG/M2 | SYSTOLIC BLOOD PRESSURE: 137 MMHG | DIASTOLIC BLOOD PRESSURE: 67 MMHG | RESPIRATION RATE: 17 BRPM | OXYGEN SATURATION: 98 % | WEIGHT: 215 LBS

## 2025-02-13 LAB
ANION GAP SERPL CALCULATED.3IONS-SCNC: 11 MMOL/L (ref 7–16)
BNP SERPL-MCNC: 5498 PG/ML (ref 0–450)
BUN SERPL-MCNC: 21 MG/DL (ref 6–23)
CALCIUM SERPL-MCNC: 9.3 MG/DL (ref 8.6–10.2)
CHLORIDE SERPL-SCNC: 100 MMOL/L (ref 98–107)
CO2 SERPL-SCNC: 28 MMOL/L (ref 22–29)
CREAT SERPL-MCNC: 1.3 MG/DL (ref 0.7–1.2)
GFR, ESTIMATED: 59 ML/MIN/1.73M2
GLUCOSE SERPL-MCNC: 160 MG/DL (ref 74–99)
POTASSIUM SERPL-SCNC: 4 MMOL/L (ref 3.5–5)
SODIUM SERPL-SCNC: 139 MMOL/L (ref 132–146)

## 2025-02-13 PROCEDURE — 36415 COLL VENOUS BLD VENIPUNCTURE: CPT

## 2025-02-13 PROCEDURE — 80048 BASIC METABOLIC PNL TOTAL CA: CPT

## 2025-02-13 PROCEDURE — 83880 ASSAY OF NATRIURETIC PEPTIDE: CPT

## 2025-02-13 PROCEDURE — 96374 THER/PROPH/DIAG INJ IV PUSH: CPT

## 2025-02-13 PROCEDURE — 2500000003 HC RX 250 WO HCPCS: Performed by: INTERNAL MEDICINE

## 2025-02-13 PROCEDURE — 6360000002 HC RX W HCPCS: Performed by: INTERNAL MEDICINE

## 2025-02-13 PROCEDURE — 99214 OFFICE O/P EST MOD 30 MIN: CPT

## 2025-02-13 RX ORDER — SODIUM CHLORIDE 0.9 % (FLUSH) 0.9 %
10 SYRINGE (ML) INJECTION PRN
Status: DISCONTINUED | OUTPATIENT
Start: 2025-02-13 | End: 2025-02-14 | Stop reason: HOSPADM

## 2025-02-13 RX ORDER — BUMETANIDE 0.25 MG/ML
2 INJECTION, SOLUTION INTRAMUSCULAR; INTRAVENOUS ONCE
Status: COMPLETED | OUTPATIENT
Start: 2025-02-13 | End: 2025-02-13

## 2025-02-13 RX ADMIN — BUMETANIDE 2 MG: 0.25 INJECTION INTRAMUSCULAR; INTRAVENOUS at 12:20

## 2025-02-13 RX ADMIN — SODIUM CHLORIDE, PRESERVATIVE FREE 10 ML: 5 INJECTION INTRAVENOUS at 12:21

## 2025-02-13 NOTE — PROGRESS NOTES
refused due to not wanting multiple appointments.       02/13/25: I called Dr. Daniels about Murtaza updated lab work and assessment. He advised the patient to remain on his current Amiodarone dose of 200 mg once daily. And follow up in the CHF Clinic as scheduled.       [x]Lab work obtained BMP/pro-BNP    [x] Patient/Family Educated On:  [x] HF zones (Green, Yellow, Red) and aware of when to take action   [x] Daily weights  [] Scale provided   [x] Low sodium diet (2000 mg)  Barriers to compliance  [] Refuses to monitor diet  [] Socioeconomic difficulties  [] Unable to cook for self (use of frozen meals, can goods, etc)  [] CHF CHW consulted  [] Low sodium meal delivery options given to patient  [] Dietician consulted   [] Low sodium recipes provided  [] Sodium free seasoning provided   [x] Fluid intake 6-8 cups (around 64 oz)  [x] Reviewed currently prescribed medications with patient, educated on importance of compliance and answered any questions regarding their medication  [] Pill box provided to patient  [] Patient using pill packing pharmacy   [] CPAP/BiPAP use  [] Low impact exercise / cardiac rehab   [] LifeVest use  [x] Patient aware of signs and symptoms of worsening HF, CHF clinic phone number provided and made aware to call clinic for sooner if evaluation if needed     [x] Difficulty affording medications  [x] CHF CHW consulted  [] Prescription assistance information given   [] [facility] medication assistance program information given   [x] Sample medications provided to patient to help bridge gap until affordability;  Samples given to patient: at CHF Clinic of   Med name: Eliquis     Dosage: 5  mg     Quantity: 2 boxes   Lot number: [id number]  Exp. date: 3/2026, 3/2026  Instructions: Eliquis 5 mg BID    [x] PHQ assessment completed while in CHF clinic (1st visit, every 3 months and PRN)      12/11/2024     8:06 AM 9/5/2024     7:36 AM 8/30/2024     1:15 PM 8/8/2024     7:42 AM

## 2025-02-15 DIAGNOSIS — E78.2 MIXED HYPERLIPIDEMIA: ICD-10-CM

## 2025-02-15 DIAGNOSIS — J30.1 SEASONAL ALLERGIC RHINITIS DUE TO POLLEN: ICD-10-CM

## 2025-02-18 RX ORDER — MONTELUKAST SODIUM 10 MG/1
10 TABLET ORAL NIGHTLY
Qty: 90 TABLET | Refills: 0 | Status: SHIPPED | OUTPATIENT
Start: 2025-02-18

## 2025-02-18 RX ORDER — ATORVASTATIN CALCIUM 80 MG/1
TABLET, FILM COATED ORAL
Qty: 90 TABLET | Refills: 0 | Status: SHIPPED | OUTPATIENT
Start: 2025-02-18

## 2025-02-19 ENCOUNTER — OFFICE VISIT (OUTPATIENT)
Dept: SURGERY | Age: 70
End: 2025-02-19
Payer: MEDICARE

## 2025-02-19 VITALS
OXYGEN SATURATION: 98 % | WEIGHT: 215 LBS | HEIGHT: 66 IN | BODY MASS INDEX: 34.55 KG/M2 | RESPIRATION RATE: 18 BRPM | DIASTOLIC BLOOD PRESSURE: 68 MMHG | HEART RATE: 90 BPM | TEMPERATURE: 98.6 F | SYSTOLIC BLOOD PRESSURE: 106 MMHG

## 2025-02-19 DIAGNOSIS — K80.20 GALLSTONES: Primary | ICD-10-CM

## 2025-02-19 DIAGNOSIS — J90 PLEURAL EFFUSION: ICD-10-CM

## 2025-02-19 PROCEDURE — 1126F AMNT PAIN NOTED NONE PRSNT: CPT | Performed by: SURGERY

## 2025-02-19 PROCEDURE — G8417 CALC BMI ABV UP PARAM F/U: HCPCS | Performed by: SURGERY

## 2025-02-19 PROCEDURE — 1159F MED LIST DOCD IN RCRD: CPT | Performed by: SURGERY

## 2025-02-19 PROCEDURE — 3078F DIAST BP <80 MM HG: CPT | Performed by: SURGERY

## 2025-02-19 PROCEDURE — 3074F SYST BP LT 130 MM HG: CPT | Performed by: SURGERY

## 2025-02-19 PROCEDURE — 1036F TOBACCO NON-USER: CPT | Performed by: SURGERY

## 2025-02-19 PROCEDURE — 3017F COLORECTAL CA SCREEN DOC REV: CPT | Performed by: SURGERY

## 2025-02-19 PROCEDURE — 99213 OFFICE O/P EST LOW 20 MIN: CPT | Performed by: SURGERY

## 2025-02-19 PROCEDURE — G8427 DOCREV CUR MEDS BY ELIG CLIN: HCPCS | Performed by: SURGERY

## 2025-02-19 PROCEDURE — 1123F ACP DISCUSS/DSCN MKR DOCD: CPT | Performed by: SURGERY

## 2025-02-19 NOTE — PROGRESS NOTES
General Surgery History and Physical  [facility]    Patient's Name/Date of Birth: Murtaza Martinez / 1955    Date: February 19, 2025     Surgeon: Rafat Hayes MD    PCP: Morales Machuca DO     Chief Complaint: Abdominal pain bulge    HPI:   Murtaza Martinez is a 69 y.o. male who presents for evaluation of abdomianl pain, bulge midline and right. Timing is none, radiation to midline and right, alleviated by none and started months to years and severity is 5/10. he isolated history of previous bilateral inguinal hernia repairs and no other abdominal surgeries. he has no history of previous repairs of the hernia in the midline but states bilateral inguinal's was done. Denies nausea, vomiting, fever, chills, SOB, chest pain, diarrhea or constipation. No history of abnormal colonoscopy he states the last 1 was within 5 years with Dr. Miller. .    Returns after CT. No hernia, has gallstones,    Patient Active Problem List   Diagnosis    CAD (coronary artery disease)    Hypertension    Type 2 diabetes mellitus with hyperglycemia, with long-term current use of insulin (HCC)    Tobacco abuse    PAF (paroxysmal atrial fibrillation) ([facility])    Status post coronary artery bypass graft    H/O mitral valve repair    Morbid obesity with BMI of 50.0-59.9, adult    Chronic bronchitis (HCC)    Sleep apnea    Gastroesophageal reflux disease without esophagitis    Hyperlipidemia    Muscular deconditioning    Acute diastolic (congestive) heart failure (HCC)    Acute diastolic heart failure (HCC)    Iron deficiency anemia, unspecified    Acute systolic/diastolic congestive heart failure (HCC)    Atrial fibrillation with RVR (HCC)    Ischemic cardiomyopathy    Nonrheumatic tricuspid valve regurgitation    Chronic anticoagulation    Stage 3 chronic kidney disease (HCC)    Acute on chronic congestive heart failure (HCC)    Dizziness    Hyperosmolar hyperglycemic state (HHS) (HCC)    Acute metabolic encephalopathy

## 2025-02-27 ENCOUNTER — HOSPITAL ENCOUNTER (OUTPATIENT)
Dept: OTHER | Age: 70
Setting detail: THERAPIES SERIES
Discharge: HOME | End: 2025-02-27
Payer: MEDICARE

## 2025-02-27 VITALS
BODY MASS INDEX: 36.32 KG/M2 | SYSTOLIC BLOOD PRESSURE: 102 MMHG | HEART RATE: 98 BPM | DIASTOLIC BLOOD PRESSURE: 71 MMHG | OXYGEN SATURATION: 93 % | WEIGHT: 225 LBS | RESPIRATION RATE: 18 BRPM

## 2025-02-27 LAB
ANION GAP SERPL CALCULATED.3IONS-SCNC: 8 MMOL/L (ref 7–16)
BNP SERPL-MCNC: 3512 PG/ML (ref 0–450)
BUN SERPL-MCNC: 37 MG/DL (ref 6–23)
CALCIUM SERPL-MCNC: 9.5 MG/DL (ref 8.6–10.2)
CHLORIDE SERPL-SCNC: 98 MMOL/L (ref 98–107)
CO2 SERPL-SCNC: 28 MMOL/L (ref 22–29)
CREAT SERPL-MCNC: 1.5 MG/DL (ref 0.7–1.2)
GFR, ESTIMATED: 51 ML/MIN/1.73M2
GLUCOSE SERPL-MCNC: 162 MG/DL (ref 74–99)
POTASSIUM SERPL-SCNC: 4.3 MMOL/L (ref 3.5–5)
SODIUM SERPL-SCNC: 134 MMOL/L (ref 132–146)

## 2025-02-27 PROCEDURE — 6360000002 HC RX W HCPCS: Performed by: INTERNAL MEDICINE

## 2025-02-27 PROCEDURE — 99214 OFFICE O/P EST MOD 30 MIN: CPT

## 2025-02-27 PROCEDURE — 83880 ASSAY OF NATRIURETIC PEPTIDE: CPT

## 2025-02-27 PROCEDURE — 36415 COLL VENOUS BLD VENIPUNCTURE: CPT

## 2025-02-27 PROCEDURE — 2500000003 HC RX 250 WO HCPCS: Performed by: INTERNAL MEDICINE

## 2025-02-27 PROCEDURE — 96374 THER/PROPH/DIAG INJ IV PUSH: CPT

## 2025-02-27 PROCEDURE — 80048 BASIC METABOLIC PNL TOTAL CA: CPT

## 2025-02-27 RX ORDER — BUMETANIDE 0.25 MG/ML
2 INJECTION, SOLUTION INTRAMUSCULAR; INTRAVENOUS ONCE
Status: COMPLETED | OUTPATIENT
Start: 2025-02-27 | End: 2025-02-27

## 2025-02-27 RX ORDER — SODIUM CHLORIDE 0.9 % (FLUSH) 0.9 %
5-40 SYRINGE (ML) INJECTION 2 TIMES DAILY
Status: DISCONTINUED | OUTPATIENT
Start: 2025-02-27 | End: 2025-02-28 | Stop reason: HOSPADM

## 2025-02-27 RX ADMIN — SODIUM CHLORIDE, PRESERVATIVE FREE 10 ML: 5 INJECTION INTRAVENOUS at 09:00

## 2025-02-27 RX ADMIN — BUMETANIDE 2 MG: 0.25 INJECTION INTRAMUSCULAR; INTRAVENOUS at 08:03

## 2025-02-27 NOTE — PROGRESS NOTES
Congestive Heart Failure Clinic   [facility]    Referring Provider:   Primary Care Physician: Morales Machuca DO   Cardiologist:   Nephrologist: [facility]    HISTORY OF PRESENT ILLNESS:     Murtaza Martinez is a 69 y.o. (1955) male with a history of HFpEF (EF> 50%)  Pre Cupid:     Lab Results   Component Value Date    LVEF 50 08/22/2024    LVEF 50 08/22/2024     He presents to the CHF clinic for ongoing evaluation and monitoring of heart failure.    In the CHF clinic today he denies any adverse symptoms except:    [] Dizziness or lightheadedness   [] Syncope or near syncope  [x] Recent Fall  [] Shortness of breath at rest   [x] Dyspnea with exertion  [] Decline in functional capacity (unable to perform activities they had previously been able to do)  [] Fatigue   [] Orthopnea  [] PND  [] Nocturnal cough  [] Hemoptysis  [] Chest discomfort  [] Palpitations  [] Abdominal distention  [] Abdominal bloating  [] Early satiety  [] Blood in stool   [] Diarrhea  [] Constipation  [] Nausea/Vomiting   [] Decreased urinary response to oral diuretic   [] Scrotal swelling   [x] Lower extremity edema  [] Used PRN doses of oral diuretic   [x] Weight gain-10 pounds    Wt Readings from Last 3 Encounters:   02/27/25 102.1 kg (225 lb)   02/19/25 97.5 kg (215 lb)   02/13/25 97.5 kg (215 lb)     SOCIAL HISTORY:  [x] Denies tobacco, alcohol or illicit drug abuse  [] Tobacco use:  [] ETOH use:  [] Illicit drug use:      MEDICATIONS:    Allergies   Allergen Reactions    Pcn [Penicillins]      Child went to hospital   ? Reaction  Patient tolerates cephalosporins    Sulfa Antibiotics      ?     Prior to Visit Medications    Medication Sig Taking? Authorizing Provider   atorvastatin (LIPITOR) 80 MG tablet TAKE ONE TABLET BY MOUTH EVERY NIGHT Yes Vitor Daniels MD   montelukast (SINGULAIR) 10 MG tablet TAKE ONE TABLET BY MOUTH EVERY NIGHT Yes Brigette

## 2025-03-04 ENCOUNTER — OFFICE VISIT (OUTPATIENT)
Dept: PULMONOLOGY | Age: 70
End: 2025-03-04
Payer: MEDICARE

## 2025-03-04 VITALS
DIASTOLIC BLOOD PRESSURE: 82 MMHG | RESPIRATION RATE: 20 BRPM | TEMPERATURE: 97.5 F | SYSTOLIC BLOOD PRESSURE: 131 MMHG | OXYGEN SATURATION: 96 % | HEART RATE: 102 BPM

## 2025-03-04 DIAGNOSIS — G47.33 OSA (OBSTRUCTIVE SLEEP APNEA): ICD-10-CM

## 2025-03-04 DIAGNOSIS — Z86.16 HISTORY OF COVID-19: ICD-10-CM

## 2025-03-04 DIAGNOSIS — J43.2 CENTRILOBULAR EMPHYSEMA (HCC): Primary | ICD-10-CM

## 2025-03-04 PROCEDURE — G8417 CALC BMI ABV UP PARAM F/U: HCPCS | Performed by: INTERNAL MEDICINE

## 2025-03-04 PROCEDURE — 3075F SYST BP GE 130 - 139MM HG: CPT | Performed by: INTERNAL MEDICINE

## 2025-03-04 PROCEDURE — 3079F DIAST BP 80-89 MM HG: CPT | Performed by: INTERNAL MEDICINE

## 2025-03-04 PROCEDURE — G8427 DOCREV CUR MEDS BY ELIG CLIN: HCPCS | Performed by: INTERNAL MEDICINE

## 2025-03-04 PROCEDURE — 1159F MED LIST DOCD IN RCRD: CPT | Performed by: INTERNAL MEDICINE

## 2025-03-04 PROCEDURE — 99203 OFFICE O/P NEW LOW 30 MIN: CPT | Performed by: INTERNAL MEDICINE

## 2025-03-04 PROCEDURE — 1036F TOBACCO NON-USER: CPT | Performed by: INTERNAL MEDICINE

## 2025-03-04 PROCEDURE — 1123F ACP DISCUSS/DSCN MKR DOCD: CPT | Performed by: INTERNAL MEDICINE

## 2025-03-04 PROCEDURE — 3017F COLORECTAL CA SCREEN DOC REV: CPT | Performed by: INTERNAL MEDICINE

## 2025-03-04 PROCEDURE — 3023F SPIROM DOC REV: CPT | Performed by: INTERNAL MEDICINE

## 2025-03-04 RX ORDER — DEXAMETHASONE 4 MG/1
4 TABLET ORAL
Qty: 10 TABLET | Refills: 0 | Status: SHIPPED | OUTPATIENT
Start: 2025-03-04 | End: 2025-03-14

## 2025-03-04 RX ORDER — DOXYCYCLINE HYCLATE 100 MG
100 TABLET ORAL 2 TIMES DAILY
Qty: 14 TABLET | Refills: 0 | Status: SHIPPED | OUTPATIENT
Start: 2025-03-04 | End: 2025-03-11

## 2025-03-04 NOTE — PROGRESS NOTES
[facility]    PULMONARY/CRITICAL CARE CONSULTATION NOTE    Patient: Murtaza Martinez  MRN: [id number]  : 1955    Encounter Date: 3/4/2025  Encounter Time: 1:26 PM     PROBLEM LIST:  Patient Active Problem List   Diagnosis    CAD (coronary artery disease)    Hypertension    Type 2 diabetes mellitus with hyperglycemia, with long-term current use of insulin ([facility])    Tobacco abuse    PAF (paroxysmal atrial fibrillation) ([facility])    Status post coronary artery bypass graft    H/O mitral valve repair    Morbid obesity with BMI of 50.0-59.9, adult    Chronic bronchitis ([facility])    Sleep apnea    Gastroesophageal reflux disease without esophagitis    Hyperlipidemia    Muscular deconditioning    Acute diastolic (congestive) heart failure ([facility])    Acute diastolic heart failure ([facility])    Iron deficiency anemia, unspecified    Acute systolic/diastolic congestive heart failure ([facility])    Atrial fibrillation with RVR ([facility])    Ischemic cardiomyopathy    Nonrheumatic tricuspid valve regurgitation    Chronic anticoagulation    Stage 3 chronic kidney disease ([facility])    Acute on chronic congestive heart failure ([facility])    Dizziness    Hyperosmolar hyperglycemic state (HHS) ([facility])    Acute metabolic encephalopathy    Altered mental status    Hypotension    Transient hypotension    Atrial fibrillation with rapid ventricular response ([facility])    Compression fracture of T11 vertebra, initial encounter ([facility])    Fx dorsal vertebra-closed ([facility])    Closed fracture of multiple ribs    Ventral hernia with obstruction and without gangrene    Severe muscle deconditioning    Balance disorder    Leg weakness, bilateral    Bilateral arm weakness     Reason for Consultation: COPD    HPI:   Murtaza Martinez is a 69 y.o. male with past medical history noted for COPD, CABG x 3 () that presented to hospital for COPD.    Patient had some mild hemoptysis noted due to cough but no acute issues as of visit 3/4/2025.  Patient uses Symbicort,

## 2025-03-05 ENCOUNTER — TELEPHONE (OUTPATIENT)
Dept: PULMONOLOGY | Age: 70
End: 2025-03-05

## 2025-03-05 ENCOUNTER — TELEPHONE (OUTPATIENT)
Dept: OTHER | Age: 70
End: 2025-03-05

## 2025-03-05 DIAGNOSIS — Z13.9 ENCOUNTER FOR SCREENING INVOLVING SOCIAL DETERMINANTS OF HEALTH (SDOH): ICD-10-CM

## 2025-03-05 DIAGNOSIS — Z59.86 PATIENT CANNOT AFFORD MEDICATIONS: Primary | ICD-10-CM

## 2025-03-05 SDOH — ECONOMIC STABILITY - INCOME SECURITY: FINANCIAL INSECURITY: Z59.86

## 2025-03-05 NOTE — TELEPHONE ENCOUNTER
Mailed a letter to patient informing him that his CT Chest is scheduled for 3-27-25 at 9:00 am at the [facility]. He must arrive by 8:30 am. There is no prep for this test.    His PFT is scheduled for 3-27-25 at 9:30 am directly after his CT Chest. He is to have no caffeine for 24 hours prior to test and no resp meds for at least 4 hours prior to test

## 2025-03-05 NOTE — TELEPHONE ENCOUNTER
CHF CHW Follow up Call  3/5/2025  9:30 AM     Notes: called pt to assist with cost of Eliquis. Pt did not answer. I left a VM.     CHW instructed patient to call CHF CHW at [phone] for further assistance.    Electronically signed by Vanessa Fernandez on 3/5/2025 at 9:30 AM

## 2025-03-10 ENCOUNTER — TELEPHONE (OUTPATIENT)
Dept: CARDIOLOGY CLINIC | Age: 70
End: 2025-03-10

## 2025-03-10 DIAGNOSIS — M62.838 MUSCLE SPASMS OF BOTH LOWER EXTREMITIES: ICD-10-CM

## 2025-03-11 ENCOUNTER — TELEPHONE (OUTPATIENT)
Dept: CARDIOLOGY CLINIC | Age: 70
End: 2025-03-11

## 2025-03-11 RX ORDER — BACLOFEN 20 MG/1
TABLET ORAL
Qty: 90 TABLET | Refills: 0 | Status: SHIPPED | OUTPATIENT
Start: 2025-03-11

## 2025-03-11 NOTE — TELEPHONE ENCOUNTER
Name of Medication(s) Requested:  Requested Prescriptions     Pending Prescriptions Disp Refills    baclofen (LIORESAL) 20 MG tablet [Pharmacy Med Name: Baclofen Oral Tablet 20 MG] 90 tablet 0     Sig: TAKE 1 TABLET BY MOUTH 3 TIMES A DAY AS NEEDED FOR MUSCLE SPASM       Medication is on current medication list Yes    Dosage and directions were verified? Yes    Quantity verified: 30 day supply     Pharmacy Verified?  Yes    Last Appointment:  12/20/2024    Future appts:  Future Appointments   Date Time Provider Department Center   3/12/2025 12:00 PM Vitor Daniels MD [facility]   3/20/2025  9:15 AM Morales Machuca DO [facility] ECC DEP   3/21/2025  1:30 PM [facility] CHF ROOM 2 [facility]   3/27/2025  9:00 AM [facility] CT SCAN 3 [facility] CT [facility] Rad/Car   3/27/2025  9:30 AM [facility] PFT STRESS LAB ROOM [facility] PFT [facility]   6/10/2025  1:00 PM [facility]        (If no appt send self scheduling link. .REFILLAPPT)  Scheduling request sent?     [] Yes  [x] No    Does patient need updated?  [] Yes  [x] No

## 2025-03-12 ENCOUNTER — OFFICE VISIT (OUTPATIENT)
Dept: CARDIOLOGY CLINIC | Age: 70
End: 2025-03-12
Payer: MEDICARE

## 2025-03-12 VITALS
HEART RATE: 99 BPM | WEIGHT: 222.2 LBS | RESPIRATION RATE: 18 BRPM | HEIGHT: 66 IN | BODY MASS INDEX: 35.71 KG/M2 | SYSTOLIC BLOOD PRESSURE: 126 MMHG | DIASTOLIC BLOOD PRESSURE: 72 MMHG

## 2025-03-12 DIAGNOSIS — I25.10 CORONARY ARTERY DISEASE INVOLVING NATIVE CORONARY ARTERY OF NATIVE HEART WITHOUT ANGINA PECTORIS: Primary | ICD-10-CM

## 2025-03-12 DIAGNOSIS — I48.0 PAF (PAROXYSMAL ATRIAL FIBRILLATION) (HCC): ICD-10-CM

## 2025-03-12 PROCEDURE — G8417 CALC BMI ABV UP PARAM F/U: HCPCS | Performed by: INTERNAL MEDICINE

## 2025-03-12 PROCEDURE — 3078F DIAST BP <80 MM HG: CPT | Performed by: INTERNAL MEDICINE

## 2025-03-12 PROCEDURE — 93000 ELECTROCARDIOGRAM COMPLETE: CPT | Performed by: INTERNAL MEDICINE

## 2025-03-12 PROCEDURE — 3017F COLORECTAL CA SCREEN DOC REV: CPT | Performed by: INTERNAL MEDICINE

## 2025-03-12 PROCEDURE — 99214 OFFICE O/P EST MOD 30 MIN: CPT | Performed by: INTERNAL MEDICINE

## 2025-03-12 PROCEDURE — 1036F TOBACCO NON-USER: CPT | Performed by: INTERNAL MEDICINE

## 2025-03-12 PROCEDURE — 1123F ACP DISCUSS/DSCN MKR DOCD: CPT | Performed by: INTERNAL MEDICINE

## 2025-03-12 PROCEDURE — G8427 DOCREV CUR MEDS BY ELIG CLIN: HCPCS | Performed by: INTERNAL MEDICINE

## 2025-03-12 PROCEDURE — 3074F SYST BP LT 130 MM HG: CPT | Performed by: INTERNAL MEDICINE

## 2025-03-12 PROCEDURE — 1159F MED LIST DOCD IN RCRD: CPT | Performed by: INTERNAL MEDICINE

## 2025-03-12 PROCEDURE — 1160F RVW MEDS BY RX/DR IN RCRD: CPT | Performed by: INTERNAL MEDICINE

## 2025-03-12 RX ORDER — METOPROLOL SUCCINATE 50 MG/1
50 TABLET, EXTENDED RELEASE ORAL DAILY
Qty: 90 TABLET | Refills: 3 | Status: SHIPPED | OUTPATIENT
Start: 2025-03-12

## 2025-03-12 NOTE — PROGRESS NOTES
eccentric 99% distal stenosis before origin RPDA & RPLCA. LV: Mild inferior hypokinesis EF 55%. Probable mild AS with 10-15mmHg. Successful PCI distal RCA w/3.5 x 12 mm BMS, 0% res stenosis. Normal distal runoff    CORONARY ARTERY BYPASS GRAFT  09/09/2013    Dr Aden; CABG x2, LIMA to LAD, SVG to ramus intermedius; MV repair using 30-mm Future complete ring with magic stitch between A3 and P3; rigid internal fixation of sternum using KLS plates x2; endoscopic vein harvesting of right lower extremity     ECHO COMPL W DOP COLOR FLOW  07/25/2013         HERNIA REPAIR      SINUS SURGERY      TONSILLECTOMY           Current Outpatient Medications   Medication Sig Dispense Refill    baclofen (LIORESAL) 20 MG tablet TAKE 1 TABLET BY MOUTH 3 TIMES A DAY AS NEEDED FOR MUSCLE SPASM 90 tablet 0    apixaban (ELIQUIS) 5 MG TABS tablet Take 1 tablet by mouth 2 times daily (Dr Machuca) 180 tablet 3    dexAMETHasone (DECADRON) 4 MG tablet Take 1 tablet by mouth daily (with breakfast) for 10 days 10 tablet 0    atorvastatin (LIPITOR) 80 MG tablet TAKE ONE TABLET BY MOUTH EVERY NIGHT 90 tablet 0    montelukast (SINGULAIR) 10 MG tablet TAKE ONE TABLET BY MOUTH EVERY NIGHT 90 tablet 0    traZODone (DESYREL) 50 MG tablet Take 1 tablet by mouth nightly Sleeping pill      amiodarone (CORDARONE) 200 MG tablet Take 1 tablet by mouth daily 30 tablet 3    vardenafil (LEVITRA) 20 MG tablet Take 1 tablet by mouth as needed for Erectile Dysfunction 30 tablet 4    SYMBICORT 80-4.5 MCG/ACT AERO Inhale 2 puffs into the lungs 2 times daily 10.2 g 3    bumetanide (BUMEX) 1 MG tablet Take 1 tablet by mouth 2 times daily 180 tablet 3    potassium chloride (KLOR-CON M) 20 MEQ extended release tablet Take 1 tablet by mouth 2 times daily 180 tablet 2    DULoxetine (CYMBALTA) 30 MG extended release capsule Take 1 capsule by mouth daily 90 capsule 4    pantoprazole (PROTONIX) 40 MG tablet Take 1 tablet by mouth daily 90 tablet 5    metoprolol succinate

## 2025-03-20 ENCOUNTER — HOSPITAL ENCOUNTER (OUTPATIENT)
Dept: OTHER | Age: 70
Setting detail: THERAPIES SERIES
Discharge: HOME | End: 2025-03-20
Payer: MEDICARE

## 2025-03-20 VITALS
BODY MASS INDEX: 34.38 KG/M2 | HEART RATE: 103 BPM | DIASTOLIC BLOOD PRESSURE: 84 MMHG | OXYGEN SATURATION: 97 % | SYSTOLIC BLOOD PRESSURE: 152 MMHG | WEIGHT: 213 LBS | RESPIRATION RATE: 17 BRPM

## 2025-03-20 LAB
ANION GAP SERPL CALCULATED.3IONS-SCNC: 10 MMOL/L (ref 7–16)
BNP SERPL-MCNC: 6436 PG/ML (ref 0–450)
BUN SERPL-MCNC: 18 MG/DL (ref 6–23)
CALCIUM SERPL-MCNC: 9.3 MG/DL (ref 8.6–10.2)
CHLORIDE SERPL-SCNC: 100 MMOL/L (ref 98–107)
CO2 SERPL-SCNC: 28 MMOL/L (ref 22–29)
CREAT SERPL-MCNC: 0.9 MG/DL (ref 0.7–1.2)
GFR, ESTIMATED: >90 ML/MIN/1.73M2
GLUCOSE SERPL-MCNC: 96 MG/DL (ref 74–99)
POTASSIUM SERPL-SCNC: 3.5 MMOL/L (ref 3.5–5)
SODIUM SERPL-SCNC: 138 MMOL/L (ref 132–146)

## 2025-03-20 PROCEDURE — 96374 THER/PROPH/DIAG INJ IV PUSH: CPT

## 2025-03-20 PROCEDURE — 2500000003 HC RX 250 WO HCPCS: Performed by: INTERNAL MEDICINE

## 2025-03-20 PROCEDURE — 83880 ASSAY OF NATRIURETIC PEPTIDE: CPT

## 2025-03-20 PROCEDURE — 99214 OFFICE O/P EST MOD 30 MIN: CPT

## 2025-03-20 PROCEDURE — 36415 COLL VENOUS BLD VENIPUNCTURE: CPT

## 2025-03-20 PROCEDURE — 80048 BASIC METABOLIC PNL TOTAL CA: CPT

## 2025-03-20 PROCEDURE — 6360000002 HC RX W HCPCS: Performed by: INTERNAL MEDICINE

## 2025-03-20 RX ORDER — SODIUM CHLORIDE 0.9 % (FLUSH) 0.9 %
10 SYRINGE (ML) INJECTION PRN
Status: DISCONTINUED | OUTPATIENT
Start: 2025-03-20 | End: 2025-03-21 | Stop reason: HOSPADM

## 2025-03-20 RX ORDER — CLINDAMYCIN HYDROCHLORIDE 150 MG/1
150 CAPSULE ORAL 3 TIMES DAILY
COMMUNITY

## 2025-03-20 RX ORDER — BUMETANIDE 0.25 MG/ML
2 INJECTION, SOLUTION INTRAMUSCULAR; INTRAVENOUS ONCE
Status: COMPLETED | OUTPATIENT
Start: 2025-03-20 | End: 2025-03-20

## 2025-03-20 RX ADMIN — Medication 10 ML: at 14:22

## 2025-03-20 RX ADMIN — BUMETANIDE 2 MG: 0.25 INJECTION INTRAMUSCULAR; INTRAVENOUS at 14:22

## 2025-03-20 NOTE — PROGRESS NOTES
(U/L)   Date Value   01/31/2025 181 (H)     INR:  INR (no units)   Date Value   01/31/2025 1.1         Wt Readings from Last 3 Encounters:   03/20/25 96.6 kg (213 lb)   03/12/25 100.8 kg (222 lb 3.2 oz)   02/27/25 102.1 kg (225 lb)       ASSESSMENT/PLAN:    [] Euvolemic           [x] Hypervolemic, with increase from baseline:  [x] Shortness of breath/DE PAZ  [] JVD   [] HJR   [] Abnormal lung assessment:   [] Orthopnea  [] PND  [] Decreased urinary response to oral diuretic   [] Scrotal swelling   [x] Lower extremity edema    [] Compression stockings provided  [] Decline in functional capacity (unable to perform activities they had previously been able to do)  [] Weight gain    [x] IV diuretics Bumex 2 mg iv   [] Provider notified of recurrent IV diuretic use    Additional Notes: Patient presents to the CHF clinic for a follow up appointment with a weight loss of 12 pounds. He was recently increased to 50 of metoprolol from 25 mg per Dr. Daniels. The patient accidentally has been taking 100 mg daily. He was reeducated on the proper dosing. Murtaza also admits to skipping his medications this past week for several consecutive days. He was advised to take all medications as prescribed.     Although Murtaza has had a decreased weight, his edema has gotten worse. IV Bumex has been administered in the CHF Clinic and a short term follow up has been scheduled.       [x]Lab work obtained BMP/pro-BNP    [x] Patient/Family Educated On:  [x] HF zones (Green, Yellow, Red) and aware of when to take action   [x] Daily weights  [] Scale provided   [x] Low sodium diet (2000 mg)  Barriers to compliance  [] Refuses to monitor diet  [] Socioeconomic difficulties  [] Unable to cook for self (use of frozen meals, can goods, etc)  [] CHF CHW consulted  [] Low sodium meal delivery options given to patient  [] Dietician consulted   [] Low sodium recipes provided  [] Sodium free seasoning provided   [x] Fluid intake 6-8 cups (around 64

## 2025-04-02 ENCOUNTER — HOSPITAL ENCOUNTER (OUTPATIENT)
Dept: OTHER | Age: 70
Setting detail: THERAPIES SERIES
Discharge: HOME | End: 2025-04-02
Payer: MEDICARE

## 2025-04-02 VITALS
SYSTOLIC BLOOD PRESSURE: 139 MMHG | DIASTOLIC BLOOD PRESSURE: 82 MMHG | HEART RATE: 96 BPM | OXYGEN SATURATION: 97 % | BODY MASS INDEX: 32.28 KG/M2 | WEIGHT: 200 LBS | RESPIRATION RATE: 18 BRPM

## 2025-04-02 LAB
ANION GAP SERPL CALCULATED.3IONS-SCNC: 10 MMOL/L (ref 7–16)
BNP SERPL-MCNC: 3586 PG/ML (ref 0–450)
BUN SERPL-MCNC: 24 MG/DL (ref 6–23)
CALCIUM SERPL-MCNC: 9.4 MG/DL (ref 8.6–10.2)
CHLORIDE SERPL-SCNC: 92 MMOL/L (ref 98–107)
CO2 SERPL-SCNC: 30 MMOL/L (ref 22–29)
CREAT SERPL-MCNC: 1.1 MG/DL (ref 0.7–1.2)
GFR, ESTIMATED: 71 ML/MIN/1.73M2
GLUCOSE SERPL-MCNC: 301 MG/DL (ref 74–99)
POTASSIUM SERPL-SCNC: 4.1 MMOL/L (ref 3.5–5)
SODIUM SERPL-SCNC: 132 MMOL/L (ref 132–146)

## 2025-04-02 PROCEDURE — 99214 OFFICE O/P EST MOD 30 MIN: CPT

## 2025-04-02 PROCEDURE — 80048 BASIC METABOLIC PNL TOTAL CA: CPT

## 2025-04-02 PROCEDURE — 83880 ASSAY OF NATRIURETIC PEPTIDE: CPT

## 2025-04-02 PROCEDURE — 36415 COLL VENOUS BLD VENIPUNCTURE: CPT

## 2025-04-02 ASSESSMENT — PATIENT HEALTH QUESTIONNAIRE - PHQ9
SUM OF ALL RESPONSES TO PHQ QUESTIONS 1-9: 0
1. LITTLE INTEREST OR PLEASURE IN DOING THINGS: NOT AT ALL
SUM OF ALL RESPONSES TO PHQ QUESTIONS 1-9: 0
2. FEELING DOWN, DEPRESSED OR HOPELESS: NOT AT ALL
SUM OF ALL RESPONSES TO PHQ QUESTIONS 1-9: 0
SUM OF ALL RESPONSES TO PHQ QUESTIONS 1-9: 0

## 2025-04-02 NOTE — PROGRESS NOTES
take action   [x] Daily weights  [] Scale provided   [x] Low sodium diet (2000 mg)  Barriers to compliance  [] Refuses to monitor diet  [] Socioeconomic difficulties  [] Unable to cook for self (use of frozen meals, can goods, etc)  [] CHF CHW consulted  [] Low sodium meal delivery options given to patient  [] Dietician consulted   [] Low sodium recipes provided  [] Sodium free seasoning provided   [x] Fluid intake 6-8 cups (around 64 oz)  [x] Reviewed currently prescribed medications with patient, educated on importance of compliance and answered any questions regarding their medication  [] Pill box provided to patient  [] Patient using pill packing pharmacy   [] CPAP/BiPAP use  [] Low impact exercise / cardiac rehab   [] LifeVest use  [x] Patient aware of signs and symptoms of worsening HF, CHF clinic phone number provided and made aware to call clinic for sooner if evaluation if needed     [x] Difficulty affording medications  [x] CHF CHW consulted  [] Prescription assistance information given   [] [facility] medication assistance program information given   [x] Sample medications provided to patient to help bridge gap until affordability;  Samples given to patient: at CHF Clinic of   [facility] name: Eliquis     Dosage: 5  mg     Quantity: 2 boxes   Lot number: [id number]  Exp. date: 3/2026, 3/2026  Instructions: Eliquis 5 mg BID    [x] PHQ assessment completed while in CHF clinic (1st visit, every 3 months and PRN)      12/11/2024     8:06 AM 9/5/2024     7:36 AM 8/30/2024     1:15 PM 8/8/2024     7:42 AM 5/8/2024     9:22 AM 4/22/2024     8:52 AM 2/15/2024     9:23 AM   PHQ Scores   PHQ2 Score 0 1 0 0 0 0 0   PHQ9 Score 0 1 0 0 0 0 6     Interpretation of Total Score Depression Severity:   1-4 = Minimal depression  5-9 = Mild depression  10-14 = Moderate depression  15-19 = Moderately severe depression  20-27 = Severe depression   [] Patient provided community resources for counseling services  []

## 2025-04-29 ENCOUNTER — TELEPHONE (OUTPATIENT)
Dept: OTHER | Age: 70
End: 2025-04-29

## 2025-04-29 NOTE — TELEPHONE ENCOUNTER
Patient called and cancelled 4/30/25 appt. Due to lack of transportation.  Patient rescheduled appt. And I contacted Vanessa Fernandez via message to coordinate [facility] transportation to CHF clinic for next appt.

## 2025-04-30 ENCOUNTER — HOSPITAL ENCOUNTER (OUTPATIENT)
Dept: OTHER | Age: 70
Setting detail: THERAPIES SERIES
End: 2025-04-30
Payer: MEDICARE

## 2025-04-30 ENCOUNTER — TELEPHONE (OUTPATIENT)
Dept: OTHER | Age: 70
End: 2025-04-30

## 2025-04-30 NOTE — TELEPHONE ENCOUNTER
[facility], [address]  [phone]  2025    Murtaza Martinez (:1955)  [address] [place] [id number]    Telephone Encounter  Schedule [facility] Transportation Van to a [facility] location.      Appointment scheduled for 25 @ 9:45 AM.    Van Safety rules discussed, specifically:  wheelchair must be secured for transport if utilized.  patient safety belt (seat belt) must be worn at all times inside van.    Patient verbalizes understanding and indicates that they have accepted to follow the van safety rules.   (If patient declines patient safety rules, appointment for transport will not be scheduled)

## 2025-05-12 ENCOUNTER — RESULTS FOLLOW-UP (OUTPATIENT)
Dept: FAMILY MEDICINE CLINIC | Age: 70
End: 2025-05-12

## 2025-05-12 ENCOUNTER — OFFICE VISIT (OUTPATIENT)
Dept: FAMILY MEDICINE CLINIC | Age: 70
End: 2025-05-12
Payer: MEDICARE

## 2025-05-12 VITALS
HEIGHT: 66 IN | WEIGHT: 200 LBS | SYSTOLIC BLOOD PRESSURE: 124 MMHG | BODY MASS INDEX: 32.14 KG/M2 | TEMPERATURE: 97.4 F | RESPIRATION RATE: 19 BRPM | DIASTOLIC BLOOD PRESSURE: 79 MMHG | HEART RATE: 98 BPM | OXYGEN SATURATION: 98 %

## 2025-05-12 DIAGNOSIS — M54.50 BILATERAL LOW BACK PAIN WITHOUT SCIATICA, UNSPECIFIED CHRONICITY: Primary | ICD-10-CM

## 2025-05-12 DIAGNOSIS — S39.012A STRAIN OF LUMBAR REGION, INITIAL ENCOUNTER: ICD-10-CM

## 2025-05-12 PROCEDURE — 1159F MED LIST DOCD IN RCRD: CPT

## 2025-05-12 PROCEDURE — G8427 DOCREV CUR MEDS BY ELIG CLIN: HCPCS

## 2025-05-12 PROCEDURE — 1036F TOBACCO NON-USER: CPT

## 2025-05-12 PROCEDURE — 99213 OFFICE O/P EST LOW 20 MIN: CPT

## 2025-05-12 PROCEDURE — 1160F RVW MEDS BY RX/DR IN RCRD: CPT

## 2025-05-12 PROCEDURE — 1123F ACP DISCUSS/DSCN MKR DOCD: CPT

## 2025-05-12 PROCEDURE — G8417 CALC BMI ABV UP PARAM F/U: HCPCS

## 2025-05-12 PROCEDURE — 3074F SYST BP LT 130 MM HG: CPT

## 2025-05-12 PROCEDURE — 3017F COLORECTAL CA SCREEN DOC REV: CPT

## 2025-05-12 PROCEDURE — 3078F DIAST BP <80 MM HG: CPT

## 2025-05-12 RX ORDER — LIDOCAINE 4 G/G
1 PATCH TOPICAL DAILY
Qty: 7 PATCH | Refills: 0 | Status: SHIPPED | OUTPATIENT
Start: 2025-05-12

## 2025-05-12 NOTE — PROGRESS NOTES
Chief Complaint   Lower Back Pain (Fell three weeks ago and hurt lower back)      History of Present Illness   Source of history provided by:  patient.       Murtaza Martinez is a 69 y.o. old male presenting to the walk in clinic for evaluation of lower back pain for the past 3 weeks. Pt states he was working on his  and fell forward onto his knees and the back pain started after. Pt has had back problems in the past. There is no radiation of the pain into the buttocks/leg. Pt states the pain is worse with movement and improves immobilization. Pt denies any bowel/bladder incontinence, abdominal pain, hematuria, N/V/D, fever, chills, HA, neck pain, recent illness, dysuria, or lethargy.    ROS    Unless otherwise stated in this report or unable to obtain because of the patient's clinical or mental status as evidenced by the medical record, this patients's positive and negative responses for Review of Systems, constitutional, psych, eyes, ENT, cardiovascular, respiratory, gastrointestinal, neurological, genitourinary, musculoskeletal, integument systems and systems related to the presenting problem are either stated in the preceding or were not pertinent or were negative for the symptoms and/or complaints related to the medical problem.      Physical Exam         VS:  /79   Pulse 98   Temp 97.4 °F (36.3 °C)   Resp 19   Ht 1.676 m (5' 5.98\")   Wt 90.7 kg (200 lb)   SpO2 98%   BMI 32.30 kg/m²    Oxygen Saturation Interpretation: Normal.    Physical Exam  Vitals and nursing note reviewed.   Constitutional:       General: He is not in acute distress.     Appearance: Normal appearance. He is well-developed and normal weight. He is not ill-appearing or toxic-appearing.   HENT:      Head: Normocephalic and atraumatic.      Right Ear: External ear normal.      Left Ear: External ear normal.      Nose: Nose normal.      Mouth/Throat:      Mouth: Mucous membranes are moist.      Pharynx: Oropharynx is

## 2025-05-22 ENCOUNTER — TELEPHONE (OUTPATIENT)
Dept: ORTHOPEDIC SURGERY | Age: 70
End: 2025-05-22

## 2025-05-22 NOTE — TELEPHONE ENCOUNTER
Contacted patient regarding referral for scheduling after walk-in clinic visit for acute exacerbation on chronic lower thoracic and lumbar pain. Patient was diagnosed August 2023 with multiple levels transverse process fractures and compression fractures at T11 and L2.  Was seen 5/12/25 with acute on chronic pain with fall TELLY. Discussed with him plan to see [facility] provider but patient declined stating he would like to return to see Elisabeth Wright PA-C in Neurosurgery.  Referral will be forwarded to that office.

## 2025-05-23 ENCOUNTER — HOSPITAL ENCOUNTER (OUTPATIENT)
Dept: OTHER | Age: 70
Setting detail: THERAPIES SERIES
Discharge: HOME | End: 2025-05-23
Payer: MEDICARE

## 2025-05-23 VITALS
SYSTOLIC BLOOD PRESSURE: 122 MMHG | DIASTOLIC BLOOD PRESSURE: 89 MMHG | OXYGEN SATURATION: 96 % | WEIGHT: 183 LBS | BODY MASS INDEX: 29.55 KG/M2 | HEART RATE: 108 BPM | RESPIRATION RATE: 18 BRPM

## 2025-05-23 LAB
ANION GAP SERPL CALCULATED.3IONS-SCNC: 11 MMOL/L (ref 7–16)
BNP SERPL-MCNC: 2184 PG/ML (ref 0–450)
BUN SERPL-MCNC: 22 MG/DL (ref 6–23)
CALCIUM SERPL-MCNC: 9.6 MG/DL (ref 8.6–10.2)
CHLORIDE SERPL-SCNC: 100 MMOL/L (ref 98–107)
CO2 SERPL-SCNC: 29 MMOL/L (ref 22–29)
CREAT SERPL-MCNC: 0.9 MG/DL (ref 0.7–1.2)
GFR, ESTIMATED: 89 ML/MIN/1.73M2
GLUCOSE SERPL-MCNC: 191 MG/DL (ref 74–99)
POTASSIUM SERPL-SCNC: 3.9 MMOL/L (ref 3.5–5)
SODIUM SERPL-SCNC: 140 MMOL/L (ref 132–146)

## 2025-05-23 PROCEDURE — 99214 OFFICE O/P EST MOD 30 MIN: CPT

## 2025-05-23 PROCEDURE — 36415 COLL VENOUS BLD VENIPUNCTURE: CPT

## 2025-05-23 PROCEDURE — 83880 ASSAY OF NATRIURETIC PEPTIDE: CPT

## 2025-05-23 PROCEDURE — 80048 BASIC METABOLIC PNL TOTAL CA: CPT

## 2025-05-23 RX ORDER — ASCORBIC ACID 500 MG
1000 TABLET ORAL DAILY
COMMUNITY

## 2025-05-23 NOTE — PROGRESS NOTES
191 (H)   04/02/2025 301 (H)   03/20/2025 96   10/27/2011 118 (H)   06/09/2011 96     Calcium (mg/dL)   Date Value   05/23/2025 9.6   04/02/2025 9.4   03/20/2025 9.3     BNP:  NT Pro-BNP (pg/mL)   Date Value   05/23/2025 2,184 (H)   04/02/2025 3,586 (H)   03/20/2025 6,436 (H)      CBC:  WBC (k/uL)   Date Value   01/31/2025 7.7     Hemoglobin (g/dL)   Date Value   01/31/2025 14.5     Hematocrit (%)   Date Value   01/31/2025 46.1     Platelets (k/uL)   Date Value   01/31/2025 202     Iron Studies:  Iron (mcg/dL)   Date Value   01/04/2023 89     TIBC (mcg/dL)   Date Value   01/04/2023 260     Hepatic:  AST (U/L)   Date Value   01/31/2025 21     ALT (U/L)   Date Value   01/31/2025 16     Total Bilirubin (mg/dL)   Date Value   01/31/2025 1.2     Alkaline Phosphatase (U/L)   Date Value   01/31/2025 181 (H)     INR:  INR (no units)   Date Value   01/31/2025 1.1         Wt Readings from Last 3 Encounters:   05/23/25 83 kg (183 lb)   05/12/25 90.7 kg (200 lb)   04/02/25 90.7 kg (200 lb)       ASSESSMENT/PLAN:    [x] Euvolemic           [] Hypervolemic, with increase from baseline:  [] Shortness of breath/DE PAZ  [] JVD   [] HJR   [] Abnormal lung assessment:   [] Orthopnea  [] PND  [] Decreased urinary response to oral diuretic   [] Scrotal swelling   [] Lower extremity edema    [] Compression stockings provided  [] Decline in functional capacity (unable to perform activities they had previously been able to do)  [] Weight gain    [] IV diuretics NO  [] Provider notified of recurrent IV diuretic use    Additional Notes: Pt seen for follow up. Euvolemic on exam. Weight is down 17lbs since last visit. Pt brought medications and upon checking medications, there were multiple bottles not in bag and pt states \"if it is not in the bag, I am not taking it.\" Pt has not been taking eliquis d/t cost--sent message to [facility] and has not been taking jardiance as he says \"there are too many side effects and it will kill him. Labs drawn and

## 2025-05-28 DIAGNOSIS — M62.838 MUSCLE SPASMS OF BOTH LOWER EXTREMITIES: ICD-10-CM

## 2025-05-28 RX ORDER — BACLOFEN 20 MG/1
TABLET ORAL
Qty: 90 TABLET | Refills: 0 | Status: SHIPPED | OUTPATIENT
Start: 2025-05-28

## 2025-05-28 NOTE — TELEPHONE ENCOUNTER
Patient called for a refill of Baclofen for muscle spasms.  Patient informed me that there are times when he has to take more than 3 tablets a day since the muscle spasms won't let up.  Patient asked if he could have a 90 day supply, instead of a 30 day supply.  I informed him that I will let Dr. Machuca know and that he should discuss at his appt next wk.  Patient was agreeable.      Name of Medication(s) Requested:  Requested Prescriptions     Pending Prescriptions Disp Refills    baclofen (LIORESAL) 20 MG tablet 90 tablet 0     Sig: TAKE 1 TABLET BY MOUTH 3 TIMES A DAY AS NEEDED FOR MUSCLE SPASM       Medication is on current medication list Yes    Dosage and directions were verified? Yes    Quantity verified: 30 day supply     Pharmacy Verified?  Yes    Last Appointment:  12/20/2024    Future appts:  Future Appointments   Date Time Provider Department Center   6/3/2025  3:15 PM Morales Machuca DO [facility]   6/10/2025  1:00 PM Larry Becker MD [facility]   7/3/2025 11:00 AM Elisabeth Wright PA [facility]   7/7/2025 10:30 AM [facility] ROOM 1 [facility]   9/23/2025  1:15 PM [facility]        (If no appt send self scheduling link. .REFILLAPPT)  Scheduling request sent?     [] Yes  [x] No    Does patient need updated?  [] Yes  [x] No

## 2025-06-03 ENCOUNTER — OFFICE VISIT (OUTPATIENT)
Dept: FAMILY MEDICINE CLINIC | Age: 70
End: 2025-06-03
Payer: MEDICARE

## 2025-06-03 VITALS
SYSTOLIC BLOOD PRESSURE: 112 MMHG | WEIGHT: 184 LBS | HEART RATE: 118 BPM | RESPIRATION RATE: 18 BRPM | OXYGEN SATURATION: 90 % | TEMPERATURE: 97.2 F | DIASTOLIC BLOOD PRESSURE: 68 MMHG | BODY MASS INDEX: 29.71 KG/M2

## 2025-06-03 DIAGNOSIS — N18.31 STAGE 3A CHRONIC KIDNEY DISEASE (HCC): ICD-10-CM

## 2025-06-03 DIAGNOSIS — E03.9 ACQUIRED HYPOTHYROIDISM: ICD-10-CM

## 2025-06-03 DIAGNOSIS — Z13.6 ENCOUNTER FOR ABDOMINAL AORTIC ANEURYSM (AAA) SCREENING: ICD-10-CM

## 2025-06-03 DIAGNOSIS — R53.82 CHRONIC FATIGUE: ICD-10-CM

## 2025-06-03 DIAGNOSIS — Z79.4 TYPE 2 DIABETES MELLITUS WITH HYPERGLYCEMIA, WITH LONG-TERM CURRENT USE OF INSULIN (HCC): ICD-10-CM

## 2025-06-03 DIAGNOSIS — E78.2 MIXED HYPERLIPIDEMIA: ICD-10-CM

## 2025-06-03 DIAGNOSIS — M51.16 LUMBAR DISC DISEASE WITH RADICULOPATHY: Primary | ICD-10-CM

## 2025-06-03 DIAGNOSIS — I50.9 ACUTE ON CHRONIC CONGESTIVE HEART FAILURE, UNSPECIFIED HEART FAILURE TYPE (HCC): ICD-10-CM

## 2025-06-03 DIAGNOSIS — E11.42 DIABETIC PERIPHERAL NEUROPATHY (HCC): ICD-10-CM

## 2025-06-03 DIAGNOSIS — E11.65 TYPE 2 DIABETES MELLITUS WITH HYPERGLYCEMIA, WITH LONG-TERM CURRENT USE OF INSULIN (HCC): ICD-10-CM

## 2025-06-03 PROCEDURE — G8417 CALC BMI ABV UP PARAM F/U: HCPCS | Performed by: FAMILY MEDICINE

## 2025-06-03 PROCEDURE — 1036F TOBACCO NON-USER: CPT | Performed by: FAMILY MEDICINE

## 2025-06-03 PROCEDURE — 1160F RVW MEDS BY RX/DR IN RCRD: CPT | Performed by: FAMILY MEDICINE

## 2025-06-03 PROCEDURE — 3052F HG A1C>EQUAL 8.0%<EQUAL 9.0%: CPT | Performed by: FAMILY MEDICINE

## 2025-06-03 PROCEDURE — 3017F COLORECTAL CA SCREEN DOC REV: CPT | Performed by: FAMILY MEDICINE

## 2025-06-03 PROCEDURE — 3074F SYST BP LT 130 MM HG: CPT | Performed by: FAMILY MEDICINE

## 2025-06-03 PROCEDURE — 1159F MED LIST DOCD IN RCRD: CPT | Performed by: FAMILY MEDICINE

## 2025-06-03 PROCEDURE — 1123F ACP DISCUSS/DSCN MKR DOCD: CPT | Performed by: FAMILY MEDICINE

## 2025-06-03 PROCEDURE — G8427 DOCREV CUR MEDS BY ELIG CLIN: HCPCS | Performed by: FAMILY MEDICINE

## 2025-06-03 PROCEDURE — 99214 OFFICE O/P EST MOD 30 MIN: CPT | Performed by: FAMILY MEDICINE

## 2025-06-03 PROCEDURE — 2022F DILAT RTA XM EVC RTNOPTHY: CPT | Performed by: FAMILY MEDICINE

## 2025-06-03 PROCEDURE — 3078F DIAST BP <80 MM HG: CPT | Performed by: FAMILY MEDICINE

## 2025-06-03 RX ORDER — METHOCARBAMOL 750 MG/1
750 TABLET, FILM COATED ORAL 4 TIMES DAILY
Qty: 120 TABLET | Refills: 3 | Status: SHIPPED | OUTPATIENT
Start: 2025-06-03 | End: 2025-10-01

## 2025-06-03 RX ORDER — INSULIN GLARGINE 100 [IU]/ML
15 INJECTION, SOLUTION SUBCUTANEOUS NIGHTLY
COMMUNITY

## 2025-06-03 SDOH — ECONOMIC STABILITY: FOOD INSECURITY: WITHIN THE PAST 12 MONTHS, YOU WORRIED THAT YOUR FOOD WOULD RUN OUT BEFORE YOU GOT MONEY TO BUY MORE.: NEVER TRUE

## 2025-06-03 SDOH — ECONOMIC STABILITY: FOOD INSECURITY: WITHIN THE PAST 12 MONTHS, THE FOOD YOU BOUGHT JUST DIDN'T LAST AND YOU DIDN'T HAVE MONEY TO GET MORE.: NEVER TRUE

## 2025-06-03 NOTE — PROGRESS NOTES
06/03/25 3:00 PM     DM A1c outreach is not required, patient has an upcoming appointment with the PCP office.    Thank you.  Melinda Saucedo MA  [facility]    Physical Therapy Initial Evaluation/Plan of Care    Room #:  3463/3217-76  Patient Name: Juan Camara  YOB: 1955  MRN: [id number]    Date of Service: 6/14/2021      Tentative placement recommendation: Home Health Physical Therapy      Equipment recommendation: None      Evaluating Physical Therapist: Napoleon Hernández, PT #[id number]      Specific Provider Orders/Date/Referring Provider :  06/13/21 0545   PT eval and treat Start: 06/13/21 0545, End: 06/13/21 0545, ONE TIME, Standing Count: 1 Occurrences, R    Alexander Chan DO      Admitting Diagnosis:   Dizziness [R42]  was walking, was feeling dizzy and fell.       Visit Diagnoses       Codes    JORDEN (acute kidney injury) ([facility]     N17.9    Elevated brain natriuretic peptide (BNP) level     R79.89    Chronic heart failure with preserved ejection fraction (HCC)     I50.32    Morbid obesity with BMI of 45.0-49.9, adult ([facility]     E66.01, Z68.42        Surgery: -    Patient Active Problem List   Diagnosis    CAD (coronary artery disease)    Hypertension    Type 2 diabetes mellitus with hyperglycemia, with long-term current use of insulin ([facility])    Tobacco abuse    PAF (paroxysmal atrial fibrillation) ([facility])    Status post coronary artery bypass graft    H/O mitral valve repair    Morbid obesity with BMI of 50.0-59.9, adult ([facility])    Chronic bronchitis ([facility])    Sleep apnea    Gastroesophageal reflux disease without esophagitis    Hyperlipidemia    Muscular deconditioning    Acute diastolic (congestive) heart failure (HCC)    Acute diastolic heart failure (HCC)    Iron deficiency anemia, unspecified    Acute systolic/diastolic congestive heart failure (HCC)    Atrial fibrillation with RVR ([facility])    Ischemic cardiomyopathy    Nonrheumatic tricuspid valve regurgitation    Chronic anticoagulation    Stage 3 chronic kidney disease (HCC)    Acute on chronic congestive heart failure (HCC)    Dizziness        ASSESSMENT of Current Deficits Patient exhibits decreased strength, balance, endurance and pain right shoulder with movement impairing functional mobility, gait , gait distance and tolerance to activity unsteady gait, fatigues quickly requiring rest breaks. Patient requires skilled physical therapy to address concerns listed above to increase safety and independence at discharge. PHYSICAL THERAPY  PLAN OF CARE       Physical therapy plan of care is established based on physician order,  patient diagnosis and clinical assessment    Current Treatment Recommendations:    -Transfers: Cues for hand placement, technique and safety  -Gait: Gait training and Standing activities to improve: base of support, weight shift, weight bearing    -Endurance: Utilize Supervised activities to increase level of endurance to allow for safe functional mobility including transfers and gait   -Stairs: Stair training with instruction on proper technique and hand placement on rail    PT long term treatment goals are located in below grid    Patient and or family understand(s) diagnosis, prognosis, and plan of care. Frequency of treatments: Patient will be seen  daily.          Prior Level of Function: Patient ambulated independently    Rehab Potential: good  - for baseline    Past medical history:   Past Medical History:   Diagnosis Date    Acid reflux     Acute diastolic (congestive) heart failure ([facility] 06/19/2019    Arthritis     Asthma 04/16/2014    Asthmatic bronchitis , chronic (HCC) 11/28/2016    CAD (coronary artery disease)     Chronic bronchitis (HCC) 04/16/2014    COPD (chronic obstructive pulmonary disease) (HCC)     CB    COPD (chronic obstructive pulmonary disease) (HCC)     Diabetes mellitus (HCC)     Emphysema (subcutaneous) (surgical) resulting from a procedure     H/O cardiovascular stress test 04/24/2021    Lexiscan    Hyperlipidemia     Hypertension     Hypoxemia requiring supplemental oxygen 02/02/2015    LONG TERM Patient lives with spouse   in a two story home bedroom and bathroom 2nd floor    with 4 steps  to enter with Rail on right  Walk in shower basement Tub/shower  2nd floor       Equipment owned: Wheelchair, [employer], Wheeled Walker, Bedside commode and O2,       [id number] Mobility Inpatient   How much difficulty turning over in bed?: A Little  How much difficulty sitting down on / standing up from a chair with arms?: A Little  How much difficulty moving from lying on back to sitting on side of bed?: A Little  How much help from another person moving to and from a bed to a chair?: A Little  How much help from another person needed to walk in hospital room?: A Little  How much help from another person for climbing 3-5 steps with a railing?: A Little  AM-PAC Inpatient Mobility Raw Score : 18  AM-PAC Inpatient T-Scale Score : 43.63  Mobility Inpatient CMS 0-100% Score: 46.58  Mobility Inpatient CMS G-Code Modifier : CK    Nursing cleared patient for PT evaluation. The admitting diagnosis and active problem list as listed above have been reviewed prior to the initiation of this evaluation. OBJECTIVE;   Initial Evaluation  Date: 6/14/2021 Treatment Date:     Short Term/ Long Term   Goals   Was pt agreeable to Eval/treatment? Yes    To be met in 1  days   Pain level   10/10  Right shoulder with movement none at rest     Bed Mobility    Rolling: Not assessed  seated edge of bed     Rolling: Independent    Supine to sit:  Independent    Sit to supine: Independent    Scooting: Independent     Transfers Sit to stand: Supervision     Sit to stand: Independent     Ambulation    2 x 120 feet, 2 x 20 feet using  no device with Supervision    unsteady gait, increased external rotation Right lower extremity    150 feet using  no device with Independent    Stair negotiation: ascended and descended   8 steps 1 rail right ascending side step to pattern holding rail with left and right hand due to pain right only, descends step to leading left down left railing. Minimal assist for safety      10 steps 1 rail with supervision   ROM Within functional limits except right shoulder due to pain    Increase range of motion 10% of affected joints    Strength BUE:  refer to OT eval  RLE:  3+/5  LLE:  3+/5   Increase strength in affected mm groups by 1/3 grade   Balance Sitting EOB:  good    Dynamic Standing:  fair    Sitting EOB:  good    Dynamic Standing: good -     Patient is Alert & Oriented x person, place, time and situation and follows directions    Sensation:  Patient  reports numbness/tingling and neuropathy bilateral lower extremities    Edema:  yes bilateral lower extremities    Endurance: fair  +    Vitals: room air    Blood Pressure at rest   Blood Pressure during session     Heart Rate at rest 65 Heart Rate during session     SPO2 at rest 90%  SPO2 during session 100%     Patient education  Patient educated on role of Physical Therapy, risks of immobility, safety and plan of care,  importance of mobility while in hospital  and stair training       Patient response to education:   Pt verbalized understanding Pt demonstrated skill Pt requires further education in this area   Yes Partial Yes      Treatment:  Patient practiced and was instructed/facilitated in the following treatment: Patient Sat edge of bed 5 minutes with No assist to increase dynamic sitting balance and activity tolerance. donned shoes, ambulated in hallway, seated rest, performed stairs, seated rest, ambulated back to room. Returned seated edge of bed      Therapeutic Exercises:  ankle pumps  x 10 reps. At end of session, patient sitting edge of bed with   call light and phone within reach,   all lines and tubes intact, nursing notified. Patient would benefit from continued skilled Physical Therapy to improve functional independence and quality of life.           Patient's/ family goals   home        Time in  1337  Time out  1417    Total Treatment Time  10 minutes    Evaluation time includes thorough review of current medical information, gathering information on past medical history/social history and prior level of function, completion of standardized testing/informal observation of tasks, assessment of data, and development of Plan of care and goals.      CPT codes:  Low Complexity PT evaluation (97535)  Therapeutic activities (23796)   10 minutes  1 unit(s)  Non billable time 10 minutes    Alayna Toure, PT

## 2025-06-03 NOTE — PATIENT INSTRUCTIONS
[place] Transportation Resources*  (Call [employer]/211 if need more resources.)       [employer] (CARTS):  What they offer: Public transportation for all of [facility]. Call at least 24 hours in advance to make reservation. Reduced rates for age 65 and over.   Phone Number: [phone] ext [phone]    [facility] TRANSPORTATION:  What they offer: No cost transportation for [place] residents aged 65 and disabled; weekly shopping schedule.  Phone: [phone] choose option for [facility]'s office    [facility] SERVICES TRANSPORTATION:  What they offer: Transportation for [facility] residents aged 60 and over who do not require assistance getting on or off vehicle. Service for medical appointments, congregate meal sites, grocery shopping or social service appointments for most of [facility]. Requires 2 week advanced notice   Phone: [phone]    [facility] TRANSPORTATION:  What they offer: Transportation for [facility] residents aged 60 and over or disabled on a weekly schedule for medical appointments, shopping, banking, etc. in [facility] and [place]. No cost  Phone: [phone] [phone]    [facility] DEPARTMENT OF JOB AND FAMILY SERVICES (NET)  What they offer: no cost transportation for persons on traditional Medicaid  Phone number: [phone] ext [phone]    [facility] TRANSPORTATION:  What they offer: Transportation for city residents aged 60 and over for [facility] medical appointments and shopping on a weekly schedule. 24-hour notice required. Cost: donation.  Phone: [phone]  Website: [employer]    [place] SENIOR TRANSPORTATION:  What they offer: No cost transportation for local seniors to medical appointments, shopping and social activities within [place] and [facility]. Call day of appointment.  Phone: [phone] option 1  [place] RESERVE TRANSIT AUTHORITY (WRTA)  What they offer: [place] public transportation. Services

## 2025-06-10 ENCOUNTER — OFFICE VISIT (OUTPATIENT)
Age: 70
End: 2025-06-10
Payer: MEDICARE

## 2025-06-10 VITALS
HEART RATE: 94 BPM | RESPIRATION RATE: 18 BRPM | TEMPERATURE: 97.8 F | DIASTOLIC BLOOD PRESSURE: 77 MMHG | SYSTOLIC BLOOD PRESSURE: 116 MMHG | OXYGEN SATURATION: 94 %

## 2025-06-10 DIAGNOSIS — G47.33 OSA (OBSTRUCTIVE SLEEP APNEA): ICD-10-CM

## 2025-06-10 DIAGNOSIS — J43.2 CENTRILOBULAR EMPHYSEMA (HCC): Primary | ICD-10-CM

## 2025-06-10 DIAGNOSIS — Z86.16 HISTORY OF COVID-19: ICD-10-CM

## 2025-06-10 PROCEDURE — 1123F ACP DISCUSS/DSCN MKR DOCD: CPT | Performed by: INTERNAL MEDICINE

## 2025-06-10 PROCEDURE — 3023F SPIROM DOC REV: CPT | Performed by: INTERNAL MEDICINE

## 2025-06-10 PROCEDURE — 3017F COLORECTAL CA SCREEN DOC REV: CPT | Performed by: INTERNAL MEDICINE

## 2025-06-10 PROCEDURE — 1159F MED LIST DOCD IN RCRD: CPT | Performed by: INTERNAL MEDICINE

## 2025-06-10 PROCEDURE — 3074F SYST BP LT 130 MM HG: CPT | Performed by: INTERNAL MEDICINE

## 2025-06-10 PROCEDURE — G8417 CALC BMI ABV UP PARAM F/U: HCPCS | Performed by: INTERNAL MEDICINE

## 2025-06-10 PROCEDURE — 99214 OFFICE O/P EST MOD 30 MIN: CPT | Performed by: INTERNAL MEDICINE

## 2025-06-10 PROCEDURE — 1036F TOBACCO NON-USER: CPT | Performed by: INTERNAL MEDICINE

## 2025-06-10 PROCEDURE — 3078F DIAST BP <80 MM HG: CPT | Performed by: INTERNAL MEDICINE

## 2025-06-10 PROCEDURE — G8427 DOCREV CUR MEDS BY ELIG CLIN: HCPCS | Performed by: INTERNAL MEDICINE

## 2025-06-10 NOTE — PROGRESS NOTES
[facility]    PULMONARY/CRITICAL CARE CONSULTATION NOTE    Patient: Murtaza Martinez  MRN: [id number]  : 1955    Encounter Date: 6/10/2025  Encounter Time: 1:10 PM     PROBLEM LIST:  Patient Active Problem List   Diagnosis    CAD (coronary artery disease)    Hypertension    Type 2 diabetes mellitus with hyperglycemia, with long-term current use of insulin ([facility])    Tobacco abuse    PAF (paroxysmal atrial fibrillation) ([facility])    Status post coronary artery bypass graft    H/O mitral valve repair    Morbid obesity with BMI of 50.0-59.9, adult ([facility])    Chronic bronchitis ([facility])    Sleep apnea    Gastroesophageal reflux disease without esophagitis    Hyperlipidemia    Muscular deconditioning    Acute diastolic (congestive) heart failure ([facility])    Acute diastolic heart failure ([facility])    Iron deficiency anemia, unspecified    Acute systolic/diastolic congestive heart failure ([facility])    Atrial fibrillation with RVR ([facility])    Ischemic cardiomyopathy    Nonrheumatic tricuspid valve regurgitation    Chronic anticoagulation    Stage 3 chronic kidney disease ([facility])    Acute on chronic congestive heart failure ([facility])    Dizziness    Hyperosmolar hyperglycemic state (HHS) ([facility])    Acute metabolic encephalopathy    Altered mental status    Hypotension    Transient hypotension    Atrial fibrillation with rapid ventricular response ([facility])    Compression fracture of T11 vertebra, initial encounter ([facility])    Fx dorsal vertebra-closed ([facility])    Closed fracture of multiple ribs    Ventral hernia with obstruction and without gangrene    Severe muscle deconditioning    Balance disorder    Leg weakness, bilateral    Bilateral arm weakness     Reason for Consultation: COPD    HPI:   Murtaza Martinez is a 69 y.o. male with past medical history noted for COPD, CABG x 3 () that presented to hospital for COPD.    Patient had some mild hemoptysis noted due to cough but no acute issues as of visit 3/4/2025.  Patient uses Symbicort,

## 2025-07-03 ENCOUNTER — OFFICE VISIT (OUTPATIENT)
Age: 70
End: 2025-07-03
Payer: MEDICARE

## 2025-07-03 VITALS
OXYGEN SATURATION: 95 % | SYSTOLIC BLOOD PRESSURE: 104 MMHG | HEART RATE: 102 BPM | BODY MASS INDEX: 29.57 KG/M2 | DIASTOLIC BLOOD PRESSURE: 64 MMHG | WEIGHT: 184 LBS | HEIGHT: 66 IN

## 2025-07-03 DIAGNOSIS — Z87.81 HX OF COMPRESSION FRACTURE OF SPINE: ICD-10-CM

## 2025-07-03 DIAGNOSIS — M54.6 ACUTE MIDLINE THORACIC BACK PAIN: ICD-10-CM

## 2025-07-03 DIAGNOSIS — M54.50 ACUTE BILATERAL LOW BACK PAIN WITHOUT SCIATICA: Primary | ICD-10-CM

## 2025-07-03 PROCEDURE — 3017F COLORECTAL CA SCREEN DOC REV: CPT | Performed by: PHYSICIAN ASSISTANT

## 2025-07-03 PROCEDURE — G8417 CALC BMI ABV UP PARAM F/U: HCPCS | Performed by: PHYSICIAN ASSISTANT

## 2025-07-03 PROCEDURE — 1160F RVW MEDS BY RX/DR IN RCRD: CPT | Performed by: PHYSICIAN ASSISTANT

## 2025-07-03 PROCEDURE — G8427 DOCREV CUR MEDS BY ELIG CLIN: HCPCS | Performed by: PHYSICIAN ASSISTANT

## 2025-07-03 PROCEDURE — 1159F MED LIST DOCD IN RCRD: CPT | Performed by: PHYSICIAN ASSISTANT

## 2025-07-03 PROCEDURE — 1036F TOBACCO NON-USER: CPT | Performed by: PHYSICIAN ASSISTANT

## 2025-07-03 PROCEDURE — 3074F SYST BP LT 130 MM HG: CPT | Performed by: PHYSICIAN ASSISTANT

## 2025-07-03 PROCEDURE — 1123F ACP DISCUSS/DSCN MKR DOCD: CPT | Performed by: PHYSICIAN ASSISTANT

## 2025-07-03 PROCEDURE — 1125F AMNT PAIN NOTED PAIN PRSNT: CPT | Performed by: PHYSICIAN ASSISTANT

## 2025-07-03 PROCEDURE — 99214 OFFICE O/P EST MOD 30 MIN: CPT | Performed by: PHYSICIAN ASSISTANT

## 2025-07-03 PROCEDURE — 3078F DIAST BP <80 MM HG: CPT | Performed by: PHYSICIAN ASSISTANT

## 2025-07-03 RX ORDER — METHYLPREDNISOLONE 4 MG/1
TABLET ORAL
Qty: 1 KIT | Refills: 0 | Status: SHIPPED | OUTPATIENT
Start: 2025-07-03 | End: 2025-07-09

## 2025-07-03 RX ORDER — CYCLOBENZAPRINE HCL 10 MG
10 TABLET ORAL 3 TIMES DAILY PRN
Qty: 30 TABLET | Refills: 0 | Status: SHIPPED | OUTPATIENT
Start: 2025-07-03 | End: 2025-07-13

## 2025-07-03 NOTE — PROGRESS NOTES
Problem Focused Office Visit     Subjective: Murtaza Martinez is a 69 y.o.  male who is known to our services and was seen in 2023 for a T11 compression fx.     Patient presents to the office today c/o acute mid back pain and low back pain after falling. Patient states about 6 weeks ago he fell in his garage and landed on his back. Since the incident, he has been having severe back pain. Any type of movement makes the pain worse. He has tried Tylenol and robaxin without significant lasting relief. Denies loss of bowel or bladder, saddle anesthesia, pain down the legs, headache, loss of dexterity, abnormal gait, fever, chills, N/V, SOB, or chest pain.       Physical Exam:              WDWN, no apparent distress   Ambulates with a cane              Non-labored breathing               Vitals Stable              Alert and oriented x3              CN 3-12 intact              PERRL              EOMI              COX               Sensation to LT intact bilaterally   Diffuse TTP thoracic and lumbar spine                     Imaging: Lumbar XR 5/12/25: IMPRESSION:  1. Mild degenerative changes in the lumbar spine.  2. Significant amount of aorto iliac arterial vascular plaque.     Assessment: This is a 69 y.o.  male presenting for evaluation of acute mid back pain and low back pain after falling     Plan:  -Obtain MRI thoracic and lumbar spine to evaluate for fractures  -Medrol dosepack and flexeril sent to pharmacy  -OARRS report reviewed   -Call/return to Neurosurgery clinic after completion of imaging to discuss results and further treatment plan  -Call/return sooner if symptoms worsen or new issues arise in the interim       Electronically signed by Zulema Finn PA-C on 7/3/2025 at 11:24 AM

## 2025-07-07 ENCOUNTER — HOSPITAL ENCOUNTER (OUTPATIENT)
Dept: OTHER | Age: 70
Setting detail: THERAPIES SERIES
Discharge: HOME | End: 2025-07-07
Payer: MEDICARE

## 2025-07-07 VITALS
RESPIRATION RATE: 17 BRPM | WEIGHT: 187 LBS | OXYGEN SATURATION: 94 % | DIASTOLIC BLOOD PRESSURE: 91 MMHG | BODY MASS INDEX: 30.2 KG/M2 | SYSTOLIC BLOOD PRESSURE: 136 MMHG | HEART RATE: 113 BPM

## 2025-07-07 LAB
ANION GAP SERPL CALCULATED.3IONS-SCNC: 11 MMOL/L (ref 7–16)
BNP SERPL-MCNC: 5660 PG/ML (ref 0–125)
BUN SERPL-MCNC: 19 MG/DL (ref 8–23)
CALCIUM SERPL-MCNC: 9.3 MG/DL (ref 8.8–10.2)
CHLORIDE SERPL-SCNC: 101 MMOL/L (ref 98–107)
CO2 SERPL-SCNC: 27 MMOL/L (ref 22–29)
CREAT SERPL-MCNC: 0.7 MG/DL (ref 0.7–1.2)
GFR, ESTIMATED: >90 ML/MIN/1.73M2
GLUCOSE SERPL-MCNC: 106 MG/DL (ref 74–99)
POTASSIUM SERPL-SCNC: 4.1 MMOL/L (ref 3.5–5.1)
SODIUM SERPL-SCNC: 140 MMOL/L (ref 136–145)

## 2025-07-07 PROCEDURE — 36415 COLL VENOUS BLD VENIPUNCTURE: CPT

## 2025-07-07 PROCEDURE — 80048 BASIC METABOLIC PNL TOTAL CA: CPT

## 2025-07-07 PROCEDURE — 96374 THER/PROPH/DIAG INJ IV PUSH: CPT

## 2025-07-07 PROCEDURE — 2500000003 HC RX 250 WO HCPCS: Performed by: INTERNAL MEDICINE

## 2025-07-07 PROCEDURE — 6360000002 HC RX W HCPCS: Performed by: INTERNAL MEDICINE

## 2025-07-07 PROCEDURE — 83880 ASSAY OF NATRIURETIC PEPTIDE: CPT

## 2025-07-07 PROCEDURE — 99214 OFFICE O/P EST MOD 30 MIN: CPT

## 2025-07-07 RX ORDER — METHOCARBAMOL 750 MG/1
750 TABLET, FILM COATED ORAL 4 TIMES DAILY
COMMUNITY

## 2025-07-07 RX ORDER — BUMETANIDE 0.25 MG/ML
2 INJECTION, SOLUTION INTRAMUSCULAR; INTRAVENOUS ONCE
Status: COMPLETED | OUTPATIENT
Start: 2025-07-07 | End: 2025-07-07

## 2025-07-07 RX ORDER — SODIUM CHLORIDE 0.9 % (FLUSH) 0.9 %
10 SYRINGE (ML) INJECTION PRN
Status: DISCONTINUED | OUTPATIENT
Start: 2025-07-07 | End: 2025-07-08 | Stop reason: HOSPADM

## 2025-07-07 RX ADMIN — SODIUM CHLORIDE, PRESERVATIVE FREE 10 ML: 5 INJECTION INTRAVENOUS at 10:40

## 2025-07-07 RX ADMIN — BUMETANIDE 2 MG: 0.25 INJECTION INTRAMUSCULAR; INTRAVENOUS at 10:40

## 2025-07-07 ASSESSMENT — PATIENT HEALTH QUESTIONNAIRE - PHQ9
SUM OF ALL RESPONSES TO PHQ QUESTIONS 1-9: 0
1. LITTLE INTEREST OR PLEASURE IN DOING THINGS: NOT AT ALL
2. FEELING DOWN, DEPRESSED OR HOPELESS: NOT AT ALL

## 2025-07-07 NOTE — PROGRESS NOTES
1:59 PM 7/7/2025 Patient call in to review bottles medications over the phone.     Atorvastatin 80 mg  every night  ASA 81 mg daily   Potassium chloride 20 mequ BID  Levothyroxine 150 mcg daily   Duloxetine 30 mg daily   Amiodarone 200 mg BID-- different than prescribed.   robaxin 750 mg QID   Bumex 1 mg BID  Pantoprazole 40 mg daily  Humalog SSI   Basilar 20 units in the AM 15 units in the PM.     Not on metoprolol succinate.     Updated in epic.       Future Appointments   Date Time Provider Department Center   7/16/2025 10:30 AM [facility] CHF ROOM 4 [facility]   8/7/2025 10:45 AM Morales Machuca DO [place] [facility] ECC DEP   9/23/2025  1:15 PM [facility] MD [facility]   6/10/2026 10:00 AM [facility]       
Clinic of   Med name: Eliquis     Dosage: 5  mg     Quantity: 2 boxes   Lot number: [id number]  Exp. date: 3/2026, 3/2026  Instructions: Eliquis 5 mg BID    [x] PHQ assessment completed while in CHF clinic (1st visit, every 3 months and PRN)      7/7/2025    10:29 AM 4/2/2025    10:51 AM 12/11/2024     8:06 AM 9/5/2024     7:36 AM 8/30/2024     1:15 PM 8/8/2024     7:42 AM 5/8/2024     9:22 AM   PHQ Scores   PHQ2 Score 0 0 0 1 0 0 0   PHQ9 Score 0 0 0 1 0 0 0     Interpretation of Total Score Depression Severity:   1-4 = Minimal depression  5-9 = Mild depression  10-14 = Moderate depression  15-19 = Moderately severe depression  20-27 = Severe depression   [] Patient provided community resources for counseling services  [] PCP called and PHQ result faxed   [] Behavorial health consultant called      Scheduled to follow up in CHF clinic on:   Future Appointments   Date Time Provider Department Center   7/16/2025 10:30 AM [facility] CHF ROOM 4 [facility] CHF [place]   8/7/2025 10:45 AM Morales Machuca DO [facility]   9/23/2025  1:15 PM Vitor Daniels MD [facility]   6/10/2026 10:00 AM Larry Becker MD [facility]

## 2025-07-08 DIAGNOSIS — E78.2 MIXED HYPERLIPIDEMIA: ICD-10-CM

## 2025-07-08 RX ORDER — ATORVASTATIN CALCIUM 80 MG/1
TABLET, FILM COATED ORAL
Qty: 90 TABLET | Refills: 3 | Status: SHIPPED | OUTPATIENT
Start: 2025-07-08

## 2025-07-08 NOTE — TELEPHONE ENCOUNTER
Patient called for a refill.    Name of Medication(s) Requested:  Requested Prescriptions     Pending Prescriptions Disp Refills    atorvastatin (LIPITOR) 80 MG tablet 90 tablet 0     Sig: TAKE ONE TABLET BY MOUTH EVERY NIGHT       Medication is on current medication list Yes    Dosage and directions were verified? Yes    Quantity verified: 90 day supply     Pharmacy Verified?  Yes    Last Appointment:  6/3/2025    Future appts:  Future Appointments   Date Time Provider Department Center   7/16/2025 10:30 AM [facility] ROOM 4 [facility]   8/7/2025 10:45 AM Morales Machuca DO [place] [facility] DEP   9/23/2025  1:15 PM Vitor Daniels MD [facility]   6/10/2026 10:00 AM Larry Becker MD [facility]        (If no appt send self scheduling link. .REFILLAPPT)  Scheduling request sent?     [] Yes  [x] No    Does patient need updated?  [] Yes  [x] No

## 2025-07-09 NOTE — TELEPHONE ENCOUNTER
Patient called states he is not able to afford eliquis he has a 600 deductible to pay not a option at this time    D/t income he wont qualify for prescription assistance.     He will  samples at [facility]

## 2025-07-15 NOTE — TELEPHONE ENCOUNTER
Hi doc,     Is there any way patient is able to go on coumadin instead.Talked to patient he is agreeable to do so. Patient stated he would  the eliquis samples 7/16/2025. In the mean time.

## 2025-07-16 ENCOUNTER — HOSPITAL ENCOUNTER (OUTPATIENT)
Dept: OTHER | Age: 70
Setting detail: THERAPIES SERIES
Discharge: HOME | End: 2025-07-16
Payer: MEDICARE

## 2025-07-16 VITALS
DIASTOLIC BLOOD PRESSURE: 71 MMHG | SYSTOLIC BLOOD PRESSURE: 120 MMHG | HEART RATE: 84 BPM | RESPIRATION RATE: 18 BRPM | WEIGHT: 189 LBS | OXYGEN SATURATION: 98 % | BODY MASS INDEX: 30.52 KG/M2

## 2025-07-16 LAB
ANION GAP SERPL CALCULATED.3IONS-SCNC: 9 MMOL/L (ref 7–16)
BNP SERPL-MCNC: 4222 PG/ML (ref 0–125)
BUN SERPL-MCNC: 24 MG/DL (ref 8–23)
CALCIUM SERPL-MCNC: 8.7 MG/DL (ref 8.8–10.2)
CHLORIDE SERPL-SCNC: 103 MMOL/L (ref 98–107)
CO2 SERPL-SCNC: 27 MMOL/L (ref 22–29)
CREAT SERPL-MCNC: 0.9 MG/DL (ref 0.7–1.2)
GFR, ESTIMATED: 89 ML/MIN/1.73M2
GLUCOSE SERPL-MCNC: 160 MG/DL (ref 74–99)
POTASSIUM SERPL-SCNC: 3.6 MMOL/L (ref 3.5–5.1)
SODIUM SERPL-SCNC: 139 MMOL/L (ref 136–145)

## 2025-07-16 PROCEDURE — 99214 OFFICE O/P EST MOD 30 MIN: CPT

## 2025-07-16 PROCEDURE — 80048 BASIC METABOLIC PNL TOTAL CA: CPT

## 2025-07-16 PROCEDURE — 36415 COLL VENOUS BLD VENIPUNCTURE: CPT

## 2025-07-16 PROCEDURE — 83880 ASSAY OF NATRIURETIC PEPTIDE: CPT

## 2025-07-16 NOTE — PROGRESS NOTES
patient admitted to being out of medication (Eliquis and Metoprolol) Patient was given Eliquis coupon and was instructed to  medications at pharmacy. He did receive samples Eliquis from 's office.At today's appointment patient brought in all medications and correlated with EPIC. Heart rate is improved since last visit. (113---84)    [x]Lab work obtained BMP/pro-BNP    [x] Patient/Family Educated On:  [x] HF zones (Green, Yellow, Red) and aware of when to take action   [x] Daily weights  [] Scale provided   [x] Low sodium diet (2000 mg)  Barriers to compliance  [] Refuses to monitor diet  [] Socioeconomic difficulties  [] Unable to cook for self (use of frozen meals, can goods, etc)  [] CHF CHW consulted  [] Low sodium meal delivery options given to patient  [] Dietician consulted   [] Low sodium recipes provided  [] Sodium free seasoning provided   [x] Fluid intake 6-8 cups (around 64 oz)  [x] Reviewed currently prescribed medications with patient, educated on importance of compliance and answered any questions regarding their medication  [] Pill box provided to patient  [] Patient using pill packing pharmacy   [] CPAP/BiPAP use  [] Low impact exercise / cardiac rehab   [] LifeVest use  [x] Patient aware of signs and symptoms of worsening HF, CHF clinic phone number provided and made aware to call clinic for sooner if evaluation if needed     [x] Difficulty affording medications  [x] CHF CHW consulted  [] Prescription assistance information given   [] [facility] medication assistance program information given   [x] Sample medications provided to patient to help bridge gap until affordability;  Samples given to patient: at [facility] Clinic of   Med name: Eliquis     Dosage: 5  mg     Quantity: 2 boxes   Lot number: [id number]  Exp. date: 3/2026, 3/2026  Instructions: Eliquis 5 mg BID    [x] PHQ assessment completed while in CHF clinic (1st visit, every 3 months and PRN)      7/7/2025

## 2025-07-22 RX ORDER — METHOCARBAMOL 750 MG/1
750 TABLET, FILM COATED ORAL 4 TIMES DAILY
Qty: 120 TABLET | Refills: 0 | Status: SHIPPED | OUTPATIENT
Start: 2025-07-22

## 2025-07-22 NOTE — TELEPHONE ENCOUNTER
Patient called for a refill.    Name of Medication(s) Requested:  Requested Prescriptions     Pending Prescriptions Disp Refills    methocarbamol (ROBAXIN) 750 MG tablet 120 tablet 0     Sig: Take 1 tablet by mouth 4 times daily       Medication is on current medication list Yes    Dosage and directions were verified? Yes    Quantity verified: 30 day supply     Pharmacy Verified?  Yes    Last Appointment:  6/3/2025    Future appts:  Future Appointments   Date Time Provider Department Center   8/7/2025 10:45 AM Morales Machuca DO [place] [facility]   8/11/2025  8:15 AM [facility] ROOM 3 [facility]   8/13/2025  7:45 AM [facility]   8/13/2025  8:30 AM [facility]   9/23/2025  1:15 PM Vitor Daniels MD [facility]   6/10/2026 10:00 AM Larry Becker MD [facility]        (If no appt send self scheduling link. .REFILLAPPT)  Scheduling request sent?     [] Yes  [x] No    Does patient need updated?  [] Yes  [x] No

## 2025-07-24 ENCOUNTER — HOSPITAL ENCOUNTER (EMERGENCY)
Age: 70
Discharge: HOME | End: 2025-07-24
Payer: MEDICARE

## 2025-07-24 VITALS
RESPIRATION RATE: 18 BRPM | HEART RATE: 76 BPM | TEMPERATURE: 98.2 F | OXYGEN SATURATION: 93 % | DIASTOLIC BLOOD PRESSURE: 79 MMHG | SYSTOLIC BLOOD PRESSURE: 96 MMHG

## 2025-07-24 DIAGNOSIS — E16.2 HYPOGLYCEMIA: Primary | ICD-10-CM

## 2025-07-24 LAB
ALBUMIN SERPL-MCNC: 3.5 G/DL (ref 3.5–5.2)
ALP SERPL-CCNC: 221 U/L (ref 40–129)
ALT SERPL-CCNC: 25 U/L (ref 0–50)
ANION GAP SERPL CALCULATED.3IONS-SCNC: 12 MMOL/L (ref 7–16)
AST SERPL-CCNC: 49 U/L (ref 0–50)
BASOPHILS # BLD: 0.05 K/UL (ref 0–0.2)
BASOPHILS NFR BLD: 0 % (ref 0–2)
BILIRUB SERPL-MCNC: 1.2 MG/DL (ref 0–1.2)
BUN SERPL-MCNC: 18 MG/DL (ref 8–23)
CALCIUM SERPL-MCNC: 8.8 MG/DL (ref 8.8–10.2)
CHLORIDE SERPL-SCNC: 102 MMOL/L (ref 98–107)
CHP ED QC CHECK: NORMAL
CHP ED QC CHECK: YES
CO2 SERPL-SCNC: 24 MMOL/L (ref 22–29)
CREAT SERPL-MCNC: 0.7 MG/DL (ref 0.7–1.2)
EOSINOPHIL # BLD: 0.04 K/UL (ref 0.05–0.5)
EOSINOPHILS RELATIVE PERCENT: 0 % (ref 0–6)
ERYTHROCYTE [DISTWIDTH] IN BLOOD BY AUTOMATED COUNT: 15.5 % (ref 11.5–15)
GFR, ESTIMATED: >90 ML/MIN/1.73M2
GLUCOSE BLD-MCNC: 117 MG/DL
GLUCOSE BLD-MCNC: 117 MG/DL (ref 74–99)
GLUCOSE BLD-MCNC: 141 MG/DL
GLUCOSE BLD-MCNC: 141 MG/DL (ref 74–99)
GLUCOSE BLD-MCNC: 73 MG/DL
GLUCOSE BLD-MCNC: 73 MG/DL (ref 74–99)
GLUCOSE BLD-MCNC: 89 MG/DL
GLUCOSE BLD-MCNC: 89 MG/DL (ref 74–99)
GLUCOSE SERPL-MCNC: 49 MG/DL (ref 74–99)
HCT VFR BLD AUTO: 44.1 % (ref 37–54)
HGB BLD-MCNC: 14.3 G/DL (ref 12.5–16.5)
IMM GRANULOCYTES # BLD AUTO: 0.13 K/UL (ref 0–0.58)
IMM GRANULOCYTES NFR BLD: 1 % (ref 0–5)
LYMPHOCYTES NFR BLD: 0.83 K/UL (ref 1.5–4)
LYMPHOCYTES RELATIVE PERCENT: 6 % (ref 20–42)
MCH RBC QN AUTO: 32.4 PG (ref 26–35)
MCHC RBC AUTO-ENTMCNC: 32.4 G/DL (ref 32–34.5)
MCV RBC AUTO: 99.8 FL (ref 80–99.9)
MONOCYTES NFR BLD: 1.15 K/UL (ref 0.1–0.95)
MONOCYTES NFR BLD: 9 % (ref 2–12)
NEUTROPHILS NFR BLD: 84 % (ref 43–80)
NEUTS SEG NFR BLD: 11.26 K/UL (ref 1.8–7.3)
PLATELET # BLD AUTO: 204 K/UL (ref 130–450)
PMV BLD AUTO: 10.3 FL (ref 7–12)
POTASSIUM SERPL-SCNC: 5 MMOL/L (ref 3.5–5.1)
PROT SERPL-MCNC: 7.8 G/DL (ref 6.4–8.3)
RBC # BLD AUTO: 4.42 M/UL (ref 3.8–5.8)
SODIUM SERPL-SCNC: 138 MMOL/L (ref 136–145)
WBC OTHER # BLD: 13.5 K/UL (ref 4.5–11.5)

## 2025-07-24 PROCEDURE — 99283 EMERGENCY DEPT VISIT LOW MDM: CPT

## 2025-07-24 PROCEDURE — 82962 GLUCOSE BLOOD TEST: CPT

## 2025-07-24 PROCEDURE — 85025 COMPLETE CBC W/AUTO DIFF WBC: CPT

## 2025-07-24 PROCEDURE — 80053 COMPREHEN METABOLIC PANEL: CPT

## 2025-07-24 NOTE — DISCHARGE INSTRUCTIONS
Please ensure that you are taking her medications correctly.  Please do not take your insulin and not eat.  Please follow-up with your PCP.

## 2025-07-25 ASSESSMENT — ENCOUNTER SYMPTOMS
DIARRHEA: 0
NAUSEA: 0
APNEA: 0
CHEST TIGHTNESS: 0
VOMITING: 0
CONSTIPATION: 0
COUGH: 0
ABDOMINAL DISTENTION: 0
SORE THROAT: 0
ABDOMINAL PAIN: 0
WHEEZING: 0
SHORTNESS OF BREATH: 0
BACK PAIN: 0

## 2025-07-25 NOTE — ED PROVIDER NOTES
Conditions:   Past Medical History:   Diagnosis Date    Acid reflux     Acute diastolic (congestive) heart failure ([facility]) 06/19/2019    Arthritis     Asthma 04/16/2014    Asthmatic bronchitis , chronic ([facility]) 11/28/2016    CAD (coronary artery disease)     Chronic bronchitis ([facility]) 04/16/2014    COPD (chronic obstructive pulmonary disease) ([facility])     CB    COPD (chronic obstructive pulmonary disease) ([facility])     Diabetes mellitus ([facility])     Emphysema (subcutaneous) (surgical) resulting from a procedure     H/O cardiovascular stress test 04/24/2021    Lexiscan    Hyperlipidemia     Hypertension     Hypoxemia requiring supplemental oxygen 02/02/2015    LONG TERM ANTICOAGULENT USE     Morbid obesity with BMI of 50.0-59.9, adult ([facility]) 11/27/2013    Obesity     Osteoarthritis     Sleep apnea     bilevel positive airway pressure at 13/8 with 2 L oxygen flow     Stage 3 chronic kidney disease ([facility])     Tobacco abuse     Type II or unspecified type diabetes mellitus without mention of complication, not stated as uncontrolled          Records Reviewed: [facility] neurosurgery for patient's past medical history    CONSULTS: (Who and What was discussed)  None      FINAL IMPRESSION      1. Hypoglycemia          DISPOSITION/PLAN     DISPOSITION Decision To Discharge 07/24/2025 04:06:18 PM      PATIENT REFERRED TO:  [facility] Emergency Department  [address] [address]  [phone]  Go to   As needed, If symptoms worsen    Morales Machuca DO  [address] [address]  [phone]    Call in 1 day        DISCHARGE MEDICATIONS:  Discharge Medication List as of 7/24/2025  4:45 PM               (Please note that portions of this note were completed with a voice recognition program.  Efforts were made to edit the dictations but occasionally words are mis-transcribed.)    Ameya Stringer DO (electronically signed)           Ameya Stringer DO  07/25/25 0043

## 2025-07-28 RX ORDER — AMIODARONE HYDROCHLORIDE 200 MG/1
200 TABLET ORAL DAILY
Qty: 30 TABLET | Refills: 3 | Status: SHIPPED | OUTPATIENT
Start: 2025-07-28

## 2025-07-29 DIAGNOSIS — E11.65 TYPE 2 DIABETES MELLITUS WITH HYPERGLYCEMIA, WITH LONG-TERM CURRENT USE OF INSULIN (HCC): ICD-10-CM

## 2025-07-29 DIAGNOSIS — R53.82 CHRONIC FATIGUE: ICD-10-CM

## 2025-07-29 DIAGNOSIS — E78.2 MIXED HYPERLIPIDEMIA: ICD-10-CM

## 2025-07-29 DIAGNOSIS — Z79.4 TYPE 2 DIABETES MELLITUS WITH HYPERGLYCEMIA, WITH LONG-TERM CURRENT USE OF INSULIN (HCC): ICD-10-CM

## 2025-07-29 LAB
ALBUMIN: 3.4 G/DL (ref 3.5–5.2)
ALP BLD-CCNC: 197 U/L (ref 40–129)
ALT SERPL-CCNC: 16 U/L (ref 0–50)
ANION GAP SERPL CALCULATED.3IONS-SCNC: 14 MMOL/L (ref 7–16)
AST SERPL-CCNC: 23 U/L (ref 0–50)
BASOPHILS ABSOLUTE: 0.05 K/UL (ref 0–0.2)
BASOPHILS RELATIVE PERCENT: 1 % (ref 0–2)
BILIRUB SERPL-MCNC: 1 MG/DL (ref 0–1.2)
BUN BLDV-MCNC: 18 MG/DL (ref 8–23)
CALCIUM SERPL-MCNC: 9 MG/DL (ref 8.8–10.2)
CHLORIDE BLD-SCNC: 99 MMOL/L (ref 98–107)
CHOLESTEROL, TOTAL: 127 MG/DL
CO2: 26 MMOL/L (ref 22–29)
CREAT SERPL-MCNC: 0.9 MG/DL (ref 0.7–1.2)
EOSINOPHILS ABSOLUTE: 0.13 K/UL (ref 0.05–0.5)
EOSINOPHILS RELATIVE PERCENT: 2 % (ref 0–6)
GFR, ESTIMATED: 88 ML/MIN/1.73M2
GLUCOSE BLD-MCNC: 202 MG/DL (ref 74–99)
HBA1C MFR BLD: 7.1 % (ref 4–5.6)
HCT VFR BLD CALC: 39.4 % (ref 37–54)
HDLC SERPL-MCNC: 57 MG/DL
HEMOGLOBIN: 12.6 G/DL (ref 12.5–16.5)
IMMATURE GRANULOCYTES %: 1 % (ref 0–5)
IMMATURE GRANULOCYTES ABSOLUTE: 0.05 K/UL (ref 0–0.58)
LDL CHOLESTEROL: 60 MG/DL
LYMPHOCYTES ABSOLUTE: 1.24 K/UL (ref 1.5–4)
LYMPHOCYTES RELATIVE PERCENT: 14 % (ref 20–42)
MCH RBC QN AUTO: 32.1 PG (ref 26–35)
MCHC RBC AUTO-ENTMCNC: 32 G/DL (ref 32–34.5)
MCV RBC AUTO: 100.3 FL (ref 80–99.9)
MONOCYTES ABSOLUTE: 0.72 K/UL (ref 0.1–0.95)
MONOCYTES RELATIVE PERCENT: 8 % (ref 2–12)
NEUTROPHILS ABSOLUTE: 6.4 K/UL (ref 1.8–7.3)
NEUTROPHILS RELATIVE PERCENT: 75 % (ref 43–80)
PDW BLD-RTO: 15.7 % (ref 11.5–15)
PLATELET # BLD: 199 K/UL (ref 130–450)
PMV BLD AUTO: 10.5 FL (ref 7–12)
POTASSIUM SERPL-SCNC: 3.7 MMOL/L (ref 3.5–5.1)
RBC # BLD: 3.93 M/UL (ref 3.8–5.8)
SODIUM BLD-SCNC: 139 MMOL/L (ref 136–145)
T4 FREE: 1.7 NG/DL (ref 0.9–1.7)
TOTAL PROTEIN: 6.9 G/DL (ref 6.4–8.3)
TRIGL SERPL-MCNC: 53 MG/DL
TSH SERPL DL<=0.05 MIU/L-ACNC: 0.51 UIU/ML (ref 0.27–4.2)
VLDLC SERPL CALC-MCNC: 11 MG/DL
WBC # BLD: 8.6 K/UL (ref 4.5–11.5)

## 2025-08-05 DIAGNOSIS — E03.9 ACQUIRED HYPOTHYROIDISM: ICD-10-CM

## 2025-08-07 ENCOUNTER — OFFICE VISIT (OUTPATIENT)
Dept: FAMILY MEDICINE CLINIC | Age: 70
End: 2025-08-07

## 2025-08-07 ENCOUNTER — TELEPHONE (OUTPATIENT)
Dept: FAMILY MEDICINE CLINIC | Age: 70
End: 2025-08-07

## 2025-08-07 VITALS
SYSTOLIC BLOOD PRESSURE: 118 MMHG | HEART RATE: 100 BPM | OXYGEN SATURATION: 96 % | WEIGHT: 201 LBS | BODY MASS INDEX: 33.49 KG/M2 | HEIGHT: 65 IN | DIASTOLIC BLOOD PRESSURE: 76 MMHG | RESPIRATION RATE: 18 BRPM

## 2025-08-07 DIAGNOSIS — E11.42 DIABETIC PERIPHERAL NEUROPATHY (HCC): ICD-10-CM

## 2025-08-07 DIAGNOSIS — I50.9 ACUTE ON CHRONIC CONGESTIVE HEART FAILURE, UNSPECIFIED HEART FAILURE TYPE (HCC): ICD-10-CM

## 2025-08-07 DIAGNOSIS — Z95.1 STATUS POST CORONARY ARTERY BYPASS GRAFT: ICD-10-CM

## 2025-08-07 DIAGNOSIS — I87.2 STASIS DERMATITIS OF BOTH LEGS: ICD-10-CM

## 2025-08-07 DIAGNOSIS — Z79.4 TYPE 2 DIABETES MELLITUS WITH HYPERGLYCEMIA, WITH LONG-TERM CURRENT USE OF INSULIN (HCC): ICD-10-CM

## 2025-08-07 DIAGNOSIS — F41.8 SITUATIONAL ANXIETY: Primary | ICD-10-CM

## 2025-08-07 DIAGNOSIS — E03.9 ACQUIRED HYPOTHYROIDISM: ICD-10-CM

## 2025-08-07 DIAGNOSIS — E11.42 TYPE 2 DIABETES MELLITUS WITH DIABETIC POLYNEUROPATHY, WITHOUT LONG-TERM CURRENT USE OF INSULIN (HCC): ICD-10-CM

## 2025-08-07 DIAGNOSIS — I38 VALVULAR HEART DISEASE: ICD-10-CM

## 2025-08-07 DIAGNOSIS — Z00.00 MEDICARE ANNUAL WELLNESS VISIT, SUBSEQUENT: ICD-10-CM

## 2025-08-07 DIAGNOSIS — E53.8 FOLIC ACID DEFICIENCY: ICD-10-CM

## 2025-08-07 DIAGNOSIS — I73.9 CLAUDICATION: ICD-10-CM

## 2025-08-07 DIAGNOSIS — K21.9 GASTROESOPHAGEAL REFLUX DISEASE WITHOUT ESOPHAGITIS: ICD-10-CM

## 2025-08-07 DIAGNOSIS — Z87.891 PERSONAL HISTORY OF TOBACCO USE: ICD-10-CM

## 2025-08-07 DIAGNOSIS — Z13.6 ENCOUNTER FOR ABDOMINAL AORTIC ANEURYSM (AAA) SCREENING: ICD-10-CM

## 2025-08-07 DIAGNOSIS — E11.65 TYPE 2 DIABETES MELLITUS WITH HYPERGLYCEMIA, WITH LONG-TERM CURRENT USE OF INSULIN (HCC): ICD-10-CM

## 2025-08-07 DIAGNOSIS — I48.0 PAF (PAROXYSMAL ATRIAL FIBRILLATION) (HCC): ICD-10-CM

## 2025-08-07 RX ORDER — DIAZEPAM 5 MG/1
TABLET ORAL
Qty: 6 TABLET | Refills: 0 | Status: SHIPPED | OUTPATIENT
Start: 2025-08-07 | End: 2025-08-07

## 2025-08-07 RX ORDER — CLINDAMYCIN PHOSPHATE 10 UG/ML
LOTION TOPICAL
Qty: 60 G | Refills: 2 | Status: SHIPPED | OUTPATIENT
Start: 2025-08-07 | End: 2025-09-06

## 2025-08-07 RX ORDER — WARFARIN SODIUM 2.5 MG/1
TABLET ORAL
Qty: 30 TABLET | Refills: 3 | Status: SHIPPED | OUTPATIENT
Start: 2025-08-07

## 2025-08-07 RX ORDER — INSULIN GLARGINE 100 [IU]/ML
INJECTION, SOLUTION SUBCUTANEOUS
Qty: 5 ADJUSTABLE DOSE PRE-FILLED PEN SYRINGE | Refills: 3
Start: 2025-08-07

## 2025-08-07 RX ORDER — PANTOPRAZOLE SODIUM 40 MG/1
40 TABLET, DELAYED RELEASE ORAL DAILY
Qty: 90 TABLET | Refills: 5 | Status: SHIPPED | OUTPATIENT
Start: 2025-08-07

## 2025-08-07 RX ORDER — LEVOTHYROXINE SODIUM 150 UG/1
150 TABLET ORAL DAILY
Qty: 90 TABLET | Refills: 5 | Status: SHIPPED | OUTPATIENT
Start: 2025-08-07

## 2025-08-07 RX ORDER — LEVOTHYROXINE SODIUM 150 UG/1
150 TABLET ORAL DAILY
Qty: 90 TABLET | Refills: 0 | OUTPATIENT
Start: 2025-08-07

## 2025-08-07 RX ORDER — DULOXETIN HYDROCHLORIDE 30 MG/1
30 CAPSULE, DELAYED RELEASE ORAL DAILY
Qty: 90 CAPSULE | Refills: 4 | Status: SHIPPED | OUTPATIENT
Start: 2025-08-07

## 2025-08-07 RX ORDER — LISINOPRIL 5 MG/1
TABLET ORAL
COMMUNITY

## 2025-08-07 ASSESSMENT — LIFESTYLE VARIABLES: HOW OFTEN DO YOU HAVE A DRINK CONTAINING ALCOHOL: NEVER

## 2025-08-11 ENCOUNTER — TELEPHONE (OUTPATIENT)
Dept: OTHER | Age: 70
End: 2025-08-11

## 2025-08-12 ENCOUNTER — TELEPHONE (OUTPATIENT)
Dept: CARDIAC REHAB | Age: 70
End: 2025-08-12

## 2025-08-12 ENCOUNTER — HOSPITAL ENCOUNTER (OUTPATIENT)
Dept: OTHER | Age: 70
Setting detail: THERAPIES SERIES
Discharge: HOME | End: 2025-08-12
Payer: MEDICARE

## 2025-08-12 VITALS
WEIGHT: 197 LBS | RESPIRATION RATE: 17 BRPM | OXYGEN SATURATION: 95 % | HEART RATE: 104 BPM | DIASTOLIC BLOOD PRESSURE: 98 MMHG | BODY MASS INDEX: 32.78 KG/M2 | SYSTOLIC BLOOD PRESSURE: 126 MMHG

## 2025-08-12 LAB
ANION GAP SERPL CALCULATED.3IONS-SCNC: 11 MMOL/L (ref 7–16)
BNP SERPL-MCNC: 6133 PG/ML (ref 0–125)
BUN SERPL-MCNC: 16 MG/DL (ref 8–23)
CALCIUM SERPL-MCNC: 8.7 MG/DL (ref 8.8–10.2)
CHLORIDE SERPL-SCNC: 99 MMOL/L (ref 98–107)
CO2 SERPL-SCNC: 27 MMOL/L (ref 22–29)
CREAT SERPL-MCNC: 0.9 MG/DL (ref 0.7–1.2)
GFR, ESTIMATED: 88 ML/MIN/1.73M2
GLUCOSE SERPL-MCNC: 190 MG/DL (ref 74–99)
POTASSIUM SERPL-SCNC: 3.5 MMOL/L (ref 3.5–5.1)
SODIUM SERPL-SCNC: 137 MMOL/L (ref 136–145)

## 2025-08-12 PROCEDURE — 6360000002 HC RX W HCPCS: Performed by: INTERNAL MEDICINE

## 2025-08-12 PROCEDURE — 36415 COLL VENOUS BLD VENIPUNCTURE: CPT

## 2025-08-12 PROCEDURE — 2500000003 HC RX 250 WO HCPCS: Performed by: INTERNAL MEDICINE

## 2025-08-12 PROCEDURE — 80048 BASIC METABOLIC PNL TOTAL CA: CPT

## 2025-08-12 PROCEDURE — 99214 OFFICE O/P EST MOD 30 MIN: CPT

## 2025-08-12 PROCEDURE — 83880 ASSAY OF NATRIURETIC PEPTIDE: CPT

## 2025-08-12 PROCEDURE — 96374 THER/PROPH/DIAG INJ IV PUSH: CPT

## 2025-08-12 RX ORDER — SODIUM CHLORIDE 0.9 % (FLUSH) 0.9 %
10 SYRINGE (ML) INJECTION PRN
Status: DISCONTINUED | OUTPATIENT
Start: 2025-08-12 | End: 2025-08-13 | Stop reason: HOSPADM

## 2025-08-12 RX ORDER — BUMETANIDE 0.25 MG/ML
2 INJECTION, SOLUTION INTRAMUSCULAR; INTRAVENOUS ONCE
Status: COMPLETED | OUTPATIENT
Start: 2025-08-12 | End: 2025-08-12

## 2025-08-12 RX ADMIN — BUMETANIDE 2 MG: 0.25 INJECTION INTRAMUSCULAR; INTRAVENOUS at 12:49

## 2025-08-12 RX ADMIN — SODIUM CHLORIDE, PRESERVATIVE FREE 10 ML: 5 INJECTION INTRAVENOUS at 12:50

## 2025-08-13 ENCOUNTER — HOSPITAL ENCOUNTER (OUTPATIENT)
Dept: MRI IMAGING | Age: 70
Discharge: HOME | End: 2025-08-15
Payer: MEDICARE

## 2025-08-13 DIAGNOSIS — M54.6 ACUTE MIDLINE THORACIC BACK PAIN: ICD-10-CM

## 2025-08-13 DIAGNOSIS — Z87.81 HX OF COMPRESSION FRACTURE OF SPINE: ICD-10-CM

## 2025-08-13 DIAGNOSIS — M54.50 ACUTE BILATERAL LOW BACK PAIN WITHOUT SCIATICA: ICD-10-CM

## 2025-08-13 PROCEDURE — 72148 MRI LUMBAR SPINE W/O DYE: CPT

## 2025-08-13 PROCEDURE — 72146 MRI CHEST SPINE W/O DYE: CPT

## 2025-08-15 DIAGNOSIS — I87.2 VENOUS INSUFFICIENCY (CHRONIC) (PERIPHERAL): ICD-10-CM

## 2025-08-15 RX ORDER — BUMETANIDE 1 MG/1
1 TABLET ORAL 2 TIMES DAILY
Qty: 180 TABLET | Refills: 3 | Status: SHIPPED | OUTPATIENT
Start: 2025-08-15

## 2025-08-20 ENCOUNTER — TELEPHONE (OUTPATIENT)
Age: 70
End: 2025-08-20

## 2025-08-20 DIAGNOSIS — S22.070A CLOSED WEDGE COMPRESSION FRACTURE OF T10 VERTEBRA, INITIAL ENCOUNTER (HCC): Primary | ICD-10-CM

## 2025-08-21 ENCOUNTER — HOSPITAL ENCOUNTER (OUTPATIENT)
Dept: OTHER | Age: 70
Setting detail: THERAPIES SERIES
Discharge: HOME | End: 2025-08-21
Payer: MEDICARE

## 2025-08-21 VITALS
DIASTOLIC BLOOD PRESSURE: 82 MMHG | SYSTOLIC BLOOD PRESSURE: 115 MMHG | BODY MASS INDEX: 32.78 KG/M2 | OXYGEN SATURATION: 97 % | WEIGHT: 197 LBS

## 2025-08-21 LAB
ANION GAP SERPL CALCULATED.3IONS-SCNC: 10 MMOL/L (ref 7–16)
BNP SERPL-MCNC: 5486 PG/ML (ref 0–125)
BUN SERPL-MCNC: 20 MG/DL (ref 8–23)
CALCIUM SERPL-MCNC: 8.8 MG/DL (ref 8.8–10.2)
CHLORIDE SERPL-SCNC: 99 MMOL/L (ref 98–107)
CO2 SERPL-SCNC: 28 MMOL/L (ref 22–29)
CREAT SERPL-MCNC: 0.9 MG/DL (ref 0.7–1.2)
GFR, ESTIMATED: 89 ML/MIN/1.73M2
GLUCOSE SERPL-MCNC: 201 MG/DL (ref 74–99)
POTASSIUM SERPL-SCNC: 3.4 MMOL/L (ref 3.5–5.1)
SODIUM SERPL-SCNC: 137 MMOL/L (ref 136–145)

## 2025-08-21 PROCEDURE — 83880 ASSAY OF NATRIURETIC PEPTIDE: CPT

## 2025-08-21 PROCEDURE — 2500000003 HC RX 250 WO HCPCS: Performed by: INTERNAL MEDICINE

## 2025-08-21 PROCEDURE — 99214 OFFICE O/P EST MOD 30 MIN: CPT

## 2025-08-21 PROCEDURE — 36415 COLL VENOUS BLD VENIPUNCTURE: CPT

## 2025-08-21 PROCEDURE — 96374 THER/PROPH/DIAG INJ IV PUSH: CPT

## 2025-08-21 PROCEDURE — 6360000002 HC RX W HCPCS: Performed by: INTERNAL MEDICINE

## 2025-08-21 PROCEDURE — 80048 BASIC METABOLIC PNL TOTAL CA: CPT

## 2025-08-21 RX ORDER — BUMETANIDE 0.25 MG/ML
2 INJECTION, SOLUTION INTRAMUSCULAR; INTRAVENOUS ONCE
Status: COMPLETED | OUTPATIENT
Start: 2025-08-21 | End: 2025-08-21

## 2025-08-21 RX ORDER — SODIUM CHLORIDE 0.9 % (FLUSH) 0.9 %
5-40 SYRINGE (ML) INJECTION 2 TIMES DAILY
Status: DISCONTINUED | OUTPATIENT
Start: 2025-08-21 | End: 2025-08-22 | Stop reason: HOSPADM

## 2025-08-21 RX ORDER — BACLOFEN 20 MG/1
20 TABLET ORAL 3 TIMES DAILY PRN
COMMUNITY

## 2025-08-21 RX ADMIN — BUMETANIDE 2 MG: 0.25 INJECTION INTRAMUSCULAR; INTRAVENOUS at 11:19

## 2025-08-21 RX ADMIN — SODIUM CHLORIDE, PRESERVATIVE FREE 10 ML: 5 INJECTION INTRAVENOUS at 11:20

## 2025-08-22 ENCOUNTER — TELEPHONE (OUTPATIENT)
Dept: FAMILY MEDICINE CLINIC | Age: 70
End: 2025-08-22

## 2025-08-28 ENCOUNTER — TELEPHONE (OUTPATIENT)
Dept: CARDIAC REHAB | Age: 70
End: 2025-08-28

## 2025-09-01 DIAGNOSIS — I73.9 PAD (PERIPHERAL ARTERY DISEASE): Primary | ICD-10-CM

## 2025-09-03 ENCOUNTER — TELEPHONE (OUTPATIENT)
Dept: VASCULAR SURGERY | Age: 70
End: 2025-09-03